# Patient Record
Sex: MALE | Race: WHITE | NOT HISPANIC OR LATINO | Employment: OTHER | URBAN - METROPOLITAN AREA
[De-identification: names, ages, dates, MRNs, and addresses within clinical notes are randomized per-mention and may not be internally consistent; named-entity substitution may affect disease eponyms.]

---

## 2017-01-06 ENCOUNTER — TELEPHONE (OUTPATIENT)
Dept: FAMILY MEDICINE | Facility: CLINIC | Age: 67
End: 2017-01-06

## 2017-01-06 DIAGNOSIS — K59.00 CONSTIPATION, UNSPECIFIED CONSTIPATION TYPE: ICD-10-CM

## 2017-01-06 DIAGNOSIS — R60.0 BILATERAL EDEMA OF LOWER EXTREMITY: ICD-10-CM

## 2017-01-06 DIAGNOSIS — E66.01 MORBID OBESITY, UNSPECIFIED OBESITY TYPE (H): Primary | ICD-10-CM

## 2017-01-06 NOTE — TELEPHONE ENCOUNTER
OKAY TO WAIT for Dr. Jamar Womack  Reason for Call:  Other call back    Detailed comments: 1.) Requesting approval of a referral to a dietician  2.)  Has seen two products advertised on TV and is wondering if they would be beneficial for Jamar.  (A.) Revitive for his leg circulation and (B.) Linzess for his constipation    Phone Number Patient can be reached at: Home number on file [phone] (home)    Best Time: When reviewed by Dr. Jamar Womack      Can we leave a detailed message on this number? YES    Call taken on 1/6/2017 at 4:12 PM by MIKEL GIL

## 2017-01-16 NOTE — TELEPHONE ENCOUNTER
theese were ordered. Will need to see if covered by  [employer] insurance. The dme order was printed out could be faxed to medical supply  Store. Please let patient know.     Jamar Womack MD

## 2017-01-17 NOTE — TELEPHONE ENCOUNTER
Referral done for dietician and pt notified.  Gavin was sent to pharmacy and pt notified.  Ok given to order Revitive machine

## 2017-01-20 ENCOUNTER — TRANSFERRED RECORDS (OUTPATIENT)
Dept: HEALTH INFORMATION MANAGEMENT | Facility: CLINIC | Age: 67
End: 2017-01-20

## 2017-01-24 ENCOUNTER — OFFICE VISIT (OUTPATIENT)
Dept: FAMILY MEDICINE | Facility: CLINIC | Age: 67
End: 2017-01-24
Payer: MEDICARE

## 2017-01-24 VITALS
BODY MASS INDEX: 47.74 KG/M2 | DIASTOLIC BLOOD PRESSURE: 74 MMHG | TEMPERATURE: 98.3 F | RESPIRATION RATE: 18 BRPM | SYSTOLIC BLOOD PRESSURE: 126 MMHG | HEART RATE: 74 BPM | OXYGEN SATURATION: 97 % | HEIGHT: 68 IN | WEIGHT: 315 LBS

## 2017-01-24 DIAGNOSIS — M79.604 PAIN IN BOTH LOWER EXTREMITIES: Primary | ICD-10-CM

## 2017-01-24 DIAGNOSIS — R60.9 EDEMA, UNSPECIFIED TYPE: ICD-10-CM

## 2017-01-24 DIAGNOSIS — M79.605 PAIN IN BOTH LOWER EXTREMITIES: Primary | ICD-10-CM

## 2017-01-24 PROCEDURE — 99213 OFFICE O/P EST LOW 20 MIN: CPT | Performed by: FAMILY MEDICINE

## 2017-01-24 RX ORDER — FUROSEMIDE 40 MG
TABLET ORAL
Qty: 90 TABLET | Refills: 3 | Status: SHIPPED | OUTPATIENT
Start: 2017-01-24 | End: 2017-05-25

## 2017-01-24 RX ORDER — HYDROCODONE BITARTRATE AND ACETAMINOPHEN 5; 325 MG/1; MG/1
TABLET ORAL
Qty: 70 TABLET | Refills: 0 | Status: SHIPPED | OUTPATIENT
Start: 2017-01-24 | End: 2017-08-31
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
## 2017-01-24 NOTE — MR AVS SNAPSHOT
"              After Visit Summary   2017    Jamar Ivory    MRN: [id number]           Patient Information     Date Of Birth          1950        Visit Information        Provider Department      2017 3:30 PM Jamar Womack MD [facility]        Today's Diagnoses     Pain in both lower extremities    -  1     Edema, unspecified type            Follow-ups after your visit        Who to contact     If you have questions or need follow up information about today's clinic visit or your schedule please contact [facility] directly at [phone].  Normal or non-critical lab and imaging results will be communicated to you by [employer], letter or phone within 4 business days after the clinic has received the results. If you do not hear from us within 7 days, please contact the clinic through [employer] or phone. If you have a critical or abnormal lab result, we will notify you by phone as soon as possible.  Submit refill requests through [employer] or call your pharmacy and they will forward the refill request to us. Please allow 3 business days for your refill to be completed.          Additional Information About Your Visit        MyChart Information     [employer] lets you send messages to your doctor, view your test results, renew your prescriptions, schedule appointments and more. To sign up, go to [email or web address] . Click on \"Log in\" on the left side of the screen, which will take you to the Welcome page. Then click on \"Sign up Now\" on the right side of the page.     You will be asked to enter the access code listed below, as well as some personal information. Please follow the directions to create your username and password.     Your access code is: [email or web address]  Expires: 2017  7:37 AM     Your access code will  in 90 days. If you need help or a new code, please call your [facility] or [phone].      " "  Care EveryWhere ID     This is your Care EveryWhere ID. This could be used by other organizations to access your [place] medical records  [id number]        Your Vitals Were     Pulse Temperature Respirations Height BMI (Body Mass Index) Pulse Oximetry    74 98.3  F (36.8  C) 18 5' 8\" (1.727 m) 53.23 kg/m2 97%       Blood Pressure from Last 3 Encounters:   01/24/17 126/74   12/21/16 134/80   12/09/16 114/70    Weight from Last 3 Encounters:   01/24/17 350 lb (158.759 kg)   12/21/16 359 lb (162.841 kg)   12/09/16 352 lb 8 oz (159.893 kg)              Today, you had the following     No orders found for display         Today's Medication Changes          These changes are accurate as of: 1/24/17 11:59 PM.  If you have any questions, ask your nurse or doctor.               These medicines have changed or have updated prescriptions.        Dose/Directions    aspirin 325 MG EC tablet   This may have changed:  how much to take   Used for:  CVA (cerebral infarction)        Dose:  325 mg   Take 1 tablet (325 mg) by mouth daily   Quantity:  90 tablet   Refills:  3       furosemide 40 MG tablet   Commonly known as:  LASIX   This may have changed:    - how much to take  - how to take this  - when to take this  - additional instructions   Used for:  Edema, unspecified type   Changed by:  Jamar Womack MD        New dose--2 in the morning and one in the evening.   Quantity:  90 tablet   Refills:  3       HYDROcodone-acetaminophen 5-325 MG per tablet   Commonly known as:  NORCO   This may have changed:    - how much to take  - how to take this  - when to take this  - reasons to take this  - additional instructions   Used for:  Pain in both lower extremities   Changed by:  Jamar Womack MD        1-2 tabs four times a day.   Quantity:  70 tablet   Refills:  0            Where to get your medicines      These medications were sent to [facility] [address] [place] [facility] [place], [place] - [address] " [place], [facility] [address]     Phone:  [phone]    - furosemide 40 MG tablet      Some of these will need a paper prescription and others can be bought over the counter.  Ask your nurse if you have questions.     Bring a paper prescription for each of these medications    - HYDROcodone-acetaminophen 5-325 MG per tablet             Primary Care Provider Office Phone # Fax #    Jamar Womack [phone]       [facility] [address]  [facility] [address]        Thank you!     Thank you for choosing [facility]  for your care. Our goal is always to provide you with excellent care. Hearing back from our patients is one way we can continue to improve our services. Please take a few minutes to complete the written survey that you may receive in the mail after your visit with us. Thank you!             Your Updated Medication List - Protect others around you: Learn how to safely use, store and throw away your medicines at www.disposemymeds.org.          This list is accurate as of: 1/24/17 11:59 PM.  Always use your most recent med list.                   Brand Name Dispense Instructions for use    acetaminophen 325 MG tablet    TYLENOL    100 tablet    Take 2 tablets (650 mg) by mouth every 6 hours as needed for mild pain       aspirin 325 MG EC tablet     90 tablet    Take 1 tablet (325 mg) by mouth daily       buPROPion 150 MG 24 hr tablet    WELLBUTRIN XL    30 tablet    Take 1 tablet (150 mg) by mouth every morning       * finasteride 5 MG tablet    PROSCAR    30 tablet    Take 1 tablet (5 mg) by mouth daily       * finasteride 5 MG tablet    PROSCAR    30 tablet    Take 1 tablet (5 mg) by mouth daily       furosemide 40 MG tablet    LASIX    90 tablet    New dose--2 in the morning and one in the evening.       gabapentin 100 MG capsule    NEURONTIN     Take 100 mg by mouth       Glucosamine-Chondroitin--200-150 MG Tabs           HYDROcodone-acetaminophen 5-325 MG per tablet    NORCO    70 tablet    1-2 tabs four times a day.       ibuprofen 800 MG tablet    ADVIL/MOTRIN    60 tablet    Take 1 tablet (800 mg) by mouth every 8 hours as needed for pain       indomethacin 50 MG capsule    INDOCIN    15 capsule    Take 1 capsule (50 mg) by mouth 3 times daily (with meals) for 5 days       levothyroxine 175 MCG tablet    SYNTHROID/LEVOTHROID    90 tablet    Take 1 tablet (175 mcg) by mouth daily       LEXAPRO 20 MG tablet   Generic drug:  escitalopram      Take 20 mg by mouth       linaclotide 145 MCG capsule    LINZESS    30 capsule    Take 1 capsule (145 mcg) by mouth every morning (before breakfast)       METOPROLOL SUCCINATE ER PO      Take 25 mg by mouth       order for DME     100 strip    Equipment being ordered: blood glucose monitor strips to use 2 to 4 times  A day. As covered by insurance. #100 with 5 refills  Monitor ordered this day  As well.  Also lancets #100 use as needed for testing 5 refils       potassium chloride SA 10 MEQ CR tablet    K-DUR/KLOR-CON M    30 tablet    Take 1 tablet (10 mEq) by mouth daily       * Notice:  This list has 2 medication(s) that are the same as other medications prescribed for you. Read the directions carefully, and ask your doctor or other care provider to review them with you.

## 2017-01-24 NOTE — NURSING NOTE
"Chief Complaint   Patient presents with     RECHECK       Initial /74 mmHg  Pulse 74  Temp(Src) 98.3  F (36.8  C)  Resp 18  Ht 5' 8\" (1.727 m)  Wt 350 lb (158.759 kg)  BMI 53.23 kg/m2  SpO2 97% Estimated body mass index is 53.23 kg/(m^2) as calculated from the following:    Height as of this encounter: 5' 8\" (1.727 m).    Weight as of this encounter: 350 lb (158.759 kg).  BP completed using cuff size: jassi Matson CMA      "

## 2017-02-07 ENCOUNTER — OFFICE VISIT (OUTPATIENT)
Dept: FAMILY MEDICINE | Facility: CLINIC | Age: 67
End: 2017-02-07
Payer: MEDICARE

## 2017-02-07 VITALS
BODY MASS INDEX: 51.86 KG/M2 | SYSTOLIC BLOOD PRESSURE: 112 MMHG | WEIGHT: 315 LBS | HEART RATE: 67 BPM | RESPIRATION RATE: 18 BRPM | OXYGEN SATURATION: 94 % | TEMPERATURE: 98.8 F | DIASTOLIC BLOOD PRESSURE: 70 MMHG

## 2017-02-07 DIAGNOSIS — I50.42 CHRONIC COMBINED SYSTOLIC AND DIASTOLIC CONGESTIVE HEART FAILURE (H): ICD-10-CM

## 2017-02-07 DIAGNOSIS — R06.02 SHORTNESS OF BREATH: ICD-10-CM

## 2017-02-07 DIAGNOSIS — Z23 NEED FOR PROPHYLACTIC VACCINATION AND INOCULATION AGAINST INFLUENZA: Primary | ICD-10-CM

## 2017-02-07 DIAGNOSIS — Z23 NEED FOR PROPHYLACTIC VACCINATION AGAINST STREPTOCOCCUS PNEUMONIAE (PNEUMOCOCCUS): ICD-10-CM

## 2017-02-07 LAB
ERYTHROCYTE [DISTWIDTH] IN BLOOD BY AUTOMATED COUNT: 14.5 % (ref 10–15)
HCT VFR BLD AUTO: 37 % (ref 40–53)
HGB BLD-MCNC: 12.3 G/DL (ref 13.3–17.7)
MCH RBC QN AUTO: 28 PG (ref 26.5–33)
MCHC RBC AUTO-ENTMCNC: 33.2 G/DL (ref 31.5–36.5)
MCV RBC AUTO: 84 FL (ref 78–100)
NT-PROBNP SERPL-MCNC: 89 PG/ML (ref 0–125)
PLATELET # BLD AUTO: 255 10E9/L (ref 150–450)
RBC # BLD AUTO: 4.4 10E12/L (ref 4.4–5.9)
WBC # BLD AUTO: 4 10E9/L (ref 4–11)

## 2017-02-07 PROCEDURE — 99214 OFFICE O/P EST MOD 30 MIN: CPT | Performed by: FAMILY MEDICINE

## 2017-02-07 PROCEDURE — 83880 ASSAY OF NATRIURETIC PEPTIDE: CPT | Performed by: FAMILY MEDICINE

## 2017-02-07 PROCEDURE — 36415 COLL VENOUS BLD VENIPUNCTURE: CPT | Performed by: FAMILY MEDICINE

## 2017-02-07 PROCEDURE — 85027 COMPLETE CBC AUTOMATED: CPT | Performed by: FAMILY MEDICINE

## 2017-02-07 PROCEDURE — 80048 BASIC METABOLIC PNL TOTAL CA: CPT | Performed by: FAMILY MEDICINE

## 2017-02-07 NOTE — NURSING NOTE
"Chief Complaint   Patient presents with     Insomnia     Depression     Chills       Initial /70 mmHg  Pulse 67  Temp(Src) 98.8  F (37.1  C)  Resp 18  Wt 341 lb (154.677 kg)  SpO2 94% Estimated body mass index is 51.86 kg/(m^2) as calculated from the following:    Height as of 1/24/17: 5' 8\" (1.727 m).    Weight as of this encounter: 341 lb (154.677 kg).  Medication Reconciliation: complete    "
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
## 2017-02-07 NOTE — MR AVS SNAPSHOT
"              After Visit Summary   2/7/2017    Jamar Ivory    MRN: [id number]           Patient Information     Date Of Birth          1950        Visit Information        Provider Department      2/7/2017 10:30 AM Jamar Womack MD [facility]        Today's Diagnoses     Need for prophylactic vaccination and inoculation against influenza    -  1     Need for prophylactic vaccination against Streptococcus pneumoniae (pneumococcus)         Chronic combined systolic and diastolic congestive heart failure (H)         Shortness of breath            Follow-ups after your visit        Who to contact     If you have questions or need follow up information about today's clinic visit or your schedule please contact [facility] directly at [phone].  Normal or non-critical lab and imaging results will be communicated to you by MyChart, letter or phone within 4 business days after the clinic has received the results. If you do not hear from us within 7 days, please contact the clinic through [employer] or phone. If you have a critical or abnormal lab result, we will notify you by phone as soon as possible.  Submit refill requests through [employer] or call your pharmacy and they will forward the refill request to us. Please allow 3 business days for your refill to be completed.          Additional Information About Your Visit        MyChart Information     [employer] lets you send messages to your doctor, view your test results, renew your prescriptions, schedule appointments and more. To sign up, go to [email or web address] . Click on \"Log in\" on the left side of the screen, which will take you to the Welcome page. Then click on \"Sign up Now\" on the right side of the page.     You will be asked to enter the access code listed below, as well as some personal information. Please follow the directions to create your username and " password.     Your access code is: [email or web address]  Expires: 2017  7:37 AM     Your access code will  in 90 days. If you need help or a new code, please call your [place] clinic or [phone].        Care EveryWhere ID     This is your Care EveryWhere ID. This could be used by other organizations to access your [place] medical records  [id number]        Your Vitals Were     Pulse Temperature Respirations Pulse Oximetry          67 98.8  F (37.1  C) 18 94%         Blood Pressure from Last 3 Encounters:   17 112/70   17 126/74   16 134/80    Weight from Last 3 Encounters:   17 341 lb (154.677 kg)   17 350 lb (158.759 kg)   16 359 lb (162.841 kg)              We Performed the Following     Basic metabolic panel     BNP-N terminal pro     CBC with platelets          Today's Medication Changes          These changes are accurate as of: 17  4:58 PM.  If you have any questions, ask your nurse or doctor.               These medicines have changed or have updated prescriptions.        Dose/Directions    aspirin 325 MG EC tablet   This may have changed:  how much to take   Used for:  CVA (cerebral infarction)        Dose:  325 mg   Take 1 tablet (325 mg) by mouth daily   Quantity:  90 tablet   Refills:  3                Primary Care Provider Office Phone # Fax #    Jamar Womack [phone]       [facility] [address] [facility] [address]        Thank you!     Thank you for choosing [facility]  for your care. Our goal is always to provide you with excellent care. Hearing back from our patients is one way we can continue to improve our services. Please take a few minutes to complete the written survey that you may receive in the mail after your visit with us. Thank you!             Your Updated Medication List - Protect others around you: Learn how to safely use, store and throw away your  medicines at www.disposemymeds.org.          This list is accurate as of: 2/7/17  4:58 PM.  Always use your most recent med list.                   Brand Name Dispense Instructions for use    acetaminophen 325 MG tablet    TYLENOL    100 tablet    Take 2 tablets (650 mg) by mouth every 6 hours as needed for mild pain       aspirin 325 MG EC tablet     90 tablet    Take 1 tablet (325 mg) by mouth daily       buPROPion 150 MG 24 hr tablet    WELLBUTRIN XL    30 tablet    Take 1 tablet (150 mg) by mouth every morning       * finasteride 5 MG tablet    PROSCAR    30 tablet    Take 1 tablet (5 mg) by mouth daily       * finasteride 5 MG tablet    PROSCAR    30 tablet    Take 1 tablet (5 mg) by mouth daily       furosemide 40 MG tablet    LASIX    90 tablet    New dose--2 in the morning and one in the evening.       gabapentin 100 MG capsule    NEURONTIN     Take 100 mg by mouth       Glucosamine-Chondroitin--200-150 MG Tabs          HYDROcodone-acetaminophen 5-325 MG per tablet    NORCO    70 tablet    1-2 tabs four times a day.       ibuprofen 800 MG tablet    ADVIL/MOTRIN    60 tablet    Take 1 tablet (800 mg) by mouth every 8 hours as needed for pain       indomethacin 50 MG capsule    INDOCIN    15 capsule    Take 1 capsule (50 mg) by mouth 3 times daily (with meals) for 5 days       levothyroxine 175 MCG tablet    SYNTHROID/LEVOTHROID    90 tablet    Take 1 tablet (175 mcg) by mouth daily       LEXAPRO 20 MG tablet   Generic drug:  escitalopram      Take 20 mg by mouth       linaclotide 145 MCG capsule    LINZESS    30 capsule    Take 1 capsule (145 mcg) by mouth every morning (before breakfast)       METOPROLOL SUCCINATE ER PO      Take 25 mg by mouth       order for DME     100 strip    Equipment being ordered: blood glucose monitor strips to use 2 to 4 times  A day. As covered by insurance. #100 with 5 refills  Monitor ordered this day  As well.  Also lancets #100 use as needed for testing 5 refils        potassium chloride SA 10 MEQ CR tablet    K-DUR/KLOR-CON M    30 tablet    Take 1 tablet (10 mEq) by mouth daily       * Notice:  This list has 2 medication(s) that are the same as other medications prescribed for you. Read the directions carefully, and ask your doctor or other care provider to review them with you.

## 2017-02-08 ENCOUNTER — TELEPHONE (OUTPATIENT)
Dept: FAMILY MEDICINE | Facility: CLINIC | Age: 67
End: 2017-02-08

## 2017-02-08 LAB
ANION GAP SERPL CALCULATED.3IONS-SCNC: 7 MMOL/L (ref 3–14)
BUN SERPL-MCNC: 18 MG/DL (ref 7–30)
CALCIUM SERPL-MCNC: 8.8 MG/DL (ref 8.5–10.1)
CHLORIDE SERPL-SCNC: 103 MMOL/L (ref 94–109)
CO2 SERPL-SCNC: 29 MMOL/L (ref 20–32)
CREAT SERPL-MCNC: 0.9 MG/DL (ref 0.66–1.25)
GFR SERPL CREATININE-BSD FRML MDRD: 84 ML/MIN/1.7M2
GLUCOSE SERPL-MCNC: 99 MG/DL (ref 70–99)
POTASSIUM SERPL-SCNC: 4.5 MMOL/L (ref 3.4–5.3)
SODIUM SERPL-SCNC: 139 MMOL/L (ref 133–144)

## 2017-02-08 NOTE — TELEPHONE ENCOUNTER
Pt's wife was called reports pt he has diarrhea. 6 times today while using Linzess. HC -instructions given. Push fluids, bland food . Hold medication until further directed by PCP.   Wife verbalized understanding and agreement with plan. Will wait to hear from PCP if he is to discontinue or resume Rx when symptoms improved.

## 2017-02-08 NOTE — TELEPHONE ENCOUNTER
Patients wife salvador called stating pt was given LINZESS for constipation and now has been having Diarrhea. Has has about 6 episodes today.  Please call.    Magy Martínez TC

## 2017-02-15 ENCOUNTER — TELEPHONE (OUTPATIENT)
Dept: FAMILY MEDICINE | Facility: CLINIC | Age: 67
End: 2017-02-15

## 2017-02-15 DIAGNOSIS — E87.6 HYPOKALEMIA: Primary | ICD-10-CM

## 2017-02-24 NOTE — PROGRESS NOTES
Result was abnormal The anemia test is low. The other tests are ok. .      Please let patient know by calling  or sending a  letter.

## 2017-03-01 DIAGNOSIS — E03.9 ACQUIRED HYPOTHYROIDISM: Chronic | ICD-10-CM

## 2017-03-01 NOTE — TELEPHONE ENCOUNTER
Levothyroxine 175 mcg     Last Written Prescription Date: 12/3/16  Last Quantity: 90, # refills: 0  Last Office Visit with G, P or [facility] prescribing provider: 2/7/17        TSH   Date Value Ref Range Status   11/28/2016 8.82 (H) 0.40 - 5.00 mU/L Final

## 2017-03-02 DIAGNOSIS — E03.9 ACQUIRED HYPOTHYROIDISM: Chronic | ICD-10-CM

## 2017-03-02 LAB
T4 FREE SERPL-MCNC: 0.95 NG/DL (ref 0.76–1.46)
TSH SERPL DL<=0.005 MIU/L-ACNC: 9.03 MU/L (ref 0.4–4)

## 2017-03-02 PROCEDURE — 36415 COLL VENOUS BLD VENIPUNCTURE: CPT | Performed by: PHYSICIAN ASSISTANT

## 2017-03-02 PROCEDURE — 84439 ASSAY OF FREE THYROXINE: CPT | Performed by: PHYSICIAN ASSISTANT

## 2017-03-02 PROCEDURE — 84443 ASSAY THYROID STIM HORMONE: CPT | Performed by: PHYSICIAN ASSISTANT

## 2017-03-02 RX ORDER — LEVOTHYROXINE SODIUM 175 UG/1
TABLET ORAL
Qty: 30 TABLET | Refills: 0 | Status: SHIPPED | OUTPATIENT
Start: 2017-03-02 | End: 2017-08-31 | Stop reason: DRUGHIGH

## 2017-03-02 NOTE — TELEPHONE ENCOUNTER
Patient overdue for TSH recheck. Lab only appt scheduled.  Medication is being filled for 1 time refill only due to:  Patient needs labs for more refills.  Appt was scheduled.

## 2017-03-02 NOTE — LETTER
[facility]  [address]  [place] [address]  [phone]                                                                                                           Jamar Ivory  [address] [place] [address]    March 6, 2017      Dear Jamar,    The results of your recent tests were reviewed and are enclosed.   Result was abnormal The tsh thyroid test is a little abnormal.  We need to increase the thyroid pill, and I have sent a prescription for the new dose to the pharmacy.  Then we can recheck after your on the new dose for 5-6 weeks  Results for orders placed or performed in visit on 03/02/17   TSH with free T4 reflex   Result Value Ref Range    TSH 9.03 (H) 0.40 - 4.00 mU/L   T4 free   Result Value Ref Range    T4 Free 0.95 0.76 - 1.46 ng/dL       Thank you for choosing [facility].  We appreciate the opportunity to serve you and look forward to supporting your healthcare needs in the future.    If you have any questions or concerns, please call me or my staff at [phone].      Sincerely,    MARTIN Morales

## 2017-03-04 RX ORDER — LEVOTHYROXINE SODIUM 200 UG/1
200 TABLET ORAL DAILY
Qty: 90 TABLET | Refills: 1 | Status: SHIPPED | OUTPATIENT
Start: 2017-03-04 | End: 2017-11-24

## 2017-03-04 NOTE — PROGRESS NOTES
Result was abnormal The tsh thyroid test is a little abnormal.  We need to increase the thyroid pill, and I have sent a prescription for the new dose to the pharmacy.  Then we can recheck after your on the new dose for 5-6 weeks.       Please let patient know by calling  or sending a  letter.

## 2017-05-05 DIAGNOSIS — I10 ESSENTIAL HYPERTENSION: Primary | ICD-10-CM

## 2017-05-08 RX ORDER — METOPROLOL SUCCINATE 25 MG/1
TABLET, EXTENDED RELEASE ORAL
Qty: 90 TABLET | Refills: 2 | Status: SHIPPED | OUTPATIENT
Start: 2017-05-08 | End: 2018-01-05

## 2017-05-08 NOTE — TELEPHONE ENCOUNTER
METOPROLOL SUCCINATE ER PO      Last Written Prescription Date: Historical  Last Fill Quantity: na, # refills: na    Last Office Visit with G, P or [facility] prescribing provider:  2/7/17   Future Office Visit:        BP Readings from Last 3 Encounters:   02/07/17 112/70   01/24/17 126/74   12/21/16 134/80

## 2017-05-25 ENCOUNTER — OFFICE VISIT (OUTPATIENT)
Dept: FAMILY MEDICINE | Facility: CLINIC | Age: 67
End: 2017-05-25
Payer: MEDICARE

## 2017-05-25 VITALS
DIASTOLIC BLOOD PRESSURE: 70 MMHG | RESPIRATION RATE: 18 BRPM | WEIGHT: 308 LBS | OXYGEN SATURATION: 93 % | SYSTOLIC BLOOD PRESSURE: 116 MMHG | BODY MASS INDEX: 46.83 KG/M2 | HEART RATE: 73 BPM | TEMPERATURE: 98.1 F

## 2017-05-25 DIAGNOSIS — R60.9 EDEMA, UNSPECIFIED TYPE: ICD-10-CM

## 2017-05-25 DIAGNOSIS — E11.9 DIABETES MELLITUS WITHOUT COMPLICATION (H): ICD-10-CM

## 2017-05-25 DIAGNOSIS — J20.9 ACUTE BRONCHITIS, UNSPECIFIED ORGANISM: ICD-10-CM

## 2017-05-25 DIAGNOSIS — Z23 NEED FOR PROPHYLACTIC VACCINATION AGAINST STREPTOCOCCUS PNEUMONIAE (PNEUMOCOCCUS): Primary | ICD-10-CM

## 2017-05-25 LAB
ANION GAP SERPL CALCULATED.3IONS-SCNC: 8 MMOL/L (ref 3–14)
BUN SERPL-MCNC: 6 MG/DL (ref 7–30)
CALCIUM SERPL-MCNC: 9.5 MG/DL (ref 8.5–10.1)
CHLORIDE SERPL-SCNC: 97 MMOL/L (ref 94–109)
CO2 SERPL-SCNC: 33 MMOL/L (ref 20–32)
CREAT SERPL-MCNC: 0.77 MG/DL (ref 0.66–1.25)
GFR SERPL CREATININE-BSD FRML MDRD: ABNORMAL ML/MIN/1.7M2
GLUCOSE SERPL-MCNC: 85 MG/DL (ref 70–99)
HBA1C MFR BLD: 5.7 % (ref 4.3–6)
POTASSIUM SERPL-SCNC: 2.8 MMOL/L (ref 3.4–5.3)
SODIUM SERPL-SCNC: 138 MMOL/L (ref 133–144)

## 2017-05-25 PROCEDURE — 99214 OFFICE O/P EST MOD 30 MIN: CPT | Performed by: FAMILY MEDICINE

## 2017-05-25 PROCEDURE — 83036 HEMOGLOBIN GLYCOSYLATED A1C: CPT | Performed by: FAMILY MEDICINE

## 2017-05-25 PROCEDURE — 36415 COLL VENOUS BLD VENIPUNCTURE: CPT | Performed by: FAMILY MEDICINE

## 2017-05-25 PROCEDURE — 80048 BASIC METABOLIC PNL TOTAL CA: CPT | Performed by: FAMILY MEDICINE

## 2017-05-25 RX ORDER — FUROSEMIDE 40 MG
TABLET ORAL
Qty: 90 TABLET | Refills: 3 | Status: SHIPPED | OUTPATIENT
Start: 2017-05-25 | End: 2017-08-31

## 2017-05-25 RX ORDER — MUPIROCIN CALCIUM 20 MG/G
CREAM TOPICAL 3 TIMES DAILY
Qty: 30 G | Refills: 1 | Status: SHIPPED | OUTPATIENT
Start: 2017-05-25 | End: 2017-08-31

## 2017-05-25 RX ORDER — AZITHROMYCIN 250 MG/1
TABLET, FILM COATED ORAL
Qty: 6 TABLET | Refills: 0 | Status: SHIPPED | OUTPATIENT
Start: 2017-05-25 | End: 2017-07-19

## 2017-05-25 NOTE — NURSING NOTE
"Chief Complaint   Patient presents with     Cough       Initial /70  Pulse 73  Temp 98.1  F (36.7  C)  Resp 18  Wt (!) 308 lb (139.7 kg)  SpO2 93%  BMI 46.83 kg/m2 Estimated body mass index is 46.83 kg/(m^2) as calculated from the following:    Height as of 1/24/17: 5' 8\" (1.727 m).    Weight as of this encounter: 308 lb (139.7 kg).  Medication Reconciliation: karthikeyan Matson CMA      "

## 2017-05-25 NOTE — LETTER
[facility]  [address]  [address]  [place] [facility] [place] [address] [phone]                                                                                                           Jamar Ivory  [address]   [place] [address]    July 10, 2017      Dear Jamar,    The results of your recent tests were reviewed and are enclosed.   Result was abnormal. Last potassium was too low please schedule a follow up eval/appointment. .    Results for orders placed or performed in visit on 05/25/17   HEMOGLOBIN A1C   Result Value Ref Range    Hemoglobin A1C 5.7 4.3 - 6.0 %   Basic metabolic panel   Result Value Ref Range    Sodium 138 133 - 144 mmol/L    Potassium 2.8 (L) 3.4 - 5.3 mmol/L    Chloride 97 94 - 109 mmol/L    Carbon Dioxide 33 (H) 20 - 32 mmol/L    Anion Gap 8 3 - 14 mmol/L    Glucose 85 70 - 99 mg/dL    Urea Nitrogen 6 (L) 7 - 30 mg/dL    Creatinine 0.77 0.66 - 1.25 mg/dL    GFR Estimate >90  Non  GFR Calc   >60 mL/min/1.7m2    GFR Estimate If Black >90   GFR Calc   >60 mL/min/1.7m2    Calcium 9.5 8.5 - 10.1 mg/dL       Thank you for choosing [facility].  We appreciate the opportunity to serve you and look forward to supporting your healthcare needs in the future.    If you have any questions or concerns, please call me or my staff at [phone].      Sincerely,    Jamar Womack MD
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
## 2017-05-29 ENCOUNTER — TELEPHONE (OUTPATIENT)
Dept: FAMILY MEDICINE | Facility: CLINIC | Age: 67
End: 2017-05-29

## 2017-05-29 NOTE — TELEPHONE ENCOUNTER
Potassium is low take 3 times a day, not all at once, morning noon and evening. Recheck potassium thurs or Friday as quite low.   Jamar Womack MD

## 2017-07-09 NOTE — PROGRESS NOTES
Result was abnormal last potassium was too low please contact him to have a follow up eval/appointment. .      Please let patient know by calling  or sending a  letter.

## 2017-07-19 ENCOUNTER — TELEPHONE (OUTPATIENT)
Dept: FAMILY MEDICINE | Facility: CLINIC | Age: 67
End: 2017-07-19

## 2017-07-19 ENCOUNTER — OFFICE VISIT (OUTPATIENT)
Dept: FAMILY MEDICINE | Facility: CLINIC | Age: 67
End: 2017-07-19
Payer: MEDICARE

## 2017-07-19 VITALS
WEIGHT: 315 LBS | OXYGEN SATURATION: 96 % | HEART RATE: 85 BPM | DIASTOLIC BLOOD PRESSURE: 76 MMHG | BODY MASS INDEX: 47.74 KG/M2 | RESPIRATION RATE: 20 BRPM | TEMPERATURE: 97.5 F | SYSTOLIC BLOOD PRESSURE: 146 MMHG | HEIGHT: 68 IN

## 2017-07-19 DIAGNOSIS — R60.0 BILATERAL LEG EDEMA: ICD-10-CM

## 2017-07-19 DIAGNOSIS — R60.9 EDEMA, UNSPECIFIED TYPE: Primary | ICD-10-CM

## 2017-07-19 DIAGNOSIS — L03.90 CELLULITIS, UNSPECIFIED CELLULITIS SITE: ICD-10-CM

## 2017-07-19 DIAGNOSIS — E11.9 DIABETES MELLITUS WITHOUT COMPLICATION (H): ICD-10-CM

## 2017-07-19 LAB
ANION GAP SERPL CALCULATED.3IONS-SCNC: 8 MMOL/L (ref 3–14)
BUN SERPL-MCNC: 19 MG/DL (ref 7–30)
CALCIUM SERPL-MCNC: 9 MG/DL (ref 8.5–10.1)
CHLORIDE SERPL-SCNC: 108 MMOL/L (ref 94–109)
CO2 SERPL-SCNC: 25 MMOL/L (ref 20–32)
CREAT SERPL-MCNC: 0.86 MG/DL (ref 0.66–1.25)
GFR SERPL CREATININE-BSD FRML MDRD: 88 ML/MIN/1.7M2
GLUCOSE SERPL-MCNC: 87 MG/DL (ref 70–99)
HBA1C MFR BLD: 5.5 % (ref 4.3–6)
POTASSIUM SERPL-SCNC: 4.3 MMOL/L (ref 3.4–5.3)
SODIUM SERPL-SCNC: 141 MMOL/L (ref 133–144)

## 2017-07-19 PROCEDURE — 36415 COLL VENOUS BLD VENIPUNCTURE: CPT | Performed by: FAMILY MEDICINE

## 2017-07-19 PROCEDURE — 99214 OFFICE O/P EST MOD 30 MIN: CPT | Performed by: FAMILY MEDICINE

## 2017-07-19 PROCEDURE — 80048 BASIC METABOLIC PNL TOTAL CA: CPT | Performed by: FAMILY MEDICINE

## 2017-07-19 PROCEDURE — 83036 HEMOGLOBIN GLYCOSYLATED A1C: CPT | Performed by: FAMILY MEDICINE

## 2017-07-19 RX ORDER — POTASSIUM CHLORIDE 750 MG/1
10 TABLET, EXTENDED RELEASE ORAL 3 TIMES DAILY
Qty: 90 TABLET | Refills: 5 | Status: SHIPPED | OUTPATIENT
Start: 2017-07-19 | End: 2018-01-05

## 2017-07-19 RX ORDER — MUPIROCIN 20 MG/G
OINTMENT TOPICAL 3 TIMES DAILY
Qty: 60 G | Refills: 1 | Status: SHIPPED | OUTPATIENT
Start: 2017-07-19 | End: 2017-07-24

## 2017-07-19 RX ORDER — FUROSEMIDE 80 MG
80 TABLET ORAL 2 TIMES DAILY
Qty: 60 TABLET | Refills: 1 | Status: SHIPPED | OUTPATIENT
Start: 2017-07-19 | End: 2017-10-10

## 2017-07-19 NOTE — TELEPHONE ENCOUNTER
SLIME---Patients wife called to let you know that patient is also using Micro Klenz Antibiotic Spray on his wounds QD to TID and Sween-24 Moisturizer Body Cream on his leg QD.
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
## 2017-07-19 NOTE — LETTER
Jamar Shlomo Cachorro  [address]   [place] [address]        July 24, 2017          Dear ,    We are writing to inform you of your test results.    Results are normal.    Resulted Orders   Basic metabolic panel   Result Value Ref Range    Sodium 141 133 - 144 mmol/L    Potassium 4.3 3.4 - 5.3 mmol/L    Chloride 108 94 - 109 mmol/L    Carbon Dioxide 25 20 - 32 mmol/L    Anion Gap 8 3 - 14 mmol/L    Glucose 87 70 - 99 mg/dL      Comment:      Non Fasting    Urea Nitrogen 19 7 - 30 mg/dL    Creatinine 0.86 0.66 - 1.25 mg/dL    GFR Estimate 88 >60 mL/min/1.7m2      Comment:      Non  GFR Calc    GFR Estimate If Black >90   GFR Calc   >60 mL/min/1.7m2    Calcium 9.0 8.5 - 10.1 mg/dL   Hemoglobin A1c   Result Value Ref Range    Hemoglobin A1C 5.5 4.3 - 6.0 %       If you have any questions or concerns, please call the clinic at the number listed above.       Sincerely,        Jamar Womack MD

## 2017-07-19 NOTE — MR AVS SNAPSHOT
"              After Visit Summary   7/19/2017    Jamar Ivory    MRN: [id number]           Patient Information     Date Of Birth          1950        Visit Information        Provider Department      7/19/2017 9:45 AM Jamar Womack MD [facility]        Today's Diagnoses     Edema, unspecified type    -  1    Bilateral leg edema        Cellulitis, unspecified cellulitis site        Diabetes mellitus without complication (H)           Follow-ups after your visit        Who to contact     If you have questions or need follow up information about today's clinic visit or your schedule please contact [facility] directly at [phone].  Normal or non-critical lab and imaging results will be communicated to you by MyChart, letter or phone within 4 business days after the clinic has received the results. If you do not hear from us within 7 days, please contact the clinic through MyChart or phone. If you have a critical or abnormal lab result, we will notify you by phone as soon as possible.  Submit refill requests through [employer] or call your pharmacy and they will forward the refill request to us. Please allow 3 business days for your refill to be completed.          Additional Information About Your Visit        Care EveryWhere ID     This is your Care EveryWhere ID. This could be used by other organizations to access your [facility] medical records  [id number]        Your Vitals Were     Pulse Temperature Respirations Height Pulse Oximetry BMI (Body Mass Index)    85 97.5  F (36.4  C) 20 5' 8\" (1.727 m) 96% 49.42 kg/m2       Blood Pressure from Last 3 Encounters:   07/19/17 146/76   05/25/17 116/70   02/07/17 112/70    Weight from Last 3 Encounters:   07/19/17 (!) 325 lb (147.4 kg)   05/25/17 (!) 308 lb (139.7 kg)   02/07/17 (!) 341 lb (154.7 kg)              We Performed the Following     Basic metabolic panel     Hemoglobin A1c        "   Today's Medication Changes          These changes are accurate as of: 7/19/17 11:49 AM.  If you have any questions, ask your nurse or doctor.               Start taking these medicines.        Dose/Directions    mupirocin 2 % ointment   Commonly known as:  BACTROBAN   Used for:  Cellulitis, unspecified cellulitis site   Started by:  Jamar Womack MD        Apply topically 3 times daily for 5 days   Quantity:  60 g   Refills:  1         These medicines have changed or have updated prescriptions.        Dose/Directions    aspirin 325 MG EC tablet   This may have changed:  how much to take   Used for:  CVA (cerebral infarction)        Dose:  325 mg   Take 1 tablet (325 mg) by mouth daily   Quantity:  90 tablet   Refills:  3       * furosemide 40 MG tablet   Commonly known as:  LASIX   This may have changed:  Another medication with the same name was added. Make sure you understand how and when to take each.   Used for:  Edema, unspecified type   Changed by:  Jamar Womack MD        New dose--2 in the morning and one in the evening.   Quantity:  90 tablet   Refills:  3       * furosemide 80 MG tablet   Commonly known as:  LASIX   This may have changed:  You were already taking a medication with the same name, and this prescription was added. Make sure you understand how and when to take each.   Used for:  Edema, unspecified type   Changed by:  Jamar Womack MD        Dose:  80 mg   Take 1 tablet (80 mg) by mouth 2 times daily   Quantity:  60 tablet   Refills:  1       potassium chloride SA 10 MEQ CR tablet   Commonly known as:  K-DUR/KLOR-CON M   This may have changed:  when to take this   Used for:  Bilateral leg edema   Changed by:  Jamar Womack MD        Dose:  10 mEq   Take 1 tablet (10 mEq) by mouth 3 times daily   Quantity:  90 tablet   Refills:  5       * Notice:  This list has 2 medication(s) that are the same as other medications prescribed for you. Read the directions  carefully, and ask your doctor or other care provider to review them with you.         Where to get your medicines      These medications were sent to [place] [id number] [facility] - [place], [place] - [address] [facility] [address], [facility] [address]     Phone:  [phone]     furosemide 80 MG tablet    mupirocin 2 % ointment    potassium chloride SA 10 MEQ CR tablet                Primary Care Provider Office Phone # Fax #    Jamar Womack [phone]       [facility] [address] [facility] [address]        Equal Access to Services     JOSE ANTONIO [facility] : Hadii aad ku hadasho Soomaali, waaxda luqadaha, qaybta kaalmada adeegyada, waxay idiin hayaan adeeg kharajoelle reich . So [facility] [phone].    ATENCIÓN: Si habla español, tiene a shook disposición servicios gratuitos de asistencia lingüística. Llame al [phone].    We comply with applicable federal civil rights laws and [place] laws. We do not discriminate on the basis of race, color, national origin, age, disability sex, sexual orientation or gender identity.            Thank you!     Thank you for choosing [facility]  for your care. Our goal is always to provide you with excellent care. Hearing back from our patients is one way we can continue to improve our services. Please take a few minutes to complete the written survey that you may receive in the mail after your visit with us. Thank you!             Your Updated Medication List - Protect others around you: Learn how to safely use, store and throw away your medicines at www.disposemymeds.org.          This list is accurate as of: 7/19/17 11:49 AM.  Always use your most recent med list.                   Brand Name Dispense Instructions for use Diagnosis    acetaminophen 325 MG tablet    TYLENOL    100 tablet    Take 2 tablets (650 mg) by mouth every 6 hours as needed for mild pain    Headache(784.0)       aspirin 325 MG EC  tablet     90 tablet    Take 1 tablet (325 mg) by mouth daily    CVA (cerebral infarction)       buPROPion 150 MG 24 hr tablet    WELLBUTRIN XL    30 tablet    Take 1 tablet (150 mg) by mouth every morning    Depression       finasteride 5 MG tablet    PROSCAR    30 tablet    Take 1 tablet (5 mg) by mouth daily    Benign prostatic hyperplasia with lower urinary tract symptoms, unspecified morphology       * furosemide 40 MG tablet    LASIX    90 tablet    New dose--2 in the morning and one in the evening.    Edema, unspecified type       * furosemide 80 MG tablet    LASIX    60 tablet    Take 1 tablet (80 mg) by mouth 2 times daily    Edema, unspecified type       gabapentin 100 MG capsule    NEURONTIN     Take 100 mg by mouth        Glucosamine-Chondroitin--200-150 MG Tabs           HYDROcodone-acetaminophen 5-325 MG per tablet    NORCO    70 tablet    1-2 tabs four times a day.    Pain in both lower extremities       ibuprofen 800 MG tablet    ADVIL/MOTRIN    60 tablet    Take 1 tablet (800 mg) by mouth every 8 hours as needed for pain    Pain in right extremity, unspecified extremity       indomethacin 50 MG capsule    INDOCIN    15 capsule    Take 1 capsule (50 mg) by mouth 3 times daily (with meals) for 5 days    Acute gouty arthritis       * levothyroxine 175 MCG tablet    SYNTHROID/LEVOTHROID    30 tablet    TAKE 1 TABLET (175 MCG) BY MOUTH DAILY    Acquired hypothyroidism       * levothyroxine 200 MCG tablet    SYNTHROID/LEVOTHROID    90 tablet    Take 1 tablet (200 mcg) by mouth daily    Acquired hypothyroidism       LEXAPRO 20 MG tablet   Generic drug:  escitalopram      Take 20 mg by mouth        linaclotide 145 MCG capsule    LINZESS    30 capsule    Take 1 capsule (145 mcg) by mouth every morning (before breakfast)    Constipation, unspecified constipation type       metoprolol 25 MG 24 hr tablet    TOPROL-XL    90 tablet    TAKE ONE TABLET BY MOUTH ONE TIME DAILY    Essential hypertension        mupirocin 2 % cream    BACTROBAN    30 g    Apply topically 3 times daily    Edema, unspecified type       mupirocin 2 % ointment    BACTROBAN    60 g    Apply topically 3 times daily for 5 days    Cellulitis, unspecified cellulitis site       order for DME     100 strip    Equipment being ordered: blood glucose monitor strips to use 2 to 4 times  A day. As covered by insurance. #100 with 5 refills  Monitor ordered this day  As well.  Also lancets #100 use as needed for testing 5 refils    Diabetes mellitus, type 2 (H)       potassium chloride SA 10 MEQ CR tablet    K-DUR/KLOR-CON M    90 tablet    Take 1 tablet (10 mEq) by mouth 3 times daily    Bilateral leg edema       * Notice:  This list has 4 medication(s) that are the same as other medications prescribed for you. Read the directions carefully, and ask your doctor or other care provider to review them with you.

## 2017-07-19 NOTE — NURSING NOTE
"Chief Complaint   Patient presents with     Edema       Initial /76 (BP Location: Left arm, Patient Position: Chair, Cuff Size: Adult Large)  Pulse 85  Temp 97.5  F (36.4  C)  Resp 20  Ht 5' 8\" (1.727 m)  Wt (!) 325 lb (147.4 kg)  SpO2 96%  BMI 49.42 kg/m2 Estimated body mass index is 49.42 kg/(m^2) as calculated from the following:    Height as of this encounter: 5' 8\" (1.727 m).    Weight as of this encounter: 325 lb (147.4 kg).  Medication Reconciliation: complete   Macarena Barajas LPN  "

## 2017-07-31 ENCOUNTER — TELEPHONE (OUTPATIENT)
Dept: FAMILY MEDICINE | Facility: CLINIC | Age: 67
End: 2017-07-31

## 2017-08-16 ENCOUNTER — OFFICE VISIT (OUTPATIENT)
Dept: FAMILY MEDICINE | Facility: CLINIC | Age: 67
End: 2017-08-16
Payer: MEDICARE

## 2017-08-16 VITALS
HEIGHT: 68 IN | WEIGHT: 315 LBS | BODY MASS INDEX: 47.74 KG/M2 | RESPIRATION RATE: 20 BRPM | TEMPERATURE: 97.2 F | SYSTOLIC BLOOD PRESSURE: 118 MMHG | OXYGEN SATURATION: 95 % | DIASTOLIC BLOOD PRESSURE: 72 MMHG | HEART RATE: 66 BPM

## 2017-08-16 DIAGNOSIS — F32.A DEPRESSION, UNSPECIFIED DEPRESSION TYPE: ICD-10-CM

## 2017-08-16 DIAGNOSIS — E03.9 ACQUIRED HYPOTHYROIDISM: Chronic | ICD-10-CM

## 2017-08-16 DIAGNOSIS — N64.4 BREAST PAIN: Primary | ICD-10-CM

## 2017-08-16 DIAGNOSIS — N52.9 ERECTILE DYSFUNCTION, UNSPECIFIED ERECTILE DYSFUNCTION TYPE: ICD-10-CM

## 2017-08-16 DIAGNOSIS — R60.9 EDEMA, UNSPECIFIED TYPE: ICD-10-CM

## 2017-08-16 DIAGNOSIS — E11.9 TYPE 2 DIABETES MELLITUS WITHOUT COMPLICATION, WITHOUT LONG-TERM CURRENT USE OF INSULIN (H): Chronic | ICD-10-CM

## 2017-08-16 LAB
ANION GAP SERPL CALCULATED.3IONS-SCNC: 11 MMOL/L (ref 3–14)
BUN SERPL-MCNC: 21 MG/DL (ref 7–30)
CALCIUM SERPL-MCNC: 8.9 MG/DL (ref 8.5–10.1)
CHLORIDE SERPL-SCNC: 103 MMOL/L (ref 94–109)
CO2 SERPL-SCNC: 25 MMOL/L (ref 20–32)
CREAT SERPL-MCNC: 0.9 MG/DL (ref 0.66–1.25)
GFR SERPL CREATININE-BSD FRML MDRD: 84 ML/MIN/1.7M2
GLUCOSE SERPL-MCNC: 100 MG/DL (ref 70–99)
POTASSIUM SERPL-SCNC: 3.7 MMOL/L (ref 3.4–5.3)
PROLACTIN SERPL-MCNC: 10 UG/L (ref 2–18)
SODIUM SERPL-SCNC: 139 MMOL/L (ref 133–144)
TSH SERPL DL<=0.005 MIU/L-ACNC: 2.19 MU/L (ref 0.4–4)

## 2017-08-16 PROCEDURE — 84443 ASSAY THYROID STIM HORMONE: CPT | Performed by: FAMILY MEDICINE

## 2017-08-16 PROCEDURE — 84146 ASSAY OF PROLACTIN: CPT | Performed by: FAMILY MEDICINE

## 2017-08-16 PROCEDURE — 84270 ASSAY OF SEX HORMONE GLOBUL: CPT | Performed by: FAMILY MEDICINE

## 2017-08-16 PROCEDURE — 36415 COLL VENOUS BLD VENIPUNCTURE: CPT | Performed by: FAMILY MEDICINE

## 2017-08-16 PROCEDURE — 99214 OFFICE O/P EST MOD 30 MIN: CPT | Performed by: FAMILY MEDICINE

## 2017-08-16 PROCEDURE — 80048 BASIC METABOLIC PNL TOTAL CA: CPT | Performed by: FAMILY MEDICINE

## 2017-08-16 PROCEDURE — 84403 ASSAY OF TOTAL TESTOSTERONE: CPT | Performed by: FAMILY MEDICINE

## 2017-08-16 RX ORDER — BUPROPION HYDROCHLORIDE 300 MG/1
300 TABLET ORAL EVERY MORNING
Qty: 90 TABLET | Refills: 1 | Status: SHIPPED | OUTPATIENT
Start: 2017-08-16 | End: 2017-08-31 | Stop reason: ALTCHOICE

## 2017-08-16 ASSESSMENT — PATIENT HEALTH QUESTIONNAIRE - PHQ9
SUM OF ALL RESPONSES TO PHQ QUESTIONS 1-9: 11
SUM OF ALL RESPONSES TO PHQ QUESTIONS 1-9: 11

## 2017-08-16 NOTE — LETTER
My Depression Action Plan  Name: Jamar Ivory   Date of Birth 1950  Date: 8/16/2017    My doctor: Jamar Womack   My clinic: [address] [address]  [phone]          GREEN    ZONE   Good Control    What it looks like:     Things are going generally well. You have normal up s and down s. You may even feel depressed from time to time, but bad moods usually last less than a day.   What you need to do:  1. Continue to care for yourself (see self care plan)  2. Check your depression survival kit and update it as needed  3. Follow your physician s recommendations including any medication.  4. Do not stop taking medication unless you consult with your physician first.           YELLOW         ZONE Getting Worse    What it looks like:     Depression is starting to interfere with your life.     It may be hard to get out of bed; you may be starting to isolate yourself from others.    Symptoms of depression are starting to last most all day and this has happened for several days.     You may have suicidal thoughts but they are not constant.   What you need to do:     1. Call your care team, your response to treatment will improve if you keep your care team informed of your progress. Yellow periods are signs an adjustment may need to be made.     2. Continue your self-care, even if you have to fake it!    3. Talk to someone in your support network    4. Open up your depression survival kit           RED    ZONE Medical Alert - Get Help    What it looks like:     Depression is seriously interfering with your life.     You may experience these or other symptoms: You can t get out of bed most days, can t work or engage in other necessary activities, you have trouble taking care of basic hygiene, or basic responsibilities, thoughts of suicide or death that will not go away, self-injurious behavior.     What you  need to do:  1. Call your care team and request a same-day appointment. If they are not available (weekends or after hours) call your local crisis line, emergency room or 911.      Electronically signed by: Danelle Kang, August 16, 2017    Depression Self Care Plan / Survival Kit    Self-Care for Depression  Here s the deal. Your body and mind are really not as separate as most people think.  What you do and think affects how you feel and how you feel influences what you do and think. This means if you do things that people who feel good do, it will help you feel better.  Sometimes this is all it takes.  There is also a place for medication and therapy depending on how severe your depression is, so be sure to consult with your medical provider and/ or Behavioral Health Consultant if your symptoms are worsening or not improving.     In order to better manage my stress, I will:    Exercise  Get some form of exercise, every day. This will help reduce pain and release endorphins, the  feel good  chemicals in your brain. This is almost as good as taking antidepressants!  This is not the same as joining a gym and then never going! (they count on that by the way ) It can be as simple as just going for a walk or doing some gardening, anything that will get you moving.      Hygiene   Maintain good hygiene (Get out of bed in the morning, Make your bed, Brush your teeth, Take a shower, and Get dressed like you were going to work, even if you are unemployed).  If your clothes don't fit try to get ones that do.    Diet  I will strive to eat foods that are good for me, drink plenty of water, and avoid excessive sugar, caffeine, alcohol, and other mood-altering substances.  Some foods that are helpful in depression are: complex carbohydrates, B vitamins, flaxseed, fish or fish oil, fresh fruits and vegetables.    Psychotherapy  I agree to participate in Individual Therapy (if recommended).    Medication  If prescribed  medications, I agree to take them.  Missing doses can result in serious side effects.  I understand that drinking alcohol, or other illicit drug use, may cause potential side effects.  I will not stop my medication abruptly without first discussing it with my provider.    Staying Connected With Others  I will stay in touch with my friends, family members, and my primary care provider/team.    Use your imagination  Be creative.  We all have a creative side; it doesn t matter if it s oil painting, sand castles, or mud pies! This will also kick up the endorphins.    Witness Beauty  (AKA stop and smell the roses) Take a look outside, even in mid-winter. Notice colors, textures. Watch the squirrels and birds.     Service to others  Be of service to others.  There is always someone else in need.  By helping others we can  get out of ourselves  and remember the really important things.  This also provides opportunities for practicing all the other parts of the program.    Humor  Laugh and be silly!  Adjust your TV habits for less news and crime-drama and more comedy.    Control your stress  Try breathing deep, massage therapy, biofeedback, and meditation. Find time to relax each day.     My support system    Clinic Contact:  Phone number:    Contact 1:  Phone number:    Contact 2:  Phone number:    [employer]/:  Phone number:    Therapist:  Phone number:    [facility] crisis center:    Phone number:    Other community support:  Phone number:
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
## 2017-08-16 NOTE — MR AVS SNAPSHOT
"              After Visit Summary   8/16/2017    Jamar Ivory    MRN: [id number]           Patient Information     Date Of Birth          1950        Visit Information        Provider Department      8/16/2017 10:15 AM Jamar Womack MD [facility]        Today's Diagnoses     Breast pain    -  1    Erectile dysfunction, unspecified erectile dysfunction type        Edema, unspecified type        Acquired hypothyroidism        Type 2 diabetes mellitus without complication, without long-term current use of insulin (H)        Depression, unspecified depression type           Follow-ups after your visit        Future tests that were ordered for you today     Open Future Orders        Priority Expected Expires Ordered    MA Diagnostic Digital Bilateral Routine  8/16/2018 8/16/2017            Who to contact     If you have questions or need follow up information about today's clinic visit or your schedule please contact [facility] directly at [phone].  Normal or non-critical lab and imaging results will be communicated to you by MyChart, letter or phone within 4 business days after the clinic has received the results. If you do not hear from us within 7 days, please contact the clinic through MyChart or phone. If you have a critical or abnormal lab result, we will notify you by phone as soon as possible.  Submit refill requests through [employer] or call your pharmacy and they will forward the refill request to us. Please allow 3 business days for your refill to be completed.          Additional Information About Your Visit        Care EveryWhere ID     This is your Care EveryWhere ID. This could be used by other organizations to access your [facility] medical records  [id number]        Your Vitals Were     Pulse Temperature Respirations Height Pulse Oximetry BMI (Body Mass Index)    66 97.2  F (36.2  C) 20 5' 8\" (1.727 m) 95% 47.9 kg/m2    "    Blood Pressure from Last 3 Encounters:   08/16/17 118/72   07/19/17 146/76   05/25/17 116/70    Weight from Last 3 Encounters:   08/16/17 (!) 315 lb (142.9 kg)   07/19/17 (!) 325 lb (147.4 kg)   05/25/17 (!) 308 lb (139.7 kg)              We Performed the Following     **Testosterone Free and Total FUTURE anytime     Basic metabolic panel     Prolactin     TSH          Today's Medication Changes          These changes are accurate as of: 8/16/17 12:46 PM.  If you have any questions, ask your nurse or doctor.               These medicines have changed or have updated prescriptions.        Dose/Directions    aspirin 325 MG EC tablet   This may have changed:  how much to take   Used for:  CVA (cerebral infarction)        Dose:  325 mg   Take 1 tablet (325 mg) by mouth daily   Quantity:  90 tablet   Refills:  3       * buPROPion 150 MG 24 hr tablet   Commonly known as:  WELLBUTRIN XL   This may have changed:  Another medication with the same name was added. Make sure you understand how and when to take each.   Used for:  Depression   Changed by:  Jamar Womack MD        Dose:  150 mg   Take 1 tablet (150 mg) by mouth every morning   Quantity:  30 tablet   Refills:  1       * buPROPion 300 MG 24 hr tablet   Commonly known as:  WELLBUTRIN XL   This may have changed:  You were already taking a medication with the same name, and this prescription was added. Make sure you understand how and when to take each.   Used for:  Depression, unspecified depression type   Changed by:  Jamar Womack MD        Dose:  300 mg   Take 1 tablet (300 mg) by mouth every morning   Quantity:  90 tablet   Refills:  1       * Notice:  This list has 2 medication(s) that are the same as other medications prescribed for you. Read the directions carefully, and ask your doctor or other care provider to review them with you.         Where to get your medicines      These medications were sent to [facility] [address] [place], [place]  - [address] [facility] [address]  [address] [facility] [address] [address]     Phone:  [phone]     buPROPion 300 MG 24 hr tablet                Primary Care Provider Office Phone # Fax #    Jamar Womack [phone]       [address]  [facility] [address]        Equal Access to Services     SHAUN AKERS : Hadii aad ku hadasho Soomaali, waaxda luqadaha, qaybta kaalmada adeegyada, waxay idiin hayaan adeeg kharash la'aan ah. So [facility] [phone].    ATENCIÓN: Si habla español, tiene a shook disposición servicios gratuitos de asistencia lingüística. Merry al [phone].    We comply with applicable federal civil rights laws and [place] laws. We do not discriminate on the basis of race, color, national origin, age, disability sex, sexual orientation or gender identity.            Thank you!     Thank you for choosing [facility]  for your care. Our goal is always to provide you with excellent care. Hearing back from our patients is one way we can continue to improve our services. Please take a few minutes to complete the written survey that you may receive in the mail after your visit with us. Thank you!             Your Updated Medication List - Protect others around you: Learn how to safely use, store and throw away your medicines at www.disposemymeds.org.          This list is accurate as of: 8/16/17 12:46 PM.  Always use your most recent med list.                   Brand Name Dispense Instructions for use Diagnosis    acetaminophen 325 MG tablet    TYLENOL    100 tablet    Take 2 tablets (650 mg) by mouth every 6 hours as needed for mild pain    Headache(784.0)       aspirin 325 MG EC tablet     90 tablet    Take 1 tablet (325 mg) by mouth daily    CVA (cerebral infarction)       * buPROPion 150 MG 24 hr tablet    WELLBUTRIN XL    30 tablet    Take 1 tablet (150 mg) by mouth every morning    Depression       * buPROPion 300 MG 24 hr tablet    WELLBUTRIN XL     90 tablet    Take 1 tablet (300 mg) by mouth every morning    Depression, unspecified depression type       finasteride 5 MG tablet    PROSCAR    30 tablet    Take 1 tablet (5 mg) by mouth daily    Benign prostatic hyperplasia with lower urinary tract symptoms, unspecified morphology       * furosemide 40 MG tablet    LASIX    90 tablet    New dose--2 in the morning and one in the evening.    Edema, unspecified type       * furosemide 80 MG tablet    LASIX    60 tablet    Take 1 tablet (80 mg) by mouth 2 times daily    Edema, unspecified type       gabapentin 100 MG capsule    NEURONTIN     Take 100 mg by mouth        Glucosamine-Chondroitin--200-150 MG Tabs           HYDROcodone-acetaminophen 5-325 MG per tablet    NORCO    70 tablet    1-2 tabs four times a day.    Pain in both lower extremities       ibuprofen 800 MG tablet    ADVIL/MOTRIN    60 tablet    Take 1 tablet (800 mg) by mouth every 8 hours as needed for pain    Pain in right extremity, unspecified extremity       indomethacin 50 MG capsule    INDOCIN    15 capsule    Take 1 capsule (50 mg) by mouth 3 times daily (with meals) for 5 days    Acute gouty arthritis       * levothyroxine 175 MCG tablet    SYNTHROID/LEVOTHROID    30 tablet    TAKE 1 TABLET (175 MCG) BY MOUTH DAILY    Acquired hypothyroidism       * levothyroxine 200 MCG tablet    SYNTHROID/LEVOTHROID    90 tablet    Take 1 tablet (200 mcg) by mouth daily    Acquired hypothyroidism       LEXAPRO 20 MG tablet   Generic drug:  escitalopram      Take 20 mg by mouth        linaclotide 145 MCG capsule    LINZESS    30 capsule    Take 1 capsule (145 mcg) by mouth every morning (before breakfast)    Constipation, unspecified constipation type       metoprolol 25 MG 24 hr tablet    TOPROL-XL    90 tablet    TAKE ONE TABLET BY MOUTH ONE TIME DAILY    Essential hypertension       mupirocin 2 % cream    BACTROBAN    30 g    Apply topically 3 times daily    Edema, unspecified type       order  for DME     100 strip    Equipment being ordered: blood glucose monitor strips to use 2 to 4 times  A day. As covered by insurance. #100 with 5 refills  Monitor ordered this day  As well.  Also lancets #100 use as needed for testing 5 refils    Diabetes mellitus, type 2 (H)       potassium chloride SA 10 MEQ CR tablet    K-DUR/KLOR-CON M    90 tablet    Take 1 tablet (10 mEq) by mouth 3 times daily    Bilateral leg edema       * Notice:  This list has 6 medication(s) that are the same as other medications prescribed for you. Read the directions carefully, and ask your doctor or other care provider to review them with you.

## 2017-08-16 NOTE — LETTER
Jamar Shlomo Pylebibi  [address]   [place] [address]        August 18, 2017          Dear ,    We are writing to inform you of your test results.    Please make an appointment with the doctor to review or follow up on your test results.  Appointments can be made by calling [phone].    Resulted Orders   **Testosterone Free and Total FUTURE anytime   Result Value Ref Range    Testosterone Total 173 (L) 240 - 950 ng/dL      Comment:      This test was developed and its performance characteristics determined by the   [facility],  Special [facility] Laboratory. It has   not been cleared or approved by the FDA. The laboratory is regulated under   CLIA as qualified to perform high-complexity testing. This test is used for   clinical purposes. It should not be regarded as investigational or for   research.      Sex Hormone Binding Globulin 37 11 - 80 nmol/L    Free Testosterone Calculated 3.03 (L) 4.7 - 24.4 ng/dL       If you have any questions or concerns, please call the clinic at the number listed above.       Sincerely,        VINNY Johnson. For Dr. Womack.

## 2017-08-16 NOTE — NURSING NOTE
"Chief Complaint   Patient presents with     Erectile Dysfunction     Unable to obtain an erection for the last 2 year     Chest Pain     Left side of chest muscle tender to the touch for the last month     Medication Reconciliation     Review meds with patient, poor historian       Initial /72 (BP Location: Left arm, Patient Position: Sitting, Cuff Size: Adult Large)  Pulse 66  Temp 97.2  F (36.2  C)  Resp 20  Ht 5' 8\" (1.727 m)  Wt (!) 315 lb (142.9 kg)  SpO2 95%  BMI 47.9 kg/m2 Estimated body mass index is 47.9 kg/(m^2) as calculated from the following:    Height as of this encounter: 5' 8\" (1.727 m).    Weight as of this encounter: 315 lb (142.9 kg).  Medication Reconciliation: complete     Danelle Holley LPN  "

## 2017-08-18 LAB
SHBG SERPL-SCNC: 37 NMOL/L (ref 11–80)
TESTOST FREE SERPL-MCNC: 3.03 NG/DL (ref 4.7–24.4)
TESTOST SERPL-MCNC: 173 NG/DL (ref 240–950)

## 2017-08-22 NOTE — PROGRESS NOTES
Result was abnormal The testosterone test is low.  Please come in to discuss medications to start. .      Please let patient know by calling  or sending a  letter.

## 2017-08-31 ENCOUNTER — OFFICE VISIT (OUTPATIENT)
Dept: FAMILY MEDICINE | Facility: CLINIC | Age: 67
End: 2017-08-31
Payer: MEDICARE

## 2017-08-31 VITALS
SYSTOLIC BLOOD PRESSURE: 130 MMHG | HEART RATE: 70 BPM | OXYGEN SATURATION: 93 % | BODY MASS INDEX: 48.81 KG/M2 | DIASTOLIC BLOOD PRESSURE: 68 MMHG | WEIGHT: 315 LBS | RESPIRATION RATE: 24 BRPM | TEMPERATURE: 98 F

## 2017-08-31 DIAGNOSIS — N64.4 BREAST TENDERNESS IN MALE: ICD-10-CM

## 2017-08-31 DIAGNOSIS — F32.1 MODERATE MAJOR DEPRESSION (H): Primary | Chronic | ICD-10-CM

## 2017-08-31 PROCEDURE — 99214 OFFICE O/P EST MOD 30 MIN: CPT | Performed by: FAMILY MEDICINE

## 2017-08-31 RX ORDER — DULOXETIN HYDROCHLORIDE 30 MG/1
30 CAPSULE, DELAYED RELEASE ORAL DAILY
Qty: 30 CAPSULE | Refills: 1 | Status: SHIPPED | OUTPATIENT
Start: 2017-08-31 | End: 2017-09-21

## 2017-08-31 ASSESSMENT — ANXIETY QUESTIONNAIRES
GAD7 TOTAL SCORE: 16
1. FEELING NERVOUS, ANXIOUS, OR ON EDGE: MORE THAN HALF THE DAYS
2. NOT BEING ABLE TO STOP OR CONTROL WORRYING: MORE THAN HALF THE DAYS
6. BECOMING EASILY ANNOYED OR IRRITABLE: NEARLY EVERY DAY
3. WORRYING TOO MUCH ABOUT DIFFERENT THINGS: NEARLY EVERY DAY
5. BEING SO RESTLESS THAT IT IS HARD TO SIT STILL: NEARLY EVERY DAY
7. FEELING AFRAID AS IF SOMETHING AWFUL MIGHT HAPPEN: NOT AT ALL

## 2017-08-31 ASSESSMENT — PATIENT HEALTH QUESTIONNAIRE - PHQ9
5. POOR APPETITE OR OVEREATING: NEARLY EVERY DAY
SUM OF ALL RESPONSES TO PHQ QUESTIONS 1-9: 15

## 2017-08-31 NOTE — NURSING NOTE
"Chief Complaint   Patient presents with     Results     Erectile Dysfunction     Depression       Initial /68 (BP Location: Left arm, Patient Position: Chair, Cuff Size: Adult Large)  Pulse 70  Temp 98  F (36.7  C) (Tympanic)  Resp 24  Wt (!) 321 lb (145.6 kg)  SpO2 93%  BMI 48.81 kg/m2 Estimated body mass index is 48.81 kg/(m^2) as calculated from the following:    Height as of 8/16/17: 5' 8\" (1.727 m).    Weight as of this encounter: 321 lb (145.6 kg).  Medication Reconciliation: complete     Princess PAYAL David CMA      "

## 2017-08-31 NOTE — MR AVS SNAPSHOT
After Visit Summary   8/31/2017    aJmar Ivory    MRN: [id number]           Patient Information     Date Of Birth          1950        Visit Information        Provider Department      8/31/2017 4:15 PM Feng Small MD [facility]        Today's Diagnoses     Moderate major depression (H)    -  1    Breast tenderness in male          Care Instructions    Stop the Wellbutrin begin taking the Cymbalta          Follow-ups after your visit        Follow-up notes from your care team     Return in about 1 month (around 9/30/2017).      Who to contact     If you have questions or need follow up information about today's clinic visit or your schedule please contact [facility] directly at [phone].  Normal or non-critical lab and imaging results will be communicated to you by MyChart, letter or phone within 4 business days after the clinic has received the results. If you do not hear from us within 7 days, please contact the clinic through MyChart or phone. If you have a critical or abnormal lab result, we will notify you by phone as soon as possible.  Submit refill requests through [employer] or call your pharmacy and they will forward the refill request to us. Please allow 3 business days for your refill to be completed.          Additional Information About Your Visit        Care EveryWhere ID     This is your Care EveryWhere ID. This could be used by other organizations to access your [facility] medical records  [id number]        Your Vitals Were     Pulse Temperature Respirations Pulse Oximetry BMI (Body Mass Index)       70 98  F (36.7  C) (Tympanic) 24 93% 48.81 kg/m2        Blood Pressure from Last 3 Encounters:   08/31/17 130/68   08/16/17 118/72   07/19/17 146/76    Weight from Last 3 Encounters:   08/31/17 (!) 321 lb (145.6 kg)   08/16/17 (!) 315 lb (142.9 kg)   07/19/17 (!) 325 lb (147.4 kg)              Today, you had  the following     No orders found for display         Today's Medication Changes          These changes are accurate as of: 8/31/17  4:38 PM.  If you have any questions, ask your nurse or doctor.               Start taking these medicines.        Dose/Directions    DULoxetine 30 MG EC capsule   Commonly known as:  CYMBALTA   Used for:  Moderate major depression (H)   Started by:  Feng Small MD        Dose:  30 mg   Take 1 capsule (30 mg) by mouth daily   Quantity:  30 capsule   Refills:  1         These medicines have changed or have updated prescriptions.        Dose/Directions    aspirin 325 MG EC tablet   This may have changed:  how much to take   Used for:  CVA (cerebral infarction)        Dose:  325 mg   Take 1 tablet (325 mg) by mouth daily   Quantity:  90 tablet   Refills:  3       levothyroxine 200 MCG tablet   Commonly known as:  SYNTHROID/LEVOTHROID   This may have changed:  Another medication with the same name was removed. Continue taking this medication, and follow the directions you see here.   Used for:  Acquired hypothyroidism        Dose:  200 mcg   Take 1 tablet (200 mcg) by mouth daily   Quantity:  90 tablet   Refills:  1         Stop taking these medicines if you haven't already. Please contact your care team if you have questions.     buPROPion 150 MG 24 hr tablet   Commonly known as:  WELLBUTRIN XL   Stopped by:  Feng Small MD           buPROPion 300 MG 24 hr tablet   Commonly known as:  WELLBUTRIN XL   Stopped by:  Feng Small MD           LEXAPRO 20 MG tablet   Generic drug:  escitalopram   Stopped by:  Fneg Small MD                Where to get your medicines      These medications were sent to [place] [address] [place] [address] [address]     Phone:  [phone]     DULoxetine 30 MG EC capsule                Primary Care Provider Office Phone # Fax #    Jamar Womack [phone]        [address] [facility] [address]        Equal Access to Services     SHAUN AKERS : Hadii cole ku hadsariaho Sogretchenali, waaxda luqadaha, qaybta kaalmada prem, maykel newellvalentinejoelle parada. So [facility] [phone].    ATENCIÓN: Si habla español, tiene a shook disposición servicios gratuitos de asistencia lingüística. Llame al [phone].    We comply with applicable federal civil rights laws and [place] laws. We do not discriminate on the basis of race, color, national origin, age, disability sex, sexual orientation or gender identity.            Thank you!     Thank you for choosing [facility]  for your care. Our goal is always to provide you with excellent care. Hearing back from our patients is one way we can continue to improve our services. Please take a few minutes to complete the written survey that you may receive in the mail after your visit with us. Thank you!             Your Updated Medication List - Protect others around you: Learn how to safely use, store and throw away your medicines at www.disposemymeds.org.          This list is accurate as of: 8/31/17  4:38 PM.  Always use your most recent med list.                   Brand Name Dispense Instructions for use Diagnosis    aspirin 325 MG EC tablet     90 tablet    Take 1 tablet (325 mg) by mouth daily    CVA (cerebral infarction)       B-12 PO           DULoxetine 30 MG EC capsule    CYMBALTA    30 capsule    Take 1 capsule (30 mg) by mouth daily    Moderate major depression (H)       finasteride 5 MG tablet    PROSCAR    30 tablet    Take 1 tablet (5 mg) by mouth daily    Benign prostatic hyperplasia with lower urinary tract symptoms, unspecified morphology       furosemide 80 MG tablet    LASIX    60 tablet    Take 1 tablet (80 mg) by mouth 2 times daily    Edema, unspecified type       Glucosamine-Chondroitin--200-150 MG Tabs           IRON SUPPLEMENT PO      Take by mouth daily         levothyroxine 200 MCG tablet    SYNTHROID/LEVOTHROID    90 tablet    Take 1 tablet (200 mcg) by mouth daily    Acquired hypothyroidism       linaclotide 145 MCG capsule    LINZESS    30 capsule    Take 1 capsule (145 mcg) by mouth every morning (before breakfast)    Constipation, unspecified constipation type       metoprolol 25 MG 24 hr tablet    TOPROL-XL    90 tablet    TAKE ONE TABLET BY MOUTH ONE TIME DAILY    Essential hypertension       order for DME     100 strip    Equipment being ordered: blood glucose monitor strips to use 2 to 4 times  A day. As covered by insurance. #100 with 5 refills  Monitor ordered this day  As well.  Also lancets #100 use as needed for testing 5 refils    Diabetes mellitus, type 2 (H)       potassium chloride SA 10 MEQ CR tablet    K-DUR/KLOR-CON M    90 tablet    Take 1 tablet (10 mEq) by mouth 3 times daily    Bilateral leg edema

## 2017-08-31 NOTE — PROGRESS NOTES
SUBJECTIVE:   Jamar Ivory is a 67 year old male who presents to clinic today for the following health issues:      Here today with wife.    Depression Followup    Status since last visit: Has not worsened but hasn't improved.     See PHQ-9 for current symptoms.  Other associated symptoms: Marital problems due to pt's depression.    Complicating factors:   Significant life event:  No   Current substance abuse:  None  Anxiety or Panic symptoms:  No        PHQ-9  English  PHQ-9   Any Language    Has not improved with increased dose of the Bupoprion    Amount of exercise or physical activity: None    Problems taking medications regularly: No    Medication side effects: drowsiness, trouble falling asleep  Diet: regular (no restrictions)    Erectile Dysfunction      Duration: x 2 years    Description (location/character/radiation): trouble with erection and interest with wife. Thinks it's due to depression    Intensity:  severe    Accompanying signs and symptoms:     History (similar episodes/previous evaluation): None    Precipitating or alleviating factors: None    Therapies tried and outcome: None         Lab Review      Duration: Seen 8/16/17    Description (location/character/radiation): following up on lab results. Would also like to know if he still needs chest x-ray for his breast pain    Intensity:  mild    Accompanying signs and symptoms:     History (similar episodes/previous evaluation): Yes 8/16/17    Precipitating or alleviating factors: None    Therapies tried and outcome: None       Lt breast/ chest wall tenderness      Duration: 2 mo    Description (location/character/radiation): Lt breast area tender, not improved    Intensity:  moderate    Accompanying signs and symptoms: chest wall tenderness    History (similar episodes/previous evaluation): None    Precipitating or alleviating factors: None    Therapies tried and outcome: None   Pt thought that Dr Womack was going to have him have xray  done at hospital but never got called to schedule     Problem list and histories reviewed & adjusted, as indicated.  Additional history: as documented    Labs reviewed in EPIC    Reviewed and updated as needed this visit by clinical staff     Reviewed and updated as needed this visit by Provider         ROS:  CONSTITUTIONAL:NEGATIVE for fever, chills, change in weight  RESP:NEGATIVE for significant cough or SOB  BREAST: POSITIVE for tenderness Lt breast  CV: NEGATIVE for chest pain, palpitations or peripheral edema  : positive for and erectile dysfunction  MUSCULOSKELETAL: NEGATIVE for significant arthralgias or myalgia  PSYCHIATRIC: POSITIVE fordepressed mood    OBJECTIVE:                                                    /68 (BP Location: Left arm, Patient Position: Chair, Cuff Size: Adult Large)  Pulse 70  Temp 98  F (36.7  C) (Tympanic)  Resp 24  Wt (!) 321 lb (145.6 kg)  SpO2 93%  BMI 48.81 kg/m2  Body mass index is 48.81 kg/(m^2).  GENERAL APPEARANCE: healthy, alert and no distress  NECK: no adenopathy, no asymmetry, masses, or scars, thyroid normal to palpation and no bruits  RESP: lungs clear to auscultation - no rales, rhonchi or wheezes  BREAST: no palpable axillary masses or adenopathy and tenderness left central under nipple area, no masses or nodules  CV: regular rates and rhythm, normal S1 S2, no S3 or S4 and no murmur, click or rub  ABDOMEN: soft, nontender, without hepatosplenomegaly or masses and bowel sounds normal  PSYCH: mentation appears normal and affect flat    Diagnostic test results:  none        ASSESSMENT/PLAN:                                                        ICD-10-CM    1. Moderate major depression (H) F32.1 DULoxetine (CYMBALTA) 30 MG EC capsule   2. Breast tenderness in male N64.4     Lt breast       Follow up with Provider - 1 mo  Discussed with Pt that this could be side effect of the Buproprion.  Will try changing medications. Buproprion was not being very  effective for depression symptoms anyway   Patient Instructions   Stop the Wellbutrin begin taking the Mikaelmbalta      Feng Small MD  [facility]

## 2017-09-01 DIAGNOSIS — N40.1 BENIGN PROSTATIC HYPERPLASIA WITH LOWER URINARY TRACT SYMPTOMS: ICD-10-CM

## 2017-09-01 ASSESSMENT — ANXIETY QUESTIONNAIRES: GAD7 TOTAL SCORE: 16

## 2017-09-02 NOTE — TELEPHONE ENCOUNTER
finasteride (PROSCAR) 5 MG tablet  Last Written Prescription Date: 12/21/2016  Last Fill Quantity: 30, # refills: 5    Last Office Visit with [facility], [facility] or [facility] prescribing provider:  08/31/2017   Future Office Visit:      BP Readings from Last 3 Encounters:   08/31/17 130/68   08/16/17 118/72   07/19/17 146/76

## 2017-09-05 RX ORDER — FINASTERIDE 5 MG/1
TABLET, FILM COATED ORAL
Qty: 90 TABLET | Refills: 3 | Status: SHIPPED | OUTPATIENT
Start: 2017-09-05 | End: 2018-01-05

## 2017-09-18 ENCOUNTER — NURSE TRIAGE (OUTPATIENT)
Dept: NURSING | Facility: CLINIC | Age: 67
End: 2017-09-18

## 2017-09-18 NOTE — TELEPHONE ENCOUNTER
"\"He has had open sores on his legs for years.  They seem to get better at times but then open up again.  They became worse 2-3 months ago, not worsening today.  The doc usually gives him an abx and cream which seems to help.\"  Advised pt to be seen today in UC per protocol.  Spouse states they don't have the money for UC right now and prefers to wait to see pcp.  Advised spouse to provide elevation, OTC pain relievers, and rest until seen in clinic.  She plans to monitor him for a fever. Transferred to scheduling.      Reason for Disposition    [1] Looks infected (spreading redness, pus) AND [2] large red area (> 2 in. or 5 cm)    Additional Information    Negative: Sounds like a life-threatening emergency to the triager    Negative: Followed an injury (i.e., from an abrasion or scratch)    Negative: Wound infection suspected (in traumatic or surgical wound)    Negative: Soft yellow scabs (crusts)    Negative: Followed a burn    Negative: Looks like insect bite    Negative: Looks like chickenpox    Negative: On one side of the lip    Negative: Looks like poison ivy    Negative: Looks like ringworm    Negative: Patient sounds very sick or weak to the triager    Negative: [1] Red area or streak AND [2] fever    Protocols used: SORES-ADULT-AH    "

## 2017-09-21 ENCOUNTER — OFFICE VISIT (OUTPATIENT)
Dept: FAMILY MEDICINE | Facility: CLINIC | Age: 67
End: 2017-09-21
Payer: MEDICARE

## 2017-09-21 VITALS
OXYGEN SATURATION: 94 % | RESPIRATION RATE: 20 BRPM | DIASTOLIC BLOOD PRESSURE: 76 MMHG | HEART RATE: 73 BPM | WEIGHT: 315 LBS | TEMPERATURE: 97.3 F | BODY MASS INDEX: 47.97 KG/M2 | SYSTOLIC BLOOD PRESSURE: 118 MMHG

## 2017-09-21 DIAGNOSIS — F32.1 MODERATE MAJOR DEPRESSION (H): Chronic | ICD-10-CM

## 2017-09-21 DIAGNOSIS — M79.604 PAIN IN BOTH LOWER EXTREMITIES: ICD-10-CM

## 2017-09-21 DIAGNOSIS — R60.0 BILATERAL LEG EDEMA: ICD-10-CM

## 2017-09-21 DIAGNOSIS — M79.605 PAIN IN BOTH LOWER EXTREMITIES: ICD-10-CM

## 2017-09-21 DIAGNOSIS — L03.119 CELLULITIS OF LOWER EXTREMITY, UNSPECIFIED LATERALITY: Primary | ICD-10-CM

## 2017-09-21 PROCEDURE — 99214 OFFICE O/P EST MOD 30 MIN: CPT | Performed by: FAMILY MEDICINE

## 2017-09-21 RX ORDER — CEFUROXIME AXETIL 500 MG/1
500 TABLET ORAL 2 TIMES DAILY
Qty: 20 TABLET | Refills: 0 | Status: SHIPPED | OUTPATIENT
Start: 2017-09-21 | End: 2017-09-26

## 2017-09-21 RX ORDER — DULOXETIN HYDROCHLORIDE 30 MG/1
30 CAPSULE, DELAYED RELEASE ORAL DAILY
Qty: 30 CAPSULE | Refills: 2 | Status: SHIPPED | OUTPATIENT
Start: 2017-09-21 | End: 2017-09-26

## 2017-09-21 NOTE — MR AVS SNAPSHOT
After Visit Summary   9/21/2017    Jamar Ivory    MRN: [id number]           Patient Information     Date Of Birth          1950        Visit Information        Provider Department      9/21/2017 10:30 AM Feng Small MD [facility]        Today's Diagnoses     Cellulitis of lower extremity, unspecified laterality    -  1    Pain in both lower extremities        Bilateral leg edema        Moderate major depression (H)          Care Instructions    Avoid scratching legs.  Use baking soda compresses to soothe  Hot pack to legs 3-4 times daily           Follow-ups after your visit        Follow-up notes from your care team     Return in about 2 weeks (around 10/5/2017).      Your next 10 appointments already scheduled     Sep 21, 2017 10:30 AM CDT   [facility] with Feng Small MD   [facility]    [address] [place] [id number] [phone]              Who to contact     If you have questions or need follow up information about today's clinic visit or your schedule please contact [facility] directly at [phone].  Normal or non-critical lab and imaging results will be communicated to you by MyChart, letter or phone within 4 business days after the clinic has received the results. If you do not hear from us within 7 days, please contact the clinic through MyChart or phone. If you have a critical or abnormal lab result, we will notify you by phone as soon as possible.  Submit refill requests through [facility] or call your pharmacy and they will forward the refill request to us. Please allow 3 business days for your refill to be completed.          Additional Information About Your Visit        Care EveryWhere ID     This is your Care EveryWhere ID. This could be used by other organizations to access your [facility]  records  [id number]        Your Vitals Were     Pulse Temperature Respirations Pulse Oximetry BMI (Body Mass Index)       73 97.3  F (36.3  C) (Oral) 20 94% 47.97 kg/m2        Blood Pressure from Last 3 Encounters:   09/21/17 118/76   08/31/17 130/68   08/16/17 118/72    Weight from Last 3 Encounters:   09/21/17 (!) 315 lb 8 oz (143.1 kg)   08/31/17 (!) 321 lb (145.6 kg)   08/16/17 (!) 315 lb (142.9 kg)              Today, you had the following     No orders found for display         Today's Medication Changes          These changes are accurate as of: 9/21/17 10:26 AM.  If you have any questions, ask your nurse or doctor.               Start taking these medicines.        Dose/Directions    cefuroxime 500 MG tablet   Commonly known as:  CEFTIN   Used for:  Cellulitis of lower extremity, unspecified laterality   Started by:  Feng Small MD        Dose:  500 mg   Take 1 tablet (500 mg) by mouth 2 times daily   Quantity:  20 tablet   Refills:  0         These medicines have changed or have updated prescriptions.        Dose/Directions    aspirin 325 MG EC tablet   This may have changed:  how much to take   Used for:  CVA (cerebral infarction)        Dose:  325 mg   Take 1 tablet (325 mg) by mouth daily   Quantity:  90 tablet   Refills:  3            Where to get your medicines      These medications were sent to [place] [address] [place] [address] [address]     Phone:  [phone]     cefuroxime 500 MG tablet    DULoxetine 30 MG EC capsule                Primary Care Provider Office Phone # Fax #    Jamar Womack [phone] [address] [facility] [address]        Equal Access to Services     SHAUN AKERS : Isaac Fowler, wamarcellus yap, qaybta kaalmada prem, maykel parada. So [facility] [phone].    ATENCIÓN: Si habla español, tiene a shook disposición servicios  desiree de asistencia lingüística. Merry gavin [phone].    We comply with applicable federal civil rights laws and [place] laws. We do not discriminate on the basis of race, color, national origin, age, disability sex, sexual orientation or gender identity.            Thank you!     Thank you for choosing [facility]  for your care. Our goal is always to provide you with excellent care. Hearing back from our patients is one way we can continue to improve our services. Please take a few minutes to complete the written survey that you may receive in the mail after your visit with us. Thank you!             Your Updated Medication List - Protect others around you: Learn how to safely use, store and throw away your medicines at www.disposemymeds.org.          This list is accurate as of: 9/21/17 10:26 AM.  Always use your most recent med list.                   Brand Name Dispense Instructions for use Diagnosis    aspirin 325 MG EC tablet     90 tablet    Take 1 tablet (325 mg) by mouth daily    CVA (cerebral infarction)       B-12 PO           cefuroxime 500 MG tablet    CEFTIN    20 tablet    Take 1 tablet (500 mg) by mouth 2 times daily    Cellulitis of lower extremity, unspecified laterality       DULoxetine 30 MG EC capsule    CYMBALTA    30 capsule    Take 1 capsule (30 mg) by mouth daily    Moderate major depression (H)       finasteride 5 MG tablet    PROSCAR    90 tablet    TAKE 1 TABLET (5 MG) BY MOUTH DAILY    Benign prostatic hyperplasia with lower urinary tract symptoms       furosemide 80 MG tablet    LASIX    60 tablet    Take 1 tablet (80 mg) by mouth 2 times daily    Edema, unspecified type       Glucosamine-Chondroitin--200-150 MG Tabs           IRON SUPPLEMENT PO      Take by mouth daily        levothyroxine 200 MCG tablet    SYNTHROID/LEVOTHROID    90 tablet    Take 1 tablet (200 mcg) by mouth daily    Acquired hypothyroidism       linaclotide 145 MCG capsule     LINZESS    30 capsule    Take 1 capsule (145 mcg) by mouth every morning (before breakfast)    Constipation, unspecified constipation type       metoprolol 25 MG 24 hr tablet    TOPROL-XL    90 tablet    TAKE ONE TABLET BY MOUTH ONE TIME DAILY    Essential hypertension       order for DME     100 strip    Equipment being ordered: blood glucose monitor strips to use 2 to 4 times  A day. As covered by insurance. #100 with 5 refills  Monitor ordered this day  As well.  Also lancets #100 use as needed for testing 5 refils    Diabetes mellitus, type 2 (H)       potassium chloride SA 10 MEQ CR tablet    K-DUR/KLOR-CON M    90 tablet    Take 1 tablet (10 mEq) by mouth 3 times daily    Bilateral leg edema

## 2017-09-21 NOTE — NURSING NOTE
Advance Care Planning 9/21/2017: ACP Review of Chart / Resources Provided:  Reviewed chart for advance care plan.  Jamar Ivory has been provided information and resources to begin or update their advance care plan.  Added by Princess PAYAL David    Information also provided to wife.   Princess PAYAL David, CMA

## 2017-09-21 NOTE — NURSING NOTE
"Chief Complaint   Patient presents with     Edema     f/u       Initial /76 (BP Location: Left arm, Patient Position: Chair, Cuff Size: Adult Large)  Pulse 73  Temp 97.3  F (36.3  C) (Oral)  Resp 20  Wt (!) 315 lb 8 oz (143.1 kg)  SpO2 94%  BMI 47.97 kg/m2 Estimated body mass index is 47.97 kg/(m^2) as calculated from the following:    Height as of 8/16/17: 5' 8\" (1.727 m).    Weight as of this encounter: 315 lb 8 oz (143.1 kg).  Medication Reconciliation: complete     Princess PAYAL David, MARIPOSA      "

## 2017-09-21 NOTE — PATIENT INSTRUCTIONS
Avoid scratching legs.  Use baking soda compresses to soothe  Hot pack to legs 3-4 times daily     Stressed need to take the potassium at least one daily  Restart the Finasteride.    Could hold the Metoprolol.  BP is good

## 2017-09-21 NOTE — PROGRESS NOTES
SUBJECTIVE:   Jamar Ivory is a 67 year old male who presents to clinic today for the following health issues:    Here today with wife.    Musculoskeletal problem/pain      Duration: x 3-6 months    Description  Location: Bilateral leg pain    Intensity:  severe    Accompanying signs and symptoms: radiation of pain up the legs, warmth, swelling, redness and discoloration of both legs    History  Previous similar problem: no   Previous evaluation:  none    Precipitating or alleviating factors:  Trauma or overuse: no   Aggravating factors include: lying down    Therapies tried and outcome: massage and elevating     Now having redness in both legs.  Pt has been scratching at both legs due to itching and pain        Diabetes Follow-up    Patient is checking blood sugars: once daily.  Results are as follows:       am - 160-180  Not checking every day        Diabetic concerns: None     Symptoms of hypoglycemia (low blood sugar): none     Paresthesias (numbness or burning in feet) or sores: Yes both legs and feet   Date of last diabetic eye exam: uncertain    Heart Failure Follow-up    Symptoms:    Shortness of breath: happens with exertion only - stable    Lower extremity edema: worsened from baseline    Chest pain: No    Using more pillows than normal: Yes-  2 pillows    Cough at night: No    Weight:    Checking weight daily: No    Weight change: none uncertain    Cardiology visits, ER/UC, or hospital admissions since last visit: None    Medication side effects: none    Depression Followup    Status since last visit: Improved slight improvement from last visit    See PHQ-9 for current symptoms.  Other associated symptoms: None    Complicating factors:   Significant life event:  No   Current substance abuse:  None  Anxiety or Panic symptoms:  No    PHQ-9  English  PHQ-9   Any Language      Pt brings bag of medications that he has stopped taking including the Finasteride, Metoprolol, Potassium as well as ASA and  vitamins  Pt didn't think these were doing him any good      Problem list and histories reviewed & adjusted, as indicated.  Additional history: as documented    Labs reviewed in EPIC    Reviewed and updated as needed this visit by clinical staffTobacco  Allergies  Meds  Problems  Med Hx  Surg Hx  Fam Hx  Soc Hx        Reviewed and updated as needed this visit by Provider  Allergies  Meds  Problems         ROS:  CONSTITUTIONAL:NEGATIVE for fever, chills, change in weight  INTEGUMENTARY/SKIN: POSITIVE for rash lower legs bilateral  RESP:POSITIVE for cough-non productive and dyspnea on exertion  CV: POSITIVE for lower extremity edema and NEGATIVE for chest pain/chest pressure  MUSCULOSKELETAL: POSITIVE  for arthralgias knees and ankles and myalgia  ENDOCRINE: NEGATIVE for temperature intolerance, skin/hair changes and POSITIVE  for HX diabetes  PSYCHIATRIC: POSITIVE fordepressed mood, Hx anxiety and Hx depression    OBJECTIVE:                                                    /76 (BP Location: Left arm, Patient Position: Chair, Cuff Size: Adult Large)  Pulse 73  Temp 97.3  F (36.3  C) (Oral)  Resp 20  Wt (!) 315 lb 8 oz (143.1 kg)  SpO2 94%  BMI 47.97 kg/m2  Body mass index is 47.97 kg/(m^2).  GENERAL APPEARANCE: healthy, alert and no distress  RESP: lungs clear to auscultation - no rales, rhonchi or wheezes  CV: regular rates and rhythm, normal S1 S2, no S3 or S4 and no murmur, click or rub  MS: pitting 3+ lower extremity edema bilaterally  SKIN: erythema - lower legs 2/3 way up on lower legs.  Excoriations present  PSYCH: mentation appears normal, affect flat and anxious    Diagnostic test results:  none        ASSESSMENT/PLAN:                                                        ICD-10-CM    1. Cellulitis of lower extremity, unspecified laterality L03.119 cefuroxime (CEFTIN) 500 MG tablet    bilateral   2. Pain in both lower extremities M79.604     M79.605    3. Bilateral leg edema R60.0     4. Moderate major depression (H) F32.1 DULoxetine (CYMBALTA) 30 MG EC capsule       Follow up with Provider - 2 weeks   Patient Instructions   Avoid scratching legs.  Use baking soda compresses to soothe  Hot pack to legs 3-4 times daily     Stressed need to take the potassium at least one daily  Restart the Finasteride.    Could hold the Metoprolol.  BP is good      Feng Small MD  [facility]

## 2017-09-26 ENCOUNTER — RADIANT APPOINTMENT (OUTPATIENT)
Dept: GENERAL RADIOLOGY | Facility: CLINIC | Age: 67
End: 2017-09-26
Attending: FAMILY MEDICINE
Payer: MEDICARE

## 2017-09-26 ENCOUNTER — OFFICE VISIT (OUTPATIENT)
Dept: FAMILY MEDICINE | Facility: CLINIC | Age: 67
End: 2017-09-26
Payer: MEDICARE

## 2017-09-26 VITALS
HEART RATE: 83 BPM | OXYGEN SATURATION: 96 % | TEMPERATURE: 97.4 F | DIASTOLIC BLOOD PRESSURE: 74 MMHG | SYSTOLIC BLOOD PRESSURE: 131 MMHG

## 2017-09-26 DIAGNOSIS — M79.671 RIGHT FOOT PAIN: ICD-10-CM

## 2017-09-26 DIAGNOSIS — R79.9 ABNORMAL FINDING OF BLOOD CHEMISTRY: ICD-10-CM

## 2017-09-26 DIAGNOSIS — I87.2 CHRONIC STASIS DERMATITIS: ICD-10-CM

## 2017-09-26 DIAGNOSIS — M79.671 RIGHT FOOT PAIN: Primary | ICD-10-CM

## 2017-09-26 DIAGNOSIS — R60.0 BILATERAL LEG EDEMA: ICD-10-CM

## 2017-09-26 PROCEDURE — 80061 LIPID PANEL: CPT | Performed by: FAMILY MEDICINE

## 2017-09-26 PROCEDURE — 73630 X-RAY EXAM OF FOOT: CPT | Mod: RT

## 2017-09-26 PROCEDURE — 84550 ASSAY OF BLOOD/URIC ACID: CPT | Performed by: FAMILY MEDICINE

## 2017-09-26 PROCEDURE — 99214 OFFICE O/P EST MOD 30 MIN: CPT | Performed by: FAMILY MEDICINE

## 2017-09-26 PROCEDURE — 36415 COLL VENOUS BLD VENIPUNCTURE: CPT | Performed by: FAMILY MEDICINE

## 2017-09-26 RX ORDER — COLCHICINE 0.6 MG/1
TABLET ORAL
Qty: 30 TABLET | Refills: 0 | Status: SHIPPED | OUTPATIENT
Start: 2017-09-26 | End: 2017-10-13

## 2017-09-26 NOTE — PROGRESS NOTES
"  SUBJECTIVE:   Jamar Ivory is a 67 year old male who presents to clinic today for the following health issues:      foot      Duration: 6 days    Description (location/character/radiation): right foot    Intensity:  moderate    Accompanying signs and symptoms: ache, cant put pressure on right foot    History (similar episodes/previous evaluation): None    Precipitating or alleviating factors: None    Therapies tried and outcome: None     67 year old with history of type 2 diabetes, diet controlled, and chronic severe lower extremity edema and wounds as well as remote history of gout in his foot was seen in clinic last week and given antibiotics for cellulitis.  After that right foot started acting up; very painful to walk on.  Wonders if antibiotics triggered something?    Hasn't taken any medicines for it recently.  Doesn't have gout medications at home.  Doesn't like to wear controlled/tight stockings; they \"don't feel good\".  On 80 mg lasix BID.    Problem list and histories reviewed & adjusted, as indicated.  Additional history: as documented    Recent Labs   Lab Test  08/16/17   1031  07/19/17   1012  05/25/17   1226  03/02/17   1056   07/18/16   0957   01/16/16   1037   04/27/15   0754   04/25/14   0956   01/23/14   0735  10/11/13   2100   A1C   --   5.5  5.7   --    --    --    --   5.5   --   6.0   < >   --    --    --    --    LDL   --    --    --    --    --   96   --    --    --   108   --   100   --    --    --    HDL   --    --    --    --    --   45   --    --    --   43   --   40*   --    --    --    TRIG   --    --    --    --    --   103   --    --    --   92   --   145   --    --    --    ALT   --    --    --    --    --    --    --    --    --    --    --   23   --   19  36   CR  0.90  0.86  0.77   --    < >   --    < >  0.90   --   1.00   --   1.00   < >  1.00  0.74   GFRESTIMATED  84  88  >90  Non  GFR Calc     --    < >   --    < >  84   --   75   --   75   < >  75  " >90   GFRESTBLACK  >90  >90  African American GFR Calc    >90   GFR Calc     --    < >   --    < >  >90   --   >90   --   >90   < >  >90  >90   POTASSIUM  3.7  4.3  2.8*   --    < >   --    < >  4.6   --   4.0   --   3.9   < >  4.1  4.1   TSH  2.19   --    --   9.03*   < >  1.80   < >   --    < >  26.05*   < >   --    --    --   19.60*    < > = values in this interval not displayed.      BP Readings from Last 3 Encounters:   09/26/17 131/74   09/21/17 118/76   08/31/17 130/68    Wt Readings from Last 3 Encounters:   09/21/17 (!) 315 lb 8 oz (143.1 kg)   08/31/17 (!) 321 lb (145.6 kg)   08/16/17 (!) 315 lb (142.9 kg)            Reviewed and updated as needed this visit by clinical staffTobacco  Allergies  Med Hx  Surg Hx  Fam Hx  Soc Hx      Reviewed and updated as needed this visit by Provider         ROS:  Constitutional, HEENT, cardiovascular, pulmonary, gi and gu systems are negative, except as otherwise noted.      OBJECTIVE:   /74  Pulse 83  Temp 97.4  F (36.3  C) (Oral)  SpO2 96%  There is no height or weight on file to calculate BMI.  GENERAL: healthy, alert and no distress  NECK: no adenopathy, no asymmetry, masses, or scars and thyroid normal to palpation  RESP: lungs clear to auscultation - no rales, rhonchi or wheezes  CV: regular rate and rhythm, normal S1 S2, no S3 or S4, no murmur, click or rub, no peripheral edema and peripheral pulses strong  ABDOMEN: soft, nontender, no hepatosplenomegaly, no masses and bowel sounds normal  MS: Bilateral venous stasis changes, severe, with scabbing and erythema on both legs but no acute infection or asymmetry.  Right foot warm, tender mid-foot.    Diagnostic Test Results:  Unresulted Labs Ordered in the Past 30 Days of this Admission     Date and Time Order Name Status Description    9/26/2017 1527 LIPID REFLEX TO DIRECT LDL PANEL In process     9/26/2017 1527 URIC ACID In process             ASSESSMENT/PLAN:     Jamar was seen today  for musculoskeletal problem.    Diagnoses and all orders for this visit:    Right foot pain  -     XR Foot Right G/E 3 Views; Future  -     Uric acid  -     Lipid panel reflex to direct LDL  -     colchicine (COLCRYS) 0.6 MG tablet; 2 tabs at the onset of flare, then 1 tab every 2 hrs until pain is better or diarrhea occurs, up to 10 doses. Wait 3 days until taking again    Abnormal finding of blood chemistry   -     Lipid panel reflex to direct LDL    Chronic stasis dermatitis  -     WOUND CARE REFERRAL    Bilateral leg edema  -     WOUND CARE REFERRAL        Patient Instructions   Your right foot shows signs of arthritis and possible gout.    Please take the COLCHICINE I sent as prescribed.  This should help with your pain.    Stay off your leg!  Elevate.  Use athletic socks knee high from [employer], 1 size too small.    You need to see the WOUND CLINIC.  I referred you.      For some reason, you are not on a cholesterol medicine or blood pressure medicine that helps extend lives for people with diabetes and history of TIAs.  Let Dr. Small know if you would like to start these.      Blanca Peng MD  [facility]

## 2017-09-26 NOTE — NURSING NOTE
"Chief Complaint   Patient presents with     Musculoskeletal Problem       Initial /74  Pulse 83  Temp 97.4  F (36.3  C) (Oral)  SpO2 96% Estimated body mass index is 47.97 kg/(m^2) as calculated from the following:    Height as of 8/16/17: 5' 8\" (1.727 m).    Weight as of 9/21/17: 315 lb 8 oz (143.1 kg).  Medication Reconciliation: complete   .Amrita ALVARADO      "

## 2017-09-26 NOTE — PATIENT INSTRUCTIONS
Your right foot shows signs of arthritis and possible gout.    Please take the COLCHICINE I sent as prescribed.  This should help with your pain.    Stay off your leg!  Elevate.  Use athletic socks knee high from [employer], 1 size too small.    You need to see the WOUND CLINIC.  I referred you.      For some reason, you are not on a cholesterol medicine or blood pressure medicine that helps extend lives for people with diabetes and history of TIAs.  Let Dr. Small know if you would like to start these.

## 2017-09-26 NOTE — LETTER
September 30, 2017      Jamar Pylebibi  [address]   [facility] [address]        Dear ,    It was nice to see you recently!  I wanted to let you know your recent test results - details of the results can be found below. Briefly:     1. Your results did confirm that you had a GOUT flare!  Good think we started that medicine; glad it's working.     Let me know if you have any questions about this, or other concerns.     Resulted Orders   Uric acid   Result Value Ref Range    Uric Acid 9.6 (H) 3.5 - 7.2 mg/dL   Lipid panel reflex to direct LDL   Result Value Ref Range    Cholesterol 163 <200 mg/dL    Triglycerides 193 (H) <150 mg/dL      Comment:      Borderline high:  150-199 mg/dl  High:             200-499 mg/dl  Very high:       >499 mg/dl  Non Fasting      HDL Cholesterol 39 (L) >39 mg/dL    LDL Cholesterol Calculated 85 <100 mg/dL      Comment:      Desirable:       <100 mg/dl    Non HDL Cholesterol 124 <130 mg/dL       If you have any questions or concerns, please call the clinic at the number listed above.       Blanca Kim MD

## 2017-09-26 NOTE — MR AVS SNAPSHOT
After Visit Summary   9/26/2017    Jamar Ivory    MRN: [id number]           Patient Information     Date Of Birth          1950        Visit Information        Provider Department      9/26/2017 2:40 PM Blanca Peng MD [facility]        Today's Diagnoses     Right foot pain    -  1    Abnormal finding of blood chemistry         Chronic stasis dermatitis        Bilateral leg edema          Care Instructions    Your right foot shows signs of arthritis and possible gout.    Please take the COLCHICINE I sent as prescribed.  This should help with your pain.    Stay off your leg!  Elevate.  Use athletic socks knee high from [employer], 1 size too small.    You need to see the WOUND CLINIC.  I referred you.      For some reason, you are not on a cholesterol medicine or blood pressure medicine that helps extend lives for people with diabetes and history of TIAs.  Let Dr. Small know if you would like to start these.          Follow-ups after your visit        Additional Services     WOUND CARE REFERRAL       Your provider has referred you to: [facility]:  [facility] [phone] FAX REFERRAL TO [phone] [email or web address]    Reason for referral: Wound care      1. [facility] Wound Consult appointment is related to what kind of wound: Venous leg/foot ulcer    2. Location of wound: Lower extremity    3. Reason for referral: Assess and treat as indicated    4. Desired treatment if any: Per [facility] nurse     Please be aware that coverage of these services is subject to the terms and limitations of your health insurance plan.  Call member services at your health plan with any benefit or coverage questions.      Please bring the following with you to your appointment:    (1) Any X-Rays, CTs or MRIs which have been performed.  Contact the facility where they were done to arrange for  prior to your  scheduled appointment.    (2) List of current medications   (3) This referral request   (4) Any documents/labs given to you for this referral                  Who to contact     If you have questions or need follow up information about today's clinic visit or your schedule please contact [facility] directly at [phone].  Normal or non-critical lab and imaging results will be communicated to you by MyChart, letter or phone within 4 business days after the clinic has received the results. If you do not hear from us within 7 days, please contact the clinic through MyChart or phone. If you have a critical or abnormal lab result, we will notify you by phone as soon as possible.  Submit refill requests through [employer] or call your pharmacy and they will forward the refill request to us. Please allow 3 business days for your refill to be completed.          Additional Information About Your Visit        Care EveryWhere ID     This is your Care EveryWhere ID. This could be used by other organizations to access your [place] medical records  [id number]        Your Vitals Were     Pulse Temperature Pulse Oximetry             83 97.4  F (36.3  C) (Oral) 96%          Blood Pressure from Last 3 Encounters:   09/26/17 131/74   09/21/17 118/76   08/31/17 130/68    Weight from Last 3 Encounters:   09/21/17 (!) 315 lb 8 oz (143.1 kg)   08/31/17 (!) 321 lb (145.6 kg)   08/16/17 (!) 315 lb (142.9 kg)              We Performed the Following     Lipid panel reflex to direct LDL     Uric acid     WOUND CARE REFERRAL          Today's Medication Changes          These changes are accurate as of: 9/26/17  3:58 PM.  If you have any questions, ask your nurse or doctor.               Start taking these medicines.        Dose/Directions    colchicine 0.6 MG tablet   Commonly known as:  COLCRYS   Used for:  Right foot pain   Started by:  Blanca Peng MD        2 tabs at the onset of flare, then 1  tab every 2 hrs until pain is better or diarrhea occurs, up to 10 doses. Wait 3 days until taking again   Quantity:  30 tablet   Refills:  0         Stop taking these medicines if you haven't already. Please contact your care team if you have questions.     aspirin 325 MG EC tablet   Stopped by:  Blanca Peng MD           linaclotide 145 MCG capsule   Commonly known as:  LINZESS   Stopped by:  Blanca Peng MD                Where to get your medicines      These medications were sent to [place] [address] [place] - [address] [address]     Phone:  [phone]     colchicine 0.6 MG tablet                Primary Care Provider Office Phone # Fax #    Feng Small [phone] [address] [facility] [place] [address]        Equal Access to Services     [facility]: Hadii cole ramirez hadasho Soomaali, waaxda luqadaha, qaybta kaalmada adeegyada, maykel raymond haycachorro reich . So [facility] [phone].    ATENCIÓN: Si habla español, tiene a shook disposición servicios gratuitos de asistencia lingüística. Merry al [phone].    We comply with applicable federal civil rights laws and [place] laws. We do not discriminate on the basis of race, color, national origin, age, disability sex, sexual orientation or gender identity.            Thank you!     Thank you for choosing [facility]  for your care. Our goal is always to provide you with excellent care. Hearing back from our patients is one way we can continue to improve our services. Please take a few minutes to complete the written survey that you may receive in the mail after your visit with us. Thank you!             Your Updated Medication List - Protect others around you: Learn how to safely use, store and throw away your medicines at www.disposemymeds.org.          This list is accurate as of: 9/26/17  3:58 PM.  Always use your most  recent med list.                   Brand Name Dispense Instructions for use Diagnosis    B-12 PO           cefuroxime 500 MG tablet    CEFTIN    20 tablet    Take 1 tablet (500 mg) by mouth 2 times daily    Cellulitis of lower extremity, unspecified laterality       colchicine 0.6 MG tablet    COLCRYS    30 tablet    2 tabs at the onset of flare, then 1 tab every 2 hrs until pain is better or diarrhea occurs, up to 10 doses. Wait 3 days until taking again    Right foot pain       DULoxetine 30 MG EC capsule    CYMBALTA    30 capsule    Take 1 capsule (30 mg) by mouth daily    Moderate major depression (H)       finasteride 5 MG tablet    PROSCAR    90 tablet    TAKE 1 TABLET (5 MG) BY MOUTH DAILY    Benign prostatic hyperplasia with lower urinary tract symptoms       furosemide 80 MG tablet    LASIX    60 tablet    Take 1 tablet (80 mg) by mouth 2 times daily    Edema, unspecified type       Glucosamine-Chondroitin--200-150 MG Tabs           IRON SUPPLEMENT PO      Take by mouth daily        levothyroxine 200 MCG tablet    SYNTHROID/LEVOTHROID    90 tablet    Take 1 tablet (200 mcg) by mouth daily    Acquired hypothyroidism       metoprolol 25 MG 24 hr tablet    TOPROL-XL    90 tablet    TAKE ONE TABLET BY MOUTH ONE TIME DAILY    Essential hypertension       order for DME     100 strip    Equipment being ordered: blood glucose monitor strips to use 2 to 4 times  A day. As covered by insurance. #100 with 5 refills  Monitor ordered this day  As well.  Also lancets #100 use as needed for testing 5 refils    Diabetes mellitus, type 2 (H)       potassium chloride SA 10 MEQ CR tablet    K-DUR/KLOR-CON M    90 tablet    Take 1 tablet (10 mEq) by mouth 3 times daily    Bilateral leg edema

## 2017-09-27 ENCOUNTER — NURSE TRIAGE (OUTPATIENT)
Dept: NURSING | Facility: CLINIC | Age: 67
End: 2017-09-27

## 2017-09-27 NOTE — TELEPHONE ENCOUNTER
Additional Information    Negative: Shock suspected (e.g., cold/pale/clammy skin, too weak to stand, low BP, rapid pulse)    Negative: Difficult to awaken or acting confused  (e.g., disoriented, slurred speech)    Negative: Sounds like a life-threatening emergency to the triager    Negative: Vomiting also present and worse than the diarrhea    Negative: [1] Blood in stool AND [2] without diarrhea    Negative: [1] SEVERE abdominal pain (e.g., excruciating) AND [2] present > 1 hour    Negative: [1] SEVERE abdominal pain AND [2] age > 60    Negative: [1] Blood in the stool AND [2] moderate or large amount of blood    Negative: Black or tarry bowel movements  (Exception: chronic-unchanged  black-grey bowel movements AND is taking iron pills or Pepto-bismol)    Negative: [1] Drinking very little AND [2] dehydration suspected (e.g., no urine > 12 hours, very dry mouth, very lightheaded)    Negative: Patient sounds very sick or weak to the triager    Negative: [1] SEVERE diarrhea (e.g., 7 or more times / day more than normal) AND [2]  age > 60 years    Negative: [1] Constant abdominal pain AND [2] present > 2 hours    Negative: [1] Fever > 103 F (39.4 C) AND [2] not able to get the fever down using Fever Care Advice    Negative: [1] SEVERE diarrhea (e.g., 7 or more times / day more than normal) AND [2] present > 24 hours (1 day)    Negative: [1] MODERATE diarrhea (e.g., 4-6 times / day more than normal) AND [2] present > 48 hours (2 days)    Negative: [1] MODERATE diarrhea (e.g., 4-6 times / day more than normal) AND [2] age > 70 years    Negative: Fever > 101 F (38.3 C)    Negative: Fever present > 3 days (72 hours)    Negative: Abdominal pain  (Exception: Pain clears with each passage of diarrhea stool)    Negative: [1] Blood in the stool AND [2] small amount of blood   (Exception: only on toilet paper. Reason: diarrhea can cause rectal irritation with blood on wiping)    Negative: [1] Mucus or pus in stool AND [2]  present > 2 days AND [3] diarrhea is more than mild    Negative: [1] Recent antibiotic therapy (i.e., within last 2 months) AND [2] > 3 days since antibiotic was stopped    Negative: Weak immune system (e.g., HIV positive, cancer chemo, splenectomy, organ transplant, chronic steroids)    Negative: Tube feedings (e.g., nasogastric, g-tube, j-tube)    Negative: Travel to a foreign country in past month    Negative: [1] MILD (e.g., 1-3 or more stools than normal in past 24 hours) diarrhea without known cause AND [2] present >  7 days    Negative: Diarrhea is a chronic symptom (recurrent or ongoing AND present > 4 weeks)    Negative: SEVERE diarrhea (e.g., 7 or more times / day more than normal) (all triage questions negative)    MILD-MODERATE diarrhea (e.g., 1-6 times / day more than normal) (all triage questions negative)    Protocols used: DIARRHEA-ADULT-ESTEPHANIE Roldan calls and says that he saw his DrRain Yesterday and was prescribed Colchicine, for his right foot pain. Melissa (pt's wife) says that pt. Has had the diarrhea, for the past 3 hours. Melissa says that pt's instructions say to let the Dr. Know if diarrhea occurs, while taking Colchicine. Dr. Hernandez-[facility]-was then paged, to call this nurse, at: [phone]-at: [phone], via smart web.

## 2017-09-27 NOTE — TELEPHONE ENCOUNTER
Regarding: Diarrhea from new medication.  ----- Message from Alfonso Palmer sent at 9/27/2017  6:07 PM CDT -----  Reason for call:  Patient reporting a symptom    Symptom or request: Diarrhea from new prescription.    Duration (how long have symptoms been present): 1 day    Have you been treated for this before? No    Additional comments: Would like to know if patient can stop taking the medication.    Phone Number patient can be reached at:  Other phone number:  [phone]    Best Time:  Anytime, but as soon as possible.    Can we leave a detailed message on this number:  Not Applicable    Call taken on 9/27/2017 at 6:05 PM by Alfonso Palmer

## 2017-09-27 NOTE — PROGRESS NOTES
Called Kenneth to talk to him about x-ray.  He's not having any pain between first and second toes where radiologist noted foreign body - pushed there and no pain at all.  His mid-foot pain is much better since starting colchicine yesterday.  Plan:   Will come into clinic if does have pain between first and second toes.  Otherwise - as feeling much better - no further action needed.  Dr. Blanca Peng MD/[facility]

## 2017-09-28 ENCOUNTER — TELEPHONE (OUTPATIENT)
Dept: FAMILY MEDICINE | Facility: CLINIC | Age: 67
End: 2017-09-28

## 2017-09-28 LAB
CHOLEST SERPL-MCNC: 163 MG/DL
HDLC SERPL-MCNC: 39 MG/DL
LDLC SERPL CALC-MCNC: 85 MG/DL
NONHDLC SERPL-MCNC: 124 MG/DL
TRIGL SERPL-MCNC: 193 MG/DL
URATE SERPL-MCNC: 9.6 MG/DL (ref 3.5–7.2)

## 2017-09-28 NOTE — TELEPHONE ENCOUNTER
Additional Information    Negative: [1] Caller is not with the adult (patient) AND [2] reporting urgent symptoms    Negative: Lab result questions    Negative: Medication questions    Negative: Caller cannot be reached by phone    Negative: Caller has already spoken to PCP or another triager    Negative: RN needs further essential information from caller in order to complete triage    Negative: Requesting regular office appointment    Negative: [1] Caller requesting NON-URGENT health information AND [2] PCP's office is the best resource    Negative: Health Information question, no triage required and triager able to answer question    Negative: General information question, no triage required and triager able to answer question    Negative: Question about upcoming scheduled test, no triage required and triager able to answer question    Negative: [1] Caller is not with the adult (patient) AND [2] probable NON-URGENT symptoms    [1] Follow-up call to recent contact AND [2] information only call, no triage required    Protocols used: INFORMATION ONLY CALL-ADULT-    Dr. Hernandez-[facility]-called this olamide stephenson and was told about pt's diarrhea and about his Colchicine. This  Says that he wants pt. To hold the Colchicine and call his DrRain In the AM. RN then called pt's wife (Melissa) back and told her what the  Said. Melissa voiced understanding and says that pt. Will call his clinic, as instructed.

## 2017-09-28 NOTE — TELEPHONE ENCOUNTER
Diarrhea from colchicine is unfortunately a common side effect.  It should stop quickly with stopping the mediction. Use Imodium AD if needed.

## 2017-09-28 NOTE — TELEPHONE ENCOUNTER
Additional Information    Negative: [1] Caller is not with the adult (patient) AND [2] reporting urgent symptoms    Negative: Lab result questions    Negative: Medication questions    Negative: Caller cannot be reached by phone    Negative: Caller has already spoken to PCP or another triager    Negative: RN needs further essential information from caller in order to complete triage    Negative: Requesting regular office appointment    Negative: [1] Caller requesting NON-URGENT health information AND [2] PCP's office is the best resource    Negative: Health Information question, no triage required and triager able to answer question    Negative: General information question, no triage required and triager able to answer question    Negative: Question about upcoming scheduled test, no triage required and triager able to answer question    Negative: [1] Caller is not with the adult (patient) AND [2] probable NON-URGENT symptoms    [1] Follow-up call to recent contact AND [2] information only call, no triage required    Protocols used: INFORMATION ONLY CALL-ADULT-    Because Dr. Verde-[facility]-has not called this nurse back yet, this  Was paged again, to call this nurse, at: [phone], at: [phone], via smart web. RN is paging the  Because pt. Has had the diarrhea, for the past 3 hours and is taking Colchicine.

## 2017-09-28 NOTE — TELEPHONE ENCOUNTER
Reason for Call:  Other call back    Detailed comments: Patient has a question about medication he was given. Please call    Phone Number Patient can be reached at: Home number on file [phone] (home)    Best Time: any    Can we leave a detailed message on this number? YES    Call taken on 9/28/2017 at 8:21 AM by MITCHELL WOLFE

## 2017-09-28 NOTE — TELEPHONE ENCOUNTER
Patient states that he was up most of the night with diarrhea as a result of the colchicine wants to know what he can do. Advised to stop medication as he reports the pain is gone and he has taken 10 doses. Advised to get some imodium AD and he can take 2 right away and one after each diarrhea stool with up to 6 in a 24 hour period. Will forward to provider for further advice.

## 2017-09-29 NOTE — PROGRESS NOTES
Hi Team 3:  Please sent a lab result with the following note.  Thank you!    Dear Kenneth,    It was nice to see you recently!  I wanted to let you know your recent test results - details of the results can be found below. Briefly:    1. Your results did confirm that you had a GOUT flare!  Good think we started that medicine; glad it's working.    Let me know if you have any questions about this, or other concerns.    Best,  Blanca Peng MD  Family Medicine  [facility]  [phone]         Results for orders placed or performed in visit on 09/26/17  -Uric acid       Result                                            Value                         Ref Range                       Uric Acid                                         9.6 (H)                       3.5 - 7.2 mg/dL            -Lipid panel reflex to direct LDL       Result                                            Value                         Ref Range                       Cholesterol                                       163                           <200 mg/dL                      Triglycerides                                     193 (H)                       <150 mg/dL                      HDL Cholesterol                                   39 (L)                        >39 mg/dL                       LDL Cholesterol Calculated                        85                            <100 mg/dL                      Non HDL Cholesterol                               124                           <130 mg/dL

## 2017-10-11 ENCOUNTER — HOSPITAL ENCOUNTER (OUTPATIENT)
Dept: WOUND CARE | Facility: CLINIC | Age: 67
Discharge: HOME | End: 2017-10-11
Attending: PHYSICIAN ASSISTANT | Admitting: PHYSICIAN ASSISTANT
Payer: MEDICARE

## 2017-10-11 VITALS
RESPIRATION RATE: 18 BRPM | TEMPERATURE: 97.4 F | HEART RATE: 73 BPM | DIASTOLIC BLOOD PRESSURE: 79 MMHG | SYSTOLIC BLOOD PRESSURE: 154 MMHG

## 2017-10-11 PROCEDURE — 97602 WOUND(S) CARE NON-SELECTIVE: CPT

## 2017-10-11 PROCEDURE — A6213 FOAM DRG >16<=48 SQ IN W/BDR: HCPCS

## 2017-10-11 PROCEDURE — 99202 OFFICE O/P NEW SF 15 MIN: CPT | Performed by: PHYSICIAN ASSISTANT

## 2017-10-11 PROCEDURE — A6252 ABSORPT DRG >16 <=48 W/O BDR: HCPCS

## 2017-10-11 PROCEDURE — 27211191 ZZH COFLEX 2 LAYER COMPRESSION WRAP

## 2017-10-11 NOTE — PROGRESS NOTES
[facility]    HISTORY OF PRESENT ILLNESS: Mr. Jamar Ivory is a 67-year-old male who presents today with wounds on bilateral lower legs. He has had intermittent swelling in both lower legs as well as itching that causes him to scratch at his legs and cause wounds. Has had several bouts of cellulitis from this requiring antibiotics. Doesn't utilize any form of compression and doesn't typically wear any kind of sock regularly.     WOUND CARE: None currently.    DATE WOUND ACQUIRED: 7/26/17    PAST MEDICAL HISTORY: vitiligo, hypothyroidism, TIA, shortness of breath, morbid obesitymajor depressive disorder, hypertension, fatty liver, CVA, arthritis, gout    SOCIAL HISTORY:     MEDICATIONS:   Current Outpatient Prescriptions   Medication     colchicine (COLCRYS) 0.6 MG tablet     finasteride (PROSCAR) 5 MG tablet     furosemide (LASIX) 80 MG tablet     potassium chloride SA (K-DUR/KLOR-CON M) 10 MEQ CR tablet     metoprolol (TOPROL-XL) 25 MG 24 hr tablet     levothyroxine (SYNTHROID/LEVOTHROID) 200 MCG tablet     Glucosamine-Chondroitin--200-150 MG TABS     ORDER FOR DME     No current facility-administered medications for this encounter.        REVIEW OF SYSTEMS:  CONSTITUTIONAL: Denies fevers or acute illness  CARDIOVASCULAR: Denies circulatory issues or previous vascular procedures. Denies heart failure. Reports pain in legs at rest, especially when lying in bed.   ENDOCRINE: Denies diabetes    VITALS: /79 (BP Location: Left arm)  Pulse 73  Temp 97.4  F (36.3  C) (Oral)  Resp 18    PHYSICAL EXAM:  GENERAL: Patient is alert and oriented and in no acute distress  CARDIOVASCULAR: pedal pulses are non-palpable bilaterally  INTEGUMENTARY:   WOUND ASSESSMENT:     Location: right proximolateral      Stage/Thickness: Full    Size: 2.0 cm x 1.8 cm with a depth of 0.1 cm    Drainage: copious amount of serosanguinous drainage    Wound description: shallow, clean and granular  WOUND  ASSESSMENT #2:     Location: right lateral    Stage/Thickness: Full    Size: 1.3 cm x 2.9 cm with a depth of 0.1 cm    Drainage: copious amount of serosanguinous drainage    Wound description: shallow, clean and granular  WOUND ASSESSMENT #3:     Location: right distolateral     Stage/Thickness: Full    Size: 1.7 cm x 1.2 cm with a depth of 0.1 cm    Drainage: copious amount of serosanguinous drainage    Wound description: shallow, clean and granular  WOUND ASSESSMENT #4:     Location: right anterior     Stage/Thickness: Full    Size: 1.8 cm x 0.4 cm with a depth of 0.1 cm    Drainage: copious amount of serosanguinous drainage    Wound description: shallow, clean and granular    PROCEDURE: Per standing order, topical 4% lidocaine was applied to the wound by the [facility]. The wound was mechanically debrided.     DOPPLER: Hand held doppler performed showing triphasic wave forms in bilateral DP and PT on an ischemic ulcer with tissue loss of the lower extremity due to absent pulses and rest pain. Please refer to the tracing placed in chart.     ASSESSMENT: lymphedema ulcers of bilateral lower legs with fat-layer exposed, absent pedal pulses    PLAN:   1. Primary dressing: ActiCoat 7; Secondary dressing: Mepilex, 2-layer CoFlex compression wraps  2. Will need to come up with a longer term compression garment for discharge from our services    FOLLOW-UP: weekly    RICH SRIVASTAVA PA-C

## 2017-10-13 ENCOUNTER — OFFICE VISIT (OUTPATIENT)
Dept: FAMILY MEDICINE | Facility: CLINIC | Age: 67
End: 2017-10-13
Payer: MEDICARE

## 2017-10-13 VITALS
WEIGHT: 315 LBS | TEMPERATURE: 98.8 F | SYSTOLIC BLOOD PRESSURE: 130 MMHG | DIASTOLIC BLOOD PRESSURE: 80 MMHG | RESPIRATION RATE: 18 BRPM | OXYGEN SATURATION: 92 % | BODY MASS INDEX: 50.78 KG/M2 | HEART RATE: 76 BPM

## 2017-10-13 DIAGNOSIS — M1A.0790 IDIOPATHIC CHRONIC GOUT OF FOOT WITHOUT TOPHUS, UNSPECIFIED LATERALITY: Primary | ICD-10-CM

## 2017-10-13 DIAGNOSIS — E78.5 HYPERLIPIDEMIA WITH TARGET LDL LESS THAN 100: Chronic | ICD-10-CM

## 2017-10-13 DIAGNOSIS — E66.01 OBESITY, MORBID (MORE THAN 100 LBS OVER IDEAL WEIGHT OR BMI > 40) (H): ICD-10-CM

## 2017-10-13 PROCEDURE — 99214 OFFICE O/P EST MOD 30 MIN: CPT | Performed by: FAMILY MEDICINE

## 2017-10-13 RX ORDER — ALLOPURINOL 100 MG/1
100 TABLET ORAL DAILY
Qty: 90 TABLET | Refills: 1 | Status: SHIPPED | OUTPATIENT
Start: 2017-10-13 | End: 2018-01-05

## 2017-10-13 RX ORDER — SIMVASTATIN 20 MG
20 TABLET ORAL AT BEDTIME
Qty: 90 TABLET | Refills: 1 | Status: SHIPPED | OUTPATIENT
Start: 2017-10-13 | End: 2018-12-28

## 2017-10-13 NOTE — PATIENT INSTRUCTIONS
1) For your uric acid levels:   We will get this down to normal by starting a medicine called ALLOPURINOL that you take once a day, every day.  This will help you avoid GOUT attacks.    2) For your cholesterol:   We'll start a medicine called simvastatin that will get this back to normal.   Try cooking with olive oil instead of butter or margarine.    Gout    Gout is an inflammation of a joint due to a build-up of gout crystals in the joint fluid. This occurs when there is an excess of uric acid (a normal waste product) in the body. Uric acid builds up in the body when the kidneys are unable to filter enough of it from the blood. This may occur with age. It is also associated with kidney disease. Gout occurs more often in people with obesity, diabetes, high blood pressure, or high levels of fats in the blood. It may run in families. Gout tends to come and go. A flare up of gout is called an attack. Drinking alcohol or eating certain foods (such as shellfish or foods with additives such as high-fructose corn syrup) may increase uric acid levels in the blood and cause a gout attack.  During a gout attack, the affected joint may become a hot, red, swollen and painful. If you have had one attack of gout, you are likely to have another. An attack of gout can be treated with medicine. If these attacks become frequent, a daily medicine may be prescribed to help the kidneys remove uric acid from the body.  Home care  During a gout attack:    Rest painful joints. If gout affects the joints of your foot or leg, you may want to use crutches for the first few days to keep from bearing weight on the affected joint.    When sitting or lying down, raise the painful joint to a level higher than your heart.    Apply an ice pack (ice cubes in a plastic bag wrapped in a thin towel) over the injured area for 20 minutes every 1 to 2 hours the first day for pain relief. Continue this 3 to 4 times a day for swelling and pain.    Avoid  alcohol and foods listed below (see Preventing attacks) during a gout attack. Drink extra fluid to help flush the uric acid through your kidneys.    If you were prescribed a medicine to treat gout, take it as your healthcare provider has instructed. Don't skip doses.    Take anti-inflammatory medicine as directed.     If pain medicines have been prescribed, take them exactly as directed.    Preventing attacks    Minimize or avoid alcohol use. Excess alcohol intake can cause a gout attack.    Limit these foods and beverages:    Organ meats, such as kidneys and liver    Certain seafoods (anchovies, sardines, shrimp, scallops, herring, mackerel)    Wild game, meat extracts and meat gravies    Foods and beverages sweetened with high-fructose corn syrup, such as sodas    Eat a healthy diet including low-fat and nonfat dairy, whole grains, and vegetables.    If you are overweight, talk to your healthcare provider about a weight reduction plan. Avoid fasting or extreme low calorie diets (less than 900 calories per day). This will increase uric acid levels in the body.    If you have diabetes or high blood pressure, work with your doctor to manage these conditions.    Protect the joint from injury. Trauma can trigger a gout attack.  Follow-up care  Follow up with your healthcare provider, or as advised.   When to seek medical advice  Call your healthcare provider if you have any of the following:    Fever over 100.4 F (38. C) with worsening joint pain    Increasing redness around the joint    Pain developing in another joint    Repeated vomiting, abdominal pain, or blood in the vomit or stool (black or red color)  Date Last Reviewed: 3/1/2017    9242-7849 [employer]. [address] [place] [address], [place] [address]. All rights reserved. This information is not intended as a substitute for professional medical care. Always follow your healthcare professional's instructions.

## 2017-10-13 NOTE — MR AVS SNAPSHOT
After Visit Summary   10/13/2017    Jamar Ivory    MRN: [id number]           Patient Information     Date Of Birth          1950        Visit Information        Provider Department      10/13/2017 1:40 PM Blanca Peng MD [facility]        Today's Diagnoses     Idiopathic chronic gout of foot without tophus, unspecified laterality    -  1    Hyperlipidemia with target LDL less than 100          Care Instructions        1) For your uric acid levels:   We will get this down to normal by starting a medicine called ALLOPURINOL that you take once a day, every day.  This will help you avoid GOUT attacks.    2) For your cholesterol:   We'll start a medicine called simvastatin that will get this back to normal.   Try cooking with olive oil instead of butter or margarine.    Gout    Gout is an inflammation of a joint due to a build-up of gout crystals in the joint fluid. This occurs when there is an excess of uric acid (a normal waste product) in the body. Uric acid builds up in the body when the kidneys are unable to filter enough of it from the blood. This may occur with age. It is also associated with kidney disease. Gout occurs more often in people with obesity, diabetes, high blood pressure, or high levels of fats in the blood. It may run in families. Gout tends to come and go. A flare up of gout is called an attack. Drinking alcohol or eating certain foods (such as shellfish or foods with additives such as high-fructose corn syrup) may increase uric acid levels in the blood and cause a gout attack.  During a gout attack, the affected joint may become a hot, red, swollen and painful. If you have had one attack of gout, you are likely to have another. An attack of gout can be treated with medicine. If these attacks become frequent, a daily medicine may be prescribed to help the kidneys remove uric acid from the body.  Home care  During a gout  attack:    Rest painful joints. If gout affects the joints of your foot or leg, you may want to use crutches for the first few days to keep from bearing weight on the affected joint.    When sitting or lying down, raise the painful joint to a level higher than your heart.    Apply an ice pack (ice cubes in a plastic bag wrapped in a thin towel) over the injured area for 20 minutes every 1 to 2 hours the first day for pain relief. Continue this 3 to 4 times a day for swelling and pain.    Avoid alcohol and foods listed below (see Preventing attacks) during a gout attack. Drink extra fluid to help flush the uric acid through your kidneys.    If you were prescribed a medicine to treat gout, take it as your healthcare provider has instructed. Don't skip doses.    Take anti-inflammatory medicine as directed.     If pain medicines have been prescribed, take them exactly as directed.    Preventing attacks    Minimize or avoid alcohol use. Excess alcohol intake can cause a gout attack.    Limit these foods and beverages:    Organ meats, such as kidneys and liver    Certain seafoods (anchovies, sardines, shrimp, scallops, herring, mackerel)    Wild game, meat extracts and meat gravies    Foods and beverages sweetened with high-fructose corn syrup, such as sodas    Eat a healthy diet including low-fat and nonfat dairy, whole grains, and vegetables.    If you are overweight, talk to your healthcare provider about a weight reduction plan. Avoid fasting or extreme low calorie diets (less than 900 calories per day). This will increase uric acid levels in the body.    If you have diabetes or high blood pressure, work with your doctor to manage these conditions.    Protect the joint from injury. Trauma can trigger a gout attack.  Follow-up care  Follow up with your healthcare provider, or as advised.   When to seek medical advice  Call your healthcare provider if you have any of the following:    Fever over 100.4 F (38. C) with  worsening joint pain    Increasing redness around the joint    Pain developing in another joint    Repeated vomiting, abdominal pain, or blood in the vomit or stool (black or red color)  Date Last Reviewed: 3/1/2017    0301-6598 [employer]. [address] [place] [address]. All rights reserved. This information is not intended as a substitute for professional medical care. Always follow your healthcare professional's instructions.                Follow-ups after your visit        Your next 10 appointments already scheduled     Oct 19, 2017 11:30 AM CDT   Return Visit with  WOUND HEALING NURSE   [facility] ([facility])    [address]  [address]  [place] [address]   [phone]            Oct 26, 2017 11:30 AM CDT   Return Visit with  WOUND HEALING NURSE   [facility] ([facility])    [address]  [address]  [place] [address]   [phone]            Nov 02, 2017 11:00 AM CDT   Return Visit with Amanda Ayala PA-C   [facility] ([facility])    [address]  [address]  [facility] [address]   [phone]              Who to contact     If you have questions or need follow up information about today's clinic visit or your schedule please contact [facility] directly at [phone].  Normal or non-critical lab and imaging results will be communicated to you by MyChart, letter or phone within 4 business days after the clinic has received the results. If you do not hear from us within 7 days, please contact the clinic through MyChart or phone. If you have a critical or abnormal lab result, we will notify you by phone as soon as possible.  Submit refill requests through [employer] or call your pharmacy and they will forward the refill request to us. Please allow 3 business days for your  refill to be completed.          Additional Information About Your Visit        Care EveryWhere ID     This is your Care EveryWhere ID. This could be used by other organizations to access your [place] medical records  [id number]        Your Vitals Were     Pulse Temperature Respirations Pulse Oximetry BMI (Body Mass Index)       76 98.8  F (37.1  C) (Tympanic) 18 92% 50.78 kg/m2        Blood Pressure from Last 3 Encounters:   10/13/17 130/80   10/11/17 154/79   09/26/17 131/74    Weight from Last 3 Encounters:   10/13/17 (!) 334 lb (151.5 kg)   09/21/17 (!) 315 lb 8 oz (143.1 kg)   08/31/17 (!) 321 lb (145.6 kg)              Today, you had the following     No orders found for display         Today's Medication Changes          These changes are accurate as of: 10/13/17  1:50 PM.  If you have any questions, ask your nurse or doctor.               Start taking these medicines.        Dose/Directions    allopurinol 100 MG tablet   Commonly known as:  ZYLOPRIM   Used for:  Idiopathic chronic gout of foot without tophus, unspecified laterality   Started by:  Blanca Peng MD        Dose:  100 mg   Take 1 tablet (100 mg) by mouth daily   Quantity:  90 tablet   Refills:  1       simvastatin 20 MG tablet   Commonly known as:  ZOCOR   Used for:  Hyperlipidemia with target LDL less than 100   Started by:  Blanca Peng MD        Dose:  20 mg   Take 1 tablet (20 mg) by mouth At Bedtime   Quantity:  90 tablet   Refills:  1         Stop taking these medicines if you haven't already. Please contact your care team if you have questions.     colchicine 0.6 MG tablet   Commonly known as:  COLCRYS   Stopped by:  Blanca Peng MD                Where to get your medicines      These medications were sent to [place] [address] [place] [address] [address]     Phone:  [phone]     allopurinol 100 MG tablet    simvastatin 20 MG tablet                 Primary Care Provider Office Phone # Fax #    Blanca Peng [phone] [address] [facility] [address]        Equal Access to Services     SHAUN AKERS : Hadii aad ku hadsariahdaniel Fowler, wamarceda coletteadaha, qasalta kaalmada tammyrory, maykel barnesjake munozphoebe ramirez rachana parada. So [facility] [phone].    ATENCIÓN: Si habla español, tiene a shook disposición servicios gratuitos de asistencia lingüística. Llame al [phone].    We comply with applicable federal civil rights laws and [place] laws. We do not discriminate on the basis of race, color, national origin, age, disability, sex, sexual orientation, or gender identity.            Thank you!     Thank you for choosing [facility]  for your care. Our goal is always to provide you with excellent care. Hearing back from our patients is one way we can continue to improve our services. Please take a few minutes to complete the written survey that you may receive in the mail after your visit with us. Thank you!             Your Updated Medication List - Protect others around you: Learn how to safely use, store and throw away your medicines at www.disposemymeds.org.          This list is accurate as of: 10/13/17  1:50 PM.  Always use your most recent med list.                   Brand Name Dispense Instructions for use Diagnosis    allopurinol 100 MG tablet    ZYLOPRIM    90 tablet    Take 1 tablet (100 mg) by mouth daily    Idiopathic chronic gout of foot without tophus, unspecified laterality       cefuroxime 500 MG tablet    CEFTIN    20 tablet    TAKE 1 TABLET (500 MG) BY MOUTH 2 TIMES DAILY    Cellulitis of lower extremity, unspecified laterality       finasteride 5 MG tablet    PROSCAR    90 tablet    TAKE 1 TABLET (5 MG) BY MOUTH DAILY    Benign prostatic hyperplasia with lower urinary tract symptoms       furosemide 80 MG tablet    LASIX    180 tablet    TAKE 1 TABLET (80 MG) BY MOUTH 2 TIMES DAILY     Edema, unspecified type       Glucosamine-Chondroitin--200-150 MG Tabs           levothyroxine 200 MCG tablet    SYNTHROID/LEVOTHROID    90 tablet    Take 1 tablet (200 mcg) by mouth daily    Acquired hypothyroidism       metoprolol 25 MG 24 hr tablet    TOPROL-XL    90 tablet    TAKE ONE TABLET BY MOUTH ONE TIME DAILY    Essential hypertension       order for DME     100 strip    Equipment being ordered: blood glucose monitor strips to use 2 to 4 times  A day. As covered by insurance. #100 with 5 refills  Monitor ordered this day  As well.  Also lancets #100 use as needed for testing 5 refils    Diabetes mellitus, type 2 (H)       potassium chloride SA 10 MEQ CR tablet    K-DUR/KLOR-CON M    90 tablet    Take 1 tablet (10 mEq) by mouth 3 times daily    Bilateral leg edema       simvastatin 20 MG tablet    ZOCOR    90 tablet    Take 1 tablet (20 mg) by mouth At Bedtime    Hyperlipidemia with target LDL less than 100

## 2017-10-13 NOTE — PROGRESS NOTES
"  SUBJECTIVE:   Jamar Ivory is a 67 year old male who presents to clinic today for the following health issues:      Pt here to follow up on lab results, how to get numbers back to normal?      Gout  Duration: on going  Description   Location: leg - bilateral  Joint Swelling: YES  Redness: YES  Pain intensity:  moderate, severe  Accompanying signs and symptoms: None  History  Previous history of gout: YES   Trauma to the area: no   Precipitating factors:   Alcohol usage: none  Diuretic use: no   Recent illness: no   Therapies tried and outcome: None        67 year old with history of type 2 diabetes, diet controlled, and chronic severe lower extremity edema and wounds as well as remote history of gout in his foot - here today for follow up of abnormal lab results.  States \"I don't like doctors much, but I like you\".  States he is feeling much better from gout.  Cochicine gave him diarrhea however.  Here today because wonders how to lower cholesterol and uric acid levels?    Problem list and histories reviewed & adjusted, as indicated.  Additional history: as documented    Recent Labs   Lab Test  09/26/17   1540  08/16/17   1031  07/19/17   1012  05/25/17   1226  03/02/17   1056   07/18/16   0957   01/16/16   1037   04/27/15   0754   04/25/14   0956   01/23/14   0735  10/11/13   2100   A1C   --    --   5.5  5.7   --    --    --    --   5.5   --   6.0   < >   --    --    --    --    LDL  85   --    --    --    --    --   96   --    --    --   108   --   100   --    --    --    HDL  39*   --    --    --    --    --   45   --    --    --   43   --   40*   --    --    --    TRIG  193*   --    --    --    --    --   103   --    --    --   92   --   145   --    --    --    ALT   --    --    --    --    --    --    --    --    --    --    --    --   23   --   19  36   CR   --   0.90  0.86  0.77   --    < >   --    < >  0.90   --   1.00   --   1.00   < >  1.00  0.74   GFRESTIMATED   --   84  88  >90  Non  " American GFR Calc     --    < >   --    < >  84   --   75   --   75   < >  75  >90   GFRESTBLACK   --   >90  >90  African American GFR Calc    >90   GFR Calc     --    < >   --    < >  >90   --   >90   --   >90   < >  >90  >90   POTASSIUM   --   3.7  4.3  2.8*   --    < >   --    < >  4.6   --   4.0   --   3.9   < >  4.1  4.1   TSH   --   2.19   --    --   9.03*   < >  1.80   < >   --    < >  26.05*   < >   --    --    --   19.60*    < > = values in this interval not displayed.      BP Readings from Last 3 Encounters:   10/13/17 130/80   10/11/17 154/79   09/26/17 131/74    Wt Readings from Last 3 Encounters:   10/13/17 (!) 334 lb (151.5 kg)   09/21/17 (!) 315 lb 8 oz (143.1 kg)   08/31/17 (!) 321 lb (145.6 kg)                 Reviewed and updated as needed this visit by clinical staffTobacco  Allergies  Med Hx  Surg Hx  Fam Hx  Soc Hx      Reviewed and updated as needed this visit by Provider         ROS:  Constitutional, HEENT, cardiovascular, pulmonary, gi and gu systems are negative, except as otherwise noted.      OBJECTIVE:   /80  Pulse 76  Temp 98.8  F (37.1  C) (Tympanic)  Resp 18  Wt (!) 334 lb (151.5 kg)  SpO2 92%  BMI 50.78 kg/m2  Body mass index is 50.78 kg/(m^2).  GENERAL: healthy, alert and no distress  NECK: no adenopathy, no asymmetry, masses, or scars and thyroid normal to palpation  RESP: lungs clear to auscultation - no rales, rhonchi or wheezes  CV: regular rate and rhythm, normal S1 S2, no S3 or S4, no murmur, click or rub, no peripheral edema and peripheral pulses strong  ABDOMEN: soft, nontender, no hepatosplenomegaly, no masses and bowel sounds normal  MS: Bilateral venous stasis changes, severe, with scabbing and erythema on both legs but no acute infection or asymmetry.  Right foot warm, tender mid-foot.    ASSESSMENT/PLAN:     Jamar was seen today for results and arthritis.    Diagnoses and all orders for this visit:    Idiopathic chronic gout of foot  without tophus, unspecified laterality  -     allopurinol (ZYLOPRIM) 100 MG tablet; Take 1 tablet (100 mg) by mouth daily    Hyperlipidemia with target LDL less than 100  -     simvastatin (ZOCOR) 20 MG tablet; Take 1 tablet (20 mg) by mouth At Bedtime        Patient Instructions       1) For your uric acid levels:   We will get this down to normal by starting a medicine called ALLOPURINOL that you take once a day, every day.  This will help you avoid GOUT attacks.    2) For your cholesterol:   We'll start a medicine called simvastatin that will get this back to normal.   Try cooking with olive oil instead of butter or margarine.    Gout    Gout is an inflammation of a joint due to a build-up of gout crystals in the joint fluid. This occurs when there is an excess of uric acid (a normal waste product) in the body. Uric acid builds up in the body when the kidneys are unable to filter enough of it from the blood. This may occur with age. It is also associated with kidney disease. Gout occurs more often in people with obesity, diabetes, high blood pressure, or high levels of fats in the blood. It may run in families. Gout tends to come and go. A flare up of gout is called an attack. Drinking alcohol or eating certain foods (such as shellfish or foods with additives such as high-fructose corn syrup) may increase uric acid levels in the blood and cause a gout attack.  During a gout attack, the affected joint may become a hot, red, swollen and painful. If you have had one attack of gout, you are likely to have another. An attack of gout can be treated with medicine. If these attacks become frequent, a daily medicine may be prescribed to help the kidneys remove uric acid from the body.  Home care  During a gout attack:    Rest painful joints. If gout affects the joints of your foot or leg, you may want to use crutches for the first few days to keep from bearing weight on the affected joint.    When sitting or lying down,  raise the painful joint to a level higher than your heart.    Apply an ice pack (ice cubes in a plastic bag wrapped in a thin towel) over the injured area for 20 minutes every 1 to 2 hours the first day for pain relief. Continue this 3 to 4 times a day for swelling and pain.    Avoid alcohol and foods listed below (see Preventing attacks) during a gout attack. Drink extra fluid to help flush the uric acid through your kidneys.    If you were prescribed a medicine to treat gout, take it as your healthcare provider has instructed. Don't skip doses.    Take anti-inflammatory medicine as directed.     If pain medicines have been prescribed, take them exactly as directed.    Preventing attacks    Minimize or avoid alcohol use. Excess alcohol intake can cause a gout attack.    Limit these foods and beverages:    Organ meats, such as kidneys and liver    Certain seafoods (anchovies, sardines, shrimp, scallops, herring, mackerel)    Wild game, meat extracts and meat gravies    Foods and beverages sweetened with high-fructose corn syrup, such as sodas    Eat a healthy diet including low-fat and nonfat dairy, whole grains, and vegetables.    If you are overweight, talk to your healthcare provider about a weight reduction plan. Avoid fasting or extreme low calorie diets (less than 900 calories per day). This will increase uric acid levels in the body.    If you have diabetes or high blood pressure, work with your doctor to manage these conditions.    Protect the joint from injury. Trauma can trigger a gout attack.  Follow-up care  Follow up with your healthcare provider, or as advised.   When to seek medical advice  Call your healthcare provider if you have any of the following:    Fever over 100.4 F (38. C) with worsening joint pain    Increasing redness around the joint    Pain developing in another joint    Repeated vomiting, abdominal pain, or blood in the vomit or stool (black or red color)  Date Last Reviewed:  3/1/2017    0372-2804 for[MD]. [address] [place] [address], [place] [address]. All rights reserved. This information is not intended as a substitute for professional medical care. Always follow your healthcare professional's instructions.            Blanca Peng MD  [facility]    BP Readings from Last 3 Encounters:   10/13/17 130/80   10/11/17 154/79   09/26/17 131/74

## 2017-10-13 NOTE — NURSING NOTE
"Chief Complaint   Patient presents with     Results     labs     Arthritis       Initial /80  Pulse 76  Temp 98.8  F (37.1  C) (Tympanic)  Resp 18  Wt (!) 334 lb (151.5 kg)  SpO2 92%  BMI 50.78 kg/m2 Estimated body mass index is 50.78 kg/(m^2) as calculated from the following:    Height as of 8/16/17: 5' 8\" (1.727 m).    Weight as of this encounter: 334 lb (151.5 kg).  Medication Reconciliation: complete   .Amrita ALVARADO      "

## 2017-10-17 ENCOUNTER — OFFICE VISIT (OUTPATIENT)
Dept: FAMILY MEDICINE | Facility: CLINIC | Age: 67
End: 2017-10-17
Payer: MEDICARE

## 2017-10-17 VITALS
WEIGHT: 315 LBS | SYSTOLIC BLOOD PRESSURE: 126 MMHG | HEART RATE: 72 BPM | BODY MASS INDEX: 50.78 KG/M2 | RESPIRATION RATE: 16 BRPM | DIASTOLIC BLOOD PRESSURE: 74 MMHG | TEMPERATURE: 97.5 F | OXYGEN SATURATION: 93 %

## 2017-10-17 DIAGNOSIS — R51.9 NEW ONSET HEADACHE: ICD-10-CM

## 2017-10-17 DIAGNOSIS — R51.9 NONINTRACTABLE EPISODIC HEADACHE, UNSPECIFIED HEADACHE TYPE: ICD-10-CM

## 2017-10-17 DIAGNOSIS — I10 HYPERTENSION GOAL BP (BLOOD PRESSURE) < 140/90: Chronic | ICD-10-CM

## 2017-10-17 DIAGNOSIS — M79.671 RIGHT FOOT PAIN: ICD-10-CM

## 2017-10-17 DIAGNOSIS — M10.9 ACUTE GOUTY ARTHROPATHY: Primary | ICD-10-CM

## 2017-10-17 DIAGNOSIS — G50.1 ATYPICAL FACIAL PAIN: ICD-10-CM

## 2017-10-17 PROCEDURE — 99214 OFFICE O/P EST MOD 30 MIN: CPT | Performed by: FAMILY MEDICINE

## 2017-10-17 RX ORDER — LOSARTAN POTASSIUM 25 MG/1
25 TABLET ORAL DAILY
Qty: 90 TABLET | Refills: 3 | Status: SHIPPED | OUTPATIENT
Start: 2017-10-17 | End: 2018-01-29

## 2017-10-17 RX ORDER — COLCHICINE 0.6 MG/1
TABLET ORAL
Qty: 30 TABLET | Refills: 1 | Status: SHIPPED | OUTPATIENT
Start: 2017-10-17 | End: 2018-01-05

## 2017-10-17 NOTE — MR AVS SNAPSHOT
After Visit Summary   10/17/2017    Jamar Ivory    MRN: [id number]           Patient Information     Date Of Birth          1950        Visit Information        Provider Department      10/17/2017 11:20 AM Blanca Peng MD [facility]        Today's Diagnoses     Acute gouty arthropathy    -  1    Right foot pain        Hypertension goal BP (blood pressure) < 140/90          Care Instructions    For your GOUT:   --Take your allopurinol every day.  This should help AVOID gout flares.   --When you have a flare, take COLCHICINE.  Once the flare is over, stop this medicine.      For your blood pressure:   --We should switch you to a medicine called LOSARTAN.     --I sent this to your pharmacy.            Follow-ups after your visit        Your next 10 appointments already scheduled     Oct 19, 2017 11:30 AM CDT   Return Visit with  WOUND HEALING NURSE   [facility] [place] ([facility])    [address]  [address]  [facility] [address]   [phone]            Oct 26, 2017 11:30 AM CDT   Return Visit with  WOUND HEALING NURSE   [facility] ([facility])    [address]  [address]  [facility] [address]   [phone]            Nov 02, 2017 11:00 AM CDT   Return Visit with Amanda Ayala PA-C   [facility] [place] ([facility])    [address]  [address]  [facility] [address]   [phone]              Who to contact     If you have questions or need follow up information about today's clinic visit or your schedule please contact [facility] directly at [phone].  Normal or non-critical lab and imaging results will be communicated to you by MyChart, letter or phone within 4 business days after the clinic has received the results. If you do not hear from us within 7  "days, please contact the clinic through [employer] English or phone. If you have a critical or abnormal lab result, we will notify you by phone as soon as possible.  Submit refill requests through [employer] English or call your pharmacy and they will forward the refill request to us. Please allow 3 business days for your refill to be completed.          Additional Information About Your Visit        Open English Information     [employer] English lets you send messages to your doctor, view your test results, renew your prescriptions, schedule appointments and more. To sign up, go to [email or web address] . Click on \"Log in\" on the left side of the screen, which will take you to the Welcome page. Then click on \"Sign up Now\" on the right side of the page.     You will be asked to enter the access code listed below, as well as some personal information. Please follow the directions to create your username and password.     Your access code is: [email or web address]  Expires: 1/15/2018 11:41 AM     Your access code will  in 90 days. If you need help or a new code, please call your [place] clinic or [phone].        Care EveryWhere ID     This is your Care EveryWhere ID. This could be used by other organizations to access your [place] medical records  [id number]        Your Vitals Were     Pulse Temperature Respirations Pulse Oximetry BMI (Body Mass Index)       72 97.5  F (36.4  C) (Tympanic) 16 93% 50.78 kg/m2        Blood Pressure from Last 3 Encounters:   10/17/17 126/74   10/13/17 130/80   10/11/17 154/79    Weight from Last 3 Encounters:   10/17/17 (!) 334 lb (151.5 kg)   10/13/17 (!) 334 lb (151.5 kg)   17 (!) 315 lb 8 oz (143.1 kg)              Today, you had the following     No orders found for display         Today's Medication Changes          These changes are accurate as of: 10/17/17 11:41 AM.  If you have any questions, ask your nurse or doctor.               Start taking these medicines.        Dose/Directions    colchicine 0.6 " MG tablet   Commonly known as:  COLCRYS   Used for:  Right foot pain, Acute gouty arthropathy   Started by:  Blanca Peng MD        2 tabs at the onset of flare, then 1 tab every 2 hrs until pain is better or diarrhea occurs, up to 10 doses. Wait 3 days until taking again   Quantity:  30 tablet   Refills:  1       losartan 25 MG tablet   Commonly known as:  COZAAR   Used for:  Hypertension goal BP (blood pressure) < 140/90   Started by:  Blanca Peng MD        Dose:  25 mg   Take 1 tablet (25 mg) by mouth daily   Quantity:  90 tablet   Refills:  3            Where to get your medicines      These medications were sent to [place] [id number] [place] [address] [facility] [address]     Phone:  [phone]     colchicine 0.6 MG tablet    losartan 25 MG tablet                Primary Care Provider Office Phone # Fax #    Blanca Peng [phone] [address] [facility] [address]        Equal Access to Services     JOSE ANTONIO AKERS AH: Hadii cole ku hadasho Soomaali, waaxda luqadaha, qaybta kaalmada adeegyada, waxay idiin haynayelin antonio reich . So [facility] [phone].    ATENCIÓN: Si habla español, tiene a shook disposición servicios gratuitos de asistencia lingüística. [facility] [phone].    We comply with applicable federal civil rights laws and [place] laws. We do not discriminate on the basis of race, color, national origin, age, disability, sex, sexual orientation, or gender identity.            Thank you!     Thank you for choosing [facility]  for your care. Our goal is always to provide you with excellent care. Hearing back from our patients is one way we can continue to improve our services. Please take a few minutes to complete the written survey that you may receive in the mail after your visit with us. Thank you!             Your Updated Medication List - Protect others around you:  Learn how to safely use, store and throw away your medicines at www.disposemymeds.org.          This list is accurate as of: 10/17/17 11:41 AM.  Always use your most recent med list.                   Brand Name Dispense Instructions for use Diagnosis    allopurinol 100 MG tablet    ZYLOPRIM    90 tablet    Take 1 tablet (100 mg) by mouth daily    Idiopathic chronic gout of foot without tophus, unspecified laterality       cefuroxime 500 MG tablet    CEFTIN    20 tablet    TAKE 1 TABLET (500 MG) BY MOUTH 2 TIMES DAILY    Cellulitis of lower extremity, unspecified laterality       colchicine 0.6 MG tablet    COLCRYS    30 tablet    2 tabs at the onset of flare, then 1 tab every 2 hrs until pain is better or diarrhea occurs, up to 10 doses. Wait 3 days until taking again    Right foot pain, Acute gouty arthropathy       finasteride 5 MG tablet    PROSCAR    90 tablet    TAKE 1 TABLET (5 MG) BY MOUTH DAILY    Benign prostatic hyperplasia with lower urinary tract symptoms       furosemide 80 MG tablet    LASIX    180 tablet    TAKE 1 TABLET (80 MG) BY MOUTH 2 TIMES DAILY    Edema, unspecified type       Glucosamine-Chondroitin--200-150 MG Tabs           levothyroxine 200 MCG tablet    SYNTHROID/LEVOTHROID    90 tablet    Take 1 tablet (200 mcg) by mouth daily    Acquired hypothyroidism       losartan 25 MG tablet    COZAAR    90 tablet    Take 1 tablet (25 mg) by mouth daily    Hypertension goal BP (blood pressure) < 140/90       metoprolol 25 MG 24 hr tablet    TOPROL-XL    90 tablet    TAKE ONE TABLET BY MOUTH ONE TIME DAILY    Essential hypertension       order for DME     100 strip    Equipment being ordered: blood glucose monitor strips to use 2 to 4 times  A day. As covered by insurance. #100 with 5 refills  Monitor ordered this day  As well.  Also lancets #100 use as needed for testing 5 refils    Diabetes mellitus, type 2 (H)       potassium chloride SA 10 MEQ CR tablet    K-DUR/KLOR-CON M    90 tablet     Take 1 tablet (10 mEq) by mouth 3 times daily    Bilateral leg edema       simvastatin 20 MG tablet    ZOCOR    90 tablet    Take 1 tablet (20 mg) by mouth At Bedtime    Hyperlipidemia with target LDL less than 100

## 2017-10-17 NOTE — PROGRESS NOTES
SUBJECTIVE:   Jamar Ivory is a 67 year old male who presents to clinic today for the following health issues:      Gout  Duration: on going  Description   Location: leg - bilateral  Joint Swelling: YES  Redness: YES  Pain intensity:  moderate  Accompanying signs and symptoms: sore  History  Previous history of gout: YES   Trauma to the area: no   Precipitating factors:   Alcohol usage: none  Diuretic use: no   Recent illness: no   Therapies tried and outcome: None    BP Readings from Last 3 Encounters:   10/17/17 126/74   10/13/17 130/80   10/11/17 154/79     Also here today because forgot to ask last week; for last month he's been having new very intense headaches that occur at night and wake him out of sleep.  Never had in past.  Wonders if he needs scan of head?  No other neuro symptoms, but worries about aneurysm.    Problem list and histories reviewed & adjusted, as indicated.  Additional history: as documented    BP Readings from Last 3 Encounters:   10/17/17 126/74   10/13/17 130/80   10/11/17 154/79    Wt Readings from Last 3 Encounters:   10/17/17 (!) 334 lb (151.5 kg)   10/13/17 (!) 334 lb (151.5 kg)   09/21/17 (!) 315 lb 8 oz (143.1 kg)           Reviewed and updated as needed this visit by clinical staffTobacco  Allergies  Meds  Problems  Med Hx  Surg Hx  Fam Hx  Soc Hx        Reviewed and updated as needed this visit by Provider  Allergies  Meds  Problems         ROS:  Constitutional, HEENT, cardiovascular, pulmonary, gi and gu systems are negative, except as otherwise noted.      OBJECTIVE:   /74  Pulse 72  Temp 97.5  F (36.4  C) (Tympanic)  Resp 16  Wt (!) 334 lb (151.5 kg)  SpO2 93%  BMI 50.78 kg/m2  Body mass index is 50.78 kg/(m^2).  GENERAL: alert, no distress, obese, elderly, appears older than stated age  NECK: no adenopathy, no asymmetry, masses, or scars and thyroid normal to palpation  RESP: lungs clear to auscultation - no rales, rhonchi or wheezes  CV:  regular rate and rhythm, normal S1 S2, no S3 or S4, no murmur, click or rub, no peripheral edema and peripheral pulses strong  ABDOMEN: soft, nontender, no hepatosplenomegaly, no masses and bowel sounds normal  MS: walks slowly with bilateral legs wrapped for edema  LYMPH: bilateral legs wrapped for edema    ASSESSMENT/PLAN:     Jamar was seen today for arthritis.    Diagnoses and all orders for this visit:    Acute gouty arthropathy  -     colchicine (COLCRYS) 0.6 MG tablet; 2 tabs at the onset of flare, then 1 tab every 2 hrs until pain is better or diarrhea occurs, up to 10 doses. Wait 3 days until taking again    Right foot pain  -     colchicine (COLCRYS) 0.6 MG tablet; 2 tabs at the onset of flare, then 1 tab every 2 hrs until pain is better or diarrhea occurs, up to 10 doses. Wait 3 days until taking again    Hypertension goal BP (blood pressure) < 140/90  -     losartan (COZAAR) 25 MG tablet; Take 1 tablet (25 mg) by mouth daily    Nonintractable episodic headache, unspecified headache type    New onset headache  -     MR Brain w/o & w Contrast; Future  -     MRA Head w/o Contrast Angiogram; Future    Atypical facial pain   -     MRA Head w/o Contrast Angiogram; Future        Patient Instructions   For your HEADACHES:   --As they are new, occur only at night and wake you from sleep we should look into this further.   --I'd like you to call for MRI.   --I'll call you with results.    For your GOUT:   --Take your allopurinol every day.  This should help AVOID gout flares.   --When you have a flare, take COLCHICINE.  Once the flare is over, stop this medicine.      For your blood pressure:   --We should switch you to a medicine called LOSARTAN.     --I sent this to your pharmacy.    Follow up with me on all of the above next month, sooner if any concerns.        Blanca Peng MD  [facility]

## 2017-10-17 NOTE — NURSING NOTE
"Chief Complaint   Patient presents with     Arthritis       Initial /74  Pulse 72  Temp 97.5  F (36.4  C) (Tympanic)  Resp 16  Wt (!) 334 lb (151.5 kg)  SpO2 93%  BMI 50.78 kg/m2 Estimated body mass index is 50.78 kg/(m^2) as calculated from the following:    Height as of 8/16/17: 5' 8\" (1.727 m).    Weight as of this encounter: 334 lb (151.5 kg).  Medication Reconciliation: complete   .Amrita ALVARADO      "

## 2017-10-17 NOTE — PATIENT INSTRUCTIONS
For your HEADACHES:   --As they are new, occur only at night and wake you from sleep we should look into this further.   --I'd like you to call for MRI.   --I'll call you with results.    For your GOUT:   --Take your allopurinol every day.  This should help AVOID gout flares.   --When you have a flare, take COLCHICINE.  Once the flare is over, stop this medicine.      For your blood pressure:   --We should switch you to a medicine called LOSARTAN.     --I sent this to your pharmacy.    Follow up with me on all of the above next month, sooner if any concerns.

## 2017-10-19 ENCOUNTER — HOSPITAL ENCOUNTER (OUTPATIENT)
Dept: WOUND CARE | Facility: CLINIC | Age: 67
Discharge: HOME | End: 2017-10-19
Attending: PHYSICIAN ASSISTANT | Admitting: PHYSICIAN ASSISTANT
Payer: MEDICARE

## 2017-10-19 DIAGNOSIS — S91.009D OPEN WOUND OF KNEE, LEG, AND ANKLE, UNSPECIFIED LATERALITY, SUBSEQUENT ENCOUNTER: ICD-10-CM

## 2017-10-19 DIAGNOSIS — M79.89 SWELLING OF LOWER EXTREMITY: ICD-10-CM

## 2017-10-19 DIAGNOSIS — I83.893 VARICOSE VEINS OF BOTH LEGS WITH EDEMA: ICD-10-CM

## 2017-10-19 DIAGNOSIS — I87.2 VENOUS (PERIPHERAL) INSUFFICIENCY: ICD-10-CM

## 2017-10-19 DIAGNOSIS — S81.009D OPEN WOUND OF KNEE, LEG, AND ANKLE, UNSPECIFIED LATERALITY, SUBSEQUENT ENCOUNTER: ICD-10-CM

## 2017-10-19 DIAGNOSIS — S81.809D OPEN WOUND OF KNEE, LEG, AND ANKLE, UNSPECIFIED LATERALITY, SUBSEQUENT ENCOUNTER: ICD-10-CM

## 2017-10-19 DIAGNOSIS — E11.620 TYPE 2 DIABETES MELLITUS WITH DIABETIC DERMATITIS, UNSPECIFIED LONG TERM INSULIN USE STATUS: Primary | ICD-10-CM

## 2017-10-19 DIAGNOSIS — I89.0 LYMPHEDEMA OF BOTH LOWER EXTREMITIES: ICD-10-CM

## 2017-10-19 PROCEDURE — 99211 OFF/OP EST MAY X REQ PHY/QHP: CPT

## 2017-10-20 ENCOUNTER — HOSPITAL ENCOUNTER (OUTPATIENT)
Dept: MRI IMAGING | Facility: CLINIC | Age: 67
Discharge: HOME | End: 2017-10-20
Attending: FAMILY MEDICINE | Admitting: FAMILY MEDICINE
Payer: MEDICARE

## 2017-10-20 DIAGNOSIS — R51.9 NEW ONSET HEADACHE: ICD-10-CM

## 2017-10-20 DIAGNOSIS — G50.1 ATYPICAL FACIAL PAIN: ICD-10-CM

## 2017-10-20 PROCEDURE — 70553 MRI BRAIN STEM W/O & W/DYE: CPT

## 2017-10-20 PROCEDURE — A9585 GADOBUTROL INJECTION: HCPCS | Performed by: RADIOLOGY

## 2017-10-20 PROCEDURE — 25000128 H RX IP 250 OP 636: Performed by: RADIOLOGY

## 2017-10-20 RX ORDER — GADOBUTROL 604.72 MG/ML
15 INJECTION INTRAVENOUS ONCE
Status: COMPLETED | OUTPATIENT
Start: 2017-10-20 | End: 2017-10-20

## 2017-10-20 RX ADMIN — GADOBUTROL 14 ML: 604.72 INJECTION INTRAVENOUS at 09:19

## 2017-10-20 NOTE — PROGRESS NOTES
"Could you call Kenneth and let him know - good news - MRI looks good and shows none of the \"bad things\" he was worried about - no aneurysms, masses or stroke.  He does have some \"aging brain\" changes, but nothing big and bad.  Let me know if he has any questions about this.  Dr. Blanca Peng MD  [facility]  [phone]"

## 2017-10-20 NOTE — PROGRESS NOTES
"Could you call Kenneth and let him know - good news - MRI looks good and shows none of the \"bad things\" he was worried about - no aneurysms, masses or stroke.  He does have some \"aging brain\" changes, but nothing big and bad.  Let me know if he has any questions about this.  Dr. Blanca Peng MD  [facility]  [phone]   "

## 2017-11-01 ENCOUNTER — TELEPHONE (OUTPATIENT)
Dept: FAMILY MEDICINE | Facility: CLINIC | Age: 67
End: 2017-11-01

## 2017-11-01 NOTE — TELEPHONE ENCOUNTER
Reason for Call:  Request for results:  Name of test or procedure: MRI  Date of test of procedure: 10/20/2017  Location of the test or procedure: [facility] to leave the result message on voice mail or with a family member? YES  Phone number Patient can be reached at:  Home number on file [phone] (home)  Additional comments: please call patient with results  Call taken on 11/1/2017 at 11:36 AM by VALERIA SWAN

## 2017-11-01 NOTE — TELEPHONE ENCOUNTER
I'm not sure why this was routed to me; we called patient with results on 10/20.  Please see my note attached to MRI for details.  Can you call him and remind him we called with results and all normal?  Thanks,  Dr. Blanca Peng MD/[facility]

## 2017-11-24 DIAGNOSIS — E03.9 ACQUIRED HYPOTHYROIDISM: Chronic | ICD-10-CM

## 2017-11-24 RX ORDER — LEVOTHYROXINE SODIUM 200 UG/1
TABLET ORAL
Qty: 90 TABLET | Refills: 2 | Status: SHIPPED | OUTPATIENT
Start: 2017-11-24 | End: 2018-09-26

## 2017-11-24 NOTE — TELEPHONE ENCOUNTER
Last OV 10/17/2017.  Requested Prescriptions   Pending Prescriptions Disp Refills     levothyroxine (SYNTHROID/LEVOTHROID) 200 MCG tablet [Pharmacy Med Name: LEVOTHYROXINE 200 MCG TABLET] 90 tablet 1     Sig: TAKE 1 TABLET (200 MCG) BY MOUTH DAILY    Thyroid Protocol Passed    11/24/2017  9:19 AM       Passed - Patient is 12 years or older       Passed - Recent or future visit with authorizing provider's specialty    Patient had office visit in the last year or has a visit in the next 30 days with authorizing provider.  See chart review.              Passed - Normal TSH on file in past 12 months    Recent Labs   Lab Test  08/16/17   1031   TSH  2.19

## 2018-01-05 ENCOUNTER — OFFICE VISIT (OUTPATIENT)
Dept: FAMILY MEDICINE | Facility: CLINIC | Age: 68
End: 2018-01-05
Payer: MEDICARE

## 2018-01-05 VITALS
SYSTOLIC BLOOD PRESSURE: 132 MMHG | BODY MASS INDEX: 47.74 KG/M2 | HEART RATE: 68 BPM | WEIGHT: 315 LBS | DIASTOLIC BLOOD PRESSURE: 80 MMHG | HEIGHT: 68 IN | RESPIRATION RATE: 18 BRPM | OXYGEN SATURATION: 95 %

## 2018-01-05 DIAGNOSIS — F33.1 MODERATE EPISODE OF RECURRENT MAJOR DEPRESSIVE DISORDER (H): ICD-10-CM

## 2018-01-05 DIAGNOSIS — Z91.81 AT RISK FOR FALLING: ICD-10-CM

## 2018-01-05 DIAGNOSIS — Z13.89 SCREENING FOR DIABETIC PERIPHERAL NEUROPATHY: ICD-10-CM

## 2018-01-05 DIAGNOSIS — L03.119 CELLULITIS OF LOWER EXTREMITY, UNSPECIFIED LATERALITY: ICD-10-CM

## 2018-01-05 DIAGNOSIS — E66.01 OBESITY, MORBID (MORE THAN 100 LBS OVER IDEAL WEIGHT OR BMI > 40) (H): ICD-10-CM

## 2018-01-05 DIAGNOSIS — E11.9 TYPE 2 DIABETES MELLITUS WITHOUT COMPLICATION, WITHOUT LONG-TERM CURRENT USE OF INSULIN (H): Primary | Chronic | ICD-10-CM

## 2018-01-05 LAB
CREAT UR-MCNC: 102 MG/DL
HBA1C MFR BLD: 5.7 % (ref 4.3–6)
MICROALBUMIN UR-MCNC: 12 MG/L
MICROALBUMIN/CREAT UR: 11.76 MG/G CR (ref 0–17)

## 2018-01-05 PROCEDURE — 99214 OFFICE O/P EST MOD 30 MIN: CPT | Performed by: FAMILY MEDICINE

## 2018-01-05 PROCEDURE — 83036 HEMOGLOBIN GLYCOSYLATED A1C: CPT | Performed by: FAMILY MEDICINE

## 2018-01-05 PROCEDURE — 36415 COLL VENOUS BLD VENIPUNCTURE: CPT | Performed by: FAMILY MEDICINE

## 2018-01-05 PROCEDURE — 82043 UR ALBUMIN QUANTITATIVE: CPT | Performed by: FAMILY MEDICINE

## 2018-01-05 PROCEDURE — 99207 C FOOT EXAM  NO CHARGE: CPT | Performed by: FAMILY MEDICINE

## 2018-01-05 RX ORDER — FLUOXETINE 10 MG/1
10 CAPSULE ORAL DAILY
Qty: 60 CAPSULE | Refills: 1 | Status: SHIPPED | OUTPATIENT
Start: 2018-01-05 | End: 2018-02-28

## 2018-01-05 RX ORDER — CEPHALEXIN 500 MG/1
500 CAPSULE ORAL 4 TIMES DAILY
Qty: 40 CAPSULE | Refills: 0 | Status: SHIPPED | OUTPATIENT
Start: 2018-01-05 | End: 2018-01-29

## 2018-01-05 ASSESSMENT — PATIENT HEALTH QUESTIONNAIRE - PHQ9
SUM OF ALL RESPONSES TO PHQ QUESTIONS 1-9: 12
SUM OF ALL RESPONSES TO PHQ QUESTIONS 1-9: 12
10. IF YOU CHECKED OFF ANY PROBLEMS, HOW DIFFICULT HAVE THESE PROBLEMS MADE IT FOR YOU TO DO YOUR WORK, TAKE CARE OF THINGS AT HOME, OR GET ALONG WITH OTHER PEOPLE: SOMEWHAT DIFFICULT

## 2018-01-05 NOTE — PROGRESS NOTES
"  SUBJECTIVE:   Jamar Ivory is a 67 year old male who presents to clinic today for the following health issues:      Edema      Duration: 2 weeks    Description (location/character/radiation): pt having swelling and redness in both legs and feet.  Pt says he has a burning feeling in both legs.  Pt is having a hard time sleeping because of the burning    Intensity:  moderate    Accompanying signs and symptoms: none    History (similar episodes/previous evaluation): None    Precipitating or alleviating factors: None    Therapies tried and outcome: None       67 year old with history of type 2 diabetes, diet controlled, morbid obesity, chronic lower extremity venous insufficiency and chronic severe lower extremity edema with venous insufficiency and some open wounds, as well as history of gout and history of cerebral infarction here today for:    1) Lower legs have been more red, swollen for last few weeks.  Wonders if there is an infection?  No fever.  Slightly tender.  Swollen more than normal.  Hasn't been able to wear compression socks because so swollen and tender.    2) Depression.  Has had for a long time; \"tried to kill myself a long long time ago\".  Hasn't been on medications for some time but wonders if he should again?  Thinks was on prozac in past; worked well.  Denies any SI now - says \"I could never do that to my family, my kids\".      PHQ-9 SCORE 8/16/2017 8/31/2017 1/5/2018   Total Score - - -   Total Score MyChart 11 (Moderate depression) - 12 (Moderate depression)   Total Score 11 15 12       Problem list and histories reviewed & adjusted, as indicated.  Additional history: as documented    BP Readings from Last 3 Encounters:   01/05/18 132/80   10/17/17 126/74   10/13/17 130/80    Wt Readings from Last 3 Encounters:   01/05/18 (!) 345 lb (156.5 kg)   10/17/17 (!) 334 lb (151.5 kg)   10/13/17 (!) 334 lb (151.5 kg)           Reviewed and updated as needed this visit by clinical staff   " "  Reviewed and updated as needed this visit by Provider       ROS:  Constitutional, HEENT, cardiovascular, pulmonary, gi and gu systems are negative, except as otherwise noted.      OBJECTIVE:   /80  Pulse 68  Resp 18  Ht 5' 8\" (1.727 m)  Wt (!) 345 lb (156.5 kg)  SpO2 95%  BMI 52.46 kg/m2  Body mass index is 52.46 kg/(m^2).  GENERAL: alert, no distress, obese, elderly, appears older than stated age  NECK: no adenopathy, no asymmetry, masses, or scars and thyroid normal to palpation  RESP: lungs clear to auscultation - no rales, rhonchi or wheezes  CV: regular rate and rhythm, normal S1 S2, no S3 or S4, no murmur, click or rub, no peripheral edema and peripheral pulses strong  ABDOMEN: soft, nontender, no hepatosplenomegaly, no masses and bowel sounds normal  MS: walks comfortably, with bilateral legs not wrapped today  LYMPH: bilateral legs 2+.  Left leg with erythema to mid calf, warm, several open wounds.  Diabetic foot exam: normal DP and PT pulses, no trophic changes or ulcerative lesions, reduced sensation throughout, trophic changes, venous stasis dermatitis noted and nail exam dystrophic nails  SKIN: Arms with dry skin with several open wounds from itching scattered    Diagnostic Test Results:  No results found for this or any previous visit (from the past 24 hour(s)).  Unresulted Labs Ordered in the Past 30 Days of this Admission     Date and Time Order Name Status Description    1/5/2018 0902 HEMOGLOBIN A1C In process           ASSESSMENT/PLAN:     Diagnoses and all orders for this visit:    Type 2 diabetes mellitus without complication, without long-term current use of insulin (H)  -     HEMOGLOBIN A1C  -     Albumin Random Urine Quantitative with Creat Ratio    Cellulitis of lower extremity, unspecified laterality  -     cephALEXin (KEFLEX) 500 MG capsule; Take 1 capsule (500 mg) by mouth 4 times daily  -     WOUND CARE REFERRAL    Moderate episode of recurrent major depressive disorder " (H)  -     FLUoxetine (PROZAC) 10 MG capsule; Take 1 capsule (10 mg) by mouth daily For  1 week, then increase to 20 mg once a day.    Obesity, morbid (more than 100 lbs over ideal weight or BMI > 40) (H)    At risk for falling    Screening for diabetic peripheral neuropathy  -     FOOT EXAM  NO CHARGE [38806.114]    Other orders  -     DEPRESSION ACTION PLAN (DAP)        Patient Instructions   1) You have an infection in your legs.   --Start KEFLEX 1 pill four times a day for 10 days.   --In 1-2 days, start using the compression sock again every day.   --If you are not getting better in the next 2 days, come back to clinic.    2) Call the wound clinic to get a new pair of compression stockings.    3) For your depression:   --Start 10 mg of prozac in the morning once a day.   --After 1 week, increase to 20 mg of prozac once a day in the morning.   --Follow up with me to see how this is going in 3-4 weeks.    4) For your dry arms, start using lotion every day twice a day.  Vanicream or Eucerin or Cerave are good ones.  Or Amlactin, but that would burn right now with your arms so beat up.              Blanca Peng MD  [facility]

## 2018-01-05 NOTE — PATIENT INSTRUCTIONS
1) You have an infection in your legs.   --Start KEFLEX 1 pill four times a day for 10 days.   --In 1-2 days, start using the compression sock again every day.   --If you are not getting better in the next 2 days, come back to clinic.    2) Call the wound clinic to get a new pair of compression stockings.    3) For your depression:   --Start 10 mg of prozac in the morning once a day.   --After 1 week, increase to 20 mg of prozac once a day in the morning.   --Follow up with me to see how this is going in 3-4 weeks.    4) For your dry arms, start using lotion every day twice a day.  Vanicream or Eucerin or Cerave are good ones.  Or Amlactin, but that would burn right now with your arms so beat up.

## 2018-01-05 NOTE — MR AVS SNAPSHOT
After Visit Summary   1/5/2018    Jamar Ivory    MRN: [id number]           Patient Information     Date Of Birth          1950        Visit Information        Provider Department      1/5/2018 9:00 AM Blanca Peng MD [facility]        Today's Diagnoses     Type 2 diabetes mellitus without complication, without long-term current use of insulin (H)    -  1    At risk for falling        Screening for diabetic peripheral neuropathy        Need for prophylactic vaccination against Streptococcus pneumoniae (pneumococcus)        Cellulitis of lower extremity, unspecified laterality        Moderate episode of recurrent major depressive disorder (H)          Care Instructions    1) You have an infection in your legs.   --Start KEFLEX 1 pill four times a day for 10 days.   --In 1-2 days, start using the compression sock again every day.   --If you are not getting better in the next 2 days, come back to clinic.    2) Call the wound clinic to get a new pair of compression stockings.    3) For your depression:   --Start 10 mg of prozac in the morning once a day.   --After 1 week, increase to 20 mg of prozac once a day in the morning.   --Follow up with me to see how this is going in 3-4 weeks.    4) For your dry arms, start using lotion every day twice a day.  Vanicream or Eucerin or Cerave are good ones.  Or Amlactin, but that would burn right now with your arms so beat up.                  Follow-ups after your visit        Additional Services     WOUND CARE REFERRAL       Your provider has referred you to: [place]:  [facility] [place] [facility] [phone] FAX REFERRAL TO [phone] [email or web address]    Reason for referral: Wound care      1. [facility] Wound Consult appointment is related to what kind of wound: Venous leg/foot ulcer    2. Location of wound: Lower extremity    3. Reason for referral: Assess and  "treat as indicated    4. Desired treatment if any: Per [facility] nurse     Please be aware that coverage of these services is subject to the terms and limitations of your health insurance plan.  Call member services at your health plan with any benefit or coverage questions.      Please bring the following with you to your appointment:    (1) Any X-Rays, CTs or MRIs which have been performed.  Contact the facility where they were done to arrange for  prior to your scheduled appointment.    (2) List of current medications   (3) This referral request   (4) Any documents/labs given to you for this referral                  Who to contact     If you have questions or need follow up information about today's clinic visit or your schedule please contact [facility] directly at [phone].  Normal or non-critical lab and imaging results will be communicated to you by MyChart, letter or phone within 4 business days after the clinic has received the results. If you do not hear from us within 7 days, please contact the clinic through MyChart or phone. If you have a critical or abnormal lab result, we will notify you by phone as soon as possible.  Submit refill requests through [employer] or call your pharmacy and they will forward the refill request to us. Please allow 3 business days for your refill to be completed.          Additional Information About Your Visit        [employer] Information     [employer] lets you send messages to your doctor, view your test results, renew your prescriptions, schedule appointments and more. To sign up, go to [email or web address] . Click on \"Log in\" on the left side of the screen, which will take you to the Welcome page. Then click on \"Sign up Now\" on the right side of the page.     You will be asked to enter the access code listed below, as well as some personal information. Please follow the directions to create your username and password.     Your access " "code is: [email or web address]  Expires: 1/15/2018 10:41 AM     Your access code will  in 90 days. If you need help or a new code, please call your [place] clinic or [phone].        Care EveryWhere ID     This is your Care EveryWhere ID. This could be used by other organizations to access your [place] medical records  [id number]        Your Vitals Were     Pulse Respirations Height Pulse Oximetry BMI (Body Mass Index)       68 18 5' 8\" (1.727 m) 95% 52.46 kg/m2        Blood Pressure from Last 3 Encounters:   18 132/80   10/17/17 126/74   10/13/17 130/80    Weight from Last 3 Encounters:   18 (!) 345 lb (156.5 kg)   10/17/17 (!) 334 lb (151.5 kg)   10/13/17 (!) 334 lb (151.5 kg)              We Performed the Following     Albumin Random Urine Quantitative with Creat Ratio     DEPRESSION ACTION PLAN (DAP)     FOOT EXAM  NO CHARGE [20730.114]     HEMOGLOBIN A1C     WOUND CARE REFERRAL          Today's Medication Changes          These changes are accurate as of: 18  9:19 AM.  If you have any questions, ask your nurse or doctor.               Start taking these medicines.        Dose/Directions    cephALEXin 500 MG capsule   Commonly known as:  KEFLEX   Used for:  Cellulitis of lower extremity, unspecified laterality   Started by:  Blanca Peng MD        Dose:  500 mg   Take 1 capsule (500 mg) by mouth 4 times daily   Quantity:  40 capsule   Refills:  0       FLUoxetine 10 MG capsule   Commonly known as:  PROzac   Used for:  Moderate episode of recurrent major depressive disorder (H)   Started by:  Blanca Peng MD        Dose:  10 mg   Take 1 capsule (10 mg) by mouth daily For  1 week, then increase to 20 mg once a day.   Quantity:  60 capsule   Refills:  1            Where to get your medicines      These medications were sent to [place] [address] [place] [address] [address]     Phone:  [phone]     cephALEXin 500 MG " capsule    FLUoxetine 10 MG capsule                Primary Care Provider Office Phone # Fax #    Blanca Melody Peng [phone] [address] [facility] [address]        Equal Access to Services     SHAUN AKERS : Hadii cole ramirez daneo Solacey, waaxda luqadaha, qaybta kaalmada prem, maykel ramirez laKurtcachorro parada. So [facility] [phone].    ATENCIÓN: Si habla español, tiene a shook disposición servicios gratuitos de asistencia lingüística. Llame al [phone].    We comply with applicable federal civil rights laws and [place] laws. We do not discriminate on the basis of race, color, national origin, age, disability, sex, sexual orientation, or gender identity.            Thank you!     Thank you for choosing [facility]  for your care. Our goal is always to provide you with excellent care. Hearing back from our patients is one way we can continue to improve our services. Please take a few minutes to complete the written survey that you may receive in the mail after your visit with us. Thank you!             Your Updated Medication List - Protect others around you: Learn how to safely use, store and throw away your medicines at www.disposemymeds.org.          This list is accurate as of: 1/5/18  9:19 AM.  Always use your most recent med list.                   Brand Name Dispense Instructions for use Diagnosis    allopurinol 100 MG tablet    ZYLOPRIM    90 tablet    Take 1 tablet (100 mg) by mouth daily    Idiopathic chronic gout of foot without tophus, unspecified laterality       aspirin 81 MG tablet     90 tablet    Take 1 tablet (81 mg) by mouth daily    Hypertension goal BP (blood pressure) < 140/90       cefuroxime 500 MG tablet    CEFTIN    20 tablet    TAKE 1 TABLET (500 MG) BY MOUTH 2 TIMES DAILY    Cellulitis of lower extremity, unspecified laterality       cephALEXin 500 MG capsule    KEFLEX    40 capsule    Take 1 capsule (500 mg)  by mouth 4 times daily    Cellulitis of lower extremity, unspecified laterality       colchicine 0.6 MG tablet    COLCRYS    30 tablet    2 tabs at the onset of flare, then 1 tab every 2 hrs until pain is better or diarrhea occurs, up to 10 doses. Wait 3 days until taking again    Right foot pain, Acute gouty arthropathy       finasteride 5 MG tablet    PROSCAR    90 tablet    TAKE 1 TABLET (5 MG) BY MOUTH DAILY    Benign prostatic hyperplasia with lower urinary tract symptoms       FLUoxetine 10 MG capsule    PROzac    60 capsule    Take 1 capsule (10 mg) by mouth daily For  1 week, then increase to 20 mg once a day.    Moderate episode of recurrent major depressive disorder (H)       furosemide 80 MG tablet    LASIX    180 tablet    TAKE 1 TABLET (80 MG) BY MOUTH 2 TIMES DAILY    Edema, unspecified type       Glucosamine-Chondroitin--200-150 MG Tabs           levothyroxine 200 MCG tablet    SYNTHROID/LEVOTHROID    90 tablet    TAKE 1 TABLET (200 MCG) BY MOUTH DAILY    Acquired hypothyroidism       losartan 25 MG tablet    COZAAR    90 tablet    Take 1 tablet (25 mg) by mouth daily    Hypertension goal BP (blood pressure) < 140/90       metoprolol 25 MG 24 hr tablet    TOPROL-XL    90 tablet    TAKE ONE TABLET BY MOUTH ONE TIME DAILY    Essential hypertension       * order for DME     100 strip    Equipment being ordered: blood glucose monitor strips to use 2 to 4 times  A day. As covered by insurance. #100 with 5 refills  Monitor ordered this day  As well.  Also lancets #100 use as needed for testing 5 refils    Diabetes mellitus, type 2 (H)       * order for DME     3 Device    Compression Stockings Ready To Wear Knee High Compression Stockings 20-30 mmHg Length of Need: Life Time # of Pairs 3    Type 2 diabetes mellitus with diabetic dermatitis, unspecified long term insulin use status (H), Swelling of lower extremity, Lymphedema of both lower extremities, Chronic venous hypertension with ulcer, bilateral  (H), Varicose veins of both legs with edema, Open wound of knee, leg, and ankle, unspecified laterality, subsequent encounter, Venous (peripheral) insufficiency       potassium chloride SA 10 MEQ CR tablet    K-DUR/KLOR-CON M    90 tablet    Take 1 tablet (10 mEq) by mouth 3 times daily    Bilateral leg edema       simvastatin 20 MG tablet    ZOCOR    90 tablet    Take 1 tablet (20 mg) by mouth At Bedtime    Hyperlipidemia with target LDL less than 100       * Notice:  This list has 2 medication(s) that are the same as other medications prescribed for you. Read the directions carefully, and ask your doctor or other care provider to review them with you.

## 2018-01-05 NOTE — LETTER
January 10, 2018      Jamar Keenan Cachorro  [address]   [facility] [facility] [address]      Dear Kenneth,     It was nice to see you recently!  I wanted to let you know your recent test results - details of the results can be found below. Briefly:     1. Your results look great; everything is normal.     Let me know if you have any questions about this, or other concerns.     Best,   Blanca Peng MD   Family Medicine   [facility]   [phone]     Resulted Orders   HEMOGLOBIN A1C   Result Value Ref Range    Hemoglobin A1C 5.7 4.3 - 6.0 %   Albumin Random Urine Quantitative with Creat Ratio   Result Value Ref Range    Creatinine Urine 102 mg/dL    Albumin Urine mg/L 12 mg/L    Albumin Urine mg/g Cr 11.76 0 - 17 mg/g Cr

## 2018-01-05 NOTE — LETTER
My Depression Action Plan  Name: Jamar Ivory   Date of Birth 1950  Date: 1/5/2018    My doctor: Blanca Peng   My clinic: [address]  [address]  [facility] [address] [phone]          GREEN    ZONE   Good Control    What it looks like:     Things are going generally well. You have normal up s and down s. You may even feel depressed from time to time, but bad moods usually last less than a day.   What you need to do:  1. Continue to care for yourself (see self care plan)  2. Check your depression survival kit and update it as needed  3. Follow your physician s recommendations including any medication.  4. Do not stop taking medication unless you consult with your physician first.           YELLOW         ZONE Getting Worse    What it looks like:     Depression is starting to interfere with your life.     It may be hard to get out of bed; you may be starting to isolate yourself from others.    Symptoms of depression are starting to last most all day and this has happened for several days.     You may have suicidal thoughts but they are not constant.   What you need to do:     1. Call your care team, your response to treatment will improve if you keep your care team informed of your progress. Yellow periods are signs an adjustment may need to be made.     2. Continue your self-care, even if you have to fake it!    3. Talk to someone in your support network    4. Open up your depression survival kit           RED    ZONE Medical Alert - Get Help    What it looks like:     Depression is seriously interfering with your life.     You may experience these or other symptoms: You can t get out of bed most days, can t work or engage in other necessary activities, you have trouble taking care of basic hygiene, or basic responsibilities, thoughts of suicide or death that will not go away, self-injurious behavior.     What you need  to do:  1. Call your care team and request a same-day appointment. If they are not available (weekends or after hours) call your local crisis line, emergency room or 911.      Electronically signed by: Betty Matson, January 5, 2018    Depression Self Care Plan / Survival Kit    Self-Care for Depression  Here s the deal. Your body and mind are really not as separate as most people think.  What you do and think affects how you feel and how you feel influences what you do and think. This means if you do things that people who feel good do, it will help you feel better.  Sometimes this is all it takes.  There is also a place for medication and therapy depending on how severe your depression is, so be sure to consult with your medical provider and/ or Behavioral Health Consultant if your symptoms are worsening or not improving.     In order to better manage my stress, I will:    Exercise  Get some form of exercise, every day. This will help reduce pain and release endorphins, the  feel good  chemicals in your brain. This is almost as good as taking antidepressants!  This is not the same as joining a gym and then never going! (they count on that by the way ) It can be as simple as just going for a walk or doing some gardening, anything that will get you moving.      Hygiene   Maintain good hygiene (Get out of bed in the morning, Make your bed, Brush your teeth, Take a shower, and Get dressed like you were going to work, even if you are unemployed).  If your clothes don't fit try to get ones that do.    Diet  I will strive to eat foods that are good for me, drink plenty of water, and avoid excessive sugar, caffeine, alcohol, and other mood-altering substances.  Some foods that are helpful in depression are: complex carbohydrates, B vitamins, flaxseed, fish or fish oil, fresh fruits and vegetables.    Psychotherapy  I agree to participate in Individual Therapy (if recommended).    Medication  If prescribed medications, I  agree to take them.  Missing doses can result in serious side effects.  I understand that drinking alcohol, or other illicit drug use, may cause potential side effects.  I will not stop my medication abruptly without first discussing it with my provider.    Staying Connected With Others  I will stay in touch with my friends, family members, and my primary care provider/team.    Use your imagination  Be creative.  We all have a creative side; it doesn t matter if it s oil painting, sand castles, or mud pies! This will also kick up the endorphins.    Witness Beauty  (AKA stop and smell the roses) Take a look outside, even in mid-winter. Notice colors, textures. Watch the squirrels and birds.     Service to others  Be of service to others.  There is always someone else in need.  By helping others we can  get out of ourselves  and remember the really important things.  This also provides opportunities for practicing all the other parts of the program.    Humor  Laugh and be silly!  Adjust your TV habits for less news and crime-drama and more comedy.    Control your stress  Try breathing deep, massage therapy, biofeedback, and meditation. Find time to relax each day.     My support system    Clinic Contact:  Phone number:    Contact 1:  Phone number:    Contact 2:  Phone number:    Islam/:  Phone number:    Therapist:  Phone number:    Local crisis center:    Phone number:    Other community support:  Phone number:

## 2018-01-05 NOTE — NURSING NOTE
"No chief complaint on file.      Initial /80  Pulse 68  Resp 18  Ht 5' 8\" (1.727 m)  Wt (!) 345 lb (156.5 kg)  SpO2 95%  BMI 52.46 kg/m2 Estimated body mass index is 52.46 kg/(m^2) as calculated from the following:    Height as of this encounter: 5' 8\" (1.727 m).    Weight as of this encounter: 345 lb (156.5 kg).  Medication Reconciliation: karthikeyan Matson CMA      "

## 2018-01-10 NOTE — PROGRESS NOTES
Hi Team 3:  Please sent a lab result with the following note.  Thank you!    Dear Kenneth,    It was nice to see you recently!  I wanted to let you know your recent test results - details of the results can be found below. Briefly:    1. Your results look great; everything is normal.    Let me know if you have any questions about this, or other concerns.    Best,  Blanca Peng MD  Family Medicine  [facility]  [phone]         Results for orders placed or performed in visit on 01/05/18  -HEMOGLOBIN A1C       Result                                            Value                         Ref Range                       Hemoglobin A1C                                    5.7                           4.3 - 6.0 %                -Albumin Random Urine Quantitative with Creat Ratio       Result                                            Value                         Ref Range                       Creatinine Urine                                  102                           mg/dL                           Albumin Urine mg/L                                12                            mg/L                            Albumin Urine mg/g Cr                             11.76                         0 - 17 mg/g Cr

## 2018-01-14 DIAGNOSIS — L03.119 CELLULITIS OF LOWER EXTREMITY, UNSPECIFIED LATERALITY: ICD-10-CM

## 2018-01-15 NOTE — TELEPHONE ENCOUNTER
Keflex      Last Written Prescription Date:  1-5-18  Last Fill Quantity: 40,   # refills: 0  Last Office Visit: 1-5-18  Future Office visit:    Next 5 appointments (look out 90 days)     Jan 19, 2018 10:40 AM CST   SHORT with Blanca Peng MD   [facility]    [address] [place] [id number]   [phone]                   Routing refill request to provider for review/approval because:  Drug not on the [facility], [facility] or [facility] refill protocol or controlled substance

## 2018-01-16 RX ORDER — CEPHALEXIN 500 MG/1
CAPSULE ORAL
Qty: 40 CAPSULE | Refills: 0 | OUTPATIENT
Start: 2018-01-16

## 2018-01-16 NOTE — TELEPHONE ENCOUNTER
Left detailed message with wife Melissa asking patient to schedule OV if he needs a refill for Keflex.

## 2018-01-19 ENCOUNTER — OFFICE VISIT (OUTPATIENT)
Dept: FAMILY MEDICINE | Facility: CLINIC | Age: 68
End: 2018-01-19
Payer: MEDICARE

## 2018-01-19 VITALS
HEART RATE: 75 BPM | DIASTOLIC BLOOD PRESSURE: 78 MMHG | BODY MASS INDEX: 51.85 KG/M2 | TEMPERATURE: 97.9 F | OXYGEN SATURATION: 92 % | WEIGHT: 315 LBS | SYSTOLIC BLOOD PRESSURE: 130 MMHG

## 2018-01-19 DIAGNOSIS — R39.12 BENIGN PROSTATIC HYPERPLASIA WITH WEAK URINARY STREAM: Primary | ICD-10-CM

## 2018-01-19 DIAGNOSIS — L03.119 CELLULITIS OF LOWER EXTREMITY, UNSPECIFIED LATERALITY: ICD-10-CM

## 2018-01-19 DIAGNOSIS — Z12.5 ENCOUNTER FOR SCREENING FOR MALIGNANT NEOPLASM OF PROSTATE: ICD-10-CM

## 2018-01-19 DIAGNOSIS — Z91.81 AT RISK FOR FALLING: ICD-10-CM

## 2018-01-19 DIAGNOSIS — N40.1 BENIGN PROSTATIC HYPERPLASIA WITH WEAK URINARY STREAM: Primary | ICD-10-CM

## 2018-01-19 PROCEDURE — 99214 OFFICE O/P EST MOD 30 MIN: CPT | Performed by: FAMILY MEDICINE

## 2018-01-19 PROCEDURE — G0103 PSA SCREENING: HCPCS | Performed by: FAMILY MEDICINE

## 2018-01-19 RX ORDER — TAMSULOSIN HYDROCHLORIDE 0.4 MG/1
0.4 CAPSULE ORAL DAILY
Qty: 30 CAPSULE | Refills: 1 | Status: SHIPPED | OUTPATIENT
Start: 2018-01-19 | End: 2018-02-28

## 2018-01-19 RX ORDER — CEFUROXIME AXETIL 500 MG/1
500 TABLET ORAL 2 TIMES DAILY
Qty: 20 TABLET | Refills: 1 | Status: SHIPPED | OUTPATIENT
Start: 2018-01-19 | End: 2018-01-29

## 2018-01-19 NOTE — PROGRESS NOTES
"  SUBJECTIVE:   Jamar Ivory is a 68 year old male who presents to clinic today for the following health issues:      legs      Duration: ongoing    Description (location/character/radiation): legs    Intensity:  moderate, severe    Accompanying signs and symptoms: red, swelling, sores, pain    History (similar episodes/previous evaluation): ongoing    Precipitating or alleviating factors: None    Therapies tried and outcome: antiobiotics         Medication Followup of furosemide    Taking Medication as prescribed: yes    Side Effects:  Frequent urination    Medication Helping Symptoms:  unknown         SUBJECTIVE:   Jamar Ivory is a 67 year old male who presents to clinic today for the following health issues:      Edema      Duration: 2 weeks    Description (location/character/radiation): pt having swelling and redness in both legs and feet.  Pt says he has a burning feeling in both legs.  Pt is having a hard time sleeping because of the burning    Intensity:  moderate    Accompanying signs and symptoms: none    History (similar episodes/previous evaluation): None    Precipitating or alleviating factors: None    Therapies tried and outcome: None       67 year old with history of type 2 diabetes, diet controlled, morbid obesity, chronic lower extremity venous insufficiency and chronic severe lower extremity edema with venous insufficiency and some open wounds, as well as history of gout and history of cerebral infarction here today for:    1) Lower legs have been more red, swollen for last few weeks.  Wonders if there is an infection?  No fever.  Slightly tender.  Swollen more than normal.  Hasn't been able to wear compression socks because so swollen and tender.  Was treated with keflex 2 weeks ago and helped \"a bit\" but then once medicine was out he noticed pain just came right back.    2) Also, getting up to urinate a lot at night.  Has been worse over last few months, could it be lasix?  Has " "been on same dose of lasix for \"years\" and never caused this problem before.    PSA   Date Value Ref Range Status   12/16/2015 2.29 0 - 4 ug/L Final       Problem list and histories reviewed & adjusted, as indicated.  Additional history: as documented    BP Readings from Last 3 Encounters:   01/19/18 130/78   01/05/18 132/80   10/17/17 126/74    Wt Readings from Last 3 Encounters:   01/19/18 (!) 341 lb (154.7 kg)   01/05/18 (!) 345 lb (156.5 kg)   10/17/17 (!) 334 lb (151.5 kg)           Reviewed and updated as needed this visit by clinical staffTobacco  Allergies  Meds  Med Hx  Surg Hx  Fam Hx  Soc Hx      Reviewed and updated as needed this visit by Provider       ROS:  Constitutional, HEENT, cardiovascular, pulmonary, gi and gu systems are negative, except as otherwise noted.      OBJECTIVE:   /78  Pulse 75  Temp 97.9  F (36.6  C) (Tympanic)  Wt (!) 341 lb (154.7 kg)  SpO2 92%  BMI 51.85 kg/m2  Body mass index is 51.85 kg/(m^2).  GENERAL: alert, no distress, obese, elderly, appears older than stated age  NECK: no adenopathy, no asymmetry, masses, or scars and thyroid normal to palpation  RESP: lungs clear to auscultation - no rales, rhonchi or wheezes  CV: regular rate and rhythm, normal S1 S2, no S3 or S4, no murmur, click or rub, no peripheral edema and peripheral pulses strong  ABDOMEN: soft, nontender, no hepatosplenomegaly, no masses and bowel sounds normal  MS: walks comfortably, with bilateral legs not wrapped today  LYMPH: bilateral legs 2+.  Left leg with erythema to mid calf, warm, several open wounds.  Right leg mild erythema, less so.  No warmth.  Diabetic foot exam: normal DP and PT pulses, no trophic changes or ulcerative lesions, reduced sensation throughout, trophic changes, venous stasis dermatitis noted and nail exam dystrophic nails  SKIN: Arms with dry skin with several open wounds from itching scattered    Diagnostic Test Results:  No results found for this or any previous " visit (from the past 24 hour(s)).  Unresulted Labs Ordered in the Past 30 Days of this Admission     Date and Time Order Name Status Description    1/19/2018 1109 PROSTATE SPEC ANTIGEN SCREEN In process           ASSESSMENT/PLAN:     Jamar was seen today for leg swelling.    Diagnoses and all orders for this visit:    Benign prostatic hyperplasia with weak urinary stream  -     PSA, screen  -     tamsulosin (FLOMAX) 0.4 MG capsule; Take 1 capsule (0.4 mg) by mouth daily    Encounter for screening for malignant neoplasm of prostate   -     PSA, screen    Cellulitis of lower extremity, unspecified laterality  Comments:  bilateral  Orders:  -     cefuroxime (CEFTIN) 500 MG tablet; Take 1 tablet (500 mg) by mouth 2 times daily    At risk for falling      Plan as below:    Patient Instructions   For your prostate:   --We'll check your PSA (blood test for your prostate) today.    -If this is high, we'll have you see a prostate specialist ASAP.    -If this is low/normal, we'll try the prostate medicine daily, and if your symptoms aren't getting better THEN we'd have you see a prostate specialist.        For your leg infection:   --Wrap your legs daily!   --Keep taking your furosemide (lasix).   --I sent a prescription for a new antibiotics with 1 refill.  Only use if needed for redness, pain, infection symptoms.        Blanca Peng MD  [facility]

## 2018-01-19 NOTE — LETTER
January 22, 2018      Jamar Pylebibi  [address]   [place] [address]            Dear Kenneth,     It was nice to see you recently!  I wanted to let you know your recent test results - details of the results can be found below. Briefly:     1. Your PSA or prostate cancer screening test is normal!  This is very good news; let me know how that new medicine for your prostate is working.     Let me know if you have any questions about this, or other concerns.     Best,   Blanca Peng MD   Family Medicine   [facility]   [phone]     Resulted Orders   PSA, screen   Result Value Ref Range    PSA 0.70 0 - 4 ug/L      Comment:      Assay Method:  Chemiluminescence using Siemens Vista analyzer       If you have any questions or concerns, please call the clinic at the number listed above.       Sincerely,        Blanca Peng MD

## 2018-01-19 NOTE — NURSING NOTE
"Chief Complaint   Patient presents with     Leg Swelling     sores       Initial /78  Pulse 75  Temp 97.9  F (36.6  C) (Tympanic)  Wt (!) 341 lb (154.7 kg)  SpO2 92%  BMI 51.85 kg/m2 Estimated body mass index is 51.85 kg/(m^2) as calculated from the following:    Height as of 1/5/18: 5' 8\" (1.727 m).    Weight as of this encounter: 341 lb (154.7 kg).  Medication Reconciliation: complete   .Amrita ALVARADO      "

## 2018-01-19 NOTE — PATIENT INSTRUCTIONS
For your prostate:   --We'll check your PSA (blood test for your prostate) today.    -If this is high, we'll have you see a prostate specialist ASAP.    -If this is low/normal, we'll try the prostate medicine daily, and if your symptoms aren't getting better THEN we'd have you see a prostate specialist.        For your leg infection:   --Wrap your legs daily!   --Keep taking your furosemide (lasix).   --I sent a prescription for a new antibiotics with 1 refill.  Only use if needed for redness, pain, infection symptoms.

## 2018-01-19 NOTE — MR AVS SNAPSHOT
After Visit Summary   1/19/2018    Jamar Ivory    MRN: [id number]           Patient Information     Date Of Birth          1950        Visit Information        Provider Department      1/19/2018 10:40 AM Blanca Peng MD [facility]        Today's Diagnoses     Benign prostatic hyperplasia with weak urinary stream    -  1    At risk for falling        Need for prophylactic vaccination against Streptococcus pneumoniae (pneumococcus)        Encounter for screening for malignant neoplasm of prostate         Cellulitis of lower extremity, unspecified laterality          Care Instructions    For your prostate:   --We'll check your PSA (blood test for your prostate) today.    -If this is high, we'll have you see a prostate specialist ASAP.    -If this is low/normal, we'll try the prostate medicine daily, and if your symptoms aren't getting better THEN we'd have you see a prostate specialist.        For your leg infection:   --Wrap your legs daily!   --Keep taking your furosemide (lasix).   --I sent a prescription for a new antibiotics with 1 refill.  Only use if needed for redness, pain, infection symptoms.            Follow-ups after your visit        Who to contact     If you have questions or need follow up information about today's clinic visit or your schedule please contact [facility] directly at [phone].  Normal or non-critical lab and imaging results will be communicated to you by MyChart, letter or phone within 4 business days after the clinic has received the results. If you do not hear from us within 7 days, please contact the clinic through MyChart or phone. If you have a critical or abnormal lab result, we will notify you by phone as soon as possible.  Submit refill requests through [employer] or call your pharmacy and they will forward the refill request to us. Please allow 3 business days for your  "refill to be completed.          Additional Information About Your Visit        [employer] Information     [employer] lets you send messages to your doctor, view your test results, renew your prescriptions, schedule appointments and more. To sign up, go to [email or web address] . Click on \"Log in\" on the left side of the screen, which will take you to the Welcome page. Then click on \"Sign up Now\" on the right side of the page.     You will be asked to enter the access code listed below, as well as some personal information. Please follow the directions to create your username and password.     Your access code is: [email or web address]  Expires: 2018 11:15 AM     Your access code will  in 90 days. If you need help or a new code, please call your [facility] clinic or [phone].        Care EveryWhere ID     This is your Care EveryWhere ID. This could be used by other organizations to access your [facility] medical records  [id number]        Your Vitals Were     Pulse Temperature Pulse Oximetry BMI (Body Mass Index)          75 97.9  F (36.6  C) (Tympanic) 92% 51.85 kg/m2         Blood Pressure from Last 3 Encounters:   18 130/78   18 132/80   10/17/17 126/74    Weight from Last 3 Encounters:   18 (!) 341 lb (154.7 kg)   18 (!) 345 lb (156.5 kg)   10/17/17 (!) 334 lb (151.5 kg)              We Performed the Following     PSA, screen          Today's Medication Changes          These changes are accurate as of: 18 11:16 AM.  If you have any questions, ask your nurse or doctor.               Start taking these medicines.        Dose/Directions    cefuroxime 500 MG tablet   Commonly known as:  CEFTIN   Used for:  Cellulitis of lower extremity, unspecified laterality   Started by:  Blanca Peng MD        Dose:  500 mg   Take 1 tablet (500 mg) by mouth 2 times daily   Quantity:  20 tablet   Refills:  1       tamsulosin 0.4 MG capsule   Commonly known as:  FLOMAX   Used for:  Benign " prostatic hyperplasia with weak urinary stream   Started by:  Blanca Peng MD        Dose:  0.4 mg   Take 1 capsule (0.4 mg) by mouth daily   Quantity:  30 capsule   Refills:  1            Where to get your medicines      These medications were sent to [facility] [address] [facility] - [place], [place] - [address] [facility] [address] [facility] [address]     Phone:  [phone]     cefuroxime 500 MG tablet    tamsulosin 0.4 MG capsule                Primary Care Provider Office Phone # Fax #    Blanca Peng [phone] [address] [facility] [address]        Equal Access to Services     [facility]: Hadii aad ashley hadasho Soomaali, waaxda luqadaha, qaybta kaalmada adeegyada, waxpadmini parada. So [facility] [phone].    ATENCIÓN: Si habla español, tiene a shook disposición servicios gratuitos de asistencia lingüística. [facility] [phone].    We comply with applicable federal civil rights laws and [place] laws. We do not discriminate on the basis of race, color, national origin, age, disability, sex, sexual orientation, or gender identity.            Thank you!     Thank you for choosing [facility]  for your care. Our goal is always to provide you with excellent care. Hearing back from our patients is one way we can continue to improve our services. Please take a few minutes to complete the written survey that you may receive in the mail after your visit with us. Thank you!             Your Updated Medication List - Protect others around you: Learn how to safely use, store and throw away your medicines at www.disposemymeds.org.          This list is accurate as of: 1/19/18 11:16 AM.  Always use your most recent med list.                   Brand Name Dispense Instructions for use Diagnosis    aspirin 81 MG tablet     90 tablet    Take 1 tablet (81 mg) by mouth daily    Hypertension goal BP (blood pressure) < 140/90        cefuroxime 500 MG tablet    CEFTIN    20 tablet    Take 1 tablet (500 mg) by mouth 2 times daily    Cellulitis of lower extremity, unspecified laterality       cephALEXin 500 MG capsule    KEFLEX    40 capsule    Take 1 capsule (500 mg) by mouth 4 times daily    Cellulitis of lower extremity, unspecified laterality       FLUoxetine 10 MG capsule    PROzac    60 capsule    Take 1 capsule (10 mg) by mouth daily For  1 week, then increase to 20 mg once a day.    Moderate episode of recurrent major depressive disorder (H)       furosemide 80 MG tablet    LASIX    180 tablet    TAKE 1 TABLET (80 MG) BY MOUTH 2 TIMES DAILY    Edema, unspecified type       levothyroxine 200 MCG tablet    SYNTHROID/LEVOTHROID    90 tablet    TAKE 1 TABLET (200 MCG) BY MOUTH DAILY    Acquired hypothyroidism       losartan 25 MG tablet    COZAAR    90 tablet    Take 1 tablet (25 mg) by mouth daily    Hypertension goal BP (blood pressure) < 140/90       * order for DME     100 strip    Equipment being ordered: blood glucose monitor strips to use 2 to 4 times  A day. As covered by insurance. #100 with 5 refills  Monitor ordered this day  As well.  Also lancets #100 use as needed for testing 5 refils    Diabetes mellitus, type 2 (H)       * order for DME     3 Device    Compression Stockings Ready To Wear Knee High Compression Stockings 20-30 mmHg Length of Need: Life Time # of Pairs 3    Type 2 diabetes mellitus with diabetic dermatitis, unspecified long term insulin use status (H), Swelling of lower extremity, Lymphedema of both lower extremities, Chronic venous hypertension with ulcer, bilateral (H), Varicose veins of both legs with edema, Open wound of knee, leg, and ankle, unspecified laterality, subsequent encounter, Venous (peripheral) insufficiency       simvastatin 20 MG tablet    ZOCOR    90 tablet    Take 1 tablet (20 mg) by mouth At Bedtime    Hyperlipidemia with target LDL less than 100       tamsulosin 0.4 MG capsule     FLOMAX    30 capsule    Take 1 capsule (0.4 mg) by mouth daily    Benign prostatic hyperplasia with weak urinary stream       * Notice:  This list has 2 medication(s) that are the same as other medications prescribed for you. Read the directions carefully, and ask your doctor or other care provider to review them with you.

## 2018-01-20 LAB — PSA SERPL-ACNC: 0.7 UG/L (ref 0–4)

## 2018-01-20 NOTE — PROGRESS NOTES
Hi Team 3:  Please sent a lab result with the following note.  Thank you!    Dear Kenneth,    It was nice to see you recently!  I wanted to let you know your recent test results - details of the results can be found below. Briefly:    1. Your PSA or prostate cancer screening test is normal!  This is very good news; let me know how that new medicine for your prostate is working.    Let me know if you have any questions about this, or other concerns.    Best,  Blanca Peng MD  Family Medicine  [facility]  [phone]         Results for orders placed or performed in visit on 01/19/18  -PSA, screen       Result                                            Value                         Ref Range                       PSA                                               0.70                          0 - 4 ug/L

## 2018-01-29 ENCOUNTER — TELEPHONE (OUTPATIENT)
Dept: FAMILY MEDICINE | Facility: CLINIC | Age: 68
End: 2018-01-29

## 2018-01-29 ENCOUNTER — OFFICE VISIT (OUTPATIENT)
Dept: FAMILY MEDICINE | Facility: CLINIC | Age: 68
End: 2018-01-29
Payer: MEDICARE

## 2018-01-29 VITALS
HEIGHT: 68 IN | TEMPERATURE: 99.3 F | OXYGEN SATURATION: 95 % | HEART RATE: 75 BPM | RESPIRATION RATE: 20 BRPM | BODY MASS INDEX: 47.74 KG/M2 | SYSTOLIC BLOOD PRESSURE: 130 MMHG | WEIGHT: 315 LBS | DIASTOLIC BLOOD PRESSURE: 78 MMHG

## 2018-01-29 DIAGNOSIS — I10 HYPERTENSION GOAL BP (BLOOD PRESSURE) < 140/90: Chronic | ICD-10-CM

## 2018-01-29 DIAGNOSIS — J10.1 INFLUENZA A: Primary | ICD-10-CM

## 2018-01-29 LAB
FLUAV+FLUBV AG SPEC QL: NEGATIVE
FLUAV+FLUBV AG SPEC QL: POSITIVE
SPECIMEN SOURCE: ABNORMAL

## 2018-01-29 PROCEDURE — 99213 OFFICE O/P EST LOW 20 MIN: CPT | Performed by: INTERNAL MEDICINE

## 2018-01-29 PROCEDURE — 87804 INFLUENZA ASSAY W/OPTIC: CPT | Performed by: INTERNAL MEDICINE

## 2018-01-29 RX ORDER — LOSARTAN POTASSIUM 25 MG/1
25 TABLET ORAL DAILY
Qty: 90 TABLET | Refills: 3 | Status: CANCELLED | OUTPATIENT
Start: 2018-01-29

## 2018-01-29 RX ORDER — LOSARTAN POTASSIUM 25 MG/1
25 TABLET ORAL DAILY
Qty: 90 TABLET | Refills: 3 | Status: SHIPPED | OUTPATIENT
Start: 2018-01-29 | End: 2018-12-28

## 2018-01-29 RX ORDER — FLUOXETINE 10 MG/1
10 CAPSULE ORAL DAILY
Qty: 60 CAPSULE | Refills: 1 | Status: CANCELLED | OUTPATIENT
Start: 2018-01-29

## 2018-01-29 RX ORDER — OSELTAMIVIR PHOSPHATE 75 MG/1
75 CAPSULE ORAL 2 TIMES DAILY
Qty: 10 CAPSULE | Refills: 0 | Status: SHIPPED | OUTPATIENT
Start: 2018-01-29 | End: 2018-04-11

## 2018-01-29 NOTE — TELEPHONE ENCOUNTER
"Influenza-Like Illness (MARILU) Protocol    Jamar Shlomo Pylehideo      Age: 68 year old     YOB: 1950    Are you currently sick or have you had close contact with someone who is currently sick?   Yes, this patient is currently sick. For 3 DAYS  Adult Clinical Evaluation    Is this patient experiencing ANY of the following?  Unconsciousness or unresponsiveness No   Difficulty breathing or swallowing No   Blue or dusky lips, skin, or nail beds No   Chest pain No   Severe confusion or delirium No   Seizure activity: ongoing or stopped No   Severe dehydration or signs of shock No   Patient sounds very sick on the phone No     Is this patient experiencing ANY of the following?  Fever > 104 or shaking chills No   Wheezing with minimal response to usual wheezing medications or new wheezing No   Repeated vomiting or diarrhea with signs of dehydration (no urination within last 12 hours) No   Flu-like symptoms that initially improved but returned with fever and a worse cough No   Stiff or painful neck YES,able to move   Severe headache No-has nagging ha     Does the patient have any of the following?  Measured fever > 100 degrees Hasn't taken temp   Chills or feels very warm to the touch Yes at night   Cough Yes- not produtive   Sore throat Yes   Muscle/ body aches No   Headaches Yes, but \"nagging HA'   Fatigue (tiredness) Yes     Nursing Plan  Scheduled appointment    Provided home care instructions    General home care instruction:      Avoid contact with people in your household who are at increased risk for more severe complications of influenza (such as pregnant women or people who have a chronic health condition, for example diabetes, heart disease, asthma, or emphysema).    Stay home from work, school, childcare or other public places until your fever (37.8 degrees Celsius [100 degrees Fahrenheit]) has been gone for at least 24 hours, except to seek medical care. (Fever should be gone without the use of " fever-reducing medications.) Use a surgical mask if available, or cover your mouth and nose with a tissue if possible if you need to seek medical care. Contact your work place, school, or  as they may have longer exclusion times.    You may continue to shed virus after your fever is gone. Limit your contact with high-risk individuals for 10 days after your symptoms started and be especially careful to cover your coughs/sneezes and wash your hands.    Cover your cough and wash your hands often, and especially after coughing, sneezing, blowing your nose.    Drink plenty of fluids (such as water, broth, sports drinks, electrolyte beverages for children) to prevent dehydration.    Avoid tobacco and second hand smoke.    Get plenty of rest.    Use over-the-counter pain relievers as needed per  instructions.    Do not give aspirin (acetylsalicylic acid) or products that contain aspirin (e.g. bismuth subsalicylate - Pepto Bismol) to children or teenagers 18 years or younger.    A small number of people with influenza do not have fever. If you have respiratory symptoms and are at increased risk for complications of influenza, contact your health care provider to discuss these symptoms.    For parents of infants:    If possible, only family members who are not sick should care for infants.    Wash your hands with soap and water, or an alcohol-based hand rub (if your hands are not visibly soiled) before caring for your infant.    Cover your mouth and nose with a tissue when coughing or sneezing, and clean your hands.    Contact a health care provider to discuss your illness within 1-2 days if you are    Pregnant    Immunocompromised    Call 911 if you experience:    Difficulty breathing or shortness of breath    Pain or pressure in the chest    Confusion or less responsive than normal    Seizure activity: ongoing or stopped    Severe dehydration or signs of shock     Blue or dusky lips, skin, or nail  beds    If further questions/concerns or if new symptoms develop, call your PCP or [facility] as soon as possible.    When to seek medical attention    Contact your health care provider right away if you experience:    A painful sore throat accompanied by fever persists for more than 48 hours    Ear pain, sinus pain, persistent vomiting and/or diarrhea    Oral temperature greater than 104  Fahrenheit (40  Celsius)    Dehydration (e.g., mouth feeling dry, dizzy when sitting/standing, decreased urine output)    Severe or persistent vomiting; unable to keep fluids down    Improvement in flu-like symptoms (fever and cough or sore throat) but then return of fever and worse cough or sore throat    Not drinking enough fluid    Any other concerns not stated above      Additional educational resources include:    http://www.[employer].com    http://www.cdc.gov/flu/  Chaya Mathis

## 2018-01-29 NOTE — TELEPHONE ENCOUNTER
Reason for call:  Patient reporting a symptom    Symptom or request: sore throat, headache, upset stomach    Duration (how long have symptoms been present): 2-3 days    Have you been treated for this before? No    Additional comments: Patient states started late Friday night, early Saturday morning. Please call to advise    Phone Number patient can be reached at:  Home number on file [phone] (home)    Best Time:  any    Can we leave a detailed message on this number:  YES    Call taken on 1/29/2018 at 8:45 AM by MITCHELL WOLFE

## 2018-01-29 NOTE — PROGRESS NOTES
"  SUBJECTIVE:   Jamar Ivory is a 68 year old male who presents to clinic today for the following health issues:      Acute Illness   Acute illness concerns: cough  Onset: 2-3 days    Fever: YES- occ.    Chills/Sweats: no     Headache (location?): YES- occ.    Sinus Pressure:no    Conjunctivitis:  no    Ear Pain: no    Rhinorrhea: YES    Congestion: no     Sore Throat: YES     Cough: YES    Wheeze: no     Decreased Appetite: YES    Nausea: YES    Vomiting: no     Diarrhea:  no     Dysuria/Freq.: no     Fatigue/Achiness: no     Sick/Strep Exposure: no      Therapies Tried and outcome: nothing             Did not have influenza vaccine.  No hx of lung disease; never smoked.                                                                     Lots of coughing; has led to \"a sore throat\".                                                   Here with his wife; being Rx for \"sinusitis\".            needs a losartan refill; ran out \"a while ago\".                    Problem list and histories reviewed & adjusted, as indicated.      Current Outpatient Prescriptions   Medication Sig Dispense Refill     tamsulosin (FLOMAX) 0.4 MG capsule Take 1 capsule (0.4 mg) by mouth daily 30 capsule 1     FLUoxetine (PROZAC) 10 MG capsule Take 1 capsule (10 mg) by mouth daily For  1 week, then increase to 20 mg once a day. 60 capsule 1     levothyroxine (SYNTHROID/LEVOTHROID) 200 MCG tablet TAKE 1 TABLET (200 MCG) BY MOUTH DAILY 90 tablet 2     order for DME Compression Stockings  Ready To Wear Knee High Compression Stockings  20-30 mmHg  Length of Need: Life Time  # of Pairs 3 3 Device 0     simvastatin (ZOCOR) 20 MG tablet Take 1 tablet (20 mg) by mouth At Bedtime 90 tablet 1     furosemide (LASIX) 80 MG tablet TAKE 1 TABLET (80 MG) BY MOUTH 2 TIMES DAILY 180 tablet 2     ORDER FOR DME Equipment being ordered: blood glucose monitor strips to use 2 to 4 times  A day. As covered by insurance. #100 with 5 refills    Monitor ordered " "this day  As well.   Also lancets #100 use as needed for testing 5 refils 100 strip 5     aspirin 81 MG tablet Take 1 tablet (81 mg) by mouth daily (Patient not taking: Reported on 1/29/2018) 90 tablet 3     losartan (COZAAR) 25 MG tablet Take 1 tablet (25 mg) by mouth daily (Patient not taking: Reported on 1/29/2018) 90 tablet 3     Allergies   Allergen Reactions     Clopidogrel      Cough/emesis     Penicillins Rash     BP Readings from Last 3 Encounters:   01/29/18 130/78   01/19/18 130/78   01/05/18 132/80    Wt Readings from Last 3 Encounters:   01/29/18 (!) 341 lb (154.7 kg)   01/19/18 (!) 341 lb (154.7 kg)   01/05/18 (!) 345 lb (156.5 kg)                    Reviewed and updated as needed this visit by clinical staff       Reviewed and updated as needed this visit by Provider         ROS:  CONSTITUTIONAL:NEGATIVE  for chills  ENT/MOUTH: NEGATIVE for rhinorrhea-purulent  RESP:POSITIVE for cough-non productive and NEGATIVE for hemoptysis and wheezing    OBJECTIVE:                                                    /78 (BP Location: Left arm, Patient Position: Chair, Cuff Size: Adult Large)  Pulse 75  Temp 99.3  F (37.4  C)  Resp 20  Ht 5' 8\" (1.727 m)  Wt (!) 341 lb (154.7 kg)  SpO2 95%  BMI 51.85 kg/m2  Body mass index is 51.85 kg/(m^2).  GENERAL APPEARANCE: alert, no distress and over weight  RESP: no rales or rhonchi    Diagnostic test results:  Results for orders placed or performed in visit on 01/29/18 (from the past 24 hour(s))   Influenza A/B antigen   Result Value Ref Range    Influenza A/B Agn Specimen Nasopharyngeal     Influenza A Positive (A) NEG^Negative    Influenza B Negative NEG^Negative        ASSESSMENT/PLAN:                                                        ICD-10-CM    1. Influenza A J10.1 Influenza A/B antigen     oseltamivir (TAMIFLU) 75 MG capsule        given age and co-morbidities, will Rx tamiflu.                                 Also,resume losartan.     Huy MOE " MD Roberto  [facility]

## 2018-01-29 NOTE — NURSING NOTE
"Chief Complaint   Patient presents with     Cough       Initial /78 (BP Location: Left arm, Patient Position: Chair, Cuff Size: Adult Large)  Pulse 75  Temp 99.3  F (37.4  C)  Resp 20  Ht 5' 8\" (1.727 m)  Wt (!) 341 lb (154.7 kg)  SpO2 95%  BMI 51.85 kg/m2 Estimated body mass index is 51.85 kg/(m^2) as calculated from the following:    Height as of this encounter: 5' 8\" (1.727 m).    Weight as of this encounter: 341 lb (154.7 kg).  Medication Reconciliation: complete   Macarena Barajas LPN  "

## 2018-01-29 NOTE — MR AVS SNAPSHOT
"              After Visit Summary   2018    Jamar Ivory    MRN: [id number]           Patient Information     Date Of Birth          1950        Visit Information        Provider Department      2018 3:45 PM Huy Mejia MD [facility]        Today's Diagnoses     Influenza A    -  1    Hypertension goal BP (blood pressure) < 140/90           Follow-ups after your visit        Who to contact     If you have questions or need follow up information about today's clinic visit or your schedule please contact [facility] directly at [phone].  Normal or non-critical lab and imaging results will be communicated to you by MyChart, letter or phone within 4 business days after the clinic has received the results. If you do not hear from us within 7 days, please contact the clinic through Information Gateway[facility] or phone. If you have a critical or abnormal lab result, we will notify you by phone as soon as possible.  Submit refill requests through [employer] or call your pharmacy and they will forward the refill request to us. Please allow 3 business days for your refill to be completed.          Additional Information About Your Visit        MyChart Information     Launchr lets you send messages to your doctor, view your test results, renew your prescriptions, schedule appointments and more. To sign up, go to [email or web address] . Click on \"Log in\" on the left side of the screen, which will take you to the Welcome page. Then click on \"Sign up Now\" on the right side of the page.     You will be asked to enter the access code listed below, as well as some personal information. Please follow the directions to create your username and password.     Your access code is: [email or web address]  Expires: 2018 11:15 AM     Your access code will  in 90 days. If you need help or a new code, please call your [facility] or [phone].      " "  Care EveryWhere ID     This is your Care EveryWhere ID. This could be used by other organizations to access your [place] medical records  [id number]        Your Vitals Were     Pulse Temperature Respirations Height Pulse Oximetry BMI (Body Mass Index)    75 99.3  F (37.4  C) 20 5' 8\" (1.727 m) 95% 51.85 kg/m2       Blood Pressure from Last 3 Encounters:   01/29/18 130/78   01/19/18 130/78   01/05/18 132/80    Weight from Last 3 Encounters:   01/29/18 (!) 341 lb (154.7 kg)   01/19/18 (!) 341 lb (154.7 kg)   01/05/18 (!) 345 lb (156.5 kg)              We Performed the Following     Influenza A/B antigen          Today's Medication Changes          These changes are accurate as of 1/29/18  5:14 PM.  If you have any questions, ask your nurse or doctor.               Start taking these medicines.        Dose/Directions    oseltamivir 75 MG capsule   Commonly known as:  TAMIFLU   Used for:  Influenza A   Started by:  Huy Mejia MD        Dose:  75 mg   Take 1 capsule (75 mg) by mouth 2 times daily   Quantity:  10 capsule   Refills:  0            Where to get your medicines      These medications were sent to [place] [address] [place] [address] [address]     Phone:  [phone]     losartan 25 MG tablet    oseltamivir 75 MG capsule                Primary Care Provider Office Phone # Fax #    Blanca Melody Peng [phone]       [facility] [address]        Equal Access to Services     [facility]: Hadgrabiel Fowler, waaxda luqadaha, qaybta kaalmaykel jacques. So [facility] [phone].    ATENCIÓN: Si habla español, tiene a shook disposición servicios gratuitos de asistencia lingüística. Llame al [phone].    We comply with applicable federal civil rights laws and [place] laws. We do not discriminate on the basis of race, color, national origin, age, disability, " sex, sexual orientation, or gender identity.            Thank you!     Thank you for choosing [facility]  for your care. Our goal is always to provide you with excellent care. Hearing back from our patients is one way we can continue to improve our services. Please take a few minutes to complete the written survey that you may receive in the mail after your visit with us. Thank you!             Your Updated Medication List - Protect others around you: Learn how to safely use, store and throw away your medicines at www.disposemymeds.org.          This list is accurate as of 1/29/18  5:14 PM.  Always use your most recent med list.                   Brand Name Dispense Instructions for use Diagnosis    aspirin 81 MG tablet     90 tablet    Take 1 tablet (81 mg) by mouth daily    Hypertension goal BP (blood pressure) < 140/90       FLUoxetine 10 MG capsule    PROzac    60 capsule    Take 1 capsule (10 mg) by mouth daily For  1 week, then increase to 20 mg once a day.    Moderate episode of recurrent major depressive disorder (H)       furosemide 80 MG tablet    LASIX    180 tablet    TAKE 1 TABLET (80 MG) BY MOUTH 2 TIMES DAILY    Edema, unspecified type       levothyroxine 200 MCG tablet    SYNTHROID/LEVOTHROID    90 tablet    TAKE 1 TABLET (200 MCG) BY MOUTH DAILY    Acquired hypothyroidism       losartan 25 MG tablet    COZAAR    90 tablet    Take 1 tablet (25 mg) by mouth daily        * order for DME     100 strip    Equipment being ordered: blood glucose monitor strips to use 2 to 4 times  A day. As covered by insurance. #100 with 5 refills  Monitor ordered this day  As well.  Also lancets #100 use as needed for testing 5 refils    Diabetes mellitus, type 2 (H)       * order for DME     3 Device    Compression Stockings Ready To Wear Knee High Compression Stockings 20-30 mmHg Length of Need: Life Time # of Pairs 3    Type 2 diabetes mellitus with diabetic dermatitis, unspecified  long term insulin use status (H), Swelling of lower extremity, Lymphedema of both lower extremities, Chronic venous hypertension with ulcer, bilateral (H), Varicose veins of both legs with edema, Open wound of knee, leg, and ankle, unspecified laterality, subsequent encounter, Venous (peripheral) insufficiency       oseltamivir 75 MG capsule    TAMIFLU    10 capsule    Take 1 capsule (75 mg) by mouth 2 times daily    Influenza A       simvastatin 20 MG tablet    ZOCOR    90 tablet    Take 1 tablet (20 mg) by mouth At Bedtime    Hyperlipidemia with target LDL less than 100       tamsulosin 0.4 MG capsule    FLOMAX    30 capsule    Take 1 capsule (0.4 mg) by mouth daily    Benign prostatic hyperplasia with weak urinary stream       * Notice:  This list has 2 medication(s) that are the same as other medications prescribed for you. Read the directions carefully, and ask your doctor or other care provider to review them with you.

## 2018-02-28 ENCOUNTER — OFFICE VISIT (OUTPATIENT)
Dept: FAMILY MEDICINE | Facility: CLINIC | Age: 68
End: 2018-02-28
Payer: MEDICARE

## 2018-02-28 ENCOUNTER — TELEPHONE (OUTPATIENT)
Dept: FAMILY MEDICINE | Facility: CLINIC | Age: 68
End: 2018-02-28

## 2018-02-28 VITALS
HEART RATE: 66 BPM | SYSTOLIC BLOOD PRESSURE: 130 MMHG | DIASTOLIC BLOOD PRESSURE: 72 MMHG | TEMPERATURE: 97.6 F | OXYGEN SATURATION: 96 % | WEIGHT: 315 LBS | BODY MASS INDEX: 51.01 KG/M2

## 2018-02-28 DIAGNOSIS — N40.1 BENIGN PROSTATIC HYPERPLASIA WITH WEAK URINARY STREAM: Primary | ICD-10-CM

## 2018-02-28 DIAGNOSIS — R35.0 URINARY FREQUENCY: ICD-10-CM

## 2018-02-28 DIAGNOSIS — F33.1 MODERATE EPISODE OF RECURRENT MAJOR DEPRESSIVE DISORDER (H): ICD-10-CM

## 2018-02-28 DIAGNOSIS — R39.12 BENIGN PROSTATIC HYPERPLASIA WITH WEAK URINARY STREAM: Primary | ICD-10-CM

## 2018-02-28 DIAGNOSIS — E11.42 DIABETIC POLYNEUROPATHY ASSOCIATED WITH TYPE 2 DIABETES MELLITUS (H): ICD-10-CM

## 2018-02-28 LAB
ALBUMIN UR-MCNC: NEGATIVE MG/DL
APPEARANCE UR: CLEAR
BILIRUB UR QL STRIP: NEGATIVE
COLOR UR AUTO: YELLOW
GLUCOSE UR STRIP-MCNC: NEGATIVE MG/DL
HGB UR QL STRIP: NEGATIVE
KETONES UR STRIP-MCNC: NEGATIVE MG/DL
LEUKOCYTE ESTERASE UR QL STRIP: NEGATIVE
NITRATE UR QL: NEGATIVE
PH UR STRIP: 6.5 PH (ref 5–7)
SOURCE: NORMAL
SP GR UR STRIP: 1.01 (ref 1–1.03)
UROBILINOGEN UR STRIP-ACNC: 0.2 EU/DL (ref 0.2–1)

## 2018-02-28 PROCEDURE — 81003 URINALYSIS AUTO W/O SCOPE: CPT | Performed by: FAMILY MEDICINE

## 2018-02-28 PROCEDURE — 99214 OFFICE O/P EST MOD 30 MIN: CPT | Performed by: FAMILY MEDICINE

## 2018-02-28 RX ORDER — FLUOXETINE 40 MG/1
40 CAPSULE ORAL DAILY
Qty: 60 CAPSULE | Refills: 1 | Status: SHIPPED | OUTPATIENT
Start: 2018-02-28 | End: 2018-02-28

## 2018-02-28 RX ORDER — FLUOXETINE 40 MG/1
40 CAPSULE ORAL DAILY
Qty: 90 CAPSULE | Refills: 3 | Status: SHIPPED | OUTPATIENT
Start: 2018-02-28 | End: 2018-08-29

## 2018-02-28 RX ORDER — TAMSULOSIN HYDROCHLORIDE 0.4 MG/1
0.4 CAPSULE ORAL DAILY
Qty: 30 CAPSULE | Refills: 1 | Status: CANCELLED | OUTPATIENT
Start: 2018-02-28

## 2018-02-28 RX ORDER — TAMSULOSIN HYDROCHLORIDE 0.4 MG/1
0.8 CAPSULE ORAL DAILY
Qty: 180 CAPSULE | Refills: 1 | Status: SHIPPED | OUTPATIENT
Start: 2018-02-28 | End: 2018-08-29

## 2018-02-28 NOTE — PATIENT INSTRUCTIONS
For your depression:   --Let's increase your prozac to 40 mg daily.   --I sent a new prescription for this to your pharmacy - 1 pill once a day.  You can use up what you have at home as well but at this increased dose.      For your frequent urination:    --I'd like you to leave a urine sample today to check for infection.    --If you have an infection, we'll treat it with antibiotics.    --If you don't have an infection,  we're going to increase your FLOMAX to 0.8 mg daily.  I'll send a new prescription for this.    --If this doesn't help in 2-4 weeks, I'd like you to see a urologist.      For your neuropathy (pain in legs at night):    --If the pain at night keeps you up even with Aleve, let me know and we can try a different medicine that works well for nerve pain (gabapentin).

## 2018-02-28 NOTE — PROGRESS NOTES
SUBJECTIVE:   Jamar Ivory is a 68 year old male who presents to clinic today for the following health issues:      Depression Followup    Status since last visit: Worsened , lost house and truck    See PHQ-9 for current symptoms.  Other associated symptoms: None    Complicating factors:   Significant life event:  Yes-  Lost house and truck   Current substance abuse:  None  Anxiety or Panic symptoms:  No    PHQ-9 8/16/2017 8/31/2017 1/5/2018   Total Score 11 15 12   Q9: Suicide Ideation Nearly every day More than half the days More than half the days     PHQ-9  English  PHQ-9   Any Language  Suicide Assessment Five-step Evaluation and Treatment (SAFE-T)    Amount of exercise or physical activity: None    Problems taking medications regularly: No    Medication side effects: none    Diet: regular (no restrictions)        Genitourinary symptoms      Duration: years    Description:  frequency and weak flow    Intensity:  moderate, severe    Accompanying signs and symptoms (fever/discharge/nausea/vomiting/back or abdominal pain):  None    History (frequent UTI's/kidney stones/prostate problems): None  Sexually active: YES    Precipitating or alleviating factors: None    Therapies tried and outcome: none   Outcome:       67 year old with history of type 2 diabetes, diet controlled, morbid obesity, chronic lower extremity venous insufficiency and chronic severe lower extremity edema with venous insufficiency and some open wounds, as well as history of gout and history of cerebral infarction here today for:    1. Depression.  Started prozac 10 mg 1/5 - feels not helping.  Has had some life events  In the last year that he still feels down about - losing his house and truck last year.  Just feels medicine isn't working.  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    2. Frequent urination.  Started flomax 0.4 mg 4 weeks ago; no improvement.  PSA normal.  Last Urinalysis over a year ago - needs  to be checked.    PSA   Date Value Ref Range Status   01/19/2018 0.70 0 - 4 ug/L Final     Comment:     Assay Method:  Chemiluminescence using Siemens Vista analyzer     No components found for: URINE    Problem list and histories reviewed & adjusted, as indicated.  Additional history: as documented      Recent Labs   Lab Test  01/05/18   0924  09/26/17   1540  08/16/17   1031  07/19/17   1012  05/25/17   1226  03/02/17   1056   07/18/16   0957   04/27/15   0754   04/25/14   0956   01/23/14   0735  10/11/13   2100   A1C  5.7   --    --   5.5  5.7   --    --    --    < >  6.0   < >   --    --    --    --    LDL   --   85   --    --    --    --    --   96   --   108   --   100   --    --    --    HDL   --   39*   --    --    --    --    --   45   --   43   --   40*   --    --    --    TRIG   --   193*   --    --    --    --    --   103   --   92   --   145   --    --    --    ALT   --    --    --    --    --    --    --    --    --    --    --   23   --   19  36   CR   --    --   0.90  0.86  0.77   --    < >   --    < >  1.00   --   1.00   < >  1.00  0.74   GFRESTIMATED   --    --   84  88  >90  Non  GFR Calc     --    < >   --    < >  75   --   75   < >  75  >90   GFRESTBLACK   --    --   >90  >90  African American GFR Calc    >90   GFR Calc     --    < >   --    < >  >90   --   >90   < >  >90  >90   POTASSIUM   --    --   3.7  4.3  2.8*   --    < >   --    < >  4.0   --   3.9   < >  4.1  4.1   TSH   --    --   2.19   --    --   9.03*   < >  1.80   < >  26.05*   < >   --    --    --   19.60*    < > = values in this interval not displayed.      BP Readings from Last 3 Encounters:   02/28/18 130/72   01/29/18 130/78   01/19/18 130/78    Wt Readings from Last 3 Encounters:   02/28/18 (!) 335 lb 8 oz (152.2 kg)   01/29/18 (!) 341 lb (154.7 kg)   01/19/18 (!) 341 lb (154.7 kg)            Reviewed and updated as needed this visit by clinical staff  Tobacco  Allergies  Meds  Problems  Med  "Hx  Surg Hx  Fam Hx  Soc Hx        Reviewed and updated as needed this visit by Provider  Allergies  Meds  Problems         ROS:  Constitutional, HEENT, cardiovascular, pulmonary, gi and gu systems are negative, except as otherwise noted.    OBJECTIVE:     /72  Pulse 66  Temp 97.6  F (36.4  C) (Tympanic)  Wt (!) 335 lb 8 oz (152.2 kg)  SpO2 96%  BMI 51.01 kg/m2  Body mass index is 51.01 kg/(m^2).  GENERAL: alert, no distress, over weight, obese and fatigued  MENTAL STATUS EXAM:   Jamar is casually dressed and well groomed, sitting comfortably during encounter.  he is alert, awake, and in no acute distress. Eye contact is good. Speech is normal, not pressured. Psychomotor activity is within normal limits and there are no abnormal involuntary movements demonstrated. Mood is \"down, depressed\" and affect is dysthymic with good range and reactivity. Holmes is negative. Thought process is linear, logical, and goal directed. Thought content is free of suicidal or homicidal ideation, hallucinations or other symptoms of psychosis. Insight and judgement are fair to good. he is generally oriented. No difficulty with memory, attention, or cognition. Associations intact, gait and station within normal limits.    ASSESSMENT/PLAN:     Jamar was seen today for depression.    Diagnoses and all orders for this visit:    Benign prostatic hyperplasia with weak urinary stream  -     UROLOGY ADULT REFERRAL  -     tamsulosin (FLOMAX) 0.4 MG capsule; Take 2 capsules (0.8 mg) by mouth daily    Urinary frequency  -     UA reflex to Microscopic  -     UROLOGY ADULT REFERRAL    Moderate episode of recurrent major depressive disorder (H)  -     FLUoxetine (PROZAC) 40 MG capsule; Take 1 capsule (40 mg) by mouth daily For  1 week, then increase to 20 mg once a day.    Diabetic polyneuropathy associated with type 2 diabetes mellitus (H)    Other orders  -     Cancel: tamsulosin (FLOMAX) 0.4 MG capsule; Take 1 capsule (0.4 mg) " by mouth daily        Patient Instructions   For your depression:   --Let's increase your prozac to 40 mg daily.   --I sent a new prescription for this to your pharmacy - 1 pill once a day.  You can use up what you have at home as well but at this increased dose.      For your frequent urination:    --I'd like you to leave a urine sample today to check for infection.    --If you have an infection, we'll treat it with antibiotics.    --If you don't have an infection,  we're going to increase your FLOMAX to 0.8 mg daily.  I'll send a new prescription for this.    --If this doesn't help in 2-4 weeks, I'd like you to see a urologist.      For your neuropathy (pain in legs at night):    --If the pain at night keeps you up even with Aleve, let me know and we can try a different medicine that works well for nerve pain (gabapentin).        Follow up 4 weeks, sooner if worsening or other concerns.    Blanca Peng MD  [facility]

## 2018-02-28 NOTE — NURSING NOTE
"Chief Complaint   Patient presents with     Depression       Initial /72  Pulse 66  Temp 97.6  F (36.4  C) (Tympanic)  Wt (!) 335 lb 8 oz (152.2 kg)  SpO2 96%  BMI 51.01 kg/m2 Estimated body mass index is 51.01 kg/(m^2) as calculated from the following:    Height as of 1/29/18: 5' 8\" (1.727 m).    Weight as of this encounter: 335 lb 8 oz (152.2 kg).  Medication Reconciliation: complete   .Amrita ALVARADO      "

## 2018-02-28 NOTE — TELEPHONE ENCOUNTER
FLUoxetine (PROZAC) 40 MG capsule 60 capsule 1 2/28/2018  No      Sig: Take 1 capsule (40 mg) by mouth daily For  1 week, then increase to 20 mg once a day.     Class: E-Prescribe     Route: Oral     Order: [id number]     E-Prescribing Status: Receipt confirmed by pharmacy (2/28/2018  8:10 AM CST)     Please clarify directions.   Thanks!

## 2018-02-28 NOTE — MR AVS SNAPSHOT
After Visit Summary   2/28/2018    Jamar Ivory    MRN: [id number]           Patient Information     Date Of Birth          1950        Visit Information        Provider Department      2/28/2018 8:00 AM Blanca Peng MD [facility]        Today's Diagnoses     Benign prostatic hyperplasia with weak urinary stream    -  1    Urinary frequency        Moderate major depression (H)        Moderate episode of recurrent major depressive disorder (H)        Need for prophylactic vaccination against Streptococcus pneumoniae (pneumococcus)          Care Instructions    For your depression:   --Let's increase your prozac to 40 mg daily.   --I sent a new prescription for this to your pharmacy - 1 pill once a day.  You can use up what you have at home as well but at this increased dose.      For your frequent urination:    --I'd like you to leave a urine sample today to check for infection.    --If you have an infection, we'll treat it with antibiotics.    --If you don't have an infection,  we're going to increase your FLOMAX to 0.8 mg daily.  I'll send a new prescription for this.    --If this doesn't help in 2-4 weeks, I'd like you to see a urologist.      For your neuropathy (pain in legs at night):    --If the pain at night keeps you up even with Aleve, let me know and we can try a different medicine that works well for nerve pain (gabapentin).            Follow-ups after your visit        Additional Services     UROLOGY ADULT REFERRAL       Your provider has referred you to: [facility] [place]: [facility] [place] [phone]   [email or web address]    Please be aware that coverage of these services is subject to the terms and limitations of your health insurance plan.  Call member services at your health plan with any benefit or coverage questions.      Please bring the following with you to your appointment:    (1) Any  "X-Rays, CTs or MRIs which have been performed.  Contact the facility where they were done to arrange for  prior to your scheduled appointment.    (2) List of current medications  (3) This referral request   (4) Any documents/labs given to you for this referral                  Follow-up notes from your care team     Return in about 4 weeks (around 3/28/2018) for Follow up.      Who to contact     If you have questions or need follow up information about today's clinic visit or your schedule please contact [facility] directly at [phone].  Normal or non-critical lab and imaging results will be communicated to you by [email or web address], letter or phone within 4 business days after the clinic has received the results. If you do not hear from us within 7 days, please contact the clinic through [email or web address] or phone. If you have a critical or abnormal lab result, we will notify you by phone as soon as possible.  Submit refill requests through [employer] or call your pharmacy and they will forward the refill request to us. Please allow 3 business days for your refill to be completed.          Additional Information About Your Visit        MyChart Information     [employer] lets you send messages to your doctor, view your test results, renew your prescriptions, schedule appointments and more. To sign up, go to [email or web address] . Click on \"Log in\" on the left side of the screen, which will take you to the Welcome page. Then click on \"Sign up Now\" on the right side of the page.     You will be asked to enter the access code listed below, as well as some personal information. Please follow the directions to create your username and password.     Your access code is: [email or web address]  Expires: 2018 11:15 AM     Your access code will  in 90 days. If you need help or a new code, please call your [facility] or [phone].        Care EveryWhere ID     This is your Care EveryWhere ID. " This could be used by other organizations to access your [place] medical records  [id number]        Your Vitals Were     Pulse Temperature Pulse Oximetry BMI (Body Mass Index)          66 97.6  F (36.4  C) (Tympanic) 96% 51.01 kg/m2         Blood Pressure from Last 3 Encounters:   02/28/18 130/72   01/29/18 130/78   01/19/18 130/78    Weight from Last 3 Encounters:   02/28/18 (!) 335 lb 8 oz (152.2 kg)   01/29/18 (!) 341 lb (154.7 kg)   01/19/18 (!) 341 lb (154.7 kg)              We Performed the Following     UA reflex to Microscopic     UROLOGY ADULT REFERRAL          Today's Medication Changes          These changes are accurate as of 2/28/18  8:26 AM.  If you have any questions, ask your nurse or doctor.               These medicines have changed or have updated prescriptions.        Dose/Directions    FLUoxetine 40 MG capsule   Commonly known as:  PROzac   This may have changed:    - medication strength  - how much to take   Used for:  Moderate episode of recurrent major depressive disorder (H)   Changed by:  Blanca Peng MD        Dose:  40 mg   Take 1 capsule (40 mg) by mouth daily For  1 week, then increase to 20 mg once a day.   Quantity:  60 capsule   Refills:  1       tamsulosin 0.4 MG capsule   Commonly known as:  FLOMAX   This may have changed:  how much to take   Used for:  Benign prostatic hyperplasia with weak urinary stream   Changed by:  Blanca Peng MD        Dose:  0.8 mg   Take 2 capsules (0.8 mg) by mouth daily   Quantity:  180 capsule   Refills:  1         Stop taking these medicines if you haven't already. Please contact your care team if you have questions.     aspirin 81 MG tablet   Stopped by:  Blanca Peng MD                Where to get your medicines      These medications were sent to [place] [id number] [place] [facility] - [address] [place] [id number]     Phone:  [phone]     FLUoxetine 40 MG capsule    tamsulosin 0.4  MG capsule                Primary Care Provider Office Phone # Fax #    Blanca Peng [phone] [address] [facility] [address]        Equal Access to Services     SHAUN AKERS : Hadii aad ku hadsariahdaniel Fowler, wamarceda coletteibisha, qaybta karositada tammyrory, maykel nicolain hayaajake munozphoebe reecevalentinejoelle parada. So [facility] [phone].    ATENCIÓN: Si habla español, tiene a shook disposición servicios gratuitos de asistencia lingüística. Llame al [phone].    We comply with applicable federal civil rights laws and [place] laws. We do not discriminate on the basis of race, color, national origin, age, disability, sex, sexual orientation, or gender identity.            Thank you!     Thank you for choosing [facility]  for your care. Our goal is always to provide you with excellent care. Hearing back from our patients is one way we can continue to improve our services. Please take a few minutes to complete the written survey that you may receive in the mail after your visit with us. Thank you!             Your Updated Medication List - Protect others around you: Learn how to safely use, store and throw away your medicines at www.disposemymeds.org.          This list is accurate as of 2/28/18  8:26 AM.  Always use your most recent med list.                   Brand Name Dispense Instructions for use Diagnosis    FLUoxetine 40 MG capsule    PROzac    60 capsule    Take 1 capsule (40 mg) by mouth daily For  1 week, then increase to 20 mg once a day.    Moderate episode of recurrent major depressive disorder (H)       furosemide 80 MG tablet    LASIX    180 tablet    TAKE 1 TABLET (80 MG) BY MOUTH 2 TIMES DAILY    Edema, unspecified type       levothyroxine 200 MCG tablet    SYNTHROID/LEVOTHROID    90 tablet    TAKE 1 TABLET (200 MCG) BY MOUTH DAILY    Acquired hypothyroidism       losartan 25 MG tablet    COZAAR    90 tablet    Take 1 tablet (25 mg) by mouth daily         * order for DME     100 strip    Equipment being ordered: blood glucose monitor strips to use 2 to 4 times  A day. As covered by insurance. #100 with 5 refills  Monitor ordered this day  As well.  Also lancets #100 use as needed for testing 5 refils    Diabetes mellitus, type 2 (H)       * order for DME     3 Device    Compression Stockings Ready To Wear Knee High Compression Stockings 20-30 mmHg Length of Need: Life Time # of Pairs 3    Type 2 diabetes mellitus with diabetic dermatitis, unspecified long term insulin use status (H), Swelling of lower extremity, Lymphedema of both lower extremities, Chronic venous hypertension with ulcer, bilateral (H), Varicose veins of both legs with edema, Open wound of knee, leg, and ankle, unspecified laterality, subsequent encounter, Venous (peripheral) insufficiency       oseltamivir 75 MG capsule    TAMIFLU    10 capsule    Take 1 capsule (75 mg) by mouth 2 times daily    Influenza A       simvastatin 20 MG tablet    ZOCOR    90 tablet    Take 1 tablet (20 mg) by mouth At Bedtime    Hyperlipidemia with target LDL less than 100       tamsulosin 0.4 MG capsule    FLOMAX    180 capsule    Take 2 capsules (0.8 mg) by mouth daily    Benign prostatic hyperplasia with weak urinary stream       * Notice:  This list has 2 medication(s) that are the same as other medications prescribed for you. Read the directions carefully, and ask your doctor or other care provider to review them with you.

## 2018-02-28 NOTE — PROGRESS NOTES
Can you call Kenneth and let him know his urine showed NO infection, so I'd like him to double his flomax (as we talked about today) and make an appointment to see the urologist.  Thanks,  Dr. Blanca Peng MD/[facility]

## 2018-03-16 ENCOUNTER — TELEPHONE (OUTPATIENT)
Dept: FAMILY MEDICINE | Facility: CLINIC | Age: 68
End: 2018-03-16

## 2018-03-16 NOTE — TELEPHONE ENCOUNTER
Panel Management Review      Patient has the following on his problem list:     Depression / Dysthymia review    Measure:  Needs PHQ-9 score of 4 or less during index window.  Administer PHQ-9 and if score is 5 or more, send encounter to provider for next steps.    5 - 7 month window range:     PHQ-9 SCORE 8/31/2017 1/5/2018 3/16/2018   Total Score - - -   Total Score MyChart - 12 (Moderate depression) -   Total Score 15 12 14       If PHQ-9 recheck is 5 or more, route to provider for next steps.    Patient is due for:  PHQ9    Diabetes    ASA: Failed    Last A1C  Lab Results   Component Value Date    A1C 5.7 01/05/2018    A1C 5.5 07/19/2017    A1C 5.7 05/25/2017    A1C 5.5 01/16/2016    A1C 6.0 04/27/2015     A1C tested: Passed    Last LDL:    Lab Results   Component Value Date    CHOL 163 09/26/2017     Lab Results   Component Value Date    HDL 39 09/26/2017     Lab Results   Component Value Date    LDL 85 09/26/2017     Lab Results   Component Value Date    TRIG 193 09/26/2017     Lab Results   Component Value Date    CHOLHDLRATIO 3.9 04/27/2015     Lab Results   Component Value Date    NHDL 124 09/26/2017       Is the patient on a Statin? YES             Is the patient on Aspirin? NO    Medications     HMG CoA Reductase Inhibitors    simvastatin (ZOCOR) 20 MG tablet          Last three blood pressure readings:  BP Readings from Last 3 Encounters:   02/28/18 130/72   01/29/18 130/78   01/19/18 130/78       Date of last diabetes office visit: 1-5-18     Tobacco History:     History   Smoking Status     Never Smoker   Smokeless Tobacco     Never Used         Hypertension   Last three blood pressure readings:  BP Readings from Last 3 Encounters:   02/28/18 130/72   01/29/18 130/78   01/19/18 130/78     Blood pressure: Passed    HTN Guidelines:  Age 18-59 BP range:  Less than 140/90  Age 60-85 with Diabetes:  Less than 140/90  Age 60-85 without Diabetes:  less than 150/90      Composite cancer screening  Chart  review shows that this patient is due/due soon for the following None  Summary:    Patient is due/failing the following:   PHQ9    Action needed:   phq9 compleated    Type of outreach:    Phone, spoke to patient.  .    Questions for provider review:    None                                                                                                                                    .Amrita ALVARADO       Chart routed to  .

## 2018-03-17 ASSESSMENT — PATIENT HEALTH QUESTIONNAIRE - PHQ9: SUM OF ALL RESPONSES TO PHQ QUESTIONS 1-9: 14

## 2018-03-27 ENCOUNTER — OFFICE VISIT (OUTPATIENT)
Dept: FAMILY MEDICINE | Facility: CLINIC | Age: 68
End: 2018-03-27
Payer: MEDICARE

## 2018-03-27 VITALS
TEMPERATURE: 98.9 F | SYSTOLIC BLOOD PRESSURE: 132 MMHG | HEART RATE: 78 BPM | BODY MASS INDEX: 50.78 KG/M2 | WEIGHT: 315 LBS | DIASTOLIC BLOOD PRESSURE: 70 MMHG | OXYGEN SATURATION: 95 %

## 2018-03-27 DIAGNOSIS — R60.0 BILATERAL LEG EDEMA: ICD-10-CM

## 2018-03-27 DIAGNOSIS — I87.2 CHRONIC STASIS DERMATITIS: ICD-10-CM

## 2018-03-27 DIAGNOSIS — M79.604 PAIN IN BOTH LOWER EXTREMITIES: Primary | ICD-10-CM

## 2018-03-27 DIAGNOSIS — S81.809S OPEN WOUND OF LOWER EXTREMITY, UNSPECIFIED LATERALITY, SEQUELA: ICD-10-CM

## 2018-03-27 DIAGNOSIS — M79.605 PAIN IN BOTH LOWER EXTREMITIES: Primary | ICD-10-CM

## 2018-03-27 PROCEDURE — 99214 OFFICE O/P EST MOD 30 MIN: CPT | Performed by: FAMILY MEDICINE

## 2018-03-27 NOTE — PATIENT INSTRUCTIONS
1. Call for your ultrasound of your legs to look for blood clots:  For [facility] call [phone].  For [place] or the [place] call [phone].    2. Call the wound clinic for an appointment ASAP.

## 2018-03-27 NOTE — PROGRESS NOTES
"  SUBJECTIVE:   Jamar Ivory is a 68 year old male who presents to clinic today for the following health issues:      Pt here for leg swelling, pain, open wounds since last visit was referred to a specialist but lost info would like again  Pt thinks might have blood clot in leg      67 year old with history of type 2 diabetes, diet controlled, morbid obesity, chronic lower extremity venous insufficiency and chronic severe lower extremity edema with venous insufficiency and some open wounds, as well as history of gout and history of cerebral infarction here today for:    1) Lower legs have been more red, swollen for last few months.  I've sent him to wound clinic but he doesn't wear socks or continue wound care.  Tried antibiotics; no helps.  Wonders if there is a blood clot?  No fever.  Slightly tender.  Swollen more than normal.  Hasn't been able to wear compression socks because so swollen and tender.  Was treated with keflex 2 weeks ago and helped \"a bit\" but then once medicine was out he noticed pain just came right back.    PSA   Date Value Ref Range Status   01/19/2018 0.70 0 - 4 ug/L Final     Comment:     Assay Method:  Chemiluminescence using Siemens Vista analyzer       Problem list and histories reviewed & adjusted, as indicated.  Additional history: as documented    BP Readings from Last 3 Encounters:   04/02/18 145/78   03/27/18 132/70   02/28/18 130/72    Wt Readings from Last 3 Encounters:   03/27/18 (!) 334 lb (151.5 kg)   02/28/18 (!) 335 lb 8 oz (152.2 kg)   01/29/18 (!) 341 lb (154.7 kg)           Reviewed and updated as needed this visit by clinical staffTobacco  Allergies  Meds  Med Hx  Surg Hx  Fam Hx  Soc Hx      Reviewed and updated as needed this visit by Provider       ROS:  Constitutional, HEENT, cardiovascular, pulmonary, gi and gu systems are negative, except as otherwise noted.      OBJECTIVE:   /70 (Cuff Size: Adult Large)  Pulse 78  Temp 98.9  F (37.2  C) " (Tympanic)  Wt (!) 334 lb (151.5 kg)  SpO2 95%  BMI 50.78 kg/m2  Body mass index is 50.78 kg/(m^2).  GENERAL: alert, no distress, obese, elderly, appears older than stated age  NECK: no adenopathy, no asymmetry, masses, or scars and thyroid normal to palpation  RESP: lungs clear to auscultation - no rales, rhonchi or wheezes  CV: regular rate and rhythm, normal S1 S2, no S3 or S4, no murmur, click or rub, no peripheral edema and peripheral pulses strong  ABDOMEN: soft, nontender, no hepatosplenomegaly, no masses and bowel sounds normal  MS: walks comfortably, with bilateral legs not wrapped today  LYMPH: bilateral legs 2+.  Left leg with erythema to mid calf, warm, several open wounds.  Right leg mild erythema, less so.  No warmth.  Diabetic foot exam: normal DP and PT pulses, no trophic changes or ulcerative lesions, reduced sensation throughout, trophic changes, venous stasis dermatitis noted and nail exam dystrophic nails  SKIN: Arms with dry skin with several open wounds from itching scattered    Diagnostic Test Results:  No results found for this or any previous visit (from the past 24 hour(s)).  Unresulted Labs Ordered in the Past 30 Days of this Admission     No orders found from 1/26/2018 to 3/28/2018.          ASSESSMENT/PLAN:     Jamar was seen today for leg swelling.    Diagnoses and all orders for this visit:    Pain in both lower extremities  -     US Lower Extremity Venous Duplex Bilateral; Future  -     WOUND CARE REFERRAL    Chronic stasis dermatitis    Bilateral leg edema    Open wound of lower extremity, unspecified laterality, sequela      Plan as below:    Patient Instructions   1. Call for your ultrasound of your legs to look for blood clots:  For [facility] call [phone].  For [place] or [facility] call [phone].    2. Call the wound clinic for an appointment ASAP.      Blanca Peng MD  [facility]

## 2018-03-27 NOTE — MR AVS SNAPSHOT
After Visit Summary   3/27/2018    Jamar Ivory    MRN: [id number]           Patient Information     Date Of Birth          1950        Visit Information        Provider Department      3/27/2018 3:40 PM Blanca Peng MD [facility]        Today's Diagnoses     Pain in both lower extremities    -  1    Chronic stasis dermatitis        Bilateral leg edema        Open wound of lower extremity, unspecified laterality, sequela          Care Instructions    1. Call for your ultrasound of your legs to look for blood clots:  For [facility] call [phone].  For [place] or [facility] call [phone].    2. Call the wound clinic for an appointment ASAP.            Follow-ups after your visit        Additional Services     WOUND CARE REFERRAL       Your provider has referred you to: [place]: [facility] [place] [facility] [phone] FAX REFERRAL TO [phone] [email or web address]    Reason for referral: Wound care      1. [facility] Wound Consult appointment is related to what kind of wound: Venous leg/foot ulcer    2. Location of wound: Lower extremity    3. Reason for referral: Assess and treat as indicated    4. Desired treatment if any:    Please be aware that coverage of these services is subject to the terms and limitations of your health insurance plan.  Call member services at your health plan with any benefit or coverage questions.      Please bring the following with you to your appointment:    (1) Any X-Rays, CTs or MRIs which have been performed.  Contact the facility where they were done to arrange for  prior to your scheduled appointment.    (2) List of current medications   (3) This referral request   (4) Any documents/labs given to you for this referral                  Future tests that were ordered for you today     Open Future Orders        Priority Expected Expires Ordered     "US Lower Extremity Venous Duplex Bilateral Routine  3/27/2019 3/27/2018            Who to contact     If you have questions or need follow up information about today's clinic visit or your schedule please contact [facility] directly at [phone].  Normal or non-critical lab and imaging results will be communicated to you by MyChart, letter or phone within 4 business days after the clinic has received the results. If you do not hear from us within 7 days, please contact the clinic through MyChart or phone. If you have a critical or abnormal lab result, we will notify you by phone as soon as possible.  Submit refill requests through [employer] or call your pharmacy and they will forward the refill request to us. Please allow 3 business days for your refill to be completed.          Additional Information About Your Visit        MyChart Information     [employer] lets you send messages to your doctor, view your test results, renew your prescriptions, schedule appointments and more. To sign up, go to [email or web address] . Click on \"Log in\" on the left side of the screen, which will take you to the Welcome page. Then click on \"Sign up Now\" on the right side of the page.     You will be asked to enter the access code listed below, as well as some personal information. Please follow the directions to create your username and password.     Your access code is: [email or web address]  Expires: 2018 12:15 PM     Your access code will  in 90 days. If you need help or a new code, please call your [place] clinic or [phone].        Care EveryWhere ID     This is your Care EveryWhere ID. This could be used by other organizations to access your [place] medical records  [id number]        Your Vitals Were     Pulse Temperature Pulse Oximetry BMI (Body Mass Index)          78 98.9  F (37.2  C) (Tympanic) 95% 50.78 kg/m2         Blood Pressure from Last 3 Encounters:   18 132/70 "   02/28/18 130/72   01/29/18 130/78    Weight from Last 3 Encounters:   03/27/18 (!) 334 lb (151.5 kg)   02/28/18 (!) 335 lb 8 oz (152.2 kg)   01/29/18 (!) 341 lb (154.7 kg)              We Performed the Following     WOUND CARE REFERRAL        Primary Care Provider Office Phone # Fax #    Blanca Peng [phone] [address] [facility] [address]        Equal Access to Services     SHAUN AKERS : Hadii aad ku hadasho Soomaali, waaxda luqadaha, qaybta kaalmada adeegyada, waxpadmini raymond haycachorro reich . So [facility] [phone].    ATENCIÓN: Si habla español, tiene a shook disposición servicios gratuitos de asistencia lingüística. Llame al [phone].    We comply with applicable federal civil rights laws and [place] laws. We do not discriminate on the basis of race, color, national origin, age, disability, sex, sexual orientation, or gender identity.            Thank you!     Thank you for choosing [facility]  for your care. Our goal is always to provide you with excellent care. Hearing back from our patients is one way we can continue to improve our services. Please take a few minutes to complete the written survey that you may receive in the mail after your visit with us. Thank you!             Your Updated Medication List - Protect others around you: Learn how to safely use, store and throw away your medicines at www.disposemymeds.org.          This list is accurate as of 3/27/18  4:03 PM.  Always use your most recent med list.                   Brand Name Dispense Instructions for use Diagnosis    FLUoxetine 40 MG capsule    PROzac    90 capsule    Take 1 capsule (40 mg) by mouth daily    Moderate episode of recurrent major depressive disorder (H)       furosemide 80 MG tablet    LASIX    180 tablet    TAKE 1 TABLET (80 MG) BY MOUTH 2 TIMES DAILY    Edema, unspecified type       levothyroxine 200 MCG tablet    SYNTHROID/LEVOTHROID    90  tablet    TAKE 1 TABLET (200 MCG) BY MOUTH DAILY    Acquired hypothyroidism       losartan 25 MG tablet    COZAAR    90 tablet    Take 1 tablet (25 mg) by mouth daily        order for DME     100 strip    Equipment being ordered: blood glucose monitor strips to use 2 to 4 times  A day. As covered by insurance. #100 with 5 refills  Monitor ordered this day  As well.  Also lancets #100 use as needed for testing 5 refils    Diabetes mellitus, type 2 (H)       order for DME     3 Device    Compression Stockings Ready To Wear Knee High Compression Stockings 20-30 mmHg Length of Need: Life Time # of Pairs 3    Type 2 diabetes mellitus with diabetic dermatitis, unspecified long term insulin use status (H), Swelling of lower extremity, Lymphedema of both lower extremities, Chronic venous hypertension with ulcer, bilateral (H), Varicose veins of both legs with edema, Open wound of knee, leg, and ankle, unspecified laterality, subsequent encounter, Venous (peripheral) insufficiency       oseltamivir 75 MG capsule    TAMIFLU    10 capsule    Take 1 capsule (75 mg) by mouth 2 times daily    Influenza A       simvastatin 20 MG tablet    ZOCOR    90 tablet    Take 1 tablet (20 mg) by mouth At Bedtime    Hyperlipidemia with target LDL less than 100       tamsulosin 0.4 MG capsule    FLOMAX    180 capsule    Take 2 capsules (0.8 mg) by mouth daily    Benign prostatic hyperplasia with weak urinary stream

## 2018-03-27 NOTE — NURSING NOTE
"Chief Complaint   Patient presents with     Leg Swelling       Initial /70 (Cuff Size: Adult Large)  Pulse 78  Temp 98.9  F (37.2  C) (Tympanic)  Wt (!) 334 lb (151.5 kg)  SpO2 95%  BMI 50.78 kg/m2 Estimated body mass index is 50.78 kg/(m^2) as calculated from the following:    Height as of 1/29/18: 5' 8\" (1.727 m).    Weight as of this encounter: 334 lb (151.5 kg).  Medication Reconciliation: complete   .Amrita ALVARADO      "

## 2018-03-28 ENCOUNTER — HOSPITAL ENCOUNTER (OUTPATIENT)
Dept: ULTRASOUND IMAGING | Facility: CLINIC | Age: 68
Discharge: HOME | End: 2018-03-28
Attending: FAMILY MEDICINE | Admitting: FAMILY MEDICINE
Payer: MEDICARE

## 2018-03-28 DIAGNOSIS — M79.604 PAIN IN BOTH LOWER EXTREMITIES: ICD-10-CM

## 2018-03-28 DIAGNOSIS — M79.605 PAIN IN BOTH LOWER EXTREMITIES: ICD-10-CM

## 2018-03-28 PROCEDURE — 93970 EXTREMITY STUDY: CPT

## 2018-03-28 NOTE — PROGRESS NOTES
Good news - no blood clots.  Can you let Kenneth know?  He needs to see wound clinic for his legs - I already referred him, can you be sure he made appointment?  Thanks,  Dr. Blanca Peng MD/[facility]

## 2018-04-02 ENCOUNTER — HOSPITAL ENCOUNTER (OUTPATIENT)
Dept: WOUND CARE | Facility: CLINIC | Age: 68
Discharge: HOME | End: 2018-04-02
Attending: PHYSICIAN ASSISTANT | Admitting: PHYSICIAN ASSISTANT
Payer: MEDICARE

## 2018-04-02 VITALS
TEMPERATURE: 97.9 F | HEART RATE: 80 BPM | RESPIRATION RATE: 16 BRPM | SYSTOLIC BLOOD PRESSURE: 145 MMHG | DIASTOLIC BLOOD PRESSURE: 78 MMHG

## 2018-04-02 DIAGNOSIS — I89.0 LYMPHEDEMA OF BOTH LOWER EXTREMITIES: Primary | ICD-10-CM

## 2018-04-02 DIAGNOSIS — I83.029 VENOUS ULCER OF LEFT LEG (H): ICD-10-CM

## 2018-04-02 DIAGNOSIS — L97.929 VENOUS ULCER OF LEFT LEG (H): ICD-10-CM

## 2018-04-02 PROCEDURE — 97602 WOUND(S) CARE NON-SELECTIVE: CPT

## 2018-04-02 PROCEDURE — 99213 OFFICE O/P EST LOW 20 MIN: CPT | Performed by: PHYSICIAN ASSISTANT

## 2018-04-02 NOTE — MR AVS SNAPSHOT
MRN:[id number]                      After Visit Summary   4/2/2018    Jamar Ivory    MRN: [id number]           Visit Information        Provider Department      4/2/2018  2:00 PM Amanda Pham PA-C [facility]          Further instructions from your care team             [facility] [place]  [address] [facility] [address], [facility] [address]  Appointment Phone [phone] [facility]visors [phone]    Jamar Ivory      1950    Wound Dressing Change: Bilateral Lower Leg  Cleanse wound and surrounding skin with: soap and water   Cover wound with xeroform cut to fit the size of the wound   Wrap or cover wound with gauze ABD pad  Change dressing daily   Compression:   You have a compression wrap Spandigrip G is supposed to be removed at night and put back on first thing in the morning.   Please remove compression dressing if toes turn blue and/or tingle and can not be relieved by raising the leg for one hour.   Please raise your legs about your heart for 30 mins 4 times a day to promote wound healing.    Your physician has referred you to Edema Therapy. To make your first appointment call the scheduling line at [phone]. They are located in many different offices through out the [facility] area.      Amanda Pham PA-C. April 2, 2018    Call us at [phone] if you have any questions about your wounds, have redness or swelling around your wound, have a fever of 101 or greater or if you have any other problems or concerns. We answer the phone Monday through Friday 8 am to 4 pm, please leave a message as we check the voicemail frequently throughout the day.     Follow up with Provider - 3 weeks               MyChart Information     [employer] lets you send messages to your doctor, view your test results, renew your prescriptions, schedule appointments and more. To sign up, go to [email or web address] .  "Click on \"Log in\" on the left side of the screen, which will take you to the Welcome page. Then click on \"Sign up Now\" on the right side of the page.     You will be asked to enter the access code listed below, as well as some personal information. Please follow the directions to create your username and password.     Your access code is: [email or web address]  Expires: 2018 12:15 PM     Your access code will  in 90 days. If you need help or a new code, please call your [place] clinic or [phone].        Care EveryWhere ID     This is your Care EveryWhere ID. This could be used by other organizations to access your [place] medical records  [id number]        Equal Access to Services     SHAUN AKERS : Isaac Fowler, cullen yap, lisa amaro, maykel parada. So [facility] [phone].    ATENCIÓN: Si habla español, tiene a shook disposición servicios gratuitos de asistencia lingüística. Llame al [phone].    We comply with applicable federal civil rights laws and [place] laws. We do not discriminate on the basis of race, color, national origin, age, disability, sex, sexual orientation, or gender identity.            "

## 2018-04-02 NOTE — PROGRESS NOTES
[facility]    HISTORY OF PRESENT ILLNESS: Mr. Jamar Ivory is a 68-year-old gentleman with bilateral, intermittent peripheral edema who is known to our clinic for sores relating to this. We last saw Jamar in October 2017. He was put in a weekly compression wrap and then discharged with compression socks. He reports that he didn't wear his compression socks due to pain and difficulty applying them.     DATE WOUND ACQUIRED: 2/2018    PAST MEDICAL HISTORY:  has a past medical history of Arthritis; CVA (cerebral infarction) (2012); Fatty liver; Hypertension goal BP (blood pressure) < 140/90 (1/17/2011); Major depressive disorder, single episode, severe, without mention of psychotic behavior (1977); Obesity, morbid (more than 100 lbs over ideal weight or BMI > 40) (H); Shortness of breath; TIA (transient ischaemic attack) (2012); Unspecified hypothyroidism; and Vitiligo.    SOCIAL HISTORY:     MEDICATIONS:   Current Outpatient Prescriptions   Medication     order for DME     tamsulosin (FLOMAX) 0.4 MG capsule     FLUoxetine (PROZAC) 40 MG capsule     losartan (COZAAR) 25 MG tablet     oseltamivir (TAMIFLU) 75 MG capsule     levothyroxine (SYNTHROID/LEVOTHROID) 200 MCG tablet     order for DME     simvastatin (ZOCOR) 20 MG tablet     furosemide (LASIX) 80 MG tablet     ORDER FOR DME     No current facility-administered medications for this encounter.        VITALS: /78  Pulse 80  Temp 97.9  F (36.6  C) (Oral)  Resp 16    PHYSICAL EXAM:  GENERAL: Patient is alert and oriented and in no acute distress  INTEGUMENTARY:   WOUND ASSESSMENT #2:     Location: right lateral calf     Size: 2.1 cm x 1.1 cm with a depth of 0.1 cm    Drainage: copious amount of serosanguinous drainage    Wound description: shallow, granular  WOUND ASSESSMENT #3:     Location: left medial lower leg     Size: 1.6 cm x 3.8 cm with a depth of 0.1 cm    Drainage: copious amount of serosanguinous drainage    Wound  description: shallow, granular    PROCEDURE: Per standing order, topical 4% lidocaine was applied to the wound by the CMA. The wound was mechanically debrided.     ASSESSMENT:   1. bilateral lower leg ulcers with fat-layer exposed  2. Lymphedema of bilateral lower legs    PLAN:   1. Dress the wounds with Xeroform and SpandaGrip  2. Discussed that the underlying problem is peripheral edema and he needs to get better control of this. We will refer him to the lymphedema clinic for decongestive therapy and advice on a long-term maintenance strategy.     FOLLOW-UP: RENEA DEGROOT PA-C

## 2018-04-02 NOTE — DISCHARGE INSTRUCTIONS
[facility]  [address] [facility] [address], [place] [address]  Appointment Phone [phone] Nurse [facility]visors [phone]    Jamar Ivory      1950    Wound Dressing Change: Bilateral Lower Leg  Cleanse wound and surrounding skin with: soap and water   Cover wound with xeroform cut to fit the size of the wound   Wrap or cover wound with gauze ABD pad  Change dressing daily   Compression:   You have a compression wrap Spandigrip G is supposed to be removed at night and put back on first thing in the morning.   Please remove compression dressing if toes turn blue and/or tingle and can not be relieved by raising the leg for one hour.   Please raise your legs about your heart for 30 mins 4 times a day to promote wound healing.    Your physician has referred you to Edema Therapy. To make your first appointment call the scheduling line at [phone]. They are located in many different offices through out the [facility] area.      Amanda Pham PA-C. April 2, 2018    Call us at [phone] if you have any questions about your wounds, have redness or swelling around your wound, have a fever of 101 or greater or if you have any other problems or concerns. We answer the phone Monday through Friday 8 am to 4 pm, please leave a message as we check the voicemail frequently throughout the day.     Follow up with Provider - 3 weeks

## 2018-04-10 ENCOUNTER — TELEPHONE (OUTPATIENT)
Dept: FAMILY MEDICINE | Facility: CLINIC | Age: 68
End: 2018-04-10

## 2018-04-10 NOTE — TELEPHONE ENCOUNTER
Pt has been having Chest pain since he woke up this morning.  I recommended that pt go to ER.  He refuses. Also has a headache, upset stomach, weak legs, chest pain described as crushing on rt side, lack of energy and sore throat. Wt 330 lbs. Legs sl swollen.   Advised pt to ER- He refused. Again advised if there is any change in his condition that you call 911. Appt made for  Tomorrow .

## 2018-04-10 NOTE — TELEPHONE ENCOUNTER
Reason for Call:  Other call back    Detailed comments: Patient's spouse is calling stating she wants to talk to Dr Peng in regards to  feeling so lousy. Wants a call back today.     Phone Number Patient can be reached at: Other phone number:  [phone]    Best Time: any    Can we leave a detailed message on this number? YES    Call taken on 4/10/2018 at 1:16 PM by MITCHELL WOLFE

## 2018-04-11 ENCOUNTER — OFFICE VISIT (OUTPATIENT)
Dept: FAMILY MEDICINE | Facility: CLINIC | Age: 68
End: 2018-04-11
Payer: MEDICARE

## 2018-04-11 ENCOUNTER — RADIANT APPOINTMENT (OUTPATIENT)
Dept: GENERAL RADIOLOGY | Facility: CLINIC | Age: 68
End: 2018-04-11
Attending: PHYSICIAN ASSISTANT
Payer: MEDICARE

## 2018-04-11 ENCOUNTER — HOSPITAL ENCOUNTER (EMERGENCY)
Facility: CLINIC | Age: 68
Discharge: HOME | End: 2018-04-11
Attending: EMERGENCY MEDICINE | Admitting: EMERGENCY MEDICINE
Payer: MEDICARE

## 2018-04-11 ENCOUNTER — APPOINTMENT (OUTPATIENT)
Dept: CT IMAGING | Facility: CLINIC | Age: 68
End: 2018-04-11
Attending: EMERGENCY MEDICINE
Payer: MEDICARE

## 2018-04-11 VITALS
TEMPERATURE: 97.9 F | DIASTOLIC BLOOD PRESSURE: 78 MMHG | OXYGEN SATURATION: 97 % | RESPIRATION RATE: 19 BRPM | SYSTOLIC BLOOD PRESSURE: 121 MMHG

## 2018-04-11 VITALS
RESPIRATION RATE: 18 BRPM | DIASTOLIC BLOOD PRESSURE: 62 MMHG | WEIGHT: 315 LBS | BODY MASS INDEX: 50.18 KG/M2 | OXYGEN SATURATION: 95 % | HEART RATE: 83 BPM | SYSTOLIC BLOOD PRESSURE: 104 MMHG | TEMPERATURE: 98.8 F

## 2018-04-11 DIAGNOSIS — E66.01 OBESITY, MORBID (MORE THAN 100 LBS OVER IDEAL WEIGHT OR BMI > 40) (H): ICD-10-CM

## 2018-04-11 DIAGNOSIS — K92.1 MELENA: ICD-10-CM

## 2018-04-11 DIAGNOSIS — I51.9 MILD DIASTOLIC DYSFUNCTION: ICD-10-CM

## 2018-04-11 DIAGNOSIS — R07.89 ATYPICAL CHEST PAIN: ICD-10-CM

## 2018-04-11 DIAGNOSIS — R07.89 OTHER CHEST PAIN: ICD-10-CM

## 2018-04-11 DIAGNOSIS — J06.9 UPPER RESPIRATORY TRACT INFECTION, UNSPECIFIED TYPE: ICD-10-CM

## 2018-04-11 DIAGNOSIS — R07.89 OTHER CHEST PAIN: Primary | ICD-10-CM

## 2018-04-11 LAB
ALBUMIN SERPL-MCNC: 3.2 G/DL (ref 3.4–5)
ALBUMIN UR-MCNC: NEGATIVE MG/DL
ALP SERPL-CCNC: 62 U/L (ref 40–150)
ALT SERPL W P-5'-P-CCNC: 20 U/L (ref 0–70)
ANION GAP SERPL CALCULATED.3IONS-SCNC: 12 MMOL/L (ref 3–14)
APPEARANCE UR: CLEAR
AST SERPL W P-5'-P-CCNC: 25 U/L (ref 0–45)
BACTERIA #/AREA URNS HPF: ABNORMAL /HPF
BASOPHILS # BLD AUTO: 0 10E9/L (ref 0–0.2)
BASOPHILS NFR BLD AUTO: 0.6 %
BILIRUB SERPL-MCNC: 0.4 MG/DL (ref 0.2–1.3)
BILIRUB UR QL STRIP: NEGATIVE
BUN SERPL-MCNC: 14 MG/DL (ref 7–30)
CALCIUM SERPL-MCNC: 8.5 MG/DL (ref 8.5–10.1)
CHLORIDE SERPL-SCNC: 98 MMOL/L (ref 94–109)
CO2 SERPL-SCNC: 26 MMOL/L (ref 20–32)
COLOR UR AUTO: ABNORMAL
CREAT SERPL-MCNC: 1.02 MG/DL (ref 0.66–1.25)
D DIMER PPP FEU-MCNC: 1.1 UG/ML FEU (ref 0–0.5)
DIFFERENTIAL METHOD BLD: ABNORMAL
EOSINOPHIL # BLD AUTO: 0.1 10E9/L (ref 0–0.7)
EOSINOPHIL NFR BLD AUTO: 1.9 %
ERYTHROCYTE [DISTWIDTH] IN BLOOD BY AUTOMATED COUNT: 14.1 % (ref 10–15)
ERYTHROCYTE [DISTWIDTH] IN BLOOD BY AUTOMATED COUNT: 14.4 % (ref 10–15)
GFR SERPL CREATININE-BSD FRML MDRD: 72 ML/MIN/1.7M2
GLUCOSE SERPL-MCNC: 100 MG/DL (ref 70–99)
GLUCOSE UR STRIP-MCNC: NEGATIVE MG/DL
HCT VFR BLD AUTO: 35.6 % (ref 40–53)
HCT VFR BLD AUTO: 37.5 % (ref 40–53)
HGB BLD-MCNC: 11.7 G/DL (ref 13.3–17.7)
HGB BLD-MCNC: 12.6 G/DL (ref 13.3–17.7)
HGB UR QL STRIP: NEGATIVE
IMM GRANULOCYTES # BLD: 0 10E9/L (ref 0–0.4)
IMM GRANULOCYTES NFR BLD: 0.3 %
INR PPP: 1.06 (ref 0.86–1.14)
KETONES UR STRIP-MCNC: NEGATIVE MG/DL
LACTATE SERPL-SCNC: 1.3 MMOL/L (ref 0.4–2)
LEUKOCYTE ESTERASE UR QL STRIP: NEGATIVE
LYMPHOCYTES # BLD AUTO: 0.8 10E9/L (ref 0.8–5.3)
LYMPHOCYTES NFR BLD AUTO: 25.2 %
MAGNESIUM SERPL-MCNC: 2.1 MG/DL (ref 1.6–2.3)
MCH RBC QN AUTO: 27.5 PG (ref 26.5–33)
MCH RBC QN AUTO: 27.9 PG (ref 26.5–33)
MCHC RBC AUTO-ENTMCNC: 32.9 G/DL (ref 31.5–36.5)
MCHC RBC AUTO-ENTMCNC: 33.6 G/DL (ref 31.5–36.5)
MCV RBC AUTO: 83 FL (ref 78–100)
MCV RBC AUTO: 84 FL (ref 78–100)
MONOCYTES # BLD AUTO: 0.7 10E9/L (ref 0–1.3)
MONOCYTES NFR BLD AUTO: 22.3 %
NEUTROPHILS # BLD AUTO: 1.6 10E9/L (ref 1.6–8.3)
NEUTROPHILS NFR BLD AUTO: 49.7 %
NITRATE UR QL: NEGATIVE
NRBC # BLD AUTO: 0 10*3/UL
NRBC BLD AUTO-RTO: 0 /100
PH UR STRIP: 6 PH (ref 5–7)
PHOSPHATE SERPL-MCNC: 3.3 MG/DL (ref 2.5–4.5)
PLATELET # BLD AUTO: 173 10E9/L (ref 150–450)
PLATELET # BLD AUTO: 182 10E9/L (ref 150–450)
PLATELET # BLD EST: ABNORMAL 10*3/UL
POTASSIUM SERPL-SCNC: 3.1 MMOL/L (ref 3.4–5.3)
PROT SERPL-MCNC: 8.2 G/DL (ref 6.8–8.8)
RBC # BLD AUTO: 4.26 10E12/L (ref 4.4–5.9)
RBC # BLD AUTO: 4.51 10E12/L (ref 4.4–5.9)
RBC #/AREA URNS AUTO: <1 /HPF (ref 0–2)
RBC MORPH BLD: NORMAL
SODIUM SERPL-SCNC: 136 MMOL/L (ref 133–144)
SOURCE: ABNORMAL
SP GR UR STRIP: 1.02 (ref 1–1.03)
SQUAMOUS #/AREA URNS AUTO: <1 /HPF (ref 0–1)
TROPONIN I BLD-MCNC: 0 UG/L (ref 0–0.1)
TROPONIN I SERPL-MCNC: <0.015 UG/L (ref 0–0.04)
UROBILINOGEN UR STRIP-MCNC: NORMAL MG/DL (ref 0–2)
WBC # BLD AUTO: 3.2 10E9/L (ref 4–11)
WBC # BLD AUTO: 3.4 10E9/L (ref 4–11)
WBC #/AREA URNS AUTO: <1 /HPF (ref 0–5)

## 2018-04-11 PROCEDURE — 85379 FIBRIN DEGRADATION QUANT: CPT | Performed by: EMERGENCY MEDICINE

## 2018-04-11 PROCEDURE — 84484 ASSAY OF TROPONIN QUANT: CPT

## 2018-04-11 PROCEDURE — 99215 OFFICE O/P EST HI 40 MIN: CPT | Performed by: PHYSICIAN ASSISTANT

## 2018-04-11 PROCEDURE — 71046 X-RAY EXAM CHEST 2 VIEWS: CPT | Mod: FY

## 2018-04-11 PROCEDURE — 93005 ELECTROCARDIOGRAM TRACING: CPT | Performed by: EMERGENCY MEDICINE

## 2018-04-11 PROCEDURE — 80053 COMPREHEN METABOLIC PANEL: CPT | Performed by: EMERGENCY MEDICINE

## 2018-04-11 PROCEDURE — 71260 CT THORAX DX C+: CPT

## 2018-04-11 PROCEDURE — 96360 HYDRATION IV INFUSION INIT: CPT | Performed by: EMERGENCY MEDICINE

## 2018-04-11 PROCEDURE — 85610 PROTHROMBIN TIME: CPT | Performed by: EMERGENCY MEDICINE

## 2018-04-11 PROCEDURE — 25000132 ZZH RX MED GY IP 250 OP 250 PS 637: Mod: GY | Performed by: EMERGENCY MEDICINE

## 2018-04-11 PROCEDURE — A9270 NON-COVERED ITEM OR SERVICE: HCPCS | Mod: GY | Performed by: EMERGENCY MEDICINE

## 2018-04-11 PROCEDURE — 83735 ASSAY OF MAGNESIUM: CPT | Performed by: EMERGENCY MEDICINE

## 2018-04-11 PROCEDURE — 99284 EMERGENCY DEPT VISIT MOD MDM: CPT | Mod: 25 | Performed by: EMERGENCY MEDICINE

## 2018-04-11 PROCEDURE — 93010 ELECTROCARDIOGRAM REPORT: CPT | Mod: Z6 | Performed by: EMERGENCY MEDICINE

## 2018-04-11 PROCEDURE — 25000125 ZZHC RX 250: Performed by: EMERGENCY MEDICINE

## 2018-04-11 PROCEDURE — 81001 URINALYSIS AUTO W/SCOPE: CPT | Performed by: EMERGENCY MEDICINE

## 2018-04-11 PROCEDURE — 85025 COMPLETE CBC W/AUTO DIFF WBC: CPT | Performed by: EMERGENCY MEDICINE

## 2018-04-11 PROCEDURE — 25000128 H RX IP 250 OP 636: Performed by: EMERGENCY MEDICINE

## 2018-04-11 PROCEDURE — 83605 ASSAY OF LACTIC ACID: CPT | Performed by: EMERGENCY MEDICINE

## 2018-04-11 PROCEDURE — 84100 ASSAY OF PHOSPHORUS: CPT | Performed by: EMERGENCY MEDICINE

## 2018-04-11 PROCEDURE — 84484 ASSAY OF TROPONIN QUANT: CPT | Performed by: EMERGENCY MEDICINE

## 2018-04-11 RX ORDER — POTASSIUM CHLORIDE 750 MG/1
40 TABLET, EXTENDED RELEASE ORAL ONCE
Status: COMPLETED | OUTPATIENT
Start: 2018-04-11 | End: 2018-04-11

## 2018-04-11 RX ORDER — IOPAMIDOL 755 MG/ML
100 INJECTION, SOLUTION INTRAVASCULAR ONCE
Status: COMPLETED | OUTPATIENT
Start: 2018-04-11 | End: 2018-04-11

## 2018-04-11 RX ADMIN — IOPAMIDOL 75 ML: 755 INJECTION, SOLUTION INTRAVENOUS at 11:57

## 2018-04-11 RX ADMIN — SODIUM CHLORIDE 75 ML: 9 INJECTION, SOLUTION INTRAVENOUS at 12:01

## 2018-04-11 RX ADMIN — SODIUM CHLORIDE 500 ML: 9 INJECTION, SOLUTION INTRAVENOUS at 11:43

## 2018-04-11 RX ADMIN — POTASSIUM CHLORIDE 40 MEQ: 750 TABLET, FILM COATED, EXTENDED RELEASE ORAL at 14:27

## 2018-04-11 ASSESSMENT — ANXIETY QUESTIONNAIRES
7. FEELING AFRAID AS IF SOMETHING AWFUL MIGHT HAPPEN: NOT AT ALL
3. WORRYING TOO MUCH ABOUT DIFFERENT THINGS: MORE THAN HALF THE DAYS
GAD7 TOTAL SCORE: 4
GAD7 TOTAL SCORE: 4
2. NOT BEING ABLE TO STOP OR CONTROL WORRYING: NOT AT ALL
7. FEELING AFRAID AS IF SOMETHING AWFUL MIGHT HAPPEN: NOT AT ALL
4. TROUBLE RELAXING: SEVERAL DAYS
5. BEING SO RESTLESS THAT IT IS HARD TO SIT STILL: NOT AT ALL
GAD7 TOTAL SCORE: 4
1. FEELING NERVOUS, ANXIOUS, OR ON EDGE: SEVERAL DAYS
6. BECOMING EASILY ANNOYED OR IRRITABLE: NOT AT ALL

## 2018-04-11 ASSESSMENT — ENCOUNTER SYMPTOMS
SHORTNESS OF BREATH: 0
WEAKNESS: 1
FEVER: 0
WOUND: 1
DYSURIA: 0
VOMITING: 0
COUGH: 1
NUMBNESS: 0
FREQUENCY: 1
SORE THROAT: 1
BACK PAIN: 0
LIGHT-HEADEDNESS: 1
NAUSEA: 0
ABDOMINAL PAIN: 0

## 2018-04-11 ASSESSMENT — PATIENT HEALTH QUESTIONNAIRE - PHQ9
SUM OF ALL RESPONSES TO PHQ QUESTIONS 1-9: 12
SUM OF ALL RESPONSES TO PHQ QUESTIONS 1-9: 12
10. IF YOU CHECKED OFF ANY PROBLEMS, HOW DIFFICULT HAVE THESE PROBLEMS MADE IT FOR YOU TO DO YOUR WORK, TAKE CARE OF THINGS AT HOME, OR GET ALONG WITH OTHER PEOPLE: NOT DIFFICULT AT ALL

## 2018-04-11 NOTE — ED NOTES
/56. RR 20, shallow. Pulse 130. No observable distress. SpO2 88% RA after CT, placed on 1 LPM NC. Currently 93%. HOB 30 degrees. Tele wave unchanged during transport. RN escorted to imaging.

## 2018-04-11 NOTE — NURSING NOTE
"Chief Complaint   Patient presents with     Nausea     Headache     Fatigue       Initial /62  Pulse 83  Temp 98.8  F (37.1  C)  Resp 18  Wt 330 lb (149.7 kg)  SpO2 95%  BMI 50.18 kg/m2 Estimated body mass index is 50.18 kg/(m^2) as calculated from the following:    Height as of 1/29/18: 5' 8\" (1.727 m).    Weight as of this encounter: 330 lb (149.7 kg).  Medication Reconciliation: karthikeyan Matson CMA      "

## 2018-04-11 NOTE — ED NOTES
Went to the clinic today for multiple complaints. Pt states they did blood and cxr and was told to come here for ct scanfor enlarged arota
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
## 2018-04-11 NOTE — DISCHARGE INSTRUCTIONS
*CHEST PAIN, UNCERTAIN CAUSE    Based on your exam today, the exact cause of your chest pain is not certain. Your condition does not seem serious at this time, and your pain does not appear to be coming from your heart. However, sometimes the signs of a serious problem take more time to appear. Therefore, watch for the warning signs listed below.  HOME CARE:  1. Rest today and avoid strenuous activity.  2. Take any prescribed medicine as directed.  FOLLOW UP with your doctor in 1-3 days.   GET PROMPT MEDICAL ATTENTION if any of the following occur:    A change in the type of pain: if it feels different, becomes more severe, lasts longer, or begins to spread into your shoulder, arm, neck, jaw or back    Shortness of breath or increased pain with breathing    Weakness, dizziness, or fainting    Cough with blood or dark colored sputum (phlegm)    Fever over 101  F (38.3  C)    Swelling, pain or redness in one leg    8142-0694 [employer]. [address] [place] [address]. All rights reserved. This information is not intended as a substitute for professional medical care. Always follow your healthcare professional's instructions.  This information has been modified by your health care provider with permission from the publisher.      Viral Upper Respiratory Illness (Adult)  You have a viral upper respiratory illness (URI), which is another term for the common cold. This illness is contagious during the first few days. It is spread through the air by coughing and sneezing. It may also be spread by direct contact (touching the sick person and then touching your own eyes, nose, or mouth). Frequent handwashing will decrease risk of spread. Most viral illnesses go away within 7 to 10 days with rest and simple home remedies. Sometimes the illness may last for several weeks. Antibiotics will not kill a virus, and they are generally not prescribed for this condition.    Home care    If symptoms are  severe, rest at home for the first 2 to 3 days. When you resume activity, don't let yourself get too tired.    Avoid being exposed to cigarette smoke (yours or others ).    You may use acetaminophen or ibuprofen to control pain and fever, unless another medicine was prescribed. (Note: If you have chronic liver or kidney disease, have ever had a stomach ulcer or gastrointestinal bleeding, or are taking blood-thinning medicines, talk with your healthcare provider before using these medicines.) Aspirin should never be given to anyone under 18 years of age who is ill with a viral infection or fever. It may cause severe liver or brain damage.    Your appetite may be poor, so a light diet is fine. Avoid dehydration by drinking 6 to 8 glasses of fluids per day (water, soft drinks, juices, tea, or soup). Extra fluids will help loosen secretions in the nose and lungs.    Over-the-counter cold medicines will not shorten the length of time you re sick, but they may be helpful for the following symptoms: cough, sore throat, and nasal and sinus congestion. (Note: Do not use decongestants if you have high blood pressure.)  Follow-up care  Follow up with your healthcare provider, or as advised.  When to seek medical advice  Call your healthcare provider right away if any of these occur:    Cough with lots of colored sputum (mucus)    Severe headache; face, neck, or ear pain    Difficulty swallowing due to throat pain    Fever of 100.4 F (38 C)  Call 911, or get immediate medical care  Call emergency services right away if any of these occur:    Chest pain, shortness of breath, wheezing, or difficulty breathing    Coughing up blood    Inability to swallow due to throat pain  Date Last Reviewed: 9/13/2015 2000-2017 [employer]. [address] [place] [address], [place] [address]. All rights reserved. This information is not intended as a substitute for professional medical care. Always follow your healthcare professional's  instructions.      Please make an appointment to follow up with Your Primary Care Provider in 2-3 days if not improving.
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
## 2018-04-11 NOTE — ED AVS SNAPSHOT
[facility], Emergency Department    [address]    [place] [place] [address]    Phone:  [phone]    Fax:  [phone]                                       Jamar Ivory   MRN: [id number]    Department:  [facility], Emergency Department   Date of Visit:  4/11/2018           Patient Information     Date Of Birth          1950        Your diagnoses for this visit were:     Atypical chest pain     Upper respiratory tract infection, unspecified type        You were seen by Gerri Lombardi MD.        Discharge Instructions          *CHEST PAIN, UNCERTAIN CAUSE    Based on your exam today, the exact cause of your chest pain is not certain. Your condition does not seem serious at this time, and your pain does not appear to be coming from your heart. However, sometimes the signs of a serious problem take more time to appear. Therefore, watch for the warning signs listed below.  HOME CARE:  1. Rest today and avoid strenuous activity.  2. Take any prescribed medicine as directed.  FOLLOW UP with your doctor in 1-3 days.   GET PROMPT MEDICAL ATTENTION if any of the following occur:    A change in the type of pain: if it feels different, becomes more severe, lasts longer, or begins to spread into your shoulder, arm, neck, jaw or back    Shortness of breath or increased pain with breathing    Weakness, dizziness, or fainting    Cough with blood or dark colored sputum (phlegm)    Fever over 101  F (38.3  C)    Swelling, pain or redness in one leg    9347-2817 [employer]. [address] [place] [address]. All rights reserved. This information is not intended as a substitute for professional medical care. Always follow your healthcare professional's instructions.  This information has been modified by your health care provider with permission from the publisher.      Viral Upper Respiratory Illness (Adult)  You have a viral upper respiratory illness (URI), which is  another term for the common cold. This illness is contagious during the first few days. It is spread through the air by coughing and sneezing. It may also be spread by direct contact (touching the sick person and then touching your own eyes, nose, or mouth). Frequent handwashing will decrease risk of spread. Most viral illnesses go away within 7 to 10 days with rest and simple home remedies. Sometimes the illness may last for several weeks. Antibiotics will not kill a virus, and they are generally not prescribed for this condition.    Home care    If symptoms are severe, rest at home for the first 2 to 3 days. When you resume activity, don't let yourself get too tired.    Avoid being exposed to cigarette smoke (yours or others ).    You may use acetaminophen or ibuprofen to control pain and fever, unless another medicine was prescribed. (Note: If you have chronic liver or kidney disease, have ever had a stomach ulcer or gastrointestinal bleeding, or are taking blood-thinning medicines, talk with your healthcare provider before using these medicines.) Aspirin should never be given to anyone under 18 years of age who is ill with a viral infection or fever. It may cause severe liver or brain damage.    Your appetite may be poor, so a light diet is fine. Avoid dehydration by drinking 6 to 8 glasses of fluids per day (water, soft drinks, juices, tea, or soup). Extra fluids will help loosen secretions in the nose and lungs.    Over-the-counter cold medicines will not shorten the length of time you re sick, but they may be helpful for the following symptoms: cough, sore throat, and nasal and sinus congestion. (Note: Do not use decongestants if you have high blood pressure.)  Follow-up care  Follow up with your healthcare provider, or as advised.  When to seek medical advice  Call your healthcare provider right away if any of these occur:    Cough with lots of colored sputum (mucus)    Severe headache; face, neck, or ear  pain    Difficulty swallowing due to throat pain    Fever of 100.4 F (38 C)  Call 911, or get immediate medical care  Call emergency services right away if any of these occur:    Chest pain, shortness of breath, wheezing, or difficulty breathing    Coughing up blood    Inability to swallow due to throat pain  Date Last Reviewed: 9/13/2015 2000-2017 [employer]. [address] [place] [address]. All rights reserved. This information is not intended as a substitute for professional medical care. Always follow your healthcare professional's instructions.      Please make an appointment to follow up with Your Primary Care Provider in 2-3 days if not improving.      Your next 10 appointments already scheduled     Apr 13, 2018  8:45 AM CDT   Lymphedema Evaluation with Noble Gonzalez   [facility] ([facility])    [address] [place] [id number] [phone]              24 Hour Appointment Hotline       To make an appointment at any [facility] clinic, call [phone] ([phone]). If you don't have a family doctor or clinic, we will help you find one. [facility] clinics are conveniently located to serve the needs of you and your family.             Review of your medicines      Our records show that you are taking the medicines listed below. If these are incorrect, please call your family doctor or clinic.        Dose / Directions Last dose taken    FLUoxetine 40 MG capsule   Commonly known as:  PROzac   Dose:  40 mg   Quantity:  90 capsule        Take 1 capsule (40 mg) by mouth daily   Refills:  3        furosemide 80 MG tablet   Commonly known as:  LASIX   Quantity:  180 tablet        TAKE 1 TABLET (80 MG) BY MOUTH 2 TIMES DAILY   Refills:  2        levothyroxine 200 MCG tablet   Commonly known as:  SYNTHROID/LEVOTHROID   Quantity:  90 tablet        TAKE 1 TABLET (200 MCG) BY MOUTH DAILY   Refills:  2        losartan 25 MG tablet   Commonly  known as:  COZAAR   Dose:  25 mg   Quantity:  90 tablet        Take 1 tablet (25 mg) by mouth daily   Refills:  3        order for DME   Quantity:  100 strip        Equipment being ordered: blood glucose monitor strips to use 2 to 4 times  A day. As covered by insurance. #100 with 5 refills  Monitor ordered this day  As well.  Also lancets #100 use as needed for testing 5 refils   Refills:  5        order for DME   Quantity:  3 Device        Compression Stockings Ready To Wear Knee High Compression Stockings 20-30 mmHg Length of Need: Life Time # of Pairs 3   Refills:  0        order for DME   Quantity:  30 days        Equipment being ordered: [employer] Phone [phone] Fax [phone]  Primary Dressing Xeroform   Qty 30 sheets Secondary Dressing guanako rolls Qty 60 Secondary Dressing 2'tape Qty 1 roll Length of Need: 1 month Frequency of dressing change: daily   Refills:  0        simvastatin 20 MG tablet   Commonly known as:  ZOCOR   Dose:  20 mg   Quantity:  90 tablet        Take 1 tablet (20 mg) by mouth At Bedtime   Refills:  1        tamsulosin 0.4 MG capsule   Commonly known as:  FLOMAX   Dose:  0.8 mg   Quantity:  180 capsule        Take 2 capsules (0.8 mg) by mouth daily   Refills:  1                Procedures and tests performed during your visit     CBC with platelets differential    Chest CT w IV contrast only, TRAUMA / DISSECTION    Comprehensive metabolic panel    D dimer quantitative    EKG 12 lead    INR    ISTAT troponin nursing POCT    Lactic acid    Magnesium    Phosphorus    Troponin I    Troponin POCT    UA with Microscopic reflex to Culture      Orders Needing Specimen Collection     None      Pending Results     No orders found from 4/9/2018 to 4/12/2018.            Pending Culture Results     No orders found from 4/9/2018 to 4/12/2018.            Pending Results Instructions     If you had any lab results that were not finalized at the time of your Discharge, you can call the ED  "Lab Result RN at [phone]. You will be contacted by this team for any positive Lab results or changes in treatment. The nurses are available 7 days a week from 10A to 6:30P.  You can leave a message 24 hours per day and they will return your call.        Thank you for choosing [facility]       Thank you for choosing [facility] for your care. Our goal is always to provide you with excellent care. Hearing back from our patients is one way we can continue to improve our services. Please take a few minutes to complete the written survey that you may receive in the mail after you visit with us. Thank you!        [employer] Information     [employer] lets you send messages to your doctor, view your test results, renew your prescriptions, schedule appointments and more. To sign up, go to [email or web address] . Click on \"Log in\" on the left side of the screen, which will take you to the Welcome page. Then click on \"Sign up Now\" on the right side of the page.     You will be asked to enter the access code listed below, as well as some personal information. Please follow the directions to create your username and password.     Your access code is: [email or web address]  Expires: 2018 12:15 PM     Your access code will  in 90 days. If you need help or a new code, please call your [facility] clinic or [phone].        Care EveryWhere ID     This is your Care EveryWhere ID. This could be used by other organizations to access your [facility] medical records  [id number]        Equal Access to Services     SHAUN AKERS : Hadii cole ramirez hadasho Sogretchenali, waaxda luqadaha, qaybta kaalmada prem, maykel reich . So [facility] [phone].    ATENCIÓN: Si habla español, tiene a shook disposición servicios gratuitos de asistencia lingüística. Llame al [phone].    We comply with applicable federal civil rights laws and [place] laws. We do not discriminate on the basis of race, color, national origin, age, " disability, sex, sexual orientation, or gender identity.            After Visit Summary       This is your record. Keep this with you and show to your community pharmacist(s) and doctor(s) at your next visit.

## 2018-04-11 NOTE — ED AVS SNAPSHOT
[facility], [place], Emergency Department    [address]    [facility] [address]    Phone:  [phone]    Fax:  [phone]                                       Jamar Ivory   MRN: [id number]    Department:  [facility], Emergency Department   Date of Visit:  4/11/2018           After Visit Summary Signature Page     I have received my discharge instructions, and my questions have been answered. I have discussed any challenges I see with this plan with the nurse or doctor.    ..........................................................................................................................................  Patient/Patient Representative Signature      ..........................................................................................................................................  Patient Representative Print Name and Relationship to Patient    ..................................................               ................................................  Date                                            Time    ..........................................................................................................................................  Reviewed by Signature/Title    ...................................................              ..............................................  Date                                                            Time

## 2018-04-11 NOTE — MR AVS SNAPSHOT
"              After Visit Summary   4/11/2018    Jamar Ivory    MRN: [id number]           Patient Information     Date Of Birth          1950        Visit Information        Provider Department      4/11/2018 9:30 AM Stacy Miller PA-C [facility]        Today's Diagnoses     Other chest pain    -  1    Melena           Follow-ups after your visit        Your next 10 appointments already scheduled     Apr 13, 2018  8:45 AM CDT   Lymphedema Evaluation with Noble Gonzalez   [facility] Lymphedema OT ([facility])    [address] [address] [phone]              Who to contact     If you have questions or need follow up information about today's clinic visit or your schedule please contact [facility] directly at [phone].  Normal or non-critical lab and imaging results will be communicated to you by MyChart, letter or phone within 4 business days after the clinic has received the results. If you do not hear from us within 7 days, please contact the clinic through MyChart or phone. If you have a critical or abnormal lab result, we will notify you by phone as soon as possible.  Submit refill requests through [employer] or call your pharmacy and they will forward the refill request to us. Please allow 3 business days for your refill to be completed.          Additional Information About Your Visit        MyChart Information     [employer] lets you send messages to your doctor, view your test results, renew your prescriptions, schedule appointments and more. To sign up, go to [email or web address] . Click on \"Log in\" on the left side of the screen, which will take you to the Welcome page. Then click on \"Sign up Now\" on the right side of the page.     You will be asked to enter the access code listed below, as well as some personal information. Please follow the directions to create your " username and password.     Your access code is: [email or web address]  Expires: 2018 12:15 PM     Your access code will  in 90 days. If you need help or a new code, please call your [place] clinic or [phone].        Care EveryWhere ID     This is your Care EveryWhere ID. This could be used by other organizations to access your [place] medical records  [id number]        Your Vitals Were     Pulse Temperature Respirations Pulse Oximetry BMI (Body Mass Index)       83 98.8  F (37.1  C) 18 95% 50.18 kg/m2        Blood Pressure from Last 3 Encounters:   18 104/62   18 145/78   18 132/70    Weight from Last 3 Encounters:   18 330 lb (149.7 kg)   18 (!) 334 lb (151.5 kg)   18 (!) 335 lb 8 oz (152.2 kg)              We Performed the Following     CBC with platelets          Today's Medication Changes          These changes are accurate as of 18 10:12 AM.  If you have any questions, ask your nurse or doctor.               Stop taking these medicines if you haven't already. Please contact your care team if you have questions.     oseltamivir 75 MG capsule   Commonly known as:  TAMIFLU   Stopped by:  Stacy Miller PA-C                    Primary Care Provider Office Phone # Fax #    Blanca Melody Peng [phone] [address] [facility] [address]        Equal Access to Services     [facility] : Hadii cole ramirez hadasho Soomaali, waaxda luqadaha, qaybta kaalmada prem, waxay mandi love adephoebe reich . So [facility] [phone].    ATENCIÓN: Si habla español, tiene a shook disposición servicios gratuitos de asistencia lingüística. Llame al [phone].    We comply with applicable federal civil rights laws and [place] laws. We do not discriminate on the basis of race, color, national origin, age, disability, sex, sexual orientation, or gender identity.            Thank you!     Thank you for choosing [facility]  [place]  for your care. Our goal is always to provide you with excellent care. Hearing back from our patients is one way we can continue to improve our services. Please take a few minutes to complete the written survey that you may receive in the mail after your visit with us. Thank you!             Your Updated Medication List - Protect others around you: Learn how to safely use, store and throw away your medicines at www.disposemymeds.org.          This list is accurate as of 4/11/18 10:12 AM.  Always use your most recent med list.                   Brand Name Dispense Instructions for use Diagnosis    FLUoxetine 40 MG capsule    PROzac    90 capsule    Take 1 capsule (40 mg) by mouth daily    Moderate episode of recurrent major depressive disorder (H)       furosemide 80 MG tablet    LASIX    180 tablet    TAKE 1 TABLET (80 MG) BY MOUTH 2 TIMES DAILY    Edema, unspecified type       levothyroxine 200 MCG tablet    SYNTHROID/LEVOTHROID    90 tablet    TAKE 1 TABLET (200 MCG) BY MOUTH DAILY    Acquired hypothyroidism       losartan 25 MG tablet    COZAAR    90 tablet    Take 1 tablet (25 mg) by mouth daily        order for DME     100 strip    Equipment being ordered: blood glucose monitor strips to use 2 to 4 times  A day. As covered by insurance. #100 with 5 refills  Monitor ordered this day  As well.  Also lancets #100 use as needed for testing 5 refils    Diabetes mellitus, type 2 (H)       order for DME     3 Device    Compression Stockings Ready To Wear Knee High Compression Stockings 20-30 mmHg Length of Need: Life Time # of Pairs 3    Type 2 diabetes mellitus with diabetic dermatitis, unspecified long term insulin use status (H), Swelling of lower extremity, Lymphedema of both lower extremities, Chronic venous hypertension with ulcer, bilateral (H), Varicose veins of both legs with edema, Open wound of knee, leg, and ankle, unspecified laterality, subsequent encounter, Venous (peripheral) insufficiency        order for DME     30 days    Equipment being ordered: [employer] Phone [phone] Fax [phone]  Primary Dressing Xeroform   Qty 30 sheets Secondary Dressing guanako rolls Qty 60 Secondary Dressing 2'tape Qty 1 roll Length of Need: 1 month Frequency of dressing change: daily    Lymphedema of both lower extremities, Venous ulcer of left leg (H)       simvastatin 20 MG tablet    ZOCOR    90 tablet    Take 1 tablet (20 mg) by mouth At Bedtime    Hyperlipidemia with target LDL less than 100       tamsulosin 0.4 MG capsule    FLOMAX    180 capsule    Take 2 capsules (0.8 mg) by mouth daily    Benign prostatic hyperplasia with weak urinary stream

## 2018-04-12 LAB — INTERPRETATION ECG - MUSE: NORMAL

## 2018-04-12 ASSESSMENT — ANXIETY QUESTIONNAIRES: GAD7 TOTAL SCORE: 4

## 2018-04-12 ASSESSMENT — PATIENT HEALTH QUESTIONNAIRE - PHQ9: SUM OF ALL RESPONSES TO PHQ QUESTIONS 1-9: 12

## 2018-05-22 ENCOUNTER — OFFICE VISIT (OUTPATIENT)
Dept: OPHTHALMOLOGY | Facility: CLINIC | Age: 68
End: 2018-05-22
Payer: MEDICARE

## 2018-05-22 DIAGNOSIS — H35.62 RETINAL HEMORRHAGE OF LEFT EYE: ICD-10-CM

## 2018-05-22 DIAGNOSIS — H52.4 PRESBYOPIA OF BOTH EYES: ICD-10-CM

## 2018-05-22 DIAGNOSIS — S05.12XA CONTUSION OF GLOBE OF LEFT EYE, INITIAL ENCOUNTER: ICD-10-CM

## 2018-05-22 DIAGNOSIS — H52.223 REGULAR ASTIGMATISM OF BOTH EYES: Primary | ICD-10-CM

## 2018-05-22 ASSESSMENT — EXTERNAL EXAM - RIGHT EYE: OD_EXAM: NORMAL

## 2018-05-22 ASSESSMENT — CONF VISUAL FIELD
OD_NORMAL: 1
OS_NORMAL: 1

## 2018-05-22 ASSESSMENT — SLIT LAMP EXAM - LIDS
COMMENTS: NORMAL
COMMENTS: NORMAL

## 2018-05-22 ASSESSMENT — REFRACTION_WEARINGRX
OD_SPHERE: -1.00
OS_ADD: +2.50
OD_AXIS: 180
OD_ADD: +2.50
OD_CYLINDER: +1.25
OS_AXIS: 170
OS_SPHERE: -1.25
OS_CYLINDER: +0.25
SPECS_TYPE: PAL

## 2018-05-22 ASSESSMENT — EXTERNAL EXAM - LEFT EYE: OS_EXAM: NORMAL

## 2018-05-22 ASSESSMENT — TONOMETRY
OS_IOP_MMHG: 16
IOP_METHOD: ICARE
OD_IOP_MMHG: 15

## 2018-05-22 ASSESSMENT — VISUAL ACUITY
CORRECTION_TYPE: GLASSES
METHOD: SNELLEN - LINEAR
OS_CC: 20/30
OD_CC: 20/30

## 2018-05-22 ASSESSMENT — REFRACTION_MANIFEST
OS_CYLINDER: +1.00
OD_CYLINDER: +1.25
OS_ADD: +2.50
OS_AXIS: 180
OD_SPHERE: +0.25
OS_SPHERE: -1.00
OD_ADD: +2.50
OD_AXIS: 180

## 2018-05-22 ASSESSMENT — CUP TO DISC RATIO
OS_RATIO: 0.4
OD_RATIO: 0.4

## 2018-05-22 NOTE — NURSING NOTE
Chief Complaints and History of Present Illnesses   Patient presents with     Blurred Vision Both Eyes     Pt fell scratched Left lens     HPI    Affected eye(s):  Left   Symptoms:     Blurred vision      Duration:  1 week   Frequency:  Constant       Do you have eye pain now?:  No      Comments:  Pt scratched glasses he fell  Tripping on a curb  Needs new glasses  No eye medications including over the counter  Hui Vo COT 1:37 PM May 22, 2018

## 2018-05-22 NOTE — PROGRESS NOTES
HPI  Jamar Ivory is a 68 year old male here for comprehensive eye exam.  He fell a week and a half ago and hit the left side of his face and scratched his glasses.  He didn't have a swollen or bruised eye and didn't note any vision loss or loss of consciousness.  Denies diplopia, double vision, flashes/floaters, or blurred vision.  Reports normal blood pressure at exam with primary care physician last month.    PMH:  Hypertension, hyperlipidemia, history of diabetes mellitus (currently not on medications), BPH on flomax, hypothyroidism  POH:  Glasses for astigmatism/presbyopia, no surgery, no trauma  Oc Meds:  none  FH:  Denies any glaucoma, age related macular degeneration, or other known eye diseases       Assessment & Plan      (H52.223) Regular astigmatism of both eyes - Both Eyes  (primary encounter diagnosis)  (H52.4) Presbyopia of both eyes - Both Eyes  Comment: mild change and less myopia both eyes   Plan: manifest refraction done and prescription for glasses given     (H35.62) Retinal hemorrhage of left eye - Left Eye  Comment: new- hypertension? No posterior vitreous detachment.  Possibly due to valsalva during fall  Plan: reviewed signs and symptoms of flashes/floaters and to call if they occur  Will recheck in 2 months    (S05.12XA) Contusion of globe of left eye, initial encounter - Left Eye  Comment: no ocular signs of trauma, unclear how much direct impact occured  Plan: discussed with patient symptoms of flashes and floaters, and to call immediately if they occur, will do gonioscopy at next visit         -----------------------------------------------------------------------------------    Patient disposition:   Return in about 2 months (around 7/22/2018) for Follow Up- IRH OS, gonio OU after fall, no dilation . Call for sooner appointment as needed.    Complete documentation of historical and exam elements from today's encounter can be found in the full encounter summary report (not  reduplicated in this progress note). I personally obtained the chief complaint(s) and history of present illness.  I have confirmed and edited as necessary the CC, HPI, PMH/PSH, social history, FMH, ROS, and exam/neuro findings as obtained by the technician or others. I have examined this patient myself and I personally viewed the image(s) and studies listed above and the documentation reflects my findings and interpretation.  I formulated and edited as necessary the assessment and plan and discussed the findings and management plan with the patient and family.     Cary Grace MD

## 2018-05-22 NOTE — MR AVS SNAPSHOT
After Visit Summary   2018    Jamar Ivory    MRN: [id number]           Patient Information     Date Of Birth          1950        Visit Information        Provider Department      2018 1:40 PM Cary Grace MD [facility]         Follow-ups after your visit        Follow-up notes from your care team     Return in about 2 months (around 2018) for Follow Up- IRH OS, gonio OU after fall, no dilation .      Your next 10 appointments already scheduled     Jul 10, 2018 11:40 AM CDT   Return Visit with Cary Grace MD   [facility] ([facility])    [facility]  [address] [address] [phone]              Who to contact     Please call your clinic at [phone] to:    Ask questions about your health    Make or cancel appointments    Discuss your medicines    Learn about your test results    Speak to your doctor            Additional Information About Your Visit        [employer] Information     [employer] is an electronic gateway that provides easy, online access to your medical records. With [employer], you can request a clinic appointment, read your test results, renew a prescription or communicate with your care team.     To sign up for [employer] visit the website at [email or web address]   You will be asked to enter the access code listed below, as well as some personal information. Please follow the directions to create your username and password.     Your access code is: [email or web address]  Expires: 2018  6:31 AM     Your access code will  in 90 days. If you need help or a new code, please contact your [facility] or call [phone] for assistance.        Care EveryWhere ID     This is your Care EveryWhere ID. This could be used by other organizations to access your [facility]  records  [id number]         Blood Pressure from Last 3 Encounters:   04/11/18 121/78   04/11/18 104/62   04/02/18 145/78    Weight from Last 3 Encounters:   04/11/18 149.7 kg (330 lb)   03/27/18 (!) 151.5 kg (334 lb)   02/28/18 (!) 152.2 kg (335 lb 8 oz)              Today, you had the following     No orders found for display       Primary Care Provider Office Phone # Fax #    Blanca Peng [phone] [address] [address]        Equal Access to Services     [facility] ANTON[facility] : Hadii cole ramirez hadtrudy Solacey, waaxda lucaitlynadaha, qaybta kaalmada prem, maykel reich . So [facility] [phone].    ATENCIÓN: Si habla español, tiene a shook disposición servicios gratuitos de asistencia lingüística. Llame al [phone].    We comply with applicable federal civil rights laws and [place] laws. We do not discriminate on the basis of race, color, national origin, age, disability, sex, sexual orientation, or gender identity.            Thank you!     Thank you for choosing [facility]  for your care. Our goal is always to provide you with excellent care. Hearing back from our patients is one way we can continue to improve our services. Please take a few minutes to complete the written survey that you may receive in the mail after your visit with us. Thank you!             Your Updated Medication List - Protect others around you: Learn how to safely use, store and throw away your medicines at www.disposemymeds.org.          This list is accurate as of 5/22/18  2:44 PM.  Always use your most recent med list.                   Brand Name Dispense Instructions for use Diagnosis    FLUoxetine 40 MG capsule    PROzac    90 capsule    Take 1 capsule (40 mg) by mouth daily    Moderate episode of recurrent major depressive disorder (H)       furosemide 80 MG tablet    LASIX    180 tablet    TAKE 1 TABLET (80 MG) BY MOUTH 2 TIMES DAILY     Edema, unspecified type       levothyroxine 200 MCG tablet    SYNTHROID/LEVOTHROID    90 tablet    TAKE 1 TABLET (200 MCG) BY MOUTH DAILY    Acquired hypothyroidism       losartan 25 MG tablet    COZAAR    90 tablet    Take 1 tablet (25 mg) by mouth daily        order for DME     100 strip    Equipment being ordered: blood glucose monitor strips to use 2 to 4 times  A day. As covered by insurance. #100 with 5 refills  Monitor ordered this day  As well.  Also lancets #100 use as needed for testing 5 refils    Diabetes mellitus, type 2 (H)       order for DME     3 Device    Compression Stockings Ready To Wear Knee High Compression Stockings 20-30 mmHg Length of Need: Life Time # of Pairs 3    Type 2 diabetes mellitus with diabetic dermatitis, unspecified long term insulin use status (H), Swelling of lower extremity, Lymphedema of both lower extremities, Chronic venous hypertension with ulcer, bilateral (H), Varicose veins of both legs with edema, Open wound of knee, leg, and ankle, unspecified laterality, subsequent encounter, Venous (peripheral) insufficiency       order for DME     30 days    Equipment being ordered: [employer] Phone [phone] Fax [phone]  Primary Dressing Xeroform   Qty 30 sheets Secondary Dressing guanako rolls Qty 60 Secondary Dressing 2'tape Qty 1 roll Length of Need: 1 month Frequency of dressing change: daily    Lymphedema of both lower extremities, Venous ulcer of left leg (H)       simvastatin 20 MG tablet    ZOCOR    90 tablet    Take 1 tablet (20 mg) by mouth At Bedtime    Hyperlipidemia with target LDL less than 100       tamsulosin 0.4 MG capsule    FLOMAX    180 capsule    Take 2 capsules (0.8 mg) by mouth daily    Benign prostatic hyperplasia with weak urinary stream

## 2018-06-20 ENCOUNTER — APPOINTMENT (OUTPATIENT)
Dept: CT IMAGING | Facility: CLINIC | Age: 68
End: 2018-06-20
Attending: EMERGENCY MEDICINE
Payer: MEDICARE

## 2018-06-20 ENCOUNTER — HOSPITAL ENCOUNTER (EMERGENCY)
Facility: CLINIC | Age: 68
Discharge: HOME | End: 2018-06-20
Attending: EMERGENCY MEDICINE | Admitting: EMERGENCY MEDICINE
Payer: MEDICARE

## 2018-06-20 ENCOUNTER — OFFICE VISIT (OUTPATIENT)
Dept: FAMILY MEDICINE | Facility: CLINIC | Age: 68
End: 2018-06-20
Payer: MEDICARE

## 2018-06-20 ENCOUNTER — HOSPITAL ENCOUNTER (OUTPATIENT)
Dept: LAB | Facility: CLINIC | Age: 68
End: 2018-06-20
Attending: EMERGENCY MEDICINE
Payer: MEDICARE

## 2018-06-20 VITALS
HEIGHT: 68 IN | SYSTOLIC BLOOD PRESSURE: 120 MMHG | RESPIRATION RATE: 8 BRPM | WEIGHT: 315 LBS | BODY MASS INDEX: 47.74 KG/M2 | HEART RATE: 111 BPM | TEMPERATURE: 98.5 F | OXYGEN SATURATION: 93 % | DIASTOLIC BLOOD PRESSURE: 57 MMHG

## 2018-06-20 VITALS
HEART RATE: 101 BPM | DIASTOLIC BLOOD PRESSURE: 62 MMHG | TEMPERATURE: 100.3 F | WEIGHT: 315 LBS | BODY MASS INDEX: 51.7 KG/M2 | RESPIRATION RATE: 24 BRPM | OXYGEN SATURATION: 93 % | SYSTOLIC BLOOD PRESSURE: 138 MMHG

## 2018-06-20 DIAGNOSIS — R10.84 ABDOMINAL PAIN, GENERALIZED: ICD-10-CM

## 2018-06-20 DIAGNOSIS — R10.32 LLQ ABDOMINAL PAIN: ICD-10-CM

## 2018-06-20 DIAGNOSIS — R19.7 NAUSEA, VOMITING AND DIARRHEA: ICD-10-CM

## 2018-06-20 DIAGNOSIS — R11.10 VOMITING AND DIARRHEA: Primary | ICD-10-CM

## 2018-06-20 DIAGNOSIS — R19.7 VOMITING AND DIARRHEA: Primary | ICD-10-CM

## 2018-06-20 DIAGNOSIS — R11.2 NAUSEA, VOMITING AND DIARRHEA: ICD-10-CM

## 2018-06-20 LAB
ALBUMIN UR-MCNC: NEGATIVE MG/DL
ANION GAP SERPL CALCULATED.3IONS-SCNC: 8 MMOL/L (ref 3–14)
APPEARANCE UR: CLEAR
BASOPHILS # BLD AUTO: 0 10E9/L (ref 0–0.2)
BASOPHILS NFR BLD AUTO: 0 %
BILIRUB UR QL STRIP: NEGATIVE
BUN SERPL-MCNC: 18 MG/DL (ref 7–30)
CALCIUM SERPL-MCNC: 8.4 MG/DL (ref 8.5–10.1)
CHLORIDE SERPL-SCNC: 102 MMOL/L (ref 94–109)
CO2 BLDCOV-SCNC: 25 MMOL/L (ref 21–28)
CO2 SERPL-SCNC: 26 MMOL/L (ref 20–32)
COLOR UR AUTO: YELLOW
CREAT SERPL-MCNC: 0.9 MG/DL (ref 0.66–1.25)
DIFFERENTIAL METHOD BLD: ABNORMAL
EOSINOPHIL # BLD AUTO: 0.1 10E9/L (ref 0–0.7)
EOSINOPHIL NFR BLD AUTO: 3 %
ERYTHROCYTE [DISTWIDTH] IN BLOOD BY AUTOMATED COUNT: 14.6 % (ref 10–15)
GFR SERPL CREATININE-BSD FRML MDRD: 83 ML/MIN/1.7M2
GLUCOSE SERPL-MCNC: 117 MG/DL (ref 70–99)
GLUCOSE UR STRIP-MCNC: NEGATIVE MG/DL
HCT VFR BLD AUTO: 36.4 % (ref 40–53)
HGB BLD-MCNC: 11.6 G/DL (ref 13.3–17.7)
HGB UR QL STRIP: NEGATIVE
INTERPRETATION ECG - MUSE: NORMAL
KETONES UR STRIP-MCNC: NEGATIVE MG/DL
LACTATE BLD-SCNC: 1.4 MMOL/L (ref 0.7–2)
LACTATE BLD-SCNC: 1.8 MMOL/L (ref 0.7–2.1)
LACTATE BLD-SCNC: 2.3 MMOL/L (ref 0.7–2)
LEUKOCYTE ESTERASE UR QL STRIP: NEGATIVE
LYMPHOCYTES # BLD AUTO: 0.5 10E9/L (ref 0.8–5.3)
LYMPHOCYTES NFR BLD AUTO: 15 %
MCH RBC QN AUTO: 27.5 PG (ref 26.5–33)
MCHC RBC AUTO-ENTMCNC: 31.9 G/DL (ref 31.5–36.5)
MCV RBC AUTO: 86 FL (ref 78–100)
MONOCYTES # BLD AUTO: 0.8 10E9/L (ref 0–1.3)
MONOCYTES NFR BLD AUTO: 25 %
NEUTROPHILS # BLD AUTO: 1.7 10E9/L (ref 1.6–8.3)
NEUTROPHILS NFR BLD AUTO: 57 %
NITRATE UR QL: NEGATIVE
PCO2 BLDV: 38 MM HG (ref 40–50)
PH BLDV: 7.42 PH (ref 7.32–7.43)
PH UR STRIP: 6 PH (ref 5–7)
PLATELET # BLD AUTO: 195 10E9/L (ref 150–450)
PLATELET # BLD EST: ABNORMAL 10*3/UL
PO2 BLDV: 36 MM HG (ref 25–47)
POTASSIUM SERPL-SCNC: 3.9 MMOL/L (ref 3.4–5.3)
RBC # BLD AUTO: 4.22 10E12/L (ref 4.4–5.9)
RBC #/AREA URNS AUTO: 1 /HPF (ref 0–2)
RBC MORPH BLD: ABNORMAL
SAO2 % BLDV FROM PO2: 71 %
SODIUM SERPL-SCNC: 136 MMOL/L (ref 133–144)
SOURCE: NORMAL
SP GR UR STRIP: 1.02 (ref 1–1.03)
SQUAMOUS #/AREA URNS AUTO: 1 /HPF (ref 0–1)
TROPONIN I SERPL-MCNC: <0.015 UG/L (ref 0–0.04)
TSH SERPL DL<=0.005 MIU/L-ACNC: 1.06 MU/L (ref 0.4–4)
UROBILINOGEN UR STRIP-MCNC: 0 MG/DL (ref 0–2)
WBC # BLD AUTO: 3 10E9/L (ref 4–11)
WBC #/AREA URNS AUTO: <1 /HPF (ref 0–5)

## 2018-06-20 PROCEDURE — 99285 EMERGENCY DEPT VISIT HI MDM: CPT | Mod: 25

## 2018-06-20 PROCEDURE — 83605 ASSAY OF LACTIC ACID: CPT | Performed by: EMERGENCY MEDICINE

## 2018-06-20 PROCEDURE — 80048 BASIC METABOLIC PNL TOTAL CA: CPT | Performed by: EMERGENCY MEDICINE

## 2018-06-20 PROCEDURE — 93005 ELECTROCARDIOGRAM TRACING: CPT

## 2018-06-20 PROCEDURE — 82803 BLOOD GASES ANY COMBINATION: CPT

## 2018-06-20 PROCEDURE — 74177 CT ABD & PELVIS W/CONTRAST: CPT

## 2018-06-20 PROCEDURE — 83605 ASSAY OF LACTIC ACID: CPT

## 2018-06-20 PROCEDURE — 99213 OFFICE O/P EST LOW 20 MIN: CPT | Performed by: FAMILY MEDICINE

## 2018-06-20 PROCEDURE — 25000128 H RX IP 250 OP 636: Performed by: EMERGENCY MEDICINE

## 2018-06-20 PROCEDURE — 96361 HYDRATE IV INFUSION ADD-ON: CPT

## 2018-06-20 PROCEDURE — 84484 ASSAY OF TROPONIN QUANT: CPT | Performed by: EMERGENCY MEDICINE

## 2018-06-20 PROCEDURE — 36415 COLL VENOUS BLD VENIPUNCTURE: CPT | Performed by: EMERGENCY MEDICINE

## 2018-06-20 PROCEDURE — 81001 URINALYSIS AUTO W/SCOPE: CPT | Performed by: EMERGENCY MEDICINE

## 2018-06-20 PROCEDURE — A9270 NON-COVERED ITEM OR SERVICE: HCPCS | Mod: GY | Performed by: EMERGENCY MEDICINE

## 2018-06-20 PROCEDURE — 84443 ASSAY THYROID STIM HORMONE: CPT | Performed by: EMERGENCY MEDICINE

## 2018-06-20 PROCEDURE — 25000132 ZZH RX MED GY IP 250 OP 250 PS 637: Mod: GY | Performed by: EMERGENCY MEDICINE

## 2018-06-20 PROCEDURE — 85025 COMPLETE CBC W/AUTO DIFF WBC: CPT | Performed by: EMERGENCY MEDICINE

## 2018-06-20 PROCEDURE — 96374 THER/PROPH/DIAG INJ IV PUSH: CPT | Mod: 59

## 2018-06-20 RX ORDER — ACETAMINOPHEN 500 MG
1000 TABLET ORAL
Status: COMPLETED | OUTPATIENT
Start: 2018-06-20 | End: 2018-06-20

## 2018-06-20 RX ORDER — SODIUM CHLORIDE, SODIUM LACTATE, POTASSIUM CHLORIDE, CALCIUM CHLORIDE 600; 310; 30; 20 MG/100ML; MG/100ML; MG/100ML; MG/100ML
1000 INJECTION, SOLUTION INTRAVENOUS CONTINUOUS
Status: DISCONTINUED | OUTPATIENT
Start: 2018-06-20 | End: 2018-06-20 | Stop reason: HOSPADM

## 2018-06-20 RX ORDER — ONDANSETRON 4 MG/1
4 TABLET, ORALLY DISINTEGRATING ORAL EVERY 8 HOURS PRN
Qty: 10 TABLET | Refills: 0 | Status: SHIPPED | OUTPATIENT
Start: 2018-06-20 | End: 2018-06-23

## 2018-06-20 RX ORDER — ONDANSETRON 2 MG/ML
4 INJECTION INTRAMUSCULAR; INTRAVENOUS
Status: COMPLETED | OUTPATIENT
Start: 2018-06-20 | End: 2018-06-20

## 2018-06-20 RX ORDER — IOPAMIDOL 755 MG/ML
500 INJECTION, SOLUTION INTRAVASCULAR ONCE
Status: COMPLETED | OUTPATIENT
Start: 2018-06-20 | End: 2018-06-20

## 2018-06-20 RX ADMIN — SODIUM CHLORIDE 65 ML: 9 INJECTION, SOLUTION INTRAVENOUS at 13:31

## 2018-06-20 RX ADMIN — ONDANSETRON 4 MG: 2 INJECTION INTRAMUSCULAR; INTRAVENOUS at 14:28

## 2018-06-20 RX ADMIN — IOPAMIDOL 100 ML: 755 INJECTION, SOLUTION INTRAVENOUS at 13:31

## 2018-06-20 RX ADMIN — ACETAMINOPHEN 1000 MG: 500 TABLET, FILM COATED ORAL at 12:33

## 2018-06-20 RX ADMIN — SODIUM CHLORIDE, POTASSIUM CHLORIDE, SODIUM LACTATE AND CALCIUM CHLORIDE 1000 ML: 600; 310; 30; 20 INJECTION, SOLUTION INTRAVENOUS at 12:33

## 2018-06-20 ASSESSMENT — ENCOUNTER SYMPTOMS
SHORTNESS OF BREATH: 0
APNEA: 1
ABDOMINAL PAIN: 1
NAUSEA: 1
WOUND: 1
DIARRHEA: 1
VOMITING: 1
DIFFICULTY URINATING: 0
FEVER: 1

## 2018-06-20 NOTE — MR AVS SNAPSHOT
"              After Visit Summary   6/20/2018    Jamar Ivory    MRN: [id number]           Patient Information     Date Of Birth          1950        Visit Information        Provider Department      6/20/2018 10:30 AM Feng Small MD [facility]        Today's Diagnoses     Vomiting and diarrhea    -  1    Abdominal pain, generalized           Follow-ups after your visit        Your next 10 appointments already scheduled     Jul 10, 2018 11:40 AM CDT   Return Visit with Cary Grace MD   [facility]    [facility]  [address] [address] [phone]              Who to contact     If you have questions or need follow up information about today's clinic visit or your schedule please contact [facility] directly at [phone].  Normal or non-critical lab and imaging results will be communicated to you by MyChart, letter or phone within 4 business days after the clinic has received the results. If you do not hear from us within 7 days, please contact the clinic through MyChart or phone. If you have a critical or abnormal lab result, we will notify you by phone as soon as possible.  Submit refill requests through [employer] or call your pharmacy and they will forward the refill request to us. Please allow 3 business days for your refill to be completed.          Additional Information About Your Visit        [employer] Information     [employer] lets you send messages to your doctor, view your test results, renew your prescriptions, schedule appointments and more. To sign up, go to [email or web address] . Click on \"Log in\" on the left side of the screen, which will take you to the Welcome page. Then click on \"Sign up Now\" on the right side of the page.     You will be asked to enter the access code listed below, as well " as some personal information. Please follow the directions to create your username and password.     Your access code is: [email or web address]  Expires: 2018  6:31 AM     Your access code will  in 90 days. If you need help or a new code, please call your [place] clinic or [phone].        Care EveryWhere ID     This is your Care EveryWhere ID. This could be used by other organizations to access your [place] medical records  [id number]        Your Vitals Were     Pulse Temperature Respirations Pulse Oximetry BMI (Body Mass Index)       101 100.3  F (37.9  C) (Tympanic) 24 93% 51.7 kg/m2        Blood Pressure from Last 3 Encounters:   18 138/62   18 121/78   18 104/62    Weight from Last 3 Encounters:   18 (!) 340 lb (154.2 kg)   18 330 lb (149.7 kg)   18 (!) 334 lb (151.5 kg)              Today, you had the following     No orders found for display       Primary Care Provider Office Phone # Fax #    Blanca Peng [phone] [address] [facility] [address]        Equal Access to Services     SHAUN AKERS AH: Hadii cole ku hadasho Soomaali, waaxda luqadaha, qaybta kaalmada adeegyada, waxay nicolain haynayelin antonio parada. So [facility] [phone].    ATENCIÓN: Si habla español, tiene a shook disposición servicios gratuitos de asistencia lingüística. Llame al [phone].    We comply with applicable federal civil rights laws and [place] laws. We do not discriminate on the basis of race, color, national origin, age, disability, sex, sexual orientation, or gender identity.            Thank you!     Thank you for choosing [facility]  for your care. Our goal is always to provide you with excellent care. Hearing back from our patients is one way we can continue to improve our services. Please take a few minutes to complete the written survey that you may receive in the mail after your visit with us. Thank you!              Your Updated Medication List - Protect others around you: Learn how to safely use, store and throw away your medicines at www.disposemymeds.org.          This list is accurate as of 6/20/18 10:44 AM.  Always use your most recent med list.                   Brand Name Dispense Instructions for use Diagnosis    FLUoxetine 40 MG capsule    PROzac    90 capsule    Take 1 capsule (40 mg) by mouth daily    Moderate episode of recurrent major depressive disorder (H)       furosemide 80 MG tablet    LASIX    180 tablet    TAKE 1 TABLET (80 MG) BY MOUTH 2 TIMES DAILY    Edema, unspecified type       levothyroxine 200 MCG tablet    SYNTHROID/LEVOTHROID    90 tablet    TAKE 1 TABLET (200 MCG) BY MOUTH DAILY    Acquired hypothyroidism       losartan 25 MG tablet    COZAAR    90 tablet    Take 1 tablet (25 mg) by mouth daily        order for DME     100 strip    Equipment being ordered: blood glucose monitor strips to use 2 to 4 times  A day. As covered by insurance. #100 with 5 refills  Monitor ordered this day  As well.  Also lancets #100 use as needed for testing 5 refils    Diabetes mellitus, type 2 (H)       order for DME     3 Device    Compression Stockings Ready To Wear Knee High Compression Stockings 20-30 mmHg Length of Need: Life Time # of Pairs 3    Type 2 diabetes mellitus with diabetic dermatitis, unspecified long term insulin use status (H), Swelling of lower extremity, Lymphedema of both lower extremities, Chronic venous hypertension with ulcer, bilateral (H), Varicose veins of both legs with edema, Open wound of knee, leg, and ankle, unspecified laterality, subsequent encounter, Venous (peripheral) insufficiency       order for DME     30 days    Equipment being ordered: [employer] Phone [phone] Fax [phone]  Primary Dressing Xeroform   Qty 30 sheets Secondary Dressing guanako rolls Qty 60 Secondary Dressing 2'tape Qty 1 roll Length of Need: 1 month Frequency of dressing change: daily     Lymphedema of both lower extremities, Venous ulcer of left leg (H)       simvastatin 20 MG tablet    ZOCOR    90 tablet    Take 1 tablet (20 mg) by mouth At Bedtime    Hyperlipidemia with target LDL less than 100       tamsulosin 0.4 MG capsule    FLOMAX    180 capsule    Take 2 capsules (0.8 mg) by mouth daily    Benign prostatic hyperplasia with weak urinary stream

## 2018-06-20 NOTE — ED TRIAGE NOTES
ABCs intact. Pt c/o abdominal pain, n/v/d since 0400. Denies black or bloody vomit or stool. C/o headache as well. Pt was seen at [facility]D and had a 100.3 fever.

## 2018-06-20 NOTE — PROGRESS NOTES
SUBJECTIVE:   Jamar Ivory is a 68 year old male who presents to clinic today for the following health issues:    Patient is accompanied by wife.    Acute Illness   Acute illness concerns: vomiting and diarrhea  Onset: today    Severity: severe    Accompanying signs and symptoms: None    History (predisposing factors):  none      Fever: YES    Chills/Sweats: YES    Headache (location?): YES    Sinus Pressure:no    Conjunctivitis:  no    Ear Pain: no    Rhinorrhea: YES    Congestion: no     Sore Throat: no      Cough: no    Wheeze: no     Decreased Appetite: YES    Nausea: YES    Vomiting: YES    Diarrhea:  YES    Dysuria/Freq.: no     Fatigue/Achiness: YES    Sick/Strep Exposure: no        Precipitating or alleviating factors: None  Therapies tried and outcome:  OTC NSAID    Onset this morning has vomited 5 times  He vomited again while in the office this morning    Last diarrhea was 2 hrs ago    Pt complains of diffuse abdominal pain              Problem list and histories reviewed & adjusted, as indicated.  Additional history: as documented    Labs reviewed in EPIC    Reviewed and updated as needed this visit by clinical staff  Tobacco  Allergies  Meds  Problems  Med Hx  Surg Hx  Fam Hx  Soc Hx        Reviewed and updated as needed this visit by Provider  Allergies  Meds  Problems         ROS:  CONSTITUTIONAL:POSITIVE  for fever 100  ENT/MOUTH: NEGATIVE for ear, mouth and throat problems  RESP:POSITIVE for cough-non productive  GI: POSITIVE for diarrhea, gas or bloating, nausea and vomiting    OBJECTIVE:                                                    /62 (BP Location: Left arm, Patient Position: Sitting, Cuff Size: Adult Large)  Pulse 101  Temp 100.3  F (37.9  C) (Tympanic)  Resp 24  Wt (!) 340 lb (154.2 kg)  SpO2 93%  BMI 51.7 kg/m2  Body mass index is 51.7 kg/(m^2).  GENERAL APPEARANCE: healthy, alert and no distress  HENT: ear canals and TM's normal, nose and mouth without  ulcers or lesions, oral mucous membranes moist and oropharynx clear  RESP: lungs clear to auscultation - no rales, rhonchi or wheezes  CV: regular rates and rhythm, normal S1 S2, no S3 or S4 and no murmur, click or rub  ABDOMEN: bowel sounds normal, obese, no hepatosplenomegaly or masses and moderate diffuse tenderness, no localized pain    Diagnostic test results:  none      ASSESSMENT/PLAN:                                                        ICD-10-CM    1. Vomiting and diarrhea R11.10     R19.7    2. Abdominal pain, generalized R10.84        Follow up with Provider - as needed      Patient Instructions   Go to ER today for further assessment      Feng Small MD  [facility]

## 2018-06-20 NOTE — ED AVS SNAPSHOT
[facility] Emergency Department    [address] [facility] [address]    Phone:  [phone]    Fax:  [phone]                                       Jamar Ivory   MRN: [id number]    Department:  [facility] Emergency Department   Date of Visit:  6/20/2018           Patient Information     Date Of Birth          1950        Your diagnoses for this visit were:     Nausea, vomiting and diarrhea     LLQ abdominal pain        You were seen by Clau Morales MD.      Follow-up Information     Follow up with Blanca Peng MD.    Specialty:  Family Practice    Why:  within 2 days    Contact information:    [address] [facility] [address] [phone]          Follow up with [facility] Emergency Department.    Specialty:  EMERGENCY MEDICINE    Why:  As needed, If symptoms worsen    Contact information:    [address] [facility] [address] [phone]        Discharge Instructions       Discharge Instructions  Gastroenteritis (possible cause of your symptoms)    You have been seen today for vomiting (throwing up) and diarrhea (loose stools), called gastroenteritis or the stomach flu. This is usually caused by a virus, but some bacteria, parasites, medicines or other medical conditions can cause similar symptoms. At this time your provider does not find that your vomiting and diarrhea is a sign of anything dangerous or life-threatening.  However, sometimes the signs of serious illness do not show up right away. Remember that serious problems like appendicitis can look like gastroenteritis at first.       Generally, every Emergency Department visit should have a follow-up clinic visit with either a primary or a specialty clinic/provider. Please follow-up as instructed by your emergency provider today.    Return to the Emergency Department if:    You keep vomiting and you are not able to keep liquids down.    You  feel you are getting dehydrated, such as being very thirsty, not urinating (peeing), or feeling faint or lightheaded.     You develop a new fever.    You have abdominal (belly) pain that seems worse than cramps, is in one spot, or is getting worse over time.     You have blood in your vomit or in your diarrhea.    You feel very weak.    What can I do to help myself?    The most important thing to do is to drink clear liquids.  If you have been vomiting a lot, it is best to have only small, frequent sips of liquids.  Drinking too much at once may cause more vomiting. Water is a good first option for rehydration. If you are vomiting often, you must also replace electrolytes (salts and minerals) lost with your illness. Pedialyte  is the best rehydration liquid but many don t like the taste so sports drinks (like Gatorade ) are a good option. Sodas and juice are also options but are high in sugar. Avoid acid liquids (orange), caffeine (coffee) or alcohol. Do not drink milk until you no longer have diarrhea.    After liquids are staying down, you may start eating mild foods. Soda crackers, toast, plain noodles, gelatin, applesauce and bananas are good first choices.  Avoid foods that have acid, are spicy, fatty or fibrous (such as meats, coarse grains, vegetables). You may start eating these foods again in about 3 days when you are better.    Sometimes treatment includes prescription medicine to prevent nausea (sick to your stomach) and vomiting and to prevent diarrhea. If your provider prescribes these for you, take them as directed.    Nonprescription medicine is available for the treatment of diarrhea and can be very effective.  If you use it, make sure you use the dose recommended on the package. Avoid Lomotil . Check with your healthcare provider before you use any medicine for diarrhea.    Do not take ibuprofen, or other nonsteroidal anti-inflammatory medicines without checking with your healthcare provider.  If  you were given a prescription for medicine here today, be sure to read all of the information (including the package insert) that comes with your prescription.  This will include important information about the medicine, its side effects, and any warnings that you need to know about.  The pharmacist who fills the prescription can provide more information and answer questions you may have about the medicine.  If you have questions or concerns that the pharmacist cannot address, please call or return to the Emergency Department.   Remember that you can always come back to the Emergency Department if you are not able to see your regular provider in the amount of time listed above, if you get any new symptoms, or if there is anything that worries you.    Discharge Instructions  Abdominal Pain    Abdominal pain (belly pain) can be caused by many things. Your evaluation today does not show the exact cause for your pain. Your provider today has decided that it is unlikely your pain is due to a life threatening problem, or a problem requiring surgery or hospital admission. Sometimes those problems cannot be found right away, so it is very important that you follow up as directed.  Sometimes only the changes which occur over time allow the cause of your pain to be found.    Generally, every Emergency Department visit should have a follow-up clinic visit with either a primary or a specialty clinic/provider. Please follow-up as instructed by your emergency provider today. With abdominal pain, we often recommend very close follow-up, such as the following day.    ADULTS:  Return to the Emergency Department right away if:      You get an oral temperature above 102oF or as directed by your provider.    You have blood in your stools. This may be bright red or appear as black, tarry stools.      You keep vomiting (throwing up) or cannot drink liquids.    You see blood when you vomit.     You cannot have a bowel movement or you  cannot pass gas.    Your stomach gets bloated or bigger.    Your skin or the whites of your eyes look yellow.    You faint.    You have bloody, frequent or painful urination (peeing).    You have new symptoms or anything that worries you.    CHILDREN:  Return to the Emergency Department right away if your child has any of the above-listed symptoms or the following:      Pushes your hand away or screams/cries when his/her belly is touched.    You notice your child is very fussy or weak.    Your child is very tired and is too tired to eat or drink.    Your child is dehydrated.  Signs of dehydration can be:  o Significant change in the amount of wet diapers/urine.  o Your infant or child starts to have dry mouth and lips, or no saliva (spit) or tears.    PREGNANT WOMEN:  Return to the Emergency Department right away if you have any of the above-listed symptoms or the following:      You have bleeding, leaking fluid or passing tissue from the vagina.    You have worse pain or cramping, or pain in your shoulder or back.    You have vomiting that will not stop.    You have a temperature of 100oF or more.    Your baby is not moving as much as usual.    You faint.    You get a bad headache with or without eye problems and abdominal pain.    You have a seizure.    You have unusual discharge from your vagina and abdominal pain.    Abdominal pain is pretty common during pregnancy.  Your pain may or may not be related to your pregnancy. You should follow-up closely with your OB provider so they can evaluate you and your baby.  Until you follow-up with your regular provider, do the following:       Avoid sex and do not put anything in your vagina.    Drink clear fluids.    Only take medications approved by your provider.    MORE INFORMATION:    Appendicitis:  A possible cause of abdominal pain in any person who still has their appendix is acute appendicitis. Appendicitis is often hard to diagnose.  Testing does not always rule  "out early appendicitis or other causes of abdominal pain. Close follow-up with your provider and re-evaluations may be needed to figure out the reason for your abdominal pain.    Follow-up:  It is very important that you make an appointment with your clinic and go to the appointment.  If you do not follow-up with your primary provider, it may result in missing an important development which could result in permanent injury or disability and/or lasting pain.  If there is any problem keeping your appointment, call your provider or return to the Emergency Department.    Medications:  Take your medications as directed by your provider today.  Before using over-the-counter medications, ask your provider and make sure to take the medications as directed.  If you have any questions about medications, ask your provider.    Diet:  Resume your normal diet as much as possible, but do not eat fried, fatty or spicy foods while you have pain.  Do not drink alcohol or have caffeine.  Do not smoke tobacco.    Probiotics: If you have been given an antibiotic, you may want to also take a probiotic pill or eat yogurt with live cultures. Probiotics have \"good bacteria\" to help your intestines stay healthy. Studies have shown that probiotics help prevent diarrhea (loose stools) and other intestine problems (including C. diff infection) when you take antibiotics. You can buy these without a prescription in the pharmacy section of the store.     If you were given a prescription for medicine here today, be sure to read all of the information (including the package insert) that comes with your prescription.  This will include important information about the medicine, its side effects, and any warnings that you need to know about.  The pharmacist who fills the prescription can provide more information and answer questions you may have about the medicine.  If you have questions or concerns that the pharmacist cannot address, please call or " return to the Emergency Department.       Remember that you can always come back to the Emergency Department if you are not able to see your regular provider in the amount of time listed above, if you get any new symptoms, or if there is anything that worries you.    Your next 10 appointments already scheduled     Jul 10, 2018 11:40 AM CDT   Return Visit with Cary Grace MD   [facility]    [facility]  [address] [address] [phone]              24 Hour Appointment Hotline       To make an appointment at any [facility], call [phone] ([phone]). If you don't have a family doctor or clinic, we will help you find one. [place] clinics are conveniently located to serve the needs of you and your family.          ED Discharge Orders     Clostridium difficile toxin B           Enteric Bacteria and Virus Panel by JOE Stool                    Review of your medicines      START taking        Dose / Directions Last dose taken    ondansetron 4 MG ODT tab   Commonly known as:  ZOFRAN ODT   Dose:  4 mg   Quantity:  10 tablet        Take 1 tablet (4 mg) by mouth every 8 hours as needed for nausea   Refills:  0          Our records show that you are taking the medicines listed below. If these are incorrect, please call your family doctor or clinic.        Dose / Directions Last dose taken    FLUoxetine 40 MG capsule   Commonly known as:  PROzac   Dose:  40 mg   Quantity:  90 capsule        Take 1 capsule (40 mg) by mouth daily   Refills:  3        furosemide 80 MG tablet   Commonly known as:  LASIX   Quantity:  180 tablet        TAKE 1 TABLET (80 MG) BY MOUTH 2 TIMES DAILY   Refills:  2        levothyroxine 200 MCG tablet   Commonly known as:  SYNTHROID/LEVOTHROID   Quantity:  90 tablet        TAKE 1 TABLET (200 MCG) BY MOUTH DAILY   Refills:  2        losartan 25 MG tablet   Commonly  known as:  COZAAR   Dose:  25 mg   Quantity:  90 tablet        Take 1 tablet (25 mg) by mouth daily   Refills:  3        order for DME   Quantity:  100 strip        Equipment being ordered: blood glucose monitor strips to use 2 to 4 times  A day. As covered by insurance. #100 with 5 refills  Monitor ordered this day  As well.  Also lancets #100 use as needed for testing 5 refils   Refills:  5        order for DME   Quantity:  3 Device        Compression Stockings Ready To Wear Knee High Compression Stockings 20-30 mmHg Length of Need: Life Time # of Pairs 3   Refills:  0        order for DME   Quantity:  30 days        Equipment being ordered: [facility] [employer] [facility] Phone [phone] Fax [phone]  Primary Dressing Xeroform   Qty 30 sheets Secondary Dressing guanako rolls Qty 60 Secondary Dressing 2'tape Qty 1 roll Length of Need: 1 month Frequency of dressing change: daily   Refills:  0        simvastatin 20 MG tablet   Commonly known as:  ZOCOR   Dose:  20 mg   Quantity:  90 tablet        Take 1 tablet (20 mg) by mouth At Bedtime   Refills:  1        tamsulosin 0.4 MG capsule   Commonly known as:  FLOMAX   Dose:  0.8 mg   Quantity:  180 capsule        Take 2 capsules (0.8 mg) by mouth daily   Refills:  1                Prescriptions were sent or printed at these locations (1 Prescription)                   Other Prescriptions                Printed at Department/Unit printer (1 of 1)         ondansetron (ZOFRAN ODT) 4 MG ODT tab                Procedures and tests performed during your visit     Procedure/Test Number of Times Performed    Basic metabolic panel 1    CBC with platelets differential 1    CT Abdomen Pelvis w Contrast 1    Cardiac Continuous Monitoring 1    EKG 12-lead, tracing only 1    Give 20 ounces of water 15 minutes before CT of abdomen 1    ISTAT CG4 gases lactate cathy nursing POCT 1    ISTAT gases lactate cathy POCT 1    Lactic acid whole blood 2    Peripheral IV: Standard 1    Pulse oximetry  nursing 1    TSH with free T4 reflex 1    Troponin I 1    UA with Microscopic 1      Orders Needing Specimen Collection     Ordered          06/20/18 1412  Enteric Bacteria and Virus Panel by JOE Stool - STAT, [facility]: STAT, Status: Sent     Scheduled Task Status   06/20/18 1412 [facility] Reminder: Open   Order Class:  PCU Collect                06/20/18 1412  Clostridium difficile toxin B PCR - ROUTINE, [facility]: Routine, Status: Sent     Scheduled Task Status   06/20/18 1412 [facility] Reminder: Open   Order Class:  PCU Collect                  Pending Results     No orders found from 6/18/2018 to 6/21/2018.            Pending Culture Results     No orders found from 6/18/2018 to 6/21/2018.            Pending Results Instructions     If you had any lab results that were not finalized at the time of your Discharge, you can call the ED Lab Result RN at [phone]. You will be contacted by this team for any positive Lab results or changes in treatment. The nurses are available 7 days a week from 10A to 6:30P.  You can leave a message 24 hours per day and they will return your call.        Test Results From Your Hospital Stay        6/20/2018  1:49 PM      Component Results     Component Value Ref Range & Units Status    WBC 3.0 (L) 4.0 - 11.0 10e9/L Final    RBC Count 4.22 (L) 4.4 - 5.9 10e12/L Final    Hemoglobin 11.6 (L) 13.3 - 17.7 g/dL Final    Hematocrit 36.4 (L) 40.0 - 53.0 % Final    MCV 86 78 - 100 fl Final    MCH 27.5 26.5 - 33.0 pg Final    MCHC 31.9 31.5 - 36.5 g/dL Final    RDW 14.6 10.0 - 15.0 % Final    Platelet Count 195 150 - 450 10e9/L Final    Diff Method Manual Differential  Final    % Neutrophils 57.0 % Final    % Lymphocytes 15.0 % Final    % Monocytes 25.0 % Final    % Eosinophils 3.0 % Final    % Basophils 0.0 % Final    Absolute Neutrophil 1.7 1.6 - 8.3 10e9/L Final    Absolute Lymphocytes 0.5 (L) 0.8 - 5.3 10e9/L Final    Absolute Monocytes 0.8 0.0 - 1.3 10e9/L Final    Absolute Eosinophils 0.1  0.0 - 0.7 10e9/L Final    Absolute Basophils 0.0 0.0 - 0.2 10e9/L Final    RBC Morphology   Final    Consistent with reported results    Platelet Estimate   Final    Automated count confirmed.  Platelet morphology is normal.         6/20/2018 12:51 PM      Component Results     Component Value Ref Range & Units Status    Lactic Acid 2.3 (H) 0.7 - 2.0 mmol/L Final         6/20/2018  1:07 PM      Component Results     Component Value Ref Range & Units Status    Sodium 136 133 - 144 mmol/L Final    Potassium 3.9 3.4 - 5.3 mmol/L Final    Chloride 102 94 - 109 mmol/L Final    Carbon Dioxide 26 20 - 32 mmol/L Final    Anion Gap 8 3 - 14 mmol/L Final    Glucose 117 (H) 70 - 99 mg/dL Final    Urea Nitrogen 18 7 - 30 mg/dL Final    Creatinine 0.90 0.66 - 1.25 mg/dL Final    GFR Estimate 83 >60 mL/min/1.7m2 Final    Non  GFR Calc    GFR Estimate If Black >90 >60 mL/min/1.7m2 Final    African American GFR Calc    Calcium 8.4 (L) 8.5 - 10.1 mg/dL Final         6/20/2018  2:19 PM      Narrative     CT ABDOMEN AND PELVIS WITH CONTRAST   6/20/2018 1:43 PM     HISTORY: Left lower quadrant abdomen pain, diarrhea, vomiting.     TECHNIQUE:   CT of the abdomen and pelvis was performed following the  administration of 100 mL Isovue-370. Radiation dose for this scan was  reduced using automated exposure control, adjustment of the mA and/or  kV according to patient size, or iterative reconstruction technique.    COMPARISON: None.    FINDINGS:  There are extensive diverticula within the sigmoid colon.  No inflammatory changes are identified to indicate acute  diverticulitis. There is no free fluid or free air within the abdomen  or pelvis. The bowel otherwise appears grossly unremarkable.    The solid organs in the abdomen appear unremarkable. Nonspecific jan  mesentery.    There are enlarged lymph nodes in the pelvis about the iliac vessels.  The largest of these measures approximately 2.4 x 2.1 cm. There are  mildly  prominent inguinal lymph nodes. There are scattered small  mesenteric lymph nodes.        Impression     IMPRESSION:  1. Enlarged lymph nodes in the pelvis as above. These are nonspecific.  2. Colonic diverticulosis. No evidence for acute diverticulitis.    WANDER SHIRLEY MD         6/20/2018  1:26 PM      Component Results     Component Value Ref Range & Units Status    TSH 1.06 0.40 - 4.00 mU/L Final               6/20/2018 12:44 PM      Component Results     Component Value Ref Range & Units Status    Ph Venous 7.42 7.32 - 7.43 pH Final    PCO2 Venous 38 (L) 40 - 50 mm Hg Final    PO2 Venous 36 25 - 47 mm Hg Final    Bicarbonate Venous 25 21 - 28 mmol/L Final    O2 Sat Venous 71 % Final    Lactic Acid 1.8 0.7 - 2.1 mmol/L Final         6/20/2018  1:36 PM      Component Results     Component Value Ref Range & Units Status    Troponin I ES <0.015 0.000 - 0.045 ug/L Final    The 99th percentile for upper reference range is 0.045 ug/L.  Troponin values   in the range of 0.045 - 0.120 ug/L may be associated with risks of adverse   clinical events.           6/20/2018  3:08 PM      Component Results     Component Value Ref Range & Units Status    Color Urine Yellow  Final    Appearance Urine Clear  Final    Glucose Urine Negative NEG^Negative mg/dL Final    Bilirubin Urine Negative NEG^Negative Final    Ketones Urine Negative NEG^Negative mg/dL Final    Specific Gravity Urine 1.019 1.003 - 1.035 Final    Blood Urine Negative NEG^Negative Final    pH Urine 6.0 5.0 - 7.0 pH Final    Protein Albumin Urine Negative NEG^Negative mg/dL Final    Urobilinogen mg/dL 0.0 0.0 - 2.0 mg/dL Final    Nitrite Urine Negative NEG^Negative Final    Leukocyte Esterase Urine Negative NEG^Negative Final    Source Midstream Urine  Final    WBC Urine <1 0 - 5 /HPF Final    RBC Urine 1 0 - 2 /HPF Final    Squamous Epithelial /HPF Urine 1 0 - 1 /HPF Final         6/20/2018  2:50 PM      Component Results     Component Value Ref Range & Units  Status    Lactic Acid 1.4 0.7 - 2.0 mmol/L Final                Clinical Quality Measure: Blood Pressure Screening     Your blood pressure was checked while you were in the emergency department today. The last reading we obtained was  BP: 120/57 . Please read the guidelines below about what these numbers mean and what you should do about them.  If your systolic blood pressure (the top number) is less than 120 and your diastolic blood pressure (the bottom number) is less than 80, then your blood pressure is normal. There is nothing more that you need to do about it.  If your systolic blood pressure (the top number) is 120-139 or your diastolic blood pressure (the bottom number) is 80-89, your blood pressure may be higher than it should be. You should have your blood pressure rechecked within a year by a primary care provider.  If your systolic blood pressure (the top number) is 140 or greater or your diastolic blood pressure (the bottom number) is 90 or greater, you may have high blood pressure. High blood pressure is treatable, but if left untreated over time it can put you at risk for heart attack, stroke, or kidney failure. You should have your blood pressure rechecked by a primary care provider within the next 4 weeks.  If your provider in the emergency department today gave you specific instructions to follow-up with your doctor or provider even sooner than that, you should follow that instruction and not wait for up to 4 weeks for your follow-up visit.        Thank you for choosing [facility]       Thank you for choosing [facility] for your care. Our goal is always to provide you with excellent care. Hearing back from our patients is one way we can continue to improve our services. Please take a few minutes to complete the written survey that you may receive in the mail after you visit with us. Thank you!        [facility] Information     [facility] [employer] lets you send messages to your doctor, view your test results, renew  "your prescriptions, schedule appointments and more. To sign up, go to [email or web address] . Click on \"Log in\" on the left side of the screen, which will take you to the Welcome page. Then click on \"Sign up Now\" on the right side of the page.     You will be asked to enter the access code listed below, as well as some personal information. Please follow the directions to create your username and password.     Your access code is: [email or web address]  Expires: 2018  6:31 AM     Your access code will  in 90 days. If you need help or a new code, please call your [place] clinic or [phone].        Care EveryWhere ID     This is your Care EveryWhere ID. This could be used by other organizations to access your [place] medical records  [id number]        Equal Access to Services     SHAUN AKERS : Isaac Fowler, cullen yap, lisa amaro, maykel reich . So [facility] [phone].    ATENCIÓN: Si habla español, tiene a shook disposición servicios gratuitos de asistencia lingüística. Llame al [phone].    We comply with applicable federal civil rights laws and [place] laws. We do not discriminate on the basis of race, color, national origin, age, disability, sex, sexual orientation, or gender identity.            After Visit Summary       This is your record. Keep this with you and show to your community pharmacist(s) and doctor(s) at your next visit.                  "

## 2018-06-20 NOTE — ED PROVIDER NOTES
History     Chief Complaint:  Abdominal Pain    HPI   Jamar Ivory is a 68 year old male with a history of type 2 diabetes, cerebral infarction, hypertension, hyperlipidemia, lung cancer as a teenager, and bilateral leg swelling who presents to the emergency department with his wife for evaluation of abdominal pain. The patient reports 4 episodes of runny diarrhea this morning, with no bloody or black stool. This was accompanied by left sided abdominal pain, nausea, and vomiting. He has been feeling warm recently but did not measure his temperature. He denies any trouble urinating, shortness of breath, chest pain, increased leg swelling, ill contacts with similar symptoms, recent travel or hospitalizations. He does admits to being on antibiotics a couple months ago for a chronic left leg wound. The patient has chronic skin ulcerations to his bilateral lower legs. His wife adds that he has been snoring the past couple of nights, but that he typically does not snore. The patient has a surgical history of appendectomy but no other abdominal surgeries. He has no history of COPD or asthma.     Allergies:  Clopidogrel  Penicillins     Medications:    Prozac  Lasix  Levothyroxine  Cozaar  Zocor   Flomax    Past Medical History:    Arthritis   CVA (cerebral infarction)   Fatty liver   Hypertension goal BP (blood pressure) < 140/90   Major depressive disorder, single episode, severe, without mention of psychotic behavior   Obesity, morbid (more than 100 lbs over ideal weight or BMI > 40)   Shortness of breath   TIA (transient ischemic attack)   Unspecified hypothyroidism   Vitiligo     Past Surgical History:    Appendectomy  Left index finger fracture  Nasal bone fracture  Colonoscopy x 2    Family History:    Lung cancer Father  Diabetes Mother  Leukemia Daughter    Social History:  The patient was accompanied to the emergency department by his wife.  Smoking Status: Never Smoker  Smokeless Tobacco: Never  "Used  Alcohol Use: Yes (Rarely)  Marital Status:     Review of Systems   Constitutional: Positive for fever.   Respiratory: Positive for apnea. Negative for shortness of breath.    Cardiovascular: Negative for chest pain and leg swelling.   Gastrointestinal: Positive for abdominal pain, diarrhea, nausea and vomiting.   Genitourinary: Negative for difficulty urinating.   Skin: Positive for wound (chronic).   All other systems reviewed and are negative.    Physical Exam     Patient Vitals for the past 24 hrs:   BP Temp Temp src Pulse Heart Rate Resp SpO2 Height Weight   06/20/18 1515 - 98.5  F (36.9  C) Oral - - - - - -   06/20/18 1430 120/57 - - - 91 8 93 % - -   06/20/18 1400 119/60 - - - 93 17 97 % - -   06/20/18 1348 114/65 - - - 96 24 92 % - -   06/20/18 1345 - - - - 100 25 94 % - -   06/20/18 1315 - - - - 100 16 93 % - -   06/20/18 1300 114/62 - - - 97 15 91 % - -   06/20/18 1230 105/70 102.6  F (39.2  C) Rectal - 99 20 92 % - -   06/20/18 1218 - - - - - - 93 % - -   06/20/18 1215 - - - - 105 20 (!) 89 % - -   06/20/18 1159 147/82 100.6  F (38.1  C) Temporal 111 - 18 90 % 1.727 m (5' 8\") (!) 154.2 kg (340 lb)       Physical Exam  General: Large elderly male sitting upright.   Eyes: PERRL, Conjunctive within normal limits. No scleral icterus.  ENT: Moist mucous membranes, oropharynx clear.   CV: Normal S1S2, no murmur, rub or gallop. Regular rate and rhythm  Resp: Scattered occasional wheeze on expiration, bilateral lungs.  GI: Large abdomen. Tender in left lower quadrant. Abdomen is soft, nontender and nondistended. No palpable masses. No rebound or guarding.  MSK: No edema. Nontender. Normal active range of motion.  Skin: Skin wound of the left anterior medial lower leg. Chronic appearing skin discoloration bilateral lower legs. Bilateral lower extremity edema. Warm and dry.   Neuro: Alert and oriented. Responds appropriately to all questions and commands. No focal findings appreciated. Normal muscle " tone.  Psych: Normal mood and affect. Pleasant.    Emergency Department Course     ECG:  ECG taken at 1238, ECG read at 1239  Sinus tachycardia  Left axis deviation  Inferior infarct, age undetermined   Abnormal ECG   Rate 101 bpm. WI interval 186 ms. QRS duration 96 ms. QT/QTc 336/435 ms. P-R-T axes 31 -30 32.    Imaging:  Radiology findings were communicated with the patient and wife who voiced understanding of the findings.    CT Abdomen Pelvis w Contrast  1. Enlarged lymph nodes in the pelvis as above. These are nonspecific.  2. Colonic diverticulosis. No evidence for acute diverticulitis.  Reading per radiology.     Laboratory:  Laboratory findings were communicated with the patient and wife who voiced understanding of the findings.    UA: All Negative   Lactic Acid (Collected 1424): 1.4   Lactic Acid (Collected 1222): 2.3 (H) Resulted 1251   ISTAT gases lactate cathy POCT: pCO2 38 (L), o/w WNL  TSH with free T4 reflex: 1.06  CBC: WBC 3.0 (L), HGB 11.6 (L),   BMP: Glucose 117 (H), Calcium 8.4 (L) o/w WNL (Creatinine 0.90)  Troponin (Collected 1222): <0.015     Interventions:  1233 Tylenol 1,000 mg PO   1233 NS Bolus IV   1428 Zofran 4 mg IV     Emergency Department Course:     Nursing notes and vitals reviewed.     I performed an exam of the patient as documented above.     IV was inserted and blood was drawn for laboratory testing, results above.     The patient was sent for a CT scan while in the emergency department, results above.    The patient provided a urine sample here in the emergency department. This was sent for laboratory testing, findings above.    1415 I reassessed the patient. He is feeling improved. He would like to go home.     I personally reviewed the laboratory, imaging, and EKG results with the patient and wife and answered all related questions prior to discharge.    Impression & Plan      Medical Decision Making:  Jamar Ivory is a 68 year old male who presents to the  emergency department today with  Concerns for nausea, vomiting, and diarrhea, as well as associated low abdominal pain that started after the diarrhea. He was slightly tachycardiac and had a fever upon presentation. This improved with fluids. His initial lactic by ISTAT gases was normal but there was slight elevation in the lactic sent onto laboratory. This also improved with a liter of IV fluid. He is feeling much better on reassessment. The abdominal pain does not appear to be cause for any acute surgical or emergent life threatening cause based on the CT scan. He noted a desire to go home and seems reasonable in this circumstance, although he is aware that he could be early in acute pathology and should return immediately if symptoms worsen. Zofran given for nausea and vomiting. He was unable to give a diarrhea sample here and was sent home with a stool collection kit, looking for an etiology for the diarrhea given the associated fever and abdominal pain. He should follow up with his PCP within two days for reassessment. All questions answered. Discharged home in stable condition.    Diagnosis:    ICD-10-CM    1. Nausea, vomiting and diarrhea R11.2     R19.7    2. LLQ abdominal pain R10.32      Disposition:   The patient was discharged home.      Discharge Medications:  Discharge Medication List as of 6/20/2018  3:27 PM      START taking these medications    Details   ondansetron (ZOFRAN ODT) 4 MG ODT tab Take 1 tablet (4 mg) by mouth every 8 hours as needed for nausea, Disp-10 tablet, R-0, Local Print             Scribe Disclosure:  I, Babs Lindquist, am serving as a scribe at 12:20 PM on 6/20/2018 to document services personally performed by Clau Morales MD, based on my observations and the provider's statements to me.    [facility] EMERGENCY DEPARTMENT       Clau Morales MD  06/26/18 0431

## 2018-06-20 NOTE — ED AVS SNAPSHOT
[facility] Emergency Department    [address]    [facility] [address]    Phone:  [phone]    Fax:  [phone]                                       Jamar Ivory   MRN: [id number]    Department:  [facility] Emergency Department   Date of Visit:  6/20/2018           After Visit Summary Signature Page     I have received my discharge instructions, and my questions have been answered. I have discussed any challenges I see with this plan with the nurse or doctor.    ..........................................................................................................................................  Patient/Patient Representative Signature      ..........................................................................................................................................  Patient Representative Print Name and Relationship to Patient    ..................................................               ................................................  Date                                            Time    ..........................................................................................................................................  Reviewed by Signature/Title    ...................................................              ..............................................  Date                                                            Time

## 2018-06-20 NOTE — DISCHARGE INSTRUCTIONS
Discharge Instructions  Gastroenteritis (possible cause of your symptoms)    You have been seen today for vomiting (throwing up) and diarrhea (loose stools), called gastroenteritis or the stomach flu. This is usually caused by a virus, but some bacteria, parasites, medicines or other medical conditions can cause similar symptoms. At this time your provider does not find that your vomiting and diarrhea is a sign of anything dangerous or life-threatening.  However, sometimes the signs of serious illness do not show up right away. Remember that serious problems like appendicitis can look like gastroenteritis at first.       Generally, every Emergency Department visit should have a follow-up clinic visit with either a primary or a specialty clinic/provider. Please follow-up as instructed by your emergency provider today.    Return to the Emergency Department if:    You keep vomiting and you are not able to keep liquids down.    You feel you are getting dehydrated, such as being very thirsty, not urinating (peeing), or feeling faint or lightheaded.     You develop a new fever.    You have abdominal (belly) pain that seems worse than cramps, is in one spot, or is getting worse over time.     You have blood in your vomit or in your diarrhea.    You feel very weak.    What can I do to help myself?    The most important thing to do is to drink clear liquids.  If you have been vomiting a lot, it is best to have only small, frequent sips of liquids.  Drinking too much at once may cause more vomiting. Water is a good first option for rehydration. If you are vomiting often, you must also replace electrolytes (salts and minerals) lost with your illness. Pedialyte  is the best rehydration liquid but many don t like the taste so sports drinks (like Gatorade ) are a good option. Sodas and juice are also options but are high in sugar. Avoid acid liquids (orange), caffeine (coffee) or alcohol. Do not drink milk until you no longer  have diarrhea.    After liquids are staying down, you may start eating mild foods. Soda crackers, toast, plain noodles, gelatin, applesauce and bananas are good first choices.  Avoid foods that have acid, are spicy, fatty or fibrous (such as meats, coarse grains, vegetables). You may start eating these foods again in about 3 days when you are better.    Sometimes treatment includes prescription medicine to prevent nausea (sick to your stomach) and vomiting and to prevent diarrhea. If your provider prescribes these for you, take them as directed.    Nonprescription medicine is available for the treatment of diarrhea and can be very effective.  If you use it, make sure you use the dose recommended on the package. Avoid Lomotil . Check with your healthcare provider before you use any medicine for diarrhea.    Do not take ibuprofen, or other nonsteroidal anti-inflammatory medicines without checking with your healthcare provider.  If you were given a prescription for medicine here today, be sure to read all of the information (including the package insert) that comes with your prescription.  This will include important information about the medicine, its side effects, and any warnings that you need to know about.  The pharmacist who fills the prescription can provide more information and answer questions you may have about the medicine.  If you have questions or concerns that the pharmacist cannot address, please call or return to the Emergency Department.   Remember that you can always come back to the Emergency Department if you are not able to see your regular provider in the amount of time listed above, if you get any new symptoms, or if there is anything that worries you.    Discharge Instructions  Abdominal Pain    Abdominal pain (belly pain) can be caused by many things. Your evaluation today does not show the exact cause for your pain. Your provider today has decided that it is unlikely your pain is due to a life  threatening problem, or a problem requiring surgery or hospital admission. Sometimes those problems cannot be found right away, so it is very important that you follow up as directed.  Sometimes only the changes which occur over time allow the cause of your pain to be found.    Generally, every Emergency Department visit should have a follow-up clinic visit with either a primary or a specialty clinic/provider. Please follow-up as instructed by your emergency provider today. With abdominal pain, we often recommend very close follow-up, such as the following day.    ADULTS:  Return to the Emergency Department right away if:      You get an oral temperature above 102oF or as directed by your provider.    You have blood in your stools. This may be bright red or appear as black, tarry stools.      You keep vomiting (throwing up) or cannot drink liquids.    You see blood when you vomit.     You cannot have a bowel movement or you cannot pass gas.    Your stomach gets bloated or bigger.    Your skin or the whites of your eyes look yellow.    You faint.    You have bloody, frequent or painful urination (peeing).    You have new symptoms or anything that worries you.    CHILDREN:  Return to the Emergency Department right away if your child has any of the above-listed symptoms or the following:      Pushes your hand away or screams/cries when his/her belly is touched.    You notice your child is very fussy or weak.    Your child is very tired and is too tired to eat or drink.    Your child is dehydrated.  Signs of dehydration can be:  o Significant change in the amount of wet diapers/urine.  o Your infant or child starts to have dry mouth and lips, or no saliva (spit) or tears.    PREGNANT WOMEN:  Return to the Emergency Department right away if you have any of the above-listed symptoms or the following:      You have bleeding, leaking fluid or passing tissue from the vagina.    You have worse pain or cramping, or pain in  your shoulder or back.    You have vomiting that will not stop.    You have a temperature of 100oF or more.    Your baby is not moving as much as usual.    You faint.    You get a bad headache with or without eye problems and abdominal pain.    You have a seizure.    You have unusual discharge from your vagina and abdominal pain.    Abdominal pain is pretty common during pregnancy.  Your pain may or may not be related to your pregnancy. You should follow-up closely with your OB provider so they can evaluate you and your baby.  Until you follow-up with your regular provider, do the following:       Avoid sex and do not put anything in your vagina.    Drink clear fluids.    Only take medications approved by your provider.    MORE INFORMATION:    Appendicitis:  A possible cause of abdominal pain in any person who still has their appendix is acute appendicitis. Appendicitis is often hard to diagnose.  Testing does not always rule out early appendicitis or other causes of abdominal pain. Close follow-up with your provider and re-evaluations may be needed to figure out the reason for your abdominal pain.    Follow-up:  It is very important that you make an appointment with your clinic and go to the appointment.  If you do not follow-up with your primary provider, it may result in missing an important development which could result in permanent injury or disability and/or lasting pain.  If there is any problem keeping your appointment, call your provider or return to the Emergency Department.    Medications:  Take your medications as directed by your provider today.  Before using over-the-counter medications, ask your provider and make sure to take the medications as directed.  If you have any questions about medications, ask your provider.    Diet:  Resume your normal diet as much as possible, but do not eat fried, fatty or spicy foods while you have pain.  Do not drink alcohol or have caffeine.  Do not smoke  "tobacco.    Probiotics: If you have been given an antibiotic, you may want to also take a probiotic pill or eat yogurt with live cultures. Probiotics have \"good bacteria\" to help your intestines stay healthy. Studies have shown that probiotics help prevent diarrhea (loose stools) and other intestine problems (including C. diff infection) when you take antibiotics. You can buy these without a prescription in the pharmacy section of the store.     If you were given a prescription for medicine here today, be sure to read all of the information (including the package insert) that comes with your prescription.  This will include important information about the medicine, its side effects, and any warnings that you need to know about.  The pharmacist who fills the prescription can provide more information and answer questions you may have about the medicine.  If you have questions or concerns that the pharmacist cannot address, please call or return to the Emergency Department.       Remember that you can always come back to the Emergency Department if you are not able to see your regular provider in the amount of time listed above, if you get any new symptoms, or if there is anything that worries you.  "

## 2018-06-21 ENCOUNTER — HOSPITAL ENCOUNTER (OUTPATIENT)
Dept: LAB | Facility: CLINIC | Age: 68
Discharge: HOME | End: 2018-06-21
Attending: FAMILY MEDICINE | Admitting: EMERGENCY MEDICINE
Payer: MEDICARE

## 2018-06-21 ENCOUNTER — TELEPHONE (OUTPATIENT)
Dept: EMERGENCY MEDICINE | Facility: CLINIC | Age: 68
End: 2018-06-21

## 2018-06-21 DIAGNOSIS — A04.72 C. DIFFICILE COLITIS: ICD-10-CM

## 2018-06-21 LAB
C COLI+JEJUNI+LARI FUSA STL QL NAA+PROBE: NOT DETECTED
C DIFF TOX B STL QL: POSITIVE
EC STX1 GENE STL QL NAA+PROBE: NOT DETECTED
EC STX2 GENE STL QL NAA+PROBE: NOT DETECTED
ENTERIC PATHOGEN COMMENT: NORMAL
NOROV GI+II ORF1-ORF2 JNC STL QL NAA+PR: NOT DETECTED
RVA NSP5 STL QL NAA+PROBE: NOT DETECTED
SALMONELLA SP RPOD STL QL NAA+PROBE: NOT DETECTED
SHIGELLA SP+EIEC IPAH STL QL NAA+PROBE: NOT DETECTED
SPECIMEN SOURCE: ABNORMAL
V CHOL+PARA RFBL+TRKH+TNAA STL QL NAA+PR: NOT DETECTED
Y ENTERO RECN STL QL NAA+PROBE: NOT DETECTED

## 2018-06-21 PROCEDURE — 87493 C DIFF AMPLIFIED PROBE: CPT | Performed by: EMERGENCY MEDICINE

## 2018-06-21 PROCEDURE — 87506 IADNA-DNA/RNA PROBE TQ 6-11: CPT | Performed by: EMERGENCY MEDICINE

## 2018-06-21 RX ORDER — METRONIDAZOLE 500 MG/1
500 TABLET ORAL 3 TIMES DAILY
Qty: 42 TABLET | Refills: 0 | Status: SHIPPED | OUTPATIENT
Start: 2018-06-21 | End: 2018-07-05

## 2018-06-21 NOTE — TELEPHONE ENCOUNTER
[facility] Emergency Department Lab result notification [Adult-Male]    [facility] ED lab result protocol used  Clostridium    Reason for call  Notify of lab results, assess symptoms,  review ED providers recommendations/discharge instructions (if necessary) and advise per ED lab result f/u protocol    Lab Result (including Rx patient on, if applicable)  Final Clostridium difficile toxin B PCR is POSITIVE.  Toxin producing Clostridium difficile target DNA sequences detected, presumed positive for Clostridium difficile toxin B.   Rx (Flagyl or Vancomycin) prescribed upon discharge from the [facility] ED/[facility]:  No  If positive for C diff toxin B and treated appropriately, notify patient of results.  If No Rx, advise and/or treat per [facility] ED Lab Result protocol.    Information table from ED Provider visit on 6/19/18  ED diagnosis: Nausea vomiting diarrhea LLQ abd pain   ED provider Carmen SANTAMARIA   Symptoms reported at ED visit (Chief complaint, HPI) Chief Complaint:  Abdominal Pain     HPI   Jamar Ivory is a 68 year old male with a history of type 2 diabetes, cerebral infarction, hypertension, hyperlipidemia, lung cancer as a teenager, and bilateral leg swelling who presents to the emergency department with his wife for evaluation of abdominal pain. The patient reports 4 episodes of runny diarrhea this morning, with no bloody or black stool. This was accompanied by left sided abdominal pain, nausea, and vomiting. He has been feeling warm recently but did not measure his temperature. He denies any trouble urinating, shortness of breath, chest pain, increased leg swelling, ill contacts with similar symptoms, recent travel or hospitalizations. He does admits to being on antibiotics a couple months ago for a chronic left leg wound. The patient has chronic skin ulcerations to his bilateral lower legs. His wife adds that he has been snoring the past couple of nights, but that he typically does not snore. The  patient has a surgical history of appendectomy but no other abdominal surgeries. He has no history of COPD or asthma.    ED providers Impression and Plan (applicable information) Medical Decision Making:  Jamar Ivory is a 68 year old male who presents to the emergency department today with  Concerns for nausea, vomiting, and diarrhea, as well as associated low abdominal pain that started after the diarrhea. He was slightly tachycardiac and had a fever upon presentation. This improved with fluids. His initial lactic by ISTAT gases was normal but there was slight elevation in the lactic sent onto laboratory. This also improved with a liter of IV fluid. He is feeling much better on reassessment. The abdominal pain does not appear to be cause for any acute surgical or emergent life threatening cause based on the CT scan. He noted a desire to go home and seems reasonable in this circumstance, although he is aware that he could be early in acute pathology and should return immediately if symptoms worsen. Zofran given for nausea and vomiting. He was unable to give a diarrhea sample here and was sent home with a stool collection kit, looking for an etiology for the diarrhea given the associated fever and abdominal pain. He should follow up with his PCP within two days for reassessment. All questions answered. Discharged.      Diagnosis:      ICD-10-CM     1. Nausea, vomiting and diarrhea R11.2 Enteric Bacteria and Virus Panel by JOE Stool     R19.7 Clostridium difficile toxin B   2. LLQ abdominal pain R10.32        Significant Medical hx, if applicable Review   Coumadin/Warfarin [Yes or No] No   Creatinine Level (mg/dl) 0.90   Creatinine clearance (ml/min), if applicable 168.98 mL/min   Allergies Clopidogrel   PenicillinsRash      Weight, if applicable 340 pounds      RN Assessment (Patient s current Symptoms), include time called.  [Insert Left message here if message left]  4:00pm I spoke with wife Melissa and  Jamar. His vomiting has stopped but still having diarrhea.     RN Recommendations/Instructions per [facility] ED lab result protocol  Patient notified of lab result and treatment recommendations.  Rx for Metronidazole (Flagyl) 500 MG tablet, TID for 14 days  sent to [Pharmacy - [facility] [place]].  RN reviewed information about C. Diff and hygiene. Advised to finish full course of antibiotics as prescribed and drink plenty of fluids.  And follow up with your PCP as the ED provider recommended.     Please Contact your PCP clinic or return to the Emergency department if your:    Symptoms return.    Symptoms do not improve after 3 days on antibiotic.    Symptoms do not resolve after completing antibiotic.    Symptoms worsen or other concerning symptom's.    PCP follow-up Questions asked: YES       Fatimah Cordon RN  [facility] RN  Lung Nodule and ED Lab Result F/u RN  Epic pool (ED late result f/u RN): [id number]  # [phone]

## 2018-06-28 ENCOUNTER — OFFICE VISIT (OUTPATIENT)
Dept: FAMILY MEDICINE | Facility: CLINIC | Age: 68
End: 2018-06-28
Payer: MEDICARE

## 2018-06-28 VITALS
TEMPERATURE: 98.2 F | BODY MASS INDEX: 50.63 KG/M2 | WEIGHT: 315 LBS | DIASTOLIC BLOOD PRESSURE: 64 MMHG | HEART RATE: 77 BPM | SYSTOLIC BLOOD PRESSURE: 120 MMHG | RESPIRATION RATE: 18 BRPM | OXYGEN SATURATION: 92 %

## 2018-06-28 DIAGNOSIS — S81.809D OPEN WOUND OF LOWER EXTREMITY, UNSPECIFIED LATERALITY, SUBSEQUENT ENCOUNTER: ICD-10-CM

## 2018-06-28 DIAGNOSIS — A04.72 COLITIS DUE TO CLOSTRIDIUM DIFFICILE: Primary | ICD-10-CM

## 2018-06-28 DIAGNOSIS — Z23 NEED FOR VACCINATION: ICD-10-CM

## 2018-06-28 DIAGNOSIS — I50.42 CHRONIC COMBINED SYSTOLIC AND DIASTOLIC CONGESTIVE HEART FAILURE (H): ICD-10-CM

## 2018-06-28 DIAGNOSIS — F32.1 MODERATE MAJOR DEPRESSION (H): Chronic | ICD-10-CM

## 2018-06-28 DIAGNOSIS — I73.9 PVD (PERIPHERAL VASCULAR DISEASE) (H): ICD-10-CM

## 2018-06-28 DIAGNOSIS — E11.9 TYPE 2 DIABETES MELLITUS WITHOUT COMPLICATION, WITHOUT LONG-TERM CURRENT USE OF INSULIN (H): Chronic | ICD-10-CM

## 2018-06-28 DIAGNOSIS — Z23 NEED FOR PROPHYLACTIC VACCINATION AGAINST STREPTOCOCCUS PNEUMONIAE (PNEUMOCOCCUS): ICD-10-CM

## 2018-06-28 DIAGNOSIS — I47.10 SUPRAVENTRICULAR TACHYCARDIA (H): ICD-10-CM

## 2018-06-28 LAB
ERYTHROCYTE [DISTWIDTH] IN BLOOD BY AUTOMATED COUNT: 14.8 % (ref 10–15)
HBA1C MFR BLD: 5.6 % (ref 0–5.6)
HCT VFR BLD AUTO: 36.1 % (ref 40–53)
HGB BLD-MCNC: 12.1 G/DL (ref 13.3–17.7)
MCH RBC QN AUTO: 28.6 PG (ref 26.5–33)
MCHC RBC AUTO-ENTMCNC: 33.5 G/DL (ref 31.5–36.5)
MCV RBC AUTO: 85 FL (ref 78–100)
PLATELET # BLD AUTO: 253 10E9/L (ref 150–450)
RBC # BLD AUTO: 4.23 10E12/L (ref 4.4–5.9)
WBC # BLD AUTO: 4.1 10E9/L (ref 4–11)

## 2018-06-28 PROCEDURE — 85027 COMPLETE CBC AUTOMATED: CPT | Performed by: PHYSICIAN ASSISTANT

## 2018-06-28 PROCEDURE — 36415 COLL VENOUS BLD VENIPUNCTURE: CPT | Performed by: PHYSICIAN ASSISTANT

## 2018-06-28 PROCEDURE — 90670 PCV13 VACCINE IM: CPT | Performed by: PHYSICIAN ASSISTANT

## 2018-06-28 PROCEDURE — 99214 OFFICE O/P EST MOD 30 MIN: CPT | Mod: 25 | Performed by: PHYSICIAN ASSISTANT

## 2018-06-28 PROCEDURE — 80048 BASIC METABOLIC PNL TOTAL CA: CPT | Performed by: PHYSICIAN ASSISTANT

## 2018-06-28 PROCEDURE — 83036 HEMOGLOBIN GLYCOSYLATED A1C: CPT | Performed by: PHYSICIAN ASSISTANT

## 2018-06-28 PROCEDURE — G0009 ADMIN PNEUMOCOCCAL VACCINE: HCPCS | Performed by: PHYSICIAN ASSISTANT

## 2018-06-28 RX ORDER — BUPROPION HYDROCHLORIDE 150 MG/1
300 TABLET ORAL EVERY MORNING
Qty: 90 TABLET | Refills: 1 | Status: SHIPPED | OUTPATIENT
Start: 2018-06-28 | End: 2018-08-29

## 2018-06-28 ASSESSMENT — ENCOUNTER SYMPTOMS
EYES NEGATIVE: 1
CARDIOVASCULAR NEGATIVE: 1
NEUROLOGICAL NEGATIVE: 1
ROS GI COMMENTS: AS IN HPI
RESPIRATORY NEGATIVE: 1
ROS SKIN COMMENTS: AS IN HPI

## 2018-06-28 ASSESSMENT — ANXIETY QUESTIONNAIRES
GAD7 TOTAL SCORE: 6
6. BECOMING EASILY ANNOYED OR IRRITABLE: SEVERAL DAYS
7. FEELING AFRAID AS IF SOMETHING AWFUL MIGHT HAPPEN: SEVERAL DAYS
1. FEELING NERVOUS, ANXIOUS, OR ON EDGE: MORE THAN HALF THE DAYS
5. BEING SO RESTLESS THAT IT IS HARD TO SIT STILL: NOT AT ALL
GAD7 TOTAL SCORE: 6
2. NOT BEING ABLE TO STOP OR CONTROL WORRYING: MORE THAN HALF THE DAYS
GAD7 TOTAL SCORE: 6
3. WORRYING TOO MUCH ABOUT DIFFERENT THINGS: NOT AT ALL
4. TROUBLE RELAXING: NOT AT ALL
7. FEELING AFRAID AS IF SOMETHING AWFUL MIGHT HAPPEN: SEVERAL DAYS

## 2018-06-28 ASSESSMENT — PATIENT HEALTH QUESTIONNAIRE - PHQ9
SUM OF ALL RESPONSES TO PHQ QUESTIONS 1-9: 12
SUM OF ALL RESPONSES TO PHQ QUESTIONS 1-9: 12

## 2018-06-28 NOTE — MR AVS SNAPSHOT
After Visit Summary   6/28/2018    Jamar Ivory    MRN: [id number]           Patient Information     Date Of Birth          1950        Visit Information        Provider Department      6/28/2018 11:10 AM Myah Florez PA-C [facility]        Today's Diagnoses     Colitis due to Clostridium difficile    -  1    Need for prophylactic vaccination against Streptococcus pneumoniae (pneumococcus)        Chronic combined systolic and diastolic congestive heart failure (H)        Supraventricular tachycardia (H)        PVD (peripheral vascular disease) (H)        Type 2 diabetes mellitus without complication, without long-term current use of insulin (H)        Moderate major depression (H)        Open wound of lower extremity, unspecified laterality, subsequent encounter          Care Instructions    1. Depression medication -     For two weeks (6/28/2018 - 7/12/2018) take 20 mg of fluoxetine daily.  Start taking Wellbutrin  mg every morning.   Stop taking fluoxetine on 7/13/2018, and start taking Wellbutrin  mg in the morning.     Come see me in 3 weeks for a depression recheck.   If you are feeling worse (or have suicidal thought) - either come see me or go to the ER right away.     2. Schedule an appointment with lymphedema specialist for the leg swelling and wound.     3. Continue to take the metronidazole as directed.            Follow-ups after your visit        Additional Services     LYMPHEDEMA THERAPY REFERRAL       *This therapy referral will be filtered to a centralized scheduling office at [facility] and the patient will receive a call to schedule an appointment at a [place] location most convenient for them. *   If you have not heard from the scheduling office within 2 business days, please call [phone] for all locations, with the exception of [place], please call [phone].     Treatment: PT or OT  "Evaluation & Treatment  Special Instructions: chronic lower extremity wounds related to edema  PT/OT Treatment Diagnosis: Edema and Wound(s)    Please be aware that coverage of these services is subject to the terms and limitations of your health insurance plan.  Call member services at your health plan with any benefit or coverage questions.      **Note to Provider:  If you are referring outside of [facility] for the therapy appointment, please list the name of the location in the \"special instructions\" above, print the referral and give to the patient to schedule the appointment.                  Follow-up notes from your care team     Return in about 3 weeks (around 7/19/2018) for depression recheck.      Your next 10 appointments already scheduled     Jul 10, 2018 11:40 AM CDT   Return Visit with Cary Grace MD   [facility])    [facility]  [address] [address] [phone]              Who to contact     If you have questions or need follow up information about today's clinic visit or your schedule please contact [facility] directly at [phone].  Normal or non-critical lab and imaging results will be communicated to you by MyChart, letter or phone within 4 business days after the clinic has received the results. If you do not hear from us within 7 days, please contact the clinic through MyChart or phone. If you have a critical or abnormal lab result, we will notify you by phone as soon as possible.  Submit refill requests through [employer] or call your pharmacy and they will forward the refill request to us. Please allow 3 business days for your refill to be completed.          Additional Information About Your Visit        Care EveryWhere ID     This is your Care EveryWhere ID. This could be used by other organizations to access your [facility] medical " records  [id number]        Your Vitals Were     Pulse Temperature Respirations Pulse Oximetry BMI (Body Mass Index)       77 98.2  F (36.8  C) 18 92% 50.63 kg/m2        Blood Pressure from Last 3 Encounters:   06/28/18 120/64   06/20/18 120/57   06/20/18 138/62    Weight from Last 3 Encounters:   06/28/18 (!) 333 lb (151 kg)   06/20/18 (!) 340 lb (154.2 kg)   06/20/18 (!) 340 lb (154.2 kg)              We Performed the Following     ADMIN: Vaccine, Initial (93398)     Basic metabolic panel  (Ca, Cl, CO2, Creat, Gluc, K, Na, BUN)     CBC with platelets     HEMOGLOBIN A1C     LYMPHEDEMA THERAPY REFERRAL     Pneumococcal vaccine 23 valent PPSV23  (Pneumovax) [14105]          Today's Medication Changes          These changes are accurate as of 6/28/18 11:28 AM.  If you have any questions, ask your nurse or doctor.               Start taking these medicines.        Dose/Directions    buPROPion 150 MG 24 hr tablet   Commonly known as:  WELLBUTRIN XL   Used for:  Type 2 diabetes mellitus without complication, without long-term current use of insulin (H)   Started by:  Myah Florez PA-C        Dose:  300 mg   Take 2 tablets (300 mg) by mouth every morning   Quantity:  90 tablet   Refills:  1            Where to get your medicines      These medications were sent to [place] [address] [place] [address] [address]     Phone:  [phone]     buPROPion 150 MG 24 hr tablet                Primary Care Provider Office Phone # Fax #    Blanca Peng [phone]       [facility] [address]        Equal Access to Services     JOSE ANTONIO AKERS AH: Isaac Fowler, cullen yap, lisa amaro, maykel parada. So [facility] [phone].    ATENCIÓN: Si habla español, tiene a shook disposición servicios gratuitos de asistencia lingüística. Llame al [phone].    We comply with  applicable federal civil rights laws and [place] laws. We do not discriminate on the basis of race, color, national origin, age, disability, sex, sexual orientation, or gender identity.            Thank you!     Thank you for choosing [facility]  for your care. Our goal is always to provide you with excellent care. Hearing back from our patients is one way we can continue to improve our services. Please take a few minutes to complete the written survey that you may receive in the mail after your visit with us. Thank you!             Your Updated Medication List - Protect others around you: Learn how to safely use, store and throw away your medicines at www.disposemymeds.org.          This list is accurate as of 6/28/18 11:28 AM.  Always use your most recent med list.                   Brand Name Dispense Instructions for use Diagnosis    buPROPion 150 MG 24 hr tablet    WELLBUTRIN XL    90 tablet    Take 2 tablets (300 mg) by mouth every morning    Type 2 diabetes mellitus without complication, without long-term current use of insulin (H)       FLUoxetine 40 MG capsule    PROzac    90 capsule    Take 1 capsule (40 mg) by mouth daily    Moderate episode of recurrent major depressive disorder (H)       furosemide 80 MG tablet    LASIX    180 tablet    TAKE 1 TABLET (80 MG) BY MOUTH 2 TIMES DAILY    Edema, unspecified type       levothyroxine 200 MCG tablet    SYNTHROID/LEVOTHROID    90 tablet    TAKE 1 TABLET (200 MCG) BY MOUTH DAILY    Acquired hypothyroidism       losartan 25 MG tablet    COZAAR    90 tablet    Take 1 tablet (25 mg) by mouth daily        metroNIDAZOLE 500 MG tablet    FLAGYL    42 tablet    Take 1 tablet (500 mg) by mouth 3 times daily for 14 days    C. difficile colitis       order for DME     100 strip    Equipment being ordered: blood glucose monitor strips to use 2 to 4 times  A day. As covered by insurance. #100 with 5 refills  Monitor ordered this day  As well.   Also lancets #100 use as needed for testing 5 refils    Diabetes mellitus, type 2 (H)       order for DME     3 Device    Compression Stockings Ready To Wear Knee High Compression Stockings 20-30 mmHg Length of Need: Life Time # of Pairs 3    Type 2 diabetes mellitus with diabetic dermatitis, unspecified long term insulin use status (H), Swelling of lower extremity, Lymphedema of both lower extremities, Chronic venous hypertension with ulcer, bilateral (H), Varicose veins of both legs with edema, Open wound of knee, leg, and ankle, unspecified laterality, subsequent encounter, Venous (peripheral) insufficiency       order for DME     30 days    Equipment being ordered: [employer] Phone [phone] Fax [phone]  Primary Dressing Xeroform   Qty 30 sheets Secondary Dressing guanako rolls Qty 60 Secondary Dressing 2'tape Qty 1 roll Length of Need: 1 month Frequency of dressing change: daily    Lymphedema of both lower extremities, Venous ulcer of left leg (H)       simvastatin 20 MG tablet    ZOCOR    90 tablet    Take 1 tablet (20 mg) by mouth At Bedtime    Hyperlipidemia with target LDL less than 100       tamsulosin 0.4 MG capsule    FLOMAX    180 capsule    Take 2 capsules (0.8 mg) by mouth daily    Benign prostatic hyperplasia with weak urinary stream

## 2018-06-28 NOTE — LETTER
June 29, 2018    Jamar Ivory  [address]   [place] [address]      Dear ,    We are writing to inform you of your test results.    Your test results fall within the expected range(s) or remain unchanged from previous results.  Please continue with current treatment plan.    Resulted Orders   HEMOGLOBIN A1C   Result Value Ref [place]    Hemoglobin A1C 5.6 0 - 5.6 %      Comment:      Normal <5.7% Prediabetes 5.7-6.4%  Diabetes 6.5% or higher - adopted from ADA   consensus guidelines.     Basic metabolic panel  (Ca, Cl, CO2, Creat, Gluc, K, Na, BUN)   Result Value Ref [place]    Sodium 141 133 - 144 mmol/L    Potassium 3.8 3.4 - 5.3 mmol/L    Chloride 105 94 - 109 mmol/L    Carbon Dioxide 26 20 - 32 mmol/L    Anion Gap 10 3 - 14 mmol/L    Glucose 101 (H) 70 - 99 mg/dL      Comment:      Non Fasting    Urea Nitrogen 17 7 - 30 mg/dL    Creatinine 1.04 0.66 - 1.25 mg/dL    GFR Estimate 71 >60 mL/min/1.7m2      Comment:      Non  GFR Calc    GFR Estimate If Black 86 >60 mL/min/1.7m2      Comment:       GFR Calc    Calcium 8.8 8.5 - 10.1 mg/dL   CBC with platelets   Result Value Ref [place]    WBC 4.1 4.0 - 11.0 10e9/L    RBC Count 4.23 (L) 4.4 - 5.9 10e12/L    Hemoglobin 12.1 (L) 13.3 - 17.7 g/dL    Hematocrit 36.1 (L) 40.0 - 53.0 %    MCV 85 78 - 100 fl    MCH 28.6 26.5 - 33.0 pg    MCHC 33.5 31.5 - 36.5 g/dL    RDW 14.8 10.0 - 15.0 %    Platelet Count 253 150 - 450 10e9/L       If you have any questions or concerns, please call the clinic at the number listed above.       Sincerely,        Calvin Florez PA-C
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
## 2018-06-28 NOTE — PATIENT INSTRUCTIONS
1. Depression medication -     For two weeks (6/28/2018 - 7/12/2018) take 20 mg of fluoxetine daily.  Start taking Wellbutrin  mg every morning.   Stop taking fluoxetine on 7/13/2018, and start taking Wellbutrin  mg in the morning.     Come see me in 3 weeks for a depression recheck.   If you are feeling worse (or have suicidal thought) - either come see me or go to the ER right away.     2. Schedule an appointment with lymphedema specialist for the leg swelling and wound.     3. Continue to take the metronidazole as directed.

## 2018-06-29 LAB
ANION GAP SERPL CALCULATED.3IONS-SCNC: 10 MMOL/L (ref 3–14)
BUN SERPL-MCNC: 17 MG/DL (ref 7–30)
CALCIUM SERPL-MCNC: 8.8 MG/DL (ref 8.5–10.1)
CHLORIDE SERPL-SCNC: 105 MMOL/L (ref 94–109)
CO2 SERPL-SCNC: 26 MMOL/L (ref 20–32)
CREAT SERPL-MCNC: 1.04 MG/DL (ref 0.66–1.25)
GFR SERPL CREATININE-BSD FRML MDRD: 71 ML/MIN/1.7M2
GLUCOSE SERPL-MCNC: 101 MG/DL (ref 70–99)
POTASSIUM SERPL-SCNC: 3.8 MMOL/L (ref 3.4–5.3)
SODIUM SERPL-SCNC: 141 MMOL/L (ref 133–144)

## 2018-06-29 ASSESSMENT — PATIENT HEALTH QUESTIONNAIRE - PHQ9: SUM OF ALL RESPONSES TO PHQ QUESTIONS 1-9: 12

## 2018-06-29 ASSESSMENT — ANXIETY QUESTIONNAIRES: GAD7 TOTAL SCORE: 6

## 2018-07-06 ENCOUNTER — HOSPITAL ENCOUNTER (OUTPATIENT)
Dept: OCCUPATIONAL THERAPY | Facility: CLINIC | Age: 68
Setting detail: THERAPIES SERIES
End: 2018-07-06
Attending: PHYSICIAN ASSISTANT
Payer: MEDICARE

## 2018-07-06 DIAGNOSIS — R60.0 LOCALIZED EDEMA: Primary | ICD-10-CM

## 2018-07-06 PROCEDURE — 97165 OT EVAL LOW COMPLEX 30 MIN: CPT | Mod: GO

## 2018-07-06 PROCEDURE — 97535 SELF CARE MNGMENT TRAINING: CPT | Mod: GO

## 2018-07-06 PROCEDURE — G8990 OTHER PT/OT CURRENT STATUS: HCPCS | Mod: GO,CL

## 2018-07-06 PROCEDURE — G8991 OTHER PT/OT GOAL STATUS: HCPCS | Mod: GO,CI

## 2018-07-06 PROCEDURE — 40000445 ZZHC STATISTIC OT VISIT, LYMPHEDEMA

## 2018-07-06 NOTE — PROGRESS NOTES
[facility]        OUTPATIENT OCCUPATIONAL THERAPY EDEMA EVALUATION  PLAN OF TREATMENT FOR OUTPATIENT REHABILITATION  (COMPLETE FOR INITIAL CLAIMS ONLY)  Patient's Last Name, First Name, Jamar Garrido                           Provider s Name:   [facility] Medical Record No.  [id number]     Start of Care Date:  07/06/18   Onset Date:  06/28/18   Type:  OT   Medical Diagnosis:  edema; wounds; PVD   Therapy Diagnosis:  lymphedema Visits from SOC:  1                                     __________________________________________________________________________________   Plan of Treatment/Functional Goals:    Gradient compression bandaging, Fit for compression garment, Exercises, Precautions to prevent infection / exacerbation, Education, Skin care / precautions, Home management program development        GOALS  1. Goal description: In order to improve functional mobility for activities of living, promote wound healing and skin closure for pain reductions and reductions in prevelance cellulitis infections, and promote better fit/ease with LB dressing, by the completion of the intsnive phase of services the pt and/or caregiver will:       Target date: 08/31/18  2. Goal description: tolerate gradient compression bandaging/wearing compression garments 23 hrs/day to prevent re-acccumulation of extracellular fluid for reductions needed to aid eskin healing for reduced incidence cellulitis, reduce pain with rest and activity, and promote improved functional ambulation engagement       Target date: 08/31/18  3. Goal description: demonstrate independence in applying gradient compression bandages to build I with home management of BLE lymphedema/edema needed to aide skin healing for infection prevention, wound healing and pain reductions for imporved daily activity  engageemnt, and promote more ease and I with LB dressing       Target date: 08/31/18  4. Goal description: demonstrate independence in performing prescribed exercises to facilate the muscle pumping systems for max reductions needed to aide skin healing, reduce pain, and promote increased functional ambulation engagement       Target date: 08/31/18  5. Goal description: be independent in donning/doffing, wearing schedule, and care of compression garments for I building with home management of BLE lymphedema/edema needed to reduce recurrent cellulitis infections, promote skin healing and pain reductions for imporved mobility, and promote increased I with LB dressing       Target date: 08/31/18  6. Goal description: Display total BLE volume reduction of .75L+ for reductions needed to aide skin closure for wound healing, to promote reduction cellulitis infections, and promote improvements in functional mobility       Target date: 08/31/18     7.             8.              Treatment frequency: 2 times / week, 3 times / week   Treatment duration: 3x/wk x 2 weeks, then 2x/wk x 1 week, then 0x/wk x 3 weeks, then 1x/wk x 1 week    Noble Gonzalez I CERTIFY THE NEED FOR THESE SERVICES FURNISHED UNDER        THIS PLAN OF TREATMENT AND WHILE UNDER MY CARE     (Physician co-signature of this document indicates review and certification of the therapy plan).                   Certification date from: 07/06/18       Certification date to: 08/31/18           Referring physician: Myah Florez PA-C   Initial Assessment  See Epic Evaluation- Start of care: 07/06/18

## 2018-07-06 NOTE — PROGRESS NOTES
07/06/18 1600   Quick Adds   Quick Adds Certification   Rehab Discipline   Discipline OT   Type of Visit   Type of visit Initial Edema Evaluation   General Information   Start of care 07/06/18   Referring physician Myah Florez PA-C   Orders Evaluate and treat as indicated   Order date 06/28/18   Medical diagnosis edema; wounds; PVD   Onset of illness / date of surgery 06/28/18   Edema onset 06/28/18   Affected body parts LLE;RLE   Edema etiology Infection;Chronic Venous Insufficiency   Edema etiology comments Pt reports wounds visualized on BLE's today have been present in some shape or form over past 2 years. Pt displays hemosidern staining, varicose veins/spider veins, and appears to have struggles with CVI/venous stasis despite no diagnosis found in chart or helath history. History CVA 2 years ago w/o residual deficits. Pt offers no cancer or cardipovbascular history that appears to be related to BLE swelling and wounds. Pt is obese and inactive 2/2 B knee arthritis.   Pertinent history of current problem (PT: include personal factors and/or comorbidities that impact the POC; OT: include additional occupational profile info) PMH significant for arthritis, lung cancer 1968 remedeiated with radiation, DVT, headaches, HTN, cellulitis infections, thyroid problems, visoion/hearing problems, and weakness/loss energy.   Surgical / medical history reviewed Yes   Edema special tests Ultrasound   Prior level of functional mobility I   Prior treatment Compression garments;Elevation   Community support Family / friend caregiver   Patient role / employment history Retired   Living environment [place] [facility]   Fall Risk Screen   Fall screen completed by OT   Have you fallen 2 or more times in the past year? No   Have you fallen and had an injury in the past year? No   Is patient a fall risk? No   System Outcome Measures   Lymphedema Life Impact Scale (score range 0-72). A higher score indicates greater  impairment. 26   Subjective Report   Patient report of symptoms pain; infections   Patient / Family Goals   Patient / family goals statement to get rid of swelling; reduce wounds/help skin heal   Pain   Patient currently in pain Yes   Pain location LLE medial calf wound   Pain description Burning   Cognitive Status   Orientation Orientation to person, place and time   Level of consciousness Alert   Follows commands and answers questions 100% of the time   Personal safety and judgement Intact   Edema Exam / Assessment   Skin condition Pitting;Hemosiderin deposits   Skin condition comments 2-3+ pitting ankles-knee crease; 1-2+ B feet   Pitting 1+;2+;3+   Pitting location BLE   Capillary refill Symmetrical   Dorsal pedal pulse comments unable to palpate-no signs ischemia   Stemmer sign Negative   Girth Measurements   Girth Measurements (will obtain upon return for services)   Range of Motion   ROM comments WFL   Strength   Strength comments NT'd   Activities of Daily Living   Activities of Daily Living I-Min A   Bed Mobility   Bed mobility I   Transfers   Transfers I   Gait / Locomotion   Gait / Locomotion I   Sensory   Sensory perception comments no neuropathy reported or identified   Vascular Assessment   Vascular Assessment Comments BLE appear to have venous involvement (CVI or venous stasis?)   Coordination   Coordination Gross motor coordination appropriate   Muscle Tone   Muscle tone No deficits were identified   Planned Edema Interventions   Planned edema interventions Gradient compression bandaging;Fit for compression garment;Exercises;Precautions to prevent infection / exacerbation;Education;Skin care / precautions;Home management program development   Clinical Impression   Criteria for skilled therapeutic intervention met Yes   Therapy diagnosis lymphedema   Influenced by the following impairments / conditions Stage 2;Phlebolymphedema;Wounds / Ulcers   Assessment of Occupational Performance 3-5 Performance  Deficits   Identified Performance Deficits decreased I with LB dressing; decreased functional mobility; recurrent cellulitis infections; wounds/pain   Clinical Decision Making (Complexity) Low complexity   Treatment frequency 2 times / week;3 times / week   Treatment duration 3x/wk x 2 weeks, then 2x/wk x 1 week, then 0x/wk x 3 weeks, then 1x/wk x 1 week   Patient / family and/or staff in agreement with plan of care Yes   Risks and benefits of therapy have been explained Yes   Clinical impression comments Pt will benefit from skilled lymphedema services to reduce BLE edema/lymphedema to promote skin healing and wound closure for pain reductions with activity, reduce incidence cellulitis infection, promote imporved functional mobility engagement, and promote better fit/I with LB dressing   Goals   Edema Eval Goals 1;2;3;4;5;6   Goal 1   Goal identifier 1   Goal description In order to improve functional mobility for activities of living, promote wound healing and skin closure for pain reductions and reductions in prevelance cellulitis infections, and promote better fit/ease with LB dressing, by the completion of the intsnive phase of services the pt and/or caregiver will:   Target date 08/31/18   Goal 2   Goal identifier 1a   Goal description tolerate gradient compression bandaging/wearing compression garments 23 hrs/day to prevent re-acccumulation of extracellular fluid for reductions needed to aid eskin healing for reduced incidence cellulitis, reduce pain with rest and activity, and promote improved functional ambulation engagement   Target date 08/31/18   Goal 3   Goal identifier 1b   Goal description demonstrate independence in applying gradient compression bandages to build I with home management of BLE lymphedema/edema needed to aide skin healing for infection prevention, wound healing and pain reductions for imporved daily activity engageemnt, and promote more ease and I with LB dressing   Target date  08/31/18   Goal 4   Goal identifier 1c   Goal description demonstrate independence in performing prescribed exercises to facilate the muscle pumping systems for max reductions needed to aide skin healing, reduce pain, and promote increased functional ambulation engagement   Target date 08/31/18   Goal 5   Goal identifier 1d   Goal description be independent in donning/doffing, wearing schedule, and care of compression garments for I building with home management of BLE lymphedema/edema needed to reduce recurrent cellulitis infections, promote skin healing and pain reductions for imporved mobility, and promote increased I with LB dressing   Target date 08/31/18   Goal 6   Goal identifier 2   Goal description Display total BLE volume reduction of .75L+ for reductions needed to aide skin closure for wound healing, to promote reduction cellulitis infections, and promote improvements in functional mobility   Target date 08/31/18   Total Evaluation Time   Total evaluation time 20   Certification   Certification date from 07/06/18   Certification date to 08/31/18   Medical Diagnosis edema; wounds; PVD   Certification I certify the need for these services furnished under this plan of treatment and while under my care.  (Physician co-signature of this document indicates review and certification of the therapy plan).

## 2018-07-09 NOTE — ADDENDUM NOTE
Encounter addended by: Myah Florez PA-C on: 7/9/2018  7:33 AM<BR>     Actions taken: Cosign clinical note with attestation

## 2018-07-10 ENCOUNTER — HOSPITAL ENCOUNTER (OUTPATIENT)
Dept: OCCUPATIONAL THERAPY | Facility: CLINIC | Age: 68
Setting detail: THERAPIES SERIES
End: 2018-07-10
Attending: PHYSICIAN ASSISTANT
Payer: MEDICARE

## 2018-07-10 PROCEDURE — 97535 SELF CARE MNGMENT TRAINING: CPT | Mod: GO

## 2018-07-10 PROCEDURE — 40000445 ZZHC STATISTIC OT VISIT, LYMPHEDEMA

## 2018-07-11 ENCOUNTER — HOSPITAL ENCOUNTER (OUTPATIENT)
Dept: OCCUPATIONAL THERAPY | Facility: CLINIC | Age: 68
Setting detail: THERAPIES SERIES
End: 2018-07-11
Attending: PHYSICIAN ASSISTANT
Payer: MEDICARE

## 2018-07-11 PROCEDURE — 40000445 ZZHC STATISTIC OT VISIT, LYMPHEDEMA

## 2018-07-11 PROCEDURE — 97535 SELF CARE MNGMENT TRAINING: CPT | Mod: GO

## 2018-07-12 ENCOUNTER — TELEPHONE (OUTPATIENT)
Dept: FAMILY MEDICINE | Facility: CLINIC | Age: 68
End: 2018-07-12

## 2018-07-12 ENCOUNTER — HOSPITAL ENCOUNTER (OUTPATIENT)
Dept: OCCUPATIONAL THERAPY | Facility: CLINIC | Age: 68
Setting detail: THERAPIES SERIES
End: 2018-07-12
Attending: PHYSICIAN ASSISTANT
Payer: MEDICARE

## 2018-07-12 PROCEDURE — 97535 SELF CARE MNGMENT TRAINING: CPT | Mod: GO

## 2018-07-12 PROCEDURE — 40000445 ZZHC STATISTIC OT VISIT, LYMPHEDEMA

## 2018-07-12 NOTE — TELEPHONE ENCOUNTER
Reason for Call:  Other Patient information    Detailed comments: Patients wife called in states that patient has been doing therapy and having his legs wrapped. From Tuesday-Thursday patient has lost about 2 liters of fluid in legs. Wanted Dr. Peng to be aware.     Phone Number Patient can be reached at: Home number on file [phone] (home)    Best Time: any    Can we leave a detailed message on this number? Not Applicable    Call taken on 7/12/2018 at 10:56 AM by Sharee Mora

## 2018-07-15 ENCOUNTER — APPOINTMENT (OUTPATIENT)
Dept: ULTRASOUND IMAGING | Facility: CLINIC | Age: 68
End: 2018-07-15
Attending: EMERGENCY MEDICINE
Payer: MEDICARE

## 2018-07-15 ENCOUNTER — APPOINTMENT (OUTPATIENT)
Dept: GENERAL RADIOLOGY | Facility: CLINIC | Age: 68
End: 2018-07-15
Attending: EMERGENCY MEDICINE
Payer: MEDICARE

## 2018-07-15 ENCOUNTER — HOSPITAL ENCOUNTER (EMERGENCY)
Facility: CLINIC | Age: 68
Discharge: HOME | End: 2018-07-15
Attending: EMERGENCY MEDICINE | Admitting: EMERGENCY MEDICINE
Payer: MEDICARE

## 2018-07-15 VITALS
WEIGHT: 315 LBS | BODY MASS INDEX: 47.74 KG/M2 | HEART RATE: 86 BPM | TEMPERATURE: 98.7 F | HEIGHT: 68 IN | DIASTOLIC BLOOD PRESSURE: 73 MMHG | RESPIRATION RATE: 18 BRPM | OXYGEN SATURATION: 90 % | SYSTOLIC BLOOD PRESSURE: 152 MMHG

## 2018-07-15 DIAGNOSIS — M79.671 RIGHT FOOT PAIN: ICD-10-CM

## 2018-07-15 DIAGNOSIS — M72.2 PLANTAR FASCIITIS: ICD-10-CM

## 2018-07-15 PROCEDURE — 25000132 ZZH RX MED GY IP 250 OP 250 PS 637: Mod: GY | Performed by: EMERGENCY MEDICINE

## 2018-07-15 PROCEDURE — A9270 NON-COVERED ITEM OR SERVICE: HCPCS | Mod: GY | Performed by: EMERGENCY MEDICINE

## 2018-07-15 PROCEDURE — 99284 EMERGENCY DEPT VISIT MOD MDM: CPT | Mod: 25

## 2018-07-15 PROCEDURE — 93971 EXTREMITY STUDY: CPT | Mod: RT

## 2018-07-15 PROCEDURE — 73630 X-RAY EXAM OF FOOT: CPT | Mod: RT

## 2018-07-15 RX ORDER — HYDROCODONE BITARTRATE AND ACETAMINOPHEN 5; 325 MG/1; MG/1
1 TABLET ORAL ONCE
Status: COMPLETED | OUTPATIENT
Start: 2018-07-15 | End: 2018-07-15

## 2018-07-15 RX ORDER — HYDROCODONE BITARTRATE AND ACETAMINOPHEN 5; 325 MG/1; MG/1
1 TABLET ORAL EVERY 6 HOURS PRN
Qty: 8 TABLET | Refills: 0 | Status: SHIPPED | OUTPATIENT
Start: 2018-07-15 | End: 2018-07-17

## 2018-07-15 RX ORDER — IBUPROFEN 600 MG/1
600 TABLET, FILM COATED ORAL EVERY 6 HOURS PRN
Qty: 16 TABLET | Refills: 0 | Status: SHIPPED | OUTPATIENT
Start: 2018-07-15 | End: 2018-07-19

## 2018-07-15 RX ORDER — IBUPROFEN 600 MG/1
600 TABLET, FILM COATED ORAL ONCE
Status: COMPLETED | OUTPATIENT
Start: 2018-07-15 | End: 2018-07-15

## 2018-07-15 RX ADMIN — HYDROCODONE BITARTRATE AND ACETAMINOPHEN 1 TABLET: 5; 325 TABLET ORAL at 11:44

## 2018-07-15 RX ADMIN — IBUPROFEN 600 MG: 600 TABLET ORAL at 11:44

## 2018-07-15 ASSESSMENT — ENCOUNTER SYMPTOMS
WEAKNESS: 0
COLOR CHANGE: 0
WOUND: 0
MYALGIAS: 1
VOMITING: 0
NUMBNESS: 1
ARTHRALGIAS: 0
FEVER: 0
NAUSEA: 0
SHORTNESS OF BREATH: 0

## 2018-07-15 NOTE — ED TRIAGE NOTES
ABCs intact. Pt has been having lymphedema treatments on 7/6, 7/7, 7/11, 7/12. Pt had his legs wrapped on Thursday. Pt c/o pain and was told to take off the wraps Friday for 45 min, but has left them off since. Pt hx nerve pain. Pt . Pt BIBA.

## 2018-07-15 NOTE — ED PROVIDER NOTES
"  History     Chief Complaint:  \"My right foot hurts.\"    The history is provided by the patient and the spouse.      Jamar Ivory is a 68 year old male who presents with right foot pain. The patient has a complex past medical history notable for type 2 diabetes and CHF with lymphedema and takes Lasix. He follows with the lymphedema clinic and has his legs wrapped regularly and reports marked improvement in his chronic BLE swelling. Patient reports onset of right foot pain a few days ago. He reports this is a new pain and denies chronic leg or foot pain. He states pain is aching and  \"all over\" the foot and is mid to moderate at rest, but severe with weight bearing making it difficult to ambulate. When pressed, he is able to localize the pain to the sole and lateral aspect of his foot. He denies injury or trauma. He denies pain in the left leg. He denies fever, shortness of breath, chest pain, nausea, or vomiting. Denies skin changes. He is concerned about DVT and wife is concerned about neuropathy. It is unclear why these are their suspected diagnoses. While patient self reports a history of PE, he denies diabetes.    Allergies:  Clopidogrel  Penicillins      Medications:    Wellbutrin  Prozac  Lasix  Levothyroxine  Cozaar  Zocor  Flomax    Past Medical History:    Chronic combined systolic and diastolic CHF  SVT  PVD  Diabetic polyneuropathy  Type 2 diabetes  Obesity  TIA  Fatty liver  Moderate major depression  Cerebral infarction  Hypertension  Hyperlipidemia   Vitiligo  Acquired hypothyroidism   Arthritis  Self reported history of PE    Past Surgical History:    Appendectomy  Left index finger fracture fixation  Nasal bone fracture fixation    Family History:    Lung cancer   Diabetes    Social History:  Smoking Status: Never Smoker  Alcohol Use: rarely  Patient presents with wife via EMS.   Marital Status:        Review of Systems   Constitutional: Negative for fever.   Respiratory: Negative " "for shortness of breath.    Cardiovascular: Positive for leg swelling (chronic). Negative for chest pain.   Gastrointestinal: Negative for nausea and vomiting.   Musculoskeletal: Positive for myalgias (right foot). Negative for arthralgias.   Skin: Negative for color change and wound.   Neurological: Positive for numbness (baseline). Negative for weakness.   All other systems reviewed and are negative.      Physical Exam   Patient Vitals for the past 24 hrs:   BP Temp Temp src Pulse Resp SpO2 Height Weight   07/15/18 1200 - - - - - 90 % - -   07/15/18 1145 - - - - - 93 % - -   07/15/18 1130 - - - - - 91 % - -   07/15/18 1115 - - - - - 93 % - -   07/15/18 1100 - - - - - 92 % - -   07/15/18 1055 - - - - - 93 % - -   07/15/18 1030 152/73 98.7  F (37.1  C) Oral 86 18 96 % 1.727 m (5' 8\") (!) 151.5 kg (334 lb)        Physical Exam  General: Well-developed and obese. Well appearing elderly  man. Cooperative.  Head:  Atraumatic.  Eyes:  Conjunctivae, lids, and sclerae are normal.  ENT:    Normal nose. Moist mucous membranes.  Neck:  Supple. Normal range of motion.  CV:  Regular rate and rhythm. Normal heart sounds with no murmurs, rubs, or gallops detected.  Resp:  No respiratory distress. Clear to auscultation bilaterally without decreased breath sounds, wheezing, rales, or rhonchi.  GI:  Soft. Non-distended. Non-tender. Obese.   MS:  Normal ROM. 1+ bilateral lower extremity edema with chronic appearing skin changes and erythema. Left lower leg bandage clean/dry/intact.  Tenderness to the right foot cannot be well localized, but seems most significant on the plantar surface of MCPs, though he also endorses lateral and dorsal tenderness. There is no worsening erythema as compared to the left foot and no increased warmth. No fluctuance, masses, or induration. Soft compartments throughout. Mild edema similar to right foot. No ankle tenderness with full ROM.   Skin:  Warm. Non-diaphoretic. No pallor.  Neuro:  Awake. " A&Ox3. Normal strength.  Psych: Normal mood and affect. Normal speech.  Vitals reviewed.    Emergency Department Course     Imaging:  Radiographic findings were communicated with the patient who voiced understanding of the findings.    X-ray Right Foot, 3 views:  There is a linear density medial to the proximal second  phalanx which is probably external or artifact although it is  difficult to exclude foreign body. There is no evidence for any  fracture. Benign traction spurs are seen off the plantar fascia and  Achilles tendon attachment site. There is also some ossification in  the Achilles tendon. Degenerative changes are seen in the PIP joints  as well as the DIP joints and the first MTP joint. Degenerative change  is also seen in the midfoot.  Result per radiology.      US Lower extremity venous:  No evidence of deep venous thrombosis.  As read by Radiology.     Interventions:  1144 - Norco 5-325 mg tablet, 1 tablet PO   1144 - Ibuprofen 600 mg PO    Emergency Department Course:  Past medical records, nursing notes, and vitals reviewed.  1119: I performed an exam of the patient and obtained history, as documented above.  The patient was sent for a X-ray and ultrasound while in the emergency department, findings above.       1304: Recheck patient.  Now reports no pain but has not tried to ambulate.     1339: Patient ambulated with post-op shoe and walker. Did very well per ED tech..    1415: I rechecked the patient. Findings and plan explained to the patient. Patient discharged home with instructions regarding supportive care, medications, and reasons to return. The importance of close follow-up was reviewed.      Impression & Plan      Medical Decision Making:  Jamar is a 68-year-old man who presents with right foot pain.  Patient states pain is constant and worse with weightbearing such that he has a difficult time with ambulation.  He notes he has chronic lower extremity edema that is improved with  lymphedema treatments.  He denies fever or systemic signs of illness.  On exam, patient cannot well localize his pain though it seems to be most prominent on the plantar surface of the MCPs and along the plantar fascia. Lesser degree of tenderness over calcaneus.  He also endorses tenderness over the lateral aspect and the dorsum of the foot.  He does have edema that is symmetric with the contralateral side.  He is concerned for DVT, though right lower extremity ultrasound is negative for presence of such.  Similarly, x-ray is negative for acute pathology such as fracture or dislocation.  Radiology remarks linear density is noted which is probably external or artifact.  I reevaluated patient and there is no evidence for a foreign body in this area and I agree with likely that this is artifactual.  Traction spurs off the plantar fascia may support diagnosis of plantar fasciitis.  Other degenerative changes noted.  Patient was given Norco and ibuprofen for pain such that it resolved when he was in bed without weightbearing.  He was placed in a postop shoe and ambulated in the emergency department.  He felt much more comfortable with the use of walker and one was provided though ED tech remarks patient was tolerating weightbearing quite well and not using walker for support of weight.  Etiology of patient's pain is unclear at this time. Although I favor plantar fasciitis as most likely cause his area of maximal tenderness is more proximal than hindfoot.  There is no evidence of gout or other acute arthritis.  No evidence of cellulitis.  No bony injury.  Because patient is improved and is able to tolerate pain with the aforementioned interventions and can now ambulate, I believe he is safe for discharge.  I recommended he follow-up with his primary care provider to discuss his symptoms and to continue the hard sole shoe and walker as needed. May require orthopedics referral if patient has worsening symptoms, but at this  time it is not indicated. Patient to continue Motrin and Norco as needed as well. I recommended he continue his lymphedema treatments and I provided strict return precautions, including those for cellulitis, in the interim.  I answered all the patient and his wife's questions and they verbalized understanding.  Amenable to discharge.    Diagnosis:    ICD-10-CM   1. Right foot pain M79.671   2. Plantar fasciitis M72.2       Discharge Medications:   Details   HYDROcodone-acetaminophen (NORCO) 5-325 MG per tablet Take 1 tablet by mouth every 6 hours as needed for pain, Disp-8 tablet, R-0, Local Print      ibuprofen (ADVIL/MOTRIN) 600 MG tablet Take 1 tablet (600 mg) by mouth every 6 hours as needed for moderate pain, Disp-16 tablet, R-0, Local Print       Francisco J Peng  7/15/2018   [facility] EMERGENCY DEPARTMENT  I, Francisco J Peng, am serving as a scribe at 11:19 AM on 7/15/2018 to document services personally performed by Kalpana Sanchez MD based on my observations and the provider's statements to me.       Kalpana Sanchez MD  07/15/18 [id number]

## 2018-07-15 NOTE — ED AVS SNAPSHOT
[facility] Emergency Department    [address]    [facility] [place] [address]    Phone:  [phone]    Fax:  [phone]                                       Jamar Ivory   MRN: [id number]    Department:  [facility] Emergency Department   Date of Visit:  7/15/2018           Patient Information     Date Of Birth          1950        Your diagnoses for this visit were:     Right foot pain     Plantar fasciitis        You were seen by Kalpana Sanchez MD.      Follow-up Information     Follow up with Blanca Peng MD In 3 days.    Specialty:  Family Practice    Contact information:    [address] [facility] [address]  [phone]          Follow up with [facility] Emergency Department.    Specialty:  EMERGENCY MEDICINE    Why:  If symptoms worsen    Contact information:    [address]  [facility] [address]  [phone]        Discharge Instructions       Continue with your lymphedema treatments.  Use Tylenol or ibuprofen for mild to moderate pain.  Use Norco for severe pain only.  This may make you sleepy and is addictive so be careful with use.  Use hard sole shoe for comfort and walker as needed.  Follow-up with your primary care provider soon as possible.  May require referral to orthopedics if you are not improving.  Return to the emergency department with severe pain, worsening redness, fever greater than 101 F, or any other concerns.    Discharge References/Attachments     PLANTAR FASCIITIS (ENGLISH)    SHOES, WEARING PROPER (ENGLISH)      Your next 10 appointments already scheduled     Jul 17, 2018  8:30 AM CDT   Lymphedema Treatment with Noble Gonzalez   [facility] Lymphedema OT ([facility])    [address] [facility] [address]   [phone]            Jul 18, 2018  8:30 AM CDT   Lymphedema Treatment with Noble Gonzalez   [facility] ([facility])     [address] [facility] [address]   [phone]            Jul 19, 2018  8:30 AM CDT   Lymphedema Treatment with Noble Gonzalez   [facility] ([facility])    [address] [facility] [address]   [phone]            Jul 24, 2018  9:00 AM CDT   Lymphedema Treatment with Noble Gonzalez   [facility] ([facility])    [address] [facility] [address]   [phone]            Jul 26, 2018  8:30 AM CDT   Lymphedema Treatment with Noble Gonzalez   [facility] ([facility])    [address] [facility] [address]   [phone]              24 Hour Appointment Hotline       To make an appointment at any [facility], call [phone] ([phone]). If you don't have a family doctor or clinic, we will help you find one. [facility] are conveniently located to serve the needs of you and your family.             Review of your medicines      START taking        Dose / Directions Last dose taken    HYDROcodone-acetaminophen 5-325 MG per tablet   Commonly known as:  NORCO   Dose:  1 tablet   Quantity:  8 tablet        Take 1 tablet by mouth every 6 hours as needed for pain   Refills:  0        ibuprofen 600 MG tablet   Commonly known as:  ADVIL/MOTRIN   Dose:  600 mg   Quantity:  16 tablet        Take 1 tablet (600 mg) by mouth every 6 hours as needed for moderate pain   Refills:  0          Our records show that you are taking the medicines listed below. If these are incorrect, please call your family doctor or clinic.        Dose / Directions Last dose taken    buPROPion 150 MG 24 hr tablet   Commonly known as:  WELLBUTRIN XL   Dose:  300 mg   Quantity:  90 tablet        Take 2 tablets (300 mg) by mouth every morning   Refills:  1        FLUoxetine 40 MG capsule   Commonly known as:  PROzac   Dose:  40 mg   Quantity:  90 capsule        Take 1 capsule (40 mg) by  mouth daily   Refills:  3        furosemide 80 MG tablet   Commonly known as:  LASIX   Quantity:  180 tablet        TAKE 1 TABLET (80 MG) BY MOUTH 2 TIMES DAILY   Refills:  2        KLOR-CON 10 PO   Dose:  10 mEq        Take 10 mEq by mouth 3 times daily   Refills:  0        levothyroxine 200 MCG tablet   Commonly known as:  SYNTHROID/LEVOTHROID   Quantity:  90 tablet        TAKE 1 TABLET (200 MCG) BY MOUTH DAILY   Refills:  2        losartan 25 MG tablet   Commonly known as:  COZAAR   Dose:  25 mg   Quantity:  90 tablet        Take 1 tablet (25 mg) by mouth daily   Refills:  3        order for DME   Quantity:  100 strip        Equipment being ordered: blood glucose monitor strips to use 2 to 4 times  A day. As covered by insurance. #100 with 5 refills  Monitor ordered this day  As well.  Also lancets #100 use as needed for testing 5 refils   Refills:  5        order for DME   Quantity:  3 Device        Compression Stockings Ready To Wear Knee High Compression Stockings 20-30 mmHg Length of Need: Life Time # of Pairs 3   Refills:  0        order for DME   Quantity:  30 days        Equipment being ordered: [employer] Phone [phone] Fax [phone]  Primary Dressing Xeroform   Qty 30 sheets Secondary Dressing guanako rolls Qty 60 Secondary Dressing 2'tape Qty 1 roll Length of Need: 1 month Frequency of dressing change: daily   Refills:  0        order for DME   Quantity:  1 each        1: Gradient Compression Wraps; 2: Cast Boots; 3: BLE velcro compression garments; knee or thigh high; 4: BLE custom compression stockings; knee or thigh high; 5; BLE 20-30 or 30-40 mm Hg compression stockings; knee or thigh high   Refills:  o        simvastatin 20 MG tablet   Commonly known as:  ZOCOR   Dose:  20 mg   Quantity:  90 tablet        Take 1 tablet (20 mg) by mouth At Bedtime   Refills:  1        tamsulosin 0.4 MG capsule   Commonly known as:  FLOMAX   Dose:  0.8 mg   Quantity:  180 capsule        Take 2 capsules  (0.8 mg) by mouth daily   Refills:  1                Information about OPIOIDS     PRESCRIPTION OPIOIDS: WHAT YOU NEED TO KNOW   We gave you an opioid (narcotic) pain medicine. It is important to manage your pain, but opioids are not always the best choice. You should first try all the other options your care team gave you. Take this medicine for as short a time (and as few doses) as possible.     These medicines have risks:    DO NOT drive when on new or higher doses of pain medicine. These medicines can affect your alertness and reaction times, and you could be arrested for driving under the influence (DUI). If you need to use opioids long-term, talk to your care team about driving.    DO NOT operate heave machinery    DO NOT do any other dangerous activities while taking these medicines.     DO NOT drink any alcohol while taking these medicines.      If the opioid prescribed includes acetaminophen, DO NOT take with any other medicines that contain acetaminophen. Read all labels carefully. Look for the word  acetaminophen  or  Tylenol.  Ask your pharmacist if you have questions or are unsure.    You can get addicted to pain medicines, especially if you have a history of addiction (chemical, alcohol or substance dependence). Talk to your care team about ways to reduce this risk.    Store your pills in a secure place, locked if possible. We will not replace any lost or stolen medicine. If you don t finish your medicine, please throw away (dispose) as directed by your pharmacist. The [place] [employer]ution [employer] has more information about safe disposal: https://www.pca.[facility].[place].us/living-green/managing-unwanted-medications.     All opioids tend to cause constipation. Drink plenty of water and eat foods that have a lot of fiber, such as fruits, vegetables, prune juice, apple juice and high-fiber cereal. Take a laxative (Miralax, milk of magnesia, Colace, Senna) if you don t move your bowels at least every other  day.         Prescriptions were sent or printed at these locations (2 Prescriptions)                   Other Prescriptions                Printed at Department/Unit printer (2 of 2)         HYDROcodone-acetaminophen (NORCO) 5-325 MG per tablet               ibuprofen (ADVIL/MOTRIN) 600 MG tablet                Procedures and tests performed during your visit     Foot  XR, G/E 3 views, right    US Lower Extremity Venous Duplex Right      Orders Needing Specimen Collection     None      Pending Results     No orders found from 7/13/2018 to 7/16/2018.            Pending Culture Results     No orders found from 7/13/2018 to 7/16/2018.            Pending Results Instructions     If you had any lab results that were not finalized at the time of your Discharge, you can call the ED Lab Result RN at [phone]. You will be contacted by this team for any positive Lab results or changes in treatment. The nurses are available 7 days a week from 10A to 6:30P.  You can leave a message 24 hours per day and they will return your call.        Test Results From Your Hospital Stay        7/15/2018 12:46 PM      Narrative     VENOUS ULTRASOUND RIGHT LOWER EXTREMITY  7/15/2018 12:14 PM     HISTORY: Right foot pain, chronic leg swelling, history of VTE.     COMPARISON: 3/28/2018    FINDINGS: Examination of the deep veins with graded compression and  color flow Doppler with spectral wave form analysis shows no evidence  of thrombus in the common femoral vein, femoral vein, popliteal vein  or calf veins. There is no venous insufficiency.        Impression     IMPRESSION: No evidence of deep venous thrombosis.    ENDY BARTH MD         7/15/2018  1:21 PM      Narrative     RIGHT FOOT THREE OR MORE VIEWS   7/15/2018 12:30 PM     HISTORY: Foot pain, difficulty weight bearing, primarily lateral.  Difficult to evaluate.     COMPARISON: None.        Impression     IMPRESSION: There is a linear density medial to the proximal second  phalanx  which is probably external or artifact although it is  difficult to exclude foreign body. There is no evidence for any  fracture. Benign traction spurs are seen off the plantar fascia and  Achilles tendon attachment site. There is also some ossification in  the Achilles tendon. Degenerative changes are seen in the PIP joints  as well as the DIP joints and the first MTP joint. Degenerative change  is also seen in the midfoot.    ENDY BARTH MD                Clinical Quality Measure: Blood Pressure Screening     Your blood pressure was checked while you were in the emergency department today. The last reading we obtained was  BP: 152/73 . Please read the guidelines below about what these numbers mean and what you should do about them.  If your systolic blood pressure (the top number) is less than 120 and your diastolic blood pressure (the bottom number) is less than 80, then your blood pressure is normal. There is nothing more that you need to do about it.  If your systolic blood pressure (the top number) is 120-139 or your diastolic blood pressure (the bottom number) is 80-89, your blood pressure may be higher than it should be. You should have your blood pressure rechecked within a year by a primary care provider.  If your systolic blood pressure (the top number) is 140 or greater or your diastolic blood pressure (the bottom number) is 90 or greater, you may have high blood pressure. High blood pressure is treatable, but if left untreated over time it can put you at risk for heart attack, stroke, or kidney failure. You should have your blood pressure rechecked by a primary care provider within the next 4 weeks.  If your provider in the emergency department today gave you specific instructions to follow-up with your doctor or provider even sooner than that, you should follow that instruction and not wait for up to 4 weeks for your follow-up visit.        Thank you for choosing [facility]       Thank you for choosing  [facility] for your care. Our goal is always to provide you with excellent care. Hearing back from our patients is one way we can continue to improve our services. Please take a few minutes to complete the written survey that you may receive in the mail after you visit with us. Thank you!        Care EveryWhere ID     This is your Care EveryWhere ID. This could be used by other organizations to access your [facility] medical records  [id number]        Equal Access to Services     SHAUN AKERS : Isaac Fowler, cullen yap, lisa amaro, maykel parada. So [facility] [phone].    ATENCIÓN: Si habla español, tiene a shook disposición servicios gratuitos de asistencia lingüística. Maryjaneame al [phone].    We comply with applicable federal civil rights laws and [place] laws. We do not discriminate on the basis of race, color, national origin, age, disability, sex, sexual orientation, or gender identity.            After Visit Summary       This is your record. Keep this with you and show to your community pharmacist(s) and doctor(s) at your next visit.

## 2018-07-15 NOTE — ED AVS SNAPSHOT
[facility] Emergency Department    [address]    [facility] [address]    Phone:  [phone]    Fax:  [phone]                                       Jamar Ivory   MRN: [id number]    Department:  [facility] Emergency Department   Date of Visit:  7/15/2018           After Visit Summary Signature Page     I have received my discharge instructions, and my questions have been answered. I have discussed any challenges I see with this plan with the nurse or doctor.    ..........................................................................................................................................  Patient/Patient Representative Signature      ..........................................................................................................................................  Patient Representative Print Name and Relationship to Patient    ..................................................               ................................................  Date                                            Time    ..........................................................................................................................................  Reviewed by Signature/Title    ...................................................              ..............................................  Date                                                            Time

## 2018-07-15 NOTE — DISCHARGE INSTRUCTIONS
Continue with your lymphedema treatments.  Use Tylenol or ibuprofen for mild to moderate pain.  Use Norco for severe pain only.  This may make you sleepy and is addictive so be careful with use.  Use hard sole shoe for comfort and walker as needed.  Follow-up with your primary care provider soon as possible.  May require referral to orthopedics if you are not improving.  Return to the emergency department with severe pain, worsening redness, fever greater than 101 F, or any other concerns.

## 2018-07-17 ENCOUNTER — HOSPITAL ENCOUNTER (OUTPATIENT)
Dept: OCCUPATIONAL THERAPY | Facility: CLINIC | Age: 68
Setting detail: THERAPIES SERIES
End: 2018-07-17
Attending: PHYSICIAN ASSISTANT
Payer: MEDICARE

## 2018-07-17 PROCEDURE — 40000445 ZZHC STATISTIC OT VISIT, LYMPHEDEMA

## 2018-07-17 PROCEDURE — 97535 SELF CARE MNGMENT TRAINING: CPT | Mod: GO

## 2018-07-18 ENCOUNTER — HOSPITAL ENCOUNTER (OUTPATIENT)
Dept: OCCUPATIONAL THERAPY | Facility: CLINIC | Age: 68
Setting detail: THERAPIES SERIES
End: 2018-07-18
Attending: PHYSICIAN ASSISTANT
Payer: MEDICARE

## 2018-07-18 PROCEDURE — 40000445 ZZHC STATISTIC OT VISIT, LYMPHEDEMA

## 2018-07-18 PROCEDURE — 97535 SELF CARE MNGMENT TRAINING: CPT | Mod: GO

## 2018-07-19 ENCOUNTER — HOSPITAL ENCOUNTER (OUTPATIENT)
Dept: OCCUPATIONAL THERAPY | Facility: CLINIC | Age: 68
Setting detail: THERAPIES SERIES
End: 2018-07-19
Attending: PHYSICIAN ASSISTANT
Payer: MEDICARE

## 2018-07-19 PROCEDURE — 97535 SELF CARE MNGMENT TRAINING: CPT | Mod: GO

## 2018-07-19 PROCEDURE — 40000445 ZZHC STATISTIC OT VISIT, LYMPHEDEMA

## 2018-07-24 ENCOUNTER — HOSPITAL ENCOUNTER (OUTPATIENT)
Dept: OCCUPATIONAL THERAPY | Facility: CLINIC | Age: 68
Setting detail: THERAPIES SERIES
End: 2018-07-24
Attending: PHYSICIAN ASSISTANT
Payer: MEDICARE

## 2018-07-24 PROCEDURE — 97535 SELF CARE MNGMENT TRAINING: CPT | Mod: GO

## 2018-07-24 PROCEDURE — 40000445 ZZHC STATISTIC OT VISIT, LYMPHEDEMA

## 2018-07-26 ENCOUNTER — HOSPITAL ENCOUNTER (OUTPATIENT)
Dept: OCCUPATIONAL THERAPY | Facility: CLINIC | Age: 68
Setting detail: THERAPIES SERIES
End: 2018-07-26
Attending: PHYSICIAN ASSISTANT
Payer: MEDICARE

## 2018-07-26 PROCEDURE — 97535 SELF CARE MNGMENT TRAINING: CPT | Mod: GO

## 2018-07-26 PROCEDURE — 40000445 ZZHC STATISTIC OT VISIT, LYMPHEDEMA

## 2018-08-02 ENCOUNTER — OFFICE VISIT (OUTPATIENT)
Dept: FAMILY MEDICINE | Facility: CLINIC | Age: 68
End: 2018-08-02
Payer: MEDICARE

## 2018-08-02 VITALS
OXYGEN SATURATION: 95 % | WEIGHT: 315 LBS | RESPIRATION RATE: 18 BRPM | HEART RATE: 77 BPM | BODY MASS INDEX: 48.35 KG/M2 | SYSTOLIC BLOOD PRESSURE: 114 MMHG | DIASTOLIC BLOOD PRESSURE: 70 MMHG

## 2018-08-02 DIAGNOSIS — E11.9 TYPE 2 DIABETES MELLITUS WITHOUT COMPLICATION, WITHOUT LONG-TERM CURRENT USE OF INSULIN (H): Chronic | ICD-10-CM

## 2018-08-02 DIAGNOSIS — I73.9 PVD (PERIPHERAL VASCULAR DISEASE) (H): ICD-10-CM

## 2018-08-02 DIAGNOSIS — R60.0 BILATERAL LEG EDEMA: ICD-10-CM

## 2018-08-02 DIAGNOSIS — M79.671 RIGHT FOOT PAIN: Primary | ICD-10-CM

## 2018-08-02 PROCEDURE — 99214 OFFICE O/P EST MOD 30 MIN: CPT | Performed by: PHYSICIAN ASSISTANT

## 2018-08-02 RX ORDER — MELOXICAM 7.5 MG/1
7.5 TABLET ORAL DAILY
Qty: 90 TABLET | Refills: 0 | Status: ON HOLD | OUTPATIENT
Start: 2018-08-02 | End: 2018-12-24

## 2018-08-02 ASSESSMENT — ENCOUNTER SYMPTOMS
CHILLS: 0
NAUSEA: 0
FEVER: 0
FOCAL WEAKNESS: 0
DIARRHEA: 0
ABDOMINAL PAIN: 0
HEADACHES: 0
SHORTNESS OF BREATH: 0
VOMITING: 0

## 2018-08-02 ASSESSMENT — ANXIETY QUESTIONNAIRES
5. BEING SO RESTLESS THAT IT IS HARD TO SIT STILL: NOT AT ALL
GAD7 TOTAL SCORE: 2
GAD7 TOTAL SCORE: 2
6. BECOMING EASILY ANNOYED OR IRRITABLE: MORE THAN HALF THE DAYS
1. FEELING NERVOUS, ANXIOUS, OR ON EDGE: NOT AT ALL
2. NOT BEING ABLE TO STOP OR CONTROL WORRYING: NOT AT ALL
7. FEELING AFRAID AS IF SOMETHING AWFUL MIGHT HAPPEN: NOT AT ALL
7. FEELING AFRAID AS IF SOMETHING AWFUL MIGHT HAPPEN: NOT AT ALL
4. TROUBLE RELAXING: NOT AT ALL
GAD7 TOTAL SCORE: 2
3. WORRYING TOO MUCH ABOUT DIFFERENT THINGS: NOT AT ALL

## 2018-08-02 ASSESSMENT — PATIENT HEALTH QUESTIONNAIRE - PHQ9
SUM OF ALL RESPONSES TO PHQ QUESTIONS 1-9: 15
SUM OF ALL RESPONSES TO PHQ QUESTIONS 1-9: 15
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
## 2018-08-02 NOTE — PROGRESS NOTES
"  SUBJECTIVE:   Jamar Ivory is a 68 year old male who presents to clinic today for the following health issues:      Musculoskeletal problem/pain      Duration: 2 weeks    Description  Location: right foot pain    Intensity:  moderate    Accompanying signs and symptoms: none    History  Previous similar problem: no   Previous evaluation:  Yes    Precipitating or alleviating factors:  Trauma or overuse: no   Aggravating factors include: standing and walking    Therapies tried and outcome: rest/inactivity      {additional problems for provider to add:808436}    Problem list and histories reviewed & adjusted, as indicated.  Additional history: {NONE - AS DOCUMENTED:533475::\"as documented\"}    {HIST REVIEW/ LINKS 2:035838}    Reviewed and updated as needed this visit by clinical staff       Reviewed and updated as needed this visit by Provider         {PROVIDER CHARTING PREFERENCE:983155}  "

## 2018-08-02 NOTE — MR AVS SNAPSHOT
After Visit Summary   8/2/2018    Jamar Ivory    MRN: [id number]           Patient Information     Date Of Birth          1950        Visit Information        Provider Department      8/2/2018 9:20 AM Suzanne Huertas PA-C [facility]        Today's Diagnoses     Right foot pain    -  1      Care Instructions        What is plantar fasciitis?   Plantar fasciitis is a painful inflammation of the bottom of the foot between the ball of the foot and the heel.   How does it occur?   There are several possible causes of plantar fasciitis, including:     wearing high heels     gaining weight     increased walking, standing, or stair-climbing   If you wear high-heeled shoes, including western-style boots, for long periods of time, the tough, tendonlike tissue of the bottom of your foot can become shorter. This layer of tissue is called fascia. Pain occurs when you stretch fascia that has shortened. This painful stretching might happen, for example, when you walk barefoot after getting out of bed in the morning.   If you gain weight, you might be more likely to have plantar fasciitis, especially if you walk a lot or  shoes with poor heel cushioning. Normally there is a pad of fatty tissue under your heel bone. Weight gain might break down this fat pad and cause heel pain.   Runners may get plantar fasciitis when they change their workout and increase their mileage or frequency of workouts. It can also occur with a change in exercise surface or terrain, or if your shoes are worn out and don't provide enough cushion for your heels.   If the arches of your foot are abnormally high or low, you are more likely to develop plantar fasciitis than if your arches are normal.   What are the symptoms?   The main symptom of plantar fasciitis is heel pain when you walk. You may also feel pain when you stand and possibly even when you are resting. This pain  typically occurs first thing in the morning after you get out of bed, when your foot is placed flat on the floor. The pain occurs because you are stretching the plantar fascia. The pain usually lessens with more walking, but you may have it again after periods of rest.   You may feel no pain when you are sleeping because the position of your feet during rest allows the fascia to shorten and relax.   How is it diagnosed?   Your healthcare provider will ask about your symptoms. He or she will ask if the bottom of your heel is tender and if you have pain when you stretch the bottom of your foot. An X-ray of your heel may be done.   How is it treated?     Give your painful heel lots of rest. You may need to stay completely off your foot for several days when the pain is severe.     Your healthcare provider may recommend or prescribe anti-inflammatory medicines, such as aspirin or ibuprofen. These drugs decrease pain and inflammation. Adults aged 65 years and older should not take non-steroidal anti-inflammatory medicine for more than 7 days without their healthcare provider's approval. Resting your heel on an ice pack for a few minutes several times a day can also help.     Try to cushion your foot. You can do this by wearing athletic shoes, even at work, for awhile. Heel cushions can also be used. The cushions should be worn in both shoes. They are most helpful if you are overweight or an older adult.     Your provider may recommend special arch supports or inserts for your shoes called orthotics, either custom-made or off the shelf. These supports can be particularly helpful if you have flat feet or high arches.     Your provider may recommend an injection of a cortisone-like medicine.     Lose weight if needed.     A night splint may be recommended. This will keep the plantar fascia stretched while you are sleeping.     Physical therapy for additional treatments may be recommended     Surgery is rarely needed.   How  long will the effects last?   You may find that the pain is sometimes worse and sometimes better over time. If you get treatment soon after you notice the pain, the symptoms should stop after several weeks. If, however, you have had plantar fasciitis for a long time, it may take many weeks to months for the pain to go away.   When can I return to my normal activities?   Everyone recovers from an injury at a different rate. Return to your activities will be determined by how soon your foot recovers, not by how many days or weeks it has been since your injury has occurred. In general, the longer you have symptoms before you start treatment, the longer it will take to get better. The goal of rehabilitation is to return you to your normal activities as soon as is safely possible. If you return too soon you may worsen your injury.   You may safely return to your activities when, starting from the top of the list and progressing to the end, each of the following is true:     You have full range of motion in the injured foot compared with the uninjured foot.     You have full strength of the injured foot compared with the uninjured foot.     You can walk straight ahead without significant pain or limping.   How can I prevent plantar fasciitis?   The best way to prevent plantar fasciitis is to wear shoes that are well made and fit your feet. This is especially important when you exercise or walk a lot or stand for a long time on hard surfaces. Get new athletic shoes before your old shoes stop supporting and cushioning your feet.   You should also:     Avoid repeated jarring to the heel.     Keep a healthy weight.     Do your leg and foot stretching exercises regularly.   Developed by [employer].  Last reviewed: 2008-07-07   Sports Medicine Advisor 2009.1 Index Sports Medicine Advisor 2009.1 Credits     2009 [facility] and/or its affiliates. All Rights Reserved.               Plantar Fasciitis Rehabilitation Exercises    You may begin exercising the muscles of your foot right away by gently stretching them as follows:     Prone hip extension: Lie on your stomach with your legs straight out behind you. Tighten the buttocks and thigh muscles of your injured leg and lift it off the floor about 8 inches. Keep your knee straight. Hold for 5 seconds. Then lower your leg and relax. Do 3 sets of 10.     Towel stretch: Sit on a hard surface with one leg stretched out in front of you. Loop a towel around your toes and the ball of your foot and pull the towel toward your body keeping your knee straight. Hold this position for 15 to 30 seconds then relax. Repeat 3 times.   When the towel stretch becomes too easy, you may begin doing the standing calf stretch.     Standing calf stretch: Facing a wall, put your hands against the wall at about eye level. Keep one leg back with the heel on the floor, and the other leg forward. Turn your back foot slightly inward (as if you were pigeon-toed) as you slowly lean into the wall until you feel a stretch in the back of your calf. Hold for 15 to 30 seconds. Repeat 3 times and then switch the position of your legs and repeat the exercise 3 times. Do this exercise several times each day.     Sitting plantar fascia stretch: Sit in a chair and cross one foot over your other knee. Grab the base of your toes and pull them back toward your leg until you feel a comfortable stretch. Hold 15 seconds and repeat 3 times.   When you can stand comfortably on your injured foot, you can begin standing to stretch the bottom of your foot using the plantar fascia stretch.     Achilles stretch: Stand with the ball of one foot on a stair. Reach for the bottom step with your heel until you feel a stretch in the arch of your foot. Hold this position for 15 to 30 seconds and then relax. Repeat 3 times.   After you have stretched the bottom muscles of your foot, you can begin strengthening the top muscles of your foot.      Frozen can roll: Roll your bare injured foot back and forth from your heel to your mid-arch over a frozen juice can. Repeat for 3 to 5 minutes. This exercise is particularly helpful if done first thing in the morning.                          Follow-ups after your visit        Additional Services     ORTHO  REFERRAL       [facility] is referring you to the Orthopedic  Services at [facility].       The  Representative will assist you in the coordination of your Orthopedic and Musculoskeletal Care as prescribed by your physician.    The  Representative will call you within 1 business day to help schedule your appointment, or you may contact the  Representative at:    All areas ~ [phone]     Type of Referral : [facility] / Foot & Ankle Surgery       Timeframe requested: Routine    Coverage of these services is subject to the terms and limitations of your health insurance plan.  Please call member services at your health plan with any benefit or coverage questions.      If X-rays, CT or MRI's have been performed, please contact the facility where they were done to arrange for , prior to your scheduled appointment.  Please bring this referral request to your appointment and present it to your specialist.                  Who to contact     If you have questions or need follow up information about today's clinic visit or your schedule please contact [facility] directly at [phone].  Normal or non-critical lab and imaging results will be communicated to you by MyChart, letter or phone within 4 business days after the clinic has received the results. If you do not hear from us within 7 days, please contact the clinic through MyChart or phone. If you have a critical or abnormal lab result, we will notify you by phone as soon as possible.  Submit refill requests through [employer] or  call your pharmacy and they will forward the refill request to us. Please allow 3 business days for your refill to be completed.          Additional Information About Your Visit        Care EveryWhere ID     This is your Care EveryWhere ID. This could be used by other organizations to access your [facility] medical records  [id number]        Your Vitals Were     Pulse Respirations Pulse Oximetry BMI (Body Mass Index)          77 18 95% 48.35 kg/m2         Blood Pressure from Last 3 Encounters:   08/02/18 114/70   07/15/18 152/73   06/28/18 120/64    Weight from Last 3 Encounters:   08/02/18 318 lb (144.2 kg)   07/15/18 (!) 334 lb (151.5 kg)   06/28/18 (!) 333 lb (151 kg)              We Performed the Following     ORTHO  REFERRAL          Today's Medication Changes          These changes are accurate as of 8/2/18  9:36 AM.  If you have any questions, ask your nurse or doctor.               Start taking these medicines.        Dose/Directions    meloxicam 7.5 MG tablet   Commonly known as:  MOBIC   Used for:  Right foot pain   Started by:  Suzanne Huertas PA-C        Dose:  7.5 mg   Take 1 tablet (7.5 mg) by mouth daily   Quantity:  90 tablet   Refills:  0            Where to get your medicines      These medications were sent to [place] [address] [place] [address] [address]     Phone:  [phone]     meloxicam 7.5 MG tablet                Primary Care Provider Office Phone # Fax #    Blanca Melody Peng [phone]       [facility] [address]        Equal Access to Services     [facility] : Hadii cole Fowler, cullen yap, qamaykel waite. So [facility] [phone].    ATENCIÓN: Si habla español, tiene a shook disposición servicios gratuitos de asistencia lingüística. Merry gavin [phone].    We comply with applicable federal civil rights laws  and [place] laws. We do not discriminate on the basis of race, color, national origin, age, disability, sex, sexual orientation, or gender identity.            Thank you!     Thank you for choosing [facility]  for your care. Our goal is always to provide you with excellent care. Hearing back from our patients is one way we can continue to improve our services. Please take a few minutes to complete the written survey that you may receive in the mail after your visit with us. Thank you!             Your Updated Medication List - Protect others around you: Learn how to safely use, store and throw away your medicines at www.disposemymeds.org.          This list is accurate as of 8/2/18  9:36 AM.  Always use your most recent med list.                   Brand Name Dispense Instructions for use Diagnosis    buPROPion 150 MG 24 hr tablet    WELLBUTRIN XL    90 tablet    Take 2 tablets (300 mg) by mouth every morning    Type 2 diabetes mellitus without complication, without long-term current use of insulin (H)       FLUoxetine 40 MG capsule    PROzac    90 capsule    Take 1 capsule (40 mg) by mouth daily    Moderate episode of recurrent major depressive disorder (H)       furosemide 80 MG tablet    LASIX    180 tablet    TAKE 1 TABLET (80 MG) BY MOUTH 2 TIMES DAILY    Edema, unspecified type       KLOR-CON 10 PO      Take 10 mEq by mouth 3 times daily        levothyroxine 200 MCG tablet    SYNTHROID/LEVOTHROID    90 tablet    TAKE 1 TABLET (200 MCG) BY MOUTH DAILY    Acquired hypothyroidism       losartan 25 MG tablet    COZAAR    90 tablet    Take 1 tablet (25 mg) by mouth daily        meloxicam 7.5 MG tablet    MOBIC    90 tablet    Take 1 tablet (7.5 mg) by mouth daily    Right foot pain       order for DME     100 strip    Equipment being ordered: blood glucose monitor strips to use 2 to 4 times  A day. As covered by insurance. #100 with 5 refills  Monitor ordered this day  As well.   Also lancets #100 use as needed for testing 5 refils    Diabetes mellitus, type 2 (H)       order for DME     3 Device    Compression Stockings Ready To Wear Knee High Compression Stockings 20-30 mmHg Length of Need: Life Time # of Pairs 3    Type 2 diabetes mellitus with diabetic dermatitis, unspecified long term insulin use status (H), Swelling of lower extremity, Lymphedema of both lower extremities, Chronic venous hypertension with ulcer, bilateral (H), Varicose veins of both legs with edema, Open wound of knee, leg, and ankle, unspecified laterality, subsequent encounter, Venous (peripheral) insufficiency       order for DME     30 days    Equipment being ordered: [employer] [facility] Phone [phone] Fax [phone]  Primary Dressing Xeroform   Qty 30 sheets Secondary Dressing guanako rolls Qty 60 Secondary Dressing 2'tape Qty 1 roll Length of Need: 1 month Frequency of dressing change: daily    Lymphedema of both lower extremities, Venous ulcer of left leg (H)       order for DME     1 each    1: Gradient Compression Wraps; 2: Cast Boots; 3: BLE velcro compression garments; knee or thigh high; 4: BLE custom compression stockings; knee or thigh high; 5; BLE 20-30 or 30-40 mm Hg compression stockings; knee or thigh high    Localized edema       simvastatin 20 MG tablet    ZOCOR    90 tablet    Take 1 tablet (20 mg) by mouth At Bedtime    Hyperlipidemia with target LDL less than 100       tamsulosin 0.4 MG capsule    FLOMAX    180 capsule    Take 2 capsules (0.8 mg) by mouth daily    Benign prostatic hyperplasia with weak urinary stream

## 2018-08-02 NOTE — PATIENT INSTRUCTIONS
What is plantar fasciitis?   Plantar fasciitis is a painful inflammation of the bottom of the foot between the ball of the foot and the heel.   How does it occur?   There are several possible causes of plantar fasciitis, including:     wearing high heels     gaining weight     increased walking, standing, or stair-climbing   If you wear high-heeled shoes, including western-style boots, for long periods of time, the tough, tendonlike tissue of the bottom of your foot can become shorter. This layer of tissue is called fascia. Pain occurs when you stretch fascia that has shortened. This painful stretching might happen, for example, when you walk barefoot after getting out of bed in the morning.   If you gain weight, you might be more likely to have plantar fasciitis, especially if you walk a lot or  shoes with poor heel cushioning. Normally there is a pad of fatty tissue under your heel bone. Weight gain might break down this fat pad and cause heel pain.   Runners may get plantar fasciitis when they change their workout and increase their mileage or frequency of workouts. It can also occur with a change in exercise surface or terrain, or if your shoes are worn out and don't provide enough cushion for your heels.   If the arches of your foot are abnormally high or low, you are more likely to develop plantar fasciitis than if your arches are normal.   What are the symptoms?   The main symptom of plantar fasciitis is heel pain when you walk. You may also feel pain when you stand and possibly even when you are resting. This pain typically occurs first thing in the morning after you get out of bed, when your foot is placed flat on the floor. The pain occurs because you are stretching the plantar fascia. The pain usually lessens with more walking, but you may have it again after periods of rest.   You may feel no pain when you are sleeping because the position of your feet during rest allows the fascia to shorten  and relax.   How is it diagnosed?   Your healthcare provider will ask about your symptoms. He or she will ask if the bottom of your heel is tender and if you have pain when you stretch the bottom of your foot. An X-ray of your heel may be done.   How is it treated?     Give your painful heel lots of rest. You may need to stay completely off your foot for several days when the pain is severe.     Your healthcare provider may recommend or prescribe anti-inflammatory medicines, such as aspirin or ibuprofen. These drugs decrease pain and inflammation. Adults aged 65 years and older should not take non-steroidal anti-inflammatory medicine for more than 7 days without their healthcare provider's approval. Resting your heel on an ice pack for a few minutes several times a day can also help.     Try to cushion your foot. You can do this by wearing athletic shoes, even at work, for awhile. Heel cushions can also be used. The cushions should be worn in both shoes. They are most helpful if you are overweight or an older adult.     Your provider may recommend special arch supports or inserts for your shoes called orthotics, either custom-made or off the shelf. These supports can be particularly helpful if you have flat feet or high arches.     Your provider may recommend an injection of a cortisone-like medicine.     Lose weight if needed.     A night splint may be recommended. This will keep the plantar fascia stretched while you are sleeping.     Physical therapy for additional treatments may be recommended     Surgery is rarely needed.   How long will the effects last?   You may find that the pain is sometimes worse and sometimes better over time. If you get treatment soon after you notice the pain, the symptoms should stop after several weeks. If, however, you have had plantar fasciitis for a long time, it may take many weeks to months for the pain to go away.   When can I return to my normal activities?   Everyone recovers  from an injury at a different rate. Return to your activities will be determined by how soon your foot recovers, not by how many days or weeks it has been since your injury has occurred. In general, the longer you have symptoms before you start treatment, the longer it will take to get better. The goal of rehabilitation is to return you to your normal activities as soon as is safely possible. If you return too soon you may worsen your injury.   You may safely return to your activities when, starting from the top of the list and progressing to the end, each of the following is true:     You have full range of motion in the injured foot compared with the uninjured foot.     You have full strength of the injured foot compared with the uninjured foot.     You can walk straight ahead without significant pain or limping.   How can I prevent plantar fasciitis?   The best way to prevent plantar fasciitis is to wear shoes that are well made and fit your feet. This is especially important when you exercise or walk a lot or stand for a long time on hard surfaces. Get new athletic shoes before your old shoes stop supporting and cushioning your feet.   You should also:     Avoid repeated jarring to the heel.     Keep a healthy weight.     Do your leg and foot stretching exercises regularly.   Developed by [employer].  Last reviewed: 2008-07-07   Sports Medicine Advisor 2009.1 Index Sports Medicine Advisor 2009.1 Credits     2009 [facility] and/or its affiliates. All Rights Reserved.               Plantar Fasciitis Rehabilitation Exercises   You may begin exercising the muscles of your foot right away by gently stretching them as follows:     Prone hip extension: Lie on your stomach with your legs straight out behind you. Tighten the buttocks and thigh muscles of your injured leg and lift it off the floor about 8 inches. Keep your knee straight. Hold for 5 seconds. Then lower your leg and relax. Do 3 sets of 10.     Towel  stretch: Sit on a hard surface with one leg stretched out in front of you. Loop a towel around your toes and the ball of your foot and pull the towel toward your body keeping your knee straight. Hold this position for 15 to 30 seconds then relax. Repeat 3 times.   When the towel stretch becomes too easy, you may begin doing the standing calf stretch.     Standing calf stretch: Facing a wall, put your hands against the wall at about eye level. Keep one leg back with the heel on the floor, and the other leg forward. Turn your back foot slightly inward (as if you were pigeon-toed) as you slowly lean into the wall until you feel a stretch in the back of your calf. Hold for 15 to 30 seconds. Repeat 3 times and then switch the position of your legs and repeat the exercise 3 times. Do this exercise several times each day.     Sitting plantar fascia stretch: Sit in a chair and cross one foot over your other knee. Grab the base of your toes and pull them back toward your leg until you feel a comfortable stretch. Hold 15 seconds and repeat 3 times.   When you can stand comfortably on your injured foot, you can begin standing to stretch the bottom of your foot using the plantar fascia stretch.     Achilles stretch: Stand with the ball of one foot on a stair. Reach for the bottom step with your heel until you feel a stretch in the arch of your foot. Hold this position for 15 to 30 seconds and then relax. Repeat 3 times.   After you have stretched the bottom muscles of your foot, you can begin strengthening the top muscles of your foot.     Frozen can roll: Roll your bare injured foot back and forth from your heel to your mid-arch over a frozen juice can. Repeat for 3 to 5 minutes. This exercise is particularly helpful if done first thing in the morning.

## 2018-08-03 ASSESSMENT — PATIENT HEALTH QUESTIONNAIRE - PHQ9: SUM OF ALL RESPONSES TO PHQ QUESTIONS 1-9: 15

## 2018-08-03 ASSESSMENT — ANXIETY QUESTIONNAIRES: GAD7 TOTAL SCORE: 2

## 2018-08-14 ENCOUNTER — OFFICE VISIT (OUTPATIENT)
Dept: PODIATRY | Facility: CLINIC | Age: 68
End: 2018-08-14
Payer: MEDICARE

## 2018-08-14 VITALS
BODY MASS INDEX: 47.74 KG/M2 | HEIGHT: 68 IN | DIASTOLIC BLOOD PRESSURE: 72 MMHG | WEIGHT: 315 LBS | SYSTOLIC BLOOD PRESSURE: 118 MMHG

## 2018-08-14 DIAGNOSIS — I87.2 CHRONIC STASIS DERMATITIS: ICD-10-CM

## 2018-08-14 DIAGNOSIS — I87.2 VENOUS INSUFFICIENCY: ICD-10-CM

## 2018-08-14 DIAGNOSIS — M25.571 ACUTE RIGHT ANKLE PAIN: Primary | ICD-10-CM

## 2018-08-14 PROCEDURE — 99202 OFFICE O/P NEW SF 15 MIN: CPT | Performed by: PODIATRIST
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
## 2018-08-14 NOTE — MR AVS SNAPSHOT
After Visit Summary   8/14/2018    Jamar Ivory    MRN: [id number]           Patient Information     Date Of Birth          1950        Visit Information        Provider Department      8/14/2018 11:00 AM Eddi Foster DPM [facility]        Today's Diagnoses     Acute right ankle pain    -  1    Venous insufficiency        Chronic stasis dermatitis           Follow-ups after your visit        Follow-up notes from your care team     Return if symptoms worsen or fail to improve.      Your next 10 appointments already scheduled     Aug 29, 2018  1:40 PM CDT   Office Visit with Blanca Peng MD   [facility]    [address]  [address]  [place] [address] [phone]           Bring a current list of meds and any records pertaining to this visit. For Physicals, please bring immunization records and any forms needing to be filled out. Please arrive 10 minutes early to complete paperwork.              Who to contact     If you have questions or need follow up information about today's clinic visit or your schedule please contact [facility] directly at [phone].  Normal or non-critical lab and imaging results will be communicated to you by MyChart, letter or phone within 4 business days after the clinic has received the results. If you do not hear from us within 7 days, please contact the clinic through MyChart or phone. If you have a critical or abnormal lab result, we will notify you by phone as soon as possible.  Submit refill requests through [email or web address] or call your pharmacy and they will forward the refill request to us. Please allow 3 business days for your refill to be completed.          Additional Information About Your Visit        Care EveryWhere ID     This is your Care EveryWhere ID. This could be used by other organizations to access your  "[place] medical records  [id number]        Your Vitals Were     Height BMI (Body Mass Index)                5' 8\" (1.727 m) 48.35 kg/m2           Blood Pressure from Last 3 Encounters:   08/14/18 118/72   08/02/18 114/70   07/15/18 152/73    Weight from Last 3 Encounters:   08/14/18 318 lb (144.2 kg)   08/02/18 318 lb (144.2 kg)   07/15/18 (!) 334 lb (151.5 kg)              Today, you had the following     No orders found for display       Primary Care Provider Office Phone # Fax #    Blanca Peng [phone] [address] [address]        Equal Access to Services     SHAUN AKERS : Isaac velao Solacey, waaxda luqadaha, qaybta kaalmada adeegyada, maykel reich . So [facility] [phone].    ATENCIÓN: Si habla español, tiene a shook disposición servicios gratuitos de asistencia lingüística. Ll[facility] [phone].    We comply with applicable federal civil rights laws and [place] laws. We do not discriminate on the basis of race, color, national origin, age, disability, sex, sexual orientation, or gender identity.            Thank you!     Thank you for choosing [facility]  for your care. Our goal is always to provide you with excellent care. Hearing back from our patients is one way we can continue to improve our services. Please take a few minutes to complete the written survey that you may receive in the mail after your visit with us. Thank you!             Your Updated Medication List - Protect others around you: Learn how to safely use, store and throw away your medicines at www.disposemymeds.org.          This list is accurate as of 8/14/18  1:59 PM.  Always use your most recent med list.                   Brand Name Dispense Instructions for use Diagnosis    buPROPion 150 MG 24 hr tablet    WELLBUTRIN XL    90 tablet    Take 2 tablets (300 mg) by mouth every morning    Type 2 diabetes mellitus without complication, " without long-term current use of insulin (H)       FLUoxetine 40 MG capsule    PROzac    90 capsule    Take 1 capsule (40 mg) by mouth daily    Moderate episode of recurrent major depressive disorder (H)       furosemide 80 MG tablet    LASIX    180 tablet    TAKE 1 TABLET (80 MG) BY MOUTH 2 TIMES DAILY    Edema, unspecified type       KLOR-CON 10 MEQ CR tablet   Generic drug:  potassium chloride SA     90 tablet    TAKE 1 TABLET (10 MEQ) BY MOUTH 3 TIMES DAILY    Bilateral leg edema       KLOR-CON 10 PO      Take 10 mEq by mouth 3 times daily        levothyroxine 200 MCG tablet    SYNTHROID/LEVOTHROID    90 tablet    TAKE 1 TABLET (200 MCG) BY MOUTH DAILY    Acquired hypothyroidism       losartan 25 MG tablet    COZAAR    90 tablet    Take 1 tablet (25 mg) by mouth daily        meloxicam 7.5 MG tablet    MOBIC    90 tablet    Take 1 tablet (7.5 mg) by mouth daily    Right foot pain       order for DME     100 strip    Equipment being ordered: blood glucose monitor strips to use 2 to 4 times  A day. As covered by insurance. #100 with 5 refills  Monitor ordered this day  As well.  Also lancets #100 use as needed for testing 5 refils    Diabetes mellitus, type 2 (H)       order for DME     3 Device    Compression Stockings Ready To Wear Knee High Compression Stockings 20-30 mmHg Length of Need: Life Time # of Pairs 3    Type 2 diabetes mellitus with diabetic dermatitis, unspecified long term insulin use status (H), Swelling of lower extremity, Lymphedema of both lower extremities, Chronic venous hypertension with ulcer, bilateral (H), Varicose veins of both legs with edema, Open wound of knee, leg, and ankle, unspecified laterality, subsequent encounter, Venous (peripheral) insufficiency       order for DME     30 days    Equipment being ordered: [employer] Phone [phone] Fax [phone]  Primary Dressing Xeroform   Qty 30 sheets Secondary Dressing guanako rolls Qty 60 Secondary Dressing 2'tape Qty 1 roll  Length of Need: 1 month Frequency of dressing change: daily    Lymphedema of both lower extremities, Venous ulcer of left leg (H)       order for DME     1 each    1: Gradient Compression Wraps; 2: Cast Boots; 3: BLE velcro compression garments; knee or thigh high; 4: BLE custom compression stockings; knee or thigh high; 5; BLE 20-30 or 30-40 mm Hg compression stockings; knee or thigh high    Localized edema       simvastatin 20 MG tablet    ZOCOR    90 tablet    Take 1 tablet (20 mg) by mouth At Bedtime    Hyperlipidemia with target LDL less than 100       tamsulosin 0.4 MG capsule    FLOMAX    180 capsule    Take 2 capsules (0.8 mg) by mouth daily    Benign prostatic hyperplasia with weak urinary stream
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
## 2018-08-29 ENCOUNTER — OFFICE VISIT (OUTPATIENT)
Dept: FAMILY MEDICINE | Facility: CLINIC | Age: 68
End: 2018-08-29
Payer: MEDICARE

## 2018-08-29 VITALS
SYSTOLIC BLOOD PRESSURE: 120 MMHG | OXYGEN SATURATION: 94 % | DIASTOLIC BLOOD PRESSURE: 70 MMHG | BODY MASS INDEX: 48.66 KG/M2 | TEMPERATURE: 98.3 F | WEIGHT: 315 LBS | HEART RATE: 80 BPM

## 2018-08-29 DIAGNOSIS — N52.9 VASCULOGENIC ERECTILE DYSFUNCTION, UNSPECIFIED VASCULOGENIC ERECTILE DYSFUNCTION TYPE: ICD-10-CM

## 2018-08-29 DIAGNOSIS — N40.1 BENIGN PROSTATIC HYPERPLASIA WITH INCOMPLETE BLADDER EMPTYING: ICD-10-CM

## 2018-08-29 DIAGNOSIS — R39.14 BENIGN PROSTATIC HYPERPLASIA WITH INCOMPLETE BLADDER EMPTYING: ICD-10-CM

## 2018-08-29 DIAGNOSIS — F32.1 MODERATE MAJOR DEPRESSION (H): Primary | Chronic | ICD-10-CM

## 2018-08-29 DIAGNOSIS — R39.12 BENIGN PROSTATIC HYPERPLASIA WITH WEAK URINARY STREAM: ICD-10-CM

## 2018-08-29 DIAGNOSIS — N40.1 BENIGN PROSTATIC HYPERPLASIA WITH WEAK URINARY STREAM: ICD-10-CM

## 2018-08-29 PROCEDURE — 99214 OFFICE O/P EST MOD 30 MIN: CPT | Performed by: FAMILY MEDICINE

## 2018-08-29 RX ORDER — VENLAFAXINE HYDROCHLORIDE 75 MG/1
75 CAPSULE, EXTENDED RELEASE ORAL DAILY
Qty: 90 CAPSULE | Refills: 1 | Status: SHIPPED | OUTPATIENT
Start: 2018-08-29 | End: 2018-09-18

## 2018-08-29 RX ORDER — SILDENAFIL 50 MG/1
50 TABLET, FILM COATED ORAL DAILY PRN
Qty: 6 TABLET | Refills: 1 | Status: ON HOLD | OUTPATIENT
Start: 2018-08-29 | End: 2018-12-24

## 2018-08-29 RX ORDER — TAMSULOSIN HYDROCHLORIDE 0.4 MG/1
CAPSULE ORAL
Qty: 180 CAPSULE | Refills: 3 | Status: SHIPPED | OUTPATIENT
Start: 2018-08-29 | End: 2018-08-29

## 2018-08-29 ASSESSMENT — ANXIETY QUESTIONNAIRES
7. FEELING AFRAID AS IF SOMETHING AWFUL MIGHT HAPPEN: NOT AT ALL
6. BECOMING EASILY ANNOYED OR IRRITABLE: SEVERAL DAYS
4. TROUBLE RELAXING: NOT AT ALL
GAD7 TOTAL SCORE: 5
GAD7 TOTAL SCORE: 5
7. FEELING AFRAID AS IF SOMETHING AWFUL MIGHT HAPPEN: NOT AT ALL
1. FEELING NERVOUS, ANXIOUS, OR ON EDGE: MORE THAN HALF THE DAYS
5. BEING SO RESTLESS THAT IT IS HARD TO SIT STILL: SEVERAL DAYS
2. NOT BEING ABLE TO STOP OR CONTROL WORRYING: SEVERAL DAYS
3. WORRYING TOO MUCH ABOUT DIFFERENT THINGS: NOT AT ALL
GAD7 TOTAL SCORE: 5

## 2018-08-29 ASSESSMENT — PATIENT HEALTH QUESTIONNAIRE - PHQ9
SUM OF ALL RESPONSES TO PHQ QUESTIONS 1-9: 13
SUM OF ALL RESPONSES TO PHQ QUESTIONS 1-9: 13

## 2018-08-29 NOTE — PROGRESS NOTES
SUBJECTIVE:   Jamar Ivory is a 68 year old male who presents to clinic today for the following health issues:      Depression Followup    Status since last visit: Worsened     See PHQ-9 for current symptoms.  Other associated symptoms: None    Complicating factors:   Significant life event:  Yes-  Wife complaining about alot   Current substance abuse:  None  Anxiety or Panic symptoms:  No    PHQ-9 6/28/2018 8/2/2018 8/29/2018   Total Score 12 15 13   Q9: Suicide Ideation Nearly every day Nearly every day Nearly every day     PHQ-9  English  PHQ-9   Any Language  Suicide Assessment Five-step Evaluation and Treatment (SAFE-T)    Amount of exercise or physical activity: walking    Problems taking medications regularly: No, not sure there helping    Medication side effects: none    Diet: regular (no restrictions)    Pt hasent been able to get an erection for 2 1/2 years and dosent believe his prostate medication is working.  Would be interested in seeing urologist.    Problem list and histories reviewed & adjusted, as indicated.  Additional history: as documented    Reviewed and updated as needed this visit by clinical staff       Reviewed and updated as needed this visit by Provider         ROS:  Constitutional, HEENT, cardiovascular, pulmonary, gi and gu systems are negative, except as otherwise noted.    OBJECTIVE:     /70 (Cuff Size: Adult Large)  Pulse 80  Temp 98.3  F (36.8  C) (Tympanic)  Wt 320 lb (145.2 kg)  SpO2 94%  BMI 48.66 kg/m2  Body mass index is 48.66 kg/(m^2).  GENERAL: alert, no distress, obese, elderly, appears older than stated age  NECK: no adenopathy, no asymmetry, masses, or scars and thyroid normal to palpation  RESP: lungs clear to auscultation - no rales, rhonchi or wheezes  CV: regular rate and rhythm, normal S1 S2, no S3 or S4, no murmur, click or rub, no peripheral edema and peripheral pulses strong  ABDOMEN: soft, nontender, no hepatosplenomegaly, no masses and bowel  sounds normal  MS: walks comfortably, with bilateral legs not wrapped today  LYMPH: bilateral legs 2+.   No warmth.  Diabetic foot exam: normal DP and PT pulses, no trophic changes or ulcerative lesions, reduced sensation throughout, trophic changes, venous stasis dermatitis noted and nail exam dystrophic nails  SKIN: Arms with dry skin with several open wounds from itching scattered    ASSESSMENT/PLAN:     Jamar was seen today for erectile dysfunction.    Diagnoses and all orders for this visit:    Moderate major depression (H)  -     venlafaxine (EFFEXOR-XR) 75 MG 24 hr capsule; Take 1 capsule (75 mg) by mouth daily    Vasculogenic erectile dysfunction, unspecified vasculogenic erectile dysfunction type  -     sildenafil (VIAGRA) 50 MG tablet; Take 1 tablet (50 mg) by mouth daily as needed 30 min to 4 hrs before sex. Do not use with nitroglycerin, terazosin or doxazosin.  -     UROLOGY ADULT REFERRAL    Benign prostatic hyperplasia with incomplete bladder emptying  -     UROLOGY ADULT REFERRAL        Patient Instructions   1. No Tamsulosin (Flomax) with viagra.  Stop this jmedicine.    2. Stop your bupropion and prozac (fluoxetine).  Start instead effexor (venlafaxine) 75 mg daily.    3. I referred you for urology - call for this appointment.    4. Follow up in 1 month, to be sure depression med is working.        Blanca Peng MD  [facility]    Answers for HPI/ROS submitted by the patient on 8/29/2018   PHQ9 TOTAL SCORE: 13  MICHEAL 7 TOTAL SCORE: 5

## 2018-08-29 NOTE — TELEPHONE ENCOUNTER
Prescription approved per [facility] Refill Protocol.  Sade Crump RN- Triage [employer]

## 2018-08-29 NOTE — MR AVS SNAPSHOT
After Visit Summary   8/29/2018    Jamar Ivory    MRN: [id number]           Patient Information     Date Of Birth          1950        Visit Information        Provider Department      8/29/2018 1:40 PM Blanca Peng MD [facility]        Today's Diagnoses     Moderate major depression (H)    -  1    Vasculogenic erectile dysfunction, unspecified vasculogenic erectile dysfunction type        Benign prostatic hyperplasia with incomplete bladder emptying          Care Instructions    1. No Tamsulosin (Flomax) with viagra.  Stop this jmedicine.    2. Stop your bupropion and prozac (fluoxetine).  Start instead effexor (venlafaxine) 75 mg daily.    3. I referred you for urology - call for this appointment.            Follow-ups after your visit        Additional Services     UROLOGY ADULT REFERRAL       Your provider has referred you to: [facility]: [facility] [place] [phone]   [email or web address]    Please be aware that coverage of these services is subject to the terms and limitations of your health insurance plan.  Call member services at your health plan with any benefit or coverage questions.      Please bring the following with you to your appointment:    (1) Any X-Rays, CTs or MRIs which have been performed.  Contact the facility where they were done to arrange for  prior to your scheduled appointment.    (2) List of current medications  (3) This referral request   (4) Any documents/labs given to you for this referral                  Who to contact     If you have questions or need follow up information about today's clinic visit or your schedule please contact [facility] directly at [phone].  Normal or non-critical lab and imaging results will be communicated to you by MyChart, letter or phone within 4 business days after the clinic has received the  results. If you do not hear from us within 7 days, please contact the clinic through [employer] or phone. If you have a critical or abnormal lab result, we will notify you by phone as soon as possible.  Submit refill requests through [employer] or call your pharmacy and they will forward the refill request to us. Please allow 3 business days for your refill to be completed.          Additional Information About Your Visit        Care EveryWhere ID     This is your Care EveryWhere ID. This could be used by other organizations to access your [place] medical records  [id number]        Your Vitals Were     Pulse Temperature Pulse Oximetry BMI (Body Mass Index)          80 98.3  F (36.8  C) (Tympanic) 94% 48.66 kg/m2         Blood Pressure from Last 3 Encounters:   08/29/18 120/70   08/14/18 118/72   08/02/18 114/70    Weight from Last 3 Encounters:   08/29/18 320 lb (145.2 kg)   08/14/18 318 lb (144.2 kg)   08/02/18 318 lb (144.2 kg)              We Performed the Following     UROLOGY ADULT REFERRAL          Today's Medication Changes          These changes are accurate as of 8/29/18  2:25 PM.  If you have any questions, ask your nurse or doctor.               Start taking these medicines.        Dose/Directions    sildenafil 50 MG tablet   Commonly known as:  VIAGRA   Used for:  Vasculogenic erectile dysfunction, unspecified vasculogenic erectile dysfunction type   Started by:  Blanca Peng MD        Dose:  50 mg   Take 1 tablet (50 mg) by mouth daily as needed 30 min to 4 hrs before sex. Do not use with nitroglycerin, terazosin or doxazosin.   Quantity:  6 tablet   Refills:  1       venlafaxine 75 MG 24 hr capsule   Commonly known as:  EFFEXOR-XR   Used for:  Moderate major depression (H)   Started by:  Blanca Peng MD        Dose:  75 mg   Take 1 capsule (75 mg) by mouth daily   Quantity:  90 capsule   Refills:  1         Stop taking these medicines if you haven't already. Please contact your care team if  you have questions.     buPROPion 150 MG 24 hr tablet   Commonly known as:  WELLBUTRIN XL   Stopped by:  Blanca Peng MD           FLUoxetine 40 MG capsule   Commonly known as:  PROzac   Stopped by:  Blanca Peng MD           tamsulosin 0.4 MG capsule   Commonly known as:  FLOMAX   Stopped by:  Blanca Peng MD                Where to get your medicines      These medications were sent to [place] [address] [place] [facility] - [address] [facility] [address] [facility] [address]     Phone:  [phone]     sildenafil 50 MG tablet    venlafaxine 75 MG 24 hr capsule                Primary Care Provider Office Phone # Fax #    Blanca Peng [phone] [address] [facility] [address]        Equal Access to Services     SHAUN AKERS : Hadii aad ku hadasho Soomaali, waaxda luqadaha, qaybta kaalmada adeegyada, maykel reich . So [facility] [phone].    ATENCIÓN: Si habla español, tiene a shook disposición servicios gratuitos de asistencia lingüística. Llame al [phone].    We comply with applicable federal civil rights laws and [place] laws. We do not discriminate on the basis of race, color, national origin, age, disability, sex, sexual orientation, or gender identity.            Thank you!     Thank you for choosing [facility]  for your care. Our goal is always to provide you with excellent care. Hearing back from our patients is one way we can continue to improve our services. Please take a few minutes to complete the written survey that you may receive in the mail after your visit with us. Thank you!             Your Updated Medication List - Protect others around you: Learn how to safely use, store and throw away your medicines at www.disposemymeds.org.          This list is accurate as of 8/29/18  2:25 PM.  Always use your most recent med list.                   Brand Name Dispense  Instructions for use Diagnosis    furosemide 80 MG tablet    LASIX    180 tablet    TAKE 1 TABLET (80 MG) BY MOUTH 2 TIMES DAILY    Edema, unspecified type       KLOR-CON 10 MEQ CR tablet   Generic drug:  potassium chloride SA     90 tablet    TAKE 1 TABLET (10 MEQ) BY MOUTH 3 TIMES DAILY    Bilateral leg edema       KLOR-CON 10 PO      Take 10 mEq by mouth 3 times daily        levothyroxine 200 MCG tablet    SYNTHROID/LEVOTHROID    90 tablet    TAKE 1 TABLET (200 MCG) BY MOUTH DAILY    Acquired hypothyroidism       losartan 25 MG tablet    COZAAR    90 tablet    Take 1 tablet (25 mg) by mouth daily        meloxicam 7.5 MG tablet    MOBIC    90 tablet    Take 1 tablet (7.5 mg) by mouth daily    Right foot pain       order for DME     100 strip    Equipment being ordered: blood glucose monitor strips to use 2 to 4 times  A day. As covered by insurance. #100 with 5 refills  Monitor ordered this day  As well.  Also lancets #100 use as needed for testing 5 refils    Diabetes mellitus, type 2 (H)       order for DME     3 Device    Compression Stockings Ready To Wear Knee High Compression Stockings 20-30 mmHg Length of Need: Life Time # of Pairs 3    Type 2 diabetes mellitus with diabetic dermatitis, unspecified long term insulin use status (H), Swelling of lower extremity, Lymphedema of both lower extremities, Chronic venous hypertension with ulcer, bilateral (H), Varicose veins of both legs with edema, Open wound of knee, leg, and ankle, unspecified laterality, subsequent encounter, Venous (peripheral) insufficiency       order for DME     30 days    Equipment being ordered: [employer] Phone [phone] Fax [phone]  Primary Dressing Xeroform   Qty 30 sheets Secondary Dressing guanako rolls Qty 60 Secondary Dressing 2'tape Qty 1 roll Length of Need: 1 month Frequency of dressing change: daily    Lymphedema of both lower extremities, Venous ulcer of left leg (H)       order for DME     1 each    1: Gradient  Compression Wraps; 2: Cast Boots; 3: BLE velcro compression garments; knee or thigh high; 4: BLE custom compression stockings; knee or thigh high; 5; BLE 20-30 or 30-40 mm Hg compression stockings; knee or thigh high    Localized edema       sildenafil 50 MG tablet    VIAGRA    6 tablet    Take 1 tablet (50 mg) by mouth daily as needed 30 min to 4 hrs before sex. Do not use with nitroglycerin, terazosin or doxazosin.    Vasculogenic erectile dysfunction, unspecified vasculogenic erectile dysfunction type       simvastatin 20 MG tablet    ZOCOR    90 tablet    Take 1 tablet (20 mg) by mouth At Bedtime    Hyperlipidemia with target LDL less than 100       venlafaxine 75 MG 24 hr capsule    EFFEXOR-XR    90 capsule    Take 1 capsule (75 mg) by mouth daily    Moderate major depression (H)

## 2018-08-29 NOTE — TELEPHONE ENCOUNTER
"TAMSULOSIN HCL 0.4 MG CAPSULE  Last Written Prescription Date:  02/28/18  Last Fill Quantity: 180,  # refills: 1   Last office visit: 8/2/2018 with prescribing provider:  08/02/18   Future Office Visit:   Next 5 appointments (look out 90 days)     Aug 29, 2018  1:40 PM CDT   Office Visit with Blanca Peng MD   [facility] ([facility]    [address] [place] [address] [phone]                 Requested Prescriptions   Pending Prescriptions Disp Refills     tamsulosin (FLOMAX) 0.4 MG capsule [Pharmacy Med Name: TAMSULOSIN HCL 0.4 MG CAPSULE] 180 capsule 1     Sig: TAKE 2 CAPSULES (0.8 MG) BY MOUTH DAILY    Alpha Blockers Passed    8/29/2018  1:34 AM       Passed - Blood pressure under 140/90 in past 12 months    BP Readings from Last 3 Encounters:   08/14/18 118/72   08/02/18 114/70   07/15/18 152/73                Passed - Recent (12 mo) or future (30 days) visit within the authorizing provider's specialty    Patient had office visit in the last 12 months or has a visit in the next 30 days with authorizing provider or within the authorizing provider's specialty.  See \"Patient Info\" tab in inbasket, or \"Choose Columns\" in Meds & Orders section of the refill encounter.           Passed - Patient does not have Tadalafil, Vardenafil, or Sildenafil on their medication list       Passed - Patient is 18 years of age or older          "

## 2018-08-29 NOTE — PATIENT INSTRUCTIONS
1. No Tamsulosin (Flomax) with viagra.  Stop this jmedicine.    2. Stop your bupropion and prozac (fluoxetine).  Start instead effexor (venlafaxine) 75 mg daily.    3. I referred you for urology - call for this appointment.    4. Follow up in 1 month, to be sure depression med is working.

## 2018-08-30 ASSESSMENT — ANXIETY QUESTIONNAIRES: GAD7 TOTAL SCORE: 5

## 2018-08-30 ASSESSMENT — PATIENT HEALTH QUESTIONNAIRE - PHQ9: SUM OF ALL RESPONSES TO PHQ QUESTIONS 1-9: 13

## 2018-09-18 ENCOUNTER — OFFICE VISIT (OUTPATIENT)
Dept: FAMILY MEDICINE | Facility: CLINIC | Age: 68
End: 2018-09-18
Payer: MEDICARE

## 2018-09-18 ENCOUNTER — TELEPHONE (OUTPATIENT)
Dept: FAMILY MEDICINE | Facility: CLINIC | Age: 68
End: 2018-09-18

## 2018-09-18 VITALS
DIASTOLIC BLOOD PRESSURE: 72 MMHG | WEIGHT: 315 LBS | OXYGEN SATURATION: 92 % | BODY MASS INDEX: 47.9 KG/M2 | HEART RATE: 85 BPM | TEMPERATURE: 98.5 F | SYSTOLIC BLOOD PRESSURE: 120 MMHG

## 2018-09-18 DIAGNOSIS — N40.1 BENIGN PROSTATIC HYPERPLASIA WITH INCOMPLETE BLADDER EMPTYING: Primary | ICD-10-CM

## 2018-09-18 DIAGNOSIS — R60.0 BILATERAL LEG EDEMA: ICD-10-CM

## 2018-09-18 DIAGNOSIS — R39.14 BENIGN PROSTATIC HYPERPLASIA WITH INCOMPLETE BLADDER EMPTYING: Primary | ICD-10-CM

## 2018-09-18 DIAGNOSIS — F32.1 MODERATE MAJOR DEPRESSION (H): Chronic | ICD-10-CM

## 2018-09-18 PROCEDURE — 99215 OFFICE O/P EST HI 40 MIN: CPT | Performed by: FAMILY MEDICINE

## 2018-09-18 NOTE — PROGRESS NOTES
SUBJECTIVE:   Jamar Ivory is a 68 year old male who presents to clinic today for the following health issues:      Depression Followup    Status since last visit: Stable , wife keeps saying he is    See PHQ-9 for current symptoms.  Other associated symptoms: None    Complicating factors:   Significant life event:  No, prostate concerns   Current substance abuse:  None  Anxiety or Panic symptoms:  No    PHQ-9 8/29/2018 9/18/2018 9/18/2018   Total Score 13 12 12   Q9: Suicide Ideation Nearly every day Nearly every day Nearly every day     PHQ-9  English  PHQ-9   Any Language  Suicide Assessment Five-step Evaluation and Treatment (SAFE-T)    Amount of exercise or physical activity: walking    Problems taking medications regularly: No    Medication side effects: none    Diet: regular (no restrictions)        Genitourinary symptoms      Duration: 3-4 months    Description:  Flow is low    Intensity:  moderate    Accompanying signs and symptoms (fever/discharge/nausea/vomiting/back or abdominal pain):  None    History (frequent UTI's/kidney stones/prostate problems): prostate for about 1 year  Sexually active: no     Precipitating or alleviating factors: None    Therapies tried and outcome: none   Outcome:       Complex 68-year-old male here with his wife, Melissa, who also has complex psychosocial issues and frequently interrupts our conversation, here today for follow-up of several issues:    1.  Depression.  Patient has significant past medical history of depression.  He has been on Prozac and Wellbutrin for the last year and has not improved.  He does not want to go to therapy as he feels he cannot drive and is not sure it worked in the past.  He does have a remote history of suicidal ideation for which he was hospitalized.  He was started on the medication after this time but cannot remember what it was called, he feels it worked well.  We started Effexor last month and it has shown no improvement.  He is  "not actively suicidal and denies a plan but is \"feeling like nothing is going to make it better\".  Much of his depression symptomology is focused around financial stressors and relationship stressors.  He feels he cannot stop thinking about the things he has lost.  He is interested in learning how to change that thought pattern.    2.  Erectile dysfunction and BPH symptoms.  I did refer Kenneth to urology several times most recently last month.  He has not made this follow-up appointment.  He is willing to today.  He has tried multiple medications and behavioral modifications without much success.  He is getting up to urinate every hour.        Problem list and histories reviewed & adjusted, as indicated.  Additional history: as documented    Reviewed and updated as needed this visit by clinical staff  Tobacco  Allergies  Meds  Med Hx  Surg Hx  Fam Hx  Soc Hx      Reviewed and updated as needed this visit by Provider         ROS:  Constitutional, HEENT, cardiovascular, pulmonary, gi and gu systems are negative, except as otherwise noted.    OBJECTIVE:     /72 (Cuff Size: Adult Large)  Pulse 85  Temp 98.5  F (36.9  C) (Tympanic)  Wt 315 lb (142.9 kg)  SpO2 92%  BMI 47.9 kg/m2  Body mass index is 47.9 kg/(m^2).  GENERAL: alert, no distress, over weight, obese and fatigued  MENTAL STATUS EXAM:   Jamar is casually dressed and well groomed, sitting comfortably during encounter.  he is alert, awake, and in no acute distress. Eye contact is good. Speech is normal, not pressured. Psychomotor activity is within normal limits and there are no abnormal involuntary movements demonstrated. Mood is \"down, depressed\" and affect is dysthymic with good range and reactivity. [place] is negative. Thought process is linear, logical, and goal directed. Thought content is free of suicidal or homicidal ideation, hallucinations or other symptoms of psychosis. Insight and judgement are fair to good. he is generally oriented. " No difficulty with memory, attention, or cognition. Associations intact, gait and station within normal limits.    ASSESSMENT/PLAN:       ICD-10-CM    1. Benign prostatic hyperplasia with incomplete bladder emptying N40.1     R39.14    2. Moderate major depression (H) F32.1 sertraline (ZOLOFT) 50 MG tablet   3. Bilateral leg edema R60.0      -For depression needs multifaceted approach.  Will refer to leap program through [facility] [place].  This is a therapy program that will come to patient's house.  Additionally will switch to Zoloft as think this will be really helpful for first name.  First name thinks that this is what he was on in the past which was effective.  Will start with 50 mg, and quick taper to 100 mg if not noticing improvement.  Close follow-up in 1 month.      -Patient contracted with me that he will consider looking for a small part-time job.  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    -For persistent urologic symptoms despite multiple medication trials, will refer to urology.  Patient agrees  to make appointment.      Patient Instructions   1. Stop your effexor.     2. Start zoloft 50 mg daily.  After 2 weeks if depression not better, increase to 100 mg daily (2 tabs).    3. I referred you for urology - call for this appointment.  Your provider has referred you to: [facility]: [facility] [place] [phone]   [email or web address]  \    4. Follow up with min 1 month again.          Greater than 40 minutes total were spent face to face with the patient including history, exam, counseling and coordination of care.        Blanca Peng MD  [facility]

## 2018-09-18 NOTE — MR AVS SNAPSHOT
After Visit Summary   9/18/2018    Jamar Ivory    MRN: [id number]           Patient Information     Date Of Birth          1950        Visit Information        Provider Department      9/18/2018 1:40 PM Blanca Peng MD [facility]        Today's Diagnoses     Moderate major depression (H)          Care Instructions    1. Stop your effexor.     2. Start zoloft 50 mg daily.  After 2 weeks if depression not better, increase to 100 mg daily (2 tabs).    3. I referred you for urology - call for this appointment.  Your provider has referred you to: [facility]: [facility] - [place] [phone]   [email or web address]  \    4. Follow up with min 1 month again.                Follow-ups after your visit        Who to contact     If you have questions or need follow up information about today's clinic visit or your schedule please contact [facility] directly at [phone].  Normal or non-critical lab and imaging results will be communicated to you by MyChart, letter or phone within 4 business days after the clinic has received the results. If you do not hear from us within 7 days, please contact the clinic through MyChart or phone. If you have a critical or abnormal lab result, we will notify you by phone as soon as possible.  Submit refill requests through [employer] or call your pharmacy and they will forward the refill request to us. Please allow 3 business days for your refill to be completed.          Additional Information About Your Visit        Care EveryWhere ID     This is your Care EveryWhere ID. This could be used by other organizations to access your [place] medical records  [id number]        Your Vitals Were     Pulse Temperature Pulse Oximetry BMI (Body Mass Index)          85 98.5  F (36.9  C) (Tympanic) 92% 47.9 kg/m2         Blood Pressure from Last 3 Encounters:    09/18/18 120/72   08/29/18 120/70   08/14/18 118/72    Weight from Last 3 Encounters:   09/18/18 315 lb (142.9 kg)   08/29/18 320 lb (145.2 kg)   08/14/18 318 lb (144.2 kg)              Today, you had the following     No orders found for display         Today's Medication Changes          These changes are accurate as of 9/18/18  2:29 PM.  If you have any questions, ask your nurse or doctor.               Start taking these medicines.        Dose/Directions    sertraline 50 MG tablet   Commonly known as:  ZOLOFT   Used for:  Moderate major depression (H)   Started by:  Blanca Peng MD        Dose:  50 mg   Take 1 tablet (50 mg) by mouth daily . After 2 weeks, if depression not better increase to 2 tablets (100 mg) a day.   Quantity:  60 tablet   Refills:  1         Stop taking these medicines if you haven't already. Please contact your care team if you have questions.     venlafaxine 75 MG 24 hr capsule   Commonly known as:  EFFEXOR-XR   Stopped by:  Blanca Peng MD                Where to get your medicines      These medications were sent to [place] [address] [place] [address] [address]     Phone:  [phone]     sertraline 50 MG tablet                Primary Care Provider Office Phone # Fax #    Blanca Peng [phone]       [address] [place] [address]        Equal Access to Services     SHAUN AKERS AH: Isaac don Solacey, waaxda luqbandar, qaybta kaalmada prem, waxay mandi parada. So [facility] [phone].    ATENCIÓN: Si habla elenañol, tiene a shook disposición servicios gratuitos de asistencia lingüística. Llame al [phone].    We comply with applicable federal civil rights laws and [place] laws. We do not discriminate on the basis of race, color, national origin, age, disability, sex, sexual orientation, or gender identity.            Thank you!     Thank you  for choosing [facility]  for your care. Our goal is always to provide you with excellent care. Hearing back from our patients is one way we can continue to improve our services. Please take a few minutes to complete the written survey that you may receive in the mail after your visit with us. Thank you!             Your Updated Medication List - Protect others around you: Learn how to safely use, store and throw away your medicines at www.disposemymeds.org.          This list is accurate as of 9/18/18  2:29 PM.  Always use your most recent med list.                   Brand Name Dispense Instructions for use Diagnosis    furosemide 80 MG tablet    LASIX    180 tablet    TAKE 1 TABLET (80 MG) BY MOUTH 2 TIMES DAILY    Edema, unspecified type       KLOR-CON 10 MEQ CR tablet   Generic drug:  potassium chloride SA     90 tablet    TAKE 1 TABLET (10 MEQ) BY MOUTH 3 TIMES DAILY    Bilateral leg edema       KLOR-CON 10 PO      Take 10 mEq by mouth 3 times daily        levothyroxine 200 MCG tablet    SYNTHROID/LEVOTHROID    90 tablet    TAKE 1 TABLET (200 MCG) BY MOUTH DAILY    Acquired hypothyroidism       losartan 25 MG tablet    COZAAR    90 tablet    Take 1 tablet (25 mg) by mouth daily        meloxicam 7.5 MG tablet    MOBIC    90 tablet    Take 1 tablet (7.5 mg) by mouth daily    Right foot pain       order for DME     100 strip    Equipment being ordered: blood glucose monitor strips to use 2 to 4 times  A day. As covered by insurance. #100 with 5 refills  Monitor ordered this day  As well.  Also lancets #100 use as needed for testing 5 refils    Diabetes mellitus, type 2 (H)       order for DME     3 Device    Compression Stockings Ready To Wear Knee High Compression Stockings 20-30 mmHg Length of Need: Life Time # of Pairs 3    Type 2 diabetes mellitus with diabetic dermatitis, unspecified long term insulin use status (H), Swelling of lower extremity, Lymphedema of both lower  extremities, Chronic venous hypertension with ulcer, bilateral (H), Varicose veins of both legs with edema, Open wound of knee, leg, and ankle, unspecified laterality, subsequent encounter, Venous (peripheral) insufficiency       order for DME     30 days    Equipment being ordered: [employer] Phone [phone] Fax [phone]  Primary Dressing Xeroform   Qty 30 sheets Secondary Dressing guanako rolls Qty 60 Secondary Dressing 2'tape Qty 1 roll Length of Need: 1 month Frequency of dressing change: daily    Lymphedema of both lower extremities, Venous ulcer of left leg (H)       order for DME     1 each    1: Gradient Compression Wraps; 2: Cast Boots; 3: BLE velcro compression garments; knee or thigh high; 4: BLE custom compression stockings; knee or thigh high; 5; BLE 20-30 or 30-40 mm Hg compression stockings; knee or thigh high    Localized edema       sertraline 50 MG tablet    ZOLOFT    60 tablet    Take 1 tablet (50 mg) by mouth daily . After 2 weeks, if depression not better increase to 2 tablets (100 mg) a day.    Moderate major depression (H)       sildenafil 50 MG tablet    VIAGRA    6 tablet    Take 1 tablet (50 mg) by mouth daily as needed 30 min to 4 hrs before sex. Do not use with nitroglycerin, terazosin or doxazosin.    Vasculogenic erectile dysfunction, unspecified vasculogenic erectile dysfunction type       simvastatin 20 MG tablet    ZOCOR    90 tablet    Take 1 tablet (20 mg) by mouth At Bedtime    Hyperlipidemia with target LDL less than 100

## 2018-09-18 NOTE — PATIENT INSTRUCTIONS
1. Stop your effexor.     2. Start zoloft 50 mg daily.  After 2 weeks if depression not better, increase to 100 mg daily (2 tabs).    3. I referred you for urology - call for this appointment.  Your provider has referred you to: [facility]: [facility] - [place] [phone]   [email or web address]  \    4. Follow up with min 1 month again.

## 2018-09-18 NOTE — TELEPHONE ENCOUNTER
Reason for Call:  Other     Detailed comments: saw Dr. Peng today, has question about medications    Phone Number Patient can be reached at: Home number on file [phone] (home)    Best Time: today    Can we leave a detailed message on this number? YES    Call taken on 9/18/2018 at 4:03 PM by JONATHON ARIAS

## 2018-09-20 ASSESSMENT — PATIENT HEALTH QUESTIONNAIRE - PHQ9: SUM OF ALL RESPONSES TO PHQ QUESTIONS 1-9: 12

## 2018-09-21 DIAGNOSIS — F32.1 MODERATE MAJOR DEPRESSION (H): Chronic | ICD-10-CM

## 2018-09-21 NOTE — TELEPHONE ENCOUNTER
Pharmacy calling   PT starting sertraline--should the be D/C fluoxetine and venlafaxine? Please advise.     Venlafaxine- d/c'd 9/18/18  Fluoxetine d/c'd 2/28/18    I called him and he is only on Sertraline now.  I updated pharmacy    Sade Crump RN- Triage [employer]

## 2018-09-26 DIAGNOSIS — E03.9 ACQUIRED HYPOTHYROIDISM: Chronic | ICD-10-CM

## 2018-09-27 RX ORDER — LEVOTHYROXINE SODIUM 200 UG/1
TABLET ORAL
Qty: 90 TABLET | Refills: 2 | Status: SHIPPED | OUTPATIENT
Start: 2018-09-27 | End: 2018-12-28

## 2018-09-27 NOTE — TELEPHONE ENCOUNTER
"LEVOTHYROXINE 200 MCG TABLET  Last Written Prescription Date:  11/24/17  Last Fill Quantity: 90,  # refills: 2   Last office visit: 9/18/2018 with prescribing provider:  09/18/18   Future Office Visit:    Requested Prescriptions   Pending Prescriptions Disp Refills     levothyroxine (SYNTHROID/LEVOTHROID) 200 MCG tablet [Pharmacy Med Name: LEVOTHYROXINE 200 MCG TABLET] 90 tablet 2     Sig: TAKE 1 TABLET (200 MCG) BY MOUTH DAILY    Thyroid Protocol Passed    9/26/2018  4:42 PM       Passed - Patient is 12 years or older       Passed - Recent (12 mo) or future (30 days) visit within the authorizing provider's specialty    Patient had office visit in the last 12 months or has a visit in the next 30 days with authorizing provider or within the authorizing provider's specialty.  See \"Patient Info\" tab in inbasket, or \"Choose Columns\" in Meds & Orders section of the refill encounter.           Passed - Normal TSH on file in past 12 months    Recent Labs   Lab Test  06/20/18   1225   TSH  1.06                "

## 2018-10-15 ENCOUNTER — OFFICE VISIT (OUTPATIENT)
Dept: UROLOGY | Facility: CLINIC | Age: 68
End: 2018-10-15
Payer: MEDICARE

## 2018-10-15 VITALS — BODY MASS INDEX: 47.74 KG/M2 | OXYGEN SATURATION: 97 % | WEIGHT: 315 LBS | HEART RATE: 76 BPM | HEIGHT: 68 IN

## 2018-10-15 DIAGNOSIS — N40.1 BENIGN PROSTATIC HYPERPLASIA WITH INCOMPLETE BLADDER EMPTYING: Primary | ICD-10-CM

## 2018-10-15 DIAGNOSIS — R39.14 BENIGN PROSTATIC HYPERPLASIA WITH INCOMPLETE BLADDER EMPTYING: Primary | ICD-10-CM

## 2018-10-15 LAB — RESIDUAL VOLUME (RV) (EXTERNAL): 99

## 2018-10-15 PROCEDURE — 51798 US URINE CAPACITY MEASURE: CPT | Performed by: UROLOGY

## 2018-10-15 PROCEDURE — 99203 OFFICE O/P NEW LOW 30 MIN: CPT | Mod: 25 | Performed by: UROLOGY

## 2018-10-15 RX ORDER — TAMSULOSIN HYDROCHLORIDE 0.4 MG/1
0.4 CAPSULE ORAL DAILY
COMMUNITY
End: 2019-05-15

## 2018-10-15 ASSESSMENT — PAIN SCALES - GENERAL: PAINLEVEL: EXTREME PAIN (8)
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
## 2018-10-15 NOTE — MR AVS SNAPSHOT
"              After Visit Summary   10/15/2018    Jamar Ivory    MRN: [id number]           Patient Information     Date Of Birth          1950        Visit Information        Provider Department      10/15/2018 9:00 AM Adiel Alvarez MD [facility]        Today's Diagnoses     Benign prostatic hyperplasia with incomplete bladder emptying    -  1       Follow-ups after your visit        Follow-up notes from your care team     Return if symptoms worsen or fail to improve.      Who to contact     If you have questions or need follow up information about today's clinic visit or your schedule please contact [facility] directly at [phone].  Normal or non-critical lab and imaging results will be communicated to you by MyChart, letter or phone within 4 business days after the clinic has received the results. If you do not hear from us within 7 days, please contact the clinic through [employer] or phone. If you have a critical or abnormal lab result, we will notify you by phone as soon as possible.  Submit refill requests through [employer] or call your pharmacy and they will forward the refill request to us. Please allow 3 business days for your refill to be completed.          Additional Information About Your Visit        MyChart Information     [employer] lets you send messages to your doctor, view your test results, renew your prescriptions, schedule appointments and more. To sign up, go to [email or web address] . Click on \"Log in\" on the left side of the screen, which will take you to the Welcome page. Then click on \"Sign up Now\" on the right side of the page.     You will be asked to enter the access code listed below, as well as some personal information. Please follow the directions to create your username and password.     Your access code is: [email or web address]  Expires: 1/13/2019  9:14 AM     Your access " "code will  in 90 days. If you need help or a new code, please call your [facility] clinic or [phone].        Care EveryWhere ID     This is your Care EveryWhere ID. This could be used by other organizations to access your [facility] medical records  [id number]        Your Vitals Were     Pulse Height Pulse Oximetry BMI (Body Mass Index)          76 1.727 m (5' 8\") 97% 47.9 kg/m2         Blood Pressure from Last 3 Encounters:   18 120/72   18 120/70   18 118/72    Weight from Last 3 Encounters:   10/15/18 142.9 kg (315 lb)   18 142.9 kg (315 lb)   18 145.2 kg (320 lb)              We Performed the Following     Bladder scan        Primary Care Provider Office Phone # Fax #    Blanca Peng [phone] [address] [address]        Equal Access to Services     SHAUN AKERS : Hadii aad ku hadasho Soomaali, waaxda luqadaha, qaybta kaalmada adeegyada, waxay idiin haynayelin antonio reich . So [facility] [phone].    ATENCIÓN: Si habla español, tiene a shook disposición servicios gratuitos de asistencia lingüística. Llame al [phone].    We comply with applicable federal civil rights laws and [place] laws. We do not discriminate on the basis of race, color, national origin, age, disability, sex, sexual orientation, or gender identity.            Thank you!     Thank you for choosing [facility] [place]  for your care. Our goal is always to provide you with excellent care. Hearing back from our patients is one way we can continue to improve our services. Please take a few minutes to complete the written survey that you may receive in the mail after your visit with us. Thank you!             Your Updated Medication List - Protect others around you: Learn how to safely use, store and throw away your medicines at www.disposemymeds.org.          This list is accurate as of 10/15/18  9:14 AM.  Always use " your most recent med list.                   Brand Name Dispense Instructions for use Diagnosis    FLOMAX 0.4 MG capsule   Generic drug:  tamsulosin      Take 0.4 mg by mouth daily        furosemide 80 MG tablet    LASIX    180 tablet    TAKE 1 TABLET (80 MG) BY MOUTH 2 TIMES DAILY    Edema, unspecified type       KLOR-CON 10 MEQ CR tablet   Generic drug:  potassium chloride SA     90 tablet    TAKE 1 TABLET (10 MEQ) BY MOUTH 3 TIMES DAILY    Bilateral leg edema       KLOR-CON 10 PO      Take 10 mEq by mouth 3 times daily        levothyroxine 200 MCG tablet    SYNTHROID/LEVOTHROID    90 tablet    TAKE 1 TABLET (200 MCG) BY MOUTH DAILY    Acquired hypothyroidism       losartan 25 MG tablet    COZAAR    90 tablet    Take 1 tablet (25 mg) by mouth daily        meloxicam 7.5 MG tablet    MOBIC    90 tablet    Take 1 tablet (7.5 mg) by mouth daily    Right foot pain       order for DME     100 strip    Equipment being ordered: blood glucose monitor strips to use 2 to 4 times  A day. As covered by insurance. #100 with 5 refills  Monitor ordered this day  As well.  Also lancets #100 use as needed for testing 5 refils    Diabetes mellitus, type 2 (H)       order for DME     3 Device    Compression Stockings Ready To Wear Knee High Compression Stockings 20-30 mmHg Length of Need: Life Time # of Pairs 3    Type 2 diabetes mellitus with diabetic dermatitis, unspecified long term insulin use status, Swelling of lower extremity, Lymphedema of both lower extremities, Chronic venous hypertension with ulcer, bilateral (H), Varicose veins of both legs with edema, Open wound of knee, leg, and ankle, unspecified laterality, subsequent encounter, Venous (peripheral) insufficiency       order for DME     30 days    Equipment being ordered: [employer] Phone [phone] Fax [phone]  Primary Dressing Xeroform   Qty 30 sheets Secondary Dressing guanako rolls Qty 60 Secondary Dressing 2'tape Qty 1 roll Length of Need: 1 month  Frequency of dressing change: daily    Lymphedema of both lower extremities, Venous ulcer of left leg (H)       order for DME     1 each    1: Gradient Compression Wraps; 2: Cast Boots; 3: BLE velcro compression garments; knee or thigh high; 4: BLE custom compression stockings; knee or thigh high; 5; BLE 20-30 or 30-40 mm Hg compression stockings; knee or thigh high    Localized edema       sertraline 50 MG tablet    ZOLOFT    60 tablet    Take 1 tablet (50 mg) by mouth daily . After 2 weeks, if depression not better increase to 2 tablets (100 mg) a day.    Moderate major depression (H)       sildenafil 50 MG tablet    VIAGRA    6 tablet    Take 1 tablet (50 mg) by mouth daily as needed 30 min to 4 hrs before sex. Do not use with nitroglycerin, terazosin or doxazosin.    Vasculogenic erectile dysfunction, unspecified vasculogenic erectile dysfunction type       simvastatin 20 MG tablet    ZOCOR    90 tablet    Take 1 tablet (20 mg) by mouth At Bedtime    Hyperlipidemia with target LDL less than 100

## 2018-10-15 NOTE — NURSING NOTE
Pvr=99  Pt has not seen a urologist in the past.  Pt has nocturia and freq.  Pt has ED.  Pt up a lot at nt to void and goes every 15 min during the day.  Pt states Viagra did not work on him.  No UA at this time............. He just went before he got here.  Pt denies dysuria.  KIA Vail, CMA
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
## 2018-10-17 NOTE — TELEPHONE ENCOUNTER
Reason for Call:  Medication or medication refill:    Do you use a [facility]?  Name of the pharmacy and phone number for the current request:  [id number] [place] [address]    Name of the medication requested: HYDROcodone-acetaminophen (NORCO) 5-325 MG per tablet     Other request: Patients wife Suzan state that it helps with Kenneth's knee pain. Please mail to pharmacy     Can we leave a detailed message on this number? YES    Phone number patient can be reached at: Home number on file [phone] (home)    Best Time: anytime     Call taken on 10/17/2018 at 2:54 PM by Idalia Atkinson

## 2018-10-18 NOTE — TELEPHONE ENCOUNTER
Controlled Substance Refill Request for Hydrocodone-acetaminophen (NORCO) 5-325 MGProblem List Complete:  Yes   checked in past 3 months?  Yes 10/18/18 NO CONCERNS

## 2018-10-19 RX ORDER — HYDROCODONE BITARTRATE AND ACETAMINOPHEN 5; 325 MG/1; MG/1
1 TABLET ORAL EVERY 6 HOURS PRN
Qty: 8 TABLET | Refills: 0 | OUTPATIENT
Start: 2018-10-19

## 2018-10-22 NOTE — TELEPHONE ENCOUNTER
Reached him and he isn't in any pain.  I explained that the medication that was requested is for pain and his wife had asked for it. He will call back PRN    Sade Crump RN- Triage [employer]

## 2018-12-24 ENCOUNTER — APPOINTMENT (OUTPATIENT)
Dept: GENERAL RADIOLOGY | Facility: CLINIC | Age: 68
DRG: 292 | End: 2018-12-24
Attending: EMERGENCY MEDICINE
Payer: MEDICARE

## 2018-12-24 ENCOUNTER — APPOINTMENT (OUTPATIENT)
Dept: CARDIOLOGY | Facility: CLINIC | Age: 68
DRG: 292 | End: 2018-12-24
Attending: INTERNAL MEDICINE
Payer: MEDICARE

## 2018-12-24 ENCOUNTER — HOSPITAL ENCOUNTER (INPATIENT)
Facility: CLINIC | Age: 68
LOS: 2 days | Discharge: HOME | DRG: 292 | End: 2018-12-26
Attending: EMERGENCY MEDICINE | Admitting: INTERNAL MEDICINE
Payer: MEDICARE

## 2018-12-24 DIAGNOSIS — I50.43 ACUTE ON CHRONIC COMBINED SYSTOLIC AND DIASTOLIC CONGESTIVE HEART FAILURE (H): Primary | ICD-10-CM

## 2018-12-24 DIAGNOSIS — R06.02 SHORTNESS OF BREATH: ICD-10-CM

## 2018-12-24 DIAGNOSIS — I63.9 CEREBRAL INFARCTION, UNSPECIFIED MECHANISM (H): ICD-10-CM

## 2018-12-24 DIAGNOSIS — J98.01 ACUTE BRONCHOSPASM: ICD-10-CM

## 2018-12-24 DIAGNOSIS — I50.9 ACUTE ON CHRONIC CONGESTIVE HEART FAILURE, UNSPECIFIED HEART FAILURE TYPE (H): ICD-10-CM

## 2018-12-24 LAB
ANION GAP SERPL CALCULATED.3IONS-SCNC: 6 MMOL/L (ref 3–14)
BASOPHILS # BLD AUTO: 0.1 10E9/L (ref 0–0.2)
BASOPHILS NFR BLD AUTO: 1.6 %
BUN SERPL-MCNC: 12 MG/DL (ref 7–30)
CALCIUM SERPL-MCNC: 8.2 MG/DL (ref 8.5–10.1)
CHLORIDE SERPL-SCNC: 107 MMOL/L (ref 94–109)
CO2 SERPL-SCNC: 27 MMOL/L (ref 20–32)
CREAT SERPL-MCNC: 0.86 MG/DL (ref 0.66–1.25)
CREAT SERPL-MCNC: 0.89 MG/DL (ref 0.66–1.25)
DIFFERENTIAL METHOD BLD: ABNORMAL
EOSINOPHIL # BLD AUTO: 0.4 10E9/L (ref 0–0.7)
EOSINOPHIL NFR BLD AUTO: 10.4 %
ERYTHROCYTE [DISTWIDTH] IN BLOOD BY AUTOMATED COUNT: 15.5 % (ref 10–15)
GFR SERPL CREATININE-BSD FRML MDRD: 87 ML/MIN/{1.73_M2}
GFR SERPL CREATININE-BSD FRML MDRD: 88 ML/MIN/{1.73_M2}
GLUCOSE SERPL-MCNC: 106 MG/DL (ref 70–99)
HCT VFR BLD AUTO: 38 % (ref 40–53)
HGB BLD-MCNC: 12.1 G/DL (ref 13.3–17.7)
IMM GRANULOCYTES # BLD: 0 10E9/L (ref 0–0.4)
IMM GRANULOCYTES NFR BLD: 0.3 %
INTERPRETATION ECG - MUSE: NORMAL
LYMPHOCYTES # BLD AUTO: 1.4 10E9/L (ref 0.8–5.3)
LYMPHOCYTES NFR BLD AUTO: 35.2 %
MAGNESIUM SERPL-MCNC: 2.2 MG/DL (ref 1.6–2.3)
MCH RBC QN AUTO: 28.6 PG (ref 26.5–33)
MCHC RBC AUTO-ENTMCNC: 31.8 G/DL (ref 31.5–36.5)
MCV RBC AUTO: 90 FL (ref 78–100)
MONOCYTES # BLD AUTO: 0.5 10E9/L (ref 0–1.3)
MONOCYTES NFR BLD AUTO: 14 %
NEUTROPHILS # BLD AUTO: 1.5 10E9/L (ref 1.6–8.3)
NEUTROPHILS NFR BLD AUTO: 38.5 %
NRBC # BLD AUTO: 0 10*3/UL
NRBC BLD AUTO-RTO: 0 /100
NT-PROBNP SERPL-MCNC: 911 PG/ML (ref 0–900)
PLATELET # BLD AUTO: 213 10E9/L (ref 150–450)
PLATELET # BLD AUTO: 239 10E9/L (ref 150–450)
POTASSIUM SERPL-SCNC: 4.2 MMOL/L (ref 3.4–5.3)
PROCALCITONIN SERPL-MCNC: <0.05 NG/ML
RBC # BLD AUTO: 4.23 10E12/L (ref 4.4–5.9)
SODIUM SERPL-SCNC: 140 MMOL/L (ref 133–144)
TROPONIN I SERPL-MCNC: 0.02 UG/L (ref 0–0.04)
TROPONIN I SERPL-MCNC: 0.02 UG/L (ref 0–0.04)
TROPONIN I SERPL-MCNC: <0.015 UG/L (ref 0–0.04)
TROPONIN I SERPL-MCNC: <0.015 UG/L (ref 0–0.04)
WBC # BLD AUTO: 3.9 10E9/L (ref 4–11)

## 2018-12-24 PROCEDURE — 93005 ELECTROCARDIOGRAM TRACING: CPT

## 2018-12-24 PROCEDURE — 25500064 ZZH RX 255 OP 636: Performed by: INTERNAL MEDICINE

## 2018-12-24 PROCEDURE — 84484 ASSAY OF TROPONIN QUANT: CPT | Performed by: EMERGENCY MEDICINE

## 2018-12-24 PROCEDURE — 94640 AIRWAY INHALATION TREATMENT: CPT

## 2018-12-24 PROCEDURE — 12000007 ZZH R&B INTERMEDIATE

## 2018-12-24 PROCEDURE — 40000275 ZZH STATISTIC RCP TIME EA 10 MIN

## 2018-12-24 PROCEDURE — 82565 ASSAY OF CREATININE: CPT | Performed by: INTERNAL MEDICINE

## 2018-12-24 PROCEDURE — 25000125 ZZHC RX 250: Performed by: EMERGENCY MEDICINE

## 2018-12-24 PROCEDURE — 83880 ASSAY OF NATRIURETIC PEPTIDE: CPT | Performed by: EMERGENCY MEDICINE

## 2018-12-24 PROCEDURE — 84484 ASSAY OF TROPONIN QUANT: CPT | Performed by: INTERNAL MEDICINE

## 2018-12-24 PROCEDURE — 25000128 H RX IP 250 OP 636: Performed by: INTERNAL MEDICINE

## 2018-12-24 PROCEDURE — 40000274 ZZH STATISTIC RCP CONSULT EA 30 MIN

## 2018-12-24 PROCEDURE — 25000132 ZZH RX MED GY IP 250 OP 250 PS 637: Mod: GY | Performed by: EMERGENCY MEDICINE

## 2018-12-24 PROCEDURE — 99285 EMERGENCY DEPT VISIT HI MDM: CPT | Mod: 25

## 2018-12-24 PROCEDURE — 71046 X-RAY EXAM CHEST 2 VIEWS: CPT

## 2018-12-24 PROCEDURE — 94640 AIRWAY INHALATION TREATMENT: CPT | Mod: 76

## 2018-12-24 PROCEDURE — 25000128 H RX IP 250 OP 636: Performed by: EMERGENCY MEDICINE

## 2018-12-24 PROCEDURE — 36415 COLL VENOUS BLD VENIPUNCTURE: CPT | Performed by: INTERNAL MEDICINE

## 2018-12-24 PROCEDURE — 25000132 ZZH RX MED GY IP 250 OP 250 PS 637: Mod: GY | Performed by: INTERNAL MEDICINE

## 2018-12-24 PROCEDURE — 93306 TTE W/DOPPLER COMPLETE: CPT | Mod: 26 | Performed by: INTERNAL MEDICINE

## 2018-12-24 PROCEDURE — 87633 RESP VIRUS 12-25 TARGETS: CPT | Performed by: INTERNAL MEDICINE

## 2018-12-24 PROCEDURE — 85049 AUTOMATED PLATELET COUNT: CPT | Performed by: INTERNAL MEDICINE

## 2018-12-24 PROCEDURE — A9270 NON-COVERED ITEM OR SERVICE: HCPCS | Mod: GY | Performed by: EMERGENCY MEDICINE

## 2018-12-24 PROCEDURE — 80048 BASIC METABOLIC PNL TOTAL CA: CPT | Performed by: EMERGENCY MEDICINE

## 2018-12-24 PROCEDURE — 99223 1ST HOSP IP/OBS HIGH 75: CPT | Mod: AI | Performed by: INTERNAL MEDICINE

## 2018-12-24 PROCEDURE — 83735 ASSAY OF MAGNESIUM: CPT | Performed by: EMERGENCY MEDICINE

## 2018-12-24 PROCEDURE — 96374 THER/PROPH/DIAG INJ IV PUSH: CPT

## 2018-12-24 PROCEDURE — 85025 COMPLETE CBC W/AUTO DIFF WBC: CPT | Performed by: EMERGENCY MEDICINE

## 2018-12-24 PROCEDURE — 84145 PROCALCITONIN (PCT): CPT | Performed by: INTERNAL MEDICINE

## 2018-12-24 PROCEDURE — A9270 NON-COVERED ITEM OR SERVICE: HCPCS | Mod: GY | Performed by: INTERNAL MEDICINE

## 2018-12-24 PROCEDURE — 25000125 ZZHC RX 250: Performed by: INTERNAL MEDICINE

## 2018-12-24 PROCEDURE — 40000264 ECHOCARDIOGRAM COMPLETE

## 2018-12-24 RX ORDER — BISACODYL 10 MG
10 SUPPOSITORY, RECTAL RECTAL DAILY PRN
Status: DISCONTINUED | OUTPATIENT
Start: 2018-12-24 | End: 2018-12-26 | Stop reason: HOSPADM

## 2018-12-24 RX ORDER — SIMVASTATIN 20 MG
20 TABLET ORAL AT BEDTIME
Status: DISCONTINUED | OUTPATIENT
Start: 2018-12-24 | End: 2018-12-26 | Stop reason: HOSPADM

## 2018-12-24 RX ORDER — POTASSIUM CHLORIDE 7.45 MG/ML
10 INJECTION INTRAVENOUS
Status: DISCONTINUED | OUTPATIENT
Start: 2018-12-24 | End: 2018-12-26 | Stop reason: HOSPADM

## 2018-12-24 RX ORDER — ONDANSETRON 2 MG/ML
4 INJECTION INTRAMUSCULAR; INTRAVENOUS EVERY 6 HOURS PRN
Status: DISCONTINUED | OUTPATIENT
Start: 2018-12-24 | End: 2018-12-26 | Stop reason: HOSPADM

## 2018-12-24 RX ORDER — IPRATROPIUM BROMIDE AND ALBUTEROL SULFATE 2.5; .5 MG/3ML; MG/3ML
3 SOLUTION RESPIRATORY (INHALATION) EVERY 4 HOURS PRN
Status: DISCONTINUED | OUTPATIENT
Start: 2018-12-24 | End: 2018-12-26 | Stop reason: HOSPADM

## 2018-12-24 RX ORDER — NALOXONE HYDROCHLORIDE 0.4 MG/ML
.1-.4 INJECTION, SOLUTION INTRAMUSCULAR; INTRAVENOUS; SUBCUTANEOUS
Status: DISCONTINUED | OUTPATIENT
Start: 2018-12-24 | End: 2018-12-26 | Stop reason: HOSPADM

## 2018-12-24 RX ORDER — LEVOTHYROXINE SODIUM 100 UG/1
200 TABLET ORAL DAILY
Status: DISCONTINUED | OUTPATIENT
Start: 2018-12-25 | End: 2018-12-26 | Stop reason: HOSPADM

## 2018-12-24 RX ORDER — AZITHROMYCIN 250 MG/1
250 TABLET, FILM COATED ORAL DAILY
Status: DISCONTINUED | OUTPATIENT
Start: 2018-12-25 | End: 2018-12-26 | Stop reason: HOSPADM

## 2018-12-24 RX ORDER — AMOXICILLIN 250 MG
1 CAPSULE ORAL 2 TIMES DAILY PRN
Status: DISCONTINUED | OUTPATIENT
Start: 2018-12-24 | End: 2018-12-26 | Stop reason: HOSPADM

## 2018-12-24 RX ORDER — FUROSEMIDE 10 MG/ML
40 INJECTION INTRAMUSCULAR; INTRAVENOUS
Status: DISCONTINUED | OUTPATIENT
Start: 2018-12-24 | End: 2018-12-26 | Stop reason: HOSPADM

## 2018-12-24 RX ORDER — ACETAMINOPHEN 325 MG/1
650 TABLET ORAL EVERY 4 HOURS PRN
Status: DISCONTINUED | OUTPATIENT
Start: 2018-12-24 | End: 2018-12-26 | Stop reason: HOSPADM

## 2018-12-24 RX ORDER — POTASSIUM CHLORIDE 1500 MG/1
20-40 TABLET, EXTENDED RELEASE ORAL
Status: DISCONTINUED | OUTPATIENT
Start: 2018-12-24 | End: 2018-12-26 | Stop reason: HOSPADM

## 2018-12-24 RX ORDER — TAMSULOSIN HYDROCHLORIDE 0.4 MG/1
0.4 CAPSULE ORAL DAILY
Status: DISCONTINUED | OUTPATIENT
Start: 2018-12-25 | End: 2018-12-26 | Stop reason: HOSPADM

## 2018-12-24 RX ORDER — ACETAMINOPHEN 500 MG
1000 TABLET ORAL EVERY 6 HOURS PRN
COMMUNITY
Start: 2016-09-18 | End: 2021-12-30

## 2018-12-24 RX ORDER — MAGNESIUM SULFATE HEPTAHYDRATE 40 MG/ML
4 INJECTION, SOLUTION INTRAVENOUS EVERY 4 HOURS PRN
Status: DISCONTINUED | OUTPATIENT
Start: 2018-12-24 | End: 2018-12-26 | Stop reason: HOSPADM

## 2018-12-24 RX ORDER — POTASSIUM CL/LIDO/0.9 % NACL 10MEQ/0.1L
10 INTRAVENOUS SOLUTION, PIGGYBACK (ML) INTRAVENOUS
Status: DISCONTINUED | OUTPATIENT
Start: 2018-12-24 | End: 2018-12-26 | Stop reason: HOSPADM

## 2018-12-24 RX ORDER — LOSARTAN POTASSIUM 25 MG/1
25 TABLET ORAL DAILY
Status: DISCONTINUED | OUTPATIENT
Start: 2018-12-25 | End: 2018-12-24

## 2018-12-24 RX ORDER — ASPIRIN 81 MG/1
324 TABLET, CHEWABLE ORAL ONCE
Status: COMPLETED | OUTPATIENT
Start: 2018-12-24 | End: 2018-12-24

## 2018-12-24 RX ORDER — ASPIRIN 81 MG/1
81 TABLET, CHEWABLE ORAL DAILY
Status: DISCONTINUED | OUTPATIENT
Start: 2018-12-25 | End: 2018-12-26 | Stop reason: HOSPADM

## 2018-12-24 RX ORDER — HYDRALAZINE HYDROCHLORIDE 20 MG/ML
10 INJECTION INTRAMUSCULAR; INTRAVENOUS EVERY 4 HOURS PRN
Status: DISCONTINUED | OUTPATIENT
Start: 2018-12-24 | End: 2018-12-26 | Stop reason: HOSPADM

## 2018-12-24 RX ORDER — FUROSEMIDE 10 MG/ML
40 INJECTION INTRAMUSCULAR; INTRAVENOUS ONCE
Status: COMPLETED | OUTPATIENT
Start: 2018-12-24 | End: 2018-12-24

## 2018-12-24 RX ORDER — IPRATROPIUM BROMIDE AND ALBUTEROL SULFATE 2.5; .5 MG/3ML; MG/3ML
3 SOLUTION RESPIRATORY (INHALATION)
Status: DISCONTINUED | OUTPATIENT
Start: 2018-12-24 | End: 2018-12-24

## 2018-12-24 RX ORDER — AMOXICILLIN 250 MG
2 CAPSULE ORAL 2 TIMES DAILY PRN
Status: DISCONTINUED | OUTPATIENT
Start: 2018-12-24 | End: 2018-12-26 | Stop reason: HOSPADM

## 2018-12-24 RX ORDER — POTASSIUM CHLORIDE 29.8 MG/ML
20 INJECTION INTRAVENOUS
Status: DISCONTINUED | OUTPATIENT
Start: 2018-12-24 | End: 2018-12-26 | Stop reason: HOSPADM

## 2018-12-24 RX ORDER — POTASSIUM CHLORIDE 1.5 G/1.58G
20-40 POWDER, FOR SOLUTION ORAL
Status: DISCONTINUED | OUTPATIENT
Start: 2018-12-24 | End: 2018-12-26 | Stop reason: HOSPADM

## 2018-12-24 RX ORDER — IPRATROPIUM BROMIDE AND ALBUTEROL SULFATE 2.5; .5 MG/3ML; MG/3ML
3 SOLUTION RESPIRATORY (INHALATION) ONCE
Status: COMPLETED | OUTPATIENT
Start: 2018-12-24 | End: 2018-12-24

## 2018-12-24 RX ORDER — ONDANSETRON 4 MG/1
4 TABLET, ORALLY DISINTEGRATING ORAL EVERY 6 HOURS PRN
Status: DISCONTINUED | OUTPATIENT
Start: 2018-12-24 | End: 2018-12-26 | Stop reason: HOSPADM

## 2018-12-24 RX ORDER — LOSARTAN POTASSIUM 25 MG/1
25 TABLET ORAL ONCE
Status: COMPLETED | OUTPATIENT
Start: 2018-12-24 | End: 2018-12-24

## 2018-12-24 RX ORDER — LOSARTAN POTASSIUM 50 MG/1
50 TABLET ORAL DAILY
Status: DISCONTINUED | OUTPATIENT
Start: 2018-12-25 | End: 2018-12-26 | Stop reason: HOSPADM

## 2018-12-24 RX ADMIN — IPRATROPIUM BROMIDE AND ALBUTEROL SULFATE 3 ML: .5; 3 SOLUTION RESPIRATORY (INHALATION) at 16:19

## 2018-12-24 RX ADMIN — HUMAN ALBUMIN MICROSPHERES AND PERFLUTREN 3 ML: 10; .22 INJECTION, SOLUTION INTRAVENOUS at 12:30

## 2018-12-24 RX ADMIN — FUROSEMIDE 40 MG: 10 INJECTION, SOLUTION INTRAMUSCULAR; INTRAVENOUS at 15:52

## 2018-12-24 RX ADMIN — ENOXAPARIN SODIUM 40 MG: 40 INJECTION SUBCUTANEOUS at 13:45

## 2018-12-24 RX ADMIN — FUROSEMIDE 40 MG: 10 INJECTION, SOLUTION INTRAMUSCULAR; INTRAVENOUS at 10:37

## 2018-12-24 RX ADMIN — IPRATROPIUM BROMIDE AND ALBUTEROL SULFATE 3 ML: .5; 3 SOLUTION RESPIRATORY (INHALATION) at 08:58

## 2018-12-24 RX ADMIN — AZITHROMYCIN MONOHYDRATE 500 MG: 500 INJECTION, POWDER, LYOPHILIZED, FOR SOLUTION INTRAVENOUS at 13:39

## 2018-12-24 RX ADMIN — SIMVASTATIN 20 MG: 20 TABLET, FILM COATED ORAL at 21:41

## 2018-12-24 RX ADMIN — ASPIRIN 81 MG 324 MG: 81 TABLET ORAL at 10:37

## 2018-12-24 RX ADMIN — LOSARTAN POTASSIUM 25 MG: 25 TABLET, FILM COATED ORAL at 18:02

## 2018-12-24 ASSESSMENT — PULMONARY FUNCTION TESTS
PRE_BRONCH_PEFR_L/MIN: 200
PRE_BRONCH_PEFR_L/MIN: 250

## 2018-12-24 ASSESSMENT — ENCOUNTER SYMPTOMS
FEVER: 0
WHEEZING: 1
SHORTNESS OF BREATH: 1
COUGH: 1
UNEXPECTED WEIGHT CHANGE: 1

## 2018-12-24 ASSESSMENT — ACTIVITIES OF DAILY LIVING (ADL)
ADLS_ACUITY_SCORE: 13

## 2018-12-24 ASSESSMENT — MIFFLIN-ST. JEOR: SCORE: 2244.99

## 2018-12-24 NOTE — LETTER
Transition Communication Hand-off for Care Transitions to Next Level of Care Provider    Name: Jamar Ivory  : 1950  MRN #: [id number]  Primary Care Provider: Blanca Peng  Primary Care MD Name: Blanca Peng  Primary Clinic: [address] [facility] [address]  Primary Care Clinic Name: [facility]   Reason for Hospitalization:  Shortness of breath [R06.02]  Acute bronchospasm [J98.01]  Acute on chronic congestive heart failure, unspecified heart failure type (H) [I50.9]  Admit Date/Time: 2018  8:28 AM  Discharge Date:   Payor Source: Payor: MEDICARE / Plan: MEDICARE / Product Type: Medicare /     Readmission Assessment Measure (LAM) Risk Score/category: Avg    Reason for Communication Hand-off Referral: Admission diagnoses: CHF    Discharge Plan: home     Discharge Needs Assessment:  Needs      Most Recent Value   # of Referrals Placed by CTS  Scheduled Follow-up appointments, Communication hand-offs to next level of Care Providers        Follow-up plan:    Future Appointments   Date Time Provider Department Center   2018  9:00 AM Blanca Peng MD [facility]             Key Recommendations:  CTS following for CHF dx. BNP on admission was 911, and pt was admitted with SOB.  Pt states he lives at home with his wife in [place]. He does not have any in home services or home oxygen. He does not follow a specific diet and does not have a scale or do daily weights. He states he has had the CHF teaching and understands he will have to follow a low Na diet. We discussed importance of low Na diet and daily weights. New scale for home weights given to pt. Weight monitoring and recording daily weights explained. Pt is receptive of CHF education. He will need reinforcement through clinic for CHF education. Follow up appt arranged for pt with PCP. [facility] is recommended.           Sylvia Perry    AVS/Discharge Summary is the source of truth; this is a helpful guide  for improved communication of patient story

## 2018-12-24 NOTE — PROGRESS NOTES
"Critical [facility] hospital RCAT     Date: 12/24/18  Admission Dx: SOB, Possible PNA  Pulmonary History: States no history of asthma, copd  Home Nebulizer/MDI Use: None  Home Oxygen: None  Acuity Level (RCAT flow sheet): 4  Aerosol Therapy initiated: Duoneb Q4 prn      Pulmonary Hygiene initiated: Cough and deep breathing techniques       Volume Expansion initiated: Incentive Spirometry      Current Oxygen Requirements: RA  Current SpO2: 94%    Re-evaluation date: 12/27/18    Patient Education: Patient does not feel as though nebulizer's help his breathing. Patient scored on RCAT assessment and scores for prn nebulizers, will give as needed.       See \"RT Assessments\" flow sheet for patient assessment scoring and Acuity Level Details.             " I personally performed the service described in the documentation recorded by the scribe in my presence, and it accurately and completely records my words and actions.
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
## 2018-12-24 NOTE — ED TRIAGE NOTES
shosrtness of breath started yesterday at about 10 pm, continued through the night and this morning. Dry non-productive cough. Some mid-chest discomfort as well, EKG done by EMS showed NSR. His oxigen saturations on EMS arrival were 80% with audible wheezes, they started a duoneb which was continuing on arrival. Blood glucose 141. /p.

## 2018-12-24 NOTE — ED NOTES
Bed: ED12  Expected date: 12/24/18  Expected time:   Means of arrival: Ambulance  Comments:  MARY 69yo M

## 2018-12-24 NOTE — PHARMACY-ADMISSION MEDICATION HISTORY
Admission medication history interview status for this patient is complete. See [facility] admission navigator for allergy information, prior to admission medications and immunization status.     Medication history interview source(s):Patient  Medication history resources (including written lists, pill bottles, clinic record): [facility] Everywhere, SureScripts fill history  Primary pharmacy: [facility] [id number] [place], [place] - [address] [place] [address] [address]    Changes made to PTA medication list:  Added: Osteo-Bi-Flex  Deleted: Meloxicam [per pt - no longer needing for pain]; Potassium chloride [duplicate]; Sertraline [per pt - no longer needing for mood]; Sildenafil;   Changed: None    Actions taken by pharmacist (provider contacted, etc): Notified provider;     Additional medication history information: Patient reports that he stopped taking all of his medications about a month or two ago as he feels they are not working - they remain for historical purposes.  He explains that he only takes levothyroxine,  Osteo-Bi-Flex, and Tylenol.    Medication reconciliation/reorder completed by provider prior to medication history? Yes    Do you take OTC medications (eg tylenol, ibuprofen, fish oil, eye/ear drops, etc)?  Yes - listed      Prior to Admission medications    Medication Sig Last Dose Taking? Auth Provider   acetaminophen (TYLENOL) 500 MG tablet Take 1,000 mg by mouth every 6 hours as needed (Headache)  12/23/2018 at Unknown time Yes Unknown, Entered By History   Glucosamine-Chondroitin (OSTEO BI-FLEX REGULAR STRENGTH) 250-200 MG TABS Take 1 tablet by mouth 2 times daily 12/23/2018 at Unknown time Yes Unknown, Entered By History   levothyroxine (SYNTHROID/LEVOTHROID) 200 MCG tablet TAKE 1 TABLET (200 MCG) BY MOUTH DAILY 12/24/2018 at AM Yes Blanca Peng MD   furosemide (LASIX) 80 MG tablet TAKE 1 TABLET (80 MG) BY MOUTH 2 TIMES DAILY More than a month at Unknown time  Blanca Peng MD   KLOR-CON 10 MEQ CR tablet  TAKE 1 TABLET (10 MEQ) BY MOUTH 3 TIMES DAILY More than a month at Unknown time  Huy Mejia MD   losartan (COZAAR) 25 MG tablet Take 1 tablet (25 mg) by mouth daily More than a month at Unknown time  Huy Mejia MD   simvastatin (ZOCOR) 20 MG tablet Take 1 tablet (20 mg) by mouth At Bedtime More than a month at Unknown time  Blanca Peng MD   tamsulosin (FLOMAX) 0.4 MG capsule Take 0.4 mg by mouth daily More than a month at Unknown time  Reported, Patient         Sundar Beyer, PharmD./PGY-1 Resident

## 2018-12-24 NOTE — ED NOTES
[facility]  ED Nurse Handoff Report    Jamar Ivory is a 68 year old male   ED Chief complaint: Shortness of Breath and Chest Pain  . ED Diagnosis:   Final diagnoses:   Shortness of breath   Acute on chronic congestive heart failure, unspecified heart failure type (H)   Acute bronchospasm     Allergies:   Allergies   Allergen Reactions     Clopidogrel      Cough/emesis     Penicillins Rash       Code Status: DNR / DNI  Activity level - Baseline/Home:  Independent. Activity Level - Current:   Stand with Assist. Lift room needed: No. Bariatric: No   Needed: No   Isolation: No. Infection: Not Applicable.     Vital Signs:   Vitals:    12/24/18 0830 12/24/18 0835 12/24/18 0845 12/24/18 0900   BP: (!) 164/101 (!) 164/101 (!) 150/92 (!) 156/91   Pulse:  83 87 78   Resp:  28 (!) 40 (!) 40   Temp:  98.3  F (36.8  C)     TempSrc:  Oral     SpO2:  91% 92% 93%       Cardiac Rhythm:  ,   Cardiac  Cardiac Rhythm: Normal sinus rhythm  Pain level: 0-10 Pain Scale: 5  Patient confused: No. Patient Falls Risk: Yes.   Elimination Status: Has voided   Patient Report - Initial Complaint: shosrtness of breath started yesterday at about 10 pm, continued through the night and this morning. Dry non-productive cough. Some mid-chest discomfort as well, EKG done by EMS showed NSR. His oxigen saturations on EMS arrival were 80% with audible wheezes, they started a duoneb which was continuing on arrival. Blood glucose 141. /p. . Focused Assessment:    Chest Pain Assessment - Chest Pain Location: midsternal Chest Pain Reproducible?: No  Cardiac Monitoring - EKG Monitoring: Yes Cardiac Regularity: Regular Cardiac Rhythm: NSR  Respiratory - Respiratory WDL: cough; all; breath sounds; effort/expansion; rhythm/pattern Rhythm/Pattern, Respiratory: tachypneic; shortness of breath Breath Sounds: All Fields Nailbeds: pale Mucous Membranes: pale Cough Frequency: frequent Cough Type: dry; no productive sputum  Head  To Toe Assessment - All Lung Fields Breath Sounds: wheezes, expiratory  Tests Performed: EKG, labs, cxr. Abnormal Results:   Abnormal Labs Reviewed   CBC WITH PLATELETS DIFFERENTIAL - Abnormal; Notable for the following components:       Result Value    WBC 3.9 (*)     RBC Count 4.23 (*)     Hemoglobin 12.1 (*)     Hematocrit 38.0 (*)     RDW 15.5 (*)     Absolute Neutrophil 1.5 (*)     All other components within normal limits   BASIC METABOLIC PANEL - Abnormal; Notable for the following components:    Glucose 106 (*)     Calcium 8.2 (*)     All other components within normal limits   NT PROBNP INPATIENT - Abnormal; Notable for the following components:    N-Terminal Pro BNP Inpatient 911 (*)     All other components within normal limits     XR Chest 2 Views   Preliminary Result   IMPRESSION: Bilateral interstitial edema.          Treatments provided: lasix, asa, telemetry, ekg  Family Comments: wife here, would like healthy lifestyle information and instructions for him including nutrition advice.  OBS brochure/video discussed/provided to patient:  No  ED Medications:   Medications   ipratropium - albuterol 0.5 mg/2.5 mg/3 mL (DUONEB) neb solution 3 mL (3 mLs Nebulization Given 12/24/18 0858)   furosemide (LASIX) injection 40 mg (40 mg Intravenous Given 12/24/18 1037)   aspirin (ASA) chewable tablet 324 mg (324 mg Oral Given 12/24/18 1037)     Drips infusing:  No  For the majority of the shift, the patient's behavior Green. Interventions performed were none.     Severe Sepsis OR Septic Shock Diagnosis Present: No      ED Nurse Name/Phone Number: Kerrie Crenshaw,   10:44 AM  RECEIVING UNIT ED HANDOFF REVIEW    Above ED Nurse Handoff Report was reviewed: Yes  Reviewed by: Joselyn Rea on December 24, 2018 at 11:23 AM (for MS3)

## 2018-12-24 NOTE — PLAN OF CARE
"VS /87 (BP Location: Left arm)   Pulse 85   Temp 97.5  F (36.4  C) (Oral)   Resp 18   Ht 1.727 m (5' 8\")   Wt (!) 150.5 kg (331 lb 14.4 oz)   SpO2 94%   BMI 50.47 kg/m    Lung sounds diminished  O2 room air  Tele SR 1* AVB with PVCs  Bowel sounds active, no BM this shift  Tolerating 2 gm salt diet  IVF saline locked  Dressings lymphedema wraps to bilateral lower extremities  Tests echo completed this shift  CMS intact  Activity up with stand by assist  Pain denies pain    Heart Failure Care Pathway  GOALS TO BE MET BEFORE DISCHARGE:    1. Decrease congestion and/or edema with diuretic therapy to achieve near      optimal volume status.            Dyspnea improved:  No            Edema improved:     No, please explain: lymphedema wraps BLE          Net I/O and Weights since admission:          11/24 1500 - 12/24 1459  In: -   Out: 875 [Urine:875]  Net: -875            Vitals:    12/24/18 1142   Weight: (!) 150.5 kg (331 lb 14.4 oz)       2.  O2 sats > 92% on RA or at prior home O2 therapy level.          Current oxygenation status:       SpO2: 94 %         O2 Device: None (Room air),            Able to wean O2 this shift to keep sats > 92%:  Yes       Does patient use Home O2? No    3.  Tolerates ambulation and mobility near baseline: No, please explain: patient admitted this shift         How many times did the patient ambulate with nursing staff this shift? 1--within room     Please review the Heart Failure Care Pathway for additional HF goal outcomes.    Joselyn Rea RN  12/24/2018           "

## 2018-12-24 NOTE — PROGRESS NOTES
Update: Blood pressure remains poorly controlled.  -- will give an additional 25 mg losartan now and increase his a.m. dose to 50 mg.    --Continue IV diuresis   --We will have IV hydralazine available for blood pressures greater than 160 in the setting of suspected congestive heart failure.    Noble Cerda MD

## 2018-12-25 LAB
ANION GAP SERPL CALCULATED.3IONS-SCNC: 5 MMOL/L (ref 3–14)
BASOPHILS # BLD AUTO: 0 10E9/L (ref 0–0.2)
BASOPHILS NFR BLD AUTO: 1 %
BUN SERPL-MCNC: 18 MG/DL (ref 7–30)
CALCIUM SERPL-MCNC: 8.2 MG/DL (ref 8.5–10.1)
CHLORIDE SERPL-SCNC: 104 MMOL/L (ref 94–109)
CO2 SERPL-SCNC: 28 MMOL/L (ref 20–32)
CREAT SERPL-MCNC: 0.89 MG/DL (ref 0.66–1.25)
DIFFERENTIAL METHOD BLD: ABNORMAL
EOSINOPHIL # BLD AUTO: 0.4 10E9/L (ref 0–0.7)
EOSINOPHIL NFR BLD AUTO: 12 %
ERYTHROCYTE [DISTWIDTH] IN BLOOD BY AUTOMATED COUNT: 15.7 % (ref 10–15)
FLUAV H1 2009 PAND RNA SPEC QL NAA+PROBE: NEGATIVE
FLUAV H1 RNA SPEC QL NAA+PROBE: NEGATIVE
FLUAV H3 RNA SPEC QL NAA+PROBE: NEGATIVE
FLUAV RNA SPEC QL NAA+PROBE: NEGATIVE
FLUBV RNA SPEC QL NAA+PROBE: NEGATIVE
GFR SERPL CREATININE-BSD FRML MDRD: 87 ML/MIN/{1.73_M2}
GLUCOSE SERPL-MCNC: 110 MG/DL (ref 70–99)
HADV DNA SPEC QL NAA+PROBE: NEGATIVE
HADV DNA SPEC QL NAA+PROBE: NEGATIVE
HCT VFR BLD AUTO: 38.8 % (ref 40–53)
HGB BLD-MCNC: 12.5 G/DL (ref 13.3–17.7)
HMPV RNA SPEC QL NAA+PROBE: NEGATIVE
HPIV1 RNA SPEC QL NAA+PROBE: NEGATIVE
HPIV2 RNA SPEC QL NAA+PROBE: NEGATIVE
HPIV3 RNA SPEC QL NAA+PROBE: NEGATIVE
IMM GRANULOCYTES # BLD: 0 10E9/L (ref 0–0.4)
IMM GRANULOCYTES NFR BLD: 0.3 %
LYMPHOCYTES # BLD AUTO: 0.8 10E9/L (ref 0.8–5.3)
LYMPHOCYTES NFR BLD AUTO: 27.6 %
MCH RBC QN AUTO: 28.6 PG (ref 26.5–33)
MCHC RBC AUTO-ENTMCNC: 32.2 G/DL (ref 31.5–36.5)
MCV RBC AUTO: 89 FL (ref 78–100)
MICROBIOLOGIST REVIEW: NORMAL
MONOCYTES # BLD AUTO: 0.5 10E9/L (ref 0–1.3)
MONOCYTES NFR BLD AUTO: 15.3 %
NEUTROPHILS # BLD AUTO: 1.3 10E9/L (ref 1.6–8.3)
NEUTROPHILS NFR BLD AUTO: 43.8 %
NRBC # BLD AUTO: 0 10*3/UL
NRBC BLD AUTO-RTO: 0 /100
PLATELET # BLD AUTO: 217 10E9/L (ref 150–450)
POTASSIUM SERPL-SCNC: 3.9 MMOL/L (ref 3.4–5.3)
RBC # BLD AUTO: 4.37 10E12/L (ref 4.4–5.9)
RHINOVIRUS RNA SPEC QL NAA+PROBE: NEGATIVE
RSV RNA SPEC QL NAA+PROBE: NEGATIVE
RSV RNA SPEC QL NAA+PROBE: NEGATIVE
SODIUM SERPL-SCNC: 137 MMOL/L (ref 133–144)
SPECIMEN SOURCE: NORMAL
WBC # BLD AUTO: 3 10E9/L (ref 4–11)

## 2018-12-25 PROCEDURE — 99232 SBSQ HOSP IP/OBS MODERATE 35: CPT | Performed by: INTERNAL MEDICINE

## 2018-12-25 PROCEDURE — 25000128 H RX IP 250 OP 636: Performed by: INTERNAL MEDICINE

## 2018-12-25 PROCEDURE — 36415 COLL VENOUS BLD VENIPUNCTURE: CPT | Performed by: INTERNAL MEDICINE

## 2018-12-25 PROCEDURE — 85025 COMPLETE CBC W/AUTO DIFF WBC: CPT | Performed by: INTERNAL MEDICINE

## 2018-12-25 PROCEDURE — A9270 NON-COVERED ITEM OR SERVICE: HCPCS | Mod: GY | Performed by: INTERNAL MEDICINE

## 2018-12-25 PROCEDURE — 25000132 ZZH RX MED GY IP 250 OP 250 PS 637: Mod: GY | Performed by: INTERNAL MEDICINE

## 2018-12-25 PROCEDURE — 80048 BASIC METABOLIC PNL TOTAL CA: CPT | Performed by: INTERNAL MEDICINE

## 2018-12-25 PROCEDURE — 99207 ZZC CDG-MDM COMPONENT: MEETS LOW - DOWN CODED: CPT | Performed by: INTERNAL MEDICINE

## 2018-12-25 PROCEDURE — 12000007 ZZH R&B INTERMEDIATE

## 2018-12-25 RX ADMIN — FUROSEMIDE 40 MG: 10 INJECTION, SOLUTION INTRAMUSCULAR; INTRAVENOUS at 15:56

## 2018-12-25 RX ADMIN — ENOXAPARIN SODIUM 40 MG: 40 INJECTION SUBCUTANEOUS at 09:17

## 2018-12-25 RX ADMIN — POTASSIUM CHLORIDE 20 MEQ: 1500 TABLET, EXTENDED RELEASE ORAL at 15:56

## 2018-12-25 RX ADMIN — ENOXAPARIN SODIUM 40 MG: 40 INJECTION SUBCUTANEOUS at 20:42

## 2018-12-25 RX ADMIN — TAMSULOSIN HYDROCHLORIDE 0.4 MG: 0.4 CAPSULE ORAL at 09:12

## 2018-12-25 RX ADMIN — AZITHROMYCIN 250 MG: 250 TABLET, FILM COATED ORAL at 09:12

## 2018-12-25 RX ADMIN — LEVOTHYROXINE SODIUM 200 MCG: 100 TABLET ORAL at 09:12

## 2018-12-25 RX ADMIN — SIMVASTATIN 20 MG: 20 TABLET, FILM COATED ORAL at 20:42

## 2018-12-25 RX ADMIN — FUROSEMIDE 40 MG: 10 INJECTION, SOLUTION INTRAMUSCULAR; INTRAVENOUS at 09:12

## 2018-12-25 RX ADMIN — LOSARTAN POTASSIUM 50 MG: 50 TABLET ORAL at 09:12

## 2018-12-25 RX ADMIN — ASPIRIN 81 MG 81 MG: 81 TABLET ORAL at 09:12

## 2018-12-25 ASSESSMENT — MIFFLIN-ST. JEOR: SCORE: 2242.72

## 2018-12-25 ASSESSMENT — ACTIVITIES OF DAILY LIVING (ADL)
ADLS_ACUITY_SCORE: 13

## 2018-12-25 NOTE — PROVIDER NOTIFICATION
Pt had 12 beat run of Vtach, VSS, No c/o of CLARK GREEN/Kalyan4 - MD notified    2043 - no new orders given

## 2018-12-25 NOTE — PLAN OF CARE
Up independently in room. Lasix iv given this morning. Voiding adequately. No c/o pain. Appetite good. VSS.

## 2018-12-25 NOTE — PLAN OF CARE
"A&O. BP elevated-PRN hydralazine not given d/t unmet parameters, other VSS on RA. Tele SR with 1st degree AVB. LS diminished, GOMEZ, cough with small amounts of light pink-tinged sputum. 2+/3+ BLE edema. Lymphedema wraps in place-pt refused to remove. Up with SBA/independent in room. Continue with POC.    /71 (BP Location: Right arm)   Pulse 75   Temp 97.1  F (36.2  C) (Oral)   Resp 18   Ht 1.727 m (5' 8\")   Wt (!) 150.3 kg (331 lb 6.4 oz)   SpO2 93%   BMI 50.39 kg/m       Heart Failure Care Pathway  GOALS TO BE MET BEFORE DISCHARGE:    1. Decrease congestion and/or edema with diuretic therapy to achieve near      optimal volume status.            Dyspnea improved:  No, please explain: GOMEZ              Edema improved:     No, please explain: 2+/3+ BLE edema, lymphedema wraps        Net I/O and Weights since admission:          11/25 1500 - 12/25 1459  In: -   Out: 2150 [Urine:2150]  Net: -2150            Vitals:    12/24/18 1142 12/25/18 0655   Weight: (!) 150.5 kg (331 lb 14.4 oz) (!) 150.3 kg (331 lb 6.4 oz)       2.  O2 sats > 92% on RA or at prior home O2 therapy level.          Current oxygenation status:       SpO2: 93 %         O2 Device: None (Room air),            Able to wean O2 this shift to keep sats > 92%:  Yes       Does patient use Home O2? No    3.  Tolerates ambulation and mobility near baseline: Yes        How many times did the patient ambulate with nursing staff this shift? 2    Please review the Heart Failure Care Pathway for additional HF goal outcomes.    Charu Best RN  12/25/2018       "

## 2018-12-25 NOTE — PLAN OF CARE
Heart Failure Care Pathway  GOALS TO BE MET BEFORE DISCHARGE:    1. Decrease congestion and/or edema with diuretic therapy to achieve near      optimal volume status.            Dyspnea improved:  No, please explain: Pt not ambulating at baseline, c/o dyspnea             Edema improved:     No, please explain: lymphedema wraps          Net I/O and Weights since admission:          11/24 2300 - 12/24 2259  In: -   Out: 2150 [Urine:2150]  Net: -2150            Vitals:    12/24/18 1142   Weight: (!) 150.5 kg (331 lb 14.4 oz)       2.  O2 sats > 92% on RA or at prior home O2 therapy level.          Current oxygenation status:       SpO2: 94 %         O2 Device: None (Room air),            Able to wean O2 this shift to keep sats > 92%:  NA       Does patient use Home O2? No    3.  Tolerates ambulation and mobility near baseline: No, please explain: dyspneic           How many times did the patient ambulate with nursing staff this shift? 0    Please review the Heart Failure Care Pathway for additional HF goal outcomes.    Jessica Dasilva RN  12/24/2018     Orientation: Alert and oriented x4. Awake, alert, cooperative, no apparent distress.     VSS. 94% on RA. Afebrile.  IVF: SL  Tele: SR. W/1st degree AV block HR 70s. 12 beats of Vtach, see note  LS: diminished throughout, No wheezing  GI: Passing gas. No BM. Denies N/V.  Normal bowel sounds, soft, non-distended, non-tender  : Adequate urine output. Clear yellow.   Skin: dry flaky skin, scabbing on arms from scratching Skin otherwise intact. No rashes, no cyanosis, dry to touch  Activity: SBA to assist of 1.    Pain: No c/o pain. No PRN interventions utilized.   DC Plan: Continue with current cares.       ;

## 2018-12-25 NOTE — PROGRESS NOTES
[facility]  Hospitalist Progress Note  Tatiana Vivas MD 12/25/2018    Reason for Stay (Diagnosis): SOB         Assessment and Plan:      Summary of Stay: Jamar Ivory is a 68 year old male with a history of systolic heart dysfunction with an ejection fraction measured at 46% by Lexiscan in 2016,  Stroke, hypertension, hypothyroidism, gout admitted on 12/24/2018 with complaints of shortness of breath.  He has had a non-productive cough and has been afebrile.  He does not carry any pulmonary diagnoses such as emphysema COPD or asthma.  His breathing became so difficult he actually called the paramedics and on arrival he was tachypneic at a rate of 40 and was hypertensive in the 160/100 range.    Examination revealed diffuse inspiratory and expiratory wheezing with faint crackles and chest x-ray showed bilateral interstitial edema.  It was difficult to tell the difference between an atypical pneumonia versus pulmonary edema.  Since his hospitalization he has completed an echocardiogram which shows an ejection fraction of 40-45% which I suspect is his baseline.  He remains hypertensive but responded well to IV diuretics and respiratory status is much improved.    Retrospectively I think this is all acute on chronic systolic heart failure exacerbation.  He now admits to stopping all medications 2-3 weeks ago as he felt like they weren't working       Problem List:   1. Shortness of breath and tachypnea: Secondary to pulmonary edema from combination of acute systolic and diastolic dysfunction on chronic systolic and diastolic dysfunction.  He appears to responded well to IV diuretics, blood pressure remains uncontrolled  2. Chronic systolic heart failure: Baseline function is 40-45% by echocardiogram during this hospitalization that was seen previously in 2016.  Should be noted that when they diagnosed his systolic dysfunction they did a Lexiscan that did not show any evidence of ischemia.  Prior to  "admission medications included low-dose losartan at 25 mg a day and high-dose furosemide at 80 mg twice daily which he had stopped some weeks ago.  He really should be on a beta-blocker but I am hesitant to start it now when he is acutely decompensated.  For now agree with titration up of his losartan from 25-50 mg a day, and IV furosemide at 40 mg twice daily.  He's been counseled not to stop medications, especially abruptly, in the future.   3. Questionable pneumonia: Procal, is undetectable, he is not febrile nor does he have an elevated white count and therefore will discontinue azithromycin  4. Hypertension: Poorly controlled, somewhat better after IV diuresis.  Titrate up his losartan from 25-50 mg a day, consider addition of a beta-blocker when acute systolic heart failure exacerbation resolved  5. History of CVA: Continue on daily aspirin and statin  6. Obesity: Encouraged to lose weight  7. Hypothyroidism: Continue replacement as at home  DVT Prophylaxis: Enoxaparin (Lovenox) subcutaneous bid  Code Status: Full Code    Disposition Plan   Expected discharge in 2 days to prior living arrangement once diuresis complete and BP under fair control .     Entered: Tatiana Vivas 12/25/2018, 8:41 AM               Interval History (Subjective):      Is feeling much better compared with admission.  No cp or sob, although still with GOMEZ.  No n/v ad tolerating po                   Physical Exam:      Last Vital Signs:  /84 (BP Location: Right arm)   Pulse 75   Temp 98.3  F (36.8  C) (Oral)   Resp 16   Ht 1.727 m (5' 8\")   Wt (!) 150.3 kg (331 lb 6.4 oz)   SpO2 95%   BMI 50.39 kg/m      I/O: net negative 3.3 L  Tele NSR    Pleasant nad looks stated age head nc/at sclera clear, lungs no wheezes, bilateral LL crackles, good breath sounds, rrr no mrg 1 -2 + le edema skin w/d no cyanosis or clubbing abd s/nt/nd alert and oriented affect appropriate adam            Medications:      All current medications were " reviewed with changes reflected in problem list.         Data:      All new lab and imaging data was reviewed.   Labs:  Recent Labs   Lab 12/25/18  0602      POTASSIUM 3.9   CHLORIDE 104   CO2 28   ANIONGAP 5   *   BUN 18   CR 0.89   GFRESTIMATED 87   GFRESTBLACK >90   ANGEL 8.2*     Recent Labs   Lab 12/25/18  0602   WBC 3.0*   HGB 12.5*   HCT 38.8*   MCV 89        Recent Labs   Lab 12/25/18  0602 12/24/18  0850   * 106*      Imaging:    Echo:  12/24/18  Interpretation Summary     The left ventricle is mildly dilated. Proximal septal thickening is noted.  Left ventricular systolic function is mild to moderately reduced. The visual  ejection fraction is estimated at 40-45%. Grade I or early diastolic  dysfunction. Diastolic Doppler findings (E/E' ratio and/or other parameters)  suggest left ventricular filling pressures are increased. There is mild-  moderate global hypokinesia of the left ventricle. There is no thrombus seen  in the left ventricle.  The right ventricle is normal size. The right ventricular systolic function is  normal.  Trace to mild mitral and tricuspid regurgitation.  Mildly dilated ascending thoracic aorta.  No pericardial effusion.  In comparison to the [facility] echo report dated 02/12/2016, the findings are  similar.

## 2018-12-26 VITALS
DIASTOLIC BLOOD PRESSURE: 73 MMHG | TEMPERATURE: 97.8 F | RESPIRATION RATE: 18 BRPM | HEIGHT: 68 IN | BODY MASS INDEX: 47.74 KG/M2 | OXYGEN SATURATION: 90 % | HEART RATE: 75 BPM | SYSTOLIC BLOOD PRESSURE: 133 MMHG | WEIGHT: 315 LBS

## 2018-12-26 PROCEDURE — 97602 WOUND(S) CARE NON-SELECTIVE: CPT

## 2018-12-26 PROCEDURE — 25000128 H RX IP 250 OP 636: Performed by: INTERNAL MEDICINE

## 2018-12-26 PROCEDURE — A9270 NON-COVERED ITEM OR SERVICE: HCPCS | Mod: GY | Performed by: INTERNAL MEDICINE

## 2018-12-26 PROCEDURE — G0463 HOSPITAL OUTPT CLINIC VISIT: HCPCS | Mod: 25

## 2018-12-26 PROCEDURE — A9270 NON-COVERED ITEM OR SERVICE: HCPCS | Mod: GY | Performed by: HOSPITALIST

## 2018-12-26 PROCEDURE — 25000132 ZZH RX MED GY IP 250 OP 250 PS 637: Mod: GY | Performed by: INTERNAL MEDICINE

## 2018-12-26 PROCEDURE — 25000132 ZZH RX MED GY IP 250 OP 250 PS 637: Mod: GY | Performed by: HOSPITALIST

## 2018-12-26 PROCEDURE — 99239 HOSP IP/OBS DSCHRG MGMT >30: CPT | Performed by: HOSPITALIST

## 2018-12-26 RX ORDER — ASPIRIN 81 MG/1
81 TABLET, CHEWABLE ORAL DAILY
Qty: 30 TABLET | Refills: 0 | Status: SHIPPED | OUTPATIENT
Start: 2018-12-26 | End: 2018-12-26

## 2018-12-26 RX ORDER — SILVER SULFADIAZINE 10 MG/G
CREAM TOPICAL DAILY
Status: DISCONTINUED | OUTPATIENT
Start: 2018-12-26 | End: 2018-12-26 | Stop reason: HOSPADM

## 2018-12-26 RX ORDER — METOPROLOL SUCCINATE 25 MG/1
25 TABLET, EXTENDED RELEASE ORAL DAILY
Status: DISCONTINUED | OUTPATIENT
Start: 2018-12-26 | End: 2018-12-26 | Stop reason: HOSPADM

## 2018-12-26 RX ORDER — METOPROLOL SUCCINATE 25 MG/1
25 TABLET, EXTENDED RELEASE ORAL DAILY
Qty: 30 TABLET | Refills: 0 | Status: SHIPPED | OUTPATIENT
Start: 2018-12-26 | End: 2018-12-28

## 2018-12-26 RX ADMIN — AZITHROMYCIN 250 MG: 250 TABLET, FILM COATED ORAL at 07:49

## 2018-12-26 RX ADMIN — ASPIRIN 81 MG 81 MG: 81 TABLET ORAL at 07:49

## 2018-12-26 RX ADMIN — LEVOTHYROXINE SODIUM 200 MCG: 100 TABLET ORAL at 07:49

## 2018-12-26 RX ADMIN — FUROSEMIDE 40 MG: 10 INJECTION, SOLUTION INTRAMUSCULAR; INTRAVENOUS at 08:13

## 2018-12-26 RX ADMIN — METOPROLOL SUCCINATE 25 MG: 25 TABLET, EXTENDED RELEASE ORAL at 10:04

## 2018-12-26 RX ADMIN — LOSARTAN POTASSIUM 50 MG: 50 TABLET ORAL at 07:50

## 2018-12-26 RX ADMIN — ENOXAPARIN SODIUM 40 MG: 40 INJECTION SUBCUTANEOUS at 07:50

## 2018-12-26 RX ADMIN — TAMSULOSIN HYDROCHLORIDE 0.4 MG: 0.4 CAPSULE ORAL at 07:48

## 2018-12-26 ASSESSMENT — ACTIVITIES OF DAILY LIVING (ADL)
ADLS_ACUITY_SCORE: 13

## 2018-12-26 ASSESSMENT — MIFFLIN-ST. JEOR: SCORE: 2215.05

## 2018-12-26 NOTE — PROGRESS NOTES
Transition Communication Hand-off for Care Transitions to Next Level of Care Provider    Name: Jamar Ivory  : 1950  MRN #: [id number]  Primary Care Provider: Blanca Peng  Primary Care MD Name: Blanca Peng  Primary Clinic: [address] [facility] [address]  Primary Care Clinic Name: [facility]   Reason for Hospitalization:  Shortness of breath [R06.02]  Acute bronchospasm [J98.01]  Acute on chronic congestive heart failure, unspecified heart failure type (H) [I50.9]  Admit Date/Time: 2018  8:28 AM  Discharge Date:   Payor Source: Payor: MEDICARE / Plan: MEDICARE / Product Type: Medicare /     Readmission Assessment Measure (LAM) Risk Score/category: Avg    Reason for Communication Hand-off Referral: Admission diagnoses: CHF    Discharge Plan: home     Discharge Needs Assessment:  Needs      Most Recent Value   # of Referrals Placed by CTS  Scheduled Follow-up appointments, Communication hand-offs to next level of Care Providers        Follow-up plan:    Future Appointments   Date Time Provider Department Center   2018  9:00 AM Blanca Peng MD [facility]             Key Recommendations:  CTS following for CHF dx. BNP on admission was 911, and pt was admitted with SOB.  Pt states he lives at home with his wife in [place]. He does not have any in home services or home oxygen. He does not follow a specific diet and does not have a scale or do daily weights. He states he has had the CHF teaching and understands he will have to follow a low Na diet. We discussed importance of low Na diet and daily weights. New scale for home weights given to pt. Weight monitoring and recording daily weights explained. Pt is receptive of CHF education. He will need reinforcement through clinic for CHF education. Follow up appt arranged for pt with PCP. [facility] clinic is recommended.           Sylvia Perry    AVS/Discharge Summary is the source of truth; this is a helpful guide  for improved communication of patient story

## 2018-12-26 NOTE — CONSULTS
Care Transition Initial Assessment - RN        Met with: Patient.  DATA   Active Problems:    CHF (congestive heart failure) (H)       Cognitive Status: alert and oriented.  Primary Care Clinic Name: [facility]   Primary Care MD Name: Blanca Peng  Contact information and PCP information verified: Yes  Lives With: spouse         Description of Support System: Supportive, Involved       Support Assessment: Adequate family and caregiver support   Insurance concerns: No Insurance issues identified  ASSESSMENT  Patient currently receives the following services:  none        Identified issues/concerns regarding health management: new low Na diet and daily weights    CTS following for CHF dx. BNP on admission was 911, and pt was admitted with SOB. Met with pt at bedside to discuss plan of care. Pt states he lives at home with his wife in [place]. He does not have any in home services or home oxygen. He is independent with all activities and care. He does not follow a specific diet and does not have a scale or do daily weights. He states he has had the CHF teaching and understands he will have to follow a low Na diet. We discussed importance of low Na diet and daily weights. New scale for home weights given to pt. Weight monitoring and recording daily weights explained. Pt is receptive of CHF education. His wife will be picking him up for discharge. Follow up appt arranged for pt with PCP.    PLAN  Patient/family is agreeable to the plan?  Yes  Patient anticipates discharging to home .        Patient anticipates needs for home equipment: Yes, scale given to pt  Plan/Disposition: Home   Appointments: Follow up was arranged for [facility] with Dr Peng on Friday 12/28 at 9:00am.      Care  (CTS) will continue to follow as needed. Handoff will be sent to cts for pcp on discharge.    Sylvia Perry RN CTS  Care Transitions  [phone]

## 2018-12-26 NOTE — PLAN OF CARE
Heart Failure Care Pathway  GOALS TO BE MET BEFORE DISCHARGE:    1. Decrease congestion and/or edema with diuretic therapy to achieve near      optimal volume status.            Dyspnea improved:  Yes            Edema improved:     Yes - Legs remains somewhat swollen        Net I/O and Weights since admission:          11/26 1500 - 12/26 1459  In: 1120 [P.O.:1120]  Out: 4775 [Urine:4775]  Net: -3655            Vitals:    12/24/18 1142 12/25/18 0655 12/26/18 0652   Weight: (!) 150.5 kg (331 lb 14.4 oz) (!) 150.3 kg (331 lb 6.4 oz) 147.6 kg (325 lb 4.8 oz)       2.  O2 sats > 92% on RA or at prior home O2 therapy level.          Current oxygenation status:       SpO2: 90 %         O2 Device: None (Room air),            Able to wean O2 this shift to keep sats > 92%:  Yes       Does patient use Home O2? No    3.  Tolerates ambulation and mobility near baseline: Yes        How many times did the patient ambulate with nursing staff this shift? 2    Please review the Heart Failure Care Pathway for additional HF goal outcomes.    RN - VSS. Pt denies pain. Tele SR. Received 1 last dose of IV lasix. Receiving discharge orders. Pt expected to restart home medications that he stopped. Arranging follow up with primary doctor and cardiologist. Of note, pt awaiting ordered topical silvadene per WOC RN. Pt receiving discharge orders prior to ordering - Silvadene was not available thus not applied. Pt sent home with wound care instructions/supplies and states would follow up with PCP.    Patient's After Visit Summary was reviewed with patient.   Patient verbalized understanding of After Visit Summary, recommended follow up and was given an opportunity to ask questions.   Discharge medications sent home with patient/family: YES   Discharged with significant other

## 2018-12-26 NOTE — DISCHARGE SUMMARY
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  Hypertension  Cerebrovascular disease  Obesity  Hypothyroidism             Discharge Disposition:   Discharged to home           Allergies:   Allergies   Allergen Reactions     Clopidogrel      Cough/emesis     Penicillins Rash              Discharge Medications:   Current Discharge Medication List      START taking these medications    Details   aspirin (ASA) 81 MG tablet Take 1 tablet (81 mg) by mouth daily  Qty: 30 tablet, Refills: 0    Associated Diagnoses: Cerebral infarction, unspecified mechanism (H)      metoprolol succinate ER (TOPROL-XL) 25 MG 24 hr tablet Take 1 tablet (25 mg) by mouth daily  Qty: 30 tablet, Refills: 0    Associated Diagnoses: Acute on chronic combined systolic and diastolic congestive heart failure (H)         CONTINUE these medications which have NOT CHANGED    Details   acetaminophen (TYLENOL) 500 MG tablet Take 1,000 mg by mouth every 6 hours as needed (Headache)       Glucosamine-Chondroitin (OSTEO BI-FLEX REGULAR STRENGTH) 250-200 MG TABS Take 1 tablet by mouth 2 times daily      levothyroxine (SYNTHROID/LEVOTHROID) 200 MCG tablet TAKE 1 TABLET (200 MCG) BY MOUTH DAILY  Qty: 90 tablet, Refills: 2    Associated Diagnoses: Acquired hypothyroidism      furosemide (LASIX) 80 MG tablet TAKE 1 TABLET (80 MG) BY MOUTH 2 TIMES DAILY  Qty: 180 tablet, Refills: 2    Associated Diagnoses: Edema, unspecified type      KLOR-CON 10 MEQ CR tablet TAKE 1 TABLET (10 MEQ) BY MOUTH 3 TIMES DAILY  Qty:  "90 tablet, Refills: 1    Associated Diagnoses: Bilateral leg edema      losartan (COZAAR) 25 MG tablet Take 1 tablet (25 mg) by mouth daily  Qty: 90 tablet, Refills: 3      simvastatin (ZOCOR) 20 MG tablet Take 1 tablet (20 mg) by mouth At Bedtime  Qty: 90 tablet, Refills: 1    Associated Diagnoses: Hyperlipidemia with target LDL less than 100      tamsulosin (FLOMAX) 0.4 MG capsule Take 0.4 mg by mouth daily                    Condition on Discharge:   Discharge condition: Stable   Discharge vitals: Blood pressure 133/73, pulse 75, temperature 97.8  F (36.6  C), temperature source Oral, resp. rate 18, height 1.727 m (5' 8\"), weight 147.6 kg (325 lb 4.8 oz), SpO2 90 %.   Code status on discharge: Full Code      BASIC PHYSICAL EXAMINATION:  GENERAL: No apparent distress.  CARDIOVASCULAR: Regular rate and rhythm without murmurs.  PULMONARY: Clear to auscultation bilaterally.   GASTROINTESTINAL: Abdomen soft, non-tender.  EXTREMITIES: 2+ BL LE edema, pulses intact.  NEUROLOGIC: No focal deficits.            History of Illness:   See detailed admission note for full details.               Procedures excluding imaging which is summarized below:   Please see details in the electronic medical record.           Consultations:   WOUND OSTOMY CONTINENCE NURSE  IP CONSULT  [facility]INIC EVALUATION IP CONSULT          Significant Results:   Results for orders placed or performed during the hospital encounter of 12/24/18   XR Chest 2 Views    Narrative    CHEST TWO VIEWS December 24, 2018 9:14 AM     HISTORY: Cough, dyspnea, evaluate for infiltrate.     COMPARISON: 4/11/2018.    FINDINGS: Aortic calcification and mild tortuosity again seen. Mild  left-sided atelectasis.      Impression    IMPRESSION: Bilateral interstitial edema.    DELMAR MYERS MD       Transthoracic Echocardiogram Results:  No results found for this or any previous visit (from the past 4320 hour(s)).             Pending Results:   Unresulted Labs Ordered in the " Past 30 Days of this Admission     No orders found from 10/25/2018 to 12/25/2018.                      Discharge Instructions and Follow-Up:   Discharge instructions and follow-up:   Discharge Procedure Orders   Follow-up and recommended labs and tests    Order Comments: Follow up with primary care provider, Blanca Peng, within 7 days to evaluate medication change and for hospital follow- up.  The following labs/tests are recommended: BMP.  Continue compression stockings.  Medication compliance and outpatient follow up is crucial.  Recommend establishing in the [facility] clinic to manage medications, diuretics, and monitor weight.  Recommend outpatient sleep study to assess for sleep apnea.     Activity   Order Comments: Your activity upon discharge: activity as tolerated     Order Specific Question Answer Comments   Is discharge order? Yes      Full Code     Order Specific Question Answer Comments   Code status determined by: Discussion with patient/legal decision maker      Diet   Order Comments: Follow this diet upon discharge: Orders Placed This Encounter      Combination Diet Regular Diet Adult; 2 gm NA Diet     Order Specific Question Answer Comments   Is discharge order? Yes              Hospital Course:   Summary of Stay: Jamar Ivory is a 68 year old male with a history of systolic heart dysfunction with an ejection fraction measured at 46% by Lexiscan in 2016,  Stroke, hypertension, hypothyroidism, gout admitted on 12/24/2018 with complaints of shortness of breath.  He has had a non-productive cough and has been afebrile.  He does not carry any pulmonary diagnoses such as emphysema COPD or asthma.  His breathing became so difficult he actually called the paramedics and on arrival he was tachypneic at a rate of 40 and was hypertensive in the 160/100 range.     Examination revealed diffuse inspiratory and expiratory wheezing with faint crackles and chest x-ray showed bilateral interstitial edema.   It was difficult to tell the difference between an atypical pneumonia versus pulmonary edema.  Since his hospitalization he has completed an echocardiogram which shows an ejection fraction of 40-45% which I suspect is his baseline.  He remains hypertensive but responded well to IV diuretics and respiratory status is much improved.     Retrospectively I think this is all acute on chronic systolic heart failure exacerbation.  He now admits to stopping all medications 2-3 weeks ago as he felt like they weren't working.  He has been started on Toprol XL today.  He is no longer hypoxic and feels much improved and has requested discharge.  I recommended continuing with hospitalization to further optimize volume status be he declined and appears stable for discharge with close outpatient follow up.     Problem List:   1. Shortness of breath and tachypnea: Secondary to pulmonary edema from combination of acute systolic and diastolic dysfunction on chronic systolic and diastolic dysfunction.  He appears to responded well to IV diuretics, blood pressure remains uncontrolled  2. Chronic systolic heart failure: Baseline function is 40-45% by echocardiogram during this hospitalization that was seen previously in 2016.  Should be noted that when they diagnosed his systolic dysfunction they did a Lexiscan that did not show any evidence of ischemia.  Prior to admission medications included low-dose losartan at 25 mg a day and high-dose furosemide at 80 mg twice daily which he had stopped some weeks ago.  He is more compensated now so we have started Toprol XL.  He's been counseled not to stop medications, especially abruptly, in the future.  He was given contact information for the [facility] clinic.  3. Questionable pneumonia: Procal, is undetectable, he is not febrile nor does he have an elevated white count and therefore discontinued azithromycin  4. Hypertension: Better controlled after IV diuresis.  Continue losartan 25 mg a day,  adding low dose Toprol.  5. History of CVA: Continue on daily aspirin and statin.  6. Obesity: Encouraged to lose weight, recommended outpatient sleep clinic follow up.  7. Hypothyroidism: Continue replacement as at home.    The patient was seen, examined, and counseled on this day. The hospitalization and plan of care was reviewed with the patient extensively. All questions were addressed and the patient agreed to follow-up as noted above.      Total time spent in face to face contact with the patient and coordinating discharge was:  35 Minutes    Jonnie Hardy DO, MPH  [facility] Hospitalist  [address] [place] [address]  Pager: [phone]  12/26/2018

## 2018-12-26 NOTE — PROGRESS NOTES
Focus: wounds  D: per MD note: 68 year old male with a history of systolic heart dysfunction with an ejection fraction measured at 46% by Lexiscan in 2016,  Stroke, hypertension, hypothyroidism, gout admitted on 12/24/2018 with complaints of shortness of breath.  He has had a non-productive cough and has been afebrile.  He does not carry any pulmonary diagnoses such as emphysema COPD or asthma.  His breathing became so difficult he actually called the paramedics and on arrival he was tachypneic at a rate of 40 and was hypertensive in the 160/100 range.      BLE scattered dry red ulcerations; left greater than right, chronic wounds pt states for at least 6 months and treated with neosporin and gauze by his wife with no improvement. Wound bases are dry red scabs and in need of gentle debridement and cleansing. Hemosiderin staining to gator region and 2+ edema to bilateral feet; skin is dry and scaly.    I: assessment and orders written; education to pt     A/P: chronic VSU to BLE; stable dry wounds in need of debridement with Silvadene.    Wound care to BLE: Daily  1. Wash with soap and water, rinse wounds with wound spray  2. Sween moisturizing cream to intact skin and dry scabs  3. Apply a small amount of silvadene to non adherent dressing (Adaptic- clear package) and cover large ulcerations on legs  4. Cover with gauze or ABD/ secure with flex net/ Stretch roll gauze  5. Elevate BLE as able several times thru the day

## 2018-12-26 NOTE — CONSULTS
[facility] Referral    A [facility] Clinic referral was received from Dr. Hardy. Mr. Ivory is hospitalized for heart failure after stopping his medications for several weeks and plans to discharge this morning. Called and spoke with patient's inpatient nurse, Freddy. Reviewed that patient previously followed with [facility] cardiology, last appointment 2016. Asked him to have a discussion with patient about his plans for medication compliance and hospital follow up. Provided nurse with our scheduling phone number if patient would like to establish with one of our cardiologists which is required prior to an appointment with a [facility].     Thank you for the [facility] referral. If you have any questions, please contact one of the [facility] nurses in [place] at [phone] or [place] at [phone].    Yeimi SOUTH, RN, CHFN  [facility] Care Coordinator  [phone]    [facility] Clinic: Cardiomyopathy, Optimization, Rehabilitation, Education   The [facility] is a heart failure specialty clinic within the [facility] where you will work with specialized nurse practioners, physician assistants, doctors and registered nurses. They are dedicated to helping patients with heart failure to carefully adjust medications, receive education, and learn who and when to call if symptoms develop. They specialize in helping you better understand your condition, slow the progression of your disease, improve the length and quality of your life, help you detect future heart problems before they become life threatening, and avoid hospitalizations.

## 2018-12-26 NOTE — PLAN OF CARE
Heart Failure Care Pathway  GOALS TO BE MET BEFORE DISCHARGE:    1. Decrease congestion and/or edema with diuretic therapy to achieve near      optimal volume status.            Dyspnea improved:  No, please explain: GOMEZ            Edema improved:     No, please explain: 3+BLE        Net I/O and Weights since admission:          11/26 0700 - 12/26 0659  In: 240 [P.O.:240]  Out: 3800 [Urine:3800]  Net: -3560            Vitals:    12/24/18 1142 12/25/18 0655   Weight: (!) 150.5 kg (331 lb 14.4 oz) (!) 150.3 kg (331 lb 6.4 oz)       2.  O2 sats > 92% on RA or at prior home O2 therapy level.          Current oxygenation status:       SpO2: 90 %         O2 Device: None (Room air),            Able to wean O2 this shift to keep sats > 92%:  Yes       Does patient use Home O2? No    3.  Tolerates ambulation and mobility near baseline: Yes        How many times did the patient ambulate with nursing staff this shift? 1    Please review the Heart Failure Care Pathway for additional HF goal outcomes.    Kerrie Moss RN  12/26/2018   A&O. Denies pain. VSS. IV SL. LS clear. Wounds on BLE, NISA. Independent with ambulation. Tele:

## 2018-12-27 ENCOUNTER — TELEPHONE (OUTPATIENT)
Dept: FAMILY MEDICINE | Facility: CLINIC | Age: 68
End: 2018-12-27

## 2018-12-27 ENCOUNTER — PATIENT OUTREACH (OUTPATIENT)
Dept: CARE COORDINATION | Facility: CLINIC | Age: 68
End: 2018-12-27

## 2018-12-27 NOTE — TELEPHONE ENCOUNTER
"Hospital/TCU/ED for chronic condition Discharge Protocol    \"Hi, my name is Loar Zamora, a registered nurse, and I am calling from [facility].  I am calling to follow up and see how things are going for you after your recent emergency visit/hospital/TCU stay.\"    Tell me how you are doing now that you are home?\" I feel really good.       Discharge Instructions    \"Let's review your discharge instructions.  What is/are the follow-up recommendations?  Pt. Response: to follow up with PCP    \"Has an appointment with your primary care provider been scheduled?\"   Yes. (confirm)    \"When you see the provider, I would recommend that you bring your medications with you.\"    Medications    \"Tell me what changed about your medicines when you discharged?\"    Changes to chronic meds?    0-1    \"What questions do you have about your medications?\"    None     New diagnoses of heart failure, COPD, diabetes, or MI?    No              Medication reconciliation completed? Yes  Was MTM referral placed (*Make sure to put transitions as reason for referral)?   No    Call Summary    \"What questions or concerns do you have about your recent visit and your follow-up care?\"     none    \"If you have questions or things don't continue to improve, we encourage you contact us through the main clinic number (give number).  Even if the clinic is not open, triage nurses are available 24/7 to help you.     We would like you to know that our clinic has extended hours (provide information).  We also have urgent care (provide details on closest location and hours/contact info)\"      \"Thank you for your time and take care!\"             "

## 2018-12-27 NOTE — PROGRESS NOTES
Clinic Care Coordination Contact  [facility]/Voicemail    Referral Source: IP Handoff  Clinical Data: Care Coordinator Outreach.  Chart reviewed.  Patient admitted to [facility] 12/24/18-12/26/18 and treated for CHF exacerbation.  Per CTS handoff, patient is non compliant with sodium restricted diet and does not monitor weight.  Patient given CHF education and C[facility] clinic referral.  Patient was also seen by WOC RN for treatment of BLE scattered dry red ulcerations; left greater than right, chronic wounds pt states for at least 6 months and treated with neosporin and gauze by his wife with no improvement. Patient and wife instructed on daily wound care:   Wound care to BLE: Daily  1. Wash with soap and water, rinse wounds with wound spray  2. Sween moisturizing cream to intact skin and dry scabs  3. Apply a small amount of silvadene to non adherent dressing (Adaptic- clear package) and cover large ulcerations on legs  4. Cover with gauze or ABD/ secure with flex net/ Stretch roll gauze  5. Elevate BLE as able several times thru the day    Outreach attempted x 1.  Left message on voicemail with call back information and requested return call.  Plan: Care Coordinator will try to reach patient again in 3-5 business days.      Ro Villaseñor RN-BSN   Care Coordination  Phone:  [phone]  Email: [email or web address]

## 2018-12-27 NOTE — TELEPHONE ENCOUNTER
ED / Discharge Outreach Protocol    Patient Contact    Attempt # 1    Was call answered?  No.  Left message on voicemail with information to call triage back. FYI patient has a hospital follow up appointment 12/28/2018.

## 2018-12-28 ENCOUNTER — OFFICE VISIT (OUTPATIENT)
Dept: FAMILY MEDICINE | Facility: CLINIC | Age: 68
End: 2018-12-28
Payer: MEDICARE

## 2018-12-28 ENCOUNTER — ANCILLARY PROCEDURE (OUTPATIENT)
Dept: GENERAL RADIOLOGY | Facility: CLINIC | Age: 68
End: 2018-12-28
Attending: FAMILY MEDICINE
Payer: MEDICARE

## 2018-12-28 VITALS
SYSTOLIC BLOOD PRESSURE: 110 MMHG | TEMPERATURE: 97.3 F | BODY MASS INDEX: 51.09 KG/M2 | HEART RATE: 75 BPM | WEIGHT: 315 LBS | DIASTOLIC BLOOD PRESSURE: 72 MMHG | OXYGEN SATURATION: 91 %

## 2018-12-28 DIAGNOSIS — E03.9 ACQUIRED HYPOTHYROIDISM: Chronic | ICD-10-CM

## 2018-12-28 DIAGNOSIS — R60.9 EDEMA, UNSPECIFIED TYPE: ICD-10-CM

## 2018-12-28 DIAGNOSIS — I50.43 ACUTE ON CHRONIC COMBINED SYSTOLIC AND DIASTOLIC CONGESTIVE HEART FAILURE (H): ICD-10-CM

## 2018-12-28 DIAGNOSIS — M25.531 RIGHT WRIST PAIN: ICD-10-CM

## 2018-12-28 DIAGNOSIS — E78.5 HYPERLIPIDEMIA WITH TARGET LDL LESS THAN 100: Chronic | ICD-10-CM

## 2018-12-28 DIAGNOSIS — M25.531 RIGHT WRIST PAIN: Primary | ICD-10-CM

## 2018-12-28 DIAGNOSIS — R60.0 BILATERAL LEG EDEMA: ICD-10-CM

## 2018-12-28 DIAGNOSIS — I63.9 CEREBRAL INFARCTION, UNSPECIFIED MECHANISM (H): ICD-10-CM

## 2018-12-28 PROCEDURE — 73110 X-RAY EXAM OF WRIST: CPT | Mod: RT

## 2018-12-28 PROCEDURE — 99495 TRANSJ CARE MGMT MOD F2F 14D: CPT | Performed by: FAMILY MEDICINE

## 2018-12-28 RX ORDER — METOPROLOL SUCCINATE 25 MG/1
25 TABLET, EXTENDED RELEASE ORAL DAILY
Qty: 90 TABLET | Refills: 3 | Status: SHIPPED | OUTPATIENT
Start: 2018-12-28 | End: 2019-05-08

## 2018-12-28 RX ORDER — LOSARTAN POTASSIUM 25 MG/1
25 TABLET ORAL DAILY
Qty: 90 TABLET | Refills: 3 | Status: SHIPPED | OUTPATIENT
Start: 2018-12-28 | End: 2019-05-08

## 2018-12-28 RX ORDER — SIMVASTATIN 20 MG
20 TABLET ORAL AT BEDTIME
Qty: 90 TABLET | Refills: 3 | Status: SHIPPED | OUTPATIENT
Start: 2018-12-28 | End: 2019-05-08

## 2018-12-28 RX ORDER — POTASSIUM CHLORIDE 750 MG/1
TABLET, EXTENDED RELEASE ORAL
Qty: 90 TABLET | Refills: 11 | Status: SHIPPED | OUTPATIENT
Start: 2018-12-28 | End: 2019-05-15

## 2018-12-28 RX ORDER — LEVOTHYROXINE SODIUM 200 UG/1
200 TABLET ORAL DAILY
Qty: 90 TABLET | Refills: 3 | Status: SHIPPED | OUTPATIENT
Start: 2018-12-28 | End: 2020-03-22

## 2018-12-28 RX ORDER — FUROSEMIDE 80 MG
TABLET ORAL
Qty: 180 TABLET | Refills: 3 | Status: SHIPPED | OUTPATIENT
Start: 2018-12-28 | End: 2019-05-08

## 2018-12-28 NOTE — RESULT ENCOUNTER NOTE
Could you call Kenneth and let him know no fracture?  Continue to use brace for next few days until wrist feeling better.  Dr. Blanca Peng MD/[facility]

## 2019-01-31 ASSESSMENT — ACTIVITIES OF DAILY LIVING (ADL): DEPENDENT_IADLS:: INDEPENDENT

## 2019-01-31 NOTE — PROGRESS NOTES
Clinic Care Coordination Contact    Clinic Care Coordination Contact  OUTREACH    Referral Information:  Referral Source: IP Handoff  Chart reviewed.  Patient admitted to [facility] 12/24/18-12/26/18 and treated for CHF exacerbation.  Per CTS handoff, patient is non compliant with sodium restricted diet and does not monitor weight.  Patient given CHF education and CORE clinic referral.  Patient was also seen by WOC RN for treatment of BLE scattered dry red ulcerations; left greater than right, chronic wounds pt states for at least 6 months and treated with neosporin and gauze by his wife with no improvement. Patient and wife instructed on daily wound care:   Wound care to BLE: Daily  1. Wash with soap and water, rinse wounds with wound spray  2. Sween moisturizing cream to intact skin and dry scabs  3. Apply a small amount of silvadene to non adherent dressing (Adaptic- clear package) and cover large ulcerations on legs  4. Cover with gauze or ABD/ secure with flex net/ Stretch roll gauze  5. Elevate BLE as able several times thru the day    Patient discharged home.    Primary Diagnosis: CHF    Chief Complaint   Patient presents with     Clinic Care Coordination - Post Hospital     [facility] 12/24/18-12/26/18  CHF exacerbation.  CTS handoff.        Universal Utilization:   Clinic Utilization  Difficulty keeping appointments:: No  Compliance Concerns: Yes  No-Show Concerns: No  No PCP office visit in Past Year: No  Utilization    Last refreshed: 1/25/2019 10:42 PM:  Hospital Admissions 1           Last refreshed: 1/25/2019 10:42 PM:  ED Visits 3           Last refreshed: 1/25/2019 10:42 PM:  No Show Count (past year) 4              Current as of: 1/25/2019 10:42 PM              Clinical Concerns:  Current Medical Concerns: CHF and chronic wounds.  RN CC spoke with patient today over phone.  Patient stated he is doing well.  No new concerns.  Patient reports he is monitoring weight daily and writing it down on a  "notebook he has. Discharge weight was 325 lbs.  Today patient reports weight as being 329 lbs. He is taking all medications as prescribed, including lasix. Patient stated he does see increased urine output after he takes lasix, he is urinating frequently over night as well.  Almost hourly, which is frustrating to him.  CORE clinic referral was placed at hospital discharge.  Patient states he did follow up with [facility], but was told \"there isn't much we can do for you\", so patient left.  Writer does not see any documentation in Epic regarding this visit.  Patient would defiantly benefit from close CORE follow up.    BLE wound is still present.  He feels it has improved, but not healed.  There is no pain, no drainage, area is not reddened or hot to touch.  Patient reports following wound care instructions as mentioned above.  He is frustrated at how long it is taking for wound to heal.  Patient stated he has been to wound clinic previously and didn't find it helpful.    Patient has been enrolled in care coordination.    Current Behavioral Concerns: none      Education Provided to patient: r     Health Maintenance Reviewed: Due/Overdue    1950  HF ACTION PLAN Q3 YR     01/19/1999  ZOSTER IMMUNIZATION (1 of 2)     06/26/2017  ADVANCE DIRECTIVE PLANNING Q5 YRS     09/01/2018  INFLUENZA VACCINE (1)     09/26/2018  LIPID MONITORING Q1 YEAR     12/28/2018  A1C Q6 MO     01/05/2019  FOOT EXAM Q1 YEAR     01/05/2019  MICROALBUMIN Q1 YEAR     01/19/2019  PSA Q1 YR        Clinical Pathway: Clinic Care Coordination CHF Assessment    CHF:    Home scale available:  Yes  Symptom Review:   Heart Failure Symptoms  Shortness of breath:: No  Wheezing or noisy breathing?: No  Increased sputum: No  Fever: No  Chest pain: : No  Heartbeat: Regular  Dizzy or Lightheaded: No  Checking weight daily? : Yes  Today's Weight?: 149.5 kg (329 lb 9.6 oz)  Does the patient have understanding of Diuretic self-management?: Yes  Diet:: 2000 mg of " sodium  Appetite:: Normal  Bloating:: None  Urination:: Waking up at night  Fatigue: No  Weakness (Heaviness in limbs):: No  Overall your CHF symptoms are (GOAL):: Stable  How confident are you with the plan we have identified?: 9    Medication Management:  No questions or concerns.    Functional Status: Patient does not use DME but has cane and walker at home.    Dependent ADLs:: Independent  Dependent IADLs:: Independent  Bed or wheelchair confined:: No  Mobility Status: Independent  Fallen 2 or more times in the past year?: No  Any fall with injury in the past year?: No    Living Situation:  Current living arrangement:: I live in a private home with spouse and a roommate  Type of residence:: Apartment    Diet/Exercise/Sleep:  Diet:: No added salt 2gm Na  Inadequate nutrition (GOAL):: No  Food Insecurity: No  Tube Feeding: No  Exercise:: Currently not exercising  Inadequate activity/exercise (GOAL):: No  Significant changes in sleep pattern (GOAL): No       Goals:   Goals        General    Improve chronic conditions (pt-stated)     Notes - Note created  1/31/2019 11:41 AM by Ro Villaseñor RN    Goal Statement: I will remain free from infection  Measure of Success: healing of open chronic wounds  Supportive Steps to Achieve: follow prescribed wound care.  Follow up with providers as recommended.  RN CC support.    Barriers: wound has not improved much on current wound care regime.  Strengths: patient is motivated. Patient has good support system.  Date to Achieve By: 1 June 2019  Patient expressed understanding of goal: yes.              Outreach Frequency: Monthly    Pt reports understanding and denies any additional questions or concerns at this times. RN CC engaged in AIDET communication during encounter.    Plan: Patient will continue to follow prescribed daily wound care.  Patient will continue to monitor weight and follow salt restriction in diet.  Patient will follow up with providers as recommended.    RN  CC will send in basket message to PCP for further recommendations.  RN CC will follow patient closely and outreach monthly or as needed.    Ro Villaseñor RN-BSN   Care Coordination  Phone:  [phone]  Email: [email or web address]

## 2019-02-08 ENCOUNTER — PATIENT OUTREACH (OUTPATIENT)
Dept: CARE COORDINATION | Facility: CLINIC | Age: 69
End: 2019-02-08

## 2019-02-08 NOTE — PROGRESS NOTES
Clinic Care Coordination Contact  Care Team Conversations    RN CC discussed patient with PCP.  She would like patient to try [facility] clinic again.  He would benefit from the kind of support they offer.    RN CC spoke to patient over phone review with him the recommendations of PCP.  Patient stated frankly that he is not interested in trying CORE clinic again.  We discussed benefits of CORE and patient was not interested.  Writer acknowledged this and encouraged patient to come to clinic for OV with with PCP so his current health status and wounds can be assessed.  Patient verbalized understanding of this, but declined to make appt today.  He stated he will call and schedule OV soon.    Patient encouraged to call RN CC with any questions or concerns.  RN CC will continue to follow.  Next outreach in 1 month.    Ro Villaseñor RN-BSN   Care Coordination  Phone:  [phone]  Email: [email or web address]

## 2019-02-14 DIAGNOSIS — F32.1 MODERATE MAJOR DEPRESSION (H): Chronic | ICD-10-CM

## 2019-02-14 NOTE — TELEPHONE ENCOUNTER
"Requested Prescriptions   Pending Prescriptions Disp Refills     sertraline (ZOLOFT) 50 MG tablet [Pharmacy Med Name: SERTRALINE HCL 50 MG TABLET]  DISCONTINUED  Last Written Prescription Date:  9/18/2018 END: 12/24/2018  Last Fill Quantity: 60 tablet,  # refills: 1   Last office visit: 12/28/2018 with prescribing provider:  DELIA Peng   Future Office Visit:     60 tablet 1     Sig: TAKE 1 TAB BY MOUTH DAILY , AFTER 2 WEEKS, IF DEPRESSION NOT BETTER INCREASE TO 2 TABS A DAY    SSRIs Protocol Failed - 2/14/2019  9:33 AM       Failed - PHQ-9 score less than 5 in past 6 months    Please review last PHQ-9 score.   PHQ-9 SCORE 8/29/2018 9/18/2018 9/18/2018   PHQ-9 Total Score - - -   PHQ-9 Total Score MyChart 13 (Moderate depression) - -   PHQ-9 Total Score 13 12 12     MICHEAL-7 SCORE 6/28/2018 8/2/2018 8/29/2018   Total Score - - -   Total Score 6 (mild anxiety) 2 (minimal anxiety) 5 (mild anxiety)   Total Score 6 2 5          Failed - Medication is active on med list       Passed - Patient is age 18 or older       Passed - Recent (6 mo) or future (30 days) visit within the authorizing provider's specialty    Patient had office visit in the last 6 months or has a visit in the next 30 days with authorizing provider or within the authorizing provider's specialty.  See \"Patient Info\" tab in inbasket, or \"Choose Columns\" in Meds & Orders section of the refill encounter.               "

## 2019-02-19 ENCOUNTER — PATIENT OUTREACH (OUTPATIENT)
Dept: CARE COORDINATION | Facility: CLINIC | Age: 69
End: 2019-02-19

## 2019-02-19 NOTE — PROGRESS NOTES
Clinic Care Coordination Contact      CHANDNI GUARDADO received call from pt's wife, who has been working with this writer, requesting Good Rx coupons for both herself and this pt.     CHANDNI GUARDADO provided coupons via mail for losartan and potassium cl er as well as a Good Rx card for future use.     This writer will not have any additional outreached for this pt at this time. CHANDNI GUARDADO notified RN CC of pt supports provided.     MERY Coffey   Social Work Care Coordinator  [phone]

## 2019-02-19 NOTE — TELEPHONE ENCOUNTER
Is patient requesting this, or pharmacy?  Can you call patient and see?  If he is requesting, okay to refill.  Dr. Blanca Peng MD/[facility]

## 2019-02-19 NOTE — TELEPHONE ENCOUNTER
Patient Contact    Attempt # 1    Was call answered?  No.  Left message on voicemail with information to call triage back to clarify if he requested this medication.

## 2019-02-20 NOTE — TELEPHONE ENCOUNTER
Patient Contact    Attempt # 2    Was call answered?  No.  Left message on voicemail with information to call me back.

## 2019-03-02 DIAGNOSIS — F32.1 MODERATE MAJOR DEPRESSION (H): Chronic | ICD-10-CM

## 2019-03-04 NOTE — TELEPHONE ENCOUNTER
"Requested Prescriptions   Pending Prescriptions Disp Refills     sertraline (ZOLOFT) 50 MG tablet [Pharmacy Med Name: SERTRALINE HCL 50 MG TABLET]  DISCONTINUED  Last Written Prescription Date:  9/18/2018 END: 12/24/2018  Last Fill Quantity: 60 tablet,  # refills: 1   Last office visit: 12/28/2018 with prescribing provider:  DELIA Peng   Future Office Visit:     60 tablet 1     Sig: TAKE 1 TAB BY MOUTH DAILY , AFTER 2 WEEKS, IF DEPRESSION NOT BETTER INCREASE TO 2 TABS A DAY    SSRIs Protocol Failed - 3/2/2019 11:28 AM       Failed - PHQ-9 score less than 5 in past 6 months    Please review last PHQ-9 score.   PHQ-9 SCORE 8/29/2018 9/18/2018 9/18/2018   PHQ-9 Total Score - - -   PHQ-9 Total Score MyChart 13 (Moderate depression) - -   PHQ-9 Total Score 13 12 12     MICHEAL-7 SCORE 6/28/2018 8/2/2018 8/29/2018   Total Score - - -   Total Score 6 (mild anxiety) 2 (minimal anxiety) 5 (mild anxiety)   Total Score 6 2 5            Failed - Medication is active on med list       Passed - Patient is age 18 or older       Passed - Recent (6 mo) or future (30 days) visit within the authorizing provider's specialty    Patient had office visit in the last 6 months or has a visit in the next 30 days with authorizing provider or within the authorizing provider's specialty.  See \"Patient Info\" tab in inbasket, or \"Choose Columns\" in Meds & Orders section of the refill encounter.               "

## 2019-03-05 NOTE — PROGRESS NOTES
Outpatient Occupational Therapy Discharge Note     Patient: Jamar Ivory  : 1950    Beginning/End Dates of Reporting Period:  18 to 3/5/2019    Referring Provider: Myah Florez PA-C    Therapy Diagnosis: edema; wounds; PVD    Client Self Report:       Objective Measurements: see flowsheet                                                        Outcome Measures (most recent score): no LLIS scored; pt did not return for follow up       Goals:   Goal Identifier 1   Goal Description In order to improve functional mobility for activities of living, promote wound healing and skin closure for pain reductions and reductions in prevelance cellulitis infections, and promote better fit/ease with LB dressing, by the completion of the intsnive phase of services the pt and/or caregiver will:   Target Date 18   Date Met      Progress:     Goal Identifier 1a   Goal Description tolerate gradient compression bandaging/wearing compression garments 23 hrs/day to prevent re-acccumulation of extracellular fluid for reductions needed to aid eskin healing for reduced incidence cellulitis, reduce pain with rest and activity, and promote improved functional ambulation engagement   Target Date 18   Date Met  18   Progress:     Goal Identifier 1b   Goal Description demonstrate independence in applying gradient compression bandages to build I with home management of BLE lymphedema/edema needed to aide skin healing for infection prevention, wound healing and pain reductions for imporved daily activity engageemnt, and promote more ease and I with LB dressing   Target Date 18   Date Met  18   Progress:     Goal Identifier 1c   Goal Description demonstrate independence in performing prescribed exercises to facilate the muscle pumping systems for max reductions needed to aide skin healing, reduce pain, and promote increased functional ambulation engagement   Target Date 18   Date  Met  07/18/18   Progress:     Goal Identifier 1d   Goal Description be independent in donning/doffing, wearing schedule, and care of compression garments for I building with home management of BLE lymphedema/edema needed to reduce recurrent cellulitis infections, promote skin healing and pain reductions for imporved mobility, and promote increased I with LB dressing   Target Date 08/31/18   Date Met      Progress:     Goal Identifier 2   Goal Description Display total BLE volume reduction of .75L+ for reductions needed to aide skin closure for wound healing, to promote reduction cellulitis infections, and promote improvements in functional mobility   Target Date 08/31/18   Date Met  07/12/18(and on going)   Progress:     Goal Identifier     Goal Description     Target Date     Date Met      Progress:     Goal Identifier     Goal Description     Target Date     Date Met      Progress:     Progress Toward Goals:   Progress this reporting period: Pt and pts spouse built I with use GCB's and reduced BLE edema/swelling quite a bit. Pt had open skin that fluctuated throughout POC, looking better some days, and then opening up and weeping worse on other days. Pt had some toleranec issues with GCB wearing and on days he would discharge use GCB's, swelling would increase and skin would open back up. Pts spouse is was educated on options for daily compression in context open weeping skin at times. Pt and spouse planned to get zipper type compression stocking to use daily in combo with night time GCb for home management. [place] and pt kept in touch with this writer via phone concerning some continued skin issues and questions reagrding progress with pts cares; spouse advised cannot tend to pt needs over phone regarding topics they would call in about and impression built to this writer via spouse pt non compliant with home management regarding stocking and GCB use. Pt and spouse have not returned for follow up; pt to be  discharged.      Plan:  Discharge from therapy.    Discharge:    Reason for Discharge: Patient chooses to discontinue therapy.  Patient has failed to schedule further appointments.  Medicare G-code: Patient did not attend a final scheduled session prior to discharge. Unable to determine discharge functional status.    Equipment Issued:     Discharge Plan: Patient to continue home program.

## 2019-03-05 NOTE — TELEPHONE ENCOUNTER
Pls see previous request for this on 2/14.  For some reason it is continuing to be requested although patient states he is not taking.  Could you call the pharmacy and ask them to stop sending request?  And maybe send med list to them so they know what he is taking and what he's not?  Thanks,  Dr. Blanca Peng MD/[facility]

## 2019-03-05 NOTE — ADDENDUM NOTE
Encounter addended by: Noble Gonzalez OT on: 3/5/2019 9:04 AM   Actions taken: Sign clinical note, Episode resolved

## 2019-03-26 ENCOUNTER — PATIENT OUTREACH (OUTPATIENT)
Dept: CARE COORDINATION | Facility: CLINIC | Age: 69
End: 2019-03-26

## 2019-03-26 NOTE — PROGRESS NOTES
Clinic Care Coordination Contact  [facility]/Voicemail       Clinical Data: Care Coordinator Outreach. Chart reviewed. Patient is enrolled in care coordination.  It is noted patient has not yet had f/u with PCP since our last encounter on 2/8/19  Outreach attempted x 1.  Left message on voicemail with call back information and requested return call.  Plan:  Care Coordinator will try to reach patient again in 3-5 business days.    Ro Villaseñor RN-BSN   Care Coordination  Phone:  [phone]  Email: [email or web address]

## 2019-03-26 NOTE — LETTER
[facility] CARE COORDINATION  [address] [address]    April 11, 2019    Jamar Ivory  [address]   [place] [address]      Dear Jamar,    I am a clinic care coordinator who works with Blanca Peng MD at [facility]. I recently tried to call and was unable to reach you. I wanted to check in on your current health status and see if there is anything I can help you with. Below is a description of clinic care coordination and how I can further assist you.     The clinic care coordinator is a registered nurse and/or  who understand the health care system. The goal of clinic care coordination is to help you manage your health and improve access to the [facility] system in the most efficient manner. The registered nurse can assist you in meeting your health care goals by providing education, coordinating services, and strengthening the communication among your providers. The  can assist you with financial, behavioral, psychosocial, chemical dependency, counseling, and/or psychiatric resources.    Please feel free to contact me at [phone], with any questions or concerns. We at [facility] are focused on providing you with the highest-quality healthcare experience possible and that all starts with you.     Sincerely,     Ro Villaseñor RN-BSN   Care Coordination  Phone:  [phone]  Email: [email or web address]

## 2019-04-11 NOTE — PROGRESS NOTES
Clinic Care Coordination Contact  [facility]/Voicemail       Clinical Data: Care Coordinator Outreach  Outreach attempted x 2.  Left message on voicemail with call back information and requested return call.  Plan: Care Coordinator will mail out care coordination introduction letter with care coordinator contact information and explanation of care coordination services. Care Coordinator will do no further outreaches at this time.    Ro Villaseñor RN-BSN   Care Coordination  Phone:  [phone]  Email: [email or web address]

## 2019-05-08 ENCOUNTER — OFFICE VISIT (OUTPATIENT)
Dept: FAMILY MEDICINE | Facility: CLINIC | Age: 69
End: 2019-05-08
Payer: MEDICARE

## 2019-05-08 VITALS
BODY MASS INDEX: 51.85 KG/M2 | HEART RATE: 87 BPM | OXYGEN SATURATION: 96 % | WEIGHT: 315 LBS | SYSTOLIC BLOOD PRESSURE: 124 MMHG | DIASTOLIC BLOOD PRESSURE: 80 MMHG | RESPIRATION RATE: 18 BRPM | TEMPERATURE: 98.9 F

## 2019-05-08 DIAGNOSIS — E78.5 HYPERLIPIDEMIA WITH TARGET LDL LESS THAN 100: Chronic | ICD-10-CM

## 2019-05-08 DIAGNOSIS — F32.1 MODERATE MAJOR DEPRESSION (H): ICD-10-CM

## 2019-05-08 DIAGNOSIS — E11.9 TYPE 2 DIABETES MELLITUS WITHOUT COMPLICATION, WITHOUT LONG-TERM CURRENT USE OF INSULIN (H): ICD-10-CM

## 2019-05-08 DIAGNOSIS — E66.01 OBESITY, MORBID (MORE THAN 100 LBS OVER IDEAL WEIGHT OR BMI > 40) (H): ICD-10-CM

## 2019-05-08 DIAGNOSIS — I50.43 ACUTE ON CHRONIC COMBINED SYSTOLIC AND DIASTOLIC CONGESTIVE HEART FAILURE (H): Primary | ICD-10-CM

## 2019-05-08 DIAGNOSIS — Z12.5 ENCOUNTER FOR SCREENING FOR MALIGNANT NEOPLASM OF PROSTATE: ICD-10-CM

## 2019-05-08 DIAGNOSIS — R60.9 EDEMA, UNSPECIFIED TYPE: ICD-10-CM

## 2019-05-08 DIAGNOSIS — Z13.89 SCREENING FOR DIABETIC PERIPHERAL NEUROPATHY: ICD-10-CM

## 2019-05-08 LAB — HBA1C MFR BLD: 5.5 % (ref 0–5.6)

## 2019-05-08 PROCEDURE — 99207 C FOOT EXAM  NO CHARGE: CPT | Performed by: FAMILY MEDICINE

## 2019-05-08 PROCEDURE — 80061 LIPID PANEL: CPT | Performed by: FAMILY MEDICINE

## 2019-05-08 PROCEDURE — 83036 HEMOGLOBIN GLYCOSYLATED A1C: CPT | Performed by: FAMILY MEDICINE

## 2019-05-08 PROCEDURE — 36415 COLL VENOUS BLD VENIPUNCTURE: CPT | Performed by: FAMILY MEDICINE

## 2019-05-08 PROCEDURE — G0103 PSA SCREENING: HCPCS | Performed by: FAMILY MEDICINE

## 2019-05-08 PROCEDURE — 83880 ASSAY OF NATRIURETIC PEPTIDE: CPT | Performed by: FAMILY MEDICINE

## 2019-05-08 PROCEDURE — 99215 OFFICE O/P EST HI 40 MIN: CPT | Performed by: FAMILY MEDICINE

## 2019-05-08 PROCEDURE — 84460 ALANINE AMINO (ALT) (SGPT): CPT | Performed by: FAMILY MEDICINE

## 2019-05-08 PROCEDURE — 82043 UR ALBUMIN QUANTITATIVE: CPT | Performed by: FAMILY MEDICINE

## 2019-05-08 RX ORDER — LOSARTAN POTASSIUM 25 MG/1
25 TABLET ORAL DAILY
Qty: 90 TABLET | Refills: 3 | Status: SHIPPED | OUTPATIENT
Start: 2019-05-08 | End: 2020-07-10

## 2019-05-08 RX ORDER — METOPROLOL SUCCINATE 25 MG/1
25 TABLET, EXTENDED RELEASE ORAL DAILY
Qty: 90 TABLET | Refills: 3 | Status: SHIPPED | OUTPATIENT
Start: 2019-05-08 | End: 2019-08-21

## 2019-05-08 RX ORDER — SIMVASTATIN 20 MG
20 TABLET ORAL AT BEDTIME
Qty: 90 TABLET | Refills: 3 | Status: SHIPPED | OUTPATIENT
Start: 2019-05-08 | End: 2019-07-10

## 2019-05-08 RX ORDER — FUROSEMIDE 80 MG
TABLET ORAL
Qty: 180 TABLET | Refills: 3 | Status: SHIPPED | OUTPATIENT
Start: 2019-05-08 | End: 2019-08-21

## 2019-05-08 NOTE — PATIENT INSTRUCTIONS
1. Please start taking your LASIX (FUROSEMIDE) 80 mg TWICE A DAY.  2. You need to continue your medications - metoprolol and cozaar and zocor every day.  3. Please consider seeing the [facility] clinic.  They can help manage your heart failure.  Call for appointment, please!  4. Follow up next week.

## 2019-05-08 NOTE — PROGRESS NOTES
SUBJECTIVE:   Jamar Ivory is a 69 year old male who presents to clinic today for the following   health issues:      Musculoskeletal problem/pain      Duration: ongoing    Description  Location: pt has pain and swelling in both legs. Also has wounds on both legs that are not healing    Intensity:  severe    Accompanying signs and symptoms: warmth, swelling and redness    History  Previous similar problem: YES  Previous evaluation:  YES    Precipitating or alleviating factors:  Trauma or overuse: no   Aggravating factors include: sitting, standing and walking    Therapies tried and outcome: nothing    Very pleasant 69-year-old man with chronic heart failure, depression, morbid obesity, type 2 diabetes here today for swelling in his legs.  He has had chronic nonhealing wounds in bilateral lower legs.  He was hospitalized for heart failure in December.  At that point he was 325 pounds.  Today he is 341.  He brings me his daily weights and it they have slowly gone up over time.  He did not follow-up with the core clinic as recommended.  He did not continue taking his Lasix twice a day as recommended.  He is also stopped taking his metoprolol and losartan.    Additional history: as documented    Reviewed  and updated as needed this visit by clinical staff  Tobacco  Allergies  Meds  Problems  Med Hx  Surg Hx  Fam Hx  Soc Hx          Reviewed and updated as needed this visit by Provider  Tobacco  Allergies  Meds  Problems  Med Hx  Surg Hx  Fam Hx         Recent Labs   Lab Test 05/08/19  1616 12/25/18  0602 12/24/18  1246 12/24/18  0850 06/28/18  1138 06/20/18  1225  04/11/18  1115 01/05/18  0924 09/26/17  1540 08/16/17  1031  07/18/16  0957  04/27/15  0754  04/25/14  0956  01/23/14  0735   A1C 5.5  --   --   --  5.6  --   --   --  5.7  --   --    < >  --    < > 6.0   < >  --   --   --    LDL  --   --   --   --   --   --   --   --   --  85  --   --  96  --  108  --  100  --   --    HDL  --   --    --   --   --   --   --   --   --  39*  --   --  45  --  43  --  40*  --   --    TRIG  --   --   --   --   --   --   --   --   --  193*  --   --  103  --  92  --  145  --   --    ALT  --   --   --   --   --   --   --  20  --   --   --   --   --   --   --   --  23  --  19   CR  --  0.89 0.89 0.86 1.04  --    < > 1.02  --   --  0.90   < >  --    < > 1.00  --  1.00   < > 1.00   GFRESTIMATED  --  87 87 88 71  --    < > 72  --   --  84   < >  --    < > 75  --  75   < > 75   GFRESTBLACK  --  >90 >90 >90 86  --    < > 88  --   --  >90   < >  --    < > >90  --  >90   < > >90   POTASSIUM  --  3.9  --  4.2 3.8  --    < > 3.1*  --   --  3.7   < >  --    < > 4.0  --  3.9   < > 4.1   TSH  --   --   --   --   --  1.06  --   --   --   --  2.19   < > 1.80   < > 26.05*   < >  --   --   --     < > = values in this interval not displayed.        ROS:  Constitutional, HEENT, cardiovascular, pulmonary, gi and gu systems are negative, except as otherwise noted.    OBJECTIVE:     /80   Pulse 87   Temp 98.9  F (37.2  C)   Resp 18   Wt (!) 154.7 kg (341 lb)   SpO2 96%   BMI 51.85 kg/m    Body mass index is 51.85 kg/m .  GENERAL: alert, no distress, obese and elderly  RESP: bilateral mild crackles lower lungs  CV: regular rate and rhythm, normal S1 S2, no S3 or S4, no murmur, click or rub, no peripheral edema and peripheral pulses strong  MS: no gross musculoskeletal defects noted, no edema  PSYCH: mentation appears normal, concentration poor and affect normal/bright  Diabetic foot exam: normal DP and PT pulses, no trophic changes or ulcerative lesions and poor sensory exam.   Lower extremities with 2+ edema bilaterally to midcalf    Diagnostic Test Results:  Results for orders placed or performed in visit on 05/08/19 (from the past 24 hour(s))   HEMOGLOBIN A1C   Result Value Ref Range    Hemoglobin A1C 5.5 0 - 5.6 %       ASSESSMENT/PLAN:       ICD-10-CM    1. Acute on chronic combined systolic and diastolic congestive heart  failure (H) I50.43 losartan (COZAAR) 25 MG tablet     metoprolol succinate ER (TOPROL-XL) 25 MG 24 hr tablet     CARDIOLOGY EVAL ADULT REFERRAL     BNP-N terminal pro   2. Moderate major depression (H) F32.1    3. Obesity, morbid (more than 100 lbs over ideal weight or BMI > 40) (H) E66.01    4. Type 2 diabetes mellitus without complication, without long-term current use of insulin (H) E11.9 PROSTATE SPEC ANTIGEN SCREEN     ALT     HEMOGLOBIN A1C     Lipid panel reflex to direct LDL Fasting     Albumin Random Urine Quantitative with Creat Ratio   5. Edema, unspecified type R60.9 furosemide (LASIX) 80 MG tablet   6. Encounter for screening for malignant neoplasm of prostate  Z12.5 PROSTATE SPEC ANTIGEN SCREEN   7. Screening for diabetic peripheral neuropathy Z13.89 FOOT EXAM  NO CHARGE [21813.073]   8. Hyperlipidemia with target LDL less than 100 E78.5 simvastatin (ZOCOR) 20 MG tablet     Patient with chronic CHF with slow worsening of symptoms as he has stopped taking medications as prescribed and not followed up with CORE heart clinic as recommended.  Plan:   1. Restart lasix BID.   2. Restart metoprolol and losartan.   3. FOLLOW UP WITH CORE CLINIC.  We discussed how his weight has jumped up, and that they can follow him over the phone to keep him fluid balanced and out of hospital.   4. In meantime, close follow up with me next week.  If worsening to hospital/ER.    Discussed with patient, all questions answered, in agreement with this plan, will return or seek further care if not improving or worsening.    Patient Instructions   1. Please start taking your LASIX (FUROSEMIDE) 80 mg TWICE A DAY.  2. You need to continue your medications - metoprolol and cozaar and zocor every day.  3. Please consider seeing the CORE clinic.  They can help manage your heart failure.  Call for appointment, please!  4. Follow up next week.    Greater than 40 minutes total were spent face to face with the patient including history,  exam with >50% spent on counseling and coordination of care.  Blanca Peng MD  [facility]

## 2019-05-09 ENCOUNTER — TELEPHONE (OUTPATIENT)
Dept: FAMILY MEDICINE | Facility: CLINIC | Age: 69
End: 2019-05-09

## 2019-05-09 LAB
ALT SERPL W P-5'-P-CCNC: 16 U/L (ref 0–70)
CHOLEST SERPL-MCNC: 175 MG/DL
CREAT UR-MCNC: 79 MG/DL
HDLC SERPL-MCNC: 38 MG/DL
LDLC SERPL CALC-MCNC: 94 MG/DL
MICROALBUMIN UR-MCNC: 8 MG/L
MICROALBUMIN/CREAT UR: 9.84 MG/G CR (ref 0–17)
NONHDLC SERPL-MCNC: 137 MG/DL
NT-PROBNP SERPL-MCNC: 982 PG/ML (ref 0–125)
PSA SERPL-ACNC: 1.93 UG/L (ref 0–4)
TRIGL SERPL-MCNC: 214 MG/DL

## 2019-05-09 NOTE — TELEPHONE ENCOUNTER
Reason for Call:  Other prescription    Detailed comments:   Patient wants to discuss medications in order to clarify what he should be taking   Re Glucosamine-Chondroitin (OSTEO BI-FLEX REGULAR STRENGTH) 250-200 MG TABS  And potassium chloride ER (KLOR-CON) 10 MEQ CR tablet    Phone Number Patient can be reached at: Home number on file [phone] (home)    Best Time:   anytime    Can we leave a detailed message on this number? YES    Call taken on 5/9/2019 at 3:38 PM by DESTIN LOWE

## 2019-05-09 NOTE — TELEPHONE ENCOUNTER
Medication list reviewed. Pt reports he has not taken potassium for 1 month since he needed this renewed. Please advice on refill or if K+ lab order needed first.

## 2019-05-09 NOTE — TELEPHONE ENCOUNTER
Reason for Call:  Other Update Rxs    Detailed comments: Kenneth was told to call with what rx he is currently on     Levothyroxine   Low dose Asprin  Losartan   Tasmuolosin     Phone Number Patient can be reached at: Cell number on file:    Telephone Information:   Mobile [phone]       Best Time: anytime     Can we leave a detailed message on this number? YES    Call taken on 5/9/2019 at 12:49 PM by Idalia Atkinson

## 2019-05-10 ENCOUNTER — TELEPHONE (OUTPATIENT)
Dept: FAMILY MEDICINE | Facility: CLINIC | Age: 69
End: 2019-05-10

## 2019-05-10 NOTE — TELEPHONE ENCOUNTER
Wow - he's not taking his Furosemide?  That's what keeps his swelling down!  Can you call him and emphasize the importance of this given his heart failure?  I discussed with him, as have other providers, but seems like he needs a reminder.  And okay to refill K+.  Needs follow up 1 week.  May need home RN nursing help, we'll discuss in clinic then.  Thanks.  Dr. Blanca Peng MD/[facility]

## 2019-05-10 NOTE — TELEPHONE ENCOUNTER
Reason for Call: Request for an order or referral:    Order or referral being requested: referral for cardiology    Date needed: as soon as possible    Has the patient been seen by the PCP for this problem? YES    Additional comments: Pt has referral but cant get in until June 14.  Should he go somewhere else?  Should he keep appt with Dr Peng for May 15?    Phone number Patient can be reached at:  Home number on file [phone] (home)    Best Time:  any    Can we leave a detailed message on this number?  YES    Call taken on 5/10/2019 at 11:32 AM by PRIYANKA GOMEZ

## 2019-05-10 NOTE — TELEPHONE ENCOUNTER
Routing to provider to advise if a different referral needed or if okay to wait 1 month for appointment

## 2019-05-10 NOTE — TELEPHONE ENCOUNTER
Called patient wife, Suzan back. She was unable to find the notes she wanted to discuss with triage.     Will find these and call back.     Please discuss medication questions on call back and review provider OV note with patient and wife.

## 2019-05-13 NOTE — TELEPHONE ENCOUNTER
Can wait, as long as he sees me weekly.  Is he feeling better?  What's his weight?  Let's do daily weight checks while we get him in - I'll send this to Care Coordinator RN to see if they can help follow while waiting for CORE clinic.  Dr. Blanca Peng MD/[facility]

## 2019-05-13 NOTE — TELEPHONE ENCOUNTER
Patient Contact    Attempt # 1    Was call answered?  No.  Left message on voicemail with information about the need for furosemide and to call back and schedule an appointment.

## 2019-05-14 ENCOUNTER — PATIENT OUTREACH (OUTPATIENT)
Dept: CARE COORDINATION | Facility: CLINIC | Age: 69
End: 2019-05-14

## 2019-05-14 DIAGNOSIS — E66.01 OBESITY, MORBID (MORE THAN 100 LBS OVER IDEAL WEIGHT OR BMI > 40) (H): ICD-10-CM

## 2019-05-14 DIAGNOSIS — E11.9 TYPE 2 DIABETES MELLITUS WITHOUT COMPLICATION, WITHOUT LONG-TERM CURRENT USE OF INSULIN (H): Chronic | ICD-10-CM

## 2019-05-14 DIAGNOSIS — I50.43 ACUTE ON CHRONIC COMBINED SYSTOLIC AND DIASTOLIC CONGESTIVE HEART FAILURE (H): ICD-10-CM

## 2019-05-14 DIAGNOSIS — I50.42 CHRONIC COMBINED SYSTOLIC AND DIASTOLIC CONGESTIVE HEART FAILURE (H): Primary | ICD-10-CM

## 2019-05-14 DIAGNOSIS — F32.1 MODERATE MAJOR DEPRESSION (H): Chronic | ICD-10-CM

## 2019-05-14 NOTE — PROGRESS NOTES
Clinic Care Coordination Contact    Clinic Care Coordination Contact  OUTREACH    Referral Information:  Referral Source: PCP  Primary Diagnosis: CHF  Chief Complaint   Patient presents with     Clinic Care Coordination - Initial     PCP referral; CHF management      Universal Utilization: Compliance concerns    Clinical Concerns:  CCC RN received patient referral from PCP requesting assistance for patient in managing his worsening CHF while waiting to be seen by Cardiology/CORE clinic.    CCC RN left voicemail for CORE clinic explaining patient situation and requesting call back to discuss whether or not they would be able to accommodate seeing patient sooner than next month.    CCC RN spoke with the patient today and explained Care Coordination and reason for call being that PCP is concerned about his worsening CHF in light of not being able to be seen by Cardiology/CORE until next month; the patient stated understanding.    The patient reported to be feeling quite well today, but did report that he thinks he might have a recurrent episode of gout in his left foot.  He reported his left big toe to be red and sore to the touch.    The patient otherwise stated his swelling to be improved/nearly resolved and denied shortness of breath or other symptoms; see CHF Pathway Assessment and CHF Symptom Review below.  The patient confirmed he has been weighing himself daily, but hasn't been following any diet or fluid restrictions.  CCC RN to provide patient with low-salt diet education.    The patient was agreeable to working with Care Coordination in efforts to keep his CHF under control and prevent him from being admitted to the hospital; see Goals below.  The patient confirmed his PCP appointment scheduled for tomorrow.    CHF Pathway Assessment:  Home scale available:  Yes  Home scale weight this mornin lbs  Weight increase more than 2 lbs in 24 hours?: No  Weight Increase more than 5 lbs in 1 week? : No  Last  weight recorded in clinic: 341 lbs on 5/8/19   Heart Failure Zones sheet on refrigerator or available: No, patient stated he doesn't have the Heart Failure Action Plan; CCC RN will alert PCP and request to provide to patient.  Any increased SOB:  No  Any increased edema:  No     CHF Symptom Review:   Heart Failure Symptoms  Shortness of breath:: No  Wheezing or noisy breathing?: No  Cough: No  Increased sputum: No  Fever: No  Chest pain: : No  Heartbeat: Regular  Dizzy or Lightheaded: No  Checking weight daily? : Yes  Does the patient have understanding of Diuretic self-management?: No  Appetite: Normal  Bloating: None  Urination: Normal  Fatigue: No  Weakness (Heaviness in limbs): No  Overall your CHF symptoms are (GOAL): Improving    Medication Management:  CCC RN reviewed medications with the patient.  He stated he has not been taking any potassium and does not currently have any on hand.  CCC RN will notify PCP of this and request potassium refill if patient should be taking.  The patient also reported that his prescription for tamsulosin says to take 0.4 mg twice daily while the prescription on file says daily.  CCC RN will request clarification from PCP regarding tamsulosin dosing.    Living Situation:  Lives in private home with wife    Diet/Exercise/Sleep:  Diet: Regular-- patient should be following low-salt diet; educational materials to be provided to patient  Exercise: Currently not exercising  Significant changes in sleep pattern (GOAL): No    Transportation:  Transportation concerns (GOAL): No  Transportation means: Regular car     Psychosocial:  The patient's wife is main source of support.  The patient's wife stated that the patient has depression and they would like to talk with PCP about medication options to help patient manage his depression.     Goals Addressed                 This Visit's Progress       Patient Stated      1. Medical (pt-stated)   New     Goal Statement: I will not have any  heart failure-related hospitalizations within the next 6 months.  Measure of Success: The patient will have no hospitalizations related to CHF.  Supportive Steps to Achieve: The patient will take his medications as prescribed, monitor is weight daily and notify clinic with weight gain of 2 or more lbs in a day or 5 or more lbs in a week, promptly report new/worsening symptoms to clinic, follow a low-salt diet, and participate in routine follow-up with my care teams.  Barriers: Depression, limited knowledge about managing CHF.  Strengths: The patient is motivated to avoid hospitalizations.  Date to Achieve By: 8/31/19  Patient expressed understanding of goal: Yes          Patient/Caregiver understanding: Yes    Outreach Frequency: weekly  Future Appointments              Tomorrow Blanca Peng MD [facility]    In 1 month Janette Morton DO [facility]        Plan:    The patient will weigh himself daily and call clinic with any weight gain of 2 or more lbs in a day or 5 or more lbs in a week.    The patient will continue taking his medications daily as prescribed.    The patient will promptly call clinic with any new or worsening symptoms.    The patient will contact CCC RN with additional questions or concerns.    The patient will attend his PCP appointment tomorrow as scheduled to discuss CHF, possible gout, and depression.    CCC RN will outreach to patient in approximately 2 business days to get updates on patient status, assess goal progress, and offer additional support and resources as indicated.    UPDATE at 1218:  CCC RN received call back from [facility]; they can see the patient this coming Monday 5/20/19 at 1:45 pm with Dr. Ezequiel Chand.  CCC RN called patient/patient's wife to update them with the appointment information.    Sool Dia RN  Clinic Care  Coordinator  [facility]  Ph: [phone]

## 2019-05-14 NOTE — Clinical Note
Mark Peng!  I followed up with Kenneth as you requested based on worsening labs and concern for non-compliance.  He reported to be feeling well overall and confirmed his appointment with you tomorrow.  There are a few things that need to be addressed at his visit: 1) Kenneth thinks he might have recurrence of gout to his left foot. 2) He isn't taking potassium and doesn't have any. 3) He said his bottle of tamsulosin says 0.4 mg twice daily when our prescription on file says only daily; needs clarification. 4) His wife brought up his depression so they may want to discuss possible medication for depression (and I wouldn't be surprised if this is a factor contributing to his non-compliance).  5) Can you give him and review Heart Failure Action Plan?  He said he doesn't have one/remember talking about it.  I plan to call him again this Thursday 5/16/19 to check in, get his weight, ask about symptoms.  Additionally, I have left a voicemail for the [facility] requesting call back to discuss if patient could perhaps be seen sooner given status of CHF, non-compliance, etc.  Let me know if you have follow-up questions/concerns!  Thank you!  --JOY Banda Care Coordinator

## 2019-05-14 NOTE — RESULT ENCOUNTER NOTE
Results back - this is heart failure worsening again due to patient not taking medications including lasix.  He restarted last week, and we referred to CORE clinic.  In the meantime, let's try to do daily check ins and weight checks.  Patient needs additional support to be sure he's taking his medications regularly and correctly.  Dr. Blanca Peng MD/[facility]

## 2019-05-14 NOTE — Clinical Note
Sorry for multiple messages, but wanted to update you that I was able to get Kenneth's appointment with Cardiology moved up to this coming Monday 5/20/19 with Dr. Chand; Kenneth is aware of the appointment change.  After this appointment, they will then decide if they plan to enroll Kenneth with the [facility] Clinic based on needs.  Thanks!  --JOY Banda Care Coordinator

## 2019-05-14 NOTE — LETTER
[facility] CARE COORDINATION  [facility]  [address]  [place] [address]    May 14, 2019    Jamar Ivory  [address]   [place] [facility] [address]      Dear Jamar,    I am a clinic care coordinator who works with Blanca Peng MD at [facility]. I wanted to thank you for spending the time to talk with me and provide you with my contact information so that you can call me with questions or concerns about your health care. Below is a description of clinic care coordination and how I can further assist you.     The clinic care coordinator is a registered nurse and/or  who understand the health care system. The goal of clinic care coordination is to help you manage your health and improve access to the [facility] system in the most efficient manner. The registered nurse can assist you in meeting your health care goals by providing education, coordinating services, and strengthening the communication among your providers. The  can assist you with financial, behavioral, psychosocial, chemical dependency, counseling, and/or psychiatric resources.    Please feel free to contact me at [phone] with any questions or concerns. We at [facility] are focused on providing you with the highest-quality healthcare experience possible and that all starts with you.     Sincerely,     Solo Dia    Enclosed: I have enclosed a copy of the Complex Care Plan. This has helpful information and goals that we have talked about. Please keep this in an easy to access place to use as needed.  I have also enclosed education materials about heart failure, low-salt diet, and some sheets you can use to track your daily weights.

## 2019-05-14 NOTE — LETTER
[facility]  Complex Care Plan  About Me:    Patient Name:  Jamar Marroquin    YOB: 1950  Age:         69 year old   Twila MRN:    [id number] Telephone Information:  Home Phone [phone]   Mobile [phone]       Address:  [address]  [facility] [place] [facility] [address] Email address:  No e-mail address on record      Emergency Contact(s)    Name Relationship Lgl Grd Work Phone Home Phone Mobile Phone   1. SELIN MARROQUIN Spouse  none [phone]   2. JOSE MARIA MARROQUIN Son  none [phone] none           Primary language:  English     needed? No   [employer] [employer]:  [phone] op. 1  Other communication barriers: None  Preferred Method of Communication:  Mail  Current living arrangement: I live in a private home with spouse  Mobility Status/ Medical Equipment:      Health Maintenance  Health Maintenance Reviewed: Due/Overdue   Health Maintenance Due   Topic Date Due     HF ACTION PLAN Q3 YR  1950     ZOSTER IMMUNIZATION (1 of 2) 01/19/1999     MEDICARE ANNUAL WELLNESS VISIT  04/27/2016     ADVANCE DIRECTIVE PLANNING Q5 YRS  06/26/2017     PHQ-9 Q6 MONTHS  03/18/2019     EYE EXAM Q1 YEAR  05/22/2019     My Access Plan  Medical Emergency 911   Primary Clinic Line [facility] - [phone]   24 Hour Appointment Line [phone] or  [phone]) (toll-free)   24 Hour Nurse Line [phone] (toll-free)   Preferred Urgent Care [facility] [phone]   Preferred Hospital [facility]  [phone]   Preferred Pharmacy [id number] - [place], [place] - [address]     Behavioral Health Crisis Line The National Suicide Prevention Lifeline at 1-947.254.7120 or 911     My Care Team Members  Patient Care Team       Relationship Specialty Notifications Start End    Blanca Peng MD PCP - General Family Practice  10/10/17     Phone:  [phone] Fax: [phone]         [address] [address]    Kong Fraga MD MD Gastroenterology  8/11/16     Phone: [phone] Fax: [phone]         [address] [facility] [address] [place] [address]    Blanca Pegn MD Assigned PCP   10/15/17     Phone: [phone] Fax: [phone]         [address] [address]    Cary Grace MD MD Ophthalmology  5/21/18     Phone: [phone] Fax: [phone]         [address] [address]    Solo Dia, RN Lead Care Coordinator Primary Care - CC  5/14/19     Phone: [phone]                 My Care Plans  Self Management and Treatment Plan  Goals and (Comments)  Goals        General    1. Medical (pt-stated)     Notes - Note edited  5/14/2019 11:10 AM by Solo Dia, RN    Goal Statement: I will not have any heart failure-related hospitalizations within the next 6 months.  Measure of Success: The patient will have no hospitalizations related to CHF.  Supportive Steps to Achieve: The patient will take his medications as prescribed, monitor is weight daily and notify clinic with weight gain of 2 or more lbs in a day or 5 or more lbs in a week, promptly report new/worsening symptoms to clinic, follow a low-salt diet, and participate in routine follow-up with my care teams.  Barriers: Depression, limited knowledge about managing CHF.  Strengths: The patient is motivated to avoid hospitalizations.  Date to Achieve By: 8/31/19  Patient expressed understanding of goal: Yes             Action Plans on File:    Depression        My Medical and Care Information  Problem List   Patient Active Problem List   Diagnosis     Acquired hypothyroidism     Vitiligo     Hyperlipidemia with target LDL less than 100     Hypertension goal BP (blood pressure) < 140/90     Cerebral infarction (H)     Moderate major depression (H)     Health Care Home     Fatty liver     Advanced directives, counseling/discussion      Impaired glucose tolerance     Transient cerebral ischemia     Obesity, morbid (more than 100 lbs over ideal weight or BMI > 40) (H)     Chronic stasis dermatitis     Bilateral leg edema     Type 2 diabetes mellitus without complication, without long-term current use of insulin (H)     Benign neoplasm of colon     Pain in both lower extremities     Low hemoglobin     Breast tenderness in male     Diabetic polyneuropathy associated with type 2 diabetes mellitus (H)     Chronic combined systolic and diastolic congestive heart failure (H)     Supraventricular tachycardia (H)     PVD (peripheral vascular disease) (H)     Benign prostatic hyperplasia with incomplete bladder emptying     CHF (congestive heart failure) (H)        Care Coordination Start Date: 5/14/2019   Frequency of Care Coordination: weekly   Form Last Updated: 05/14/2019

## 2019-05-14 NOTE — PATIENT INSTRUCTIONS
Patient Education     Heart Failure: Making Changes to Your Diet    You have a condition called heart failure. When you have heart failure, excess fluid is more likely to build up in your body because your heart isn't working well. This makes the heart work harder to pump blood. Fluid buildup causes symptoms such as shortness of breath and swelling (edema). This is often referred to as congestive heart failure or CHF. Controlling the amount of salt (sodium) you eat may help stop fluid from building up. Your doctor may also tell you to reduce the amount of fluid you drink.  Reading food labels  Your healthcare provider will tell you how much sodium you can eat each day. Read food labels to keep track. Keep in mind that certain foods are high in salt. These include canned, frozen, and processed foods. Check the amount of sodium in each serving. Watch out for high-sodium ingredients. These include MSG (monosodium glutamate), baking soda, and sodium phosphate.   Eating less salt  Give yourself time to get used to eating less salt. It may take a little while. Here are some tips to help:    Take the saltshaker off the table. Replace it with salt-free herb mixes and spices.    Eat fresh or plain frozen vegetables. These have much less salt than canned vegetables.    Choose low-sodium snacks like sodium-free pretzels, crackers, or air-popped popcorn.    Don t add salt to your food when you re cooking. Instead, season your foods with pepper, lemon, garlic, or onion.    When you eat out, ask that your food be cooked without added salt.    Avoid eating fried foods as these often have a great deal of salt.  If you re told to limit fluids  You may need to limit how much fluid you have to help prevent swelling. This includes anything that is liquid at room temperature, such as ice cream and soup. If your doctor tells you to limit fluid, try these tips:    Measure drinks in a measuring cup before you drink them. This will help  you meet daily goals.    Chill drinks to make them more refreshing.    Suck on frozen lemon wedges to quench thirst.    Only drink when you re thirsty.    Chew sugarless gum or suck on hard candy to keep your mouth moist.    Weigh yourself daily to know if your body's fluid content is rising.  My sodium goal  Your healthcare provider may give you a sodium goal to meet each day. This includes sodium found in food as well as salt that you add. My goal is to eat no more than ___________ mg of sodium per day.     When to call your doctor  Call your doctor right away if you have any symptoms of worsening heart failure. These can include:    Sudden weight gain    Increased swelling of your legs or ankles    Trouble breathing when you re resting or at night    Increase in the number of pillows you have to sleep on    Chest pain, pressure, discomfort, or pain in the jaw, neck, or back   Date Last Reviewed: 3/21/2016    0619-0287 [employer]. [address] [place] [address]. All rights reserved. This information is not intended as a substitute for professional medical care. Always follow your healthcare professional's instructions.           Patient Education     Heart Failure: Warning Signs of a Flare-Up    You have a condition called heart failure. Once you have heart failure, flare-ups can happen. Below are signs that can mean your heart failure is getting worse. If you notice any of these warning signs, call your healthcare provider.  Swelling    Your feet, ankles, or lower legs get puffier.    You notice skin changes on your lower legs.    Your shoes feel too tight.    Your clothes are tighter in the waist.    You have trouble getting rings on or off your fingers.  Shortness of breath    You have to breathe harder even when you re doing your normal activities or when you re resting.    You are short of breath walking up stairs or even short distances.    You wake up at night short of breath or  coughing.    You need to use more pillows or sit up to sleep.    You wake up tired or restless.  Other warning signs    You feel weaker, dizzy, or more tired.    You have chest pain or changes in your heartbeat.    You have a cough that won t go away.    You can t remember things or don t feel like eating.  Tracking your weight  Gaining weight is often the first warning sign that heart failure is getting worse. Gaining even a few pounds can be a sign that your body is retaining excess water and salt. Weighing yourself each day in the morning after you urinate and before you eat, is the best way to know if you're retaining water. Get a scale that is easy to read and make sure you wear the same clothes and use the same scale every time you weigh. Your healthcare provider will show you how to track your weight. Call your doctor if you gain more than 2 pounds in 1 day, 5 pounds in 1 week, or whatever weight gain you were told to report by your doctor. This is often a sign of worsening heart failure and needs to be evaluated and treated before it compromises your breathing. Your doctor will tell you what to do next.    Date Last Reviewed: 3/15/2016    1901-7384 [employer]. [address] [place] [address], [place] [address]. All rights reserved. This information is not intended as a substitute for professional medical care. Always follow your healthcare professional's instructions.           Patient Education     Low-Salt Diet  This diet removes foods that are high in salt. It also limits the amount of salt you use when cooking. It is most often used for people with high blood pressure, edema (fluid retention), and kidney, liver, or heart disease.  Table salt contains the mineral sodium. Your body needs sodium to work normally. But too much sodium can make your health problems worse. Your healthcare provider is recommending a low-salt (also called low-sodium) diet for you. Your total daily allowance of salt is 1,500  to 2,300 milligrams (mg). It is less than 1 teaspoon of table salt. This means you can have only about 500 to 700 mg of sodium at each meal. People with certain health problems should limit salt intake to the lower end of the recommended range.    When you cook, don t add much salt. If you can cook without using salt, even better. Don t add salt to your food at the table.  When shopping, read food labels. Salt is often called sodium on the label. Choose foods that are salt-free, low salt, or very low salt. Note that foods with reduced salt may not lower your salt intake enough.    Beans, potatoes, and pasta  Ok: Dry beans, split peas, lentils, potatoes, rice, macaroni, pasta, spaghetti without added salt  Avoid: Potato chips, tortilla chips, and similar products  Breads and cereals  Ok: Low-sodium breads, rolls, cereals, and cakes; low-salt crackers, matzo crackers  Avoid: Salted crackers, pretzels, popcorn, Citizen of [place] toast, pancakes, muffins  Dairy  Ok: Milk, chocolate milk, hot chocolate mix, low-salt cheeses, and yogurt  Avoid: Processed cheese and cheese spreads; Roquefort, Camembert, and cottage cheese; buttermilk, instant breakfast drink  Desserts  Ok: Ice cream, frozen yogurt, juice bars, gelatin, cookies and pies, sugar, honey, jelly, hard candy  Avoid: Most pies, cakes and cookies prepared or processed with salt; instant pudding  Drinks  Ok: Tea, coffee, fizzy (carbonated) drinks, juices  Avoid: Flavored coffees, electrolyte replacement drinks, sports drinks  Meats  Ok: All fresh meat, fish, poultry, low-salt tuna, eggs, egg substitute  Avoid: Smoked, pickled, brine-cured, or salted meats and fish. This includes way, chipped beef, corned beef, hot dogs, deli meats, ham, kosher meats, salt pork, sausage, canned tuna, salted codfish, smoked salmon, herring, sardines, or anchovies.  Seasonings and spices  Ok: Most seasonings are okay. Good substitutes for salt include: fresh herb blends, hot sauce, lemon,  garlic, alves, vinegar, dry mustard, parsley, cilantro, horseradish, tomato paste, regular margarine, mayonnaise, unsalted butter, cream cheese, vegetable oil, cream, low-salt salad dressing and gravy.  Avoid: Regular ketchup, relishes, pickles, soy sauce, teriyaki sauce, Worcestershire sauce, BBQ sauce, tartar sauce, meat tenderizer, chili sauce, regular gravy, regular salad dressing, salted butter  Soups  Ok: Low-salt soups and broths made with allowed foods  Avoid: Bouillon cubes, soups with smoked or salted meats, regular soup and broth  Vegetables  Ok: Most vegetables are okay; also low-salt tomato and vegetable juices  Avoid: Sauerkraut and other brine-soaked vegetables; pickles and other pickled vegetables; tomato juice, olives  Date Last Reviewed: 8/1/2016 2000-2018 [employer]. [address] [place] [address]. All rights reserved. This information is not intended as a substitute for professional medical care. Always follow your healthcare professional's instructions.

## 2019-05-14 NOTE — TELEPHONE ENCOUNTER
Talked with patient this AM.    States he has appointment tomorrow with provider. Also states he IS taking Lasix.     States that he is taking losartan-potassium pills and is not taking them separately like on his medication list. Because of this he is not taking the potassium 3 times daily.    He will bring all his medications tomorrow to appointment. Review needed for patient.       FYI to provider.

## 2019-05-15 ENCOUNTER — OFFICE VISIT (OUTPATIENT)
Dept: FAMILY MEDICINE | Facility: CLINIC | Age: 69
End: 2019-05-15
Payer: MEDICARE

## 2019-05-15 VITALS
OXYGEN SATURATION: 93 % | DIASTOLIC BLOOD PRESSURE: 80 MMHG | HEART RATE: 77 BPM | SYSTOLIC BLOOD PRESSURE: 130 MMHG | BODY MASS INDEX: 50.78 KG/M2 | WEIGHT: 315 LBS | TEMPERATURE: 98.6 F

## 2019-05-15 DIAGNOSIS — R60.0 BILATERAL LEG EDEMA: ICD-10-CM

## 2019-05-15 DIAGNOSIS — M10.9 GOUT, UNSPECIFIED CAUSE, UNSPECIFIED CHRONICITY, UNSPECIFIED SITE: Primary | ICD-10-CM

## 2019-05-15 DIAGNOSIS — F32.1 MODERATE MAJOR DEPRESSION (H): ICD-10-CM

## 2019-05-15 DIAGNOSIS — E66.01 OBESITY, MORBID (MORE THAN 100 LBS OVER IDEAL WEIGHT OR BMI > 40) (H): ICD-10-CM

## 2019-05-15 DIAGNOSIS — R60.9 EDEMA, UNSPECIFIED TYPE: ICD-10-CM

## 2019-05-15 DIAGNOSIS — E11.42 DIABETIC POLYNEUROPATHY ASSOCIATED WITH TYPE 2 DIABETES MELLITUS (H): ICD-10-CM

## 2019-05-15 DIAGNOSIS — I73.9 PVD (PERIPHERAL VASCULAR DISEASE) (H): ICD-10-CM

## 2019-05-15 DIAGNOSIS — I50.42 CHRONIC COMBINED SYSTOLIC AND DIASTOLIC CONGESTIVE HEART FAILURE (H): ICD-10-CM

## 2019-05-15 PROBLEM — I47.10 SUPRAVENTRICULAR TACHYCARDIA (H): Status: RESOLVED | Noted: 2018-06-28 | Resolved: 2019-05-15

## 2019-05-15 PROBLEM — I50.9 CHF (CONGESTIVE HEART FAILURE) (H): Status: RESOLVED | Noted: 2018-12-24 | Resolved: 2019-05-15

## 2019-05-15 PROCEDURE — 99215 OFFICE O/P EST HI 40 MIN: CPT | Performed by: FAMILY MEDICINE

## 2019-05-15 RX ORDER — TAMSULOSIN HYDROCHLORIDE 0.4 MG/1
0.8 CAPSULE ORAL DAILY
Start: 2019-05-15 | End: 2019-10-23

## 2019-05-15 RX ORDER — DESVENLAFAXINE 25 MG/1
TABLET, EXTENDED RELEASE ORAL
Qty: 67 TABLET | Refills: 0 | Status: SHIPPED | OUTPATIENT
Start: 2019-05-15 | End: 2019-08-05

## 2019-05-15 RX ORDER — PREDNISONE 10 MG/1
TABLET ORAL
Qty: 23 TABLET | Refills: 0 | Status: SHIPPED | OUTPATIENT
Start: 2019-05-15 | End: 2019-08-21

## 2019-05-15 RX ORDER — POTASSIUM CHLORIDE 750 MG/1
TABLET, EXTENDED RELEASE ORAL
Qty: 90 TABLET | Refills: 11 | Status: SHIPPED | OUTPATIENT
Start: 2019-05-15 | End: 2019-08-21

## 2019-05-15 NOTE — PATIENT INSTRUCTIONS
1. Check at [facility] [place] pharmacy for less expensive medications.  2. For your gout: take PREDNISONE taper.  I sent this to your pharmacy.  3. For your low potassium: start taking this again.  I sent it to your pharmacy.  4. For your depression: let's try a new medication called PRISTIQ.  Take one daily for first week, then two daily.  Follow up with me in 1 month.  5. See cardiologist on Monday.  6. Follow up with me in 1 jossy, sooner if you're not feeling better.

## 2019-05-15 NOTE — LETTER
My Heart Failure Action Plan   Name: Jamar Ivory    YOB: 1950   Date: 5/15/2019    My doctor: Blanca Peng     [address]  [address]  [facility] [address] [phone]  My Diagnosis: Combined Heart Failure   My Ejection Fraction: Over 50%    My Exercise Goal: 30 minutes daily  .     My Weight Goal: 325-330 lbs  Wt Readings from Last 2 Encounters:   05/15/19 (!) 151.5 kg (334 lb)   05/08/19 (!) 154.7 kg (341 lb)     Weigh yourself daily using the same scale. If you gain more than 2 pounds in 24 hours or 5 pounds in a week call the clinic    My Diet Goal: No added salt    Emergency Room Visits:    Our goal is to improve your quality of life and help you avoid a visit to the emergency room or hospital.  If we work together, we can achieve this goal. But, if you feel you need to call 911 or go to the emergency room, please do so.  If you go to the emergency room, please bring your list of medicines and your daily weight chart with you.       GREEN ZONE     Doing well today    Weight gained is no more than 2 pounds a day or 5 pounds a week.    No swelling in feet, ankles, legs or stomach.    No more swelling than usual.    No more trouble breathing than usual.    No change in my sleep.    No other problems. Actions:    I am doing fine.  I will take my medicine, follow my diet, see my doctor, exercise, and watch for symptoms.           YELLOW ZONE         Having a bad day or flare up    Weight gain of more than 2 pounds in one day or 5 pounds in one week.    New swelling in ankle, leg, knee or thigh.    Bloating in belly, pants feel tighter.    Swelling in hands or face.    Coughing or trouble breathing while walking or talking.    Harder to breathe last night.    Have trouble sleeping, wake up short of breath.    Much more tired than usual.    Not eating.    Pain in my chest or bad leg cramps.    Feel weak or dizzy.  Actions:    I need to take action and call my doctor or nurse today.                 RED ZONE         Need medical care now    Weight gain of 5 pounds overnight.    Chest pain or pressure that does not go away.    Feel less alert.    Wheezing or have trouble breathing when at rest.    Cannot sleep lying down.    Cannot take my water pill.    Pass out or faint. Actions:    I need to call my doctor or nurse now!    Call 911 if I have chest pain or cannot breathe.

## 2019-05-15 NOTE — PROGRESS NOTES
SUBJECTIVE:   Jamar Ivory is a 69 year old male who presents to clinic today for the following   health issues:      Musculoskeletal problem/pain      Duration: ongoing    Description  Location: left foot 6/10    Intensity:  moderate, severe    Accompanying signs and symptoms: swelling, redness and pain, leg wounds    History  Previous similar problem: YES    Previous evaluation:  goutPrecipitating or alleviating factors:  Trauma or overuse: no   Aggravating factors include: overuse      Pleasant 69 year old male here for follow up from last week for     1. CHF exacerbation.  Patient stopped taking most of medications and CHF worsened.  Now here for follow up after restarting.  Brings medications today which I reconciled and explained to patient.  He will take as prescribed and understands importance.  Has cardiology follow up on Monday.    2. Gout.  Left big toe.  Feels the same.  Been 4 days.  Didn't start colchicine yet.  Gave diarrhea last time.    3. Depression.  Wonders if different med would work?  Prozac ineffective in past.  Zoloft ineffective.  Effexor ineffective.  Have referred to home therapy: ineffective.      Wt Readings from Last 4 Encounters:   05/15/19 (!) 151.5 kg (334 lb)   05/08/19 (!) 154.7 kg (341 lb)   12/28/18 (!) 152.4 kg (336 lb)   12/26/18 147.6 kg (325 lb 4.8 oz)       Additional history: as documented    Reviewed  and updated as needed this visit by clinical staff  Tobacco  Allergies  Meds  Problems  Med Hx  Surg Hx  Fam Hx  Soc Hx          Reviewed and updated as needed this visit by Provider  Tobacco  Allergies  Meds  Problems  Med Hx  Surg Hx  Fam Hx         ROS:  Constitutional, HEENT, cardiovascular, pulmonary, gi and gu systems are negative, except as otherwise noted.    OBJECTIVE:     /80 (Cuff Size: Adult Large)   Pulse 77   Temp 98.6  F (37  C) (Tympanic)   Wt (!) 151.5 kg (334 lb)   SpO2 93%   BMI 50.78 kg/m    Body mass index is 50.78  kg/m .  GENERAL: healthy, alert, obese, elderly and appears older than stated age  RESP: lungs clear to auscultation - no rales, rhonchi or wheezes  CV: regular rate and rhythm, normal S1 S2, no S3 or S4, no murmur, click or rub, no peripheral edema and peripheral pulses strong  ABDOMEN: soft, nontender, no hepatosplenomegaly, no masses and bowel sounds normal  MS: bilateral lower extremities with 1+ pitting edema, improved.  Continued vascular insufficiency wounds left leg.  Left great toe with redness, erythema, pain in joint consistent with gout flare.  SKIN: no suspicious lesions or rashes  PSYCH: mentation appears normal, affect flat, judgement and insight impaired and appearance disheveled    ASSESSMENT/PLAN:       ICD-10-CM    1. Gout, unspecified cause, unspecified chronicity, unspecified site M10.9 Uric acid     predniSONE (DELTASONE) 10 MG tablet   2. Diabetic polyneuropathy associated with type 2 diabetes mellitus (H) E11.42    3. Moderate major depression (H) F32.1 desvenlafaxine (PRISTIQ) 25 MG 24 hr tablet   4. Edema, unspecified type R60.9 tamsulosin (FLOMAX) 0.4 MG capsule   5. Bilateral leg edema R60.0 potassium chloride ER (KLOR-CON) 10 MEQ CR tablet   6. Chronic combined systolic and diastolic congestive heart failure (H) I50.42    7. PVD (peripheral vascular disease) (H) I73.9    8. Obesity, morbid (more than 100 lbs over ideal weight or BMI > 40) (H) E66.01        Patient Instructions   1. Check at [facility] [place] pharmacy for less expensive medications.  2. For your gout: take PREDNISONE taper.  I sent this to your pharmacy.  3. For your low potassium: start taking this again.  I sent it to your pharmacy.  4. For your depression: let's try a new medication called PRISTIQ.  Take one daily for first week, then two daily.  Follow up with me in 1 month.  5. See cardiologist on Monday.  6. Follow up with me in 1 jossy, sooner if you're not feeling better.    Greater than 45 minutes total were  spent face to face with the patient including history, exam with >50% spent on counseling and coordination of care.    Blanca Peng MD  [facility]

## 2019-05-16 ENCOUNTER — PATIENT OUTREACH (OUTPATIENT)
Dept: CARE COORDINATION | Facility: CLINIC | Age: 69
End: 2019-05-16

## 2019-05-16 ASSESSMENT — ACTIVITIES OF DAILY LIVING (ADL): DEPENDENT_IADLS:: INDEPENDENT

## 2019-05-16 NOTE — PROGRESS NOTES
Clinic Care Coordination Contact    Clinic Care Coordination Contact  OUTREACH    Referral Information:  Referral Source: PCP  Primary Diagnosis: CHF  Chief Complaint   Patient presents with     Clinic Care Coordination - Follow-up     goal and symptom check-in     Clinical Concerns:  CCC RN follow-up call to get updates on status and assess symptoms while patient waiting to be seen by Cardiology for heart failure management.    The patient reports to be feeling well today.  He had just woken up at time of call.  He stated his PCP appointment yesterday went well, and confirmed his upcoming Cardiology appointment on 19.    Clinical Pathway: Clinic Care Coordination CHF Assessment  Home scale available:  Yes  Home scale weight this mornin lbs  Weight increase more than 2 lbs in 24 hours?: No  Weight Increase more than 5 lbs in 1 week? : No  Heart Failure Zones sheet on refrigerator or available: Yes  Any increased SOB:  No  Any increased edema:  No   Wheezing or noisy breathing?: No  Cough: No  Increased sputum: No  Fever: No  Chest pain: No  Heartbeat: Regular  Dizzy or Lightheaded: No  Appetite: Normal  Bloating: None  Urination: Normal  Fatigue: No  Weakness (Heaviness in limbs): No  What Heart Failure zone are you currently in: Green  Overall your CHF symptoms are (GOAL): Improving  How confident are you with the plan we have identified?: 10- Completely confident    Medication Management:  The patient confirmed he was able to  all of his medications from the pharmacy and has been taking them as prescribed by PCP at his appointment yesterday.     Psychosocial:  PCP started patient on desvenlafaxine to see if this helps with his depression.  The patient confirmed the dosing instructions and denied questions at this time.    Goals Addressed                 This Visit's Progress       Patient Stated      1. Medical (pt-stated)   On track     Goal Statement: I will not have any heart  failure-related hospitalizations within the next 6 months.  Measure of Success: The patient will have no hospitalizations related to CHF.  Supportive Steps to Achieve: The patient will take his medications as prescribed, monitor is weight daily and notify clinic with weight gain of 2 or more lbs in a day or 5 or more lbs in a week, promptly report new/worsening symptoms to clinic, follow a low-salt diet, and participate in routine follow-up with my care teams.  Barriers: Depression, limited knowledge about managing CHF.  Strengths: The patient is motivated to avoid hospitalizations.  Date to Achieve By: 8/31/19  Patient expressed understanding of goal: Yes          Patient/Caregiver understanding: Yes  Outreach Frequency: weekly  Future Appointments              In 4 days Ezequiel Chand MD [facility]    In 1 month Blanca Peng MD [facility]        Plan: The patient will continue monitoring his weight daily and will report weight changes as discussed as well as any new/worsening symptoms.  The patient will take his medications daily as prescribed and will attend his upcoming appointments as scheduled.  The patient will contact CCC RN with additional questions or concerns.  CCC RN will outreach to patient in approximately 1 week to get updates on patient status, assess goal progress, and offer additional support and resources as indicated.    Solo Dia RN  Clinic Care Coordinator  [facility]  Ph: [phone]

## 2019-05-20 ENCOUNTER — OFFICE VISIT (OUTPATIENT)
Dept: CARDIOLOGY | Facility: CLINIC | Age: 69
End: 2019-05-20
Payer: MEDICARE

## 2019-05-20 VITALS
HEART RATE: 69 BPM | DIASTOLIC BLOOD PRESSURE: 72 MMHG | WEIGHT: 315 LBS | BODY MASS INDEX: 47.74 KG/M2 | OXYGEN SATURATION: 94 % | HEIGHT: 68 IN | SYSTOLIC BLOOD PRESSURE: 98 MMHG

## 2019-05-20 DIAGNOSIS — I50.22 CHRONIC SYSTOLIC HEART FAILURE (H): Primary | ICD-10-CM

## 2019-05-20 PROCEDURE — 99204 OFFICE O/P NEW MOD 45 MIN: CPT | Performed by: INTERNAL MEDICINE

## 2019-05-20 ASSESSMENT — MIFFLIN-ST. JEOR: SCORE: 2276.73

## 2019-05-20 NOTE — LETTER
"5/20/2019    Blanca Peng MD  [address] [facility] [address]    RE: Jamar Ivory       Dear Colleague,    I had the pleasure of seeing Jamar Shlomo Ivory in the [facility].    HISTORY OF PRESENT ILLNESS:  Feels \"fine\" despite current blood pressure.    Orders this Visit:  No orders of the defined types were placed in this encounter.    No orders of the defined types were placed in this encounter.    There are no discontinued medications.    No diagnosis found.    CURRENT MEDICATIONS:  Current Outpatient Medications   Medication Sig Dispense Refill     acetaminophen (TYLENOL) 500 MG tablet Take 1,000 mg by mouth every 6 hours as needed (Headache)        aspirin (ASA) 81 MG tablet Take 1 tablet (81 mg) by mouth daily 90 tablet 3     desvenlafaxine (PRISTIQ) 25 MG 24 hr tablet Take 1 tablet (25 mg) by mouth daily for 7 days, THEN 2 tablets (50 mg) daily. 67 tablet 0     furosemide (LASIX) 80 MG tablet TAKE 1 TABLET (80 MG) BY MOUTH 2 TIMES DAILY 180 tablet 3     Glucosamine-Chondroitin (OSTEO BI-FLEX REGULAR STRENGTH) 250-200 MG TABS Take 1 tablet by mouth 2 times daily       levothyroxine (SYNTHROID/LEVOTHROID) 200 MCG tablet Take 1 tablet (200 mcg) by mouth daily 90 tablet 3     losartan (COZAAR) 25 MG tablet Take 1 tablet (25 mg) by mouth daily 90 tablet 3     metoprolol succinate ER (TOPROL-XL) 25 MG 24 hr tablet Take 1 tablet (25 mg) by mouth daily 90 tablet 3     potassium chloride ER (KLOR-CON) 10 MEQ CR tablet TAKE 1 TABLET (10 MEQ) BY MOUTH 3 TIMES DAILY 90 tablet 11     predniSONE (DELTASONE) 10 MG tablet Take 20 mg by mouth 2 times daily for 3 days, THEN 10 mg 2 times daily for 3 days, THEN 5 mg 2 times daily for 3 days, THEN 5 mg daily for 3 days. (Patient taking differently: 10 mg QD) 23 tablet 0     simvastatin (ZOCOR) 20 MG tablet Take 1 tablet (20 mg) by mouth At Bedtime 90 tablet 3     tamsulosin (FLOMAX) 0.4 MG capsule Take 2 capsules (0.8 mg) by " "mouth daily         ALLERGIES     Allergies   Allergen Reactions     Clopidogrel      Cough/emesis     Penicillins Rash       PAST MEDICAL, SURGICAL, FAMILY, SOCIAL HISTORY:  History was reviewed and updated as needed, see medical record.    Review of Systems:  A 12-point review of systems was completed, see medical record for detailed review of systems information.    Physical Exam:  Vitals: BP 98/72 (BP Location: Right arm, Patient Position: Sitting, Cuff Size: Adult Large)   Pulse 69   Ht 1.727 m (5' 8\")   Wt (!) 154.2 kg (340 lb)   SpO2 94%   BMI 51.70 kg/m       Constitutional:           Skin:           Head:           Eyes:           ENT:           Neck:           Chest:           Cardiac:                    Abdomen:           Vascular:                                        Extremities and Back:           Neurological:           ASSESSMENT:  Stable CHF       RECOMMENDATIONS:   [facility]  Dietary counseling      Recent Lab Results:  LIPID RESULTS:  Lab Results   Component Value Date    CHOL 175 05/08/2019    HDL 38 (L) 05/08/2019    LDL 94 05/08/2019    TRIG 214 (H) 05/08/2019    CHOLHDLRATIO 3.9 04/27/2015       LIVER ENZYME RESULTS:  Lab Results   Component Value Date    AST 25 04/11/2018    ALT 16 05/08/2019       CBC RESULTS:  Lab Results   Component Value Date    WBC 3.0 (L) 12/25/2018    RBC 4.37 (L) 12/25/2018    HGB 12.5 (L) 12/25/2018    HCT 38.8 (L) 12/25/2018    MCV 89 12/25/2018    MCH 28.6 12/25/2018    MCHC 32.2 12/25/2018    RDW 15.7 (H) 12/25/2018     12/25/2018       BMP RESULTS:  Lab Results   Component Value Date     12/25/2018    POTASSIUM 3.9 12/25/2018    CHLORIDE 104 12/25/2018    CO2 28 12/25/2018    ANIONGAP 5 12/25/2018     (H) 12/25/2018    BUN 18 12/25/2018    CR 0.89 12/25/2018    GFRESTIMATED 87 12/25/2018    GFRESTBLACK >90 12/25/2018    ANGEL 8.2 (L) 12/25/2018        A1C RESULTS:  Lab Results   Component Value Date    A1C 5.5 05/08/2019       INR " RESULTS:  Lab Results   Component Value Date    INR 1.06 04/11/2018    INR 1.02 08/23/2013       We greatly appreciate the opportunity to be involved in the care of your patient, Jamar Ivory.    Sincerely,  Ezequiel Chand MD      CC  Blanca Peng MD  [address] [place], [place] [id number]                                                                       Thank you for allowing me to participate in the care of your patient.      Sincerely,     Ezequiel Chand MD     [facility]    cc:   Blanca Peng MD  [address] [place] [id number]

## 2019-05-20 NOTE — PROGRESS NOTES
"HISTORY OF PRESENT ILLNESS:  Feels \"fine\" despite current blood pressure.    Orders this Visit:  No orders of the defined types were placed in this encounter.    No orders of the defined types were placed in this encounter.    There are no discontinued medications.    No diagnosis found.    CURRENT MEDICATIONS:  Current Outpatient Medications   Medication Sig Dispense Refill     acetaminophen (TYLENOL) 500 MG tablet Take 1,000 mg by mouth every 6 hours as needed (Headache)        aspirin (ASA) 81 MG tablet Take 1 tablet (81 mg) by mouth daily 90 tablet 3     desvenlafaxine (PRISTIQ) 25 MG 24 hr tablet Take 1 tablet (25 mg) by mouth daily for 7 days, THEN 2 tablets (50 mg) daily. 67 tablet 0     furosemide (LASIX) 80 MG tablet TAKE 1 TABLET (80 MG) BY MOUTH 2 TIMES DAILY 180 tablet 3     Glucosamine-Chondroitin (OSTEO BI-FLEX REGULAR STRENGTH) 250-200 MG TABS Take 1 tablet by mouth 2 times daily       levothyroxine (SYNTHROID/LEVOTHROID) 200 MCG tablet Take 1 tablet (200 mcg) by mouth daily 90 tablet 3     losartan (COZAAR) 25 MG tablet Take 1 tablet (25 mg) by mouth daily 90 tablet 3     metoprolol succinate ER (TOPROL-XL) 25 MG 24 hr tablet Take 1 tablet (25 mg) by mouth daily 90 tablet 3     potassium chloride ER (KLOR-CON) 10 MEQ CR tablet TAKE 1 TABLET (10 MEQ) BY MOUTH 3 TIMES DAILY 90 tablet 11     predniSONE (DELTASONE) 10 MG tablet Take 20 mg by mouth 2 times daily for 3 days, THEN 10 mg 2 times daily for 3 days, THEN 5 mg 2 times daily for 3 days, THEN 5 mg daily for 3 days. (Patient taking differently: 10 mg QD) 23 tablet 0     simvastatin (ZOCOR) 20 MG tablet Take 1 tablet (20 mg) by mouth At Bedtime 90 tablet 3     tamsulosin (FLOMAX) 0.4 MG capsule Take 2 capsules (0.8 mg) by mouth daily         ALLERGIES     Allergies   Allergen Reactions     Clopidogrel      Cough/emesis     Penicillins Rash       PAST MEDICAL, SURGICAL, FAMILY, SOCIAL HISTORY:  History was reviewed and updated as needed, see medical " "record.    Review of Systems:  A 12-point review of systems was completed, see medical record for detailed review of systems information.    Physical Exam:  Vitals: BP 98/72 (BP Location: Right arm, Patient Position: Sitting, Cuff Size: Adult Large)   Pulse 69   Ht 1.727 m (5' 8\")   Wt (!) 154.2 kg (340 lb)   SpO2 94%   BMI 51.70 kg/m      Constitutional:           Skin:           Head:           Eyes:           ENT:           Neck:           Chest:           Cardiac:                    Abdomen:           Vascular:                                        Extremities and Back:           Neurological:           ASSESSMENT:  Stable CHF       RECOMMENDATIONS:   [facility]  Dietary counseling      Recent Lab Results:  LIPID RESULTS:  Lab Results   Component Value Date    CHOL 175 05/08/2019    HDL 38 (L) 05/08/2019    LDL 94 05/08/2019    TRIG 214 (H) 05/08/2019    CHOLHDLRATIO 3.9 04/27/2015       LIVER ENZYME RESULTS:  Lab Results   Component Value Date    AST 25 04/11/2018    ALT 16 05/08/2019       CBC RESULTS:  Lab Results   Component Value Date    WBC 3.0 (L) 12/25/2018    RBC 4.37 (L) 12/25/2018    HGB 12.5 (L) 12/25/2018    HCT 38.8 (L) 12/25/2018    MCV 89 12/25/2018    MCH 28.6 12/25/2018    MCHC 32.2 12/25/2018    RDW 15.7 (H) 12/25/2018     12/25/2018       BMP RESULTS:  Lab Results   Component Value Date     12/25/2018    POTASSIUM 3.9 12/25/2018    CHLORIDE 104 12/25/2018    CO2 28 12/25/2018    ANIONGAP 5 12/25/2018     (H) 12/25/2018    BUN 18 12/25/2018    CR 0.89 12/25/2018    GFRESTIMATED 87 12/25/2018    GFRESTBLACK >90 12/25/2018    ANGEL 8.2 (L) 12/25/2018        A1C RESULTS:  Lab Results   Component Value Date    A1C 5.5 05/08/2019       INR RESULTS:  Lab Results   Component Value Date    INR 1.06 04/11/2018    INR 1.02 08/23/2013       We greatly appreciate the opportunity to be involved in the care of your patient, Jamar Ivory.    Sincerely,  Ezequiel Chand, " MD      CC  Blanca Peng MD  [address] [place] [address]

## 2019-05-20 NOTE — PROGRESS NOTES
Service Date: 05/20/2019      PRIMARY CARE PHYSICIAN:  Dr. Blanca Peng.      HISTORY OF PRESENT ILLNESS:  Jamar Ivory, a 69-year-old man with nonischemic cardiomyopathy, chronic systolic heart failure, morbid obesity, diabetes and dyslipidemia, was seen today at your request for recommendations regarding long-term management of heart failure.      Mr. Ivory has a known history of nonischemic dilated cardiomyopathy.  Previously, he underwent a normal coronary angiogram in 2012.  He was evaluated for nonsustained VT at an outside institution in 2016.  A nuclear stress test showed no ischemia with an ejection fraction in the 45%-50% range.      The patient was hospitalized in 12/2018 for heart failure symptoms in the setting of medical noncompliance.  The patient's medical therapy was resumed, and his symptoms abated.      In spring 2019, the patient  developed swelling in his lower legs associated with weight gain in the setting of medical noncompliance. His primary care providers stressed to him the importance of compliance with medical therapy and arranged follow up in cardiology clinic today.     Since the last visit with his primary care MD , Mr. Ivory  Has remained compliant with medical treatment.  He is on a combination of losartan, metoprolol, and b.i.d. furosemide.  He denies dizziness, lightheadedness, chest pain, syncope or palpitation.      PAST MEDICAL HISTORY:   1.  Morbid obesity.   2.  Dyslipidemia - well controlled on simvastatin.   3.  Chronic systolic heart failure - ejection fraction in the 45% range.  Most recent echo 12/2018 showing ejection fraction of 40%-45% with mild to moderate global hypokinesis and no significant valvular stenosis or insufficiency.  Unchanged since 2016.   4.  Noninsulin dependent diabetes mellitus.   5.  Depression.   6.  Old history of nonsustained VT - currently asymptomatic.      ALLERGIES:  None known.      HABITS:  The patient does not abuse tobacco or  alcohol.      SOCIAL HISTORY:  The patient is .  This is his second marriage.  He has 3 children.  He worked as a .  He is now retired.      SURGICAL HISTORY:  Status post appendectomy.      CARDIAC RISK FACTORS:  Denies hypertension, previous MI, dyslipidemia or cigarette smoking.      REVIEW OF SYSTEMS:  A 12-point review of systems was performed.  There are no other complaints.      PHYSICAL EXAMINATION:   GENERAL:  Exam today demonstrates a very pleasant and cooperative 69-year-old man who is markedly overweight.   VITAL SIGNS:  His height was 1.73 meters, his weight was 154.2 kg for a BMI of 51.7.   LUNGS:  Clear to percussion and auscultation.   CARDIOVASCULAR:  Shows a normal S1 with a normal S2.  There is no S3.  There is no murmur, rub or click.      MEDICATIONS:   1.  Aspirin 81 daily.   2.  Furosemide 80 b.i.d.   3.  Levothyroxine 200 mcg daily.   4.  Losartan 25 daily.   5.  Metoprolol ER 25 daily.   6.  Simvastatin 20 mg daily.      ASSESSMENT:  Mr. Ivory is currently asymptomatic on maximally tolerated doses of ACE inhibitors and beta blockers.  He will require chronic diuretic therapy.      It does not appear he will be able to tolerate higher doses of  neurohormonal inhibitors.  In view of his difficulty with compliance in the past, I believe he should be followed in [facility] to ensure compliance and avoidance of recurrent hospitalization.      The patient would benefit from counseling regarding weight loss and exercise.      RECOMMENDATIONS:   1.  We will enroll in [facility] for long-term followup.  I can see the patient on an as needed basis every year.   2.  Dietary counseling for weight loss.  We recommended a regular exercise program at least 40 minutes 4-7 times a week along with a Mediterranean style weight loss diet.   3.  Consider screening for sleep apnea.   4. I would plan to see him on an annual basis unless he develops new problems that require earlier  assessment.      We greatly appreciate the opportunity to be involved in the care of your patient, Mr. Jamar Marroquin.       cc:   [place] [address]         SABA BEJARANO MD             D: 2019   T: 2019   MT: ALEJANDRA      Name:     JAMAR MARROQUIN   MRN:      [id number]        Account:      [id number]   :      1950           Service Date: 2019      Document: [id number]

## 2019-05-20 NOTE — LETTER
5/20/2019      Blanca Peng MD  [address] [facility] [address]      RE: Jamar Carrolldeo       Dear Colleague,    I had the pleasure of seeing Jamar Ivory in the [facility].    Service Date: 05/20/2019      PRIMARY CARE PHYSICIAN:  Dr. Blanca Peng.      HISTORY OF PRESENT ILLNESS:  Jamar Ivory, a 69-year-old man with nonischemic cardiomyopathy, chronic systolic heart failure, morbid obesity, diabetes and dyslipidemia, was seen today at your request for recommendations regarding long-term management of heart failure.      Mr. Ivory has a known history of nonischemic dilated cardiomyopathy.  Previously, he underwent a normal coronary angiogram in 2012.  He was evaluated for nonsustained VT at an outside institution in 2016.  A nuclear stress test showed no ischemia with an ejection fraction in the 45%-50% range.      The patient was hospitalized in 12/2018 for heart failure symptoms in the setting of medical noncompliance.  The patient's medical therapy was resumed, and his symptoms abated.      In spring 2019, the patient  developed swelling in his lower legs associated with weight gain in the setting of medical noncompliance. His primary care providers stressed to him the importance of compliance with medical therapy and arranged follow up in cardiology clinic today.     Since the last visit with his primary care MD , Mr. Ivory  Has remained compliant with medical treatment.  He is on a combination of losartan, metoprolol, and b.i.d. furosemide.  He denies dizziness, lightheadedness, chest pain, syncope or palpitation.      PAST MEDICAL HISTORY:   1.  Morbid obesity.   2.  Dyslipidemia - well controlled on simvastatin.   3.  Chronic systolic heart failure - ejection fraction in the 45% range.  Most recent echo 12/2018 showing ejection fraction of 40%-45% with mild to moderate global hypokinesis and no significant valvular stenosis or insufficiency.   Unchanged since 2016.   4.  Noninsulin dependent diabetes mellitus.   5.  Depression.   6.  Old history of nonsustained VT - currently asymptomatic.      ALLERGIES:  None known.      HABITS:  The patient does not abuse tobacco or alcohol.      SOCIAL HISTORY:  The patient is .  This is his second marriage.  He has 3 children.  He worked as a .  He is now retired.      SURGICAL HISTORY:  Status post appendectomy.      CARDIAC RISK FACTORS:  Denies hypertension, previous MI, dyslipidemia or cigarette smoking.      REVIEW OF SYSTEMS:  A 12-point review of systems was performed.  There are no other complaints.      PHYSICAL EXAMINATION:   GENERAL:  Exam today demonstrates a very pleasant and cooperative 69-year-old man who is markedly overweight.   VITAL SIGNS:  His height was 1.73 meters, his weight was 154.2 kg for a BMI of 51.7.   LUNGS:  Clear to percussion and auscultation.   CARDIOVASCULAR:  Shows a normal S1 with a normal S2.  There is no S3.  There is no murmur, rub or click.      MEDICATIONS:   1.  Aspirin 81 daily.   2.  Furosemide 80 b.i.d.   3.  Levothyroxine 200 mcg daily.   4.  Losartan 25 daily.   5.  Metoprolol ER 25 daily.   6.  Simvastatin 20 mg daily.      ASSESSMENT:  Mr. Ivory is currently asymptomatic on maximally tolerated doses of ACE inhibitors and beta blockers.  He will require chronic diuretic therapy.      It does not appear he will be able to tolerate higher doses of  neurohormonal inhibitors.  In view of his difficulty with compliance in the past, I believe he should be followed in [facility] to ensure compliance and avoidance of recurrent hospitalization.      The patient would benefit from counseling regarding weight loss and exercise.      RECOMMENDATIONS:   1.  We will enroll in [facility] for long-term followup.  I can see the patient on an as needed basis every year.   2.  Dietary counseling for weight loss.  We recommended a regular exercise program  at least 40 minutes 4-7 times a week along with a Mediterranean style weight loss diet.   3.  Consider screening for sleep apnea.   4. I would plan to see him on an annual basis unless he develops new problems that require earlier assessment.      We greatly appreciate the opportunity to be involved in the care of your patient, Mr. Jamar Marroquin.       cc:   Blanca Peng MD   [address]  [address]         SABA BEJARANO MD             D: 2019   T: 2019   MT: ALEJANDRA      Name:     JAMAR MARROQUIN   MRN:      [id number]        Account:      [id number]   :      1950           Service Date: 2019      Document: [id number]           Outpatient Encounter Medications as of 2019   Medication Sig Dispense Refill     acetaminophen (TYLENOL) 500 MG tablet Take 1,000 mg by mouth every 6 hours as needed (Headache)        aspirin (ASA) 81 MG tablet Take 1 tablet (81 mg) by mouth daily 90 tablet 3     desvenlafaxine (PRISTIQ) 25 MG 24 hr tablet Take 1 tablet (25 mg) by mouth daily for 7 days, THEN 2 tablets (50 mg) daily. 67 tablet 0     furosemide (LASIX) 80 MG tablet TAKE 1 TABLET (80 MG) BY MOUTH 2 TIMES DAILY 180 tablet 3     Glucosamine-Chondroitin (OSTEO BI-FLEX REGULAR STRENGTH) 250-200 MG TABS Take 1 tablet by mouth 2 times daily       levothyroxine (SYNTHROID/LEVOTHROID) 200 MCG tablet Take 1 tablet (200 mcg) by mouth daily 90 tablet 3     losartan (COZAAR) 25 MG tablet Take 1 tablet (25 mg) by mouth daily 90 tablet 3     metoprolol succinate ER (TOPROL-XL) 25 MG 24 hr tablet Take 1 tablet (25 mg) by mouth daily 90 tablet 3     potassium chloride ER (KLOR-CON) 10 MEQ CR tablet TAKE 1 TABLET (10 MEQ) BY MOUTH 3 TIMES DAILY 90 tablet 11     predniSONE (DELTASONE) 10 MG tablet Take 20 mg by mouth 2 times daily for 3 days, THEN 10 mg 2 times daily for 3 days, THEN 5 mg 2 times daily for 3 days, THEN 5 mg daily for 3 days.  (Patient taking differently: 10 mg QD) 23 tablet 0     simvastatin (ZOCOR) 20 MG tablet Take 1 tablet (20 mg) by mouth At Bedtime 90 tablet 3     tamsulosin (FLOMAX) 0.4 MG capsule Take 2 capsules (0.8 mg) by mouth daily       No facility-administered encounter medications on file as of 5/20/2019.      Again, thank you for allowing me to participate in the care of your patient.      Sincerely,    Ezequiel Chand MD     [facility]

## 2019-05-23 ENCOUNTER — PATIENT OUTREACH (OUTPATIENT)
Dept: CARE COORDINATION | Facility: CLINIC | Age: 69
End: 2019-05-23

## 2019-05-23 ASSESSMENT — ACTIVITIES OF DAILY LIVING (ADL): DEPENDENT_IADLS:: INDEPENDENT

## 2019-05-23 NOTE — PATIENT INSTRUCTIONS
Patient Education     Mediterranean Diet  A heart healthy eating plan  The Mediterranean Diet is based on the eating habits of people in countries near the [place]. People living in this part of the world have long lives and low rates of chronic diseases. They have lower rates of death from heart disease, cancer and other illnesses.  When you follow this eating plan you'll have better control of your blood sugar and weight. The plan requires simple changes in your habits of eating, and the whole family can take part.  Mediterranean lifestyle   Enjoy eating with others. Sit at the table with family and friends and enjoy your meal. When you eat slowly, you are able to tune in to your body's hunger and fullness signals. You're less likely to overeat.  Be physically active: Being active every day is important for overall good health. Run, walk, dance and do lighter activities such as house and yard work. Move more and sit less!  Drink plenty of water during the day.  Drink red wine with meals in modest amounts (optional).  The Mediterranean Pyramid   The pyramid shows the food groups and the amounts eaten in relation to the whole diet. It is more than a diet; it is also a life-style plan.  The largest food group at bottom contains plant foods: vegetables, fruits, grains, nuts, legumes, seeds, olives and olive oil. These foods make up the largest part of your meals.   The next groups above: fish and seafood, then poultry, cheese, eggs and yogurt are eaten less often and in smaller servings.   The top group, meats and sweets, are eaten the least often and in the smallest amounts.    Tips for adding plant foods to your meals   Vegetables and fruits:     Aim for 3 to 8 servings each day. A serving is   to 2 cups, depending on the food.    Choose a variety of colors. Big green salads are a great way to include several vegetable servings.    Try fresh fruit as a dessert: oranges, grapes, apples pomegranates or  "fresh figs.    At home keep fresh fruit in a bowl to tempt family.    Bring fruit and vegetables to work for a snack.  Oil     Replace butter and margarine with healthy fats such as olive oil. Other plant-based oils are canola, walnut and peanut oil. These are high in good fats. Use them in cooking, salad dressings and baking.    Drizzle your bread with olive oil instead of butter or margarine. Use herbs and spices to add flavor and aroma and to reduce fat and salt when cooking.  Whole grains    Look for the term \"whole\" or \"whole grain\" on the package. Processing grains removes vitamins, minerals and fiber. Whole grains may include: corn, wheat, oats, rye, rice and barley.    Examples of Mediterranean grains include: barley, farro, buckwheat, bulgur, couscous, and wheatberries.    Slowly switch to a whole grain by using whole-grain blends of pastas and rice. Or mix whole grains with refined; for example, mix whole-wheat pasta with white pasta.  Beans and legumes     Beans are a good source of protein and fiber, adding flavor and texture to dishes. Examples include cannellini beans, chickpea, clau beans, green beans, kidney beans, lentils and split peas.    Cook a vegetarian meal one night a week: use beans or legumes with vegetables and grains.    Nuts are high in healthy fats. Try walnuts, almonds, pine nuts, hazelnuts and cashews. Avoid candied, honey-roasted and heavily salted nuts.    Limit your intake of nuts to a small handful each day. Explore ways to add to nuts to salads and other dishes.  Tips for using fish and seafood in your meals    Aim for meals with fish or shellfish at least twice a week.    Tuna, herring, salmon and sardines are rich in heart-healthy omega-3. Shellfish, such as mussels, oysters and clams, have similar benefits for brain and heart health.  Tips for using poultry, eggs and cheese and yogurt in your meals    Eat poultry or eggs at least twice a week.    Roast, broil or grill your " poultry. Season with fresh or dried herbs.    Enjoy low-fat cheese or yogurt every day.  Tips for using red meat and sweets in your meals     Limit lean red meat to one time a week or 4 times a month.    Red meat has more saturated fats. Choose a lean cut, like top loin, sirloin, flank steak, strip steak or 90% lean ground beef.    Limit portions to 3 to 4 ounces.    Make whole grains and vegetables the main focus of a meal. Add meat in small amounts for flavor.    Limit sweets such as ice cream or cookies for a special times or holidays.  Snacks    Snack on a handful of almonds, walnuts or sunflower seeds in place of chips, cookies or other processed snack foods.    Calcium-rich, low-fat cheese or low- and nonfat plain yogurt with fresh fruit are healthy snacks that are easy to take with you.  Like to know more?  For tips on shoppping, cooking and eating well the Mediterranean way, go to the [employer] website at www.[employer].  For informational purposes only. Not to replace the advice of your health care provider.   Copyright   2014 [employer]. All rights reserved.   Mediterranean pyramrid used with permission of the [employer] Lola Pirindola 427737 - 09/15.  For informational purposes only. Not to replace the advice of your health care provider.  Copyright   2018 [employer]. All rights reserved.

## 2019-05-23 NOTE — LETTER
[address]  [place] [address]    May 23, 2019        Jmaar Ivory  [address]  [facility] [address]

## 2019-05-23 NOTE — PROGRESS NOTES
Clinic Care Coordination Contact    Clinic Care Coordination Contact  OUTREACH    Referral Information:  Referral Source: PCP  Primary Diagnosis: CHF  Chief Complaint   Patient presents with     Clinic Care Coordination - Follow-up     goal check-in     Clinical Concerns:  CCC RN patient outreach to get updates on status, assess goal progress, and provide support and resources as needed.    Today the patient reports to be feeling well.  He stated his Cardiology appointment went well.  He was referred to a Lifestyle & Weight Management program as well as to [facility] clinic; appointments days/times reviewed with patient.    The patient reported his weight today of 331 lbs and denied any new/worsening symptoms.  He did mention that his legs continue to have scabs/small wounds on them from awhile back when he itched them raw.  CCC RN encouraged the patient to be keeping his legs clean and dry each day, and to have them promptly evaluated if he notices any worsening, which he agreed to do.  He stated they are not currently swollen.    At this time, the patient reported to be feeling well and denied any outstanding questions or concerns.  He agreed to contact CCC RN with any issues.    Diet/Exercise/Sleep:  Diet: No added salt (Mediterranean)  CCC RN reviewed salt-restricted diet and Mediterranean diet with patient as recommended by his Cardiology team.  CCC RN will send patient educational information about Mediterranean diet.        Goals Addressed                 This Visit's Progress       Patient Stated      1. Medical (pt-stated)   On track     Goal Statement: I will not have any heart failure-related hospitalizations within the next 6 months.  Measure of Success: The patient will have no hospitalizations related to CHF.  Supportive Steps to Achieve: The patient will take his medications as prescribed, monitor is weight daily and notify clinic with weight gain of 2 or more lbs in a day or 5 or more lbs in a week,  promptly report new/worsening symptoms to clinic, follow a low-salt diet, and participate in routine follow-up with my care teams.  Barriers: Depression, limited knowledge about managing CHF.  Strengths: The patient is motivated to avoid hospitalizations.  Date to Achieve By: 8/31/19  Patient expressed understanding of goal: Yes          Patient/Caregiver understanding: Yes  Outreach Frequency: 2 weeks  Future Appointments              In 1 week Jeanine Howell RD; Ramirez Worley MD [facility]    In 1 week RU LAB [facility]    In 1 week Cecilio Garvin PA-C [facility] PSA CLIN    In 3 weeks Blanca Peng MD [facility]        Plan: The patient will continue closely monitoring for heart failure symptoms and will promptly report any new/worsening symptoms to Cardiology clinic.  The patient will continue monitoring the scabs/wounds on his legs and will notify his care team should they worsen so he can be seen for evaluation.  The patient will continue taking his medications daily as prescribed, will attend his upcoming appointments as scheduled, and will contact CCC RN with any questions or concerns.  CCC RN will outreach to patient in approximately 2 weeks to get updates on patient status, assess goal progress, and offer additional support and resources as indicated.    Solo Dia RN  Clinic Care Coordinator  [facility]  Ph: [phone]

## 2019-05-29 DIAGNOSIS — R06.02 SOB (SHORTNESS OF BREATH): ICD-10-CM

## 2019-05-29 DIAGNOSIS — I50.42 CHRONIC COMBINED SYSTOLIC AND DIASTOLIC CONGESTIVE HEART FAILURE (H): Primary | ICD-10-CM

## 2019-06-03 ENCOUNTER — OFFICE VISIT (OUTPATIENT)
Dept: FAMILY MEDICINE | Facility: CLINIC | Age: 69
End: 2019-06-03
Attending: INTERNAL MEDICINE
Payer: MEDICARE

## 2019-06-03 VITALS
HEART RATE: 77 BPM | SYSTOLIC BLOOD PRESSURE: 102 MMHG | OXYGEN SATURATION: 93 % | WEIGHT: 315 LBS | BODY MASS INDEX: 49.44 KG/M2 | HEIGHT: 67 IN | TEMPERATURE: 97.7 F | DIASTOLIC BLOOD PRESSURE: 66 MMHG

## 2019-06-03 DIAGNOSIS — E66.9 OBESITY, UNSPECIFIED OBESITY SEVERITY, UNSPECIFIED OBESITY TYPE: Primary | ICD-10-CM

## 2019-06-03 DIAGNOSIS — I50.22 CHRONIC SYSTOLIC HEART FAILURE (H): ICD-10-CM

## 2019-06-03 ASSESSMENT — MIFFLIN-ST. JEOR: SCORE: 2171.52

## 2019-06-03 NOTE — PATIENT INSTRUCTIONS
IMPRESSION: 69 year old y.o. with obesity who is motivated to lose weight in order to feel better.     Obesity, complicated by:    BMI of 50    Very low motivation     DIET ASSESSMENT/PLAN:    Diet is currently with reported large portions of processed foods    Stress is a large precipitant of intake. Also, eats large amounts in the evening. Lots of overeating    Motivation to change is very low. He feels that it's futile.     PLAN:     Discussed many options, Kenneth comfortable with current eating plan     Agrees to spend 10 minutes outside in the evening before an after dinner snack. At that snack, consider half of usual portion.     PHYSICAL ACTIVITY ASSESSMENT/PLAN:    Currently does very little activity. Spends the entire day sitting    PLAN    Discussed options and elected to start with the following:    Aim for short, 10 minute outdoor walks 3 times/day    Also, ask the [employer] society near your home in [place] about spending time with their animals.     Miscellaneous Issues:     Strongly encouraged Kenneth to look into counseling to help his feelings of hopelessness.     Follow-up:    In 8 weeks with MD/Nutrition    Has visit with Dr. Peng on June 19, in 2.5 weeks      --Ramirez Worley MD

## 2019-06-03 NOTE — NURSING NOTE
"69 year old  Chief Complaint   Patient presents with     Weight Problem     pt here for Lifestyle management program       Blood pressure 102/66, pulse 77, temperature 97.7  F (36.5  C), temperature source Oral, height 1.704 m (5' 7.09\"), weight 145.2 kg (320 lb), SpO2 93 %. Body mass index is 49.99 kg/m .  Patient Active Problem List   Diagnosis     Acquired hypothyroidism     Vitiligo     Hyperlipidemia with target LDL less than 100     Hypertension goal BP (blood pressure) < 140/90     Cerebral infarction (H)     Moderate major depression (H)     Health Care Home     Fatty liver     Advanced directives, counseling/discussion     Impaired glucose tolerance     Transient cerebral ischemia     Obesity, morbid (more than 100 lbs over ideal weight or BMI > 40) (H)     Chronic stasis dermatitis     Bilateral leg edema     Type 2 diabetes mellitus without complication, without long-term current use of insulin (H)     Benign neoplasm of colon     Pain in both lower extremities     Low hemoglobin     Breast tenderness in male     Diabetic polyneuropathy associated with type 2 diabetes mellitus (H)     Chronic combined systolic and diastolic congestive heart failure (H)     PVD (peripheral vascular disease) (H)     Benign prostatic hyperplasia with incomplete bladder emptying       Wt Readings from Last 2 Encounters:   06/03/19 145.2 kg (320 lb)   05/20/19 (!) 154.2 kg (340 lb)     BP Readings from Last 3 Encounters:   06/03/19 102/66   05/20/19 98/72   05/15/19 130/80         Current Outpatient Medications   Medication     acetaminophen (TYLENOL) 500 MG tablet     aspirin (ASA) 81 MG tablet     desvenlafaxine (PRISTIQ) 25 MG 24 hr tablet     furosemide (LASIX) 80 MG tablet     Glucosamine-Chondroitin (OSTEO BI-FLEX REGULAR STRENGTH) 250-200 MG TABS     levothyroxine (SYNTHROID/LEVOTHROID) 200 MCG tablet     losartan (COZAAR) 25 MG tablet     metoprolol succinate ER (TOPROL-XL) 25 MG 24 hr tablet     potassium chloride ER " (KLOR-CON) 10 MEQ CR tablet     simvastatin (ZOCOR) 20 MG tablet     tamsulosin (FLOMAX) 0.4 MG capsule     No current facility-administered medications for this visit.        Social History     Tobacco Use     Smoking status: Never Smoker     Smokeless tobacco: Never Used   Substance Use Topics     Alcohol use: Yes     Alcohol/week: 0.0 oz     Comment: rarely     Drug use: No       Health Maintenance Due   Topic Date Due     ZOSTER IMMUNIZATION (1 of 2) 01/19/1999     MEDICARE ANNUAL WELLNESS VISIT  04/27/2016     ADVANCED DIRECTIVE PLANNING  06/26/2017     PHQ-9  03/18/2019     DIABETIC EYE EXAM  05/22/2019     BMP  06/25/2019       No results found for: PAP      Dianne 3, 2019 9:15 AM

## 2019-06-03 NOTE — PROGRESS NOTES
INTRODUCTION: Mr. Jamar Ivory is a 69 year old y.o. male with Obesity, pre-diabetes and s/p CVA who presents for his initial visit to the Lifestyle Medicine Program for Weight Management referred by Blanca Peng MD.      Accompanied by his wife, Melissa.    Mr. Ivory reports a history of obesity since at least 2 years. 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 Was also in [employer] for 6 years. Stopped working in 2015 and further decreased physical activity about 2 years ago (2017). Reports usual weight as adult was 180-200 with weight gain starting 2-3 years ago, however brief chart review shows a weight of 343 lbs in 2014, 5 years ago, and a weight of 315 lbs in 2010, 9 years ago. For weight loss he has tried eating less food, 2 meals per day currently. He started trying to lose weight about 2 months ago after his most recent PCP visit, has decreased from 336 lbs to 320 lbs.  He believes that his biggest obstacles in trying to maintain a healthy weight are low energy, depression. Reports no motivation to lose weight, plays [employer] all day.       Patient Supplied Answers To Sports Med Lifestyle Weight Management Intake Questionnaire:    [facility] Lifestyle Management Questionnaire Responses  Reasons for coming: Referred by PCP, agreed to come because it was easier than arguing.     History of obesity and weight loss attempts:  Fasted/skipped meals, Ate very little food    Dietary history: At times difficult to obtain history as spouse (Melissa) provided many opinions about current eating and activity pattern, at times Kenneth had a hard time speaking for self. Also Kenneth reports feeling very depressed. Unable to fully articulate why he decided to come to this appointment and hesitant to discuss any follow up with nutrition in the future. Describes motivation at a zero, saying he just wants to die instead. Spouse very worried about depression  "and both agree this is a major challenge/barrier to any lifestyle changes.     Recent food recall:  Breakfast: bowl of cereal, water  No snack  Lunch: nothing right now  Snack: potato chips (anything crunchy, salty)  Dinner: [employer] ernie steak, potatoes, glass of milk; alternatively encore turkey, BBQ amita, chicken breast with 1-2 cans cream of chicken soup with instant potatoes, occasional veggies (corn, peas, green beans, spinach)  HS: not often, bread 4 pieces with butter, sometimes PBJ  Beverages: water all day, 1-2 cans pepsi in evening, no etoh    Sleep Habits:  - No problems sleeping. Some snoring but \"not a lot\" per his wife, Melissa.     Physical Activity History:  \"Not much\". He plays [employer] all day per his report. He walks around house ever now and then. Only rarely goes outside except to car. He states that he enjoys walking outside in the non-winter months. No watch. No activity monitor. No smartphone. No animals. He wants to have a dog, but his apartment charges money for animals.     Review of systems: \"I feel fine\".      Past Medical History:  has a past medical history of Arthritis, CHF (congestive heart failure) (H) (12/24/2018), CVA (cerebral infarction) (2012), CVD (cardiovascular disease), Fatty liver, Gout, Hypertension goal BP (blood pressure) < 140/90 (1/17/2011), Major depressive disorder, single episode, severe, without mention of psychotic behavior (1977), Obesity, morbid (more than 100 lbs over ideal weight or BMI > 40) (H), Shortness of breath, Spider veins, TIA (transient ischaemic attack) (2012), Unspecified hypothyroidism, and Vitiligo. He also has no past medical history of Asymptomatic human immunodeficiency virus (HIV) infection status (H), Blood in semen, Cerebral palsy (H), Complication of anesthesia, Congenital renal agenesis and dysgenesis, Epididymitis, bilateral, Epididymitis, left, Epididymitis, right, Goiter, Hernia, abdominal, History of spinal cord injury, History of " "thrombophlebitis, Horseshoe kidney, Hydrocephalus, Malignant hyperthermia, Mumps, Orchitis, epididymitis, and epididymo-orchitis, with abscess, Palpitations, Parkinsons disease (H), Penile discharge, Prostate infection, Spina bifida (H), STD (sexually transmitted disease), Swelling of testicle, Tethered cord (H), or Tuberculosis.    Speciality comments:  [facility]  [address]   [place] [address]  [phone]    Lab Results  Recent Labs   Lab Test 05/08/19  1616 09/26/17  1540  04/27/15  0754 04/25/14  0956   CHOL 175 163   < > 169 169   HDL 38* 39*   < > 43 40*   LDL 94 85   < > 108 100   TRIG 214* 193*   < > 92 145   CHOLHDLRATIO  --   --   --  3.9 4.2    < > = values in this interval not displayed.     Liver Function Studies -   Recent Labs   Lab Test 05/08/19  1616 04/11/18  1115   PROTTOTAL  --  8.2   ALBUMIN  --  3.2*   BILITOTAL  --  0.4   ALKPHOS  --  62   AST  --  25   ALT 16 20         PHYSICAL EXAM:  /66   Pulse 77   Temp 97.7  F (36.5  C) (Oral)   Ht 1.704 m (5' 7.09\")   Wt 145.2 kg (320 lb)   SpO2 93%   BMI 49.99 kg/m         Mr. Ivory is an obese somewhat pale 70 yo. Able to rise slowly without assistance. Gait is normal given his body size    HEENT: Normal  CV: Regular rate and rhythm without murmer  Lungs: Clear to Ausculation  Abdomen: Large, soft and without tenderness  Musculoskeletal: No limitations noted in hips, knees or lower legs and feet    IMPRESSION: 69 year old y.o. with obesity who is motivated to lose weight in order to feel better.     Obesity, complicated by:    BMI of 50    Very low motivation     Given the lack of motivation, and his age of 69, will focus efforts on lifestyle changes of healthier diet and more activity  If weight loss comes with these changes, then about 5% (16 lbs) in the next 3 months would be an approx goal.     DIET ASSESSMENT/PLAN:    Diet is currently with reported large portions of processed " foods    Stress is a large precipitant of intake. Also, eats large amounts in the evening. Lots of overeating    Motivation to change is very low. He feels that it's futile.     PLAN:     Discussed multiple possible dietary adjustments including daily Pepsi, portion sizes, and larger unplanned snacks in the evening however Kenneth was resistant to any changes at this time. Eventually agreed to an after dinner snack that was half the size of his usual snack (2 pc bread vs 4 pc bread)    Agrees to spend 10 minutes outside in the evening before an after dinner snack.     PHYSICAL ACTIVITY ASSESSMENT/PLAN:    Currently does very little activity. Spends the entire day sitting    PLAN    Discussed options and elected to start with the following:    Aim for short, 10 minute outdoor walks 3 times/day    Also, ask the [employer] society near your home in [place] about spending time with their animals.     Miscellaneous Issues:     Strongly encouraged Kenneth to look into counseling to help his feelings of hopelessness.     Follow-up:    In 8 weeks with MD/Nutrition    Has visit with Dr. Peng on June 19, in 2.5 weeks    Over 50% of the 45 minute face-to-face clinic visit time was spent in counseling regarding strategies to achieve lifestyle changes in diet and physical activity as described above.   --Ramirez Worley MD

## 2019-06-06 ENCOUNTER — TELEPHONE (OUTPATIENT)
Dept: CARDIOLOGY | Facility: CLINIC | Age: 69
End: 2019-06-06

## 2019-06-06 ASSESSMENT — ANXIETY QUESTIONNAIRES
1. FEELING NERVOUS, ANXIOUS, OR ON EDGE: SEVERAL DAYS
GAD7 TOTAL SCORE: 8
5. BEING SO RESTLESS THAT IT IS HARD TO SIT STILL: SEVERAL DAYS
6. BECOMING EASILY ANNOYED OR IRRITABLE: SEVERAL DAYS
IF YOU CHECKED OFF ANY PROBLEMS ON THIS QUESTIONNAIRE, HOW DIFFICULT HAVE THESE PROBLEMS MADE IT FOR YOU TO DO YOUR WORK, TAKE CARE OF THINGS AT HOME, OR GET ALONG WITH OTHER PEOPLE: NOT DIFFICULT AT ALL
3. WORRYING TOO MUCH ABOUT DIFFERENT THINGS: NEARLY EVERY DAY
2. NOT BEING ABLE TO STOP OR CONTROL WORRYING: MORE THAN HALF THE DAYS
7. FEELING AFRAID AS IF SOMETHING AWFUL MIGHT HAPPEN: NOT AT ALL

## 2019-06-06 ASSESSMENT — PATIENT HEALTH QUESTIONNAIRE - PHQ9: 5. POOR APPETITE OR OVEREATING: NOT AT ALL

## 2019-06-06 NOTE — TELEPHONE ENCOUNTER
Left message requesting patient bring medication bottles for all current prescriptions to office visit with TYLER Brunner on 6/7/19.      Solo Foreman LPN  [facility]  [phone]

## 2019-06-07 ENCOUNTER — OFFICE VISIT (OUTPATIENT)
Dept: CARDIOLOGY | Facility: CLINIC | Age: 69
End: 2019-06-07
Attending: INTERNAL MEDICINE
Payer: MEDICARE

## 2019-06-07 ENCOUNTER — PATIENT OUTREACH (OUTPATIENT)
Dept: CARDIOLOGY | Facility: CLINIC | Age: 69
End: 2019-06-07

## 2019-06-07 VITALS
BODY MASS INDEX: 47.74 KG/M2 | OXYGEN SATURATION: 93 % | WEIGHT: 315 LBS | SYSTOLIC BLOOD PRESSURE: 124 MMHG | DIASTOLIC BLOOD PRESSURE: 70 MMHG | HEIGHT: 68 IN | HEART RATE: 64 BPM

## 2019-06-07 DIAGNOSIS — I50.22 CHRONIC SYSTOLIC HEART FAILURE (H): ICD-10-CM

## 2019-06-07 DIAGNOSIS — I50.42 CHRONIC COMBINED SYSTOLIC AND DIASTOLIC CONGESTIVE HEART FAILURE (H): ICD-10-CM

## 2019-06-07 DIAGNOSIS — I50.42 CHRONIC COMBINED SYSTOLIC AND DIASTOLIC CONGESTIVE HEART FAILURE (H): Primary | ICD-10-CM

## 2019-06-07 DIAGNOSIS — R06.02 SOB (SHORTNESS OF BREATH): ICD-10-CM

## 2019-06-07 LAB
ANION GAP SERPL CALCULATED.3IONS-SCNC: 5 MMOL/L (ref 3–14)
BUN SERPL-MCNC: 15 MG/DL (ref 7–30)
CALCIUM SERPL-MCNC: 8.5 MG/DL (ref 8.5–10.1)
CHLORIDE SERPL-SCNC: 103 MMOL/L (ref 94–109)
CO2 SERPL-SCNC: 29 MMOL/L (ref 20–32)
CREAT SERPL-MCNC: 0.83 MG/DL (ref 0.66–1.25)
GFR SERPL CREATININE-BSD FRML MDRD: 89 ML/MIN/{1.73_M2}
GLUCOSE SERPL-MCNC: 107 MG/DL (ref 70–99)
NT-PROBNP SERPL-MCNC: 261 PG/ML (ref 0–125)
POTASSIUM SERPL-SCNC: 3.9 MMOL/L (ref 3.4–5.3)
SODIUM SERPL-SCNC: 137 MMOL/L (ref 133–144)
TSH SERPL DL<=0.005 MIU/L-ACNC: 1.83 MU/L (ref 0.4–4)

## 2019-06-07 PROCEDURE — 83880 ASSAY OF NATRIURETIC PEPTIDE: CPT | Performed by: PHYSICIAN ASSISTANT

## 2019-06-07 PROCEDURE — 84443 ASSAY THYROID STIM HORMONE: CPT | Performed by: PHYSICIAN ASSISTANT

## 2019-06-07 PROCEDURE — 80048 BASIC METABOLIC PNL TOTAL CA: CPT | Performed by: PHYSICIAN ASSISTANT

## 2019-06-07 PROCEDURE — 36415 COLL VENOUS BLD VENIPUNCTURE: CPT | Performed by: PHYSICIAN ASSISTANT

## 2019-06-07 PROCEDURE — 99214 OFFICE O/P EST MOD 30 MIN: CPT | Performed by: PHYSICIAN ASSISTANT

## 2019-06-07 ASSESSMENT — ANXIETY QUESTIONNAIRES: GAD7 TOTAL SCORE: 8

## 2019-06-07 ASSESSMENT — MIFFLIN-ST. JEOR: SCORE: 2222.75
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
## 2019-06-07 NOTE — LETTER
[address]  [facility] [address]  Phone: [phone]  Fax: [phone]       Jamar Ivory  [address]   [facility] [address]      June 7, 2019       Dear Jamar Ivory,  Welcome to the [employer] Home Monitoring Program!  Per our discussion, below is a summary of our conversation today.  Enclosed, you will find the attached instructions on how to access, complete, and submit your daily surveys, as well as detailed instructions for accurate daily weight recording (please refer to these instructions to assure you are measuring accurate and true weights).  Please keep a copy of this information for your records; you may also want to keep a copy of the attached instructions near your phone or computer, for reference, should you need them.        General Program Information:  - Please complete your survey daily, between 6:00 am and 12:00 pm (noon).   - You may complete your survey via touch-tone phone or by logging into your [employer] account (please see attached instructions for either option).   - After submitting your survey, a Registered Nurse (RN) from your care team will review your responses and contact you for any necessary follow up needed, based on survey entries.  Please note, any surveys entered on weekends or holidays will be reviewed the next business day.   - Should you miss 3 consecutive daily surveys, you will receive a reminder to complete your survey message, sent to your email.  - For general questions about your survey, please call [phone].  - Vacations: If you will be out of town and will miss your surveys, please notify the RN from your care team by calling [phone], and a hold will be placed until your return.  - If you are experiencing symptoms, please call your Cardiologist ([facility] at [phone]).      Again, we want to welcome you to the program and look forward to assisting you with  your care.         Haily Graves RN              Instructions for Accurate Daily Weight Recording:  - You will need a scale at home  - Weigh yourself every day, first thing in the morning.  - Weigh after going to the bathroom and before getting dressed.   - Write down your weight in your daily weight log, and enter this weight in your My[employer] Tracker survey.  - Call your doctor if you gain 2 pounds a day for 2 days in a row, or 5 pounds in 1 week.     Daily Survey Instructions (Touch-Tone Phone):  - Call [phone] to access the survey.   - Follow the prompts in answering the six questions.  Please note, if you call from a number that is not on file, you may be asked for your phone number and/or date of birth followed by the # sign, to verify your identity.   - When prompted, enter your weight - as measured this morning - using the numbers on your phone, followed by the # sign.   - Answer the remaining 5 survey questions, using 1 for Yes or 2 for No, for each question.     Daily Survey Instructions ([employer]):  - Login to your [employer] Account at [email or web address]  - From the  Health  dropdown menu, select  Track My Health    - On the  Track My Health  screen, click on 'My CHF Diary'   - Click 'Add New Data' button (located directly below the 'My CHF Diary' header)  - Click on the 'Now' button, to automatically pull in today's date and current time in your survey (if necessary, please change the time to reflect the exact time you checked your weight)    - Record your daily weight (please refer to the  Instructions for Accurate Daily Weight Recording , above) and answer the survey questions.  - Click the 'Continue' button and review your answers for accuracy (please note, once you submit your survey, your answers cannot be changed)   - Click the 'Submit' button to save and submit your survey
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
## 2019-06-07 NOTE — PROGRESS NOTES
Enrolled pt into [employer] [facility] today. Pt did not have MyChart; writer assisted pt into enrolling. Pt does not want to communicate through [employer] as they do not have Internet.   Wt window to be set per Cecilio.  Haily Graves RN 10:47 AM 06/07/19

## 2019-06-07 NOTE — PATIENT INSTRUCTIONS
Call [facility] nurse for any questions or concerns Mon-Fri 8am-4pm: [phone] For concerns after hours: [phone]      Plan from today:   Continue your current medications.     Continue to weigh daily and write it down. If your weight is up more than 3 pounds in one day or 5 pounds in one week, please call your provider.    Look at the labels on the food you buy.  We are aiming for 2,000 mg or less of sodium in a day.     Work on going for walks every day.  Start with three 10 minute walks daily.  Our goal will be 40 minutes of exercise most days of the week.      We will get you back to the Lymphedema Clinic for wraps.     I referred you to the sleep clinic as well.     See me in 1 month.

## 2019-06-10 ENCOUNTER — TELEPHONE (OUTPATIENT)
Dept: FAMILY MEDICINE | Facility: CLINIC | Age: 69
End: 2019-06-10

## 2019-06-10 ENCOUNTER — CARE COORDINATION (OUTPATIENT)
Dept: CARDIOLOGY | Facility: CLINIC | Age: 69
End: 2019-06-10

## 2019-06-10 NOTE — TELEPHONE ENCOUNTER
Reason for Call:  Other     Detailed comments: patient on simvastatin (ZOCOR) 20 MG tablet for about 1 mo.  Getting him sick    Phone Number Patient can be reached at: Cell number on file:    Telephone Information:   Mobile [phone]       Best Time: today    Can we leave a detailed message on this number? YES    Call taken on 6/10/2019 at 11:39 AM by JONATHON ARIAS

## 2019-06-10 NOTE — PROGRESS NOTES
"Pt left  stating his wt is 323 lbs today, down 2 lbs from yesterday and up 1 lbs from OV.     Pt was seen on 6/7 for [facility] Enrollment with c/o GOMEZ with more significant exertion & swollen legs. Home wt was 322 lbs   Pt has appt with lymphedema clinic referral on 6/17.    Called and spoke to pt. Sat into Sun his wt went from 321 lbs to 325 lbs. Today he is down to 323 lbs. Pt states GOMEZ is \"maybe about the same\" and legs remain swollen. He is not elevating; Encouraged him to elevate especially after taking Furosemide.   Diet:  Pt unsure but did say he has been \" a lot of bread and butter, unsalted.\"  Diet education given. Wife grabbed the phone and stated that pt has been having diarrhea and vomitting since starting on simvastatin (PCP prescribes). Wife states this happened to him in the past when he was on it.   Verified pt is taking his furosemide & KCL and is voiding in good amounts. Pt reports urine is clear.   Next appt is 7/10 with Cecilio.   Writer did send PCP staff message regarding simvastatin.   Will update Cecilio.  Reminded pt and wife to call into [employer]. Expressed understanding.   Haily Graves RN 1:45 PM 06/10/19    "

## 2019-06-10 NOTE — TELEPHONE ENCOUNTER
Patient states that ever since starting simvastatin, he has had diarrhea on almost a daily basis and has thrown up a few times as well. Says he has some days where he is sick to his stomach. Patient denies abdominal pain. Routing to provider to advise on medication adjustment/change.

## 2019-06-10 NOTE — TELEPHONE ENCOUNTER
Patient Contact    Attempt # 1    Was call answered?  No.  Left message on voicemail with information to call triage back.    Patient returned call to clinic and provider message relayed. Pt will contact [facility] to discuss these symptoms. No further questions or concerns at this time.    Patient notified. Verbalized understanding. No further questions.

## 2019-06-10 NOTE — TELEPHONE ENCOUNTER
Chart reviewed. Patient had stopped medications and all had been restarted 5/8/2019 when he presented with acute-on-CHF. Is he sure simvastatin is the cause?    Simvastatin unlikely cause of diarrhea, more likely to cause constipation. Vomiting also less likely with simvastatin. Could consider switching to rosuvastatin, which may be better tolerated but would defer that decision upon PCP's return next week.    He could check with [facility] about his other medications (Lasix, losartan) as they could cause those symptoms.    TSH just checked 6/7/2019, wnl, so levothyroxine dose correct and unlikely cause of symptoms.

## 2019-06-10 NOTE — PROGRESS NOTES
Pt was seen in [facility] on 6/7 by Cecilio. Pt was enrolled into [employer] at that time but has not called in. Received call from [employer] stating they received a VM from pt on 6/9 reporting 4 lbs wt gain from sat to sun. Pt does have a high salt diet at home and needs a lot of education. Wt was 322 lbs at home.     Pt takes furosemide 80 mg BID, KCL 10 mEq TID    Left VM for pt for wt update and not calling into [employer].   Haily Graves RN 10:19 AM 06/10/19

## 2019-06-11 NOTE — PROGRESS NOTES
Let's start with weight window 321-326 lbs. This was his range when he saw me, but was most often 322-324 lbs.     Re the nausea and vomiting - it only happened once or twice in the past month - that is what he reported to me.  I suspect it is more related to what he is eating or the volume he is eating or some other GI process (and not the statin), but defer to PCP.     Cecilio Garvin PA-C 6/11/2019 10:45 AM

## 2019-06-11 NOTE — PROGRESS NOTES
Nausea/vomiting better since stopping Simvastatin. PCP aware. Pt has appt 6/19 with PCP. Reminded pt to call into [employer]. Pt expressed understanding. Wt today is 324 lbs.   Wt window set at 321-326 lbs.   Next appt 6/17 for lymphedema therapy and 7/10 with Cecilio.   Haily Graves RN 1:06 PM 06/11/19

## 2019-06-12 ENCOUNTER — CARE COORDINATION (OUTPATIENT)
Dept: CARDIOLOGY | Facility: CLINIC | Age: 69
End: 2019-06-12

## 2019-06-12 NOTE — PROGRESS NOTES
[facility]     [employer] Daily Monitoring      First call in sussessful    Daily Weight Goal: 321-326 lbs     Today's Weight:  324 lbs      Diuretic:  furosemide 80 mg BID, KCL 10 mEq TID    Plan: Needs plan        Haily Graves RN 10:13 AM 06/12/19

## 2019-06-12 NOTE — PROGRESS NOTES
Weight goal 321-326 lbs  If > 326 lbs and no sxs, monitor one more day.  If weight comes back down, no changes.   If weight remains above 326 lbs x 2 days in a row, additional Lasix 40 mg.     If weight > 326 lbs + sxs, he can take an additional 40 mg Lasix.     If he takes additional Lasix 40 mg, he should take double up on his KCl that day.     Thanks,   Cecilio Garvin PA-C 6/12/2019 3:16 PM

## 2019-06-17 ENCOUNTER — CARE COORDINATION (OUTPATIENT)
Dept: CARDIOLOGY | Facility: CLINIC | Age: 69
End: 2019-06-17

## 2019-06-17 ENCOUNTER — HOSPITAL ENCOUNTER (OUTPATIENT)
Dept: OCCUPATIONAL THERAPY | Facility: CLINIC | Age: 69
Setting detail: THERAPIES SERIES
End: 2019-06-17
Attending: PHYSICIAN ASSISTANT
Payer: MEDICARE

## 2019-06-17 DIAGNOSIS — I89.0 LYMPHEDEMA: Primary | ICD-10-CM

## 2019-06-17 DIAGNOSIS — I50.42 CHRONIC COMBINED SYSTOLIC AND DIASTOLIC CONGESTIVE HEART FAILURE (H): ICD-10-CM

## 2019-06-17 DIAGNOSIS — R60.0 BILATERAL LEG EDEMA: ICD-10-CM

## 2019-06-17 PROCEDURE — 97140 MANUAL THERAPY 1/> REGIONS: CPT | Mod: GO

## 2019-06-17 PROCEDURE — 97165 OT EVAL LOW COMPLEX 30 MIN: CPT | Mod: GO

## 2019-06-17 NOTE — PROGRESS NOTES
06/17/19 1000   Quick Adds   Quick Adds Certification   Rehab Discipline   Discipline OT   Type of Visit   Type of visit Initial Edema Evaluation   General Information   Start of care 06/17/19   Referring physician Cecilio Garvin PA-C   Orders Evaluate and treat as indicated   Order date 06/07/19   Medical diagnosis lymphedema   Onset of illness / date of surgery 06/07/19   Edema onset 06/07/19   Affected body parts LLE;RLE   Edema etiology Infection;Chronic Venous Insufficiency;DVT  (CHF; inactivity; diabetes; obesity)   Edema etiology comments Pt familiar to this writer 2/2 previous POC. he has history CVA w/o residual deficits, DVT, LE cellulitis, what appears to be CVI seen with hmosidern staining and distended varicose and spider veins. Pt returns to clinic for new POC in setting of further decompensation of heart failure and ejection fraction worse than when seen last year    Pertinent history of current problem (PT: include personal factors and/or comorbidities that impact the POC; OT: include additional occupational profile info) PMH significant for depression, CHF, DVT, LE cellulitis infection, arthritis, cancer, frequent headaches, HTN, L hand pins/metal placed, thyroid issues, obesity, vision problems, and fatigue/weakness/loss energy.   Surgical / medical history reviewed Yes   Prior level of functional mobility I   Prior treatment Compression garments;Exercise;Elevation;Diuretics;Gradient compression bandaging   Community support Family / friend caregiver   Patient role / employment history Retired   Psychosocial concerns Depression   Living environment Apartment / condo   Fall Risk Screen   Fall screen completed by OT   Have you fallen 2 or more times in the past year? No   Have you fallen and had an injury in the past year? No   Is patient a fall risk? No   Abuse Screen (yes response referral indicated)   Feels Unsafe at Home or Work/School no   Feels Threatened by Someone no   Does Anyone Try to  Keep You From Having Contact with Others or Doing Things Outside Your Home? no   Physical Signs of Abuse Present no   System Outcome Measures   Lymphedema Life Impact Scale (score range 0-72). A higher score indicates greater impairment. 11   Subjective Report   Patient report of symptoms itchy; open skin thats struggles to heal; fatigue with mobility   Patient / Family Goals   Patient / family goals statement to reduce swelling in legs   Pain   Pain comments Pt rpeorts pain in LLE that started more so past few days   Cognitive Status   Orientation Orientation to person, place and time   Level of consciousness Alert   Follows commands and answers questions 100% of the time   Personal safety and judgement Intact   Edema Exam / Assessment   Skin condition Pitting;Venous distention;Dryness;Hemosiderin deposits   Skin condition comments 2+ pitting ankle-knee crease BLE's; widespread open skin from scratching/skin tears throughout lower legs BLE's   Pitting 2+   Pitting location BLE's   Stemmer sign Negative   Girth Measurements   Girth Measurements   (will obtain upon return for services)   Range of Motion   ROM comments WFL   Strength   Strength comments NT'd   Activities of Daily Living   Activities of Daily Living I-Min A   Bed Mobility   Bed mobility I   Transfers   Transfers I   Gait / Locomotion   Gait / Locomotion I   Sensory   Sensory perception comments no neuropathy reported or identified   Vascular Assessment   Vascular Assessment Comments History DVT; CVI symptoms noted in external skin; varicose and spider veins widespread thoughout LE's   Coordination   Coordination Gross motor coordination appropriate   Muscle Tone   Muscle tone No deficits were identified   Planned Edema Interventions   Planned edema interventions Gradient compression bandaging;Fit for compression garment;Exercises;Precautions to prevent infection / exacerbation;Education;Manual therapy;Skin care / precautions;Home management program  development   Clinical Impression   Criteria for skilled therapeutic intervention met Yes   Therapy diagnosis lymphedema   Influenced by the following impairments / conditions Stage 1;Phlebolymphedema;Wounds / Ulcers   Assessment of Occupational Performance 1-3 Performance Deficits   Identified Performance Deficits infection risk; decreased functional mobility; limite dstanding tolerance   Clinical Decision Making (Complexity) Low complexity   Treatment Frequency 1x/week;4x/week   Treatment duration 4x/wk x 1 week, then 1x/wk x 1 week, then 0x/wk x 3 weeks, then 1x/wk x 1 week   Patient / family and/or staff in agreement with plan of care Yes   Risks and benefits of therapy have been explained Yes   Clinical impression comments Pt will benefit from skilled lymphedema services to reduce BLE lymphedema to promote skin healing for infection reoccurrence avoidance, promote more tolerance when standing, and promote improved engagement in functional mobility.   Goals   Edema Eval Goals 1;2;3;4;5   Goal 1   Goal identifier 1   Goal description In order to improve functional mobility for activities of living, reduce swelling for infection prveention, and promote more tolerance when standing, by the completion of the intensive phase of services the pt and/or caregiver will:   Target date 08/23/19   Goal 2   Goal identifier 1a   Goal description tolerate gradient compression bandaging/wearing compression garments 23 hrs/day to prevent re-acccumulation of extracellular fluid for reductions needed to aide improvements in functional mobility, standing tolerance, and skin healing for infection prevention/avoidance   Target date 08/23/19   Goal 3   Goal identifier 1b   Goal description demonstrate independence in applying gradient compression bandages to build Ind with home management of BLE lymphedema needed to aide improvements in functional mobility, standing tolerance, and reduce incidence skin infection   Target date 08/23/19    Goal 4   Goal identifier 1c   Goal description demonstrate independence in performing prescribed exercises to facilate the muscle puming system for max reductions needed to aid eimprovements in functional mobility, standing tolerance, and reduce chances skin infections   Target date 08/23/19   Goal 5   Goal identifier 2   Goal description Display total BLE volume reduction of .75L+ for reducitons needed to aid eskin healing for infection prveention and promote more tolerance when standing and ambulating   Target date 08/23/19   Total Evaluation Time   OT Eval, Low Complexity Minutes (80665) 10   Certification   Certification date from 06/17/19   Certification date to 08/23/19   Medical Diagnosis lymphedema   Certification I certify the need for these services furnished under this plan of treatment and while under my care.  (Physician co-signature of this document indicates review and certification of the therapy plan).

## 2019-06-17 NOTE — PROGRESS NOTES
"[facility]     My [employer] Daily Monitoring        Daily Weight Goal: 321 - 326 lbs     Today's Weight: wrong wt entered. Pt meant to enter 329 lbs, not 129 lbs.       Diuretic:  furosemide 80 mg BID, KCL 10 mEq TID     Plan: Per Anah: Weight goal 321-326 lbs  If > 326 lbs and no sxs, monitor one more day.  If weight comes back down, no changes.   If weight remains above 326 lbs x 2 days in a row, additional Lasix 40 mg.   If weight > 326 lbs + sxs, he can take an additional 40 mg Lasix.   If he takes additional Lasix 40 mg, he should take double up on his KCl that day.     No answer when called pt.  left.  6/17/19 1251: Spoke to pt and wife. Pt does have some \"mild\" GOMEZ and abd bloat. Instructed him and wife to take an additional 40 mg tablet of furosemide today with extra KCL. Wife and pt took out meds while writer was on the phone and both repeated back correctly.   Will continue to monitor.         Haily Graves RN 12:54 AM 06/17/19    "

## 2019-06-17 NOTE — PROGRESS NOTES
[facility]        OUTPATIENT OCCUPATIONAL THERAPY EDEMA EVALUATION  PLAN OF TREATMENT FOR OUTPATIENT REHABILITATION  (COMPLETE FOR INITIAL CLAIMS ONLY)  Patient's Last Name, First Name, Jamar Garrido                           Provider s Name:   [facility] Medical Record No.  [id number]     Start of Care Date:  06/17/19   Onset Date:  06/07/19   Type:  OT   Medical Diagnosis:  lymphedema   Therapy Diagnosis:  lymphedema Visits from SOC:  1                                     __________________________________________________________________________________   Plan of Treatment/Functional Goals:    Gradient compression bandaging, Fit for compression garment, Exercises, Precautions to prevent infection / exacerbation, Education, Manual therapy, Skin care / precautions, Home management program development        GOALS  1. Goal description: In order to improve functional mobility for activities of living, reduce swelling for infection prveention, and promote more tolerance when standing, by the completion of the intensive phase of services the pt and/or caregiver will:       Target date: 08/23/19  2. Goal description: tolerate gradient compression bandaging/wearing compression garments 23 hrs/day to prevent re-acccumulation of extracellular fluid for reductions needed to aide improvements in functional mobility, standing tolerance, and skin healing for infection prevention/avoidance       Target date: 08/23/19  3. Goal description: demonstrate independence in applying gradient compression bandages to build Ind with home management of BLE lymphedema needed to aide improvements in functional mobility, standing tolerance, and reduce incidence skin infection       Target date: 08/23/19  4. Goal description: demonstrate independence in performing prescribed  exercises to facilate the muscle puming system for max reductions needed to aid eimprovements in functional mobility, standing tolerance, and reduce chances skin infections       Target date: 08/23/19  5. Goal description: Display total BLE volume reduction of .75L+ for reducitons needed to aid eskin healing for infection prveention and promote more tolerance when standing and ambulating       Target date: 08/23/19  6.               7.             8.              Treatment Frequency: 1x/week, 4x/week   Treatment duration: 4x/wk x 1 week, then 1x/wk x 1 week, then 0x/wk x 3 weeks, then 1x/wk x 1 week    Noble Gonzalez                                    I CERTIFY THE NEED FOR THESE SERVICES FURNISHED UNDER        THIS PLAN OF TREATMENT AND WHILE UNDER MY CARE     (Physician co-signature of this document indicates review and certification of the therapy plan).                   Certification date from: 06/17/19       Certification date to: 08/23/19           Referring physician: Cecilio Garvin PA-C   Initial Assessment  See Epic Evaluation- Start of care: 06/17/19

## 2019-06-18 NOTE — PROGRESS NOTES
"Pt is down 6 lbs over night after taking extra 40 mg furosemide yesterday with extra KCL per instructions.   Pt feels \"great, went to the bathroom like crazy.\"   Wt is back down in his current wt window with no sx. Pt reminded to go back to his return to his normal furosemide dosing of 80 mg BID. Pt repeated back correctly.         Haily Graves RN 9:43 AM 06/18/19    "

## 2019-06-26 ENCOUNTER — CARE COORDINATION (OUTPATIENT)
Dept: CARDIOLOGY | Facility: CLINIC | Age: 69
End: 2019-06-26

## 2019-06-26 NOTE — PROGRESS NOTES
Let's lower his weight parameter 2 lbs on both ends, so 319-324.   We are getting BMP 7/10/19.   We may end up lowering diuretic.   But for now, without sxs ogf LH, near syncope, let's continue current diuretic dose.     Cecilio Garvin PA-C 6/26/2019 2:57 PM

## 2019-06-26 NOTE — PROGRESS NOTES
[facility]     My [employer] Daily Monitoring        Daily Weight Goal: 321 - 326 lbs     Today's Weight: 319 lbs       Diuretic:  furosemide 80 mg BID, KCL 10 mEq TID     Plan: Per 6/12/19 OV w/Cecilio  If > 326 lbs and no sxs, monitor one more day.  If weight comes back down, no changes.   If weight remains above 326 lbs x 2 days in a row, additional Lasix 40 mg.   If weight > 326 lbs + sxs, he can take an additional 40 mg Lasix.   If he takes additional Lasix 40 mg, he should take double up on his KCl that day.        Spoke to wife and pt (on speaker phone) Pt states he would like his wt down to 280 lbs. Pt is denying dizziness, lightheaded, cramps or other symptoms. Verified he is taking Furosemide 80 mg BID and KCL 10 mEq TID  Next appt 7/10  Will review with Cecilio.        Haily Graves RN 1:46 PM 06/26/19

## 2019-06-26 NOTE — PROGRESS NOTES
Wt window adjusted to 319-324. Called wife and pt to let them know no med changes and to call if dizzy, LH, near syncopal or other sx. Expressed understanding.   Haily Graves RN 4:53 PM 06/26/19

## 2019-06-28 DIAGNOSIS — I50.42 CHRONIC COMBINED SYSTOLIC AND DIASTOLIC CONGESTIVE HEART FAILURE (H): Primary | ICD-10-CM

## 2019-07-02 ENCOUNTER — PATIENT OUTREACH (OUTPATIENT)
Dept: CARE COORDINATION | Facility: CLINIC | Age: 69
End: 2019-07-02

## 2019-07-02 ASSESSMENT — ACTIVITIES OF DAILY LIVING (ADL): DEPENDENT_IADLS:: INDEPENDENT
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
## 2019-07-05 ENCOUNTER — CARE COORDINATION (OUTPATIENT)
Dept: CARDIOLOGY | Facility: CLINIC | Age: 69
End: 2019-07-05

## 2019-07-05 NOTE — PROGRESS NOTES
"Pt called stating \"I goofed. I forgot to weigh myself this morning and had breakfast then weighed myself. My wt was 322 lbs.\"  Reassured pt he is ok and he is still within his parameters. Pt is feeling well.           Haily Gerdowsky, RN 1:37 PM 07/05/19    "

## 2019-07-10 ENCOUNTER — OFFICE VISIT (OUTPATIENT)
Dept: CARDIOLOGY | Facility: CLINIC | Age: 69
End: 2019-07-10
Attending: PHYSICIAN ASSISTANT
Payer: MEDICARE

## 2019-07-10 VITALS
WEIGHT: 315 LBS | HEART RATE: 70 BPM | BODY MASS INDEX: 47.74 KG/M2 | DIASTOLIC BLOOD PRESSURE: 60 MMHG | SYSTOLIC BLOOD PRESSURE: 122 MMHG | OXYGEN SATURATION: 95 % | HEIGHT: 68 IN

## 2019-07-10 DIAGNOSIS — I50.42 CHRONIC COMBINED SYSTOLIC AND DIASTOLIC CONGESTIVE HEART FAILURE (H): ICD-10-CM

## 2019-07-10 LAB
ANION GAP SERPL CALCULATED.3IONS-SCNC: 7 MMOL/L (ref 3–14)
BUN SERPL-MCNC: 18 MG/DL (ref 7–30)
CALCIUM SERPL-MCNC: 8.7 MG/DL (ref 8.5–10.1)
CHLORIDE SERPL-SCNC: 105 MMOL/L (ref 94–109)
CO2 SERPL-SCNC: 25 MMOL/L (ref 20–32)
CREAT SERPL-MCNC: 0.83 MG/DL (ref 0.66–1.25)
GFR SERPL CREATININE-BSD FRML MDRD: 89 ML/MIN/{1.73_M2}
GLUCOSE SERPL-MCNC: 107 MG/DL (ref 70–99)
POTASSIUM SERPL-SCNC: 3.9 MMOL/L (ref 3.4–5.3)
SODIUM SERPL-SCNC: 137 MMOL/L (ref 133–144)

## 2019-07-10 PROCEDURE — 36415 COLL VENOUS BLD VENIPUNCTURE: CPT | Performed by: PHYSICIAN ASSISTANT

## 2019-07-10 PROCEDURE — 99214 OFFICE O/P EST MOD 30 MIN: CPT | Performed by: PHYSICIAN ASSISTANT

## 2019-07-10 PROCEDURE — 80048 BASIC METABOLIC PNL TOTAL CA: CPT | Performed by: PHYSICIAN ASSISTANT

## 2019-07-10 ASSESSMENT — MIFFLIN-ST. JEOR: SCORE: 2229.1
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
## 2019-07-10 NOTE — PATIENT INSTRUCTIONS
Weights are stable. Continue to weigh daily and submit on MyHealth Tracker.      Continue to limit sodium.  Look at the labels on the food you buy.  We are aiming for 2,000 mg or less of sodium in a day.      Continue to work on daily walks.  Start with three 10 minute walks daily.  Our goal will be 40 minutes of exercise most days of the week.      See the Lymphedema Clinic 7/16/19.      See [facility] in 6 weeks.

## 2019-07-11 ENCOUNTER — CARE COORDINATION (OUTPATIENT)
Dept: CARDIOLOGY | Facility: CLINIC | Age: 69
End: 2019-07-11

## 2019-07-11 NOTE — PROGRESS NOTES
"[facility]     My [place]-Tracker Daily Monitoring        Daily Weight Goal: 319-324 lbs     Today's Weight: 325 lbs      Alert: 4 pound wt gain over night, 1 lb above goal     Diuretic:  furosemide 80 mg BID, KCL 10 mEq TID     Plan: Previous plan before wt window adjusted: Per 6/12/19 OV w/Beeh  If > 326 lbs and no sxs, monitor one more day.  If weight comes back down, no changes.   If weight remains above 326 lbs x 2 days in a row, additional Lasix 40 mg.   If weight > 326 lbs + sxs, he can take an additional 40 mg Lasix.   If he takes additional Lasix 40 mg, he should take double up on his KCl that day      Spoke to pt. He is up 4 lbs over night. Pt denies eating high salt foods or drinking excessive fluids. He denies dyspnea. L lower leg swollen but wife put his wraps on and he is elevating his legs \"It seems to be better\".   Will review with Cecilio and verify if previous plan is still active.             Haily Graves RN 11:59 AM 07/11/19    "

## 2019-07-11 NOTE — PROGRESS NOTES
Changed parameters to new goal.       If > 324 lbs and no sxs, monitor one more day.  If weight comes back down, no changes.   If weight remains above 324 lbs x 2 days in a row, additional Lasix 40 mg.   If weight > 324 lbs + sxs, he can take an additional 40 mg Lasix.   If he takes additional Lasix 40 mg, he should take double up on his KCl that day      Based on this (weight >324 but no sxs - leg edema is not new), check in on weight Friday 7/12/19.   Thanks,   Cecilio Garvin PA-C 7/11/2019 5:35 PM

## 2019-07-12 ENCOUNTER — TELEPHONE (OUTPATIENT)
Dept: OPHTHALMOLOGY | Facility: CLINIC | Age: 69
End: 2019-07-12

## 2019-07-12 NOTE — PROGRESS NOTES
Pt has not called in to [facility] yet this morning but did weigh. Wt is 325 lbs today and pt is feeling fatigued. He is wearing his leg wraps and elevating legs.   Instructed p to take an extra 40 mg of lasix today. Pt normally takes 80 mg BID. Instructed pt to take 1.5 tablets this morning and his regular 1 tablet this afternoon.   Also instructed pt to double his KCL today as well. Pt takes 10 mEq TID, Instructed him to take 2 tablets TID.   Pt repeated back correctly and verified doses.   Haily Graves RN 10:52 AM 07/12/19

## 2019-07-12 NOTE — TELEPHONE ENCOUNTER
Mailed last glasses RX to pt    Note to Dr. Grace/facilitator to review recommendations for cheap or basically free glasses per message below  Huy Rome RN 3:42 PM 07/12/19          [facility]    Phone Message    May a detailed message be left on voicemail: yes    Reason for Call: pt wife calling to get the prescription for the eye glasses as they lost the prescription. They also forgot where they can get cheap glasses or basically free. Dr Grace told the pt where they get this as well. Please call the pt. Pt wife said not to rush through the message.    Action Taken: Message routed to:  [facility] Eye clinic

## 2019-07-16 NOTE — PROGRESS NOTES
[facility]     My [place]-Tracker Daily Monitoring        Daily Weight Goal: 319-324 lbs     Today's Weight: 319 lbs      Alert: 4 pound wt gain over night, 1 lb above goal     Diuretic:  furosemide 80 mg BID, KCL 10 mEq TID     Plan: Previous plan before wt window adjusted: Per 6/12/19 OV w/Anah  If > 326 lbs and no sxs, monitor one more day.  If weight comes back down, no changes.   If weight remains above 326 lbs x 2 days in a row, additional Lasix 40 mg.   If weight > 326 lbs + sxs, he can take an additional 40 mg Lasix.   If he takes additional Lasix 40 mg, he should take double up on his KCl that day            Haily Graves RN 10:51 AM 07/16/19

## 2019-07-17 ENCOUNTER — HOSPITAL ENCOUNTER (OUTPATIENT)
Dept: OCCUPATIONAL THERAPY | Facility: CLINIC | Age: 69
Setting detail: THERAPIES SERIES
End: 2019-07-17
Attending: PHYSICIAN ASSISTANT
Payer: MEDICARE

## 2019-07-17 PROCEDURE — 97140 MANUAL THERAPY 1/> REGIONS: CPT | Mod: GO | Performed by: OCCUPATIONAL THERAPIST

## 2019-07-18 ENCOUNTER — HOSPITAL ENCOUNTER (OUTPATIENT)
Dept: OCCUPATIONAL THERAPY | Facility: CLINIC | Age: 69
Setting detail: THERAPIES SERIES
End: 2019-07-18
Attending: PHYSICIAN ASSISTANT
Payer: MEDICARE

## 2019-07-18 PROCEDURE — 97140 MANUAL THERAPY 1/> REGIONS: CPT | Mod: GO

## 2019-07-19 ENCOUNTER — HOSPITAL ENCOUNTER (OUTPATIENT)
Dept: OCCUPATIONAL THERAPY | Facility: CLINIC | Age: 69
Setting detail: THERAPIES SERIES
End: 2019-07-19
Attending: PHYSICIAN ASSISTANT
Payer: MEDICARE

## 2019-07-19 PROCEDURE — 97140 MANUAL THERAPY 1/> REGIONS: CPT | Mod: GO

## 2019-07-24 ENCOUNTER — CARE COORDINATION (OUTPATIENT)
Dept: CARDIOLOGY | Facility: CLINIC | Age: 69
End: 2019-07-24

## 2019-07-24 NOTE — PROGRESS NOTES
"[facility]     My [place]-[facility] Daily Monitoring        Daily Weight Goal: 319-324 lbs      Today's Weight: 315 lbs     Alert:  Wt below parameters     Diuretic:  Furosemide 80 mg BID, KCL 10 mEq TID     Spoke to wife. Pt feeling well. No fatigue, LH, dizziness, diarrhea or vomiting. Pt has been watching what he has been eating. He is also \"very slowly\" increasing activity. No GOMEZ. He has been going to grocery store with wife. He is also seeing lymphedema therapist and that is helpful. Pt took shower today and has been scratching his legs and did open up some sores and wife bandage them and put on his wraps.   Next appt 8/28  Will review with Madina Graves RN 11:20 AM 07/24/19    "

## 2019-07-24 NOTE — PROGRESS NOTES
Spoke to wife with pt in background. Notified them that wt parameters are changed to 310-320 lbs.   Wt parameters updated in My Health Tracker.   Haily Graves RN 3:19 PM 07/24/19

## 2019-07-25 ENCOUNTER — HOSPITAL ENCOUNTER (OUTPATIENT)
Dept: OCCUPATIONAL THERAPY | Facility: CLINIC | Age: 69
Setting detail: THERAPIES SERIES
End: 2019-07-25
Attending: PHYSICIAN ASSISTANT
Payer: MEDICARE

## 2019-07-25 PROCEDURE — 97140 MANUAL THERAPY 1/> REGIONS: CPT | Mod: GO

## 2019-07-26 NOTE — PROGRESS NOTES
Outpatient Occupational Therapy Discharge Note     Patient: Jamar Ivory  : 1950    Beginning/End Dates of Reporting Period:  19 to 2019    Referring Provider: Cecilio Garvin PA-C    Therapy Diagnosis: lymphedema    Client Self Report:       Objective Measurements: see flowsheet                                                        Outcome Measures (most recent score):  Lymphedema Life Impact Scale (score range 0-72). A higher score indicates greater impairment.: 14 post from 11 pre indicates no change    Goals:   Goal Identifier 1   Goal Description In order to improve functional mobility for activities of living, reduce swelling for infection prveention, and promote more tolerance when standing, by the completion of the intensive phase of services the pt and/or caregiver will:   Target Date 19   Date Met      Progress:     Goal Identifier 1a   Goal Description tolerate gradient compression bandaging/wearing compression garments 23 hrs/day to prevent re-acccumulation of extracellular fluid for reductions needed to aide improvements in functional mobility, standing tolerance, and skin healing for infection prevention/avoidance   Target Date 19   Date Met  (partially met with modified approach)   Progress:     Goal Identifier 1b   Goal Description demonstrate independence in applying gradient compression bandages to build Ind with home management of BLE lymphedema needed to aide improvements in functional mobility, standing tolerance, and reduce incidence skin infection   Target Date 19   Date Met  19   Progress:     Goal Identifier 1c   Goal Description demonstrate independence in performing prescribed exercises to facilate the muscle puming system for max reductions needed to aid eimprovements in functional mobility, standing tolerance, and reduce chances skin infections   Target Date 19   Date Met   19   Progress:     Goal Identifier 2   Goal  Description Display total BLE volume reduction of .75L+ for reducitons needed to aid eskin healing for infection prveention and promote more tolerance when standing and ambulating   Target Date 08/23/19   Date Met      Progress: Goal not met     Goal Identifier     Goal Description     Target Date     Date Met      Progress:     Goal Identifier     Goal Description     Target Date     Date Met      Progress:     Goal Identifier     Goal Description     Target Date     Date Met      Progress:     Progress Toward Goals:   Progress limited due to pt compliance and tolerance. Pt and spouse familiar to this writer from previous POC. Pt has BLE lymphedema with open skin and long standing wound healing issues. Pts home management was not going well and they returned for this POC to find what they could do to improve home management as they felt GCB's were not reducing pts legs like they had in past. New materials showed it was a matter of material replacement versus ineffective GCB use, or a signs of decline with CHF and medical issues influencing LE lymphedema. Pt showed good reductions during this POC when able to tolerate GCB's. Unfortunetly, like last POC, tolerance became too much of an issue and he was not able to meet wearing schedule requests, minimizing results. Spouse and pt able to use modified version GCB system and aide ankle-knee reductions but sacrificing feet (feet remain edemetous). HEP issued and available for use but pt use randomly despite education consistent routine use needed for swelling management. Skin began to heal and look less open when pt tolerating wraps. Pt and spouse appropriate to manage from home. Pt to be discharged.      Plan:  Discharge from therapy.    Discharge:    Reason for Discharge: No further expectation of progress.    Equipment Issued:     Discharge Plan: Patient to continue home program.

## 2019-08-05 ENCOUNTER — OFFICE VISIT (OUTPATIENT)
Dept: FAMILY MEDICINE | Facility: CLINIC | Age: 69
End: 2019-08-05
Payer: MEDICARE

## 2019-08-05 ENCOUNTER — CARE COORDINATION (OUTPATIENT)
Dept: CARDIOLOGY | Facility: CLINIC | Age: 69
End: 2019-08-05

## 2019-08-05 VITALS
OXYGEN SATURATION: 95 % | BODY MASS INDEX: 48.96 KG/M2 | TEMPERATURE: 97.6 F | RESPIRATION RATE: 18 BRPM | SYSTOLIC BLOOD PRESSURE: 116 MMHG | WEIGHT: 315 LBS | DIASTOLIC BLOOD PRESSURE: 64 MMHG | HEART RATE: 68 BPM

## 2019-08-05 DIAGNOSIS — R19.7 DIARRHEA OF PRESUMED INFECTIOUS ORIGIN: Primary | ICD-10-CM

## 2019-08-05 LAB
BASOPHILS # BLD AUTO: 0 10E9/L (ref 0–0.2)
BASOPHILS NFR BLD AUTO: 0.8 %
DIFFERENTIAL METHOD BLD: ABNORMAL
EOSINOPHIL # BLD AUTO: 0.7 10E9/L (ref 0–0.7)
EOSINOPHIL NFR BLD AUTO: 13.5 %
ERYTHROCYTE [DISTWIDTH] IN BLOOD BY AUTOMATED COUNT: 13.8 % (ref 10–15)
ERYTHROCYTE [SEDIMENTATION RATE] IN BLOOD BY WESTERGREN METHOD: 51 MM/H (ref 0–20)
HCT VFR BLD AUTO: 38.3 % (ref 40–53)
HGB BLD-MCNC: 12.2 G/DL (ref 13.3–17.7)
LYMPHOCYTES # BLD AUTO: 1.3 10E9/L (ref 0.8–5.3)
LYMPHOCYTES NFR BLD AUTO: 26 %
MCH RBC QN AUTO: 28.8 PG (ref 26.5–33)
MCHC RBC AUTO-ENTMCNC: 31.9 G/DL (ref 31.5–36.5)
MCV RBC AUTO: 90 FL (ref 78–100)
MONOCYTES # BLD AUTO: 0.9 10E9/L (ref 0–1.3)
MONOCYTES NFR BLD AUTO: 19.5 %
NEUTROPHILS # BLD AUTO: 1.9 10E9/L (ref 1.6–8.3)
NEUTROPHILS NFR BLD AUTO: 40.2 %
PLATELET # BLD AUTO: 187 10E9/L (ref 150–450)
RBC # BLD AUTO: 4.24 10E12/L (ref 4.4–5.9)
WBC # BLD AUTO: 4.8 10E9/L (ref 4–11)

## 2019-08-05 PROCEDURE — 99214 OFFICE O/P EST MOD 30 MIN: CPT | Performed by: FAMILY MEDICINE

## 2019-08-05 PROCEDURE — 36415 COLL VENOUS BLD VENIPUNCTURE: CPT | Performed by: FAMILY MEDICINE

## 2019-08-05 PROCEDURE — 85652 RBC SED RATE AUTOMATED: CPT | Performed by: FAMILY MEDICINE

## 2019-08-05 PROCEDURE — 80048 BASIC METABOLIC PNL TOTAL CA: CPT | Performed by: FAMILY MEDICINE

## 2019-08-05 PROCEDURE — 85025 COMPLETE CBC W/AUTO DIFF WBC: CPT | Performed by: FAMILY MEDICINE

## 2019-08-05 ASSESSMENT — PATIENT HEALTH QUESTIONNAIRE - PHQ9
SUM OF ALL RESPONSES TO PHQ QUESTIONS 1-9: 7
10. IF YOU CHECKED OFF ANY PROBLEMS, HOW DIFFICULT HAVE THESE PROBLEMS MADE IT FOR YOU TO DO YOUR WORK, TAKE CARE OF THINGS AT HOME, OR GET ALONG WITH OTHER PEOPLE: NOT DIFFICULT AT ALL
SUM OF ALL RESPONSES TO PHQ QUESTIONS 1-9: 7

## 2019-08-05 NOTE — PATIENT INSTRUCTIONS
"Diabetes: Exchange List  What are the exchange lists?   The exchange lists show you portions of food that equal 1 exchange. Foods are divided into food lists. The foods on each list are called exchanges because they have a similar number of calories, protein, carbohydrate and fat content. Foods from each list can be traded or \"exchanged\" for any other food on the same list because they all have a similar exchange value. A dietitian will help you plan how much food your child should eat at each meal and from what lists the foods should come from. At first you should measure your food until you are able to make good estimates about serving sizes. The following list is a sample of foods found on the exchange lists. For more information, you can buy the Exchange Lists for Meal Planning from: The American [employer] Association [address] [place] [address] [phone] http://www.diabetes.org  Carbohydrate group   Starch List: One starch exchange contains about 15 grams of carbohydrate, 3 grams of protein, 0 to 1 grams of fat, and 80 calories. A starch exchange is sometimes called a carb exchange. Examples of one starch (carb) exchange are:   one slice of bread   1/2 hamburger or hot dog bun   3/4 cup of unsweetened cereal   1/3 cup pasta   3 cups popcorn   crackers (6 small saltines, 3 squares of allyn crackers, 3 of most other crackers)   1 pancake or waffle (5 inch)   15 to 20 fat-free or baked potato or corn chips.   The vegetables included in the starch exchanges include:   corn (1/2 cup or 1/2 cob)   white potato (1/4 large baked with skin or 1/2 cup mashed)   yam or sweet potato (1/2 cup)   green peas (1/2 cup)   squash, winter (1 cup)   lima beans (2/3 cup).   Fruit List: 1 fruit exchange contains about 15 grams of carbohydrate and 60 calories. Examples of one fruit exchange are:   grape juice (1/3 cup)   apple or pineapple juice (1/2 cup)   orange or grapefruit juice (1/2 cup)   1 small apple   orange or " "peach   1/2 banana   1 cup raspberries   1/3 of a small cantaloupe   1 slice of watermelon.   Milk List: 1 milk exchange contains about 8 grams of protein and 12 grams of carbohydrate. Items on the milk list are divided into fat-free, reduced fat, and whole milk depending on the number of fat grams in the exchange. Examples of one milk exchange are: Fat-Free (0 to 3 grams of fat)   1 cup of skim or non-fat milk   1 cup of 1% milk (also includes 1/2 fat exchange)   6 ounces flavored fat-free yogurt   Reduced-Fat (5 grams of fat)   6 ounces of plain, low-fat yogurt   1 cup 2% milk (also includes one fat exchange).   Whole Milk (8 grams of fat)   8 ounces of plain yogurt (made from whole milk)   1 cup whole milk.   Vegetable List: One vegetable exchange has 5 grams of carbohydrate, 2 grams of protein, no fat, and 25 calories. One-half cup of cooked or a cup of raw vegetables is a good measure for 1 exchange of most vegetables. Raw lettuce may be taken in larger quantities, but salad dressing usually equals 1 fat exchange. Other Carbohydrates List: One \"other carbohydrate\" exchange has 15 grams of carbohydrate. Many of these foods count as a starch exchange and one or more fat exchanges.   brownie (2 inch square) = 1 carb, 1 fat exchange   fruit snack roll = 1 carb exchange   granola bar = 1 and 1/2 carb exchanges   ice cream (1/2 cup) = 1 carb, 2 fat exchanges   frozen yogurt (1/2 cup) = 1 carb, 0 to 1 fat exchanges   tortilla chips (6-12) = 1 carb, 2 fat exchanges.   Meat and Meat Substitute Group   Meats are divided into very lean meats, lean meats, medium-fat meats, and high-fat meats. People with diabetes should try to eat more lean and medium fat meats and stay away from the high fat choices. The Very Lean meat group includes foods that contain 7 grams of protein, 0 to 1 gram of fat, and 35 calories for 1 exchange. Examples include:   1 ounce chicken or turkey (white meat, no skin)   1 ounce fresh fish   1 ounce " fat-free cheese   2 egg whites   The Lean meat group includes foods that contain 7 grams of protein, 3 grams of fat, and 55 calories for 1 meat exchange. Examples include:   1 ounce chicken or turkey (dark meat, no skin)   1 ounce fish   1 ounce lean pork   1 ounce USDA Select or Choice grades of lean beef   1 ounce cheese (with 3 grams of fat or less per ounce).   The Medium-Fat group includes foods that have 7 grams of protein, 5 grams of fat, and 75 calories for 1 meat exchange. Examples include:   1 ounce of ground beef, most cuts of beef, pork, lamb or veal   1 ounce of cheese (5 grams of fat per ounce or less)   1 egg   1 ounce fried fish.   The High-Fat foods have 7 grams of protein, 8 grams of fat, and 100 calories for 1 meat exchange. This group includes:   1 ounce of pork sausage   1 ounce of spare ribs   1 oz of regular cheese (American, Swiss etc.)   1 oz of lunch meat   1 hot dog (turkey or chicken).   Fat Group   Fat List: Fat is necessary for the body and is particularly important during periods of fasting (overnight), when it is very slowly absorbed. 1 fat exchange contains 5 grams of fat and 45 calories. The monounsaturated fats and polyunsaturated fats are better for us than saturated fats. The fat list includes: 1 exchange of monounsaturated fats equals:   1/2 Tbsp peanut butter   6 almonds   1 tsp of oil (olive, peanut, canola).   1 exchange of polyunsaturated fats equals:   1 tsp margarine   1 tsp of any vegetable oil (except coconut).   1 exchange of saturated fat includes:   1 tsp butter   1 strip of way   2 Tbsp of cream (half and half).   Free Foods   A free food contains less than 20 calories or less than 5 grams of carbohydrate per serving. If the food has a serving size listed on its package, it should be limited to 3 servings spread throughout the day. Examples of free foods include:   4 Tbsp fat-free margarine   1 Tbsp fat-free Miracle Whip   sugar-free gelatin   diet soft drinks    catsup   soy sauce   spices.   Combination Foods   Many foods, such as casseroles, are mixed together. Your dietitian can help you figure out how many exchanges to count for combination foods. For example:   lasagna (1 cup) = 2 carb exchanges and 2 medium-fat meat exchanges   spaghetti with meatballs (1 cup) = 2 carb exchanges and 2 medium-fat meat exchanges   pizza, cheese (1/4 of 12 in.) = 2 carbs, 2 medium-fat meats   chicken noodle soup (1 cup) = 1 carb exchange   frozen entrée (less than 300 calories) = 2 carbs, 3 lean meat exchanges   macaroni and cheese (1 cup) = 2 carb exchanges and 2 medium-fat meat exchanges.   20 from list   WALK IN PLACE 15 MINUTES WITH EACH MEAL    AVOID SITTING   1. MODIFIED FAST 250 CALORIES TWICE DAILY FEMALES  300 CALORIES TWICE DAILY FOR MALES   OATMEAL AND COTTAGE CHEESE WORK NICELY   COPIOUS WATER BETWEEN   5/2 PLAN DR [facility]  NORMAL DIET 5 DAYS PER WEEK  SEMIFAST 2 DAYS PER WEEK  LOOK UP 5-2 PLAN ON THE INTERNET    Weight Loss Tips  1. Do not eat after 6 hrs before your expected bedtime  2. Have your heaviest meal for breakfast, a slightly lighter meal at lunch and a snack 6 hrs before bed  3. No sugar/calorie drinks except milk ie no fruit juice, pop, alcohol.  4. Drink milk OR WATER  30min before meals to decrease your hunger. Also it is excellent as part of your last meal of the day snack  5. Drink lots of water  6. Increase fiber in diet: all bran cereal, salads, popcorn etc  7. Have only one small serving of fruit a day about 1/2 cup (as this is high in sugar)  8. EXERCISE is the bottom line. Without it, you will gain weight even on a low calorie diet. Best if done 2-3X a day as can    2. The only way known to prevent diabetes or keep it from getting worse is exercise, 20-40 minutes 3 times a day around the time of meals      SLEEP 8 HOURS PER DAY    BLACK AND BLUEBERRIES EVERY DAY ANTINFLAMMATORY BENEFIT     PLAIN YOGURT SEVERAL TIMES DAILY AS TOLERATED IF  NOT ALLERGIC     AVOID HIGH FRUCTOSE SYRUP AND OLENE IN YOUR DIET     GREEN TEA EXTRACT    PROBIOTIC CAPSULE DAILY  HIGH QUALITY     DON'T EAT LATE AT NIGHT    CLARISSAUMA FIMBRALEXYA HELPS WITH APPETITE    KEEP A BLESSINGS JOURNAL    888 BREATHER WHEN STRESSED OR COUNT BACK FROM 100 WITH SLOW BREATHING    POSITIVE AFFIRMATIONS         FIT BIT OR PEDOMETER 10,000 STEPS PER DAY     FIT BIT TWICH RECOMMENDED
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
## 2019-08-05 NOTE — PROGRESS NOTES
Pt left VM stating he is down 7 lbs.   Pt has not called into [facility] since 8/2.    Left  for pt.         Haily Gerdowsky, RN 3:29 PM 08/05/19

## 2019-08-05 NOTE — LETTER
August 8, 2019      Jamar Ivory  [address]   [facility] [address]        Dear ,    We are writing to inform you of your test results.    NORMAL GLUCOSE, RENAL AND BLOOD SALTS     HIGH ERTHROCYTE SEDIMENTATION RATE IE INFLAMMATORY MARKER AT 51   MILD ANEMIA   NORMOCYTIC NORMOCHROMIC   NORMAL WHITE BLOOD CELL COUNT       Resulted Orders   Basic metabolic panel   Result Value Ref Range    Sodium 139 133 - 144 mmol/L    Potassium 4.1 3.4 - 5.3 mmol/L    Chloride 108 94 - 109 mmol/L    Carbon Dioxide 22 20 - 32 mmol/L    Anion Gap 10 3 - 14 mmol/L    Glucose 97 70 - 99 mg/dL    Urea Nitrogen 15 7 - 30 mg/dL    Creatinine 0.81 0.66 - 1.25 mg/dL    GFR Estimate 90 >60 mL/min/[1.73_m2]      Comment:      Non  GFR Calc  Starting 12/18/2018, serum creatinine based estimated GFR (eGFR) will be   calculated using the Chronic Kidney Disease Epidemiology Collaboration   (CKD-EPI) equation.      GFR Estimate If Black >90 >60 mL/min/[1.73_m2]      Comment:       GFR Calc  Starting 12/18/2018, serum creatinine based estimated GFR (eGFR) will be   calculated using the Chronic Kidney Disease Epidemiology Collaboration   (CKD-EPI) equation.      Calcium 8.8 8.5 - 10.1 mg/dL   CBC with platelets differential   Result Value Ref Range    WBC 4.8 4.0 - 11.0 10e9/L    RBC Count 4.24 (L) 4.4 - 5.9 10e12/L    Hemoglobin 12.2 (L) 13.3 - 17.7 g/dL    Hematocrit 38.3 (L) 40.0 - 53.0 %    MCV 90 78 - 100 fl    MCH 28.8 26.5 - 33.0 pg    MCHC 31.9 31.5 - 36.5 g/dL    RDW 13.8 10.0 - 15.0 %    Platelet Count 187 150 - 450 10e9/L    % Neutrophils 40.2 %    % Lymphocytes 26.0 %    % Monocytes 19.5 %    % Eosinophils 13.5 %    % Basophils 0.8 %    Absolute Neutrophil 1.9 1.6 - 8.3 10e9/L    Absolute Lymphocytes 1.3 0.8 - 5.3 10e9/L    Absolute Monocytes 0.9 0.0 - 1.3 10e9/L    Absolute Eosinophils 0.7 0.0 - 0.7 10e9/L    Absolute Basophils 0.0 0.0 - 0.2 10e9/L    Diff Method Automated  Method    Erythrocyte sedimentation rate auto   Result Value Ref Range    Sed Rate 51 (H) 0 - 20 mm/h       If you have any questions or concerns, please call the clinic at the number listed above.       Sincerely,        TRACI ALMANZA MD

## 2019-08-06 LAB
ANION GAP SERPL CALCULATED.3IONS-SCNC: 10 MMOL/L (ref 3–14)
BUN SERPL-MCNC: 15 MG/DL (ref 7–30)
CALCIUM SERPL-MCNC: 8.8 MG/DL (ref 8.5–10.1)
CHLORIDE SERPL-SCNC: 108 MMOL/L (ref 94–109)
CO2 SERPL-SCNC: 22 MMOL/L (ref 20–32)
CREAT SERPL-MCNC: 0.81 MG/DL (ref 0.66–1.25)
GFR SERPL CREATININE-BSD FRML MDRD: 90 ML/MIN/{1.73_M2}
GLUCOSE SERPL-MCNC: 97 MG/DL (ref 70–99)
POTASSIUM SERPL-SCNC: 4.1 MMOL/L (ref 3.4–5.3)
SODIUM SERPL-SCNC: 139 MMOL/L (ref 133–144)

## 2019-08-06 NOTE — PROGRESS NOTES
"Spoke to pt. He has been having diarrhea since Friday. The did go to his [facility] and saw another provider. Pt is to collect a stool sample for CDiff.   Pt states he is drinking water 5 20 oz bottles of water. Has abd cramps. No dizziness. Wt today is 320 lbs. Wt window 321-326 lbs. Pt reports he is stooling \"every half hr\". Pt is taking all of meds including Lasix 80 mg BID & KCl 10 mEq TID.     BMP done at [facility] 8/5:  Component      Latest Ref Rng & Units 8/5/2019   Sodium      133 - 144 mmol/L 139   Potassium      3.4 - 5.3 mmol/L 4.1   Chloride      94 - 109 mmol/L 108   Carbon Dioxide      20 - 32 mmol/L 22   Anion Gap      3 - 14 mmol/L 10   Glucose      70 - 99 mg/dL 97   Urea Nitrogen      7 - 30 mg/dL 15   Creatinine      0.66 - 1.25 mg/dL 0.81   GFR Estimate      >60 mL/min/1.73:m2 90   GFR Estimate If Black      >60 mL/min/1.73:m2 >90   Calcium      8.5 - 10.1 mg/dL 8.8     Will review w/Anah  "

## 2019-08-06 NOTE — PROGRESS NOTES
Spoke to pt and wife. Pt having large amounts of stool. Did instruct pt and wife to decrease lasix in half from 80 mg BID to 40 mg BID.   Wife cutting 5 tablets in half.   Will follow up on pt tomorrow to see how he is feeling.   Haily Graves RN 4:34 PM 08/06/19

## 2019-08-06 NOTE — PROGRESS NOTES
Is it significant volume stool?  If just small, but frequent, his BMP from yesterday looks stable, so if he has no LH/dizziness, would continue current  Doses.    If significant volume and sxs, he could half his Lasix while he is having significant volume diarrhea.   We would need to keep a close eye on weights.

## 2019-08-07 NOTE — PROGRESS NOTES
I think weight is probably 319 lbs :)    Resume Lasix 80 mg BID tomorrow as long as no more diarrhea.   We will keep the same weight parameters for now    Cecilio Garvin PA-C 8/7/2019 12:57 PM

## 2019-08-07 NOTE — PROGRESS NOTES
Spoke to Kenneth after he left .  Pt weight down to 219# today.  Last diarrhea episode was 8/6 at 1500.   Denies cramps to hands/legs, denies dizziness or LH.   Will update Anah and call pt back with plan.     Blanca Gould RN BSN  C[facility] Clinic Care Coordinator, [place], [place]  08/07/19, 12:08 PM

## 2019-08-08 NOTE — PROGRESS NOTES
[facility]     My [place]-[facility] Daily Monitoring        Daily Weight Goal: 321-326 lb     Today's Weight: 319 lb x2 days     Alert: under goal raissa by 2 lbs.      Diuretic:  PO Lasix 80mg BID.      Plan:  Resume Lasix 80 mg BID if diarrhea resolved. Keep same weight parameters. -per Cecilio Garvin PA-C.      Spoke with pt.  Denies diarrhea stools now since Mon 1500.  Will resume Lasix 80 mg BID starting today.  No other questions or concerns at this time.  Will monitor wt tomorrow and follow-up if needed.      Blanca Gould RN BSN  C[facility]O.R[facility] Clinic Care Coordinator, [place], [place]  08/08/19, 10:07 AM

## 2019-08-09 DIAGNOSIS — R19.7 DIARRHEA OF PRESUMED INFECTIOUS ORIGIN: ICD-10-CM

## 2019-08-09 PROCEDURE — 87506 IADNA-DNA/RNA PROBE TQ 6-11: CPT | Performed by: FAMILY MEDICINE

## 2019-08-12 ENCOUNTER — CARE COORDINATION (OUTPATIENT)
Dept: CARDIOLOGY | Facility: CLINIC | Age: 69
End: 2019-08-12

## 2019-08-12 ENCOUNTER — TELEPHONE (OUTPATIENT)
Dept: FAMILY MEDICINE | Facility: CLINIC | Age: 69
End: 2019-08-12

## 2019-08-12 NOTE — PROGRESS NOTES
"Wife reports diarrhea \"started up\" again, 2-3 days ago.    Pt reports going every 1 hr -1.5 hr, small amounts.    Frozen hot pockets x2 for breakfast.  Doesn't read ingredients.    Recommended they call PCP and update them about diarrhea starting up again and f/u with stool sample.    No other questions or concerns.  Wife agrees to call PCP regarding stool sample they sent to r/o CDiff.        Blanca Gould RN BSN  C.O.R.E[facility] Clinic Care Coordinator, [place], [place]  08/12/19, 12:03 PM      "

## 2019-08-12 NOTE — TELEPHONE ENCOUNTER
Reason for Call:  Other call back    Detailed comments: The patient called and stated that he dropped off a stool sample on 8/5 or 8/6 and is wondering if results are back. He would like a call back to discuss this.     Phone Number Patient can be reached at: Cell number on file:    Telephone Information:   Mobile [phone]       Best Time: Any    Can we leave a detailed message on this number? YES    Call taken on 8/12/2019 at 2:30 PM by Whitney Duarte

## 2019-08-12 NOTE — TELEPHONE ENCOUNTER
Called patient to notify of results. C. Diff sample was not collected. Diarrhea not improving, pt requesting to do lab. Scheduled lab only appointment 8/13. Will come in to provide sample tomorrow.

## 2019-08-13 DIAGNOSIS — R19.7 DIARRHEA OF PRESUMED INFECTIOUS ORIGIN: ICD-10-CM

## 2019-08-13 LAB
C DIFF TOX B STL QL: NEGATIVE
SPECIMEN SOURCE: NORMAL

## 2019-08-13 PROCEDURE — 87493 C DIFF AMPLIFIED PROBE: CPT | Performed by: FAMILY MEDICINE

## 2019-08-21 ENCOUNTER — OFFICE VISIT (OUTPATIENT)
Dept: FAMILY MEDICINE | Facility: CLINIC | Age: 69
End: 2019-08-21
Payer: MEDICARE

## 2019-08-21 VITALS
TEMPERATURE: 98.2 F | HEART RATE: 72 BPM | SYSTOLIC BLOOD PRESSURE: 136 MMHG | BODY MASS INDEX: 49.26 KG/M2 | WEIGHT: 315 LBS | OXYGEN SATURATION: 94 % | DIASTOLIC BLOOD PRESSURE: 70 MMHG

## 2019-08-21 DIAGNOSIS — Z59.9 FINANCIAL DIFFICULTIES: ICD-10-CM

## 2019-08-21 DIAGNOSIS — F32.1 MODERATE MAJOR DEPRESSION (H): Primary | ICD-10-CM

## 2019-08-21 DIAGNOSIS — I50.42 CHRONIC COMBINED SYSTOLIC AND DIASTOLIC CONGESTIVE HEART FAILURE (H): ICD-10-CM

## 2019-08-21 DIAGNOSIS — Z91.89: ICD-10-CM

## 2019-08-21 DIAGNOSIS — R60.0 BILATERAL LOWER EXTREMITY EDEMA: ICD-10-CM

## 2019-08-21 PROCEDURE — 99215 OFFICE O/P EST HI 40 MIN: CPT | Performed by: FAMILY MEDICINE

## 2019-08-21 RX ORDER — FUROSEMIDE 80 MG
TABLET ORAL
Qty: 180 TABLET | Refills: 3 | Status: SHIPPED | OUTPATIENT
Start: 2019-08-21 | End: 2019-09-11

## 2019-08-21 SDOH — ECONOMIC STABILITY - INCOME SECURITY: PROBLEM RELATED TO HOUSING AND ECONOMIC CIRCUMSTANCES, UNSPECIFIED: Z59.9

## 2019-08-21 NOTE — PROGRESS NOTES
"Subjective     Jamarjolly Ivory is a 69 year old male who presents to clinic today for the following health issues:    HPI   Pt here for depression, palliative care, medications.  Patient is a 69-year-old male well-known to me.  Has heart failure, morbid obesity, long-standing depression.  He is here today with his wife because he \"does not want to take his medications anymore\".  He states he is\" tired of all the sickness and ready for it just to be over\".  He does agree that he has depression that is not been well controlled however he states he has had this for years and none of the medications or therapy we have tried have worked.  He has quite a bit of stress with his medications and expenses.  He would like to be on fewer medications as he feels depressed about how much money they cost him.  Additionally he has a fair amount of pain which interferes with him doing activities he enjoys.  He is wondering about options today.    Reviewed and updated as needed this visit by Provider  Tobacco  Allergies  Meds  Problems  Med Hx  Surg Hx  Fam Hx         Review of Systems   ROS COMP: Constitutional, HEENT, cardiovascular, pulmonary, GI, , musculoskeletal, neuro, skin, endocrine and psych systems are negative, except as otherwise noted.      Objective    /70 (Cuff Size: Adult Large)   Pulse 72   Temp 98.2  F (36.8  C) (Tympanic)   Wt 147 kg (324 lb)   SpO2 94%   BMI 49.26 kg/m    Body mass index is 49.26 kg/m .  Physical Exam   GENERAL: alert, no distress, obese, elderly and fatigued  MENTAL STATUS EXAM:   Jamar is casually dressed and well groomed, sitting comfortably during encounter. Alert, awake, and in no acute distress. Not tearful today, has been in past. Eye contact is  good. Speech is normal and not pressured. Psychomotor activity is within normal limits and there no abnormal involuntary movements demonstrated. Mood is \"down\" and affect is dysthymic but with good range and reactivity. " "Adak is down, negative and focused on recent stressors. Thought process is linear, logical, and goal directed. Thought content is notable for suicidal ideation, but states \"I would never do that\" and denies a plan.  Denies hallucinations or other symptoms of psychosis. Insight and judgement are poor to fair. Seems to have limited memory/cognition at baseline although I don't have formal testing for this.  Generally oriented. No difficulty with memory, attention, or cognition. Associations intact, gait and station within normal limits          Assessment & Plan       ICD-10-CM    1. Moderate major depression (H) F32.1 PALLIATIVE CARE REFERRAL   2. Chronic combined systolic and diastolic congestive heart failure (H) I50.42 PALLIATIVE CARE REFERRAL   3. Bilateral lower extremity edema R60.0 furosemide (LASIX) 80 MG tablet   4. Financial difficulties Z59.8    5. At risk for decreased quality of life Z91.89      Complex psychologically and socially and medically 69-year-old male here today as he is frustrated by his medications, personal, psychological, medical, and social situation.  I do think there is a significant element of depression with this however this is been resistant to treatment and he is not interested in restarting additional medications today.  Additionally I did get home counseling for him with [facility] however after 2 visits he declined further treatment.  What he is mostly interested in today decreasing the number of medications he is on.  We did go through his medications and carefully and with shared decision making and shared risk discussion the prescribed some of his medications.  This included his potassium and his metoprolol which he feels makes him feel quite sluggish.  We did discuss the importance of staying on his furosemide for his heart failure and edema, and he agreed to stay on his losartan as well.    Additionally we discussed his goals for the remainder of his life.  He " "would prefer to have more independence and be in less pain.  Additionally he like to be able to pay for his own medications.  We discussed that may be palliative care could be helpful for him.  I went ahead and placed a referral for the palliative care physicians in [place] as this is the closest location for him.    I would like close follow-up with Kenneth.  I like him to follow-up within 1 month.  Sooner if he has issues or other concerns.  Additionally we will do a follow-up of his potassium level in 2 weeks as a lab only appointment given that we just discontinued this medication.    At the end of the visit Kenneth is quite happy that he has been on fewer medications.  He did express understanding of the risks of discontinuing these medications.    BMI:   Estimated body mass index is 49.26 kg/m  as calculated from the following:    Height as of 7/10/19: 1.727 m (5' 8\").    Weight as of this encounter: 147 kg (324 lb).   Weight management plan: Discussed healthy diet and exercise guidelines      Return in about 2 weeks (around 9/4/2019) for Non-fasting Lab Only Visit to check your potassium.    56 minutes total were spent face to face with the patient including history, exam with >50% spent on counseling and coordination of care.       Blanca Peng MD  [facility]        "

## 2019-08-22 ENCOUNTER — TELEPHONE (OUTPATIENT)
Dept: ONCOLOGY | Facility: CLINIC | Age: 69
End: 2019-08-22

## 2019-08-22 NOTE — TELEPHONE ENCOUNTER
PT called to schedule for palliative care.  His info has been passed on to Chuck in that department to call him back and schedule.

## 2019-08-26 ENCOUNTER — CARE COORDINATION (OUTPATIENT)
Dept: CARDIOLOGY | Facility: CLINIC | Age: 69
End: 2019-08-26

## 2019-08-26 NOTE — PROGRESS NOTES
Spoke to pt as he has not called into [facility] since/23. Pt states he is very depressed with his health and doesn't know what to do. He has an appt with palliative on 9/6.   Offered emotional support to pt.   Pt states he does not want to talk but thankful for he call.   Next appt 8/28 w/Cecilio.  Haily Graves, RN 2:31 PM 08/26/19

## 2019-08-27 ENCOUNTER — CARE COORDINATION (OUTPATIENT)
Dept: CARDIOLOGY | Facility: CLINIC | Age: 69
End: 2019-08-27

## 2019-08-27 NOTE — PROGRESS NOTES
[facility]    MyHealth Tracker Daily Monitoring      7/12/19: Furosemide 40 mg extra    7/11/19:      7/24/19: Weight Parameters Changed to 310-320 lbs    8/5/19: Weight 319 x 2 days (2 lbs under goal) Lasix held due to diarrhea.    8/6/19: Lasix decreased to 40 mg BID    8/8/19: Resumed Lasix 80 mg BID      My Health-Tracker Trends:       Blanca Bright RN    Heart Failure Coordinator, [place]  08/27/19 3:21 PM

## 2019-08-28 ENCOUNTER — TELEPHONE (OUTPATIENT)
Dept: CARDIOLOGY | Facility: CLINIC | Age: 69
End: 2019-08-28

## 2019-08-28 NOTE — TELEPHONE ENCOUNTER
Spoke with Kenneth this morning at 9:30AM, as he had a 9:00Am appointment at [facility], with Dr. Mora.  Kenneth had been scheduled June 7, 2019 for this appointment due to referral from [facility] .  Kenneth said he forgot the appointment and was sleeping well.  I did offer a later time today and he declined.  I explained his doctor at [facility] wanted him to have the appointment.  Kenneth again deferred appointment and will contact [facility].  Routing message to Dr. Chand.

## 2019-09-06 ENCOUNTER — ONCOLOGY VISIT (OUTPATIENT)
Dept: ONCOLOGY | Facility: CLINIC | Age: 69
End: 2019-09-06
Attending: INTERNAL MEDICINE
Payer: MEDICARE

## 2019-09-06 VITALS
HEART RATE: 73 BPM | OXYGEN SATURATION: 95 % | DIASTOLIC BLOOD PRESSURE: 71 MMHG | WEIGHT: 315 LBS | RESPIRATION RATE: 16 BRPM | TEMPERATURE: 97.6 F | SYSTOLIC BLOOD PRESSURE: 144 MMHG | HEIGHT: 68 IN | BODY MASS INDEX: 47.74 KG/M2

## 2019-09-06 DIAGNOSIS — I50.42 CHRONIC COMBINED SYSTOLIC AND DIASTOLIC CONGESTIVE HEART FAILURE (H): ICD-10-CM

## 2019-09-06 DIAGNOSIS — Z51.5 ENCOUNTER FOR PALLIATIVE CARE: ICD-10-CM

## 2019-09-06 DIAGNOSIS — F33.2 SEVERE EPISODE OF RECURRENT MAJOR DEPRESSIVE DISORDER, WITHOUT PSYCHOTIC FEATURES (H): ICD-10-CM

## 2019-09-06 DIAGNOSIS — I50.42 CHRONIC COMBINED SYSTOLIC AND DIASTOLIC CONGESTIVE HEART FAILURE (H): Primary | ICD-10-CM

## 2019-09-06 PROCEDURE — 80048 BASIC METABOLIC PNL TOTAL CA: CPT | Performed by: PHYSICIAN ASSISTANT

## 2019-09-06 PROCEDURE — 99204 OFFICE O/P NEW MOD 45 MIN: CPT | Performed by: INTERNAL MEDICINE

## 2019-09-06 PROCEDURE — 36415 COLL VENOUS BLD VENIPUNCTURE: CPT | Performed by: PHYSICIAN ASSISTANT

## 2019-09-06 PROCEDURE — G0463 HOSPITAL OUTPT CLINIC VISIT: HCPCS

## 2019-09-06 ASSESSMENT — PAIN SCALES - GENERAL: PAINLEVEL: MODERATE PAIN (5)

## 2019-09-06 ASSESSMENT — MIFFLIN-ST. JEOR: SCORE: 2213.23

## 2019-09-06 NOTE — PATIENT INSTRUCTIONS
Thank you for coming into the Palliative Care Clinic today.     1. Please put a copy of your POLST form on the fridge or in another easily accessible location.   2. Try moving the second water pill to the afternoon and not drinking water after dinner. Can use saliva spray or gel instead for dry mouth.     Return to clinic as needed for a follow up.     You can reach the Palliative Care Team during business hours at the following number: [phone] (Palliative Clinic Nurse Line).     To reach the Palliative Care Provider on-call after-hours or on holidays and weekends, call: [phone].  Please note that we are not able to provide pain medication refills on evenings or weekends.     ===================================================================

## 2019-09-06 NOTE — NURSING NOTE
"Oncology Rooming Note    September 6, 2019 12:27 PM   Jamar Ivory is a 69 year old male who presents for:    Chief Complaint   Patient presents with     Oncology Clinic Visit     New Patient - consult     Initial Vitals: BP (!) 144/71   Pulse 73   Temp 97.6  F (36.4  C) (Tympanic)   Resp 16   Ht 1.727 m (5' 8\")   Wt 147.9 kg (326 lb)   SpO2 95%   BMI 49.57 kg/m   Estimated body mass index is 49.57 kg/m  as calculated from the following:    Height as of this encounter: 1.727 m (5' 8\").    Weight as of this encounter: 147.9 kg (326 lb). Body surface area is 2.66 meters squared.  Moderate Pain (5) Comment: Data Unavailable   No LMP for male patient.  Allergies reviewed: Yes  Medications reviewed: Yes    Medications: Medication refills not needed today.  Pharmacy name entered into [facility]: [facility] [id number] [place] [address]    Clinical concerns: New Patient consult      Ana Maria Morales CMA              "
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
## 2019-09-07 LAB
ANION GAP SERPL CALCULATED.3IONS-SCNC: 3 MMOL/L (ref 3–14)
BUN SERPL-MCNC: 14 MG/DL (ref 7–30)
CALCIUM SERPL-MCNC: 8.8 MG/DL (ref 8.5–10.1)
CHLORIDE SERPL-SCNC: 105 MMOL/L (ref 94–109)
CO2 SERPL-SCNC: 30 MMOL/L (ref 20–32)
CREAT SERPL-MCNC: 0.81 MG/DL (ref 0.66–1.25)
GFR SERPL CREATININE-BSD FRML MDRD: 90 ML/MIN/{1.73_M2}
GLUCOSE SERPL-MCNC: 87 MG/DL (ref 70–99)
POTASSIUM SERPL-SCNC: 3.7 MMOL/L (ref 3.4–5.3)
SODIUM SERPL-SCNC: 138 MMOL/L (ref 133–144)

## 2019-09-09 ENCOUNTER — CARE COORDINATION (OUTPATIENT)
Dept: CARDIOLOGY | Facility: CLINIC | Age: 69
End: 2019-09-09

## 2019-09-09 NOTE — PROGRESS NOTES
Reviewed MyHealth Tracker/phone encounter information. Weight up 11# in past 3 days with a weight of 336# which is 10# above goal weight.     Patient was last seen for CORE follow up on 7/10/2019 at which time 1 month follow up was recommended, no medication changes were made, patient no showed to CORE follow up on 8/28/2019.     Most recent labs from 9/6/2019 reviewed, showed stable kidney function and electrolytes.       1. Patient to take additional 40mg furosemide x 3 days  2. Patient needs urgent CORE follow up this week for assessment of volume and diuretics with BMP and BNP prior, may need to consider changing diuretics to torsemide or adding PRN metolazone if continued difficulty with weight.       Rozina Bond, YENI CNP  Text Page  (M-F, 7:30 - 4:00 pm)

## 2019-09-09 NOTE — PROGRESS NOTES
[facility]    MyHealth Tracker Daily Monitoring      Daily Weight Goal: 321-326 lbs    Today's Weight: 336 lbs             Weight 9/6/19: 325 lbs    Alert: 10 lbs over weight parameters and answered yes to questions 1, 3, 5    Diuretic: Furosemide 80 mg twice a day    SOB/GOMEZ: SOB at rest and GOMEZ when walking more than a few steps    Lower Extremity/Abdominal Edema: BLE edema > He reports it is less than it was last week    Diet/Fluid Intake: Weekend (He stated he has only had the foods listed below since Friday morning (9/6/19)      Frosted Flakes      Skim Milk      Canned Fruit    Notes: He reports he is taking his Furosemide as directed and he has frequent urination after taking it. He stated his SOB woke him up in the middle of the night.    Plan: Forward this to Rozina Bond CNP for further recommendation.      My Health-Tracker Trends:       Blanca Bright RN    Heart Failure Coordinator, [place]  09/09/19 9:26 AM

## 2019-09-09 NOTE — PROGRESS NOTES
[facility]        Per Rozina Bond, CNP orders I have instructed Kenneth to take an additional 40 mg of Lasix for today, tomorrow and Wednesday ( September 9, 10, 11). I repeated it back to him 4 times and he expressed understanding.    I also scheduled a follow-up appointment with Rozina on 9/20/19 @ 0850 and Labs at 0800.    Blanca Bright RN    Heart Failure Coordinator, [place]  09/09/19 1:36 PM

## 2019-09-10 ENCOUNTER — DOCUMENTATION ONLY (OUTPATIENT)
Dept: OTHER | Facility: CLINIC | Age: 69
End: 2019-09-10

## 2019-09-11 ENCOUNTER — CARE COORDINATION (OUTPATIENT)
Dept: CARDIOLOGY | Facility: CLINIC | Age: 69
End: 2019-09-11

## 2019-09-11 DIAGNOSIS — I50.42 CHRONIC COMBINED SYSTOLIC AND DIASTOLIC CONGESTIVE HEART FAILURE (H): Primary | ICD-10-CM

## 2019-09-11 RX ORDER — TORSEMIDE 20 MG/1
20 TABLET ORAL DAILY
Qty: 60 TABLET | Refills: 1 | Status: SHIPPED | OUTPATIENT
Start: 2019-09-11 | End: 2019-09-13

## 2019-09-11 NOTE — PROGRESS NOTES
"[facility]     [facility] [place]-Tracker Daily Monitoring        Daily Weight Goal: 321-326 lbs     Today's Weight: 329 lbs                 Alert: wt up 3 lbs above wt goal & Yes to SOB that woke him       Diuretic: Furosemide 80 mg twice a day     Plan: Pt was to take extra 40 mg lasix 9/9, 9/10 & 9/11 per Rozina Bond CNP on 9/9 (1. Patient to take additional 40mg furosemide x 3 days 2. Patient needs urgent CORE follow up this week for assessment of volume and diuretics with BMP and BNP prior, may need to consider changing diuretics to torsemide or adding PRN metolazone if continued difficulty with weight.)    Spent 22 min on phone with wife listening to her talk about her health problems.  Spoke to pt. He is feeling tired, weak and dyspneic, more so with rest. He also has some JACOB.   Pt reports he is voiding \"about every 30 min to 1 hr\".     Pt had BMP last done 9/6: K 3.7, BUN 14, Cr 0.81  Pt did see Palliative on 9/6. Pt depressed. He wanted to stop his cardiac meds as \"they don't seem to be helping\" . Dr Lawrence did explain reasoning behind cardiac meds. Pt was encouraged to take his afternoon lasix earlier so he is not up all night going to the bathroom.   Pt has been taking it earlier and it is helpful. Pt has been taking his medications as directed.     Next appt 9/20 kelsey/Rozina  Will review with Cecilio.           Haily Graves RN 11:00 AM 09/11/19            "

## 2019-09-11 NOTE — PROGRESS NOTES
Called pt to let him know Anah would like to change furosemide to torsemide 40 mg daily.  Pt willing to try the med change. Informed pt he can start torsemide tomorrow and we will get wt update on Friday and if needed we can incr to 40 mg BID.  Spoke to wife to let her know the same.   Med list updated and prescription sent to [facility]  Haily Graves RN 2:52 PM 09/11/19

## 2019-09-11 NOTE — PROGRESS NOTES
Have him trial switching from Lasix to torsemide 40 mg once daily starting Thursday 9/12/19.   He may need more, so if weight is going up Friday, please let me know and we would increase to 40 mg BID.   He has lab and appt already scheduled 9/20/19.   Cecilio Garvin PA-C 9/11/2019 11:27 AM

## 2019-09-13 ENCOUNTER — CARE COORDINATION (OUTPATIENT)
Dept: CARDIOLOGY | Facility: CLINIC | Age: 69
End: 2019-09-13

## 2019-09-13 DIAGNOSIS — R06.02 SOB (SHORTNESS OF BREATH): Primary | ICD-10-CM

## 2019-09-13 DIAGNOSIS — I50.42 CHRONIC COMBINED SYSTOLIC AND DIASTOLIC CONGESTIVE HEART FAILURE (H): ICD-10-CM

## 2019-09-13 RX ORDER — TORSEMIDE 20 MG/1
40 TABLET ORAL DAILY
Qty: 60 TABLET | Refills: 1 | Status: SHIPPED | OUTPATIENT
Start: 2019-09-11 | End: 2019-10-09

## 2019-09-13 NOTE — PROGRESS NOTES
Destiny Cruz, Haily Steele, JOY   Cc: Rozina Bond, APRN CNP   Caller: Unspecified (2 days ago,  9:00 AM)             Then he should go to the 40mg of torsemide as initially planned please.   Rozina sees him next week, I will CC her so she's aware.   Please be sure to call him Monday and check if it made any difference.   timi Dixon      Called pt back and instructed him to take another 20 mg tablet now and starting to tomorrow to take 40 mg daily. Pt expressed understanding.   Haily Graves, RN 1:58 PM 09/13/19

## 2019-09-13 NOTE — PROGRESS NOTES
"[facility]     My Vernon-Tracker Daily Monitoring        Daily Weight Goal: 321 - 326 lbs      Today's Weight: 336 lbs      Alert: 10 lbs above max goal & 'yes\" to SOB that woke him     Diuretic:  Torsemide 40 mg daily     Plan: Per Cecilio on 9/11/19: \"He may need more, so if weight is going up Friday, please let me know and we would increase to 40 mg BID. \"    Torsemide prescription was sent in wrong. Instructions defaulted to 20 mg daily not 40 mg daily. Spoke to pt. He was taking 20 mg daily per instructions on bottle.   He is feeling weak, tired & SOB woke up him up. Does have some JACOB \"but its not worse thatn normal\"  Next appt 9/20 kelsey/Rozina    Will review with Destiny as Cecilio is off.           Haily Graves RN 8:40 AM 09/13/19    "

## 2019-09-16 NOTE — PROGRESS NOTES
Called to pt. He has not called into [facility] since 9/13   Pt was to increase torsemide from 20 mg daily to 40 mg (as ordered, instructions did not go through correctly when prescription submitted)  VM left for pt.   Next appt 9/20 w/Rozina.  Haily Graves RN 10:30 AM 09/16/19

## 2019-09-18 ENCOUNTER — CARE COORDINATION (OUTPATIENT)
Dept: CARDIOLOGY | Facility: CLINIC | Age: 69
End: 2019-09-18

## 2019-09-18 NOTE — PROGRESS NOTES
[facility]     My Vernon-Tracker Daily Monitoring        Daily Weight Goal: 321-326 lbs      Today's Weight: No call in for 5 days      Alert: No call in for 5 days      Diuretic:  Torsemide 40 mg daily     Pt has not called in since 9/13.   VM left for pt and wife.  Haily Graves, RN 10:47 AM 09/18/19

## 2019-09-18 NOTE — PROGRESS NOTES
[facility]     My Vernon-Tracker Daily Monitoring        Daily Weight Goal: 321 - 326 lbs      Today's Weight: 330 lbs     Diuretic:  Torsemide 40 mg daily       Writer called into [facility] based on what pt stated.       Haily Graves RN 1:38 PM 09/18/19

## 2019-09-18 NOTE — PROGRESS NOTES
"Wife called back. Pt and wife could not remember (torsemide) \"what that new pill was for\". Pt was only taking 20 mg daily, not 40 mg as instructed.   Re educated both on torsemide and that is to replace furosemide. Both expressed understanding. Instructed wife and pt to take another 20 mg tablet now. Pt and wife expressed understanding and will take 40 mg to starting tomorrow.   Pt's wt is 330 lbs. Wt window set at 321-326 lbs. Pt reports he is tired.     Next appt 9/20 w/Rozina Graves RN 11:52 AM 09/18/19    "

## 2019-09-19 ENCOUNTER — CARE COORDINATION (OUTPATIENT)
Dept: CARDIOLOGY | Facility: CLINIC | Age: 69
End: 2019-09-19

## 2019-09-19 NOTE — PROGRESS NOTES
[facility]    MyHealth Tracker Daily Monitoring      Daily Weight Goal: 321-326 lbs    Adjustments since 7/10/19 office visit with Cecilio Garvin PA-C:    7/11/19: Weight parameters changed to 319-324 lbs    7/11/19: If > 324 lbs and no sxs, monitor one more day. If weight comes back down, no changes. If weight remains above 324 lbs x 2 days in a row, additional Lasix 40 mg. If weight > 324 lbs + sxs, he can take an additional 40 mg Lasix. If he takes additional Lasix 40 mg, he should take double up on his KCL that day.    7/24/19: Weight parameters changed to 310-320 lbs    8/5/19: Weight parameters changed to 321-236 lbs    8/5/19: Lasix decreased to 40 mg BID due to diarrhea    9/9/19: Lasix 40 mg extra    9/10/19: Lasix 40 mg extra    9/11/19: Lasix 40 mg extra    9/11/19: Furosemide changed to Torsemide 40 mg daily    9/18/19: Patient was only taking Torsemide 20 mg daily vs 40 mg daily. He started the correct dosage today.      My Health-Tracker Trends:         Weight Graph:        Blanca Bright RN    Heart Failure Coordinator, [place]  09/19/19 2:26 PM

## 2019-09-19 NOTE — PROGRESS NOTES
[facility]     My Vernon-Tracker Daily Monitoring        Daily Weight Goal: 321 - 326 lbs      Today's Weight: 328 lbs, down 2 lbs from yesterday    Alert: Yes to SOB that woke him and dizzy or lightheaded     Diuretic:  Torsemide 40 mg daily      VM left for pt    Next appt 9/20 w/Rozina Graves RN 10:26 AM 09/19/19

## 2019-09-20 ENCOUNTER — APPOINTMENT (OUTPATIENT)
Dept: GENERAL RADIOLOGY | Facility: CLINIC | Age: 69
End: 2019-09-20
Attending: EMERGENCY MEDICINE
Payer: MEDICARE

## 2019-09-20 ENCOUNTER — HOSPITAL ENCOUNTER (EMERGENCY)
Facility: CLINIC | Age: 69
Discharge: HOME | End: 2019-09-20
Attending: EMERGENCY MEDICINE | Admitting: EMERGENCY MEDICINE
Payer: MEDICARE

## 2019-09-20 ENCOUNTER — OFFICE VISIT (OUTPATIENT)
Dept: CARDIOLOGY | Facility: CLINIC | Age: 69
End: 2019-09-20
Payer: MEDICARE

## 2019-09-20 VITALS
SYSTOLIC BLOOD PRESSURE: 134 MMHG | HEART RATE: 64 BPM | OXYGEN SATURATION: 95 % | WEIGHT: 315 LBS | HEIGHT: 68 IN | BODY MASS INDEX: 47.74 KG/M2 | DIASTOLIC BLOOD PRESSURE: 80 MMHG

## 2019-09-20 VITALS
WEIGHT: 315 LBS | TEMPERATURE: 97.7 F | HEIGHT: 68 IN | SYSTOLIC BLOOD PRESSURE: 167 MMHG | HEART RATE: 73 BPM | BODY MASS INDEX: 47.74 KG/M2 | OXYGEN SATURATION: 95 % | RESPIRATION RATE: 18 BRPM | DIASTOLIC BLOOD PRESSURE: 97 MMHG

## 2019-09-20 DIAGNOSIS — I50.42 CHRONIC COMBINED SYSTOLIC AND DIASTOLIC CONGESTIVE HEART FAILURE (H): Primary | ICD-10-CM

## 2019-09-20 DIAGNOSIS — R06.02 SOB (SHORTNESS OF BREATH): ICD-10-CM

## 2019-09-20 DIAGNOSIS — I50.42 CHRONIC COMBINED SYSTOLIC AND DIASTOLIC CONGESTIVE HEART FAILURE (H): ICD-10-CM

## 2019-09-20 DIAGNOSIS — R07.9 CHEST PAIN, UNSPECIFIED TYPE: ICD-10-CM

## 2019-09-20 DIAGNOSIS — R07.9 CHEST PAIN: Primary | ICD-10-CM

## 2019-09-20 LAB
ANION GAP SERPL CALCULATED.3IONS-SCNC: 2 MMOL/L (ref 3–14)
ANION GAP SERPL CALCULATED.3IONS-SCNC: 5 MMOL/L (ref 3–14)
BUN SERPL-MCNC: 18 MG/DL (ref 7–30)
BUN SERPL-MCNC: 18 MG/DL (ref 7–30)
CALCIUM SERPL-MCNC: 8.9 MG/DL (ref 8.5–10.1)
CALCIUM SERPL-MCNC: 8.9 MG/DL (ref 8.5–10.1)
CHLORIDE SERPL-SCNC: 105 MMOL/L (ref 94–109)
CHLORIDE SERPL-SCNC: 107 MMOL/L (ref 94–109)
CO2 SERPL-SCNC: 26 MMOL/L (ref 20–32)
CO2 SERPL-SCNC: 31 MMOL/L (ref 20–32)
CREAT SERPL-MCNC: 0.9 MG/DL (ref 0.66–1.25)
CREAT SERPL-MCNC: 0.96 MG/DL (ref 0.66–1.25)
ERYTHROCYTE [DISTWIDTH] IN BLOOD BY AUTOMATED COUNT: 14.1 % (ref 10–15)
GFR SERPL CREATININE-BSD FRML MDRD: 79 ML/MIN/{1.73_M2}
GFR SERPL CREATININE-BSD FRML MDRD: 86 ML/MIN/{1.73_M2}
GLUCOSE SERPL-MCNC: 102 MG/DL (ref 70–99)
GLUCOSE SERPL-MCNC: 102 MG/DL (ref 70–99)
HCT VFR BLD AUTO: 39.3 % (ref 40–53)
HGB BLD-MCNC: 12.5 G/DL (ref 13.3–17.7)
INTERPRETATION ECG - MUSE: NORMAL
MCH RBC QN AUTO: 27.8 PG (ref 26.5–33)
MCHC RBC AUTO-ENTMCNC: 31.8 G/DL (ref 31.5–36.5)
MCV RBC AUTO: 87 FL (ref 78–100)
NT-PROBNP SERPL-MCNC: 311 PG/ML (ref 0–125)
PLATELET # BLD AUTO: 266 10E9/L (ref 150–450)
POTASSIUM SERPL-SCNC: 3.6 MMOL/L (ref 3.4–5.3)
POTASSIUM SERPL-SCNC: 3.7 MMOL/L (ref 3.4–5.3)
RBC # BLD AUTO: 4.5 10E12/L (ref 4.4–5.9)
SODIUM SERPL-SCNC: 138 MMOL/L (ref 133–144)
SODIUM SERPL-SCNC: 138 MMOL/L (ref 133–144)
TROPONIN I SERPL-MCNC: <0.015 UG/L (ref 0–0.04)
WBC # BLD AUTO: 4.3 10E9/L (ref 4–11)

## 2019-09-20 PROCEDURE — 93005 ELECTROCARDIOGRAM TRACING: CPT

## 2019-09-20 PROCEDURE — 85027 COMPLETE CBC AUTOMATED: CPT | Performed by: EMERGENCY MEDICINE

## 2019-09-20 PROCEDURE — 83880 ASSAY OF NATRIURETIC PEPTIDE: CPT | Performed by: NURSE PRACTITIONER

## 2019-09-20 PROCEDURE — 80048 BASIC METABOLIC PNL TOTAL CA: CPT | Performed by: EMERGENCY MEDICINE

## 2019-09-20 PROCEDURE — 80048 BASIC METABOLIC PNL TOTAL CA: CPT | Performed by: NURSE PRACTITIONER

## 2019-09-20 PROCEDURE — 93000 ELECTROCARDIOGRAM COMPLETE: CPT | Performed by: NURSE PRACTITIONER

## 2019-09-20 PROCEDURE — 36415 COLL VENOUS BLD VENIPUNCTURE: CPT | Performed by: NURSE PRACTITIONER

## 2019-09-20 PROCEDURE — 84484 ASSAY OF TROPONIN QUANT: CPT | Performed by: EMERGENCY MEDICINE

## 2019-09-20 PROCEDURE — 99285 EMERGENCY DEPT VISIT HI MDM: CPT | Mod: 25

## 2019-09-20 PROCEDURE — 71046 X-RAY EXAM CHEST 2 VIEWS: CPT

## 2019-09-20 ASSESSMENT — ENCOUNTER SYMPTOMS
COUGH: 0
SHORTNESS OF BREATH: 1
NAUSEA: 0
VOMITING: 0

## 2019-09-20 ASSESSMENT — MIFFLIN-ST. JEOR
SCORE: 2205.52
SCORE: 2204.15

## 2019-09-20 NOTE — PROGRESS NOTES
Patient presented to CORE clinic today with complaints of crushing chest pain that has been going on for 2 days, notes slight improvement today. This is accompanied with dizziness/lightheadedness and increased fatigued. EKG in clinic shows no acute ischemic changes.     Recommended presenting to the ED for urgent evaluation.     Will coordinate close CORE follow up moving forward to continuing optimizing his heart failure medications.     YENI Meadows CNP   [facility]   Text Page

## 2019-09-20 NOTE — PATIENT INSTRUCTIONS
Call C[facility] nurse for any questions or concerns Mon-Fri 8am-4pm: [phone] For concerns after hours: [phone]    Medication changes:     Plan from today:     Lab results: Processing.

## 2019-09-20 NOTE — ED TRIAGE NOTES
Pt has sternal chest pain for past 3 days.  Pain is continuous and was worse yesterday and 2 days ago.

## 2019-09-20 NOTE — ED PROVIDER NOTES
History     Chief Complaint:  Chest Pain    HPI   Jamar Ivory is a 69 year old male who presents to the emergency department today with chest pain. The patient presented to heart failure clinic today and was sent to the ER for symptoms of chest pain. The patient reports central chest pain for the last 3 days that he describes as constant and crushing. The pain is better today than the last few days, but has not resolved. The pain does not radiate and he has not found anything to make this pain worse or better. He reports associated shortness of breath. He denies nausea, vomiting, hemoptysis The patient has an EF of about 40-45%.    Cardiac/PE/DVT Risk Factors:  History of hypertension - positive   History of hyperlipidemia - positive   History of diabetes - positive   History of smoking - negative   Personal history of PE/DVT - negative    Family history of heart complications - positive   Recent travel - negative   Recent surgery - negative   Other immobilizations - negative   Cancer - positive     Allergies:  Clopidogrel  Penicillins    Medications:    Tylenol  Aspirin  Synthroid  Glucosamine-chondroitin   Flomax  Demadex      Past Medical History:    Acquired hypothyroidism  Arthritis  Benign neoplasm of colon  Benign prostatic hyperplasia with incomplete bladder emptying  Bilateral leg edema  Breast tenderness in male  Cerebral infarction   CHF (congestive heart failure)   Chronic combined systolic and diastolic congestive heart failure   Chronic stasis dermatitis  CVA (cerebral infarction)  CVD (cardiovascular disease)  Diet Controlled Type 2 diabetes mellitus without complication, without long-term current use of insulin   Fatty liver  Gout  Hx of Diabetic polyneuropathy associated with type 2 diabetes mellitus - now diet controlled   Hyperlipidemia   Hypertension  Impaired glucose tolerance  Low hemoglobin  Moderate major depression   Obesity, morbid (more than 100 lbs over ideal weight or BMI >  "40)   Pain in both lower extremities  PVD (peripheral vascular disease)   Spider veins  TIA (transient ischaemic attack)  Transient cerebral ischemia  Unspecified hypothyroidism  Vitiligo    Past Surgical History:    Appendectomy  Left index finger procedure  Nasal bone fracture repair   Testicle surgery  Vasectomy      Family History:    Lung cancer  Diabetes  Leukemia      Social History:  The patient was accompanied to the ED by wife.  Smoking Status: Never  Smokeless Tobacco: Never  Alcohol Use: Yes   Marital Status:      Review of Systems   Respiratory: Positive for shortness of breath. Negative for cough (bloody).    Cardiovascular: Positive for chest pain.   Gastrointestinal: Negative for nausea and vomiting.   All other systems reviewed and are negative.      Physical Exam     Patient Vitals for the past 24 hrs:   BP Temp Temp src Pulse Heart Rate Resp SpO2 Height Weight   09/20/19 0945 (!) 154/112 -- -- 73 66 18 92 % -- --   09/20/19 0934 -- -- -- -- -- -- -- 1.727 m (5' 8\") 147 kg (324 lb)   09/20/19 0930 (!) 185/110 -- -- -- 66 -- 95 % -- --   09/20/19 0926 (!) 171/106 97.7  F (36.5  C) Oral -- 72 20 94 % -- --      Physical Exam    General:   Pleasant, age appropriate male resting comfortably in the bed.  HEENT:    Oropharynx is moist.  Eyes:    Conjunctiva normal  Neck:    Supple, no meningismus.     CV:     Regular rate and rhythm.      No murmurs, rubs or gallops.       No unilateral leg swelling.       2+ radial pulses bilateral.       2+ bilateral lower extremity edema.  PULM:    Clear to auscultation bilateral.       No respiratory distress.      Good air exchange.     No rales or wheezing.  ABD:    Soft, non-tender, non-distended.       No pulsatile masses.       No rebound, guarding or rigidity.  MSK:     No gross deformity to all four extremities.   LYMPH:   No cervical lymphadenopathy.  NEURO:   Alert. Good muscle tone, no atrophy.  Skin:    Warm, dry  Psych:    Mood is good and affect " is appropriate.      Emergency Department Course     ECG:  Indication: chest pain  Completed at 0929.  Read at 0930.   Normal sinus rhythm   Left axis deviation  Abnormal ECG    No significant change compared to 18  Rate 65 bpm. IA interval 202. QRS duration 106. QT/QTc 434/451. P-R-T axes 29 -35 15.     Imaging:  Radiology findings were communicated with the patient who voiced understanding of the findings.  Chest XR,  PA & LAT   Preliminary Result   IMPRESSION: Stable heart size. The previously demonstrated   interstitial edema has resolved. No new focal lung consolidation,   pleural effusion or pneumothorax appreciated.      Report per radiology      Laboratory:  Laboratory findings were communicated with the patient and family who voiced understanding of the findings.  Troponin (Collected 932): <0.015  BMP: 2 anion gap, 102 Glucose o/w WNL (Creatinine 0.96)   CBC: AWNL (WBC 4.3, HGB 12.5, )     Emergency Department Course:  Nursing notes and vitals reviewed.  0925: I performed an exam of the patient as documented above.   IV was inserted and blood was drawn for laboratory testing, results above.  The patient was sent for a Chest XR while in the emergency department, results above.   1037: Findings and plan explained to the Patient. Patient discharged home with instructions regarding supportive care, medications, and reasons to return. The importance of close follow-up was reviewed.   I personally reviewed the laboratory and imaging results with the Patient and answered all related questions prior to discharge.     Impression & Plan    Medical Decision Makin-year-old male presented to the ED with chest pain for last 2 to 3 days.  EKG reveals no ischemic changes.  Troponin is within normal limits despite greater than 24 hours of constant symptoms thus ruling out MI.  There are no features concerning for aortic dissection, aortic aneurysm or pulmonary embolus.  Chest x-ray unremarkable.  The  remainder of his evaluation was unremarkable.  The cause of his symptoms are uncertain.  Differential diagnosis would include atypical reflux, esophageal spasm, intercostal muscle strain, gastritis, pleurisy.  Patient safe for discharge home and will closely follow up with primary care physician for further investigation and treatment.    Diagnosis:    ICD-10-CM    1. Chest pain, unspecified type R07.9        Disposition:  discharged to home    Scribe Disclosure:  IYana MD, am serving as a scribe at 9:30 AM on 9/20/2019 to document services personally performed by Tonio Long MD based on my observations and the provider's statements to me.    9/20/2019   [facility] EMERGENCY DEPARTMENT       Tonio Long MD  09/20/19 1210

## 2019-09-20 NOTE — ED AVS SNAPSHOT
[facility] Emergency Department  [address]  [facility] [address]  Phone:  [phone]  Fax:  [phone]                                    Jamar Ivory   MRN: [id number]    Department:  [facility] Emergency Department   Date of Visit:  9/20/2019           After Visit Summary Signature Page    I have received my discharge instructions, and my questions have been answered. I have discussed any challenges I see with this plan with the nurse or doctor.    ..........................................................................................................................................  Patient/Patient Representative Signature      ..........................................................................................................................................  Patient Representative Print Name and Relationship to Patient    ..................................................               ................................................  Date                                   Time    ..........................................................................................................................................  Reviewed by Signature/Title    ...................................................              ..............................................  Date                                               Time          22EPIC Rev 08/18

## 2019-09-20 NOTE — LETTER
9/20/2019    Blanca Peng MD  [address] [facility] [address]    RE: Jamar Keenan Cachorro       Dear Colleague,    I had the pleasure of seeing Jamar Keenan Cachorro in the [facility].    Patient not seen in clinic    Pt was not seen in clinic.  Due to symptoms he was sent to the ER for further evaluation.     Patient not seen in clinic.    Patient presented to CORE clinic today with complaints of crushing chest pain that has been going on for 2 days, notes slight improvement today. This is accompanied with dizziness/lightheadedness and increased fatigued. EKG in clinic shows no acute ischemic changes.      Recommended presenting to the ED for urgent evaluation.      Will coordinate close CORE follow up moving forward to continuing optimizing his heart failure medications.          Thank you for allowing me to participate in the care of your patient.      Sincerely,     YENI Meadows CNP     [facility]    cc:   No referring provider defined for this encounter.

## 2019-09-23 ENCOUNTER — CARE COORDINATION (OUTPATIENT)
Dept: CARDIOLOGY | Facility: CLINIC | Age: 69
End: 2019-09-23

## 2019-09-23 NOTE — PROGRESS NOTES
[facility]    MyHealth Tracker Daily Monitoring      Daily Weight Goal: 321-326 lbs    Today's Weight: 330 lbs    Alert: 4 lbs above parameter and yes to SOB    Diuretic: Torsemide 40 mg daily      Notes: I left a message for them to return my call and what my call was in reference to.      My Health-Tracker Trends:         Blanca Bright RN    Heart Failure Coordinator, [place]  09/23/19 1:35 PM

## 2019-09-23 NOTE — PROGRESS NOTES
[facility]        I spoke to Melissa and Kenneth and confirmed his medications. His medication list in Epic is correct.    His SOB is the same as it always is. He denies lower extremity edema.    His weight has been around 330 lbs since 9/18/19.     Patient and wife stated they would prefer to not take any additional diuretic tonight.     Since his SOB has remained unchanged I told them we can watch him one more night to see how his weight does and if his SOB increases.    Blanca Bright RN    Heart Failure Coordinator, [place]  09/23/19 3:15 PM

## 2019-09-24 NOTE — PROGRESS NOTES
Phone encounter reviewed.     Patient can take extra 20mg torsemide this afternoon for complaints of worsening dyspnea. Weight is down 5# since yesterday.       YENI Meadows CNP   [facility]  Text Page

## 2019-09-24 NOTE — PROGRESS NOTES
[facility]          Per Rozina Bond, RICCARDO I instructed Kenneth to take additional Torsemide 20 mg this afternoon. He repeated my instructions back to me.     Blanca Bright RN    Heart Failure Coordinator, [place]  09/24/19 4:05 PM

## 2019-09-24 NOTE — PROGRESS NOTES
[facility]    MyHealth Tracker Daily Monitoring      Daily Weight Goal: 321-326 lbs    Today's Weight: 325 lbs    Alert: Yes to SOB is more today than yesterday    Diuretic: Torsemide 40 mg daily    SOB/GOMEZ: He was SOB when he woke up this morning and still feels like it is a little harder to breathe.    Lower Extremity/Abdominal Edema: Denies    Diet/Fluid Intake: He stated he still does not have an appetite and he eats what his wife gives him.    Next Follow-Up Appointment: 10/9/19 with Rozina Bond CNP     Notes: Creatinine 0.96 BUN 18 on 9/20/19    Plan: Forward to Rozina Bond CNP for further recommendations    My Health-Tracker Trends:   '    Blanca Bright RN    Heart Failure Coordinator, [place]  09/24/19 12:32 PM

## 2019-09-25 ENCOUNTER — OFFICE VISIT (OUTPATIENT)
Dept: FAMILY MEDICINE | Facility: CLINIC | Age: 69
End: 2019-09-25
Payer: MEDICARE

## 2019-09-25 VITALS
HEART RATE: 71 BPM | BODY MASS INDEX: 48.66 KG/M2 | WEIGHT: 315 LBS | SYSTOLIC BLOOD PRESSURE: 124 MMHG | DIASTOLIC BLOOD PRESSURE: 76 MMHG | TEMPERATURE: 97.6 F | OXYGEN SATURATION: 95 %

## 2019-09-25 DIAGNOSIS — R07.9 CHEST PAIN, UNSPECIFIED TYPE: ICD-10-CM

## 2019-09-25 DIAGNOSIS — F32.1 MODERATE MAJOR DEPRESSION (H): ICD-10-CM

## 2019-09-25 DIAGNOSIS — I50.22 CHRONIC SYSTOLIC HEART FAILURE (H): ICD-10-CM

## 2019-09-25 DIAGNOSIS — L97.902: Primary | ICD-10-CM

## 2019-09-25 PROCEDURE — 99215 OFFICE O/P EST HI 40 MIN: CPT | Performed by: FAMILY MEDICINE

## 2019-09-25 NOTE — PROGRESS NOTES
[facility]     My Vernon-Tracker Daily Monitoring        Daily Weight Goal: 321-326 lbs      Today's Weight: 325 lbs       Diuretic:  Torsemide 40 mg daily     Plan: Pt was to take extra 20 mg torsemide 9/24 per Rozina Bond CNP    Next Follow-Up Appointment: 10/9/19 with Rozina Bond CNP      No answer, wt unchanged but no symptoms reported.         Haily Graves RN 12:24 PM 09/25/19

## 2019-09-25 NOTE — PROGRESS NOTES
Patient not seen in clinic.    Patient presented to CORE clinic today with complaints of crushing chest pain that has been going on for 2 days, notes slight improvement today. This is accompanied with dizziness/lightheadedness and increased fatigued. EKG in clinic shows no acute ischemic changes.      Recommended presenting to the ED for urgent evaluation.      Will coordinate close CORE follow up moving forward to continuing optimizing his heart failure medications.

## 2019-09-25 NOTE — PROGRESS NOTES
"Subjective     Jamar Shlomo Ivory is a 69 year old male who presents to clinic today for the following health issues:    HPI   ED/UC Followup:    Facility:  [facility]  Date of visit: 9-20-19  Reason for visit: chest pain  Current Status: pressure on chest 5-6/10 now 3-4/10,      Complex 69 year old with CHF, chronic venous stasis and with ulcers that have been non healing, and chronic difficult to treat depressionn non-responsive to SSRIs or SNRI, here today for:    1. Follow up from ER.  Chest pain better.  2. Follow up from palliative care visit.  Feeling still quite down, \"no one can help me\".  \"ready to just have my heart quit\".  Is feeling better on fewer medications, but only slightly.   Has appointment with psychiatrist November 7th.  3. Legs bothering him.  Melissa has been wrapping daily and it is making them smaller, but wounds won't heal.  She is using neosporin and nightly and they burn after using neosporin.  Went to wound clinic once but \"they didn't do anything\".    Reviewed and updated as needed this visit by Provider  Meds  Problems         Review of Systems   ROS COMP: Constitutional, HEENT, cardiovascular, pulmonary, gi and gu systems are negative, except as otherwise noted.      Objective    /76 (Cuff Size: Adult Large)   Pulse 71   Temp 97.6  F (36.4  C) (Tympanic)   Wt 145.2 kg (320 lb)   SpO2 95%   BMI 48.66 kg/m    Body mass index is 48.66 kg/m .  Physical Exam   GENERAL: alert, no distress, obese, elderly, fatigued and appears older than stated age  Right Leg:      Left Leg:            Diagnostic Test Results:  Labs reviewed in Epic        Assessment & Plan     Jamar was seen today for hospital f/u.    Diagnoses and all orders for this visit:    Non-healing ulcer of lower leg with fat layer exposed, unspecified laterality (H)    At this point with 1 year of non healing wounds, we need home care wound help.     Referred.  I do think patient would qualify as he \"doesn't " "leave the home at all except when Melissa drives me to doctor's appointments\".    I also think a regular home care visit could help with med compliance and CHF management, and potentially depression as well.  -     HOME CARE NURSING REFERRAL    Chronic systolic heart failure (H)    Patient declines statin and beta blockers.    Managed through CORE clinic.     Otherwise stable.  Doing better on torsemide then furosemide.  Weight down.  Wt Readings from Last 4 Encounters:   09/25/19 145.2 kg (320 lb)   09/20/19 147 kg (324 lb)   09/20/19 147.1 kg (324 lb 4.8 oz)   09/06/19 147.9 kg (326 lb)     -     STATIN NOT PRESCRIBED (INTENTIONAL); Please choose reason not prescribed, below  -     HOME CARE NURSING REFERRAL  -     BETA BLOCKER NOT PRESCRIBED (INTENTIONAL); Beta Blocker not prescribed intentionally due to Evidence of fluid overload or volume depletion and Refusal by patient    Moderate major depression (H)    Severe, and non responsive to medications in past.    REferred to psych - has appointment on 11/7.    Patient states \"I hope they'll be able to think of something to help\".    Follow up monthly,continue to offer support.      Return in about 1 month (around 10/25/2019) for Follow up.    55 minutes total were spent face to face with the patient including history, exam with >50% spent on counseling and coordination of care.      Blanca Peng MD  [facility]      GET HOME CARE< WOUND NURSE< WOUND SUPPLIES FOR PT  "

## 2019-09-26 ENCOUNTER — CARE COORDINATION (OUTPATIENT)
Dept: CARDIOLOGY | Facility: CLINIC | Age: 69
End: 2019-09-26

## 2019-09-26 NOTE — TELEPHONE ENCOUNTER
Error. Please disregard.    MERY Amezquita  [facility] Care Coordination  [facility]   Email: [email or web address]  Tele: [phone]

## 2019-09-26 NOTE — PROGRESS NOTES
"[facility]     My Vernon-Tracker Daily Monitoring        Daily Weight Goal: 321 - 326 lbs      Today's Weight: 330 lbs      Alert: 5 lbs wt gain over night     Diuretic:  Torsemide 40 mg daily      Spoke to wife, Melissa. Pt is having a hard time with his depression. He did have \"a lot of soda and water yesterday. \" Melissa states pt reports CP is resolved. CP was 1-2/10 yesterday. Melissa reports pt was up at 0400 this morning and could not fall back to sleep. He did go back to sleep at [id number] and \"is snoring up a storm now\".   Pt's legs are bothering him still. Melissa was putting neosporin on his leg wounds and then wrapping them. Pt would c/o burning sensation after about an hr. They saw his PCP yesterday dn she recommended using Vaseline instead. They used it last night and pt did not complain about burning. They did go to the wound clinic once but \"they didn't do anything\".  They will be having home care out soon for wound care. Pt has been dealing with non healing leg ulcers, medication compliance and potentially depression. PCP sent referral.     1115: Pt woke up and called back. He is feeling tried. Chest feels \"a little\" heavy. Pt's wt is up 5lbs from yesterday. Pt denies eating high salt foods, \"I just drank a lot of water and soda.\" Reminded him to be mindful of his fluid intake.   Pt did go to ER for CP on 9/20 and had negative work up. He did take his torsemide this morning and is voiding.     Will follow up with pt tomorrow.   Next appt 11/7 with psych  Next appt 10/9 & 10/25 w/Rozina.        Haily Graves RN 12:35 PM 09/26/19    "

## 2019-09-27 ENCOUNTER — TELEPHONE (OUTPATIENT)
Dept: FAMILY MEDICINE | Facility: CLINIC | Age: 69
End: 2019-09-27

## 2019-09-27 NOTE — TELEPHONE ENCOUNTER
Reason for Call: Request for an order or referral:    Order or referral being requested: home care orders    Date needed: as soon as possible    Has the patient been seen by the PCP for this problem? YES    Additional comments: April a nurse with [place] [facility] called to get verbal orders from Dr. Peng for skilled nursing visits twice a week for three weeks and three as needed visits. Occupational therapy for lymphedema to evaluate and treat. Wound care for legs-vaseline with non stick guaze and compression wraps.    Phone number Patient can be reached at:  Other phone number: [phone]    Best Time: Any    Can we leave a detailed message on this number?  YES    Call taken on 9/27/2019 at 1:45 PM by Whitney Duarte

## 2019-09-30 ENCOUNTER — CARE COORDINATION (OUTPATIENT)
Dept: CARDIOLOGY | Facility: CLINIC | Age: 69
End: 2019-09-30

## 2019-09-30 NOTE — PROGRESS NOTES
[facility]     My Vernon-Tracker Daily Monitoring        Daily Weight Goal: 321-326 lbs      Today's Weight: 321 lbs      Diuretic:  Torsemide 40 mg daily     No answer when call placed to pt.     Next appt 11/7 with psych  Next appt 10/9 & 10/25 w/Rozina.        Haily Graves RN 12:00 PM 09/30/19

## 2019-10-01 ENCOUNTER — HEALTH MAINTENANCE LETTER (OUTPATIENT)
Age: 69
End: 2019-10-01

## 2019-10-03 ENCOUNTER — TELEPHONE (OUTPATIENT)
Dept: FAMILY MEDICINE | Facility: CLINIC | Age: 69
End: 2019-10-03

## 2019-10-03 NOTE — TELEPHONE ENCOUNTER
Our goal is to have forms completed within 72 hours, however some forms may require a visit or additional information.    What clinic location was the form placed at [facility] or [place].?     Who is the form from?   Where did the form come from? Faxed to clinic   The form was placed in the inbox of Blanca Peng MD      Please fax to [phone]  Phone number: [phone]    Additional comments: [facility]- HHC/POC (certification period 09/27/2019-11/25/2019)    Call take on 10/3/2019 at 1:38 PM by Johanna Valerio

## 2019-10-04 ENCOUNTER — TELEPHONE (OUTPATIENT)
Dept: FAMILY MEDICINE | Facility: CLINIC | Age: 69
End: 2019-10-04

## 2019-10-04 DIAGNOSIS — N40.1 BENIGN PROSTATIC HYPERPLASIA WITH WEAK URINARY STREAM: ICD-10-CM

## 2019-10-04 DIAGNOSIS — R39.12 BENIGN PROSTATIC HYPERPLASIA WITH WEAK URINARY STREAM: ICD-10-CM

## 2019-10-04 DIAGNOSIS — L97.902: Primary | ICD-10-CM

## 2019-10-04 RX ORDER — TRIAMCINOLONE ACETONIDE 1 MG/G
CREAM TOPICAL DAILY
Status: SHIPPED | OUTPATIENT
Start: 2019-10-04 | End: 2019-10-05

## 2019-10-04 NOTE — TELEPHONE ENCOUNTER
"Requested Prescriptions   Pending Prescriptions Disp Refills     tamsulosin (FLOMAX) 0.4 MG capsule [Pharmacy Med Name: TAMSULOSIN HCL 0.4 MG CAPSULE]  Last Written Prescription Date:  5/15/2019  Last Fill Quantity: na,  # refills: na   Last office visit: 9/25/2019 with prescribing provider:  Danish   Future Office Visit:     180 capsule 3     Sig: TAKE 2 CAPSULES (0.8 MG) BY MOUTH DAILY       Alpha Blockers Passed - 10/4/2019  1:29 AM        Passed - Blood pressure under 140/90 in past 12 months     BP Readings from Last 3 Encounters:   09/25/19 124/76   09/20/19 (!) 167/97   09/20/19 134/80                 Passed - Recent (12 mo) or future (30 days) visit within the authorizing provider's specialty     Patient has had an office visit with the authorizing provider or a provider within the authorizing providers department within the previous 12 mos or has a future within next 30 days. See \"Patient Info\" tab in inbasket, or \"Choose Columns\" in Meds & Orders section of the refill encounter.              Passed - Patient does not have Tadalafil, Vardenafil, or Sildenafil on their medication list        Passed - Medication is active on med list        Passed - Patient is 18 years of age or older           "

## 2019-10-04 NOTE — TELEPHONE ENCOUNTER
Home Care Contact (Carisa)     Attempt # 1    Was call answered?  No.  Left message on voicemail with information to call triage back.    Upon callback, notify that triamcinolone was sent to pharmacy. Does she need order for hydrogel and adaptic or just verbal order?

## 2019-10-04 NOTE — TELEPHONE ENCOUNTER
Carisa, wound nurse with  Home Care calling to request orders for:     1. Hydrogel Silver and Adaptic - daily to wound on Lower Legs.   2. Triamcinolone cream for leg daily ([facility] in [place])    Routing to provider to approve.    Traige: Okay to leave detailed voicemail with orders.

## 2019-10-04 NOTE — TELEPHONE ENCOUNTER
I signed triamcinolone order - do we need to add hydrogel silver and adaptic?  Thanks!  Dr. Blanca Peng MD/[facility]

## 2019-10-07 RX ORDER — TAMSULOSIN HYDROCHLORIDE 0.4 MG/1
CAPSULE ORAL
Qty: 180 CAPSULE | Refills: 3 | Status: SHIPPED | OUTPATIENT
Start: 2019-10-07 | End: 2020-10-14

## 2019-10-07 NOTE — TELEPHONE ENCOUNTER
Carisa was informed Rx for triamcinolone was sent to pt's pharmacy. Verbal approval was given for hydrogel silver and adaptic. No further action needed.

## 2019-10-08 ENCOUNTER — TELEPHONE (OUTPATIENT)
Dept: FAMILY MEDICINE | Facility: CLINIC | Age: 69
End: 2019-10-08

## 2019-10-08 NOTE — TELEPHONE ENCOUNTER
Our goal is to have forms completed within 72 hours, however some forms may require a visit or additional information.    What clinic location was the form placed at [facility] or [place].?     Who is the form from?   Where did the form come from? Faxed to clinic   The form was placed in the inbox of Blanca Peng MD      Please fax to [phone]  Phone number: [phone]    Additional comments: [facility] - new order for triamcinolone; SN 10/4/2019 (2)    Call take on 10/8/2019 at 9:44 AM by Leslie Allen

## 2019-10-09 ENCOUNTER — CARE COORDINATION (OUTPATIENT)
Dept: CARDIOLOGY | Facility: CLINIC | Age: 69
End: 2019-10-09

## 2019-10-09 ENCOUNTER — OFFICE VISIT (OUTPATIENT)
Dept: CARDIOLOGY | Facility: CLINIC | Age: 69
End: 2019-10-09
Payer: MEDICARE

## 2019-10-09 VITALS
HEART RATE: 78 BPM | HEIGHT: 68 IN | SYSTOLIC BLOOD PRESSURE: 124 MMHG | BODY MASS INDEX: 47.74 KG/M2 | WEIGHT: 315 LBS | OXYGEN SATURATION: 94 % | DIASTOLIC BLOOD PRESSURE: 78 MMHG

## 2019-10-09 DIAGNOSIS — E78.5 HYPERLIPIDEMIA WITH TARGET LDL LESS THAN 100: Chronic | ICD-10-CM

## 2019-10-09 DIAGNOSIS — I10 HYPERTENSION GOAL BP (BLOOD PRESSURE) < 140/90: Chronic | ICD-10-CM

## 2019-10-09 DIAGNOSIS — I50.42 CHRONIC COMBINED SYSTOLIC AND DIASTOLIC CONGESTIVE HEART FAILURE (H): Primary | ICD-10-CM

## 2019-10-09 DIAGNOSIS — R06.02 SOB (SHORTNESS OF BREATH): ICD-10-CM

## 2019-10-09 DIAGNOSIS — I50.42 CHRONIC COMBINED SYSTOLIC AND DIASTOLIC CONGESTIVE HEART FAILURE (H): ICD-10-CM

## 2019-10-09 LAB
ANION GAP SERPL CALCULATED.3IONS-SCNC: 5 MMOL/L (ref 3–14)
BUN SERPL-MCNC: 16 MG/DL (ref 7–30)
CALCIUM SERPL-MCNC: 8.6 MG/DL (ref 8.5–10.1)
CHLORIDE SERPL-SCNC: 104 MMOL/L (ref 94–109)
CO2 SERPL-SCNC: 27 MMOL/L (ref 20–32)
CREAT SERPL-MCNC: 0.87 MG/DL (ref 0.66–1.25)
GFR SERPL CREATININE-BSD FRML MDRD: 87 ML/MIN/{1.73_M2}
GLUCOSE SERPL-MCNC: 104 MG/DL (ref 70–99)
NT-PROBNP SERPL-MCNC: 209 PG/ML (ref 0–125)
POTASSIUM SERPL-SCNC: 3.7 MMOL/L (ref 3.4–5.3)
SODIUM SERPL-SCNC: 136 MMOL/L (ref 133–144)

## 2019-10-09 PROCEDURE — 99214 OFFICE O/P EST MOD 30 MIN: CPT | Performed by: NURSE PRACTITIONER

## 2019-10-09 PROCEDURE — 80048 BASIC METABOLIC PNL TOTAL CA: CPT | Performed by: NURSE PRACTITIONER

## 2019-10-09 PROCEDURE — 83880 ASSAY OF NATRIURETIC PEPTIDE: CPT | Performed by: NURSE PRACTITIONER

## 2019-10-09 PROCEDURE — 36415 COLL VENOUS BLD VENIPUNCTURE: CPT | Performed by: NURSE PRACTITIONER

## 2019-10-09 RX ORDER — POTASSIUM CHLORIDE 1500 MG/1
20 TABLET, EXTENDED RELEASE ORAL DAILY
Qty: 90 TABLET | Refills: 1 | Status: SHIPPED | OUTPATIENT
Start: 2019-10-09 | End: 2019-12-04

## 2019-10-09 RX ORDER — TORSEMIDE 20 MG/1
60 TABLET ORAL DAILY
Qty: 270 TABLET | Refills: 1 | Status: SHIPPED | OUTPATIENT
Start: 2019-10-09 | End: 2019-10-17

## 2019-10-09 ASSESSMENT — MIFFLIN-ST. JEOR: SCORE: 2199.62

## 2019-10-09 NOTE — PATIENT INSTRUCTIONS
Call C.O.R.VIDYA. nurse for any questions or concerns Mon-Fri 8am-4pm: [phone] For concerns after hours: [phone]    Medication changes: INCREASE torsemide to 60mg daily (3 tablets daily) START potassium 20meq (1 tablet) daily.     Plan from today: Follow up with [facility] in 2 weeks with labs.     Lab results: stable kidney function and electrolytes  Component      Latest Ref Rng & Units 10/9/2019   Sodium      133 - 144 mmol/L 136   Potassium      3.4 - 5.3 mmol/L 3.7   Chloride      94 - 109 mmol/L 104   Carbon Dioxide      20 - 32 mmol/L 27   Anion Gap      3 - 14 mmol/L 5   Glucose      70 - 99 mg/dL 104 (H)   Urea Nitrogen      7 - 30 mg/dL 16   Creatinine      0.66 - 1.25 mg/dL 0.87   GFR Estimate      >60 mL/min/1.73:m2 87   GFR Estimate If Black      >60 mL/min/1.73:m2 >90   Calcium      8.5 - 10.1 mg/dL 8.6   N-Terminal Pro Bnp      0 - 125 pg/mL 209 (H)
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
## 2019-10-10 NOTE — PROGRESS NOTES
[facility]  [facility]    [employer] Tracker Heart Failure Alert      Daily Weight Goal: 321-326 lbs    Today's Weight: 324 lbs    Weight Alert: 0 lbs over weight parameters    Symptom Alert 10/10/19: Yes       3) Did shortness of breath wake you up last night?   4) Did you prop up on more pillows or sleep in a chair to breathe      easier last night?    Current Diuretic Regimen: Torsemide 60 mg daily    Potassium Replacement: KCL 20 mEq daily    Follow-Up Appointments: 10/25/19 with Rozina Bond CNP    Plan: Monitor for one more day. His Torsemide was increased yesterday by Rozina Bond CNP.       MyHealth Tracker Weight Trends:         MyHealth Tracker Weight Graph:         Blanca Bright RN    Heart Failure Coordinator, [place]  10/10/19 9:56 AM

## 2019-10-14 NOTE — PROGRESS NOTES
[facility]     [employer] Daily Monitoring        Daily Weight Goal: 321-326 lbs      Today's Weight: 327 lbs      Alert: 'Yes' to SOB and 1 lb above goal     Diuretic:  Torsemide 60 mg daily w/KCL 20 mEq daily       No answer, left VM.   Next appt 1025 w/Rozina Graves RN 11:57 AM 10/14/19

## 2019-10-15 NOTE — PROGRESS NOTES
[facility]     [employer] Daily Monitoring           Daily Weight Goal: 321-326 lbs      Today's Weight: 322 lbs      Alert: 'Yes' to SOB      Diuretic:  Torsemide 60 mg daily w/KCL 20 mEq daily       No answer when call to pt. Back within threshold goal without interventions.           Haily Graves, RN 10:36 AM 10/15/19

## 2019-10-16 ENCOUNTER — TELEPHONE (OUTPATIENT)
Dept: FAMILY MEDICINE | Facility: CLINIC | Age: 69
End: 2019-10-16

## 2019-10-16 ENCOUNTER — PATIENT OUTREACH (OUTPATIENT)
Dept: CARE COORDINATION | Facility: CLINIC | Age: 69
End: 2019-10-16

## 2019-10-16 ASSESSMENT — ACTIVITIES OF DAILY LIVING (ADL): DEPENDENT_IADLS:: INDEPENDENT

## 2019-10-16 NOTE — TELEPHONE ENCOUNTER
Our goal is to have forms completed within 72 hours, however some forms may require a visit or additional information.    What clinic location was the form placed at [facility] or [place]     Who is the form from?   Where did the form come from? Faxed to clinic   The form was placed in the inbox of Blanca Peng MD      Please fax to [phone]  Phone number: [phone]    Additional comments: [facility] - OT 2 week 2, 1 week 1    Call take on 10/16/2019 at 4:35 PM by Leslie Allen

## 2019-10-16 NOTE — PROGRESS NOTES
Clinic Care Coordination Contact    Follow Up Progress Note     Assessment: The patient reports to be doing well and denied outstanding concerns at this time.  He is currently working with [facility] and continues close follow-up with Cardiology/CORE.  The patient's wife updated that the patient has been getting his leg wrapped by home care, which she has also been assisting with when needed.  Both patient and wife denied need for additional assistance from Care Coordination at this time.    Goals addressed this encounter:   Goals Addressed                 This Visit's Progress       Patient Stated      COMPLETED: 1. Medical (pt-stated)        Goal Statement: I will not have any heart failure-related hospitalizations within the next 6 months.  Measure of Success: The patient will have no hospitalizations related to CHF.  Supportive Steps to Achieve: The patient will take his medications as prescribed, monitor is weight daily and notify clinic with weight gain of 2 or more lbs in a day or 5 or more lbs in a week, promptly report new/worsening symptoms to clinic, follow a low-salt diet, and participate in routine follow-up with my care teams.  Barriers: Depression, limited knowledge about managing CHF.  Strengths: The patient is motivated to avoid hospitalizations.  Date to Achieve By: 8/31/19  Patient expressed understanding of goal: Yes          Home Care Contact:              Home Care Agency: [facility]              Contact name () and phone number: Jonnie Lanza, ph: [phone]              Anticipated start of care date: 9/27/19    Plan: The patient will continue working with home care and Cardiology/CORE as scheduled.  He will contact CCC RN with any additional questions or concerns.  CCC RN will make no further scheduled patient outreaches at this time but will remain available should any patient needs arise.    Solo Dia RN  Clinic Care Coordinator  [facility]  [employer]  Ph: [phone]

## 2019-10-17 ENCOUNTER — CARE COORDINATION (OUTPATIENT)
Dept: CARDIOLOGY | Facility: CLINIC | Age: 69
End: 2019-10-17

## 2019-10-17 DIAGNOSIS — I50.42 CHRONIC COMBINED SYSTOLIC AND DIASTOLIC CONGESTIVE HEART FAILURE (H): ICD-10-CM

## 2019-10-17 RX ORDER — TORSEMIDE 20 MG/1
40 TABLET ORAL DAILY
Qty: 180 TABLET | Refills: 1 | Status: SHIPPED | OUTPATIENT
Start: 2019-10-17 | End: 2019-10-25

## 2019-10-17 NOTE — PROGRESS NOTES
Okay to decrease torsemide to 40mg daily. Keep CORE f/u visit scheduled for 10/25/19, will have CORE RN meet with him after that visit to provide further sodium education.

## 2019-10-17 NOTE — PROGRESS NOTES
Call to pt. Spoke to wife. Did let her know that Rozina is ok with pt decreasing torsemide from 60 mg daily to 40 mg daily. Pt asked for writer to send in updated prescription to [facility].   Haily Graves RN 1:08 PM 10/17/19

## 2019-10-17 NOTE — PROGRESS NOTES
"Received call from Jonnie [facility] RN, reporting that pt is \"miserable and would like to go back to 40 mg of torsemide\". He is also declining HC. He will participate in out pt lymphedema therapy and wife will do his wound care.   Pt was last seen in CORE on 10/9 by Rozina Bond CNP. She increased his torsemide to 60 mg daily and started him on KCL 20 mEq daily. Pt does not exercise and overindulges in food.   Pt is enrolled into [facility] and calls in frequently. Wts varying 1-4 lbs difference each day with SOB daily that's not worse than the previous day.         Call to pt and wife. Pt is not feeling well today with frequent bathroom trips. He is voiding every \"5-10 mins after he takes that water pill\".  His legs no longer itching. Wife is \"very worried\" about pt's depression. \"It is getting really bad. I can't get him to do anything.\" Pt is not on any medication for depression and declines them as he does not think they work. Wife reports pt is also having a lot of pain his back and legs. Wife reports his legs are looking \"better. The wounds are finally healing and the swelling. Pt and wife likes Luis Armando at the Lymphedema clinic and will continue to see him.   Wife did confirm that pt is declining home care and wants to decrease his diuretic.   Pt is not eating a low sodium diet. They had hot dogs last evening and he had canned soup for lunch. Wife states she is planning on making bologna for dinner. Did let wife know that diet does strongly impact pt's HF symptoms and water retention. Both pt and wife need further diet education.     Next appt 10/25 w/Rozina  Will review with Rozina for recommendations.   Haily Graves RN 11:54 AM 10/17/19    "

## 2019-10-21 ENCOUNTER — MEDICAL CORRESPONDENCE (OUTPATIENT)
Dept: HEALTH INFORMATION MANAGEMENT | Facility: CLINIC | Age: 69
End: 2019-10-21

## 2019-10-23 ENCOUNTER — CARE COORDINATION (OUTPATIENT)
Dept: CARDIOLOGY | Facility: CLINIC | Age: 69
End: 2019-10-23

## 2019-10-23 NOTE — PROGRESS NOTES
[facility] - [facility]    [employer] Tracker Heart Failure Alert      Daily Weight Goal: 321-326 lbs    Today's Weight: 329 lbs    Weight Alert: 3 lbs over weight parameters    Symptom Alert: NONE    Current Diuretic Regimen: Torsemide 40 mg daily    Potassium Replacement: Potassium Chloride 20 mEq daily    Follow-Up Appointments: 10/25/2019 with Rozina Bond CNP    Notes: Left a message for Kenneth or his wife to call me back regarding his increased weight today.    MyHealth Tracker Weight Trends:         MyHealth Tracker Weight Graph:       Blanca Bright RN    [facility]  10/23/19 9:57 AM

## 2019-10-24 ENCOUNTER — DOCUMENTATION ONLY (OUTPATIENT)
Dept: CARDIOLOGY | Facility: CLINIC | Age: 69
End: 2019-10-24

## 2019-10-24 NOTE — PROGRESS NOTES
[facility]- CORE Clinic MyHealth Tracker    For CORE OV Review: 10/25/19    MyHealth Tracker Activitated: 6/7/19    Diuretic: Torsemide 40 mg daily    KCL:  20 mEq daily    Goal Weight: 321-326 lbs    Diuretic Plan: None              Haily Graves RN 4:57 PM 10/24/19

## 2019-10-24 NOTE — PROGRESS NOTES
Spoke to wife and pt. No changes. We will see them tomorrow at appt.   Haily Graves RN 11:57 AM 10/24/19

## 2019-10-24 NOTE — PROGRESS NOTES
[facility]     [employer] Daily Monitoring        Daily Weight Goal: 321 - 326 lbs      Today's Weight: 329 lbs x 2 days       Alert:  3 lbs over parameter     Diuretic:  Torsemide 40 mg daily     Potassium:  KCL 20 mEq daily     Spoke to pt and wife. Pt feels weak and tired. Wife states pt does not do any activity and needs. Writer agreed and encouraged to have him start with being active for 5 min and working up slowly. Pt states his legs are sore from his wounds. A new blister on his R leg popped last night. They are wrapping his legs ad wife has non stick dressings that she uses. Pt sees lymphedema tomorrow.  Pt and wife not able to recall diet. Reminded them to be mindful of sodium intake as well as fluid intake. Encouraged him to try to limit soda.   Pt is voidng every 30 min. No dizziness, cramping or SOB.   Will review w/Rozina as she sees pt tomorrow  Haily Graves RN 10:06 AM 10/24/19

## 2019-10-25 ENCOUNTER — OFFICE VISIT (OUTPATIENT)
Dept: CARDIOLOGY | Facility: CLINIC | Age: 69
End: 2019-10-25
Payer: MEDICARE

## 2019-10-25 VITALS
BODY MASS INDEX: 47.74 KG/M2 | HEIGHT: 68 IN | SYSTOLIC BLOOD PRESSURE: 142 MMHG | WEIGHT: 315 LBS | HEART RATE: 76 BPM | DIASTOLIC BLOOD PRESSURE: 88 MMHG | OXYGEN SATURATION: 91 %

## 2019-10-25 DIAGNOSIS — I10 HYPERTENSION GOAL BP (BLOOD PRESSURE) < 140/90: Chronic | ICD-10-CM

## 2019-10-25 DIAGNOSIS — E66.01 OBESITY, MORBID (MORE THAN 100 LBS OVER IDEAL WEIGHT OR BMI > 40) (H): ICD-10-CM

## 2019-10-25 DIAGNOSIS — E78.5 HYPERLIPIDEMIA WITH TARGET LDL LESS THAN 100: Chronic | ICD-10-CM

## 2019-10-25 DIAGNOSIS — I50.42 CHRONIC COMBINED SYSTOLIC AND DIASTOLIC CONGESTIVE HEART FAILURE (H): Primary | ICD-10-CM

## 2019-10-25 DIAGNOSIS — I50.42 CHRONIC COMBINED SYSTOLIC AND DIASTOLIC CONGESTIVE HEART FAILURE (H): ICD-10-CM

## 2019-10-25 LAB
ANION GAP SERPL CALCULATED.3IONS-SCNC: 5 MMOL/L (ref 3–14)
BUN SERPL-MCNC: 11 MG/DL (ref 7–30)
CALCIUM SERPL-MCNC: 8.8 MG/DL (ref 8.5–10.1)
CHLORIDE SERPL-SCNC: 108 MMOL/L (ref 94–109)
CO2 SERPL-SCNC: 25 MMOL/L (ref 20–32)
CREAT SERPL-MCNC: 0.91 MG/DL (ref 0.66–1.25)
GFR SERPL CREATININE-BSD FRML MDRD: 85 ML/MIN/{1.73_M2}
GLUCOSE SERPL-MCNC: 98 MG/DL (ref 70–99)
POTASSIUM SERPL-SCNC: 4 MMOL/L (ref 3.4–5.3)
SODIUM SERPL-SCNC: 138 MMOL/L (ref 133–144)

## 2019-10-25 PROCEDURE — 99214 OFFICE O/P EST MOD 30 MIN: CPT | Performed by: NURSE PRACTITIONER

## 2019-10-25 PROCEDURE — 36415 COLL VENOUS BLD VENIPUNCTURE: CPT | Performed by: NURSE PRACTITIONER

## 2019-10-25 PROCEDURE — 80048 BASIC METABOLIC PNL TOTAL CA: CPT | Performed by: NURSE PRACTITIONER

## 2019-10-25 RX ORDER — TORSEMIDE 20 MG/1
TABLET ORAL
Qty: 180 TABLET | Refills: 1 | Status: SHIPPED | OUTPATIENT
Start: 2019-10-25 | End: 2020-03-27

## 2019-10-25 ASSESSMENT — MIFFLIN-ST. JEOR: SCORE: 2227.74

## 2019-10-25 NOTE — PATIENT INSTRUCTIONS
Call C.O.R.VIDYA. nurse for any questions or concerns Mon-Fri 8am-4pm: [phone] For concerns after hours: [phone]    Medication changes: INCREASE torsemide to 40mg(2 tablets) in the morning and 20mg(1 tablet) in afternoon.    Plan from today: Labs in 10 day to monitor after adjustments in diuretics.     Follow up with Rozina in 6 weeks with labs    Lab results:   Component      Latest Ref Rng & Units 10/25/2019   Sodium      133 - 144 mmol/L 138   Potassium      3.4 - 5.3 mmol/L 4.0   Chloride      94 - 109 mmol/L 108   Carbon Dioxide      20 - 32 mmol/L 25   Anion Gap      3 - 14 mmol/L 5   Glucose      70 - 99 mg/dL 98   Urea Nitrogen      7 - 30 mg/dL 11   Creatinine      0.66 - 1.25 mg/dL 0.91   GFR Estimate      >60 mL/min/1.73:m2 85   GFR Estimate If Black      >60 mL/min/1.73:m2 >90   Calcium      8.5 - 10.1 mg/dL 8.8
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
## 2019-10-30 DIAGNOSIS — R60.0 BILATERAL LEG EDEMA: Primary | ICD-10-CM

## 2019-10-30 DIAGNOSIS — I50.42 CHRONIC COMBINED SYSTOLIC AND DIASTOLIC CONGESTIVE HEART FAILURE (H): ICD-10-CM

## 2019-10-30 NOTE — PROGRESS NOTES
Placed order for Lymphedema Therapy per Rozina Bond CNP. Ordered under Dr Chand name as pt has medicare for insurance and it is required to be under MD name. Dr Chand is pt's cardiologist.   Haily Graves RN 4:48 PM 10/30/19

## 2019-11-04 ENCOUNTER — CARE COORDINATION (OUTPATIENT)
Dept: CARDIOLOGY | Facility: CLINIC | Age: 69
End: 2019-11-04

## 2019-11-04 DIAGNOSIS — I50.42 CHRONIC COMBINED SYSTOLIC AND DIASTOLIC CONGESTIVE HEART FAILURE (H): ICD-10-CM

## 2019-11-04 LAB
ANION GAP SERPL CALCULATED.3IONS-SCNC: 5 MMOL/L (ref 3–14)
BUN SERPL-MCNC: 14 MG/DL (ref 7–30)
CALCIUM SERPL-MCNC: 9 MG/DL (ref 8.5–10.1)
CHLORIDE SERPL-SCNC: 109 MMOL/L (ref 94–109)
CO2 SERPL-SCNC: 26 MMOL/L (ref 20–32)
CREAT SERPL-MCNC: 0.96 MG/DL (ref 0.66–1.25)
GFR SERPL CREATININE-BSD FRML MDRD: 79 ML/MIN/{1.73_M2}
GLUCOSE SERPL-MCNC: 107 MG/DL (ref 70–99)
POTASSIUM SERPL-SCNC: 4.2 MMOL/L (ref 3.4–5.3)
SODIUM SERPL-SCNC: 140 MMOL/L (ref 133–144)

## 2019-11-04 PROCEDURE — 80048 BASIC METABOLIC PNL TOTAL CA: CPT | Performed by: NURSE PRACTITIONER

## 2019-11-04 PROCEDURE — 36415 COLL VENOUS BLD VENIPUNCTURE: CPT | Performed by: NURSE PRACTITIONER

## 2019-11-04 NOTE — PROGRESS NOTES
[facility]  DORA    [email or web address] Tracker Heart Failure Alert      Daily Weight Goal: 321-326 lbs    Today's Weight: 330 lbs    Weight Alert: 4 lbs over weight parameters    Symptom Alert: NONE    Current Diuretic Regimen: Torsemide 40 mg in the morning and Torsemide 20 mg in the afternoon    Taking Diuretics as Instructed: Yes    Potassium Replacement: K-DUR/KLOR-CON 20 mEq daily    Taking Potassium Replacement as Instructed: Yes    SOB/GOMEZ: Denies     Lower Extremity Edema: Denies    Abdominal Edema: Denies    Diet: Kenneth stated his appetite is poor and he hasn't been eating very much. His wife Melissa stated she made a big meal on Saturday and he ate a lot (Tur[place], Mashed Potato's, Bread, Veggies).     Fluids: He is drinking > 2 liters of water daily    Follow-Up Appointments: Labs Only today @1500 and 12/4/19 with Rozina Bond CNP with labs prior.    Notes: Patient and wife both stated they do not want him to take extra diuretics because he goes to the bathroom to much.    Plan: Forward to Rozina Bond CNP for further orders.      MyHealth Tracker Weight Trends:         MyHealth Tracker Weight Graph:         Blanca Bright RN    [facility], [place]  11/04/19 12:02 PM

## 2019-11-04 NOTE — PROGRESS NOTES
Phone encounter reviewed. Patients weight is up today (330#) but already notes notes unwilling to further increase diuretics. Reviewed lab results from today which show stable kidney function and electrolytes.     No changes, continue current regimen. Please reinforce lifestyle modifications. Continue telehealth tracker.     YENI Meadows CNP

## 2019-11-07 ENCOUNTER — OFFICE VISIT (OUTPATIENT)
Dept: PSYCHIATRY | Facility: CLINIC | Age: 69
End: 2019-11-07
Payer: MEDICARE

## 2019-11-07 VITALS
WEIGHT: 315 LBS | RESPIRATION RATE: 18 BRPM | DIASTOLIC BLOOD PRESSURE: 60 MMHG | SYSTOLIC BLOOD PRESSURE: 122 MMHG | OXYGEN SATURATION: 95 % | HEART RATE: 72 BPM | TEMPERATURE: 97.7 F | HEIGHT: 68 IN | BODY MASS INDEX: 47.74 KG/M2

## 2019-11-07 DIAGNOSIS — F32.1 MODERATE MAJOR DEPRESSION (H): Primary | Chronic | ICD-10-CM

## 2019-11-07 PROCEDURE — 90792 PSYCH DIAG EVAL W/MED SRVCS: CPT | Performed by: NURSE PRACTITIONER

## 2019-11-07 RX ORDER — BUPROPION HYDROCHLORIDE 150 MG/1
150 TABLET, EXTENDED RELEASE ORAL 2 TIMES DAILY
Qty: 60 TABLET | Refills: 1 | Status: SHIPPED | OUTPATIENT
Start: 2019-11-07 | End: 2020-01-27

## 2019-11-07 ASSESSMENT — ANXIETY QUESTIONNAIRES
1. FEELING NERVOUS, ANXIOUS, OR ON EDGE: SEVERAL DAYS
IF YOU CHECKED OFF ANY PROBLEMS ON THIS QUESTIONNAIRE, HOW DIFFICULT HAVE THESE PROBLEMS MADE IT FOR YOU TO DO YOUR WORK, TAKE CARE OF THINGS AT HOME, OR GET ALONG WITH OTHER PEOPLE: SOMEWHAT DIFFICULT
6. BECOMING EASILY ANNOYED OR IRRITABLE: SEVERAL DAYS
2. NOT BEING ABLE TO STOP OR CONTROL WORRYING: NOT AT ALL
5. BEING SO RESTLESS THAT IT IS HARD TO SIT STILL: NOT AT ALL
GAD7 TOTAL SCORE: 2
3. WORRYING TOO MUCH ABOUT DIFFERENT THINGS: NOT AT ALL
7. FEELING AFRAID AS IF SOMETHING AWFUL MIGHT HAPPEN: NOT AT ALL

## 2019-11-07 ASSESSMENT — MIFFLIN-ST. JEOR: SCORE: 2235.91

## 2019-11-07 ASSESSMENT — PAIN SCALES - GENERAL: PAINLEVEL: NO PAIN (0)

## 2019-11-07 ASSESSMENT — PATIENT HEALTH QUESTIONNAIRE - PHQ9
5. POOR APPETITE OR OVEREATING: NOT AT ALL
SUM OF ALL RESPONSES TO PHQ QUESTIONS 1-9: 15

## 2019-11-07 NOTE — Clinical Note
Hi Dr. Peng,Thank you for the Psychiatry referral to the [facility] ([facility]). Our psychiatry providers act as a specialty service for Primary Care Providers in the [facility] that seek to optimize medications for unstable patients.  Once medications have been optimized, our providers discharge the patient back to the referring Primary Care Provider for ongoing medication management.  This type of system allows our providers to serve a high volume of patients. Please see my Impression and Plan. If you have any questions or concerns, please let me know. Rika

## 2019-11-07 NOTE — PROGRESS NOTES
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 Denies  Suicide History: Denies  Medications: Denies     Social History:   Birth place: [place], [place]  Childhood: Yes intact home  Siblings:  zero Brother(s),  zero Sister(s)  Highest education level was associate degree / vocational certificate.   Current Living situation:  [place], [place] with Spouse/Partner and friend Adithya. Feels safe at home.  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     Legal History:  No: Patient denies any legal history    Significant Losses / Trauma / Abuse / Neglect Issues:  There are indications or report of significant loss, trauma, abuse or neglect issues related to: major medical problems   and divorce / relational changes  .   Issues of possible neglect are not " present.   Recommended that patient call 911 or go to the local ED should there be a change in any of these risk factors.    Mental Status Examination:     Appearance:  appeared stated age, awake, alert, mild distress, morbidly obese, slightly unkempt and with wife  Attitude:  cooperative - arguing with wife at times during interview  Eye Contact:  fair and wears glasses  Gait and Station: Normal, No assistive Devices used and No dizziness or falls  Psychomotor Behavior:  no evidence of tardive dyskinesia, dystonia, or tics and fidgeting  Oriented to:  time, person, and place  Attention Span and Concentration:  Normal  Speech:  clear, coherent, regular rate, regular rhythm and fluent  Language: intact  Mood:  depressed  Affect:  mood congruent and intensity is flat  Associations:  no loose associations  Thought Process:  logical, linear, goal oriented and rigid at times  Thought Content:  no evidence of psychotic thought, passive suicidal ideation present and rigid at times  Recent and Remote Memory:  Intact to interview. Not formally assessed. No amnesia.  Fund of Knowledge: appropriate  Insight:  fair  Judgment:  fair, adequate for safety  Impulse Control:  intact    Suicide Risk Assessment:  Today Jamar Ivory reports depression that has been worse the last 1-1/5 years. Reports he just does not wan tot be here anymore. In addition, there are notable risk factors for self-harm, including age, anxiety, previous history of suicide attempts, comorbid medical condition of heart disease, suicidal ideation, anger/rage, withdrawing and mood change. However, risk is mitigated by ability to volunteer a safety plan, history of seeking help when needed, future oriented, denies suicidal intent or plan, no family history of suicide and denies homicidal ideation, intent, or plan. Therefore, based on all available evidence including the factors cited above, Jamar Ivory does not appear to be at imminent risk  for self-harm, does not meet criteria for a 72-hr hold, and therefore remains appropriate for ongoing outpatient level of care.  A thorough assessment of risk factors related to suicide and self-harm have been reviewed and are noted above. The patient convincingly denies acute suicidality on several occasions. Local community safety resources reviewed and printed for patient to use if needed. There was no deceit detected, and the patient presented in a manner that was believable.     DSM5  Diagnosis:  296.32 (F33.1) Major Depressive Disorder, Recurrent Episode, Moderate to Severe  Obesity per BMI of 50.33    Medical Comorbidities Include:   Patient Active Problem List    Diagnosis Date Noted     Benign prostatic hyperplasia with incomplete bladder emptying 08/29/2018     Priority: Medium     Chronic combined systolic and diastolic congestive heart failure (H) 06/28/2018     Priority: Medium     PVD (peripheral vascular disease) (H) 06/28/2018     Priority: Medium     Hx of Diabetic polyneuropathy associated with type 2 diabetes mellitus - now diet controlled (H) 02/28/2018     Priority: Medium     Breast tenderness in male 08/31/2017     Priority: Medium     Lt breast       Pain in both lower extremities 12/03/2016     Priority: Medium     Low hemoglobin 12/03/2016     Priority: Medium     Benign neoplasm of colon 09/08/2016     Priority: Medium     Colon polyp 9/2016; repeat 1-3 years       Diet Controlled Type 2 diabetes mellitus without complication, without long-term current use of insulin (H) 10/25/2015     Priority: Medium     Chronic stasis dermatitis 10/13/2013     Priority: Medium     Bilateral leg edema 10/13/2013     Priority: Medium     Obesity, morbid (more than 100 lbs over ideal weight or BMI > 40) (H)      Priority: Medium     Transient cerebral ischemia 07/04/2012     Priority: Medium     Advanced directives, counseling/discussion 06/26/2012     Priority: Medium     Impaired glucose tolerance  06/26/2012     Priority: Medium     Fatty liver      Priority: Medium     Moderate major depression (H) 06/24/2011     Priority: Medium     Cerebral infarction (H)      Priority: Medium     Hypertension goal BP (blood pressure) < 140/90 01/17/2011     Priority: Medium     Hyperlipidemia with target LDL less than 100 05/09/2010     Priority: Medium     History of TIA       Vitiligo 10/09/2007     Priority: Medium     Acquired hypothyroidism 09/03/2004     Priority: Medium     Health Care Home 12/19/2011     Priority: Low     State Tier Level:    Status:  Closed  Care Coordinator:  Kerrie Perez    See Letters for H Care Plan             Psychosocial & Contextual Factors:  Financial Difficulties, Relationship Difficulties, Phase of Life Difficulties and Medical Comorbidites, Wife had cancer    Strengths and Opportunities:   Jamar Ivory identified the following strengths or resources that will help he succeed in counseling: friends / good social support, family support and sense of humor. Things that may interfere with the patient's success include:  financial hardship.    There are no language or communication issues or need for modification in treatment.   There are no ethnic, cultural or Oriental orthodox factors that may be relevant for therapy.  Client identified their preferred language to be English.  Client does not need the assistance of an  or other support involved in therapy.    A 12-item WHODAS 2.0 assessment was completed by the patient today and recorded in [facility].  No flowsheet data found.    Impression:  Jamar Ivory notes long history of depression that has been worse the last one year.  Patient is not very interested in therapy or adding medications, but would certainly benefit from it. Per Epic Electronic Medical Record, he has been on different antidepressant medications, but is not sure how he reacted to them. Does not seem like he had a combination of medication. We can  re-tiral Zoloft (sertraline) and Wellbutrin (buproprion) and consider augmentation with Abilify (aripriprazole)  if needed. Patient notes significant financial stressors that limit his ability to do things and affects his mood.     Medication side effects and alternatives reviewed. Health promotion activities recommended and reviewed today. All questions addressed. Education and counseling completed regarding risks and benefits of medications and psychotherapy options. Recommend therapy for additional support. 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 Patient is aware of this and we discussed other options for care if needed. We also reviewed the Collaborative Care Psychiatry Service model today.     Treatment Plan:    Start Wellbutrin (buproprion)  mg daily in AM and Noon for depression.     Contact me in about 2-4 weeks and we may consider dose increase if needed.     Continue all other medications as reviewed per electronic medical record today.     Safety plan reviewed. To the Emergency Department as needed or call after hours crisis line at [phone] or [phone]. [place] Crisis Text Line: Text [place] to 465370  or  Suicide LifeLine Chat: suicidepreventionlifeline.org/chat    To schedule individual or family therapy, call [facility] at [phone].     Schedule an appointment with me in 6-8 weeks or sooner as needed.     Follow up with primary care provider as planned or sooner if needed for acute medical concerns.    Call the psychiatric nurse line with medication questions or concerns at [phone].    MyChart may be used to communicate with your provider, but this is not intended to be used for emergencies.    Community Resources:    National Suicide Prevention Lifeline: 742.176.1854 (TTY: 450.674.5287). Call anytime for help.  (www.suicidepreventionlifeline.org)  National Welches on Mental Illness (www.gold.org):  823-188-9745 or 850-798-3314.   Mental Health Association (www.mentalhealth.org): 754.788.5299 or 511-496-8814.  [place] Crisis Text Line: Text [place] to 820909  Suicide LifeLine Chat: suicideprePeriGenline.org/chat    Administrative Billing:   Time spent with patient and wife was 60 minutes and greater than 50% of time or 60 minutes was spent in counseling and coordination of care regarding above diagnoses and treatment plan.    Patient was seen with JUANITO Leung, RN, PMHNP Student     Patient Status:  Patient will continue to be seen for ongoing consultation and stabilization.    Signed:   Rika Lindsay, PhD, APRN, CNP  Psychiatry

## 2019-11-07 NOTE — NURSING NOTE
"Chief Complaint   Patient presents with     Consult     referred by Dr Peng-depression ongoing for 1 year medications pt has tried are not helping.      initial /60 (BP Location: Left arm, Patient Position: Sitting, Cuff Size: Adult Large)   Pulse 72   Temp 97.7  F (36.5  C) (Oral)   Resp 18   Ht 1.727 m (5' 8\")   Wt (!) 150.1 kg (331 lb)   SpO2 95%   BMI 50.33 kg/m   Estimated body mass index is 50.33 kg/m  as calculated from the following:    Height as of this encounter: 1.727 m (5' 8\").    Weight as of this encounter: 150.1 kg (331 lb)..  bp completed using cuff size large    "

## 2019-11-08 ENCOUNTER — CARE COORDINATION (OUTPATIENT)
Dept: CARDIOLOGY | Facility: CLINIC | Age: 69
End: 2019-11-08

## 2019-11-08 ENCOUNTER — PATIENT OUTREACH (OUTPATIENT)
Dept: CARE COORDINATION | Facility: CLINIC | Age: 69
End: 2019-11-08

## 2019-11-08 ASSESSMENT — ACTIVITIES OF DAILY LIVING (ADL): DEPENDENT_IADLS:: INDEPENDENT

## 2019-11-08 ASSESSMENT — ANXIETY QUESTIONNAIRES: GAD7 TOTAL SCORE: 2

## 2019-11-08 NOTE — LETTER
[facility]  Complex Care Plan  About Me:    Patient Name:  Jamar Marroquin    YOB: 1950  Age:         69 year old   [place] MRN:    [id number] Telephone Information:  Home Phone [phone]   Mobile [phone]       Address:  [address]  [facility] [place] [address] Email address:  No e-mail address on record      Emergency Contact(s)    Name Relationship Lgl Grd Work Phone Home Phone Mobile Phone   1. SELIN MARROQUIN Spouse No none [phone]   2. JOSE MARIA MARROQUIN Son No  [phone]            Primary language:  English     needed? No   [facility] [employer]:  [phone] op. 1  Other communication barriers: None  Preferred Method of Communication:  Mail  Current living arrangement: I live in a private home with spouse  Mobility Status/ Medical Equipment: Independent    Health Maintenance  Health Maintenance Reviewed: Due/Overdue   Health Maintenance Due   Topic Date Due     ZOSTER IMMUNIZATION (1 of 2) 01/19/1999     MEDICARE ANNUAL WELLNESS VISIT  04/27/2016     EYE EXAM  05/22/2019     INFLUENZA VACCINE (1) 09/01/2019     A1C  11/08/2019     My Access Plan  Medical Emergency 911   Primary Clinic Line [facility] - [phone]   24 Hour Appointment Line [phone] or  [phone]) (toll-free)   24 Hour Nurse Line [phone] (toll-free)   Preferred Urgent Care [facility] [phone]   Preferred Hospital [facility]  [phone]   Preferred Pharmacy [id number] - [place], [place] - [address]     Behavioral Health Crisis Line The National Suicide Prevention Lifeline at 1-494.764.5600 or 911       My Care Team Members  Patient Care Team       Relationship Specialty Notifications Start End    Blanca Peng MD PCP - General Family Practice  10/10/17     Phone: [phone] Fax: [phone] [address]  [facility] [address]    Kong Fraga MD MD Gastroenterology  8/11/16     Phone: [phone] Fax: [phone]         [address] [place] [address]    Blanca Peng MD Assigned PCP   10/15/17     Phone: [phone] Fax: [phone]         [address] [facility] [address]    Cary Grace MD MD Ophthalmology  5/21/18     Phone: [phone] Fax: [phone]         [address] [place] [address]    Ezequiel Chand MD MD Cardiology  9/16/19     Phone: [phone] Fax: [phone] [address] [place] [address]    Haily Graves, RN Registered Nurse Cardiology Admissions 9/26/19     [facility]    Rika Lindsay NP Nurse Practitioner Nurse Practitioner Psych/Mental Health  11/7/19     Phone: [phone] Fax: [phone]         [facility] [address] [facility] [address]    Rozina Bond APRN CNP Nurse Practitioner Cardiology  11/8/19     [facility]    Phone: [phone] Pager: [phone] Fax: [phone] [address] [place] [address]    Rozina Bond APRN CNP [facility] Cardiology  11/8/19     Phone: [phone] Pager: [phone] Fax: [phone] [address] [place] [address]    Solo Dia, RN Lead Care Coordinator Primary Care - CC  11/8/19     Phone: [phone]                 My Care Plans  Self Management and Treatment Plan  Goals and (Comments)  Goals        General    1. Mental Health Management (pt-stated)     Notes - Note edited  11/8/2019  2:00 PM by Solo Dia, RN    Goal Statement: I will improve my depression by working with my care teams and taking my medications daily as prescribed during the next 6 months.  Measure of Success: The patient will report decrease in depressive symptoms as evidenced by decreased PHQ-9 score.  Supportive Steps to Achieve: The patient will continue routine and as-needed follow-up with Psychiatry and will explore mental  health resources provided.  CCC RN will continue routine patient outreach for ongoing support and additional resources as needed.  Barriers: The patient has a long history of depression and reports having tried several interventions without improvement.  Strengths: The patient has good support from his wife.  The patient is motivated to improve his depression.  Date to Achieve By: 5/31/20   Patient expressed understanding of goal: Yes             Action Plans on File:    Depression  Heart Failure      Advance Care Plans/Directives Type:        My Medical and Care Information  Problem List   Patient Active Problem List   Diagnosis     Acquired hypothyroidism     Vitiligo     Hyperlipidemia with target LDL less than 100     Hypertension goal BP (blood pressure) < 140/90     Cerebral infarction (H)     Moderate major depression (H)     Health Care Home     Fatty liver     Advanced directives, counseling/discussion     Impaired glucose tolerance     Transient cerebral ischemia     Obesity, morbid (more than 100 lbs over ideal weight or BMI > 40) (H)     Chronic stasis dermatitis     Bilateral leg edema     Diet Controlled Type 2 diabetes mellitus without complication, without long-term current use of insulin (H)     Benign neoplasm of colon     Pain in both lower extremities     Low hemoglobin     Breast tenderness in male     Hx of Diabetic polyneuropathy associated with type 2 diabetes mellitus - now diet controlled (H)     Chronic combined systolic and diastolic congestive heart failure (H)     PVD (peripheral vascular disease) (H)     Benign prostatic hyperplasia with incomplete bladder emptying      Current Medications:  Current Outpatient Medications   Medication     acetaminophen (TYLENOL) 500 MG tablet     aspirin (ASA) 81 MG tablet     BETA BLOCKER NOT PRESCRIBED (INTENTIONAL)     buPROPion (WELLBUTRIN SR) 150 MG 12 hr tablet     Glucosamine-Chondroitin (OSTEO BI-FLEX REGULAR STRENGTH) 250-200 MG TABS      levothyroxine (SYNTHROID/LEVOTHROID) 200 MCG tablet     losartan (COZAAR) 25 MG tablet     order for DME     potassium chloride ER (K-DUR/KLOR-CON M) 20 MEQ CR tablet     STATIN NOT PRESCRIBED (INTENTIONAL)     tamsulosin (FLOMAX) 0.4 MG capsule     torsemide (DEMADEX) 20 MG tablet     No current facility-administered medications for this visit.      Care Coordination Start Date: 11/8/2019   Frequency of Care Coordination: monthly   Form Last Updated: 11/08/2019

## 2019-11-08 NOTE — LETTER
[facility] CARE COORDINATION  [facility]  [address]  [place] [address]    November 8, 2019    Jamar Ivory  [address]   [place] [address]      Dear Jamar,    I am a clinic care coordinator who works with Blanca Peng MD at [facility]. I wanted to thank you for spending the time to talk with me and provide you with my contact information so that you can call me with questions or concerns about your health care. Below is a description of clinic care coordination and how I can further assist you.     The clinic care coordinator is a registered nurse and/or  who understand the health care system. The goal of clinic care coordination is to help you manage your health and improve access to the [facility] system in the most efficient manner. The registered nurse can assist you in meeting your health care goals by providing education, coordinating services, and strengthening the communication among your providers. The  can assist you with financial, behavioral, psychosocial, chemical dependency, counseling, and/or psychiatric resources.    Please feel free to contact me at [phone] with any questions or concerns. We at [facility] are focused on providing you with the highest-quality healthcare experience possible and that all starts with you.     Sincerely,     Solo Dia RN  Clinic Care Coordinator  [facility]  [facility]  Ph: [phone]    Enclosed: I have enclosed a copy of the Complex Care Plan. This has helpful information and goals that we have talked about. Please keep this in an easy to access place to use as needed.

## 2019-11-08 NOTE — PROGRESS NOTES
Clinic Care Coordination Contact    Clinic Care Coordination Contact  OUTREACH    Referral Information:  Referral Source: Care Team  Primary Diagnosis: Psychosocial  Chief Complaint   Patient presents with     Clinic Care Coordination - Initial     provider referral     Clinical Concerns:  Care Coordination referral received from Psychiatry provider with whom patient established yesterday 11/7/19.    CCC RN called and spoke with both patient and patient's wife to get updates on patient's status and assess for care coordination needs.    Primarily, the patient is focusing on his mental health at this time; see Goal.  CCC RN did discuss finances, medications, diet recommendations (per Cardiology), and transportation as below.    Medication Management:  The patient was able to discontinue some of his medications yesterday at his Psychiatry appointment, which he was glad about.  He did start buPROPion for his depression.  CCC RN reiterated that it will likely take several weeks for him to notice any improvement and encouraged him to continue taking it daily as prescribed; he stated understanding and agreed to do so.  At this time, the patient reports he is able to afford his medications.  He has looked into the [employer] Prescription Assistance program but did not qualify due to too much income.  He agreed to notify CCC RN if he begins having difficulty affording his medications.    Functional Status:  Independent    Living Situation:  Lives in private home with spouse    Diet/Exercise/Sleep:  Diet--low salt, which patient struggles with.  The patient states that he tries to follow a low-salt diet with encouragement from his wife, but he enjoys adding salt to his food.  CCC RN discussed alternative seasonings/spices other than salt, which patient/wife state they have and will aim to use more in place of salt.  The patient and his wife report they have good access to food ([employer], food shelves) and deny food insecurity at  this time.    Transportation:  The patient serves as the main source of transportation for himself and his wife.  He is not interested in any additional transportation resources at this time but agreed to contact CCC RN should he want information in the future.     Psychosocial:  Mental health DX: Yes  Mental health DX how managed: Medication  Mental health management concern (GOAL): Yes  Informal Support system: Spouse  The patient has a long-standing history of depression and reports having tried several medications/intervetions without success.  He saw Psychiatry yesterday 11/7/19; see note for plan.  The patient feels that his appointment went well and he feels comfortable with the plan in place.     Financial/Insurance:   The patient has Medicare and is on a fixed-income.  He receives social security income but he and his wife state that they do use her income (social security, early halfway, early pension) for food and some of his medications as his income sometimes isn't enough.  He does have a payment plan open with [employer] to allow him to pay his bills a little at a time ($50/month).  The patient/wife have tried to obtain county assistance in the past but did not qualify due to being over income/asset limit.     Resources and Interventions:  Community Resources: [facility], [facility]     Goals Addressed                 This Visit's Progress       Patient Stated      1. Mental Health Management (pt-stated)        Goal Statement: I will improve my depression by working with my care teams and taking my medications daily as prescribed during the next 6 months.  Measure of Success: The patient will report decrease in depressive symptoms as evidenced by decreased PHQ-9 score.  Supportive Steps to Achieve: The patient will continue routine and as-needed follow-up with Psychiatry and will explore mental health resources provided.  CCC RN will continue routine patient outreach for ongoing support and  additional resources as needed.  Barriers: The patient has a long history of depression and reports having tried several interventions without improvement.  Strengths: The patient has good support from his wife.  The patient is motivated to improve his depression.  Date to Achieve By: 5/31/20   Patient expressed understanding of goal: Yes          Patient/Caregiver understanding: Yes    Outreach Frequency: monthly  Future Appointments              In 3 weeks RU LAB [facility]    In 3 weeks Rozina Bond APRN CNP [facility]    In 1 month Rika Lindsay NP [place], [place]        Plan: The patient will continue his depression plan of care per Psychiatry and will call their clinic in 2-4 weeks with an update on how he is feeling after starting buPROPion.  He will continue working closely with Cardiology/[facility].  The patient will attend his upcoming appointments as scheduled and will notify CCC RN of additional questions or concerns.  CCC RN will outreach to patient in approximately 1 month to get updates on patient status, assess goal progress, and offer additional support and resources as indicated.    Solo Dia, RN  Clinic Care Coordinator  [facility]  Ph: [phone]

## 2019-11-12 ENCOUNTER — CARE COORDINATION (OUTPATIENT)
Dept: CARDIOLOGY | Facility: CLINIC | Age: 69
End: 2019-11-12

## 2019-11-12 NOTE — PROGRESS NOTES
"[facility]     My [employer] Daily Monitoring        Daily Weight Goal:  321 - 326 lbs      Today's Weight: 324 lbs      Alert: 8 lbs wt loss 4 days      Diuretic:  Torsemide 40 mg in am and 20 mg Torsemide 20 mg in afternoon    KCL: KDur 20 mEq daily    Pt back within wt parameters. Per wife his legs looking better, sores are healing. Legs are wrapped.   Pt feeling \"ok, except for the depression.\" He is continuing to take Wellbutrin.    Spoke to pt. He has not changed anything he is drinking or eating but he is eating less. Wife contributes that to Wellbutrin. Wife trying to get pt to increase activity. Agreed with wife to have pt go with her to grocery store and try to get involved in more activaties.   Will send update to Rozina.   Next appt is 12/4 w/Rozina.   Wife will call to make appt with Luis Armando in Lymphedema         Haily Graves RN 9:44 AM 11/12/19    "

## 2019-11-14 ENCOUNTER — CARE COORDINATION (OUTPATIENT)
Dept: CARDIOLOGY | Facility: CLINIC | Age: 69
End: 2019-11-14

## 2019-11-14 NOTE — PROGRESS NOTES
Reviewed my health tracker encounter. Patients weight is stable. Patient does continue to not complaints of shortness of breath or lightheadedness, but notes not worse. Will continue to monitor. Patient scheduled for CORE visit on 12/4/19.

## 2019-11-14 NOTE — PROGRESS NOTES
"[facility]  C.ORainRNESSA    MyHealth Tracker Heart Failure Alert      Daily Weight Goal: 321-326 lbs    Today's Weight: 323 lbs    Weight Alert: 0 lbs over weight parameters    Symptom Alert: YES       3) Did shortness of breath wake you up last night?   5) Have you felt more dizzy or lightheaded, since yesterday?    Current Diuretic Regimen: Torsemide 40 mg in the morning and Torsemide 20 mg in the afternoon,    Taking Diuretics as Instructed: Yes    Last Extra Diuretic: NA    Potassium Replacement: K-DUR/KLOR-CON 20 mEq daily    Taking Potassium Replacement as Instructed: Yes    SOB/GOMEZ: Yes. He stated it is not more today versus yesterday    Lower Extre mity Edema: Per Kenneth, no more than usual    Diet: 11/13/19   Breakfast: Cereal/Milk   Lunch: NA   Dinner: 2 pieces of leftover chicken    Fluids: He stated he \"Thinks\" he is drinking < 2 Liters daily    Follow-Up Appointments: 12/4/19 with Rozina Bond CNP with labs prior    Notes: He stated he is still dizzy and lightheaded. It is not worse today than yesterday. He also stated he does not want additional diuretics.    Plan: Forward to Rozina Bond CNP for review      MyHealth Tracker Weight Trends:         MyHealth Tracker Weight Graph:         Blanca Bright RN    [facility]  11/14/19 10:13 AM               "

## 2019-11-18 ENCOUNTER — CARE COORDINATION (OUTPATIENT)
Dept: CARDIOLOGY | Facility: CLINIC | Age: 69
End: 2019-11-18

## 2019-11-18 NOTE — PROGRESS NOTES
My health tracker phone encounter, patients weight up 4# over 3 days. Patient and girlfriend decline adjustments in diuretic. Patient scheduled for CORE follow up 12/4/19. Continue to reinforced diet and lifestyle changes. No medication changes given patients resistance to diuretic adjustments.

## 2019-11-18 NOTE — PROGRESS NOTES
[facility]  DORA    "[employer]"ealth Tracker Heart Failure Alert      Daily Weight Goal: 321-326 lbs    Today's Weight: 325 lbs    Weight Alert: 4 lbs gained since 11/15/19    Symptom Alert: YES      3) Did shortness of breath wake you up last night?   4) Did you prop up on more pillows or sleep in a chair to breathe easier last  night?    Current Diuretic Regimen: Torsemide 40 mg in the morning, Torsemide 20 mg in the afternoon    Taking Diuretics as Instructed: Yes    Last Extra Diuretic: NA    Potassium Replacement: K-DUR/KLOR-CON 20 mEq daily    Taking Potassium Replacement as Instructed: Yes    SOB/GOMEZ: Kenneth does not feel more SOB today versus yesterday    Lower Extremity Edema: Denies    Abdominal Edema: Denies    Diet: Per Melissa there has been no changes    Fluids: Per Melissa there has been no changes    Follow-Up Appointments: 12/4/19 with Rozina Bond CNP    Notes: Kenneth and Melissa do not want him to take any additional diuretics.     Plan: Forward to Rozina Bond CNP for review      MyHealth Tracker Weight Trends:         MyHealth Tracker Weight Graph:

## 2019-11-22 ENCOUNTER — CARE COORDINATION (OUTPATIENT)
Dept: CARDIOLOGY | Facility: CLINIC | Age: 69
End: 2019-11-22

## 2019-11-22 NOTE — PROGRESS NOTES
"[facility]  C.O.RNESSA    MyHealth Tracker Heart Failure Alert      Daily Weight Goal: 321-326 lbs    Today's Weight: 330 lbs    Weight Alert: 4 lbs over weight parameters    Symptom Alert: Yes       4) Did you prop up on more pillows or sleep in a chair to breathe  easier last night?      Current Diuretics: Torsemide 40 mg in the morning and 40 mg in the afternoon,     Taking Diuretics as Instructed: Yes    Last Extra Diuretic: NA    Potassium Replacement: K-DUR/KLOR-CON 20 mEq daily    Taking Potassium as Instructed: Yes    SOB/GOMEZ: A little while sitting. He has more SOB when walking around. He had a difficult time resting last night due to SOB.    Lower Extremity Edema: \"A little more than usual\"    Abdominal Edema: None    Diet: No Changes (per patient's wife)    Fluids: No Changes (per patient's wife)    Follow-Up Appointments: 12/4/19 with Rozina Bond CNP    Notes: After explaining to Kenneth and his wife what his weight parameters are set at, how much weight he has gained overnight and all week in general, Kenneth is willing to take extra diuretics today.     Plan: Forward to Rozina Bond CNP for review      MyHealth Tracker Weight Trends:         MyHealth Tracker Weight Graph:       Blanca Bright RN    [facility] [place]  11/22/19 9:49 AM                 "

## 2019-11-22 NOTE — PROGRESS NOTES
[facility]        Per Rozina Bond CNP, Kenneth was instructed to take Torsemide 40 mg twice daily and K-DUR/KLOR-CON 20 mEq twice daily for the next 3 days, 11/22, 11/23, 11/24. Then resume Torsemide 40 mg in the morning and 20 mg in the afternoon and K-DUR/KLOR-CON 20 mEq daily.    Also instructed to continue to call in weights and symptoms over the weekend to MyHealth Tracker.    Will follow-up with them on Monday morning via MyHealth Tracker.    Blanca Bright RN    [facility]  11/22/19 1:15 PM

## 2019-11-22 NOTE — PROGRESS NOTES
Reviewed MHT encounter, patients weight is now 4# above goal weight at 330# today, along with worsening dyspnea. Patients current diuretic regimen is torsemide 40mg in the morning and 20mg in the afternoon.     Recommendations   1. Increase torsemide to 40mg BID x 3 days.   2. When on increased torsemide increase potassium to 20meq BID x 3 days, then resume potassium 20meq daily.   3. Continue to reinforce HF education about low sodium diet and 2L fluid restriction

## 2019-11-25 ENCOUNTER — CARE COORDINATION (OUTPATIENT)
Dept: CARDIOLOGY | Facility: CLINIC | Age: 69
End: 2019-11-25

## 2019-11-25 NOTE — PROGRESS NOTES
"[facility]  C.O.RRainERain    "[employer]"ealth Tracker Heart Failure Alert      Daily Weight Goal: 321-326 lbs    Today's Weight: 326 lbs       Sunday's Weight: 325 lbs         Saturday's Weight: 321lbs    Weight Alert: NA    Symptom Alert:      3) Did shortness of breath wake you up last night?   4) Did you prop up on more pillows or sleep in a chair to breathe easier last  night?       Current Diuretics: Torsemide 40 mg in the morning and 20 mg in the afternoon    Taking Diuretics as Instructed: Yes    Last Extra Diuretic: Torsemide 40 mg Bid on 11/22/19, 11/23, 11/24    Current Potassium: K-DUR/KLOR-CON 20 mEq Daily    Taking Potassium as Instructed: Yes    SOB/GOMEZ: Patient states it was no more than what is usual for him    Lower Extremity Edema: Patient states he does have a \"Little\"    Abdominal Edema: Denies    Diet: Melissa states he maintains a < 2 gram sodium diet. He has been eating a little later in the evening.    Fluids: Melissa states it is < 64 oz    Follow-Up Appointments: 12/4/19 with Rozina Bond CNP    Notes: Kenneth stated he is not really in distress with his SOB. It is just noticeable at times, but not anymore than what he normally is.    Plan: Monitor his weight and Signs and Symptoms one more day.      MyHealth Tracker Weight Trends:         MyHealth Tracker Weight Graph:       Blanca Bright RN    [facility], [place]  11/25/19 11:42 AM                 "

## 2019-11-26 NOTE — PROGRESS NOTES
"[facility]        Kenneth stated he feels about the same as yesterday, \"No worse, no better.\" His weight is 327 lbs, which is 1 lb over his parameters of 321-326 lbs.         Plan: Continue to monitor one more day via MyHealth Tracker    Blanca Bright RN    [facility]  11/26/19 11:48 AM       "

## 2019-11-27 NOTE — PROGRESS NOTES
[facility]        Kenneth is within parameter today and is symptom free.      Weight Parameter: 321-326 lbs    Today's Weight: 321 lbs          Plan: Will continue to monitor via MyHealth Tracker    Blanca Bright RN    [facility]  11/27/19 12:12 PM

## 2019-11-29 NOTE — PROGRESS NOTES
Subjective     Jamar Ivory is a 69 year old male who presents to clinic today for the following health issues:    HPI   Toe Pain    Onset: 1 week    Description:   Location: big toe, left foot  Character: wound on toe    Intensity: moderate    Progression of Symptoms: worse, same    Accompanying Signs & Symptoms:  Other symptoms: swelling and redness, some drainage    History:   Previous similar pain: no       Precipitating factors:   Trauma or overuse: no     Alleviating factors:  Improved by: nothing    Therapies Tried and outcome: trying to soak foot daily       Diabetes Follow-up      How often are you checking your blood sugar? Not at all    What concerns do you have today about your diabetes? None     Do you have any of these symptoms? (Select all that apply)  Numbness in feet     Have you had a diabetic eye exam in the last 12 months? No    BP Readings from Last 2 Encounters:   11/30/19 132/74   11/07/19 122/60     Hemoglobin A1C (%)   Date Value   11/30/2019 5.5   05/08/2019 5.5     LDL Cholesterol Calculated (mg/dL)   Date Value   05/08/2019 94   09/26/2017 85       Diabetes Management Resources      Recent Labs   Lab Test 11/30/19  1123 11/04/19  1444 10/25/19  1411  06/07/19  0800 05/08/19  1616  06/28/18  1138 06/20/18  1225  04/11/18  1115  09/26/17  1540  07/18/16  0957  04/25/14  0956   A1C 5.5  --   --   --   --  5.5  --  5.6  --   --   --    < >  --    < >  --    < >  --    LDL  --   --   --   --   --  94  --   --   --   --   --   --  85  --  96   < > 100   HDL  --   --   --   --   --  38*  --   --   --   --   --   --  39*  --  45   < > 40*   TRIG  --   --   --   --   --  214*  --   --   --   --   --   --  193*  --  103   < > 145   ALT  --   --   --   --   --  16  --   --   --   --  20  --   --   --   --   --  23   CR  --  0.96 0.91   < > 0.83  --    < > 1.04  --    < > 1.02  --   --    < >  --    < > 1.00   GFRESTIMATED  --  79 85   < > 89  --    < > 71  --    < > 72  --   --    < >  --     < > 75   GFRESTBLACK  --  >90 >90   < > >90  --    < > 86  --    < > 88  --   --    < >  --    < > >90   POTASSIUM  --  4.2 4.0   < > 3.9  --    < > 3.8  --    < > 3.1*  --   --    < >  --    < > 3.9   TSH  --   --   --   --  1.83  --   --   --  1.06  --   --   --   --    < > 1.80   < >  --     < > = values in this interval not displayed.      BP Readings from Last 3 Encounters:   11/30/19 132/74   11/07/19 122/60   10/25/19 (!) 142/88    Wt Readings from Last 3 Encounters:   11/30/19 147.9 kg (326 lb)   11/07/19 (!) 150.1 kg (331 lb)   10/25/19 149.3 kg (329 lb 3.2 oz)                      Reviewed and updated as needed this visit by Provider         Review of Systems   ROS COMP: CONSTITUTIONAL: NEGATIVE for fever, chills, change in weight  INTEGUMENTARY/SKIN: NEGATIVE for worrisome rashes, moles or lesions and POSITIVE for swelling and sore on Lt 1st toe  ENT/MOUTH: NEGATIVE for ear, mouth and throat problems  RESP: NEGATIVE for significant cough or SOB  CV: NEGATIVE for chest pain, palpitations or peripheral edema  ENDOCRINE: NEGATIVE for temperature intolerance, skin/hair changes and POSITIVE  for HX diabetes      Objective    /74   Pulse 82   Temp 97.6  F (36.4  C) (Tympanic)   Resp 16   Wt 147.9 kg (326 lb)   SpO2 95%   BMI 49.57 kg/m    Body mass index is 49.57 kg/m .  Physical Exam   GENERAL: healthy, alert and no distress  NECK: no adenopathy, no asymmetry, masses, or scars and thyroid normal to palpation  RESP: lungs clear to auscultation - no rales, rhonchi or wheezes  CV: regular rate and rhythm, normal S1 S2, no S3 or S4, no murmur, click or rub, no peripheral edema and peripheral pulses strong  SKIN: Lt 1st toe shows paronychial inflammation, with granulation tissue, dried blood present.  Tender to palpation     Diagnostic Test Results:  Labs reviewed in Epic  Lab: see below, results pending        Assessment & Plan     Jamar was seen today for wound check.    Diagnoses and all orders for  "this visit:    Paronychia of toe, left  -     clindamycin (CLEOCIN) 150 MG capsule; Take 1 capsule (150 mg) by mouth 3 times daily  -     CBC with platelets    Diet Controlled Type 2 diabetes mellitus without complication, without long-term current use of insulin (H)  -     HEMOGLOBIN A1C  -     Basic metabolic panel    Obesity, morbid (more than 100 lbs over ideal weight or BMI > 40) (H)         BMI:   Estimated body mass index is 49.57 kg/m  as calculated from the following:    Height as of 11/7/19: 1.727 m (5' 8\").    Weight as of this encounter: 147.9 kg (326 lb).   Weight management plan: Discussed healthy diet and exercise guidelines        FUTURE APPOINTMENTS:       - Follow-up visit in 2 weeks if not resolving  Patient Instructions   Frequent hot soaks for Lt foot       Return in about 2 weeks (around 12/14/2019).    Feng Small MD  [facility]      "

## 2019-11-30 ENCOUNTER — OFFICE VISIT (OUTPATIENT)
Dept: FAMILY MEDICINE | Facility: CLINIC | Age: 69
End: 2019-11-30
Payer: MEDICARE

## 2019-11-30 VITALS
BODY MASS INDEX: 49.57 KG/M2 | OXYGEN SATURATION: 95 % | WEIGHT: 315 LBS | RESPIRATION RATE: 16 BRPM | TEMPERATURE: 97.6 F | DIASTOLIC BLOOD PRESSURE: 74 MMHG | HEART RATE: 82 BPM | SYSTOLIC BLOOD PRESSURE: 132 MMHG

## 2019-11-30 DIAGNOSIS — E11.9 TYPE 2 DIABETES MELLITUS WITHOUT COMPLICATION, WITHOUT LONG-TERM CURRENT USE OF INSULIN (H): Chronic | ICD-10-CM

## 2019-11-30 DIAGNOSIS — L03.032 PARONYCHIA OF TOE, LEFT: Primary | ICD-10-CM

## 2019-11-30 DIAGNOSIS — E66.01 OBESITY, MORBID (MORE THAN 100 LBS OVER IDEAL WEIGHT OR BMI > 40) (H): ICD-10-CM

## 2019-11-30 LAB
ERYTHROCYTE [DISTWIDTH] IN BLOOD BY AUTOMATED COUNT: 14.5 % (ref 10–15)
HBA1C MFR BLD: 5.5 % (ref 0–5.6)
HCT VFR BLD AUTO: 38.3 % (ref 40–53)
HGB BLD-MCNC: 12.5 G/DL (ref 13.3–17.7)
MCH RBC QN AUTO: 28.2 PG (ref 26.5–33)
MCHC RBC AUTO-ENTMCNC: 32.6 G/DL (ref 31.5–36.5)
MCV RBC AUTO: 86 FL (ref 78–100)
PLATELET # BLD AUTO: 198 10E9/L (ref 150–450)
RBC # BLD AUTO: 4.44 10E12/L (ref 4.4–5.9)
WBC # BLD AUTO: 3.1 10E9/L (ref 4–11)

## 2019-11-30 PROCEDURE — 83036 HEMOGLOBIN GLYCOSYLATED A1C: CPT | Performed by: FAMILY MEDICINE

## 2019-11-30 PROCEDURE — 99214 OFFICE O/P EST MOD 30 MIN: CPT | Performed by: FAMILY MEDICINE

## 2019-11-30 PROCEDURE — 85027 COMPLETE CBC AUTOMATED: CPT | Performed by: FAMILY MEDICINE

## 2019-11-30 PROCEDURE — 80048 BASIC METABOLIC PNL TOTAL CA: CPT | Performed by: FAMILY MEDICINE

## 2019-11-30 PROCEDURE — 36415 COLL VENOUS BLD VENIPUNCTURE: CPT | Performed by: FAMILY MEDICINE

## 2019-11-30 RX ORDER — CLINDAMYCIN HCL 150 MG
150 CAPSULE ORAL 3 TIMES DAILY
Qty: 30 CAPSULE | Refills: 1 | Status: SHIPPED | OUTPATIENT
Start: 2019-11-30 | End: 2020-04-08

## 2019-12-02 ENCOUNTER — CARE COORDINATION (OUTPATIENT)
Dept: CARDIOLOGY | Facility: CLINIC | Age: 69
End: 2019-12-02

## 2019-12-02 LAB
ANION GAP SERPL CALCULATED.3IONS-SCNC: 5 MMOL/L (ref 3–14)
BUN SERPL-MCNC: 22 MG/DL (ref 7–30)
CALCIUM SERPL-MCNC: 8.9 MG/DL (ref 8.5–10.1)
CHLORIDE SERPL-SCNC: 106 MMOL/L (ref 94–109)
CO2 SERPL-SCNC: 26 MMOL/L (ref 20–32)
CREAT SERPL-MCNC: 1.07 MG/DL (ref 0.66–1.25)
GFR SERPL CREATININE-BSD FRML MDRD: 70 ML/MIN/{1.73_M2}
GLUCOSE SERPL-MCNC: 92 MG/DL (ref 70–99)
POTASSIUM SERPL-SCNC: 4.2 MMOL/L (ref 3.4–5.3)
SODIUM SERPL-SCNC: 137 MMOL/L (ref 133–144)

## 2019-12-02 NOTE — PROGRESS NOTES
[facility]     My [employer] Daily Monitoring        Daily Weight Goal: 321 - 326 lbs      Today's Weight: 331 lbs      Alert: 5 lbs wt gain over night & yes to pillows & noc dyspnea     Diuretic:  Torsemide 40 mg in am and 20 mg in pm     KCl: KCL 20 mEq daily    Left VM for pt and wife  Next appt 12/4 with Rozina Graves RN 1:10 PM 12/02/19

## 2019-12-03 ENCOUNTER — CARE COORDINATION (OUTPATIENT)
Dept: CARDIOLOGY | Facility: CLINIC | Age: 69
End: 2019-12-03

## 2019-12-03 NOTE — PROGRESS NOTES
[facility] CORE Clinic MyHealth Tracker    For CORE OV Review: 12/4/19    MyHealth Tracker Activitated: 6/7/2019    Diuretic: Torsemide 40 mg in am and 20 mg in pm     KCL: KCL 20 mEq daily    Goal Weight: 321-326 lbs    Diuretic Plan: Pt does not like to take extra diuretics. Pt has diet Noncompliance issues and increase fluid intake. Diet education.                 Haily Graves RN 3:42 PM 12/03/19

## 2019-12-04 ENCOUNTER — OFFICE VISIT (OUTPATIENT)
Dept: CARDIOLOGY | Facility: CLINIC | Age: 69
End: 2019-12-04
Attending: NURSE PRACTITIONER
Payer: MEDICARE

## 2019-12-04 VITALS
DIASTOLIC BLOOD PRESSURE: 64 MMHG | BODY MASS INDEX: 47.74 KG/M2 | SYSTOLIC BLOOD PRESSURE: 120 MMHG | WEIGHT: 315 LBS | OXYGEN SATURATION: 93 % | HEART RATE: 82 BPM | HEIGHT: 68 IN

## 2019-12-04 DIAGNOSIS — E78.5 HYPERLIPIDEMIA WITH TARGET LDL LESS THAN 100: Chronic | ICD-10-CM

## 2019-12-04 DIAGNOSIS — I50.42 CHRONIC COMBINED SYSTOLIC AND DIASTOLIC CONGESTIVE HEART FAILURE (H): Primary | ICD-10-CM

## 2019-12-04 DIAGNOSIS — I10 HYPERTENSION GOAL BP (BLOOD PRESSURE) < 140/90: Chronic | ICD-10-CM

## 2019-12-04 DIAGNOSIS — I50.42 CHRONIC COMBINED SYSTOLIC AND DIASTOLIC CONGESTIVE HEART FAILURE (H): ICD-10-CM

## 2019-12-04 DIAGNOSIS — E66.01 OBESITY, MORBID (MORE THAN 100 LBS OVER IDEAL WEIGHT OR BMI > 40) (H): ICD-10-CM

## 2019-12-04 LAB
ANION GAP SERPL CALCULATED.3IONS-SCNC: 6 MMOL/L (ref 3–14)
BUN SERPL-MCNC: 19 MG/DL (ref 7–30)
CALCIUM SERPL-MCNC: 8.8 MG/DL (ref 8.5–10.1)
CHLORIDE SERPL-SCNC: 107 MMOL/L (ref 94–109)
CO2 SERPL-SCNC: 26 MMOL/L (ref 20–32)
CREAT SERPL-MCNC: 1.24 MG/DL (ref 0.66–1.25)
GFR SERPL CREATININE-BSD FRML MDRD: 58 ML/MIN/{1.73_M2}
GLUCOSE SERPL-MCNC: 97 MG/DL (ref 70–99)
POTASSIUM SERPL-SCNC: 4.2 MMOL/L (ref 3.4–5.3)
SODIUM SERPL-SCNC: 139 MMOL/L (ref 133–144)

## 2019-12-04 PROCEDURE — 80048 BASIC METABOLIC PNL TOTAL CA: CPT | Performed by: NURSE PRACTITIONER

## 2019-12-04 PROCEDURE — 36415 COLL VENOUS BLD VENIPUNCTURE: CPT | Performed by: NURSE PRACTITIONER

## 2019-12-04 PROCEDURE — 99214 OFFICE O/P EST MOD 30 MIN: CPT | Performed by: NURSE PRACTITIONER

## 2019-12-04 RX ORDER — SPIRONOLACTONE 25 MG/1
12.5 TABLET ORAL DAILY
Qty: 45 TABLET | Refills: 1 | Status: SHIPPED | OUTPATIENT
Start: 2019-12-04 | End: 2019-12-18

## 2019-12-04 ASSESSMENT — MIFFLIN-ST. JEOR: SCORE: 2229.56
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
## 2019-12-04 NOTE — PATIENT INSTRUCTIONS
Call [facility] nurse for any questions or concerns Mon-Fri 8am-4pm: [phone] For concerns after hours: [phone]    Medication changes:    - STOP potassium   - START spironolactone 12.5 mg (1/2 tablet) daily    Plan from today:    - Follow up with Rozina in CORE clinic 12/16/19   - Continue to call in weight, notify clinic of worsening edema/breathing.     Lab results:   Component      Latest Ref Rng & Units 12/4/2019   Sodium      133 - 144 mmol/L 139   Potassium      3.4 - 5.3 mmol/L 4.2   Chloride      94 - 109 mmol/L 107   Carbon Dioxide      20 - 32 mmol/L 26   Anion Gap      3 - 14 mmol/L 6   Glucose      70 - 99 mg/dL 97   Urea Nitrogen      7 - 30 mg/dL 19   Creatinine      0.66 - 1.25 mg/dL 1.24   GFR Estimate      >60 mL/min/1.73:m2 58 (L)   GFR Estimate If Black      >60 mL/min/1.73:m2 67   Calcium      8.5 - 10.1 mg/dL 8.8
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
## 2019-12-06 ENCOUNTER — CARE COORDINATION (OUTPATIENT)
Dept: CARDIOLOGY | Facility: CLINIC | Age: 69
End: 2019-12-06

## 2019-12-06 NOTE — PROGRESS NOTES
[facility]     My [place]-Tracker Daily Monitoring        Daily Weight Goal: 321 - 326 lbs      Today's Weight: 325 lbs      Alert: None     Diuretic:  Torsemide 40 mg in am and 20 mg in pm     KCl: KCL 20 mEq daily     Pt was started on spironolactone on 12/4 by Rozina Bond CNP. Wt was 324 lbs at home on 12/4.   Wt back with goal with no symptoms.           Will update Rozina.  Haily Graves RN 7:28 AM 12/06/19

## 2019-12-10 ENCOUNTER — CARE COORDINATION (OUTPATIENT)
Dept: CARDIOLOGY | Facility: CLINIC | Age: 69
End: 2019-12-10

## 2019-12-10 NOTE — PROGRESS NOTES
"[facility]     My [place]-Tracker Daily Monitoring        Daily Weight Goal: 321-326 lbs     Today's Weight: 327 lbs      Alert: None     Diuretic:  Torsemide 40 mg in am and 20 mg in PM, spironolactone 12.5 mg daily     KCl: None    Pt has has not called in since 12/6. He had forgotten.   Wts:   12/6    325 lbs  12/7    327 lbs  12/8    325 lbs  12/9    324 lbs  12/10  327 lbs    Pt feeling \"good\". Wants to monitor another day. Pt has not had his leg wraps for the last \"coupledays, legs are not swollen.\"  Next appt 12/16  Will send update to RICCARDO Cantor RN 10:57 AM 12/10/19    "

## 2019-12-10 NOTE — PROGRESS NOTES
Rozina Bond, APRN CNP  [facility] Core Nurse 2 hours ago (11:37 AM)      Thank you for the update.

## 2019-12-10 NOTE — PROGRESS NOTES
Reviewed phone encounter. Weight up, denies worsening symptoms. If weight continues to increase would recommend increase in diuretic for a couple days. Please continue to reinforce HF education ( low sodium and fluid restriction).

## 2019-12-12 ENCOUNTER — CARE COORDINATION (OUTPATIENT)
Dept: CARDIOLOGY | Facility: CLINIC | Age: 69
End: 2019-12-12

## 2019-12-13 ENCOUNTER — DOCUMENTATION ONLY (OUTPATIENT)
Dept: CARDIOLOGY | Facility: CLINIC | Age: 69
End: 2019-12-13

## 2019-12-13 NOTE — PROGRESS NOTES
[facility]- CORE Clinic MyHealth Tracker    For CORE OV Review:  12/16/19    MyHealth Tracker Activitated: 6/7/19    Diuretic: Spironolactone 12.5 mg daily & torsemide 40 mg in am & 20 mg in afternoon    KCL: None, stopped 12/4/19    Goal Weight: 321 lbs - 326 lbs    Diuretic Plan: Pt does not like to take extra diuretics. Complete diet review              Haily Graves RN 3:18 PM 12/13/19

## 2019-12-15 ENCOUNTER — HEALTH MAINTENANCE LETTER (OUTPATIENT)
Age: 69
End: 2019-12-15

## 2019-12-16 ENCOUNTER — OFFICE VISIT (OUTPATIENT)
Dept: CARDIOLOGY | Facility: CLINIC | Age: 69
End: 2019-12-16
Attending: NURSE PRACTITIONER
Payer: MEDICARE

## 2019-12-16 VITALS
SYSTOLIC BLOOD PRESSURE: 122 MMHG | DIASTOLIC BLOOD PRESSURE: 74 MMHG | HEIGHT: 68 IN | OXYGEN SATURATION: 94 % | WEIGHT: 315 LBS | BODY MASS INDEX: 47.74 KG/M2 | HEART RATE: 74 BPM

## 2019-12-16 DIAGNOSIS — E78.5 HYPERLIPIDEMIA WITH TARGET LDL LESS THAN 100: Chronic | ICD-10-CM

## 2019-12-16 DIAGNOSIS — I50.42 CHRONIC COMBINED SYSTOLIC AND DIASTOLIC CONGESTIVE HEART FAILURE (H): ICD-10-CM

## 2019-12-16 DIAGNOSIS — E66.01 OBESITY, MORBID (MORE THAN 100 LBS OVER IDEAL WEIGHT OR BMI > 40) (H): Primary | ICD-10-CM

## 2019-12-16 DIAGNOSIS — I10 HYPERTENSION GOAL BP (BLOOD PRESSURE) < 140/90: Chronic | ICD-10-CM

## 2019-12-16 LAB
ANION GAP SERPL CALCULATED.3IONS-SCNC: 6 MMOL/L (ref 3–14)
BUN SERPL-MCNC: 16 MG/DL (ref 7–30)
CALCIUM SERPL-MCNC: 8.8 MG/DL (ref 8.5–10.1)
CHLORIDE SERPL-SCNC: 106 MMOL/L (ref 94–109)
CO2 SERPL-SCNC: 26 MMOL/L (ref 20–32)
CREAT SERPL-MCNC: 1.15 MG/DL (ref 0.66–1.25)
GFR SERPL CREATININE-BSD FRML MDRD: 64 ML/MIN/{1.73_M2}
GLUCOSE SERPL-MCNC: 88 MG/DL (ref 70–99)
POTASSIUM SERPL-SCNC: 3.9 MMOL/L (ref 3.4–5.3)
SODIUM SERPL-SCNC: 138 MMOL/L (ref 133–144)

## 2019-12-16 PROCEDURE — 99214 OFFICE O/P EST MOD 30 MIN: CPT | Performed by: NURSE PRACTITIONER

## 2019-12-16 PROCEDURE — 36415 COLL VENOUS BLD VENIPUNCTURE: CPT | Performed by: INTERNAL MEDICINE

## 2019-12-16 PROCEDURE — 80048 BASIC METABOLIC PNL TOTAL CA: CPT | Performed by: NURSE PRACTITIONER

## 2019-12-16 ASSESSMENT — MIFFLIN-ST. JEOR: SCORE: 2210.96

## 2019-12-16 NOTE — PATIENT INSTRUCTIONS
Call C.O.R.E. nurse for any questions or concerns Mon-Fri 8am-4pm: [phone] For concerns after hours: [phone]    Medication changes: none    Plan from today:    - CORE follow up with Rozina the end of January    Lab results:   Component      Latest Ref Rng & Units 12/16/2019   Sodium      133 - 144 mmol/L 138   Potassium      3.4 - 5.3 mmol/L 3.9   Chloride      94 - 109 mmol/L 106   Carbon Dioxide      20 - 32 mmol/L 26   Anion Gap      3 - 14 mmol/L 6   Glucose      70 - 99 mg/dL 88   Urea Nitrogen      7 - 30 mg/dL 16   Creatinine      0.66 - 1.25 mg/dL 1.15   GFR Estimate      >60 mL/min/1.73:m2 64   GFR Estimate If Black      >60 mL/min/1.73:m2 74   Calcium      8.5 - 10.1 mg/dL 8.8
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
## 2019-12-18 ENCOUNTER — CARE COORDINATION (OUTPATIENT)
Dept: CARDIOLOGY | Facility: CLINIC | Age: 69
End: 2019-12-18

## 2019-12-18 DIAGNOSIS — I50.42 CHRONIC COMBINED SYSTOLIC AND DIASTOLIC CONGESTIVE HEART FAILURE (H): Primary | ICD-10-CM

## 2019-12-18 DIAGNOSIS — I50.42 CHRONIC COMBINED SYSTOLIC AND DIASTOLIC CONGESTIVE HEART FAILURE (H): ICD-10-CM

## 2019-12-18 RX ORDER — SPIRONOLACTONE 25 MG/1
25 TABLET ORAL DAILY
Qty: 90 TABLET | Refills: 1 | Status: SHIPPED | OUTPATIENT
Start: 2019-12-18 | End: 2020-05-05

## 2019-12-18 NOTE — PROGRESS NOTES
[facility]     My [place]-Tracker Daily Monitoring        Daily Weight Goal: 321-326 lbs      Today's Weight: 326 lbs      Alert: Pillows and dizziness reported     Diuretic:  Spironolactone 25 mg daily & torsemide 40 mg in am and 20 mg in afternoon     KCl: None    Left VM for pt and wife for call back.        Haily Graves RN 8:55 AM 12/18/19

## 2019-12-18 NOTE — PROGRESS NOTES
Updated spironolactone dose to 25 mg daily. Dose adjusted at 12/16/19 OV as pt was taking 25 mg daily not 12.5 mg daily.   Haily Graves RN 12:23 PM 12/18/19

## 2019-12-19 NOTE — PROGRESS NOTES
Called pt to review recommendation per Rozina regarding Entresto.     Pt is willing to have echo prior to appt with Rozina.   Will message scheduling to schedule. Order placed.   Haily Graves RN 12:54 PM 12/19/19

## 2019-12-19 NOTE — PROGRESS NOTES
"Spoke to Melissa to check on pt.   Spoke to pt. He is feeling better than yesterday. He slept better with no extra pillows. He is still \"a little dizzy but it is better then yesterday\" Wt is down 2 lbs from yesterday. He slept better.           Pt and wife asked if Entresto would be \"a good medication for me to on. I keep seeing commercials for it.\"    Will send Rozina an update.   Haily Graves RN 12:02 PM 12/19/19    "

## 2019-12-19 NOTE — PROGRESS NOTES
"Late entry from 12/18/19:   VM from wife. Pt was doing well and \"helped me with grocery shopping. He pushed the cart and helped me bag them up. He even helped me carry everything in.\"  Haily Graves RN 11:43 AM 12/19/19    "

## 2019-12-19 NOTE — PROGRESS NOTES
Reviewed phone encounter. Weight remaining stable.     Regarding Entresto, I would recommend a repeat echocardiogram to assess his EF. Patient is a combined HFpEF/HFrEF, his EF per echo in 12/2018 was 40-45%. If patient willing we can get an echocardiogram prior to his clinic visit in January and then can further discuss consideration for Entresto.     YENI Meadows CNP

## 2019-12-20 ENCOUNTER — CARE COORDINATION (OUTPATIENT)
Dept: CARDIOLOGY | Facility: CLINIC | Age: 69
End: 2019-12-20

## 2019-12-20 NOTE — PROGRESS NOTES
[facility]  DORA    erentoealth Tracker Heart Failure Alert      Daily Weight Goal: 321-326 lbs    Today's Weight: 323 lbs    Weight Alert: 0 lbs over weight parameters    Symptom Alert: YES      1) If you have shortness of breath, is it worse today than yesterday?   4) Did you prop up on more pillows or sleep in a chair to breathe easier last                    night?      Current Diuretics: Torsemide 40 mg in the morning and 20 mg in the afternoon, Spironolactone 25 mg daily    Taking Diuretics as Instructed: Yes    Last Extra Diuretic: Torsemide 20 mg on 11/22/19 x 3 days    Current Potassium: None    SOB/GOMEZ: No more than what is normal for him    Lower Extremity Edema: Denies    Diet: Per Melissa he only eats what she makes for him and it is all low sodium    Fluids: Maintaining fluid restriction    Follow-Up Appointments:         Notes: Patient does not want to take extra diuretics. Reinforced CHF diet and fluid education.     Plan: Continue to monitor via MyHealth Tracker      MyHealth Tracker Weight Trends:         MyHealth Tracker Weight Graph:         Blanca Bright RN    [facility]  12/20/19 1:02 PM

## 2019-12-23 NOTE — PROGRESS NOTES
[facility]  [email or web address]    [employer] Tr[employer] Heart Failure Alert      Daily Weight Goal: 321-326 lbs    Today's Weight: 322 lbs    Weight Alert: 0 lbs over weight parameters    Symptom Alert: YES      1) If you have shortness of breath, is it worse today than yesterday?   3) Did shortness of breath wake you up last night?   4) Did you prop up on more pillows or sleep in a chair to breathe easier last night?      Current Diuretics: Torsemide 40 mg in the morning and 20 mg in the afternoon, Spironolactone 25 mg daily     Taking Diuretics as Instructed: Yes    Last Extra Diuretic: Torsemide 20 mg on 11/22/19    Current Potassium: NONE    SOB/GOMEZ: GOMEZ Exertion    Lower Extremity Edema: Slight bilateral ankle edema    Diet: Patient's wife Melissa, stated he has not been following lower sodium diet for the last week.    Fluids: Patient's wife Melissa, stated he has had more fluids daily over the last week    Follow-Up Appointments: 1/27/2020 with Rozina Bond CNP    Notes: Melissa stated Kenneth woke up early this morning SOB, but it has resolved. He is currently in bed sleeping and he does not appear to be SOB with his head lying on 1 pillow.     Plan: Patient is within weight parameters, but answered yes to 3 MyHealth Tracker questions about his breathing this morning. Per Melissa his SOB seems to have resolved since his call into MyHealth Tracker. I have asked Melissa to have Kenneth call me when he wakes up this morning and if his symptoms return. She stated understanding.      MyHealth Tracker Weight Trends:         MyHealth Tracker Weight Graph:       Blanca Bright RN    [facility]  12/23/19 8:45 AM

## 2019-12-26 NOTE — PROGRESS NOTES
[facility]          Today's Weight: 323 lbs    Patient remains within weight parameters and is asymptomatic today      MyHealth Tracker Weight Trends:      MyHealth Tracker Weight Trend Graph:      Blanca Bright RN    [facility]  12/26/19 10:14 AM

## 2019-12-30 ENCOUNTER — CARE COORDINATION (OUTPATIENT)
Dept: CARDIOLOGY | Facility: CLINIC | Age: 69
End: 2019-12-30

## 2019-12-30 NOTE — PROGRESS NOTES
[facility]     My [place]-Tracker Daily Monitoring        Daily Weight Goal: 321-326 lbs      Today's Weight: 319 lbs       Alert: Yes to sob that woke him and used more pillows     Diuretic:  Spironolactone 25 mg daily & Torsemide 40 mg in am and 20 mg in pm     KCl: None     Plan:  Patient does not want to take extra diuretics. Reinforced CHF diet and fluid education.     Call to pt. He is still sleeping. Reviewed reported symptoms. Wife states pt has not said anything to her about it and will call back once pt wakes up.     Next appt 1/27/20 w/Rozina.         Haily Graves, RN 9:34 AM 12/30/19

## 2019-12-30 NOTE — PROGRESS NOTES
"Spoke to pt and wife.   Pt feeling better right now.   He did have a can Progresso Traditional 99% Fat-Free Chicken Noodle Soup and 4 pieces of bread.     Reviewed sodium content in can of soup: 620 mg in half a can, 1340 mg for the whole.   Bread 1 piece has 172 m pieces was 688 mg of sodium.   Pt had a total of 2028 mg in just his dinner.   Pt and wife were \"shocked\"  Wife admits she has not been looking at sodium content on labels but will start again.   Wife asked if we have seasoning options for cooking. Mailing her out a list of options as well as our low sodium recipe book.   Haily Graves RN 3:11 PM 19           "

## 2020-01-06 ENCOUNTER — HOSPITAL ENCOUNTER (OUTPATIENT)
Dept: CARDIOLOGY | Facility: CLINIC | Age: 70
Discharge: HOME | End: 2020-01-06
Attending: NURSE PRACTITIONER | Admitting: NURSE PRACTITIONER
Payer: MEDICARE

## 2020-01-06 DIAGNOSIS — I50.42 CHRONIC COMBINED SYSTOLIC AND DIASTOLIC CONGESTIVE HEART FAILURE (H): ICD-10-CM

## 2020-01-06 PROCEDURE — 25500064 ZZH RX 255 OP 636: Performed by: NURSE PRACTITIONER

## 2020-01-06 PROCEDURE — 93306 TTE W/DOPPLER COMPLETE: CPT | Mod: 26 | Performed by: INTERNAL MEDICINE

## 2020-01-06 PROCEDURE — 40000264 ECHOCARDIOGRAM COMPLETE

## 2020-01-06 RX ADMIN — HUMAN ALBUMIN MICROSPHERES AND PERFLUTREN 3 ML: 10; .22 INJECTION, SOLUTION INTRAVENOUS at 11:30

## 2020-01-08 NOTE — RESULT ENCOUNTER NOTE
Discussed results with Melissa, patients wife. She stated understanding and will give the results to Kenneth when he comes out of the bathroom. Instructed her to call back if they have any further questions or concerns.    Blanca Bright RN    [facility]  01/08/20 10:11 AM

## 2020-01-09 ENCOUNTER — PATIENT OUTREACH (OUTPATIENT)
Dept: CARE COORDINATION | Facility: CLINIC | Age: 70
End: 2020-01-09

## 2020-01-09 DIAGNOSIS — F32.1 MODERATE MAJOR DEPRESSION (H): Primary | ICD-10-CM

## 2020-01-09 NOTE — PROGRESS NOTES
"Clinic Care Coordination Contact    Follow Up Progress Note      Assessment: DEBBY TARIQ to follow up on patient's goal of decreasing his depression symptoms. DEBBY TARIQ inquired to what depression symptoms patient is experiencing, he states that his symptoms,\" are wanting to isolate himself, he spends a lot of time by himself. He doesn't have interest in doing anything outside his home.\" DEBBY TARIQ asked if he would be interested in counseling services, he is not interested in talk therapy.      Diet:  Patient is eating 3 meals a day, sometimes eat snacks.  Patient is maintaining his low sodium diet as best he can. He typically eats two sandwiches a day as stated by wife.       Goals addressed this encounter:   Goals Addressed                 This Visit's Progress       Patient Stated      1. Mental Health Management (pt-stated)   40%     Goal Statement: I will improve my depression by working with my care teams and taking my medications daily as prescribed during the next 6 months.  Measure of Success: The patient will report decrease in depressive symptoms as evidenced by decreased PHQ-9 score.  Supportive Steps to Achieve: The patient will continue routine and as-needed follow-up with Psychiatry and will explore mental health resources provided.  CCC RN will continue routine patient outreach for ongoing support and additional resources as needed.  Barriers: The patient has a long history of depression and reports having tried several interventions without improvement.  Strengths: The patient has good support from his wife.  The patient is motivated to improve his depression.  Date to Achieve By: 5/31/20   Patient expressed understanding of goal: Yes             Intervention/Education provided during outreach: DEBBY TARIQ dicussed maintaining his low sodium diet. Patient's wife stated that overall patient's edema has decreased.   DEBBY TARIQ discussed his depression symptoms.   DEBBY TARIQ reviewed patient's medication, no changes to medications. " "Patient taking medications appropriately.     Outreach Frequency: monthly    Plan:   CC RN to reach out in a month to follow up on patient's stated depression symptoms.   Patient to see Psychiatry in on 1/27/2019 to discuss medication management. Patient is taking medication daily, but feels that his current Wellbutrin 300 MG daily dose is not doing anything to decrease his depression symptoms. He states,\" he feels like he needs a different antidepressant.\" He will discuss this with his Psychiatrist.       Care Coordinator will follow up in 1 month.       IGNACIA Lujan  Clinic Care Coordinator  [phone]  [email or web address]          "

## 2020-01-10 ENCOUNTER — CARE COORDINATION (OUTPATIENT)
Dept: CARDIOLOGY | Facility: CLINIC | Age: 70
End: 2020-01-10

## 2020-01-10 NOTE — PROGRESS NOTES
[facility]     My [place]-Tracker Daily Monitoring        Daily Weight Goal:  321 -326 lbs      Today's Weight: 319 lbs      Alert: Yes to SOB & pillows      Diuretic:  Spironolactone 25 mg daily & Torsemide 40 mg in am and 20 mg in pm     KCl: None      Plan:  Patient does not want to take extra diuretics. Reinforced CHF diet and fluid education.     Left VM for pt and wife.     MyHealth Tracker CHF Flowsheet My weight today is:   1/7/2020 319   1/8/2020 321   1/9/2020 320   1/10/2020 319       Haily Graves RN 11:56 AM 01/10/20

## 2020-01-16 ENCOUNTER — CARE COORDINATION (OUTPATIENT)
Dept: CARDIOLOGY | Facility: CLINIC | Age: 70
End: 2020-01-16

## 2020-01-16 NOTE — PROGRESS NOTES
Pt called into [facility] today. Last call in was 1/13. Pt's wt is up 7 lbs in 3 day. No symptoms reported. He is 1# over wt window.     Left VM for pt.     Next appt 1/27 w/Rozina Graves RN 3:14 PM 01/16/20

## 2020-01-27 ENCOUNTER — DOCUMENTATION ONLY (OUTPATIENT)
Dept: CARDIOLOGY | Facility: CLINIC | Age: 70
End: 2020-01-27

## 2020-01-27 ENCOUNTER — OFFICE VISIT (OUTPATIENT)
Dept: CARDIOLOGY | Facility: CLINIC | Age: 70
End: 2020-01-27
Attending: NURSE PRACTITIONER
Payer: MEDICARE

## 2020-01-27 VITALS
HEART RATE: 66 BPM | BODY MASS INDEX: 47.74 KG/M2 | DIASTOLIC BLOOD PRESSURE: 68 MMHG | WEIGHT: 315 LBS | SYSTOLIC BLOOD PRESSURE: 122 MMHG | HEIGHT: 68 IN | OXYGEN SATURATION: 96 %

## 2020-01-27 DIAGNOSIS — E78.5 HYPERLIPIDEMIA WITH TARGET LDL LESS THAN 100: Chronic | ICD-10-CM

## 2020-01-27 DIAGNOSIS — I10 HYPERTENSION GOAL BP (BLOOD PRESSURE) < 140/90: Chronic | ICD-10-CM

## 2020-01-27 DIAGNOSIS — I50.42 CHRONIC COMBINED SYSTOLIC AND DIASTOLIC CONGESTIVE HEART FAILURE (H): Primary | ICD-10-CM

## 2020-01-27 DIAGNOSIS — E66.01 OBESITY, MORBID (MORE THAN 100 LBS OVER IDEAL WEIGHT OR BMI > 40) (H): ICD-10-CM

## 2020-01-27 DIAGNOSIS — I50.42 CHRONIC COMBINED SYSTOLIC AND DIASTOLIC CONGESTIVE HEART FAILURE (H): ICD-10-CM

## 2020-01-27 LAB
ANION GAP SERPL CALCULATED.3IONS-SCNC: 4 MMOL/L (ref 3–14)
BUN SERPL-MCNC: 15 MG/DL (ref 7–30)
CALCIUM SERPL-MCNC: 9.2 MG/DL (ref 8.5–10.1)
CHLORIDE SERPL-SCNC: 107 MMOL/L (ref 94–109)
CO2 SERPL-SCNC: 28 MMOL/L (ref 20–32)
CREAT SERPL-MCNC: 0.98 MG/DL (ref 0.66–1.25)
GFR SERPL CREATININE-BSD FRML MDRD: 77 ML/MIN/{1.73_M2}
GLUCOSE SERPL-MCNC: 102 MG/DL (ref 70–99)
POTASSIUM SERPL-SCNC: 4 MMOL/L (ref 3.4–5.3)
SODIUM SERPL-SCNC: 139 MMOL/L (ref 133–144)

## 2020-01-27 PROCEDURE — 36415 COLL VENOUS BLD VENIPUNCTURE: CPT | Performed by: NURSE PRACTITIONER

## 2020-01-27 PROCEDURE — 80048 BASIC METABOLIC PNL TOTAL CA: CPT | Performed by: NURSE PRACTITIONER

## 2020-01-27 PROCEDURE — 99214 OFFICE O/P EST MOD 30 MIN: CPT | Performed by: NURSE PRACTITIONER

## 2020-01-27 ASSESSMENT — MIFFLIN-ST. JEOR: SCORE: 2216.84

## 2020-01-27 NOTE — PATIENT INSTRUCTIONS
Call C.O.RDREW. nurse for any questions or concerns Mon-Fri 8am-4pm: [phone] For concerns after hours: [phone]    Medication changes: none    Plan from today:    - CORE follow up with Rozina in 2 weeks   - Continue to monitor weight   - Please call if willing to increase torsemide     Lab results:   Component      Latest Ref Rng & Units 1/27/2020   Sodium      133 - 144 mmol/L 139   Potassium      3.4 - 5.3 mmol/L 4.0   Chloride      94 - 109 mmol/L 107   Carbon Dioxide      20 - 32 mmol/L 28   Anion Gap      3 - 14 mmol/L 4   Glucose      70 - 99 mg/dL 102 (H)   Urea Nitrogen      7 - 30 mg/dL 15   Creatinine      0.66 - 1.25 mg/dL 0.98   GFR Estimate      >60 mL/min/1.73:m2 77   GFR Estimate If Black      >60 mL/min/1.73:m2 89   Calcium      8.5 - 10.1 mg/dL 9.2
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
## 2020-01-27 NOTE — PROGRESS NOTES
[facility]- CORE Clinic MyHealth Tracker    For CORE OV Review:  1/27/2020    MyHealth Tracker Activitated: 06/07/2020    Diuretic: Spironolactone 25 mg daily & Torsemide 40 mg in am and 20 mg in afternoon    KCL:  None    Goal Weight: 321-326 lbs    Diuretic Plan: Diet and fluid education. Pt does not like tot take extra diuretics                  Haily Graves RN 8:48 AM 01/27/20

## 2020-02-06 ENCOUNTER — CARE COORDINATION (OUTPATIENT)
Dept: CARDIOLOGY | Facility: CLINIC | Age: 70
End: 2020-02-06

## 2020-02-06 NOTE — PROGRESS NOTES
[facility]     My [place]-Tracker Daily Monitoring        Daily Weight Goal:  321 -326 lbs      Today's Weight: 331 lbs      Alert: 5 lbs above wt window max     Diuretic:  Spironolactone 25 mg daily & Torsemide 40 mg in am and 20 mg in pm     KCl: None      Plan:  Patient does not want to take extra diuretics. Reinforced CHF diet and fluid education.     VM left for pt and wife.   Next appt 3/27 w/Rozina    MHT Weights:           MHT Weight graph:

## 2020-02-07 NOTE — PROGRESS NOTES
[facility]     My [place]-Tracker Daily Monitoring      Daily Weight Goal:  321 -326 lbs      Today's Weight: 327 lbs      Alert: 1 lbs above goal & said yes to dizziness     Diuretic:  Spironolactone 25 mg daily & Torsemide 40 mg in am and 20 mg in pm     KCl: None      Plan:  Patient does not want to take extra diuretics. Reinforced CHF diet and fluid education.      VM left for pt and wife.   Next appt 3/27 w/Rozina CROWELLT weights:           MHT graph:        Haily Graves RN 10:58 AM 02/07/20

## 2020-02-10 ENCOUNTER — ANCILLARY PROCEDURE (OUTPATIENT)
Dept: GENERAL RADIOLOGY | Facility: CLINIC | Age: 70
End: 2020-02-10
Attending: PHYSICIAN ASSISTANT
Payer: MEDICARE

## 2020-02-10 ENCOUNTER — OFFICE VISIT (OUTPATIENT)
Dept: FAMILY MEDICINE | Facility: CLINIC | Age: 70
End: 2020-02-10
Payer: MEDICARE

## 2020-02-10 VITALS
WEIGHT: 315 LBS | TEMPERATURE: 97.4 F | HEIGHT: 68 IN | RESPIRATION RATE: 14 BRPM | BODY MASS INDEX: 47.74 KG/M2 | DIASTOLIC BLOOD PRESSURE: 78 MMHG | HEART RATE: 69 BPM | SYSTOLIC BLOOD PRESSURE: 126 MMHG | OXYGEN SATURATION: 98 %

## 2020-02-10 DIAGNOSIS — M79.672 LEFT FOOT PAIN: ICD-10-CM

## 2020-02-10 DIAGNOSIS — E11.42 DIABETIC POLYNEUROPATHY ASSOCIATED WITH TYPE 2 DIABETES MELLITUS (H): ICD-10-CM

## 2020-02-10 DIAGNOSIS — L97.902: ICD-10-CM

## 2020-02-10 DIAGNOSIS — M10.9 GOUT, UNSPECIFIED CAUSE, UNSPECIFIED CHRONICITY, UNSPECIFIED SITE: Primary | ICD-10-CM

## 2020-02-10 DIAGNOSIS — E11.9 TYPE 2 DIABETES MELLITUS WITHOUT COMPLICATION, WITHOUT LONG-TERM CURRENT USE OF INSULIN (H): ICD-10-CM

## 2020-02-10 PROCEDURE — 99214 OFFICE O/P EST MOD 30 MIN: CPT | Performed by: PHYSICIAN ASSISTANT

## 2020-02-10 PROCEDURE — 73630 X-RAY EXAM OF FOOT: CPT | Mod: LT

## 2020-02-10 RX ORDER — INDOMETHACIN 50 MG/1
50 CAPSULE ORAL 2 TIMES DAILY WITH MEALS
Qty: 14 CAPSULE | Refills: 0 | Status: SHIPPED | OUTPATIENT
Start: 2020-02-10 | End: 2021-01-04

## 2020-02-10 ASSESSMENT — ENCOUNTER SYMPTOMS
NEUROLOGICAL NEGATIVE: 1
GASTROINTESTINAL NEGATIVE: 1
CONSTITUTIONAL NEGATIVE: 1
RESPIRATORY NEGATIVE: 1
CARDIOVASCULAR NEGATIVE: 1
EYES NEGATIVE: 1
PSYCHIATRIC NEGATIVE: 1

## 2020-02-10 ASSESSMENT — MIFFLIN-ST. JEOR: SCORE: 2226.37

## 2020-02-10 ASSESSMENT — PATIENT HEALTH QUESTIONNAIRE - PHQ9: SUM OF ALL RESPONSES TO PHQ QUESTIONS 1-9: 10
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
## 2020-02-11 ENCOUNTER — CARE COORDINATION (OUTPATIENT)
Dept: CARDIOLOGY | Facility: CLINIC | Age: 70
End: 2020-02-11

## 2020-02-11 NOTE — PROGRESS NOTES
[facility] [place].          Weight and symptoms update in MyHealth Tracker as requested      Blanca Bright RN    [facility] [place]  02/11/20 1:24 PM

## 2020-02-18 ENCOUNTER — CARE COORDINATION (OUTPATIENT)
Dept: CARDIOLOGY | Facility: CLINIC | Age: 70
End: 2020-02-18

## 2020-02-18 NOTE — PROGRESS NOTES
Left VM reminding pt to call into MHT  Next appt. 3/27 with RICCARDO Cantor, RN 11:32 AM 02/18/20

## 2020-02-19 NOTE — PROGRESS NOTES
"[facility]     My [place]-Tracker Daily Monitoring        Daily Weight Goal:  321 -326 lbs      Today's Weight: 328 lbs      Alert: 2 lbs above wt window max     Diuretic:  Spironolactone 25 mg daily & Torsemide 40 mg in am and 20 mg in pm     KCl: None      Plan:  Patient does not want to take extra diuretics. Reinforced CHF diet and fluid education.     Spoke to pt and wife. Pt is continuing to be non complaint with diet. He is eating \"a can of green beans right now with 4 slices of bread\". Pt did rinse beans. STRONGLY encouraged pt to stop eating canned veggies and that many slices of bread.   Discussed briefly [facility] with pt and wife. Both are very interested. I will get more information to them.     Pt was seen by PCC on 2/10 for L foot ulcer. Diagnosed with gout and started on Indomethacin. His foot is feeling better.     Will send Rozina an update.   Next appt 3/27 w/Rozina.     T wts:           T Wt graph:         Haily Gerdowsky, RN 3:06 PM 02/19/20    "

## 2020-02-19 NOTE — PROGRESS NOTES
Reviewed [facility]ORE phone encounter. Continue to reinforce low sodium diet and 2L fluid restriction. Dietary indiscretions continue to make volume management difficult.     YENI [facility]  Pager: [phone]

## 2020-02-20 ENCOUNTER — PATIENT OUTREACH (OUTPATIENT)
Dept: CARE COORDINATION | Facility: CLINIC | Age: 70
End: 2020-02-20

## 2020-02-20 ASSESSMENT — ACTIVITIES OF DAILY LIVING (ADL): DEPENDENT_IADLS:: INDEPENDENT

## 2020-02-20 NOTE — PROGRESS NOTES
Clinic Care Coordination Contact  [facility]/Voicemail    Referral Source: Care Team  CC RN outreach to get updates on patient status, assess goal progress, and provide support and additional resources as needed.    Clinical Data: Care Coordinator Outreach  Outreach attempted x 1.  Left message on patient's voicemail with call back information and requested return call.  Plan: Care Coordinator will try to reach patient again in 3-5 business days.    Solo Dia RN  Clinic Care Coordinator  [facility]  Ph: [phone]

## 2020-02-20 NOTE — Clinical Note
"Hi Dr. Peng-- Kenneth stopped taking his Wellbutrin as he didn't feel it was helping and was \"dismissed\" from Psychiatry; he does not have any interest in continuing to see Psychiatry as he doesn't feel it has been helpful either.  He was agreeable to me reaching out to you to see if you had any recommendations (Psychiatry has essentially sent him back to you for depression management at this point).  Kenneth said he would likely be willing to try a different medication if it was something you thought might help.  I spoke with him candidly about his depression and told him I'm concerned that nobody is monitoring it/managing it at this point.  He was very apathetic, which seems to be the trend.  His wife was on the phone with us, as well, and she reiterated that this has been how he's been for a long time.  I'm not sure how to help him at this point.  Thoughts?  --JOY Banda Care Coordinator"

## 2020-02-20 NOTE — PROGRESS NOTES
[facility]     My Vernon-Tracker Daily Monitoring        Daily Weight Goal:  321 -326 lbs      Today's Weight: 328 lbs      Alert: 2 lbs above wt window max     Diuretic:  Spironolactone 25 mg daily & Torsemide 40 mg in am and 20 mg in pm     KCl: None      Plan:  Patient does not want to take extra diuretics. Reinforced CHF diet and fluid education.       Pt was eating high salt foods last night. Left  for wife to see if she would be willing to drive to [facility] to  meals from [facility] as they are a few blocks west of delivery area.    T Wts:               T Wt graph:         Haily Graves RN 10:13 AM 02/20/20

## 2020-02-24 ENCOUNTER — CARE COORDINATION (OUTPATIENT)
Dept: CARDIOLOGY | Facility: CLINIC | Age: 70
End: 2020-02-24

## 2020-02-24 NOTE — PROGRESS NOTES
[facility]     My Vernon-Tracker Daily Monitoring        Daily Weight Goal:  321 -326 lbs      Today's Weight: 328 lbs      Alert: 2 lbs above wt window max     Diuretic:  Spironolactone 25 mg daily & Torsemide 40 mg in am and 20 mg in pm     KCl: None      Plan:  Patient does not want to take extra diuretics. Reinforced CHF diet and fluid education.     Next appt 3/27 w/Rozina    MHT weights:         MHT weight graph:         Haily Gerdowsky, RN 9:39 AM 02/24/20

## 2020-02-25 ENCOUNTER — CARE COORDINATION (OUTPATIENT)
Dept: CARDIOLOGY | Facility: CLINIC | Age: 70
End: 2020-02-25

## 2020-02-25 NOTE — PROGRESS NOTES
[facility]     My Vernon-Tracker Daily Monitoring        Daily Weight Goal:  321 -326 lbs      Today's Weight: 327 lbs      Alert: 1 lbs above wt window max     Diuretic:  Spironolactone 25 mg daily & Torsemide 40 mg in am and 20 mg in pm     KCl: None      Plan:  Patient does not want to take extra diuretics. Reinforced CHF diet and fluid education.    No answer when called to pt.  Next appt 3/27 w/Rozina    MHT wts:        MHT wt graph:

## 2020-02-27 ENCOUNTER — CARE COORDINATION (OUTPATIENT)
Dept: CARDIOLOGY | Facility: CLINIC | Age: 70
End: 2020-02-27

## 2020-02-27 ASSESSMENT — ACTIVITIES OF DAILY LIVING (ADL): DEPENDENT_IADLS:: INDEPENDENT

## 2020-02-27 NOTE — PROGRESS NOTES
"[facility]     My Vern[place]-Tracker Daily Monitoring        Daily Weight Goal:  321 -326 lbs      Today's Weight:  No wt today     Alert:      Diuretic:  Spironolactone 25 mg daily & Torsemide 40 mg in am and 20 mg in pm     KCl: None      Plan:  Patient does not want to take extra diuretics. Reinforced CHF diet and fluid education.        Message received from a CC at pt's PCC: \"I'm going to follow up with his PCP about his depression as it seems like that's really impacting how he's managing his health (particularly his CHF).  From a CHF standpoint, I understand he wasn't wanting to increase his diuretics...it sounds like it was really impacting his quality of life day-to-day, so I'm not sure if this is something that could be discussed further. His legs are swollen (L >R) and burning, but he said this has been the case intermittently for months\"      Spoke to pt and wife. Pt believes leg is swollen from gout as it is painful. Did recommend they keep elevating legs and wrapping. Both deny rash to leg. Encouraged him to elevate his legs, especially right after talking torsemide.  Pt has been continuing to eat high salt foods. Reminded him again to be mindful of food choices and read labels. Pt seems very depressed. Wife seems very stressed.   Discussed [facility] with them. unfortunately, pt and wife live just outside of the meal delivery zone. Did let wife know if she is willing to drive to [facility] there is a  a station there. Wife will think about it as they are \"really struggling this month. We need new tires, tabs and I have 3 medical bills I have to pay\".   Will wait for them to call back.     MHT wts:           MHT wt graph:           "

## 2020-02-27 NOTE — PROGRESS NOTES
"Clinic Care Coordination Contact    Follow Up Progress Note      Assessment: The patient updated that he stopped taking his Wellbutrin and is not agreeable to continuing to see Psychiatry as he didn't feel like the medication or the appointments were helping.  The patient stated \"I just don't trust any doctor\".  The patient's wife was on speaker phone with CC RN and patient; she stated she feels the patient has never felt like his depression has been well-controlled, but he also usually hasn't been agreeable to seeking help for it and is oftentimes apathetic about it.  Per chart review, the patient's depression will now need to be managed by PCP as Psychiatry has \"dismissed\" the patient; he would need another PCP referral to Psychiatry to schedule with a different Psychiatry provider.    While on the phone, the patient mentioned that his legs continue to be somewhat swollen (left > right) and are burning; which he states has been intermittently going on for months.  The patient's wife helps him to wrap his legs daily as previously instructed by the lymphedema therapist that was seeing him.  Per chart review, the patient is refusing to increase his diuretics.  When CC RN inquired about this, the patient stated the additional diuretics were causing him to urinate so frequently that he didn't feel he had time to do anything he enjoys (watching TV, doing puzzles, etc.)  CC RN spoke with patient about his diet and fluid management per [facility] clinic recommendations in light of him not wanting to increase his diuretics; he stated he doesn't really eat much, and reported eating mostly sandwiches, bread, cereal.  CC RN reiterated CHF diet which includes low sodium; patient and wife stated it can be challenging to purchase healthier foods, at times, related to their financial limitations.  Per chart review, [facility] clinic recently discussed option of getting patient connected with meals through [facility]; patient/wife stated they " had not yet received this information.    Goals addressed this encounter:   Goals Addressed                 This Visit's Progress       Patient Stated      1. Mental Health Management (pt-stated)   10%     Goal Statement: I will improve my depression by working with my care teams and taking my medications daily as prescribed during the next 6 months.  Measure of Success: The patient will report decrease in depressive symptoms as evidenced by decreased PHQ-9 score.  Supportive Steps to Achieve: The patient will continue routine and as-needed follow-up with Psychiatry and will explore mental health resources provided.  CCC RN will continue routine patient outreach for ongoing support and additional resources as needed.  Barriers: The patient has a long history of depression and reports having tried several interventions without improvement.  Strengths: The patient has good support from his wife.  The patient is motivated to improve his depression.  Date to Achieve By: 5/31/20   Patient expressed understanding of goal: Yes    As of today's date 2/27/2020 goal is met at 0 - 25%.   Goal Status:  Active - Patient stopped his Wellbutrin as he didn't feel it was helping and is not currently agreeable to continue seeing a Psychiatrist.        Intervention/Education provided during outreach: Regarding patient's depression, the patient was agreeable to CC RN speaking with PCP about today's conversation and inquiring about any recommendations.  He stated he would be open to trying a different medication if she thought something might help.  Regarding the patient's leg swelling and need for healthier foods, CC RN spoke with [facility] clinic RN Haily who confirmed she will contact patient/wife today to review current CHF status as well as to provide information about the [facility] program.     Outreach Frequency: monthly    Plan: CC RN will discuss patient's depression with PCP with goal of formulating a plan for depression  management in light of patient's refusal to continue seeing Psychiatry at this time.  [facility] clinic will continue CHF symptom management and will discuss [facility] with patient as option of him obtaining healthier foods.  CC RN will call back to update patient with updates after speaking with PCP.    Sool Dia RN  Clinic Care Coordinator  [facility]  Ph: [phone]

## 2020-03-02 NOTE — PROGRESS NOTES
Clinic Care Coordination Contact  Care Team Conversations    PCP suggested patient could see Psychiatry at [facility] as they may be able to consider alternative/non-medication treatments (such as ECT, TMS, etc).    CC RN left voicemail for patient briefly reviewing this recommendation and requested call back from patient to discuss further.  If patient agreeable, PCP can place referral to [facility] Psychiatry.    Solo Dia RN  Clinic Care Coordinator  [facility]  Ph: [phone]

## 2020-03-03 ENCOUNTER — CARE COORDINATION (OUTPATIENT)
Dept: CARDIOLOGY | Facility: CLINIC | Age: 70
End: 2020-03-03

## 2020-03-11 ENCOUNTER — CARE COORDINATION (OUTPATIENT)
Dept: CARDIOLOGY | Facility: CLINIC | Age: 70
End: 2020-03-11

## 2020-03-11 NOTE — PROGRESS NOTES
"Called and reviewed the following instructions by Rozina Bond CNP:    1. INCREASE torsemide to 40mg BID until you hear otherwise  2. Continue telehealth management   3. Be mindful of the salt in foods and watch how much soda pop you are consuming daily.  Try to cut back as this should also help lower your weight.  4. CORE follow up scheduled for 3/27/20.      Kenneth reports he was only taking 2 pills/day in the morning (40 mg total) of torsemide.  Was not taking the 20 mg in afternoon.       Melissa reports \"your guess is as good as mine\" as to whether or not Kenneth will follow the directions to increase his torsemide.  Melissa thinks that his depression is worsening.  Recommended they see's his PCP regarding this.        Will follow up with Kenneth on Friday to assess how he's feeling.          Blanca Gould RN [facility] [place] [place]  [place]. Clinic Care Coordinator  03/11/20, 3:22 PM              "

## 2020-03-11 NOTE — PROGRESS NOTES
Will call Kenneth on Friday to follow up.      JOY Guzman  [place], [place]  D[facility] Clinic Care Coordinator  03/11/20, 4:13 PM

## 2020-03-11 NOTE — PROGRESS NOTES
Agree, please check in with patient on Friday after increased dosage of torsemide will determine at that time if he should continue or cut back to his baseline dosage.     YEIN [facility]  Pager: [phone]

## 2020-03-11 NOTE — PROGRESS NOTES
[facility]: [facility]E. Clinic Telemanagment  My Health Tracker HF Alert     Current Weight Parameters:  321-326 lbs    3/9: 323 lbs  3/10: 331 lbs  3/11: 335 lbs    Current Daily Diuretic Plan:  Spironolactone 25 mg daily & torsemide 40 mg in AM and 20 mg in PM.     Heart Failure s/s: denies.  Has intermittent leg edema.     Notes:    - Wife Melissa reports she bought a 24 pack of pepsi on Fri 3/6 and today there is only 10 can left. He also is drinking a lot of water and milk as well. He's hiding his food that he is eating and she says he's going out to eat too much.   - Continued to reinforce low sodium diet and 2L fluid restriction with wife, but Melissa states he just isn't interested.      Future Appointments   Date Time Provider Department Center   3/27/2020  9:15 AM RU [facility] PSA CLIN   3/27/2020 10:10 AM Rozina Bond APRN CNP [facility] PSA CLIN   4/8/2020 11:20 AM Blanca Peng MD [facility] HP       Plan:  Will route to Rozina Bond CNP for further review and recommendations      Blanca Gould RN BSN  [facility] Care Coordinator  [facility] [place], [place]  [phone]  03/11/20, 12:37 PM

## 2020-03-12 NOTE — PROGRESS NOTES
"[facility]: C.O.R.E. Clinic Telemanagment  My Health Tracker HF Alert     Today's Weight: 321 lbs (14# wt loss?)  Current Weight Parameters:  321-326 lbs       Answered yes to MHT questions:   - \"Did shortness of breath wake you up last night?\"  - \"Did you prop up with more pillows or sleep in a chair to breathe easier last night?\"    Current Daily Diuretic Plan:  Torsemide 40 mg AM and 20 mg afternoon. Spironolactone 25 mg daily.    Notes:  Spoke with Kenneth on phone and he confirmed he did take Torsemide 40 mg BID yesterday 3/11 as directed by CORE.  Voiding every hour.  Questioning accuracy of wt today as appear's he's lost 14 lbs.  He stood on scale while on the phone with me to recheck and scale read: 323.6 lbs. Possibly 3/11 was inaccurate?     Instructed Kenneth to go back to 40 mg of Torsemide AM and 20 mg Torsemide (1 pill) this afternoon.  Will check wt tomorrow again and call with follow up.     Future Appointments   Date Time Provider Department Center   3/27/2020  9:15 AM RU LAB [facility] PSA CLIN   3/27/2020 10:10 AM Rozina Bond APRN CNP [facility] PSA CLIN   4/8/2020 11:20 AM Blanca Peng MD [facility] HP     Plan:  Will route to Rozina Bond CNP for further review and recommendations      JOY Guzman  C.O.R.E. Clinic Care Coordinator  [facility] [place], [place]  [phone]  03/12/20, 3:12 PM             "

## 2020-03-13 NOTE — PROGRESS NOTES
Melissa returned called and stated the received the VM and Kenneth will continue taking Torsemide 40 mg (2 pills) in AM and 20 mg (1 pill) in afternoon per Rozina Bond CNP.  He will continue to call in over the weekend as well.     Today's wt: 324 lbs          Blanca Gould, RN BSN  [place], [place]  [place]. Clinic Care Coordinator  03/13/20, 1:11 PM

## 2020-03-13 NOTE — PROGRESS NOTES
Reviewed my health tracker phone encounter. Agree questioning accuracy of weight.     RECOMMENDATIONS  1. Continue torsemide 40mg qam and 20mg qpm  2. Continue my health tracker

## 2020-03-16 NOTE — PROGRESS NOTES
Tele-health tracker reviewed. Please encourage compliance with torsemide 40mg q am and 20mg q pm. Reinforce HF education including low sodium diet and 1.5-2L fluid restriction.     YENI [facility]  Pager: [phone]

## 2020-03-16 NOTE — PROGRESS NOTES
"[facility]  C.ORAMY    MyHealth Tracker Heart Failure Alert      Daily Weight Goal: 321-326 lbs    Today's Weight: 325 lbs    Weight Alert: Within wt window    Symptom Alert: Answered yes to:       3) Did shortness of breath wake you up last night?     Current Diuretic & Potassium Plan: Torsemide 40 mg in am and 20 mg in afternoon       Last Extra Diuretic: Torsemide 40 mg BID on 3/11 - 3/13/2020.     Notes: Pt was not taking his pm dose of torsemide, 20 mg.  Was instructed to go back to 40 mg in am and 20 mg in afternoon on 3/13    Spoke to wife. Pt is sleeping. She has not noticed any signs of PND last night. \"He slept all night. He has been eating and drinking too much. And he skipped his 20 mg pm dose.\" Wife is \"very worried that his depression will kill him before his heart condition\"   Wife states they have a hard time finding a therapist that he likes but will keep trying.       Will send [place] an update.     Future Appointments   Date Time Provider Department Center   3/27/2020  9:15 AM RU LAB RULAB [facility] PSA CLIN   3/27/2020 10:10 AM Rozina Bond APRN [facility] PSA CLIN   4/8/2020 11:20 AM Blanca Peng MD [facility]           MyHealth Tracker Weight Trends:         MyHealth Tracker Weight Graph:           Haily Graves RN 10:07 AM 03/16/20      "

## 2020-03-19 ENCOUNTER — CARE COORDINATION (OUTPATIENT)
Dept: CARDIOLOGY | Facility: CLINIC | Age: 70
End: 2020-03-19

## 2020-03-19 NOTE — PROGRESS NOTES
"Wife called and left message stating pt accidentally deleted phone for MHT.    Called and spoke to wife as pt is sleeping. Wt today is 324 lbs. Up 3 lbs from yesterday. He did have soup. Wife states he was not complaining of SOB, swelling/bloating, dizziness or had to prop himself up more.   Gave wife phone number to MHT.     I also did let her know that Rozina would like to change pt's appt to a phone visit due to COVID -19. Wife agreeable. Did let her know that Rozina will decide if pt needs labs after phone visit. Wife expressed understanding. She is very concerned about 's depression, :I really wish we could remember what he was taking when we first met because he felt good on that.\"   They do not have a BP cuff at home. I did tell her briefly how phone visit will go and that I will call Thursday to review and also to complete Wellness Screening as it needs to be done 24 hrs prior to appt.     Haily Graves RN 11:21 AM 03/19/20        "

## 2020-03-24 ENCOUNTER — CARE COORDINATION (OUTPATIENT)
Dept: CARDIOLOGY | Facility: CLINIC | Age: 70
End: 2020-03-24

## 2020-03-24 NOTE — PROGRESS NOTES
[facility]  CSCAR    58.comealth Tracker Heart Failure Alert      Daily Weight Goal: 321-326 lbs    Today's Weight: 325 lbs    Weight Alert: 3 lbs over weight parameters    Symptom Alert: Yes to        4) Did you prop up on more pillows or sleep in a chair to breathe easier last night?     Current Diuretic & Potassium Plan: Torsemide 40 mg in am and 20 mg in afternoon        Last Extra Diuretic: Torsemide 40 mg BID on 3/11 - 3/13/2020.        Future Appointments   Date Time Provider Department Center   3/27/2020 10:10 AM Rozina Bond APRN CNP [facility] PSA CLIN   4/8/2020 11:20 AM Blanca Peng MD [facility] HP       Notes: Pt missed his afternoon dose of torsemide yesterday. Reminded him to take it. Expressed understanding.         MyHealth Tracker Weight Trends:           MyHealth Tracker Weight Graph:         Haily Graves RN 12:30 PM 03/24/20

## 2020-03-25 ENCOUNTER — PATIENT OUTREACH (OUTPATIENT)
Dept: CARE COORDINATION | Facility: CLINIC | Age: 70
End: 2020-03-25

## 2020-03-25 DIAGNOSIS — F32.1 MODERATE MAJOR DEPRESSION (H): Primary | ICD-10-CM

## 2020-03-25 ASSESSMENT — ACTIVITIES OF DAILY LIVING (ADL): DEPENDENT_IADLS:: INDEPENDENT

## 2020-03-25 NOTE — PROGRESS NOTES
Clinic Care Coordination Contact  [facility]/Voicemail    Referral Source: Care Team  CC RN outreach to get updates on patient status, assess goal progress, and provide support and additional resources as needed.    Clinical Data: Care Coordinator Outreach  Outreach attempted x 1.  Left message on patient's voicemail with call back information and requested return call.  Plan: Care Coordinator will try to reach patient again in 3-5 business days.    Solo Dia RN  Clinic Care Coordinator  [facility]  Ph: [phone]

## 2020-03-25 NOTE — Clinical Note
Dr. Peng-- Kenneth is [facility] to [facility] to [facility] to see if they have alternative/non-medicinal options for his depression.  Would you mind placing?  Thank you!  --JOY Banda Care Coordinator

## 2020-03-25 NOTE — LETTER
[facility]  Complex Care Plan  About Me:    Patient Name:  Jamar Marroquin    YOB: 1950  Age:         70 year old   Twila MRN:    [id number] Telephone Information:  Home Phone [phone]   Mobile [phone]       Address:  [address]  [place] [address] Email address:  No e-mail address on record      Emergency Contact(s)    Name Relationship Lgl Grd Work Phone Home Phone Mobile Phone   1. SELIN MARROQUIN Spouse No none [phone]   2. JOSE MARIA MARROQUIN Son No  [phone]            Primary language:  English     needed? No   [employer]:  [phone] op. 1  Other communication barriers: None  Preferred Method of Communication:  Mail  Current living arrangement: I live in a private home with spouse  Mobility Status/ Medical Equipment: Independent    Health Maintenance  Health Maintenance Reviewed: Due/Overdue   Health Maintenance Due   Topic Date Due     ZOSTER IMMUNIZATION (1 of 2) 01/19/1999     MEDICARE ANNUAL WELLNESS VISIT  04/27/2016     EYE EXAM  05/22/2019     INFLUENZA VACCINE (1) 09/01/2019     My Access Plan  Medical Emergency 911   Primary Clinic Line [facility] - [phone]   24 Hour Appointment Line [phone] or  [phone]) (toll-free)   24 Hour Nurse Line [phone] (toll-free)   Preferred Urgent Care [facility] [phone]   Preferred Hospital [facility]  [phone]   Preferred Pharmacy [id number] - [place], [place] - [address]     Behavioral Health Crisis Line The National Suicide Prevention Lifeline at 1-596.383.2617 or 911     My Care Team Members  Patient Care Team       Relationship Specialty Notifications Start End    Blanca Peng MD PCP - General Family Practice  10/10/17     Phone: [phone] Fax: [phone]         [facility] [facility]  [place] [address]    Kong Fraga MD MD Gastroenterology  8/11/16     Phone: [phone] Fax: [phone]         [address] [place] [address]    Blanca Peng MD Assigned PCP   10/15/17     Phone: [phone] Fax: [phone]         [address] [facility] [address]    Cary Grace MD MD Ophthalmology  5/21/18     Phone: [phone] Fax: [phone]         [address] [place] [address]    Ezequiel Chand MD MD Cardiology  9/16/19     Phone: [phone] Fax: [phone] [address] [facility] [address]    Haily Graves, RN Registered Nurse Cardiology Admissions 9/26/19     [facility]    AngélicaRika villalba NP Nurse Practitioner Nurse Practitioner Psych/Mental Health  11/7/19     Phone: [phone] Fax: [phone]         [facility] [address] [place] [address]    Rozina Bond APRN CNP Nurse Practitioner Cardiology  11/8/19     [facility]    Phone: [phone] Pager: [phone] Fax: [phone] [address] [facility] [address]    Solo Dia, RN Lead Care Coordinator Primary Care - CC  11/8/19     Phone: [phone]         Karen Stapleton MA Community Health Worker Primary Care - CC  1/22/20     Rozina Bond APRN CNP MyHealth Tracker Nurse Practitioner - Adult Health  1/27/20     Phone: [phone] Pager: [phone] Fax: [phone] [address] [place] [address]            My Care Plans  Self Management and Treatment Plan  Goals and (Comments)  Goals        General    1. Mental Health Management (pt-stated)     Notes - Note edited  3/30/2020 12:28 PM by Solo Dia, RN    Goal Statement: I will improve my depression by working with my care teams and taking my medications daily as prescribed.  Measure of Success: The patient will report decrease in depressive symptoms as evidenced by decreased PHQ-9 score.  Supportive Steps to Achieve: The patient will continue routine and as-needed  follow-up with Psychiatry and will explore mental health resources provided.  CCC RN will continue routine patient outreach for ongoing support and additional resources as needed.  Barriers: The patient has a long history of depression and reports having tried several interventions without improvement.  Strengths: The patient has good support from his wife.  The patient is motivated to improve his depression.  Date to Achieve By: 5/31/20   Patient expressed understanding of goal: Yes             Action Plans on File:   Depression  Heart Failure       My Medical and Care Information  Problem List   Patient Active Problem List   Diagnosis     Acquired hypothyroidism     Vitiligo     Hyperlipidemia with target LDL less than 100     Hypertension goal BP (blood pressure) < 140/90     Cerebral infarction (H)     Moderate major depression (H)     Health Care Home     Fatty liver     Advanced directives, counseling/discussion     Impaired glucose tolerance     Transient cerebral ischemia     Obesity, morbid (more than 100 lbs over ideal weight or BMI > 40) (H)     Chronic stasis dermatitis     Bilateral leg edema     Diet Controlled Type 2 diabetes mellitus without complication, without long-term current use of insulin (H)     Benign neoplasm of colon     Pain in both lower extremities     Low hemoglobin     Breast tenderness in male     Hx of Diabetic polyneuropathy associated with type 2 diabetes mellitus - now diet controlled (H)     Chronic combined systolic and diastolic congestive heart failure (H)     PVD (peripheral vascular disease) (H)     Benign prostatic hyperplasia with incomplete bladder emptying     Non-healing ulcer of lower leg with fat layer exposed, unspecified laterality (H)      Current Medications:  Current Outpatient Medications   Medication     acetaminophen (TYLENOL) 500 MG tablet     aspirin (ASA) 81 MG tablet     BETA BLOCKER NOT PRESCRIBED (INTENTIONAL)     clindamycin (CLEOCIN) 150 MG capsule      Glucosamine-Chondroitin (OSTEO BI-FLEX REGULAR STRENGTH) 250-200 MG TABS     indomethacin (INDOCIN) 50 MG capsule     levothyroxine (SYNTHROID/LEVOTHROID) 200 MCG tablet     losartan (COZAAR) 25 MG tablet     order for DME     spironolactone (ALDACTONE) 25 MG tablet     STATIN NOT PRESCRIBED (INTENTIONAL)     tamsulosin (FLOMAX) 0.4 MG capsule     torsemide (DEMADEX) 20 MG tablet     No current facility-administered medications for this visit.      Care Coordination Start Date: 11/8/2019   Frequency of Care Coordination: monthly   Form Last Updated: 03/30/2020

## 2020-03-26 ENCOUNTER — CARE COORDINATION (OUTPATIENT)
Dept: CARDIOLOGY | Facility: CLINIC | Age: 70
End: 2020-03-26

## 2020-03-26 NOTE — PROGRESS NOTES
[facility]  CSCAR    Audiamealth Tracker Heart Failure Alert      Daily Weight Goal: 321-326 lbs    Today's Weight: 320 lbs    Weight Alert: None    Symptom Alert: None         Current Diuretic & Potassium Plan: Torsemide 40 mg in am and 20 mg in afternoon        Last Extra Diuretic: Torsemide 40 mg BID on 3/11 - 3/13/2020.       Future Appointments   Date Time Provider Department Center   3/27/2020 10:10 AM Rozina Bond APRN [facility] PSA CLIN   4/8/2020 11:20 AM Blanca Peng MD [facility] HP       Notes: Left VM for pt and wife as pt did not call in yesterday. Want to make sure not rapid wt loss.         MyHealth Tracker Weight Trends:             MyHealth Tracker Weight Graph:       Haily Graves, RN 2:32 PM 03/26/20

## 2020-03-27 ENCOUNTER — CARE COORDINATION (OUTPATIENT)
Dept: CARDIOLOGY | Facility: CLINIC | Age: 70
End: 2020-03-27

## 2020-03-27 ENCOUNTER — VIRTUAL VISIT (OUTPATIENT)
Dept: CARDIOLOGY | Facility: CLINIC | Age: 70
End: 2020-03-27
Attending: NURSE PRACTITIONER
Payer: MEDICARE

## 2020-03-27 VITALS — WEIGHT: 315 LBS | BODY MASS INDEX: 49.11 KG/M2

## 2020-03-27 DIAGNOSIS — E66.01 OBESITY, MORBID (MORE THAN 100 LBS OVER IDEAL WEIGHT OR BMI > 40) (H): ICD-10-CM

## 2020-03-27 DIAGNOSIS — I10 HYPERTENSION GOAL BP (BLOOD PRESSURE) < 140/90: Chronic | ICD-10-CM

## 2020-03-27 DIAGNOSIS — I50.42 CHRONIC COMBINED SYSTOLIC AND DIASTOLIC CONGESTIVE HEART FAILURE (H): Primary | ICD-10-CM

## 2020-03-27 DIAGNOSIS — E78.5 HYPERLIPIDEMIA WITH TARGET LDL LESS THAN 100: Chronic | ICD-10-CM

## 2020-03-27 PROCEDURE — 99443 ZZC PHYSICIAN TELEPHONE EVALUATION 21-30 MIN: CPT | Performed by: NURSE PRACTITIONER

## 2020-03-27 RX ORDER — TORSEMIDE 20 MG/1
TABLET ORAL
Qty: 180 TABLET | Refills: 1 | Status: SHIPPED | OUTPATIENT
Start: 2020-03-27 | End: 2020-07-30

## 2020-03-27 NOTE — PROGRESS NOTES
"Jamar Ivory is a 70 year old male who is being evaluated via a billable telephone visit.      The patient has been notified of following:     \"This telephone visit will be conducted via a call between you and your physician/provider. We have found that certain health care needs can be provided without the need for a physical exam.  This service lets us provide the care you need with a short phone conversation.  If a prescription is necessary we can send it directly to your pharmacy.  If lab work is needed we can place an order for that and you can then stop by our lab to have the test done at a later time.    If during the course of the call the physician/provider feels a telephone visit is not appropriate, you will not be charged for this service.\"     Physician has received verbal consent for a Telephone Visit from the patient? Yes    Jamar Ivory complains of    Chief Complaint   Patient presents with     Heart Failure       I have reviewed and updated the patient's Past Medical History, Social History, Family History and Medication List.    ALLERGIES  Clopidogrel and Penicillins     HPI  Jamar Ivory is a very pleasant 69 year old male with a history of combined chronic systolic and diastolic heart failure, nonischemic cardiomyopathy, hypertension, obesity, hypothyroidism, stroke, dyslipidemia and diabetes.     Please see my note from 1/27/20 for complete history. Patient established care with cardiology in May 2019 with Dr. Chand after developing swelling in his lower extremities associated with weight gain in the setting of medication noncompliance. Patient noted to have a known nonischemic cardiomyopathy. Normal coronary angiogram in 12/2011. In 2016 patient was evaluated for nonsustained VT at outside facility, nuclear stress test showed no ischemic with an EF 45-50%.     Patient has followed closely with [facility] since June 2019. I first met patient in September 2019. " "Patient has been followed in telehealth tracker with multiple phone calls and diuretic adjustments over the past many months. Patient is often resistant to adjustments in his diuretic regimen.     Patient reports feel \"lousy\", notes his depression has been worse. Sleeping okay. Denies orthopnea of PND. Denies suicidal or homicidal ideations. Monitoring weight daily at home, weight has been 320-325#, today weight 323#. Patient has been taking torsemide 40mg qam and 20mg qpm. Notes weeping has resolved on lower extremities, but still has some edema. Still wearing lymphedema wraps most days. Denies shortness of breath at rest. Will note some slight exertional dyspnea with carrying in groceries. Energy has been low.     Denies chest pain or chest tightness. Denies dizziness, lightheadedness or other presyncopal symptoms. Denies syncope. Denies palpitations or tachycardia.     No blood pressure or heart rate available at home today.     Appetite good. Not monitoring sodium intake. Doing some walking for exercise. Denies alcohol use. Denies tobacco use.     Review Of Systems  Skin: Lower extremity wounds no longer weeping  Eyes: negative, glasses  Ears/Nose/Throat: negative  Respiratory: Shortness of breath when carrying groceries  Cardiovascular: positive for edema, fatigue  Gastrointestinal: negative  Genitourinary: negative  Musculoskeletal: negative  Neurologic: negative  Psychiatric: excessive stress, depression, poor sleep  Hematologic/Lymphatic/Immunologic: negative  Endocrine: thyroid disorder    Assessment/Plan:  1.  Chronic combined systolic and diastolic heart failure, nonischemic cardiomyopathy - LVEF of 40-45%, grade I or early diastolic dysfunction. Weights had been stable around 320-325#, weight today 323#. Patient has been taking torsemide 40mg qam and 20mg qafternoon. Overall HF symptoms controlled. No labs today. Patient dietary indiscretions, sedentary lifestyle and depression continue to be major " contributors, he follows with psychiatry see below. Given stable weight and overall stable heart failure will continue current medication regimen. Continue my health tracker and [facility] follow up recommended in 2 months. Reinforced when to call [facility] clinic.               - NYHA class III, stage C              - Etiology : nonischemic               - Diuretic regimen : continue torsemide to 40mg qam and 20mg qafternoon, additional 20mg torsemide when advised by [facility] clinic.               - Last RHC : none              - Ischemic evaluation : 2016 nuclear stress test completed which showed no ischemia, 12/2011 normal coronary angiogram               - Guideline directed medical therapy                          - Betablocker: stopped due to patients frustrations with medications, PCP and patient went through his medications and with shared decision making reviewed the risk/benefits. Ultimately they decided to stop metoprolol XL. Heart rates have remained controlled.                           - ACEI/ARB/ARNI: losartan 25mg daily                           - Aldactone antagonist: spironolactone 25mg daily               - Reinforced HF education, reviewed low sodium diet, fluid restriction and when to notify [facility] clinic              - Continue telehealth tracker.               - Encouraged patient to continue using lymphedema wraps     2. Hypertension - controlled     3. Obesity - counseled patient on weight loss strategies.      4. Dyslipidemia - 5/8/2019 lipid panel total cholesterol 175, HDL 38, LDL 94, and triglycerides 214     5. Depression - patient notes his depression has been worsening, denies suicidal or homicidal ideations. He notes that his PCP and psychiatry appts were cancelled due to COVID-19 pandemic. Advised patient and wife to call psychiatry clinic to see if they are able to complete a telephone visit.    - [facility] staff to assist with getting patient/family phone number for psychiatry.      Follow up  plan:  1. [facility] telephone follow up in 2 months, this will be with another [facility] provider while I am on leave.   2. Advised to call psychiatry - [facility] nurse will call with psych phone number okay to leave voicemail.     Phone call duration: 22 minutes      YENI [facility]  Pager: [phone]

## 2020-03-27 NOTE — PROGRESS NOTES
Left VM for wife with phone to psych.   Also called psych and asked if hey could reach out to wife.They will call.   Haily Graves RN 2:13 PM 03/27/20

## 2020-03-27 NOTE — PROGRESS NOTES
Rozina Bond, YENI CNP  [facility] Core Nurse               Hi,     I had a phone visit with Kenneth and Melissa today, Kenneth's depression is worse right now, he was scheduled for psych appt but Kenneth states was cancelled. I am hoping they can do a phone visit with patient.     Could you call and leave a message for Melissa with the number for psychiatry? Sorry these are the struggles I have when working remotely....  I advised them to call and set up a telephone visit with psychiatry.     Please let me know if you need anything from me.     Thank you for all your help.   Rozina

## 2020-03-30 ASSESSMENT — ACTIVITIES OF DAILY LIVING (ADL): DEPENDENT_IADLS:: INDEPENDENT

## 2020-03-30 ASSESSMENT — PATIENT HEALTH QUESTIONNAIRE - PHQ9: SUM OF ALL RESPONSES TO PHQ QUESTIONS 1-9: 17

## 2020-03-30 NOTE — PROGRESS NOTES
"Clinic Care Coordination Contact    Follow Up Progress Note     Assessment:    The patient states he has been \"doing fine\".  No major changes noted to his depression, per his report.    He is open to [facility] referral as recommended by PCP as they do have some alternative/non-medicinal options for treatment of depression.    The patient has been in close contact with Cardiology/[facility] clinic and will continue following up with them, likely via telephone/virtual visits for now.    Goals addressed this encounter:   Goals Addressed                 This Visit's Progress       Patient Stated      1. Mental Health Management (pt-stated)   30%     Goal Statement: I will improve my depression by working with my care teams and taking my medications daily as prescribed.  Measure of Success: The patient will report decrease in depressive symptoms as evidenced by decreased PHQ-9 score.  Supportive Steps to Achieve: The patient will continue routine and as-needed follow-up with Psychiatry and will explore mental health resources provided.  CCC RN will continue routine patient outreach for ongoing support and additional resources as needed.  Barriers: The patient has a long history of depression and reports having tried several interventions without improvement.  Strengths: The patient has good support from his wife.  The patient is motivated to improve his depression.  Date to Achieve By: 5/31/20   Patient expressed understanding of goal: Yes    As of today's date 2/27/2020 goal is met at 0 - 25%.   Goal Status:  Active - Patient stopped his Wellbutrin as he didn't feel it was helping and is not currently agreeable to continue seeing a Psychiatrist.  As of today's date 3/30/2020 goal is met at 26 - 50%.   Goal Status:  Active - Patient PHQ-9 score 4 (as compared to previous score of 6). Patient agreeable to referral to [facility].        Intervention/Education provided during outreach: PHQ-9 done; score of 4 today, " previously 6.  CC RN informed patient of plan to request [facility] referral from PCP; he was agreeable.  The patient denied further outstanding concerns at this time.    Outreach Frequency: monthly    Plan:    CC RN will request [facility] referral from PCP as previously discussed.    The patient will participate with [facility] per referral.    The patient will continue working closely with Cardiology/[facility] clinic as scheduled.    The patient agreed to contact CC RN with additional questions or concerns.    CC RN will outreach to patient in approximately 1 month to get updates on patient status, assess goal progress, and offer additional support and resources as indicated.    Solo Dia, RN  Clinic Care Coordinator  [facility]  Ph: [phone]

## 2020-03-31 ENCOUNTER — CARE COORDINATION (OUTPATIENT)
Dept: CARDIOLOGY | Facility: CLINIC | Age: 70
End: 2020-03-31

## 2020-03-31 NOTE — PROGRESS NOTES
[facility]  C.ORainRNESSA    MyHealth Tracker Heart Failure Alert      Daily Weight Goal: 321-326 lbs    Today's Weight: 325 lbs    Weight Alert: 5 lbs weight gain over night    Symptom Alert: Said yes to        5) Have you felt more dizzy or lightheaded, since yesterday?    Current Diuretic & Potassium Plan: Torsemide 40 mg in am and 20 mg in afternoon        Last Extra Diuretic: Torsemide 40 mg BID on 3/11 - 3/13/2020.     Future Appointments   Date Time Provider Department Center   4[facility]2020 11[facility]    4/28/2020 10:30 AM Nasrin Nation LICSW CCL[facility]   5/22/2020 10:10 AM [facility], [facility]       Notes: Left VM for pt to call back. Will do diet/fluid intake education/reminders  Next appt 5/22 with Destiny    MyHealth Tracker Weight Trends:             MyHealth Tracker Weight Graph:         Haily Graves RN 10:32 AM 03/31/20

## 2020-04-01 NOTE — PROGRESS NOTES
[facility]  DORA    MyHealth Tracker Heart Failure Alert      Daily Weight Goal: 321-326 lbs    Today's Weight: 320 lbs    Weight Alert: None    Symptom Alert: None     Current Diuretic & Potassium Plan: Torsemide 40 mg in am and 20 mg in afternoon        Last Extra Diuretic: Torsemide 40 mg BID on 3/11 - 3/13/2020.     Future Appointments   Date Time Provider Department Center   4/8/2020 11:20 [facility]    [facility]28/2020 10:30 [facility]   5/22/2020 10:10 AM [facility]       Notes: No answer when call to pt.       MyHealth Tracker Weight Trends:             MyHealth Tracker Weight Graph:         Haily Graves RN 12:56 PM 04/01/20

## 2020-04-08 ENCOUNTER — CARE COORDINATION (OUTPATIENT)
Dept: CARDIOLOGY | Facility: CLINIC | Age: 70
End: 2020-04-08

## 2020-04-08 ENCOUNTER — VIRTUAL VISIT (OUTPATIENT)
Dept: FAMILY MEDICINE | Facility: CLINIC | Age: 70
End: 2020-04-08
Payer: MEDICARE

## 2020-04-08 DIAGNOSIS — R60.0 BILATERAL LEG EDEMA: ICD-10-CM

## 2020-04-08 DIAGNOSIS — I87.2 CHRONIC STASIS DERMATITIS: ICD-10-CM

## 2020-04-08 DIAGNOSIS — I50.42 CHRONIC COMBINED SYSTOLIC AND DIASTOLIC CONGESTIVE HEART FAILURE (H): ICD-10-CM

## 2020-04-08 DIAGNOSIS — I73.9 PVD (PERIPHERAL VASCULAR DISEASE) (H): ICD-10-CM

## 2020-04-08 DIAGNOSIS — L97.902: Primary | ICD-10-CM

## 2020-04-08 PROCEDURE — 99442 ZZC PHYSICIAN TELEPHONE EVALUATION 11-20 MIN: CPT | Performed by: FAMILY MEDICINE

## 2020-04-08 NOTE — PATIENT INSTRUCTIONS
For your legs:  1. Keep taking your torsemide as instructed by [facility].  2. For worsening pain and redness in legs without what sounds like acute infection, I think we need to get the wound care nurse out again.  I've ordered this for you, and I'd like her to come visit you and assess your legs within the next few days.  3. Follow up with us post-covid for exam, or sooner if any issues at all.

## 2020-04-08 NOTE — PROGRESS NOTES
"Subjective     Jamar Ivory is a 70 year old male who is being evaluated via a billable telephone visit.      The patient has been notified of following:     \"This telephone visit will be conducted via a call between you and your physician/provider. We have found that certain health care needs can be provided without the need for a physical exam.  This service lets us provide the care you need with a short phone conversation.  If a prescription is necessary we can send it directly to your pharmacy.  If lab work is needed we can place an order for that and you can then stop by our lab to have the test done at a later time.    Telephone visits are billed at different rates depending on your insurance coverage. During this emergency period, for some insurers they may be billed the same as an in-person visit.  Please reach out to your insurance provider with any questions.    If during the course of the call the physician/provider feels a telephone visit is not appropriate, you will not be charged for this service.\"    Patient has given verbal consent for Telephone visit?  Yes    Jamar Ivory complains of   Chief Complaint   Patient presents with     Musculoskeletal Problem     rash and swelling      Start: 11:30 AM    ALLERGIES  Clopidogrel and Penicillins    Musculoskeletal problem/pain      Duration: 4 weeks      Description  Location: both left leg and ankles    Intensity:  moderate    Accompanying signs and symptoms: swelling and redness    History  Previous similar problem: YES    Precipitating or alleviating factors:  Trauma or overuse: no   Aggravating factors include: ankle raps     Holding up - barely.  Taking medications.  Weight: 225.   Afternoon clinic - torsemide.    No one wants to help you.  His social security, hers.  Getting bills paid, there is food in the house.    Using wraps for legs.  Worried about possible infection.  Ankles really red and sore.  Started about a month ago.  Look " a little red but not too much.    Melissa doing wrapping.  Therapist said was doing excellent job and to keep it up.  Didn't want homebound status.      Reviewed and updated as needed this visit by Provider  Tobacco  Allergies  Meds  Problems  Med Hx  Surg Hx  Fam Hx         Review of Systems   ROS COMP: Constitutional, HEENT, cardiovascular, pulmonary, gi and gu systems are negative, except as otherwise noted.       Objective   Reported vitals:  There were no vitals taken for this visit.   healthy, alert and no distress  Psych: Alert and oriented times 3; coherent speech, normal   rate and volume, able to articulate logical thoughts, able   to abstract reason, no tangential thoughts, no hallucinations   or delusions  His affect is down, depressed as at baseline.  Per Melissa and Kenneth: legs bilateral not hot, a little redder than normal but no red streaks, small wound on back of leg that isn't healing     Diagnostic Test Results:  Labs reviewed in Epic        Assessment/Plan:  Problem List Items Addressed This Visit        Medium    Bilateral leg edema    Chronic combined systolic and diastolic congestive heart failure (H)    Chronic stasis dermatitis    Non-healing ulcer of lower leg with fat layer exposed, unspecified laterality (H) - Primary    Relevant Orders    HOME CARE NURSING REFERRAL    PVD (peripheral vascular disease) (H)        Patient Instructions   For your legs:  1. Keep taking your torsemide as instructed by [facility].  2. For worsening pain and redness in legs without what sounds like acute infection, I think we need to get the wound care nurse out again.  I've ordered this for you, and I'd like her to come visit you and assess your legs within the next few days.  3. Follow up with us post-covid for exam, or sooner if any issues at all.          Return in about 1 week (around 4/15/2020) for home nursing care - wound eval.    Phone end: 11:46 AM    Phone call duration:  13 minutes    Blanca Oliver  MD [place]

## 2020-04-08 NOTE — PROGRESS NOTES
[facility]  C.O.RDREW.    MyHealth Tracker Heart Failure Alert      Daily Weight Goal: 321-326 lbs    Today's Weight: 325 lbs    Weight Alert: 5 lb weight gain overnight    Symptom Alert: NONE    Current Diuretic & Potassium Plan: Torsemide 40 mg in the morning and 20 mg in the afternoon, with additional 20 mg in the afternoon when advised by the [facility], Spironolactone 25 mg daily       Last Extra Diuretic: Torsemide 40 mg on 3/11, 3/12, 3/13    Notes: Instructed Kenneth to take an additional 20 mg of Torsemide this afternoon. He stated understanding    Future Appointments   Date Time Provider Department Center   4/28/2020 10:30 AM Nasrin Nation LICSW [facility]   5/22/2020 10:10 [facility]     MyHealth Tracker Weight Trends:       MyHealth Tracker Weight Graph:       Blanca Bright RN    [facility]  04/08/20 12:34 PM

## 2020-04-09 ENCOUNTER — TELEPHONE (OUTPATIENT)
Dept: FAMILY MEDICINE | Facility: CLINIC | Age: 70
End: 2020-04-09

## 2020-04-09 ENCOUNTER — CARE COORDINATION (OUTPATIENT)
Dept: CARDIOLOGY | Facility: CLINIC | Age: 70
End: 2020-04-09

## 2020-04-09 ENCOUNTER — MEDICAL CORRESPONDENCE (OUTPATIENT)
Dept: HEALTH INFORMATION MANAGEMENT | Facility: CLINIC | Age: 70
End: 2020-04-09

## 2020-04-09 NOTE — PROGRESS NOTES
[facility]  C.O.R.E.    MyHealth Tracker Heart Failure Alert      Daily Weight Goal: 321-326 lbs    Today's Weight: 318 lbs    Weight Alert: 7 lbs weight loss over night.     Symptom Alert: None     Current Diuretic & Potassium Plan: Torsemide 40 mg in the morning and 20 mg in the afternoon, with additional 20 mg in the afternoon when advised by the [facility], Spironolactone 25 mg daily        Last Extra Diuretic: Extra 20 mg in afternoon on (for total of 40 mg) 3/11, 3/12, 3/13 & 4/8      Notes: Kenneth was instructed be [facility] to take an extra 20 mg torsemide yesterday afternoon.     Future Appointments   Date Time Provider Department Center   4/28/2020 10:30 AM Nasrin Nation LICSW [facility]   5/22/2020 10:10 [facility]       Notes: No answer when call placed to pt.   Next appt 5/22 with Destiny       MyHealth Tracker Weight Trends:             MyHealth Tracker Weight Graph:         Haily Graves RN 9:46 AM 04/09/20

## 2020-04-09 NOTE — TELEPHONE ENCOUNTER
"Dr. Peng  Pt was admitted to [facility] 4/9/2020. He is reporting severe right lateral ankle pain, 10/10, taking tylneol. Area not red, not warm to the touch, no s/s of infection-appears to be more tendonitis in nature. Pt has three scabbed stasis ulcers to right lateral leg. Wondering if Ibuprofen/Ice would be appropriate? He is not getting relief with tylenol.     Pts left leg has multiple small open stasis ulcers on posterior and anterior leg and one larger 5cmx4.5cm weeping wound to left medial lower leg.   Pts leg is slightly red/warm to the touch, but spouse reports \"not more than usual\"  They were under the impression that you were going to be calling in a prescription?     I am requesting the following home care orders  RN 2w2, 1w1, 3prn, WOCN to eval and treat  PT/SW to eval and treat     Wound care: vaseline to wound beds, cover with gauze, and lymph wrapping (spouse doing this).     Thank you. Please feel free to call if you have any questions . Please route message back to sender for verbal orders.  Eliane Peng RN, BSN  [email or web address]  [phone]    "

## 2020-04-09 NOTE — TELEPHONE ENCOUNTER
Ice is perfect. Rare ibuprofen okay, but I'd be cautious with this given cardiac history.    I was not going to send prescription - I wanted wound care nurse to eval and see if he needed anything for wound care, increased wrapping.    Let me know if you have any questions about this.  Thanks,  Dr. Blanca Peng MD/[facility]

## 2020-04-14 ENCOUNTER — TELEPHONE (OUTPATIENT)
Dept: FAMILY MEDICINE | Facility: CLINIC | Age: 70
End: 2020-04-14

## 2020-04-14 NOTE — TELEPHONE ENCOUNTER
[facility] now requests orders and shares plan of care/discharge summaries for some patients through [employer].  Please REPLY TO THIS MESSAGE OR ROUTE BACK TO THE AUTHOR in order to give authorization for orders when needed.  This is considered a verbal order, you will still receive a faxed copy of orders for signature.  Thank you for your assistance in improving collaboration for our patients.    ORDER: OT lymphedema therapy 5x/month for 1 month for Complete decongestive therapy including gradient compression bandaging, skin/edema assessment, lymph HEP/elevation program, garment education/fitting/ordering, and long term edema management strategies.     Please respond to this message as soon as possible. Thank you.    Migdalia Dutta OTR/[facility]

## 2020-04-14 NOTE — PROGRESS NOTES
[facility] LETYORainRDREW.        This was addressed in a separate encounter by Haily Graves RN. For this reason I am closing this encounter.    Blanca Bright RN    [facility]  04/14/20 11:31 AM

## 2020-04-16 ENCOUNTER — TELEPHONE (OUTPATIENT)
Dept: FAMILY MEDICINE | Facility: CLINIC | Age: 70
End: 2020-04-16

## 2020-04-16 DIAGNOSIS — Z53.9 DIAGNOSIS NOT YET DEFINED: Primary | ICD-10-CM

## 2020-04-16 PROCEDURE — G0180 MD CERTIFICATION HHA PATIENT: HCPCS | Performed by: FAMILY MEDICINE

## 2020-04-17 NOTE — TELEPHONE ENCOUNTER
..[facility] now requests orders and shares plan of care/discharge summaries for some patients through [employer].  Please REPLY TO THIS MESSAGE OR ROUTE BACK TO THE AUTHOR in order to give authorization for orders when needed.  This is considered a verbal order, you will still receive a faxed copy of orders for signature.  Thank you for your assistance in improving collaboration for our patients.    ORDER    Requesting orders for  Bilateral lower leg wounds 3x per week and prn  Neosporin and adaptic, telfa and wrap with gauze

## 2020-04-22 ENCOUNTER — TELEPHONE (OUTPATIENT)
Dept: FAMILY MEDICINE | Facility: CLINIC | Age: 70
End: 2020-04-22

## 2020-04-22 NOTE — TELEPHONE ENCOUNTER
[facility] now requests orders and shares plan of care/discharge summaries for some patients through [employer].  Please REPLY TO THIS MESSAGE OR ROUTE BACK TO THE AUTHOR in order to give authorization for orders when needed.  This is considered a verbal order, you will still receive a faxed copy of orders for signature.  Thank you for your assistance in improving collaboration for our patients.    DISCHARGE SUMMARY  Pt is a 70 yr old male with referred to [facility] from PMD after telephone visit secondary to bilateral lower extremity edema (chronic) with chronic stasis dermatis and non healing ulcer of lower leg with fat layer exposed. During this episode, pt was seen for SN for wound care and disease mgt and OT lymphedema treatment for BLE edema mgt. Pt was also seen for 1 PT eval to assess home/mobility safety and HEP and SW phone visit to provide caregiver support and resources to spouse. According to spouse, pt is suffering from depression, manifests in a lot of anger. Spouse states pt is refusing all home care visits at this time.     Summary of outcomes in meeting goals  Goals not met    Discharge disposition/remain living in apt with spouse to assist with wound cares and lymph wraps for edema mgt.

## 2020-04-23 ENCOUNTER — PATIENT OUTREACH (OUTPATIENT)
Dept: CARE COORDINATION | Facility: CLINIC | Age: 70
End: 2020-04-23

## 2020-04-23 ENCOUNTER — TELEPHONE (OUTPATIENT)
Dept: PSYCHOLOGY | Facility: CLINIC | Age: 70
End: 2020-04-23

## 2020-04-23 ASSESSMENT — ACTIVITIES OF DAILY LIVING (ADL): DEPENDENT_IADLS:: INDEPENDENT

## 2020-04-23 NOTE — PROGRESS NOTES
Clinic Care Coordination Contact  [facility]/Voicemail    Referral Source: Care Team  Discharge from [facility] on 4/21/20    Clinical Data: Care Coordinator Outreach  Outreach attempted x 1.  Patient's wife answered phone; patient on the other line and requested call back.  Plan: Care Coordinator will try to reach patient again in 1-2 business days.    Solo Dia RN  Clinic Care Coordinator  [facility]  Ph: [phone]

## 2020-04-24 ASSESSMENT — ACTIVITIES OF DAILY LIVING (ADL): DEPENDENT_IADLS:: INDEPENDENT

## 2020-04-27 ENCOUNTER — TELEPHONE (OUTPATIENT)
Dept: FAMILY MEDICINE | Facility: CLINIC | Age: 70
End: 2020-04-27

## 2020-04-27 NOTE — TELEPHONE ENCOUNTER
Faxed home care orders to [phone] and abstracted.    Lizzeth Marie MA on 4/27/2020 at 3:48 PM

## 2020-04-28 ENCOUNTER — CARE COORDINATION (OUTPATIENT)
Dept: CARDIOLOGY | Facility: CLINIC | Age: 70
End: 2020-04-28

## 2020-04-28 ENCOUNTER — TELEPHONE (OUTPATIENT)
Dept: PSYCHOLOGY | Facility: CLINIC | Age: 70
End: 2020-04-28

## 2020-04-28 ENCOUNTER — VIRTUAL VISIT (OUTPATIENT)
Dept: PSYCHOLOGY | Facility: CLINIC | Age: 70
End: 2020-04-28
Payer: MEDICARE

## 2020-04-28 DIAGNOSIS — Z53.9 ERRONEOUS ENCOUNTER--DISREGARD: Primary | ICD-10-CM

## 2020-04-28 PROCEDURE — 99207 ZZC NO CHARGE LOS: CPT | Mod: GT | Performed by: SOCIAL WORKER

## 2020-04-28 NOTE — TELEPHONE ENCOUNTER
Therapist left a vm on client's home and cell phone and sent a link to join via video for today's scheduled intake.  At time of this note, therapist was unable to get in communication with client for scheduled session.

## 2020-04-28 NOTE — PROGRESS NOTES
[facility]  C.O.RNESSA    MyHealth Tracker Heart Failure Alert      Daily Weight Goal: 321-326 lbs    Today's Weight: 320 lbs    Weight Alert: No alert, within goal    Symptom Alert: Said yes to       4) Did you prop up on more pillows or sleep in a chair to breathe easier last night?      Current Diuretic & Potassium Plan: Torsemide 40 mg in the morning and 20 mg in the afternoon, with additional 20 mg in the afternoon when advised by the [facility], Spironolactone 25 mg daily        Last Extra Diuretic: Extra 20 mg in afternoon on (for total of 40 mg) 3/11, 3/12, 3/13 & 4/8     Future Appointments   Date Time Provider Department Center   5/22/2020 10:10 AM Destiny Cruz PA [facility]       Notes: No answer when call to pt.          MyHealth Tracker Weight Trends:             MyHealth Tracker Weight Graph:         Haily Graves RN 11:47 AM 04/28/20

## 2020-05-02 ENCOUNTER — OFFICE VISIT (OUTPATIENT)
Dept: URGENT CARE | Facility: URGENT CARE | Age: 70
End: 2020-05-02
Payer: MEDICARE

## 2020-05-02 VITALS
TEMPERATURE: 97.9 F | BODY MASS INDEX: 49.26 KG/M2 | DIASTOLIC BLOOD PRESSURE: 74 MMHG | RESPIRATION RATE: 20 BRPM | WEIGHT: 315 LBS | HEART RATE: 72 BPM | SYSTOLIC BLOOD PRESSURE: 124 MMHG

## 2020-05-02 DIAGNOSIS — Z87.39 HISTORY OF GOUT: ICD-10-CM

## 2020-05-02 DIAGNOSIS — L03.119 CELLULITIS OF LOWER EXTREMITY, UNSPECIFIED LATERALITY: ICD-10-CM

## 2020-05-02 DIAGNOSIS — M79.671 RIGHT FOOT PAIN: Primary | ICD-10-CM

## 2020-05-02 PROCEDURE — 99214 OFFICE O/P EST MOD 30 MIN: CPT | Performed by: FAMILY MEDICINE

## 2020-05-02 RX ORDER — CLINDAMYCIN HCL 300 MG
300 CAPSULE ORAL 4 TIMES DAILY
Qty: 40 CAPSULE | Refills: 0 | Status: SHIPPED | OUTPATIENT
Start: 2020-05-02 | End: 2020-07-22

## 2020-05-02 RX ORDER — METHYLPREDNISOLONE 4 MG
TABLET, DOSE PACK ORAL
Qty: 21 TABLET | Refills: 0 | Status: SHIPPED | OUTPATIENT
Start: 2020-05-02 | End: 2020-05-22

## 2020-05-02 NOTE — PROGRESS NOTES
SUBJECTIVE: Jamar Ivory is a 70 year old male presenting with a chief complaint of rt foot pain and lower leg redness bilaterally.  Onset of symptoms was day(s) ago.  Course of illness is same.    Severity moderate  Current and Associated symptoms: none  Treatment measures tried include None tried.  Predisposing factors include HX of gout and sma e symptoms as b4.    Past Medical History:   Diagnosis Date     Arthritis      CHF (congestive heart failure) (H) 12/24/2018     CVA (cerebral infarction) 2012     CVD (cardiovascular disease)      Fatty liver      Gout      Hypertension goal BP (blood pressure) < 140/90 1/17/2011     Major depressive disorder, single episode, severe, without mention of psychotic behavior 1977    hospitalized     Obesity, morbid (more than 100 lbs over ideal weight or BMI > 40) (H)      Shortness of breath      Spider veins      TIA (transient ischaemic attack) 2012     Unspecified hypothyroidism      Vitiligo      Allergies   Allergen Reactions     Clopidogrel      Cough/emesis     Penicillins Rash     Social History     Tobacco Use     Smoking status: Never Smoker     Smokeless tobacco: Never Used   Substance Use Topics     Alcohol use: Not Currently     Alcohol/week: 0.0 standard drinks     Comment: rarely       ROS:  SKIN: no rash  GI: no vomiting    OBJECTIVE:  /74 (Cuff Size: Adult Large)   Pulse 72   Temp 97.9  F (36.6  C) (Oral)   Resp 20   Wt 147 kg (324 lb)   BMI 49.26 kg/m  GENERAL APPEARANCE: healthy, alert and no distress  SKIN: redness bilateral lower ext  Rt dorsum of foot pain      ICD-10-CM    1. Right foot pain  M79.671 methylPREDNISolone (MEDROL DOSEPAK) 4 MG tablet therapy pack   2. Cellulitis of lower extremity, unspecified laterality  L03.119 clindamycin (CLEOCIN) 300 MG capsule   3. History of gout  Z87.39        Fluids/Rest, f/u if worse/not any better

## 2020-05-05 ENCOUNTER — CARE COORDINATION (OUTPATIENT)
Dept: CARDIOLOGY | Facility: CLINIC | Age: 70
End: 2020-05-05

## 2020-05-05 NOTE — PROGRESS NOTES
[facility]  DORA    MyHealth Tracker Heart Failure Alert      Daily Weight Goal: 321-326 lbs    Today's Weight: 326 lbs    Weight Alert:  Within weight parameters    Symptom Alert: None    Current Diuretic & Potassium Plan: Torsemide 40 mg in am and 20 mg in afternoon       Last Extra Diuretic: None      Future Appointments   Date Time Provider Department Center   5[facility]22[facility]2020 10:10 [facility]       Notes:  Pt entered in wrong wt.      MyHealth Tracker Weight Trends:               MyHealth Tracker Weight Graph:        Graph will not copy to clipboard.     Haily Graves, RN 2:47 PM 05/05/20

## 2020-05-06 ENCOUNTER — CARE COORDINATION (OUTPATIENT)
Dept: CARDIOLOGY | Facility: CLINIC | Age: 70
End: 2020-05-06

## 2020-05-06 NOTE — PROGRESS NOTES
[facility]  DORA    [employer] Tracker Heart Failure Alert      Daily Weight Goal: 321-326 lbs    Today's Weight: 316 lbs     Weight Alert:  Within weight parameters     Symptom Alert: None     Current Diuretic & Potassium Plan: Torsemide 40 mg in am and 20 mg in afternoon    Future Appointments   Date Time Provider Department Center   5/22/2020 10:10 AM [facility]       Notes:  Spoke to wife as pt is sleeping. She did say that pt was not eating and was having diarrhea for 2-3 days. He is feeling better now. He is starting to eat again. She sates his wt is really 316 lbs. She said pt was not right when he told me he was 326 lbs.   Pt is also recover ing from R foot cellulitis and pain. He is on Clindamycin 300 for 10 days and Medrol Dosepak 4 mg for 21 days. Started both 5/5.   Will update Destiny as  .     MyHealth Tracker Weight Trends:             MyHealth Tracker Weight Graph:           Haily Graves RN 9:22 AM 05/06/20

## 2020-05-13 ENCOUNTER — PATIENT OUTREACH (OUTPATIENT)
Dept: CARE COORDINATION | Facility: CLINIC | Age: 70
End: 2020-05-13

## 2020-05-13 ASSESSMENT — ACTIVITIES OF DAILY LIVING (ADL): DEPENDENT_IADLS:: INDEPENDENT

## 2020-05-13 NOTE — PROGRESS NOTES
Clinic Care Coordination Contact  [facility]/Voicemail    Referral Source: Care Team  CC RN outreach to get updates on patient status, assess goal progress, and provide support and additional resources as needed.    Clinical Data: Care Coordinator Outreach  Outreach attempted x 1.  Left message on patient's voicemail with call back information and requested return call.  Plan: Care Coordinator will try to reach patient again in 1-2 business days.    Solo Dia RN  Clinic Care Coordinator  [facility]  Ph: [phone]

## 2020-05-14 ENCOUNTER — CARE COORDINATION (OUTPATIENT)
Dept: CARDIOLOGY | Facility: CLINIC | Age: 70
End: 2020-05-14

## 2020-05-14 DIAGNOSIS — R06.02 SOB (SHORTNESS OF BREATH): Primary | ICD-10-CM

## 2020-05-14 DIAGNOSIS — I50.42 CHRONIC COMBINED SYSTOLIC AND DIASTOLIC CONGESTIVE HEART FAILURE (H): ICD-10-CM

## 2020-05-14 RX ORDER — SPIRONOLACTONE 25 MG/1
25 TABLET ORAL DAILY
Refills: 0 | COMMUNITY
Start: 2019-12-18 | End: 2020-07-15

## 2020-05-14 ASSESSMENT — ACTIVITIES OF DAILY LIVING (ADL): DEPENDENT_IADLS:: INDEPENDENT

## 2020-05-14 NOTE — PROGRESS NOTES
[facility]  CSCAR    MyHealth Tracker Heart Failure Alert      Daily Weight Goal: 321-326 lbs    Today's Weight: 320 lbs    Weight Alert: 1 lbs below weight parameters    Symptom Alert: Said yes to        3) Did shortness of breath wake you up last night?   4) Did you prop up on more pillows or sleep in a chair to breathe easier last night?     Current Diuretic & Potassium Plan: Torsemide 40 mg in morning and 20 mg in afternoon.      Spironolactone 25 mg daily (pt stopped on own 5/5/20)        Future Appointments   Date Time Provider Department Center   5/22/2020 10:10 AM Destiny Cruz PA [facility]       Notes:   Phone busy x2        MyHealth Tracker Weight Trends:           MyHealth Tracker Weight Graph:         Haily Gerdowsky, RN 7:45 AM 05/14/20

## 2020-05-14 NOTE — PROGRESS NOTES
Clinic Care Coordination Contact  [facility]/Voicemail    Referral Source: Care Team  CC RN outreach to get updates on patient status, assess goal progress, and provide support and additional resources as needed.    Clinical Data: Care Coordinator Outreach  Outreach attempted x 2.  Left message on patient's voicemail with call back information and requested return call.  Also sent patient MyChart message.  Plan: Care Coordinator will try to reach patient again in 3-5 business days.    Solo Dia RN  Clinic Care Coordinator  [facility]  Ph: [phone]

## 2020-05-15 NOTE — PROGRESS NOTES
[facility]  DORA    MyHealth Tracker Heart Failure Alert      Daily Weight Goal: 321-326 lbs    Today's Weight: 324 lbs    Weight Alert: weight within parameters    Symptom Alert: said yes        3) Did shortness of breath wake you up last night?   4) Did you prop up on more pillows or sleep in a chair to breathe easier last night?      Current Diuretic & Potassium Plan: Torsemide 40 mg in morning and 20 mg in afternoon.      Spironolactone 25 mg daily (pt stopped on own 5/5/20)        Future Appointments   Date Time Provider Department Center   5/22/2020 10:10 AM Destiny Cruz PA [facility]       Notes: No answer or VM when call placed to pt        MyHealth Tracker Weight Trends:             MyHealth Tracker Weight Graph:           Haily Graves RN 10:58 AM 05/15/20

## 2020-05-18 ASSESSMENT — ACTIVITIES OF DAILY LIVING (ADL): DEPENDENT_IADLS:: INDEPENDENT

## 2020-05-18 NOTE — PROGRESS NOTES
"Clinic Care Coordination Contact    Follow Up Progress Note      Assessment: CC RN called and spoke with patient's wife.  She updated that the patient seems to be doing \"half and half\" where some days are worse and some days are quite good.  The patient's wife stated he does seem to be doing better overall, however, and has started helping her more and participating in things like errands/getting groceries.  The patient's wife stated she wasn't sure whether or not the patient had been set up for Psych follow-up.  The patient's wife confirmed the patient continues working closely with the [facility] for management of his CHF.    Goals addressed this encounter:   Goals Addressed                 This Visit's Progress       Patient Stated      1. Mental Health Management (pt-stated)   40%     Goal Statement: I will improve my depression by working with my care teams and taking my medications daily as prescribed.  Measure of Success: The patient will report decrease in depressive symptoms as evidenced by decreased PHQ-9 score.  Supportive Steps to Achieve: The patient will continue routine and as-needed follow-up with Psychiatry and will explore mental health resources provided.  CCC RN will continue routine patient outreach for ongoing support and additional resources as needed.  Barriers: The patient has a long history of depression and reports having tried several interventions without improvement.  Strengths: The patient has good support from his wife.  The patient is motivated to improve his depression.  Date to Achieve By: extended to 8/31/20  Patient expressed understanding of goal: Yes    As of today's date 2/27/2020 goal is met at 0 - 25%.   Goal Status:  Active - Patient stopped his Wellbutrin as he didn't feel it was helping and is not currently agreeable to continue seeing a Psychiatrist.  As of today's date 3/30/2020 goal is met at 26 - 50%.   Goal Status:  Active - Patient PHQ-9 score 4 (as compared to " previous score of 6). Patient agreeable to referral to [facility].  As of today's date 5/18/2020 goal is met at 26 - 50%.   Goal Status:  Active - Patient had virtual visit scheduled with psychology; no-show. Patient's wife will discuss with patient and call to reschedule if patient agreeable. Patient mood improved; participating in more things with wife.        Intervention/Education provided during outreach: Per chart review, CC RN informed patient's wife that it looks like patient was no-show for and intake appointment with  Psychology.  The patient's wife wasn't aware of this; she noted the department phone number and plans to review this with the patient; they will call to reschedule if patient agreeable.  The patient's wife denied further outstanding needs at this time.     Outreach Frequency: monthly    Plan:     The patient's wife will review with patient recent no-show for intake with  Psychology; they will rescheduled the appointment if patient agreeable.    The patient's wife agreed they will call back with additional questions or concerns.    CC RN will outreach to patient in approximately 3 weeks to get updates on patient status, assess goal progress, and offer additional support and resources as indicated.    Solo Dia, RN  Clinic Care Coordinator  [facility], & [facility]  Ph: [phone]

## 2020-05-21 ENCOUNTER — DOCUMENTATION ONLY (OUTPATIENT)
Dept: CARDIOLOGY | Facility: CLINIC | Age: 70
End: 2020-05-21

## 2020-05-21 NOTE — PROGRESS NOTES
CARDIOLOGY TELEPHONE VISIT    Jamar Ivory is a 70 year old male who is being evaluated via a billable telephone visit.      The patient has been notified of following:     This telephone visit will be conducted via a call between you and your physician/provider. Given concern for spread of COVID 19 we are minimizing in person clinic visits when possible. We have found that certain health care needs can be provided without the need for a physical exam.  This service lets us provide the care you need with a short phone conversation.  If a prescription is necessary we can send it directly to your pharmacy.  If lab work is needed we can place an order for that and you can then stop by our lab to have the test done at a later time. If during the course of the call the physician/provider feels a telephone visit is not appropriate, you will not be charged for this service.    Telephone visits are billed at different rates depending on your insurance coverage. During this emergency period, for some insurers they may be billed the same as an in-person visit.  Please reach out to your insurance provider with any questions.    Patient has given verbal consent for Telephone visit?  Yes      I have reviewed and updated the patient's Past Medical History, Social History, Family History and Medication List.      MEDICATIONS:  Current Outpatient Medications   Medication Sig Dispense Refill     acetaminophen (TYLENOL) 500 MG tablet Take 1,000 mg by mouth every 6 hours as needed (Headache)        aspirin (ASA) 81 MG tablet Take 1 tablet (81 mg) by mouth daily 90 tablet 3     BETA BLOCKER NOT PRESCRIBED (INTENTIONAL) Beta Blocker not prescribed intentionally due to Evidence of fluid overload or volume depletion and Refusal by patient       Glucosamine-Chondroitin (OSTEO BI-FLEX REGULAR STRENGTH) 250-200 MG TABS Take 1 tablet by mouth 2 times daily       indomethacin (INDOCIN) 50 MG capsule Take 1 capsule (50 mg) by mouth 2  times daily (with meals) (Patient not taking: Reported on 5/2/2020) 14 capsule 0     levothyroxine (SYNTHROID/LEVOTHROID) 200 MCG tablet TAKE 1 TABLET (200 MCG) BY MOUTH DAILY 90 tablet 0     losartan (COZAAR) 25 MG tablet Take 1 tablet (25 mg) by mouth daily 90 tablet 3     methylPREDNISolone (MEDROL DOSEPAK) 4 MG tablet therapy pack Follow Package Directions 21 tablet 0     order for DME 1: Gradient Compression Wraps; 2: Cast Boots; 3: BLE knee high 20-30 mm Hg compression stockings; 4: BLE velcro compression garments; knee high 1 each 0     spironolactone (ALDACTONE) 25 MG tablet Take 1 tablet (25 mg) by mouth daily  0     STATIN NOT PRESCRIBED (INTENTIONAL) Please choose reason not prescribed, below       tamsulosin (FLOMAX) 0.4 MG capsule TAKE 2 CAPSULES (0.8 MG) BY MOUTH DAILY 180 capsule 3     torsemide (DEMADEX) 20 MG tablet Take 2 tablets (40mg) in morning and 1 tablet (20mg) in afternoon, additional 20mg in the afternoon when advised by [facility] clinic. 180 tablet 1       ALLERGIES  Clopidogrel and Penicillins      Review Of Systems:    Skin: negative  Eyes: negative  Ears/Nose/Throat: negative  Respiratory: No shortness of breath, dyspnea on exertion, cough, or hemoptysis  Cardiovascular: negative  Gastrointestinal: negative  Genitourinary: negative  Musculoskeletal: negative  Neurologic: negative  Psychiatric: negative  Hematologic/Lymphatic/Immunologic: negative  Endocrine: positive for Hypothyroidism  (ROS TAKEN BY Blanca Bright RN PRIOR TO VISIT)    He is not currently taking Spironolactone, Losartan, Indomethacin. I did not discontinue them off of his medication record because I wasn't sure if he was supposed to be taking them and he isn't. No recent labs in [facility] or Care Everywhere.    Self reported vitals:    Weight: 317 lbs  BP NA  HR NA    Mr. Ivory was scheduled for a telephone visit today for [facility] clinic.  He did answer and complete the first part of the visit with the RN, but when I attempted to  call him x 2 for the visit portion, there was no answer and went to .  I left  that our office would reach back out to him later today to reschedule another appointment.        Destiny Cruz PA-C  [facility]  Pager [phone]

## 2020-05-21 NOTE — PROGRESS NOTES
[facility]  [place]    [employer] Tr[employer] Heart Failure   For office visit review      Daily Weight Goal: 321-326 lbs    [facility] Enrollment date: 6/7/2020    Current Diuretic: Torsemide 40 mg in am and 20 mg in afternoon  *Pt not taking spironolactone 25 mg daily since 5/5 per notes in epic from      Potassium Plan: None       Diuretic Plan: Extra 20 mg torsemide when directed by C[facility]     Last Extra Diuretic: Torsemide 20 mg on 3/13/2020        MyHealth Tracker Weight Trends:             MyHealth Tracker Weight Graph:         Haily Gerdowsky, RN 4:10 PM 05/21/20

## 2020-05-22 ENCOUNTER — VIRTUAL VISIT (OUTPATIENT)
Dept: CARDIOLOGY | Facility: CLINIC | Age: 70
End: 2020-05-22
Attending: NURSE PRACTITIONER
Payer: MEDICARE

## 2020-05-22 DIAGNOSIS — I50.42 CHRONIC COMBINED SYSTOLIC AND DIASTOLIC CONGESTIVE HEART FAILURE (H): ICD-10-CM

## 2020-05-22 PROCEDURE — 99207 ZZC NO CHARGE LOS: CPT | Performed by: PHYSICIAN ASSISTANT

## 2020-05-22 NOTE — PATIENT INSTRUCTIONS
Call CORE nurse for any questions or concerns Mon-Fri 8am-4pm:                                                  [phone]                                       For concerns after hours:                                                 [phone]     1. Medication changes:   2.  Plan from today:    3.  Lab results:  (pending)

## 2020-05-22 NOTE — LETTER
5/22/2020    Blanca Peng MD  [address] [address]    RE: Jamar Ivory       Dear Colleague,    I had the pleasure of seeing Jamar Ivory in the [facility].    Jamar Ivory is a 70 year old male who is being evaluated via a billable telephone visit.      The patient has been notified of following:     This telephone visit will be conducted via a call between you and your physician/provider. Given concern for spread of COVID 19 we are minimizing in person clinic visits when possible. We have found that certain health care needs can be provided without the need for a physical exam.  This service lets us provide the care you need with a short phone conversation.  If a prescription is necessary we can send it directly to your pharmacy.  If lab work is needed we can place an order for that and you can then stop by our lab to have the test done at a later time. If during the course of the call the physician/provider feels a telephone visit is not appropriate, you will not be charged for this service.    Telephone visits are billed at different rates depending on your insurance coverage. During this emergency period, for some insurers they may be billed the same as an in-person visit.  Please reach out to your insurance provider with any questions.    Patient has given verbal consent for Telephone visit?  Yes      I have reviewed and updated the patient's Past Medical History, Social History, Family History and Medication List.      MEDICATIONS:  Current Outpatient Medications   Medication Sig Dispense Refill     acetaminophen (TYLENOL) 500 MG tablet Take 1,000 mg by mouth every 6 hours as needed (Headache)        aspirin (ASA) 81 MG tablet Take 1 tablet (81 mg) by mouth daily 90 tablet 3     BETA BLOCKER NOT PRESCRIBED (INTENTIONAL) Beta Blocker not prescribed intentionally due to Evidence of fluid overload or volume depletion and Refusal by  patient       Glucosamine-Chondroitin (OSTEO BI-FLEX REGULAR STRENGTH) 250-200 MG TABS Take 1 tablet by mouth 2 times daily       indomethacin (INDOCIN) 50 MG capsule Take 1 capsule (50 mg) by mouth 2 times daily (with meals) (Patient not taking: Reported on 5/2/2020) 14 capsule 0     levothyroxine (SYNTHROID/LEVOTHROID) 200 MCG tablet TAKE 1 TABLET (200 MCG) BY MOUTH DAILY 90 tablet 0     losartan (COZAAR) 25 MG tablet Take 1 tablet (25 mg) by mouth daily 90 tablet 3     methylPREDNISolone (MEDROL DOSEPAK) 4 MG tablet therapy pack Follow Package Directions 21 tablet 0     order for DME 1: Gradient Compression Wraps; 2: Cast Boots; 3: BLE knee high 20-30 mm Hg compression stockings; 4: BLE velcro compression garments; knee high 1 each 0     spironolactone (ALDACTONE) 25 MG tablet Take 1 tablet (25 mg) by mouth daily  0     STATIN NOT PRESCRIBED (INTENTIONAL) Please choose reason not prescribed, below       tamsulosin (FLOMAX) 0.4 MG capsule TAKE 2 CAPSULES (0.8 MG) BY MOUTH DAILY 180 capsule 3     torsemide (DEMADEX) 20 MG tablet Take 2 tablets (40mg) in morning and 1 tablet (20mg) in afternoon, additional 20mg in the afternoon when advised by [facility] clinic. 180 tablet 1       ALLERGIES  Clopidogrel and Penicillins      Review Of Systems:    Skin: negative  Eyes: negative  Ears/Nose/Throat: negative  Respiratory: No shortness of breath, dyspnea on exertion, cough, or hemoptysis  Cardiovascular: negative  Gastrointestinal: negative  Genitourinary: negative  Musculoskeletal: negative  Neurologic: negative  Psychiatric: negative  Hematologic/Lymphatic/Immunologic: negative  Endocrine: positive for Hypothyroidism  (ROS TAKEN BY Blanca Bright RN PRIOR TO VISIT)    He is not currently taking Spironolactone, Losartan, Indomethacin. I did not discontinue them off of his medication record because I wasn't sure if he was supposed to be taking them and he isn't. No recent labs in [facility] or Care Everywhere.    Self reported  vitals:    Weight: 317 lbs  BP NA  HR NA    Mr. Ivory was scheduled for a telephone visit today for [facility] clinic.  He did answer and complete the first part of the visit with the RN, but when I attempted to call him x 2 for the visit portion, there was no answer and went to .  I left  that our office would reach back out to him later today to reschedule another appointment.        Destiny Cruz PA-C  [facility]  Pager [phone]  Thank you for allowing me to participate in the care of your patient.    Sincerely,     MARTIN Crockett     [facility]

## 2020-06-03 ENCOUNTER — CARE COORDINATION (OUTPATIENT)
Dept: CARDIOLOGY | Facility: CLINIC | Age: 70
End: 2020-06-03

## 2020-06-03 NOTE — LETTER
[address]  [place] [address]  Phone: [phone]  Fax: [phone]       Jamar Ivory  [address]   [place] [address]      Dianne 3, 2020       Dear Shadi Kellogg!!  Just a friendly reminder to please call into your daily My Health Tracker survey to report your daily weight and if you are experiencing any symptoms between 6:00 am and 12:00 pm (Noon) every day.      Calling your survey in prior to 12:00 pm will give the [facility] adequate time to contact a provider to review your symptoms and/or weight gain and have a diuretic plan.     - For general questions about your survey, please call the [facility] at [phone].  - Vacations: If you will be out of town and will miss your surveys, please notify the RN from your care team by calling [phone], and a hold will be placed until your return.      Thank you so much!        [facility]

## 2020-06-04 ENCOUNTER — CARE COORDINATION (OUTPATIENT)
Dept: CARDIOLOGY | Facility: CLINIC | Age: 70
End: 2020-06-04

## 2020-06-04 NOTE — PROGRESS NOTES
[facility]  [place]    My[employer] Tracker Heart Failure Alert      Daily Weight Goal: 321-326 lbs    Today's Weight: 322 lbs    Weight Alert: Weight within weight parameters    Symptom Alert: Said yes to        3) Did shortness of breath wake you up last night?   4) Did you prop up on more pillows or sleep in a chair to breathe easier last night?       Current Diuretic & Potassium Plan: Torsemide 40 mg in am and 20 mg in afternoon & spironolactone 25 mg daily      Potassium Plan: None        Diuretic Plan: Extra 20 mg torsemide when directed by CORE     No future appointments.    Notes: Let  for pt and wife    MyHealth Tracker Weight Trends:       MyHealth Tracker Weight Graph:           Haily Graves RN 12:23 PM 06/04/20

## 2020-06-11 ENCOUNTER — CARE COORDINATION (OUTPATIENT)
Dept: CARDIOLOGY | Facility: CLINIC | Age: 70
End: 2020-06-11

## 2020-06-11 NOTE — PROGRESS NOTES
Destiny Cruz PA Gerdowsky, Christina, RN    Caller: Unspecified (Today, 11:09 AM)               I have never actually spoken with him, as he did not answer the phone for our last scheduled virtual visit. Looks like he has not rescheduled a visit with us either?   I would just continue to encourage medication compliance. He also hasn't had any labs in quite some time, so that would be helpful.   Otherwise in the meantime, you can lower his bottom threshold number to 318 lbs until Rozina is back to assess further.     Thanks,   Destiny

## 2020-06-11 NOTE — PROGRESS NOTES
"[facility]  [place]    [employer] Tr[employer] Heart Failure Alert      Daily Weight Goal: 321-326 lbs    Today's Weight: 320 lbs  6/4 320 lbs  6/5 324 lbs  6/6 322 lbs  6/7  324 lbs  6/8 321 lbs  6/9 325 lbs  6/10 327 lbs     Weight Alert: 1 lbs under weight parameters    Symptom Alert: Denies      Current Diuretic & Potassium Plan: Torsemide 40 mg in am and 20 mg in afternoon.   Spironolactone 25 mg daily - Not taking since 5/5 and will occ miss afternoon torsemide        No future appointments. Declines making another one. Wife will talk to him.     Notes: No call in since 6/3. Pt lost phone number for [facility]. Gave wife number. Pt refused to speak to me. Wife states depression is \"really bad\". Wife reports pt has diarrhea again. Today is day 2. She is not sure how often. Asked wife to call me if pt has frequent episodes.   Pt still not taking spironolactone, encouraged her to have him take it. Wife reports pt not taking low dose aspirin ever day either. Wife sounded very frustrated with pt, his noncompliance w/meds, psych follow-up, diet and depression.    MyHealth Tracker Weight Trends:             MyHealth Tracker Weight Graph:           Haily Graves RN 11:27 AM 06/11/20      "

## 2020-06-12 DIAGNOSIS — R06.02 SOB (SHORTNESS OF BREATH): ICD-10-CM

## 2020-06-12 DIAGNOSIS — I50.42 CHRONIC COMBINED SYSTOLIC AND DIASTOLIC CONGESTIVE HEART FAILURE (H): ICD-10-CM

## 2020-06-12 LAB — NT-PROBNP SERPL-MCNC: 207 PG/ML (ref 0–125)

## 2020-06-12 PROCEDURE — 36415 COLL VENOUS BLD VENIPUNCTURE: CPT | Performed by: PHYSICIAN ASSISTANT

## 2020-06-12 PROCEDURE — 80048 BASIC METABOLIC PNL TOTAL CA: CPT | Performed by: PHYSICIAN ASSISTANT

## 2020-06-12 PROCEDURE — 83880 ASSAY OF NATRIURETIC PEPTIDE: CPT | Performed by: PHYSICIAN ASSISTANT

## 2020-06-12 NOTE — PROGRESS NOTES
"[facility]  CSC[place]    MyHealth Tracker Heart Failure Alert      Daily Weight Goal: 318-326 lbs    Today's Weight: 326 lbs    Weight Alert: Weight within weight parameters. Up 4 lbs over night    Symptom Alert: Said yes to       3) Did shortness of breath wake you up last night?   4) Did you prop up on more pillows or sleep in a chair to breathe easier last night?     Current Diuretic & Potassium Plan: Torsemide 40 mg in am and 20 mg in afternoon.   **Spironolactone 25 mg daily - Not taking since 5/5 and will occ miss afternoon torsemide        Diuretic plan: Extra 20 in afternoon when advised by [facility]     Last Extra Diuretic: Torsemide 20 mg on 6/11/2020    No future appointments. Pt willing to go to [facility] for lab draw today.     Notes:  Pt declined to speak to me. Spoke to wife. He was up most of evening due to SOB. Wife told me they have black mold in their bedroom where he sleeps. Did tell her how dangerous that it is. She said they are waiting for the apt manager to have it removed. Recommend pt & herself to NOT be in that room until mold is taken care of.   Pt has been voiding every 15-30 min this monring. He has not been taking his afternoon torsemide consistently. Pt and wife misunderstood the instructions on torsemide bottle as it states: \"Take 2 tablets (40mg) in morning and 1 tablet (20mg) in afternoon, additional 20mg in the afternoon when advised by [facility] clinic. \" He did take torsemide 40 mg BID yesterday. Instructed wife pt should be taking 2 tablets in the morning and only 1 in afternoon UNLESS [facility] instructs him to take the extra tablet the afternoon. Wife wrote down correct instructions. Pt is still not taking spironolactone.   Diarrhea is slowing down. Stool last evening and this morning is formed and \"pretty soft\".   Pt not  forth coming with diet. Wife not sure what he ate.  He feels \"little short of breath\" recommend again he get out of the bedroom with the mold.   [facility] will [facility] to have [facility]. Did ask wife to call his [facility] and make a lab only appt and orders are in. She will call now to make lab appt for today.  Will await lab results.        MyHealth Tracker Weight Trends:         Pt lost number for [facility]. Did get it yesterday, 6/11:     6/11   320 lbs    6/10   327 lbs  6/9    325 lbs  6/8   321 lbs  6/7   324 lbs  6/6   322 lbs   6/5   324 lbs   6/4   320 lbs    MyHealth Tracker Weight Graph:           Haily Graves, RN 9:50 AM 06/12/20

## 2020-06-13 LAB
ANION GAP SERPL CALCULATED.3IONS-SCNC: 7 MMOL/L (ref 3–14)
BUN SERPL-MCNC: 16 MG/DL (ref 7–30)
CALCIUM SERPL-MCNC: 8.6 MG/DL (ref 8.5–10.1)
CHLORIDE SERPL-SCNC: 104 MMOL/L (ref 94–109)
CO2 SERPL-SCNC: 26 MMOL/L (ref 20–32)
CREAT SERPL-MCNC: 0.91 MG/DL (ref 0.66–1.25)
GFR SERPL CREATININE-BSD FRML MDRD: 84 ML/MIN/{1.73_M2}
GLUCOSE SERPL-MCNC: 88 MG/DL (ref 70–99)
POTASSIUM SERPL-SCNC: 3.7 MMOL/L (ref 3.4–5.3)
SODIUM SERPL-SCNC: 137 MMOL/L (ref 133–144)

## 2020-06-15 ENCOUNTER — NURSE TRIAGE (OUTPATIENT)
Dept: FAMILY MEDICINE | Facility: CLINIC | Age: 70
End: 2020-06-15

## 2020-06-15 ENCOUNTER — CARE COORDINATION (OUTPATIENT)
Dept: CARDIOLOGY | Facility: CLINIC | Age: 70
End: 2020-06-15

## 2020-06-15 ENCOUNTER — OFFICE VISIT (OUTPATIENT)
Dept: URGENT CARE | Facility: URGENT CARE | Age: 70
End: 2020-06-15
Payer: MEDICARE

## 2020-06-15 VITALS
TEMPERATURE: 99.4 F | WEIGHT: 315 LBS | BODY MASS INDEX: 47.74 KG/M2 | DIASTOLIC BLOOD PRESSURE: 76 MMHG | HEART RATE: 82 BPM | OXYGEN SATURATION: 95 % | SYSTOLIC BLOOD PRESSURE: 146 MMHG | RESPIRATION RATE: 20 BRPM | HEIGHT: 68 IN

## 2020-06-15 DIAGNOSIS — M25.562 CHRONIC PAIN OF LEFT KNEE: ICD-10-CM

## 2020-06-15 DIAGNOSIS — R30.0 DYSURIA: ICD-10-CM

## 2020-06-15 DIAGNOSIS — K62.5 BRBPR (BRIGHT RED BLOOD PER RECTUM): Primary | ICD-10-CM

## 2020-06-15 DIAGNOSIS — K59.00 CONSTIPATION, UNSPECIFIED CONSTIPATION TYPE: ICD-10-CM

## 2020-06-15 DIAGNOSIS — G89.29 CHRONIC PAIN OF LEFT KNEE: ICD-10-CM

## 2020-06-15 LAB
ALBUMIN UR-MCNC: NEGATIVE MG/DL
APPEARANCE UR: CLEAR
BASOPHILS # BLD AUTO: 0 10E9/L (ref 0–0.2)
BASOPHILS NFR BLD AUTO: 0.9 %
BILIRUB UR QL STRIP: NEGATIVE
COLOR UR AUTO: YELLOW
DIFFERENTIAL METHOD BLD: ABNORMAL
EOSINOPHIL # BLD AUTO: 0.4 10E9/L (ref 0–0.7)
EOSINOPHIL NFR BLD AUTO: 11.1 %
ERYTHROCYTE [DISTWIDTH] IN BLOOD BY AUTOMATED COUNT: 14.9 % (ref 10–15)
GLUCOSE UR STRIP-MCNC: NEGATIVE MG/DL
HCT VFR BLD AUTO: 39.7 % (ref 40–53)
HGB BLD-MCNC: 12.8 G/DL (ref 13.3–17.7)
HGB UR QL STRIP: NEGATIVE
KETONES UR STRIP-MCNC: NEGATIVE MG/DL
LEUKOCYTE ESTERASE UR QL STRIP: NEGATIVE
LYMPHOCYTES # BLD AUTO: 1.3 10E9/L (ref 0.8–5.3)
LYMPHOCYTES NFR BLD AUTO: 37.1 %
MCH RBC QN AUTO: 28.3 PG (ref 26.5–33)
MCHC RBC AUTO-ENTMCNC: 32.2 G/DL (ref 31.5–36.5)
MCV RBC AUTO: 88 FL (ref 78–100)
MONOCYTES # BLD AUTO: 0.7 10E9/L (ref 0–1.3)
MONOCYTES NFR BLD AUTO: 19.9 %
NEUTROPHILS # BLD AUTO: 1.1 10E9/L (ref 1.6–8.3)
NEUTROPHILS NFR BLD AUTO: 31 %
NITRATE UR QL: NEGATIVE
PH UR STRIP: 6.5 PH (ref 5–7)
PLATELET # BLD AUTO: 255 10E9/L (ref 150–450)
RBC # BLD AUTO: 4.52 10E12/L (ref 4.4–5.9)
RBC #/AREA URNS AUTO: NORMAL /HPF
SOURCE: NORMAL
SP GR UR STRIP: 1.01 (ref 1–1.03)
UROBILINOGEN UR STRIP-ACNC: 0.2 EU/DL (ref 0.2–1)
WBC # BLD AUTO: 3.4 10E9/L (ref 4–11)
WBC #/AREA URNS AUTO: NORMAL /HPF

## 2020-06-15 PROCEDURE — 36415 COLL VENOUS BLD VENIPUNCTURE: CPT | Performed by: FAMILY MEDICINE

## 2020-06-15 PROCEDURE — 99214 OFFICE O/P EST MOD 30 MIN: CPT | Performed by: FAMILY MEDICINE

## 2020-06-15 PROCEDURE — 85025 COMPLETE CBC W/AUTO DIFF WBC: CPT | Performed by: FAMILY MEDICINE

## 2020-06-15 PROCEDURE — 81001 URINALYSIS AUTO W/SCOPE: CPT | Performed by: FAMILY MEDICINE

## 2020-06-15 RX ORDER — HYDROCODONE BITARTRATE AND ACETAMINOPHEN 5; 325 MG/1; MG/1
1 TABLET ORAL EVERY 6 HOURS PRN
Qty: 8 TABLET | Refills: 0 | Status: SHIPPED | OUTPATIENT
Start: 2020-06-15 | End: 2020-06-17

## 2020-06-15 ASSESSMENT — MIFFLIN-ST. JEOR: SCORE: 2203.69

## 2020-06-15 NOTE — TELEPHONE ENCOUNTER
"Writer was put on speaker phone with patient and patient's spouse.    Additional Information    Negative: Passed out (i.e., fainted, collapsed and was not responding)    Negative: Shock suspected (e.g., cold/pale/clammy skin, too weak to stand, low BP, rapid pulse)    Negative: Vomiting red blood or black (coffee ground) material    Negative: Sounds like a life-threatening emergency to the triager    Negative: Diarrhea is the main symptom    Negative: Rectal symptoms    Negative: SEVERE rectal bleeding (large blood clots; on and off, or constant bleeding)    Negative: SEVERE dizziness (e.g., unable to stand, requires support to walk, feels like passing out now)    Negative: MODERATE rectal bleeding (small blood clots, passing blood without stool, or toilet water turns red) more than once a day    Negative: Bloody, black, or tarry bowel movements    Negative: High-risk adult (e.g., prior surgery on aorta, abdominal aortic aneurysm)    Negative: Rectal foreign body (inserted or swallowed)    Negative: SEVERE abdominal pain (e.g., excruciating)    Negative: Constant abdominal pain lasting > 2 hours    Negative: Pale skin (pallor) of new onset or worsening    Negative: Patient sounds very sick or weak to the triager    MODERATE rectal bleeding (small blood clots, passing blood without stool, or toilet water turns red)    Answer Assessment - Initial Assessment Questions  1. APPEARANCE of BLOOD: \"What color is it?\" \"Is it passed separately, on the surface of the stool, or mixed in with the stool?\"       Red blood on toilet paper when wiping.    2. AMOUNT: \"How much blood was passed?\"       Unable to tell writer  3. FREQUENCY: \"How many times has blood been passed with the stools?\"       Every time wiping since 6/11/20  4. ONSET: \"When was the blood first seen in the stools?\" (Days or weeks)       8/11/20  5. DIARRHEA: \"Is there also some diarrhea?\" If so, ask: \"How many diarrhea stools were passed in past 24 hours?\"       " "No  6. CONSTIPATION: \"Do you have constipation?\" If so, \"How bad is it?\"      No  7. RECURRENT SYMPTOMS: \"Have you had blood in your stools before?\" If so, ask: \"When was the last time?\" and \"What happened that time?\"       Bleeding not associated with having bowel movement.  Denied history of hemorrhoids.  8. BLOOD THINNERS: \"Do you take any blood thinners?\" (e.g., Coumadin/warfarin, Pradaxa/dabigatran, aspirin)      Aspirin  9. OTHER SYMPTOMS: \"Do you have any other symptoms?\"  (e.g., abdominal pain, vomiting, dizziness, fever)      Generalized weakness for 1 day.  Afebrile.  No rectal sores that patient is aware of and denies injury to this area.  Rectal pain is worse with sitting down.  Also experiencing dysuria and believes has UTI for the past couple days.  Denied hematuria, malodorous urine and/or cloudy appearance to urine.  10. PREGNANCY: \"Is there any chance you are pregnant?\" \"When was your last menstrual period?\"        N/A    Protocols used: RECTAL BLEEDING-A-WILLIAM GuerraN, RN    "

## 2020-06-15 NOTE — PROGRESS NOTES
[facility]  [place]    [employer] Tr[employer] Heart Failure Alert      Daily Weight Goal: 318-326 lbs    Today's Weight: 325 lbs, down 2 lb from yesterday    Weight Alert: Within parameters    Symptom Alert: said yes to        4) Did you prop up on more pillows or sleep in a chair to breathe easier last night?       Current Diuretic & Potassium Plan: Torsemide 40 mg in am and 20 mg in afternoon.   **Spironolactone 25 mg daily - Not taking since 5/5 and will occ miss afternoon torsemide         Diuretic plan: Extra 20 in afternoon when advised by [facility]      Last Extra Diuretic: Torsemide 20 mg on 6/11/2020       No future appointments.    Notes: Left VM asking him to please call back to make an appt. Pt may need to restart spironolactone but will need appt first.         MyHealth Tracker Weight Trends:               MyHealth Tracker Weight Graph:           Haily Graves RN 9:18 AM 06/15/20

## 2020-06-15 NOTE — TELEPHONE ENCOUNTER
Patient called stating he thinks he needs to see a doctor as he has a bladder infection & blood in his colon since last Wed-Thurs     Please advise and ok to leave a message

## 2020-06-16 ENCOUNTER — CARE COORDINATION (OUTPATIENT)
Dept: CARDIOLOGY | Facility: CLINIC | Age: 70
End: 2020-06-16

## 2020-06-16 NOTE — PROGRESS NOTES
[facility]  [place]    MyHealth Tracker Heart Failure Alert      Daily Weight Goal: 318-326 lbs    Today's Weight: 320 lbs    Weight Alert: weight within parameters    Symptom Alert: Said yes to       3) Did shortness of breath wake you up last night?   4) Did you prop up on more pillows or sleep in a chair to breathe easier last night?       Current Diuretic & Potassium Plan: Torsemide 40 mg in am and 20 mg in afternoon.   **Spironolactone 25 mg daily - Not taking since 5/5 and will occ miss afternoon torsemide         Diuretic plan: Extra 20 in afternoon when advised by [facility]      Last Extra Diuretic: Torsemide 20 mg on 6/11/2020 last reported      Future Appointments   Date Time Provider Department Center   6/18/2020 11:20 AM Brody Hawk MD [facility]       Notes: No answer when called and then busy 2nd & 3rd time         MyHealth Tracker Weight Trends:             MyHealth Tracker Weight Graph:         Haily Graves RN 12:16 PM 06/16/20

## 2020-06-16 NOTE — PROGRESS NOTES
"SUBJECTIVE: Jamar Ivory is a 70 year old male presenting with a chief complaint of BRBPR, left knee pain.  Onset of symptoms was day(s) ago with brbpr and chronic for knee pain.  Course of illness is same.    Severity moderate  Current and Associated symptoms: occasional constipation  Treatment measures tried include None tried.  Predisposing factors include HX of internal hemorroids.    Past Medical History:   Diagnosis Date     Arthritis      CHF (congestive heart failure) (H) 12/24/2018     CVA (cerebral infarction) 2012     CVD (cardiovascular disease)      Fatty liver      Gout      Hypertension goal BP (blood pressure) < 140/90 1/17/2011     Major depressive disorder, single episode, severe, without mention of psychotic behavior 1977    hospitalized     Obesity, morbid (more than 100 lbs over ideal weight or BMI > 40) (H)      Shortness of breath      Spider veins      TIA (transient ischaemic attack) 2012     Unspecified hypothyroidism      Vitiligo      Allergies   Allergen Reactions     Clopidogrel      Cough/emesis     Penicillins Rash     Social History     Tobacco Use     Smoking status: Never Smoker     Smokeless tobacco: Never Used   Substance Use Topics     Alcohol use: Not Currently     Alcohol/week: 0.0 standard drinks     Comment: rarely       ROS:  SKIN: no rash  GI: no vomiting    OBJECTIVE:  BP (!) 146/76 (BP Location: Right arm, Patient Position: Sitting, Cuff Size: Adult Regular)   Pulse 82   Temp 99.4  F (37.4  C) (Tympanic)   Resp 20   Ht 1.727 m (5' 8\")   Wt 147.4 kg (325 lb)   SpO2 95%   BMI 49.42 kg/m  GENERAL APPEARANCE: healthy, alert and no distress  EYES: EOMI,  PERRL, conjunctiva clear  HENT: ear canals and TM's normal.  Nose and mouth without ulcers, erythema or lesions  NECK: supple, nontender, no lymphadenopathy  RESP: lungs clear to auscultation - no rales, rhonchi or wheezes  CV: regular rates and rhythm, normal S1 S2, no murmur noted  ABDOMEN:  soft, nontender, " no HSM or masses and bowel sounds normal  NEURO: Normal strength and tone, sensory exam grossly normal,  normal speech and mentation  SKIN: no suspicious lesions or rashes      ICD-10-CM    1. BRBPR (bright red blood per rectum)  K62.5 CBC with platelets differential     COLORECTAL SURGERY REFERRAL   2. Constipation, unspecified constipation type  K59.00    3. Dysuria  R30.0 UA with Microscopic reflex to Culture   4. Chronic pain of left knee  M25.562 Orthopedic & Spine  Referral    G89.29 HYDROcodone-acetaminophen (NORCO) 5-325 MG tablet       Fluids/Rest, f/u if worse/not any better

## 2020-06-17 ENCOUNTER — CARE COORDINATION (OUTPATIENT)
Dept: CARDIOLOGY | Facility: CLINIC | Age: 70
End: 2020-06-17

## 2020-06-17 NOTE — PROGRESS NOTES
"[facility]  [facility]    MyHealth Tracker Heart Failure Alert      Daily Weight Goal: 318-326 lbs    Today's Weight: 323 lbs, pt accidentally entered 333 lbs    Weight Alert: None    Symptom Alert: None      Current Diuretic & Potassium Plan: Torsemide 40 mg in am and 20 mg in afternoon.   **Spironolactone 25 mg daily - Not taking since 5/5 and will occ miss afternoon torsemide         Diuretic plan: Extra 20 in afternoon when advised by [facility]      Last Extra Diuretic: Torsemide 20 mg on 6/11/2020 last reported    Future Appointments   Date Time Provider Department Center   6/18/2020 11:20 AM Brody Hawk MD [facility]       Notes: Pt entered wrong weight. Feels \"fine\" does not want to make Follow-up appt \"right now. I'll have the wife call\".         MyHealth Tracker Weight Trends:     MyHealth Tracker CHF Flowsheet My weight today is:   6/12/2020 326   6/13/2020 326   6/15/2020 325   6/16/2020 320   6/17/2020 333     6/17 wt should be 323 lbs      MyHealth Tracker Weight Graph:           Haily Gerdowsky, RN 1:16 PM 06/17/20        "

## 2020-06-18 ENCOUNTER — OFFICE VISIT (OUTPATIENT)
Dept: ORTHOPEDICS | Facility: CLINIC | Age: 70
End: 2020-06-18
Payer: MEDICARE

## 2020-06-18 ENCOUNTER — ANCILLARY PROCEDURE (OUTPATIENT)
Dept: GENERAL RADIOLOGY | Facility: CLINIC | Age: 70
End: 2020-06-18
Attending: ORTHOPAEDIC SURGERY
Payer: MEDICARE

## 2020-06-18 ENCOUNTER — CARE COORDINATION (OUTPATIENT)
Dept: CARDIOLOGY | Facility: CLINIC | Age: 70
End: 2020-06-18

## 2020-06-18 VITALS
WEIGHT: 315 LBS | SYSTOLIC BLOOD PRESSURE: 138 MMHG | DIASTOLIC BLOOD PRESSURE: 74 MMHG | HEIGHT: 68 IN | BODY MASS INDEX: 47.74 KG/M2

## 2020-06-18 DIAGNOSIS — G89.29 CHRONIC PAIN OF LEFT KNEE: ICD-10-CM

## 2020-06-18 DIAGNOSIS — G89.29 CHRONIC PAIN OF LEFT KNEE: Primary | ICD-10-CM

## 2020-06-18 DIAGNOSIS — M25.562 CHRONIC PAIN OF LEFT KNEE: ICD-10-CM

## 2020-06-18 DIAGNOSIS — M25.562 CHRONIC PAIN OF LEFT KNEE: Primary | ICD-10-CM

## 2020-06-18 DIAGNOSIS — R60.0 BILATERAL LOWER EXTREMITY EDEMA: ICD-10-CM

## 2020-06-18 PROCEDURE — 99203 OFFICE O/P NEW LOW 30 MIN: CPT | Performed by: ORTHOPAEDIC SURGERY

## 2020-06-18 PROCEDURE — 73562 X-RAY EXAM OF KNEE 3: CPT | Mod: LT | Performed by: ORTHOPAEDIC SURGERY

## 2020-06-18 ASSESSMENT — MIFFLIN-ST. JEOR: SCORE: 2203.69
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
## 2020-06-18 NOTE — PROGRESS NOTES
[facility]  [place]    [facility] Your [facility] Tracker Heart Failure Alert      Daily Weight Goal: 318-326 lbs    Today's Weight: 333 lbs    Weight Alert: 7 lbs over weight parameters    Symptom Alert: Said yes to        3) Did shortness of breath wake you up last night?   4) Did you prop up on more pillows or sleep in a chair to breathe easier last night?       Current Diuretic & Potassium Plan: Torsemide 40 mg in am and 20 mg in afternoon.   **Spironolactone 25 mg daily - Not taking since 5/5 and will occ miss afternoon torsemide         Diuretic plan: Extra 20 in afternoon when advised by [facility]      Last Extra Diuretic: Torsemide 20 mg on 6/11/2020 last reported    No future appointments.    Notes: Phone busy        MyHealth Tracker Weight Trends:           MyHealth Tracker Weight Graph:         Haily Graves RN 11:55 AM 06/18/20

## 2020-06-19 NOTE — PROGRESS NOTES
[facility]  [place]    [facility] Tr[facility] Heart Failure Alert      Daily Weight Goal: 318-326 lbs    Today's Weight: 326 lbs    Weight Alert: Within weight parameters    Symptom Alert: Said yes to        4) Did you prop up on more pillows or sleep in a chair to breathe easier last night?     Current Diuretic & Potassium Plan: Torsemide 40 mg in am and 20 mg in afternoon.   **Spironolactone 25 mg daily - Not taking since 5/5 and will occ miss afternoon torsemide         Diuretic plan: Extra 20 in afternoon when advised by [facility]      Last Extra Diuretic: Torsemide 20 mg on 6/11/2020 last reported    No future appointments.    Notes: No answer        MyHealth Tracker Weight Trends:             MyHealth Tracker Weight Graph:     \    Haily Graves RN 9:22 AM 06/19/20

## 2020-06-22 ENCOUNTER — PATIENT OUTREACH (OUTPATIENT)
Dept: CARE COORDINATION | Facility: CLINIC | Age: 70
End: 2020-06-22

## 2020-06-22 ASSESSMENT — ACTIVITIES OF DAILY LIVING (ADL): DEPENDENT_IADLS:: INDEPENDENT

## 2020-06-22 NOTE — PROGRESS NOTES
Clinic Care Coordination Contact  [facility]/Voicemail    Referral Source: Care Team  CC RN outreach to get updates on patient status, assess goal progress, and provide support and additional resources as needed.    Clinical Data: Care Coordinator Outreach  Outreach attempted x 1.  Patient's wife answered phone and stated patient not available at current time; she agreed to give patient message of CC RN call.  Plan: Care Coordinator will try to reach patient again in approximately 3-5 business days.    Solo Dia, RN  Clinic Care Coordinator  [facility]  Ph: [phone]

## 2020-06-25 ENCOUNTER — CARE COORDINATION (OUTPATIENT)
Dept: CARDIOLOGY | Facility: CLINIC | Age: 70
End: 2020-06-25

## 2020-06-25 NOTE — PROGRESS NOTES
[facility]  [place]    MyHealth Tracker Heart Failure Alert    Daily Weight Goal: 318-326 lbs    Today's Weight: 330 lbs    Weight Alert:  4 lbs over weight parameters    Symptom Alert: No alerts     Current Diuretic & Potassium Plan: Torsemide 40 mg in morning and 20 mg in afternoon daily     Spironolactone 25 mg daily not taking since 5/5/2020        Future Appointments   Date Time Provider Department Center   7/3/2020 11:45 AM Noble Gonzalez [facility]       Notes: No answer        MyHealth Tracker Weight Trends:           MyHealth Tracker Weight Graph:         Haily Graves RN 10:43 AM 06/25/20

## 2020-06-26 DIAGNOSIS — E03.9 ACQUIRED HYPOTHYROIDISM: Chronic | ICD-10-CM

## 2020-06-26 RX ORDER — LEVOTHYROXINE SODIUM 200 UG/1
TABLET ORAL
Qty: 90 TABLET | Refills: 0 | Status: SHIPPED | OUTPATIENT
Start: 2020-06-26 | End: 2020-09-29

## 2020-06-26 NOTE — PROGRESS NOTES
[facility]  [place]    MyHealth Tracker Heart Failure Alert      Daily Weight Goal: 318-326 lbs    Today's Weight: 325 lbs    Weight Alert: Weight within parameters    Symptom Alert: none       Current Diuretic & Potassium Plan: Torsemide 40 mg in morning and 20 mg in afternoon daily      Spironolactone 25 mg daily not taking since 5/5/2020       Future Appointments   Date Time Provider Department Center   7/3/2020 11:45 AM Noble Gonzalez [facility]       Notes: No answer when call to pt    MyHealth Tracker Weight Trends:     MyHealth Tracker CHF Flowsheet My weight today is:   6/19/2020 326   6/20/2020 327   6/21/2020 326   6/22/2020 325   6/24/2020 330   6/26/2020 325           MyHealth Tracker Weight Graph:       Haily Graves RN 10:20 AM 06/26/20

## 2020-06-26 NOTE — TELEPHONE ENCOUNTER
Medication is being filled for 1 time refill only due to:  Patient needs labs TSH.   Stacey Rico RN    Please schedule

## 2020-06-27 NOTE — TELEPHONE ENCOUNTER
Hello,    I need an order from Dr. Peng so the pt can get labs done for his TSH.    Yu ZAFAR  [facility]

## 2020-06-30 ENCOUNTER — CARE COORDINATION (OUTPATIENT)
Dept: CARDIOLOGY | Facility: CLINIC | Age: 70
End: 2020-06-30

## 2020-06-30 ENCOUNTER — TELEPHONE (OUTPATIENT)
Dept: FAMILY MEDICINE | Facility: CLINIC | Age: 70
End: 2020-06-30

## 2020-06-30 ENCOUNTER — DOCUMENTATION ONLY (OUTPATIENT)
Dept: CARDIOLOGY | Facility: CLINIC | Age: 70
End: 2020-06-30

## 2020-06-30 DIAGNOSIS — R06.02 SOB (SHORTNESS OF BREATH): ICD-10-CM

## 2020-06-30 DIAGNOSIS — I50.42 CHRONIC COMBINED SYSTOLIC AND DIASTOLIC CONGESTIVE HEART FAILURE (H): Primary | ICD-10-CM

## 2020-06-30 DIAGNOSIS — I50.42 CHRONIC COMBINED SYSTOLIC AND DIASTOLIC CONGESTIVE HEART FAILURE (H): ICD-10-CM

## 2020-06-30 DIAGNOSIS — R06.02 SOB (SHORTNESS OF BREATH): Primary | ICD-10-CM

## 2020-06-30 DIAGNOSIS — E03.9 ACQUIRED HYPOTHYROIDISM: Chronic | ICD-10-CM

## 2020-06-30 LAB — NT-PROBNP SERPL-MCNC: 97 PG/ML (ref 0–125)

## 2020-06-30 PROCEDURE — 84443 ASSAY THYROID STIM HORMONE: CPT | Performed by: FAMILY MEDICINE

## 2020-06-30 PROCEDURE — 80048 BASIC METABOLIC PNL TOTAL CA: CPT | Performed by: PHYSICIAN ASSISTANT

## 2020-06-30 PROCEDURE — 36415 COLL VENOUS BLD VENIPUNCTURE: CPT | Performed by: FAMILY MEDICINE

## 2020-06-30 PROCEDURE — 83880 ASSAY OF NATRIURETIC PEPTIDE: CPT | Performed by: PHYSICIAN ASSISTANT

## 2020-06-30 NOTE — PROGRESS NOTES
[facility]  DORA    [employer] Tracker Heart Failure Alert      Daily Weight Goal: 318-326 lbs    Today's Weight: 328 lbs    Weight Alert: 2 lbs over weight parameters    Symptom Alert: Said yes to       3) Did shortness of breath wake you up last night?   4) Did you prop up on more pillows or sleep in a chair to breathe easier last night?      Current Diuretic & Potassium Plan: Torsemide 40 mg in am and 20 mg in afternoon & Spironolactone 25 mg daily       Future Appointments   Date Time Provider Department Center   6/30/2020  2:00 PM HP LAB [facility]PL[facility] HP   7/3/2020 11:45 AM Noble Gonzalez [facility] RID       Notes:   Last BMP 6/12/2020. Pt did not answer his phone for last CORE appt on 5/22 and has not yet rescheduled.     Spoke to pt. Pt states he is fatigued and dyspnea. He admitted he is non compliant with his diet. Strongly encouraged him to really work on his diet.     I would like to enroll pt into [facility] for low sodium meals but he is right outside of the delivery area. Wife is willing to drive to [facility] to  meals as [facility] is a drop off/ location. Will fill out paper work when able to start enrolling pts.   Also I would liek to change pt's MD to a CORE MD     Will review with Destiny,. Pt is on way to get TSH checked.       MyHealth Tracker Weight Trends:               MyHealth Tracker Weight Graph:           Haily Gerdowsky, RN 1:05 PM 06/30/20

## 2020-06-30 NOTE — PROGRESS NOTES
Destiny Cruz PA Gerdowsky, Christina, RN    Caller: Unspecified (Today,  1:03 PM)               Why don't you have him go ahead and take his torsemide 40mg BID x 3 days and get an update later this week.   Thanks   Destiny

## 2020-06-30 NOTE — PROGRESS NOTES
"Patient had lab only appt TODAY for  \"**Please draw BMP & BNP per Destiny Cruz PAC\", no orders in chart. Blood has been drawn. Please place FUTURE orders in Epic if appropriate.    Thanks,   [facility]    "

## 2020-06-30 NOTE — TELEPHONE ENCOUNTER
Blanca Peng MD,     SLIME Johansen from  heart clinic would like to add on BMP and Pro BNP labs due to Heart failure symptoms. Patient on way to lab now. Haily from Heart clinic will place under Cardiologists name to order. Ok given from writer.      Thanks! Lou Rodriguez RN

## 2020-06-30 NOTE — PROGRESS NOTES
Left VM for pt to increase his afternoon dose of torsemide from 20 mg to 40 mg so he will be taking 40 mg BID for 3 days.   Did let pt know labs drawn today are in process but Destiny reviewed BMP that was done on 6/12. Asked pt to call back so we can discuss upcoming appts and changing MDs.   Haily Graves RN 4:15 PM 06/30/20

## 2020-07-01 ENCOUNTER — PATIENT OUTREACH (OUTPATIENT)
Dept: NURSING | Facility: CLINIC | Age: 70
End: 2020-07-01
Payer: MEDICARE

## 2020-07-01 LAB
ANION GAP SERPL CALCULATED.3IONS-SCNC: 4 MMOL/L (ref 3–14)
BUN SERPL-MCNC: 17 MG/DL (ref 7–30)
CALCIUM SERPL-MCNC: 8.3 MG/DL (ref 8.5–10.1)
CHLORIDE SERPL-SCNC: 104 MMOL/L (ref 94–109)
CO2 SERPL-SCNC: 28 MMOL/L (ref 20–32)
CREAT SERPL-MCNC: 0.95 MG/DL (ref 0.66–1.25)
GFR SERPL CREATININE-BSD FRML MDRD: 80 ML/MIN/{1.73_M2}
GLUCOSE SERPL-MCNC: 128 MG/DL (ref 70–99)
POTASSIUM SERPL-SCNC: 3.7 MMOL/L (ref 3.4–5.3)
SODIUM SERPL-SCNC: 136 MMOL/L (ref 133–144)
TSH SERPL DL<=0.005 MIU/L-ACNC: 3.87 MU/L (ref 0.4–4)

## 2020-07-01 ASSESSMENT — ACTIVITIES OF DAILY LIVING (ADL): DEPENDENT_IADLS:: INDEPENDENT

## 2020-07-01 NOTE — PROGRESS NOTES
"Clinic Care Coordination Contact    Follow Up Progress Note      Assessment:    Patient reports to be feeling fairly well.    Patient and wife noted that patient has been participating more in outings and errands than he had been previously.    Patient stated he does not recall scheduling or canceling an appointment with [facility]; he was agreeable to calling to reschedule an intake appointment.    Patient reported his depression to be \"about the same, no better, no worse\".    Patient recently saw Orthopedics for left knee pain in setting of osteoarthritis.  He was told he would need to lose a little over 100 lbs in order to be eligible for knee surgery.  He was offered cortisone injection in the meantime which he declined.  Patient did start Osteo Bi-Flex which he reports has helped with his knee pain some and he has been able to walk around more than previously.    Patient's wife also participated in patient updates and noted that the patient has talked about a medication he was on a long time ago when he was seeing a psychiatrist at \"[facility]\" and he remembered that the medication had been effective for his depression.  He couldn't remember the name of the medication or the name of the clinic.  He does remember the doctor's name but stated the doctor has since retired and moved to [place].    Goals addressed this encounter:   Goals Addressed                 This Visit's Progress       Patient Stated      1. Mental Health Management (pt-stated)   40%     Goal Statement: I will improve my depression by working with my care teams and taking my medications daily as prescribed.  Measure of Success: The patient will report decrease in depressive symptoms as evidenced by decreased PHQ-9 score.  Supportive Steps to Achieve: The patient will continue routine and as-needed follow-up with Psychiatry and will explore mental health resources provided.  CCC RN will continue routine patient outreach for " ongoing support and additional resources as needed.  Barriers: The patient has a long history of depression and reports having tried several interventions without improvement.  Strengths: The patient has good support from his wife.  The patient is motivated to improve his depression.  Date to Achieve By: extended to 10/31/20  Patient expressed understanding of goal: Yes        Intervention/Education provided during outreach: CC RN helped patient's wife to identify a clinic that could be where patient used to be seen (potentially [facility]); she agreed to call there to inquire if patient's old psychiatrist used to work there and whether or not patient has any records on file there in hopes of obtaining the name of the medication patient was on for his depression.  CC RN provided patient with phone number to call and reschedule an intake appointment for [email or web address] Psych[facility]ry.  CC RN encouraged patient to also try to focus on walking more throughout the day now that his knee pain is a little improved and to try to do more of something he likes throughout the week to help with his depression; he agreed to try these things.      Outreach Frequency: monthly    Plan:     The patient will call [facility] [[email or web address] Psychiatry to reschedule an intake appointment as discussed in an effort to better manage his depression.    The patient will also try to help his depression by walking more throughout the day and spending more time doing things he likes throughout the week.    The patient/wife will call to [facility] to inquire if that is the location where patient used to be seen and ask if he has any available records they could access to gain information about his previous depression treatments.    The patient/wife agreed to contact CC RN with additional questions or concerns.    CC RN will outreach to patient in approximately 1 month to get updates on  patient status, assess goal progress, and offer additional support and resources as indicated.    Solo Dia, RN  Clinic Care Coordinator  [facility]  Ph: [phone]

## 2020-07-01 NOTE — LETTER
[facility]  Complex Care Plan  About Me:    Patient Name:  Jamar Marroquin    YOB: 1950  Age:         70 year old   Twila MRN:    [id number] Telephone Information:  Home Phone [phone]   Mobile [phone]       Address:  [address]  [place] [address] Email address:  No e-mail address on record      Emergency Contact(s)    Name Relationship Lgl Grd Work Phone Home Phone Mobile Phone   1. SELIN MARROQUIN Spouse No none [phone]   2. JOSE MARIA MARROQUIN Son No  [phone]            Primary language:  English     needed? No   [facility] [employer] [facility]:  [phone] op. 1  Other communication barriers: None  Preferred Method of Communication:  Mail  Current living arrangement: I live in a private home with spouse  Mobility Status/ Medical Equipment: Independent    Health Maintenance  Health Maintenance Reviewed: Due/Overdue   Health Maintenance Due   Topic Date Due     URINE DRUG SCREEN  1950     ZOSTER IMMUNIZATION (1 of 2) 01/19/1999     MEDICARE ANNUAL WELLNESS VISIT  04/27/2016     EYE EXAM  05/22/2019     ALT  05/08/2020     LIPID  05/08/2020     MICROALBUMIN  05/08/2020     PSA  05/08/2020     DIABETIC FOOT EXAM  05/08/2020     A1C  05/30/2020     My Access Plan  Medical Emergency 911   Primary Clinic Line [facility] [phone]   24 Hour Appointment Line [phone] or  [phone]) (toll-free)   24 Hour Nurse Line [phone] (toll-free)   Preferred Urgent Care [facility] [phone]   Preferred Hospital [facility]  [phone]   Preferred Pharmacy [id number] - [place], [place] - [address]     Behavioral Health Crisis Line The National Suicide Prevention Lifeline at 1-592.170.3864 or 911       My Care Team Members  Patient Care Team       Relationship Specialty Notifications Start End    Danish  Blanca Oliver MD PCP - General Family Practice  10/10/17     [phone]    Phone: [phone] Fax: [phone]         [address] [facility] [address]    Kong Fraga MD MD Gastroenterology  8/11/16     Phone: [phone] Fax: [phone]         [address] [facility] [address] [place] [address]    Blanca Peng MD Assigned PCP   10/15/17     Phone: [phone] Fax: [phone]         [address] [facility] [address]    Cary Grace MD MD Ophthalmology  5/21/18     Phone: [phone] Fax: [phone]         [address] [place] [address]    Ezequiel Chand MD MD Cardiology  9/16/19     Phone: [phone] Fax: [phone] [address] [facility] [address]    Haily Grvaes, RN Registered Nurse Cardiology Admissions 9/26/19     [facility]    Rika Lindsay NP Nurse Practitioner Nurse Practitioner Psych/Mental Health  11/7/19     Phone: [phone] Fax: [phone]         [facility] [address] [place] [address]    Rozina Gallegos APRN CNP Nurse Practitioner Cardiology  11/8/19     [facility]    Phone: [phone] Pager: [phone] Fax: [phone] [address] [facility] [address]    Solo Dia, RN Lead Care Coordinator Primary Care - CC  11/8/19     Phone: [phone]         Rozina Gallegos APRN CNP MyHealth Tracker Nurse Practitioner - Adult Health  1/27/20     Phone: [phone] Pager: [phone] Fax: [phone] [address] [place] [address]            My Care Plans  Self Management and Treatment Plan  Goals and (Comments)  Goals        General    1. Mental Health Management (pt-stated)     Notes - Note edited  7/1/2020 11:36 AM by Solo Dia RN    Goal Statement: I will improve my depression by working with my care teams and taking my medications daily as prescribed.  Measure of Success: The patient will report decrease in depressive symptoms as evidenced by decreased PHQ-9  score.  Supportive Steps to Achieve: The patient will continue routine and as-needed follow-up with Psychiatry and will explore mental health resources provided.  CCC RN will continue routine patient outreach for ongoing support and additional resources as needed.  Barriers: The patient has a long history of depression and reports having tried several interventions without improvement.  Strengths: The patient has good support from his wife.  The patient is motivated to improve his depression.  Date to Achieve By: extended to 10/31/20  Patient expressed understanding of goal: Yes             Action Plans on File:   Depression  Heart Failure       My Medical and Care Information  Problem List   Patient Active Problem List   Diagnosis     Acquired hypothyroidism     Vitiligo     Hyperlipidemia with target LDL less than 100     Hypertension goal BP (blood pressure) < 140/90     Cerebral infarction (H)     Moderate major depression (H)     Health Care Home     Fatty liver     Advanced directives, counseling/discussion     Impaired glucose tolerance     Transient cerebral ischemia     Obesity, morbid (more than 100 lbs over ideal weight or BMI > 40) (H)     Chronic stasis dermatitis     Bilateral leg edema     Diet Controlled Type 2 diabetes mellitus without complication, without long-term current use of insulin (H)     Benign neoplasm of colon     Pain in both lower extremities     Low hemoglobin     Breast tenderness in male     Hx of Diabetic polyneuropathy associated with type 2 diabetes mellitus - now diet controlled (H)     Chronic combined systolic and diastolic congestive heart failure (H)     PVD (peripheral vascular disease) (H)     Benign prostatic hyperplasia with incomplete bladder emptying     Non-healing ulcer of lower leg with fat layer exposed, unspecified laterality (H)      Current Medications:  Current Outpatient Medications   Medication     acetaminophen (TYLENOL) 500 MG tablet     aspirin (ASA) 81 MG  tablet     BETA BLOCKER NOT PRESCRIBED (INTENTIONAL)     Glucosamine-Chondroitin (OSTEO BI-FLEX REGULAR STRENGTH) 250-200 MG TABS     indomethacin (INDOCIN) 50 MG capsule     levothyroxine (SYNTHROID/LEVOTHROID) 200 MCG tablet     losartan (COZAAR) 25 MG tablet     order for DME     spironolactone (ALDACTONE) 25 MG tablet     STATIN NOT PRESCRIBED (INTENTIONAL)     tamsulosin (FLOMAX) 0.4 MG capsule     torsemide (DEMADEX) 20 MG tablet     Care Coordination Start Date: 11/8/2019   Frequency of Care Coordination: monthly   Form Last Updated: 07/01/2020

## 2020-07-02 NOTE — PROGRESS NOTES
[facility]  CSCAR    MyHealth Tracker Heart Failure Alert      Daily Weight Goal: 318-326 lbs    Today's Weight: 330 lbs    Symptom Alert: Said yes to        3) Did shortness of breath wake you up last night?   4) Did you prop up on more pillows or sleep in a chair to breathe easier last night?       Current Diuretic & Potassium Plan: Torsemide 40 mg in am and 20 mg in afternoon & spironlactone       Last Extra Diuretic: Pt was instructed on 6/30 to increase afternoon torsemide from 20 mg to 40 mg for 3 days.       Future Appointments   Date Time Provider Department Center   7/3/2020 11:45 AM Noble Gonzalez [facility] RID       Notes: Left VM asking for pt to call back so we can review his diet and make sure he is taking the extra torsemide. Also would like to talk with him about rescheduling C[facility]ORA appt and new MD appt.         [facility] [employer] Tr[facility] Weight Trends:               MyHealth Tracker Weight Graph:         Haily Graves RN 8:43 AM 07/02/20

## 2020-07-03 ENCOUNTER — HOSPITAL ENCOUNTER (OUTPATIENT)
Dept: OCCUPATIONAL THERAPY | Facility: CLINIC | Age: 70
Setting detail: THERAPIES SERIES
End: 2020-07-03
Attending: ORTHOPAEDIC SURGERY
Payer: MEDICARE

## 2020-07-03 DIAGNOSIS — I89.0 LYMPHEDEMA: Primary | ICD-10-CM

## 2020-07-03 DIAGNOSIS — G89.29 CHRONIC PAIN OF LEFT KNEE: ICD-10-CM

## 2020-07-03 DIAGNOSIS — M25.562 CHRONIC PAIN OF LEFT KNEE: ICD-10-CM

## 2020-07-03 DIAGNOSIS — R60.0 BILATERAL LOWER EXTREMITY EDEMA: ICD-10-CM

## 2020-07-03 PROCEDURE — 97140 MANUAL THERAPY 1/> REGIONS: CPT | Mod: GO

## 2020-07-03 PROCEDURE — 97166 OT EVAL MOD COMPLEX 45 MIN: CPT | Mod: GO

## 2020-07-05 NOTE — PROGRESS NOTES
07/03/20 1600   Quick Adds   Quick Adds Certification   Rehab Discipline   Discipline OT   Type of Visit   Type of visit Initial Edema Evaluation   General Information   Start of care 07/03/20   Referring physician Brody Hawk   Orders Evaluate and treat as indicated   Order date 06/18/20   Medical diagnosis lymphedema   Onset of illness / date of surgery 06/18/20   Edema onset 06/18/20  (chronic for years)   Affected body parts LLE;RLE   Edema etiology Infection;Chronic Venous Insufficiency;DVT  (CHF; DM; sedentary/inactive)   Edema etiology comments Pts BLE lymphedema is multifactoral. Pt living with CHF, CVI, obesity, history DVT's and LE cellulitis infections.   Pertinent history of current problem (PT: include personal factors and/or comorbidities that impact the POC; OT: include additional occupational profile info) PMH significant for OA, thyroid issues, CHF, CVI, cellulitis infections LE's, CVI w/o residual deficits, DVT, DM, and pt now with need for LTKA due to patellofemoral joint space diminished/bone on bone.   Surgical / medical history reviewed Yes   Prior level of functional mobility Mod I  (SEC)   Prior treatment Complete decongestive therapy;Compression garments;Exercise;Elevation;Diuretics;MLD;Gradient compression bandaging   Community support Family / friend caregiver   Patient role / employment history Disabled   Psychosocial concerns Depression   Living environment Apartment / condo   Living environment comments Pt lives with supportive spouse who can aide with compression donning/doffing and MLD   Current assistive devices   (SEC)   Fall Risk Screen   Fall screen completed by OT   Have you fallen 2 or more times in the past year? No   Have you fallen and had an injury in the past year? No   Is patient a fall risk? No   Abuse Screen (yes response referral indicated)   Feels Unsafe at Home or Work/School no   Feels Threatened by Someone no   Does Anyone Try to Keep You From Having Contact  with Others or Doing Things Outside Your Home? no   Physical Signs of Abuse Present no   System Outcome Measures   Lymphedema Life Impact Scale (score range 0-72). A higher score indicates greater impairment. 19   Subjective Report   Patient report of symptoms increased LE swelling; need to reduce weight and fluid levels for potential knee surgery   Patient / Family Goals   Patient / family goals statement to reduce swelling in legs for surgery   Pain   Pain comments pt has chronic pain in LE's 2/2 neuropathy; pt has substantial L knee pain with any weight/standing activity   Cognitive Status   Orientation Orientation to person, place and time   Level of consciousness Alert   Follows commands and answers questions 100% of the time   Personal safety and judgement Intact   Edema Exam / Assessment   Skin condition Pitting;Venous distention;Dryness;Hemosiderin deposits   Skin condition comments 2+ fibrotic pitting ankle-mid calf   Pitting 2+   Capillary refill Symmetrical   Dorsal pedal pulse comments unable to palpate-no signs ischemia   Stemmer sign Positive;Negative   Stemmer sign comments positive lower legs; negative B feet   Girth Measurements   Girth Measurements   (will obtain upon return for services)   Range of Motion   ROM comments WFL   Strength   Strength comments NT'd   Activities of Daily Living   Activities of Daily Living I-Min A   Bed Mobility   Bed mobility I   Transfers   Transfers Mod I   Gait / Locomotion   Gait / Locomotion Mod I   Sensory   Sensory perception comments BLRE neuropathy   Vascular Assessment   Vascular Assessment Comments known CVI   Coordination   Coordination Gross motor coordination appropriate   Muscle Tone   Muscle tone No deficits were identified   Planned Edema Interventions   Planned edema interventions Manual lymph drainage;Gradient compression bandaging;Fit for compression garment;Exercises;Precautions to prevent infection / exacerbation;Education;Skin care /  precautions;Home management program development   Clinical Impression   Criteria for skilled therapeutic intervention met Yes   Therapy diagnosis lymphedema   Influenced by the following impairments / conditions Stage 2;Phlebolymphedema;Wounds / Ulcers   Assessment of Occupational Performance 3-5 Performance Deficits   Identified Performance Deficits skin infection reoccurence; decreased functional mobility; decreased fit LB clothing; wounds/open skin   Clinical Decision Making (Complexity) Moderate complexity   Treatment Frequency 3x/week   Treatment duration 3x/wk x 4 weeks, then 0x/wk x 3 weeks, then 1x/wk x 1 week   Patient / family and/or staff in agreement with plan of care Yes;Other  (pt and spouse differing in commitment to therapy)   Risks and benefits of therapy have been explained Yes   Clinical impression comments Pt can benefit from skilled lymphedema services to reduce BLE lymphedema to imporve wound healing and repair open skin to reduce incidence skin infection, promote better fit LB clothing, and promote improveemnts in mobility and standing tasks   Goals   Edema Eval Goals 1;2;3;4;5;6   Goal 1   Goal identifier 1   Goal description In order to improve functional mobility for activities of living, aide reducitons needed for L TKA, and promote reduciton in infection risk, by the completion of the intensive phase of services the pt and or caregiver will:   Target date 09/25/20   Goal 2   Goal identifier 1a   Goal description Tolerate gradient compression bandaging/wearing compression wraps for entire daytime hour span to promote reductions in BLE lymphedema needed to reduce infection risk, promote reductions needed for L TKA, and promote imporved functional mobility   Target date 09/25/20   Goal 3   Goal identifier 1b   Goal description Demonstrate independence in applying gradient compression bandages to build I with home management of BLE lymphedema needed for redcuign incidence skin infection,  promote betetr mobility engagement, and reduce limb in manner needed to aide best outcomes L TKA needed   Target date 09/25/20   Goal 4   Goal identifier 1c   Goal description Demonstrate independence in performing prescribed exercises to facilate the lymph system and muscle pumping systems for max reductions needed to aid eimporveemnts in mobility engagement, reduce risk skin infection, and promote reductions needed for L TKA   Target date 09/25/20   Goal 5   Goal identifier 1d   Goal description Be independent in donning/doffing, wearing schedule, and care of compression garments to build I with home management of BLE lymphedema needed for redcuign incidence skin infection, promote betetr mobility engagement, and reduce limb in manner needed to aide best outcomes L TKA needed    Target date 09/25/20   Goal 6   Goal identifier 2   Goal description Reduce total BLE volume 1.25L for reducitons needed to aide mobility engagement and promote reduciton in skin infection incidene   Target date 09/25/20   Total Evaluation Time   OT Eval, Moderate Complexity Minutes (96440) 20   Certification   Certification date from 07/03/20   Certification date to 09/25/20   Medical Diagnosis lymphedema   Certification I certify the need for these services furnished under this plan of treatment and while under my care.  (Physician co-signature of this document indicates review and certification of the therapy plan).

## 2020-07-05 NOTE — PROGRESS NOTES
[facility]        OUTPATIENT OCCUPATIONAL THERAPY EDEMA EVALUATION  PLAN OF TREATMENT FOR OUTPATIENT REHABILITATION  (COMPLETE FOR INITIAL CLAIMS ONLY)  Patient's Last Name, First Name, Jamar Garrido                           Provider s Name:   [facility] Medical Record No.  [id number]     Start of Care Date:  07/03/20   Onset Date:  06/18/20(chronic for years)   Type:  OT   Medical Diagnosis:  lymphedema   Therapy Diagnosis:  lymphedema Visits from SOC:  1                                     __________________________________________________________________________________   Plan of Treatment/Functional Goals:    Manual lymph drainage, Gradient compression bandaging, Fit for compression garment, Exercises, Precautions to prevent infection / exacerbation, Education, Skin care / precautions, Home management program development        GOALS  1. Goal description: In order to improve functional mobility for activities of living, aide reducitons needed for L TKA, and promote reduciton in infection risk, by the completion of the intensive phase of services the pt and or caregiver will:       Target date: 09/25/20  2. Goal description: Tolerate gradient compression bandaging/wearing compression wraps for entire daytime hour span to promote reductions in BLE lymphedema needed to reduce infection risk, promote reductions needed for L TKA, and promote imporved functional mobility       Target date: 09/25/20  3. Goal description: Demonstrate independence in applying gradient compression bandages to build I with home management of BLE lymphedema needed for redcuign incidence skin infection, promote betetr mobility engagement, and reduce limb in manner needed to aide best outcomes L TKA needed       Target date: 09/25/20  4. Goal description: Demonstrate  independence in performing prescribed exercises to facilate the lymph system and muscle pumping systems for max reductions needed to aid eimporveemnts in mobility engagement, reduce risk skin infection, and promote reductions needed for L TKA       Target date: 09/25/20  5. Goal description: Be independent in donning/doffing, wearing schedule, and care of compression garments to build I with home management of BLE lymphedema needed for redcuign incidence skin infection, promote betetr mobility engagement, and reduce limb in manner needed to aide best outcomes L TKA needed        Target date: 09/25/20  6. Goal description: Reduce total BLE volume 1.25L for reducitons needed to aide mobility engagement and promote reduciton in skin infection incidene       Target date: 09/25/20     7.             8.              Treatment Frequency: 3x/week   Treatment duration: 3x/wk x 4 weeks, then 0x/wk x 3 weeks, then 1x/wk x 1 week    Noble Gonzalez                                    I CERTIFY THE NEED FOR THESE SERVICES FURNISHED UNDER        THIS PLAN OF TREATMENT AND WHILE UNDER MY CARE     (Physician co-signature of this document indicates review and certification of the therapy plan).                   Certification date from: 07/03/20       Certification date to: 09/25/20           Referring physician: Brody Hawk   Initial Assessment  See Epic Evaluation- Start of care: 07/03/20

## 2020-07-08 ENCOUNTER — CARE COORDINATION (OUTPATIENT)
Dept: CARDIOLOGY | Facility: CLINIC | Age: 70
End: 2020-07-08

## 2020-07-08 DIAGNOSIS — I50.42 CHRONIC COMBINED SYSTOLIC AND DIASTOLIC CONGESTIVE HEART FAILURE (H): Primary | ICD-10-CM

## 2020-07-08 NOTE — PROGRESS NOTES
Called [address] [place] [address] and made lab appt for 7/9 at 1345.  Called pt to let him know. Wife got on phone and was telling me about pt's diet. Ddi let her know he was upfront with me and did admit to eating high salt and chips. Reviewed instructions for pt to increase afternoon torsemide until he has phone appt with Destiny.   Haily Graves RN 1:08 PM 07/08/20

## 2020-07-08 NOTE — PROGRESS NOTES
Spoke to pt. He will increase afternoon torsemide dose from 20 mg to 40 mg. Pt will also get BMP drawn at [facility] or [address] [place] [address] 7/9 for 7/10 phone appt. Pt asked me to call to make appt  Haily Graves RN 12:45 PM 07/08/20

## 2020-07-08 NOTE — PROGRESS NOTES
Destiny Cruz PA Gerdowsky, Christina, RN    Caller: Unspecified (Today, 10:11 AM)               I see him in a few days to sort it out.   I suppose we can have him continue to take the extra torsemide in the afternoon until I see him with labs.   Giovanna vogt

## 2020-07-08 NOTE — PROGRESS NOTES
"[facility]  C.O.R.VIDYA.    MyHealth Tracker Heart Failure Alert      Daily Weight Goal: 318-326 lbs    Today's Weight: 330 lbs    Symptom Alert: Said yes to       3) Did shortness of breath wake you up last night?   4) Did you prop up on more pillows or sleep in a chair to breathe easier last night?      Current Diuretic & Potassium Plan: Spironolactone 25 mg daily & torsemide 40 mg in morning and 20 mg in afternoon        Last Extra Diuretic: Extra 20 mg torsemide in afternoon x 3 days  on 7/1-7/3      Future Appointments   Date Time Provider Department Center   7/10/2020  8:50 AM Destiny Cruz PA [facility]   8/10/2020  3:45 PM Vega Anders MD [facility]   8/11/2020 11:00 AM Noble Gonzalez [facility]   8/13/2020 10:30 AM ThatNoble siddiqi [facility]   8/14/2020 11:00 AM Macarena Leone, OT [facility]   8/18/2020 11:00 AM Macarena Leone, BRI [facility]   8/20/2020 11:00 AM ThatNoble siddiqi [facility]   8/21/2020 11:00 AM ThatNoble siddiqi [facility]   8/25/2020 11:00 AM Noble Gonzalez [facility]   8/27/2020 11:00 AM Noble Gonzalez [facility]   8/28/2020 11:00 AM Noble Gonzalez [facility]   9/1/2020 11:00 AM EvaNoble siddiqi [facility]   9/3/2020 11:00 AM EvaNoble siddiqi [facility]   9/4/2020 11:00 AM Noble Gonzalez [facility]       Notes: Spoke pt. Reports weight is \"creeping up again\". He is more SOB/GOMEZ today. Asked pt if he felt relief with the extra 20 mg of torsemide in tthe afternoon for 3 days. Pt reports he didn't;t notice much of a difference. \"I maybe went to the bathroom more.\"   Pt has been eating \"lots of potato chips\". Asked him to please omit the chips, no canned or processed foods today and watch his free water intake as he \"drinks a lot\". I asked him to p[please keep it under 64 oz a day. Recommended he freeze grapes, suck on lemon wedges, ice " "cubes, eat cucumbers with no salt on them or watermelon to help quench his thrist. Pt states he will try.  Pt continues to void \"every half hour to hr maybe.\"     Will update Destiny     MyHealth Tracker Weight Trends:               MyHealth Tracker Weight Graph:           Haily Graves RN 11:46 AM 07/08/20    "

## 2020-07-09 DIAGNOSIS — I50.42 CHRONIC COMBINED SYSTOLIC AND DIASTOLIC CONGESTIVE HEART FAILURE (H): ICD-10-CM

## 2020-07-09 PROCEDURE — 36415 COLL VENOUS BLD VENIPUNCTURE: CPT | Performed by: NURSE PRACTITIONER

## 2020-07-09 PROCEDURE — 80048 BASIC METABOLIC PNL TOTAL CA: CPT | Performed by: NURSE PRACTITIONER

## 2020-07-09 NOTE — PROGRESS NOTES
Pt did call into [facility] this morning.   He is down 6 lbs from yesterday. He did watch his salt and waster intake. He also took and 20 mg torsemide yesterday afternoon. Slept better. He is having BMP drawn today at 1345. Will watch for results then update Destiny Graves RN 10:21 AM 07/09/20

## 2020-07-09 NOTE — PROGRESS NOTES
CARDIOLOGY CORE CLINIC TELEPHONE VISIT    Jamar Ivory is a 70 year old male who is being evaluated via a billable telephone visit.      The patient has been notified of following:     This telephone visit will be conducted via a call between you and your physician/provider. Given concern for spread of COVID 19 we are minimizing in person clinic visits when possible. We have found that certain health care needs can be provided without the need for a physical exam.  This service lets us provide the care you need with a short phone conversation.  If a prescription is necessary we can send it directly to your pharmacy.  If lab work is needed we can place an order for that and you can then stop by our lab to have the test done at a later time. If during the course of the call the physician/provider feels a telephone visit is not appropriate, you will not be charged for this service.    Telephone visits are billed at different rates depending on your insurance coverage. During this emergency period, for some insurers they may be billed the same as an in-person visit.  Please reach out to your insurance provider with any questions.    Patient has given verbal consent for Telephone visit?  Yes      I have reviewed and updated the patient's Past Medical History, Social History, Family History and Medication List.      MEDICATIONS:  Current Outpatient Medications   Medication Sig Dispense Refill     acetaminophen (TYLENOL) 500 MG tablet Take 1,000 mg by mouth every 6 hours as needed (Headache)        aspirin (ASA) 81 MG tablet Take 1 tablet (81 mg) by mouth daily 90 tablet 3     Glucosamine-Chondroitin (OSTEO BI-FLEX REGULAR STRENGTH) 250-200 MG TABS Take 1 tablet by mouth 2 times daily       levothyroxine (SYNTHROID/LEVOTHROID) 200 MCG tablet TAKE 1 TABLET BY MOUTH EVERY DAY 90 tablet 0     order for DME 1: Gradient Compression Wraps; 2: Cast boots; 3: BLE knee high 20-30 or 30-40 mm Hg compression stockings; 4:  BLE velcro compression garments; 30-40 mm Hg; full leg; 5: Compression olamide 1 each 0     tamsulosin (FLOMAX) 0.4 MG capsule TAKE 2 CAPSULES (0.8 MG) BY MOUTH DAILY 180 capsule 3     torsemide (DEMADEX) 20 MG tablet Take 2 tablets (40mg) in morning and 1 tablet (20mg) in afternoon, additional 20mg in the afternoon when advised by CORE clinic. 180 tablet 1     BETA BLOCKER NOT PRESCRIBED (INTENTIONAL) Beta Blocker not prescribed intentionally due to Evidence of fluid overload or volume depletion and Refusal by patient (Patient not taking: Reported on 7/10/2020)       indomethacin (INDOCIN) 50 MG capsule Take 1 capsule (50 mg) by mouth 2 times daily (with meals) (Patient not taking: Reported on 7/10/2020) 14 capsule 0     losartan (COZAAR) 25 MG tablet Take 1 tablet (25 mg) by mouth daily (Patient not taking: Reported on 7/10/2020) 90 tablet 3     order for DME 1: Gradient Compression Wraps; 2: Cast Boots; 3: BLE knee high 20-30 mm Hg compression stockings; 4: BLE velcro compression garments; knee high (Patient not taking: Reported on 7/10/2020) 1 each 0     spironolactone (ALDACTONE) 25 MG tablet Take 1 tablet (25 mg) by mouth daily  0     STATIN NOT PRESCRIBED (INTENTIONAL) Please choose reason not prescribed, below (Patient not taking: Reported on 7/10/2020)         ALLERGIES  Clopidogrel and Penicillins      Review Of Systems:  Skin: Bilateral ulcers LE  Eyes: negative  Ears/Nose/Throat: negative  Respiratory: SOB/GOMEZ w/minimal exertion/ADLs, PND  Cardiovascular: Fatigue, bilateral edema LE (wears wraps at night), positional LH, chest pain at night  Gastrointestinal: negative  Genitourinary: negative  Musculoskeletal: Knee pain - arthritis   Neurologic: negative  Psychiatric: Anxiety, depression, poor sleep  Hematologic/Lymphatic/Immunologic: negative  Endocrine: negative  (ROS TAKEN BY Solo Foreman LPN PRIOR TO VISIT)    Self reported vitals:  Weight: 321#  BP N/A  HR N/A       Most recent labs:   Results  for SERGIO MARROQUIN (MRN [id number]) as of 7/10/2020 09:02   Ref. Range 7/9/2020 13:43   Sodium Latest Ref Range: 133 - 144 mmol/L 136   Potassium Latest Ref Range: 3.4 - 5.3 mmol/L 3.6   Chloride Latest Ref Range: 94 - 109 mmol/L 103   Carbon Dioxide Latest Ref Range: 20 - 32 mmol/L 27   Urea Nitrogen Latest Ref Range: 7 - 30 mg/dL 20   Creatinine Latest Ref Range: 0.66 - 1.25 mg/dL 0.97   GFR Estimate Latest Ref Range: >60 mL/min/1.73_m2 78   GFR Estimate If Black Latest Ref Range: >60 mL/min/1.73_m2 >90   Calcium Latest Ref Range: 8.5 - 10.1 mg/dL 8.6   Anion Gap Latest Ref Range: 3 - 14 mmol/L 6       Primary cardiologist: Dr. 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 He established care in May 2019 with Dr. Chand after developing swelling in his lower extremities associated with weight gain in the setting of medication noncompliance. Patient noted to have a known nonischemic cardiomyopathy, and had a normal coronary angiogram in 12/2011. In 2016 patient was evaluated for nonsustained VT at outside facility, nuclear stress test showed no ischemic with an EF 45-50%.      Patient follows closely with YENI Hutchinson CNP in the [facility] clinic. He has been followed by telehealth tracker with multiple diuretic adjustments. His course has been complicated by medical noncompliance and depression. He is also often resistant to adjustments in his diuretics. He had a virtual visit last with Rozina in March at which time he reported home weights 320-325#. He was taking torsemide 40mg qam and 20mg qpm and wearing lymphedema wraps most days. She recommended a follow up in 2 months, but unfortunately he missed his next virtual visit in May.    Today, we are doing another telephone CORE visit. Last week his weight had  increased to 330 lbs; he had noticed that he has had more swelling as well. Via phone we increased his torsemide to 40mg BID. Today, he reports that his weight has come back down to 321 lbs. He says the swelling is improved as well. He denies any new chest discomfort, or palpitations. He tells me he still has dizziness often when he first stands. He does not routinely check his BP at home, but is weighing regularly. Upon medication reconciliation, he is not taking the spironolactone or the losartan but says he cannot recall when he ran out.        Assessment/Plan:    1. Chronic combined systolic and diastolic heart failure, nonischemic cardiomyopathy    --Most recent echo Jan 2020 with LVEF reported at 46% with moderate diastolic dysfunction. RV was of normal size and function. Weight has been 320-325# at baseline. Recent increase up to 330#, responded well with transient increase of torsemide to 40mg BID. Renal function preserved.   --He will go back down to baseline dose of torsemide 40mg AM/20mg PM over the weekend, but I told him to to back up to 40mg BID if his weight climbs back up. Continue my health tracker daily weights. I encouraged lower sodium food choices which appears to be an issue. Continue leg wraps as is.   --He has been off his spironolactone and losartan for an unknown amount of time. Will try to reintroduce spironolactone at 12.5mg daily today. I did not do a full dose due to some positional dizziness. When he returns will check a BP and repeat BMP, and titrate as able. Will likely need to reintroduce ARB as well. He is not on beta blockers due to frustrations with multiple medications in the past as well. Will need to reinforce medication compliance going forward.    --Ischemic eval via 2016 nuclear stress test which showed no ischemia, 12/2011 normal coronary angiogram.       Follow up plan: Return to see myself in CORE in person in ~2 weeks with labs.       I have reviewed the note as  documented above.  This accurately captures the substance of my conversation with the patient.      Phone call contact time  Call Started at 0902  Call Ended at 0928    Destiny Cruz PA-C  [facility]  Pager [phone]

## 2020-07-10 ENCOUNTER — DOCUMENTATION ONLY (OUTPATIENT)
Dept: CARDIOLOGY | Facility: CLINIC | Age: 70
End: 2020-07-10

## 2020-07-10 ENCOUNTER — VIRTUAL VISIT (OUTPATIENT)
Dept: CARDIOLOGY | Facility: CLINIC | Age: 70
End: 2020-07-10
Attending: PHYSICIAN ASSISTANT
Payer: MEDICARE

## 2020-07-10 VITALS — WEIGHT: 315 LBS | BODY MASS INDEX: 48.81 KG/M2

## 2020-07-10 DIAGNOSIS — I50.42 CHRONIC COMBINED SYSTOLIC AND DIASTOLIC CONGESTIVE HEART FAILURE (H): ICD-10-CM

## 2020-07-10 LAB
ANION GAP SERPL CALCULATED.3IONS-SCNC: 6 MMOL/L (ref 3–14)
BUN SERPL-MCNC: 20 MG/DL (ref 7–30)
CALCIUM SERPL-MCNC: 8.6 MG/DL (ref 8.5–10.1)
CHLORIDE SERPL-SCNC: 103 MMOL/L (ref 94–109)
CO2 SERPL-SCNC: 27 MMOL/L (ref 20–32)
CREAT SERPL-MCNC: 0.97 MG/DL (ref 0.66–1.25)
GFR SERPL CREATININE-BSD FRML MDRD: 78 ML/MIN/{1.73_M2}
GLUCOSE SERPL-MCNC: 92 MG/DL (ref 70–99)
POTASSIUM SERPL-SCNC: 3.6 MMOL/L (ref 3.4–5.3)
SODIUM SERPL-SCNC: 136 MMOL/L (ref 133–144)

## 2020-07-10 PROCEDURE — 99443 ZZC PHYSICIAN TELEPHONE EVALUATION 21-30 MIN: CPT | Performed by: PHYSICIAN ASSISTANT

## 2020-07-10 RX ORDER — SPIRONOLACTONE 25 MG/1
12.5 TABLET ORAL DAILY
Qty: 90 TABLET | Refills: 3 | Status: SHIPPED | OUTPATIENT
Start: 2020-07-10 | End: 2020-07-22

## 2020-07-10 NOTE — LETTER
7/10/2020    Blanca Peng MD  [address] [address]    RE: Jamar Carrolldeo       Dear Colleague,    I had the pleasure of seeing Jamar Ivory in the [facility].    CARDIOLOGY CORE CLINIC TELEPHONE VISIT    Jamar Ivory is a 70 year old male who is being evaluated via a billable telephone visit.      The patient has been notified of following:     This telephone visit will be conducted via a call between you and your physician/provider. Given concern for spread of COVID 19 we are minimizing in person clinic visits when possible. We have found that certain health care needs can be provided without the need for a physical exam.  This service lets us provide the care you need with a short phone conversation.  If a prescription is necessary we can send it directly to your pharmacy.  If lab work is needed we can place an order for that and you can then stop by our lab to have the test done at a later time. If during the course of the call the physician/provider feels a telephone visit is not appropriate, you will not be charged for this service.    Telephone visits are billed at different rates depending on your insurance coverage. During this emergency period, for some insurers they may be billed the same as an in-person visit.  Please reach out to your insurance provider with any questions.    Patient has given verbal consent for Telephone visit?  Yes      I have reviewed and updated the patient's Past Medical History, Social History, Family History and Medication List.      MEDICATIONS:  Current Outpatient Medications   Medication Sig Dispense Refill     acetaminophen (TYLENOL) 500 MG tablet Take 1,000 mg by mouth every 6 hours as needed (Headache)        aspirin (ASA) 81 MG tablet Take 1 tablet (81 mg) by mouth daily 90 tablet 3     Glucosamine-Chondroitin (OSTEO BI-FLEX REGULAR STRENGTH) 250-200 MG TABS Take 1 tablet by mouth 2 times daily        levothyroxine (SYNTHROID/LEVOTHROID) 200 MCG tablet TAKE 1 TABLET BY MOUTH EVERY DAY 90 tablet 0     order for DME 1: Gradient Compression Wraps; 2: Cast boots; 3: BLE knee high 20-30 or 30-40 mm Hg compression stockings; 4: BLE velcro compression garments; 30-40 mm Hg; full leg; 5: Compression olamide 1 each 0     tamsulosin (FLOMAX) 0.4 MG capsule TAKE 2 CAPSULES (0.8 MG) BY MOUTH DAILY 180 capsule 3     torsemide (DEMADEX) 20 MG tablet Take 2 tablets (40mg) in morning and 1 tablet (20mg) in afternoon, additional 20mg in the afternoon when advised by C[facility] clinic. 180 tablet 1     BETA BLOCKER NOT PRESCRIBED (INTENTIONAL) Beta Blocker not prescribed intentionally due to Evidence of fluid overload or volume depletion and Refusal by patient (Patient not taking: Reported on 7/10/2020)       indomethacin (INDOCIN) 50 MG capsule Take 1 capsule (50 mg) by mouth 2 times daily (with meals) (Patient not taking: Reported on 7/10/2020) 14 capsule 0     losartan (COZAAR) 25 MG tablet Take 1 tablet (25 mg) by mouth daily (Patient not taking: Reported on 7/10/2020) 90 tablet 3     order for DME 1: Gradient Compression Wraps; 2: Cast Boots; 3: BLE knee high 20-30 mm Hg compression stockings; 4: BLE velcro compression garments; knee high (Patient not taking: Reported on 7/10/2020) 1 each 0     spironolactone (ALDACTONE) 25 MG tablet Take 1 tablet (25 mg) by mouth daily  0     STATIN NOT PRESCRIBED (INTENTIONAL) Please choose reason not prescribed, below (Patient not taking: Reported on 7/10/2020)         ALLERGIES  Clopidogrel and Penicillins      Review Of Systems:  Skin: Bilateral ulcers LE  Eyes: negative  Ears/Nose/Throat: negative  Respiratory: SOB/GOMEZ w/minimal exertion/ADLs, PND  Cardiovascular: Fatigue, bilateral edema LE (wears wraps at night), positional LH, chest pain at night  Gastrointestinal: negative  Genitourinary: negative  Musculoskeletal: Knee pain - arthritis   Neurologic: negative  Psychiatric: Anxiety,  depression, poor sleep  Hematologic/Lymphatic/Immunologic: negative  Endocrine: negative  (ROS TAKEN BY Solo Foreman LPN PRIOR TO VISIT)    Self reported vitals:  Weight: 321#  BP N/A  HR N/A       Most recent labs:   Results for SERGIO MARROQUIN (MRN [id number]) as of 7/10/2020 09:02   Ref. Range 7/9/2020 13:43   Sodium Latest Ref Range: 133 - 144 mmol/L 136   Potassium Latest Ref Range: 3.4 - 5.3 mmol/L 3.6   Chloride Latest Ref Range: 94 - 109 mmol/L 103   Carbon Dioxide Latest Ref Range: 20 - 32 mmol/L 27   Urea Nitrogen Latest Ref Range: 7 - 30 mg/dL 20   Creatinine Latest Ref Range: 0.66 - 1.25 mg/dL 0.97   GFR Estimate Latest Ref Range: >60 mL/min/1.73_m2 78   GFR Estimate If Black Latest Ref Range: >60 mL/min/1.73_m2 >90   Calcium Latest Ref Range: 8.5 - 10.1 mg/dL 8.6   Anion Gap Latest Ref Range: 3 - 14 mmol/L 6       Primary cardiologist: Dr. 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 He established care in May 2019 with Dr. Chand after developing swelling in his lower extremities associated with weight gain in the setting of medication noncompliance. Patient noted to have a known nonischemic cardiomyopathy, and had a normal coronary angiogram in 12/2011. In 2016 patient was evaluated for nonsustained VT at outside facility, nuclear stress test showed no ischemic with an EF 45-50%.      Patient follows closely with YENI Hutchinson CNP in the [facility] clinic. He has been followed by telehealth tracker with multiple diuretic adjustments. His course has been complicated by medical noncompliance and depression. He is also often resistant to adjustments in his diuretics. He had a virtual visit last with Rozina in March at which time he reported home weights 320-325#. He was taking torsemide 40mg qam and  20mg qpm and wearing lymphedema wraps most days. She recommended a follow up in 2 months, but unfortunately he missed his next virtual visit in May.    Today, we are doing another telephone CORE visit. Last week his weight had increased to 330 lbs; he had noticed that he has had more swelling as well. Via phone we increased his torsemide to 40mg BID. Today, he reports that his weight has come back down to 321 lbs. He says the swelling is improved as well. He denies any new chest discomfort, or palpitations. He tells me he still has dizziness often when he first stands. He does not routinely check his BP at home, but is weighing regularly. Upon medication reconciliation, he is not taking the spironolactone or the losartan but says he cannot recall when he ran out.        Assessment/Plan:    1. Chronic combined systolic and diastolic heart failure, nonischemic cardiomyopathy    --Most recent echo Jan 2020 with LVEF reported at 46% with moderate diastolic dysfunction. RV was of normal size and function. Weight has been 320-325# at baseline. Recent increase up to 330#, responded well with transient increase of torsemide to 40mg BID. Renal function preserved.   --He will go back down to baseline dose of torsemide 40mg AM/20mg PM over the weekend, but I told him to to back up to 40mg BID if his weight climbs back up. Continue my health tracker daily weights. I encouraged lower sodium food choices which appears to be an issue. Continue leg wraps as is.   --He has been off his spironolactone and losartan for an unknown amount of time. Will try to reintroduce spironolactone at 12.5mg daily today. I did not do a full dose due to some positional dizziness. When he returns will check a BP and repeat BMP, and titrate as able. Will likely need to reintroduce ARB as well. He is not on beta blockers due to frustrations with multiple medications in the past as well. Will need to reinforce medication compliance going forward.     --Ischemic eval via 2016 nuclear stress test which showed no ischemia, 12/2011 normal coronary angiogram.       Follow up plan: Return to see myself in CORE in person in ~2 weeks with labs.       I have reviewed the note as documented above.  This accurately captures the substance of my conversation with the patient.      Phone call contact time  Call Started at 0902  Call Ended at 0928    Destiny Cruz PA-C  [facility]  Pager [phone]      Thank you for allowing me to participate in the care of your patient.    Sincerely,     MARTIN Crockett     [facility]

## 2020-07-10 NOTE — PROGRESS NOTES
[facility]  DORA    [email or web address] Heart Failure   For office visit review      Daily Weight Goal: 318-326 lbs    [facility] Enrollment date: 6/7/2020    Current Diuretic & Potassium Plan: Spironolactone 25 mg daily & torsemide 40 mg in morning and 20 mg in afternoon         Last Extra Diuretic: Extra 20 mg torsemide in afternoon x 3 days  on 7/1-7/3          MyHealth Tracker Weight Trends:               MyHealth Tracker Weight Graph:           Haily Gerdowsky, RN 2:10 PM 07/10/20

## 2020-07-10 NOTE — PATIENT INSTRUCTIONS
Visit Summary:    Today we discussed:   1. You can go back to torsemide 40mg AM, 20mg PM, but if weight climbs back up, please return to 40mg twice daily dose and let our clinic know. Continue to call in daily weights.  2. Please work on low sodium food choices.  3. We will restart spironolactone, at 12.5mg (HALF TABLET) once daily. Prescription was sent to your pharmacy. At our next visit will decide whether to go up to a full dose.     Follow up:   With Destiny in ~2 weeks in [facility] with labs.     Please call my nurse Haily at [phone] with any questions or concerns.

## 2020-07-14 ENCOUNTER — CARE COORDINATION (OUTPATIENT)
Dept: CARDIOLOGY | Facility: CLINIC | Age: 70
End: 2020-07-14

## 2020-07-14 ENCOUNTER — TELEPHONE (OUTPATIENT)
Dept: CARDIOLOGY | Facility: CLINIC | Age: 70
End: 2020-07-14

## 2020-07-14 DIAGNOSIS — I50.42 CHRONIC COMBINED SYSTOLIC AND DIASTOLIC CONGESTIVE HEART FAILURE (H): Primary | ICD-10-CM

## 2020-07-14 NOTE — TELEPHONE ENCOUNTER
Spoke to wife and did let her know it is ok for pt to take Excedrin short term. Warned wife to be careful with taking to much as it does contain aspirin and pt is already taking an 81 mg dose.   Wife did say she was able to find 2 boxes of extra strength Tyelonol this morning.   Wife did say she did give him 1 Aleve last night. Asked her to try to avoid NSAID's   Haily Graves RN 9:32 AM 07/14/20

## 2020-07-14 NOTE — TELEPHONE ENCOUNTER
"Wife left  last night asking if pt can use Excedrin as they are unable to find Tylenol \"due to everyone buying up all the Tylenol because of this COVID.\"   Pt has been having increased joint and back pian.   Will review with Destiny Graves RN 7:20 AM 07/14/20    "

## 2020-07-14 NOTE — PROGRESS NOTES
[facility]  C.O.R.MILLIE    MyHealth Tracker Heart Failure Alert      Daily Weight Goal: 318-326 lbs    Today's Weight: 318 lbs    Symptom Alert: none    Current Diuretic & Potassium Plan: Torsemide 40 mg in morning & 20 mg in afternoon daily & Spironolactone 12.5 mg daily      Future Appointments   Date Time Provider Department Center   7/22/2020  8:50 AM Destiny Cruz PA [facility]   8/10/2020  3:45 PM Vega Anders MD [facility]   8/11/2020 11:00 AM Noble Gonzalez [facility]   8/13/2020 10:30 AM ThatNoble siddiqi A [facility]   8/14/2020 11:00 AM Macarena Leone, OT [facility]   8/18/2020 11:00 AM Macarena Leone, OT [facility]   8/20/2020 11:00 AM Rosemarie Ivy OT [facility]   8/21/2020 11:00 AM Rosemarie Ivy, OT [facility]   8/25/2020 11:00 AM Noble Gonzalez [facility]   8/27/2020 11:00 AM Noble Gonzalez [facility]   8/28/2020 11:00 AM Noble Gonzalez [facility]   9/1/2020 11:00 AM Noble Gonzalez [facility]   9/3/2020 11:00 AM Noble Gonzalez [facility]   9/4/2020 11:00 AM Noble Gonzalez [facility]       MyHealth Tracker Weight Trends:               MyHealth Tracker Weight Graph:         Haily Graves RN 12:55 PM 07/14/20

## 2020-07-14 NOTE — TELEPHONE ENCOUNTER
Destiny Cruz, Haily Steele RN    Caller: Unspecified (Yesterday,  5:54 PM)               It's ok short term, but make sure he is aware there is caffeine and aspirin in it as well.   So I would prefer he continue to see if he can find Tylenol as a longer term solution.   Thanks   Destiny

## 2020-07-15 NOTE — PROGRESS NOTES
"[facility]  [facility]    [facility]      Daily Weight Goal: 318-326 lbs    Today's Weight: 323 lbs    Symptom Alert: none      Diuretic: Spironolactone 12.5 mg daily & Torsemide 40 mg in morning and 20 mg in afternoon       Plan: Per 7/10 note: \"You can go back to torsemide 40mg AM, 20mg PM, but if weight climbs back up, please return to 40mg twice daily dose and let our clinic know. Continue to call in daily weights.\"      Future Appointments   Date Time Provider Department Center   7/22/2020  8:50 AM Destiny Cruz PA [facility] PSA CLIN   8/10/2020  3:45 PM Vega Anders MD [facility] PSA CLIN   8/11/2020 11:00 AM Noble Gonzalez [facility]   8/13/2020 10:30 AM Noble Gonzalez [facility]   8/14/2020 11:00 AM Macarena Leone, OT [facility]   8/18/2020 11:00 AM Macarena Leone OT [facility]   8/20/2020 11:00 AM Rosemarie Ivy OT [facility]   8/21/2020 11:00 AM Rosemarie Ivy OT [facility]   8/25/2020 11:00 AM Noble Gonzalez [facility]   8/27/2020 11:00 AM Noble Gonzalez [facility]   8/28/2020 11:00 AM Noble Gonzalez [facility]   9/1/2020 11:00 AM Noble Gonzalez [facility]   9/3/2020 11:00 AM Noble Gonzalez [facility]   9/4/2020 11:00 AM Noble Gonzalez [facility]       Notes: Spoke to wife. Pt is sleeping. Wife reports pt \"pigged out last night. I am so upset with him\". Asked her to have him take an take extra 20 mg tablet of torsemide today in the afternoon.       MyHealth Tracker Weight Trends:     MyHealth Tracker CHF Flowsheet My weight today is:   7/8/2020 330   7/9/2020 324   7/10/2020 321   7/13/2020 316   7/14/2020 318   7/15/2020 323           MyHealth Tracker Weight Graph:         Haily Graves RN 7:55 AM 07/15/20    "

## 2020-07-16 DIAGNOSIS — I50.42 CHRONIC COMBINED SYSTOLIC AND DIASTOLIC CONGESTIVE HEART FAILURE (H): ICD-10-CM

## 2020-07-16 PROCEDURE — 36415 COLL VENOUS BLD VENIPUNCTURE: CPT | Performed by: PHYSICIAN ASSISTANT

## 2020-07-16 PROCEDURE — 80048 BASIC METABOLIC PNL TOTAL CA: CPT | Performed by: PHYSICIAN ASSISTANT

## 2020-07-17 ENCOUNTER — OFFICE VISIT (OUTPATIENT)
Dept: FAMILY MEDICINE | Facility: CLINIC | Age: 70
End: 2020-07-17
Payer: MEDICARE

## 2020-07-17 ENCOUNTER — TELEPHONE (OUTPATIENT)
Dept: FAMILY MEDICINE | Facility: CLINIC | Age: 70
End: 2020-07-17

## 2020-07-17 VITALS
HEART RATE: 81 BPM | RESPIRATION RATE: 16 BRPM | BODY MASS INDEX: 49.42 KG/M2 | WEIGHT: 315 LBS | OXYGEN SATURATION: 96 % | SYSTOLIC BLOOD PRESSURE: 134 MMHG | TEMPERATURE: 98 F | DIASTOLIC BLOOD PRESSURE: 76 MMHG

## 2020-07-17 DIAGNOSIS — M85.80 AGE-RELATED BONE LOSS: ICD-10-CM

## 2020-07-17 DIAGNOSIS — E11.9 TYPE 2 DIABETES MELLITUS WITHOUT COMPLICATION, WITHOUT LONG-TERM CURRENT USE OF INSULIN (H): Chronic | ICD-10-CM

## 2020-07-17 DIAGNOSIS — M10.9 GOUT, UNSPECIFIED CAUSE, UNSPECIFIED CHRONICITY, UNSPECIFIED SITE: ICD-10-CM

## 2020-07-17 DIAGNOSIS — L03.116 CELLULITIS OF LEFT LOWER EXTREMITY: ICD-10-CM

## 2020-07-17 DIAGNOSIS — Z12.5 ENCOUNTER FOR SCREENING FOR MALIGNANT NEOPLASM OF PROSTATE: ICD-10-CM

## 2020-07-17 LAB
ANION GAP SERPL CALCULATED.3IONS-SCNC: 5 MMOL/L (ref 3–14)
BASOPHILS # BLD AUTO: 0.1 10E9/L (ref 0–0.2)
BASOPHILS NFR BLD AUTO: 2 %
BUN SERPL-MCNC: 23 MG/DL (ref 7–30)
CALCIUM SERPL-MCNC: 8.3 MG/DL (ref 8.5–10.1)
CHLORIDE SERPL-SCNC: 106 MMOL/L (ref 94–109)
CO2 SERPL-SCNC: 26 MMOL/L (ref 20–32)
CREAT SERPL-MCNC: 0.86 MG/DL (ref 0.66–1.25)
DIFFERENTIAL METHOD BLD: ABNORMAL
EOSINOPHIL # BLD AUTO: 0.4 10E9/L (ref 0–0.7)
EOSINOPHIL NFR BLD AUTO: 13.3 %
ERYTHROCYTE [DISTWIDTH] IN BLOOD BY AUTOMATED COUNT: 14.3 % (ref 10–15)
GFR SERPL CREATININE-BSD FRML MDRD: 87 ML/MIN/{1.73_M2}
GLUCOSE SERPL-MCNC: 121 MG/DL (ref 70–99)
HBA1C MFR BLD: 5.6 % (ref 0–5.6)
HCT VFR BLD AUTO: 38 % (ref 40–53)
HGB BLD-MCNC: 12.3 G/DL (ref 13.3–17.7)
LYMPHOCYTES # BLD AUTO: 1.2 10E9/L (ref 0.8–5.3)
LYMPHOCYTES NFR BLD AUTO: 40.5 %
MCH RBC QN AUTO: 28.3 PG (ref 26.5–33)
MCHC RBC AUTO-ENTMCNC: 32.4 G/DL (ref 31.5–36.5)
MCV RBC AUTO: 87 FL (ref 78–100)
MONOCYTES # BLD AUTO: 0.6 10E9/L (ref 0–1.3)
MONOCYTES NFR BLD AUTO: 18.6 %
NEUTROPHILS # BLD AUTO: 0.8 10E9/L (ref 1.6–8.3)
NEUTROPHILS NFR BLD AUTO: 25.6 %
PLATELET # BLD AUTO: 246 10E9/L (ref 150–450)
POTASSIUM SERPL-SCNC: 3.8 MMOL/L (ref 3.4–5.3)
RBC # BLD AUTO: 4.35 10E12/L (ref 4.4–5.9)
SODIUM SERPL-SCNC: 137 MMOL/L (ref 133–144)
WBC # BLD AUTO: 3 10E9/L (ref 4–11)

## 2020-07-17 PROCEDURE — 99214 OFFICE O/P EST MOD 30 MIN: CPT | Performed by: INTERNAL MEDICINE

## 2020-07-17 PROCEDURE — G0103 PSA SCREENING: HCPCS | Performed by: INTERNAL MEDICINE

## 2020-07-17 PROCEDURE — 84460 ALANINE AMINO (ALT) (SGPT): CPT | Performed by: INTERNAL MEDICINE

## 2020-07-17 PROCEDURE — 83036 HEMOGLOBIN GLYCOSYLATED A1C: CPT | Performed by: INTERNAL MEDICINE

## 2020-07-17 PROCEDURE — 85025 COMPLETE CBC W/AUTO DIFF WBC: CPT | Performed by: INTERNAL MEDICINE

## 2020-07-17 PROCEDURE — 82306 VITAMIN D 25 HYDROXY: CPT | Performed by: INTERNAL MEDICINE

## 2020-07-17 PROCEDURE — 84550 ASSAY OF BLOOD/URIC ACID: CPT | Performed by: INTERNAL MEDICINE

## 2020-07-17 PROCEDURE — 36415 COLL VENOUS BLD VENIPUNCTURE: CPT | Performed by: INTERNAL MEDICINE

## 2020-07-17 RX ORDER — POTASSIUM CHLORIDE 1500 MG/1
TABLET, EXTENDED RELEASE ORAL
COMMUNITY
Start: 2019-10-09 | End: 2020-07-22

## 2020-07-17 RX ORDER — COLCHICINE 0.6 MG/1
TABLET ORAL
Qty: 10 TABLET | Refills: 0 | Status: SHIPPED | OUTPATIENT
Start: 2020-07-17 | End: 2021-01-04

## 2020-07-17 NOTE — TELEPHONE ENCOUNTER
Called the patient to update on his labs and will send the medication for gout at this time.    Patient and wife understood the plan, answered all the questions.  Kinza Sanches MD on 7/17/2020 at 5:25 PM

## 2020-07-17 NOTE — PATIENT INSTRUCTIONS
Your condition is overlap of gout flare up and also possible cellulitis flaring up.    Please do the lab work before we send the medications to your pharmacy.     ======================    Patient Education     Eating to Prevent Gout  Gout is a painful form of arthritis caused by an excess of uric acid. This is a waste product made by the body. It builds up in the body and forms crystals that collect in the joints, causing a gout attack. Alcohol and certain foods can trigger a gout attack. Below are some guidelines for changing your diet to help you manage gout. Your healthcare provider can work with you to determine the best eating plan for you. Know that diet is only one part of managing gout. Take your medicines as prescribed and follow the other guidelines your healthcare provider has given you.  Foods to limit  Eating too many foods containing purines may increase the levels of uric acid in your body and increase your risk for a gout attack. It may be best to limit these high-purine foods:    Alcohol (beer and red wine). You may be told to avoid alcohol completely.    Certain fish (anchovies, sardines, fish roes, herring, tuna, mussels, codfish, scallops, trout, and sugey)    Certain meats (red meat, processed meat, way, turkey, wild game, and goose)    Sauces and gravies made with meat    Organ meats (such as liver, kidneys, sweetbreads, and tripe)    Legumes (such as dried beans and peas)    Mushrooms, spinach, asparagus, and cauliflower    Yeast and yeast extract supplements  Foods to try  Some foods may be helpful for people with gout. You may want to try adding some of the following foods to your diet:    Dark berries. These include blueberries, blackberries, and cherries. These berries contain chemicals that may lower uric acid.    Tofu. Tofu, which is made from soy, is a good source of protein. Studies have shown that it may be a better choice than meat for people with gout.    Omega fatty acids.  These acids are found in fatty fish (such as salmon), certain oils (such as flax, olive, or nut oils), or nuts. They may help prevent inflammation due to gout.  The following guidelines are recommended by the American Medical Association for people with gout. Your diet should be:    High in fiber, whole grains, fruits, and vegetables.    Low in protein (15% of calories should come from protein. Choose lean sources, such as soy, lean meats, and poultry).    Low in fat (no more than 30% of calories should come from fat, with only 10% coming from animal, or saturated, fat).  Date Last Reviewed: 11/1/2017 2000-2019 [employer]. [address] [place] [address], [place] [address]. All rights reserved. This information is not intended as a substitute for professional medical care. Always follow your healthcare professional's instructions.
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
## 2020-07-17 NOTE — LETTER
July 20, 2020      Jamar Ivory  [id number]   [facility] [id number]        Dear ,    We are writing to inform you of your test results.          Your Uric acid levels are high and we took the right decision by starting the right medication for you on your Gout diagnosis and not cellulitis at this time.     Complete the Colchicine medication as explained on instructions.   Let me know if you have any questions.          Resulted Orders   CBC with platelets differential   Result Value Ref Range    WBC 3.0 (L) 4.0 - 11.0 10e9/L    RBC Count 4.35 (L) 4.4 - 5.9 10e12/L    Hemoglobin 12.3 (L) 13.3 - 17.7 g/dL    Hematocrit 38.0 (L) 40.0 - 53.0 %    MCV 87 78 - 100 fl    MCH 28.3 26.5 - 33.0 pg    MCHC 32.4 31.5 - 36.5 g/dL    RDW 14.3 10.0 - 15.0 %    Platelet Count 246 150 - 450 10e9/L    Diff Method Automated Method     % Neutrophils 25.6 %    % Lymphocytes 40.5 %    % Monocytes 18.6 %    % Eosinophils 13.3 %    % Basophils 2.0 %    Absolute Neutrophil 0.8 (L) 1.6 - 8.3 10e9/L    Absolute Lymphocytes 1.2 0.8 - 5.3 10e9/L    Absolute Monocytes 0.6 0.0 - 1.3 10e9/L    Absolute Eosinophils 0.4 0.0 - 0.7 10e9/L    Absolute Basophils 0.1 0.0 - 0.2 10e9/L   Uric acid   Result Value Ref Range    Uric Acid 11.1 (H) 3.5 - 7.2 mg/dL   PSA, screen   Result Value Ref Range    PSA 1.35 0 - 4 ug/L      Comment:      Assay Method:  Chemiluminescence using Siemens Vista analyzer   ALT   Result Value Ref Range    ALT 29 0 - 70 U/L   Hemoglobin A1c   Result Value Ref Range    Hemoglobin A1C 5.6 0 - 5.6 %      Comment:      Normal <5.7% Prediabetes 5.7-6.4%  Diabetes 6.5% or higher - adopted from ADA   consensus guidelines.         If you have any questions or concerns, please call the clinic at the number listed above.       Sincerely,        Kinza Sanches MD

## 2020-07-18 LAB
ALT SERPL W P-5'-P-CCNC: 29 U/L (ref 0–70)
PSA SERPL-ACNC: 1.35 UG/L (ref 0–4)
URATE SERPL-MCNC: 11.1 MG/DL (ref 3.5–7.2)

## 2020-07-20 ENCOUNTER — CARE COORDINATION (OUTPATIENT)
Dept: CARDIOLOGY | Facility: CLINIC | Age: 70
End: 2020-07-20

## 2020-07-20 LAB — DEPRECATED CALCIDIOL+CALCIFEROL SERPL-MC: 25 UG/L (ref 20–75)

## 2020-07-20 NOTE — PROGRESS NOTES
[facility]  C.O.R[facility]    MyHealth Tracker Heart Failure Alert      Daily Weight Goal: 318-326 lbs    Today's Weight: 321 lbs    Symptom Alert: Said yes to      3) Did shortness of breath wake you up last night?   4) Did you prop up on more pillows or sleep in a chair to breathe easier last night?     Current Diuretic & Potassium Plan: Spironolactone 12.5 mg daily & Torsemide 40 mg in morning and 20 mg in afternoon    Last Extra Diuretic: Torsemide 20 mg in afternoon on 7/1-7/3      Future Appointments   Date Time Provider Department Center   7/22/2020  8:50 AM Destiny Cruz PA [facility]   8/10/2020  3:45 PM Vega Anders MD [facility]   8/11/2020 11:00 AM Noble Gonzalez [facility]   8/13/2020 10:30 AM ThatNoble siddiqi A [facility]   8/14/2020 11:00 AM Macarena Leone, OT [facility]   8/18/2020 11:00 AM Macarena Leone, OT [facility]   8/20/2020 11:00 AM Rosemarie Ivy, OT [facility]   8/21/2020 11:00 AM Rosemarie Ivy, OT [facility]   8/25/2020 11:00 AM Noble Gonzalez [facility]   8/27/2020 11:00 AM Noble Gonzalez [facility]   8/28/2020 11:00 AM Noble Gonzalez [facility]   9/1/2020 11:00 AM Noble Gonzalez [facility]   9/3/2020 11:00 AM Noble Gonzalez [facility]   9/4/2020 11:00 AM Noble Gonzalez [facility]       Notes: No answer when call to pt        MyHealth Tracker Weight Trends:             MyHealth Tracker Weight Graph:           Haily Graves, JOY 8:20 AM 07/20/20

## 2020-07-21 ENCOUNTER — DOCUMENTATION ONLY (OUTPATIENT)
Dept: CARDIOLOGY | Facility: CLINIC | Age: 70
End: 2020-07-21

## 2020-07-21 ENCOUNTER — TELEPHONE (OUTPATIENT)
Dept: CARDIOLOGY | Facility: CLINIC | Age: 70
End: 2020-07-21

## 2020-07-21 NOTE — TELEPHONE ENCOUNTER
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
## 2020-07-21 NOTE — PROGRESS NOTES
Wt uncharged at 321 lbs, no symptoms reported.   No answer when call to pt.   Haily Graves RN 12:05 PM 07/21/20

## 2020-07-21 NOTE — PROGRESS NOTES
Filled out application for [employer] for pt and faxed to [employer] at [phone]. Pt and wife aware.   Haily Graves RN 2:04 PM 07/21/20

## 2020-07-22 ENCOUNTER — OFFICE VISIT (OUTPATIENT)
Dept: CARDIOLOGY | Facility: CLINIC | Age: 70
End: 2020-07-22
Payer: MEDICARE

## 2020-07-22 ENCOUNTER — DOCUMENTATION ONLY (OUTPATIENT)
Dept: CARDIOLOGY | Facility: CLINIC | Age: 70
End: 2020-07-22

## 2020-07-22 VITALS
OXYGEN SATURATION: 93 % | HEART RATE: 80 BPM | DIASTOLIC BLOOD PRESSURE: 66 MMHG | WEIGHT: 315 LBS | BODY MASS INDEX: 49.02 KG/M2 | SYSTOLIC BLOOD PRESSURE: 122 MMHG

## 2020-07-22 DIAGNOSIS — I50.42 CHRONIC COMBINED SYSTOLIC AND DIASTOLIC CONGESTIVE HEART FAILURE (H): ICD-10-CM

## 2020-07-22 PROCEDURE — 99214 OFFICE O/P EST MOD 30 MIN: CPT | Performed by: PHYSICIAN ASSISTANT

## 2020-07-22 RX ORDER — SPIRONOLACTONE 25 MG/1
25 TABLET ORAL DAILY
Qty: 90 TABLET | Refills: 3
Start: 2020-07-22 | End: 2020-08-24

## 2020-07-22 NOTE — PATIENT INSTRUCTIONS
Visit Summary:    Today we discussed:   INCREASE your spironolactone to a full pill (25mg once daily).    Keep weighing daily, and follow a low salt diet.  Keep your appointment with the lymphedema clinic.     Follow up:   With Dr. Anders in August as scheduled, with labs prior.     Please call my nurse Haily with any concerns at [phone].

## 2020-07-22 NOTE — PROGRESS NOTES
[facility]  DORA    [email or web address] Tracker Heart Failure   For office visit review      Daily Weight Goal: 318-326 lbs    [facility] Enrollment date: 6/7/2020    Current Diuretic: Spironolactone 12.5 mg daily & Torsemide 40 mg in morning and 20 mg in afternoon.     Potassium Plan: 20 mEq daily       Diuretic Plan: Extra 20 mg torsemide in afternoon for wt gain     Last Extra Diuretic: Torsemide 20 mg in afternoon on 7/1-7/3 & 7/17      [employer] Tracker Weight Trends:             [employer] Tracker Weight Graph:           Haily Graves RN 7:54 AM 07/22/20
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
## 2020-07-30 ENCOUNTER — CARE COORDINATION (OUTPATIENT)
Dept: CARDIOLOGY | Facility: CLINIC | Age: 70
End: 2020-07-30

## 2020-07-30 DIAGNOSIS — I50.42 CHRONIC COMBINED SYSTOLIC AND DIASTOLIC CONGESTIVE HEART FAILURE (H): ICD-10-CM

## 2020-07-30 RX ORDER — TORSEMIDE 20 MG/1
TABLET ORAL
Qty: 180 TABLET | Refills: 1 | Status: SHIPPED | OUTPATIENT
Start: 2020-07-30 | End: 2020-10-02

## 2020-07-30 NOTE — PROGRESS NOTES
" called asking for call back. Called back and left      Wife then called and left .   Called back and spoke to pt and wife. Pt's weight is up 4 lbs from yesterday. They both had canned soup yesterday. Wife reporting \"pt eating everything is sight. He's eating crackers, mac & cheese, cheese, 4-5 slices of bread, lunch meat. You name it he's eating it.\"   Wife is struggling with finding low sodium foods. Did recommend she make an appt with dietician at [employer] as they can help her choose low sodium foods. Wife states she does not shop at [employer]. Recommended she take advantage of their free service and use that knowledge to shop at her preferred store.   Declines having low soidum options mailed to her. \"you mailed me stuff already and he won't eat it\".   They have not heard from [facility] yet and both could benefit from low sodium meals.     Did instruct pt to take an extra 20 mg of torsemide this afternoon per plan on 7/22. Pt expressed understanding.   Haily Graves RN 3:38 PM 07/30/20    "

## 2020-07-30 NOTE — PROGRESS NOTES
[facility]  [facility]    MyHealth Tracker Heart Failure Alert      Daily Weight Goal: 318-326 lbs    Today's Weight: 327 lbs      Symptom Alert: said yes       4) Did you prop up on more pillows or sleep in a chair to breathe easier last night?    Current Diuretic & Potassium Plan: Torsemide 40 mg/20 mg & Spironolactone 25 mg daily (increased from 12.5 daily to 25 mg daily on 7/22)     Last Extra Diuretic:       Future Appointments   Date Time Provider Department Center   8/10/2020  2:00 PM RU LAB RULAB [facility]   8/10/2020  3:45 PM Vega Anders MD [facility]   8/11/2020 11:00 AM Noble Gonzalez [facility]   8/13/2020 10:30 AM ThatNoble siddiqi [facility]   8/14/2020 11:00 AM Macarena Leone, OT [facility]   8/18/2020 11:00 AM Macarena Leone, OT [facility]   8/20/2020 11:00 AM Rosemarie Ivy, OT [facility]   8/21/2020 11:00 AM Rosemarie Ivy, OT [facility]   8/25/2020 11:00 AM Noble Gonzalez [facility]   8/27/2020 11:00 AM ThateSerenityew [facility]   8/28/2020 11:00 AM Noble Gonzalez [facility]   9/1/2020 11:00 AM ThateSerenityew [facility]   9/3/2020 11:00 AM ThateSerenityew [facility]   9/4/2020 11:00 AM ThatNoble siddiqi [facility]       Notes: Left VM for call back.        MyHealth Tracker Weight Trends:           MyHealth Tracker Weight Graph:           Haily Graves RN 12:18 PM 07/30/20

## 2020-07-31 NOTE — PROGRESS NOTES
[facility]        Left a detailed message asking for a call back the [facility] Nurse LIne with a weight and symptom update. Did not calling into MyHealth Tracker by noon today.    Blanca Bright RN    [facility]  07/31/20 1:59 PM

## 2020-08-03 ENCOUNTER — OFFICE VISIT (OUTPATIENT)
Dept: FAMILY MEDICINE | Facility: CLINIC | Age: 70
End: 2020-08-03
Payer: MEDICARE

## 2020-08-03 VITALS
DIASTOLIC BLOOD PRESSURE: 82 MMHG | RESPIRATION RATE: 18 BRPM | OXYGEN SATURATION: 94 % | BODY MASS INDEX: 49.42 KG/M2 | WEIGHT: 315 LBS | SYSTOLIC BLOOD PRESSURE: 124 MMHG | TEMPERATURE: 99 F | HEART RATE: 83 BPM

## 2020-08-03 DIAGNOSIS — I50.22 CHRONIC SYSTOLIC HEART FAILURE (H): ICD-10-CM

## 2020-08-03 DIAGNOSIS — E11.42 DIABETIC POLYNEUROPATHY ASSOCIATED WITH TYPE 2 DIABETES MELLITUS (H): ICD-10-CM

## 2020-08-03 DIAGNOSIS — R60.0 BILATERAL LEG EDEMA: ICD-10-CM

## 2020-08-03 DIAGNOSIS — R53.81 PHYSICAL DECONDITIONING: ICD-10-CM

## 2020-08-03 DIAGNOSIS — Z29.9 PREVENTIVE MEASURE: ICD-10-CM

## 2020-08-03 DIAGNOSIS — L03.032 PARONYCHIA OF TOE, LEFT: ICD-10-CM

## 2020-08-03 DIAGNOSIS — I50.43 ACUTE ON CHRONIC COMBINED SYSTOLIC AND DIASTOLIC CONGESTIVE HEART FAILURE (H): ICD-10-CM

## 2020-08-03 DIAGNOSIS — I87.2 CHRONIC STASIS DERMATITIS: ICD-10-CM

## 2020-08-03 DIAGNOSIS — L03.116 CELLULITIS OF LEFT LOWER EXTREMITY: Primary | ICD-10-CM

## 2020-08-03 PROCEDURE — 99214 OFFICE O/P EST MOD 30 MIN: CPT | Performed by: PHYSICIAN ASSISTANT

## 2020-08-03 RX ORDER — DOXYCYCLINE 100 MG/1
100 CAPSULE ORAL 2 TIMES DAILY
Qty: 20 CAPSULE | Refills: 0 | Status: SHIPPED | OUTPATIENT
Start: 2020-08-03 | End: 2020-08-13

## 2020-08-03 NOTE — PROGRESS NOTES
"[facility].R[facility]        Follow-Up on taking extra Torsemide 20 mg on 7/30:    Reports he had a \"Terrible weekend.\" He did not get very much sleep due to left great toe pain and left knee pain. Does not have anymore SOB or BLE and Abdominal edema than what he normally does. Wife states \"He eats whatever he wants and he doesn't listen to me.\"    Left Great Toe Pain:  He is scheduled to see Myah Gunn PA-C this morning at 1150 am for his toe pain.     Left Knee Pain: Recommended he call Dr. Brody Hawk's office (Orthopedics) regarding his left knee pain. Encouraged Kenneth to reconsider having a Cortisone Injection to his left knee. Explained to them it will help with the pain to make it manageable, which will lead to him sleeping better and being able to move around more to aid him with weight loss so he can have a knee replacement. Gave them two different numbers to try to contact Dr. Hawk's office regarding pain management.     Low Sodium Diet: Spoke to Kenneth and reminded him of the importance of maintaining a low sodium diet. Also, reminded him Dr. Brody Hawk needs his health to be optimized before he can do a total knee replacement.     Excerpt from Dr. Brody Hawk's note on 6/18/20            Blanca Bright RN    [facility]  08/03/20 9:39 AM       "

## 2020-08-03 NOTE — PATIENT INSTRUCTIONS
Patient Education     Ingrown Toenail, Infected (Antibiotics, No Excision)  An ingrown toenail occurs when the nail grows sideways into the skin alongside the nail. This can cause pain. It can also lead to an infection with redness, swelling, and sometimes drainage.  The most common cause of an ingrown toenail is trimming your nails wrong. Most people trim the nails too close to the skin and try to round the nail too tightly around the shape of the toe. When you do this, the nail can grow into the skin of your toe. It is safer to trim the nail ending in a straight line rather than a curve.  Other causes include injury or wearing shoes that are too short or tight. This can cause the same problem that happens when trimming your nails. Your genetics can also make this more likely to happen.  The following are the most common symptoms of an ingrown toenail:     Pain    Redness    Swelling    Drainage  If the infection is mild, you may be able to take care of it at home with the following measures:    Frequent warm water soaks    Keeping it clean    Wearing loose, comfortable shoes or sandals  Another method involves using a small piece of cotton or waxed dental floss to gently lift up the corner of the problem nail. Change the cotton or floss frequently, especially if it gets dirty.  If your infection is mild, and the above methods aren t working, or if the infection gets worse, see your healthcare provider. Signs of worsening infection include:    Swelling    Redness    Pus drainage  In some cases, you may need antibiotics along with warm soaks. If after 2 to 3 days of antibiotics the toenail doesn't get better or gets worse, part of the nail may need to be removed to drain the infection. With treatment, it can take 1 to 2 weeks to clear up completely.  Home care  Wound care  For the next 3 days, soak and clean your toe in warm water a few times a day.    Twice a day for the first 3 days, clean and soak the toe as  follows:  1. Soak your foot in a tub of warm water for 5 minutes. Or, hold your toe under a faucet of warm running water for 5 minute  2. Clean any remaining crust away with soap and water using a cotton swab.  3. Put a small amount of antibiotic ointment on the infected area.    Change the dressing or bandage every time you soak or clean it, or whenever it becomes wet or dirty.    If you were prescribed antibiotics, take them as directed until they are all gone.    Wear comfortable shoes with a lot of toe room, or open-toe sandals, while your toe is healing.  Medicines    You can take over-the-counter medicine for pain, unless you were given a different pain medicine to use. Note: Talk with your provider before using these medicines if you have chronic liver or kidney disease, ever had a stomach ulcer or GI (gastrointestinal) bleeding, or are taking blood thinner medicines.    If you were given antibiotics, take them until they are used up or your provider tells you to stop, even if the wound looks better. This ensures that the infection clears up.  Prevention  To prevent ingrown toenails:    Wear shoes that fit well. Avoid shoes that pinch the toes together.    When you trim your toenails, do not cut them too short. Cut straight across at the top and don t round the edges.    Don t use a sharp object to clean under your nail since this might cause an infection.    If the toenail starts to grow into the skin again, put a small piece of waxed dental floss or cotton under that side of the nail to help it grow out straight.  Follow-up care  Follow up with your healthcare provider, or as advised. If the antibiotic doesn't work, or if the condition recurs, you may need a minor procedure to have part of the nail removed.  When to seek medical advice  Call your healthcare provider right away if any of the following occur:    Increasing redness, pain, or swelling of the toe    Red streaks in the skin leading away from the  wound    Pus or fluid drainage    Fever of 100.4 F (38 C) or higher, or as directed by your provider  Date Last Reviewed: 12/1/2016 2000-2019 [employer]. [address] [place] [address], [place] [address]. All rights reserved. This information is not intended as a substitute for professional medical care. Always follow your healthcare professional's instructions.

## 2020-08-03 NOTE — Clinical Note
1. call patient to schedule wound recheck on Friday 8/7  2. call patient with information on where to get a face shield (ask [place])

## 2020-08-03 NOTE — LETTER
[facility]  [address]  [place] [address]  Phone: [phone]  Fax: [phone]    August 3, 2020        Jamar Ivory  [address]   [place] [address]          To whom it may concern:    RE: Jamar Ivory    Kenneth is not able to wear a face mask due to underlying health conditions which cause difficulty breathing.  He has been instructed to obtain a clear face shield to wear instead.    Please contact me for questions or concerns.      Sincerely,        Myah Florez PA-C
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
## 2020-08-06 ENCOUNTER — CARE COORDINATION (OUTPATIENT)
Dept: CARDIOLOGY | Facility: CLINIC | Age: 70
End: 2020-08-06

## 2020-08-06 ASSESSMENT — ENCOUNTER SYMPTOMS
CARDIOVASCULAR NEGATIVE: 1
EYES NEGATIVE: 1
CONSTITUTIONAL NEGATIVE: 1
PSYCHIATRIC NEGATIVE: 1
ROS SKIN COMMENTS: AS IN HPI
GASTROINTESTINAL NEGATIVE: 1
NEUROLOGICAL NEGATIVE: 1
RESPIRATORY NEGATIVE: 1

## 2020-08-06 NOTE — PROGRESS NOTES
[facility]  C.O.RNESSA    [facility]    Notes: Kenneth reports his SOB was respiratory related. He was able to lay flat after setting up for a few minutes. Reports no other symptoms or issues. As of today he has not called his Orthopedic physician to schedule a steroid injection in his knee. Reminded him it will help him with pain to have it done.     Daily Weight Goal: 318-326 lbs    Today's Weight: 322 lbs weight is trending downwards    Symptom Alert: YES      4) Did you prop up on more pillows or sleep in a chair to breathe easier last night?     Current Diuretic & Potassium Plan: Torsemide 40 mg in the morning, 20 mg in the afternoon, additional 20 mg in the afternoon when advised by the [facility]. Spironolactone 25 mg daily.    Last Extra Diuretic/Changes: Torsemide 20 mg on 7/30/20    Future Appointments   Date Time Provider Department Center   8/7/2020 10:10 AM Myah Florez PA-C [facility]   8/10/2020  2:00 PM RU LAB LAB [facility]   8/10/2020  3:45 PM Vega Anders MD [facility]   8/11/2020 11:00 AM Noble Gonzalez [facility]   8/13/2020 10:30 AM Noble Gonzalez [facility]   8/14/2020 11:00 AM Macarena Leone, OT [facility]   8/18/2020 11:00 AM Macarena Leone OT [facility]   8/20/2020 11:00 AM Rosemarie Ivy OT [facility]   8/21/2020 11:00 AM Rosemarie Ivy OT [facility]   8/25/2020 11:00 AM Noble Gonzalez [facility]   8/27/2020 11:00 AM Noble Gonzalez [facility]   8/28/2020 11:00 AM Noble Gonzalez [facility]   9/1/2020 11:00 AM Noble Gonzalez [facility]   9/3/2020 11:00 AM Noble Gonzalez [facility]   9/4/2020 11:00 AM [facility]         MyHealth Tracker Weight Trends:       MyHealth Tracker Weight Graph:       Blanca Bright RN    [facility]  08/06/20 10:39 AM

## 2020-08-07 ENCOUNTER — OFFICE VISIT (OUTPATIENT)
Dept: FAMILY MEDICINE | Facility: CLINIC | Age: 70
End: 2020-08-07
Payer: MEDICARE

## 2020-08-07 ENCOUNTER — CARE COORDINATION (OUTPATIENT)
Dept: CARDIOLOGY | Facility: CLINIC | Age: 70
End: 2020-08-07

## 2020-08-07 VITALS
OXYGEN SATURATION: 95 % | HEART RATE: 83 BPM | WEIGHT: 315 LBS | BODY MASS INDEX: 49.26 KG/M2 | TEMPERATURE: 97.8 F | RESPIRATION RATE: 16 BRPM | DIASTOLIC BLOOD PRESSURE: 72 MMHG | SYSTOLIC BLOOD PRESSURE: 124 MMHG

## 2020-08-07 DIAGNOSIS — R60.0 BILATERAL LEG EDEMA: ICD-10-CM

## 2020-08-07 DIAGNOSIS — I87.2 CHRONIC STASIS DERMATITIS: ICD-10-CM

## 2020-08-07 DIAGNOSIS — L03.116 CELLULITIS OF LEFT LOWER EXTREMITY: ICD-10-CM

## 2020-08-07 DIAGNOSIS — L03.032 PARONYCHIA OF TOE, LEFT: Primary | ICD-10-CM

## 2020-08-07 PROCEDURE — 99214 OFFICE O/P EST MOD 30 MIN: CPT | Performed by: PHYSICIAN ASSISTANT

## 2020-08-07 RX ORDER — METRONIDAZOLE 500 MG/1
500 TABLET ORAL 3 TIMES DAILY
Qty: 21 TABLET | Refills: 0 | Status: SHIPPED | OUTPATIENT
Start: 2020-08-07 | End: 2020-08-14

## 2020-08-07 ASSESSMENT — ENCOUNTER SYMPTOMS
EYES NEGATIVE: 1
CARDIOVASCULAR NEGATIVE: 1
RESPIRATORY NEGATIVE: 1
GASTROINTESTINAL NEGATIVE: 1
ROS SKIN COMMENTS: AS IN HPI
NEUROLOGICAL NEGATIVE: 1
CONSTITUTIONAL NEGATIVE: 1
PSYCHIATRIC NEGATIVE: 1

## 2020-08-07 NOTE — PROGRESS NOTES
[facility]  C.O.R.E.    [facility]    Notes: Denies SOB. Reports he has he same BLE and Abdominal Bloating he always does. When asked about his diet he doesn't recall what he ate. His wife stated she thinks he is eating things he shouldn't when she isn't around. Wife states it is difficult to get him up to walk around the apartment. Last BMP was 7/16 - Na, K+, Crt stable.Ca 8.3. Will forward to Destiny Cruz PA-C.    Daily Weight Goal: 318-326 lbs    Today's Weight: 324 lbs    Weight Alert: 2 lb weight gain overnight     Symptom Alert: NONE    Current Diuretic: Torsemide 40 mg in the morning, 20 mg in the afternoon, extra 20 mg in the afternoon as advised by the [facility]. Spironolactone 25 mg daily.    Last Extra Diuretic/Changes: Torsemide 20 mg on 7/1/20 and 7/3/20    Future Appointments   Date Time Provider Department Center   8/7/2020 10:10 AM Myah Florez PA-C [facility]   8/10/2020  2:00 PM RU LAB [facility]   8/10/2020  3:45 PM Vega Anders MD [facility]   8/11/2020 11:00 AM Noble Gonzalez [facility]   8/13/2020 10:30 AM Noble Gonzalez [facility]   8/14/2020 11:00 AM Macarena Leone OT [facility]   8/18/2020 11:00 AM Macarena Leone OT [facility]   8/20/2020 11:00 AM Rosemarie Ivy OT [facility]   8/21/2020 11:00 AM Rosemarie Ivy OT [facility]   8/25/2020 11:00 AM Noble Gonzalez [facility]   8/27/2020 11:00 AM Noble Gonzalez [facility]   8/28/2020 11:00 AM Noble Gonzalez [facility]   9/1/2020 11:00 AM Noble Gonzalez [facility]   9/3/2020 11:00 AM Noble Gonzalez [employer] [facility]   9/4/2020 11:00 AM Noble Gonzalez [employer] [facility]         MyHealth Tracker Weight Trends:       MyHealth Tracker Weight Graph:         Blanca Bright RN    [facility]  08/07/20 8:01 AM

## 2020-08-07 NOTE — PROGRESS NOTES
Subjective     Jamar Ivory is a 70 year old male who presents to clinic today for the following health issues:    HPI       Cellulitis f/u      Duration: 2-3 weeks    Description (location/character/radiation): pt is here today for f/u on cellulitis infection on his great left toe. 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  Surgery is not an option due to weight  Told he can consider injections           Current Outpatient Medications   Medication Sig Dispense Refill     acetaminophen (TYLENOL) 500 MG tablet Take 1,000 mg by mouth every 6 hours as needed (Headache)        aspirin (ASA) 81 MG tablet Take 1 tablet (81 mg) by mouth daily 90 tablet 3     BETA BLOCKER NOT PRESCRIBED (INTENTIONAL) Beta Blocker not prescribed intentionally due to Evidence of fluid overload or volume depletion and Refusal by patient       colchicine (COLCYRS) 0.6 MG tablet Take 2 tabs x 1, then 1 tab one hour later x 1 ( do not exceed more than 3 tabs / day ) wait for 3 days and repeat the regimen. 10 tablet 0     doxycycline hyclate (VIBRAMYCIN) 100 MG capsule Take 1 capsule (100 mg) by mouth 2 times daily for 10 days 20 capsule 0     Glucosamine-Chondroitin (OSTEO BI-FLEX REGULAR STRENGTH) 250-200 MG TABS Take 1 tablet by mouth 2 times daily       indomethacin (INDOCIN) 50 MG capsule Take 1 capsule (50 mg) by mouth 2 times daily (with meals) 14 capsule 0     levothyroxine (SYNTHROID/LEVOTHROID) 200 MCG tablet TAKE 1 TABLET BY MOUTH EVERY DAY 90 tablet 0     metroNIDAZOLE (FLAGYL) 500 MG tablet Take 1 tablet (500 mg) by mouth 3 times daily for 7 days 21 tablet 0     order for DME 1: Gradient Compression Wraps; 2:  Cast boots; 3: BLE knee high 20-30 or 30-40 mm Hg compression stockings; 4: BLE velcro compression garments; 30-40 mm Hg; full leg; 5: Compression olamide 1 each 0     order for DME 1: Gradient Compression Wraps; 2: Cast Boots; 3: BLE knee high 20-30 mm Hg compression stockings; 4: BLE velcro compression garments; knee high 1 each 0     spironolactone (ALDACTONE) 25 MG tablet Take 1 tablet (25 mg) by mouth daily 90 tablet 3     STATIN NOT PRESCRIBED (INTENTIONAL) Please choose reason not prescribed, below       tamsulosin (FLOMAX) 0.4 MG capsule TAKE 2 CAPSULES (0.8 MG) BY MOUTH DAILY 180 capsule 3     torsemide (DEMADEX) 20 MG tablet Take 2 tablets (40mg) in morning and 1 tablet (20mg) in afternoon, additional 20mg in the afternoon when advised by [facility]. 180 tablet 1     Allergies   Allergen Reactions     Clopidogrel      Cough/emesis     Penicillins Rash     Recent Labs   Lab Test 07/17/20  1058 07/16/20  0903 07/09/20  1343  06/30/20  1353  11/30/19  1123  06/07/19  0800 05/08/19  1616  04/11/18  1115  09/26/17  1540  07/18/16  0957   A1C 5.6  --   --   --   --   --  5.5  --   --  5.5   < >  --    < >  --    < >  --    LDL  --   --   --   --   --   --   --   --   --  94  --   --   --  85  --  96   HDL  --   --   --   --   --   --   --   --   --  38*  --   --   --  39*  --  45   TRIG  --   --   --   --   --   --   --   --   --  214*  --   --   --  193*  --  103   ALT 29  --   --   --   --   --   --   --   --  16  --  20  --   --   --   --    CR  --  0.86 0.97   < >  --    < > 1.07   < > 0.83  --    < > 1.02  --   --    < >  --    GFRESTIMATED  --  87 78   < >  --    < > 70   < > 89  --    < > 72  --   --    < >  --    GFRESTBLACK  --  >90 >90   < >  --    < > 81   < > >90  --    < > 88  --   --    < >  --    POTASSIUM  --  3.8 3.6   < >  --    < > 4.2   < > 3.9  --    < > 3.1*  --   --    < >  --    TSH  --   --   --   --  3.87  --   --   --  1.83  --    < >  --   --   --    < > 1.80    < > = values in this  interval not displayed.        Reviewed and updated as needed this visit by Provider         Review of Systems   Constitutional: Negative.    HENT: Negative.    Eyes: Negative.    Respiratory: Negative.    Cardiovascular: Negative.    Gastrointestinal: Negative.    Genitourinary: Negative.    Musculoskeletal:        As in HPI   Skin:        As in HPI   Neurological: Negative.    Psychiatric/Behavioral: Negative.          Objective    /72   Pulse 83   Temp 97.8  F (36.6  C) (Tympanic)   Resp 16   Wt 147 kg (324 lb)   SpO2 95%   BMI 49.26 kg/m    Physical Exam  Constitutional:       General: He is not in acute distress.     Appearance: He is well-developed. He is not diaphoretic.   HENT:      Head: Normocephalic.      Right Ear: External ear normal.      Left Ear: External ear normal.      Nose: Nose normal.   Eyes:      Conjunctiva/sclera: Conjunctivae normal.   Neck:      Musculoskeletal: Normal range of motion.   Pulmonary:      Effort: Pulmonary effort is normal.   Musculoskeletal:      Left knee: He exhibits decreased range of motion and swelling. He exhibits no effusion. Tenderness found. Medial joint line and lateral joint line tenderness noted.   Neurological:      Mental Status: He is alert and oriented to person, place, and time.   Psychiatric:         Judgment: Judgment normal.       Minimal improvement of cellulitis   Blister has now completed unroofed with red and yellow granulation tissue and ongoing drainage  Pain with IP flexion and extension - no pain when palpating MCP joint. No pain when palpating plantar aspect of IP joint.                     Diagnostic Test Results:  No results found for this or any previous visit (from the past 24 hour(s)).        Assessment & Plan   Problem List Items Addressed This Visit        Musculoskeletal and Integumentary    Bilateral leg edema    Chronic stasis dermatitis      Other Visit Diagnoses     Paronychia of toe, left    -  Primary    Relevant  Medications    metroNIDAZOLE (FLAGYL) 500 MG tablet    Cellulitis of left lower extremity             We will add metronidazole to antibiotic regimen to cover for anaerobes   Continue with doxycycline  At this time, I do not suspect IP joint involvement, but we will watch closely.   Keep wound clean - cover when wearing shoes  Continue with twice daily soaks and antibiotic ointment  Recheck in 3-4 days, or sooner if symptoms worsen.    For the knee pain - he is unlikely to get much benefit from a cortisone injection.   Definitely NO cortisone injection until toe/cellulitis is completely resolved.       There are no Patient Instructions on file for this visit.    Return in about 3 days (around 8/10/2020) for Wound Check.    Myah Florez PA-C  [facility]

## 2020-08-07 NOTE — PROGRESS NOTES
[facility] JOVITA.OSEVERIANO.      Left detailed voicemail instructing patient to take an extra dose of Torsemide 20 mg this afternoon for today only. Repeated instructions twice. Asked for a call back just to verify my instructions were received and understood.    Blanca Bright RN    [facility]  08/07/20 9:55 AM

## 2020-08-10 ENCOUNTER — OFFICE VISIT (OUTPATIENT)
Dept: CARDIOLOGY | Facility: CLINIC | Age: 70
End: 2020-08-10
Attending: PHYSICIAN ASSISTANT
Payer: MEDICARE

## 2020-08-10 ENCOUNTER — OFFICE VISIT (OUTPATIENT)
Dept: FAMILY MEDICINE | Facility: CLINIC | Age: 70
End: 2020-08-10
Payer: MEDICARE

## 2020-08-10 ENCOUNTER — PATIENT OUTREACH (OUTPATIENT)
Dept: CARE COORDINATION | Facility: CLINIC | Age: 70
End: 2020-08-10

## 2020-08-10 VITALS
HEIGHT: 68 IN | WEIGHT: 315 LBS | BODY MASS INDEX: 47.74 KG/M2 | SYSTOLIC BLOOD PRESSURE: 115 MMHG | DIASTOLIC BLOOD PRESSURE: 80 MMHG | HEART RATE: 77 BPM

## 2020-08-10 VITALS
OXYGEN SATURATION: 92 % | DIASTOLIC BLOOD PRESSURE: 80 MMHG | HEART RATE: 84 BPM | BODY MASS INDEX: 49.72 KG/M2 | TEMPERATURE: 98.4 F | SYSTOLIC BLOOD PRESSURE: 126 MMHG | WEIGHT: 315 LBS

## 2020-08-10 DIAGNOSIS — L03.116 CELLULITIS OF LEFT LOWER EXTREMITY: ICD-10-CM

## 2020-08-10 DIAGNOSIS — R60.0 BILATERAL LEG EDEMA: ICD-10-CM

## 2020-08-10 DIAGNOSIS — I87.2 CHRONIC STASIS DERMATITIS: ICD-10-CM

## 2020-08-10 DIAGNOSIS — I50.42 CHRONIC COMBINED SYSTOLIC AND DIASTOLIC CONGESTIVE HEART FAILURE (H): ICD-10-CM

## 2020-08-10 DIAGNOSIS — L03.032 PARONYCHIA OF TOE, LEFT: Primary | ICD-10-CM

## 2020-08-10 LAB
ANION GAP SERPL CALCULATED.3IONS-SCNC: 6 MMOL/L (ref 3–14)
BUN SERPL-MCNC: 27 MG/DL (ref 7–30)
CALCIUM SERPL-MCNC: 8.7 MG/DL (ref 8.5–10.1)
CHLORIDE SERPL-SCNC: 101 MMOL/L (ref 94–109)
CO2 SERPL-SCNC: 26 MMOL/L (ref 20–32)
CREAT SERPL-MCNC: 1.02 MG/DL (ref 0.66–1.25)
GFR SERPL CREATININE-BSD FRML MDRD: 73 ML/MIN/{1.73_M2}
GLUCOSE SERPL-MCNC: 113 MG/DL (ref 70–99)
NT-PROBNP SERPL-MCNC: 137 PG/ML (ref 0–125)
POTASSIUM SERPL-SCNC: 3.9 MMOL/L (ref 3.4–5.3)
SODIUM SERPL-SCNC: 133 MMOL/L (ref 133–144)

## 2020-08-10 PROCEDURE — 99213 OFFICE O/P EST LOW 20 MIN: CPT | Performed by: INTERNAL MEDICINE

## 2020-08-10 PROCEDURE — 80048 BASIC METABOLIC PNL TOTAL CA: CPT | Performed by: PHYSICIAN ASSISTANT

## 2020-08-10 PROCEDURE — 99214 OFFICE O/P EST MOD 30 MIN: CPT | Performed by: PHYSICIAN ASSISTANT

## 2020-08-10 PROCEDURE — 83880 ASSAY OF NATRIURETIC PEPTIDE: CPT | Performed by: PHYSICIAN ASSISTANT

## 2020-08-10 PROCEDURE — 36415 COLL VENOUS BLD VENIPUNCTURE: CPT | Performed by: INTERNAL MEDICINE

## 2020-08-10 ASSESSMENT — ENCOUNTER SYMPTOMS
MUSCULOSKELETAL NEGATIVE: 1
ROS SKIN COMMENTS: AS IN HPI
GASTROINTESTINAL NEGATIVE: 1
PSYCHIATRIC NEGATIVE: 1
RESPIRATORY NEGATIVE: 1
CARDIOVASCULAR NEGATIVE: 1
NEUROLOGICAL NEGATIVE: 1
CONSTITUTIONAL NEGATIVE: 1
EYES NEGATIVE: 1

## 2020-08-10 ASSESSMENT — MIFFLIN-ST. JEOR: SCORE: 2203.24

## 2020-08-10 ASSESSMENT — ACTIVITIES OF DAILY LIVING (ADL): DEPENDENT_IADLS:: INDEPENDENT
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
## 2020-08-10 NOTE — PROGRESS NOTES
"[facility] C.OSEVERIANO.        Melissa returned my call and left a voicemail. I heard Kenneth say \"I'm fine!\" Melissa stated they will be at the [facility] today at 2pm for a lab only appointment.    Left voicemail asking for Kenneth to call in his weight and symptoms into [facility]acker for today.    Blanca Bright RN    [facility]  08/10/20 1:19 PM           "

## 2020-08-10 NOTE — LETTER
8/10/2020      Blanca Peng MD  [address]  [place] [address]      RE: Jamar Ivory       Dear Colleague,    I had the pleasure of seeing Jamar Ivory in the [facility].    Service Date: 08/10/2020      REASON FOR VISIT:  Heart failure consultation.      HISTORY OF PRESENT ILLNESS:  This is a 70-year-old gentleman who is here with his significant other.  The patient is a patient of Dr. Chand, last seen about a year ago in0 5/2019.  He has been followed up in [facility] by Rozina Bond and Destiny Cruz, most recently seen on 07/20/2020.  The patient has a history of nonischemic cardiomyopathy dating back to 2011, where in 12/2011 he had an angiogram done at the [facility].  I do not have the official report of that, but per reports, it appears that he had nonobstructive to mild coronary disease.  He has morbid obesity with a BMI of close to 50.  He has mild ischemic cardiomyopathy with an EF ranging about 45%-50% range.  In 2016, he had nonsustained VT for which he was seen in the [facility] system.  Reports are in Care Everywhere.  He had a nuclear stress test at that time which was negative for ischemia, and he was maintained on beta blockers since he was asymptomatic.  Also has a history of diabetes, hypertension, hyperlipidemia, hypothyroidism and morbid obesity.  He tells me that he was offered a CPAP, but he has not used it because he does not like it.  His last heart failure admission was in 12/2018.  His weight at home runs in the 320-pound range.  That corresponds to a BMI of 49.      Today Mr. Ivory is here for heart failure consultation given his persistent exhaustion, shortness of breath, fatigue and lower extremity edema/lymphedema that has been reported.  He tells me he denies any anginal symptoms.  He says he is chronically short of breath and nothing has changed for him in the last 3 years.  He does not like to walk around or  do anything in the house.  He does not cook, he does not do groceries, all he does is sits and watches TV, he says.  He does not have any syncope or presyncope.        From a medication standpoint, he is on aspirin, spironolactone and torsemide.  In the past, he used to be on beta blockers and losartan, and he has stopped that from an unknown duration of time.  He is not interested in getting back on that.      PHYSICAL EXAMINATION:   VITAL SIGNS:  Blood pressure is 115/80 with a pulse of 77 and regular.   GENERAL:  Alert and oriented x3, in no acute distress.   NECK:  Thick.  JVP is impossible to see, given his neck size for me and thick beard.   LUNGS:  Clear to auscultation.   CARDIAC:  Sounds are regular, very soft systolic murmur, no rubs or gallops.   EXTREMITIES:  Have chronic wounds on them, and there is 2 to 3+ bilateral pitting edema.   ABDOMEN:  He is morbidly obese, but it is nontender.   HEENT:  No icterus or pallor.   PSYCHIATRIC:  Appropriate affect, somewhat flat, though.      PERTINENT DATA:  Angiogram in 2011 negative for obstructive disease.  Nuclear stress test 2016 negative for ischemia.  Echocardiogram 01/2020 showing mild to moderate LV dilatation measuring 200 cc end-diastolic and an EF of 46% on biplane.  Normal RV size and function, no major valve disease.  Last NT-proBNP normal.  Last creatinine and potassium are normal.      ASSESSMENT AND PLAN:  Mr. Ivory is 70, on clinical exam seems to have a significant amount of volume on board.  His weight is 324 pounds.  He says his shortness of breath, fatigue, exhaustion and weight and edema have not changed in many years.  He says he is not interested in changing his regimen and wants to hold ground at this time.  Given his increasing weight and significant edema, I would recommend at least getting an echocardiogram to make sure his EF is not going down.  He does not want to be on ACE inhibitors and beta blockers at this time.  Although his EF  is greater than 40%, given the magnitude of LV dilatation he has, I would prefer if he is on some degree of neurohormonal blockade; however, he has been resistant for it.  At this time, he remains on torsemide 40 mg in the morning and 20 in the evening with 25 of spironolactone.      I did offer Mr. Marroquin options for aggressive outpatient diuresis that include up-titration of diuretic therapy and close followup with more frequent labs, at least for the next month or 2 while we achieve this diuresis, and potentially also consideration of right heart catheterization to better define his hemodynamics given his persistent 2 to 3+ edema.  He is not interested in doing that, but should things get worse for him he said he will change his mind and alert us.  At this time, I have not made any changes per his request to his regimen.       cc:   Destiny Cruz PA-C   [facility]   [address] [place]  [address]      Rozina Bond NP   [facility]   [address], [address]    [place]  [address]         KASEY HODGES MD             D: 08/10/2020   T: 08/10/2020   MT: ENZO      Name:     ENDY MARROQUIN   MRN:      [id number]        Account:      [id number]   :      1950           Service Date: 08/10/2020      Document: [id number]           Outpatient Encounter Medications as of 8/10/2020   Medication Sig Dispense Refill     acetaminophen (TYLENOL) 500 MG tablet Take 1,000 mg by mouth every 6 hours as needed (Headache)        aspirin (ASA) 81 MG tablet Take 1 tablet (81 mg) by mouth daily 90 tablet 3     colchicine (COLCYRS) 0.6 MG tablet Take 2 tabs x 1, then 1 tab one hour later x 1 ( do not exceed more than 3 tabs / day ) wait for 3 days and repeat the regimen. 10 tablet 0     doxycycline hyclate (VIBRAMYCIN) 100 MG capsule Take 1 capsule (100 mg) by mouth 2 times daily for 10 days 20 capsule 0     Glucosamine-Chondroitin (OSTEO BI-FLEX REGULAR STRENGTH) 250-200 MG TABS  Take 1 tablet by mouth 2 times daily       indomethacin (INDOCIN) 50 MG capsule Take 1 capsule (50 mg) by mouth 2 times daily (with meals) 14 capsule 0     levothyroxine (SYNTHROID/LEVOTHROID) 200 MCG tablet TAKE 1 TABLET BY MOUTH EVERY DAY 90 tablet 0     order for DME 1: Gradient Compression Wraps; 2: Cast boots; 3: BLE knee high 20-30 or 30-40 mm Hg compression stockings; 4: BLE velcro compression garments; 30-40 mm Hg; full leg; 5: Compression olamide 1 each 0     order for DME 1: Gradient Compression Wraps; 2: Cast Boots; 3: BLE knee high 20-30 mm Hg compression stockings; 4: BLE velcro compression garments; knee high 1 each 0     spironolactone (ALDACTONE) 25 MG tablet Take 1 tablet (25 mg) by mouth daily 90 tablet 3     tamsulosin (FLOMAX) 0.4 MG capsule TAKE 2 CAPSULES (0.8 MG) BY MOUTH DAILY 180 capsule 3     torsemide (DEMADEX) 20 MG tablet Take 2 tablets (40mg) in morning and 1 tablet (20mg) in afternoon, additional 20mg in the afternoon when advised by [facility]. 180 tablet 1     BETA BLOCKER NOT PRESCRIBED (INTENTIONAL) Beta Blocker not prescribed intentionally due to Evidence of fluid overload or volume depletion and Refusal by patient       [] HYDROcodone-acetaminophen (NORCO) 5-325 MG tablet Take 1 tablet by mouth every 6 hours as needed for pain 8 tablet 0     metroNIDAZOLE (FLAGYL) 500 MG tablet Take 1 tablet (500 mg) by mouth 3 times daily for 7 days (Patient not taking: Reported on 8/10/2020) 21 tablet 0     STATIN NOT PRESCRIBED (INTENTIONAL) Please choose reason not prescribed, below       No facility-administered encounter medications on file as of 8/10/2020.        Again, thank you for allowing me to participate in the care of your patient.      Sincerely,    Vega Anders MD     [facility]

## 2020-08-10 NOTE — PATIENT INSTRUCTIONS
Proper skin care from [facility]:     -Eliminate harsh soaps as they strip the natural oils from the skin, often resulting in dry itchy skin ( i.e. Dial, Zest, [place] Spring)  -Use mild soaps such as Cetaphil or Dove Sensitive Skin in the shower. You do not need to use soap on arms, legs, and trunk every time you shower unless visibly soiled.   -Avoid hot or cold showers.  -After showering, lightly dry off and apply moisturizing within 2-3 minutes. This will help trap moisture in the skin.   -Aggressive use of a moisturizer at least 1-2 times a day to the entire body (including -Vanicream, Cetaphil, Aquaphor or Cerave) and moisturize hands after every washing.  -We recommend using moisturizers that come in a tub that needs to be scooped out, not a pump. This has more of an oil base. It will hold moisture in your skin much better than a water base moisturizer. The above recommended are non-pore clogging.

## 2020-08-10 NOTE — PROGRESS NOTES
Clinic Care Coordination Contact  [facility]/Voicemail    Referral Source: Care Team  CC RN outreach to get updates on patient status, assess goal progress, and provide support and additional resources as needed.    Clinical Data: Care Coordinator Outreach  Outreach attempted x 1.  Left message on patient's voicemail with call back information and requested return call.    Plan: Care Coordinator will try to reach patient again in approximately 3-5 business days.    Solo Dia RN  Clinic Care Coordinator  [facility]  Ph: [phone]
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
## 2020-08-10 NOTE — PROGRESS NOTES
[facility]      Follow-Up Post Extra Torsemide on 8/7/20:     Left detailed message requesting a call back        Blanca Bright RN    [facility]  08/10/20 9:46 AM

## 2020-08-11 ENCOUNTER — CARE COORDINATION (OUTPATIENT)
Dept: CARDIOLOGY | Facility: CLINIC | Age: 70
End: 2020-08-11

## 2020-08-11 NOTE — PROGRESS NOTES
[facility]        Weight is 324 lbs today and he feels good - per message left for Haily Graves, JOY today.    Blanca Bright RN    [facility]  08/11/20 3:50 PM

## 2020-08-11 NOTE — PROGRESS NOTES
Service Date: 08/10/2020      REASON FOR VISIT:  Heart failure consultation.      HISTORY OF PRESENT ILLNESS:  This is a 70-year-old gentleman who is here with his significant other.  The patient is a patient of Dr. Chand, last seen about a year ago in0 5/2019.  He has been followed up in [facility] by Rozina Bond and Destiny Cruz, most recently seen on 07/20/2020.  The patient has a history of nonischemic cardiomyopathy dating back to 2011, where in 12/2011 he had an angiogram done at the [facility].  I do not have the official report of that, but per reports, it appears that he had nonobstructive to mild coronary disease.  He has morbid obesity with a BMI of close to 50.  He has mild ischemic cardiomyopathy with an EF ranging about 45%-50% range.  In 2016, he had nonsustained VT for which he was seen in the [facility] system.  Reports are in Care Everywhere.  He had a nuclear stress test at that time which was negative for ischemia, and he was maintained on beta blockers since he was asymptomatic.  Also has a history of diabetes, hypertension, hyperlipidemia, hypothyroidism and morbid obesity.  He tells me that he was offered a CPAP, but he has not used it because he does not like it.  His last heart failure admission was in 12/2018.  His weight at home runs in the 320-pound range.  That corresponds to a BMI of 49.      Today Mr. Ivory is here for heart failure consultation given his persistent exhaustion, shortness of breath, fatigue and lower extremity edema/lymphedema that has been reported.  He tells me he denies any anginal symptoms.  He says he is chronically short of breath and nothing has changed for him in the last 3 years.  He does not like to walk around or do anything in the house.  He does not cook, he does not do groceries, all he does is sits and watches TV, he says.  He does not have any syncope or presyncope.        From a medication standpoint, he is on aspirin, spironolactone  and torsemide.  In the past, he used to be on beta blockers and losartan, and he has stopped that from an unknown duration of time.  He is not interested in getting back on that.      PHYSICAL EXAMINATION:   VITAL SIGNS:  Blood pressure is 115/80 with a pulse of 77 and regular.   GENERAL:  Alert and oriented x3, in no acute distress.   NECK:  Thick.  JVP is impossible to see, given his neck size for me and thick beard.   LUNGS:  Clear to auscultation.   CARDIAC:  Sounds are regular, very soft systolic murmur, no rubs or gallops.   EXTREMITIES:  Have chronic wounds on them, and there is 2 to 3+ bilateral pitting edema.   ABDOMEN:  He is morbidly obese, but it is nontender.   HEENT:  No icterus or pallor.   PSYCHIATRIC:  Appropriate affect, somewhat flat, though.      PERTINENT DATA:  Angiogram in 2011 negative for obstructive disease.  Nuclear stress test 2016 negative for ischemia.  Echocardiogram 01/2020 showing mild to moderate LV dilatation measuring 200 cc end-diastolic and an EF of 46% on biplane.  Normal RV size and function, no major valve disease.  Last NT-proBNP normal.  Last creatinine and potassium are normal.      ASSESSMENT AND PLAN:  Mr. Ivory is 70, on clinical exam seems to have a significant amount of volume on board.  His weight is 324 pounds.  He says his shortness of breath, fatigue, exhaustion and weight and edema have not changed in many years.  He says he is not interested in changing his regimen and wants to hold ground at this time.  Given his increasing weight and significant edema, I would recommend at least getting an echocardiogram to make sure his EF is not going down.  He does not want to be on ACE inhibitors and beta blockers at this time.  Although his EF is greater than 40%, given the magnitude of LV dilatation he has, I would prefer if he is on some degree of neurohormonal blockade; however, he has been resistant for it.  At this time, he remains on torsemide 40 mg in the morning  and 20 in the evening with 25 of spironolactone.      I did offer Mr. Marroquin options for aggressive outpatient diuresis that include up-titration of diuretic therapy and close followup with more frequent labs, at least for the next month or 2 while we achieve this diuresis, and potentially also consideration of right heart catheterization to better define his hemodynamics given his persistent 2 to 3+ edema.  He is not interested in doing that, but should things get worse for him he said he will change his mind and alert us.  At this time, I have not made any changes per his request to his regimen.       cc:   Destiny Cruz PA-C   [facility]   [address] [place]  [address]      Rozina Bond NP   [facility]   [address], [address]    [place]  [address]         KASEY HODGES MD             D: 08/10/2020   T: 08/10/2020   MT: ENZO      Name:     ENDY MARROQUIN   MRN:      [id number]        Account:      [id number]   :      1950           Service Date: 08/10/2020      Document: [id number]

## 2020-08-11 NOTE — PROGRESS NOTES
[facility]  C.[facility]AMY    MyHealth Tracker Heart Failure Alert      Daily Weight Goal: 318-326 lbs    Today's Weight: 324 lbs      Symptom Alert: none       Current Diuretic & Potassium Plan: Current Diuretic & Potassium Plan: Torsemide 40 mg in the morning, 20 mg in the afternoon, additional 20 mg in the afternoon when advised by the [facility]. Spironolactone 25 mg daily.        Future Appointments   Date Time Provider Department Center   8/13/2020 10:30 AM Noble Gonzalez [facility]   8/14/2020 11:00 AM Macarena Leone, OT [facility]   8/18/2020 11:00 AM Macarena Leone, OT [facility]   8/20/2020 11:00 AM Rosemarie Ivy, OT [facility]   8/21/2020 11:00 AM Rosemarie Ivy, OT [facility]   8/24/2020  9:30 AM Myah Florez PA-C [facility]   8/25/2020 11:00 AM Noble Gonzalez A [facility]   8/27/2020 11:00 AM Noble Gonzalez [facility]   8/28/2020 11:00 AM Noble Gonzalez [facility]   9/1/2020 11:00 AM Noble Gonzalez [facility]   9/3/2020 11:00 AM Thatdevang Noble A [facility]   9/4/2020 11:00 AM Noble Gonzalez [facility]       Notes:   Wife left Vm stating pt's wt today is 324 lbs with no symptoms. No answer when called back.         MyHealth Tracker Weight Trends:           Haily Graves, RN 1:44 PM 08/11/20

## 2020-08-11 NOTE — PROGRESS NOTES
[facility]        Second Request    Left voicemail asking patient to call in his weight and symptom update to MyHealth Tracker. We have not received an update since 8/9/20.    Blanca Bright RN    [facility]  08/11/20 12:18 PM

## 2020-08-13 ENCOUNTER — HOSPITAL ENCOUNTER (OUTPATIENT)
Dept: OCCUPATIONAL THERAPY | Facility: CLINIC | Age: 70
Setting detail: THERAPIES SERIES
End: 2020-08-13
Attending: ORTHOPAEDIC SURGERY
Payer: MEDICARE

## 2020-08-13 PROCEDURE — 97140 MANUAL THERAPY 1/> REGIONS: CPT | Mod: GO

## 2020-08-13 NOTE — PROGRESS NOTES
Spoke to wife. Pt has an infection to his L great toe. He is almost completed a 7 day course of metronidazole for paronychia of L great toe and cellulitis of L leg.   Pt did delete MHT number from cell. Number given to wife and asked her to have pt call in after they get back from lymphedema appt.     Wife wanting to know if pt can have any alcohol. He likes beer and [employer].   Will check with Destiny.       Future Appointments   Date Time Provider Department Center   8/13/2020 10:30 AM Noble Gonzalez [facility]   8/14/2020 11:00 AM Macarena Leone, OT [facility]   8/18/2020 11:00 AM Macarena Leone OT [facility]   8/20/2020 11:00 AM Rosemarie Ivy, OT [facility]   8/21/2020 11:00 AM Rosemarie Ivy, OT [facility]   8/24/2020  9:30 AM Myah Florez PA-C [facility]   8/25/2020 11:00 AM Noble Gonzalez [facility]   8/27/2020 11:00 AM Noble Gonzalez [facility]   8/28/2020 11:00 AM Noble Gonzalez [facility]   9/1/2020 11:00 AM Noble Gonzalez [facility]   9/3/2020 11:00 AM Noble Gonzalez [facility]   9/4/2020 11:00 AM Noble Gonzalez [facility]     Haily Graves RN 10:04 AM 08/13/20

## 2020-08-14 ENCOUNTER — HOSPITAL ENCOUNTER (OUTPATIENT)
Dept: OCCUPATIONAL THERAPY | Facility: CLINIC | Age: 70
Setting detail: THERAPIES SERIES
End: 2020-08-14
Attending: ORTHOPAEDIC SURGERY
Payer: MEDICARE

## 2020-08-14 PROCEDURE — 97140 MANUAL THERAPY 1/> REGIONS: CPT | Mod: GO | Performed by: OCCUPATIONAL THERAPIST

## 2020-08-18 ENCOUNTER — HOSPITAL ENCOUNTER (OUTPATIENT)
Dept: OCCUPATIONAL THERAPY | Facility: CLINIC | Age: 70
Setting detail: THERAPIES SERIES
End: 2020-08-18
Attending: ORTHOPAEDIC SURGERY
Payer: MEDICARE

## 2020-08-18 PROCEDURE — 97140 MANUAL THERAPY 1/> REGIONS: CPT | Mod: GO | Performed by: OCCUPATIONAL THERAPIST

## 2020-08-19 NOTE — PROGRESS NOTES
Agnieszka from [employer] left VM asking for a phone number to call pt. Called and left Agnieszka VM with pt's home and cell phone numbers.   Haily Graves RN 4:24 PM 08/19/20

## 2020-08-19 NOTE — PROGRESS NOTES
Received call from Gloria from [employer]. They have been attempting to reach pt and wife but number is disconnected. Verified phone number with Gloria. She had the wrong area code listed. Gave her corrected phone number.  I reviewed the application to see if phone number incorrect and it was listed with the correct phone number.   Gloria will reach out to pt and wife.   Haily Graves RN 9:06 AM 08/19/20

## 2020-08-20 ENCOUNTER — HOSPITAL ENCOUNTER (OUTPATIENT)
Dept: OCCUPATIONAL THERAPY | Facility: CLINIC | Age: 70
Setting detail: THERAPIES SERIES
End: 2020-08-20
Attending: ORTHOPAEDIC SURGERY
Payer: MEDICARE

## 2020-08-20 PROCEDURE — 97140 MANUAL THERAPY 1/> REGIONS: CPT | Mod: GO

## 2020-08-21 ENCOUNTER — TRANSFERRED RECORDS (OUTPATIENT)
Dept: HEALTH INFORMATION MANAGEMENT | Facility: CLINIC | Age: 70
End: 2020-08-21

## 2020-08-21 ENCOUNTER — OFFICE VISIT (OUTPATIENT)
Dept: FAMILY MEDICINE | Facility: CLINIC | Age: 70
End: 2020-08-21
Payer: MEDICARE

## 2020-08-21 VITALS
HEART RATE: 78 BPM | TEMPERATURE: 98.4 F | WEIGHT: 315 LBS | SYSTOLIC BLOOD PRESSURE: 124 MMHG | DIASTOLIC BLOOD PRESSURE: 70 MMHG | OXYGEN SATURATION: 94 % | BODY MASS INDEX: 48.81 KG/M2 | RESPIRATION RATE: 14 BRPM

## 2020-08-21 DIAGNOSIS — M79.89 PAIN AND SWELLING OF LEFT LOWER LEG: Primary | ICD-10-CM

## 2020-08-21 DIAGNOSIS — R60.0 EDEMA OF LEFT LOWER LEG: ICD-10-CM

## 2020-08-21 DIAGNOSIS — Z79.899 LONG TERM CURRENT USE OF DIURETIC: ICD-10-CM

## 2020-08-21 DIAGNOSIS — M79.662 PAIN AND SWELLING OF LEFT LOWER LEG: Primary | ICD-10-CM

## 2020-08-21 LAB
BASOPHILS # BLD AUTO: 0.1 10E9/L (ref 0–0.2)
BASOPHILS NFR BLD AUTO: 2 %
DIFFERENTIAL METHOD BLD: ABNORMAL
EOSINOPHIL # BLD AUTO: 0.4 10E9/L (ref 0–0.7)
EOSINOPHIL NFR BLD AUTO: 8.6 %
ERYTHROCYTE [DISTWIDTH] IN BLOOD BY AUTOMATED COUNT: 14.5 % (ref 10–15)
HCT VFR BLD AUTO: 37.8 % (ref 40–53)
HGB BLD-MCNC: 12.2 G/DL (ref 13.3–17.7)
LYMPHOCYTES # BLD AUTO: 1.3 10E9/L (ref 0.8–5.3)
LYMPHOCYTES NFR BLD AUTO: 30.8 %
MCH RBC QN AUTO: 28.3 PG (ref 26.5–33)
MCHC RBC AUTO-ENTMCNC: 32.3 G/DL (ref 31.5–36.5)
MCV RBC AUTO: 88 FL (ref 78–100)
MONOCYTES # BLD AUTO: 0.9 10E9/L (ref 0–1.3)
MONOCYTES NFR BLD AUTO: 21.3 %
NEUTROPHILS # BLD AUTO: 1.5 10E9/L (ref 1.6–8.3)
NEUTROPHILS NFR BLD AUTO: 37.3 %
PLATELET # BLD AUTO: 238 10E9/L (ref 150–450)
RBC # BLD AUTO: 4.31 10E12/L (ref 4.4–5.9)
WBC # BLD AUTO: 4.1 10E9/L (ref 4–11)

## 2020-08-21 PROCEDURE — 80048 BASIC METABOLIC PNL TOTAL CA: CPT | Performed by: PHYSICIAN ASSISTANT

## 2020-08-21 PROCEDURE — 85025 COMPLETE CBC W/AUTO DIFF WBC: CPT | Performed by: PHYSICIAN ASSISTANT

## 2020-08-21 PROCEDURE — 99215 OFFICE O/P EST HI 40 MIN: CPT | Performed by: PHYSICIAN ASSISTANT

## 2020-08-21 PROCEDURE — 36415 COLL VENOUS BLD VENIPUNCTURE: CPT | Performed by: PHYSICIAN ASSISTANT

## 2020-08-21 NOTE — PROGRESS NOTES
Subjective     Jamar Ivory is a 70 year old male who presents to clinic today for the following health issues:    HPI       Concern - Leg Swelling  Onset: this morning  Description: lt leg swelling, pain with walking  Intensity: 8/10  Progression of Symptoms:  same  Accompanying Signs & Symptoms: redness  Previous history of similar problem: pt has lymphedema   Precipitating factors:        Worsened by: walking and putting pressure on leg  Alleviating factors:        Improved by: nothing  Therapies tried and outcome:  none       Review of Systems   Constitutional: Negative.    HENT: Negative.    Eyes: Negative.    Respiratory: Negative.    Cardiovascular: Negative.    Gastrointestinal: Negative.    Genitourinary: Negative.    Musculoskeletal:        As in HPI   Skin:        As in HPI   Neurological: Negative.    Psychiatric/Behavioral: Negative.          Objective    /70 (Cuff Size: Adult Large)   Pulse 78   Temp 98.4  F (36.9  C) (Tympanic)   Resp 14   Wt 145.6 kg (321 lb)   SpO2 94%   BMI 48.81 kg/m    Physical Exam  Constitutional:       General: He is not in acute distress.     Appearance: He is well-developed.   HENT:      Head: Normocephalic and atraumatic.      Nose: Nose normal.   Eyes:      Conjunctiva/sclera: Conjunctivae normal.   Neck:      Musculoskeletal: Normal range of motion.   Pulmonary:      Effort: Pulmonary effort is normal.   Musculoskeletal:      Right lower leg: Edema present.      Left lower leg: Edema (greater than right) present.   Skin:     General: Skin is warm and dry.      Findings: No erythema.      Comments: Chronic venous stasis changed bilateral lower legs - no new injury or erythema.  No warmth.    Neurological:      Mental Status: He is alert and oriented to person, place, and time.   Psychiatric:         Judgment: Judgment normal.         Diagnostic Test Results:  No results found for this or any previous visit (from the past 24 hour(s)).        Assessment  & Plan   Problem List Items Addressed This Visit     None      Visit Diagnoses     Pain and swelling of left lower leg    -  Primary    Relevant Orders    CBC with platelets and differential (Completed)    US Lower Extremity Venous Duplex Left    Long term current use of diuretic        Relevant Orders    Basic metabolic panel  (Ca, Cl, CO2, Creat, Gluc, K, Na, BUN) (Completed)    Edema of left lower leg        Relevant Orders    US Lower Extremity Venous Duplex Left         - leg discoloration is not consistent with cellulitis.  Bilateral lower legs with matching discoloration  - left leg with greater swelling than right - new per patient    Next step = rule out DVT.  STAT doppler US ordered - left lower leg.      Labs completed as above      There are no Patient Instructions on file for this visit.    Return today (on 8/21/2020).    Myah Florez PA-C  [facility]

## 2020-08-22 LAB
ANION GAP SERPL CALCULATED.3IONS-SCNC: 8 MMOL/L (ref 3–14)
BUN SERPL-MCNC: 22 MG/DL (ref 7–30)
CALCIUM SERPL-MCNC: 8.7 MG/DL (ref 8.5–10.1)
CHLORIDE SERPL-SCNC: 104 MMOL/L (ref 94–109)
CO2 SERPL-SCNC: 24 MMOL/L (ref 20–32)
CREAT SERPL-MCNC: 0.9 MG/DL (ref 0.66–1.25)
GFR SERPL CREATININE-BSD FRML MDRD: 85 ML/MIN/{1.73_M2}
GLUCOSE SERPL-MCNC: 119 MG/DL (ref 70–99)
POTASSIUM SERPL-SCNC: 3.8 MMOL/L (ref 3.4–5.3)
SODIUM SERPL-SCNC: 136 MMOL/L (ref 133–144)

## 2020-08-22 NOTE — RESULT ENCOUNTER NOTE
Kenneth    Your lab tests are complete and I have reviewed the results.     - Your lab results look great; everything is normal.    If you have any questions or concerns, please feel free to call or send a [employer] message.    Sincerely,  Calvin Florez PA-C

## 2020-08-24 ENCOUNTER — CARE COORDINATION (OUTPATIENT)
Dept: CARDIOLOGY | Facility: CLINIC | Age: 70
End: 2020-08-24

## 2020-08-24 DIAGNOSIS — I50.42 CHRONIC COMBINED SYSTOLIC AND DIASTOLIC CONGESTIVE HEART FAILURE (H): ICD-10-CM

## 2020-08-24 RX ORDER — SPIRONOLACTONE 25 MG/1
25 TABLET ORAL DAILY
Qty: 90 TABLET | Refills: 0 | Status: SHIPPED | OUTPATIENT
Start: 2020-08-24 | End: 2021-03-11

## 2020-08-24 ASSESSMENT — ACTIVITIES OF DAILY LIVING (ADL): DEPENDENT_IADLS:: INDEPENDENT

## 2020-08-24 NOTE — PROGRESS NOTES
Wife reports [employer] contacted them Friday and again today. They reviewed food choices and allergies. They should receive their first delivery in 2 wks.   Haily Graves RN 11:52 AM 08/24/20

## 2020-08-24 NOTE — PROGRESS NOTES
"[facility]  [facility]    [employer] Tracker Heart Failure Alert      Daily Weight Goal: 318-326 lbs    Today's Weight: 327 lbs    Symptom Alert: None     Current Diuretic & Potassium Plan: Spironolactone 25 mg daily & Torsemide 40 mg am and 20 mg in afternoon.    Diuretic plan: Extra 20 mg torsemide in afternoon per [facility]    Last Extra Diuretic: Torsemide 20 mg on 8/17/2020 - on own      Future Appointments   Date Time Provider Department Center   8/25/2020 11:00 AM Carlos Noble [facility]   8/27/2020 11:00 AM ThatdevangNoble [facility]   8/28/2020 11:00 AM EvaNoble siddiqi [facility]   9/1/2020 11:00 AM EvaNoble siddiqi [facility]   9/3/2020 11:00 AM Carlos Noble [facility]   9/4/2020 11:00 AM Evadevang Noble CLARK [facility]       Notes:   Wife left  on Saturday asking what dose of spirolactone pt should be on.     Call to wife and pt. Pt did not want to speak. Wife reports he feels \"good, except for his depression\". Did let wife know what Martita's answer was regarding alcohol use: \"Mild occassional use is fine\"  Did let wife know pt should be on 25 mg daily of spironolactone. Wife reports that is what pt is taking and the [facility] needs updated prescription. New one sent.   Wife also gave update on [employer]. They will get their first delivery in 2 wks. Wife will also be getting meals.       MyHealth Tracker Weight Trends:             MyHealth Tracker Weight Graph:         Haily Graves RN 11:51 AM 08/24/20    "

## 2020-08-24 NOTE — PROGRESS NOTES
Clinic Care Coordination Contact  [facility]/Voicemail    Referral Source: Care Team  CC RN outreach to get updates on patient status, assess goal progress, and provide support and additional resources as needed.    Clinical Data: Care Coordinator Outreach  Outreach attempted x 2.  Left message on patient's voicemail with call back information and requested return call.    Plan: Care Coordinator will try to reach patient again in approximately 3 weeks.    Solo Dia RN  Clinic Care Coordinator  [facility]  Ph: [phone]

## 2020-08-25 ENCOUNTER — HOSPITAL ENCOUNTER (OUTPATIENT)
Dept: OCCUPATIONAL THERAPY | Facility: CLINIC | Age: 70
Setting detail: THERAPIES SERIES
End: 2020-08-25
Attending: ORTHOPAEDIC SURGERY
Payer: MEDICARE

## 2020-08-25 PROCEDURE — 97140 MANUAL THERAPY 1/> REGIONS: CPT | Mod: GO

## 2020-08-27 ENCOUNTER — HOSPITAL ENCOUNTER (OUTPATIENT)
Dept: OCCUPATIONAL THERAPY | Facility: CLINIC | Age: 70
Setting detail: THERAPIES SERIES
End: 2020-08-27
Attending: ORTHOPAEDIC SURGERY
Payer: MEDICARE

## 2020-08-27 PROCEDURE — 97140 MANUAL THERAPY 1/> REGIONS: CPT | Mod: GO

## 2020-08-28 ENCOUNTER — HOSPITAL ENCOUNTER (OUTPATIENT)
Dept: OCCUPATIONAL THERAPY | Facility: CLINIC | Age: 70
Setting detail: THERAPIES SERIES
End: 2020-08-28
Attending: ORTHOPAEDIC SURGERY
Payer: MEDICARE

## 2020-08-28 PROCEDURE — 97140 MANUAL THERAPY 1/> REGIONS: CPT | Mod: GO

## 2020-08-28 ASSESSMENT — ENCOUNTER SYMPTOMS
CARDIOVASCULAR NEGATIVE: 1
EYES NEGATIVE: 1
RESPIRATORY NEGATIVE: 1
NEUROLOGICAL NEGATIVE: 1
CONSTITUTIONAL NEGATIVE: 1
ROS SKIN COMMENTS: AS IN HPI
GASTROINTESTINAL NEGATIVE: 1
PSYCHIATRIC NEGATIVE: 1

## 2020-09-01 ENCOUNTER — HOSPITAL ENCOUNTER (OUTPATIENT)
Dept: OCCUPATIONAL THERAPY | Facility: CLINIC | Age: 70
Setting detail: THERAPIES SERIES
End: 2020-09-01
Attending: ORTHOPAEDIC SURGERY
Payer: MEDICARE

## 2020-09-01 PROCEDURE — 97140 MANUAL THERAPY 1/> REGIONS: CPT | Mod: GO

## 2020-09-03 ENCOUNTER — HOSPITAL ENCOUNTER (OUTPATIENT)
Dept: OCCUPATIONAL THERAPY | Facility: CLINIC | Age: 70
Setting detail: THERAPIES SERIES
End: 2020-09-03
Attending: ORTHOPAEDIC SURGERY
Payer: MEDICARE

## 2020-09-03 PROCEDURE — 97140 MANUAL THERAPY 1/> REGIONS: CPT | Mod: GO

## 2020-09-03 NOTE — PROGRESS NOTES
Outpatient Occupational Therapy Progress Note     Patient: Jamar Ivory  : 1950    Beginning/End Dates of Reporting Period:  7/3/20 to 9/3/2020    Referring Provider: Brody Hawk Diagnosis: lymphedema    Client Self Report:       Objective Measurements: see flowsheet                                                        Outcome Measures (most recent score): LLIS scored post services       Goals:   Goal Identifier 1   Goal Description In order to improve functional mobility for activities of living, aide reducitons needed for L TKA, and promote reduciton in infection risk, by the completion of the intensive phase of services the pt and or caregiver will:   Target Date 20   Date Met      Progress:     Goal Identifier 1a   Goal Description Tolerate gradient compression bandaging/wearing compression wraps for entire daytime hour span to promote reductions in BLE lymphedema needed to reduce infection risk, promote reductions needed for L TKA, and promote imporved functional mobility   Target Date 20   Date Met  20   Progress:     Goal Identifier 1b   Goal Description Demonstrate independence in applying gradient compression bandages to build I with home management of BLE lymphedema needed for redcuign incidence skin infection, promote betetr mobility engagement, and reduce limb in manner needed to aide best outcomes L TKA needed   Target Date 20   Date Met  20   Progress:     Goal Identifier 1c   Goal Description Demonstrate independence in performing prescribed exercises to facilate the lymph system and muscle pumping systems for max reductions needed to aid eimporveemnts in mobility engagement, reduce risk skin infection, and promote reductions needed for L TKA   Target Date 20   Date Met  20   Progress:     Goal Identifier 1d   Goal Description Be independent in donning/doffing, wearing schedule, and care of compression garments to build I  with home management of BLE lymphedema needed for redcuign incidence skin infection, promote betetr mobility engagement, and reduce limb in manner needed to aide best outcomes L TKA needed    Target Date 09/25/20   Date Met  (goal discontinued)   Progress:     Goal Identifier 2   Goal Description Reduce total BLE volume 1.25L for reducitons needed to aide mobility engagement and promote reduciton in skin infection incidene   Target Date 09/25/20   Date Met  (not met; .9L reduciton total)   Progress:     Goal Identifier     Goal Description     Target Date     Date Met      Progress:     Goal Identifier     Goal Description     Target Date     Date Met      Progress:     Progress Toward Goals:   Progress this reporting period: Pt has made good reducitons in BLE lymphedema and has improved his compliance with compression use since seen in past POC. He still has issues at tiems tolerating GCB wraps at time frame requested but overall doing well and measureable reductions achieved. Pts spouse is now I with GCB application and pt understands he can benefit from walking and regular ex eventhough he chooses not to engage. Pt uses duct clearing and diaphragm breathing learned within MLD use in clinic. Skin improving but not ready for use of stocking and pt reports cannot afford velcro. Pt to cont GCB use and self MLD as able to cont to reduce LE's and get skin to heel prior to attempting stocking use.      Plan:  Continue therapy per current plan of care.    Discharge:    Reason for Discharge: discharge end POC/9-4-20    Equipment Issued:     Discharge Plan: Patient to continue home program post discharge

## 2020-09-04 ENCOUNTER — HOSPITAL ENCOUNTER (OUTPATIENT)
Dept: OCCUPATIONAL THERAPY | Facility: CLINIC | Age: 70
Setting detail: THERAPIES SERIES
End: 2020-09-04
Attending: ORTHOPAEDIC SURGERY
Payer: MEDICARE

## 2020-09-04 PROCEDURE — 97140 MANUAL THERAPY 1/> REGIONS: CPT | Mod: GO

## 2020-09-04 NOTE — PROGRESS NOTES
Outpatient Occupational Therapy Discharge Note     Patient: Jamar Ivory  : 1950    Beginning/End Dates of Reporting Period:  7/3/20 to 2020    Referring Provider: Brody Hawk Diagnosis: lymphedema    Client Self Report:       Objective Measurements: see flowsheet                                                        Outcome Measures (most recent score):  Lymphedema Life Impact Scale (score range 0-72). A higher score indicates greater impairment.: 7 post score from 19 pre score indicates satisfaction and benefit with services    Goals:   Goal Identifier 1   Goal Description In order to improve functional mobility for activities of living, aide reducitons needed for L TKA, and promote reduciton in infection risk, by the completion of the intensive phase of services the pt and or caregiver will:   Target Date 20   Date Met      Progress:     Goal Identifier 1a   Goal Description Tolerate gradient compression bandaging/wearing compression wraps for entire daytime hour span to promote reductions in BLE lymphedema needed to reduce infection risk, promote reductions needed for L TKA, and promote imporved functional mobility   Target Date 20   Date Met  20   Progress:     Goal Identifier 1b   Goal Description Demonstrate independence in applying gradient compression bandages to build I with home management of BLE lymphedema needed for redcuign incidence skin infection, promote betetr mobility engagement, and reduce limb in manner needed to aide best outcomes L TKA needed   Target Date 20   Date Met  20   Progress:     Goal Identifier 1c   Goal Description Demonstrate independence in performing prescribed exercises to facilate the lymph system and muscle pumping systems for max reductions needed to aid eimporveemnts in mobility engagement, reduce risk skin infection, and promote reductions needed for L TKA   Target Date 20   Date Met  20    Progress:     Goal Identifier 1d   Goal Description Be independent in donning/doffing, wearing schedule, and care of compression garments to build I with home management of BLE lymphedema needed for redcuign incidence skin infection, promote betetr mobility engagement, and reduce limb in manner needed to aide best outcomes L TKA needed    Target Date 09/25/20   Date Met  (goal discontinued)   Progress:     Goal Identifier 2   Goal Description Reduce total BLE volume 1.25L for reducitons needed to aide mobility engagement and promote reduciton in skin infection incidene   Target Date 09/25/20   Date Met  (not met; .9L reduciton total)   Progress:     Goal Identifier     Goal Description     Target Date     Date Met      Progress:     Goal Identifier     Goal Description     Target Date     Date Met      Progress:     Progress Toward Goals:   Progress this reporting period: Pt has made reducitons in BLE lymphedema and wounds and open skin appear to be healing and closing. Pt has shown greater tolerance for GCB use within context therapy but not engaged in therpy beyond GCB use. Pt can progress to daily stocking use with closing an dhealing of skin and spouse has skills to cont using GCB for pt support. Pt to be discharged.      Plan:  Discharge from therapy.    Discharge:    Reason for Discharge: No further expectation of progress.    Equipment Issued:     Discharge Plan: Patient to continue home program.

## 2020-09-08 ENCOUNTER — CARE COORDINATION (OUTPATIENT)
Dept: CARDIOLOGY | Facility: CLINIC | Age: 70
End: 2020-09-08

## 2020-09-08 NOTE — PROGRESS NOTES
[facility]  DORA    [employer] [facility] Heart Failure Alert      Daily Weight Goal: 318-326 lbs    Today's Weight: 319 lbs      Symptom Alert: Said yes to        4) Did you prop up on more pillows or sleep in a chair to breathe easier last night?       Current Diuretic & Potassium Plan: Spironolactone 25 mg daily & Torsemide 40 mg am and 20 mg in afternoon.          Last Extra Diuretic: Torsemide 20 mg 8/17/2020      No future appointments.    Notes: Left Vm for wife and pt        [employer] [facility] Weight Trends:               [employer] Tr[facility] Weight Graph:         Haily Graves, RN 2:19 PM 09/08/20

## 2020-09-10 ENCOUNTER — CARE COORDINATION (OUTPATIENT)
Dept: CARDIOLOGY | Facility: CLINIC | Age: 70
End: 2020-09-10

## 2020-09-10 DIAGNOSIS — R06.02 SOB (SHORTNESS OF BREATH): ICD-10-CM

## 2020-09-10 DIAGNOSIS — I50.42 CHRONIC COMBINED SYSTOLIC AND DIASTOLIC CONGESTIVE HEART FAILURE (H): Primary | ICD-10-CM

## 2020-09-11 ENCOUNTER — TELEPHONE (OUTPATIENT)
Dept: FAMILY MEDICINE | Facility: CLINIC | Age: 70
End: 2020-09-11

## 2020-09-11 ENCOUNTER — PATIENT OUTREACH (OUTPATIENT)
Dept: CARE COORDINATION | Facility: CLINIC | Age: 70
End: 2020-09-11

## 2020-09-11 ASSESSMENT — ACTIVITIES OF DAILY LIVING (ADL): DEPENDENT_IADLS:: INDEPENDENT

## 2020-09-11 NOTE — TELEPHONE ENCOUNTER
"----- Message from Myah Florez PA-C sent at 9/11/2020  8:27 AM CDT -----  Regarding: FW: Worsening depression  Can we please reach out to the patient and ask if he can schedule a visit without his wife present?  A phone visit is appropriate.      I am concerned the messages coming from him and his wife are different - she likes to put him down and interrupt when we are talking.  She complains about him often, and I would prefer to chat with him alone.    Calvin Florez PA-C      ----- Message -----  From: Haily Graves, RN  Sent: 9/10/2020   9:22 AM CDT  To: Myah Florez PA-C  Subject: Worsening depression                             Good morning, Myah    I am from the [facility], we help Kenneth with his CHF. We have him on a daily call in program for his HF called [facility] [employer] [facility]. He is gradually losing weight despite a good appetite per Melissa, his wife.   I just got off with Melissa. She is very upset as Kenneth's depression \"is really bad. He is so angry and just yells at me. He says he wants to give up and is tried of living. I do not know what to do anymore.\" Pt did not want to talk to me. He did report he had to prop himself up with more pillows and he was dizzy. Melissa said he has not said anything about being orthopneic or dizzy and has been moving around the house with no complaints.     I just wanted to give you a heads up on his mental health status.     Thank you!Haily Graves, JOY 9:42 AM 09/10/20         "

## 2020-09-11 NOTE — PROGRESS NOTES
Clinic Care Coordination Contact  Care Team Conversations    DEBBY RN received voicemail on 9/10/20 from DEBBY Johansen with [facility]; she requested call back to discuss some patient concerns.    Per chart review of Haily's note to PCP, it appears there are concerns about patient's depression.  Clinic staff helped to schedule patient for an in-person visit with PCP for Monday 9/14/20 to further discuss mental health concerns.    DEBBY RN will connect with PCP for ongoing patient support.    DEBBY RN left return voicemail message for DEBBY Johansen with [facility], updating her with plan as above.    Solo Dia RN  Clinic Care Coordinator  [facility]  Ph: [phone]

## 2020-09-11 NOTE — TELEPHONE ENCOUNTER
"RN called back to pt.    Pt scheduled in-person appt with DF for Monday, 09/14.    Pt does not note plans of hurting self on phone, but does state frustration about \"having to rely on my wife\". Plans to discuss this at appt with Calvin.  "

## 2020-09-11 NOTE — TELEPHONE ENCOUNTER
"Patient Contact    Attempt # 1    Was call answered?  Yes.  \"May I please speak with <patient name>\"  Is patient available?   No.  Spoke with Suzan; best time to call back is around lunch time    On call back:    Can we please reach out to the patient and ask if he can schedule a visit without his wife present?  A phone visit is appropriate.       I am concerned the messages coming from him and his wife are different - she likes to put him down and interrupt when we are talking.  She complains about him often, and I would prefer to chat with him alone.  "

## 2020-09-14 ENCOUNTER — CARE COORDINATION (OUTPATIENT)
Dept: CARDIOLOGY | Facility: CLINIC | Age: 70
End: 2020-09-14

## 2020-09-14 ENCOUNTER — OFFICE VISIT (OUTPATIENT)
Dept: FAMILY MEDICINE | Facility: CLINIC | Age: 70
End: 2020-09-14
Payer: MEDICARE

## 2020-09-14 VITALS
TEMPERATURE: 97.6 F | SYSTOLIC BLOOD PRESSURE: 124 MMHG | BODY MASS INDEX: 48.66 KG/M2 | DIASTOLIC BLOOD PRESSURE: 80 MMHG | OXYGEN SATURATION: 96 % | WEIGHT: 315 LBS | HEART RATE: 83 BPM | RESPIRATION RATE: 18 BRPM

## 2020-09-14 DIAGNOSIS — F32.1 MODERATE MAJOR DEPRESSION (H): Primary | ICD-10-CM

## 2020-09-14 DIAGNOSIS — Z91.51 HISTORY OF SUICIDE ATTEMPT: ICD-10-CM

## 2020-09-14 PROCEDURE — 99213 OFFICE O/P EST LOW 20 MIN: CPT | Performed by: PHYSICIAN ASSISTANT

## 2020-09-14 ASSESSMENT — ANXIETY QUESTIONNAIRES
1. FEELING NERVOUS, ANXIOUS, OR ON EDGE: MORE THAN HALF THE DAYS
GAD7 TOTAL SCORE: 7
6. BECOMING EASILY ANNOYED OR IRRITABLE: NOT AT ALL
7. FEELING AFRAID AS IF SOMETHING AWFUL MIGHT HAPPEN: NOT AT ALL
3. WORRYING TOO MUCH ABOUT DIFFERENT THINGS: NEARLY EVERY DAY
5. BEING SO RESTLESS THAT IT IS HARD TO SIT STILL: NOT AT ALL
IF YOU CHECKED OFF ANY PROBLEMS ON THIS QUESTIONNAIRE, HOW DIFFICULT HAVE THESE PROBLEMS MADE IT FOR YOU TO DO YOUR WORK, TAKE CARE OF THINGS AT HOME, OR GET ALONG WITH OTHER PEOPLE: VERY DIFFICULT
2. NOT BEING ABLE TO STOP OR CONTROL WORRYING: SEVERAL DAYS

## 2020-09-14 ASSESSMENT — PATIENT HEALTH QUESTIONNAIRE - PHQ9
SUM OF ALL RESPONSES TO PHQ QUESTIONS 1-9: 12
5. POOR APPETITE OR OVEREATING: SEVERAL DAYS

## 2020-09-14 NOTE — PROGRESS NOTES
[facility]  [email or web address]    [facility] Tr[facility] Heart Failure Alert      Daily Weight Goal: 318-326 lbs    Today's Weight: 315 lbs    Symptom Alert: Said yes to       4) Did you prop up on more pillows or sleep in a chair to breathe easier last night?    Current Diuretic & Potassium Plan: Spironolactone 25 mg daily & Torsemide 40 mg am and 20 mg in afternoon.     Diuretic plan: Extra 20 mg torsemide in afternoon per [facility]       Last Extra Diuretic: Torsemide 20 mg on 8/17/2020      Future Appointments   Date Time Provider Department Center   9/25/2020 12:30 PM RU LAB [facility]   9/25/2020  1:10 [facility]   10/14/2020 10:00 AM Myah Florez, PAJessicaC [facility]       Notes: Left VM for pt and wife       [facility] Tr[facility] Weight Trends:             [facility] Tr[facility] Weight Graph:           Haily Graves RN 11:44 AM 09/14/20
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
## 2020-09-15 ASSESSMENT — ANXIETY QUESTIONNAIRES: GAD7 TOTAL SCORE: 7

## 2020-09-16 ASSESSMENT — ENCOUNTER SYMPTOMS
NEUROLOGICAL NEGATIVE: 1
CARDIOVASCULAR NEGATIVE: 1
RESPIRATORY NEGATIVE: 1
MUSCULOSKELETAL NEGATIVE: 1
GASTROINTESTINAL NEGATIVE: 1
CONSTITUTIONAL NEGATIVE: 1
EYES NEGATIVE: 1

## 2020-09-21 ENCOUNTER — PATIENT OUTREACH (OUTPATIENT)
Dept: NURSING | Facility: CLINIC | Age: 70
End: 2020-09-21
Payer: MEDICARE

## 2020-09-21 SDOH — ECONOMIC STABILITY: TRANSPORTATION INSECURITY
IN THE PAST 12 MONTHS, HAS LACK OF TRANSPORTATION KEPT YOU FROM MEETINGS, WORK, OR FROM GETTING THINGS NEEDED FOR DAILY LIVING?: NO

## 2020-09-21 SDOH — ECONOMIC STABILITY: TRANSPORTATION INSECURITY
IN THE PAST 12 MONTHS, HAS THE LACK OF TRANSPORTATION KEPT YOU FROM MEDICAL APPOINTMENTS OR FROM GETTING MEDICATIONS?: NO

## 2020-09-21 ASSESSMENT — ACTIVITIES OF DAILY LIVING (ADL): DEPENDENT_IADLS:: INDEPENDENT

## 2020-09-21 NOTE — PROGRESS NOTES
"Clinic Care Coordination Contact    Follow Up Progress Note     Assessment:    Patient reported to be doing \"okay\" today.    He stated his recent PCP appointment to discuss his depression went well.  He reviewed his new prescription for fluoxetine and stated this has been going fine, no medication questions or concerns at this time.    Patient noted his upcoming appointments with Therapy and Psychiatry, as well as a return visit with PCP.    Patient reported feeling confident about current plan in place for his depression; he hopes the fluoxetine helps.  He stated he has tried a lot of medications in the past and doesn't feel like they helped, but he is hopeful about fluoxetine as it's one he hasn't tried.    Patient denied outstanding questions or concerns at this time.    Goals addressed this encounter:   Goals Addressed                 This Visit's Progress       Patient Stated      1. Mental Health Management (pt-stated)   40%     Goal Statement: I will work to improve my depression.  Date Goal set: 11/8/19 // revised on 9/21/20  Barriers: long-standing history of depression, reports minimal depression improvement in the past despite multiple interventions  Strengths: motivated to improve depression, willing to continue trying different things for depression treatment  Date to Achieve By: 3/31/21  Patient expressed understanding of goal: Yes    Action steps to achieve this goal:  1. I will continue taking my medications as prescribed and will notify my care team of any medication questions or concerns.  2. I will establish care with Therapy and Psychiatry for ongoing mental health management.  3. I will continue routine follow-up with my PCP as indicated.  4. I will focus on doing more things I enjoy on a weekly basis.  5. I will explore additional mental health supports and resources that I can utilize when needed.  6. I will continue working with Care Coordination to identify and address any barriers to " achieving my goal.        Intervention/Education provided during outreach:     Medication reconciliation status: Medications reviewed and reconciled.  Continue medications without change.     CC RN provided patient with encouragement for taking initial steps toward better depression management; reviewed plan of care.    CC RN encouraged patient to focus on hobbies or things he enjoys.  He reported he used to really enjoy making models of cars and would like to try do a model ship.  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  He was excited at the prospect of getting back into making models.    CC RN reviewed plan of a follow-up outreach after his next PCP visit and first Therapy visit on 10/14/20 and 10/15/20; patient agreeable to plan.     Outreach Frequency: monthly    Future Appointments              In 4 days RU LAB [facility]    In 4 days Rozina Gallegos APRN CNP [facility]    In 3 weeks Myah Florez PA-C [facility], [facility]    In 3 weeks Ricky Yu LICSW [facility]    In 1 month Carin Gomez MD [facility]      Plan:     Patient will attend his upcoming appointments as scheduled.    Patient will continue taking his medications as prescribed.  He agreed to notify care team of any questions or concerns regarding his medications, particularly his newly prescribed fluoxetine for depression.    Patient agreed to begin focusing more on hobbies/things he enjoys at home, such as building models.    Patient agreed to contact CC RN with additional questions or concerns.    Care Coordination will outreach to patient in approximately 1 month to get updates on patient status,  assess goal progress, and offer additional support and resources as indicated.    Solo Dia RN  Clinic Care Coordinator  [facility]  Ph: [phone]

## 2020-09-21 NOTE — LETTER
[place] CARE COORDINATION  [facility] [place], [facility]  [address], [place] [address]    September 21, 2020        Jamar Marroquin  [address]  [facility] [address]          Dear Damian Roldan is an updated Complex Care Plan for your continued enrollment in Care Coordination. Please let us know if you have additional questions, concerns, or goals that we can assist with.    Sincerely,    Solo Dai RN        [facility]  Complex Care Plan  About Me:    Patient Name:  Jamar Marroquin    YOB: 1950  Age:         70 year old   [place] MRN:    [id number] Telephone Information:  Home Phone [phone]   Mobile [phone]       Address:  [address]  [facility] [address] Email address:  No e-mail address on record      Emergency Contact(s)    Name Relationship Lgl Grd Work Phone Home Phone Mobile Phone   1. SELIN MARROQUIN Spouse No none [phone]   2. JOSE MARIA MARROQUIN Son No  [phone]            Primary language:  English     needed? No   [place] [employer] [facility]:  [phone] op. 1  Other communication barriers: None  Preferred Method of Communication:  Mail  Current living arrangement: I live in a private home with spouse  Mobility Status/ Medical Equipment: Independent    Health Maintenance  Health Maintenance Reviewed: Due/Overdue   Health Maintenance Due   Topic Date Due     URINE DRUG SCREEN  1950     HEPATITIS B IMMUNIZATION (1 of 3 - Risk 3-dose series) 01/19/1968     ZOSTER IMMUNIZATION (1 of 2) 01/19/1999     MEDICARE ANNUAL WELLNESS VISIT  04/27/2016     EYE EXAM  05/22/2019     LIPID  05/08/2020     MICROALBUMIN  05/08/2020     DIABETIC FOOT EXAM  05/08/2020     INFLUENZA VACCINE (1) 09/01/2020     My Access Plan  Medical Emergency 911   Primary Clinic Line [facility] - [phone]   24 Hour Appointment Line [phone]  or  [phone]) (toll-free)   24 Hour Nurse Line [phone] (toll-free)   Preferred Urgent Care [facility], [phone]   Preferred Hospital [facility]  [phone]   Preferred Pharmacy [facility] - [place], [place] - [address]     Behavioral Health Crisis Line The National Suicide Prevention Lifeline at 1-572.902.7038 or 911       My Care Team Members  Patient Care Team       Relationship Specialty Notifications Start Myah Parker PA-C PCP - General Physician Assistant  9/11/20     Phone: [phone] Fax: [phone] [address] [facility] [address]    Kong Fraga MD MD Gastroenterology  8/11/16     Phone: [phone] Fax: [phone]         [address] [facility] [address] [place] [address]    Cary Grace MD MD Ophthalmology  5/21/18     Phone: [phone] Fax: [phone]         [address] [facility] [address]    Haily Graves, RN Registered Nurse Cardiology Admissions 9/26/19     [facility]    Rika Lindsay NP Nurse Practitioner Nurse Practitioner Psych/Mental Health  11/7/19     Phone: [phone] Fax: [phone]          [facility] [address] [facility] [address]    Rozina Gallegos APRN CNP [facility] Tracker Cardiology  11/8/19     [facility]    Phone: [phone] Pager: [phone] Fax: [phone] [address] [facility] [address]    Solo Dia, RN Lead Care Coordinator Primary Care - CC  11/8/19     Phone: [phone]         Vega Anders MD MD Cardiology  7/9/20     [facility]    Phone: [phone] Pager: [phone] Fax: [phone] [address] [facility] [address] [place] [address]    Myah Florez PA-C Assigned PCP   8/16/20     Phone: [phone] Fax: [phone] [address] [place] [address]    Karen Stapleton MA Community Health Worker Primary Care - CC  8/27/20     ph: [phone]     Phone: [phone]                 My Care Plans  Self Management and Treatment Plan  Goals and (Comments)  Goals        General    1. Mental Health Management (pt-stated)     Notes - Note edited  9/21/2020  9:36 AM by Solo Dia RN    Goal Statement: I will work to improve my depression.  Date Goal set: 11/8/19 // revised on 9/21/20  Barriers: long-standing history of depression, reports minimal depression improvement in the past despite multiple interventions  Strengths: motivated to improve depression, willing to continue trying different things for depression treatment  Date to Achieve By: 3/31/21  Patient expressed understanding of goal: Yes    Action steps to achieve this goal:  1. I will continue taking my medications as prescribed and will notify my care team of any medication questions or concerns.  2. I will establish care with Therapy and Psychiatry for ongoing mental health management.  3. I will continue routine follow-up with my PCP as indicated.  4. I will focus on doing more things I enjoy on a weekly basis.  5. I will explore additional mental health supports and resources that I can utilize when needed.  6. I will continue working with Care Coordination to identify and address any barriers to achieving my goal.             Action Plans on File:   Depression  Heart Failure    Advance Care Plans/Directives Type:   None on file       My Medical and Care Information  Problem List   Patient Active Problem List   Diagnosis     Acquired hypothyroidism     Vitiligo     Hyperlipidemia with target LDL less than 100     Hypertension goal BP (blood pressure) < 140/90     Cerebral infarction (H)     Moderate major depression (H)     Health Care Home     Fatty liver     Advanced directives, counseling/discussion     Impaired glucose tolerance     Transient cerebral ischemia     Obesity, morbid (more than 100 lbs over ideal weight or BMI > 40) (H)     Chronic stasis dermatitis     Bilateral leg edema      Diet Controlled Type 2 diabetes mellitus without complication, without long-term current use of insulin (H)     Benign neoplasm of colon     Pain in both lower extremities     Low hemoglobin     Breast tenderness in male     Hx of Diabetic polyneuropathy associated with type 2 diabetes mellitus - now diet controlled (H)     Chronic combined systolic and diastolic congestive heart failure (H)     PVD (peripheral vascular disease) (H)     Benign prostatic hyperplasia with incomplete bladder emptying     Non-healing ulcer of lower leg with fat layer exposed, unspecified laterality (H)      Current Medications:  Current Outpatient Medications   Medication Instructions     acetaminophen (TYLENOL) 1,000 mg, Oral, EVERY 6 HOURS PRN     aspirin (ASA) 81 mg, Oral, DAILY     BETA BLOCKER NOT PRESCRIBED (INTENTIONAL) Beta Blocker not prescribed intentionally due to Evidence of fluid overload or volume depletion and Refusal by patient     colchicine (COLCYRS) 0.6 MG tablet Take 2 tabs x 1, then 1 tab one hour later x 1 ( do not exceed more than 3 tabs / day ) wait for 3 days and repeat the regimen.     FLUoxetine (PROZAC) 20 mg, Oral, DAILY     Glucosamine-Chondroitin (OSTEO BI-FLEX REGULAR STRENGTH) 250-200 MG TABS 1 tablet, Oral, 2 TIMES DAILY     indomethacin (INDOCIN) 50 mg, Oral, 2 TIMES DAILY WITH MEALS     levothyroxine (SYNTHROID/LEVOTHROID) 200 MCG tablet TAKE 1 TABLET BY MOUTH EVERY DAY     order for DME 1: Gradient Compression Wraps; 2: Cast Boots; 3: BLE knee high 20-30 mm Hg compression stockings; 4: BLE velcro compression garments; knee high     order for DME 1: Gradient Compression Wraps; 2: Cast boots; 3: BLE knee high 20-30 or 30-40 mm Hg compression stockings; 4: BLE velcro compression garments; 30-40 mm Hg; full leg; 5: Compression olamide     spironolactone (ALDACTONE) 25 mg, Oral, DAILY     STATIN NOT PRESCRIBED (INTENTIONAL) Please choose reason not prescribed, below     tamsulosin (FLOMAX) 0.4 MG  capsule TAKE 2 CAPSULES (0.8 MG) BY MOUTH DAILY     torsemide (DEMADEX) 20 MG tablet Take 2 tablets (40mg) in morning and 1 tablet (20mg) in afternoon, additional 20mg in the afternoon when advised by CORE clinic.     Care Coordination Start Date: 11/8/2019   Frequency of Care Coordination: monthly   Form Last Updated: 09/21/2020

## 2020-09-25 DIAGNOSIS — R06.02 SOB (SHORTNESS OF BREATH): ICD-10-CM

## 2020-09-25 DIAGNOSIS — I50.42 CHRONIC COMBINED SYSTOLIC AND DIASTOLIC CONGESTIVE HEART FAILURE (H): ICD-10-CM

## 2020-09-25 LAB
ANION GAP SERPL CALCULATED.3IONS-SCNC: 3 MMOL/L (ref 3–14)
BUN SERPL-MCNC: 22 MG/DL (ref 7–30)
CALCIUM SERPL-MCNC: 8.9 MG/DL (ref 8.5–10.1)
CHLORIDE SERPL-SCNC: 104 MMOL/L (ref 94–109)
CO2 SERPL-SCNC: 29 MMOL/L (ref 20–32)
CREAT SERPL-MCNC: 0.94 MG/DL (ref 0.66–1.25)
GFR SERPL CREATININE-BSD FRML MDRD: 80 ML/MIN/{1.73_M2}
GLUCOSE SERPL-MCNC: 104 MG/DL (ref 70–99)
NT-PROBNP SERPL-MCNC: 148 PG/ML (ref 0–125)
POTASSIUM SERPL-SCNC: 4.1 MMOL/L (ref 3.4–5.3)
SODIUM SERPL-SCNC: 136 MMOL/L (ref 133–144)

## 2020-09-25 PROCEDURE — 36415 COLL VENOUS BLD VENIPUNCTURE: CPT | Performed by: NURSE PRACTITIONER

## 2020-09-25 PROCEDURE — 83880 ASSAY OF NATRIURETIC PEPTIDE: CPT | Performed by: NURSE PRACTITIONER

## 2020-09-25 PROCEDURE — 80048 BASIC METABOLIC PNL TOTAL CA: CPT | Performed by: NURSE PRACTITIONER

## 2020-09-29 ENCOUNTER — CARE COORDINATION (OUTPATIENT)
Dept: CARDIOLOGY | Facility: CLINIC | Age: 70
End: 2020-09-29

## 2020-09-29 DIAGNOSIS — E03.9 ACQUIRED HYPOTHYROIDISM: Chronic | ICD-10-CM

## 2020-09-29 RX ORDER — LEVOTHYROXINE SODIUM 200 UG/1
200 TABLET ORAL DAILY
Qty: 90 TABLET | Refills: 2 | Status: SHIPPED | OUTPATIENT
Start: 2020-09-29 | End: 2021-07-23

## 2020-09-29 NOTE — PROGRESS NOTES
"Wife left  stating pt is feeling well and has \"nohting to tell me\". They are both enjoying the [facility] meals.     Left  for pt and wife asking if pt is currently taking his spironolactone and if he has had to take any extra Torsemide.     Haily Graves RN 12:43 PM 09/29/20    "

## 2020-10-02 ENCOUNTER — OFFICE VISIT (OUTPATIENT)
Dept: CARDIOLOGY | Facility: CLINIC | Age: 70
End: 2020-10-02
Attending: INTERNAL MEDICINE
Payer: MEDICARE

## 2020-10-02 ENCOUNTER — DOCUMENTATION ONLY (OUTPATIENT)
Dept: CARDIOLOGY | Facility: CLINIC | Age: 70
End: 2020-10-02

## 2020-10-02 VITALS
WEIGHT: 315 LBS | OXYGEN SATURATION: 93 % | SYSTOLIC BLOOD PRESSURE: 122 MMHG | HEART RATE: 78 BPM | BODY MASS INDEX: 48.88 KG/M2 | DIASTOLIC BLOOD PRESSURE: 80 MMHG

## 2020-10-02 DIAGNOSIS — I50.42 CHRONIC COMBINED SYSTOLIC AND DIASTOLIC CONGESTIVE HEART FAILURE (H): Primary | ICD-10-CM

## 2020-10-02 DIAGNOSIS — E78.5 HYPERLIPIDEMIA WITH TARGET LDL LESS THAN 100: Chronic | ICD-10-CM

## 2020-10-02 DIAGNOSIS — I10 HYPERTENSION GOAL BP (BLOOD PRESSURE) < 140/90: Chronic | ICD-10-CM

## 2020-10-02 DIAGNOSIS — I42.8 NONISCHEMIC CARDIOMYOPATHY (H): ICD-10-CM

## 2020-10-02 DIAGNOSIS — R60.0 BILATERAL LEG EDEMA: ICD-10-CM

## 2020-10-02 PROCEDURE — 99214 OFFICE O/P EST MOD 30 MIN: CPT | Performed by: NURSE PRACTITIONER

## 2020-10-02 RX ORDER — LOSARTAN POTASSIUM 25 MG/1
25 TABLET ORAL DAILY
Qty: 90 TABLET | Refills: 1 | Status: SHIPPED | OUTPATIENT
Start: 2020-10-02 | End: 2021-04-07

## 2020-10-02 RX ORDER — TORSEMIDE 20 MG/1
40 TABLET ORAL 2 TIMES DAILY
Qty: 180 TABLET | Refills: 1 | Status: SHIPPED | OUTPATIENT
Start: 2020-10-02 | End: 2021-01-08

## 2020-10-02 NOTE — PATIENT INSTRUCTIONS
1. INCREASE torsemide to 40mg (2 tablets) 2 x a day  2. START losartan to 25mg daily  3. Continue monitoring weight daily at home  4. Notify clinic of worsening breathing or swelling, [phone]   5. Follow up with Rozina in 3 weeks with labs prior
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
## 2020-10-02 NOTE — PROGRESS NOTES
[facility]  DORA    [employer] Tracker Heart Failure   For office visit review      Daily Weight Goal: 318-326 lbs    [facility] Enrollment date:  6/7/2020    Current Diuretic: Torsemide 40 mg in am and 20 mg in pm. Spirionlactone    Potassium Plan: none    Diuretic Plan: Extra 20 mg torsemide in afternoon per CORE    Last Extra Diuretic: torsemide 20 mg on 08/07/2020        MyHealth Tracker Weight Trends:     MyHealth Tracker CHF Flowsheet My weight today is:   8/20/2020 321   8/21/2020 321   8/22/2020 320   8/24/2020 327   8/28/2020 322   8/29/2020 322   8/30/2020 323   9/1/2020 325   9/3/2020 322   9/5/2020 320   9/6/2020 321   9/7/2020 324   9/8/2020 319   9/10/2020 316   9/11/2020 319   9/12/2020 315   9/14/2020 315   9/15/2020 316   9/16/2020 316   9/26/2020 326   9/29/2020 330           My[employer] Tracker Weight Graph:          Haily Gerdowsky, RN 9:08 AM 10/02/20

## 2020-10-06 NOTE — PROGRESS NOTES
[facility]  C.O.RNESSA    MyHealth Tracker Heart Failure Alert      Daily Weight Goal: 318-326 lbs    Today's Weight: 330 lbs    Symptom Alert: None     Current Diuretic & Potassium Plan: Spironolactone 25 mg daily and Torsemide 40 mg in morning and 20 mg in afternoon       Last Extra Diuretic:       Future Appointments   Date Time Provider Department Center   10/2/2020 10:10 AM Rozina Gallegos APRN CNP [facility]   10/14/2020 10:00 AM Myah Florez PA-C [facility] BL   10/15/2020  1:30 PM Ricky Yu, LICSW CCCP [facility]   11/10/2020 10:15 AM Carin Gomez MD [facility] PAR       Notes:  left for pt.         MyHealth Tracker Weight Trends:            MyHealth Tracker Weight Graph:         Haily Graves RN 10:15 AM 09/29/20

## 2020-10-09 ENCOUNTER — CARE COORDINATION (OUTPATIENT)
Dept: CARDIOLOGY | Facility: CLINIC | Age: 70
End: 2020-10-09

## 2020-10-09 NOTE — PROGRESS NOTES
"[facility]  C.O.RNESSA    MyHealth Tracker Heart Failure Alert      Daily Weight Goal: 318-326 lbs    Today's Weight: 327 lbs      Symptom Alert: none       Current Diuretic & Potassium Plan: Spironolactone 25 mg daily & torsemide 40 mg BID    Last Extra Diuretic: torsemide 20 mg on 08/07/2020      Future Appointments   Date Time Provider Department Center   10/14/2020 10:00 AM Myah Florez PA-C [facility]   10/15/2020  1:30 PM Ricky Yu LICSW [facility]   10/23/2020  1:00 PM RU [facility] P [facility] CLIN   10/23/2020  2:10 PM Rozina Gallegos, APRN CNP [facility]   11/10/2020 10:15 AM Carin Gomez MD [facility] PAR       Notes:     Pt feels fine. Ate \"to much yesterday\". Will watch his food this wkend.         MyHealth Tracker Weight Trends:                 MyHealth Tracker Weight Graph:         Haily Graves RN 10:24 AM 10/09/20    "

## 2020-10-14 ENCOUNTER — OFFICE VISIT (OUTPATIENT)
Dept: FAMILY MEDICINE | Facility: CLINIC | Age: 70
End: 2020-10-14
Payer: MEDICARE

## 2020-10-14 VITALS
TEMPERATURE: 98.5 F | BODY MASS INDEX: 47.74 KG/M2 | WEIGHT: 315 LBS | HEIGHT: 68 IN | RESPIRATION RATE: 16 BRPM | SYSTOLIC BLOOD PRESSURE: 112 MMHG | HEART RATE: 85 BPM | OXYGEN SATURATION: 95 % | DIASTOLIC BLOOD PRESSURE: 68 MMHG

## 2020-10-14 DIAGNOSIS — I73.9 PVD (PERIPHERAL VASCULAR DISEASE) (H): ICD-10-CM

## 2020-10-14 DIAGNOSIS — R53.81 PHYSICAL DECONDITIONING: ICD-10-CM

## 2020-10-14 DIAGNOSIS — F32.1 MODERATE MAJOR DEPRESSION (H): ICD-10-CM

## 2020-10-14 DIAGNOSIS — M10.9 ACUTE GOUTY ARTHRITIS: ICD-10-CM

## 2020-10-14 DIAGNOSIS — I50.42 CHRONIC COMBINED SYSTOLIC AND DIASTOLIC CONGESTIVE HEART FAILURE (H): Primary | ICD-10-CM

## 2020-10-14 LAB
BASOPHILS # BLD AUTO: 0 10E9/L (ref 0–0.2)
BASOPHILS NFR BLD AUTO: 0.8 %
DIFFERENTIAL METHOD BLD: ABNORMAL
EOSINOPHIL # BLD AUTO: 0.2 10E9/L (ref 0–0.7)
EOSINOPHIL NFR BLD AUTO: 4.4 %
ERYTHROCYTE [DISTWIDTH] IN BLOOD BY AUTOMATED COUNT: 14.5 % (ref 10–15)
HCT VFR BLD AUTO: 36.4 % (ref 40–53)
HGB BLD-MCNC: 12 G/DL (ref 13.3–17.7)
LYMPHOCYTES # BLD AUTO: 0.9 10E9/L (ref 0.8–5.3)
LYMPHOCYTES NFR BLD AUTO: 17.9 %
MCH RBC QN AUTO: 28.4 PG (ref 26.5–33)
MCHC RBC AUTO-ENTMCNC: 33 G/DL (ref 31.5–36.5)
MCV RBC AUTO: 86 FL (ref 78–100)
MONOCYTES # BLD AUTO: 1.1 10E9/L (ref 0–1.3)
MONOCYTES NFR BLD AUTO: 23.6 %
NEUTROPHILS # BLD AUTO: 2.5 10E9/L (ref 1.6–8.3)
NEUTROPHILS NFR BLD AUTO: 53.3 %
PLATELET # BLD AUTO: 266 10E9/L (ref 150–450)
RBC # BLD AUTO: 4.22 10E12/L (ref 4.4–5.9)
WBC # BLD AUTO: 4.8 10E9/L (ref 4–11)

## 2020-10-14 PROCEDURE — 99214 OFFICE O/P EST MOD 30 MIN: CPT | Performed by: PHYSICIAN ASSISTANT

## 2020-10-14 PROCEDURE — 85025 COMPLETE CBC W/AUTO DIFF WBC: CPT | Performed by: PHYSICIAN ASSISTANT

## 2020-10-14 PROCEDURE — 36415 COLL VENOUS BLD VENIPUNCTURE: CPT | Performed by: PHYSICIAN ASSISTANT

## 2020-10-14 RX ORDER — INDOMETHACIN 50 MG/1
50 CAPSULE ORAL 2 TIMES DAILY WITH MEALS
Qty: 14 CAPSULE | Refills: 0 | Status: SHIPPED | OUTPATIENT
Start: 2020-10-14 | End: 2020-10-23

## 2020-10-14 ASSESSMENT — ANXIETY QUESTIONNAIRES
2. NOT BEING ABLE TO STOP OR CONTROL WORRYING: NOT AT ALL
3. WORRYING TOO MUCH ABOUT DIFFERENT THINGS: NOT AT ALL
7. FEELING AFRAID AS IF SOMETHING AWFUL MIGHT HAPPEN: NOT AT ALL
IF YOU CHECKED OFF ANY PROBLEMS ON THIS QUESTIONNAIRE, HOW DIFFICULT HAVE THESE PROBLEMS MADE IT FOR YOU TO DO YOUR WORK, TAKE CARE OF THINGS AT HOME, OR GET ALONG WITH OTHER PEOPLE: NOT DIFFICULT AT ALL
GAD7 TOTAL SCORE: 2
5. BEING SO RESTLESS THAT IT IS HARD TO SIT STILL: NOT AT ALL
6. BECOMING EASILY ANNOYED OR IRRITABLE: MORE THAN HALF THE DAYS
1. FEELING NERVOUS, ANXIOUS, OR ON EDGE: NOT AT ALL

## 2020-10-14 ASSESSMENT — PATIENT HEALTH QUESTIONNAIRE - PHQ9
SUM OF ALL RESPONSES TO PHQ QUESTIONS 1-9: 8
5. POOR APPETITE OR OVEREATING: NOT AT ALL

## 2020-10-14 ASSESSMENT — MIFFLIN-ST. JEOR: SCORE: 2190.08

## 2020-10-14 ASSESSMENT — ENCOUNTER SYMPTOMS
CARDIOVASCULAR NEGATIVE: 1
CONSTITUTIONAL NEGATIVE: 1
RESPIRATORY NEGATIVE: 1
GASTROINTESTINAL NEGATIVE: 1
EYES NEGATIVE: 1
NEUROLOGICAL NEGATIVE: 1
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
## 2020-10-15 ENCOUNTER — VIRTUAL VISIT (OUTPATIENT)
Dept: PSYCHOLOGY | Facility: CLINIC | Age: 70
End: 2020-10-15
Payer: MEDICARE

## 2020-10-15 DIAGNOSIS — F33.1 MAJOR DEPRESSIVE DISORDER, RECURRENT EPISODE, MODERATE WITH ANXIOUS DISTRESS (H): Primary | ICD-10-CM

## 2020-10-15 PROCEDURE — 99207 PR NO BILLABLE SERVICE THIS VISIT: CPT | Mod: TEL | Performed by: SOCIAL WORKER

## 2020-10-15 ASSESSMENT — COLUMBIA-SUICIDE SEVERITY RATING SCALE - C-SSRS
6. HAVE YOU EVER DONE ANYTHING, STARTED TO DO ANYTHING, OR PREPARED TO DO ANYTHING TO END YOUR LIFE?: YES
REASONS FOR IDEATION LIFETIME: MOSTLY TO END OR STOP THE PAIN (YOU COULDN'T GO ON LIVING WITH THE PAIN OR HOW YOU WERE FEELING)
TOTAL  NUMBER OF ACTUAL ATTEMPTS LIFETIME: 1
2. HAVE YOU ACTUALLY HAD ANY THOUGHTS OF KILLING YOURSELF LIFETIME?: YES
LETHALITY/MEDICAL DAMAGE CODE FIRST ACTUAL ATTEMPT: MODERATE PHYSICAL DAMAGE, MEDICAL ATTENTION NEEDED
3. HAVE YOU BEEN THINKING ABOUT HOW YOU MIGHT KILL YOURSELF?: YES
6. HAVE YOU EVER DONE ANYTHING, STARTED TO DO ANYTHING, OR PREPARED TO DO ANYTHING TO END YOUR LIFE?: NO
TOTAL  NUMBER OF INTERRUPTED ATTEMPTS PAST 3 MONTHS: NO
5. HAVE YOU STARTED TO WORK OUT OR WORKED OUT THE DETAILS OF HOW TO KILL YOURSELF? DO YOU INTEND TO CARRY OUT THIS PLAN?: YES
4. HAVE YOU HAD THESE THOUGHTS AND HAD SOME INTENTION OF ACTING ON THEM?: YES
1. IN THE PAST MONTH, HAVE YOU WISHED YOU WERE DEAD OR WISHED YOU COULD GO TO SLEEP AND NOT WAKE UP?: YES
ATTEMPT PAST THREE MONTHS: NO
1. IN THE PAST MONTH, HAVE YOU WISHED YOU WERE DEAD OR WISHED YOU COULD GO TO SLEEP AND NOT WAKE UP?: YES
ATTEMPT LIFETIME: YES
REASONS FOR IDEATION PAST MONTH: DOES NOT APPLY
TOTAL  NUMBER OF INTERRUPTED ATTEMPTS LIFETIME: NO
2. HAVE YOU ACTUALLY HAD ANY THOUGHTS OF KILLING YOURSELF?: NO
4. HAVE YOU HAD THESE THOUGHTS AND HAD SOME INTENTION OF ACTING ON THEM?: NO
5. HAVE YOU STARTED TO WORK OUT OR WORKED OUT THE DETAILS OF HOW TO KILL YOURSELF? DO YOU INTEND TO CARRY OUT THIS PLAN?: NO
TOTAL  NUMBER OF ABORTED OR SELF INTERRUPTED ATTEMPTS PAST LIFETIME: NO
TOTAL  NUMBER OF ABORTED OR SELF INTERRUPTED ATTEMPTS PAST 3 MONTHS: NO
5. HAVE YOU STARTED TO WORK OUT OR WORKED OUT THE DETAILS OF HOW TO KILL YOURSELF? DO YOU INTEND TO CARRY OUT THIS PLAN?: SEE ABOVE

## 2020-10-15 ASSESSMENT — ANXIETY QUESTIONNAIRES: GAD7 TOTAL SCORE: 2

## 2020-10-15 NOTE — PROGRESS NOTES
"               Progress Note - Initial Session    Client Name:  Jamar Ivory Date: 10-15-20         Service Type: Individual     Session Start Time: 1:31  Session End Time: 2:16     Session Length: 45    Session #: 1    Attendees: Client    Service Modality:  Phone Visit:    The patient has been notified of the following:      \"We have found that certain health care needs can be provided without the need for a face to face visit.  This service lets us provide the care you need with a phone conversation.       I will have full access to your [facility] medical record during this entire phone call.   I will be taking notes for your medical record.      Since this is like an office visit, we will bill your insurance company for this service.       There are potential benefits and risks of telephone visits (e.g. limits to patient confidentiality) that differ from in-person visits.?  Confidentiality still applies for telephone services, and nobody will record the visit.  It is important to be in a quiet, private space that is free of distractions (including cell phone or other devices) during the visit.??      If during the course of the call I believe a telephone visit is not appropriate, you will not be charged for this service\"     Consent has been obtained for this service by care team member: Yes        DATA:  Diagnostic Assessment in progress.  Unable to complete documentation at the conclusion of the first session due to completion of screenings and adult intake form.    Interactive Complexity: No  Crisis: No    Intervention:  Motivational Interviewing: MI spirit & PACE    ASSESSMENT:  Mental Status Assessment:  Appearance:   Could not assess due to telephone visit   Eye Contact:   Could not assess due to telephone visit   Psychomotor Behavior: Normal   Attitude:   Cooperative  Pleasant  Orientation:   All  Speech   Rate / Production: Impoverished    Volume:  Soft   Mood:    Depressed  anxious distress at " times  Affect:    Blunted  Flat   Thought Content:  Clear   Thought Form:  Coherent   Insight:    Fair       Safety Issues and Plan for Safety and Risk Management:     [place] Suicide Severity Rating Scale (Lifetime/Recent)  [place] Suicide Severity Rating (Lifetime/Recent) 10/15/2020   1. Wish to be Dead (Lifetime) Yes   Wish to be Dead Description (Lifetime) Had suicide attempt 50 years ago and currently he has thoughts of not wanting to be alive and going to sleep and not waking up   1. Wish to be Dead (Recent) Yes   Wish to be Dead Description (Recent) Fleeting intrusive thoughts of not wanting to be alive and going to sleep and not waking up.   2. Non-Specific Active Suicidal Thoughts (Lifetime) Yes   Non-Specific Active Suicidal Thought Description (Lifetime) After first divorce had fleeting thoughts and was impulsive and took all of his medication and drank a bottle of whiskey-thought no one cared about him.   2. Non-Specific Active Suicidal Thoughts (Recent) No   3. Active Suicidal Ideation with any Methods (Not Plan) Without Intent to Act (Lifetime) Yes   Active Suicidal Ideation with any Methods (Not Plan) Description (Lifetime) thoughts about not wanting to live after divorce of first marriage because hethought no one cared about him.   3. Active Sucidal Ideation with any Methods (Not Plan) Without Intent to Act (Recent) No   4. Active Suicidal Ideation with Some Intent to Act, Without Specific Plan (Lifetime) Yes   Active Suicidal Ideation with Some Intent to Act, Without Specific Plan Description (Lifetime) Yes back in 1970 after divorce of first marriage took pills and drank bottle of whiskey and was hospitalized   4. Active Suicidal Ideation with Some Intent to Act, Without Specific Plan (Recent) No   5. Active Suicidal Ideation with Specific Plan and Intent (Lifetime) Yes   Active Suicidal Ideation with Specific Plan and Intent Description (Lifetime) See above   5. Active Suicidal Ideation with  Specific Plan and Intent (Recent) No   Most Severe Ideation Rating (Lifetime) 5   Most Severe Ideation Description (Lifetime) After divorce in 1970   Frequency (Lifetime) 2   Duration (Lifetime) 2   Controllability (Lifetime) 5   Protective Factors  (Lifetime) 5   Reasons for Ideation (Lifetime) 4   Most Severe Ideation Rating (Past Month) 1   Most Severe Ideation Description (Past Month) Fleeting thoughts of not wanting to live without a plan, would not do due to difficulty after first attempt and protective factors due to children, grandchildren and wife   Frequency (Past Month) 1   Duration (Past Month) 1   Controllability (Past Month) 2   Protective Factors (Past Month) 1   Reasons for Ideation (Past Month) 0   Actual Attempt (Lifetime) Yes   Comments In 1970 after divorce from first wife   Actual Attempt Description (Lifetime) See above   Total Number of Actual Attempts (Lifetime) 1   Actual Attempt (Past 3 Months) No   Has subject engaged in non-suicidal self-injurious behavior? (Lifetime) No   Has subject engaged in non-suicidal self-injurious behavior? (Past 3 Months) No   Interrupted Attempts (Lifetime) No   Interrupted Attempts (Past 3 Months) No   Aborted or Self-Interrupted Attempt (Lifetime) No   Aborted or Self-Interrupted Attempt (Past 3 Months) No   Preparatory Acts or Behavior (Lifetime) Yes   Preparatory Acts or Behavior Description (Lifetime) Took all medications and drank a bottle of whiskey   Preparatory Acts or Behavior (Past 3 Months) No   Most Recent Attempt Date (No Data)   Comments N/A   Most Recent Attempt Actual Lethality Code NA   Initial/First Attempt Date (No Data)   Comments 1970   Initial/First Attempt Actual Lethality Code 2     Patient denies current fears or concerns for personal safety.  Patient reports the following current or recent suicidal ideation or behaviors: Patient has fleeting intrusive thoughts of not wanting to be alive; going to sleep and not waking up; no plan or  serious intent and was able to list several protective factors why he would not end life.  Patient denies current or recent homicidal ideation or behaviors.  Patient denies current or recent self injurious behavior or ideation.  Patient denies other safety concerns.  Recommended that patient call 911 or go to the local ED should there be a change in any of these risk factors.  Patient reports there are firearms in the house. The firearms are secured in a locked space.     Diagnostic Criteria:  A) Recurrent episode(s) - symptoms have been present during the same 2-week period and represent a change from previous functioning 5 or more symptoms (required for diagnosis)   - Depressed mood. Note: In children and adolescents, can be irritable mood.     - Diminished interest or pleasure in all, or almost all, activities.    - Decreased sleep.    - Psychomotor activity retardation.    - Fatigue or loss of energy.    - Feelings of worthlessness or inappropriate and excessive guilt.    - Diminished ability to think or concentrate, or indecisiveness.    - Recurrent thoughts of death (not just fear of dying), recurrent suicidal ideation without a specific plan, or a suicide attempt or a specific plan for committing suicide.   B) The symptoms cause clinically significant distress or impairment in social, occupational, or other important areas of functioning  C) The episode is not attributable to the physiological effects of a substance or to another medical condition  D) The occurence of major depressive episode is not better explained by other thought / psychotic disorders  E) There has never been a manic episode or hypomanic episode      DSM5 Diagnoses: (Sustained by DSM5 Criteria Listed Above)  Diagnoses: 296.32 (F33.1) Major Depressive Disorder, Recurrent Episode, Moderate _ and With anxious distress  Psychosocial & Contextual Factors: currently retired, few hobbies, introverted, health issues, difficulty completing daily  household chores  WHODAS 2.0 (12 item):   WHODAS 2.0 Total Score 10/15/2020   Total Score 22       Collateral Reports Completed:  Not Applicable      PLAN: (Homework, other):  Patient scheduled ongoing services with [facility].        TACO Jean  October 15, 2020

## 2020-10-16 NOTE — RESULT ENCOUNTER NOTE
Kenneth    Your lab tests are complete and I have reviewed the results.     - Your lab results look great; everything is normal.    If you have any questions or concerns, please feel free to call or send a [employer] message.    Sincerely,  Calvin Florez PA-C

## 2020-10-22 ENCOUNTER — CARE COORDINATION (OUTPATIENT)
Dept: CARDIOLOGY | Facility: CLINIC | Age: 70
End: 2020-10-22

## 2020-10-22 NOTE — PROGRESS NOTES
[facility]     MyHealth Tracker Monitoring For Upcoming Office Visit        Data Copied to Patient's Chart for Review at Office Visit On: 10/23/20 with Rozina Gallegos CNP    Current Diuretics and Potassium Plan: Torsemide 40 mg twice daily, Spironolactone 25 mg daily    Diuretic Changes/Extra Since Last Office Visit: None since 8/7/20 (Torsemide 20 mg)    Weight Trends:       Weight Graph:         Blanca Bright RN  Care Coordinator  [facility]   [facility] Nurse Line: [phone]  / Personal Line: [phone]  10/22/20 12:29 PM

## 2020-10-23 ENCOUNTER — OFFICE VISIT (OUTPATIENT)
Dept: CARDIOLOGY | Facility: CLINIC | Age: 70
End: 2020-10-23
Attending: NURSE PRACTITIONER
Payer: MEDICARE

## 2020-10-23 VITALS
BODY MASS INDEX: 48.76 KG/M2 | OXYGEN SATURATION: 95 % | DIASTOLIC BLOOD PRESSURE: 76 MMHG | SYSTOLIC BLOOD PRESSURE: 124 MMHG | WEIGHT: 315 LBS | HEART RATE: 80 BPM

## 2020-10-23 DIAGNOSIS — I50.42 CHRONIC COMBINED SYSTOLIC AND DIASTOLIC CONGESTIVE HEART FAILURE (H): ICD-10-CM

## 2020-10-23 DIAGNOSIS — E78.5 HYPERLIPIDEMIA WITH TARGET LDL LESS THAN 100: Chronic | ICD-10-CM

## 2020-10-23 DIAGNOSIS — I10 HYPERTENSION GOAL BP (BLOOD PRESSURE) < 140/90: Chronic | ICD-10-CM

## 2020-10-23 DIAGNOSIS — E66.01 OBESITY, MORBID (MORE THAN 100 LBS OVER IDEAL WEIGHT OR BMI > 40) (H): ICD-10-CM

## 2020-10-23 DIAGNOSIS — I50.42 CHRONIC COMBINED SYSTOLIC AND DIASTOLIC CONGESTIVE HEART FAILURE (H): Primary | ICD-10-CM

## 2020-10-23 LAB
ANION GAP SERPL CALCULATED.3IONS-SCNC: 7 MMOL/L (ref 3–14)
BUN SERPL-MCNC: 21 MG/DL (ref 7–30)
CALCIUM SERPL-MCNC: 8.8 MG/DL (ref 8.5–10.1)
CHLORIDE SERPL-SCNC: 101 MMOL/L (ref 94–109)
CO2 SERPL-SCNC: 28 MMOL/L (ref 20–32)
CREAT SERPL-MCNC: 1.02 MG/DL (ref 0.66–1.25)
GFR SERPL CREATININE-BSD FRML MDRD: 73 ML/MIN/{1.73_M2}
GLUCOSE SERPL-MCNC: 89 MG/DL (ref 70–99)
POTASSIUM SERPL-SCNC: 3.8 MMOL/L (ref 3.4–5.3)
SODIUM SERPL-SCNC: 136 MMOL/L (ref 133–144)

## 2020-10-23 PROCEDURE — 36415 COLL VENOUS BLD VENIPUNCTURE: CPT | Performed by: INTERNAL MEDICINE

## 2020-10-23 PROCEDURE — 80048 BASIC METABOLIC PNL TOTAL CA: CPT | Performed by: INTERNAL MEDICINE

## 2020-10-23 PROCEDURE — 99214 OFFICE O/P EST MOD 30 MIN: CPT | Performed by: NURSE PRACTITIONER
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
## 2020-10-26 ENCOUNTER — CARE COORDINATION (OUTPATIENT)
Dept: CARDIOLOGY | Facility: CLINIC | Age: 70
End: 2020-10-26

## 2020-10-26 ENCOUNTER — TELEPHONE (OUTPATIENT)
Dept: CARDIOLOGY | Facility: CLINIC | Age: 70
End: 2020-10-26

## 2020-10-26 NOTE — PROGRESS NOTES
[facility] JOVITA.O.RNESSA      Spoke to Melissa with Kenneth listening in the background regarding the importance of calling into MyHealth Tracker daily with weight and symptom updates.       For [employer] Meals Purposes:     Reviewed daily weights specifically between the dates of 8/9/20 - 9/9/20 to verify how many days Kenneth's weight fell outside of his parameters (318 - 326 lbs). Two days during this time period Kenneth's weight was 327 lbs. Will update [employer] Meals Tracking Sheet.      Blanca Bright RN  Care Coordinator  [facility] JOVITA.ORAMY   C.O.R.E. Nurse Line: [phone]  / Personal Line: [phone]  10/26/20 1:18 PM

## 2020-10-27 ENCOUNTER — CARE COORDINATION (OUTPATIENT)
Dept: CARDIOLOGY | Facility: CLINIC | Age: 70
End: 2020-10-27

## 2020-10-27 NOTE — PROGRESS NOTES
[facility]  C.ORainRNESSA    MyHealth Tracker Heart Failure Alert      Daily Weight Goal: 318-326 lbs    Today's Weight: 323 lbs      Symptom Alert: None       Current Diuretic & Potassium Plan: Torsemide 40 mg BID & Spironolactone 25 mg daily          Future Appointments   Date Time Provider Department Center   10/30/2020  9:00 AM Ricky Yu LICSW [facility]   11/10/2020 10:15 AM Carin Gomez MD [facility]   12/11/2020 12:30 PM RU LAB RULAB [facility]   12/11/2020  1:30 PM [facility]legos[facility]       Notes: Pt indulged yesterday. He will watch his diet today        MyHealth Tracker Weight Trends:             MyHealth Tracker Weight Graph:           Haily Graves, RN 11:02 AM 10/27/20

## 2020-11-02 ENCOUNTER — PATIENT OUTREACH (OUTPATIENT)
Dept: CARE COORDINATION | Facility: CLINIC | Age: 70
End: 2020-11-02

## 2020-11-02 ASSESSMENT — ACTIVITIES OF DAILY LIVING (ADL): DEPENDENT_IADLS:: INDEPENDENT

## 2020-11-02 NOTE — PROGRESS NOTES
Clinic Care Coordination Contact  [facility]/Voicemail    Referral Source: Care Team  Clinical Data: Care Coordinator Outreach  CC RN outreach to get updates on patient status, assess goal progress, and provide support and additional resources as needed.    Outreach attempted x 1.  Left message on patient's voicemail with call back information and requested return call.    Plan: Care Coordinator will try to reach patient again in approximately 7-10 business days.    Solo Dia RN  Clinic Care Coordinator  [facility]  Ph: [phone]

## 2020-11-10 ENCOUNTER — VIRTUAL VISIT (OUTPATIENT)
Dept: PSYCHOLOGY | Facility: CLINIC | Age: 70
End: 2020-11-10
Attending: PHYSICIAN ASSISTANT
Payer: MEDICARE

## 2020-11-10 DIAGNOSIS — F33.42 DEPRESSION, MAJOR, RECURRENT, IN COMPLETE REMISSION (H): Primary | ICD-10-CM

## 2020-11-10 ASSESSMENT — ANXIETY QUESTIONNAIRES
3. WORRYING TOO MUCH ABOUT DIFFERENT THINGS: NOT AT ALL
6. BECOMING EASILY ANNOYED OR IRRITABLE: NOT AT ALL
5. BEING SO RESTLESS THAT IT IS HARD TO SIT STILL: NOT AT ALL
IF YOU CHECKED OFF ANY PROBLEMS ON THIS QUESTIONNAIRE, HOW DIFFICULT HAVE THESE PROBLEMS MADE IT FOR YOU TO DO YOUR WORK, TAKE CARE OF THINGS AT HOME, OR GET ALONG WITH OTHER PEOPLE: NOT DIFFICULT AT ALL
1. FEELING NERVOUS, ANXIOUS, OR ON EDGE: NOT AT ALL
7. FEELING AFRAID AS IF SOMETHING AWFUL MIGHT HAPPEN: NOT AT ALL
2. NOT BEING ABLE TO STOP OR CONTROL WORRYING: MORE THAN HALF THE DAYS
GAD7 TOTAL SCORE: 2

## 2020-11-10 ASSESSMENT — PATIENT HEALTH QUESTIONNAIRE - PHQ9
SUM OF ALL RESPONSES TO PHQ QUESTIONS 1-9: 8
5. POOR APPETITE OR OVEREATING: NOT AT ALL
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
## 2020-11-10 NOTE — Clinical Note
Myah Flores,    I met with Kenneth today.  He was feeling better and not interested in completing a psychiatric evaluation today.  I was not really sure if you had referred him for therapy or for psychiatric care (he may have been scheduled with me in error.)  I will discharge him back to you, but feel free to refer him again in the future if needed.  In the meantime, I would recommend continuing the fluoxetine indefinitely if he is willing.    Thanks for the referral.  Please feel free to contact me with questions or concerns.    Regards,    Hui Gomez MD  Collaborative Care Psychiatry  [facility]

## 2020-11-11 ASSESSMENT — ANXIETY QUESTIONNAIRES: GAD7 TOTAL SCORE: 2

## 2020-11-13 ENCOUNTER — TELEPHONE (OUTPATIENT)
Dept: FAMILY MEDICINE | Facility: CLINIC | Age: 70
End: 2020-11-13

## 2020-11-13 NOTE — TELEPHONE ENCOUNTER
Called patient. He states that he has arthritis in his knees. He states he is wondering if embrel could help. He states he has seen commercials on television. Routing to provider to review/advise.

## 2020-11-13 NOTE — TELEPHONE ENCOUNTER
Enbrel is used to treat autoimmune diseases and will not help with his arthritis pain in the he has in his knees.     He should focus on weight loss as the first treatment - this can help a lot!

## 2020-11-13 NOTE — TELEPHONE ENCOUNTER
Patient Contact    Attempt # 1    Was call answered?  No.  Left message on voicemail with information to call triage back.    On call back:     -Relay provider message.

## 2020-11-19 ENCOUNTER — PATIENT OUTREACH (OUTPATIENT)
Dept: NURSING | Facility: CLINIC | Age: 70
End: 2020-11-19
Payer: MEDICARE

## 2020-11-19 ENCOUNTER — CARE COORDINATION (OUTPATIENT)
Dept: CARDIOLOGY | Facility: CLINIC | Age: 70
End: 2020-11-19

## 2020-11-19 ASSESSMENT — ACTIVITIES OF DAILY LIVING (ADL): DEPENDENT_IADLS:: INDEPENDENT

## 2020-11-19 NOTE — PROGRESS NOTES
[facility]  CRainORAMY    MyHealth Tracker Heart Failure Alert      Daily Weight Goal: 318-326 lbs    Today's Weight: 323 lbs      Symptom Alert: Said yes to        5) Have you felt more dizzy or lightheaded, since yesterday?    Current Diuretic & Potassium Plan: Torsemide 40 mg BID & Spironolactone 25 mg daily. No KCl            Future Appointments   Date Time Provider Department Center   12/11/2020 12:30 PM RU LAB RULAB P [facility]   12/11/2020  1:30 PM Rozina Gallegos, APRN [facility]       Notes: Accidentally said yes, no dizziness        MyHealth Tracker Weight Trends:             MyHealth Tracker Weight Graph:       Haily Graves, RN 9:22 AM 11/19/20

## 2020-11-19 NOTE — PROGRESS NOTES
Clinic Care Coordination Contact    Follow Up Progress Note     Assessment:    Patient reported he has been feeling quite well.  He reported his mood continues to be 9 out of 10 with 10 being the best.    Patient continues taking fluoxetine as prescribed and feels this has been effective in helping with his depression.    Patient did have a Virtual Visit with Dr. Gomez (Psychiatry) on 11/10/20 at which time he also reported to be doing well from a mental health standpoint.  Patient declined psychiatric evaluation at that time but will keep this in mind as an option in the future.    Patient has continued focusing on his hobbies.  He has been spending a lot of time doing crossword puzzles lately.  He also has asked for a couple of ship models for [place] and hopes to receive one that he can work on putting together.    Patient's wife updated that they have been receiving food from [employer] which has been going well.  They received their Thanksgiving food this week.    Patient's wife also feels patient has been doing better lately.  She noted he was able to help her carry their food into the apartment yesterday which can be quite taxing yet he was able to help.    Patient has continued to be compliant with Cardiology/CORE follow-up; he denied concerns regarding this at this time.    Patient denied need for additional resources/supports at this time.    Goals addressed this encounter:   Goals Addressed                 This Visit's Progress       Patient Stated      1. Mental Health Management (pt-stated)   50%     Goal Statement: I will work to improve my depression.  Date Goal set: 11/8/19 // revised on 9/21/20  Barriers: long-standing history of depression, reports minimal depression improvement in the past despite multiple interventions  Strengths: motivated to improve depression, willing to continue trying different things for depression treatment  Date to Achieve By: 3/31/21  Patient expressed understanding  of goal: Yes    Action steps to achieve this goal:  1. I will continue taking my medications as prescribed and will notify my care team of any medication questions or concerns.  2. I will establish care with Therapy and Psychiatry for ongoing mental health management.  3. I will continue routine follow-up with my PCP as indicated.  4. I will focus on doing more things I enjoy on a weekly basis.  5. I will explore additional mental health supports and resources that I can utilize when needed.  6. I will continue working with Care Coordination to identify and address any barriers to achieving my goal.        Intervention/Education provided during outreach:    CC RN provided kudos to patient for attending his appointment with Psychiatry, continued medication compliance, and continued efforts toward doing things he enjoys.    CC RN encouraged patient to call should he desire additional resources, intervention ideas, etc; he agreed to do so.    Outreach Frequency: monthly    Plan:    Patient will continue taking his medications as prescribed.    Patient will continue routine follow-up with his care teams as recommended.    Patient will continue focusing on hobbies and interests to support improved depression and overall mental health.    Patient agreed to contact CC RN with additional questions or concerns.    CC RN will outreach to patient in approximately 1 month to get updates on patient status, assess goal progress, and offer additional support and resources as indicated.    Solo Dia, RN  Clinic Care Coordinator  [facility]  Ph: [phone]

## 2020-11-24 ENCOUNTER — CARE COORDINATION (OUTPATIENT)
Dept: CARDIOLOGY | Facility: CLINIC | Age: 70
End: 2020-11-24

## 2020-11-24 NOTE — PROGRESS NOTES
[facility]  C.ORAMY    MyHealth Tracker Heart Failure Alert      Daily Weight Goal: 318-326 lbs    Today's Weight: 325 lbs      Symptom Alert: Said yes to          3) Did shortness of breath wake you up last night?      5) Have you felt more dizzy or lightheaded, since yesterday?    Current Diuretic & Potassium Plan: Spironolactone 25 mg daily & Torsemide 40 mg BID           Future Appointments   Date Time Provider Department Center   12/11/2020 12:30 PM RU LAB RULAB [facility]   12/11/2020  1:30 PM Rozina Gallegos APRN CNP [facility]       Notes: Left voice mail for Kenneth and Melissa        MyHealth Tracker Weight Trends:               MyHealth Tracker Weight Graph:             Haily Gerdowsky, RN 1:10 PM 11/24/20

## 2020-12-02 ENCOUNTER — CARE COORDINATION (OUTPATIENT)
Dept: CARDIOLOGY | Facility: CLINIC | Age: 70
End: 2020-12-02

## 2020-12-02 NOTE — PROGRESS NOTES
[facility]  CRainORAMY    [employer] Tracker Heart Failure Alert      Daily Weight Goal: 318-324 lbs    Today's Weight: 330 lbs    Symptom Alert: said yes to       4) Did you prop up on more pillows or sleep in a chair to breathe easier last night?    Current Diuretic & Potassium Plan: Torsemide 40 mg BID & Spironolactone 25 mg daily. No KCl      Future Appointments   Date Time Provider Department Center   12/11/2020 12:30 PM RU LAB RULAB P [facility]   12/11/2020  1:30 PM Rozina Gallegos, APRN CNP [facility]       Notes:   Kenneth and Melissa have been overindulging in foods from family and friends. Kenneth has also missed some doses of spironolactone and torsemide. He did take both this morning and will take him afternoon dose today. I will watch his weight and symptoms for tomorrow.         MyHealth Tracker Weight Trends:               MyHealth Tracker Weight Graph:               Haily Graves RN 11:10 AM 12/02/20

## 2020-12-07 ENCOUNTER — CARE COORDINATION (OUTPATIENT)
Dept: CARDIOLOGY | Facility: CLINIC | Age: 70
End: 2020-12-07

## 2020-12-07 NOTE — PROGRESS NOTES
"[facility]  C.O.RNESSA    MyHealth Tracker Heart Failure Alert      Daily Weight Goal: 318-326 lbs    Today's Weight: 331 lbs      Symptom Alert: Said yes       5) Have you felt more dizzy or lightheaded, since yesterday?    Current Diuretic & Potassium Plan: Spironolactone 25 mg daily & Torsemide 40 mg BID           Future Appointments   Date Time Provider Department Center   12/11/2020 12:30 PM RU LAB RULAB [facility]   12/11/2020  1:30 PM Rozina Gallegos APRN CNP [facility] CL[facility]       Notes:  He missed a few doses of meds and \"overindulgled\"   Education given.         MyHealth Tracker Weight Trends:         MyHealth Tracker Weight Graph:           Haily Graves RN 8:24 AM 12/07/20    "

## 2020-12-08 ENCOUNTER — CARE COORDINATION (OUTPATIENT)
Dept: CARDIOLOGY | Facility: CLINIC | Age: 70
End: 2020-12-08

## 2020-12-08 DIAGNOSIS — I50.42 CHRONIC COMBINED SYSTOLIC AND DIASTOLIC CONGESTIVE HEART FAILURE (H): Primary | ICD-10-CM

## 2020-12-08 RX ORDER — POTASSIUM CHLORIDE 1500 MG/1
20 TABLET, EXTENDED RELEASE ORAL PRN
Qty: 30 TABLET | Refills: 0 | Status: SHIPPED | OUTPATIENT
Start: 2020-12-08 | End: 2020-12-11

## 2020-12-08 RX ORDER — METOLAZONE 5 MG/1
5 TABLET ORAL PRN
Qty: 5 TABLET | Refills: 0 | Status: SHIPPED | OUTPATIENT
Start: 2020-12-08 | End: 2020-12-17

## 2020-12-08 NOTE — PROGRESS NOTES
eVga Anders MD  You 18 minutes ago (11:41 AM)        Give Metolazone 5 mg once with 40 of K today and labs on day with Rozina.         Documentation

## 2020-12-08 NOTE — TELEPHONE ENCOUNTER
His weight in August when I had seen him was 324 and I had thought he was volume overloaded. He was not interested in meds or procedures back then. He has not seen Rozina since 2 months. He should come in to clinic to be evaluated in CORE. He needs labs and aggressive outpatient diuresis. What is his home (previous) and current diuretic regimen?

## 2020-12-08 NOTE — PROGRESS NOTES
"Spoke to Kenneth and Melissa. Gave them instructions to take Metolazone 5 mg 30 min prior to torsemide with 40 mEq KCl.. I did educate them about this medication.   Kenneth and Melissa have \" many errands to run today\". I advised him to take tomorrow morning prior to his morning torsemide dose. Both expressed understanding. Reminded them of Kenneth's appt with Rozina on Friday, 12/11.     Haily Graves RN 12:33 PM 12/08/20      "

## 2020-12-08 NOTE — PROGRESS NOTES
Vega Anders MD  You; Rozina Gallegos APRN CNP 1 hour ago (9:15 AM)        His weight in August when I had seen him was 324 and I had thought he was volume overloaded. He was not interested in meds or procedures back then. He has not seen Rozina since 2 months. He should come in to clinic to be evaluated in CORE. He needs labs and aggressive outpatient diuresis. What is his home (previous) and current diuretic regimen?         Documentation

## 2020-12-08 NOTE — PROGRESS NOTES
Kenneth has CORE appt with Wainwright this Friday, 12/11, with BMP prior.     Current regimen:   Torsemide 40 mg BID   Spironolactone 25 mg daily    Recent changes:   - 7/22 - CORE w/Destiny: Torsemide 40 mg am & 20 mg in afternoon. Spironolactone increased from 12.5 to 25 mg daily. Wts 320-325 lbs. Plan: extra 20 mg for wt gain.    - 7/30 - Extra 20 mg torsemide for wt 327 lbs. No wts until 8/3 - 326 lbs     -8/7 - Wt 324 lbs, took extra 20 mg torsemide on own, no wt for 8/8. 8/9 was 324 lbs    - 8/10 - OV w/Martita: Offered IV diuresis & consider RHC - pt declined    - 8/24 - Took extra 20 mg torsemide on own on 8/17 for wt 326, lost 2 lbs.     - 9/8 - Wt 319, Wainwright updated    - 9/10 - wt 316 lbs, Wainwright update. No changes    -  9/14 wt 315, Wainwright updated & no changes    - 10/2 - OV w/Wainwright: Wts 320-325 lbs, 321 lbs today. Appears fluid up & increased SOB. Plan: Increased torsemide to 40 mg BID. Started losartan, Follow-up 3 wks    - 10/9 - wt 327  lbs, diet education. Extra 20 mg on 10/9, down 2 lbs, 324 lbs.     - 10/23 - OV w/Wainwright - Wt 327 lbs. Home wts 324-330 lbs.  No changes.     -10/29 - wt 327 lbs, took extra 20 on own, lost 2 lbs, 325 lbs    - 11/20 - 11/25  - Wts 324-326  lbs. missed couple dose and diet indiscretions.     - 12/2 - wt 330 (was 324 lbs on 11/30) Diet indiscretions.     - 12/2 - 12/8 - Wts 330-335 lbs.     Haily Graves, RN 10:45 AM 12/08/20

## 2020-12-08 NOTE — PROGRESS NOTES
"[facility]  C.O.R.E.    [employer] Tracker Heart Failure Alert      Daily Weight Goal: 318-324 lbs    Today's Weight: 335 lbs    Symptom Alert: Said yes to      2) If you have swelling in your legs or abdomen, is it worse today than yesterday?      5) Have you felt more dizzy or lightheaded, since yesterday?    Current Diuretic & Potassium Plan: Torsemide 40 mg BID & Spironolactone 25 mg daily. No KCl      Future Appointments   Date Time Provider Department Center   12/11/2020 12:30 PM RU LAB RULAB [facility]   12/11/2020  1:30 PM [facility] Gallegos[facility]       Notes:   **Kenneth did miss a few doses of torsemide & spironolactone had high salt diet last week (12/1 - 12/3).     Spoke to Melissa as Kenneth did not want to talk. Melissa did report that Kenneth has not made any comments to her but she thinks his abdomen is more bloated as well as legs look \"puffy\". She was not aware of his weight. He did go for a walk this morning.   Spoke to Kenneth. He did tell me he walked about 3 blocks with frequent stops to catch his breath. He is fatigued right now. He was also feeling a little dizzy while walking.   He reports he has been taking his medications as instructed. His continues to have diet indiscretions. We spoke about how he feels when he is \"indulging\" compared to when he is watching what he eats and he did say \"I do feel better when I eat like I should\". Asked Kenneth to try to think of that as he is preparing meals or snacks. He said he would try.     Will review with Dr Anders as Rozina is off.       MyHealth Tracker Weight Trends:             MyHealth Tracker Weight Graph:         Haily Gerdowsky, RN 8:19 AM 12/08/20      "

## 2020-12-09 NOTE — TELEPHONE ENCOUNTER
Thanks! Glad he is feeling better. Continue current regimen. See in CORE clinic with FLORA and likely will have to go to weekly PRN metolazone dosings.

## 2020-12-09 NOTE — PROGRESS NOTES
Vega Anders MD  You 47 minutes ago (1:24 PM)        If weight is down then wait till he is seen before adding metolazone

## 2020-12-09 NOTE — PROGRESS NOTES
[facility]  CSCAR    MyHealth Tracker Heart Failure Alert      Daily Weight Goal: 318-324 lbs    Today's Weight: 325 lbs    Symptom Alert: None     Current Diuretic & Potassium Plan: Torsemide 40 mg BID & spironolactone 25 mg daily    Last Extra Diuretic: Metolazone 5 mg with 40 KCl mEq on 12/8/2020      Future Appointments   Date Time Provider Department Center   12/11/2020 12:30 PM RU LAB RULAB P [facility]   12/11/2020  1:30 PM Rozina Gallegos, APRN CNP [facility] CLIN       Notes:   Kenneth was to take metolazone 5 mg with 40 KCl mEq yesterday, He is done 10 lbs per [facility].   Left voice mail for them.     Will update Dr Anders     Appt 12/11 w/BMP prior     MyHealth Tracker Weight Trends:               MyHealth Tracker Weight Graph:          Haily Graves RN 11:23 AM 12/09/20

## 2020-12-09 NOTE — PROGRESS NOTES
Spoke to Melissa and told her we will watch and see how Kenneth is tomorrow.     Haily Graves RN 2:13 PM 12/09/20

## 2020-12-09 NOTE — PROGRESS NOTES
Vega Anders MD  You 35 minutes ago (11:35 AM)        Thanks! Glad he is feeling better. Continue current regimen. See in CORE clinic with FLORA and likely will have to go to weekly PRN metolazone dosings.

## 2020-12-09 NOTE — PROGRESS NOTES
Spoke to Melissa. She said that Kenneth mis-read the scale yesterday. They did not go  metolazone.   I did ask her to wait to get it until I review with Dr Anders. She expressed understanding.     Haily Graves RN 1:16 PM 12/09/20

## 2020-12-11 ENCOUNTER — OFFICE VISIT (OUTPATIENT)
Dept: CARDIOLOGY | Facility: CLINIC | Age: 70
End: 2020-12-11
Attending: NURSE PRACTITIONER
Payer: MEDICARE

## 2020-12-11 ENCOUNTER — DOCUMENTATION ONLY (OUTPATIENT)
Dept: CARDIOLOGY | Facility: CLINIC | Age: 70
End: 2020-12-11

## 2020-12-11 VITALS
SYSTOLIC BLOOD PRESSURE: 120 MMHG | OXYGEN SATURATION: 92 % | DIASTOLIC BLOOD PRESSURE: 68 MMHG | HEART RATE: 78 BPM | WEIGHT: 315 LBS | BODY MASS INDEX: 50.47 KG/M2

## 2020-12-11 DIAGNOSIS — I10 HYPERTENSION GOAL BP (BLOOD PRESSURE) < 140/90: Chronic | ICD-10-CM

## 2020-12-11 DIAGNOSIS — E66.01 OBESITY, MORBID (MORE THAN 100 LBS OVER IDEAL WEIGHT OR BMI > 40) (H): ICD-10-CM

## 2020-12-11 DIAGNOSIS — I50.42 CHRONIC COMBINED SYSTOLIC AND DIASTOLIC CONGESTIVE HEART FAILURE (H): ICD-10-CM

## 2020-12-11 DIAGNOSIS — E78.5 HYPERLIPIDEMIA WITH TARGET LDL LESS THAN 100: Chronic | ICD-10-CM

## 2020-12-11 DIAGNOSIS — I50.42 CHRONIC COMBINED SYSTOLIC AND DIASTOLIC CONGESTIVE HEART FAILURE (H): Primary | ICD-10-CM

## 2020-12-11 LAB
ANION GAP SERPL CALCULATED.3IONS-SCNC: 5 MMOL/L (ref 3–14)
BUN SERPL-MCNC: 19 MG/DL (ref 7–30)
CALCIUM SERPL-MCNC: 8.9 MG/DL (ref 8.5–10.1)
CHLORIDE SERPL-SCNC: 103 MMOL/L (ref 94–109)
CO2 SERPL-SCNC: 28 MMOL/L (ref 20–32)
CREAT SERPL-MCNC: 0.91 MG/DL (ref 0.66–1.25)
GFR SERPL CREATININE-BSD FRML MDRD: 84 ML/MIN/{1.73_M2}
GLUCOSE SERPL-MCNC: 103 MG/DL (ref 70–99)
POTASSIUM SERPL-SCNC: 4.2 MMOL/L (ref 3.4–5.3)
SODIUM SERPL-SCNC: 136 MMOL/L (ref 133–144)

## 2020-12-11 PROCEDURE — 99214 OFFICE O/P EST MOD 30 MIN: CPT | Performed by: NURSE PRACTITIONER

## 2020-12-11 PROCEDURE — 36415 COLL VENOUS BLD VENIPUNCTURE: CPT | Performed by: NURSE PRACTITIONER

## 2020-12-11 PROCEDURE — 80048 BASIC METABOLIC PNL TOTAL CA: CPT | Mod: QW | Performed by: NURSE PRACTITIONER
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
## 2020-12-11 NOTE — PATIENT INSTRUCTIONS
1. INCREASE Torsemide to 3 tablets (60mg) in the morning and 2 tablets (40mg) in the afternoon for 3 days (Saturday, Sunday and Monday).   2. Continue to monitor weight  3. Follow up with Rozina in 2 months  4. Please call with any additional questions/concerns [phone]

## 2020-12-11 NOTE — PROGRESS NOTES
[facility]  DORA    [employer] Heart Failure   For office visit review      Daily Weight Goal: 318-326 lbs    [facility] Enrollment date: 6/7/2020    Current Diuretic: Torsemide 40 mg in am and 20 mg in pm. Spironolactone 25 mg dialy     Potassium Plan: none     Diuretic Plan: Extra 20 mg torsemide in afternoon per CORE    MyHealth Tracker Weight Trends:               MyHealth Tracker Weight Graph:              Haily Graves RN 11:45 AM 12/11/20

## 2020-12-14 ENCOUNTER — CARE COORDINATION (OUTPATIENT)
Dept: CARDIOLOGY | Facility: CLINIC | Age: 70
End: 2020-12-14

## 2020-12-14 NOTE — PROGRESS NOTES
[facility]  DORA    [employer] Heart Failure Alert      Daily Weight Goal: 318-326 lbs    Today's Weight: 328 lbs    Symptom Alert: Said yes to       5) Have you felt more dizzy or lightheaded, since yesterday?    Current Diuretic: Torsemide 40 mg in am and 20 mg in pm. Spironolactone 25 mg dialy     Potassium Plan: none     Diuretic Plan: Extra 20 mg torsemide in afternoon per CORE    Future Appointments   Date Time Provider Department Center   2/12/2021 12:30 PM RU LAB RULAB P [facility] CL[facility]   2/12/2021  1:30 PM Rozina Gallegos, APRN CNP [facility] PSA CLIN       Notes: Left voice mail for Kenneth to call back.       MyHealth Tracker Weight Trends:            MyHealth Tracker Weight Graph:           Haily Graves, RN 10:39 AM 12/14/20

## 2020-12-16 ENCOUNTER — CARE COORDINATION (OUTPATIENT)
Dept: CARDIOLOGY | Facility: CLINIC | Age: 70
End: 2020-12-16

## 2020-12-16 NOTE — PROGRESS NOTES
[facility]  [place]ain[place]AMY    [employer] Heart Failure Alert      Daily Weight Goal: 318-326 lbs    Today's Weight: 324 lbs      Symptom Alert: said yes to         5) Have you felt more dizzy or lightheaded, since yesterday?    Current Diuretic & Potassium Plan: spironolactone 25 mg daily and Torsemide 40 mg BID    Last Extra Diuretic: torsemide dose to 60mg qam and 40mg qpm x 3 days. 12/12-12/15/20      Future Appointments   Date Time Provider Department Center   2/12/2021 12:30 PM RU LAB RULAB [facility]   2/12/2021  1:30 PM [facility]       Notes: Spoke to Melissa and Kenneth. Kenneth is reporting some dizziness with position changes (sit to stand). It is better today. He is eating and drinking. No issues with bowel or bladder.   They receives a call from [facility] asking if they need to  metolazone that was called in last wk with KCl that was not needed as Kenneth entered in the wrong. They are asking if he needs those medications.     Last OV was 12/11 with Rozina with BMP. Clinic weight was 331 lbs, with home wt of 325 lbs. Plan: Reviewed options for diuretic adjustments today, he declined PRN metolazone. He agreed to increasing morning torsemide dose to 60mg qam and 40mg qpm x 3 days. Recommended CORE follow up in 2 months.     Will review with Dr Anders and Rozina     MyHealth Tracker Weight Trends:                MyHealth Tracker Weight Graph:                 Haily Graves, RN 3:33 PM 12/16/20

## 2020-12-17 NOTE — PROGRESS NOTES
Reviewed CORE my health tracker phone encounter. Weights appears to be declining. Recommend continuing current plan and use additional PRN torsemide or if patient willing metolazone in the future for weight gain/worsening symptoms when advised by CORE.     YENI Yang [facility]  Pager: [phone]

## 2020-12-17 NOTE — PROGRESS NOTES
Called and spoke to Melissa. Kenneth was still in bed. He was up voiding frequently last night. Will wait for him to call in to [facility].     Did let her know that since his weight is coming down on it's own NO metolazone.     Called [facility] and asked them to restock both KCl and metolazone as they are not needed.  Both meds removed from list     Haily Graves RN 11:34 AM 12/17/20

## 2020-12-21 ENCOUNTER — CARE COORDINATION (OUTPATIENT)
Dept: CARDIOLOGY | Facility: CLINIC | Age: 70
End: 2020-12-21

## 2020-12-21 NOTE — PROGRESS NOTES
[facility]  CSCAR    MyHealth Tracker Heart Failure Alert      Daily Weight Goal: 318-326 lbs    Today's Weight: 321 lbs      Symptom Alert: said yes to         5) Have you felt more dizzy or lightheaded, since yesterday?    Current Diuretic & Potassium Plan: spironolactone 25 mg daily and Torsemide 40 mg BID     Last Extra Diuretic: torsemide dose to 60mg qam and 40mg qpm x 3 days. 12/12-12/15/20      Future Appointments   Date Time Provider Department Center   2/12/2021 12:30 PM RU LAB RULAB P [facility] CL[facility]   2/12/2021  1:30 PM Rozina Gallegos APRN CNP [facility] PSA CLIN       Notes:   Left vm for Kenneth          MyHealth Tracker Weight Trends:                MyHealth Tracker Weight Graph:             Haily Graves RN 12:25 PM 12/21/20

## 2021-01-03 DIAGNOSIS — R39.12 BENIGN PROSTATIC HYPERPLASIA WITH WEAK URINARY STREAM: ICD-10-CM

## 2021-01-03 DIAGNOSIS — N40.1 BENIGN PROSTATIC HYPERPLASIA WITH WEAK URINARY STREAM: ICD-10-CM

## 2021-01-04 ENCOUNTER — OFFICE VISIT (OUTPATIENT)
Dept: FAMILY MEDICINE | Facility: CLINIC | Age: 71
End: 2021-01-04
Payer: MEDICARE

## 2021-01-04 VITALS
BODY MASS INDEX: 47.74 KG/M2 | WEIGHT: 315 LBS | RESPIRATION RATE: 18 BRPM | OXYGEN SATURATION: 97 % | SYSTOLIC BLOOD PRESSURE: 120 MMHG | DIASTOLIC BLOOD PRESSURE: 70 MMHG | HEIGHT: 68 IN | HEART RATE: 67 BPM | TEMPERATURE: 97.5 F

## 2021-01-04 DIAGNOSIS — E78.2 MIXED HYPERLIPIDEMIA: ICD-10-CM

## 2021-01-04 DIAGNOSIS — E78.5 HYPERLIPIDEMIA WITH TARGET LDL LESS THAN 100: Chronic | ICD-10-CM

## 2021-01-04 DIAGNOSIS — I73.9 PVD (PERIPHERAL VASCULAR DISEASE) (H): ICD-10-CM

## 2021-01-04 DIAGNOSIS — E11.9 TYPE 2 DIABETES MELLITUS WITHOUT COMPLICATION, WITHOUT LONG-TERM CURRENT USE OF INSULIN (H): Chronic | ICD-10-CM

## 2021-01-04 DIAGNOSIS — E66.01 OBESITY, MORBID (MORE THAN 100 LBS OVER IDEAL WEIGHT OR BMI > 40) (H): Primary | ICD-10-CM

## 2021-01-04 LAB — HBA1C MFR BLD: 5.7 % (ref 0–5.6)

## 2021-01-04 PROCEDURE — 36415 COLL VENOUS BLD VENIPUNCTURE: CPT | Performed by: PHYSICIAN ASSISTANT

## 2021-01-04 PROCEDURE — 80061 LIPID PANEL: CPT | Performed by: PHYSICIAN ASSISTANT

## 2021-01-04 PROCEDURE — 99214 OFFICE O/P EST MOD 30 MIN: CPT | Performed by: PHYSICIAN ASSISTANT

## 2021-01-04 PROCEDURE — 99207 PR FOOT EXAM NO CHARGE: CPT | Performed by: PHYSICIAN ASSISTANT

## 2021-01-04 PROCEDURE — 83036 HEMOGLOBIN GLYCOSYLATED A1C: CPT | Performed by: PHYSICIAN ASSISTANT

## 2021-01-04 PROCEDURE — 82043 UR ALBUMIN QUANTITATIVE: CPT | Performed by: PHYSICIAN ASSISTANT

## 2021-01-04 ASSESSMENT — MIFFLIN-ST. JEOR: SCORE: 2203.69

## 2021-01-04 NOTE — PATIENT INSTRUCTIONS
Prior to wrapping the legs, apply a thick layer of petroleum jelly.  Do not itch the legs.     Schedule a diabetic eye exam and please send me the records.  I have placed a referral.     Diarrhea - continue hydration and eating bland foods until you can tolerate a normal diet.           Proper skin care from [facility]:     -Eliminate harsh soaps as they strip the natural oils from the skin, often resulting in dry itchy skin ( i.e. Dial, Zest, [place] Spring)  -Use mild soaps such as Cetaphil or Dove Sensitive Skin in the shower. You do not need to use soap on arms, legs, and trunk every time you shower unless visibly soiled.   -Avoid hot or cold showers.  -After showering, lightly dry off and apply moisturizing within 2-3 minutes. This will help trap moisture in the skin.   -Aggressive use of a moisturizer at least 1-2 times a day to the entire body (including -Vanicream, Cetaphil, Aquaphor or Cerave) and moisturize hands after every washing.  -We recommend using moisturizers that come in a tub that needs to be scooped out, not a pump. This has more of an oil base. It will hold moisture in your skin much better than a water base moisturizer. The above recommended are non-pore clogging.

## 2021-01-04 NOTE — PROGRESS NOTES
Assessment & Plan   Problem List Items Addressed This Visit        Digestive    Obesity, morbid (more than 100 lbs over ideal weight or BMI > 40) (H) - Primary    Relevant Orders    Lipid panel reflex to direct LDL Fasting (Completed)       Endocrine    Diet Controlled Type 2 diabetes mellitus without complication, without long-term current use of insulin (H) (Chronic)    Relevant Orders    Albumin Random Urine Quantitative with Creat Ratio    HEMOGLOBIN A1C (Completed)    FOOT EXAM (Completed)    EYE ADULT REFERRAL    Hyperlipidemia with target LDL less than 100 (Chronic)       Circulatory    PVD (peripheral vascular disease) (H)                           32 minutes spent on the date of the encounter doing chart review, history and exam, documentation and further activities as noted above           FUTURE APPOINTMENTS:       - Follow-up for annual visit or as needed  Patient Instructions   Prior to wrapping the legs, apply a thick layer of petroleum jelly.  Do not itch the legs.     Schedule a diabetic eye exam and please send me the records.  I have placed a referral.     Diarrhea - continue hydration and eating bland foods until you can tolerate a normal diet.           Proper skin care from [facility]:     -Eliminate harsh soaps as they strip the natural oils from the skin, often resulting in dry itchy skin ( i.e. Dial, Zest, Chyna Spring)  -Use mild soaps such as Cetaphil or Dove Sensitive Skin in the shower. You do not need to use soap on arms, legs, and trunk every time you shower unless visibly soiled.   -Avoid hot or cold showers.  -After showering, lightly dry off and apply moisturizing within 2-3 minutes. This will help trap moisture in the skin.   -Aggressive use of a moisturizer at least 1-2 times a day to the entire body (including -Vanicream, Cetaphil, Aquaphor or Cerave) and moisturize hands after every washing.  -We recommend using moisturizers that come in a tub that needs to be scooped  "out, not a pump. This has more of an oil base. It will hold moisture in your skin much better than a water base moisturizer. The above recommended are non-pore clogging.        Return in about 4 weeks (around 2/1/2021) for Preventive Care Visit.    [facility]    Danni Ivory is a 70 year old male who presents to clinic today for the following health issues  accompanied by his spouse:    HPI       Diarrhea  Onset/Duration: about 3 days  Description:       Consistency of stool: watery       Blood in stool: no       Number of loose stools past 24 hours: 0  Progression of Symptoms: improving  Accompanying signs and symptoms:       Fever: no       Nausea/Vomiting: no       Abdominal pain: no       Weight loss: no       Episodes of constipation: no  History   Ill contacts: no  Recent use of antibiotics: no  Recent travels: no  Recent medication-new or changes(Rx or OTC): no  Precipitating or alleviating factors: None  Therapies tried and outcome: none    Musculoskeletal problem/pain  Onset/Duration: about a month  Description  Location: foot - left  Joint Swelling: YES  Redness: no  Pain: YES  Warmth: no  Intensity:  moderate  Progression of Symptoms:  improving  Accompanying signs and symptoms:   Fevers: no  Numbness/tingling/weakness: no  History  Trauma to the area: no  Recent illness:  no  Previous similar problem: no  Previous evaluation:  no  Precipitating or alleviating factors:  Aggravating factors include: standing and walking  Therapies tried and outcome: nothing    Gout flare?  Pain is now improving  Hard to bear weight for about one month - better now          Review of Systems         Objective    /70   Pulse 67   Temp 97.5  F (36.4  C)   Resp 18   Ht 1.727 m (5' 8\")   Wt 147.4 kg (325 lb)   SpO2 97%   BMI 49.42 kg/m    Body mass index is 49.42 kg/m .  Physical Exam  Cardiovascular:      Pulses:           Dorsalis pedis " pulses are 1+ on the right side and 1+ on the left side.   Musculoskeletal:      Right lower leg: Edema present.      Left lower leg: Edema present.   Feet:      Right foot:      Protective Sensation: 5 sites tested. 5 sites sensed.      Skin integrity: Callus and dry skin present. No skin breakdown.      Toenail Condition: Fungal disease present.     Left foot:      Protective Sensation: 5 sites tested. 5 sites sensed.      Skin integrity: Callus and dry skin present. No skin breakdown.      Toenail Condition: Fungal disease present.     Comments: Bilateral lower leg wounds - no drainage, no erythema.  Dry serous drainage on socks.  Excoriation bilateral lower legs.            No results found for any visits on 01/04/21.

## 2021-01-05 LAB
CHOLEST SERPL-MCNC: 183 MG/DL
CREAT UR-MCNC: 81 MG/DL
HDLC SERPL-MCNC: 38 MG/DL
LDLC SERPL CALC-MCNC: 117 MG/DL
MICROALBUMIN UR-MCNC: <5 MG/L
MICROALBUMIN/CREAT UR: NORMAL MG/G CR (ref 0–17)
NONHDLC SERPL-MCNC: 145 MG/DL
TRIGL SERPL-MCNC: 138 MG/DL

## 2021-01-05 RX ORDER — SIMVASTATIN 40 MG
40 TABLET ORAL AT BEDTIME
Qty: 90 TABLET | Refills: 3 | Status: SHIPPED | OUTPATIENT
Start: 2021-01-05 | End: 2021-01-08

## 2021-01-05 NOTE — RESULT ENCOUNTER NOTE
Kenneth    Your lab tests are complete and I have reviewed the results.     Diabetes control is good.   I would like to restart simvastatin, due to high cholesterol.  I will send a prescription, please start at your convenience.     If you have any questions or concerns, please feel free to call or send a [employer] message.    Sincerely,  Calvin Florez PA-C

## 2021-01-08 ENCOUNTER — PATIENT OUTREACH (OUTPATIENT)
Dept: NURSING | Facility: CLINIC | Age: 71
End: 2021-01-08
Payer: MEDICARE

## 2021-01-08 ENCOUNTER — OFFICE VISIT (OUTPATIENT)
Dept: FAMILY MEDICINE | Facility: CLINIC | Age: 71
End: 2021-01-08
Payer: MEDICARE

## 2021-01-08 VITALS
HEIGHT: 68 IN | OXYGEN SATURATION: 95 % | DIASTOLIC BLOOD PRESSURE: 72 MMHG | SYSTOLIC BLOOD PRESSURE: 126 MMHG | BODY MASS INDEX: 46.68 KG/M2 | HEART RATE: 78 BPM | WEIGHT: 308 LBS | TEMPERATURE: 97.4 F

## 2021-01-08 DIAGNOSIS — Z00.00 ENCOUNTER FOR ANNUAL WELLNESS EXAM IN MEDICARE PATIENT: Primary | ICD-10-CM

## 2021-01-08 DIAGNOSIS — E78.5 HYPERLIPIDEMIA WITH TARGET LDL LESS THAN 100: ICD-10-CM

## 2021-01-08 DIAGNOSIS — I50.42 CHRONIC COMBINED SYSTOLIC AND DIASTOLIC CONGESTIVE HEART FAILURE (H): ICD-10-CM

## 2021-01-08 PROCEDURE — 99213 OFFICE O/P EST LOW 20 MIN: CPT | Mod: 25 | Performed by: PHYSICIAN ASSISTANT

## 2021-01-08 PROCEDURE — G0439 PPPS, SUBSEQ VISIT: HCPCS | Performed by: PHYSICIAN ASSISTANT

## 2021-01-08 RX ORDER — TORSEMIDE 20 MG/1
80 TABLET ORAL DAILY
Qty: 180 TABLET | Refills: 1 | Status: SHIPPED | OUTPATIENT
Start: 2021-01-08 | End: 2021-02-24

## 2021-01-08 RX ORDER — ATORVASTATIN CALCIUM 20 MG/1
TABLET, FILM COATED ORAL
Qty: 70 TABLET | Refills: 0 | Status: ON HOLD | OUTPATIENT
Start: 2021-01-08 | End: 2021-02-09

## 2021-01-08 ASSESSMENT — ENCOUNTER SYMPTOMS
PSYCHIATRIC NEGATIVE: 1
CONSTITUTIONAL NEGATIVE: 1
MUSCULOSKELETAL NEGATIVE: 1
EYES NEGATIVE: 1
GASTROINTESTINAL NEGATIVE: 1
CARDIOVASCULAR NEGATIVE: 1
RESPIRATORY NEGATIVE: 1
NEUROLOGICAL NEGATIVE: 1

## 2021-01-08 ASSESSMENT — ACTIVITIES OF DAILY LIVING (ADL): CURRENT_FUNCTION: NO ASSISTANCE NEEDED

## 2021-01-08 ASSESSMENT — MIFFLIN-ST. JEOR: SCORE: 2126.58

## 2021-01-08 NOTE — PROGRESS NOTES
Clinic Care Coordination Contact    Follow Up Progress Note      Assessment: Pt had Medicare Annual Wellness visit with PCP today.     Goals addressed this encounter:   Goals Addressed                 This Visit's Progress      1. Mental Health Management (pt-stated)   50%     Goal Statement: I will work to improve my depression.  Date Goal set: 11/8/19 // revised on 9/21/20  Barriers: long-standing history of depression, reports minimal depression improvement in the past despite multiple interventions  Strengths: motivated to improve depression, willing to continue trying different things for depression treatment  Date to Achieve By: 3/31/21  Patient expressed understanding of goal: Yes    Action steps to achieve this goal:  1. I will continue taking my medications as prescribed and will notify my care team of any medication questions or concerns.  2. I will establish care with Therapy and Psychiatry for ongoing mental health management.  3. I will continue routine follow-up with my PCP as indicated.  4. I will focus on doing more things I enjoy on a weekly basis.  5. I will explore additional mental health supports and resources that I can utilize when needed.  6. I will continue working with Care Coordination to identify and address any barriers to achieving my goal.             Intervention/Education provided during outreach: CHANDNI GUARDADO outreach to pt for goal check in.     CHANDNI GUARDADO reviewed visit with PCP notes from earlier today. Discussed new medication Lipitor and changed Torsemide dose. Discussed that previous medication gave him bad diarrhea - Simvastatin. Pt reported he weighs too much, advised to discuss this with PCP and ways to manage this.     CHANDNI GUARDADO introduced self and role, taking over for RN CC. CHANDNI CC reviewed goal and chart. Pt had psychiatry appt 11/10/20 but declined full psych assessment; last scheduled [facility] counseling appt was 10/30/20 and pt was no show. Pt had [facility] counseling appt 10/15/20.     Pt  reported he was not interested in revisiting these providers, did not feel counseling was helpful. SW CC offered other resources but pt said he is managing fine by himself at this time.     SW CC reviewed upcoming appts in chart.     Pt's wife got on the line and discussed her own health concerns. Provided active listening and feedback as needed.     Outreach Frequency: Monthly    Plan: Due to nature of pt's goals, lead CC will be transferred from RN CC to SW CC. SW CC will follow up in one month. Pt will continue to follow up as needed and follow treatment plan.     MERY Altamirano   Social Work Clinic Care Coordinator   [facility]  PH: [phone]  [email or web address]
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
## 2021-01-11 ENCOUNTER — CARE COORDINATION (OUTPATIENT)
Dept: CARDIOLOGY | Facility: CLINIC | Age: 71
End: 2021-01-11

## 2021-01-11 RX ORDER — TAMSULOSIN HYDROCHLORIDE 0.4 MG/1
CAPSULE ORAL
Qty: 180 CAPSULE | Refills: 0 | Status: SHIPPED | OUTPATIENT
Start: 2021-01-11 | End: 2021-03-11

## 2021-01-11 NOTE — PROGRESS NOTES
"[facility]: C.O.R.E. Clinic Telemanagment  My Health Tracker HF Alert     Today's Weight: 330 lbs (wt above parameter; 5 lb wt gain in 1 week)  Current Weight Parameters:  318-326 lbs       Weight Graph Trends:          Current Daily Diuretic Plan:  Torsemide 80 mg daily, spironolactone 25 mg daily    Recent Adjustments Made:   - 12/11: Office Visit w/ [facility] wt 331 lbs w/ increased LE edema and fatigue.  Torsemide increased x 3 days (12/12, 12/13, 12/14).  60 mg in AM and 40 mg in PM.  Pt declined wanting Metolazone.     Notes:  Called and spoke with wife Melissa.  She reports she thinks he had some dietary indiscretion w/ high sodium foods and increased fluid intake recently.  She reports Kenneth doesn't share much of his symptoms.  He answered no to all MHT s/s.  He just reports, \"I don't feel good.\"        Future Appointments   Date Time Provider Department Center   2/12/2021 12:30 PM [facility] LAB RULAB [facility] CL[facility]   2/12/2021  1:30 PM Rozina Gallegos APRN CNP [facility] PSA CL[facility]       Plan:  Will route to Rozina Gallegos CNP for further review and recommendations      Blanca Gould RN BSN  C.O.R.E. Clinic Care Coordinator  [facility] [place], [place]  [phone]  01/11/21, 1:38 PM             "

## 2021-01-12 NOTE — PROGRESS NOTES
"Spoke to Melissa and Kenneth. Myah, his PCP, increased his torsemide to 80 mg daily and to take it in the morning as he is up all night voiding.   She also started him on Atorvastatin 20 mg daily.   I did review instructions per Rozina: Increase torsemide to 60mg qam and 40mg qpm x 3 days. Kenneth is very hesitant on taking any diuretics in the afternoon as he \"doesn't want to be up all night peeing.\" Reminded him he needs to be mindful and really watch his diet. He gave the phone back to his wife and she said he is not happy about the diet.       Future Appointments   Date Time Provider Department Center   2/12/2021 12:30 PM [facility] LAB RULAB [facility] PSA [facility]   2/12/2021  1:30 PM Rozina Gallegos APRN CNP [facility] PSA CLIN     Update to Rozina Graves, RN 2:16 PM 01/12/21      "

## 2021-01-12 NOTE — PROGRESS NOTES
Rozina Gallegos, YENI CNP  You 1 hour ago (2:37 PM)     Okay. Thanks for the update.     Message text

## 2021-01-12 NOTE — PROGRESS NOTES
Reviewed CORE MHT encounter. Weight up 5# in 1 week to 330#. Patient was vague with symptoms when CORE nurse called. Appears weight gain likely secondary to dietary indiscretions.     RECOMMENDATIONS  1. Increase torsemide to 60mg qam and 40mg qpm x 3 days  2. Reinforce low sodium diet      YENI Yang [facility]  Pager: [phone]

## 2021-01-14 ENCOUNTER — TELEPHONE (OUTPATIENT)
Dept: FAMILY MEDICINE | Facility: CLINIC | Age: 71
End: 2021-01-14

## 2021-01-14 ENCOUNTER — OFFICE VISIT (OUTPATIENT)
Dept: FAMILY MEDICINE | Facility: CLINIC | Age: 71
End: 2021-01-14
Payer: MEDICARE

## 2021-01-14 ENCOUNTER — CARE COORDINATION (OUTPATIENT)
Dept: CARDIOLOGY | Facility: CLINIC | Age: 71
End: 2021-01-14

## 2021-01-14 VITALS
SYSTOLIC BLOOD PRESSURE: 128 MMHG | DIASTOLIC BLOOD PRESSURE: 84 MMHG | HEIGHT: 68 IN | BODY MASS INDEX: 47.74 KG/M2 | OXYGEN SATURATION: 98 % | TEMPERATURE: 97.8 F | RESPIRATION RATE: 18 BRPM | WEIGHT: 315 LBS | HEART RATE: 90 BPM

## 2021-01-14 DIAGNOSIS — I50.42 CHRONIC COMBINED SYSTOLIC AND DIASTOLIC CONGESTIVE HEART FAILURE (H): Primary | ICD-10-CM

## 2021-01-14 DIAGNOSIS — M10.9 ACUTE GOUT OF RIGHT ANKLE, UNSPECIFIED CAUSE: Primary | ICD-10-CM

## 2021-01-14 PROCEDURE — 99213 OFFICE O/P EST LOW 20 MIN: CPT | Performed by: INTERNAL MEDICINE

## 2021-01-14 RX ORDER — PREDNISONE 20 MG/1
TABLET ORAL
Qty: 20 TABLET | Refills: 0 | Status: SHIPPED | OUTPATIENT
Start: 2021-01-14 | End: 2021-02-09

## 2021-01-14 ASSESSMENT — MIFFLIN-ST. JEOR: SCORE: 2221.83

## 2021-01-14 NOTE — TELEPHONE ENCOUNTER
Patient reports right ankle pain and swelling X 2 days. Rates pain as 12/10. States that he has a history of gout.  Scheduled patient for 4:20 today with Dr. Munoz.  Lucía Hale RN

## 2021-01-14 NOTE — PATIENT INSTRUCTIONS
Stick with acetaminophen for the pain, can use the biofreeze and ice packs as well.   Take prednisone for the gout flare

## 2021-01-14 NOTE — PROGRESS NOTES
"  Assessment & Plan     Acute gout of right ankle, unspecified cause  Likely acute gout.  Will treat with prednisone rather than colchicine due to the duration of his symptoms. Would stick to acetaminophen for pain. Can use ice as well.   - predniSONE (DELTASONE) 20 MG tablet; Take 3 tabs by mouth daily x 3 days, then 2 tabs daily x 3 days, then 1 tab daily x 3 days, then 1/2 tab daily x 3 days.      Return in about 2 days (around 1/16/2021) for If no improvement.    Nasrin Munoz MD  [facility] is a 70 year [facility] who presents to clinic today for the following health issues  accompanied by his wife:    HPI       Concern - possible gout follow flare up, right leg  Onset: 3 days ago  Description: constant ache  Intensity: 8/10  Progression of Symptoms:  constant  Accompanying Signs & Symptoms: None  Previous history of similar problem: Yes  Precipitating factors:        Worsened by: standing up, walking, putting pressure on the leg  Alleviating factors:        Improved by: keeping legs  elevated  Therapies tried and outcome: cold packs on his leg, some  relief    Kenneth is here with concern for a gout flare. The pain started in his right ankle a few days ago. Feels like gout he has had in the past.  (He mentions being on a medicine for gout that MARTIN Diaz prescribed at a recent visit but we reviewed that gout was not addressed at either of his visits last week.)  He is taking acetaminophen for pain and using an ice pack.  No fevers or chills.  Hurts to move the ankle and bear weight, there is some swelling.          Review of Systems   Const, skin, msk reviewed,  otherwise negative unless noted above.        Objective    /84   Pulse 90   Temp 97.8  F (36.6  C) (Tympanic)   Resp 18   Ht 1.727 m (5' 8\")   Wt 149.2 kg (329 lb)   SpO2 98%   BMI 50.02 kg/m    Body mass index is 50.02 kg/m .  Physical Exam   Gen: pleasant, obese gentleman in a wheelchair, no " distress  MSK: R ankle without notable erythema or significant warmth. Swelling present, but he does have baseline edema. ROM limited due to pain. Tender to palpation.   Skin: erythema and some ulceration at shin above the ankle - per patient and his wife this is chronic and not where his pain is

## 2021-01-14 NOTE — PROGRESS NOTES
01/14/21 today's wt is 333 lbs which is still not back within wt parameters of 318-326 lbs.  Called and spoke with wife Melissa to get update.  She reports he hasn't been feeling so well.  R ankle is really swollen today.  Knee is painful, hasn't been walking very well.  He confirms he did the 3 days of increased torsemide to 60 mg in AM and 40 mg in PM.  He denies worsening s/s since Monday.  He's decreased his fluid intake since 1/11, prior to that wife reports he had been drinking about 6L of fluid daily previously.  The only thing he had for dinner last night was chicken broth.      Future Appointments   Date Time Provider Department Center   2/12/2021 12:30 PM RU LAB RULAB [facility] PSA CLIN   2/12/2021  1:30 PM Rozina Gallegos APRN CNP [facility] PSA CLIN       Blanca Gould RN BSN   [place], [place]  C.O.R.E. Clinic Care Coordinator  01/14/21, 1:29 PM

## 2021-01-15 RX ORDER — METOLAZONE 2.5 MG/1
2.5 TABLET ORAL PRN
Qty: 5 TABLET | Refills: 0 | Status: SHIPPED | OUTPATIENT
Start: 2021-01-15 | End: 2021-03-05

## 2021-01-15 RX ORDER — POTASSIUM CHLORIDE 1500 MG/1
20 TABLET, EXTENDED RELEASE ORAL PRN
Qty: 30 TABLET | Refills: 0 | Status: SHIPPED | OUTPATIENT
Start: 2021-01-15 | End: 2021-02-12

## 2021-01-15 NOTE — PROGRESS NOTES
[facility]  DORA    Pwin[facility] Tracker Heart Failure Alert      Daily Weight Goal: 318-326 lbs    Today's Weight: 330 lbs    Symptom Alert: None     Current Diuretic & Potassium Plan: spironolactone 25 mg daily and Torsemide 80 mg daily      Last Extra Diuretic: torsemide dose to 60mg qam and 40mg qpm x 3 days. 1/11-1/14. Lost 3 lbs than gained it back      Future Appointments   Date Time Provider Department Center   2/12/2021 12:30 PM RU LAB RULAB [facility] PSA CLIN   2/12/2021  1:30 PM Rozina Gallegos, APRN CNP [facility] PSA CLIN       Notes:   1/14 - R ankle was very swollen, wt 333 lbs.   Spoke to Melissa as Kenneth did not want to talk as his depression is bad. He is now taking prednisone tapering dose for gout flare up. Started yesterday for a 12 day course.   Instructed them per Rozina to take Metolazone 2.5 mg with 40 mEq KCl tomorrow 30 min prior to torsemide. Repeated back correctly.          MyHealth Tracker Weight Trends:            MyHealth Tracker Weight Graph:              Haily Graves RN 11:56 AM 01/15/21

## 2021-01-15 NOTE — PROGRESS NOTES
Reviewed CORE phone encounter. Recommend PRN metolazone 2.5mg x 1 (I think he has this at home) with additional 40meq potassium. Keep CORE follow up as scheduled.     YENI Yang [facility]  Pager: [phone]

## 2021-01-18 ENCOUNTER — CARE COORDINATION (OUTPATIENT)
Dept: CARDIOLOGY | Facility: CLINIC | Age: 71
End: 2021-01-18

## 2021-01-18 NOTE — PROGRESS NOTES
[facility]  DORA    MyHealth Tracker Heart Failure Alert      Daily Weight Goal: 318-326 lbs    Today's Weight: 325 lbs    Symptom Alert: None      Current Diuretic & Potassium Plan: spironolactone 25 mg daily and Torsemide 80 mg daily       Last Extra Diuretic: Metolazone 5 mg with 20 mEq KCL on 1/16. (was instructed to take metolazone 2.5 mg with 40 mEq KCl, he was confused about how many to take so he took 2 metolazones (5 mg) and 1 KCL tablet. Wife gave him 2nd KCl tablet.   Response: Did not weight the next day, voided a lot.     Future Appointments   Date Time Provider Department Center   2/12/2021 12:30 PM RU [facility] PSA CLIN   2/12/2021  1:30 PM Rozina Gallegos APRN [facility] PSA CLIN       Notes: Kenneth was instructed to take metolazone 2.5 mg (one tablet) with 40 mEq KCL (2 tablets) on 1/16. He misunderstood how many tablets of each he was to take and took 2 metolazones and 1 KCl tablet. His wife did give him a 2nd KCL tablet for a total of 40 mEq. Kenneth did void a lot and continues to. He denies bloating, shortness of breath, swelling, cramps or dizziness.     Will update Rozina          MyHealth Tracker Weight Trends: MHT not working since 1/15         MyHealth Tracker Weight Graph:       Haily Graves RN 9:51 AM 01/18/21

## 2021-01-19 NOTE — PROGRESS NOTES
Message  Received: Yesterday  Message Contents   Rozina Gallegos APRN CNP Gerdowsky, Christina, RN   Caller: Unspecified (Yesterday,  9:23 AM)             Thank you for the update. Will continue to monitor. No further changes at this time.

## 2021-01-20 ENCOUNTER — CARE COORDINATION (OUTPATIENT)
Dept: CARDIOLOGY | Facility: CLINIC | Age: 71
End: 2021-01-20

## 2021-01-20 NOTE — PROGRESS NOTES
Received voice mail early this morning from Kenneth asking for call back.     Called Kenneth back and his wife answered as he was sleeping. I did let her know he left voice mail at 0650 today and sounded slightly panicking. She woke him and he wanted to know where to get his COVID vaccine. I did referral them to their PCP.   Expressed understanding.   Haily Graves RN 8:57 AM 01/20/21

## 2021-01-28 ENCOUNTER — CARE COORDINATION (OUTPATIENT)
Dept: CARDIOLOGY | Facility: CLINIC | Age: 71
End: 2021-01-28

## 2021-01-28 NOTE — PROGRESS NOTES
"Spoke to Kenneth and Melissa. He is feeling \"good\".  No dizziness now. Reports leg swelling and abd bloat at baseline.     Haily Graves RN 4:07 PM 01/28/21      "

## 2021-01-28 NOTE — PROGRESS NOTES
[facility]  DORA    MyHealth Tracker Heart Failure Alert      Daily Weight Goal: 318-326 lbs    Today's Weight: 322 lbs      Symptom Alert: said yes to          2) If you have swelling in your legs or abdomen, is it worse today than yesterday?      5) Have you felt more dizzy or lightheaded, since yesterday?    Current Diuretic & Potassium Plan: spironolactone 25 mg daily and Torsemide 80 mg daily       Last Extra Diuretic: Metolazone 5 mg with 20 mEq KCL on 1/16. (was instructed to take metolazone 2.5 mg with 40 mEq KCl, he was confused about how many to take so he took 2 metolazones (5 mg) and 1 KCL tablet. Wife gave him 2nd KCl tablet.     Future Appointments   Date Time Provider Department Center   2/12/2021 12:30 PM RU LAB RULAB P PSA CLIN   2/12/2021  1:30 PM Rozina Gallegos APRN CNP [facility] PSA CLIN       Notes: Left VM        MyHealth Tracker Weight Trends:               MyHealth Tracker Weight Graph:           Haily Graves RN 10:33 AM 01/28/21

## 2021-01-29 NOTE — PROGRESS NOTES
[facility]  DORA    MyHealth Tracker Heart Failure Alert      Daily Weight Goal: 318-326 lbs    Today's Weight: 325 lbs    Symptom Alert: None      Current Diuretic & Potassium Plan: Spironolactone 25 mg daily and Torsemide 80 mg daily       Last Extra Diuretic: Metolazone 5 mg with 20 mEq KCL on 1/16. (was instructed to take metolazone 2.5 mg with 40 mEq KCl, he was confused about how many to take so he took 2 metolazones (5 mg) and 1 KCL tablet. Wife gave him 2nd KCl tablet.        Future Appointments   Date Time Provider Department Center   2/12/2021 12:30 PM RU LAB RULAB [facility] PSA CLIN   2/12/2021  1:30 PM Rozina Gallegos APRN [facility] PSA CLIN       Notes:   Call to Kenneth and Melissa. He is doing well. She is concerned about his eating. He will eat 3-5 slices of bread. At the store he puts hot dogs and olives in the cart. They had sausage for breakfast this morning. I did diet education with Melissa and Kenneth. I did ask Kenneth to please not eat any olives, hot dogs, way or other slaty foods for he weekend. He states he will try.       MyHealth Tracker Weight Trends:             MyHealth Tracker Weight Graph:           Haily Graves RN 10:57 AM 01/29/21

## 2021-02-05 ENCOUNTER — CARE COORDINATION (OUTPATIENT)
Dept: CARDIOLOGY | Facility: CLINIC | Age: 71
End: 2021-02-05

## 2021-02-05 ENCOUNTER — TELEPHONE (OUTPATIENT)
Dept: CARDIOLOGY | Facility: CLINIC | Age: 71
End: 2021-02-05

## 2021-02-05 DIAGNOSIS — I50.42 CHRONIC COMBINED SYSTOLIC AND DIASTOLIC CONGESTIVE HEART FAILURE (H): ICD-10-CM

## 2021-02-05 RX ORDER — POTASSIUM CHLORIDE 1500 MG/1
20 TABLET, EXTENDED RELEASE ORAL PRN
Qty: 30 TABLET | Refills: 0 | Status: CANCELLED | OUTPATIENT
Start: 2021-02-05

## 2021-02-05 NOTE — PROGRESS NOTES
Call to Melissa and Kenneth with recoemmednations    1. If weight remains > 330# tomorrow recommend PRN metolazone 2.5mg ONCE with 40meq potassium.  2. Continue monitoring weight/symptoms via my health tracker  3. CORE follow up as scheduled next week.     Melissa will put 1 metolazone and 2 KCl tablets out and put the bottles away.     Haily Graves RN 2:39 PM 02/05/21

## 2021-02-05 NOTE — PROGRESS NOTES
"[facility]  C.O.RNESSA    MyHealth Tracker Heart Failure Alert         Daily Weight Goal: 318-326 lbs     Today's Weight: 331 lbs     Symptom Alert: said yes to                1) If you have shortness of breath, is it worse today than yesterday?   5) Have you felt more dizzy or lightheaded, since yesterday?                Current Diuretic & Potassium Plan: Spironolactone 25 mg daily and Torsemide 80 mg daily       Last Extra Diuretic: Metolazone 5 mg with 20 mEq KCL on 1/16. (was instructed to take metolazone 2.5 mg with 40 mEq KCl, he was confused about how many to take so he took 2 metolazones (5 mg) and 1 KCL tablet. Wife gave him 2nd KCl tablet.        Future Appointments   Date Time Provider Department Center   2/12/2021 12:30 PM RU LAB RULAB [facility] PSA CLIN   2/12/2021  1:30 PM Rozina Gallegos, APRN [facility] PSA CLIN       Notes:   Spoke to both Kenneth and Melissa. Kenneth was very vague with his responses. He did say he does feel like he \"will fall over\" when he stands up. His breathing is worse today then yesterday. Not forth coming with diet or how much fluids he has been drinking.     Update to Rozina Nuñez Weight Trends:               MyHealth Tracker Weight Graph:           Haily Graves RN 10:06 AM 02/05/21      "

## 2021-02-05 NOTE — PROGRESS NOTES
"Reviewed CORE phone encounter. Weight is up with complaints of increased dyspnea and reporting that he feels like \"will fall over\" when he stands up. No further details given to CORE nurse on the phone.     Weight 331#. No blood pressures for review.       RECOMMENDATIONS  1. If weight remains > 330# tomorrow recommend PRN metolazone 2.5mg ONCE with 40meq potassium.  2. Continue monitoring weight/symptoms via my health tracker  3. CORE follow up as scheduled next week.     YENI Yang [facility]  Pager: [phone]    "

## 2021-02-05 NOTE — TELEPHONE ENCOUNTER
Fax sent to [facility] to deny refill of Potassium. Patient has an upcoming appointment on 2/12. He can call CORE nurse if he needs a refill.

## 2021-02-07 ENCOUNTER — NURSE TRIAGE (OUTPATIENT)
Dept: NURSING | Facility: CLINIC | Age: 71
End: 2021-02-07

## 2021-02-07 NOTE — TELEPHONE ENCOUNTER
HX of CHF .  FNA triage call :   Presenting problem : Pt called. Persisting constant mild  Dizziness and constant   SOB and  Intermittent , up to severe headache  in the last 2 weeks - without any known cause . Currently : 1&0 , eating , and homebound  Activity ( FNA  advised to use walker ) getting up to kitchen , bathroom and bed and no change in leg swelling and headache is 7/10 on pain scale and   Guideline used : Covid suspected A AH.   Disposition and recommendations : COVID 19 Nurse Triage Plan/Patient Instructions    Please be aware that novel coronavirus (COVID-19) may be circulating in the community. If you develop symptoms such as fever, cough, or SOB or if you have concerns about the presence of another infection including coronavirus (COVID-19), please contact your health care provider or visit [email or web address].     Disposition/Instructions/ Pt is unsure what he will do , will discuss with his wife.     ED Visit recommended. Follow protocol based instructions.     Bring Your Own Device:  Please also bring your smart device(s) (smart phones, tablets, laptops) and their charging cables for your personal use and to communicate with your care team during your visit.    Thank you for taking steps to prevent the spread of this virus.  o Limit your contact with others.  o Wear a simple mask to cover your cough.  o Wash your hands well and often.    Resources    [facility]: About COVID-19: [email or web address]    CDC: What to Do If You're Sick: www.cdc.gov/coronavirus/2019-ncov/about/steps-when-sick.html    CDC: Ending Home Isolation: www.cdc.gov/coronavirus/2019-ncov/hcp/disposition-in-home-patients.html     CDC: Caring for Someone: www.cdc.gov/coronavirus/2019-ncov/if-you-are-sick/care-for-someone.html     [facility]: Interim Guidance for Hospital Discharge to Home: www.health.[facility].[place]/diseases/coronavirus/hcp/hospdischarge.pdf    [facility] clinical trials (COVID-19 research  studies): clinicalaffairs.[email or web address]-clinical-trials     Below are the COVID-19 hotlines at the [place] Department of Health ([facility]). Interpreters are available.   o For health questions: Call 291-516-8603 or 1-406.128.1127 (7 a.m. to 7 p.m.)  o For questions about schools and childcare: Call 594-478-5673 or 1-888.815.2538 (7 a.m. to 7 p.m.)     Caller verbalizes understanding and denies further questions and will call back if further symptoms to triage or questions  . Nani Eng RN  - [place] Nurse Advisor       Reason for Disposition    SEVERE or constant chest pain or pressure (Exception: mild central chest pain, present only when coughing)    Additional Information    Negative: SEVERE difficulty breathing (e.g., struggling for each breath, speaks in single words)    Negative: Difficult to awaken or acting confused (e.g., disoriented, slurred speech)    Negative: Bluish (or gray) lips or face now    Negative: Shock suspected (e.g., cold/pale/clammy skin, too weak to stand, low BP, rapid pulse)    Negative: Sounds like a life-threatening emergency to the triager    Negative: [1] COVID-19 exposure AND [2] no symptoms    Negative: [1] Lives with someone known to have influenza (flu test positive) AND [2] flu-like symptoms (e.g., cough, runny nose, sore throat, SOB; with or without fever)    Negative: [1] Adult with possible COVID-19 symptoms AND [2] triager concerned about severity of symptoms or other causes    Negative: COVID-19 vaccine reaction suspected (e.g., fever, headache, muscle aches) occurring during days 1-3 after getting vaccine    Negative: COVID-19 vaccine, questions about    Negative: COVID-19 and breastfeeding, questions about    Protocols used: CORONAVIRUS (COVID-19) DIAGNOSED OR [id number] 1.3

## 2021-02-09 ENCOUNTER — APPOINTMENT (OUTPATIENT)
Dept: GENERAL RADIOLOGY | Facility: CLINIC | Age: 71
DRG: 313 | End: 2021-02-09
Attending: EMERGENCY MEDICINE
Payer: MEDICARE

## 2021-02-09 ENCOUNTER — CARE COORDINATION (OUTPATIENT)
Dept: CARDIOLOGY | Facility: CLINIC | Age: 71
End: 2021-02-09

## 2021-02-09 ENCOUNTER — HOSPITAL ENCOUNTER (INPATIENT)
Facility: CLINIC | Age: 71
LOS: 1 days | Discharge: HOME | DRG: 313 | End: 2021-02-11
Attending: EMERGENCY MEDICINE | Admitting: HOSPITALIST
Payer: MEDICARE

## 2021-02-09 ENCOUNTER — OFFICE VISIT (OUTPATIENT)
Dept: FAMILY MEDICINE | Facility: CLINIC | Age: 71
End: 2021-02-09
Payer: MEDICARE

## 2021-02-09 VITALS
SYSTOLIC BLOOD PRESSURE: 138 MMHG | HEART RATE: 80 BPM | BODY MASS INDEX: 49.11 KG/M2 | OXYGEN SATURATION: 97 % | WEIGHT: 315 LBS | DIASTOLIC BLOOD PRESSURE: 80 MMHG

## 2021-02-09 DIAGNOSIS — M79.604 PAIN IN BOTH LOWER EXTREMITIES: ICD-10-CM

## 2021-02-09 DIAGNOSIS — I87.2 VENOUS STASIS ULCER OF ANKLE LIMITED TO BREAKDOWN OF SKIN WITHOUT VARICOSE VEINS, UNSPECIFIED LATERALITY (H): ICD-10-CM

## 2021-02-09 DIAGNOSIS — R07.89 CHEST TIGHTNESS: ICD-10-CM

## 2021-02-09 DIAGNOSIS — R06.02 SHORTNESS OF BREATH: ICD-10-CM

## 2021-02-09 DIAGNOSIS — R07.9 CHEST PAIN, UNSPECIFIED TYPE: Primary | ICD-10-CM

## 2021-02-09 DIAGNOSIS — R60.0 BILATERAL LEG EDEMA: Primary | ICD-10-CM

## 2021-02-09 DIAGNOSIS — M79.605 PAIN IN BOTH LOWER EXTREMITIES: ICD-10-CM

## 2021-02-09 DIAGNOSIS — L97.301 VENOUS STASIS ULCER OF ANKLE LIMITED TO BREAKDOWN OF SKIN WITHOUT VARICOSE VEINS, UNSPECIFIED LATERALITY (H): ICD-10-CM

## 2021-02-09 LAB
ANION GAP SERPL CALCULATED.3IONS-SCNC: 5 MMOL/L (ref 3–14)
BASOPHILS # BLD AUTO: 0.1 10E9/L (ref 0–0.2)
BASOPHILS NFR BLD AUTO: 1.3 %
BUN SERPL-MCNC: 35 MG/DL (ref 7–30)
CALCIUM SERPL-MCNC: 9.7 MG/DL (ref 8.5–10.1)
CHLORIDE SERPL-SCNC: 94 MMOL/L (ref 94–109)
CO2 SERPL-SCNC: 32 MMOL/L (ref 20–32)
CREAT SERPL-MCNC: 1.01 MG/DL (ref 0.66–1.25)
D DIMER PPP FEU-MCNC: 0.5 UG/ML FEU (ref 0–0.5)
DIFFERENTIAL METHOD BLD: NORMAL
EOSINOPHIL # BLD AUTO: 0.6 10E9/L (ref 0–0.7)
EOSINOPHIL NFR BLD AUTO: 10.6 %
ERYTHROCYTE [DISTWIDTH] IN BLOOD BY AUTOMATED COUNT: 14.4 % (ref 10–15)
GFR SERPL CREATININE-BSD FRML MDRD: 74 ML/MIN/{1.73_M2}
GLUCOSE SERPL-MCNC: 101 MG/DL (ref 70–99)
HCT VFR BLD AUTO: 41.7 % (ref 40–53)
HGB BLD-MCNC: 13.5 G/DL (ref 13.3–17.7)
IMM GRANULOCYTES # BLD: 0 10E9/L (ref 0–0.4)
IMM GRANULOCYTES NFR BLD: 0.2 %
INTERPRETATION ECG - MUSE: NORMAL
LABORATORY COMMENT REPORT: NORMAL
LYMPHOCYTES # BLD AUTO: 1.8 10E9/L (ref 0.8–5.3)
LYMPHOCYTES NFR BLD AUTO: 34.6 %
MCH RBC QN AUTO: 28.1 PG (ref 26.5–33)
MCHC RBC AUTO-ENTMCNC: 32.4 G/DL (ref 31.5–36.5)
MCV RBC AUTO: 87 FL (ref 78–100)
MONOCYTES # BLD AUTO: 1.1 10E9/L (ref 0–1.3)
MONOCYTES NFR BLD AUTO: 20.6 %
NEUTROPHILS # BLD AUTO: 1.7 10E9/L (ref 1.6–8.3)
NEUTROPHILS NFR BLD AUTO: 32.7 %
NRBC # BLD AUTO: 0 10*3/UL
NRBC BLD AUTO-RTO: 0 /100
NT-PROBNP SERPL-MCNC: 43 PG/ML (ref 0–900)
PLATELET # BLD AUTO: 256 10E9/L (ref 150–450)
PLATELET # BLD EST: NORMAL 10*3/UL
POTASSIUM SERPL-SCNC: 3.1 MMOL/L (ref 3.4–5.3)
POTASSIUM SERPL-SCNC: 3.2 MMOL/L (ref 3.4–5.3)
RBC # BLD AUTO: 4.81 10E12/L (ref 4.4–5.9)
RBC MORPH BLD: NORMAL
SARS-COV-2 RNA RESP QL NAA+PROBE: NEGATIVE
SODIUM SERPL-SCNC: 131 MMOL/L (ref 133–144)
SPECIMEN SOURCE: NORMAL
TROPONIN I SERPL-MCNC: <0.015 UG/L (ref 0–0.04)
TROPONIN I SERPL-MCNC: <0.015 UG/L (ref 0–0.04)
VARIANT LYMPHS BLD QL SMEAR: PRESENT
WBC # BLD AUTO: 5.2 10E9/L (ref 4–11)

## 2021-02-09 PROCEDURE — 93005 ELECTROCARDIOGRAM TRACING: CPT

## 2021-02-09 PROCEDURE — 96376 TX/PRO/DX INJ SAME DRUG ADON: CPT

## 2021-02-09 PROCEDURE — 99220 PR INITIAL OBSERVATION CARE,LEVEL III: CPT | Performed by: PHYSICIAN ASSISTANT

## 2021-02-09 PROCEDURE — 84132 ASSAY OF SERUM POTASSIUM: CPT | Performed by: PHYSICIAN ASSISTANT

## 2021-02-09 PROCEDURE — 96374 THER/PROPH/DIAG INJ IV PUSH: CPT

## 2021-02-09 PROCEDURE — 83880 ASSAY OF NATRIURETIC PEPTIDE: CPT | Performed by: EMERGENCY MEDICINE

## 2021-02-09 PROCEDURE — 85379 FIBRIN DEGRADATION QUANT: CPT | Performed by: EMERGENCY MEDICINE

## 2021-02-09 PROCEDURE — 99214 OFFICE O/P EST MOD 30 MIN: CPT | Performed by: PHYSICIAN ASSISTANT

## 2021-02-09 PROCEDURE — 250N000011 HC RX IP 250 OP 636: Performed by: PHYSICIAN ASSISTANT

## 2021-02-09 PROCEDURE — G0378 HOSPITAL OBSERVATION PER HR: HCPCS

## 2021-02-09 PROCEDURE — 84484 ASSAY OF TROPONIN QUANT: CPT | Performed by: EMERGENCY MEDICINE

## 2021-02-09 PROCEDURE — 71045 X-RAY EXAM CHEST 1 VIEW: CPT

## 2021-02-09 PROCEDURE — 250N000013 HC RX MED GY IP 250 OP 250 PS 637: Performed by: EMERGENCY MEDICINE

## 2021-02-09 PROCEDURE — 80048 BASIC METABOLIC PNL TOTAL CA: CPT | Performed by: EMERGENCY MEDICINE

## 2021-02-09 PROCEDURE — 85025 COMPLETE CBC W/AUTO DIFF WBC: CPT | Performed by: EMERGENCY MEDICINE

## 2021-02-09 PROCEDURE — 250N000013 HC RX MED GY IP 250 OP 250 PS 637: Performed by: PHYSICIAN ASSISTANT

## 2021-02-09 PROCEDURE — C9803 HOPD COVID-19 SPEC COLLECT: HCPCS

## 2021-02-09 PROCEDURE — 84484 ASSAY OF TROPONIN QUANT: CPT | Performed by: PHYSICIAN ASSISTANT

## 2021-02-09 PROCEDURE — 36415 COLL VENOUS BLD VENIPUNCTURE: CPT | Performed by: PHYSICIAN ASSISTANT

## 2021-02-09 PROCEDURE — 87635 SARS-COV-2 COVID-19 AMP PRB: CPT | Performed by: EMERGENCY MEDICINE

## 2021-02-09 PROCEDURE — 99285 EMERGENCY DEPT VISIT HI MDM: CPT | Mod: 25

## 2021-02-09 RX ORDER — TORSEMIDE 20 MG/1
80 TABLET ORAL DAILY
Status: DISCONTINUED | OUTPATIENT
Start: 2021-02-10 | End: 2021-02-11 | Stop reason: HOSPADM

## 2021-02-09 RX ORDER — TAMSULOSIN HYDROCHLORIDE 0.4 MG/1
0.8 CAPSULE ORAL DAILY
Status: DISCONTINUED | OUTPATIENT
Start: 2021-02-10 | End: 2021-02-11 | Stop reason: HOSPADM

## 2021-02-09 RX ORDER — NALOXONE HYDROCHLORIDE 0.4 MG/ML
0.4 INJECTION, SOLUTION INTRAMUSCULAR; INTRAVENOUS; SUBCUTANEOUS
Status: DISCONTINUED | OUTPATIENT
Start: 2021-02-09 | End: 2021-02-11 | Stop reason: HOSPADM

## 2021-02-09 RX ORDER — ASPIRIN 325 MG
325 TABLET ORAL ONCE
Status: COMPLETED | OUTPATIENT
Start: 2021-02-09 | End: 2021-02-09

## 2021-02-09 RX ORDER — CEFAZOLIN SODIUM 2 G/100ML
2 INJECTION, SOLUTION INTRAVENOUS EVERY 8 HOURS
Status: DISCONTINUED | OUTPATIENT
Start: 2021-02-09 | End: 2021-02-10

## 2021-02-09 RX ORDER — ATORVASTATIN CALCIUM 20 MG/1
20 TABLET, FILM COATED ORAL DAILY
COMMUNITY
End: 2021-02-24

## 2021-02-09 RX ORDER — SPIRONOLACTONE 25 MG/1
25 TABLET ORAL DAILY
Status: DISCONTINUED | OUTPATIENT
Start: 2021-02-10 | End: 2021-02-11 | Stop reason: HOSPADM

## 2021-02-09 RX ORDER — ATORVASTATIN CALCIUM 20 MG/1
20 TABLET, FILM COATED ORAL DAILY
Status: DISCONTINUED | OUTPATIENT
Start: 2021-02-09 | End: 2021-02-11 | Stop reason: HOSPADM

## 2021-02-09 RX ORDER — POTASSIUM CHLORIDE 1500 MG/1
40 TABLET, EXTENDED RELEASE ORAL ONCE
Status: COMPLETED | OUTPATIENT
Start: 2021-02-09 | End: 2021-02-09

## 2021-02-09 RX ORDER — MAGNESIUM HYDROXIDE/ALUMINUM HYDROXICE/SIMETHICONE 120; 1200; 1200 MG/30ML; MG/30ML; MG/30ML
30 SUSPENSION ORAL EVERY 4 HOURS PRN
Status: DISCONTINUED | OUTPATIENT
Start: 2021-02-09 | End: 2021-02-11 | Stop reason: HOSPADM

## 2021-02-09 RX ORDER — NITROGLYCERIN 0.4 MG/1
0.4 TABLET SUBLINGUAL EVERY 5 MIN PRN
Status: DISCONTINUED | OUTPATIENT
Start: 2021-02-09 | End: 2021-02-09

## 2021-02-09 RX ORDER — NITROGLYCERIN 0.4 MG/1
0.4 TABLET SUBLINGUAL EVERY 5 MIN PRN
Status: DISCONTINUED | OUTPATIENT
Start: 2021-02-09 | End: 2021-02-11 | Stop reason: HOSPADM

## 2021-02-09 RX ORDER — NALOXONE HYDROCHLORIDE 0.4 MG/ML
0.2 INJECTION, SOLUTION INTRAMUSCULAR; INTRAVENOUS; SUBCUTANEOUS
Status: DISCONTINUED | OUTPATIENT
Start: 2021-02-09 | End: 2021-02-11 | Stop reason: HOSPADM

## 2021-02-09 RX ORDER — MORPHINE SULFATE 2 MG/ML
1 INJECTION, SOLUTION INTRAMUSCULAR; INTRAVENOUS
Status: DISCONTINUED | OUTPATIENT
Start: 2021-02-09 | End: 2021-02-11 | Stop reason: HOSPADM

## 2021-02-09 RX ORDER — LOSARTAN POTASSIUM 25 MG/1
25 TABLET ORAL DAILY
Status: DISCONTINUED | OUTPATIENT
Start: 2021-02-10 | End: 2021-02-11 | Stop reason: HOSPADM

## 2021-02-09 RX ORDER — LIDOCAINE 40 MG/G
CREAM TOPICAL
Status: DISCONTINUED | OUTPATIENT
Start: 2021-02-09 | End: 2021-02-11 | Stop reason: HOSPADM

## 2021-02-09 RX ORDER — FAMOTIDINE 10 MG
10 TABLET ORAL 2 TIMES DAILY
Status: DISCONTINUED | OUTPATIENT
Start: 2021-02-09 | End: 2021-02-11 | Stop reason: HOSPADM

## 2021-02-09 RX ORDER — ACETAMINOPHEN 325 MG/1
650 TABLET ORAL EVERY 4 HOURS PRN
Status: DISCONTINUED | OUTPATIENT
Start: 2021-02-09 | End: 2021-02-11 | Stop reason: HOSPADM

## 2021-02-09 RX ORDER — LEVOTHYROXINE SODIUM 100 UG/1
200 TABLET ORAL DAILY
Status: DISCONTINUED | OUTPATIENT
Start: 2021-02-10 | End: 2021-02-11 | Stop reason: HOSPADM

## 2021-02-09 RX ORDER — ASPIRIN 81 MG/1
81 TABLET ORAL DAILY
Status: DISCONTINUED | OUTPATIENT
Start: 2021-02-10 | End: 2021-02-11 | Stop reason: HOSPADM

## 2021-02-09 RX ADMIN — ASPIRIN 325 MG ORAL TABLET 325 MG: 325 PILL ORAL at 13:13

## 2021-02-09 RX ADMIN — CEFAZOLIN SODIUM 2 G: 2 INJECTION, SOLUTION INTRAVENOUS at 23:50

## 2021-02-09 RX ADMIN — POTASSIUM CHLORIDE 40 MEQ: 1500 TABLET, EXTENDED RELEASE ORAL at 20:28

## 2021-02-09 RX ADMIN — CEFAZOLIN SODIUM 2 G: 2 INJECTION, SOLUTION INTRAVENOUS at 16:52

## 2021-02-09 RX ADMIN — NITROGLYCERIN 0.4 MG: 0.4 TABLET SUBLINGUAL at 13:13

## 2021-02-09 RX ADMIN — ATORVASTATIN CALCIUM 20 MG: 20 TABLET, FILM COATED ORAL at 20:28

## 2021-02-09 RX ADMIN — NITROGLYCERIN 0.4 MG: 0.4 TABLET SUBLINGUAL at 13:26

## 2021-02-09 RX ADMIN — FAMOTIDINE 10 MG: 10 TABLET, FILM COATED ORAL at 20:28

## 2021-02-09 ASSESSMENT — MIFFLIN-ST. JEOR
SCORE: 2183.72
SCORE: 2194.15

## 2021-02-09 ASSESSMENT — ENCOUNTER SYMPTOMS
CHILLS: 0
COUGH: 0
FEVER: 0
SHORTNESS OF BREATH: 1
NAUSEA: 0
VOMITING: 0
CHEST TIGHTNESS: 1
DIARRHEA: 0
WOUND: 1
NUMBNESS: 0

## 2021-02-09 NOTE — PROGRESS NOTES
Received hospital admit for sob and cp x 2 wks.     Melissa left voice mail stating Kenneth was at the ER with chest pain and she is upset that he did not tell her how he was feeling.    He was calling on the other line and will call me back.   Haily Graves RN 4:44 PM 02/09/21

## 2021-02-09 NOTE — ED PROVIDER NOTES
History   Chief Complaint:  Chest tightness     The history is provided by the patient.      Jamar Ivory is a 71 year old male with a history of hypertension and type 2 diabetes mellitus who presents for evaluation of chest tightness. The patient reports constant chest tightness since the evening of 2/7. He rates the tightness as a 7/10 and denies any exacerbating or alleviating factors. He is no orthopnea and he denies overt pain. This feels similar to a previous episode of chest tightness that resolved after a Nitroglycerin; he denies knowledge of a past MI. He reports some associated shortness of breath. He otherwise denies numbness, tingling, cough, fever, chills, nausea, vomiting, or diarrhea. He has been taking all of his medication as prescribed and is not on any erectile medications. Of note, the patient does have a chronic skin tear right lower extremity that he reports has worsening surrounding erythema.      Review of Systems   Constitutional: Negative for chills and fever.   Respiratory: Positive for chest tightness and shortness of breath. Negative for cough.    Cardiovascular: Negative for chest pain.   Gastrointestinal: Negative for diarrhea, nausea and vomiting.   Skin: Positive for rash and wound (chronic ).   Neurological: Negative for numbness.   All other systems reviewed and are negative.      Allergies:  Clopidogrel  Penicillins  Simvastatin    Medications:  Aspirin 81 mg   Lipitor  Prozac  Levothyroxine  Cozaar  Metolazone  Klor-Con  Aldactone  Flomax  Demadex    Past Medical History:    Arthritis   CHF   CVA  Fatty liver  Gout   Hypertension  Depression  Obesity   Spider veins  TIA  Hypothyroidism  Vitiligo  Ulcer of lower leg   BPH   Peripheral vascular disease  Diabetic polyneuropathy   Benign neoplasm of colon  Hyperlipidemia  Type 2 diabetes mellitus     Past Surgical History:    Appendectomy   Colonoscopy x2  Vasectomy   Left index finger fracture repair  Nasal bone fracture   "    Family History:    Lung Cancer - father   Diabetes - mother   Leukemia - daughter    Social History:  The patient arrived to the ED with wife.   Presents to the ER room alone.     Physical Exam     Patient Vitals for the past 24 hrs:   BP Temp Temp src Pulse Resp SpO2 Height Weight   02/09/21 1400 122/83 -- -- -- 15 95 % -- --   02/09/21 1345 121/69 -- -- 71 -- -- -- --   02/09/21 1330 113/62 -- -- 74 -- -- -- --   02/09/21 1315 (!) 121/90 -- -- 73 -- 95 % -- --   02/09/21 1300 132/77 -- -- 73 29 92 % -- --   02/09/21 1245 134/81 -- -- 76 15 95 % -- --   02/09/21 1230 -- -- -- 78 20 97 % -- --   02/09/21 1215 125/82 -- -- -- -- -- -- --   02/09/21 1206 (!) 155/86 97.6  F (36.4  C) Temporal 82 16 98 % 1.727 m (5' 8\") 147 kg (324 lb)       Physical Exam  General: Resting on the bed.  Head: No obvious trauma to head.  Ears, Nose, Throat:  External ears normal.  Nose normal.  No pharyngeal erythema, swelling or exudate.  Midline uvula.    Eyes:  Conjunctivae clear.  Pupils are equal, round, and reactive.   Neck: Normal range of motion.  Neck supple.   CV: Regular rate and rhythm.  No murmurs.  2+ radial pulses    Respiratory: Effort normal and breath sounds normal.  No wheezing or crackles.   Gastrointestinal: Soft.  Obese.  No distension. There is no tenderness.   Musculoskeletal: bilateral edema  Neuro: Alert. Moving all extremities appropriately.  Normal speech.    Skin: Skin is warm and dry.  Skin tear with mild surrounding warmth and erythema on the left lower leg    Emergency Department Course   ECG  ECG taken at 1215  Sinus rhythm with 1st degree A block  Left axis deviation  Inferior infarct, age undetermined  Anterior infarct, age undetermined   No significant change as compared to prior, dated 9/20/17.  Rate 77 bpm. PA interval 210 ms. QRS duration 88 ms. QT/QTc 372/420 ms. P-R-T axes 49 -61 76.     Imaging:  XR Chest Port 1 View  IMPRESSION: No airspace consolidation, pneumothorax, or " pleural  effusion.  Reading per radiology     Laboratory:  CBC: WBC 5.2, HGB 13.5,   BMP: (L), potassium 3.2(L), glucose 101(H), BUN 35(H) o/w WNL (Creatinine 1.01)  Troponin (Collected 1218): <0.015  BNP: 43  Ddimer: 0.5    Asymptomatic COVID19 Virus PCR by nasopharyngeal swab pending     Emergency Department Course:    Reviewed:  I reviewed the patient's nursing notes, vitals and past medical history.     Assessments:  1300 I performed an exam of the patient.  1400 I rechecked the patient; he is feeling improved after the nitroglycerin.     Consults:    1422 I spoke with Cece Winston PA-C for Dr. Viramontes of the Hospitalist service from [facility] regarding patient's presentation, findings, and plan of care.    Interventions:  1313 Aspirin 325 mg Oral  1313 Nitroglycerin 0.4 mg Sublingual  1326 Nitroglycerin 0.4 mg Sublingual     Disposition:  The patient was admitted to the hospital under the care of Dr. Viramontes.       Impression & Plan   Medical Decision Making:  Jamar Ivory is a 71 year old male who presents with chest pain.  Their history and risk factor analysis are significant for obesity, HLD, CHF, HTN.  The workup in the Emergency Department (see above for cardiac enzymes and EKG) is overall reassuring.  CBC shows no leukocytosis or anemia.  BMP shows no acute electrolyte, metabolic or renal dysfunction.  Troponin initially is negative and EKG looks grossly unchanged.  Sinus rhythm.  No signs of acute ST-T wave change.  No signs of ischemia.  BNP normal not suggestive acute CHF.  Dimer negative not suggestive of acute PE.  Chest x-ray without evidence acute pneumonia, pneumothorax or effusion.  No tearing pain, no neurologic changes, do not suspect dissection. Patient is heart score 6.  My clinical suspicion of acute coronary syndrome is high enough to warrant further therapy and investigation.  I will admit the patient  to medicine service for further workup.  The patient is pain free  after nitroglycerin and aspirin.  The patient agrees to be admitted and all questions were answered.      Covid-19  Jamar Ivory was evaluated during a global COVID-19 pandemic, which necessitated consideration that the patient might be at risk for infection with the SARS-CoV-2 virus that causes COVID-19.   Applicable protocols for evaluation were followed during the patient's care.   COVID-19 was considered as part of the patient's evaluation. The plan for testing is:  a test was obtained during this visit.    Diagnosis:    ICD-10-CM    1. Chest tightness  R07.89 CBC with platelets differential   2. Shortness of breath  R06.02        Migdalia DOYLE, am serving as a scribe at 1:39 PM on 2/9/2021 to document services personally performed by Kerrie Alvarez MD based on my observations and the provider's statements to me.       Kerrie Alvarez MD  02/09/21 [id number]

## 2021-02-09 NOTE — ED NOTES
[facility]  ED Nurse Handoff Report    Jamar Ivory is a 71 year old male   ED Chief complaint: Chest Pain  . ED Diagnosis:   Final diagnoses:   Chest tightness   Shortness of breath     Allergies:   Allergies   Allergen Reactions     Clopidogrel      Cough/emesis     Penicillins Rash     Simvastatin Diarrhea       Code Status: DNR / DNI  Activity level - Baseline/Home:  Independent. Activity Level - Current:   Assist X 1. Lift room needed: No. Bariatric: Yes   Needed: No   Isolation: No. Infection: Not Applicable.     Vital Signs:   Vitals:    02/09/21 1315 02/09/21 1330 02/09/21 1345 02/09/21 1400   BP: (!) 121/90 113/62 121/69 122/83   Pulse: 73 74 71    Resp:    15   Temp:       TempSrc:       SpO2: 95%   95%   Weight:       Height:           Cardiac Rhythm:  ,   Cardiac  Cardiac Rhythm: Normal sinus rhythm  Pain level:    Patient confused: No. Patient Falls Risk: Yes.   Elimination Status: Has voided   Patient Report - Initial Complaint: Chest pain. Focused Assessment:    12:30 Cardiac Cardiac - Cardiac WDL: .WDL except   Review of Systems (Cardiac) - Cardiovascular Symptoms/Conditions: chest pain   Chest Pain Assessment - Chest Pain Location: midsternal (Pt c/o midsternal CP for past 2 weeks, pain waxes and wanes. No radiation of pain. Pt also having SOB intermittently) Duration: intermittent  Precipitating Factors: unknown  Chest Pain Reproducible?: No   Cardiac Monitoring - EKG Monitoring: Yes  Cardiac Regularity: Regular  Cardiac Rhythm: NSR   Chest Pain Assessment - Character: aching; throbbing   Cardiac Care - Chest Pain Intervention: cardiac biomarkers drawn; cardiac monitor placed; 12-lead ECG obtained; activity minimized  EO     12:30 Respiratory Respiratory - Respiratory WDL: .WDL except  Rhythm/Pattern, Respiratory: shortness of breath (Pt c/o 2 weeks of intermittent SOB with midsternal CP) Breath Sounds: All Fields   Head To Toe Assessment - All Lung Fields Breath  Sounds: Anterior:; Posterior:; equal bilaterally; clear         Tests Performed: IV labs, imaging. Abnormal Results:   Labs Ordered and Resulted from Time of ED Arrival Up to the Time of Departure from the ED   BASIC METABOLIC PANEL - Abnormal; Notable for the following components:       Result Value    Sodium 131 (*)     Potassium 3.2 (*)     Glucose 101 (*)     Urea Nitrogen 35 (*)     All other components within normal limits   CBC WITH PLATELETS DIFFERENTIAL   TROPONIN I   NT PROBNP INPATIENT   D DIMER QUANTITATIVE   SARS-COV-2 (COVID-19) VIRUS RT-PCR     XR Chest Port 1 View   Final Result   IMPRESSION: No airspace consolidation, pneumothorax, or pleural   effusion.      LESLIE SANDOVAL MD         Treatments provided: PO ASA, nitroglycerin   Family Comments: N/A  OBS brochure/video discussed/provided to patient:  Yes  ED Medications:   Medications   nitroGLYcerin (NITROSTAT) sublingual tablet 0.4 mg (0.4 mg Sublingual Given 2/9/21 1326)   aspirin (ASA) tablet 325 mg (325 mg Oral Given 2/9/21 1313)     Drips infusing:  No  For the majority of the shift, the patient's behavior Green. Interventions performed were N/A.    Sepsis treatment initiated: No     Patient tested for COVID 19 prior to admission: YES    ED Nurse Name/Phone Number: Cece Desai RN,   2:24 PM  RECEIVING UNIT ED HANDOFF REVIEW    Above ED Nurse Handoff Report was reviewed: Yes  Reviewed by: Gabriella Reaves RN on February 9, 2021 at 3:35 PM

## 2021-02-09 NOTE — ED TRIAGE NOTES
Patient comes in for evaluation of chest pain and shortness of breath which has been intermittent for 2 weeks. Currently 6/10.

## 2021-02-09 NOTE — PHARMACY-ADMISSION MEDICATION HISTORY
Admission medication history interview status for this patient is complete. See [facility] admission navigator for allergy information, prior to admission medications and immunization status.     Medication history interview done via telephone during Covid-19 pandemic, indicate source(s): Patient  Medication history resources (including written lists, pill bottles, clinic record): [facility] dispense records  Pharmacy: [facility] [id number] [place] [address] [phone]    Changes made to PTA medication list:  Added: None  Deleted: None  Changed:   1. Atorvastatin 10mg x14d then 20mg x14d --> 20mg daily    Actions taken by pharmacist (provider contacted, etc):None     Additional medication history information:None    Medication reconciliation/reorder completed by provider prior to medication history?  No       Prior to Admission medications    Medication Sig Last Dose Taking? Auth Provider   acetaminophen (TYLENOL) 500 MG tablet Take 1,000 mg by mouth every 6 hours as needed (Headache)   Yes Unknown, Entered By History   aspirin (ASA) 81 MG tablet Take 1 tablet (81 mg) by mouth daily 2/9/2021 at AM Yes Blanca Peng MD   atorvastatin (LIPITOR) 20 MG tablet Take 20 mg by mouth daily Past Week at Unknown time Yes Unknown, Entered By History   FLUoxetine (PROZAC) 20 MG capsule Take 1 capsule (20 mg) by mouth daily Past Week at Unknown time Yes Myah Florez PA-C   levothyroxine (SYNTHROID/LEVOTHROID) 200 MCG tablet Take 1 tablet (200 mcg) by mouth daily 2/9/2021 at AM Yes Myah Florez PA-C   losartan (COZAAR) 25 MG tablet Take 1 tablet (25 mg) by mouth daily 2/9/2021 at AM Yes Rozina Gallegos APRN CNP   order for DME 1: Gradient Compression Wraps; 2: Cast Boots; 3: BLE knee high 20-30 mm Hg compression stockings; 4: BLE velcro compression garments; knee high Past Month at Unknown time Yes Cecilio Garvin PA-C   potassium chloride ER (KLOR-CON M) 20 MEQ CR tablet Take 1  tablet (20 mEq) by mouth as needed for potassium supplementation (Take 40 mEq ONLY when directed BY [facility] when takes Metolazone) Past Month at Unknown time Yes Rozina Gallegos APRN CNP   spironolactone (ALDACTONE) 25 MG tablet Take 1 tablet (25 mg) by mouth daily 2/9/2021 at AM Yes Vega Anders MD   tamsulosin (FLOMAX) 0.4 MG capsule TAKE 2 CAPSULES BY MOUTH EVERY DAY 2/9/2021 at AM Yes Blanca Peng MD   torsemide (DEMADEX) 20 MG tablet Take 4 tablets (80 mg) by mouth daily 2/9/2021 at AM Yes Myah Florez PA-C   BETA BLOCKER NOT PRESCRIBED (INTENTIONAL) Beta Blocker not prescribed intentionally due to Evidence of fluid overload or volume depletion and Refusal by patient   Blanca Peng MD   Glucosamine-Chondroitin (OSTEO BI-FLEX REGULAR STRENGTH) 250-200 MG TABS Take 1 tablet by mouth 2 times daily   Unknown, Entered By History   metolazone (ZAROXOLYN) 2.5 MG tablet Take 1 tablet (2.5 mg) by mouth as needed (when instructed by [facility] clinic)   Rozina Gallegos APRN CNP   order for DME 1: Gradient Compression Wraps; 2: Cast boots; 3: BLE knee high 20-30 or 30-40 mm Hg compression stockings; 4: BLE velcro compression garments; 30-40 mm Hg; full leg; 5: Compression Brody Wilcox MD

## 2021-02-09 NOTE — PLAN OF CARE
ROOM # 213-2     Living Situation (if not independent, order SW consult): Home with wife and friend  Facility name:  : [facility]     Activity level at baseline: Ind sometimes uses walker   Activity level on admit: SBA with walker       Patient registered to observation; given Patient Bill of Rights; given the opportunity to ask questions about observation status and their plan of care.  Patient has been oriented to the observation room, bathroom and call light is in place.    Discussed discharge goals and expectations with patient/family.       Pt is A&Ox4. VSS. Pt denies any pain. LLE outlined for redness, hard to tell as pts entire lower LLE is reddened.  Area is scabbed over. Potassium is low, will replace per protocol. Ancef started for LLE redness. Pt is SBA with walker. Echo and Lexiscan for tomorrow. No chest pain/pressure.

## 2021-02-09 NOTE — PROGRESS NOTES
[facility]  C.[email or web address]    MyHealth Tracker Heart Failure Alert      Daily Weight Goal: 318-325 lbs    Today's Weight:   No call in     Symptom Alert: no call in since 2/5      Current Diuretic & Potassium Plan:Spironolactone 25 mg daily and Torsemide 80 mg daily      Last Extra Diuretic: Metolazone 2.5 mg with 40 mEq KCl on 2/6/21 per Rozina. Weight was 331 lbs  **Response:                Future Appointments   Date Time Provider Department Center   2/9/2021 11:20 AM Myah Florez PA-C [facility]   2/12/2021 12:30 PM RU LAB RULAB P [facility] CL[facility]   2/12/2021  1:30 PM [facility]       Notes: Left voice mail for Melissa and Kenneth. Kenneth has not called into [facility] since 2/85. He was to take metolazone w/KCl on 2/6.         MyHealth Tracker Weight Trends:               MyHealth Tracker Weight Graph:             Haily Graves RN 11:17 AM 02/09/21

## 2021-02-09 NOTE — PROGRESS NOTES
"  Assessment & Plan   Problem List Items Addressed This Visit     None      Visit Diagnoses     Chest pain, unspecified type    -  Primary         Patient needs to be seen in ED right away for chest pain rule out  Hx DM, morbidly obese, hyperlipidemia, CHF, HTN, PVD, TIA  Patient took ASA 81 mg this morning - will transport in private car to [facility] ED immediately.   Called and discussed patient with triage RN at ED for handoff    20 minutes spent on the date of the encounter doing chart review, patient visit and documentation      BMI:   Estimated body mass index is 49.11 kg/m  as calculated from the following:    Height as of 1/14/21: 1.727 m (5' 8\").    Weight as of this encounter: 146.5 kg (323 lb).         Return today (on 2/9/2021) for ED for chest pain workup .    Myah Florez PA-C  [facility] VANNESSA Cooper is a 71 year old who presents to clinic today for the following health issues     HPI       Headache - ongoing but worse the past 2 weeks    Chest is tight and sore - patient explains tightness by squeezing his fist over the chest  Constant   Worse with exertion  Dyspnea on exertion  Weak when standing or moving around  Legs feel weak          Review of Systems         Objective    /80   Pulse 80   Wt 146.5 kg (323 lb)   SpO2 97%   BMI 49.11 kg/m    Body mass index is 49.11 kg/m .  Physical Exam  Constitutional:       General: He is not in acute distress.     Appearance: He is well-developed. He is not diaphoretic.   HENT:      Head: Normocephalic.      Right Ear: External ear normal.      Left Ear: External ear normal.      Nose: Nose normal.   Eyes:      Conjunctiva/sclera: Conjunctivae normal.   Neck:      Musculoskeletal: Normal range of motion.   Pulmonary:      Effort: Pulmonary effort is normal.   Neurological:      Mental Status: He is alert and oriented to person, place, and time.   Psychiatric:         Judgment: Judgment normal.      "
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
## 2021-02-10 ENCOUNTER — APPOINTMENT (OUTPATIENT)
Dept: CARDIOLOGY | Facility: CLINIC | Age: 71
DRG: 313 | End: 2021-02-10
Attending: PHYSICIAN ASSISTANT
Payer: MEDICARE

## 2021-02-10 ENCOUNTER — APPOINTMENT (OUTPATIENT)
Dept: NUCLEAR MEDICINE | Facility: CLINIC | Age: 71
DRG: 313 | End: 2021-02-10
Attending: PHYSICIAN ASSISTANT
Payer: MEDICARE

## 2021-02-10 ENCOUNTER — APPOINTMENT (OUTPATIENT)
Dept: PHYSICAL THERAPY | Facility: CLINIC | Age: 71
DRG: 313 | End: 2021-02-10
Attending: PHYSICIAN ASSISTANT
Payer: MEDICARE

## 2021-02-10 LAB
ALBUMIN SERPL-MCNC: 3.2 G/DL (ref 3.4–5)
ALP SERPL-CCNC: 57 U/L (ref 40–150)
ALT SERPL W P-5'-P-CCNC: 38 U/L (ref 0–70)
ANION GAP SERPL CALCULATED.3IONS-SCNC: 5 MMOL/L (ref 3–14)
AST SERPL W P-5'-P-CCNC: 28 U/L (ref 0–45)
BILIRUB DIRECT SERPL-MCNC: 0.1 MG/DL (ref 0–0.2)
BILIRUB SERPL-MCNC: 0.5 MG/DL (ref 0.2–1.3)
BUN SERPL-MCNC: 24 MG/DL (ref 7–30)
CALCIUM SERPL-MCNC: 9.5 MG/DL (ref 8.5–10.1)
CHLORIDE SERPL-SCNC: 101 MMOL/L (ref 94–109)
CO2 SERPL-SCNC: 29 MMOL/L (ref 20–32)
CREAT SERPL-MCNC: 0.87 MG/DL (ref 0.66–1.25)
GFR SERPL CREATININE-BSD FRML MDRD: 86 ML/MIN/{1.73_M2}
GLUCOSE SERPL-MCNC: 110 MG/DL (ref 70–99)
POTASSIUM SERPL-SCNC: 3.3 MMOL/L (ref 3.4–5.3)
POTASSIUM SERPL-SCNC: 3.6 MMOL/L (ref 3.4–5.3)
PROT SERPL-MCNC: 7.6 G/DL (ref 6.8–8.8)
SODIUM SERPL-SCNC: 135 MMOL/L (ref 133–144)
TROPONIN I SERPL-MCNC: <0.015 UG/L (ref 0–0.04)

## 2021-02-10 PROCEDURE — 93306 TTE W/DOPPLER COMPLETE: CPT | Mod: 26 | Performed by: INTERNAL MEDICINE

## 2021-02-10 PROCEDURE — G0463 HOSPITAL OUTPT CLINIC VISIT: HCPCS | Mod: 25

## 2021-02-10 PROCEDURE — 250N000011 HC RX IP 250 OP 636: Performed by: PHYSICIAN ASSISTANT

## 2021-02-10 PROCEDURE — 80076 HEPATIC FUNCTION PANEL: CPT | Performed by: PHYSICIAN ASSISTANT

## 2021-02-10 PROCEDURE — G1004 CDSM NDSC: HCPCS | Performed by: INTERNAL MEDICINE

## 2021-02-10 PROCEDURE — 999N000208 ECHOCARDIOGRAM COMPLETE

## 2021-02-10 PROCEDURE — 93018 CV STRESS TEST I&R ONLY: CPT | Mod: ME | Performed by: INTERNAL MEDICINE

## 2021-02-10 PROCEDURE — 97161 PT EVAL LOW COMPLEX 20 MIN: CPT | Mod: GP | Performed by: PHYSICAL THERAPIST

## 2021-02-10 PROCEDURE — G0378 HOSPITAL OBSERVATION PER HR: HCPCS

## 2021-02-10 PROCEDURE — 120N000004 HC R&B MS OVERFLOW

## 2021-02-10 PROCEDURE — G1004 CDSM NDSC: HCPCS

## 2021-02-10 PROCEDURE — 36415 COLL VENOUS BLD VENIPUNCTURE: CPT | Performed by: PHYSICIAN ASSISTANT

## 2021-02-10 PROCEDURE — 80048 BASIC METABOLIC PNL TOTAL CA: CPT | Performed by: PHYSICIAN ASSISTANT

## 2021-02-10 PROCEDURE — 255N000002 HC RX 255 OP 636: Performed by: HOSPITALIST

## 2021-02-10 PROCEDURE — 78452 HT MUSCLE IMAGE SPECT MULT: CPT | Mod: 26 | Performed by: INTERNAL MEDICINE

## 2021-02-10 PROCEDURE — 250N000013 HC RX MED GY IP 250 OP 250 PS 637: Performed by: HOSPITALIST

## 2021-02-10 PROCEDURE — 343N000001 HC RX 343: Performed by: HOSPITALIST

## 2021-02-10 PROCEDURE — 96376 TX/PRO/DX INJ SAME DRUG ADON: CPT

## 2021-02-10 PROCEDURE — 93016 CV STRESS TEST SUPVJ ONLY: CPT | Performed by: INTERNAL MEDICINE

## 2021-02-10 PROCEDURE — 250N000013 HC RX MED GY IP 250 OP 250 PS 637: Performed by: PHYSICIAN ASSISTANT

## 2021-02-10 PROCEDURE — 99233 SBSQ HOSP IP/OBS HIGH 50: CPT | Performed by: PHYSICIAN ASSISTANT

## 2021-02-10 PROCEDURE — A9502 TC99M TETROFOSMIN: HCPCS | Performed by: HOSPITALIST

## 2021-02-10 PROCEDURE — 97602 WOUND(S) CARE NON-SELECTIVE: CPT

## 2021-02-10 PROCEDURE — 97530 THERAPEUTIC ACTIVITIES: CPT | Mod: GP | Performed by: PHYSICAL THERAPIST

## 2021-02-10 RX ORDER — POTASSIUM CHLORIDE 1500 MG/1
40 TABLET, EXTENDED RELEASE ORAL ONCE
Status: COMPLETED | OUTPATIENT
Start: 2021-02-10 | End: 2021-02-10

## 2021-02-10 RX ADMIN — LEVOTHYROXINE SODIUM 200 MCG: 0.1 TABLET ORAL at 09:07

## 2021-02-10 RX ADMIN — FLUOXETINE 20 MG: 20 CAPSULE ORAL at 09:07

## 2021-02-10 RX ADMIN — FAMOTIDINE 10 MG: 10 TABLET, FILM COATED ORAL at 19:57

## 2021-02-10 RX ADMIN — TORSEMIDE 80 MG: 20 TABLET ORAL at 09:07

## 2021-02-10 RX ADMIN — SPIRONOLACTONE 25 MG: 25 TABLET ORAL at 09:07

## 2021-02-10 RX ADMIN — POTASSIUM CHLORIDE 40 MEQ: 1500 TABLET, EXTENDED RELEASE ORAL at 01:03

## 2021-02-10 RX ADMIN — ASPIRIN 81 MG: 81 TABLET ORAL at 09:07

## 2021-02-10 RX ADMIN — Medication 33 MCI.: at 08:54

## 2021-02-10 RX ADMIN — HUMAN ALBUMIN MICROSPHERES AND PERFLUTREN 3 ML: 10; .22 INJECTION, SOLUTION INTRAVENOUS at 10:00

## 2021-02-10 RX ADMIN — ENOXAPARIN SODIUM 40 MG: 40 INJECTION SUBCUTANEOUS at 14:20

## 2021-02-10 RX ADMIN — ATORVASTATIN CALCIUM 20 MG: 20 TABLET, FILM COATED ORAL at 19:57

## 2021-02-10 RX ADMIN — TAMSULOSIN HYDROCHLORIDE 0.8 MG: 0.4 CAPSULE ORAL at 09:07

## 2021-02-10 RX ADMIN — LOSARTAN POTASSIUM 25 MG: 25 TABLET, FILM COATED ORAL at 09:07

## 2021-02-10 RX ADMIN — FAMOTIDINE 10 MG: 10 TABLET, FILM COATED ORAL at 09:07

## 2021-02-10 RX ADMIN — CEFAZOLIN SODIUM 2 G: 2 INJECTION, SOLUTION INTRAVENOUS at 09:08

## 2021-02-10 ASSESSMENT — MIFFLIN-ST. JEOR: SCORE: 2141.99

## 2021-02-10 NOTE — UTILIZATION REVIEW
Admission Status; Secondary Review Determination    Under the authority of the Utilization Management Committee, the utilization review process indicated a secondary review on the above patient. The review outcome is based on review of the medical records, discussions with staff, and applying clinical experience noted on the date of the review.    (x) Inpatient Status Appropriate - This patient's medical care is consistent with medical management for inpatient care and reasonable inpatient medical practice.    RATIONALE FOR DETERMINATION  Jamar Ivory is a 71 year old male with a history of NSVT, Nonischemic Cardiomyopathy, HTN, Hypothyroidism, CVA, HLD, Chronic Combined Systolic and Diastolic CHF, Depression, Gout, and Obesity who was admitted on 2/9/2021 with episodes of intermittent central chest pain since 2/7 with associated shortness of breath occurring at rest and with exertion.  He has a history of NICM with most recent NM Lexiscan 2/2016 negative.  12/2011 normal coronary angiogram.   Most recent echo 1/2020 with no significant valvular abnormalities with EF 46%.  Plan is for serial troponin, echocardiogram, telemetry monitoring, and NM lexiscan stress test (2 days test).  Additionally, he has evidence of LE cellulitis requiring IV antibiotics.    At the time of admission with the information available to the attending physician more than 2 nights Hospital complex care was anticipated, based on patient risk of adverse outcome if treated as outpatient and complex care required. Inpatient admission is appropriate based on the Medicare guidelines.    The information on this document is developed by the utilization review team in order for the business office to ensure compliance. This only denotes the appropriateness of proper admission status and does not reflect the quality of care rendered.    The definitions of Inpatient Status and Observation Status used in making the determination above are those  provided in the CMS Coverage Manual, Chapter 1 and Chapter 6, section 70.4.    Sincerely,    Norma Jones MD   Utilization Review  Physician Advisor  [facility].

## 2021-02-10 NOTE — PROGRESS NOTES
02/10/21 1524   Quick Adds   Quick Adds Edema Eval   Type of Visit Initial PT Evaluation   Living Environment   People in home significant other   Self-Care   Activity/Exercise/Self-Care Comment Pt mod I with FWW at home. No services continues to drive.    General Information   Onset of Illness/Injury or Date of Surgery 02/09/21   Referring Physician Ila Jacobo PA-C   Patient/Family Therapy Goals Statement (PT) agreeable to recs   Pertinent History of Current Problem (include personal factors and/or comorbidities that impact the POC) Jamar Ivory is a 71 year old male with a history of NSVT, Nonischemic Cardiomyopathy, HTN, Hypothyroidism, CVA, HLD, Chronic Combined Systolic and Diastolic CHF, Depression, Gout, and Obesity who was admitted on 2/9/2021 with episodes of intermittent central chest pain since 2/7 with associated shortness of breath occurring at rest and with exertion.  He has a history of NICM with most recent NM Lexiscan 2/2016 negative.  12/2011 normal coronary angiogram.   Most recent echo 1/2020 with no significant valvular abnormalities with EF 46%.  Plan is for serial troponin, echocardiogram, telemetry monitoring, and NM lexiscan stress test (2 days test).  Additionally, he has evidence of LE cellulitis requiring IV antibiotics. PT was ordered for lymphedema management   Edema General Information   Affected Body Part(s) Left LE;Right LE   Edema Etiology Chronic Venous Insfficiency;Infection   Etiology Comments Pt reports wound for over 1 year. Pt reports he has seen outpatient lymph therapy about 4 months ago. He was advised to continue to wrap until his edema was minimal. Pt reports that he does have wraps at home and a compression sock.   Edema Precautions Acute infection   Edema Examination/Assessment   Skin Condition Non-pitting   Ulcerations Yes  (see WOC notes)   Skin Integrity dryness, wounds, limited elasticity   Pain Assessment   Patient Currently in Pain No    Integumentary/Edema   Integumentary/Edema Comments see above   Range of Motion (ROM)   ROM Quick Adds ROM WFL   Strength   Manual Muscle Testing Quick Adds Strength WFL   Bed Mobility   Comment (Bed Mobility) inde   Transfers   Transfer Safety Comments inde with FWW, reports does not have wheels on his walker at home   Gait/Stairs (Locomotion)   Comment (Gait/Stairs) inde with FWW into BR   Clinical Impression   Criteria for Skilled Therapeutic Intervention yes, treatment indicated   PT Diagnosis (PT) edema with wounds   Edema: Patient Presentation Edema;Wounds/Ulcers   Influenced by the following impairments decreased skin intgrity   Functional limitations due to impairments risk of further wounds impacting mobility   Clinical Presentation Stable/Uncomplicated   Clinical Presentation Rationale improving   Clinical Decision Making (Complexity) low complexity   Therapy Frequency (PT) One time eval and treatment only   Predicted Duration of Therapy Intervention (days/wks) 1 day   Planned Therapy Interventions (PT) patient/family education   Edema: Planned Interventions Education;Precautions to prevent infection/exacerbation   Anticipated Equipment Needs at Discharge (PT)   (walker wheels)   Risk & Benefits of therapy have been explained evaluation/treatment results reviewed;care plan/treatment goals reviewed;risks/benefits reviewed;current/potential barriers reviewed;participants voiced agreement with care plan;patient   PT Discharge Planning    PT Discharge Recommendation (DC Rec) home with outpatient physical therapy;home with assist   PT Rationale for DC Rec PT recommends OP lymph therapy to improve skin integrity. Pt educated to have family continue to wrap at home. Recommend not using compression stocking at this time due to risk of skin tear.    Total Evaluation Time   Total Evaluation Time (Minutes) 10

## 2021-02-10 NOTE — PLAN OF CARE
"PRIMARY DIAGNOSIS: CHEST PAIN  OUTPATIENT/OBSERVATION GOALS TO BE MET BEFORE DISCHARGE:  1. Ruled out acute coronary syndrome (negative or stable Troponin):  Yes  2. Pain Status: Pain free.  3. Appropriate provocative testing performed: Yes  - Stress Test Procedure: Lesiscan  - Interpretation of cardiac rhythm per telemetry tech: SR    4. Cleared by Consultants (if applicable):No  5. Return to near baseline physical activity: Yes  Discharge Planner Nurse   Safe discharge environment identified: Yes  Barriers to discharge: Yes       Entered by: Gregoria Vaughn 02/10/2021 10:05 AM   /66 (BP Location: Left arm)   Pulse 68   Temp 96.9  F (36.1  C) (Oral)   Resp 18   Ht 1.727 m (5' 8\")   Wt 141.7 kg (312 lb 8 oz)   SpO2 94%   BMI 47.52 kg/m    Please review provider order for any additional goals.   Nurse to notify provider when observation goals have been met and patient is ready for discharge.  Patient Aox4. Denies pain. Denies SOB. PIV patent. WOC consulted and following patient BLE leg wounds. Echo and Lexiscan performed today. Plan to have repeat lexiscan tomorrow and possible discharge. Will continue to monitor and follow plan of care.    "

## 2021-02-10 NOTE — PROGRESS NOTES
[facility]    Hospitalist Progress Note      Assessment & Plan   Jamar Ivory is a 71 year old male with a history of Depression, Gout, Obesity, NSVT, Nonischemic Cardiomyopathy, HTN, Hypothyroidism, CVA, HLD, Chronic Combined Systolic and Diastolic CHF, who was admitted to [facility] on 2/9/2021 for observation and ischemic testing after complaints of intermittent central chest pain.      Active Hospital Problems      #Chest Pain - pt presents with episodes of intermittent central chest pain since 2/7 with associated shortness of breath occurring at rest and with exertion.  He has a history of NICM with most recent NM Lexiscan 2/2016 negative.  12/2011 normal coronary angiogram.   Most recent echo 1/2020 with no significant valvular abnormalities with EF 46%.   Weight today is 324lb with his goal weight 318-326lb followed by the [facility].  ED work up revealed patient hemodynamically stable and afebrile, 98% on room air.  Laboratory work up revealed sodium 131, potassium 3.2, BUN 35 with otherwise normal BMP, BNP, Troponin, CBC, ddimer.  CXR negative.  EKG with no acute ischemic changes.  Patient received ASA, sl Nitroglycerin with resolution of symptoms and admitted for ACS rule out.    - serial troponins without dynamic rise and fall  - check hepatic panel   - telemetry monitoring, SR 1AVB  - no heparin indicated at this time  - sl nitroglycerin prn  - echocardiogram pending, if new acute concerns or RWMA will request Cardiology consultation   - NM Lexiscan stress test (will need to be a 2 day test), admit to IP  - cardiac dt    #Nonhealing Chronic Vascular Ulcerations   #Lymphedema  Pt with an ulceration present ~2 years on the medial LLE with surrounding erythema with smaller ulcerations on the lateral side. No e/o CLI.   -Needs Vascular follow up/referral and MISTY's   -check doppler pulse  -Monitor outline area  -if pain, fever, leukocytosis or concerning skin changes may consider  restarting abx. Hold futher abx for now   -elevate extremities at rest  -podiatry referral at discharge  -PT consult for lymphedema    #Hyponatremia  #Hypokalemia - 2/2 baseline diuretic use  - replace per protocol     Chronic Medical Conditions     #Chronic CHF  #HTN  #HLD - Does not appear exacerbated. BNP negative.  CXR with no signs of fluid overload.  Weight reported 324lb today with his goal weight 318-326lb followed by the [facility].  Hx of combined systolic and diastolic CHF.    - continue pta ASA, Atorvastatin, Losartan, Spironolactone, Torsemide.  Pt on prn Metolazone as instructed by [facility] clinic  - monitor daily weights and I/O         #Depression - continue pta Fluoxetine     #Hypothyroidism - continue pta Levothyroxine          DVT Prophylaxis: Enoxaparin (Lovenox) SQ  Code Status: No CPR- Do NOT Intubate  Expected discharge: Tomorrow, recommended to prior living arrangement once stress evaluation complete.    Family updated with POC: Suzan via phone.    COVID PCR TESTING Status: Neg. No isolation precautions.    Ila Jacobo PA-C  Text Page (7am - 5pm, M-F)    Interval History   No CP overnight. No dyspnea, lightheadedness. Afebrile. No leg pain or warmth. Only complaint on evaluation is hunger.     -Data reviewed today: I reviewed all new labs and imaging results over the last 24 hours. I personally reviewed lab work and imaging as noted above.    Physical Exam   Temp: 96.9  F (36.1  C) Temp src: Oral BP: (!) 143/71 Pulse: 77   Resp: 18 SpO2: 93 % O2 Device: None (Room air)    Vitals:    02/09/21 1206 02/09/21 1559 02/10/21 0506   Weight: 147 kg (324 lb) 145.9 kg (321 lb 11.2 oz) 141.7 kg (312 lb 8 oz)     Vital Signs with Ranges  Temp:  [96.9  F (36.1  C)-98  F (36.7  C)] 96.9  F (36.1  C)  Pulse:  [68-82] 77  Resp:  [15-29] 18  BP: (112-155)/(62-90) 143/71  SpO2:  [91 %-98 %] 93 %  No intake/output data recorded.      Constitutional:Awake, alert, no apparent distress  Respiratory:  Normal work  of breathing. Lungs clear to auscultation bilaterally, no crackles or wheezing.  Cardiovascular: Regular rate and rhythm, normal S1 and S2, and no murmur appreciated.   GI: Normal bowel sounds, soft, non-distended, non-tender.   Skin/Integument: No rashes, no cyanosis, +2 peripheral LE edema. Ulceration present with granulation medial LLE with surrounding erythema no pain or fluctuance, with smaller ulcerations in various stages. Dystrophic toenails.   Neuro: Moving all extremities with normal strength. Coordination and sensation grossly intact. Speech clear. No focal deficits.   Psych: Appropriate affect.            Medications       aspirin  81 mg Oral Daily     atorvastatin  20 mg Oral Daily     ceFAZolin  2 g Intravenous Q8H     famotidine  10 mg Oral BID     FLUoxetine  20 mg Oral Daily     levothyroxine  200 mcg Oral Daily     losartan  25 mg Oral Daily     spironolactone  25 mg Oral Daily     tamsulosin  0.8 mg Oral Daily     technetium Tc 99m tetrofosmin 1UD study  30 mCi Intravenous Once     torsemide  80 mg Oral Daily       Data   Recent Labs   Lab 02/10/21  0645 02/09/21  2349 02/09/21  1751 02/09/21  1218   WBC  --   --   --  5.2   HGB  --   --   --  13.5   MCV  --   --   --  87   PLT  --   --   --  256     --   --  131*   POTASSIUM 3.6 3.3* 3.1* 3.2*   CHLORIDE 101  --   --  94   CO2 29  --   --  32   BUN 24  --   --  35*   CR 0.87  --   --  1.01   ANIONGAP 5  --   --  5   ANGEL 9.5  --   --  9.7   *  --   --  101*   TROPI  --  <0.015 <0.015 <0.015       Recent Results (from the past 24 hour(s))   XR Chest Port 1 View    Narrative    CHEST ONE VIEW PORTABLE   2/9/2021 1:26 PM     HISTORY:  Chest tightness.    COMPARISON: 9/20/2018      Impression    IMPRESSION: No airspace consolidation, pneumothorax, or pleural  effusion.    LESLIE SANDOVAL MD

## 2021-02-10 NOTE — DISCHARGE INSTRUCTIONS
BLE wounds: Daily  1. Cleanse with wound cleanser and dry  2. Apply Vaseline generously to wounds and legs/feet (not between toes)  3. Apply adaptic to open areas  4. Cover with gauze and wrap with kerlix

## 2021-02-10 NOTE — PLAN OF CARE
PRIMARY DIAGNOSIS: CHEST PAIN & SOB  OUTPATIENT/OBSERVATION GOALS TO BE MET BEFORE DISCHARGE:  1. Ruled out acute coronary syndrome (negative or stable Troponin):  Yes, Trops negative x2, one remaining.  2. Pain Status: Pain free.  3. Appropriate provocative testing performed: Yes, echo in AM and 2 day Ria scheduled.   - Stress Test Procedure: Echo & ria.   - Interpretation of cardiac rhythm per telemetry tech: SR, HR 70's.    4. Cleared by Consultants (if applicable):No  5. Return to near baseline physical activity: Yes  Discharge Planner Nurse   Safe discharge environment identified: Yes  Barriers to discharge: Yes       Entered by: Vanessa Reaves 02/10/2021 3:57 AM  Pt. A&O x4, denies pain. Up with SBA and walker. Denies chest pain or tightness, no reported SOB, audible expiration on respirations. On K+ replacement protocol 40 meq to be administered this evening, recheck in 4 hours. LLE outlined, possible cellulitis, wound over 2 years, warm to touch. Peripheral IV SL between Cefazolin Q 8 for cellulitis. NPO midnight. WOC consult, Ria 2 day and echo in AM. Nursing to continue to monitor and assess.     Please review provider order for any additional goals.   Nurse to notify provider when observation goals have been met and patient is ready for discharge.

## 2021-02-10 NOTE — PLAN OF CARE
"PRIMARY DIAGNOSIS: CHEST PAIN  OUTPATIENT/OBSERVATION GOALS TO BE MET BEFORE DISCHARGE:  1. Ruled out acute coronary syndrome (negative or stable Troponin):  Yes  2. Pain Status: Pain free.  3. Appropriate provocative testing performed: Yes  - Stress Test Procedure: Lexiscan  - Interpretation of cardiac rhythm per telemetry tech: SR    4. Cleared by Consultants (if applicable):No  5. Return to near baseline physical activity: Yes  Discharge Planner Nurse   Safe discharge environment identified: Yes  Barriers to discharge: Yes       Entered by: Gregoria Vaughn 02/10/2021 2:24 PM   BP (!) 151/78 (BP Location: Left arm)   Pulse 80   Temp 96.6  F (35.9  C) (Axillary)   Resp 16   Ht 1.727 m (5' 8\")   Wt 141.7 kg (312 lb 8 oz)   SpO2 92%   BMI 47.52 kg/m    Please review provider order for any additional goals.   Nurse to notify provider when observation goals have been met and patient is ready for discharge.  Patient Aox4. Denies pain. Denies SOB. PIV patent. WOC consulted and following patient BLE leg wounds. Echo and Lexiscan performed today. Plan to have repeat lexiscan tomorrow and possible discharge. Will continue to monitor and follow plan of care.    "

## 2021-02-10 NOTE — PROGRESS NOTES
Spoke to Kenneth this morning. He is feeling tried but well otherwise. He did say that he was having chest pain for 2 weeks. I did ask him if he is having any symptoms to please tell me so we can help him. He expressed understanding. He is waiting for echo tech then will have part 1 of 2 of his stress test.   Will continue to follow his chart and make appropiate Follow-up appt.     Called and spoke to Melissa. She is planning on going to the hospital.   Haily Graves RN 9:18 AM 02/10/21

## 2021-02-10 NOTE — PLAN OF CARE
PRIMARY DIAGNOSIS: CHEST PAIN & SOB  OUTPATIENT/OBSERVATION GOALS TO BE MET BEFORE DISCHARGE:  1. Ruled out acute coronary syndrome (negative or stable Troponin):  Yes, Trops negative x3.  2. Pain Status: Pain free.  3. Appropriate provocative testing performed: Yes, echo in AM and 2 day Ria scheduled.   - Stress Test Procedure: Echo & ria.   - Interpretation of cardiac rhythm per telemetry tech: SR, HR 70's.    4. Cleared by Consultants (if applicable):No  5. Return to near baseline physical activity: Yes  Discharge Planner Nurse   Safe discharge environment identified: Yes  Barriers to discharge: Yes       Entered by: Vanessa Reaves 02/10/2021 4:10 AM  Pt. A&O x4, denies pain. Up with SBA and walker. Denies chest pain or tightness, no reported SOB, audible expiration on respirations. On K+ replacement protocol  3.3 at 2349 added another 40 meq to be administered, recheck in 4 hours. LLE remains within outline, possible cellulitis, wound over 2 years, warm to touch. Peripheral IV SL between Cefazolin Q 8 for cellulitis. NPO midnight. WOC consult, Ria 2 day and echo in AM. Daily weights. Nursing to continue to monitor and assess.     Please review provider order for any additional goals.   Nurse to notify provider when observation goals have been met and patient is ready for discharge.

## 2021-02-10 NOTE — PLAN OF CARE
Physical Therapy Discharge Summary    Reason for therapy discharge:  Pt has good DISCHARGE plan for edema control, just needs to follow up and continue.     Progress towards therapy goal(s). See goals on Care Plan in [facility] electronic health record for goal details.  Goals met    Therapy recommendation(s):    Continued therapy is recommended.  Rationale/Recommendations:   Pt was educated on lymphedema system, properties of the system and the way this system is impacted by CV system as well as skin infections. Pt was educated on good skin integrity- use of lotion daily to improve elasticity of skin and decrease risk of further skin injury and how skin injury can be harder for him to heal. Pt was educated on where lymph nodes are, where the primary lymphatics live and how to start performing lymph node clearing at inguinal region in supine position with heart below LEs. Pt educated on elevation during the day and avoiding dependent position. Pt educated on compression therapy as part of complete decongestive therapy. Pt educated he also is moving inde, so his swelling will not prevent him from DISCHARGE home at this time. And is likely to DC in next day so would benefit from education, exercise and instruction on principles of the edema clinic here, but defer compression to Outpatient clinic at this time. Pt agreement with plan and understands education. Pt given handout for address of edema clinic, handout for LE edema- and precautions associated with lymphedema.  Pt educated to continue to perform compression bandaging at home at this time with instructions from wound care for wound care at home. Pt agreeable. Pt was educated on walker wheels for home. Pt agreeable to this rec. Pt inde with supine to sit, ambulation with walker. Tolerating well.

## 2021-02-10 NOTE — PLAN OF CARE
PRIMARY DIAGNOSIS: CHEST PAIN & SOB  OUTPATIENT/OBSERVATION GOALS TO BE MET BEFORE DISCHARGE:  1. Ruled out acute coronary syndrome (negative or stable Troponin):  Yes, Trops negative x3.  2. Pain Status: Pain free.  3. Appropriate provocative testing performed: Yes, echo in AM and 2 day Ria scheduled.   - Stress Test Procedure: Echo & ria.   - Interpretation of cardiac rhythm per telemetry tech: SR, HR 60's.    4. Cleared by Consultants (if applicable):No  5. Return to near baseline physical activity: Yes  Discharge Planner Nurse   Safe discharge environment identified: Yes  Barriers to discharge: Yes       Entered by: Vanessa Reaves 02/10/2021 5:23 AM  Pt. A&O x4, denies pain. Up with SBA and walker, unsteady on feet at times. Denies chest pain or tightness, no reported SOB, audible expiration on respirations. On K+ replacement protocol  3.3 at 2349 added another 40 meq to be administered, recheck this AM with morning labs. Continuing to deny chest tightness, pain or SOB. LLE remains within outline, possible cellulitis, wound over 2 years, warm to touch. Peripheral IV SL between Cefazolin Q 8 for cellulitis. NPO since midnight. WOC consult, Ria 2 day and echo in AM. Daily weights. Nursing to continue to monitor and assess.     Please review provider order for any additional goals.   Nurse to notify provider when observation goals have been met and patient is ready for discharge.

## 2021-02-11 ENCOUNTER — APPOINTMENT (OUTPATIENT)
Dept: CARDIOLOGY | Facility: CLINIC | Age: 71
DRG: 313 | End: 2021-02-11
Attending: PHYSICIAN ASSISTANT
Payer: MEDICARE

## 2021-02-11 ENCOUNTER — APPOINTMENT (OUTPATIENT)
Dept: NUCLEAR MEDICINE | Facility: CLINIC | Age: 71
DRG: 313 | End: 2021-02-11
Attending: PHYSICIAN ASSISTANT
Payer: MEDICARE

## 2021-02-11 ENCOUNTER — TELEPHONE (OUTPATIENT)
Dept: WOUND CARE | Facility: CLINIC | Age: 71
End: 2021-02-11

## 2021-02-11 VITALS
WEIGHT: 312.5 LBS | SYSTOLIC BLOOD PRESSURE: 129 MMHG | HEIGHT: 68 IN | DIASTOLIC BLOOD PRESSURE: 65 MMHG | TEMPERATURE: 97.4 F | HEART RATE: 81 BPM | RESPIRATION RATE: 16 BRPM | BODY MASS INDEX: 47.36 KG/M2 | OXYGEN SATURATION: 94 %

## 2021-02-11 PROBLEM — I50.42 CHRONIC COMBINED SYSTOLIC AND DIASTOLIC CONGESTIVE HEART FAILURE (H): Status: ACTIVE | Noted: 2018-06-28

## 2021-02-11 PROBLEM — I25.119 CORONARY ARTERY DISEASE INVOLVING NATIVE CORONARY ARTERY OF NATIVE HEART WITH ANGINA PECTORIS (H): Status: ACTIVE | Noted: 2021-02-11

## 2021-02-11 PROBLEM — R07.2 PRECORDIAL PAIN: Status: ACTIVE | Noted: 2021-02-11

## 2021-02-11 PROBLEM — I73.9 PVD (PERIPHERAL VASCULAR DISEASE) (H): Status: ACTIVE | Noted: 2018-06-28

## 2021-02-11 LAB
CV BLOOD PRESSURE: 41 %
CV STRESS MAX HR HE: 80
NUC STRESS EJECTION FRACTION: 52 %
POTASSIUM SERPL-SCNC: 3.2 MMOL/L (ref 3.4–5.3)
POTASSIUM SERPL-SCNC: 3.4 MMOL/L (ref 3.4–5.3)
RATE PRESSURE PRODUCT: NORMAL
STRESS ECHO BASELINE DIASTOLIC HE: 66
STRESS ECHO BASELINE HR: 67
STRESS ECHO BASELINE SYSTOLIC BP: 129
STRESS ECHO CALCULATED PERCENT HR: 54 %
STRESS ECHO LAST STRESS DIASTOLIC BP: 70
STRESS ECHO LAST STRESS SYSTOLIC BP: 143
STRESS ECHO TARGET HR: 148

## 2021-02-11 PROCEDURE — 99222 1ST HOSP IP/OBS MODERATE 55: CPT | Mod: 25 | Performed by: INTERNAL MEDICINE

## 2021-02-11 PROCEDURE — 93017 CV STRESS TEST TRACING ONLY: CPT

## 2021-02-11 PROCEDURE — 84132 ASSAY OF SERUM POTASSIUM: CPT | Performed by: PHYSICIAN ASSISTANT

## 2021-02-11 PROCEDURE — A9502 TC99M TETROFOSMIN: HCPCS | Performed by: HOSPITALIST

## 2021-02-11 PROCEDURE — 250N000011 HC RX IP 250 OP 636

## 2021-02-11 PROCEDURE — 93018 CV STRESS TEST I&R ONLY: CPT | Performed by: INTERNAL MEDICINE

## 2021-02-11 PROCEDURE — 78452 HT MUSCLE IMAGE SPECT MULT: CPT | Mod: 26 | Performed by: INTERNAL MEDICINE

## 2021-02-11 PROCEDURE — 250N000013 HC RX MED GY IP 250 OP 250 PS 637: Performed by: PHYSICIAN ASSISTANT

## 2021-02-11 PROCEDURE — 36415 COLL VENOUS BLD VENIPUNCTURE: CPT | Performed by: PHYSICIAN ASSISTANT

## 2021-02-11 PROCEDURE — 343N000001 HC RX 343: Performed by: HOSPITALIST

## 2021-02-11 PROCEDURE — 93016 CV STRESS TEST SUPVJ ONLY: CPT | Performed by: INTERNAL MEDICINE

## 2021-02-11 RX ORDER — POTASSIUM CHLORIDE 1500 MG/1
40 TABLET, EXTENDED RELEASE ORAL ONCE
Status: COMPLETED | OUTPATIENT
Start: 2021-02-11 | End: 2021-02-11

## 2021-02-11 RX ORDER — REGADENOSON 0.08 MG/ML
INJECTION, SOLUTION INTRAVENOUS
Status: COMPLETED
Start: 2021-02-11 | End: 2021-02-11

## 2021-02-11 RX ORDER — AMINOPHYLLINE 25 MG/ML
INJECTION, SOLUTION INTRAVENOUS
Status: DISCONTINUED
Start: 2021-02-11 | End: 2021-02-11 | Stop reason: WASHOUT

## 2021-02-11 RX ADMIN — POTASSIUM CHLORIDE 40 MEQ: 1500 TABLET, EXTENDED RELEASE ORAL at 06:09

## 2021-02-11 RX ADMIN — FLUOXETINE 20 MG: 20 CAPSULE ORAL at 09:40

## 2021-02-11 RX ADMIN — SPIRONOLACTONE 25 MG: 25 TABLET ORAL at 13:17

## 2021-02-11 RX ADMIN — TAMSULOSIN HYDROCHLORIDE 0.8 MG: 0.4 CAPSULE ORAL at 09:41

## 2021-02-11 RX ADMIN — ACETAMINOPHEN 650 MG: 325 TABLET, FILM COATED ORAL at 03:47

## 2021-02-11 RX ADMIN — REGADENOSON 0.4 MG: 0.08 INJECTION, SOLUTION INTRAVENOUS at 07:47

## 2021-02-11 RX ADMIN — FAMOTIDINE 10 MG: 10 TABLET, FILM COATED ORAL at 09:41

## 2021-02-11 RX ADMIN — TORSEMIDE 80 MG: 20 TABLET ORAL at 13:17

## 2021-02-11 RX ADMIN — LEVOTHYROXINE SODIUM 200 MCG: 0.1 TABLET ORAL at 09:41

## 2021-02-11 RX ADMIN — Medication 34 MCI.: at 07:56

## 2021-02-11 RX ADMIN — ASPIRIN 81 MG: 81 TABLET ORAL at 09:40

## 2021-02-11 NOTE — PLAN OF CARE
" Temp: 97.6  F (36.4  C)  Temp src: Oral  BP: 129/64  Pulse: 78  Resp: 16  SpO2: 97 %  O2 Device: None (Room air)        Admit Date: 02/09/2021  Admitting Diagnosis: SOB  Pertinent History: HTN, DM2 (diet controlled), Bilateral leg edema, MDD, PVD, CHF    Isolation Precautions:   Standard.    Neuro: Alert and Oriented x4  Activity: are SBA with Walker  Telemetry Monitoring: Yes - normal sinus rhythm with HR in the 70's  Pain: complaining of pain in their knee. States it is his \"usual arthritis pain\". Tylenol given..  Labs / Tests: Troponin negative x 3; part 1 of lexiscan 2/10 and second part today 2/11  GI: WNL  : Frequency (baseline)  Medications: Aspirin, Lovenox, Flomax  Misc: Bilateral lower extremities wrapped by WOC  LDA's: Peripheral  Fluids: is Saline locked.  Diet: No caffeine, Low fat and 2 gram sodium; NPO at midnight for [employer]isc[employer]  Living Situation: Home with wife and friend  Consults: PT and WOC  Discharge Disposition: TBD    Plan of Care: Part 2 of Lexiscan today. Monitor telemetry.      Mariana Brown RN on 2/11/2021 at 5:32 AM      "

## 2021-02-11 NOTE — PLAN OF CARE
"/65 (BP Location: Left arm)   Pulse 81   Temp 97.4  F (36.3  C) (Oral)   Resp 16   Ht 1.727 m (5' 8\")   Wt 141.7 kg (312 lb 8 oz)   SpO2 94%   BMI 47.52 kg/m        Vitals stable. Pt alert and oriented x4. SBA with walker and gait belt. Pt reports intermittent chest pain. Currently denies any pain. Declined need for pain medication. Denies lightheadedness/dizziness/or SOB. BLE dressing CDI. No drainage noted. Pedal pulses WDL. Lexiscan complete, see results. Cardiology consulted, see note. Tolerating regular diet, denies nausea. Voiding without difficulty. Plan for discharge this afternoon. Will continue to monitor and provide supportive cares.   "

## 2021-02-11 NOTE — PLAN OF CARE
VSS, on RA, afebrile. Up SBA with walker. A&Ox4. Pt states his sob with activity is back to his baseline. Pt also up frequently to the bathroom- pt also states this is his baseline. Denies chest pain. Good appetite. Bilateral lower extremities wound care completed today by [facility] nurse. Trops negative x3.  Plan- monitor on tele overnight, npo at midnight, lexiscan tomorrow, daily weight.
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
## 2021-02-11 NOTE — PLAN OF CARE
Patient's After Visit Summary was reviewed with patient: YES   Patient verbalized understanding of After Visit Summary, recommended follow up and was given an opportunity to ask questions.   Discharge medications sent home with patient/family:CAMRYN  Discharged with self    OBSERVATION patient END time: 1427

## 2021-02-11 NOTE — DISCHARGE SUMMARY
[facility] Discharge Summary    Jamar Ivory MRN# [id number]   Age: 71 year old YOB: 1950     Date of Admission:  2/9/2021  Date of Discharge::  2/11/2021  Admitting Physician:  Simeon Viramontes MD  Discharge Physician:  Mac Meade MD             Admission Diagnoses:   Shortness of breath [R06.02]  Chest tightness [R07.89]          Discharge Diagnosis:   Principal Problem:    Precordial pain  Active Problems:    Obesity with serious comorbidity    Venous stasis ulcer of ankle limited to breakdown of skin without varicose veins (H)    Chronic combined systolic and diastolic congestive heart failure (H)    PVD (peripheral vascular disease) (H)    Shortness of breath    Coronary artery disease, noted to have a small segment infarct with teo-infarct ischemia            Procedures:   Echocardiogram       Interpretation Summary  The visual ejection fraction is estimated at 45-50%.  Left ventricular systolic function is borderline reduced.  There is borderline global hypokinesia of the left ventricle.  Borderline aortic root dilatation.  The ascending aorta is Mildly dilated.  The study was technically difficult. The study was technically limited.  Compared to prior study, there is no significant change.    Stress Lexiscan       Conclusion       The nuclear stress test is abnormal.     There is a small area of nontransmural infarction in the inferoapical segment(s) of the left ventricle associated with a mild degree of teo-infarct ischemia.     Left ventricular function is mildly reduced.     The left ventricular ejection fraction at rest is 41%.  The left ventricular ejection fraction at stress is 52%.     There is no prior study for comparison.               Medications Prior to Admission:     Medications Prior to Admission   Medication Sig Dispense Refill Last Dose     acetaminophen (TYLENOL) 500 MG tablet Take 1,000 mg by mouth every 6 hours as needed (Headache)         aspirin  (ASA) 81 MG tablet Take 1 tablet (81 mg) by mouth daily 90 tablet 3 2/9/2021 at AM     atorvastatin (LIPITOR) 20 MG tablet Take 20 mg by mouth daily   Past Week at Unknown time     FLUoxetine (PROZAC) 20 MG capsule Take 1 capsule (20 mg) by mouth daily 90 capsule 1 Past Week at Unknown time     levothyroxine (SYNTHROID/LEVOTHROID) 200 MCG tablet Take 1 tablet (200 mcg) by mouth daily 90 tablet 2 2/9/2021 at AM     losartan (COZAAR) 25 MG tablet Take 1 tablet (25 mg) by mouth daily 90 tablet 1 2/9/2021 at AM     order for DME 1: Gradient Compression Wraps; 2: Cast Boots; 3: BLE knee high 20-30 mm Hg compression stockings; 4: BLE velcro compression garments; knee high 1 each 0 Past Month at Unknown time     potassium chloride ER (KLOR-CON M) 20 MEQ CR tablet Take 1 tablet (20 mEq) by mouth as needed for potassium supplementation (Take 40 mEq ONLY when directed BY [facility] when takes Metolazone) 30 tablet 0 Past Month at Unknown time     spironolactone (ALDACTONE) 25 MG tablet Take 1 tablet (25 mg) by mouth daily 90 tablet 0 2/9/2021 at AM     tamsulosin (FLOMAX) 0.4 MG capsule TAKE 2 CAPSULES BY MOUTH EVERY  capsule 0 2/9/2021 at AM     torsemide (DEMADEX) 20 MG tablet Take 4 tablets (80 mg) by mouth daily 180 tablet 1 2/9/2021 at AM     BETA BLOCKER NOT PRESCRIBED (INTENTIONAL) Beta Blocker not prescribed intentionally due to Evidence of fluid overload or volume depletion and Refusal by patient        Glucosamine-Chondroitin (OSTEO BI-FLEX REGULAR STRENGTH) 250-200 MG TABS Take 1 tablet by mouth 2 times daily        metolazone (ZAROXOLYN) 2.5 MG tablet Take 1 tablet (2.5 mg) by mouth as needed (when instructed by [facility] clinic) 5 tablet 0      order for DME 1: Gradient Compression Wraps; 2: Cast boots; 3: BLE knee high 20-30 or 30-40 mm Hg compression stockings; 4: BLE velcro compression garments; 30-40 mm Hg; full leg; 5: Compression olamide 1 each 0              Discharge Medications:     Current Discharge  Medication List      CONTINUE these medications which have NOT CHANGED    Details   acetaminophen (TYLENOL) 500 MG tablet Take 1,000 mg by mouth every 6 hours as needed (Headache)       aspirin (ASA) 81 MG tablet Take 1 tablet (81 mg) by mouth daily  Qty: 90 tablet, Refills: 3    Associated Diagnoses: Cerebral infarction, unspecified mechanism (H)      atorvastatin (LIPITOR) 20 MG tablet Take 20 mg by mouth daily      FLUoxetine (PROZAC) 20 MG capsule Take 1 capsule (20 mg) by mouth daily  Qty: 90 capsule, Refills: 1    Associated Diagnoses: Moderate major depression (H)      levothyroxine (SYNTHROID/LEVOTHROID) 200 MCG tablet Take 1 tablet (200 mcg) by mouth daily  Qty: 90 tablet, Refills: 2    Associated Diagnoses: Acquired hypothyroidism      losartan (COZAAR) 25 MG tablet Take 1 tablet (25 mg) by mouth daily  Qty: 90 tablet, Refills: 1    Associated Diagnoses: Chronic combined systolic and diastolic congestive heart failure (H)      !! order for DME 1: Gradient Compression Wraps; 2: Cast Boots; 3: BLE knee high 20-30 mm Hg compression stockings; 4: BLE velcro compression garments; knee high  Qty: 1 each, Refills: 0    Associated Diagnoses: Lymphedema      potassium chloride ER (KLOR-CON M) 20 MEQ CR tablet Take 1 tablet (20 mEq) by mouth as needed for potassium supplementation (Take 40 mEq ONLY when directed BY [facility] when takes Metolazone)  Qty: 30 tablet, Refills: 0    Associated Diagnoses: Chronic combined systolic and diastolic congestive heart failure (H)      spironolactone (ALDACTONE) 25 MG tablet Take 1 tablet (25 mg) by mouth daily  Qty: 90 tablet, Refills: 0    Associated Diagnoses: Chronic combined systolic and diastolic congestive heart failure (H)      tamsulosin (FLOMAX) 0.4 MG capsule TAKE 2 CAPSULES BY MOUTH EVERY DAY  Qty: 180 capsule, Refills: 0    Associated Diagnoses: Benign prostatic hyperplasia with weak urinary stream      torsemide (DEMADEX) 20 MG tablet Take 4 tablets (80 mg) by mouth  "daily  Qty: 180 tablet, Refills: 1    Comments: Refills are not needed right away  Associated Diagnoses: Chronic combined systolic and diastolic congestive heart failure (H)      BETA BLOCKER NOT PRESCRIBED (INTENTIONAL) Beta Blocker not prescribed intentionally due to Evidence of fluid overload or volume depletion and Refusal by patient    Associated Diagnoses: Chronic systolic heart failure (H)      Glucosamine-Chondroitin (OSTEO BI-FLEX REGULAR STRENGTH) 250-200 MG TABS Take 1 tablet by mouth 2 times daily      metolazone (ZAROXOLYN) 2.5 MG tablet Take 1 tablet (2.5 mg) by mouth as needed (when instructed by [facility] clinic)  Qty: 5 tablet, Refills: 0    Associated Diagnoses: Chronic combined systolic and diastolic congestive heart failure (H)      !! order for DME 1: Gradient Compression Wraps; 2: Cast boots; 3: BLE knee high 20-30 or 30-40 mm Hg compression stockings; 4: BLE velcro compression garments; 30-40 mm Hg; full leg; 5: Compression olamide  Qty: 1 each, Refills: 0    Associated Diagnoses: Lymphedema       !! - Potential duplicate medications found. Please discuss with provider.                Consultations:   Consultation during this admission received from cardiology         Hospital Course:   Jamar Ivory is a 71 year old man who had presented on 2/9/2021 due to chest pain and assoc SOB that had been present intermittently over the coursse of the past couple of weeks.     In the ED,  was noted to be hemodynamically stable and in NAD. Labs: sodium 131, potassium 3.2, BUN 35 with otherwise normal BMP, BNP, Troponin, CBC, ddimer. CXR negative.  EKG with no acute ischemic changes.  Patient received ASA, sl Nitroglycerin with resolution of symptoms and admitted for ACS rule out.      He was admitted without initiating heparin and remained stable. His troponins remained negative. His echo appeared unchanged from previous, but his stress test was positive with a \"small area of nontransmural " "infarction in the inferoapical segment(s) of the left ventricle associated with a mild degree of teo-infarct ischemia.\"      For that reason, a formal Cardiology consult was completed.  Dr. Crisostomo, who saw Mr. Ivory recommended conservative management, without coronary angiography at this time.     /66 (BP Location: Left arm)   Pulse 75   Temp 95.7  F (35.4  C) (Oral)   Resp 16   Ht 1.727 m (5' 8\")   Wt 141.7 kg (312 lb 8 oz)   SpO2 95%   BMI 47.52 kg/m     On the date of discharge, I met the patient and also communicated with Dr. Crisostomo in regard to his assessment. Mr. Ivory was fully appropriate and eager for discharge home.   HEENT: no focal muscular asymmetry  Chest: CTA  COR: RRR without murmur or rub  Abd: soft, NTND  LEs are wrapped with areas of superficial erosion covered.  Pitting edema noted.           Discharge Instructions and Follow-Up:   Discharge diet: Low salt   Discharge activity: Activity as tolerated    Discharge follow-up: Low salt, low fat.            Discharge Disposition:   Discharged to home      Attestation:  I have reviewed today's vital signs, notes, medications, labs and imaging.  Total time: 35 minutes    Mac Meade MD     "

## 2021-02-11 NOTE — TELEPHONE ENCOUNTER
Patient in workque, recently discharged from hospital with bilateral lower extremity ulcers. Previously seen by Mariana Pham PA-C in 2018 for same issue. Please schedule with Mariana or Delores flanagan available for in clinic appointment.

## 2021-02-12 ENCOUNTER — PATIENT OUTREACH (OUTPATIENT)
Dept: NURSING | Facility: CLINIC | Age: 71
End: 2021-02-12
Payer: MEDICARE

## 2021-02-12 ENCOUNTER — OFFICE VISIT (OUTPATIENT)
Dept: CARDIOLOGY | Facility: CLINIC | Age: 71
End: 2021-02-12
Attending: NURSE PRACTITIONER
Payer: MEDICARE

## 2021-02-12 VITALS
BODY MASS INDEX: 48.99 KG/M2 | DIASTOLIC BLOOD PRESSURE: 70 MMHG | WEIGHT: 315 LBS | SYSTOLIC BLOOD PRESSURE: 120 MMHG | HEART RATE: 86 BPM | OXYGEN SATURATION: 94 %

## 2021-02-12 DIAGNOSIS — I50.42 CHRONIC COMBINED SYSTOLIC AND DIASTOLIC CONGESTIVE HEART FAILURE (H): ICD-10-CM

## 2021-02-12 DIAGNOSIS — E66.813 CLASS 3 SEVERE OBESITY DUE TO EXCESS CALORIES WITH SERIOUS COMORBIDITY IN ADULT, UNSPECIFIED BMI (H): ICD-10-CM

## 2021-02-12 DIAGNOSIS — E66.01 CLASS 3 SEVERE OBESITY DUE TO EXCESS CALORIES WITH SERIOUS COMORBIDITY IN ADULT, UNSPECIFIED BMI (H): ICD-10-CM

## 2021-02-12 DIAGNOSIS — I10 HYPERTENSION GOAL BP (BLOOD PRESSURE) < 140/90: Chronic | ICD-10-CM

## 2021-02-12 DIAGNOSIS — E78.5 HYPERLIPIDEMIA WITH TARGET LDL LESS THAN 100: Chronic | ICD-10-CM

## 2021-02-12 DIAGNOSIS — I50.42 CHRONIC COMBINED SYSTOLIC AND DIASTOLIC CONGESTIVE HEART FAILURE (H): Primary | ICD-10-CM

## 2021-02-12 LAB
ANION GAP SERPL CALCULATED.3IONS-SCNC: 6 MMOL/L (ref 3–14)
BUN SERPL-MCNC: 20 MG/DL (ref 7–30)
CALCIUM SERPL-MCNC: 8.8 MG/DL (ref 8.5–10.1)
CHLORIDE SERPL-SCNC: 98 MMOL/L (ref 94–109)
CO2 SERPL-SCNC: 30 MMOL/L (ref 20–32)
CREAT SERPL-MCNC: 0.92 MG/DL (ref 0.66–1.25)
GFR SERPL CREATININE-BSD FRML MDRD: 83 ML/MIN/{1.73_M2}
GLUCOSE SERPL-MCNC: 85 MG/DL (ref 70–99)
POTASSIUM SERPL-SCNC: 3.2 MMOL/L (ref 3.4–5.3)
SODIUM SERPL-SCNC: 134 MMOL/L (ref 133–144)

## 2021-02-12 PROCEDURE — 80048 BASIC METABOLIC PNL TOTAL CA: CPT | Performed by: NURSE PRACTITIONER

## 2021-02-12 PROCEDURE — 36415 COLL VENOUS BLD VENIPUNCTURE: CPT | Performed by: NURSE PRACTITIONER

## 2021-02-12 PROCEDURE — 99215 OFFICE O/P EST HI 40 MIN: CPT | Performed by: NURSE PRACTITIONER

## 2021-02-12 RX ORDER — POTASSIUM CHLORIDE 1500 MG/1
20 TABLET, EXTENDED RELEASE ORAL PRN
Qty: 90 TABLET | Refills: 1 | Status: SHIPPED | OUTPATIENT
Start: 2021-02-12 | End: 2021-02-24

## 2021-02-12 ASSESSMENT — ACTIVITIES OF DAILY LIVING (ADL): DEPENDENT_IADLS:: INDEPENDENT

## 2021-02-12 NOTE — LETTER
[facility] CARE COORDINATION  [address] [place] [address]  February 12, 2021      Jamar PATEL Glendeo  [address]  [facility] [address]      Dear Damian Roldan is an updated complex care plan for your continued enrollment in clinic care coordination.     Please let us know if you have additional questions, goals, or concerns that we can assist with.      MERY Altamirano   Social Work Clinic Care Coordinator   [facility]  PH: [phone]  [email or web address]  Complex Care Plan  About Me:    Patient Name:  Jamar Marroquin    YOB: 1950  Age:         71 year old   [place] MRN:    [id number] Telephone Information:  Home Phone [phone]   Mobile [phone]       Address:  [address]  [facility] [address] Email address:  No e-mail address on record      Emergency Contact(s)    Name Relationship Lgl Grd Work Phone Home Phone Mobile Phone   1. SELIN MARROQUIN Spouse No none [phone]   2. JOSE MARIA MARROQUIN Son No  [phone]            Primary language:  English     needed? No   [place] [facility]:  [phone] op. 1  Other communication barriers: Glasses  Preferred Method of Communication:  Mail  Current living arrangement: I live in a private home with spouse  Mobility Status/ Medical Equipment: Independent    Health Maintenance  Health Maintenance Reviewed: Due/Overdue     Health Maintenance Due   Topic Date Due     URINE DRUG SCREEN  1950     ZOSTER IMMUNIZATION (1 of 2) 01/19/1999     EYE EXAM  05/22/2019     INFLUENZA VACCINE (1) 09/01/2020       My Access Plan  Medical Emergency 911   Primary Clinic Line [facility] - [phone]   24 Hour Appointment Line [phone] or  [phone]) (toll-free)   24 Hour Nurse Line [phone] (toll-free)   Preferred Urgent  Care     Preferred Hospital [facility]  [phone]   Preferred Pharmacy [id number] - [place], [place] - [address]     Behavioral Health Crisis Line The National Suicide Prevention Lifeline at 1-434.549.9326 or 911     My Care Team Members  Patient Care Team       Relationship Specialty Notifications Start End    Myah Florez PA-C PCP - General Physician Assistant  9/11/20     Phone: [phone] Fax: [phone]         [address] [place] [address]    Kong Fraga MD MD Gastroenterology  8/11/16     Phone: [phone] Fax: [phone]         [address] [place] [address]    Cary Grace MD MD Ophthalmology  5/21/18     Phone: [phone] Fax: [phone]         [address] [facility] [address]    Haily Graves, RN Registered Nurse Cardiology Admissions 9/26/19     [facility]    Rika Lindsay NP Nurse Practitioner Nurse Practitioner Psych/Mental Health  11/7/19     Phone: [phone] Fax: [phone]          [facility] [address] [facility] [address]    Rozina Gallegos APRN CNP [facility] Cardiology  11/8/19     [facility]    Phone: [phone] Pager: [phone] Fax: [phone] [address] [facility] [place] [address]    Vega Anders MD MD Cardiology  7/9/20     [facility]    Phone: [phone] Pager: [phone] Fax: [phone] [address] [facility] [place] [address]    Myah Florez PA-C Assigned PCP   8/16/20     Phone: [phone] Fax: [phone]         [address] [place] [address]    Brody Hawk MD Assigned Musculoskeletal Provider   10/23/20     Phone: [phone] Fax: [phone]         [address] [address] [address]    Cece Lawrence MD Assigned Palliative Care Provider   10/23/20     Phone: [phone] Fax: [phone] [address] [address]     Vega Anders MD Assigned Heart and Vascular Provider   11/15/20     Phone: [phone] Pager: [phone] Fax: [phone] [address] [place] [address]    Carin Gomez MD Assigned Behavioral Health Provider   11/15/20     Phone: [phone] Fax: [phone]         [address] [place] [address]    Cassandra Doe LSW Lead Care Coordinator Primary Care - CC  1/8/21     Phone: [phone] Fax: [phone]         [address] [place] [address]    Haily Graves, RN MyHealth Tracker   2/10/21             My Care Plans  Self Management and Treatment Plan  Goals and (Comments)  Goals        General    1. Mental Health Management (pt-stated)     Notes - Note edited  9/21/2020  9:36 AM by Solo Dia, RN    Goal Statement: I will work to improve my depression.  Date Goal set: 11/8/19 // revised on 9/21/20  Barriers: long-standing history of depression, reports minimal depression improvement in the past despite multiple interventions  Strengths: motivated to improve depression, willing to continue trying different things for depression treatment  Date to Achieve By: 3/31/21  Patient expressed understanding of goal: Yes    Action steps to achieve this goal:  1. I will continue taking my medications as prescribed and will notify my care team of any medication questions or concerns.  2. I will establish care with Therapy and Psychiatry for ongoing mental health management.  3. I will continue routine follow-up with my PCP as indicated.  4. I will focus on doing more things I enjoy on a weekly basis.  5. I will explore additional mental health supports and resources that I can utilize when needed.  6. I will continue working with Care Coordination to identify and address any barriers to achieving my goal.             Action Plans on File:            Depression  Heart Failure       Advance Care Plans/Directives Type:   Type Advanced Care Plans/Directives:  POLST    My Medical and Care Information  Problem List   Patient Active Problem List   Diagnosis     Acquired hypothyroidism     Vitiligo     Hyperlipidemia with target LDL less than 100     Hypertension goal BP (blood pressure) < 140/90     Cerebral infarction (H)     Moderate major depression (H)     Health Care Home     Fatty liver     Advanced directives, counseling/discussion     Impaired glucose tolerance     Transient cerebral ischemia     Obesity with serious comorbidity     Venous stasis ulcer of ankle limited to breakdown of skin without varicose veins (H)     Bilateral leg edema     Diet Controlled Type 2 diabetes mellitus without complication, without long-term current use of insulin (H)     Benign neoplasm of colon     Pain in both lower extremities     Low hemoglobin     Breast tenderness in male     Hx of Diabetic polyneuropathy associated with type 2 diabetes mellitus - now diet controlled (H)     Chronic combined systolic and diastolic congestive heart failure (H)     PVD (peripheral vascular disease) (H)     Benign prostatic hyperplasia with incomplete bladder emptying     Non-healing ulcer of lower leg with fat layer exposed, unspecified laterality (H)     Depression, major, recurrent, in complete remission (H)     Shortness of breath     Chest tightness     Coronary artery disease, noted to have a small segment infarct with teo-infarct ischemia     Precordial pain      Current Medications and Allergies:    Current Outpatient Medications   Medication     acetaminophen (TYLENOL) 500 MG tablet     aspirin (ASA) 81 MG tablet     atorvastatin (LIPITOR) 20 MG tablet     BETA BLOCKER NOT PRESCRIBED (INTENTIONAL)     FLUoxetine (PROZAC) 20 MG capsule     Glucosamine-Chondroitin (OSTEO BI-FLEX REGULAR STRENGTH) 250-200 MG TABS     levothyroxine (SYNTHROID/LEVOTHROID) 200 MCG tablet     losartan (COZAAR) 25 MG tablet     metolazone (ZAROXOLYN) 2.5 MG tablet     order for DME     order for DME      potassium chloride ER (KLOR-CON M) 20 MEQ CR tablet     spironolactone (ALDACTONE) 25 MG tablet     tamsulosin (FLOMAX) 0.4 MG capsule     torsemide (DEMADEX) 20 MG tablet     No current facility-administered medications for this visit.      Care Coordination Start Date: 11/8/2019   Frequency of Care Coordination: monthly   Form Last Updated: 02/12/2021
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
## 2021-02-12 NOTE — PROGRESS NOTES
Clinic Care Coordination Contact    Clinic Care Coordination Contact  OUTREACH    Referral Information:  Referral Source: IP Report    Primary Diagnosis: Behavioral Health    Chief Complaint   Patient presents with     Clinic Care Coordination - [facility]        [place] Utilization:   Clinic Utilization  Difficulty keeping appointments: No  Compliance Concerns: Yes  No-Show Concerns: No  No PCP office visit in Past Year: No    Utilization    Last refreshed: 2/11/2021  4:37 PM: Hospital Admissions 1           Last refreshed: 2/11/2021  4:37 PM: ED Visits 1           Last refreshed: 2/11/2021  4:37 PM: No Show Count (past year) 2              Current as of: 2/11/2021  4:37 PM              Clinical Concerns:  Current Medical Concerns: Pt was hospitalized at [facility] from 2/9/21 to 2/11/21. Pt came in with chest pain and was found to have a small segment of heart injury. It is not clear that this happened just now, and is seems most likely (because it does not change with activity) that is is related to the chest pain that you are currently having.    Patient Active Problem List   Diagnosis     Acquired hypothyroidism     Vitiligo     Hyperlipidemia with target LDL less than 100     Hypertension goal BP (blood pressure) < 140/90     Cerebral infarction (H)     Moderate major depression (H)     Health Care Home     Fatty liver     Advanced directives, counseling/discussion     Impaired glucose tolerance     Transient cerebral ischemia     Obesity with serious comorbidity     Venous stasis ulcer of ankle limited to breakdown of skin without varicose veins (H)     Bilateral leg edema     Diet Controlled Type 2 diabetes mellitus without complication, without long-term current use of insulin (H)     Benign neoplasm of colon     Pain in both lower extremities     Low hemoglobin     Breast tenderness in male     Hx of Diabetic polyneuropathy associated with type 2 diabetes mellitus - now diet controlled (H)      Chronic combined systolic and diastolic congestive heart failure (H)     PVD (peripheral vascular disease) (H)     Benign prostatic hyperplasia with incomplete bladder emptying     Non-healing ulcer of lower leg with fat layer exposed, unspecified laterality (H)     Depression, major, recurrent, in complete remission (H)     Shortness of breath     Chest tightness     Coronary artery disease, noted to have a small segment infarct with teo-infarct ischemia     Precordial pain       Current Behavioral Concerns: Depression ongoing     Education Provided to patient: AVS review     Pt's wife reported pt seems to be doing better. May call back later or tomorrow if any questions come up. Reported leg therapy called this morning to schedule. Pt has lab and [facility] appt today, they will bring AVS to clinic today as well. Reported pt needs to see vascular Dr to see about wounds per inpatient team.     AVS notes:    - Follow this diet upon discharge: Low Saturated Fat Na <2400 mg    Follow-up and recommended labs and tests:  - With PMD and C[facility] clinic as planned  - You have a [facility] appt tomorrow  - lymphedema therapy referral   - walker wheel referral     Pain  Pain: Not discussed     Health Maintenance Reviewed: Due/Overdue     Health Maintenance Due   Topic Date Due     URINE DRUG SCREEN  1950     ZOSTER IMMUNIZATION (1 of 2) 01/19/1999     EYE EXAM  05/22/2019     INFLUENZA VACCINE (1) 09/01/2020       Clinical Pathway: None    Medication Management:  No medication changes in hospital.     Post-discharge medication reconciliation status: Discharge medications reviewed and reconciled.  Continue medications without change.      Functional Status:  Dependent ADLs: Independent  Dependent IADLs: Independent  Bed or wheelchair confined: No  Mobility Status: Independent    Living Situation:  Current living arrangement: I live in a private home with spouse  Type of residence: Apartment    Lifestyle & Psychosocial  Needs:     Social Needs     Financial resource strain: Not on file     Food insecurity     Worry: Not on file     Inability: Not on file     Transportation needs     Medical: No     Non-medical: No       Diet: No added salt (Mediterranean)  Inadequate nutrition: No  Tube Feeding: No  Inadequate activity/exercise: Yes  Significant changes in sleep pattern: No  Transportation means: Regular car  Financial/Insurance concerns: No  [employer]ist or spiritual beliefs that impact treatment: No  Mental health DX: Yes  Mental health DX how managed: Medication  Mental health management concern: No  Chemical Dependency Status: No Current Concerns  Informal Support system: Spouse     Socioeconomic History     Marital status:      Spouse name: Melissa     Number of children: 3     Years of education: Not on file     Highest education level: Not on file   Occupational History     Occupation:      Employer: [employer]     Tobacco Use     Smoking status: Never Smoker     Smokeless tobacco: Never Used   Substance and Sexual Activity     Alcohol use: Not Currently     Alcohol/week: 0.0 standard drinks     Comment: rarely     Drug use: No     Sexual activity: Yes     Partners: Female       Care Coordinator has reviewed patient's Social Determinants of Health (SDoH) on this date. Upon review, changes were not made.      Resources and Interventions:  Current Resources:   Community Resources: [facility]  Supplies used at home: None  Equipment Currently Used at Home: none  Employment Status: retired    Advance Care Plan/Directive  Advanced Care Plans/Directives on file: Yes  Type Advanced Care Plans/Directives: POLST  Advanced Care Plan/Directive Status: Considering Options    Referrals Placed: None     Goals:   Goals        General    1. Mental Health Management (pt-stated)     Notes - Note edited  9/21/2020  9:36 AM by Solo Dia RN    Goal Statement: I will work to improve my depression.  Date Goal set:  11/8/19 // revised on 9/21/20  Barriers: long-standing history of depression, reports minimal depression improvement in the past despite multiple interventions  Strengths: motivated to improve depression, willing to continue trying different things for depression treatment  Date to Achieve By: 3/31/21  Patient expressed understanding of goal: Yes    Action steps to achieve this goal:  1. I will continue taking my medications as prescribed and will notify my care team of any medication questions or concerns.  2. I will establish care with Therapy and Psychiatry for ongoing mental health management.  3. I will continue routine follow-up with my PCP as indicated.  4. I will focus on doing more things I enjoy on a weekly basis.  5. I will explore additional mental health supports and resources that I can utilize when needed.  6. I will continue working with Care Coordination to identify and address any barriers to achieving my goal.            Patient/Caregiver understanding: Pt reports understanding and denies any additional questions or concerns at this times. CHANDNI CC engaged in AIDET communication during encounter.    Outreach Frequency: monthly  Future Appointments              Today RU LAB [facility]    Today Rozina Gallegos APRN CNP [facility] PSA CLIN    In 1 week Amanda Pham PA-C [facility]          Plan: Patient was provided with this writer's contact information and encouraged to call with any questions or concerns. Pt will attend upcoming appts and follow plan of care.     CHANDNI CC will mail updated complex plan to patient. CHANDNI CC will follow up again in approx one month.     MERY Altamirano   Social Work Clinic Care Coordinator   [facility]  PH: [phone]  [email or web address]

## 2021-02-12 NOTE — PATIENT INSTRUCTIONS
1. No medication changes  2. Start taking potassium 20meq (1 tablet) daily  3. Continue to monitor weight daily  4. Follow up with Rozina in 6 weeks with labs prior  5. Please call with any additional questions/concerns [phone]
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
## 2021-02-16 ENCOUNTER — OFFICE VISIT (OUTPATIENT)
Dept: FAMILY MEDICINE | Facility: CLINIC | Age: 71
End: 2021-02-16
Payer: MEDICARE

## 2021-02-16 ENCOUNTER — ANCILLARY PROCEDURE (OUTPATIENT)
Dept: GENERAL RADIOLOGY | Facility: CLINIC | Age: 71
End: 2021-02-16
Attending: PHYSICIAN ASSISTANT
Payer: MEDICARE

## 2021-02-16 ENCOUNTER — CARE COORDINATION (OUTPATIENT)
Dept: CARDIOLOGY | Facility: CLINIC | Age: 71
End: 2021-02-16

## 2021-02-16 VITALS
WEIGHT: 315 LBS | BODY MASS INDEX: 49.72 KG/M2 | TEMPERATURE: 97.6 F | SYSTOLIC BLOOD PRESSURE: 124 MMHG | OXYGEN SATURATION: 95 % | DIASTOLIC BLOOD PRESSURE: 70 MMHG | HEART RATE: 88 BPM

## 2021-02-16 DIAGNOSIS — R07.1 CHEST PAIN ON BREATHING: ICD-10-CM

## 2021-02-16 DIAGNOSIS — I50.42 CHRONIC COMBINED SYSTOLIC AND DIASTOLIC CONGESTIVE HEART FAILURE (H): Primary | ICD-10-CM

## 2021-02-16 DIAGNOSIS — E87.6 HYPOKALEMIA: ICD-10-CM

## 2021-02-16 DIAGNOSIS — E11.42 DIABETIC POLYNEUROPATHY ASSOCIATED WITH TYPE 2 DIABETES MELLITUS (H): ICD-10-CM

## 2021-02-16 DIAGNOSIS — M10.9 ACUTE GOUTY ARTHRITIS: ICD-10-CM

## 2021-02-16 DIAGNOSIS — E66.01 MORBID OBESITY (H): ICD-10-CM

## 2021-02-16 DIAGNOSIS — F33.41 RECURRENT MAJOR DEPRESSIVE DISORDER, IN PARTIAL REMISSION (H): ICD-10-CM

## 2021-02-16 LAB
BASOPHILS # BLD AUTO: 0.1 10E9/L (ref 0–0.2)
BASOPHILS NFR BLD AUTO: 2 %
DIFFERENTIAL METHOD BLD: ABNORMAL
EOSINOPHIL # BLD AUTO: 0.5 10E9/L (ref 0–0.7)
EOSINOPHIL NFR BLD AUTO: 10.5 %
ERYTHROCYTE [DISTWIDTH] IN BLOOD BY AUTOMATED COUNT: 14.4 % (ref 10–15)
HCT VFR BLD AUTO: 36.8 % (ref 40–53)
HGB BLD-MCNC: 12.4 G/DL (ref 13.3–17.7)
LYMPHOCYTES # BLD AUTO: 1.6 10E9/L (ref 0.8–5.3)
LYMPHOCYTES NFR BLD AUTO: 35.7 %
MCH RBC QN AUTO: 29.2 PG (ref 26.5–33)
MCHC RBC AUTO-ENTMCNC: 33.7 G/DL (ref 31.5–36.5)
MCV RBC AUTO: 87 FL (ref 78–100)
MONOCYTES # BLD AUTO: 1.3 10E9/L (ref 0–1.3)
MONOCYTES NFR BLD AUTO: 29.2 %
NEUTROPHILS # BLD AUTO: 1 10E9/L (ref 1.6–8.3)
NEUTROPHILS NFR BLD AUTO: 22.6 %
PLATELET # BLD AUTO: 275 10E9/L (ref 150–450)
RBC # BLD AUTO: 4.24 10E12/L (ref 4.4–5.9)
WBC # BLD AUTO: 4.6 10E9/L (ref 4–11)

## 2021-02-16 PROCEDURE — 84132 ASSAY OF SERUM POTASSIUM: CPT | Performed by: PHYSICIAN ASSISTANT

## 2021-02-16 PROCEDURE — 71046 X-RAY EXAM CHEST 2 VIEWS: CPT | Performed by: RADIOLOGY

## 2021-02-16 PROCEDURE — 85025 COMPLETE CBC W/AUTO DIFF WBC: CPT | Performed by: PHYSICIAN ASSISTANT

## 2021-02-16 PROCEDURE — 99495 TRANSJ CARE MGMT MOD F2F 14D: CPT | Performed by: PHYSICIAN ASSISTANT

## 2021-02-16 PROCEDURE — 36415 COLL VENOUS BLD VENIPUNCTURE: CPT | Performed by: PHYSICIAN ASSISTANT

## 2021-02-16 RX ORDER — INDOMETHACIN 50 MG/1
50 CAPSULE ORAL
Qty: 30 CAPSULE | Refills: 0 | Status: SHIPPED | OUTPATIENT
Start: 2021-02-16 | End: 2021-05-06

## 2021-02-16 ASSESSMENT — ENCOUNTER SYMPTOMS
SHORTNESS OF BREATH: 1
GASTROINTESTINAL NEGATIVE: 1
PSYCHIATRIC NEGATIVE: 1
NEUROLOGICAL NEGATIVE: 1
EYES NEGATIVE: 1
CONSTITUTIONAL NEGATIVE: 1

## 2021-02-16 ASSESSMENT — ANXIETY QUESTIONNAIRES
2. NOT BEING ABLE TO STOP OR CONTROL WORRYING: NOT AT ALL
1. FEELING NERVOUS, ANXIOUS, OR ON EDGE: NOT AT ALL
GAD7 TOTAL SCORE: 0
3. WORRYING TOO MUCH ABOUT DIFFERENT THINGS: NOT AT ALL
5. BEING SO RESTLESS THAT IT IS HARD TO SIT STILL: NOT AT ALL
6. BECOMING EASILY ANNOYED OR IRRITABLE: NOT AT ALL
7. FEELING AFRAID AS IF SOMETHING AWFUL MIGHT HAPPEN: NOT AT ALL

## 2021-02-16 ASSESSMENT — PATIENT HEALTH QUESTIONNAIRE - PHQ9
SUM OF ALL RESPONSES TO PHQ QUESTIONS 1-9: 12
5. POOR APPETITE OR OVEREATING: NOT AT ALL

## 2021-02-16 NOTE — PROGRESS NOTES
"Melissa called back with an update on Kenneth;s appt with his PCP. CXR was done and \"nothing new showed up on it she said.\" Melissa sates she did tell his PCP about Kenneth having frequent chills but no temp. PCP did not find anything. Labs were drawn.   Request for note and labs faxed.     Haily Graves RN 5:01 PM 02/16/21      "

## 2021-02-16 NOTE — LETTER
February 18, 2021      Jamar Ivory  [id number]    [place] [id number]        Dear ,    We are writing to inform you of your test results.    Your test results fall within the expected range(s) or remain unchanged from previous results.  Please continue with current treatment plan.    Resulted Orders   Potassium   Result Value Ref Range    Potassium 3.9 3.4 - 5.3 mmol/L   CBC with platelets and differential   Result Value Ref Range    WBC 4.6 4.0 - 11.0 10e9/L    RBC Count 4.24 (L) 4.4 - 5.9 10e12/L    Hemoglobin 12.4 (L) 13.3 - 17.7 g/dL      Comment:      Results confirmed by repeat test    Hematocrit 36.8 (L) 40.0 - 53.0 %    MCV 87 78 - 100 fl    MCH 29.2 26.5 - 33.0 pg    MCHC 33.7 31.5 - 36.5 g/dL    RDW 14.4 10.0 - 15.0 %    Platelet Count 275 150 - 450 10e9/L    % Neutrophils 22.6 %    % Lymphocytes 35.7 %    % Monocytes 29.2 %      Comment:      Results confirmed by repeat test  Reviewed: OK with previous      % Eosinophils 10.5 %    % Basophils 2.0 %    Absolute Neutrophil 1.0 (L) 1.6 - 8.3 10e9/L    Absolute Lymphocytes 1.6 0.8 - 5.3 10e9/L    Absolute Monocytes 1.3 0.0 - 1.3 10e9/L    Absolute Eosinophils 0.5 0.0 - 0.7 10e9/L    Absolute Basophils 0.1 0.0 - 0.2 10e9/L    Diff Method Automated Method        If you have any questions or concerns, please call the clinic at the number listed above.       Sincerely,      Myha Florez PA-C

## 2021-02-16 NOTE — PROGRESS NOTES
Spoke to Melissa yesterday. Kenneth has been having chills. No sweating after. Both Melissa and her outbreaks. I did ask her to take his temp; need afebrile.   Kennteh has appt with his PMD this morning.     Future Appointments   Date Time Provider Department Center   2/23/2021  9:30 AM Amanda Pham PA-C [facility]   4/2/2021 12:30 PM [facility]   4/2/2021  1:30 PM Rozina Gallegos APRN CNP [facility]       Haily Graves, RN 10:57 AM 02/16/21

## 2021-02-16 NOTE — PATIENT INSTRUCTIONS
Chest XR looked normal to me.  I will also have radiology read it.  I will call you if the plan changes.     Indomethacin sent for gout.     Labs ordered today.     Recheck with me in 4 weeks, or sooner if symptoms worsen.

## 2021-02-16 NOTE — PROGRESS NOTES
"    Assessment & Plan   Problem List Items Addressed This Visit        Nervous and Auditory    Hx of Diabetic polyneuropathy associated with type 2 diabetes mellitus - now diet controlled (H)       Digestive    Morbid obesity (H)       Circulatory    Chronic combined systolic and diastolic congestive heart failure (H) - Primary       Behavioral    Depression, major, recurrent, in complete remission (H)      Other Visit Diagnoses     Acute gouty arthritis        Relevant Medications    indomethacin (INDOCIN) 50 MG capsule    Hypokalemia        Relevant Orders    Potassium (Completed)    Chest pain on breathing        Relevant Orders    XR Chest 2 Views    CBC with platelets and differential (Completed)         - Ongoing chest pain: cardiology follow up found patient to be stable.  CXR today stable.    - Gout - exam is consistent with gout, patient treated with indomethacin (GFR normal)  - Potassium - monitor fluctuating hypokalemia  - depression - stable.    PHQ-9 score:    PHQ 2/16/2021   PHQ-9 Total Score 12   Q9: Thoughts of better off dead/self-harm past 2 weeks Not at all   F/U: Thoughts of suicide or self-harm -   F/U: Safety concerns -   - DM well controlled.  A1C 5.7 on 1/4/21      30 minutes spent on the date of the encounter doing chart review, interpretation of tests, patient visit and documentation      BMI:   Estimated body mass index is 49.72 kg/m  as calculated from the following:    Height as of 2/9/21: 1.727 m (5' 8\").    Weight as of this encounter: 148.3 kg (327 lb).   Weight management plan: Discussed healthy diet and exercise guidelines        Return in about 4 weeks (around 3/16/2021) for Medication Recheck, or sooner if symptoms persist or worsen.    Myah Florez PA-C  [facility] VANNESSA Cooper is a 71 year old who presents for the following health issues     HPI         Hospital Follow-up Visit:    Hospital/Nursing Home/IP Rehab Facility: [place] [facility]  Date of Admission: 2/9/2021  Date of Discharge: 2/11/2021  Reason(s) for Admission: SOB and chest pain      Was your hospitalization related to COVID-19? No   Problems taking medications regularly:  None  Medication changes since discharge: None  Problems adhering to non-medication therapy:  None    Summary of hospitalization:  [facility] discharge summary reviewed  Diagnostic Tests/Treatments reviewed.  Follow up needed: potassium (hypokalemia while inpatient)  Other Healthcare Providers Involved in Patient s Care:         Specialist appointment - Cardiology  Update since discharge: stable.  Additional issues: gout sx worsening the past 4 days       Post Discharge Medication Reconciliation: discharge medications reconciled, continue medications without change.  Plan of care communicated with patient and family                Musculoskeletal problem/pain - GOUT  Onset/Duration: 4 days  Description  Location: foot and ankle - right  Joint Swelling: YES  Redness: YES  Pain: YES  Warmth: YES  Intensity:  moderate  Progression of Symptoms:  constant  Accompanying signs and symptoms:   Fevers: no  Numbness/tingling/weakness: no  History  Trauma to the area: no  Recent illness:  no  Previous similar problem: YES  Previous evaluation:  no  Precipitating or alleviating factors:  Aggravating factors include: standing and walking  Therapies tried and outcome: nothing      Review of Systems   Constitutional: Negative.    HENT: Negative.    Eyes: Negative.    Respiratory: Positive for shortness of breath.    Cardiovascular: Positive for chest pain.   Gastrointestinal: Negative.    Genitourinary: Negative.    Musculoskeletal:        As in HPI   Neurological: Negative.    Psychiatric/Behavioral: Negative.             Objective    /70   Pulse 88   Temp 97.6  F (36.4  C) (Tympanic)   Wt 148.3 kg (327 lb)   SpO2 95%   BMI 49.72 kg/m    Body mass index is 49.72 kg/m .  Physical Exam  Constitutional:        General: He is not in acute distress.     Appearance: He is well-developed. He is not diaphoretic.   HENT:      Head: Normocephalic.      Right Ear: External ear normal.      Left Ear: External ear normal.      Nose: Nose normal.   Eyes:      Conjunctiva/sclera: Conjunctivae normal.   Neck:      Musculoskeletal: Normal range of motion.   Cardiovascular:      Rate and Rhythm: Normal rate and regular rhythm.      Heart sounds: Normal heart sounds. No murmur. No friction rub. No gallop.    Pulmonary:      Effort: Pulmonary effort is normal.      Breath sounds: Normal breath sounds. No wheezing, rhonchi or rales.   Musculoskeletal:         General: Swelling (right ankle and foot) and tenderness present. No signs of injury.      Right lower leg: Edema present.      Left lower leg: Edema present.   Skin:     General: Skin is warm and dry.   Neurological:      Mental Status: He is alert and oriented to person, place, and time.   Psychiatric:         Judgment: Judgment normal.            Results for orders placed or performed in visit on 02/16/21 (from the past 24 hour(s))   CBC with platelets and differential   Result Value Ref Range    WBC 4.6 4.0 - 11.0 10e9/L    RBC Count 4.24 (L) 4.4 - 5.9 10e12/L    Hemoglobin 12.4 (L) 13.3 - 17.7 g/dL    Hematocrit 36.8 (L) 40.0 - 53.0 %    MCV 87 78 - 100 fl    MCH 29.2 26.5 - 33.0 pg    MCHC 33.7 31.5 - 36.5 g/dL    RDW 14.4 10.0 - 15.0 %    Platelet Count 275 150 - 450 10e9/L    % Neutrophils 22.6 %    % Lymphocytes 35.7 %    % Monocytes 29.2 %    % Eosinophils 10.5 %    % Basophils 2.0 %    Absolute Neutrophil 1.0 (L) 1.6 - 8.3 10e9/L    Absolute Lymphocytes 1.6 0.8 - 5.3 10e9/L    Absolute Monocytes 1.3 0.0 - 1.3 10e9/L    Absolute Eosinophils 0.5 0.0 - 0.7 10e9/L    Absolute Basophils 0.1 0.0 - 0.2 10e9/L    Diff Method Automated Method        CXR performed today - my findings: no change from CXR on 2/9/21, no infiltrate seen.

## 2021-02-17 LAB — POTASSIUM SERPL-SCNC: 3.9 MMOL/L (ref 3.4–5.3)

## 2021-02-17 ASSESSMENT — ANXIETY QUESTIONNAIRES: GAD7 TOTAL SCORE: 0

## 2021-02-19 ENCOUNTER — CARE COORDINATION (OUTPATIENT)
Dept: CARDIOLOGY | Facility: CLINIC | Age: 71
End: 2021-02-19

## 2021-02-19 NOTE — PROGRESS NOTES
"Spoke to Melissa. She did ask if it ok if Kenneth goes back to 2% milk as the 1% \"doesn't agree with him and he did throw it up.\"   Lymphedema contact her this morning and they will get Kenneth set up to see Luis Armando at the lymphedema clinic.     Kenneth did see his PCP on 2/16. He was found to have a gout flare up. He was started on a 10 day course of Indomethacin 50 mg TID.       K was low on 2/12. Rozina started 20 mEq KCL daily. Recheck done 2/16, K improved to 3.9.     I did ask Melissa to remind Kenneth to call into MHT as it has been since 2/5. She did say he has been trying to call and his My Chart was not active anymore. His My Chart icon in his chart was changed to inactivated. I was able to reactivate and did let Melissa know.     Will wait for his MHT call and then send update to Rozina.     Future Appointments   Date Time Provider Department Center   2/23/2021  9:30 AM [facility]   3/16/2021  1:20 PM Myah Florez PA-C [facility] EC   4/2/2021 12:30 PM RU LAB [facility] PSA CLIN   4/2/2021  1:30 PM Rozina Gallegos APRN [facility] PSA CLIN           Haily Graves, RN 9:03 AM 02/19/21      "

## 2021-02-22 ENCOUNTER — CARE COORDINATION (OUTPATIENT)
Dept: CARDIOLOGY | Facility: CLINIC | Age: 71
End: 2021-02-22

## 2021-02-22 NOTE — PROGRESS NOTES
Left voice mail that MHT is not working and to call with weight update .    Haily Graves RN 11:03 AM 02/22/21

## 2021-02-23 ENCOUNTER — CARE COORDINATION (OUTPATIENT)
Dept: CARDIOLOGY | Facility: CLINIC | Age: 71
End: 2021-02-23

## 2021-02-23 ENCOUNTER — HOSPITAL ENCOUNTER (OUTPATIENT)
Dept: OCCUPATIONAL THERAPY | Facility: CLINIC | Age: 71
Setting detail: THERAPIES SERIES
End: 2021-02-23
Attending: INTERNAL MEDICINE
Payer: MEDICARE

## 2021-02-23 ENCOUNTER — HOSPITAL ENCOUNTER (OUTPATIENT)
Dept: WOUND CARE | Facility: CLINIC | Age: 71
Discharge: HOME | End: 2021-02-23
Attending: PHYSICIAN ASSISTANT | Admitting: PHYSICIAN ASSISTANT
Payer: MEDICARE

## 2021-02-23 VITALS
DIASTOLIC BLOOD PRESSURE: 72 MMHG | WEIGHT: 315 LBS | SYSTOLIC BLOOD PRESSURE: 125 MMHG | HEART RATE: 76 BPM | BODY MASS INDEX: 47.74 KG/M2 | HEIGHT: 68 IN | TEMPERATURE: 97.2 F | RESPIRATION RATE: 20 BRPM

## 2021-02-23 DIAGNOSIS — I87.2 VENOUS STASIS ULCER OF ANKLE LIMITED TO BREAKDOWN OF SKIN WITHOUT VARICOSE VEINS, UNSPECIFIED LATERALITY (H): ICD-10-CM

## 2021-02-23 DIAGNOSIS — L97.902: ICD-10-CM

## 2021-02-23 DIAGNOSIS — R60.0 BILATERAL LEG EDEMA: ICD-10-CM

## 2021-02-23 DIAGNOSIS — L97.301 VENOUS STASIS ULCER OF ANKLE LIMITED TO BREAKDOWN OF SKIN WITHOUT VARICOSE VEINS, UNSPECIFIED LATERALITY (H): ICD-10-CM

## 2021-02-23 DIAGNOSIS — I73.9 PVD (PERIPHERAL VASCULAR DISEASE) (H): Primary | ICD-10-CM

## 2021-02-23 PROCEDURE — 97165 OT EVAL LOW COMPLEX 30 MIN: CPT | Mod: GO | Performed by: OCCUPATIONAL THERAPIST

## 2021-02-23 PROCEDURE — 97602 WOUND(S) CARE NON-SELECTIVE: CPT

## 2021-02-23 PROCEDURE — 99214 OFFICE O/P EST MOD 30 MIN: CPT | Performed by: PHYSICIAN ASSISTANT

## 2021-02-23 ASSESSMENT — MIFFLIN-ST. JEOR: SCORE: 2225.91

## 2021-02-23 NOTE — PROGRESS NOTES
Call to Berta as MHT is still not working.   Left voice mail for them to call back with weights and symptoms.       Haily Graves RN 10:33 AM 02/23/21

## 2021-02-23 NOTE — DISCHARGE INSTRUCTIONS
[facility]  [address] [facility] [address] [facility] [address]    Call us at [phone] if you have any questions about your wounds, have redness or swelling around your wound, have a fever of 101 or greater or if you have any other problems or concerns. We answer the phone Monday through Friday 8 am to 4 pm, please leave a message as we check the voicemail frequently throughout the day.     Jamar Ivory      1950    Referral to [facility]; Please call to set up appointment    A diet high in protein is important for wound healing, we recommend getting 90 grams of protein per day. Taking protein shakes or bars are a good way to get extra protein in your diet.  Other good sources of protein:  Pork 26g per 3 oz  Whey protein powder - 24g per scoop (on average)  Greek yogurt - 23g per 8oz   Chicken or Turkey - 23g per 3oz  Fish - 20-25g per 3oz  Beef - 18-23g per 3oz  Navy beans - 20g per cup  Cottage cheese - 14g per 1/2 cup   Lentils - 13g per 1/4 cup  Beef jerky 13g per 1oz  2% milk - 8g per cup  Peanut butter - 8g per 2 tablespoons  Eggs - 6g per egg  Mixed nuts - 6g per 2oz     Wound care recommendations to Bilateral legs  Wash with soap and water; apply Sween cream to surrounding skin;   Cut Xeroform to size of wound and pull up Edema wear Yellow stripe  If wound drains: cover on OUTSIDE with gauze pad and secure     Compression:  Remove compression dressing if toes turn blue and/or start to feel numb and is not relieved by elevating the leg for one hour.   Walk as much as you can. When you sit raise your ankle above your hips to promote wound healing.      Amanda Pham PA-C. February 23, 2021    Wound Clinic follow up as scheduled    COVID vaccine information at [email or web address]    If you had a positive experience please indicate that on your patient satisfaction survey form that [facility] will be sending you.

## 2021-02-23 NOTE — PROGRESS NOTES
"[facility]  C.O.R[facility]E.    MyHealth Tracker Heart Failure Alert      Daily Weight Goal: 318-326 lbs    Today's Weight: 335 lbs    Symptom Alert: Said yes to      1) If you have shortness of breath, is it worse today than yesterday?   2) If you have swelling in your legs or abdomen, is it worse today than yesterday?      4) Did you prop up on more pillows or sleep in a chair to breathe easier last night?    Current Diuretic & Potassium Plan: Spironolactone 25 mg daily and torsemide 80 mg daily with Potassium 20 mEq BID    Last Extra Diuretic: Metolazone 2.5 mg with 40 mEq KCl on 2/6/21 per Rozina. Weight was 331 lbs. No weight update after      Future Appointments   Date Time Provider Department Center   2/23/2021 12:45 PM Macarena Leone OT [facility]   3/16/2021 10:10 AM [facility]   3/16/2021  1:20 PM Myah Florez PA-C [facility] EC   4/2/2021 12:30 PM RU LAB RULAB [facility] PSA CLIN   4/2/2021  1:30 PM [facility] Gallegos, [facility]       Notes:     Melissa left voice mail with Kenneth's weight and symptoms. Wife said weights have been treading up \"he is eating nonstop\".  They are going to his lymphedema appointment.     Update to Rozina.       MyHealth Tracker Weight Trends:             MyHealth Tracker Weight Graph:         "

## 2021-02-23 NOTE — PROGRESS NOTES
"[facility]    ASSESSMENT:   1. (L97.902) bilateral lower leg ulcerations with fat-layer exposed  2. Lymphedema of bilateral lower legs  3. CHF   4. Diet controlled type 2 DM  5. Limited resources, unable to afford EdemaWear   6. Venous stasis    PLAN/DISCUSSION:   1. Wound care plan: dress wounds with Xeroform, cover with Spandage (we are using this for its lymphatic stimulation effects, would prefer EdemaWear but pt unable to purchase), inelastic garment per OT  2. Will discuss case with his OT team and try to coordinate care  3. Referred to [facility] for competency US and consult    HISTORY OF PRESENT ILLNESS:   Jamar Ivory is a 71 year old male with complex medical history including hypothyroidism, morbid obesity, bilateral LE lymphedema, diet controlled type 2 DM, CHD and CAD who presents today for a bilateral lower leg ulcerations. Mr. Ivory is known to me for other similar lower extremity wounds. He has been followed by the OT outpatient lymphedema team and is attempting to manage lymphedema with short stretch wraps. However, it appears that he is failing this. He was unable to don compression stockings.      TREATMENT COURSE:  2/23/20: Presents for initial visit at our clinic.     OFFLOADING DEVICES:  EDEMA MANAGEMENT: short stretch wraps     VITALS: /72 (BP Location: Left arm)   Pulse 76   Temp 97.2  F (36.2  C) (Temporal)   Resp 20   Ht 1.727 m (5' 8\")   Wt (!) 150.1 kg (331 lb)   BMI 50.33 kg/m       PHYSICAL EXAM:  GENERAL: Patient is alert and oriented and in no acute distress  CV: pedal pulses palpable bilaterally  INTEGUMENTARY:   Wound 08/24/13 Bilateral;Lower Other (Comment) Other (comment) (Active)   Wound Bed Moist;Pale;Granulation tissue 02/23/21 1116   Angie-wound Assessment Edema;Excoriated;Erythema 02/23/21 1116   Drainage Amount Moderate 02/23/21 1116   Drainage Color/Characteristics Serosanguineous 02/23/21 1116   Wound Care/Cleansing Wound " cleanser 02/23/21 1116   Dressing Xeroform 02/23/21 1116   Dressing Status New dressing 02/23/21 1116   Wound Description (WOC) Full thickness 02/23/21 1116       Wound (used by OP [facility] only) 02/23/21 1118 Right ulceration, venous (Active)   Thickness/Stage full thickness 02/23/21 1100   Dressing Appearance moist drainage 02/23/21 1100   Base pink;moist;granulating 02/23/21 1100   Periwound edematous;dry;intact 02/23/21 1100   Periwound Temperature warm 02/23/21 1100   Periwound Skin Turgor firm 02/23/21 1100   Edges open 02/23/21 1100   Length (cm) 3.5 02/23/21 1100   Width (cm) 2.2 02/23/21 1100   Depth (cm) 0.1 02/23/21 1100   Wound (cm^2) 7.7 cm^2 02/23/21 1100   Wound Volume (cm^3) 0.77 cm^3 02/23/21 1100   Drainage Characteristics/Odor serosanguineous 02/23/21 1100   Drainage Amount copious 02/23/21 1100   Care, Wound non-select wound debridement performed 02/23/21 1100   Dressing Care dressing applied 02/23/21 1100               MDM: 44 was spent on Feb 23, 2021 reviewing previous chart notes, labs, imaging, assessing the patient, developing the plan of care and education.    RICH DEGROOT PA-C

## 2021-02-23 NOTE — PROGRESS NOTES
02/23/21 1300   Quick Adds   Quick Adds Certification   Rehab Discipline   Discipline OT   Type of Visit   Type of visit Initial Edema Evaluation       present No   General Information   Start of care 02/23/21   Referring physician Ila Jacobo PA-C    Orders Evaluate and treat as indicated   Order date 02/10/21   Medical diagnosis edema - will clarify to lymphedema   Onset of illness / date of surgery 02/19/21   Edema onset 02/23/13   Affected body parts LLE;RLE   Edema etiology Chronic Venous Insufficiency;Ulcers   Edema etiology comments Pt has long hx of CHF, CVI.  He has had swelling since 2013.  His swelling fluctuates, but when large will become wounds.   Pertinent history of current problem (PT: include personal factors and/or comorbidities that impact the POC; OT: include additional occupational profile info) PVD, ulcer, hypothyroid, Cerebral infarct, venous stasis, DM CHF - systolic and diastolic   Surgical / medical history reviewed Yes   Prior level of functional mobility Pt lives at home with his wife, Melissa. She is currently wrapping him from ankle to knee which he leaves on for 2-3 days at a time.  He cannot samara the foot wrap.   Prior treatment Complete decongestive therapy   Community support Family / friend caregiver   Patient role / employment history Retired   Psychosocial concerns Financial concerns;Impaired coping   Living environment Apartment / condo   Fall Risk Screen   Fall screen completed by OT   Have you fallen 2 or more times in the past year? No   Have you fallen and had an injury in the past year? No   Is patient a fall risk? No   Fall screen comments His chair fell backwards once.   Abuse Screen (yes response referral indicated)   Feels Unsafe at Home or Work/School no   Feels Threatened by Someone no   Does Anyone Try to Keep You From Having Contact with Others or Doing Things Outside Your Home? no   Physical Signs of Abuse Present no   System Outcome  Measures   Outcome Measures Lymphedema   Lymphedema Life Impact Scale (score range 0-72). A higher score indicates greater impairment. 14   Subjective Report   Patient report of symptoms Pt reports some new areas are open.  His swelling wiill just suddenly flare and they are not able to control it back down to where it was during his tx.   Precautions   Precautions Cardiac Edema/CHF   Patient / Family Goals   Patient / family goals statement Pt wishes to have his swelling controlled so he doesn't have the wounds.   Pain   Patient currently in pain Yes   Pain location L calf   Pain rating 6/10   Pain description Ache   Pain description comment constant   Cognitive Status   Orientation Orientation to person, place and time   Level of consciousness Alert   Follows commands and answers questions 100% of the time   Personal safety and judgement Intact   Memory Intact   Edema Exam / Assessment   Skin condition Pitting;Hemosiderin deposits   Skin condition comments Fibrosis in calf.  Dark coloring in the gaiter area of both legs   Pitting 3+   Pitting location pretibial   Scar No   Capillary refill Symmetrical   Dorsal pedal pulse comments no able to palpate   Stemmer sign Negative   Stemmer sign comments in BLEs.   Ulceration comments Pt has 4 light tears of his skin.   Girth Measurements   Girth Measurements Refer to separate girth measurement flowsheet   Volume LE   Right LE (mL) 68731   Left LE (mL) 12281   LE volume comparison LLE volume greater than RLE volume   Range of Motion   ROM Other   ROM comments Pt has difficulty reaching to his feet.   Strength   Strength No deficits were identified   Posture   Posture Forward head position   Palpation   Palpation No pain on palpation   Activities of Daily Living   Activities of Daily Living Pt has assist with dressing his lower body.   Bed Mobility   Bed mobility Pt is independent.   Transfers   Transfers Pt is independent.   Gait / Locomotion   Gait / Locomotion Pt can  walk for 15-20 then becomes winded.  He can stand to cook for 15-20 min then becomes winded.   Sensory   Sensory perception Light touch   Light touch Intact   Vascular Assessment   Vascular Assessment Vascular concerns   Coordination   Coordination Gross motor coordination appropriate   Muscle Tone   Muscle tone No deficits were identified   Planned Edema Interventions   Planned edema interventions Gradient compression bandaging;Exercises;Precautions to prevent infection / exacerbation;Education;ADL training;Home management program development   Clinical Impression   Criteria for skilled therapeutic intervention met Yes   Therapy diagnosis Edema, will clarify to lymphedema   Influenced by the following impairments / conditions Edema;Stage 2;Phlebolymphedema   Assessment of Occupational Performance 1-3 Performance Deficits   Identified Performance Deficits pain, wounds, walking and standing distance   Clinical Decision Making (Complexity) Low complexity   Treatment Frequency 1x/week   Treatment duration 0x/week for one week, then 1x/week for one week.   Patient / family and/or staff in agreement with plan of care Yes   Risks and benefits of therapy have been explained Yes   Education Assessment   Preferred learning style Listening;Reading;Demonstration;Pictures / video   Goals   Edema Eval Goals 1   Goal 1   Goal identifier 1   Goal description Pt and caregiver will be independent with modifications to home program for lymphedema and this home program will decrease his leg by1cm in B calves.   Target date 03/05/21   Total Evaluation Time   OT Eval, Low Complexity Minutes (39115) 45   Certification   Certification date from 02/23/21   Certification date to 03/05/21   Medical Diagnosis Lymphedema   Certification I certify the need for these services furnished under this plan of treatment and while under my care.  (Physician co-signature of this document indicates review and certification of the therapy plan).

## 2021-02-23 NOTE — PROGRESS NOTES
Patient arrived for wound care visit. Certified Wound Care Nurse time spent evaluating patient record, completed a full evaluation and documented wound(s) & teo-wound skin; provided recommendation based on treatment plan. Applied dressing, reviewed discharge instructions, patient education, and discussed plan of care with appropriate medical team staff members and patient and/or family members.     Pt and spouse state they cannot afford Edema wear and several pieces of Spandage were provided.

## 2021-02-23 NOTE — PROGRESS NOTES
[facility]        OUTPATIENT OCCUPATIONAL THERAPY EDEMA EVALUATION  PLAN OF TREATMENT FOR OUTPATIENT REHABILITATION  (COMPLETE FOR INITIAL CLAIMS ONLY)  Patient's Last Name, First Name, Jamar Garrido                           Provider s Name:   [facility] Medical Record No.  [id number]     Start of Care Date:  02/23/21   Onset Date:  02/23/13   Type:  OT   Medical Diagnosis:  Lymphedema   Therapy Diagnosis:  lymphedema Visits from SOC:  1                                     __________________________________________________________________________________   Plan of Treatment/Functional Goals:    Gradient compression bandaging, Exercises, Precautions to prevent infection / exacerbation, Education, ADL training, Home management program development        GOALS  1. Goal description: Pt and caregiver will be independent with modifications to home program for lymphedema and this home program will decrease his leg by1cm in B calves.       Target date: 03/05/21                Treatment Frequency: 1x/week   Treatment duration: 0x/week for one week, then 1x/week for one week.    Macarena Leone OT                                    I CERTIFY THE NEED FOR THESE SERVICES FURNISHED UNDER        THIS PLAN OF TREATMENT AND WHILE UNDER MY CARE     (Physician co-signature of this document indicates review and certification of the therapy plan).                   Certification date from: 02/23/21       Certification date to: 03/05/21           Referring physician: Ila Jacobo PA-C    Initial Assessment  See Epic Evaluation- Start of care: 02/23/21

## 2021-02-24 DIAGNOSIS — I25.119 CORONARY ARTERY DISEASE INVOLVING NATIVE CORONARY ARTERY OF NATIVE HEART WITH ANGINA PECTORIS (H): Primary | ICD-10-CM

## 2021-02-24 DIAGNOSIS — I50.42 CHRONIC COMBINED SYSTOLIC AND DIASTOLIC CONGESTIVE HEART FAILURE (H): ICD-10-CM

## 2021-02-24 RX ORDER — TORSEMIDE 20 MG/1
80 TABLET ORAL DAILY
Qty: 360 TABLET | Refills: 0 | Status: SHIPPED | OUTPATIENT
Start: 2021-02-24 | End: 2021-03-11

## 2021-02-24 RX ORDER — POTASSIUM CHLORIDE 1500 MG/1
20 TABLET, EXTENDED RELEASE ORAL DAILY
Qty: 100 TABLET | Refills: 0 | Status: SHIPPED | OUTPATIENT
Start: 2021-02-24 | End: 2021-03-10

## 2021-02-24 RX ORDER — ATORVASTATIN CALCIUM 20 MG/1
20 TABLET, FILM COATED ORAL DAILY
Qty: 90 TABLET | Refills: 0 | Status: SHIPPED | OUTPATIENT
Start: 2021-02-24 | End: 2021-06-24

## 2021-02-24 NOTE — PROGRESS NOTES
Reviewed my health tracker encounter. Weight up to 335#. We have no prior weights since 2/5.     Given weight being above his baseline with increased symptoms. Recommend metolazone 2.5mg once with 40meq potassium.     YENI Yang [facility]  Pager: [phone]

## 2021-02-24 NOTE — TELEPHONE ENCOUNTER
Received refill request for:   Torsemide, KCl and Lipitor                              Last OV was:   2/12 with Rozina Sutton CNP  Labs/EKG:  BMP 2/12/21  F/U scheduled:   4/2 with Rozina Garcia script sent to:    GENARO Graves RN 9:30 AM 02/24/21

## 2021-02-24 NOTE — PROGRESS NOTES
Spoke to Claudia as Kenneth was still asleep. Did let her know that MHT is working.   She will let Kenneth know to call in his weight and symptoms.     I did give her instructions that if his weight is still up to have him take a metolazone with 2 extra KCl.   She repeated back correctly.     Haily Graves RN 10:56 AM 02/24/21

## 2021-02-25 ENCOUNTER — CARE COORDINATION (OUTPATIENT)
Dept: CARDIOLOGY | Facility: CLINIC | Age: 71
End: 2021-02-25

## 2021-02-25 NOTE — PROGRESS NOTES
[facility]    [facility] Three Month Evaluation      Time Frame Being Evaluated: 9/10/20 - 12/10/20      MyHealth Tracker Weight Variances: 9 days      MyHealth Tracker Positive Heart Failure Symptoms Variances: 13 days      Heart Failure Hospital Admissions: NONE      Blanca Bright RN  Care Coordinator  [facility] DAVIDSONO.RNESSA Nurse Line: [phone]  / Personal Line: [phone]  02/25/21 12:05 PM

## 2021-02-25 NOTE — PROGRESS NOTES
"[facility]  C.O.RNESSA    MyHealth Tracker Heart Failure Alert      Daily Weight Goal: 318-326 lbs    Today's Weight: 332 lbs      Symptom Alert: None reported        Current Diuretic & Potassium Plan: Spironolactone 25 mg daily and torsemide 80 mg daily with Potassium 20 mEq daily    Last Extra Diuretic: Metolazone 2.5 mg with 40 mEq KCl on 02/24/2021  **Response: Wt was 334 lbs 2/24. He lost 2 lbs, 332 lbs today.       Future Appointments   Date Time Provider Department Center   3/2/2021 11:00 AM Macarena Leone OT RHLYBRI [place] RID   3/16/2021 10:10 AM Amanda Pham PA-C SHWOU [place] REX   3/16/2021  1:20 PM Myah Florez PA-C ECFP EC   4/2/2021 12:30 PM RU LAB RULAB P PSA CLIN   4/2/2021  1:30 PM Rozina Gallegos APRN CNP RUUMHT [facility] PSA CLIN       Notes: Spoke to Melissa and Kenneth. Kenneth was voiding \"about every hr\". Him and Melissa deny JACOB, abd bloating or breathing issues. Denies chest pain/discomfort. He reports he feels tried.   He made him self a breakfast of 2 scrambled eggs, hash browns, 2 pieces of toast and a glass of 2% milk.   Melissa did say he drank a lot of fluids yesterday.  Britney asked if he should take another Metolazone today. I did ask them not as it does last in his system for up to 72 hrs.     Update to Rozina    MyHealth Tracker Weight Trends:                   MyHealth Tracker Weight Graph:         Haily Graves, RN 10:02 AM 02/25/21        "

## 2021-02-26 ENCOUNTER — CARE COORDINATION (OUTPATIENT)
Dept: CARDIOLOGY | Facility: CLINIC | Age: 71
End: 2021-02-26

## 2021-02-26 NOTE — PROGRESS NOTES
[facility]  CRainORAMY    MyHealth Tracker Heart Failure Alert      Daily Weight Goal: 318-326 lbs    Today's Weight: 332 lbs      Symptom Alert: None reported       Current Diuretic & Potassium Plan: Spironolactone 25 mg daily and torsemide 80 mg daily with Potassium 20 mEq daily     Last Extra Diuretic: Metolazone 2.5 mg with 40 mEq KCl on 2/6/21 per Rozina. Weight was 331 lbs. No weight update after      Future Appointments   Date Time Provider Department Center   3/2/2021 11:00 AM Macarena Leone OT RHJEMIMA [place] RID   3/16/2021 10:10 AM Amanda Pham PA-C SHW[facility] [place] REX   3/16/2021  1:20 PM Myah Florez PA-C ECFP EC   4/2/2021 12:30 PM RU LAB RULAB UMP PSA CLIN   4/2/2021  1:30 PM [facility]       Notes: No answer when call placed. Left voice mail        MyHealth Tracker Weight Trends:                   MyHealth Tracker Weight Graph:           Haily Graves, RN 12:24 PM 02/26/21

## 2021-03-02 ENCOUNTER — TELEPHONE (OUTPATIENT)
Dept: FAMILY MEDICINE | Facility: CLINIC | Age: 71
End: 2021-03-02

## 2021-03-02 ENCOUNTER — CARE COORDINATION (OUTPATIENT)
Dept: CARDIOLOGY | Facility: CLINIC | Age: 71
End: 2021-03-02

## 2021-03-02 ENCOUNTER — HOSPITAL ENCOUNTER (OUTPATIENT)
Dept: OCCUPATIONAL THERAPY | Facility: CLINIC | Age: 71
Setting detail: THERAPIES SERIES
End: 2021-03-02
Attending: INTERNAL MEDICINE
Payer: MEDICARE

## 2021-03-02 DIAGNOSIS — I50.42 CHRONIC COMBINED SYSTOLIC AND DIASTOLIC CONGESTIVE HEART FAILURE (H): ICD-10-CM

## 2021-03-02 PROCEDURE — 97140 MANUAL THERAPY 1/> REGIONS: CPT | Mod: GO | Performed by: OCCUPATIONAL THERAPIST

## 2021-03-02 NOTE — TELEPHONE ENCOUNTER
TO PCP:     Haily - [facility] Cardiology called     Running out of ideas for pt and his wife     Wife called their office yesterday     Cardiology concerned about his depression and how much he's eating (eats a lot and it's related to his mental health)     Running out of ideas - thinks he sees a therapist/mental health provider. Has referred to nutritionist, wife was more willing than he was to follow up with them    Asking PCP to advise - asks that clinic call pt's wife back     Eating is affecting his heart health - wife makes a healthy grocery list then he adds unhealthy items and they fight about it    Please advise  Ana Maria IZQUIERDO RN      Next 5 appointments (look out 90 days)    Mar 16, 2021 10:10 AM  Return Visit with Amanda Pham PA-C  [facility] [place] ([facility] ) [address] [place] [address]  [address]  [facility] [address]  [phone]   Mar 16, 2021  1:20 PM  SHORT with Myah Florez PA-C  [facility] [place] ([facility] ) [address] [address]  [phone]

## 2021-03-02 NOTE — PROGRESS NOTES
[facility]  C.ORainRNESSA    MyHealth Tracker Heart Failure Alert      Daily Weight Goal: 318-326 lbs    Today's Weight:  No call in for today  lbs    Symptom Alert: None     Current Diuretic & Potassium Plan: Spironolactone 25 mg daily and torsemide 80 mg daily with Potassium 20 mEq daily     Last Extra Diuretic: Metolazone 2.5 mg with 40 mEq KCl on 2/6/21 per Rozina. Weight was 331 lbs. No weight update after      Future Appointments   Date Time Provider Department Center   3/16/2021 10:10 AM [facility]   3/16/2021  1:20 PM Myah Florez PA-C ECFP EC   4/2/2021 12:30 PM RU LAB RULAB P PSA CLIN   4/2/2021  1:30 PM Rozina [facility]legos [facility]       Notes: Melissa called yesterday stating she is concerned about Kenneth's eating and his depression. She stated that he is over eating, will write high salt items on their grocery list (saurkraut, brats, hot dogs). Melissa reports that Kenneth gets upset when she tells him he can't be eating those foods.   I called over to Myah Florez's office for assistance and he has appt with her on 3/16.   Left message for Melissa.      MyHealth Tracker Weight Trends: No call in for today         MyHealth Tracker Weight Graph: No call in for today       Haily Graves RN 12:26 PM 03/02/21

## 2021-03-03 NOTE — PROGRESS NOTES
[facility]        OUTPATIENT OCCUPATIONAL THERAPY EDEMA EVALUATION  PLAN OF TREATMENT FOR OUTPATIENT REHABILITATION  (COMPLETE FOR INITIAL CLAIMS ONLY)  Patient's Last Name, First Name, ANDRADERainYANIRARain  EdenilsonhiedoJamar                           Provider s Name:   [facility] Medical Record No.  [id number]     Start of Care Date:  02/23/21   Onset Date:  02/23/13   Type:  OT   Medical Diagnosis:  Lymphedema   Therapy Diagnosis:  Edema, will clarify to lymphedema Visits from SOC:  1                                     __________________________________________________________________________________   Plan of Treatment/Functional Goals:    Gradient compression bandaging, Exercises, Precautions to prevent infection / exacerbation, Education, ADL training, Home management program development        GOALS  1. Goal description: Pt and caregiver will be independent with modifications to home program for lymphedema and this home program will decrease his leg by1cm in B calves.       Target date: 03/05/21  2.            3.            4.            5.            6.               7.             8.              Treatment Frequency: 1x/week   Treatment duration: 0x/week for one week, then 1x/week for one week.    Macarena Leone OT                                    I CERTIFY THE NEED FOR THESE SERVICES FURNISHED UNDER        THIS PLAN OF TREATMENT AND WHILE UNDER MY CARE     (Physician co-signature of this document indicates review and certification of the therapy plan).                   Certification date from: 02/23/21       Certification date to: 03/05/21           Referring physician: Ila Jacobo PA-C    Initial Assessment  See Epic Evaluation- Start of care: 02/23/21

## 2021-03-03 NOTE — PROGRESS NOTES
[facility]  C.ORAMY    [employer] Tracker Heart Failure Alert      Daily Weight Goal: 318-326 lbs    Today's Weight: 327 lbs      Symptom Alert: Denies        Current Diuretic & Potassium Plan: Spironolactone 25 mg daily and torsemide 80 mg daily with Potassium 20 mEq daily     Last Extra Diuretic: Metolazone 2.5 mg with 40 mEq KCl on 2/6/21 per Rozina. Weight was 331 lbs. No weight update after    Future Appointments   Date Time Provider Department Center   3/16/2021 10:10 AM [facility]   3/16/2021  1:20 PM Myah Florez PA-C ECFP EC   4/2/2021 12:30 PM RU LAB RULAB P PSA CLIN   4/2/2021  1:30 PM Roz[facility]legos [facility]       Notes: Spoke to Kenneth and Melissa. They just got back from the store and picking up their [employer] meals.   Kenneth reports his weight is 327 lbs today and he feels well.       MyHealth Tracker Weight Trends:               MyHealth Tracker Weight Graph:           Haily Graves, RN 12:22 PM 03/03/21

## 2021-03-03 NOTE — PROGRESS NOTES
Outpatient Occupational Therapy Discharge Note     Patient: Jamar Ivory  : 1950    Beginning/End Dates of Reporting Period:  21 to 3/3/2021    Referring Provider: Myah Florez PA-C (initial order from hospitalist)    Therapy Diagnosis: Lymphedema    Client Self Report:   Pt and wife report swelling is improved.    Objective Measurements:    -1.2L RLE from SOC and -1.5L in the LLE    Outcome Measures (most recent score):  Lymphedema Life Impact Scale (score range 0-72). A higher score indicates greater impairment.: 3    Goals:   Goal Identifier 1   Goal Description Pt and caregiver will be independent with modifications to home program for lymphedema and this home program will decrease his leg by1cm in B calves.   Target Date 21   Date Met  21   Progress:goal met     Progress Toward Goals:   Progress this reporting period: Pt was seen for 2 sessions to make changes t home program and to ensure the changes helped his swelling.  He reduced 1.2L LLE and 1.5L RLE fro increasing wraps to daily, ,not every 2-3 days as they had been doing.  It was helpful that they were able to figure out only calf wrapping while not wrapping the foot as he is slightly bigger, but couldn't samara the wraps if the feet are covered.      Plan:  Discharge from therapy.    Discharge:    Reason for Discharge: Patient has met all goals.    Equipment Issued: GCB    Discharge Plan: Patient to continue home program.

## 2021-03-04 NOTE — PROGRESS NOTES
Reviewed CORE telephone encounter, weight up 6#, only answered yes to feeling lightheaded this morning. Unclear if other HF symptoms were reviewed with patient. He has been over eating and eating high sodium foods.     RECOMMENDATIONS  1. PRN metolazone 2.5mg once with 40meq potassium.  2. Reinforce HF education      YENI Yang [facility]  Pager: [phone]

## 2021-03-04 NOTE — PROGRESS NOTES
[facility]  DORA    [employer] Tracker Heart Failure Alert      Daily Weight Goal: 318-326 lbs    Today's Weight: 333 lbs, up 6 lbs from yesterday     Symptom Alert: said yes to       5) Have you felt more dizzy or lightheaded, since yesterday?    Current Diuretic & Potassium Plan: Spironolactone 25 mg daily and torsemide 80 mg daily with Potassium 20 mEq daily     Last Extra Diuretic: Metolazone 2.5 mg with 40 mEq KCl on 2/6/21 per Rozina. Weight was 331 lbs. No weight update after    Future Appointments   Date Time Provider Department Center   3/16/2021 10:10 AM Amanda Pham PA-C [facility]   3/16/2021  1:20 PM Myah Florez PA-C EC[facility] EC   4/2/2021 12:30 PM RU LAB RULAB P PSA CLIN   4/2/2021  1:30 PM Rozina Gallegos[facility] APRN [facility]       Notes:   Spoke to Kenneth. He has sore legs today and felt LH headed this morning. LH resolved but feels fatigued and sluggish today. He did over eat yesterday.         MyHealth Tracker Weight Trends:             MyHealth Tracker Weight Graph:             Haily Graves RN 12:10 PM 03/04/21

## 2021-03-05 RX ORDER — METOLAZONE 2.5 MG/1
2.5 TABLET ORAL PRN
Qty: 10 TABLET | Refills: 0 | Status: SHIPPED | OUTPATIENT
Start: 2021-03-05 | End: 2021-12-21

## 2021-03-05 NOTE — PROGRESS NOTES
"[facility]  C.O.RRainE.    [employer] Tracker Heart Failure Alert      Daily Weight Goal: 318-326 lbs    Today's Weight: 329 lbs    Symptom Alert: None reported    Current Diuretic & Potassium Plan: Spironolactone 25 mg daily and torsemide 80 mg daily with Potassium 20 mEq daily     Last Extra Diuretic: Metolazone 2.5 mg with 40 mEq KCl on 2/6/21 per Rozina. Weight was 331 lbs - no call in next day or call back      Future Appointments   Date Time Provider Department Center   3/16/2021 10:10 AM [facility]   3/16/2021  1:20 PM Myah Florez PA-C ECFP EC   4/2/2021 12:30 PM RU LAB RULAB P PSA CLIN   4/2/2021  1:30 PM Rozina Gallegos, APRN [facility]       Notes:   Spoke to Melissa as Kenneth did not want to talk. Per Melissa \"He drinks so much!. She is unsure of how much he is drinking. \"he always has his mug with him. He drank 2 big glasses of milk and then just filled up his mug and went back to bed.\"     MyHealth Tracker Weight Trends:             MyHealth Tracker Weight Graph:         Haily Graves RN 10:04 AM 03/05/21        "

## 2021-03-08 ENCOUNTER — CARE COORDINATION (OUTPATIENT)
Dept: CARDIOLOGY | Facility: CLINIC | Age: 71
End: 2021-03-08

## 2021-03-08 ENCOUNTER — APPOINTMENT (OUTPATIENT)
Dept: ULTRASOUND IMAGING | Facility: CLINIC | Age: 71
End: 2021-03-08
Attending: EMERGENCY MEDICINE
Payer: MEDICARE

## 2021-03-08 ENCOUNTER — OFFICE VISIT (OUTPATIENT)
Dept: FAMILY MEDICINE | Facility: CLINIC | Age: 71
End: 2021-03-08
Payer: MEDICARE

## 2021-03-08 ENCOUNTER — HOSPITAL ENCOUNTER (EMERGENCY)
Facility: CLINIC | Age: 71
Discharge: HOME | End: 2021-03-08
Attending: EMERGENCY MEDICINE | Admitting: EMERGENCY MEDICINE
Payer: MEDICARE

## 2021-03-08 VITALS
HEART RATE: 83 BPM | RESPIRATION RATE: 20 BRPM | DIASTOLIC BLOOD PRESSURE: 63 MMHG | OXYGEN SATURATION: 94 % | SYSTOLIC BLOOD PRESSURE: 107 MMHG | TEMPERATURE: 98.3 F

## 2021-03-08 VITALS
SYSTOLIC BLOOD PRESSURE: 158 MMHG | BODY MASS INDEX: 47.74 KG/M2 | HEART RATE: 115 BPM | HEIGHT: 68 IN | OXYGEN SATURATION: 93 % | DIASTOLIC BLOOD PRESSURE: 94 MMHG | WEIGHT: 315 LBS

## 2021-03-08 DIAGNOSIS — I50.42 CHRONIC COMBINED SYSTOLIC AND DIASTOLIC CONGESTIVE HEART FAILURE (H): Primary | ICD-10-CM

## 2021-03-08 DIAGNOSIS — L03.116 CELLULITIS OF LEFT LOWER EXTREMITY: ICD-10-CM

## 2021-03-08 DIAGNOSIS — M25.572 PAIN IN JOINT INVOLVING ANKLE AND FOOT, LEFT: Primary | ICD-10-CM

## 2021-03-08 DIAGNOSIS — M25.50 MULTIPLE JOINT PAIN: ICD-10-CM

## 2021-03-08 DIAGNOSIS — M25.521 RIGHT ELBOW PAIN: ICD-10-CM

## 2021-03-08 LAB
ANION GAP SERPL CALCULATED.3IONS-SCNC: 8 MMOL/L (ref 3–14)
BASOPHILS # BLD AUTO: 0.1 10E9/L (ref 0–0.2)
BASOPHILS NFR BLD AUTO: 1.1 %
BUN SERPL-MCNC: 19 MG/DL (ref 7–30)
CALCIUM SERPL-MCNC: 9 MG/DL (ref 8.5–10.1)
CHLORIDE SERPL-SCNC: 98 MMOL/L (ref 94–109)
CO2 SERPL-SCNC: 27 MMOL/L (ref 20–32)
CREAT SERPL-MCNC: 0.96 MG/DL (ref 0.66–1.25)
CRP SERPL-MCNC: 118 MG/L (ref 0–8)
DIFFERENTIAL METHOD BLD: ABNORMAL
EOSINOPHIL # BLD AUTO: 0.5 10E9/L (ref 0–0.7)
EOSINOPHIL NFR BLD AUTO: 6 %
ERYTHROCYTE [DISTWIDTH] IN BLOOD BY AUTOMATED COUNT: 14.5 % (ref 10–15)
GFR SERPL CREATININE-BSD FRML MDRD: 78 ML/MIN/{1.73_M2}
GLUCOSE SERPL-MCNC: 111 MG/DL (ref 70–99)
HCT VFR BLD AUTO: 37.3 % (ref 40–53)
HGB BLD-MCNC: 12 G/DL (ref 13.3–17.7)
IMM GRANULOCYTES # BLD: 0.1 10E9/L (ref 0–0.4)
IMM GRANULOCYTES NFR BLD: 0.7 %
LYMPHOCYTES # BLD AUTO: 1.5 10E9/L (ref 0.8–5.3)
LYMPHOCYTES NFR BLD AUTO: 19.6 %
MCH RBC QN AUTO: 28.4 PG (ref 26.5–33)
MCHC RBC AUTO-ENTMCNC: 32.2 G/DL (ref 31.5–36.5)
MCV RBC AUTO: 88 FL (ref 78–100)
MONOCYTES # BLD AUTO: 1.1 10E9/L (ref 0–1.3)
MONOCYTES NFR BLD AUTO: 14.2 %
NEUTROPHILS # BLD AUTO: 4.4 10E9/L (ref 1.6–8.3)
NEUTROPHILS NFR BLD AUTO: 58.4 %
NRBC # BLD AUTO: 0 10*3/UL
NRBC BLD AUTO-RTO: 0 /100
PLATELET # BLD AUTO: 234 10E9/L (ref 150–450)
POTASSIUM SERPL-SCNC: 3.1 MMOL/L (ref 3.4–5.3)
RBC # BLD AUTO: 4.22 10E12/L (ref 4.4–5.9)
SODIUM SERPL-SCNC: 133 MMOL/L (ref 133–144)
URATE SERPL-MCNC: 9.2 MG/DL (ref 3.5–7.2)
WBC # BLD AUTO: 7.5 10E9/L (ref 4–11)

## 2021-03-08 PROCEDURE — 80048 BASIC METABOLIC PNL TOTAL CA: CPT | Performed by: EMERGENCY MEDICINE

## 2021-03-08 PROCEDURE — 250N000011 HC RX IP 250 OP 636: Performed by: EMERGENCY MEDICINE

## 2021-03-08 PROCEDURE — 86140 C-REACTIVE PROTEIN: CPT | Performed by: EMERGENCY MEDICINE

## 2021-03-08 PROCEDURE — 93971 EXTREMITY STUDY: CPT | Mod: LT

## 2021-03-08 PROCEDURE — 250N000013 HC RX MED GY IP 250 OP 250 PS 637: Performed by: EMERGENCY MEDICINE

## 2021-03-08 PROCEDURE — 84550 ASSAY OF BLOOD/URIC ACID: CPT | Performed by: EMERGENCY MEDICINE

## 2021-03-08 PROCEDURE — 99214 OFFICE O/P EST MOD 30 MIN: CPT | Performed by: PHYSICIAN ASSISTANT

## 2021-03-08 PROCEDURE — 85025 COMPLETE CBC W/AUTO DIFF WBC: CPT | Performed by: EMERGENCY MEDICINE

## 2021-03-08 PROCEDURE — 99285 EMERGENCY DEPT VISIT HI MDM: CPT | Mod: 25

## 2021-03-08 PROCEDURE — 96365 THER/PROPH/DIAG IV INF INIT: CPT

## 2021-03-08 RX ORDER — OXYCODONE AND ACETAMINOPHEN 5; 325 MG/1; MG/1
2 TABLET ORAL ONCE
Status: COMPLETED | OUTPATIENT
Start: 2021-03-08 | End: 2021-03-08

## 2021-03-08 RX ORDER — CEPHALEXIN 500 MG/1
500 CAPSULE ORAL 4 TIMES DAILY
Qty: 28 CAPSULE | Refills: 0 | Status: SHIPPED | OUTPATIENT
Start: 2021-03-08 | End: 2021-03-15

## 2021-03-08 RX ORDER — CEFTRIAXONE 2 G/1
2 INJECTION, POWDER, FOR SOLUTION INTRAMUSCULAR; INTRAVENOUS ONCE
Status: COMPLETED | OUTPATIENT
Start: 2021-03-08 | End: 2021-03-08

## 2021-03-08 RX ORDER — INDOMETHACIN 50 MG/1
50 CAPSULE ORAL
Qty: 30 CAPSULE | Refills: 0 | Status: SHIPPED | OUTPATIENT
Start: 2021-03-08 | End: 2021-03-16

## 2021-03-08 RX ADMIN — CEFTRIAXONE 2 G: 2 INJECTION, POWDER, FOR SOLUTION INTRAMUSCULAR; INTRAVENOUS at 16:28

## 2021-03-08 RX ADMIN — OXYCODONE HYDROCHLORIDE AND ACETAMINOPHEN 2 TABLET: 5; 325 TABLET ORAL at 16:27

## 2021-03-08 ASSESSMENT — ENCOUNTER SYMPTOMS
NEUROLOGICAL NEGATIVE: 1
CHILLS: 1
GASTROINTESTINAL NEGATIVE: 1
PSYCHIATRIC NEGATIVE: 1
EYES NEGATIVE: 1
DIARRHEA: 0
JOINT SWELLING: 1
ARTHRALGIAS: 1
JOINT SWELLING: 1
FEVER: 0
VOMITING: 0
RESPIRATORY NEGATIVE: 1
COLOR CHANGE: 1
COUGH: 0
CARDIOVASCULAR NEGATIVE: 1

## 2021-03-08 ASSESSMENT — MIFFLIN-ST. JEOR: SCORE: 2189.62

## 2021-03-08 NOTE — PROGRESS NOTES
[facility]  C.ORAMY    MyHealth Tracker Heart Failure Alert      Daily Weight Goal: 318-326 lbs    Today's Weight: 330 lbs       Symptom Alert: said yes to        5) Have you felt more dizzy or lightheaded, since yesterday?    Current Diuretic & Potassium Plan: Spironolactone 25 mg daily and torsemide 80 mg daily with Potassium 20 mEq daily     Last Extra Diuretic: Metolazone 2.5 mg with 40 mEq KCl on 2/6/21 per Rozina. Weight was 331 lbs. No weight update after, no call in or call back      Future Appointments   Date Time Provider Department Center   3/8/2021  1:20 PM Myah Florez PA-C ECFP    3/16/2021 10:10 AM Amanda Pham PA-C [facility]   3/16/2021  1:20 PM Myah Florez PA-C ECFP EC   4/2/2021 12:30 [facility]   4/2/2021  1:30 PM [facility]       Notes:  Left voice mail for Melissa and Kenneth to call back.       MyHealth Tracker Weight Trends:                 MyHealth Tracker Weight Graph:             Haily Graves, RN 12:42 PM 03/10/21

## 2021-03-08 NOTE — ED TRIAGE NOTES
"Pt arrives for left ankle swelling/pain/redness since last Wednesday. Pt's primary physician sent him in \"for a gout test.\" Pt endorses chronic SOB d/t CHF and denies any new or worsening SOB.   "

## 2021-03-08 NOTE — ED PROVIDER NOTES
History     Chief Complaint:  Wound Check       HPI   Jamar Ivory is a 71 year old male, accompanied by his wife, with history of CHF, T2DM, CAD, CVA, BPH, and gout who presents for evaluation of left ankle swelling and redness present for a couple of weeks, and pain intermittent for five days. His ankle is sore and tender. Dr. Florez his PCP believes it may be a gout flare. He also reports pain and redness overlying the right elbow. No trauma to either ankle or elbow. His most recent flare was in his right foot last month and was treated with indomethacin. He believes he may have had cellulitis in his legs before. He denies any fever, cough, vomiting, or diarrhea. He denies any injury. He denies any recent travel.    Review of Systems   Constitutional: Negative for fever.   Respiratory: Negative for cough.    Cardiovascular: Positive for leg swelling.   Gastrointestinal: Negative for diarrhea and vomiting.   Musculoskeletal: Positive for joint swelling.   Skin: Positive for color change.   All other systems reviewed and are negative.    Allergies:  Clopidogrel  Penicillins  Simvastatin    Medications:  aspirin   atorvastatin  Prozac  Glucosamine-Chondroitin  indomethacin   levothyroxine   losartan   metolazone   potassium chloride ER   spironolactone   tamsulosin  torsemide     Past Medical History:    Arthritis   CHF    CVA   Coronary Artery Disease   Fatty liver   Gout   Hypertension   Hyperlipidemia   depression  obesity   Shortness of breath   Spider veins   TIA   Hypothyroidism   Vitiligo   Precordial pain  Non-healing ulcer of lower leg  Benign prostatic hyperplasia  peripheral vascular disease  Diabetic polyneuropathy  Benign neoplasm of colon  Type 2 diabetes mellitus  Bilateral leg edema  Fatty liver      Past Surgical History:    Appendectomy  colonoscopy  Vasectomy   Nasal bone surgery  Left index surgery  Testicle surgery    Family History:    Father: lung cancer  Mother: diabetes  Daughter:  leukemia     Social History:  The patient was accompanied to the ED by his wife.  PCP: Myah Florez     Physical Exam     Patient Vitals for the past 24 hrs:   BP Temp Temp src Pulse Resp SpO2   03/08/21 1500 107/63 -- -- 83 -- 94 %   03/08/21 1411 110/69 -- -- -- -- --   03/08/21 1410 -- 98.3  F (36.8  C) Temporal 89 20 93 %     Physical Exam  VS: Reviewed per above  HENT: normal speech  EYES: sclera anicteric  CV: Rate as noted, regular rhythm.   RESP: Effort normal. Breath sounds are normal bilaterally.  NEURO: Alert, moving all extremities  MSK: No deformity of the extremities. Full range of motion of right elbow.  SKIN: Redness and tenderness around chronic medial left lower leg wound as well as the left medial foot.  No crepitance or fluctuance.  Mild redness and tenderness over the right olecranon.        Emergency Department Course     Imaging:  US Lower Extremity Venous Duplex Left  IMPRESSION: No evidence of deep venous thrombosis.  As read by Radiology.    Laboratory:    CBC: WBC 7.5, HGB 12.0(L),   BMP: Potassium 3.1(L), Glucose 111(H), o/w WNL (Creatinine 0.96)  CRP: 118(H)  Uric Acid: 9.2(H)    Emergency Department Course:    Reviewed:    I reviewed the patient's nursing notes, vitals, past medical records, Care Everywhere.     Assessments:    1555 I performed an exam of the patient as documented above.   1705 I rechecked the patient and explained findings. I answered all questions prior to discharge.    Interventions:  1627 Percocet 2 tab PO  1628 Rocephin 2 g IV    Disposition:  The patient was discharged to home.     Impression & Plan    Medical Decision Making:  Jamar Ivory is a 71 year old male who presents to the emergency department today for evaluation of pain and redness and tenderness to the left lower medial leg and right elbow.  On arrival vital signs are reassuring without fever or tachycardia or hypotension.  On exam he has chronic appearing wound to the left  medial leg.  There is surrounding redness and tenderness and warmth which also involves the left medial foot.  No clinical joint effusion and bedside ultrasound reveals cobblestoning but no obvious joint effusion amenable to aspiration.  Left elbow does not have clinical effusion or significant redness or warmth.  At this time low suspicion for septic arthropathy.  No leukocytosis or fever.  He does have CRP elevation.  At this time I do not suspect he has sepsis.  Duplex ultrasound of the left lower extremity does not show DVT.  Patient does have significant history for recurrent gout flares.  Most recently he was treated with indomethacin last month.  Today his uric acid level is elevated and he has multiple risk factors to suggest that his symptoms could also be related to gout flare.  He states his symptoms improved with this regimen in the past.  Patient was given a single dose of Rocephin here and discharged with Keflex and indomethacin to treat both cellulitis and possible gout flare.  Area of redness was outlined with skin marker.  Although patient was not interested in admission to observation for treatment of this likely cellulitis +/- gout flare, he is agreeable to returning for any worsening of redness beyond the skin marker borders or development of fevers or new concerns. Plan for PCP recheck in the coming days.    Diagnosis:    ICD-10-CM    1. Multiple joint pain  M25.50 Uric acid    concern for gout flare   2. Cellulitis of left lower extremity  L03.116        Discharge Medications:  New Prescriptions    CEPHALEXIN (KEFLEX) 500 MG CAPSULE    Take 1 capsule (500 mg) by mouth 4 times daily for 7 days    INDOMETHACIN (INDOCIN) 50 MG CAPSULE    Take 1 capsule (50 mg) by mouth 3 times daily (with meals) for 10 days       Scribe Disclosure:  I, Orla Severson, am serving as a scribe at 3:30 PM on 3/8/2021 to document services personally performed by Jonnie Glez MD based on my observations and the  provider's statements to me.     Scribe Disclosure:  I, Brian Jimenezrenetta, am serving as a scribe at 4:11 PM on 3/8/2021 to document services personally performed by Albino Russo MD based on my observations and the provider's statements to me.       [facility] EMERGENCY DEPT         Albino Russo MD  03/08/21 2122

## 2021-03-08 NOTE — PROGRESS NOTES
Assessment & Plan   Problem List Items Addressed This Visit     None      Visit Diagnoses     Pain in joint involving ankle and foot, left    -  Primary    Right elbow pain             Erythematous, warm, painful left ankle - chronic wounds adjacent to ankle.  Likely gout, cannot rule out infection (patient has also had chills, tachycardia, high BP).  Sent to ED to rule out infection.     Right elbow pain with left ankle pain - more likely gout     [facility] ED contacted - patient will drive there for further eval.    All questions were answered.                  Return today (on 3/8/2021) for go the the ED right away.    TYLER Pastrana [facility] VANNESSA Cooper is a 71 year old who presents for the following health issues     HPI       Musculoskeletal problem/pain  Onset/Duration: started last thursday  Description  Location:  Left ankle pain  Joint Swelling: YES  Redness: YES  Pain: YES  Warmth: YES  Intensity:  10/10  Progression of Symptoms:  same  Accompanying signs and symptoms:   Fevers: no  Numbness/tingling/weakness: no  History  Trauma to the area: no  Recent illness:  no  Previous similar problem: does have a history of gout  Previous evaluation:  no  Precipitating or alleviating factors:  Aggravating factors include: walking, standing, putting any pressure on it at all  Therapies tried and outcome: rest      Left ankle pain and right elbow pain   Patient reports unable to bear weight on left foot  Can't hold things with right arm   Started last Friday   Swelling in the left ankle   hasnt been able to wrap legs at night because of the pain   Achy/dull pain   Constant   Decreased appetite  Ice helps   Chills   Elbow is tender to the touch          Review of Systems   Constitutional: Positive for chills.   HENT: Negative.    Eyes: Negative.    Respiratory: Negative.    Cardiovascular: Negative.    Gastrointestinal: Negative.    Genitourinary: Negative.   "  Musculoskeletal: Positive for arthralgias and joint swelling.        As in HPI   Skin: Negative.    Neurological: Negative.    Psychiatric/Behavioral: Negative.             Objective    BP (!) 158/94 (BP Location: Right arm, Cuff Size: Adult Large)   Pulse 115   Ht 1.727 m (5' 8\")   Wt 146.5 kg (323 lb)   SpO2 93%   BMI 49.11 kg/m    Body mass index is 49.11 kg/m .  Physical Exam  Constitutional:       General: He is not in acute distress.     Appearance: He is well-developed.   HENT:      Head: Normocephalic and atraumatic.      Nose: Nose normal.   Eyes:      Conjunctiva/sclera: Conjunctivae normal.   Neck:      Musculoskeletal: Normal range of motion.   Pulmonary:      Effort: Pulmonary effort is normal.   Musculoskeletal:      Right elbow: He exhibits decreased range of motion, swelling and effusion. Tenderness found.      Left ankle: He exhibits decreased range of motion and swelling. Tenderness.        Feet:       Comments: Wound over left medial ankle   Skin:     General: Skin is warm and dry.   Neurological:      Mental Status: He is alert and oriented to person, place, and time.   Psychiatric:         Judgment: Judgment normal.                        "

## 2021-03-09 ENCOUNTER — PATIENT OUTREACH (OUTPATIENT)
Dept: NURSING | Facility: CLINIC | Age: 71
End: 2021-03-09
Payer: MEDICARE

## 2021-03-09 ASSESSMENT — ACTIVITIES OF DAILY LIVING (ADL): DEPENDENT_IADLS:: INDEPENDENT

## 2021-03-09 NOTE — LETTER
Critical access hospital  Complex Care Plan  About Me:    Patient Name:  Jamar Marroquin    YOB: 1950  Age:         71 year old   [place] MRN:    [id number] Telephone Information:  Home Phone [phone]   Mobile [phone]       Address:  [address]  [address]  [facility] [address] Email address:  No e-mail address on record      Emergency Contact(s)    Name Relationship Lgl Grd Work Phone Home Phone Mobile Phone   1. SELIN MARROQUIN Spouse No none [phone]   2. JOSE MARIA MARROQUIN Son No  [phone]            Primary language:  English     needed? No   [place] [facility]:  [phone] op. 1  Other communication barriers: Glasses  Preferred Method of Communication:  Mail  Current living arrangement: I live in a private home with spouse  Mobility Status/ Medical Equipment: Independent    Health Maintenance  Health Maintenance Reviewed: Due/Overdue   Health Maintenance Due   Topic Date Due     URINE DRUG SCREEN  Never done     COVID-19 Vaccine (1 of 2) Never done     ZOSTER IMMUNIZATION (1 of 2) Never done     EYE EXAM  05/22/2019     INFLUENZA VACCINE (1) Never done     My Access Plan  Medical Emergency 911   Primary Clinic Line [facility] [phone]   24 Hour Appointment Line [phone] or  [phone]) (toll-free)   24 Hour Nurse Line [phone] (toll-free)   Preferred Urgent Care [facility] [phone]   [facility]  [phone]   Preferred Pharmacy [id number] - [place], [place] - [address]     Behavioral Health Crisis Line The National Suicide Prevention Lifeline at 1-631.393.3037 or 911     My Care Team Members  Patient Care Team       Relationship Specialty Notifications Start End    Myah Florez PA-C PCP - General Physician Assistant  9/11/20     Phone: [phone] Fax: [phone]          [address] [place] [address]    Kong Fraga MD MD Gastroenterology  8/11/16     Phone: [phone] Fax: [phone]         [address] [place] [address]    Cary Grace MD MD Ophthalmology  5/21/18     Phone: [phone] Fax: [phone]         [address] [facility] [address]    Haily Graves, RN Registered Nurse Cardiology Admissions 9/26/19     [facility]    AngélicaRika villalba NP Nurse Practitioner Nurse Practitioner Psych/Mental Health  11/7/19     Phone: [phone] Fax: [phone]         [facility] [address] [facility] [address]    Rozina Gallegos APRN CNP MyHealth Tracker Cardiology  11/8/19     [facility]    Phone: [phone] Pager: [phone] Fax: [phone] [address] [facility] [address]    Vega Anders MD MD Cardiology  7/9/20     [facility]    Phone: [phone] Pager: [phone] Fax: [phone] [address] [facility] [address]    Myah Florez PA-C Assigned PCP   8/16/20     Phone: [phone] Fax: [phone]         [address] [place] [address]    Brody Hawk MD Assigned Musculoskeletal Provider   10/23/20     Phone: [phone] Fax: [phone]         [address] [facility] [address]    Cece Lawrence MD Assigned Palliative Care Provider   10/23/20     Phone: [phone] Fax: [phone]         [address] [address]    Vega Anders MD Assigned Heart and Vascular Provider   10/23/20     Phone: [phone] Pager: [phone] Fax: [phone] [address] [place] [address]    Carin Gomez MD Assigned Behavioral Health Provider   11/15/20     Phone: [phone] Fax: [phone]          [address] [place] [address]    Cassandra Doe LSW Lead Care Coordinator Primary Care - CC  1/8/21     Phone: [phone] Fax: [phone]         [address]   [place] [address]    Haily Graves, RN MyHealth Tracker   2/10/21             My Care Plans  Self Management and Treatment Plan  Goals and (Comments)  Goals        General    1. Mental Health Management (pt-stated)     Notes - Note edited  9/21/2020  9:36 AM by Solo Dia, RN    Goal Statement: I will work to improve my depression.  Date Goal set: 11/8/19 // revised on 9/21/20  Barriers: long-standing history of depression, reports minimal depression improvement in the past despite multiple interventions  Strengths: motivated to improve depression, willing to continue trying different things for depression treatment  Date to Achieve By: 3/31/21  Patient expressed understanding of goal: Yes    Action steps to achieve this goal:  1. I will continue taking my medications as prescribed and will notify my care team of any medication questions or concerns.  2. I will establish care with Therapy and Psychiatry for ongoing mental health management.  3. I will continue routine follow-up with my PCP as indicated.  4. I will focus on doing more things I enjoy on a weekly basis.  5. I will explore additional mental health supports and resources that I can utilize when needed.  6. I will continue working with Care Coordination to identify and address any barriers to achieving my goal.             Action Plans on File:            Depression  Heart Failure       Advance Care Plans/Directives Type:   Type Advanced Care Plans/Directives: POLST    My Medical and Care Information  Problem List   Patient Active Problem List   Diagnosis     Acquired hypothyroidism     Vitiligo     Hyperlipidemia with target LDL less than 100     Hypertension goal BP (blood pressure) < 140/90     Cerebral infarction (H)     Moderate major depression (H)     Health Care Home     Fatty liver     Advanced directives, counseling/discussion     Impaired glucose tolerance     Transient cerebral ischemia     Obesity with serious comorbidity      Bilateral leg edema     Diet Controlled Type 2 diabetes mellitus without complication, without long-term current use of insulin (H)     Benign neoplasm of colon     Pain in both lower extremities     Low hemoglobin     Breast tenderness in male     Hx of Diabetic polyneuropathy associated with type 2 diabetes mellitus - now diet controlled (H)     Chronic combined systolic and diastolic congestive heart failure (H)     PVD (peripheral vascular disease) (H)     Benign prostatic hyperplasia with incomplete bladder emptying     Non-healing ulcer of lower leg with fat layer exposed, unspecified laterality (H)     Depression, major, recurrent, in complete remission (H)     Shortness of breath     Chest tightness     Coronary artery disease, noted to have a small segment infarct with teo-infarct ischemia     Precordial pain     Morbid obesity (H)      Current Medications and Allergies:   Current Outpatient Medications   Medication     acetaminophen (TYLENOL) 500 MG tablet     aspirin (ASA) 81 MG tablet     atorvastatin (LIPITOR) 20 MG tablet     BETA BLOCKER NOT PRESCRIBED (INTENTIONAL)     cephALEXin (KEFLEX) 500 MG capsule     FLUoxetine (PROZAC) 20 MG capsule     Glucosamine-Chondroitin (OSTEO BI-FLEX REGULAR STRENGTH) 250-200 MG TABS     indomethacin (INDOCIN) 50 MG capsule     indomethacin (INDOCIN) 50 MG capsule     levothyroxine (SYNTHROID/LEVOTHROID) 200 MCG tablet     losartan (COZAAR) 25 MG tablet     metolazone (ZAROXOLYN) 2.5 MG tablet     order for DME     order for DME     potassium chloride ER (KLOR-CON M) 20 MEQ CR tablet     spironolactone (ALDACTONE) 25 MG tablet     tamsulosin (FLOMAX) 0.4 MG capsule     torsemide (DEMADEX) 20 MG tablet     No current facility-administered medications for this visit.      Care Coordination Start Date: 11/8/2019   Frequency of Care Coordination: monthly   Form Last Updated: 03/09/2021

## 2021-03-09 NOTE — PROGRESS NOTES
Potassium noted to be 3.1 while in the ED recently. Please confirm patient is taking potassium as recommended 20mg daily.     YENI Yang [facility]  Pager: [phone]

## 2021-03-09 NOTE — PROGRESS NOTES
Clinic Care Coordination Contact    Clinic Care Coordination Contact  OUTREACH    Referral Information:  Referral Source: ED Follow-Up    Primary Diagnosis: Behavioral Health    Chief Complaint   Patient presents with     Clinic Care Coordination - Post Hospital     ED follow up - joint pain, cellulitis left lower extremity        Duck Utilization:   Clinic Utilization  Difficulty keeping appointments: No  Compliance Concerns: Yes  No-Show Concerns: No  No PCP office visit in Past Year: No    Utilization    Last refreshed: 3/8/2021  9:33 PM: Hospital Admissions 1           Last refreshed: 3/8/2021  9:33 PM: ED Visits 2           Last refreshed: 3/8/2021  9:33 PM: No Show Count (past year) 2              Current as of: 3/8/2021  9:33 PM              Clinical Concerns:  Current Medical Concerns: Pt presented to [facility] ED 3/8/21 after PCP OV for joint pain, cellulitis left lower extremity.     Patient Active Problem List   Diagnosis     Acquired hypothyroidism     Vitiligo     Hyperlipidemia with target LDL less than 100     Hypertension goal BP (blood pressure) < 140/90     Cerebral infarction (H)     Moderate major depression (H)     Health Care Home     Fatty liver     Advanced directives, counseling/discussion     Impaired glucose tolerance     Transient cerebral ischemia     Obesity with serious comorbidity     Bilateral leg edema     Diet Controlled Type 2 diabetes mellitus without complication, without long-term current use of insulin (H)     Benign neoplasm of colon     Pain in both lower extremities     Low hemoglobin     Breast tenderness in male     Hx of Diabetic polyneuropathy associated with type 2 diabetes mellitus - now diet controlled (H)     Chronic combined systolic and diastolic congestive heart failure (H)     PVD (peripheral vascular disease) (H)     Benign prostatic hyperplasia with incomplete bladder emptying     Non-healing ulcer of lower leg with fat layer exposed,  "unspecified laterality (H)     Depression, major, recurrent, in complete remission (H)     Shortness of breath     Chest tightness     Coronary artery disease, noted to have a small segment infarct with teo-infarct ischemia     Precordial pain     Morbid obesity (H)       Current Behavioral Concerns: Mental health, declines formal supports      Education Provided to patient: AVS and ED review, upcoming appts      SW CC outreach to pt for ED follow up, spoke to pt and wife. Pt was brief and reported \"I'm ok and I'm tired.\" Confirmed PCP follow up appt scheduled for 3/16/21. No other questions or concerns today.     Of note: 3/2/1 telephone encounter from cardiology to PCP regarding pt's mental skip and lack of outpatient supports. Will continue to discuss with pt. Previously declined.      Pain  Pain: Not discussed - indicated in ED visit     Health Maintenance Reviewed: Due/Overdue     Health Maintenance Due   Topic Date Due     URINE DRUG SCREEN  Never done     COVID-19 Vaccine (1 of 2) Never done     ZOSTER IMMUNIZATION (1 of 2) Never done     EYE EXAM  05/22/2019     INFLUENZA VACCINE (1) Never done       Clinical Pathway: None    Medication Management:  No questions.      START taking:  cephALEXin (KEFLEX)  CHANGE how you take:  indomethacin (INDOCIN)    Post-discharge medication reconciliation status: Discharge medications reviewed and reconciled.  Changed medications per note/orders (see AVS). ED staff updated medication list accordingly.     Functional Status:  Dependent ADLs: Independent  Dependent IADLs: Independent  Bed or wheelchair confined: No  Mobility Status: Independent  Fallen 2 or more times in the past year?: No  Any fall with injury in the past year?: No    Living Situation:  Current living arrangement: I live in a private home with spouse  Type of residence: Apartment    Lifestyle & Psychosocial Needs:    Social Needs     Financial resource strain: Not on file     Food insecurity     Worry: " Not on file     Inability: Not on file     Transportation needs     Medical: No     Non-medical: No       Diet: No added salt (Mediterranean)  Inadequate nutrition: No  Tube Feeding: No  Inadequate activity/exercise: Yes  Significant changes in sleep pattern: No  Transportation means: Regular car  Financial/Insurance concerns: No  Episcopalian or spiritual beliefs that impact treatment: No  Mental health DX: Yes  Mental health DX how managed: Medication  Mental health management concern: No  Chemical Dependency Status: No Current Concerns  Informal Support system: Spouse     Socioeconomic History     Marital status:      Spouse name: Melissa     Number of children: 3     Years of education: Not on file     Highest education level: Not on file   Occupational History     Occupation:      Employer: [employer]     Tobacco Use     Smoking status: Never Smoker     Smokeless tobacco: Never Used   Substance and Sexual Activity     Alcohol use: Not Currently     Alcohol/week: 0.0 standard drinks     Comment: rarely     Drug use: No     Sexual activity: Yes     Partners: Female       Care Coordinator has reviewed patient's Social Determinants of Health (SDoH) on this date. Upon review, changes were not made.      Resources and Interventions:  Current Resources:   Community Resources: [facility]  Supplies used at home: None  Equipment Currently Used at Home: none  Employment Status: retired    Advance Care Plan/Directive  Advanced Care Plans/Directives on file: Yes  Type Advanced Care Plans/Directives: POLST  Advanced Care Plan/Directive Status: Considering Options    Referrals Placed: None     Goals:   Goals        General    1. Mental Health Management (pt-stated)     Notes - Note edited  9/21/2020  9:36 AM by Solo Dia RN    Goal Statement: I will work to improve my depression.  Date Goal set: 11/8/19 // revised on 9/21/20  Barriers: long-standing history of depression, reports minimal  depression improvement in the past despite multiple interventions  Strengths: motivated to improve depression, willing to continue trying different things for depression treatment  Date to Achieve By: 3/31/21  Patient expressed understanding of goal: Yes    Action steps to achieve this goal:  1. I will continue taking my medications as prescribed and will notify my care team of any medication questions or concerns.  2. I will establish care with Therapy and Psychiatry for ongoing mental health management.  3. I will continue routine follow-up with my PCP as indicated.  4. I will focus on doing more things I enjoy on a weekly basis.  5. I will explore additional mental health supports and resources that I can utilize when needed.  6. I will continue working with Care Coordination to identify and address any barriers to achieving my goal.            Patient/Caregiver understanding: Pt reports understanding and denies any additional questions or concerns at this times. CHANDNI CC engaged in AIDET communication during encounter.    Outreach Frequency: Monthly    Future Appointments              Today Chandni, Kalyan [facility]     In 1 week Amanda Pham PA-C [facility]    In 1 week Myah Florez PA-C [facility] [place], [facility]    In 3 weeks RU LAB [facility]    In 3 weeks Rozina Gallegos APRN CNP [facility]          Plan: Patient was provided with this writer's contact information and encouraged to call with any questions or concerns. Pt will attend upcoming appts. Pt will follow plan of care.     CHANDNI CC will send updated complex care plan to patient. CHANDNI CC will outreach to pt in approx one month.     MERY Altamirano   Social Work Clinic Care Coordinator   [facility], [place], [place], and [facility] [place]  PH: [phone]  [email or web address]

## 2021-03-09 NOTE — PROGRESS NOTES
"Called and spoke with wife Melissa and Kenneth and he confirms he is taking 20 mEq KCl daily.  His phone wasn't working well today.  He reports his wt today is 329 lbs.  He says he would answer yes to \"Have you been dizzy or lightheaded more than usual since yesterday?\"  He feels slightly lightheaded after standing up from sitting after a long time.  She gives permission to leave instructions via VM if they do not answer.       Blanca Gould RN AKOSUA   [facility] [place], [place]  JEFFERSON. Clinic Care Coordinator  03/09/21, 4:53 PM                  "

## 2021-03-09 NOTE — PROGRESS NOTES
[facility]  C.ORAMY    MyHealth Tracker Heart Failure Alert      Daily Weight Goal: 318-326 lbs    Today's Weight:   Reported 330 lbs on 3/8, no wt for 3/9      Symptom Alert: ?? No call in yet     1) If you have shortness of breath, is it worse today than yesterday?   2) If you have swelling in your legs or abdomen, is it worse today than yesterday?   3) Did shortness of breath wake you up last night?   4) Did you prop up on more pillows or sleep in a chair to breathe easier last night?   5) Have you felt more dizzy or lightheaded, since yesterday?    Current Diuretic & Potassium Plan: Spironolactone 25 mg daily and torsemide 80 mg daily with Potassium 20 mEq daily     Last Extra Diuretic: Metolazone 2.5 mg with 40 mEq KCl on 2/6/21 per Rozina. Weight was 331 lbs. No weight update after, no call in or call back      Future Appointments   Date Time Provider Department Center   3/9/2021 11:15 AM , [facility]   3/16/2021 10:10 AM Amanda Pham PA-C [facility]   3/16/2021  1:20 PM Myah Florez PA-C [facility]   4/2/2021 12:30 PM RU LAB [facility]   4/2/2021  1:30 PM [facility] Gallegos[facility]       Notes:   No call in for today. Left voice mail for weight update.   Reviewed Epic: Kenneth had appt with his PCP 3/8 for joint pain to his left foot & right elbow. Ankle was suspicious of cellulitis & right elbow suspicious of gout. He was referred to ER for further evaluation. An US of LLE was done: Negative for DVT. Labs were checked: Uric acid was 9.2.  CRP elevated at 118. BMP K 3.1, Cr 0.96. He received percocet and Rocephin 2 G IVPB and sent home with a 7 day course of PO antibiotic and indomethacin for 10 days.   K was not addressed.     Call to [facility]. No answer, left voice mail for call back.     Will update Rozina regarding K of 3.1        MyHealth Tracker Weight Trends:           MyHealth Tracker Weight Graph:        Haily  JOY Graves 11:23 AM 03/09/21

## 2021-03-10 RX ORDER — POTASSIUM CHLORIDE 1500 MG/1
20 TABLET, EXTENDED RELEASE ORAL DAILY
COMMUNITY
Start: 2021-03-10 | End: 2021-05-11

## 2021-03-10 NOTE — PROGRESS NOTES
"[facility] ([facility])    You  Jamar Ivory \"Kenneth\" 1 minute ago (12:18 PM)      Vega Anders MD  You 22 minutes ago (11:57 AM)        Yes take extra 40 today and then increase daily dosing on the K to 40 meq. Can recheck as you suggested at next visit.         Documentation        "

## 2021-03-10 NOTE — PROGRESS NOTES
[facility]  DORA    StoreAgeealth Tracker Heart Failure Alert      Daily Weight Goal: 318-326 lbs    Today's Weight: 327 lbs      Symptom Alert: None reported     Current Diuretic & Potassium Plan: Spironolactone 25 mg daily and torsemide 80 mg daily with Potassium 20 mEq daily     Last Extra Diuretic: Metolazone 2.5 mg with 40 mEq KCl on 2/6/21 per Rozina. Weight was 331 lbs. No weight update after, no call in or call back    Future Appointments   Date Time Provider Department Center   3/16/2021 10:10 AM Amanda Pham PA-C [facility]   3/16/2021  1:20 PM Myah Florez PA-C [facility]   4/2/2021 12:30 PM RU LAB [facility]   4/2/2021  1:30 PM [facility]       Notes:   Kenneth reports he is feeling better than yesterday. Verified he is taking his KCl 20 mEq daily.     MyHealth Tracker Weight Trends:               MyHealth Tracker Weight Graph:         Haily Graves, RN 8:25 AM 03/10/21

## 2021-03-10 NOTE — PROGRESS NOTES
Called and spoke to Melissa and gave her instructions for Kenneth to take and extra 40 mEq today for a total 60 mEq today. Tomorrow he will increase his KCl from 20 mEq to 40 mEq daily. Melissa will call Dr Florez's office to make lab appt for 3/16.   Order placed for BMP placed and updated KCl on list.     Haily Graves RN 12:33 PM 03/10/21

## 2021-03-11 DIAGNOSIS — R39.12 BENIGN PROSTATIC HYPERPLASIA WITH WEAK URINARY STREAM: ICD-10-CM

## 2021-03-11 DIAGNOSIS — I50.42 CHRONIC COMBINED SYSTOLIC AND DIASTOLIC CONGESTIVE HEART FAILURE (H): ICD-10-CM

## 2021-03-11 DIAGNOSIS — N40.1 BENIGN PROSTATIC HYPERPLASIA WITH WEAK URINARY STREAM: ICD-10-CM

## 2021-03-11 RX ORDER — TORSEMIDE 20 MG/1
80 TABLET ORAL DAILY
Qty: 360 TABLET | Refills: 0 | Status: SHIPPED | OUTPATIENT
Start: 2021-03-11 | End: 2021-07-05

## 2021-03-11 RX ORDER — TAMSULOSIN HYDROCHLORIDE 0.4 MG/1
CAPSULE ORAL
Qty: 180 CAPSULE | Refills: 0 | Status: SHIPPED | OUTPATIENT
Start: 2021-03-11 | End: 2021-06-08

## 2021-03-11 RX ORDER — SPIRONOLACTONE 25 MG/1
25 TABLET ORAL DAILY
Qty: 90 TABLET | Refills: 0 | Status: SHIPPED | OUTPATIENT
Start: 2021-03-11 | End: 2021-05-11

## 2021-03-11 NOTE — TELEPHONE ENCOUNTER
Received refill request for:   Torsemide & spironolactone                  Last OV was:   2/12/21 w/Rozina Gallegos CNP  Labs/EKG:  BMP 3/8/21  F/U scheduled:   4/2/21 with Rozina Sutton CNP  New script sent to:  [facility]       Future Appointments   Date Time Provider Department Center   3/16/2021 10:10 AM [facility]   3/16/2021  1:20 PM Myah Florez PA-C [facility]   4/2/2021 12:30 PM RU LAB [facility]   4/2/2021  1:30 PM [facility]     Haily Graves, RN 9:44 AM 03/11/21

## 2021-03-15 ENCOUNTER — CARE COORDINATION (OUTPATIENT)
Dept: CARDIOLOGY | Facility: CLINIC | Age: 71
End: 2021-03-15

## 2021-03-15 NOTE — PROGRESS NOTES
[facility]  CSCAR    MyHealth Tracker Heart Failure Alert      Daily Weight Goal: 318-326 lbs    Today's Weight: 312 lbs  ?? Right??      Symptom Alert: None reported      Current Diuretic & Potassium Plan: Spironolactone 25 mg daily and torsemide 80 mg daily with Potassium 20 mEq daily     Last Extra Diuretic: Metolazone 2.5 mg with 40 mEq KCl on 2/6/21 per Rozina. Weight was 331 lbs. No weight update after, no call in or call back      Future Appointments   Date Time Provider Department Center   3/16/2021 10:10 AM Amanda Pham PA-C [facility]   3/16/2021  1:20 PM Myah Florez PA-C [facility]   4/2/2021 12:30 PM RU LAB [facility]   4/2/2021  1:30 PM [facility]legos [facility]       Notes:   Left voice mail to verify Kenneth's weight as       MyHealth Tracker Weight Trends:               MyHealth Tracker Weight Graph:          Haily Graves, RN 1:43 PM 03/15/21

## 2021-03-16 ENCOUNTER — HOSPITAL ENCOUNTER (OUTPATIENT)
Dept: WOUND CARE | Facility: CLINIC | Age: 71
Discharge: HOME | End: 2021-03-16
Attending: PHYSICIAN ASSISTANT | Admitting: PHYSICIAN ASSISTANT
Payer: MEDICARE

## 2021-03-16 ENCOUNTER — OFFICE VISIT (OUTPATIENT)
Dept: FAMILY MEDICINE | Facility: CLINIC | Age: 71
End: 2021-03-16
Payer: MEDICARE

## 2021-03-16 VITALS
BODY MASS INDEX: 50.33 KG/M2 | DIASTOLIC BLOOD PRESSURE: 60 MMHG | OXYGEN SATURATION: 93 % | TEMPERATURE: 97.2 F | SYSTOLIC BLOOD PRESSURE: 124 MMHG | HEART RATE: 80 BPM | WEIGHT: 315 LBS

## 2021-03-16 VITALS
RESPIRATION RATE: 24 BRPM | HEART RATE: 81 BPM | SYSTOLIC BLOOD PRESSURE: 130 MMHG | DIASTOLIC BLOOD PRESSURE: 71 MMHG | TEMPERATURE: 96.5 F

## 2021-03-16 DIAGNOSIS — L97.929 ULCER OF LOWER LIMB, LEFT, WITH UNSPECIFIED SEVERITY (H): Primary | ICD-10-CM

## 2021-03-16 DIAGNOSIS — L97.912 SKIN ULCER OF RIGHT LOWER LEG WITH FAT LAYER EXPOSED (H): ICD-10-CM

## 2021-03-16 DIAGNOSIS — E87.6 HYPOKALEMIA: ICD-10-CM

## 2021-03-16 DIAGNOSIS — F33.41 RECURRENT MAJOR DEPRESSIVE DISORDER, IN PARTIAL REMISSION (H): ICD-10-CM

## 2021-03-16 DIAGNOSIS — M10.9 ACUTE GOUTY ARTHRITIS: Primary | ICD-10-CM

## 2021-03-16 DIAGNOSIS — I50.42 CHRONIC COMBINED SYSTOLIC AND DIASTOLIC CONGESTIVE HEART FAILURE (H): ICD-10-CM

## 2021-03-16 PROCEDURE — 99214 OFFICE O/P EST MOD 30 MIN: CPT | Performed by: PHYSICIAN ASSISTANT

## 2021-03-16 PROCEDURE — 97602 WOUND(S) CARE NON-SELECTIVE: CPT

## 2021-03-16 PROCEDURE — 36415 COLL VENOUS BLD VENIPUNCTURE: CPT | Performed by: INTERNAL MEDICINE

## 2021-03-16 PROCEDURE — 80048 BASIC METABOLIC PNL TOTAL CA: CPT | Performed by: INTERNAL MEDICINE

## 2021-03-16 RX ORDER — ALLOPURINOL 100 MG/1
200 TABLET ORAL DAILY
Qty: 120 TABLET | Refills: 0 | Status: SHIPPED | OUTPATIENT
Start: 2021-03-16 | End: 2021-03-31

## 2021-03-16 ASSESSMENT — ANXIETY QUESTIONNAIRES
2. NOT BEING ABLE TO STOP OR CONTROL WORRYING: NOT AT ALL
7. FEELING AFRAID AS IF SOMETHING AWFUL MIGHT HAPPEN: NOT AT ALL
1. FEELING NERVOUS, ANXIOUS, OR ON EDGE: NOT AT ALL
5. BEING SO RESTLESS THAT IT IS HARD TO SIT STILL: NOT AT ALL
GAD7 TOTAL SCORE: 0
6. BECOMING EASILY ANNOYED OR IRRITABLE: NOT AT ALL
3. WORRYING TOO MUCH ABOUT DIFFERENT THINGS: NOT AT ALL

## 2021-03-16 ASSESSMENT — ENCOUNTER SYMPTOMS
CARDIOVASCULAR NEGATIVE: 1
PSYCHIATRIC NEGATIVE: 1
EYES NEGATIVE: 1
RESPIRATORY NEGATIVE: 1
ROS SKIN COMMENTS: AS IN HPI
CONSTITUTIONAL NEGATIVE: 1
GASTROINTESTINAL NEGATIVE: 1
NEUROLOGICAL NEGATIVE: 1

## 2021-03-16 ASSESSMENT — PATIENT HEALTH QUESTIONNAIRE - PHQ9
SUM OF ALL RESPONSES TO PHQ QUESTIONS 1-9: 8
5. POOR APPETITE OR OVEREATING: NOT AT ALL

## 2021-03-16 NOTE — PROGRESS NOTES
[facility]    ASSESSMENT:   1. (L97.902) bilateral lower leg ulcerations with fat-layer exposed  2. Lymphedema of bilateral lower legs  3. CHF   4. Diet controlled type 2 DM   5. Venous stasis    PLAN/DISCUSSION:   1. His care plan is suboptimal, limited by patients willingness to do interventions (declines supplements, lymphedema pump) and limited by financial situation (initially unable to afford EdemaWear)  2. Wound care plan: dress wounds with Xeroform, EdemaWear, short stretch wraps  3. Recommended hesperiden/diosmin, patient declines  4. Recommended lymphedema pump, patient states he is unable to do this due to urinary frequency - unable to sit for 60mins at a time  5. Referred to [facility] for competency US and consult    HISTORY OF PRESENT ILLNESS:   Jamar Ivory is a 71 year old male with complex medical history including hypothyroidism, morbid obesity, bilateral LE lymphedema, diet controlled type 2 DM, CHD and CAD who presents today for a bilateral lower leg ulcerations. Mr. Ivory is known to me for other similar lower extremity wounds. He has been followed by the OT outpatient lymphedema team and is attempting to manage lymphedema with short stretch wraps. However, it appears that he is failing this. He was unable to don compression stockings.      TREATMENT COURSE:  2/23/20: Presents for initial visit at our clinic.   03/16/21: Returns for follow-up. Lymphedema therapy increased his short stretch wraps to be applied daily which has been helpful in reducing volume. His wounds are slightly improved. Has not set up a consultation at the vein clinic. Unable to afford EdemaWear initially but planning on picking that up today.     OFFLOADING DEVICES:  EDEMA MANAGEMENT: short stretch wraps     VITALS: /71   Pulse 81   Temp 96.5  F (35.8  C) (Temporal)   Resp 24      PHYSICAL EXAM:  GENERAL: Patient is alert and oriented and in no acute distress  CV: pedal pulses  palpable bilaterally  INTEGUMENTARY:   Wound (used by OP I only) 02/23/21 1118 Right ulceration, venous (Active)   Thickness/Stage full thickness 03/16/21 1029   Dressing Appearance moist drainage 03/16/21 1029   Base granulating 03/16/21 1029   Periwound edematous;dry 03/16/21 1029   Periwound Temperature warm 03/16/21 1029   Length (cm) 2 03/16/21 1029   Width (cm) 1 03/16/21 1029   Depth (cm) 0.1 03/16/21 1029   Wound (cm^2) 2 cm^2 03/16/21 1029   Wound Volume (cm^3) 0.2 cm^3 03/16/21 1029   Wound healing % 74.03 03/16/21 1029   Drainage Characteristics/Odor serosanguineous 03/16/21 1029   Drainage Amount copious 03/16/21 1029   Care, Wound non-select wound debridement performed 03/16/21 1029       Wound (used by [facility] only) 03/16/21 1030 Left ulceration, venous (Active)   Thickness/Stage full thickness 03/16/21 1030   Dressing Appearance moist drainage 03/16/21 1030   Base granulating 03/16/21 1030   Periwound intact;edematous 03/16/21 1030   Periwound Temperature warm 03/16/21 1030   Length (cm) 1.5 03/16/21 1030   Width (cm) 1.1 03/16/21 1030   Depth (cm) 0.1 03/16/21 1030   Wound (cm^2) 1.65 cm^2 03/16/21 1030   Wound Volume (cm^3) 0.17 cm^3 03/16/21 1030   Drainage Characteristics/Odor serosanguineous 03/16/21 1030   Drainage Amount copious 03/16/21 1030   Care, Wound non-select wound debridement performed 03/16/21 1030       Wound (used by [facility] only) 03/16/21 1030 Left ulceration, venous (Active)   Thickness/Stage full thickness 03/16/21 1031   Dressing Appearance moist drainage 03/16/21 1031   Base granulating 03/16/21 1031   Periwound intact;edematous 03/16/21 1031   Periwound Temperature warm 03/16/21 1031   Length (cm) 3 03/16/21 1031   Width (cm) 1.7 03/16/21 1031   Depth (cm) 0.1 03/16/21 1031   Wound (cm^2) 5.1 cm^2 03/16/21 1031   Wound Volume (cm^3) 0.51 cm^3 03/16/21 1031   Drainage Characteristics/Odor serosanguineous 03/16/21 1031   Drainage Amount copious 03/16/21 1031   Care, Wound  non-select wound debridement performed 03/16/21 1031             MDM: 30-39 minutes were spent on the date of the visit reviewing previous chart notes, evaluating patient and developing the treatment plan.     RICH DEGROOT PA-C

## 2021-03-16 NOTE — PROGRESS NOTES
Assessment & Plan   Problem List Items Addressed This Visit        Circulatory    Chronic combined systolic and diastolic congestive heart failure (H)      Other Visit Diagnoses     Acute gouty arthritis    -  Primary    Relevant Medications    allopurinol (ZYLOPRIM) 100 MG tablet    Other Relevant Orders    Uric acid    Basic metabolic panel  (Ca, Cl, CO2, Creat, Gluc, K, Na, BUN)    Hypokalemia        Recurrent major depressive disorder, in partial remission (H)             - Gout and leg cellulitis have improved. Uric acid is elevated - likely worsened from torsemide and spironolactone.  Patient has good kidney function - GFR 78 last week - we will start 200 mg allopurinol daily and recheck uric acid in 4 weeks.    - Hypokalemia - recheck K today.  Patient is taking 20Meq potassium daily.   - Depression - good today.                  Return in about 4 weeks (around 4/13/2021) for Lab Only Appointment.    Myah Florez PA-C  [facility] VANNESSA Cooper is a 71 year old who presents for the following health issues     HPI     Hyperlipidemia Follow-Up      Are you regularly taking any medication or supplement to lower your cholesterol?   Yes- Atorvastatin    Are you having muscle aches or other side effects that you think could be caused by your cholesterol lowering medication?  No    Depression and Anxiety Follow-Up    How are you doing with your depression since your last visit? No change    How are you doing with your anxiety since your last visit?  No change    Are you having other symptoms that might be associated with depression or anxiety? No    Have you had a significant life event? No     Do you have any concerns with your use of alcohol or other drugs? No    Social History     Tobacco Use     Smoking status: Never Smoker     Smokeless tobacco: Never Used   Substance Use Topics     Alcohol use: Not Currently     Alcohol/week: 0.0 standard drinks     Comment:  rarely     Drug use: No     PHQ 11/10/2020 2/16/2021 3/16/2021   PHQ-9 Total Score 8 12 8   Q9: Thoughts of better off dead/self-harm past 2 weeks Not at all Not at all Not at all   F/U: Thoughts of suicide or self-harm - - -   F/U: Safety concerns - - -     MICHEAL-7 SCORE 11/10/2020 2/16/2021 3/16/2021   Total Score - - -   Total Score - - -   Total Score 2 0 0     Last PHQ-9 3/16/2021   1.  Little interest or pleasure in doing things 1   2.  Feeling down, depressed, or hopeless 3   3.  Trouble falling or staying asleep, or sleeping too much 1   4.  Feeling tired or having little energy 0   5.  Poor appetite or overeating 2   6.  Feeling bad about yourself 1   7.  Trouble concentrating 0   8.  Moving slowly or restless 0   Q9: Thoughts of better off dead/self-harm past 2 weeks 0   PHQ-9 Total Score 8   Difficulty at work, home, or with people -   In the past two weeks have you had thoughts of suicide or self harm? -   Do you have concerns about your personal safety or the safety of others? -     MICHEAL-7  3/16/2021   1. Feeling nervous, anxious, or on edge 0   2. Not being able to stop or control worrying 0   3. Worrying too much about different things 0   4. Trouble relaxing 0   5. Being so restless that it is hard to sit still 0   6. Becoming easily annoyed or irritable 0   7. Feeling afraid, as if something awful might happen 0   MICHEAL-7 Total Score 0   If you checked any problems, how difficult have they made it for you to do your work, take care of things at home, or get along with other people? -       Suicide Assessment Five-step Evaluation and Treatment (SAFE-T)    Hypothyroidism Follow-up      Since last visit, patient describes the following symptoms: Weight stable, no hair loss, no skin changes, no constipation, no loose stools        Review of Systems   Constitutional: Negative.    HENT: Negative.    Eyes: Negative.    Respiratory: Negative.    Cardiovascular: Negative.    Gastrointestinal: Negative.     Genitourinary: Negative.    Musculoskeletal:        As in HPI   Skin:        As in HPI   Neurological: Negative.    Psychiatric/Behavioral: Negative.             Objective    /60   Pulse 80   Temp 97.2  F (36.2  C) (Tympanic)   Wt (!) 150.1 kg (331 lb)   SpO2 93%   BMI 50.33 kg/m    Body mass index is 50.33 kg/m .  Physical Exam  Constitutional:       General: He is not in acute distress.     Appearance: He is well-developed. He is not diaphoretic.   HENT:      Head: Normocephalic.      Right Ear: External ear normal.      Left Ear: External ear normal.      Nose: Nose normal.   Eyes:      Conjunctiva/sclera: Conjunctivae normal.   Neck:      Musculoskeletal: Normal range of motion.   Pulmonary:      Effort: Pulmonary effort is normal.   Musculoskeletal:      Right lower leg: Edema (1+) present.      Left lower leg: Edema (1+) present.   Skin:     General: Skin is warm and dry.      Findings: No erythema.   Neurological:      Mental Status: He is alert and oriented to person, place, and time.   Psychiatric:         Judgment: Judgment normal.

## 2021-03-16 NOTE — PATIENT INSTRUCTIONS
Foods high in potassium:  - baked potatoes with skin  - black beans  - banana  - raw carrots  - broccoli  - cantaloupe  - plain, low fat yogurt  - milk

## 2021-03-16 NOTE — DISCHARGE INSTRUCTIONS
[facility]  [address] [address]    Call us at [phone] if you have any questions about your wounds, have redness or swelling around your wound, have a fever of 101 or greater or if you have any other problems or concerns. We answer the phone Monday through Friday 8 am to 4 pm, please leave a message as we check the voicemail frequently throughout the day.     Jamar Ivory      1950    Wound care recommendations to open areas on both lower legs  Cleanse with soap and water. Apply Xeroform to wound beds followed by EdemaWear followed by roll gauze and secure with tape.   Remove compression dressing if toes turn blue and/or start to feel numb and is not relieved by elevating the leg for one hour.   Walk as much as you can. When you sit raise your ankle above your hips to promote wound healing.     Recommend calling [facility] at [phone] to evaluate if you have a vein disease.     We will submit an order from [employer] for more Xeroform. Their phone number is [phone]. Please call them if you do not get your supplies in 2-3 days     Amanda Pham PA-C. March 16, 2021    Wound Clinic follow up as scheduled    If you had a positive experience please indicate that on your patient satisfaction survey form that [facility] will be sending you.

## 2021-03-17 LAB
ANION GAP SERPL CALCULATED.3IONS-SCNC: 6 MMOL/L (ref 3–14)
BUN SERPL-MCNC: 37 MG/DL (ref 7–30)
CALCIUM SERPL-MCNC: 8.7 MG/DL (ref 8.5–10.1)
CHLORIDE SERPL-SCNC: 104 MMOL/L (ref 94–109)
CO2 SERPL-SCNC: 24 MMOL/L (ref 20–32)
CREAT SERPL-MCNC: 1.09 MG/DL (ref 0.66–1.25)
GFR SERPL CREATININE-BSD FRML MDRD: 67 ML/MIN/{1.73_M2}
GLUCOSE SERPL-MCNC: 125 MG/DL (ref 70–99)
POTASSIUM SERPL-SCNC: 4.5 MMOL/L (ref 3.4–5.3)
SODIUM SERPL-SCNC: 134 MMOL/L (ref 133–144)

## 2021-03-17 ASSESSMENT — ANXIETY QUESTIONNAIRES: GAD7 TOTAL SCORE: 0

## 2021-03-17 NOTE — PROGRESS NOTES
No call in x 2 days... Wt was reported as 312 lbs on 3/15. He was 327 lbs the day prior.   Voice mail left.     BMP completed 3/16.     Future Appointments   Date Time Provider Department Center   4/2/2021 12:30 PM RU LAB LAB [facility]   4/2/2021  1:30 PM Rozina Gallegos APRN CNP [facility]   4/6/2021  2:10 PM [facility]         Haily Graves, RN 12:45 PM 03/17/21

## 2021-03-18 ENCOUNTER — CARE COORDINATION (OUTPATIENT)
Dept: CARDIOLOGY | Facility: CLINIC | Age: 71
End: 2021-03-18

## 2021-03-18 ENCOUNTER — TELEPHONE (OUTPATIENT)
Dept: FAMILY MEDICINE | Facility: CLINIC | Age: 71
End: 2021-03-18

## 2021-03-18 DIAGNOSIS — I89.0 LYMPHEDEMA OF BOTH LOWER EXTREMITIES: Primary | ICD-10-CM

## 2021-03-18 NOTE — TELEPHONE ENCOUNTER
Asking for orders to be put in epic for PHYSICAL THERAPY & OT to eval and treat    Diagnosis with lymphedema needs to be stated- lymphedema-      Call Lucía when this has been done, or to ask her to fax the form to you    [phone] ok to leave message

## 2021-03-18 NOTE — PROGRESS NOTES
Received call from wife Melissa and she reports she left a message for JOY Johansen yesterday requesting help with his tamsulosin refill since last was filled by old PMD.  New PMD is Dr. Florez who he has seen.  Informed her appears Dr. Florez has sent a new prescription for tamsulosin on 3/11/21 and was sent to [facility] pharmacy in [employer] [address]  She will call them to see if available to  as he just finished his last pill at home.  She voices understanding and denies further questions or concerns at this time.       Blanca Gould RN AKOSUA   [facility] [place], [place]  CRASHEEDA. Clinic Care Coordinator  03/18/21, 11:02 AM

## 2021-03-19 ENCOUNTER — TELEPHONE (OUTPATIENT)
Dept: FAMILY MEDICINE | Facility: CLINIC | Age: 71
End: 2021-03-19

## 2021-03-19 NOTE — PROGRESS NOTES
Melissa left voice mail yesterday afternoon very upset that Kenneth is refusing to take his tamsulosin.   Call to Melissa & voice mail left.     Called over to Myah Florez, PAC's office to see of they can reach out to Kenneth regarding med compliance. Nurse will call and speak to Kenneth and Melissa.     Haily Graves RN 10:37 AM 03/19/21

## 2021-03-19 NOTE — TELEPHONE ENCOUNTER
Received call from Haily from the [facility] who states she received a message from wife Melissa who was very upset because pt is not taking tamsulosin.  Wondering if clinic can give wife or pt a call?    S/w wife Melissa who states pt ran out of tamsulosin and did not know there was a prescription at the pharmacy.  Melissa states she s/w pt yesterday and advised there is a prescription at the pharmacy and pt states they will  rx today 3/19 and he will start taking it again.    Kassi MACKEY RN  EP Triage

## 2021-03-20 ENCOUNTER — HEALTH MAINTENANCE LETTER (OUTPATIENT)
Age: 71
End: 2021-03-20

## 2021-03-22 ENCOUNTER — CARE COORDINATION (OUTPATIENT)
Dept: CARDIOLOGY | Facility: CLINIC | Age: 71
End: 2021-03-22

## 2021-03-22 ENCOUNTER — PATIENT OUTREACH (OUTPATIENT)
Dept: NURSING | Facility: CLINIC | Age: 71
End: 2021-03-22
Payer: MEDICARE

## 2021-03-22 NOTE — PROGRESS NOTES
Clinic Care Coordination Contact    Follow Up Progress Note      Assessment: Looking to schedule COVID-19 vaccine    Goals addressed this encounter:   Goals Addressed    None          Intervention/Education provided during outreach: Another SW CC received voicemail from pt's wife Friday requesting a call to discuss scheduling COVID-19 vaccine. They are on a wait list at a local pharmacy but would like to get scheduled sooner than later.     SW CC outreach to pt's wife. Gave # for COVID-19 vaccine scheduling - [phone]. They have MyChart per chart but cannot access it.     Talked about using GoodRx for one of wife's medications.      Outreach Frequency: Monthly    Plan: SW CC will follow up in as previously scheduled. Pt's wife will call and schedule COVID-19 vaccine.     MERY Altamirano   Social Work Clinic Care Coordinator   [facility]  [facility]  PH: [phone]  [email or web address]

## 2021-03-22 NOTE — PROGRESS NOTES
"Melissa called and left voice mail stating that Kenneth's legs \"look so good. His wounds are healing.\"     Called to get weight update as Kenneth has not called in since 3/15.     Phone rang twice then busy signal x 2.     Haily Graves RN 2:04 PM 03/22/21      "

## 2021-03-23 ENCOUNTER — CARE COORDINATION (OUTPATIENT)
Dept: CARDIOLOGY | Facility: CLINIC | Age: 71
End: 2021-03-23

## 2021-03-23 NOTE — PROGRESS NOTES
[facility]  Received call back from Macarena with Lymphedema. Original order came from hospitalist so they did need order signed, but they did get it signed so can disregard.     Jazmin Roland RN, BSN, CHFN  03/23/21 at 3:51 PM

## 2021-03-23 NOTE — PROGRESS NOTES
"Spoke to Melissa. Kenneth did not want to talk. His depression \"is really bad today. He drinks constantly. I know he is going over his 2 liters a day and he does not seem to care.\"     He has not called into [facility] since 3/15. Melissa reports he has not due to his depression. She did read me his weights as he has been weighing daily and writing them down. Melissa is not noticing any shortness of breath. He is not reporting cramps, lightheadedness or dizziness.     Wts:   3/23      323 lbs  3/22      335 lbs  3/21      321 lbs  3/20       319 lbs  3/19       322 lbs  3/18      324 lbs  3/17       No weight  3/16      315 lbs  3/15       312 lbs    Melissa also reports his legs \"look so much better! His wounds are healing and the skin color is looking better. \" Legs are currently unwrapped and she will wrap them later.     Future Appointments   Date Time Provider Department Center   4/2/2021 12:30 PM RU LAB [facility]   4/2/2021  1:30 PM Rozina Gallegos APRN CNP [facility]   4/6/2021  2:10 PM Amanda Pham PA-C [facility]   4/16/2021 11:30 AM [facility]       Haily Graves, JOY 10:47 AM 03/23/21      "

## 2021-03-23 NOTE — PROGRESS NOTES
[facility]  Lymphedema form received dated 3/2/21 for CAROL Hallman to sign, but their is a provider name on bottom of form. CAROL Hallman willing to sign form if needed, but not with another provider listed, will need new form. Reviewed chart. Lymphedema ordered by MARTIN Cantrell, who is listed on the form.     Called [facility], no answer. LVM for Macarena, Lymphedema therapist noting form and date. Noted provider name on form not our provider. Asked if she had gotten this taken care of already or if she still needed a signature. If still needs signature, asked she resend without provider name on bottom, since that is not our provider. Asked for call back to review.     Jazmin Roland RN, BSN, CHFN  03/23/21 at 11:40 AM

## 2021-03-25 NOTE — PROGRESS NOTES
[facility]  C.ORAMY    MyHealth Tracker Heart Failure Alert      Daily Weight Goal: 318-326 lbs    Today's Weight: 326 lbs      Symptom Alert: said yes to      4) Did you prop up on more pillows or sleep in a chair to breathe easier last night?        Current Diuretic & Potassium Plan: Spironolactone 25 mg once daily & Torsemide 80 mg daily. KCL 40 mEq once daily     Last Extra Diuretic:   3/2 - metolazone 2.5mg once with 40meq potassium.      Future Appointments   Date Time Provider Department Center   4/2/2021 12:30 PM RU LAB [facility]   4/2/2021  1:30 PM [facility]   4/6/2021  2:10 PM Amanda Pham PA-C [facility]   4/16/2021 11:30 AM Noble Gonzalez [place] [facility]       Notes: left voice mail for call back        MyHealth Tracker Weight Trends:               MyHealth Tracker Weight Graph:               Haily Graves, RN 10:24 AM 03/25/21

## 2021-03-26 NOTE — PROGRESS NOTES
"[facility]  C.O.RRainE.    MyHealth Tracker Heart Failure Alert      Daily Weight Goal: 318-326 lbs    Today's Weight: 313 lbs, Wt was 326 lbs yesterday    Symptom Alert: Said yes to       4) Did you prop up on more pillows or sleep in a chair to breathe easier last night?   5) Have you felt more dizzy or lightheaded, since yesterday?    Current Diuretic & Potassium Plan: Spironolactone 25 mg once daily & Torsemide 80 mg daily. KCL 40 mEq once daily      Last Extra Diuretic:     3/2 - metolazone 2.5mg once with 40meq potassium.    Future Appointments   Date Time Provider Department Center   4/2/2021 12:30 PM RU LAB [facility]   4/2/2021  1:30 PM [facility]   4/5/2021 10:55 AM [facility] COVID VACCINE INITIAL [facility]   4/6/2021  2:10 PM [facility]   4/16/2021 11:30 AM Noble Gonzalez [place] [facility]       Notes:   Spoke to Melissa. She reports he has not been eating as much, \"he's been mostly drinking his calories. He has his big mug of milk,  two of those and water. He is trying to eat better.\" He was sleeping when I called. I did ask her if he said anything about being being dizzy or needing to prop himself as he said yes to that. He has not said anything about that. She will ask him when he wakes up.   I also asked if he took a metolazone. Melissa states she \"hide them really good and if he found them I would be very surprised.\"     MyHealth Tracker Weight Trends:               MyHealth Tracker Weight Graph:           Haily Graves RN 8:54 AM 03/26/21        "

## 2021-03-29 ENCOUNTER — OFFICE VISIT (OUTPATIENT)
Dept: FAMILY MEDICINE | Facility: CLINIC | Age: 71
End: 2021-03-29
Payer: MEDICARE

## 2021-03-29 VITALS
OXYGEN SATURATION: 95 % | HEART RATE: 105 BPM | SYSTOLIC BLOOD PRESSURE: 122 MMHG | WEIGHT: 315 LBS | DIASTOLIC BLOOD PRESSURE: 78 MMHG | TEMPERATURE: 98 F | HEIGHT: 68 IN | BODY MASS INDEX: 47.74 KG/M2

## 2021-03-29 DIAGNOSIS — R06.02 SHORTNESS OF BREATH: ICD-10-CM

## 2021-03-29 DIAGNOSIS — I25.110 CORONARY ARTERY DISEASE INVOLVING NATIVE CORONARY ARTERY OF NATIVE HEART WITH UNSTABLE ANGINA PECTORIS (H): ICD-10-CM

## 2021-03-29 DIAGNOSIS — R07.89 CHEST TIGHTNESS: ICD-10-CM

## 2021-03-29 DIAGNOSIS — I50.42 CHRONIC COMBINED SYSTOLIC AND DIASTOLIC CONGESTIVE HEART FAILURE (H): ICD-10-CM

## 2021-03-29 DIAGNOSIS — I25.119 CORONARY ARTERY DISEASE INVOLVING NATIVE CORONARY ARTERY OF NATIVE HEART WITH ANGINA PECTORIS (H): Primary | ICD-10-CM

## 2021-03-29 DIAGNOSIS — M10.9 ACUTE GOUTY ARTHRITIS: ICD-10-CM

## 2021-03-29 LAB
ALBUMIN SERPL-MCNC: 3.6 G/DL (ref 3.4–5)
ALP SERPL-CCNC: 71 U/L (ref 40–150)
ALT SERPL W P-5'-P-CCNC: 24 U/L (ref 0–70)
ANION GAP SERPL CALCULATED.3IONS-SCNC: 6 MMOL/L (ref 3–14)
AST SERPL W P-5'-P-CCNC: 17 U/L (ref 0–45)
BILIRUB SERPL-MCNC: 1.1 MG/DL (ref 0.2–1.3)
BUN SERPL-MCNC: 19 MG/DL (ref 7–30)
CALCIUM SERPL-MCNC: 9 MG/DL (ref 8.5–10.1)
CHLORIDE SERPL-SCNC: 104 MMOL/L (ref 94–109)
CO2 SERPL-SCNC: 25 MMOL/L (ref 20–32)
CREAT SERPL-MCNC: 1.01 MG/DL (ref 0.66–1.25)
ERYTHROCYTE [DISTWIDTH] IN BLOOD BY AUTOMATED COUNT: 14.9 % (ref 10–15)
GFR SERPL CREATININE-BSD FRML MDRD: 74 ML/MIN/{1.73_M2}
GLUCOSE SERPL-MCNC: 110 MG/DL (ref 70–99)
HCT VFR BLD AUTO: 38.9 % (ref 40–53)
HGB BLD-MCNC: 13.1 G/DL (ref 13.3–17.7)
MCH RBC QN AUTO: 29.2 PG (ref 26.5–33)
MCHC RBC AUTO-ENTMCNC: 33.7 G/DL (ref 31.5–36.5)
MCV RBC AUTO: 87 FL (ref 78–100)
PLATELET # BLD AUTO: 242 10E9/L (ref 150–450)
POTASSIUM SERPL-SCNC: 4 MMOL/L (ref 3.4–5.3)
PROT SERPL-MCNC: 8.4 G/DL (ref 6.8–8.8)
RBC # BLD AUTO: 4.48 10E12/L (ref 4.4–5.9)
SODIUM SERPL-SCNC: 135 MMOL/L (ref 133–144)
URATE SERPL-MCNC: 7.6 MG/DL (ref 3.5–7.2)
WBC # BLD AUTO: 6.7 10E9/L (ref 4–11)

## 2021-03-29 PROCEDURE — 99214 OFFICE O/P EST MOD 30 MIN: CPT | Performed by: PHYSICIAN ASSISTANT

## 2021-03-29 PROCEDURE — 85027 COMPLETE CBC AUTOMATED: CPT | Performed by: PHYSICIAN ASSISTANT

## 2021-03-29 PROCEDURE — 80053 COMPREHEN METABOLIC PANEL: CPT | Performed by: PHYSICIAN ASSISTANT

## 2021-03-29 PROCEDURE — 93000 ELECTROCARDIOGRAM COMPLETE: CPT | Performed by: PHYSICIAN ASSISTANT

## 2021-03-29 PROCEDURE — 84550 ASSAY OF BLOOD/URIC ACID: CPT | Performed by: PHYSICIAN ASSISTANT

## 2021-03-29 PROCEDURE — 36415 COLL VENOUS BLD VENIPUNCTURE: CPT | Performed by: PHYSICIAN ASSISTANT

## 2021-03-29 RX ORDER — INDOMETHACIN 25 MG/1
25 CAPSULE ORAL
Qty: 30 CAPSULE | Refills: 1 | Status: SHIPPED | OUTPATIENT
Start: 2021-03-29 | End: 2021-05-06

## 2021-03-29 RX ORDER — PREDNISONE 20 MG/1
20 TABLET ORAL DAILY
Qty: 5 TABLET | Refills: 0 | Status: CANCELLED | OUTPATIENT
Start: 2021-03-29 | End: 2021-04-03

## 2021-03-29 RX ORDER — NITROGLYCERIN 0.4 MG/1
TABLET SUBLINGUAL
Qty: 30 TABLET | Refills: 1 | Status: SHIPPED | OUTPATIENT
Start: 2021-03-29 | End: 2022-01-04

## 2021-03-29 ASSESSMENT — ENCOUNTER SYMPTOMS
NEUROLOGICAL NEGATIVE: 1
PSYCHIATRIC NEGATIVE: 1
GASTROINTESTINAL NEGATIVE: 1
CONSTITUTIONAL NEGATIVE: 1
EYES NEGATIVE: 1

## 2021-03-29 ASSESSMENT — MIFFLIN-ST. JEOR: SCORE: 2180.55

## 2021-03-29 NOTE — Clinical Note
FYI -   Patient continues to have intermittent angina on exertion.  EKG stable today.  I prescribed nitroglycerin PRN - wanted to make sure you are aware - please let me know if you have anything to add or change to the plan.     Calvin Florez PA-C

## 2021-03-29 NOTE — PROGRESS NOTES
Assessment & Plan   Problem List Items Addressed This Visit        Nervous and Auditory    Chest tightness    Relevant Orders    EKG 12-lead complete w/read - Clinics (Completed)    Coronary artery disease, noted to have a small segment infarct with teo-infarct ischemia - Primary    Relevant Orders    EKG 12-lead complete w/read - Clinics (Completed)       Respiratory    Shortness of breath    Relevant Orders    EKG 12-lead complete w/read - Clinics (Completed)    Comprehensive metabolic panel (BMP + Alb, Alk Phos, ALT, AST, Total. 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  EKG stable today.  Medications are optimized - patient still having angina.  Rx for nitroglycerin SL sent as well as note to cardiologist regarding plan.  Discussed calling 911 if chest pain does not improve after 2 doses of nitroglycerin.   Discussed patient with Dr. Booth, and routed today's chart to Dr. Anders and Rozina MEDINA in Cardiology for further recommendations - consider starting Imdur or nifedipine to prevent chest pain episodes?    Recheck K today - if normal ok to take Klon Con only once a day.     Continue allopurinol - we will check uric acid today.  Right elbow pain most consistent with gout.  Ok to take indomethacin PRN until pain resolves.                       Return in about 2 weeks (around 4/12/2021) for Medication  "Recheck, or sooner if symptoms persist or worsen.    TY[facility] VANNESSA Cooper is a 71 year old who presents for the following health issues     HPI     Chest Pain  Onset/Duration: the last 4-5 days - history of pleurisy  Description:   Location: right in the middle of his chest  Character: crushing  Radiation: none  Duration: intermittent   Intensity: 5/10  Progression of Symptoms: same  Accompanying Signs & Symptoms:  Shortness of breath: YES  Sweating: no  Nausea/vomiting: no  Lightheadedness: YES  Palpitations: no  Fever/Chills: no  Cough: no           Heartburn: no  History:   Family history of heart disease: no  Tobacco use: no  Previous similar symptoms: YES, feels like pleurisy  Precipitating factors:   Worse with exertion: YES  Worse with deep breaths: YES           Related to eating: no           Better with burping: no  Alleviating factors:   Therapies tried and outcome:     Musculoskeletal problem/pain  Onset/Duration: for about a week  Description  Location: right elbow pain  Joint Swelling: no  Redness: no  Pain: YES  Warmth: no  Intensity:  moderate  Progression of Symptoms:  improving  Accompanying signs and symptoms:   Fevers: no  Numbness/tingling/weakness: no  History  Trauma to the area: no  Recent illness:  no  Previous similar problem: no  Previous evaluation:  no  Precipitating or alleviating factors:  Aggravating factors include: extending his hand or lifting his arm  Therapies tried and outcome:         Review of Systems   Constitutional: Negative.    HENT: Negative.    Eyes: Negative.    Gastrointestinal: Negative.    Genitourinary: Negative.    Musculoskeletal:        As in HPI   Skin: Negative.    Neurological: Negative.    Psychiatric/Behavioral: Negative.             Objective    /78 (BP Location: Right arm, Cuff Size: Adult Large)   Pulse 105   Temp 98  F (36.7  C) (Tympanic)   Ht 1.727 m (5' 8\")   Wt 145.6 kg " (321 lb)   SpO2 95%   BMI 48.81 kg/m    Body mass index is 48.81 kg/m .  Physical Exam  Constitutional:       General: He is not in acute distress.     Appearance: He is well-developed. He is not diaphoretic.   HENT:      Head: Normocephalic.      Right Ear: External ear normal.      Left Ear: External ear normal.      Nose: Nose normal.   Eyes:      Conjunctiva/sclera: Conjunctivae normal.   Neck:      Musculoskeletal: Normal range of motion.   Cardiovascular:      Rate and Rhythm: Normal rate and regular rhythm.      Heart sounds: Normal heart sounds.   Pulmonary:      Effort: Pulmonary effort is normal.      Breath sounds: Normal breath sounds. No wheezing, rhonchi or rales.   Musculoskeletal:      Right elbow: He exhibits decreased range of motion and swelling. Tenderness (lateral joint line) found.   Skin:     General: Skin is warm and dry.   Neurological:      Mental Status: He is alert and oriented to person, place, and time.   Psychiatric:         Judgment: Judgment normal.            Results for orders placed or performed in visit on 03/29/21 (from the past 24 hour(s))   CBC with platelets   Result Value Ref Range    WBC 6.7 4.0 - 11.0 10e9/L    RBC Count 4.48 4.4 - 5.9 10e12/L    Hemoglobin 13.1 (L) 13.3 - 17.7 g/dL    Hematocrit 38.9 (L) 40.0 - 53.0 %    MCV 87 78 - 100 fl    MCH 29.2 26.5 - 33.0 pg    MCHC 33.7 31.5 - 36.5 g/dL    RDW 14.9 10.0 - 15.0 %    Platelet Count 242 150 - 450 10e9/L

## 2021-03-29 NOTE — Clinical Note
Follow up on adding nitroglycerin for angina.     I discussed with Dr. Booth, my supervising physician here as well.     Could his ongoing symptoms be coronary artery spasm?  And would you recommend starting Imdur or nifedipine?  I am happy to add these if you feel it would be appropriate.  I can also send him back to your team for a virtual visit or office visit as well - thanks!    Calvin Florez PA-C

## 2021-03-31 ENCOUNTER — TELEPHONE (OUTPATIENT)
Dept: FAMILY MEDICINE | Facility: CLINIC | Age: 71
End: 2021-03-31

## 2021-03-31 ENCOUNTER — CARE COORDINATION (OUTPATIENT)
Dept: CARDIOLOGY | Facility: CLINIC | Age: 71
End: 2021-03-31

## 2021-03-31 DIAGNOSIS — M10.9 ACUTE GOUTY ARTHRITIS: ICD-10-CM

## 2021-03-31 RX ORDER — ALLOPURINOL 300 MG/1
300 TABLET ORAL DAILY
Qty: 30 TABLET | Refills: 1 | Status: SHIPPED | OUTPATIENT
Start: 2021-03-31 | End: 2021-04-30

## 2021-03-31 NOTE — TELEPHONE ENCOUNTER
Left VM for patient to check MyChart and also  new prescription.   Increasing allopurinol to 300 mg daily and we will then recheck uric acid in 4 weeks.     Calvin Florez PA-C

## 2021-03-31 NOTE — PROGRESS NOTES
"Melissa left voice mail asking if [facility] can look at Kenneth's My Chart messages as his PCP sent a message and they do not have a computer or access to Internet.     Called Melissa and spoke to her and Kenneth. Did read to them AILYN Dai's My Chart message:     \" Kenneth   Your lab tests are complete and I have reviewed the results.      I would like you to increase the dose of allopurinol and then we can recheck the uric acid in another month.    I will send the new prescription over today.      If you have any questions or concerns, please feel free to call or send a MyChart message.     Sincerely,  Calvin Florez PA-C    Left VM for patient to check MyChart and also  new prescription.   Increasing allopurinol to 300 mg daily and we will then recheck uric acid in 4 weeks.      Calvin Florez PA-C\"     I did review uric acid results with them: 3/8 he was 9.2. On 3/29 it was 7.6.      Future Appointments   Date Time Provider Department Center   4/2/2021 12:30 PM [facility] LAB [facility]   4/2/2021  1:30 PM [facility]   4/5[facility]2021 10:55 [facility]   4/6/2021  2:10 PM Amanda Pham PA-C [facility]   4/16/2021 11:30 AM Noble Gonzalez [facility]         Haily Graves, RN 3:40 PM 03/31/21      "

## 2021-03-31 NOTE — RESULT ENCOUNTER NOTE
Kenneth    Your lab tests are complete and I have reviewed the results.     I would like you to increase the dose of allopurinol and then we can recheck the uric acid in another month.    I will send the new prescription over today.     If you have any questions or concerns, please feel free to call or send a MD-IT message.    Sincerely,  Calvin Florez PA-C

## 2021-04-01 NOTE — TELEPHONE ENCOUNTER
S/w wife and pt and gave Calvin's message below.  Pt states he received the message.    Kassi MACKEY RN  EP Triage

## 2021-04-02 ENCOUNTER — DOCUMENTATION ONLY (OUTPATIENT)
Dept: CARDIOLOGY | Facility: CLINIC | Age: 71
End: 2021-04-02

## 2021-04-02 ENCOUNTER — OFFICE VISIT (OUTPATIENT)
Dept: CARDIOLOGY | Facility: CLINIC | Age: 71
End: 2021-04-02
Attending: NURSE PRACTITIONER
Payer: MEDICARE

## 2021-04-02 VITALS
OXYGEN SATURATION: 95 % | SYSTOLIC BLOOD PRESSURE: 98 MMHG | BODY MASS INDEX: 47.74 KG/M2 | HEIGHT: 68 IN | HEART RATE: 74 BPM | WEIGHT: 315 LBS | DIASTOLIC BLOOD PRESSURE: 62 MMHG

## 2021-04-02 DIAGNOSIS — E66.01 MORBID OBESITY (H): ICD-10-CM

## 2021-04-02 DIAGNOSIS — I50.42 CHRONIC COMBINED SYSTOLIC AND DIASTOLIC CONGESTIVE HEART FAILURE (H): ICD-10-CM

## 2021-04-02 DIAGNOSIS — I10 HYPERTENSION GOAL BP (BLOOD PRESSURE) < 140/90: Chronic | ICD-10-CM

## 2021-04-02 DIAGNOSIS — R07.89 CHEST TIGHTNESS: ICD-10-CM

## 2021-04-02 DIAGNOSIS — E78.5 HYPERLIPIDEMIA WITH TARGET LDL LESS THAN 100: Chronic | ICD-10-CM

## 2021-04-02 DIAGNOSIS — I50.42 CHRONIC COMBINED SYSTOLIC AND DIASTOLIC CONGESTIVE HEART FAILURE (H): Primary | ICD-10-CM

## 2021-04-02 LAB
ANION GAP SERPL CALCULATED.3IONS-SCNC: 7 MMOL/L (ref 3–14)
BUN SERPL-MCNC: 38 MG/DL (ref 7–30)
CALCIUM SERPL-MCNC: 8.8 MG/DL (ref 8.5–10.1)
CHLORIDE SERPL-SCNC: 102 MMOL/L (ref 94–109)
CO2 SERPL-SCNC: 23 MMOL/L (ref 20–32)
CREAT SERPL-MCNC: 1.09 MG/DL (ref 0.66–1.25)
GFR SERPL CREATININE-BSD FRML MDRD: 67 ML/MIN/{1.73_M2}
GLUCOSE SERPL-MCNC: 110 MG/DL (ref 70–99)
POTASSIUM SERPL-SCNC: 4.2 MMOL/L (ref 3.4–5.3)
SODIUM SERPL-SCNC: 132 MMOL/L (ref 133–144)

## 2021-04-02 PROCEDURE — 36415 COLL VENOUS BLD VENIPUNCTURE: CPT | Performed by: NURSE PRACTITIONER

## 2021-04-02 PROCEDURE — 80048 BASIC METABOLIC PNL TOTAL CA: CPT | Performed by: NURSE PRACTITIONER

## 2021-04-02 PROCEDURE — 99215 OFFICE O/P EST HI 40 MIN: CPT | Performed by: NURSE PRACTITIONER

## 2021-04-02 ASSESSMENT — MIFFLIN-ST. JEOR: SCORE: 2207.63

## 2021-04-02 NOTE — PROGRESS NOTES
"Spoke to Melissa and Kenneth regarding EdemaWear. Melissa said they cannot afford it and the pair they were given is \"too tight, its a medium and I think we need a large.\" I did let her know that the should be tight to help move the fluid. She states she can't get them on without \"hurting him\".     I called to vascular to see if compression stockings could be used instead of EdemaWear as I could [facility] a prescription to  [facility]. Spoke to nurse who said that they prefer Kenneth to have the edemaWear as he would benefit from them more. I did let them know that Melissa thought the pair he has are too tight and they are too expensive for them. Nurse states she will let Amanda know and will have help them figure something out for the cost. Kenneth has an appt with them on 4/6.      home [facility] do not carry EdemaWear and insurance does not cover the cost.     Melissa and Kenneth updated    Haily Graves, RN 2:40 PM 04/02/21      "
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
## 2021-04-02 NOTE — PROGRESS NOTES
[facility]  DORA    [facility] Tr[facility] Heart Failure   For office visit review      Daily Weight Goal: 318-326 lbs    [facility] Enrollment date: 6/19/2019    Current Diuretic: Spironolactone 25 mg daily & torsemide 80 mg daily    Potassium Plan: 40 mEq daily     Diuretic Plan: Metolazone 2.5 mEq with extra 20 mEq KCl     Last Extra Diuretic: Metolazone 2.5 mg with 20 mEq KCL on 2/6/21  **wt 331 lbs; ? Response no call in or call back for update     Recent changes:     - 2/24 - Metolazone 2.5 mg with 40 mEq KCl on 02/24/2021  **Response: Wt was 334 lbs 2/24. He lost 2 lbs, 332 lbs today.   3/31 - PCP increased Allopurinol to 300 mg daily, uric acid 9.2   - 3/2 - metolazone 2.5mg once with 40meq potassium.  - 3/10 extra 40 mEq KCL (K 3.1) and increase daily from 20 to 40 mEq daily           My[facility] Tracker Weight Trends:               My[facility] Tracker Weight Graph:           Haily Graves, RN 9:20 AM 04/02/21

## 2021-04-02 NOTE — PATIENT INSTRUCTIONS
1. No medication changes  2. Follow up with Dr. Anders in 3 months  3. Please call with any questions/concerns [phone]

## 2021-04-05 ENCOUNTER — IMMUNIZATION (OUTPATIENT)
Dept: NURSING | Facility: CLINIC | Age: 71
End: 2021-04-05
Payer: MEDICARE

## 2021-04-05 PROCEDURE — 0001A PR COVID VAC PFIZER DIL RECON 30 MCG/0.3 ML IM: CPT

## 2021-04-05 PROCEDURE — 91300 PR COVID VAC PFIZER DIL RECON 30 MCG/0.3 ML IM: CPT

## 2021-04-06 ENCOUNTER — HOSPITAL ENCOUNTER (OUTPATIENT)
Dept: WOUND CARE | Facility: CLINIC | Age: 71
Discharge: HOME | End: 2021-04-06
Attending: PHYSICIAN ASSISTANT | Admitting: PHYSICIAN ASSISTANT
Payer: MEDICARE

## 2021-04-06 VITALS — HEART RATE: 79 BPM | DIASTOLIC BLOOD PRESSURE: 72 MMHG | SYSTOLIC BLOOD PRESSURE: 142 MMHG | TEMPERATURE: 97 F

## 2021-04-06 DIAGNOSIS — L97.929 ULCER OF LOWER LIMB, LEFT, WITH UNSPECIFIED SEVERITY (H): ICD-10-CM

## 2021-04-06 DIAGNOSIS — L97.912 SKIN ULCER OF RIGHT LOWER LEG WITH FAT LAYER EXPOSED (H): ICD-10-CM

## 2021-04-06 PROCEDURE — 97602 WOUND(S) CARE NON-SELECTIVE: CPT

## 2021-04-06 PROCEDURE — 99213 OFFICE O/P EST LOW 20 MIN: CPT | Performed by: PHYSICIAN ASSISTANT

## 2021-04-06 RX ORDER — ALLOPURINOL 300 MG/1
300 TABLET ORAL DAILY
COMMUNITY
Start: 2021-03-16 | End: 2021-06-24

## 2021-04-06 NOTE — PROGRESS NOTES
[facility]    ASSESSMENT:   1. Full-thickness right lower leg wound with fat-layer exposed due to lymphedema  2. Lymphedema of bilateral lower legs  3. CHF   4. Diet controlled type 2 DM   5. Venous stasis    PLAN/DISCUSSION:   1. His care plan is suboptimal, limited by patients willingness to do interventions (declines supplements, lymphedema pump, dressing changes)    2. Wound care plan: dress wounds with Xeroform, EdemaWear size large, short stretch wraps  3. Recommended hesperiden/diosmin, patient declines  4. Recommended lymphedema pump, patient states he is unable to do this due to urinary frequency - unable to sit for 60mins at a time  5. Referred to [facility] for competency US and consult    HISTORY OF PRESENT ILLNESS:   Jamar Ivory is a 71 year old male with complex medical history including hypothyroidism, morbid obesity, bilateral LE lymphedema, diet controlled type 2 DM, CHD and CAD who presents today for a bilateral lower leg ulcerations. Mr. Ivory is known to me for other similar lower extremity wounds. He has been followed by the OT outpatient lymphedema team and is attempting to manage lymphedema with short stretch wraps. However, it appears that he is failing this. He was unable to don compression stockings.      TREATMENT COURSE:  2/23/20: Presents for initial visit at our clinic.   03/16/21: Returns for follow-up. Lymphedema therapy increased his short stretch wraps to be applied daily which has been helpful in reducing volume. His wounds are slightly improved. Has not set up a consultation at the vein clinic. Unable to afford EdemaWear initially but planning on picking that up today.   04/06/21: Returns for follow-up. He was not able to get the medium sized EdemaWear on. Has been using short stretch wraps but does not always change them daily as instructed. Wounds are all nearly healed. Has appt with lymphedema therapy coming up.     EDEMA MANAGEMENT: short  stretch wraps     VITALS: BP (!) 142/72 (BP Location: Left arm)   Pulse 79   Temp 97  F (36.1  C) (Temporal)      PHYSICAL EXAM:  GENERAL: Patient is alert and oriented and in no acute distress  CV: pedal pulses palpable bilaterally  INTEGUMENTARY:   Wound (used by OP [facility] only) 02/23/21 1118 Right ulceration, venous (Active)   Thickness/Stage full thickness 04/06/21 1357   Dressing Appearance moist drainage 04/06/21 1357   Base granulating 04/06/21 1357   Periwound intact;edematous 04/06/21 1357   Periwound Temperature warm 04/06/21 1357   Length (cm) 2 04/06/21 1357   Width (cm) 1 04/06/21 1357   Depth (cm) 0.1 04/06/21 1357   Wound (cm^2) 2 cm^2 04/06/21 1357   Wound Volume (cm^3) 0.2 cm^3 04/06/21 1357   Wound healing % 74.03 04/06/21 1357   Drainage Characteristics/Odor serosanguineous 04/06/21 1357   Drainage Amount copious 04/06/21 1357   Care, Wound non-select wound debridement performed 04/06/21 1357             MDM: 20-29 minutes was spent on the day of visit reviewing previous chart notes, assessing the patient and developing the plan of care.     RICH DEGROOT PA-C

## 2021-04-06 NOTE — DISCHARGE INSTRUCTIONS
[facility]  [address] [address]    Call us at [phone] if you have any questions about your wounds, have redness or  swelling around your wound, have a fever of 101 or greater or if you have any other  problems or concerns. We answer the phone Monday through Friday 8 am to 4 pm,  please leave a message as we check the voicemail frequently throughout the day.    Jamar Ivory 1950    Wound care recommendations to open areas on both lower legs  Cleanse with soap and water. Apply Xeroform to wound beds followed by EdemaWear followed by roll gauze and secure with tape.    Remove compression dressing if toes turn blue and/or start to feel numb and is not relieved by elevating the leg for one hour.  Walk as much as you can. When you sit raise your ankle above your hips to promote wound healing.    Amanda Pham PA-C. April 6, 2021    Wound Clinic follow up as needed

## 2021-04-07 ENCOUNTER — CARE COORDINATION (OUTPATIENT)
Dept: CARDIOLOGY | Facility: CLINIC | Age: 71
End: 2021-04-07

## 2021-04-07 DIAGNOSIS — I50.42 CHRONIC COMBINED SYSTOLIC AND DIASTOLIC CONGESTIVE HEART FAILURE (H): ICD-10-CM

## 2021-04-07 RX ORDER — LOSARTAN POTASSIUM 25 MG/1
25 TABLET ORAL DAILY
Qty: 90 TABLET | Refills: 1 | Status: SHIPPED | OUTPATIENT
Start: 2021-04-07 | End: 2021-06-24

## 2021-04-07 NOTE — PROGRESS NOTES
[facility]  C.O.RNESSA    MyHealth Tracker Heart Failure Alert      Daily Weight Goal: 318-324 lbs    Today's Weight: 314 lbs yesterday      Symptom Alert: Yesterday said yes to (called in after 12 pm)        4) Did you prop up on more pillows or sleep in a chair to breathe easier last night?       Current Diuretic: Spironolactone 25 mg daily & torsemide 80 mg daily     Potassium Plan: 40 mEq daily     Diuretic Plan: Metolazone 2.5 mEq with extra 20 mEq KCl when directed      Last Extra Diuretic: Metolazone 2.5 mg with 20 mEq KCL on 3/2/21  **wt 331 lbs;  Response 327lbs    Future Appointments   Date Time Provider Department Center   4/16/2021 11:30 AM Noble Gonzalez [facility] [place] [facility]   4/26/2021 10:45 AM [facility]   7/2/2021 12:00 PM RU LAB RULAB [facility] CL[facility]   7/2/2021 12:45 PM Vega Anders MD [facility]       Notes: Zhane Hampton a voice mail for a call back        MyHealth Tracker Weight Trends:             MyHealth Tracker Weight Graph:             Haily Graves RN 11:10 AM 04/07/21

## 2021-04-07 NOTE — PROGRESS NOTES
I think this is a patient of Dr. Chand that I had seen in CORE clinic once for heart failure consultation and he was resistant to med changes from a HF standpoint.     [place], does he need to follow up with Dr. Chand again? Please let me know if I need to see him again.

## 2021-04-09 ENCOUNTER — PATIENT OUTREACH (OUTPATIENT)
Dept: NURSING | Facility: CLINIC | Age: 71
End: 2021-04-09
Payer: MEDICARE

## 2021-04-09 NOTE — PROGRESS NOTES
Clinic Care Coordination Contact    Follow Up Progress Note      Assessment: No questions, concerns, or resource needs today. Pt had a question, but forgot. Encouraged pt/wife to call back if remember.     Goals addressed this encounter:      Goals Addressed                 This Visit's Progress      COMPLETED: 1. Mental Health Management (pt-stated)        Goal Statement: I will work to improve my depression.  Date Goal set: 19 // revised on 20  Barriers: long-standing history of depression, reports minimal depression improvement in the past despite multiple interventions  Strengths: motivated to improve depression, willing to continue trying different things for depression treatment  Date to Achieve By: 3/31/21  Patient expressed understanding of goal: Yes    Action steps to achieve this goal:  1. I will continue taking my medications as prescribed and will notify my care team of any medication questions or concerns.  2. I will establish care with Therapy and Psychiatry for ongoing mental health management.  3. I will continue routine follow-up with my PCP as indicated.  4. I will focus on doing more things I enjoy on a weekly basis.  5. I will explore additional mental health supports and resources that I can utilize when needed.  6. I will continue working with Care Coordination to identify and address any barriers to achieving my goal.            Intervention/Education provided during outreach: Goal  3/31/21 and was set 19. Pt is not interested in formal supports for depression at this time.     Pt is connected to wound care, CORE, cardiology, and lymphedema treatments.     Outreach Frequency: 2 months    Plan: CHANDNI GUARDADO will follow up in two months for maintenance check in.     MERY Altamirano   Social Work Clinic Care Coordinator   [facility]  [facility], [facility], and [facility]  PH: [phone]  [email or web address]

## 2021-04-12 NOTE — PROGRESS NOTES
ERIC for wife from 2018, . Called pt and left voicemail with [facility] Intake number. Advised they set up evaluation and ask about insurance. That way, they will know if pt qualifies for outpt or inpt treatment and can get the care he needs.    Pt escorted pt and wife to ER by wheelchair. Transport uneventful.   Haily Graves RN 9:51 AM 09/20/19

## 2021-04-13 ENCOUNTER — CARE COORDINATION (OUTPATIENT)
Dept: CARDIOLOGY | Facility: CLINIC | Age: 71
End: 2021-04-13

## 2021-04-13 NOTE — PROGRESS NOTES
[facility]        Spoke with Kenneth and his preference for a Cardiologist to follow-up with is Dr. Chand. Him and his wife requested we cancel his appointment with Dr. Anders and schedule him with Dr. Chand.    Patient would like these appointments cancelled:          Blanca Bright, RN  Care Coordinator  [facility] PAULINO. Nurse Line: [phone]  / Personal Line: [phone]  04/13/21 2:10 PM

## 2021-04-14 NOTE — PROGRESS NOTES
Messaged scheduling requesting they contact Kenneth and wife to reschedule to Dr. Chand from Dr. Anders per pt preference.     Blanca Gould RN BSN   [place], [place]  [email or web address]. Clinic Care Coordinator  04/14/21, 10:40 AM

## 2021-04-15 NOTE — PROGRESS NOTES
[facility] DORA Bright, RN  Care Coordinator  [facility] [place]. Nurse Line: [phone]  / Personal Line: [phone]  04/15/21 8:28 AM

## 2021-04-16 ENCOUNTER — HOSPITAL ENCOUNTER (OUTPATIENT)
Dept: OCCUPATIONAL THERAPY | Facility: CLINIC | Age: 71
Setting detail: THERAPIES SERIES
End: 2021-04-16
Attending: PHYSICIAN ASSISTANT
Payer: MEDICARE

## 2021-04-16 DIAGNOSIS — I89.0 LYMPHEDEMA OF BOTH LOWER EXTREMITIES: ICD-10-CM

## 2021-04-16 PROCEDURE — 97140 MANUAL THERAPY 1/> REGIONS: CPT | Mod: GO

## 2021-04-16 PROCEDURE — 97165 OT EVAL LOW COMPLEX 30 MIN: CPT | Mod: GO

## 2021-04-16 NOTE — PROGRESS NOTES
Outpatient Occupational Therapy Discharge Note     Patient: Jamar Ivory  : 1950    Beginning/End Dates of Reporting Period:  21 to 2021    Referring Provider: Myah Gunn Diagnosis: lymphedema BLE's    Client Self Report:       Objective Measurements: none taken                                                        Outcome Measures (most recent score):  Lymphedema Life Impact Scale (score range 0-72). A higher score indicates greater impairment.: 2-pre score; not scored as pt did not return for services    Goals:   Goal Identifier 1   Goal Description Pt will report understandign s/s lymphedema, s/s skin infections, and tretament options for safety and I with home managment of BLE lymphedema   Target Date 21   Date Met  21   Progress:     Goal Identifier     Goal Description     Target Date     Date Met      Progress:     Goal Identifier     Goal Description     Target Date     Date Met      Progress:     Goal Identifier     Goal Description     Target Date     Date Met      Progress:     Goal Identifier     Goal Description     Target Date     Date Met      Progress:     Goal Identifier     Goal Description     Target Date     Date Met      Progress:     Goal Identifier     Goal Description     Target Date     Date Met      Progress:     Goal Identifier     Goal Description     Target Date     Date Met      Progress:     Progress Toward Goals:   Pt seen for eval and 1x treat. Pts BLE have started to increase since seen in clinic within last POC because he is not compliant with home compression use. He removed last night and never donned this morning so refilling noted in today's measurements. Pt received ed on velcro compression for ease with use at home and advised to discontinue edema wear use as he reports it never reduced the swelling and it really irritated his skin. Pt has compared effects GCB to edema wear and notes far greater support and reductions of  lymphedema from tissue BLE's. Pt does not use MLD and will not perform ex's. Pt has reported increase in tolerance and compliance with home GCB use via his spouse. No need to return for services as pt and spouse are skilled and I with GCB use. Pt to be discharged.      Plan:  Discharge from therapy.    Discharge:    Reason for Discharge: No further expectation of progress.  No skilled needs identified beyond ed session today.    Equipment Issued:     Discharge Plan: Patient to continue home program.

## 2021-04-16 NOTE — PROGRESS NOTES
04/16/21 1200   Quick Adds   Quick Adds Certification   Rehab Discipline   Discipline OT   Type of Visit   Type of visit Initial Edema Evaluation   General Information   Start of care 04/16/21   Referring physician Myah Florez   Orders Evaluate and treat as indicated   Order date 03/18/21   Medical diagnosis BLE lymphedema   Onset of illness / date of surgery 03/18/21  (chronic)   Edema onset 03/18/21  (chronic)   Affected body parts LLE;RLE   Edema etiology Infection;Chronic Venous Insufficiency;DVT;Ulcers  (CHF; inactivity; obesity)   Edema etiology comments Pt familiar to this writer from past POC. Pts BLE related to CHF, inactivity, CVI, cellulitis infections, CVI, and history DVT. Pt has had positive support from lymphedema in past when perfoming therapy and issues with compliance and increased lymphedema through each past POC. Pt is in need of B TKA and has reported in past MD will not operate with better BLE lymphedema control and weight loss.   Pertinent history of current problem (PT: include personal factors and/or comorbidities that impact the POC; OT: include additional occupational profile info) PMH significant for CHF, arthritis, HTN, DVT, cellulitis infections, depression, and surgery requiring hardware placement.   Surgical / medical history reviewed Yes   Prior level of functional mobility I   Prior treatment Complete decongestive therapy;Compression garments;Elevation;Diuretics;MLD;Gradient compression bandaging   Community support Family / friend caregiver   Patient role / employment history Unemployed;Retired;Disabled   Psychosocial concerns Depression   Living environment Apartment / condo   Fall Risk Screen   Fall screen completed by OT   Have you fallen 2 or more times in the past year? No   Have you fallen and had an injury in the past year? No   Is patient a fall risk? No   Abuse Screen (yes response referral indicated)   Feels Unsafe at Home or Work/School no   Feels Threatened by  Someone no   Does Anyone Try to Keep You From Having Contact with Others or Doing Things Outside Your Home? no   Physical Signs of Abuse Present no   System Outcome Measures   Lymphedema Life Impact Scale (score range 0-72). A higher score indicates greater impairment. 2   Subjective Report   Patient report of symptoms Pt here for eval/check in per request wound care team   Patient / Family Goals   Patient / family goals statement to get legs checked   Pain   Pain comments Pt has fluctuating baseline pain 2/2 arthritis   Cognitive Status   Orientation Orientation to person, place and time   Level of consciousness Alert   Follows commands and answers questions 100% of the time   Personal safety and judgement Intact   Edema Exam / Assessment   Skin condition Pitting;Non-pitting;Venous distention;Dryness   Skin condition comments 1+ pitting foot-knee crease BLE; wounds presents medial ankle LLE smaller than seen in past POC and much better off. Small wound lateral/posterior RLE and no concerns   Pitting 1+   Pitting location BLE   Capillary refill Symmetrical   Dorsal pedal pulse comments unbale to palpate-no signs ischemia   Stemmer sign Negative   Girth Measurements   Girth Measurements Refer to separate girth measurement flowsheet   Range of Motion   ROM comments BLE WFL   Strength   Strength comments NT'd   Activities of Daily Living   Activities of Daily Living I-Min A   Bed Mobility   Bed mobility I   Transfers   Transfers I   Gait / Locomotion   Gait / Locomotion I   Sensory   Sensory perception comments BLE neuropathy reported in past   Vascular Assessment   Vascular Assessment Comments known CVI   Coordination   Coordination Gross motor coordination appropriate   Muscle Tone   Muscle tone No deficits were identified   Planned Edema Interventions   Planned edema interventions Education   Clinical Impression   Criteria for skilled therapeutic intervention met Yes  (1x treat)   Therapy diagnosis lymphedema    Influenced by the following impairments / conditions Stage 1;Stage 2;Phlebolymphedema;Wounds / Ulcers   Assessment of Occupational Performance 1-3 Performance Deficits   Identified Performance Deficits infection risk; wounds; decreased functional mobility   Clinical Decision Making (Complexity) Low complexity   Treatment Frequency   (1x treat)   Treatment duration 1x treat   Patient / family and/or staff in agreement with plan of care Yes   Risks and benefits of therapy have been explained Yes   Clinical impression comments Pt can benefit from 1x treat today for eval situation with legs and wound healing for ed needed for sound and efficient I home management with chronic BLE lymphedema. Effieict home management will translate to decreased lymphedema, wound healing, and reduciton in incidence infections.   Goals   Edema Eval Goals 1   Goal 1   Goal identifier 1   Goal description Pt will report understandign s/s lymphedema, s/s skin infections, and tretament options for safety and I with home managment of BLE lymphedema   Target date 04/16/21   Date met 04/16/21   Total Evaluation Time   OT Eval, Low Complexity Minutes (51176) 10   Certification   Certification date from 04/16/21   Certification date to 04/19/21   Medical Diagnosis lymphedema   Certification I certify the need for these services furnished under this plan of treatment and while under my care.  (Physician co-signature of this document indicates review and certification of the therapy plan).

## 2021-04-16 NOTE — PROGRESS NOTES
[facility]        OUTPATIENT OCCUPATIONAL THERAPY EDEMA EVALUATION  PLAN OF TREATMENT FOR OUTPATIENT REHABILITATION  (COMPLETE FOR INITIAL CLAIMS ONLY)  Patient's Last Name, First Name, Jamar Garrido                           Provider s Name:   [facility] Medical Record No.  [id number]     Start of Care Date:  04/16/21   Onset Date:  03/18/21(chronic)   Type:  OT   Medical Diagnosis:  lymphedema   Therapy Diagnosis:  lymphedema Visits from SOC:  1                                     __________________________________________________________________________________   Plan of Treatment/Functional Goals:    Education        GOALS  1. Goal description: Pt will report understandign s/s lymphedema, s/s skin infections, and tretament options for safety and I with home managment of BLE lymphedema       Target date: 04/16/21  2.            3.            4.            5.            6.               7.             8.              Treatment Frequency: (1x treat)   Treatment duration: 1x treat    Noble Gonzalez                                    I CERTIFY THE NEED FOR THESE SERVICES FURNISHED UNDER        THIS PLAN OF TREATMENT AND WHILE UNDER MY CARE     (Physician co-signature of this document indicates review and certification of the therapy plan).                   Certification date from: 04/16/21       Certification date to: 04/19/21           Referring physician: Myah Florez   Initial Assessment  See Epic Evaluation- Start of care: 04/16/21

## 2021-04-20 ENCOUNTER — CARE COORDINATION (OUTPATIENT)
Dept: CARDIOLOGY | Facility: CLINIC | Age: 71
End: 2021-04-20

## 2021-04-20 NOTE — PROGRESS NOTES
[facility]  DORA    MyHealth Tracker Heart Failure Alert      Daily Weight Goal: 318-324 lbs    Today's Weight: 311 lbs      Symptom Alert: none reported     Current Diuretic: Spironolactone 25 mg daily & torsemide 80 mg daily     Potassium Plan: 40 mEq daily     Diuretic Plan: Metolazone 2.5 mEq with extra 20 mEq KCl when directed      Last Extra Diuretic: Metolazone 2.5 mg with 20 mEq KCL on 3/2/21  **wt 331 lbs;  Response 327lbs      Future Appointments   Date Time Provider Department Center   4/26/2021 10:45 AM RI COVID VACCINE 21 DAY PFIZER [facility] RID   7/12/2021  1:30 PM RU LAB RULAB [facility] PSA CLIN   7/12/2021  2:15 PM Ezequiel Chand MD [facility] PSA CLIN       Notes: Left voice mail to verify weight as he reported he is down 10 lbs over night      MyHealth Tracker Weight Trends:               MyHealth Tracker Weight Graph:           Haily Graves RN 10:24 AM 04/20/21

## 2021-04-26 ENCOUNTER — IMMUNIZATION (OUTPATIENT)
Dept: NURSING | Facility: CLINIC | Age: 71
End: 2021-04-26
Attending: INTERNAL MEDICINE
Payer: MEDICARE

## 2021-04-26 PROCEDURE — 0002A PR COVID VAC PFIZER DIL RECON 30 MCG/0.3 ML IM: CPT

## 2021-04-26 PROCEDURE — 91300 PR COVID VAC PFIZER DIL RECON 30 MCG/0.3 ML IM: CPT

## 2021-04-27 ENCOUNTER — CARE COORDINATION (OUTPATIENT)
Dept: CARDIOLOGY | Facility: CLINIC | Age: 71
End: 2021-04-27

## 2021-04-27 DIAGNOSIS — I50.42 CHRONIC COMBINED SYSTOLIC AND DIASTOLIC CONGESTIVE HEART FAILURE (H): Primary | ICD-10-CM

## 2021-04-28 NOTE — PROGRESS NOTES
Agree with MTM referral.    YENI Yang CNP  [facility]  Pager: [phone]

## 2021-04-28 NOTE — PROGRESS NOTES
"Melissa called and stated her and Kenneth did received their 2nd COVID shots. She had no issues except sore arm. Kenneth was feeling ill but is feeling better.   She asked if there was anything we can do to \"maybe get rid of some of his meds. I think all these pills make his depression worse.\"     Will review with Rozina to see if he can be referred to MTM or other recommendations.     Haily Graves RN 9:58 AM 04/28/21      "

## 2021-05-06 ENCOUNTER — CARE COORDINATION (OUTPATIENT)
Dept: CARDIOLOGY | Facility: CLINIC | Age: 71
End: 2021-05-06

## 2021-05-06 ENCOUNTER — OFFICE VISIT (OUTPATIENT)
Dept: PHARMACY | Facility: CLINIC | Age: 71
End: 2021-05-06
Attending: NURSE PRACTITIONER

## 2021-05-06 VITALS — WEIGHT: 315 LBS | DIASTOLIC BLOOD PRESSURE: 58 MMHG | SYSTOLIC BLOOD PRESSURE: 120 MMHG | BODY MASS INDEX: 50.64 KG/M2

## 2021-05-06 DIAGNOSIS — M10.9 ACUTE GOUTY ARTHRITIS: ICD-10-CM

## 2021-05-06 DIAGNOSIS — E78.5 HYPERLIPIDEMIA WITH TARGET LDL LESS THAN 100: ICD-10-CM

## 2021-05-06 DIAGNOSIS — I25.119 CORONARY ARTERY DISEASE INVOLVING NATIVE CORONARY ARTERY OF NATIVE HEART WITH ANGINA PECTORIS (H): ICD-10-CM

## 2021-05-06 DIAGNOSIS — N40.1 BENIGN PROSTATIC HYPERPLASIA WITH INCOMPLETE BLADDER EMPTYING: ICD-10-CM

## 2021-05-06 DIAGNOSIS — F32.1 MODERATE MAJOR DEPRESSION (H): Chronic | ICD-10-CM

## 2021-05-06 DIAGNOSIS — R39.14 BENIGN PROSTATIC HYPERPLASIA WITH INCOMPLETE BLADDER EMPTYING: ICD-10-CM

## 2021-05-06 DIAGNOSIS — I50.42 CHRONIC COMBINED SYSTOLIC AND DIASTOLIC CONGESTIVE HEART FAILURE (H): ICD-10-CM

## 2021-05-06 DIAGNOSIS — F33.42 DEPRESSION, MAJOR, RECURRENT, IN COMPLETE REMISSION (H): ICD-10-CM

## 2021-05-06 DIAGNOSIS — E03.9 ACQUIRED HYPOTHYROIDISM: ICD-10-CM

## 2021-05-06 DIAGNOSIS — F32.1 MODERATE MAJOR DEPRESSION (H): Primary | Chronic | ICD-10-CM

## 2021-05-06 DIAGNOSIS — M79.604 PAIN IN BOTH LOWER EXTREMITIES: ICD-10-CM

## 2021-05-06 DIAGNOSIS — M79.605 PAIN IN BOTH LOWER EXTREMITIES: ICD-10-CM

## 2021-05-06 LAB
ANION GAP SERPL CALCULATED.3IONS-SCNC: 3 MMOL/L (ref 3–14)
BUN SERPL-MCNC: 19 MG/DL (ref 7–30)
CALCIUM SERPL-MCNC: 9.2 MG/DL (ref 8.5–10.1)
CHLORIDE SERPL-SCNC: 106 MMOL/L (ref 94–109)
CO2 SERPL-SCNC: 30 MMOL/L (ref 20–32)
CREAT SERPL-MCNC: 0.97 MG/DL (ref 0.66–1.25)
GFR SERPL CREATININE-BSD FRML MDRD: 77 ML/MIN/{1.73_M2}
GLUCOSE SERPL-MCNC: 100 MG/DL (ref 70–99)
POTASSIUM SERPL-SCNC: 4 MMOL/L (ref 3.4–5.3)
SODIUM SERPL-SCNC: 139 MMOL/L (ref 133–144)

## 2021-05-06 PROCEDURE — 80048 BASIC METABOLIC PNL TOTAL CA: CPT | Performed by: NURSE PRACTITIONER

## 2021-05-06 PROCEDURE — 99607 MTMS BY PHARM ADDL 15 MIN: CPT | Performed by: PHARMACIST

## 2021-05-06 PROCEDURE — 36415 COLL VENOUS BLD VENIPUNCTURE: CPT | Performed by: NURSE PRACTITIONER

## 2021-05-06 PROCEDURE — 99605 MTMS BY PHARM NP 15 MIN: CPT | Performed by: PHARMACIST

## 2021-05-06 ASSESSMENT — PATIENT HEALTH QUESTIONNAIRE - PHQ9: SUM OF ALL RESPONSES TO PHQ QUESTIONS 1-9: 8

## 2021-05-06 NOTE — PATIENT INSTRUCTIONS
Recommendations from today's MTM visit:                                                       1. We will recheck sodium today and increase Fluoxetine if your sodium is back to normal.    2. If you decide you want further help with overnight bathroom runs, reach out, there is something else we could start (possibly stopping the Flomax).    3. I will reach out to Dr. Gallegos about increasing spironolactone to see if we can get you off potassium.    Follow-up: Return in 2 weeks (on 5/20/2021).    It was great to speak with you today.  I value your experience and would be very thankful for your time with providing feedback on our clinic survey. You may receive a survey via email or text message in the next few days.     To schedule another MTM appointment, please call the clinic directly or you may call the MTM scheduling line at [phone] or toll-free at [phone].     My Clinical Pharmacist's contact information:                                                      Please feel free to contact me with any questions or concerns you have.      Elizabeth Gutiérrez PharmD, BCACP  Medication Therapy Management Provider  Phone: [phone]  [email or web address]

## 2021-05-06 NOTE — PROGRESS NOTES
[facility]  CSCAR    [employer] Tracker Heart Failure Alert      Daily Weight Goal: 318-326 lbs    Today's Weight: 299 lbs  ??accurate??    Symptom Alert: Said yes to       4) Did you prop up on more pillows or sleep in a chair to breathe easier last night?     Current Diuretic: Spironolactone 25 mg daily & torsemide 80 mg daily     Potassium Plan: 40 mEq daily     Diuretic Plan: Metolazone 2.5 mEq with extra 20 mEq KCl when directed      Last Extra Diuretic: Metolazone 2.5 mg with 20 mEq KCL on 3/2/21  **wt 331 lbs;  Response 327lbs      Future Appointments   Date Time Provider Department Center   5/6/2021 11:30 AM Elizabeth Gutiérrez [facility] ED ECM EC   7/12[facility]2021  1[facility]30 [facility]   7/12/2021  2:15 PM Ezequiel Chand MD [facility] PSA CLIN       Notes: Left voice mail for Kenneth to verify accurate weights.         MyHealth Tracker Weight Trends:               MyHealth Tracker Weight Graph:             Haily Graves RN 11:08 AM 05/06/21

## 2021-05-06 NOTE — PROGRESS NOTES
Medication Therapy Management (MTM) Encounter    ASSESSMENT:                            Medication Adherence/Access: discussed use of pill bottles but as patient is taking all his meds in the morning, he would prefer to keep current habits.    HFpEF (Borderline): Discussed with patient today that unfortunately most of his medications are necessary either to avoid progression of disease or to treat symptoms/improve QOL.  We did discuss that there is a chance that [facility] may be open to increasing the spironolactone to allow him to come off of daily potassium supplementation (adjusting other BP and diuretics as needed).  He would be very open to this, as he says that this is one of the more difficult medications for him to take and omitting it from his pill regimen would make a huge difference for him.  I did not make reference to the fact that he is prescribed 2 tablets or 40 mEq daily, not 20 mEq daily as he is taking it, as potassium is within normal range. I updated the med list to reflect 20 mEq daily and we will recheck BMP today as discussed below.    Hyperlipidemia/CAD: Stable.    Gout: Current maintenance therapy appropriate but NSAIDs should be avoided if at all possible in patients with heart failure- would advise colchicine if tolerated, glucocorticoids if not.    Depression: Patient would benefit from recheck of sodium- if normal, patient would benefit from fluoxetine increase.    Hypothyroidism: Stable- see depression above.    OA of the Knee: Not discussed in full today- we will discuss weight management to assist with this at future visit as I avoid that discussion in first visits with patients.    BPH: We discussed other options and patient would like to avoid starting new therapies at this time- could consider finasteride in the future if interested, potentially coming off Flomax after 2-3 months.    PLAN:                            1. We will recheck sodium today and increase Fluoxetine if your  sodium is back to normal. Order placed.    2. If you decide you want further help with overnight bathroom runs, reach out, there is something else we could start.    3. I will reach out to Dr. Gallegos about increasing spironolactone to see if we can get you off potassium.    Future considerations:  -Colchicine or glucocorticoids for any future gout flares.  -Diet discussion (regular soda intake)    Follow-up: Return in about 2 weeks (around 5/20/2021).      SUBJECTIVE/OBJECTIVE:                          Jamar Ivory is a 71 year old male coming in for an initial visit. He was referred to me from Calvin Florez PA-C. Patient was accompanied by wife, Melissa.     Reason for visit: Patient is frustrated by the amount of pills he is taking and feels it is a major contributor to his depression.    Allergies/ADRs: Reviewed in chart  Tobacco: He reports that he has never smoked. He has never used smokeless tobacco.  Alcohol: not currently using  Caffeine: 1 sodas/day (16 oz, Mtn Dew or Pepsi regular)  Activity: Sedentary  Past Medical History: Reviewed in chart    Medication Adherence/Access: Patient takes medications directly from bottles.  Patient takes medications 1 time(s) per day.   Per patient, misses medication 0 times per week.     HFpEF (Borderline): Current medications include losartan 25mg daily, torsemide 80mg daily, spironolactone 25mg daily and metolazone 2.5mg as needed/instructed for fluid overload.  Patient reports no current medication side effects.    He takes potassium 20mEq daily and an extra dose on the days he takes metolazone. If he could get off of this it would be nice because it is hard to swallow.  ECHO:  Date 2/9/21, EF 45-50%  Patient is complaining of HF symptomsof: shortness of breath and dyspnea on exertion.    Patient is measuring daily weightsand reports increased- went from 297 to 299 in 2 days (from Tuesday to Thursday).  Patient does not self-monitor blood pressure.   Patient is  "following a low sodium diet ([employer] low-sodium meals), is avoiding EtOH.  Was using compression wraps but they shrunk so is now primarily using Ace bandages with good result- patient has \"graduated\" from lymphedema clinic and though legs still look swollen today do not look concerning.    Hyperlipidemia/CAD: Current therapy includes atorvastatin 20mg daily.  Patient reports no significant myalgias or other side effects.    Recent Labs   Lab Test 01/04/21  1419 05/08/19  1616 04/27/15  0754 04/27/15  0754 04/25/14  0956   CHOL 183 175   < > 169 169   HDL 38* 38*   < > 43 40*   * 94   < > 108 100   TRIG 138 214*   < > 92 145   CHOLHDLRATIO  --   --   --  3.9 4.2    < > = values in this interval not displayed.   Patient is taking aspirin 81mg daily without complaint of issue. Has not taken nitroglycerin since March- no chest pain since his last cardiology visit on 4/2.    Gout: Taking allopurinol 300mg daily. Had some elbow pain two weeks ago and took a course of indomethacin which he finished and the pain resolved.    Depression: Taking fluoxetine 20mg daily. As stated above he has been fairly depressed about his health and med burden lately. He is okay with going up on fluoxetine dose but is not interested in behavioral health.  PHQ-9 score:    PHQ 5/6/2021   PHQ-9 Total Score 8   Q9: Thoughts of better off dead/self-harm past 2 weeks Several days   F/U: Thoughts of suicide or self-harm -   F/U: Safety concerns -   When asked about his thoughts of hurting himself, he reports he does not have a plan in place and is \"taking a day at a time\".    Hypothyroidism: Patient is taking levothyroxine 200 mcg daily. Patient is having the following symptoms: hypothyroidism -  depression.   TSH   Date Value Ref Range Status   06/30/2020 3.87 0.40 - 4.00 mU/L Final     OA of the Knee: Taking Osteo Biflex and is not sure it is still working for him. Takes acetaminophen as needed for pain with some improvement.    BPH: " Taking tamsulosin 0.8mg daily. Generally gets up every hour overnight. Takes diuretics first thing in the morning. He has not been on other therapies for this.    Today's Vitals: /58   Wt (!) 333 lb (151 kg)   BMI 50.64 kg/m    ----------------  Last Comprehensive Metabolic Panel:  Sodium   Date Value Ref Range Status   04/02/2021 132 (L) 133 - 144 mmol/L Final     Potassium   Date Value Ref Range Status   04/02/2021 4.2 3.4 - 5.3 mmol/L Final     Chloride   Date Value Ref Range Status   04/02/2021 102 94 - 109 mmol/L Final     Carbon Dioxide   Date Value Ref Range Status   04/02/2021 23 20 - 32 mmol/L Final     Anion Gap   Date Value Ref Range Status   04/02/2021 7 3 - 14 mmol/L Final     Glucose   Date Value Ref Range Status   04/02/2021 110 (H) 70 - 99 mg/dL Final     Urea Nitrogen   Date Value Ref Range Status   04/02/2021 38 (H) 7 - 30 mg/dL Final     Creatinine   Date Value Ref Range Status   04/02/2021 1.09 0.66 - 1.25 mg/dL Final     GFR Estimate   Date Value Ref Range Status   04/02/2021 67 >60 mL/min/[1.73_m2] Final     Comment:     Non  GFR Calc  Starting 12/18/2018, serum creatinine based estimated GFR (eGFR) will be   calculated using the Chronic Kidney Disease Epidemiology Collaboration   (CKD-EPI) equation.       Calcium   Date Value Ref Range Status   04/02/2021 8.8 8.5 - 10.1 mg/dL Final     I spent 70 minutes with this patient today. All changes were made via collaborative practice agreement with Calvin Florez PA-C. A copy of the visit note was provided to the patient's primary care provider.    The patient was given a summary of these recommendations.     Elizabeth Gutiérrez, PharmD, BCACP  Medication Therapy Management Provider  Phone: [phone]  [email or web address]

## 2021-05-10 ENCOUNTER — CARE COORDINATION (OUTPATIENT)
Dept: CARDIOLOGY | Facility: CLINIC | Age: 71
End: 2021-05-10

## 2021-05-10 DIAGNOSIS — I50.42 CHRONIC COMBINED SYSTOLIC AND DIASTOLIC CONGESTIVE HEART FAILURE (H): ICD-10-CM

## 2021-05-10 NOTE — PROGRESS NOTES
[facility] C.O.R.E.    MyHealth Tracker Heart Failure Alert    Notes: Left a detailed message asking for a call back regarding Kenneth answering yes to question number one on the survey yesterday. Also requested a weight and symptom update be called in to MyHealth Tracker today.     Daily Weight Goal: 318-326 lbs    5/9/21 Weight: 325 lbs    Weight Alert: 0 lbs over weight parameters    Symptom Alert: YES     1) If you have shortness of breath, is it worse today than yesterday?       Current Diuretic & Potassium Plan: Torsemide 80 mg daily, Spironolactone 25 mg daily, Metolazone 2.5 mg as directed by the [facility], KCL 20 mEq daily and an additional 20 mEq on days he takes Metolazone.        Last Extra Diuretic/Changes: Metolazone 2.5 mg with an additional KCL 20 mEq on 3/2/21    Future Appointments   Date Time Provider Department Center   5/20/2021 11:30 AM Elizabeth Gutiérrez, [facility] EC   7/12/2021  1:30 PM RU LAB RULAB UMP PSA CLIN   7/12/2021  2:15 PM Ezequiel Chand MD [facility] PSA CLIN         MyHealth Tracker Weight Trends:       MyHealth Tracker Weight Graph:           Blanca Bright RN  Care Coordinator  [facility] C.O.R.E.   C.O.R.E. Nurse Line: [phone]  / Personal Line: [phone]  05/10/21 12:32 PM

## 2021-05-11 ENCOUNTER — TELEPHONE (OUTPATIENT)
Dept: PHARMACY | Facility: CLINIC | Age: 71
End: 2021-05-11

## 2021-05-11 RX ORDER — SPIRONOLACTONE 25 MG/1
50 TABLET ORAL DAILY
Qty: 180 TABLET | Refills: 0 | Status: SHIPPED | OUTPATIENT
Start: 2021-05-11 | End: 2021-06-24

## 2021-05-11 NOTE — TELEPHONE ENCOUNTER
Left voicemail asking patient to call me back at [phone] or get on my schedule at [phone].    Per verbal from Dr. Gallegos, once I make contact with the patient, we will move forward with increasing spironolactone to 50mg daily and discontinuing scheduled daily potassium, following up with a BMP and BP recheck. We are scheduled to meet the 20th if we do not connect sooner.    Elizabeth Gutiérrez, SelenaD, [facility]  Medication Therapy Management Provider  Phone: [phone]  [email or web address]

## 2021-05-11 NOTE — PROGRESS NOTES
"Kenneth has not called into MHT nor returned phone call.     Reviewed note form MTM: \"Per verbal from Dr. Gallegos, once I make contact with the patient, we will move forward with increasing spironolactone to 50mg daily and discontinuing scheduled daily potassium, following up with a BMP and BP recheck. We are scheduled to meet the 20th if we do not connect sooner.\"    Updated medication list.     Call to Berta. Message left.     Haily Graves RN 2:13 PM 05/11/21        "

## 2021-05-13 NOTE — TELEPHONE ENCOUNTER
Returned VM from patient, discussed the following plan with Supa:    -Increase spironolactone to 50mg daily  -Stop daily potassium chloride but keep on-hand in case it is needed for metolazone doses  -BMP and BP recheck in 3 weeks in clinic with myself    Orders/med list have already been updated by Dr. Gallegos's office to reflect above changes. I encouraged patient to call either the clinic or myself with any issues.    Elizabeth Gutiérrez, SelenaD, [facility]  Medication Therapy Management Provider  Phone: [phone]  [email or web address]

## 2021-05-14 NOTE — PROGRESS NOTES
[facility] C.O.R.E.        Patient and wife have had two messages left for them to return our calls to update us on Heart Failure Symptoms and Weight Increase on MyHealth Tracker. As well as needing to update MyHealth Tracker with his current weight and symptoms. Closing encounter due to non-response for patient and his wife.    Blanca Bright RN  Care Coordinator  [facility] C.OSEVERIANO.   C.O.R.E. Nurse Line: [phone]  / Personal Line: [phone]  05/14/21 9:44 AM

## 2021-05-21 ENCOUNTER — CARE COORDINATION (OUTPATIENT)
Dept: CARDIOLOGY | Facility: CLINIC | Age: 71
End: 2021-05-21

## 2021-05-21 NOTE — PROGRESS NOTES
[facility] C.O.R.E.    MyHealth Tracker Heart Failure Alert    Notes: Left a detailed message for patient and his wife to call back so I can gather more information from them regarding his weight being 1 lb over his weight parameters.    Daily Weight Goal: 318-326 lbs    Today's Weight: 327 lbs    Weight Alert: 1 lbs over weight parameters    Symptom Alert: NONE     Current Diuretic & Potassium Plan: Torsemide 80 mg daily, Spironolactone 50 mg daily, Metolazone 2.5 mg as directed by the [facility]       Last Extra Diuretic/Changes: Metolazone 2.5 mg with an additional KCL 20 mEq on 3/2/21    Future Appointments   Date Time Provider Department Center   6/3/2021 11:00 AM Elizabeth Gutiérrez [facility] EC   7/12/2021  1:30 PM Counts include [facility] beds at the [facility] RULAB UMP PSA CLIN   7/12/2021  2:15 PM Ezequiel Chand MD [facility] PSA CLIN         MyHealth Tracker Weight Trends:       MyHealth Tracker Weight Graph:         Blanca Bright, JOY  Care Coordinator  [facility] C.O.R.ERani   C.O.R.E. Nurse Line: [phone]  / Personal Line: [phone]  05/21/21 12:22 PM

## 2021-05-24 NOTE — PROGRESS NOTES
[facility] JOVITA.OSEVERIANO.      Kenneth denies SOB, BLE Edema, Lower Abdominal Bloating. Patient has not had much of an appetite lately due to the humidity outside. His wife Melissa will try to find a way to remind him to call in his weights to MyHealth Tracker daily. I questioned the 11 lb weight loss overnight. Melissa stated he doesn't see the scale very well at times. Will watch his weight one more day to see what happens.      Weights    5/22: 326 lbs  5/23: 327 lbs  5/24: 316 lbs    Blanca Bright RN  Care Coordinator  [facility] DORA HUSSEIN.O.RDREW. Nurse Line: [phone]  / Personal Line: [phone]  05/24/21 11:32 AM

## 2021-05-25 NOTE — PROGRESS NOTES
[facility]  CRainORAMY    freeeealth Tracker Heart Failure Alert    Daily Weight Goal: 318-326 lbs    Today's Weight: 299 lbs    Symptom Alert: None      Current Diuretic & Potassium Plan: Torsemide 80 mg daily, Spironolactone 50 mg daily, Metolazone 2.5 mg as directed by [facility]        Last Extra Diuretic/Changes: Metolazone 2.5 mg with an additional KCL 20 mEq on 3/2/21    Future Appointments   Date Time Provider Department Center   6/3/2021 11:00 AM Elizabeth Gutiérrez, [facility] EC   7/12/2021  1:30 PM RU LAB RULAB P PSA CLIN   7/12/2021  2:15 PM Ezequiel Chand MD [facility] PSA CLIN       Notes:   Called with no answer nor voice mail; questioning accuracy if weight as Kenneth sometimes can not read the scale.      MyHealth Tracker Weight Trends:               MyHealth Tracker Weight Graph:           Haily Graves RN 10:34 AM 05/25/21

## 2021-05-27 NOTE — PROGRESS NOTES
[facility] DORA        Kenneth called in his weight to MyHealth Tracker today. It is within parameters. He has not returned any of the messages we have left for him and his wife.For these reasons I am closing this encounter.          Blanca Bright, RN  Care Coordinator  [facility] DORA PAULINO. Nurse Line: [phone]  / Personal Line: [phone]  05/27/21 12:19 PM

## 2021-05-28 NOTE — PROGRESS NOTES
[facility]  C.ORainRNESSA    [facility] Tracker Heart Failure Alert      Daily Weight Goal: 318-326 lbs    Today's Weight: 314 lbs    Symptom Alert: none      Current Diuretic & Potassium Plan: Torsemide 80 mg daily, Spironolactone 50 mg daily, Metolazone 2.5 mg as directed by [facility]        Last Extra Diuretic/Changes: Metolazone 2.5 mg with an additional KCL 20 mEq on 3/2/21    Future Appointments   Date Time Provider Department Center   6/3/2021 11:00 AM Elizabeth Gutiérrez, [facility] EC   7/12/2021  1:30 [facility]   7/12/2021  2:15 PM Ezequiel Chand MD [facility] PSA CLIN       Notes:   Left message for Kenneth asking about accuracy of wts.       MyHealth Tracker Weight Trends:               MyHealth Tracker Weight Graph:         Haily Graves RN 11:00 AM 05/28/21

## 2021-06-02 ENCOUNTER — CARE COORDINATION (OUTPATIENT)
Dept: CARDIOLOGY | Facility: CLINIC | Age: 71
End: 2021-06-02

## 2021-06-03 ENCOUNTER — OFFICE VISIT (OUTPATIENT)
Dept: PHARMACY | Facility: CLINIC | Age: 71
End: 2021-06-03

## 2021-06-03 VITALS — SYSTOLIC BLOOD PRESSURE: 108 MMHG | DIASTOLIC BLOOD PRESSURE: 58 MMHG

## 2021-06-03 DIAGNOSIS — F32.1 MODERATE MAJOR DEPRESSION (H): ICD-10-CM

## 2021-06-03 DIAGNOSIS — I50.42 CHRONIC COMBINED SYSTOLIC AND DIASTOLIC CONGESTIVE HEART FAILURE (H): ICD-10-CM

## 2021-06-03 DIAGNOSIS — I50.42 CHRONIC COMBINED SYSTOLIC AND DIASTOLIC CONGESTIVE HEART FAILURE (H): Primary | ICD-10-CM

## 2021-06-03 DIAGNOSIS — M79.605 PAIN IN BOTH LOWER EXTREMITIES: ICD-10-CM

## 2021-06-03 DIAGNOSIS — M79.604 PAIN IN BOTH LOWER EXTREMITIES: ICD-10-CM

## 2021-06-03 LAB
ANION GAP SERPL CALCULATED.3IONS-SCNC: 8 MMOL/L (ref 3–14)
BUN SERPL-MCNC: 24 MG/DL (ref 7–30)
CALCIUM SERPL-MCNC: 9.5 MG/DL (ref 8.5–10.1)
CHLORIDE SERPL-SCNC: 103 MMOL/L (ref 94–109)
CO2 SERPL-SCNC: 24 MMOL/L (ref 20–32)
CREAT SERPL-MCNC: 0.98 MG/DL (ref 0.66–1.25)
GFR SERPL CREATININE-BSD FRML MDRD: 77 ML/MIN/{1.73_M2}
GLUCOSE SERPL-MCNC: 102 MG/DL (ref 70–99)
POTASSIUM SERPL-SCNC: 3.9 MMOL/L (ref 3.4–5.3)
SODIUM SERPL-SCNC: 135 MMOL/L (ref 133–144)

## 2021-06-03 PROCEDURE — 99606 MTMS BY PHARM EST 15 MIN: CPT | Performed by: PHARMACIST

## 2021-06-03 PROCEDURE — 99607 MTMS BY PHARM ADDL 15 MIN: CPT | Performed by: PHARMACIST

## 2021-06-03 PROCEDURE — 36415 COLL VENOUS BLD VENIPUNCTURE: CPT | Performed by: PHYSICIAN ASSISTANT

## 2021-06-03 PROCEDURE — 80048 BASIC METABOLIC PNL TOTAL CA: CPT | Performed by: PHYSICIAN ASSISTANT

## 2021-06-03 NOTE — PROGRESS NOTES
[facility]  DORA    MyHealth Tracker Heart Failure Alert      Daily Weight Goal: 318-326 lbs     Today's Weight: 316 lbs     Symptom Alert: none                 Current Diuretic & Potassium Plan: Torsemide 80 mg daily, Spironolactone 50 mg daily, Metolazone 2.5 mg as directed by the [facility]        Last Extra Diuretic/Changes: Metolazone 2.5 mg with an additional KCL 20 mEq on 3/2/21       Future Appointments   Date Time Provider Department Center   6/3/2021 12:00 PM EC LAB ECLAB EC   7/12/2021  1:30 PM RU LAB RULAB UMP PSA CLIN   7/12/2021  2:15 PM Ezequiel Chand MD [facility] PSA CLIN       Notes: Phone Busy        MyHealth Tracker Weight Trends:               MyHealth Tracker Weight Graph:         Haily Graves RN 11:43 AM 06/03/21

## 2021-06-03 NOTE — PROGRESS NOTES
Medication Therapy Management (MTM) Encounter    ASSESSMENT:                            Medication Adherence/Access: No issues identified    Depression: We discussed that a fluoxetine increase would be reasonable in the future as his sodium level is back within normal limits.    HFpEF: Patient would benefit from BMP recheck today to recheck potassium, otherwise stable.    OA/Weight: Patient advised to avoid liquid sugar to help with inflammation/pain and weight management. Suggested switch to Crystal Light or similar as he is averse to the taste of plain water.    PLAN:                            1. Continue current blood pressure medications as-is for now.    2. Get BMP/electrolytes checked today.    3. Consider switching to Crystal Light or [place]re instead of fruit juice (or regular soda) to help with weight management and     Future considerations:  -Sodium is normal so could consider Fluoxetine if patient wishes.    Follow-up: Return in about 3 months (around 9/3/2021) for phone visit.      SUBJECTIVE/OBJECTIVE:                          Jamar Ivory is a 71 year old male coming in for a follow-up visit. He was referred to me from Calvin Florez PA-C.  Today's visit is a follow-up MTM visit from 05/06/21.     Reason for visit: Follow up on med changes from last visit.    Allergies/ADRs: Reviewed in chart  Tobacco: He reports that he has never smoked. He has never used smokeless tobacco.  Alcohol: not currently using    Past Medical History: Reviewed in chart    Medication Adherence/Access: no issues reported    Depression: Patient continues on fluoxetine 20mg daily. He reports mood is not very good but stable, he is not interested in going up on fluoxetine or referral to psychiatry or psychology today.    HFpEF: Current medications include losartan 25mg daily, torsemide 80mg daily, spironolactone 50mg daily and metolazone 2.5mg as needed/instructed for fluid overload (has not needed any doses since last visit).  He stopped potassium when he increased spironolactone from 25mg daily after last visit and has noted no side effects with this. He still has potassium on hand for any metolazone doses in the future.  ECHO:  Date 2/9/21, EF 45-50%  Patient is not complaining of HF symptoms.    Patient is measuring daily weights reported in our system.   Patient is following a low sodium diet, is avoiding EtOH.    OA/Weight: Taking Osteo Biflex but still having joint pain. He has stopped regular soda (ran out) but has a 24 oz glass of apple juice daily, occasionally additional juice through the day.    Today's Vitals: /58     Last Comprehensive Metabolic Panel:  Sodium   Date Value Ref Range Status   05/06/2021 139 133 - 144 mmol/L Final     Potassium   Date Value Ref Range Status   05/06/2021 4.0 3.4 - 5.3 mmol/L Final     Chloride   Date Value Ref Range Status   05/06/2021 106 94 - 109 mmol/L Final     Carbon Dioxide   Date Value Ref Range Status   05/06/2021 30 20 - 32 mmol/L Final     Anion Gap   Date Value Ref Range Status   05/06/2021 3 3 - 14 mmol/L Final     Glucose   Date Value Ref Range Status   05/06/2021 100 (H) 70 - 99 mg/dL Final     Comment:     Non Fasting     Urea Nitrogen   Date Value Ref Range Status   05/06/2021 19 7 - 30 mg/dL Final     Creatinine   Date Value Ref Range Status   05/06/2021 0.97 0.66 - 1.25 mg/dL Final     GFR Estimate   Date Value Ref Range Status   05/06/2021 77 >60 mL/min/[1.73_m2] Final     Comment:     Non  GFR Calc  Starting 12/18/2018, serum creatinine based estimated GFR (eGFR) will be   calculated using the Chronic Kidney Disease Epidemiology Collaboration   (CKD-EPI) equation.       Calcium   Date Value Ref Range Status   05/06/2021 9.2 8.5 - 10.1 mg/dL Final     I spent 40 minutes with this patient today. All changes were made via collaborative practice agreement with Calvin Florez PA-C. A copy of the visit note was provided to the patient's primary care  provider.    The patient was given a summary of these recommendations.     Elizabeth Gutiérrez PharmD, [facility]  Medication Therapy Management Provider  Phone: [phone]  [email or web address]

## 2021-06-03 NOTE — PATIENT INSTRUCTIONS
Recommendations from today's MTM visit:                                                       1. Continue current blood pressure medications as-is for now.    2. Get BMP/electrolytes checked today.    3. Consider switching to Crystal Light or [place] instead of fruit juice (or regular soda) to help with weight management and pain management.    Follow-up: Return in about 3 months (around 9/3/2021) for phone visit.    It was great to speak with you today.  I value your experience and would be very thankful for your time with providing feedback on our clinic survey. You may receive a survey via email or text message in the next few days.     To schedule another MTM appointment, please call the clinic directly or you may call the MTM scheduling line at [phone] or toll-free at [phone].     My Clinical Pharmacist's contact information:                                                      Please feel free to contact me with any questions or concerns you have.      Elizabeth Gutiérrez, PharmD, BCACP  Medication Therapy Management Provider  Phone: [phone]  [email or web address]

## 2021-06-04 ENCOUNTER — PATIENT OUTREACH (OUTPATIENT)
Dept: CARE COORDINATION | Facility: CLINIC | Age: 71
End: 2021-06-04

## 2021-06-04 NOTE — PROGRESS NOTES
Clinic Care Coordination Contact    Assessment: Care Coordinator contacted patient for 2 month follow up.  Patient has continued to follow the plan of care and assessment and feels that there is no need follow any longer. Patient and wife confirm that pt has graduated from lymphedema treatments and wound clinic.    Enrollment status: Graduated.      Plan: No further outreaches at this time.  Patient will continue to follow the plan of care.  If new needs arise a new Care Coordination referral may be placed.  Graduation letter sent via [employer].    MERY Suarez   Social Work Clinic Care Coordinator   [facility]  PH: [phone]  [email or web address]

## 2021-06-04 NOTE — LETTER
[facility] CARE COORDINATION    June 4, 2021    Jamar Ivory  [address]    [facility] [address]    Dear Jamar,  Your Care Team congratulates you on your journey to maintain wellness. This document will help guide you on your journey to maintain a healthy lifestyle.  You can use this to help you overcome any barriers you may encounter.  If you should have any questions or concerns, you can contact the members of your Care Team or contact your Primary Care Clinic for assistance.     Health Maintenance  Health Maintenance Reviewed:      My Access Plan  Medical Emergency 911   Primary Clinic Line [facility] - [phone]   24 Hour Appointment Line [phone] or  [phone]) (toll-free)   24 Hour Nurse Line [phone] (toll-free)   Preferred Urgent Care     Preferred Hospital     Preferred Pharmacy [id number] - [place], [place] - [address]     Behavioral Health Crisis Line The National Suicide Prevention Lifeline at 1-899.347.4025 or 911     My Care Team Members  Patient Care Team       Relationship Specialty Notifications Start End    Myah Florez PA-C PCP - General Physician Assistant  9/11/20     Phone: [phone] Fax: [phone]         [address] [place] [address]    Kong Fraga MD MD Gastroenterology  8/11/16     Phone: [phone] Fax: [phone]         [address] [place] [address]    Cary Grace MD MD Ophthalmology  5/21/18     Phone: [phone] Fax: [phone]         [address] [facility] [address]    Haily Graves, RN Registered Nurse Cardiology Admissions 9/26/19     [facility]    Rika Lindsay NP Nurse Practitioner Nurse Practitioner Psych/Mental Health  11/7/19     Phone: [phone] Fax: [phone]          CLINICS [address] [facility] [address]    Rozina Gallegos, APRN CNP MyHealth Tracker Cardiology  11/8/19      [facility]    Phone: [phone] Pager: [phone] Fax: [phone] [address] [place] [address]    Brody Hawk MD Assigned Musculoskeletal Provider   10/23/20     Phone: [phone] Fax: [phone] [address] [facility] [address]    Carin Gomez MD Assigned Behavioral Health Provider   11/15/20     Phone: [phone] Fax: [phone]         [address] [place] [address]    Haily Graves, RN MyHealth Tracker   2/10/21     Destiny Cruz PA Assigned Heart and Vascular Provider   3/17/21     Phone: [phone] Pager: [phone] Fax: [phone]        [facility] [address] [place] [address]    Myah Florez PA-C Assigned PCP   3/22/21     Phone: [phone] Fax: [phone]         [address] [place] [address]    Ezequiel Chand MD MD Cardiovascular Disease  4/21/21     Phone: [phone] Fax: [phone] [address] [facility] [address]    Elizabeth Gutiérrez [facility] Pharmacist Pharmacist  5/11/21     Phone: [phone]          [address] [address]                It has been your Clinic Care Team's pleasure to work with you on your goals.    Regards,  Your Clinic Care Team

## 2021-06-09 ENCOUNTER — CARE COORDINATION (OUTPATIENT)
Dept: CARDIOLOGY | Facility: CLINIC | Age: 71
End: 2021-06-09

## 2021-06-09 NOTE — PROGRESS NOTES
[facility]  CRainORAMY    Treatoealth Tracker Heart Failure Alert      Daily Weight Goal: 318-326 lbs    Today's Weight: 308 lbs    Symptom Alert: said yes to       4) Did you prop up on more pillows or sleep in a chair to breathe easier last night?     Current Diuretic & Potassium Plan: Torsemide 80 mg daily, spironolactone 50 mg daily (increased on 5/11 per MTM.  KCL was stopped 5/11 per MTM    Last Extra Diuretic: Metolazone 2.5 mg with 20 mEq KCl on 3/2/21    Future Appointments   Date Time Provider Department Center   7/12/2021  1:30 PM RU [facility] CL[facility]   7/12/2021  2:15 PM Ezequiel Chand MD [facility] PSA CLIN       Notes: Spoke to Melissa. Kenneth has a decreased appetite with the heat of humidity. He has been drinking more water and eating more fruit, less bread.   He denies taking any metolazone recently. I did talk to Melissa about looking into buying a talking scale as Kenneth has trouble sometimes reading the scale. Stressed to her the importance of accurate weights.       MyHealth Tracker Weight Trends:               MyHealth Tracker Weight Graph:           Haily Gerdowsky, RN 9:45 AM 06/09/21

## 2021-06-15 ENCOUNTER — CARE COORDINATION (OUTPATIENT)
Dept: CARDIOLOGY | Facility: CLINIC | Age: 71
End: 2021-06-15

## 2021-06-15 NOTE — PROGRESS NOTES
[facility]  DORA    MyHealth Tracker Heart Failure Alert      Daily Weight Goal: 318-326 lbs    Today's Weight: 315 lbs      Symptom Alert:  None reported      Current Diuretic & Potassium Plan: Torsemide 80 mg daily, spironolactone 50 mg daily (increased on 5/11 per MTM.  KCL was stopped 5/11 per MTM     Last Extra Diuretic: Metolazone 2.5 mg with 20 mEq KCl on 3/2/21       Future Appointments   Date Time Provider Department Center   7/12/2021  1:30 PM RU [facility] PSA CLIN   7/12/2021  2:15 PM Ezequiel Chand MD [facility] PSA CLIN       Notes: Phone busy x2         MyHealth Tracker Weight Trends:               MyHealth Tracker Weight Graph:             Haily Graves RN 11:32 AM 06/15/21

## 2021-06-20 ENCOUNTER — OFFICE VISIT (OUTPATIENT)
Dept: URGENT CARE | Facility: URGENT CARE | Age: 71
End: 2021-06-20
Payer: MEDICARE

## 2021-06-20 VITALS
TEMPERATURE: 98.5 F | HEART RATE: 72 BPM | RESPIRATION RATE: 17 BRPM | OXYGEN SATURATION: 94 % | DIASTOLIC BLOOD PRESSURE: 69 MMHG | SYSTOLIC BLOOD PRESSURE: 118 MMHG

## 2021-06-20 DIAGNOSIS — H61.23 BILATERAL IMPACTED CERUMEN: Primary | ICD-10-CM

## 2021-06-20 DIAGNOSIS — H60.392 INFECTIVE OTITIS EXTERNA, LEFT: ICD-10-CM

## 2021-06-20 PROCEDURE — 99213 OFFICE O/P EST LOW 20 MIN: CPT | Performed by: FAMILY MEDICINE

## 2021-06-20 RX ORDER — NEOMYCIN SULFATE, POLYMYXIN B SULFATE, HYDROCORTISONE 3.5; 10000; 1 MG/ML; [USP'U]/ML; MG/ML
3 SOLUTION/ DROPS AURICULAR (OTIC) 4 TIMES DAILY
Qty: 10 ML | Refills: 0 | Status: SHIPPED | OUTPATIENT
Start: 2021-06-20 | End: 2021-06-24

## 2021-06-21 NOTE — PROGRESS NOTES
ASSESSMENT/ PLAN  Bilateral impacted cerumen     - carbamide peroxide (DEBROX) 6.5 % otic solution; Place 5 drops into both ears 2 times daily for 10 days    Treat at home after otitis externa is resolved.  No irrigation today due to concern of worsening symptoms of otitis externa    Infective otitis externa, left     - neomycin-polymyxin-hydrocortisone (CORTISPORIN) 3.5-14144-1 otic solution; Place 3 drops Into the left ear 4 times daily    Treat ear infection first to address pain and inflammation     Symptomatic relief of pain and fever with acetaminophen and/or ibuprofen   May apply a warm pack to the region of the ear for symptomatic relief  --------------------------------------------------------------------------------------------------------------------------------------      SUBJECTIVE:  Chief Complaint   Patient presents with     Urgent Care     Present for left pain radiating down to left side of jaw and decrease hearing in left ear for 1.5 weeks.      Urgent Care     Request atorvastatin refill.     Jamar Ivory is a 71 year old male who presents with left ear pain, fullness and pressure for 10 day(s).   Severity: moderate   Timing:gradual onset, still present, worsening and constant  Additional symptoms include - muffled hearing.      History of recurrent otitis: no    Past Medical History:   Diagnosis Date     Arthritis      CHF (congestive heart failure) (H) 12/24/2018     CVA (cerebral infarction) 2012     CVD (cardiovascular disease)      Fatty liver      Gout      Hypertension goal BP (blood pressure) < 140/90 1/17/2011     Major depressive disorder, single episode, severe, without mention of psychotic behavior 1977    hospitalized     Obesity, morbid (more than 100 lbs over ideal weight or BMI > 40) (H)      Shortness of breath      Spider veins      TIA (transient ischaemic attack) 2012     Unspecified hypothyroidism      Vitiligo      Patient Active Problem List   Diagnosis     Acquired  hypothyroidism     Vitiligo     Hyperlipidemia with target LDL less than 100     Hypertension goal BP (blood pressure) < 140/90     Cerebral infarction (H)     Moderate major depression (H)     Health Care Home     Fatty liver     Advanced directives, counseling/discussion     Impaired glucose tolerance     Transient cerebral ischemia     Obesity with serious comorbidity     Bilateral leg edema     Diet Controlled Type 2 diabetes mellitus without complication, without long-term current use of insulin (H)     Benign neoplasm of colon     Pain in both lower extremities     Low hemoglobin     Breast tenderness in male     Hx of Diabetic polyneuropathy associated with type 2 diabetes mellitus - now diet controlled (H)     Chronic combined systolic and diastolic congestive heart failure (H)     PVD (peripheral vascular disease) (H)     Benign prostatic hyperplasia with incomplete bladder emptying     Skin ulcer of right lower leg with fat layer exposed (H)     Depression, major, recurrent, in complete remission (H)     Shortness of breath     Chest tightness     Coronary artery disease, noted to have a small segment infarct with teo-infarct ischemia     Precordial pain     Morbid obesity (H)     Ulcer of lower limb, left, with unspecified severity (H)       ALLERGIES:  Clopidogrel, Penicillins, and Simvastatin    acetaminophen (TYLENOL) 500 MG tablet, Take 1,000 mg by mouth every 6 hours as needed (Headache)   allopurinol (ZYLOPRIM) 300 MG tablet, Take 300 mg by mouth daily   aspirin (ASA) 81 MG tablet, Take 1 tablet (81 mg) by mouth daily  atorvastatin (LIPITOR) 20 MG tablet, Take 1 tablet (20 mg) by mouth daily  Glucosamine-Chondroitin (OSTEO BI-FLEX REGULAR STRENGTH) 250-200 MG TABS, Take 2 tablets by mouth daily   levothyroxine (SYNTHROID/LEVOTHROID) 200 MCG tablet, Take 1 tablet (200 mcg) by mouth daily  losartan (COZAAR) 25 MG tablet, Take 1 tablet (25 mg) by mouth daily  tamsulosin (FLOMAX) 0.4 MG capsule, TAKE 2  CAPSULES BY MOUTH EVERY DAY  torsemide (DEMADEX) 20 MG tablet, Take 4 tablets (80 mg) by mouth daily  BETA BLOCKER NOT PRESCRIBED (INTENTIONAL), Beta Blocker not prescribed intentionally due to Evidence of fluid overload or volume depletion and Refusal by patient (Patient not taking: Reported on 5/6/2021)  FLUoxetine (PROZAC) 20 MG capsule, Take 1 capsule (20 mg) by mouth daily (Patient not taking: Reported on 6/20/2021)  metolazone (ZAROXOLYN) 2.5 MG tablet, Take 1 tablet (2.5 mg) by mouth as needed (when instructed by [facility]) (Patient not taking: Reported on 6/20/2021)  nitroGLYcerin (NITROSTAT) 0.4 MG sublingual tablet, For chest pain place 1 tablet under the tongue every 5 minutes for 3 doses. If symptoms persist 5 minutes after 1st dose call 911. (Patient not taking: Reported on 5/6/2021)  spironolactone (ALDACTONE) 25 MG tablet, Take 2 tablets (50 mg) by mouth daily (Patient not taking: Reported on 6/20/2021)    No current facility-administered medications on file prior to visit.       Social History     Tobacco Use     Smoking status: Never Smoker     Smokeless tobacco: Never Used   Substance Use Topics     Alcohol use: Not Currently     Alcohol/week: 0.0 standard drinks     Comment: rarely       Family History   Problem Relation Age of Onset     Cancer Father         Lung     Diabetes Mother      Cancer Daughter         leukemia         ROS:   CONSTITUTIONAL:NEGATIVE for fever, chills,    INTEGUMENTARY/SKIN: NEGATIVE for worrisome rashes,   or lesions  EYES: NEGATIVE for vision changes or irritation  RESP:NEGATIVE for significant cough or SOB  GI: NEGATIVE for nausea, abdominal pain , or change in bowel habits    OBJECTIVE:  /69 (BP Location: Right arm, Patient Position: Sitting, Cuff Size: Adult Large)   Pulse 72   Temp 98.5  F (36.9  C) (Temporal)   Resp 17   SpO2 94%    EXAM:  The right TM is not visualized secondary to cerumen     The right auditory canal is normal and without drainage,  edema or erythema  The left TM is not visualized secondary to cerumen and drainage  The left auditory canal is erythematous, swollen and tender  Oropharynx exam is normal: no lesions, erythema, adenopathy or exudate.  GENERAL: no acute distress  EYES: EOMI,  PERRL, conjunctiva clear  NECK: supple, non-tender to palpation, no adenopathy noted  RESP: lungs clear to auscultation - no rales, rhonchi or wheezes  CV: regular rates and rhythm, normal S1 S2, no murmur noted  SKIN: no suspicious lesions or rashes

## 2021-06-24 ENCOUNTER — OFFICE VISIT (OUTPATIENT)
Dept: FAMILY MEDICINE | Facility: CLINIC | Age: 71
End: 2021-06-24
Payer: MEDICARE

## 2021-06-24 VITALS
SYSTOLIC BLOOD PRESSURE: 112 MMHG | HEART RATE: 76 BPM | WEIGHT: 315 LBS | DIASTOLIC BLOOD PRESSURE: 60 MMHG | OXYGEN SATURATION: 93 % | BODY MASS INDEX: 49.43 KG/M2 | TEMPERATURE: 99 F

## 2021-06-24 DIAGNOSIS — I25.119 CORONARY ARTERY DISEASE INVOLVING NATIVE CORONARY ARTERY OF NATIVE HEART WITH ANGINA PECTORIS (H): ICD-10-CM

## 2021-06-24 DIAGNOSIS — H61.22 IMPACTED CERUMEN OF LEFT EAR: Primary | ICD-10-CM

## 2021-06-24 DIAGNOSIS — H61.23 BILATERAL IMPACTED CERUMEN: ICD-10-CM

## 2021-06-24 DIAGNOSIS — I50.42 CHRONIC COMBINED SYSTOLIC AND DIASTOLIC CONGESTIVE HEART FAILURE (H): ICD-10-CM

## 2021-06-24 DIAGNOSIS — M10.9 ACUTE GOUTY ARTHRITIS: ICD-10-CM

## 2021-06-24 DIAGNOSIS — H61.21 IMPACTED CERUMEN OF RIGHT EAR: ICD-10-CM

## 2021-06-24 LAB
ALBUMIN SERPL-MCNC: 3.3 G/DL (ref 3.4–5)
ALP SERPL-CCNC: 68 U/L (ref 40–150)
ALT SERPL W P-5'-P-CCNC: 29 U/L (ref 0–70)
ANION GAP SERPL CALCULATED.3IONS-SCNC: 1 MMOL/L (ref 3–14)
AST SERPL W P-5'-P-CCNC: 21 U/L (ref 0–45)
BILIRUB SERPL-MCNC: 0.3 MG/DL (ref 0.2–1.3)
BUN SERPL-MCNC: 14 MG/DL (ref 7–30)
CALCIUM SERPL-MCNC: 9.7 MG/DL (ref 8.5–10.1)
CHLORIDE SERPL-SCNC: 108 MMOL/L (ref 94–109)
CO2 SERPL-SCNC: 31 MMOL/L (ref 20–32)
CREAT SERPL-MCNC: 0.93 MG/DL (ref 0.66–1.25)
GFR SERPL CREATININE-BSD FRML MDRD: 82 ML/MIN/{1.73_M2}
GLUCOSE SERPL-MCNC: 110 MG/DL (ref 70–99)
POTASSIUM SERPL-SCNC: 4 MMOL/L (ref 3.4–5.3)
PROT SERPL-MCNC: 8.2 G/DL (ref 6.8–8.8)
SODIUM SERPL-SCNC: 140 MMOL/L (ref 133–144)
URATE SERPL-MCNC: 7.7 MG/DL (ref 3.5–7.2)

## 2021-06-24 PROCEDURE — 80053 COMPREHEN METABOLIC PANEL: CPT | Performed by: PHYSICIAN ASSISTANT

## 2021-06-24 PROCEDURE — 99213 OFFICE O/P EST LOW 20 MIN: CPT | Mod: 25 | Performed by: PHYSICIAN ASSISTANT

## 2021-06-24 PROCEDURE — 84550 ASSAY OF BLOOD/URIC ACID: CPT | Performed by: PHYSICIAN ASSISTANT

## 2021-06-24 PROCEDURE — 69209 REMOVE IMPACTED EAR WAX UNI: CPT | Performed by: PHYSICIAN ASSISTANT

## 2021-06-24 PROCEDURE — 36415 COLL VENOUS BLD VENIPUNCTURE: CPT | Performed by: PHYSICIAN ASSISTANT

## 2021-06-24 RX ORDER — ALLOPURINOL 300 MG/1
300 TABLET ORAL DAILY
Qty: 90 TABLET | Refills: 1 | Status: SHIPPED | OUTPATIENT
Start: 2021-06-24 | End: 2021-07-01

## 2021-06-24 RX ORDER — SPIRONOLACTONE 25 MG/1
50 TABLET ORAL DAILY
Qty: 180 TABLET | Refills: 1 | Status: SHIPPED | OUTPATIENT
Start: 2021-06-24 | End: 2021-12-21

## 2021-06-24 RX ORDER — ATORVASTATIN CALCIUM 20 MG/1
20 TABLET, FILM COATED ORAL DAILY
Qty: 90 TABLET | Refills: 3 | Status: SHIPPED | OUTPATIENT
Start: 2021-06-24 | End: 2022-09-26

## 2021-06-24 RX ORDER — LOSARTAN POTASSIUM 25 MG/1
25 TABLET ORAL DAILY
Qty: 90 TABLET | Refills: 3 | Status: SHIPPED | OUTPATIENT
Start: 2021-06-24 | End: 2022-09-26

## 2021-06-24 ASSESSMENT — PATIENT HEALTH QUESTIONNAIRE - PHQ9
SUM OF ALL RESPONSES TO PHQ QUESTIONS 1-9: 10
10. IF YOU CHECKED OFF ANY PROBLEMS, HOW DIFFICULT HAVE THESE PROBLEMS MADE IT FOR YOU TO DO YOUR WORK, TAKE CARE OF THINGS AT HOME, OR GET ALONG WITH OTHER PEOPLE: NOT DIFFICULT AT ALL
SUM OF ALL RESPONSES TO PHQ QUESTIONS 1-9: 10

## 2021-06-24 ASSESSMENT — ANXIETY QUESTIONNAIRES
3. WORRYING TOO MUCH ABOUT DIFFERENT THINGS: NOT AT ALL
GAD7 TOTAL SCORE: 2
1. FEELING NERVOUS, ANXIOUS, OR ON EDGE: SEVERAL DAYS
4. TROUBLE RELAXING: NOT AT ALL
6. BECOMING EASILY ANNOYED OR IRRITABLE: SEVERAL DAYS
7. FEELING AFRAID AS IF SOMETHING AWFUL MIGHT HAPPEN: NOT AT ALL
2. NOT BEING ABLE TO STOP OR CONTROL WORRYING: NOT AT ALL
GAD7 TOTAL SCORE: 2
7. FEELING AFRAID AS IF SOMETHING AWFUL MIGHT HAPPEN: NOT AT ALL
5. BEING SO RESTLESS THAT IT IS HARD TO SIT STILL: NOT AT ALL
GAD7 TOTAL SCORE: 2

## 2021-06-24 NOTE — PROGRESS NOTES
Assessment & Plan   Problem List Items Addressed This Visit        Nervous and Auditory    Coronary artery disease, noted to have a small segment infarct with teo-infarct ischemia    Relevant Medications    atorvastatin (LIPITOR) 20 MG tablet    Other Relevant Orders    Comprehensive metabolic panel (BMP + Alb, Alk Phos, ALT, AST, Total. Bili, TP) (Completed)       Circulatory    Chronic combined systolic and diastolic congestive heart failure (H)    Relevant Medications    losartan (COZAAR) 25 MG tablet    spironolactone (ALDACTONE) 25 MG tablet    Other Relevant Orders    Comprehensive metabolic panel (BMP + Alb, Alk Phos, ALT, AST, Total. Bili, TP) (Completed)      Other Visit Diagnoses     Impacted cerumen of left ear    -  Primary    Relevant Orders    KY REMOVAL IMPACTED CERUMEN IRRIGATION/LVG UNILAT (Completed)    Impacted cerumen of right ear        Relevant Orders    KY REMOVAL IMPACTED CERUMEN IRRIGATION/LVG UNILAT (Completed)    Bilateral impacted cerumen        Acute gouty arthritis        Relevant Medications    allopurinol (ZYLOPRIM) 300 MG tablet    Other Relevant Orders    Uric acid (Completed)    Comprehensive metabolic panel (BMP + Alb, Alk Phos, ALT, AST, Total. Bili, TP) (Completed)           Patient has not been requesting medication refills, so he has stopped several daily medications in the past month when he ran out.  Today these were discussed and filled.  Next time he should ask the pharmacy to request refills.   Labs updated as above today.     Bilateral cerumen impaction today - no obvious otitis externa.  Irrigation completed.     PROCEDURE NOTE:  bilateral  ear(s) were irrigated with water.  Cerumen successfully removed.  Procedure was performed by the [facility].          Depression Screening Follow Up    PHQ 6/24/2021   PHQ-9 Total Score 10   Q9: Thoughts of better off dead/self-harm past 2 weeks More than half the days   F/U: Thoughts of suicide or self-harm No   F/U: Safety concerns  No                 Follow Up    Follow Up Actions Taken  Crisis resource information provided in the After Visit Summary    Discussed the following ways the patient can remain in a safe environment:  be around others      Return in about 4 days (around 6/28/2021) for repeat ear wash PRN.    TYLER Pastrana [facility] [place] Cooper is a 71 year old who presents for the following health issues     HPI     ED/UC Followup:    Facility:  [facility]  Date of visit: 6/20/21  Reason for visit: left ear pain  Current Status: still c/o left ear fullness/congestion              Review of Systems         Objective    /60   Pulse 76   Temp 99  F (37.2  C) (Tympanic)   Wt 147.4 kg (325 lb)   SpO2 93%   BMI 49.43 kg/m    Body mass index is 49.43 kg/m .  Physical Exam  Constitutional:       General: He is not in acute distress.     Appearance: He is well-developed. He is not diaphoretic.   HENT:      Head: Normocephalic.      Right Ear: External ear normal. There is impacted cerumen.      Left Ear: External ear normal. There is impacted cerumen.      Nose: Nose normal.   Eyes:      Conjunctiva/sclera: Conjunctivae normal.   Neck:      Musculoskeletal: Normal range of motion.   Pulmonary:      Effort: Pulmonary effort is normal.   Musculoskeletal:      Comments: Bilateral lower leg lymphedema wraps.    Neurological:      Mental Status: He is alert and oriented to person, place, and time.   Psychiatric:         Judgment: Judgment normal.

## 2021-06-24 NOTE — PATIENT INSTRUCTIONS
MENTAL HEALTH CRISIS NUMBERS:   For a medical emergency please call  911 or go to the nearest ER.     [facility]:   [facility] -[phone]   Crisis Residence [facility] -[phone]   Walk-In Counseling Center [facility] -[phone]   COPE 24/7 [place] Mobile Team -[phone] (adults)/[phone] (child)   CHILD: [facility] needs assessment team - [phone]       [facility]:   [facility] - [phone]   Walk-in counseling [facility] - [phone]   Walk-in counseling [facility] - [phone]   Crisis Residence [facility] - [phone]   Urgent Care Adult Mental Nfimyv-[phone] mobile unit/ 24/7 crisis line     National Crisis Numbers:   National Suicide Prevention Lifeline: 3-815-044-TALK (856-123-0983)   Poison Control Center - 9-591-333-1761   Sellaround/resources for a list of additional resources (SOS)   Trans Lifeline a hotline for transgender people 1-344.995.2076   The [employer] Project a hotline for LGBT youth 1-506.174.9149   Crisis Text Line: For any crisis 24/7   To: 675131  see www.crisistextline.org   - IF MAKING A CALL FEELS TOO HARD, send a text!

## 2021-06-25 ENCOUNTER — CARE COORDINATION (OUTPATIENT)
Dept: CARDIOLOGY | Facility: CLINIC | Age: 71
End: 2021-06-25

## 2021-06-25 ASSESSMENT — ANXIETY QUESTIONNAIRES: GAD7 TOTAL SCORE: 2

## 2021-06-25 NOTE — PROGRESS NOTES
[facility]  CRainORAMY    MyHealth Tracker Heart Failure Alert      Daily Weight Goal: 318-326 lbs    Today's Weight: No call in    Symptom Alert: None reported    Current Diuretic & Potassium Plan: Torsemide 80 mg daily, spironolactone 50 mg daily (increased on 5/11 per MTM.  KCL was stopped 5/11 per MTM     Last Extra Diuretic: Metolazone 2.5 mg with 20 mEq KCl on 3/2/21       Future Appointments   Date Time Provider Department Center   7/12/2021  1:30 PM RU LAB [facility] P [facility]   7/12/2021  2:15 PM Ezequiel Chand MD [facility] PSA CLIN       Notes: Left message for call back. Reviewed Epic notes:  Kenneth had OV with his PCC yesterday, 6/24. Wt was 325 lbs.   He had stopped his meds as e ran it. She refilled atorvastatin, losartan and spironolactone. CMP, FLP/ALT& CBC were checked  He also has a gout flare up. Lymphedema wraps were on.       losartan (COZAAR) 25 MG tablet      spironolactone (ALDACTONE) 25 MG tablet             MyHealth Tracker Weight Trends:               MyHealth Tracker Weight Graph:         Haily Graves RN 10:43 AM 06/25/21

## 2021-06-28 ENCOUNTER — OFFICE VISIT (OUTPATIENT)
Dept: FAMILY MEDICINE | Facility: CLINIC | Age: 71
End: 2021-06-28
Payer: MEDICARE

## 2021-06-28 VITALS
WEIGHT: 315 LBS | HEART RATE: 76 BPM | BODY MASS INDEX: 47.74 KG/M2 | TEMPERATURE: 98.3 F | OXYGEN SATURATION: 95 % | DIASTOLIC BLOOD PRESSURE: 70 MMHG | SYSTOLIC BLOOD PRESSURE: 122 MMHG | HEIGHT: 68 IN

## 2021-06-28 DIAGNOSIS — H61.23 BILATERAL IMPACTED CERUMEN: Primary | ICD-10-CM

## 2021-06-28 PROCEDURE — 99213 OFFICE O/P EST LOW 20 MIN: CPT | Performed by: PHYSICIAN ASSISTANT

## 2021-06-28 ASSESSMENT — MIFFLIN-ST. JEOR: SCORE: 2184.95

## 2021-06-28 NOTE — PROGRESS NOTES
"    Assessment & Plan   Problem List Items Addressed This Visit     None      Visit Diagnoses     Bilateral impacted cerumen    -  Primary                        Return in about 3 months (around 9/28/2021) for Medication Recheck.    [facility] is a 71 year old who presents for the following health issues     HPI     Concern - ear problem   Onset: seen 6/24 and back to clean the other side   Description: wax buildup in right ear   Intensity: mild  Progression of Symptoms:  same  Accompanying Signs & Symptoms:   Previous history of similar problem:   Precipitating factors:        Worsened by:   Alleviating factors:        Improved by:   Therapies tried and outcome: None    He has been using the debrox, not sure if it is working        Review of Systems         Objective    /70   Pulse 76   Temp 98.3  F (36.8  C) (Tympanic)   Ht 1.727 m (5' 7.99\")   Wt 146.1 kg (322 lb)   SpO2 95%   BMI 48.97 kg/m    Body mass index is 48.97 kg/m .  Physical Exam  Constitutional:       General: He is not in acute distress.     Appearance: He is well-developed. He is not diaphoretic.   HENT:      Head: Normocephalic.      Right Ear: External ear normal.      Left Ear: External ear normal.      Ears:      Comments: Scant wax remaining in right ear.  Otherwise ear canals are clear - no impaction     Nose: Nose normal.   Eyes:      Conjunctiva/sclera: Conjunctivae normal.   Neck:      Musculoskeletal: Normal range of motion.   Pulmonary:      Effort: Pulmonary effort is normal.   Neurological:      Mental Status: He is alert and oriented to person, place, and time.   Psychiatric:         Judgment: Judgment normal.                        "

## 2021-07-01 ENCOUNTER — CARE COORDINATION (OUTPATIENT)
Dept: CARDIOLOGY | Facility: CLINIC | Age: 71
End: 2021-07-01

## 2021-07-01 DIAGNOSIS — M10.9 ACUTE GOUTY ARTHRITIS: ICD-10-CM

## 2021-07-01 RX ORDER — ALLOPURINOL 300 MG/1
300 TABLET ORAL 2 TIMES DAILY
Qty: 60 TABLET | Refills: 1 | Status: SHIPPED | OUTPATIENT
Start: 2021-07-01 | End: 2021-07-31

## 2021-07-01 NOTE — PROGRESS NOTES
Left voice for Kenneth to please call into [facility] as it has been since 6/17    Haily Graves RN 10:53 AM 07/01/21

## 2021-07-01 NOTE — RESULT ENCOUNTER NOTE
Kenneth    Your lab tests are complete and I have reviewed the results.     - Your metabolic panel shows:  normal electrolytes (sodium, potassium, calcium), kidney function (creatinine and GFR) and non-fasting blood sugar. normal liver function (AST/ALT)  - Your Uric Acid level is normal and - Your Uric Acid level is still high even on your gout lowering medication so we should increase your dosage.  We discussed this over the phone already.     If you have any questions or concerns, please feel free to call or send a [employer] message.    Sincerely,  Calvin Florez PA-C

## 2021-07-03 NOTE — TELEPHONE ENCOUNTER
Yes take extra 40 today and then increase daily dosing on the K to 40 meq. Can recheck as you suggested at next visit.   [Home] : at home, [unfilled] , at the time of the visit. [Medical Office: ([facility])___] : at the medical office located in  [de-identified] : 42 year old female presents for follow up on abn lab results.\par Pt offers no c/o

## 2021-07-05 DIAGNOSIS — F32.1 MODERATE MAJOR DEPRESSION (H): ICD-10-CM

## 2021-07-05 DIAGNOSIS — I50.42 CHRONIC COMBINED SYSTOLIC AND DIASTOLIC CONGESTIVE HEART FAILURE (H): ICD-10-CM

## 2021-07-05 RX ORDER — TORSEMIDE 20 MG/1
80 TABLET ORAL DAILY
Qty: 360 TABLET | Refills: 1 | Status: SHIPPED | OUTPATIENT
Start: 2021-07-05 | End: 2022-12-15

## 2021-07-05 NOTE — TELEPHONE ENCOUNTER
"Failed protocol.  please route to  team if patient needs an appointment     Carisa ARNDTRN BSN  [facility]  [phone]    Requested Prescriptions   Pending Prescriptions Disp Refills     FLUoxetine (PROZAC) 20 MG capsule 90 capsule 1     Sig: Take 1 capsule (20 mg) by mouth daily       SSRIs Protocol Failed - 7/5/2021 10:28 AM        Failed - PHQ-9 score less than 5 in past 6 months     Please review last PHQ-9 score.           Passed - Medication is active on med list        Passed - Patient is age 18 or older        Passed - Recent (6 mo) or future (30 days) visit within the authorizing provider's specialty     Patient had office visit in the last 6 months or has a visit in the next 30 days with authorizing provider or within the authorizing provider's specialty.  See \"Patient Info\" tab in inbasket, or \"Choose Columns\" in Meds & Orders section of the refill encounter.               torsemide (DEMADEX) 20 MG tablet 360 tablet 0     Sig: Take 4 tablets (80 mg) by mouth daily       Diuretics (Including Combos) Protocol Passed - 7/5/2021 10:28 AM        Passed - Blood pressure under 140/90 in past 12 months     BP Readings from Last 3 Encounters:   06/28/21 122/70   06/24/21 112/60   06/20/21 118/69                 Passed - Recent (12 mo) or future (30 days) visit within the authorizing provider's specialty     Patient has had an office visit with the authorizing provider or a provider within the authorizing providers department within the previous 12 mos or has a future within next 30 days. See \"Patient Info\" tab in inbasket, or \"Choose Columns\" in Meds & Orders section of the refill encounter.              Passed - Medication is active on med list        Passed - Patient is age 18 or older        Passed - Normal serum creatinine on file in past 12 months     Recent Labs   Lab Test 06/24/21  1155   CR 0.93              Passed - Normal serum potassium on file in past 12 months     " Recent Labs   Lab Test 06/24/21  1155   POTASSIUM 4.0                    Passed - Normal serum sodium on file in past 12 months     Recent Labs   Lab Test 06/24/21  1155

## 2021-07-06 ENCOUNTER — CARE COORDINATION (OUTPATIENT)
Dept: CARDIOLOGY | Facility: CLINIC | Age: 71
End: 2021-07-06

## 2021-07-06 NOTE — PROGRESS NOTES
Spoke to Melissa. She will call me right back as she had a call from [employer]    Haily Graves RN 10:32 AM 07/06/21

## 2021-07-07 NOTE — PROGRESS NOTES
Spoke to [employer] about re-enrollment. Kenneth is due at end of August. If re-enrollment paper work not filled out, they will lose meals in Sept. Paperwork is due to be mailed to them at end of July. [facility] will fax paper work to [facility] as Melissa is overwhelmed with it.   Reminder sent to board to watch for paper work closer to August.     Left detailed message for Melissa and Kenneth.     Future Appointments   Date Time Provider Department Center   7/12/2021  1:30 PM  LAB [facility]   7/12/2021  2:15 PM Ezequiel Chand MD [facility]         Haily Graves, JOY 11:15 AM 07/07/21

## 2021-07-07 NOTE — PROGRESS NOTES
[facility]  DORA    MyHealth Tracker Heart Failure Alert      Daily Weight Goal: 318-326 lbs    Today's Weight: 297 lbs, 305 lbs 7/6      Symptom Alert: None       Current Diuretic & Potassium Plan: Torsemide 80 mg daily, spironolactone 50 mg daily (increased on 5/11 per MTM.)  KCL was stopped 5/11 per MTM     Last Extra Diuretic: Metolazone 2.5 mg with 20 mEq KCl on 3/2/21         Future Appointments   Date Time Provider Department Center   7/12/2021  1:30 PM RU LAB [facility]   7/12/2021  2:15 PM [facility]       Notes: Left message for Kenneth and Melissa. Kenneth reported he was 305 lbs and now today 297lbs.         MyHealth Tracker Weight Trends:           Wts:   7/6   305 lbs  7/5   319 lbs  7/4   No wt  7/3   320 lbs  7/2  328 lbs  7/1   320 lbs      MyHealth Tracker Weight Graph:           Haily Graves RN 11:30 AM 07/07/21

## 2021-07-10 ENCOUNTER — HEALTH MAINTENANCE LETTER (OUTPATIENT)
Age: 71
End: 2021-07-10

## 2021-07-12 ENCOUNTER — OFFICE VISIT (OUTPATIENT)
Dept: CARDIOLOGY | Facility: CLINIC | Age: 71
End: 2021-07-12
Attending: NURSE PRACTITIONER
Payer: MEDICARE

## 2021-07-12 ENCOUNTER — DOCUMENTATION ONLY (OUTPATIENT)
Dept: CARDIOLOGY | Facility: CLINIC | Age: 71
End: 2021-07-12

## 2021-07-12 ENCOUNTER — LAB (OUTPATIENT)
Dept: LAB | Facility: CLINIC | Age: 71
End: 2021-07-12
Payer: MEDICARE

## 2021-07-12 VITALS
WEIGHT: 315 LBS | SYSTOLIC BLOOD PRESSURE: 122 MMHG | BODY MASS INDEX: 47.74 KG/M2 | HEART RATE: 67 BPM | OXYGEN SATURATION: 95 % | DIASTOLIC BLOOD PRESSURE: 70 MMHG | HEIGHT: 68 IN

## 2021-07-12 DIAGNOSIS — I50.42 CHRONIC COMBINED SYSTOLIC AND DIASTOLIC CONGESTIVE HEART FAILURE (H): ICD-10-CM

## 2021-07-12 DIAGNOSIS — E78.5 HYPERLIPIDEMIA WITH TARGET LDL LESS THAN 100: ICD-10-CM

## 2021-07-12 DIAGNOSIS — E66.01 MORBID OBESITY (H): Primary | ICD-10-CM

## 2021-07-12 DIAGNOSIS — I50.42 CHRONIC COMBINED SYSTOLIC AND DIASTOLIC HEART FAILURE (H): ICD-10-CM

## 2021-07-12 DIAGNOSIS — I25.119 CORONARY ARTERY DISEASE INVOLVING NATIVE CORONARY ARTERY OF NATIVE HEART WITH ANGINA PECTORIS (H): ICD-10-CM

## 2021-07-12 DIAGNOSIS — I10 HYPERTENSION GOAL BP (BLOOD PRESSURE) < 140/90: ICD-10-CM

## 2021-07-12 DIAGNOSIS — I50.42 CHRONIC COMBINED SYSTOLIC AND DIASTOLIC CONGESTIVE HEART FAILURE (H): Primary | ICD-10-CM

## 2021-07-12 LAB
ANION GAP SERPL CALCULATED.3IONS-SCNC: 3 MMOL/L (ref 3–14)
BUN SERPL-MCNC: 19 MG/DL (ref 7–30)
CALCIUM SERPL-MCNC: 9.2 MG/DL (ref 8.5–10.1)
CHLORIDE BLD-SCNC: 103 MMOL/L (ref 94–109)
CO2 SERPL-SCNC: 27 MMOL/L (ref 20–32)
CREAT SERPL-MCNC: 0.99 MG/DL (ref 0.66–1.25)
GFR SERPL CREATININE-BSD FRML MDRD: 76 ML/MIN/1.73M2
GLUCOSE BLD-MCNC: 99 MG/DL (ref 70–99)
POTASSIUM BLD-SCNC: 4.5 MMOL/L (ref 3.4–5.3)
SODIUM SERPL-SCNC: 133 MMOL/L (ref 133–144)

## 2021-07-12 PROCEDURE — 36415 COLL VENOUS BLD VENIPUNCTURE: CPT | Performed by: NURSE PRACTITIONER

## 2021-07-12 PROCEDURE — 99214 OFFICE O/P EST MOD 30 MIN: CPT | Performed by: INTERNAL MEDICINE

## 2021-07-12 PROCEDURE — 80048 BASIC METABOLIC PNL TOTAL CA: CPT | Performed by: NURSE PRACTITIONER

## 2021-07-12 ASSESSMENT — MIFFLIN-ST. JEOR: SCORE: 2192.79

## 2021-07-12 NOTE — PROGRESS NOTES
HISTORY OF PRESENT ILLNESS:  Very sedentary does cross word puzzles and watches TV limited by osteoarthritis. 8 grandchildren. 21 years marriage. Rarely takes metolazone.     Orders this Visit:  No orders of the defined types were placed in this encounter.    No orders of the defined types were placed in this encounter.    There are no discontinued medications.    Encounter Diagnosis   Name Primary?     Chronic combined systolic and diastolic congestive heart failure (H)        CURRENT MEDICATIONS:  Current Outpatient Medications   Medication Sig Dispense Refill     acetaminophen (TYLENOL) 500 MG tablet Take 1,000 mg by mouth every 6 hours as needed (Headache)        allopurinol (ZYLOPRIM) 300 MG tablet Take 1 tablet (300 mg) by mouth 2 times daily 60 tablet 1     aspirin (ASA) 81 MG tablet Take 1 tablet (81 mg) by mouth daily 90 tablet 3     atorvastatin (LIPITOR) 20 MG tablet Take 1 tablet (20 mg) by mouth daily 90 tablet 3     FLUoxetine (PROZAC) 20 MG capsule Take 1 capsule (20 mg) by mouth daily 90 capsule 1     Glucosamine-Chondroitin (OSTEO BI-FLEX REGULAR STRENGTH) 250-200 MG TABS Take 2 tablets by mouth daily        levothyroxine (SYNTHROID/LEVOTHROID) 200 MCG tablet Take 1 tablet (200 mcg) by mouth daily 90 tablet 2     losartan (COZAAR) 25 MG tablet Take 1 tablet (25 mg) by mouth daily 90 tablet 3     metolazone (ZAROXOLYN) 2.5 MG tablet Take 1 tablet (2.5 mg) by mouth as needed (when instructed by [facility] clinic) 10 tablet 0     nitroGLYcerin (NITROSTAT) 0.4 MG sublingual tablet For chest pain place 1 tablet under the tongue every 5 minutes for 3 doses. If symptoms persist 5 minutes after 1st dose call 911. 30 tablet 1     spironolactone (ALDACTONE) 25 MG tablet Take 2 tablets (50 mg) by mouth daily 180 tablet 1     tamsulosin (FLOMAX) 0.4 MG capsule TAKE 2 CAPSULES BY MOUTH EVERY  capsule 0     torsemide (DEMADEX) 20 MG tablet Take 4 tablets (80 mg) by mouth daily 360 tablet 1     BETA BLOCKER NOT  "PRESCRIBED (INTENTIONAL) Beta Blocker not prescribed intentionally due to Evidence of fluid overload or volume depletion and Refusal by patient         ALLERGIES     Allergies   Allergen Reactions     Clopidogrel      Cough/emesis     Penicillins Rash     Simvastatin Diarrhea       PAST MEDICAL, SURGICAL, FAMILY, SOCIAL HISTORY:  History was reviewed and updated as needed, see medical record.    Review of Systems:  A 12-point review of systems was completed, see medical record for detailed review of systems information.    Physical Exam:  Vitals: /70 (BP Location: Right arm, Patient Position: Sitting, Cuff Size: Adult Large)   Pulse 67   Ht 1.727 m (5' 8\")   Wt 146.8 kg (323 lb 11.2 oz)   SpO2 95%   BMI 49.22 kg/m      Constitutional:           Skin:           Head:           Eyes:           ENT:           Neck:           Chest:           Cardiac:                    Abdomen:           Vascular:                                        Extremities and Back:           Neurological:           ASSESSMENT: stable diastolic heart failure     RECOMMENDATIONS:   Continue present meds.  FLORA in 6 months with BMP      Recent Lab Results:  LIPID RESULTS:  Lab Results   Component Value Date    CHOL 183 01/04/2021    HDL 38 (L) 01/04/2021     (H) 01/04/2021    TRIG 138 01/04/2021    CHOLHDLRATIO 3.9 04/27/2015       LIVER ENZYME RESULTS:  Lab Results   Component Value Date    AST 21 06/24/2021    ALT 29 06/24/2021       CBC RESULTS:  Lab Results   Component Value Date    WBC 6.7 03/29/2021    RBC 4.48 03/29/2021    HGB 13.1 (L) 03/29/2021    HCT 38.9 (L) 03/29/2021    MCV 87 03/29/2021    MCH 29.2 03/29/2021    MCHC 33.7 03/29/2021    RDW 14.9 03/29/2021     03/29/2021       BMP RESULTS:  Lab Results   Component Value Date     07/12/2021     06/24/2021    POTASSIUM 4.5 07/12/2021    POTASSIUM 4.0 06/24/2021    CHLORIDE 103 07/12/2021    CHLORIDE 108 06/24/2021    CO2 27 07/12/2021    CO2 31 " 06/24/2021    ANIONGAP 3 07/12/2021    ANIONGAP 1 (L) 06/24/2021    GLC 99 07/12/2021     (H) 06/24/2021    BUN 19 07/12/2021    BUN 14 06/24/2021    CR 0.99 07/12/2021    CR 0.93 06/24/2021    GFRESTIMATED 76 07/12/2021    GFRESTIMATED 82 06/24/2021    GFRESTBLACK >90 06/24/2021    ANGEL 9.2 07/12/2021    ANGEL 9.7 06/24/2021        A1C RESULTS:  Lab Results   Component Value Date    A1C 5.7 (H) 01/04/2021       INR RESULTS:  Lab Results   Component Value Date    INR 1.06 04/11/2018    INR 1.02 08/23/2013       We greatly appreciate the opportunity to be involved in the care of your patient, Jamar Ivory.    Sincerely,  Ezequiel Chand MD      CC  Rozina Gallegos, APRN CNP  [address]  [place] [id number]

## 2021-07-12 NOTE — LETTER
7/12/2021    Myah Florez PA-C  [address]  [place] [address]    RE: Jamar Ivory       Dear Colleague,    I had the pleasure of seeing Jamar Ivory in the [facility].    HISTORY OF PRESENT ILLNESS:  Very sedentary does cross word puzzles and watches TV limited by osteoarthritis. 8 grandchildren. 21 years marriage. Rarely takes metolazone.     Orders this Visit:  No orders of the defined types were placed in this encounter.    No orders of the defined types were placed in this encounter.    There are no discontinued medications.    Encounter Diagnosis   Name Primary?     Chronic combined systolic and diastolic congestive heart failure (H)        CURRENT MEDICATIONS:  Current Outpatient Medications   Medication Sig Dispense Refill     acetaminophen (TYLENOL) 500 MG tablet Take 1,000 mg by mouth every 6 hours as needed (Headache)        allopurinol (ZYLOPRIM) 300 MG tablet Take 1 tablet (300 mg) by mouth 2 times daily 60 tablet 1     aspirin (ASA) 81 MG tablet Take 1 tablet (81 mg) by mouth daily 90 tablet 3     atorvastatin (LIPITOR) 20 MG tablet Take 1 tablet (20 mg) by mouth daily 90 tablet 3     FLUoxetine (PROZAC) 20 MG capsule Take 1 capsule (20 mg) by mouth daily 90 capsule 1     Glucosamine-Chondroitin (OSTEO BI-FLEX REGULAR STRENGTH) 250-200 MG TABS Take 2 tablets by mouth daily        levothyroxine (SYNTHROID/LEVOTHROID) 200 MCG tablet Take 1 tablet (200 mcg) by mouth daily 90 tablet 2     losartan (COZAAR) 25 MG tablet Take 1 tablet (25 mg) by mouth daily 90 tablet 3     metolazone (ZAROXOLYN) 2.5 MG tablet Take 1 tablet (2.5 mg) by mouth as needed (when instructed by [facility] clinic) 10 tablet 0     nitroGLYcerin (NITROSTAT) 0.4 MG sublingual tablet For chest pain place 1 tablet under the tongue every 5 minutes for 3 doses. If symptoms persist 5 minutes after 1st dose call 911. 30 tablet 1     spironolactone  "(ALDACTONE) 25 MG tablet Take 2 tablets (50 mg) by mouth daily 180 tablet 1     tamsulosin (FLOMAX) 0.4 MG capsule TAKE 2 CAPSULES BY MOUTH EVERY  capsule 0     torsemide (DEMADEX) 20 MG tablet Take 4 tablets (80 mg) by mouth daily 360 tablet 1     BETA BLOCKER NOT PRESCRIBED (INTENTIONAL) Beta Blocker not prescribed intentionally due to Evidence of fluid overload or volume depletion and Refusal by patient         ALLERGIES     Allergies   Allergen Reactions     Clopidogrel      Cough/emesis     Penicillins Rash     Simvastatin Diarrhea       PAST MEDICAL, SURGICAL, FAMILY, SOCIAL HISTORY:  History was reviewed and updated as needed, see medical record.    Review of Systems:  A 12-point review of systems was completed, see medical record for detailed review of systems information.    Physical Exam:  Vitals: /70 (BP Location: Right arm, Patient Position: Sitting, Cuff Size: Adult Large)   Pulse 67   Ht 1.727 m (5' 8\")   Wt 146.8 kg (323 lb 11.2 oz)   SpO2 95%   BMI 49.22 kg/m      Constitutional:           Skin:           Head:           Eyes:           ENT:           Neck:           Chest:           Cardiac:                    Abdomen:           Vascular:                                        Extremities and Back:           Neurological:           ASSESSMENT: stable diastolic heart failure     RECOMMENDATIONS:   Continue present meds.  FLORA in 6 months with BMP      Recent Lab Results:  LIPID RESULTS:  Lab Results   Component Value Date    CHOL 183 01/04/2021    HDL 38 (L) 01/04/2021     (H) 01/04/2021    TRIG 138 01/04/2021    CHOLHDLRATIO 3.9 04/27/2015       LIVER ENZYME RESULTS:  Lab Results   Component Value Date    AST 21 06/24/2021    ALT 29 06/24/2021       CBC RESULTS:  Lab Results   Component Value Date    WBC 6.7 03/29/2021    RBC 4.48 03/29/2021    HGB 13.1 (L) 03/29/2021    HCT 38.9 (L) 03/29/2021    MCV 87 03/29/2021    MCH 29.2 03/29/2021    MCHC 33.7 03/29/2021    RDW 14.9 " 03/29/2021     03/29/2021       BMP RESULTS:  Lab Results   Component Value Date     07/12/2021     06/24/2021    POTASSIUM 4.5 07/12/2021    POTASSIUM 4.0 06/24/2021    CHLORIDE 103 07/12/2021    CHLORIDE 108 06/24/2021    CO2 27 07/12/2021    CO2 31 06/24/2021    ANIONGAP 3 07/12/2021    ANIONGAP 1 (L) 06/24/2021    GLC 99 07/12/2021     (H) 06/24/2021    BUN 19 07/12/2021    BUN 14 06/24/2021    CR 0.99 07/12/2021    CR 0.93 06/24/2021    GFRESTIMATED 76 07/12/2021    GFRESTIMATED 82 06/24/2021    GFRESTBLACK >90 06/24/2021    ANGEL 9.2 07/12/2021    ANGEL 9.7 06/24/2021        A1C RESULTS:  Lab Results   Component Value Date    A1C 5.7 (H) 01/04/2021       INR RESULTS:  Lab Results   Component Value Date    INR 1.06 04/11/2018    INR 1.02 08/23/2013       We greatly appreciate the opportunity to be involved in the care of your patient, Jamar Ivory.    Sincerely,  Ezequiel Chand MD      CC  YENI Dee CNP  [address]  [place] [address]                                                                         Thank you for allowing me to participate in the care of your patient.      Sincerely,     Ezequiel Chand MD     [facility]  cc:   YENI Dee CNP  [address]  [place] [id number]

## 2021-07-12 NOTE — PROGRESS NOTES
[facility]  DORA    [facility] Tracker Heart Failure   For office visit review      Daily Weight Goal: 305-318 lbs    [facility] Enrollment date: 6/7/2019    Current Diuretic: Torsemide 80 mg and spirionlacotne 50 mg daily    Potassium Plan: Stopped 5/11 by MTM receommendations    Diuretic Plan: Metolazone 2.5 mg when directed by [facility]    Last Extra Diuretic: Metolazone 2.5 mg with 20 mEq KCl on 3/2/21 - last directed by [facility]    **WTS HAVE BEEN 299-326 lbs - unsure if accurate. Wife and Kenneth bot say he has hard time reading scale and then he can    MyHealth Tracker Weight Trends:           MyHealth Tracker Weight Graph:             Haily Graves RN 12:08 PM 07/12/21

## 2021-07-12 NOTE — PROGRESS NOTES
Service Date: 07/12/2021    PRIMARY CARE PROVIDER:  Calvin Florez PA-C    HISTORY OF PRESENT ILLNESS:  Jamar Ivory, a 71-year-old man with chronic combined systolic/diastolic heart failure, hypertension, obesity, hypothyroidism, old history of stroke, dyslipidemia and diabetes mellitus was seen today at your request for followup.    Since last seen in 04/2021, Mr. Ivory remains stable.  He specifically denies worsening dyspnea, orthopnea, or PND.  His weight remains stable at 322 pounds on the current diuretic regimen.  He has had a long history of intermittent atypical chest discomfort.  Coronary angiography in 2011 was normal.  A recent nuclear stress test showed a small area of transmural infarction in the inferoapical segments with very minimal teo-infarct ischemia and his chest pain has been managed medically.  He takes nitroglycerin on an occasional  basis for chest tightness. He has been followed in the [facility] clinic, but because he has been so stable has asked to followed in the [facility] clinic long term with me.    The patient's blood pressure remains well-controlled.  He has disabling osteoarthritis which limits  physical activity.  He has been offered knee surgery if he is willing to lose about 100 pounds, but the patient told me he is unwilling to consider any weight loss program and is satisfied with his current level of activity.   He enjoys playing crossword puzzles and watching TV.      Our charts indicate that there have been occasional   times when the patient has been less compliant with medical therapy, but he is currently taking all of his pills. He refuses to take metoprolol xl or carvedilol.     PAST MEDICAL HISTORY:  1.  Combined systolic/diastolic heart failure - ejection fractions in the 45%-50% range.  Previous normal coronary angiogram.  Recent nuclear stress test showing only minimal teo-infarct apical ischemia with normal LVEF.  On combined losartan, spironolactone, torsemide, and  p.r.n. metolazone.  2.  Dyslipidemia - on atorvastatin.  3.  Morbid obesity.  4.  Bilateral knee osteoarthritis - managed medically at this time.  5.  History of lymphedema     MEDICATIONS:  1.  Allopurinol 300 mg twice a day.  2.  Aspirin 81 daily.  3.  Atorvastatin 20 mg daily.  4.  Fluoxetine 20 mg daily.  5.  Levothyroxine 200 mcg daily.  6.  Losartan 25 mg daily.  7.  Metolazone 2.5 as needed.  8.  Spironolactone 25 mg daily,  9.  Torsemide 80 mg daily.  10.  Tamsulosin 0.4 mg daily.    PHYSICAL EXAMINATION:    GENERAL:  Exam today demonstrates a cooperative and pleasant 71-year-old man who is overweight.  VITAL SIGNS:  His blood pressure is 122/70, his heart rate is 67.  His height is 1.3 meters, his weight is 146.8 kg, his BMI is 49.22.  RESPIRATORY:  His lungs are clear to percussion and auscultation.  CARDIOVASCULAR:  Exam shows a normal S1 with a normal S2.  There is no S3.  There is no murmur, rub or click.    ASSESSMENT:  Mr. Marroquin remains stable on current medical therapy.  Our primary challenge will be to avoid congestion and he  Is well compensated on the current regimen. .  The patient would benefit from regular exercise, but is unwilling to consider dietary changes and /or a more active lifestyle    RECOMMENDATIONS:  1.  Mediterranean-style weight loss diet if patient will agree.  2.  Continue present medical therapy.  3.  Followup visit with FLORA in about 6 months with a BMP at that time.  4.  Followup visit with me in about 1 year.    We greatly appreciate the opportunity to care for your patient, Mr. Jamar Marroquin.  Ezequiel Chand MD    cc:  Calvin Florez PA-C  [address] [address]    Ezequiel Chand MD        D: 2021   T: 2021   MT: al    Name:     JAMAR MARROQUIN  MRN:      [id number]        Account:      [id number]   :      1950           Service Date: 2021       Document: [id number]

## 2021-07-21 ENCOUNTER — CARE COORDINATION (OUTPATIENT)
Dept: CARDIOLOGY | Facility: CLINIC | Age: 71
End: 2021-07-21

## 2021-07-21 NOTE — PROGRESS NOTES
Left voice for Melissa and Kenneth to call into [facility] and if they received re-enrollment papers from [facility]. Left phone number for [facility] in case he lost it.     Haily Graves RN 10:35 AM 07/21/21

## 2021-07-22 ENCOUNTER — TELEPHONE (OUTPATIENT)
Dept: FAMILY MEDICINE | Facility: CLINIC | Age: 71
End: 2021-07-22

## 2021-07-22 ENCOUNTER — ANCILLARY PROCEDURE (OUTPATIENT)
Dept: GENERAL RADIOLOGY | Facility: CLINIC | Age: 71
End: 2021-07-22
Attending: PHYSICIAN ASSISTANT
Payer: MEDICARE

## 2021-07-22 ENCOUNTER — OFFICE VISIT (OUTPATIENT)
Dept: FAMILY MEDICINE | Facility: CLINIC | Age: 71
End: 2021-07-22
Payer: MEDICARE

## 2021-07-22 VITALS
OXYGEN SATURATION: 95 % | BODY MASS INDEX: 47.74 KG/M2 | SYSTOLIC BLOOD PRESSURE: 128 MMHG | HEIGHT: 68 IN | WEIGHT: 315 LBS | HEART RATE: 85 BPM | RESPIRATION RATE: 14 BRPM | DIASTOLIC BLOOD PRESSURE: 74 MMHG | TEMPERATURE: 98.4 F

## 2021-07-22 DIAGNOSIS — M17.12 PRIMARY OSTEOARTHRITIS OF LEFT KNEE: ICD-10-CM

## 2021-07-22 DIAGNOSIS — S89.92XA KNEE INJURY, LEFT, INITIAL ENCOUNTER: Primary | ICD-10-CM

## 2021-07-22 PROCEDURE — 99213 OFFICE O/P EST LOW 20 MIN: CPT | Performed by: PHYSICIAN ASSISTANT

## 2021-07-22 PROCEDURE — 73562 X-RAY EXAM OF KNEE 3: CPT | Mod: LT | Performed by: RADIOLOGY

## 2021-07-22 ASSESSMENT — ANXIETY QUESTIONNAIRES
3. WORRYING TOO MUCH ABOUT DIFFERENT THINGS: NOT AT ALL
5. BEING SO RESTLESS THAT IT IS HARD TO SIT STILL: NOT AT ALL
GAD7 TOTAL SCORE: 0
1. FEELING NERVOUS, ANXIOUS, OR ON EDGE: NOT AT ALL
7. FEELING AFRAID AS IF SOMETHING AWFUL MIGHT HAPPEN: NOT AT ALL
6. BECOMING EASILY ANNOYED OR IRRITABLE: NOT AT ALL
2. NOT BEING ABLE TO STOP OR CONTROL WORRYING: NOT AT ALL

## 2021-07-22 ASSESSMENT — MIFFLIN-ST. JEOR: SCORE: 2157.87

## 2021-07-22 ASSESSMENT — PAIN SCALES - GENERAL: PAINLEVEL: SEVERE PAIN (7)

## 2021-07-22 ASSESSMENT — PATIENT HEALTH QUESTIONNAIRE - PHQ9
5. POOR APPETITE OR OVEREATING: NOT AT ALL
SUM OF ALL RESPONSES TO PHQ QUESTIONS 1-9: 3

## 2021-07-22 NOTE — PROGRESS NOTES
"    Assessment & Plan   Problem List Items Addressed This Visit     None      Visit Diagnoses     Knee injury, left, initial encounter    -  Primary    Relevant Orders    REVIEW OF HEALTH MAINTENANCE PROTOCOL ORDERS (Completed)    XR Knee Left 3 Views (Completed)         Underlying arthritis  No evidence of fracture - diagnosis  knee contusion with acute OA flare left knee  Discussed supportive cares    Patient Instructions   Ice  Tylenol  Ibuprofen as needed                     Return in about 2 weeks (around 8/5/2021) for a recheck if you are not improved.    TYLER Pastrana [facility] VANNESSA Cooper is a 71 year old who presents for the following health issues     HPI     Concern - Fall  Onset: Monday Morning  Description: left knee pain  Intensity: moderate  Progression of Symptoms:  same  Accompanying Signs & Symptoms: NA  Previous history of similar problem: No  Precipitating factors:        Worsened by: walking  Alleviating factors:        Improved by: resting  Therapies tried and outcome:  none     No other injuries  Hit head, remembers the entire incident  Headache  No vision changes    Landed on the left knee  Immediate pain  Ongoing - pain with walking, stairs, movement  Now swollen        Review of Systems         Objective    /74   Pulse 85   Temp 98.4  F (36.9  C) (Tympanic)   Resp 14   Ht 1.727 m (5' 8\")   Wt 143.3 kg (316 lb)   SpO2 95%   BMI 48.05 kg/m    Body mass index is 48.05 kg/m .  Physical Exam  Constitutional:       General: He is not in acute distress.     Appearance: He is well-developed. He is not diaphoretic.   HENT:      Head: Normocephalic.      Right Ear: External ear normal.      Left Ear: External ear normal.      Nose: Nose normal.   Eyes:      Conjunctiva/sclera: Conjunctivae normal.   Pulmonary:      Effort: Pulmonary effort is normal.   Musculoskeletal:      Cervical back: Normal range of motion.      Left knee: " Swelling, effusion and crepitus present. No erythema, ecchymosis or lacerations. Tenderness present.   Neurological:      Mental Status: He is alert and oriented to person, place, and time.   Psychiatric:         Judgment: Judgment normal.            Results for orders placed or performed in visit on 07/22/21   XR Knee Left 3 Views     Status: None    Narrative    KNEE LEFT THREE VIEWS  July 22, 2021 1:11 PM     HISTORY: Knee injury, left, initial encounter.    COMPARISON: None.      Impression    IMPRESSION: Moderate medial and moderate to advanced patellofemoral  degenerative changes. No fracture or effusion.    SUN PARR MD         SYSTEM ID:  [id number]

## 2021-07-22 NOTE — TELEPHONE ENCOUNTER
Patient wife returned post visit to ask if patient could take some of patient's wife's Ibuprofen 800 mg but cut it in half. Explained to patient's wife that it is not advisable for patient to take a medication that he is not prescribed. Patient wife continued to ask if it would be okay, informed her that I would inquire to provider and have her give patient's wife a call.      Advised that she should give us a call to document, patient wife refused and wanted to have MA leave a message to have provider give her a call.    Callback at: [phone]

## 2021-07-23 DIAGNOSIS — E03.9 ACQUIRED HYPOTHYROIDISM: Chronic | ICD-10-CM

## 2021-07-23 RX ORDER — NAPROXEN 500 MG/1
500 TABLET ORAL 2 TIMES DAILY WITH MEALS
Qty: 60 TABLET | Refills: 1 | Status: SHIPPED | OUTPATIENT
Start: 2021-07-23 | End: 2021-12-21

## 2021-07-23 RX ORDER — LEVOTHYROXINE SODIUM 200 UG/1
200 TABLET ORAL DAILY
Qty: 90 TABLET | Refills: 0 | Status: SHIPPED | OUTPATIENT
Start: 2021-07-23 | End: 2022-04-11

## 2021-07-23 ASSESSMENT — ANXIETY QUESTIONNAIRES: GAD7 TOTAL SCORE: 0

## 2021-07-23 NOTE — TELEPHONE ENCOUNTER
Routing refill request to provider for review/approval because:  Labs not current:  T[facility]    Kassi MACKEY RN  EP Triage

## 2021-07-23 NOTE — TELEPHONE ENCOUNTER
"Pts wife salvador answered [facility] on file and says in the past generic ibuprofen did not work. She says he took 3-4 of these at a time last summer and did not help his pain. Is there anything else to advise on?  When he was taking this medication last year it was for arthritis of knees it did not help.\"She states she does not want to waste more money for same issue they have already have spent money on\"  Pt has been icing his knee though as well. If you know of anything else OTC or and Rx totally fine as well. Please advise and have care member call to update.   Okay to leave detailed message.   Rocío Rodriguez       "

## 2021-07-23 NOTE — TELEPHONE ENCOUNTER
I recommend he take 400 - 600 mg of ibuprofen as needed for the knee pain.   I would prefer they  a bottle of generic ibuprofen and use that instead of using his wife's prescription.      Calvin Florez PA-C

## 2021-07-23 NOTE — TELEPHONE ENCOUNTER
Naproxen 500 mg twice a day instead of ibuprofen.   I just sent a prescription to the pharmacy.     Calvin Florez PA-C

## 2021-07-23 NOTE — TELEPHONE ENCOUNTER
Called and updated pt and they are fine with this plan of care.   Rocío Rodriguez        Labs/EKG/Imaging Studies Labs/EKG/Imaging Studies/Medications

## 2021-07-29 ENCOUNTER — OFFICE VISIT (OUTPATIENT)
Dept: FAMILY MEDICINE | Facility: CLINIC | Age: 71
End: 2021-07-29
Payer: MEDICARE

## 2021-07-29 VITALS
OXYGEN SATURATION: 95 % | DIASTOLIC BLOOD PRESSURE: 72 MMHG | HEART RATE: 78 BPM | BODY MASS INDEX: 47.74 KG/M2 | HEIGHT: 68 IN | SYSTOLIC BLOOD PRESSURE: 126 MMHG | WEIGHT: 315 LBS | TEMPERATURE: 97.1 F

## 2021-07-29 DIAGNOSIS — K92.1 HEMATOCHEZIA: Primary | ICD-10-CM

## 2021-07-29 DIAGNOSIS — Z86.0100 HISTORY OF COLONIC POLYPS: ICD-10-CM

## 2021-07-29 PROCEDURE — 99214 OFFICE O/P EST MOD 30 MIN: CPT | Performed by: PHYSICIAN ASSISTANT

## 2021-07-29 ASSESSMENT — MIFFLIN-ST. JEOR: SCORE: 2221.37

## 2021-07-29 NOTE — PROGRESS NOTES
"    Assessment & Plan   Problem List Items Addressed This Visit     None      Visit Diagnoses     Hematochezia    -  Primary    Relevant Orders    Adult Gastro Ref - Procedure Only    CBC with platelets    History of colonic polyps             Follow up ASAP for the colonoscopy - we need to eval the source or the bleeding  CT considered, however patient is not have any abdominal pain.   CBC to r/o anemia                   Return in about 1 week (around 8/5/2021) for Colonoscopy.    [facility] 71 year [facility] who presents for the following health issues     HPI     Blood in stool   Onset/Duration: x couple days   Description:   Angie-anal lump: no  Pain: no  Itching: no  Accompanying Signs & Symptoms:  Blood in stool: YES  Changes in stool pattern: no  History:   Any previous GI studies done:none  Family History of colon cancer: no  Precipitating factors:   None  Alleviating factors:  None  Therapies tried and outcome: none    3 days of bright red blood - saturating the stool  No pain with wiping  No abdominal pain  Previously had polyps with a similar episode  No constipation or straining prior to the BRB          Review of Systems         Objective    /72   Pulse 78   Temp 97.1  F (36.2  C) (Tympanic)   Ht 1.727 m (5' 8\")   Wt 149.7 kg (330 lb)   SpO2 95%   BMI 50.18 kg/m    Body mass index is 50.18 kg/m .  Physical Exam  Constitutional:       General: He is not in acute distress.     Appearance: He is well-developed. He is not diaphoretic.   HENT:      Head: Normocephalic.      Right Ear: External ear normal.      Left Ear: External ear normal.      Nose: Nose normal.   Eyes:      Conjunctiva/sclera: Conjunctivae normal.   Pulmonary:      Effort: Pulmonary effort is normal.   Abdominal:      Palpations: Abdomen is soft.      Tenderness: There is no abdominal tenderness. There is no guarding.   Musculoskeletal:      Cervical " back: Normal range of motion.   Neurological:      Mental Status: He is alert and oriented to person, place, and time.   Psychiatric:         Judgment: Judgment normal.

## 2021-08-02 ENCOUNTER — OFFICE VISIT (OUTPATIENT)
Dept: FAMILY MEDICINE | Facility: CLINIC | Age: 71
End: 2021-08-02
Payer: MEDICARE

## 2021-08-02 VITALS
TEMPERATURE: 98.7 F | HEART RATE: 84 BPM | SYSTOLIC BLOOD PRESSURE: 112 MMHG | OXYGEN SATURATION: 95 % | BODY MASS INDEX: 50.09 KG/M2 | WEIGHT: 315 LBS | DIASTOLIC BLOOD PRESSURE: 68 MMHG

## 2021-08-02 DIAGNOSIS — Z12.5 SCREENING FOR PROSTATE CANCER: ICD-10-CM

## 2021-08-02 DIAGNOSIS — E11.42 DIABETIC POLYNEUROPATHY ASSOCIATED WITH TYPE 2 DIABETES MELLITUS (H): ICD-10-CM

## 2021-08-02 DIAGNOSIS — E03.9 ACQUIRED HYPOTHYROIDISM: ICD-10-CM

## 2021-08-02 DIAGNOSIS — L60.0 INGROWN NAIL OF GREAT TOE OF LEFT FOOT: Primary | ICD-10-CM

## 2021-08-02 DIAGNOSIS — L03.032 PARONYCHIA OF TOE, LEFT: ICD-10-CM

## 2021-08-02 DIAGNOSIS — K92.1 HEMATOCHEZIA: ICD-10-CM

## 2021-08-02 LAB
ERYTHROCYTE [DISTWIDTH] IN BLOOD BY AUTOMATED COUNT: 15 % (ref 10–15)
HBA1C MFR BLD: 5.9 % (ref 0–5.6)
HCT VFR BLD AUTO: 36.7 % (ref 40–53)
HGB BLD-MCNC: 12.1 G/DL (ref 13.3–17.7)
MCH RBC QN AUTO: 29.2 PG (ref 26.5–33)
MCHC RBC AUTO-ENTMCNC: 33 G/DL (ref 31.5–36.5)
MCV RBC AUTO: 88 FL (ref 78–100)
PLATELET # BLD AUTO: 221 10E3/UL (ref 150–450)
RBC # BLD AUTO: 4.15 10E6/UL (ref 4.4–5.9)
WBC # BLD AUTO: 5.8 10E3/UL (ref 4–11)

## 2021-08-02 PROCEDURE — 83036 HEMOGLOBIN GLYCOSYLATED A1C: CPT | Performed by: PHYSICIAN ASSISTANT

## 2021-08-02 PROCEDURE — 85027 COMPLETE CBC AUTOMATED: CPT | Performed by: PHYSICIAN ASSISTANT

## 2021-08-02 PROCEDURE — 84443 ASSAY THYROID STIM HORMONE: CPT | Performed by: PHYSICIAN ASSISTANT

## 2021-08-02 PROCEDURE — 36415 COLL VENOUS BLD VENIPUNCTURE: CPT | Performed by: PHYSICIAN ASSISTANT

## 2021-08-02 PROCEDURE — 99214 OFFICE O/P EST MOD 30 MIN: CPT | Performed by: PHYSICIAN ASSISTANT

## 2021-08-02 RX ORDER — SULFAMETHOXAZOLE/TRIMETHOPRIM 800-160 MG
1 TABLET ORAL 2 TIMES DAILY
Qty: 14 TABLET | Refills: 0 | Status: SHIPPED | OUTPATIENT
Start: 2021-08-02 | End: 2021-08-09

## 2021-08-02 RX ORDER — METRONIDAZOLE 500 MG/1
500 TABLET ORAL 2 TIMES DAILY
Qty: 14 TABLET | Refills: 0 | Status: SHIPPED | OUTPATIENT
Start: 2021-08-02 | End: 2021-08-09

## 2021-08-02 ASSESSMENT — PAIN SCALES - GENERAL: PAINLEVEL: SEVERE PAIN (6)

## 2021-08-02 NOTE — PROGRESS NOTES
Assessment & Plan   Problem List Items Addressed This Visit        Nervous and Auditory    Hx of Diabetic polyneuropathy associated with type 2 diabetes mellitus - now diet controlled (H)    Relevant Orders    HEMOGLOBIN A1C (Completed)       Endocrine    Acquired hypothyroidism (Chronic)    Relevant Orders    TSH WITH FREE T4 REFLEX      Other Visit Diagnoses     Ingrown nail of great toe of left foot    -  Primary    Relevant Medications    sulfamethoxazole-trimethoprim (BACTRIM DS) 800-160 MG tablet    metroNIDAZOLE (FLAGYL) 500 MG tablet    Screening for prostate cancer        Paronychia of toe, left        Relevant Medications    sulfamethoxazole-trimethoprim (BACTRIM DS) 800-160 MG tablet    metroNIDAZOLE (FLAGYL) 500 MG tablet    Hematochezia             - ingrown left great toenail with infection.  Allergy to PCN - antibiotics as above.   - toe is draining today, no need for I&D  - home care discussed and handout given  - updated labs needed as above  - PSA discussed, patient has opted out of continuing prostate cancer screening                 Return in about 1 week (around 8/9/2021) for a recheck if you are not improved.    Myah Florez PA-C  [facility] Cooper is a 71 year old who presents for the following health issues     HPI     Concern - big toe  Onset: 5 days  Description: left big toe sore, ingrown  Intensity: moderate, severe  Progression of Symptoms:  worsening  Accompanying Signs & Symptoms: tender, red, drainage  Previous history of similar problem: none  Precipitating factors:        Worsened by: socks  Alleviating factors:        Improved by: none  Therapies tried and outcome:  epsom salt soak        Review of Systems         Objective    /68 (Cuff Size: Adult Large)   Pulse 84   Temp 98.7  F (37.1  C) (Tympanic)   Wt 149.4 kg (329 lb 6.4 oz)   SpO2 95%   BMI 50.09 kg/m    Body mass index is 50.09 kg/m .  Physical  Exam  Constitutional:       General: He is not in acute distress.     Appearance: He is well-developed. He is not diaphoretic.   HENT:      Head: Normocephalic.      Right Ear: External ear normal.      Left Ear: External ear normal.      Nose: Nose normal.   Eyes:      Conjunctiva/sclera: Conjunctivae normal.   Pulmonary:      Effort: Pulmonary effort is normal.   Musculoskeletal:      Cervical back: Normal range of motion.   Feet:      Comments: Ingrown toenail with infection - left great toe medial nail edge.  Mild erythema, tenderness throughout great toe, clear drainage from nail.    Neurological:      Mental Status: He is alert and oriented to person, place, and time.   Psychiatric:         Judgment: Judgment normal.

## 2021-08-02 NOTE — PATIENT INSTRUCTIONS
Patient Education     Infected Ingrown Toenail (Antibiotics, No Excision)   An ingrown toenail occurs when the nail grows sideways into the skin alongside the nail. This can cause pain. It can also lead to an infection with redness, swelling, and sometimes drainage.   The most common cause of an ingrown toenail is trimming your nails wrong. Most people trim the nails too close to the skin and try to round the nail too tightly around the shape of the toe. When you do this, the nail can grow into the skin of your toe. It's safer to trim the nail ending in a straight line rather than a curve.   Other causes include injury or wearing shoes that are too short or tight. This can cause the same problem that happens when trimming your nails. Your genetics can also make this more likely to happen.   The following are the most common symptoms of an ingrown toenail:     Pain    Redness    Swelling    Drainage  If the infection is mild, you may be able to take care of it at home with the following measures:     Frequent warm water soaks    Keeping it clean    Wearing loose, comfortable shoes or sandals  Another method involves using a small piece of cotton or waxed dental floss to gently lift up the corner of the problem nail. Change the cotton or floss frequently, especially if it gets dirty.   If your infection is mild, and the above methods aren t working, or if the infection gets worse, see your healthcare provider. Signs of worsening infection include:     Swelling    Redness    Pus drainage    Increased pain  In some cases, you may need antibiotics along with warm soaks. If after 2 to 3 days of antibiotics the toenail doesn't get better or gets worse, part of the nail may need to be removed to drain the infection. With treatment, it can take 1 to 2 weeks to clear up completely.   Home care  Wound care  For the next 3 days, soak and clean your toe in warm water a few times a day.    Twice a day for the first 3 days,  clean and soak the toe as follows:  1. Soak your foot in a tub of warm water for 5 minutes. Or, hold your toe under a faucet of warm running water for 5 minute  2. Clean any remaining crust away with soap and water using a cotton swab.  3. Put a small amount of antibiotic ointment on the infected area.    Change the dressing or bandage every time you soak or clean it, or whenever it becomes wet or dirty.    If you were prescribed antibiotics, take them as directed until they are all gone.    Wear comfortable shoes with a lot of toe room, or open-toe sandals, while your toe is healing.  Medicines    You can take over-the-counter medicine for pain, unless you were given a different pain medicine to use. Note: Talk with your provider before using these medicines if you have chronic liver or kidney disease, ever had a stomach ulcer or digestive bleeding, or are taking blood-thinner medicines.    If you were given antibiotics, take them until they are used up or your provider tells you to stop, even if the wound looks better. This makes sure that the infection clears up.  Prevention  To prevent ingrown toenails:    Wear shoes that fit well. Don't wear shoes that pinch the toes together.    When you trim your toenails, don't cut them too short. Cut straight across at the top and don t round the edges.    Don t use a sharp object to clean under your nail since this might cause an infection.    If the toenail starts to grow into the skin again, put a small piece of waxed dental floss or cotton under that side of the nail to help it grow out straight.  Follow-up care  Follow up with your healthcare provider, or as advised. If the antibiotic doesn't work, or if the condition happens again, you may need to have part of the nail removed.   When to seek medical advice  Call your healthcare provider right away if any of the following occur:    Increasing redness, pain, or swelling of the toe    Red streaks in the skin leading  away from the wound    Pus or fluid drainage    Fever of 100.4 F (38 C) or higher, or as directed by your provider  Babita last reviewed this educational content on 8/1/2019 2000-2021 [employer]. All rights reserved. This information is not intended as a substitute for professional medical care. Always follow your healthcare professional's instructions.

## 2021-08-03 ENCOUNTER — CARE COORDINATION (OUTPATIENT)
Dept: CARDIOLOGY | Facility: CLINIC | Age: 71
End: 2021-08-03

## 2021-08-03 LAB — TSH SERPL DL<=0.005 MIU/L-ACNC: 0.56 MU/L (ref 0.4–4)

## 2021-08-03 NOTE — PROGRESS NOTES
Returned Melissa's call from yesterday as she left voice mail with questions regarding [employer] re-enrollment paperwork.     Spoke to Melissa. Asked her to please tell Kenneth to call into [facility]. She will tell him. She has many questions regarding [employer] paperwork. I did let her know [employer] was going to fax me the same paperwork and I have not seen it yet. She is asking for help with it.     Appt made for nurse only visit for 8/12 to review paperwork with them.     Haily Graves RN 12:37 PM 08/03/21

## 2021-08-05 ENCOUNTER — NURSE TRIAGE (OUTPATIENT)
Dept: NURSING | Facility: CLINIC | Age: 71
End: 2021-08-05

## 2021-08-05 NOTE — RESULT ENCOUNTER NOTE
Kenneth    Your lab tests are complete and I have reviewed the results.     - Your lab results look great; everything is normal.    If you have any questions or concerns, please feel free to call or send a [email or web address] message.    Sincerely,  Calvin Florez PA-C

## 2021-08-06 ENCOUNTER — TELEPHONE (OUTPATIENT)
Dept: URGENT CARE | Facility: URGENT CARE | Age: 71
End: 2021-08-06

## 2021-08-06 ENCOUNTER — OFFICE VISIT (OUTPATIENT)
Dept: URGENT CARE | Facility: URGENT CARE | Age: 71
End: 2021-08-06
Payer: MEDICARE

## 2021-08-06 VITALS
BODY MASS INDEX: 50.02 KG/M2 | SYSTOLIC BLOOD PRESSURE: 125 MMHG | TEMPERATURE: 97.9 F | OXYGEN SATURATION: 94 % | RESPIRATION RATE: 15 BRPM | DIASTOLIC BLOOD PRESSURE: 72 MMHG | HEART RATE: 79 BPM | WEIGHT: 315 LBS

## 2021-08-06 DIAGNOSIS — T78.40XA ALLERGIC REACTION, INITIAL ENCOUNTER: Primary | ICD-10-CM

## 2021-08-06 DIAGNOSIS — L60.0 INGROWN TOENAIL: ICD-10-CM

## 2021-08-06 DIAGNOSIS — Z20.822 SUSPECTED COVID-19 VIRUS INFECTION: ICD-10-CM

## 2021-08-06 LAB — SARS-COV-2 RNA RESP QL NAA+PROBE: NEGATIVE

## 2021-08-06 PROCEDURE — U0003 INFECTIOUS AGENT DETECTION BY NUCLEIC ACID (DNA OR RNA); SEVERE ACUTE RESPIRATORY SYNDROME CORONAVIRUS 2 (SARS-COV-2) (CORONAVIRUS DISEASE [COVID-19]), AMPLIFIED PROBE TECHNIQUE, MAKING USE OF HIGH THROUGHPUT TECHNOLOGIES AS DESCRIBED BY CMS-2020-01-R: HCPCS | Performed by: FAMILY MEDICINE

## 2021-08-06 PROCEDURE — U0005 INFEC AGEN DETEC AMPLI PROBE: HCPCS | Performed by: FAMILY MEDICINE

## 2021-08-06 PROCEDURE — 99214 OFFICE O/P EST MOD 30 MIN: CPT | Performed by: FAMILY MEDICINE

## 2021-08-06 RX ORDER — METHYLPREDNISOLONE 4 MG
TABLET, DOSE PACK ORAL
Qty: 21 TABLET | Refills: 0 | Status: SHIPPED | OUTPATIENT
Start: 2021-08-06 | End: 2021-09-16

## 2021-08-06 RX ORDER — CLINDAMYCIN HCL 300 MG
300 CAPSULE ORAL 4 TIMES DAILY
Qty: 28 CAPSULE | Refills: 0 | Status: SHIPPED | OUTPATIENT
Start: 2021-08-06 | End: 2021-08-13

## 2021-08-06 NOTE — PROGRESS NOTES
SUBJECTIVE: Jamar Ivory is a 71 year old male presenting with a chief complaint of allergic reaction from antibiotic prescribed few days ago for ingrown toenail.  Onset of symptoms was day ago.  Course of illness is worsening.    Severity moderate  Current and Associated symptoms: rash itchy  Treatment measures tried include None tried, pt told can't take antihistamines.  Predisposing factors include ingrown toenail with infection.    Past Medical History:   Diagnosis Date     Arthritis      CHF (congestive heart failure) (H) 12/24/2018     CVA (cerebral infarction) 2012     CVD (cardiovascular disease)      Fatty liver      Gout      Hypertension goal BP (blood pressure) < 140/90 1/17/2011     Major depressive disorder, single episode, severe, without mention of psychotic behavior 1977    hospitalized     Obesity, morbid (more than 100 lbs over ideal weight or BMI > 40) (H)      Shortness of breath      Spider veins      TIA (transient ischaemic attack) 2012     Unspecified hypothyroidism      Vitiligo      Allergies   Allergen Reactions     Clopidogrel      Cough/emesis     Penicillins Rash     Sulfa Drugs      Simvastatin Diarrhea     Social History     Tobacco Use     Smoking status: Never Smoker     Smokeless tobacco: Never Used   Substance Use Topics     Alcohol use: Not Currently     Alcohol/week: 0.0 standard drinks     Comment: rarely       ROS:  SKIN: no rash  GI: no vomiting    OBJECTIVE:  /72   Pulse 79   Temp 97.9  F (36.6  C)   Resp 15   Wt 149.2 kg (329 lb)   SpO2 94%   BMI 50.02 kg/m  GENERAL APPEARANCE: healthy, alert and no distress  SKIN: diffuse rash and red toenail ingrown      ICD-10-CM    1. Allergic reaction, initial encounter  T78.40XA methylPREDNISolone (MEDROL DOSEPAK) 4 MG tablet therapy pack   2. Suspected COVID-19 virus infection  Z20.822 Asymptomatic COVID-19 Virus (Coronavirus) by PCR Nasopharyngeal   3. Ingrown toenail  L60.0 clindamycin (CLEOCIN) 300 MG capsule      Orthopedic  Referral     Discontinue sulfa  probiotics  Fluids/Rest, f/u if worse/not any better

## 2021-08-06 NOTE — TELEPHONE ENCOUNTER
"Call received from spouse, Melissa  Consent to Communicate on chart    Jamar started taking 2 antibiotics on Tue, 8/3  Wed, 8/4 - he started having redness under his chin ~2\" x 0.5\",  Red spot on the back of his neck ~1\" and redness to the back of his hands and between some of his fingers.     His hands especially are \"itching like crazy\".  Not helped with hydrocortisone cream    History of CHF, DM    Per protocol, advised to see Physician within 24 hours  Care Advice reviewed   Avoid antihistamines due to Prostate    Scheduling offered. They will go to [facility] tomorrow    COVID 19 Nurse Triage Plan/Patient Instructions    Please be aware that novel coronavirus (COVID-19) may be circulating in the community. If you develop symptoms such as fever, cough, or SOB or if you have concerns about the presence of another infection including coronavirus (COVID-19), please contact your health care provider or visit [email or web address].     Disposition/Instructions    In-Person Visit with provider recommended. Reference Visit Selection Guide.    Thank you for taking steps to prevent the spread of this virus.  o Limit your contact with others.  o Wear a simple mask to cover your cough.  o Wash your hands well and often.    Resources    [facility]: About COVID-19: [email or web address]    CDC: What to Do If You're Sick: www.cdc.gov/coronavirus/2019-ncov/about/steps-when-sick.html    CDC: Ending Home Isolation: www.cdc.gov/coronavirus/2019-ncov/hcp/disposition-in-home-patients.html     CDC: Caring for Someone: www.cdc.gov/coronavirus/2019-ncov/if-you-are-sick/care-for-someone.html     [facility]: Interim Guidance for Hospital Discharge to Home: www.health.[facility].mn.us/diseases/coronavirus/hcp/hospdischarge.pdf    [facility] clinical trials (COVID-19 research studies): clinicalaffairs.[email or web address]/umn-clinical-trials     Below are the COVID-19 hotlines at the [place] Department of Health ([facility]). " Interpreters are available.   o For health questions: Call 650-740-1221 or 1-428.661.8361 (7 a.m. to 7 p.m.)  o For questions about schools and childcare: Call 771-464-4894 or 1-120.269.3423 (7 a.m. to 7 p.m.)     Thelma Wesley RN  [facility] Nurse Advisors      Reason for Disposition    Hives or itching    Additional Information    Negative: [1] Life-threatening reaction (anaphylaxis) in the past to the same drug AND [2] < 2 hours since exposure    Negative: Difficulty breathing or wheezing    Negative: [1] Hoarseness or cough AND [2] started soon after 1st dose of drug    Negative: [1] Swollen tongue AND [2] started soon after 1st dose of drug    Negative: [1] Purple or blood-colored rash (spots or dots) AND [2] fever    Negative: Sounds like a life-threatening emergency to the triager    Negative: Swollen tongue    Negative: [1] Widespread hives AND [2] onset < 2 hours of exposure to 1st dose of drug    Negative: Fever    Negative: Patient sounds very sick or weak to the triager    Negative: [1] Purple or blood-colored rash (spots or dots) AND [2] no fever AND [3] sounds well to triager    Negative: [1] Taking new prescription medication AND [2] rash within 4 hours of 1st dose    Negative: Large or small blisters on skin (i.e., fluid filled bubbles or sacs)    Negative: Bloody crusts on lips or sores in mouth    Negative: Face or lip swelling    Protocols used: RASH - WIDESPREAD ON DRUGS-A-AH

## 2021-08-06 NOTE — TELEPHONE ENCOUNTER
Pt has urgent 3-5 day pod referral- please triage and advise/schedule appropriately.  No openings within 3-5 days     Thank you,     Blanca LUZ [facility]

## 2021-08-07 ENCOUNTER — NURSE TRIAGE (OUTPATIENT)
Dept: NURSING | Facility: CLINIC | Age: 71
End: 2021-08-07

## 2021-08-09 NOTE — TELEPHONE ENCOUNTER
Per chart review, patient has ingrown nail of left great toe with paronychia. He was put on both Bactrim and Flagyl on 8/2/21. He had an allergic reaction of a rash to the antibiotics and was then placed on Clindamycin and a Medrol Dosepak on 8/6/21.     No openings in [place] until 9/2/21. There are openings this week with Dr. Villegas and Alejandro.     Left voicemail on cell asking for a return call regarding an appointment.     ENID Teixeira RN

## 2021-08-10 ENCOUNTER — CARE COORDINATION (OUTPATIENT)
Dept: CARDIOLOGY | Facility: CLINIC | Age: 71
End: 2021-08-10

## 2021-08-10 NOTE — PROGRESS NOTES
[facility] JOVITA.O.RNESSA        Spoke with Melissa and Kenneth was in the background. Reminded them to call in his weights and heart failure signs and symptoms daily. The last call in we have is on 7/23/21. Kenneth stated he has called in almost daily since 7/23/21. Today's weight is 311 lbs. I called it into MyHealth Tracker for Kenneth successfully. Will continue to monitor via MyHealth Tracker.    Weights /No Signs and Symptoms    8/10: 311 lbs  8/9: 314 lbs  8/8: 318 lbs  8/7: No Weight  8/6: 322 lbs  8/5: 319 lbs  8/4: 321 lbs  8/3: 319 lbs  8/2: 324 lbs  8/1: 321 lbs  7/31: No Weight  7/30: 321 lbs  7/29: 319 lbs  7/28: 321 lbs  7/27: 321 lbs  7/26: 319 lbs  7/25: 321 lbs  7/24: 319 lbs    Blanca Bright, RN  Care Coordinator  [facility] C.ORAMY   C.O.RDREW. Nurse Line: [phone]  / Personal Line: [phone]  08/10/21 10:16 AM

## 2021-08-11 NOTE — TELEPHONE ENCOUNTER
Phone call to patient and informed of referral. He and wife were both on speaker phone. Wife states that once the provider looked at patient's toe more closely, they didn't think it was an ingrown nail and was just an infection of the nail. Patient states he is doing much better and does not need an appointment. Provided triage number for future if needed.   They verbalized understanding.     ENID Teixeira RN

## 2021-08-12 ENCOUNTER — ALLIED HEALTH/NURSE VISIT (OUTPATIENT)
Dept: CARDIOLOGY | Facility: CLINIC | Age: 71
End: 2021-08-12
Payer: MEDICARE

## 2021-08-12 ENCOUNTER — TELEPHONE (OUTPATIENT)
Dept: FAMILY MEDICINE | Facility: CLINIC | Age: 71
End: 2021-08-12

## 2021-08-12 DIAGNOSIS — I50.32 CHRONIC DIASTOLIC CONGESTIVE HEART FAILURE (H): Primary | ICD-10-CM

## 2021-08-12 NOTE — TELEPHONE ENCOUNTER
[facility].        Weight and symptoms are being addressed in a separate encounter. He was seen in clinic this morning by a [facility] Nurse. Closing encounter.    Blanca Bright RN  Care Coordinator  [facility].   LETYORainRDREW. Nurse Line: [phone]  / Personal Line: [phone]  08/12/21 2:29 PM

## 2021-08-12 NOTE — PROGRESS NOTES
Kenneth and Melissa came in for assistance to complete re-enrollment paperwork for [employer]. I went through paperwork with Kenneth and Melissa. Kenneth signed and initialed paperwork were indicted. Rozina needs to sign application and then can sent to [employer] ticketbroker. Will get signature tomorrow when Rozina is in clinic.    Haily Graves RN 11:41 AM 08/12/21

## 2021-08-12 NOTE — TELEPHONE ENCOUNTER
Yes I am aware of his weight. Him and his wife were in cardiology clinic today and we did discuss his weight and symptoms.     Thank you for the update.     Haily Graves RN 2:21 PM 08/12/21

## 2021-08-13 ENCOUNTER — DOCUMENTATION ONLY (OUTPATIENT)
Dept: CARDIOLOGY | Facility: CLINIC | Age: 71
End: 2021-08-13

## 2021-08-13 NOTE — PROGRESS NOTES
Kenneth has been having issues calling into [facility]. [facility] clinic can call from clinic and info is received into [facility] but not from Kenneth's phone.   Called IT and ticket placed.   Ticket number: [id number]    Haily Graves RN 11:53 AM 08/13/21

## 2021-08-16 NOTE — PROGRESS NOTES
[employer] paperwork signed by Rozina on 8/13. Faxed completed forms to [employer].     Haily Graves RN 12:25 PM 08/16/21

## 2021-08-23 ENCOUNTER — OFFICE VISIT (OUTPATIENT)
Dept: FAMILY MEDICINE | Facility: CLINIC | Age: 71
End: 2021-08-23
Payer: MEDICARE

## 2021-08-23 VITALS
TEMPERATURE: 96.9 F | HEART RATE: 94 BPM | DIASTOLIC BLOOD PRESSURE: 76 MMHG | OXYGEN SATURATION: 91 % | SYSTOLIC BLOOD PRESSURE: 124 MMHG

## 2021-08-23 DIAGNOSIS — K12.0 APHTHOUS ULCER: Primary | ICD-10-CM

## 2021-08-23 DIAGNOSIS — M1A.29X0 DRUG-INDUCED CHRONIC GOUT OF MULTIPLE SITES WITHOUT TOPHUS: ICD-10-CM

## 2021-08-23 PROCEDURE — 99214 OFFICE O/P EST MOD 30 MIN: CPT | Performed by: PHYSICIAN ASSISTANT

## 2021-08-23 PROCEDURE — 36415 COLL VENOUS BLD VENIPUNCTURE: CPT | Performed by: PHYSICIAN ASSISTANT

## 2021-08-23 PROCEDURE — 84550 ASSAY OF BLOOD/URIC ACID: CPT | Performed by: PHYSICIAN ASSISTANT

## 2021-08-23 RX ORDER — DEXAMETHASONE 0.5 MG/5ML
0.5 ELIXIR ORAL 4 TIMES DAILY
Qty: 237 ML | Refills: 1 | Status: SHIPPED | OUTPATIENT
Start: 2021-08-23 | End: 2021-12-21

## 2021-08-23 NOTE — PROGRESS NOTES
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  Adding dexamethasone elixir to use until sx resolve.   - Gout - uric acid check due today - will adjust allopurinol as needed.                  Return in about 2 weeks (around 9/6/2021) for a recheck if you are not improved.    Myah Florez PA-C  [facility] is a 71 year old who presents for the following health issues     HPI     Concern - mouth  Onset: 3 weeks  Description: bit tongue   Intensity: moderate  Progression of Symptoms:  worsening and same  Accompanying Signs & Symptoms: sore throat  Previous history of similar problem: none  Precipitating factors:        Worsened by: none  Alleviating factors:        Improved by: none  Therapies tried and outcome:  none     2 weeks bit tongue  1 week of sore throat  No fevers  Fatigue - about baseline    Tx - salt water gurgles          Review of Systems         Objective    /76 (Cuff Size: Adult Large)   Pulse 94   Temp 96.9  F (36.1  C) (Tympanic)   SpO2 91%   There is no height or weight on file to calculate BMI.  Physical Exam  Constitutional:       General: He is not in acute distress.     Appearance: He is well-developed. He is not diaphoretic.   HENT:      Head: Normocephalic.      Right Ear: External ear normal.      Left Ear: External ear normal.      Nose: Nose normal.      Mouth/Throat:      Lips: Pink.      Mouth: Mucous membranes are moist.      Dentition: No gum lesions.      Tongue: Lesions (right lateral tongue, grey round lesion with erythematous border.  No vesicles, no laceration,.) present.      Palate: No  lesions.      Pharynx: Oropharynx is clear. Uvula midline. No posterior oropharyngeal erythema.      Tonsils: No tonsillar exudate.   Eyes:      Conjunctiva/sclera: Conjunctivae normal.   Pulmonary:      Effort: Pulmonary effort is normal.   Musculoskeletal:      Cervical back: Normal range of motion.   Neurological:      Mental Status: He is alert and oriented to person, place, and time.   Psychiatric:         Judgment: Judgment normal.            Results for orders placed or performed in visit on 08/23/21   Uric acid     Status: Normal   Result Value Ref Range    Uric Acid 6.6 3.5 - 7.2 mg/dL

## 2021-08-24 LAB — URATE SERPL-MCNC: 6.6 MG/DL (ref 3.5–7.2)

## 2021-08-25 NOTE — RESULT ENCOUNTER NOTE
Kenneth    Your lab tests are complete and I have reviewed the results.     - Your Uric Acid level is normal so you are currently on the correct dose of allopurinol.    If you have any questions or concerns, please feel free to call or send a [employer] message.    Sincerely,  Calvin Florez PA-C

## 2021-08-25 NOTE — PROGRESS NOTES
Checking with IT regarding Kenneth's inability to call into [facility]. They will ramila back.     Haily Graves RN 12:11 PM 08/25/21

## 2021-08-27 ENCOUNTER — CARE COORDINATION (OUTPATIENT)
Dept: CARDIOLOGY | Facility: CLINIC | Age: 71
End: 2021-08-27

## 2021-08-27 DIAGNOSIS — I50.42 CHRONIC COMBINED SYSTOLIC AND DIASTOLIC CONGESTIVE HEART FAILURE (H): Primary | ICD-10-CM

## 2021-08-27 NOTE — PROGRESS NOTES
[facility]  C.ORAMY    MyHealth Tracker Heart Failure Alert      Daily Weight Goal: 305-318 lbs    Today's Weight:  lbs      Symptom Alert:        Diuretic: Torsemide 80 mg and spirionlacotne 50 mg daily     Potassium Plan: Stopped 5/11 by [facility] receommendations     Diuretic Plan: Metolazone 2.5 mg when directed by [facility]     Last Extra Diuretic: Metolazone 2.5 mg with 20 mEq KCl on 3/2/21 - last directed by [facility]      Future Appointments   Date Time Provider Department Center   9/17/2021 11:45 AM [facility] [place] [facility]   9/17/2021 12:30 PM Rozina Gallegos APRN CNP [facility]       Notes: message left for weight and symptom update. Unable to receive his [facility] - IT aware and still working on issue- ticket placed 8/13.        MyHealth Tracker Weight Trends:         MyHealth Tracker Weight Graph:       Haily Graves RN 11:52 AM 08/27/21

## 2021-09-01 NOTE — PROGRESS NOTES
[facility]  My Health Tracker HF Alert     Background: Last received My Health Tracker survey 8/12. Per my colleague's note there was a request sent to IT as he had been sending surveys but we weren't able to see them.     Daily Weight Goal: 305-318 lbs  Weight today: 315 lbs    I spoke to Kenneth and his wife Melissa. Kenneth told me his breathing is at his baseline, Melissa said he's been able to tolerate running errands with breaks to rest--sounded like this was an improvement. He's been trying to do short walks in their home.    Kenneth said he stopped submitting surveys as they weren't going through. Will update Haily VELAZQUEZ RN.    I asked them to call [facility] RN between now and next visit if worsening swelling, edema, or weight outside above goal range.     Melissa noted she's made a list of their favorite [employer] meals and plans to bring it to next appt with request it's faxed to [employer].    Future Appointments   Date Time Provider Department Center   9/17/2021 11:45 AM [facility] [place] [facility]   9/17/2021 12:30 PM Rozina Gallegos APRN CNP [facility]     Claudia Dimas RN BSN   1:24 PM 09/01/21

## 2021-09-02 NOTE — PROGRESS NOTES
Reviewed phone encounter. Okay to stop [facility].     YENI Yang [facility]  Pager: [phone]

## 2021-09-04 ENCOUNTER — HEALTH MAINTENANCE LETTER (OUTPATIENT)
Age: 71
End: 2021-09-04

## 2021-09-16 ENCOUNTER — OFFICE VISIT (OUTPATIENT)
Dept: FAMILY MEDICINE | Facility: CLINIC | Age: 71
End: 2021-09-16
Payer: MEDICARE

## 2021-09-16 VITALS
BODY MASS INDEX: 49.26 KG/M2 | RESPIRATION RATE: 16 BRPM | SYSTOLIC BLOOD PRESSURE: 130 MMHG | TEMPERATURE: 98.7 F | DIASTOLIC BLOOD PRESSURE: 70 MMHG | WEIGHT: 315 LBS | OXYGEN SATURATION: 94 % | HEART RATE: 72 BPM

## 2021-09-16 DIAGNOSIS — I50.42 CHRONIC COMBINED SYSTOLIC AND DIASTOLIC CONGESTIVE HEART FAILURE (H): ICD-10-CM

## 2021-09-16 DIAGNOSIS — M79.10 MUSCLE PAIN: ICD-10-CM

## 2021-09-16 DIAGNOSIS — R60.0 BILATERAL LEG EDEMA: Primary | ICD-10-CM

## 2021-09-16 DIAGNOSIS — I10 HYPERTENSION GOAL BP (BLOOD PRESSURE) < 140/90: Chronic | ICD-10-CM

## 2021-09-16 DIAGNOSIS — L53.9 LEG ERYTHEMA: ICD-10-CM

## 2021-09-16 PROCEDURE — 99214 OFFICE O/P EST MOD 30 MIN: CPT | Performed by: FAMILY MEDICINE

## 2021-09-16 RX ORDER — DOXYCYCLINE HYCLATE 100 MG
100 TABLET ORAL 2 TIMES DAILY
Qty: 14 TABLET | Refills: 0 | Status: SHIPPED | OUTPATIENT
Start: 2021-09-16 | End: 2021-09-17

## 2021-09-16 ASSESSMENT — PAIN SCALES - GENERAL: PAINLEVEL: SEVERE PAIN (6)

## 2021-09-16 ASSESSMENT — PATIENT HEALTH QUESTIONNAIRE - PHQ9
10. IF YOU CHECKED OFF ANY PROBLEMS, HOW DIFFICULT HAVE THESE PROBLEMS MADE IT FOR YOU TO DO YOUR WORK, TAKE CARE OF THINGS AT HOME, OR GET ALONG WITH OTHER PEOPLE: NOT DIFFICULT AT ALL
SUM OF ALL RESPONSES TO PHQ QUESTIONS 1-9: 7
SUM OF ALL RESPONSES TO PHQ QUESTIONS 1-9: 7

## 2021-09-16 NOTE — PROGRESS NOTES
"    Assessment & Plan     Bilateral leg edema  Patient has chronic ongoing bilateral leg edema. He is advised to continue the wraps and follow-up with cardiology see if there is any alteration in medication for his CHF if needed.    Leg erythema  Mild superficial edema noted less likely deep cellulitis but due to erythema I will start him on doxycycline to make sure there is no infection.  - doxycycline hyclate (VIBRA-TABS) 100 MG tablet; Take 1 tablet (100 mg) by mouth 2 times daily for 7 days    Muscle pain  Side pain most likely muscular etiology has naproxen he can use it as needed. There is no acute systemic symptoms    Chronic combined systolic and diastolic congestive heart failure (H)      Hypertension goal BP (blood pressure) < 140/90    Patient has severe depression and anxiety. His PHQ score is stable he denies any suicidal thoughts however due to his health conditions sometimes that thought does cross his mind. His wife is very supportive does not think there is any worsening at this time.             BMI:   Estimated body mass index is 49.26 kg/m  as calculated from the following:    Height as of 7/29/21: 1.727 m (5' 8\").    Weight as of this encounter: 147 kg (324 lb).           No follow-ups on file.    Tito Reid MD  [facility] VANNESSA Cooper is a 71 year old who presents for the following health issues     56}  Patient came today to discuss some discomfort on the right side which aggravated with change of position.  He has a complex medical history including CHF along with depression anxiety  Is dealing with bilateral leg edema he has done multiple evaluations including edema wrap,s he noticed when he removed his wrap there was some erythema in that area.  Any nausea vomiting there is no urinary symptoms.  Other issues include morbid obesity  Onset/Duration: On and off  Description:   Character:   Location:   Radiation: None  Intensity: moderate  Progression of " Symptoms:  same  Accompanying Signs & Symptoms:  Fever/Chills: no  Gas/Bloating: no  Nausea: no  Vomitting: no  Diarrhea: no  Constipation: no  Dysuria or Hematuria: no  History:   Trauma: no  Previous similar pain: no  Previous tests done: none  Precipitating factors:   Does the pain change with:     Food: no    Bowel Movement: no    Urination: no   Other factors:  no  Therapies tried and outcome: None          Review of Systems   Constitutional, HEENT, cardiovascular, pulmonary, gi and gu systems are negative, except as otherwise noted.      Objective    There were no vitals taken for this visit.  There is no height or weight on file to calculate BMI.  Physical Exam   GENERAL: healthy, alert and no distress  NECK: no adenopathy, no asymmetry, masses, or scars and thyroid normal to palpation  RESP: lungs clear to auscultation - no rales, rhonchi or wheezes  CV: regular rate and rhythm, normal S1 S2, no S3 or S4, no murmur, click or rub, no peripheral edema and peripheral pulses strong  ABDOMEN: soft, nontender, no hepatosplenomegaly, no masses and bowel sounds normal  Bilateral leg edema, some superficial blisters and mild erythema. Not warm to touch

## 2021-09-17 ENCOUNTER — OFFICE VISIT (OUTPATIENT)
Dept: CARDIOLOGY | Facility: CLINIC | Age: 71
End: 2021-09-17
Payer: MEDICARE

## 2021-09-17 ENCOUNTER — LAB (OUTPATIENT)
Dept: LAB | Facility: CLINIC | Age: 71
End: 2021-09-17
Payer: MEDICARE

## 2021-09-17 VITALS
HEIGHT: 68 IN | BODY MASS INDEX: 47.74 KG/M2 | OXYGEN SATURATION: 95 % | DIASTOLIC BLOOD PRESSURE: 62 MMHG | SYSTOLIC BLOOD PRESSURE: 102 MMHG | WEIGHT: 315 LBS | HEART RATE: 96 BPM

## 2021-09-17 DIAGNOSIS — I10 HYPERTENSION GOAL BP (BLOOD PRESSURE) < 140/90: Chronic | ICD-10-CM

## 2021-09-17 DIAGNOSIS — E66.01 MORBID OBESITY (H): ICD-10-CM

## 2021-09-17 DIAGNOSIS — I50.42 CHRONIC COMBINED SYSTOLIC AND DIASTOLIC CONGESTIVE HEART FAILURE (H): Primary | ICD-10-CM

## 2021-09-17 DIAGNOSIS — I50.42 CHRONIC COMBINED SYSTOLIC AND DIASTOLIC CONGESTIVE HEART FAILURE (H): ICD-10-CM

## 2021-09-17 DIAGNOSIS — I25.119 CORONARY ARTERY DISEASE INVOLVING NATIVE CORONARY ARTERY OF NATIVE HEART WITH ANGINA PECTORIS (H): ICD-10-CM

## 2021-09-17 DIAGNOSIS — E78.5 HYPERLIPIDEMIA WITH TARGET LDL LESS THAN 100: Chronic | ICD-10-CM

## 2021-09-17 LAB
ANION GAP SERPL CALCULATED.3IONS-SCNC: 3 MMOL/L (ref 3–14)
BUN SERPL-MCNC: 21 MG/DL (ref 7–30)
CALCIUM SERPL-MCNC: 9.4 MG/DL (ref 8.5–10.1)
CHLORIDE BLD-SCNC: 107 MMOL/L (ref 94–109)
CO2 SERPL-SCNC: 28 MMOL/L (ref 20–32)
CREAT SERPL-MCNC: 0.94 MG/DL (ref 0.66–1.25)
GFR SERPL CREATININE-BSD FRML MDRD: 81 ML/MIN/1.73M2
GLUCOSE BLD-MCNC: 104 MG/DL (ref 70–99)
POTASSIUM BLD-SCNC: 4.3 MMOL/L (ref 3.4–5.3)
SODIUM SERPL-SCNC: 138 MMOL/L (ref 133–144)

## 2021-09-17 PROCEDURE — 99215 OFFICE O/P EST HI 40 MIN: CPT | Performed by: NURSE PRACTITIONER

## 2021-09-17 PROCEDURE — 36415 COLL VENOUS BLD VENIPUNCTURE: CPT | Performed by: NURSE PRACTITIONER

## 2021-09-17 PROCEDURE — 80048 BASIC METABOLIC PNL TOTAL CA: CPT | Performed by: NURSE PRACTITIONER

## 2021-09-17 RX ORDER — ALLOPURINOL 300 MG/1
300 TABLET ORAL DAILY
COMMUNITY
End: 2022-09-26

## 2021-09-17 ASSESSMENT — MIFFLIN-ST. JEOR: SCORE: 2187.8
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
## 2021-09-17 NOTE — PATIENT INSTRUCTIONS
Call CORE nurse for any questions or concerns Mon-Fri 8am-4pm:                                                 #[phone]                                       For concerns after hours:                                               #[phone]     Medication changes: none    Plan from today: CORE follow up with me in 3 months with labs prior    Lab results: see attached:   Component      Latest Ref Rng & Units 7/12/2021 9/17/2021   Sodium      133 - 144 mmol/L 133 138   Potassium      3.4 - 5.3 mmol/L 4.5 4.3   Chloride      94 - 109 mmol/L 103 107   Carbon Dioxide      20 - 32 mmol/L 27 28   Anion Gap      3 - 14 mmol/L 3 3   Urea Nitrogen      7 - 30 mg/dL 19 21   Creatinine      0.66 - 1.25 mg/dL 0.99 0.94   Calcium      8.5 - 10.1 mg/dL 9.2 9.4   Glucose      70 - 99 mg/dL 99 104 (H)   GFR Estimate      >60 mL/min/1.73m2 76 81

## 2021-09-27 DIAGNOSIS — N40.1 BENIGN PROSTATIC HYPERPLASIA WITH WEAK URINARY STREAM: ICD-10-CM

## 2021-09-27 DIAGNOSIS — R39.12 BENIGN PROSTATIC HYPERPLASIA WITH WEAK URINARY STREAM: ICD-10-CM

## 2021-09-28 RX ORDER — TAMSULOSIN HYDROCHLORIDE 0.4 MG/1
CAPSULE ORAL
Qty: 180 CAPSULE | Refills: 1 | Status: SHIPPED | OUTPATIENT
Start: 2021-09-28 | End: 2022-05-23

## 2021-09-28 NOTE — TELEPHONE ENCOUNTER
Seen 1 week ago.    Creatinine   Date Value Ref Range Status   09/17/2021 0.94 0.66 - 1.25 mg/dL Final   06/24/2021 0.93 0.66 - 1.25 mg/dL Final     BP Readings from Last 3 Encounters:   09/17/21 102/62   09/16/21 130/70   08/23/21 124/76

## 2021-10-05 ENCOUNTER — CARE COORDINATION (OUTPATIENT)
Dept: CARDIOLOGY | Facility: CLINIC | Age: 71
End: 2021-10-05

## 2021-10-07 ENCOUNTER — OFFICE VISIT (OUTPATIENT)
Dept: FAMILY MEDICINE | Facility: CLINIC | Age: 71
End: 2021-10-07
Payer: MEDICARE

## 2021-10-07 VITALS
HEIGHT: 68 IN | TEMPERATURE: 97 F | HEART RATE: 70 BPM | DIASTOLIC BLOOD PRESSURE: 72 MMHG | BODY MASS INDEX: 47.74 KG/M2 | SYSTOLIC BLOOD PRESSURE: 124 MMHG | OXYGEN SATURATION: 96 % | WEIGHT: 315 LBS

## 2021-10-07 DIAGNOSIS — S81.802D OPEN WOUND OF LEFT LOWER EXTREMITY, SUBSEQUENT ENCOUNTER: ICD-10-CM

## 2021-10-07 DIAGNOSIS — R60.0 BILATERAL LEG EDEMA: Primary | ICD-10-CM

## 2021-10-07 DIAGNOSIS — I50.42 CHRONIC COMBINED SYSTOLIC AND DIASTOLIC CONGESTIVE HEART FAILURE (H): ICD-10-CM

## 2021-10-07 DIAGNOSIS — Z12.11 SCREENING FOR COLON CANCER: ICD-10-CM

## 2021-10-07 DIAGNOSIS — D50.9 IRON DEFICIENCY ANEMIA, UNSPECIFIED IRON DEFICIENCY ANEMIA TYPE: ICD-10-CM

## 2021-10-07 DIAGNOSIS — Z12.5 SCREENING FOR PROSTATE CANCER: ICD-10-CM

## 2021-10-07 LAB
ERYTHROCYTE [DISTWIDTH] IN BLOOD BY AUTOMATED COUNT: 15.1 % (ref 10–15)
HCT VFR BLD AUTO: 36.2 % (ref 40–53)
HGB BLD-MCNC: 11.6 G/DL (ref 13.3–17.7)
MCH RBC QN AUTO: 29.7 PG (ref 26.5–33)
MCHC RBC AUTO-ENTMCNC: 32 G/DL (ref 31.5–36.5)
MCV RBC AUTO: 93 FL (ref 78–100)
PLATELET # BLD AUTO: 257 10E3/UL (ref 150–450)
RBC # BLD AUTO: 3.9 10E6/UL (ref 4.4–5.9)
WBC # BLD AUTO: 5.7 10E3/UL (ref 4–11)

## 2021-10-07 PROCEDURE — G0103 PSA SCREENING: HCPCS | Performed by: PHYSICIAN ASSISTANT

## 2021-10-07 PROCEDURE — 99214 OFFICE O/P EST MOD 30 MIN: CPT | Performed by: PHYSICIAN ASSISTANT

## 2021-10-07 PROCEDURE — 85027 COMPLETE CBC AUTOMATED: CPT | Performed by: PHYSICIAN ASSISTANT

## 2021-10-07 PROCEDURE — 80048 BASIC METABOLIC PNL TOTAL CA: CPT | Performed by: PHYSICIAN ASSISTANT

## 2021-10-07 PROCEDURE — 82728 ASSAY OF FERRITIN: CPT | Performed by: PHYSICIAN ASSISTANT

## 2021-10-07 PROCEDURE — 36415 COLL VENOUS BLD VENIPUNCTURE: CPT | Performed by: PHYSICIAN ASSISTANT

## 2021-10-07 RX ORDER — FERROUS SULFATE 325(65) MG
325 TABLET, DELAYED RELEASE (ENTERIC COATED) ORAL
Qty: 100 TABLET | Refills: 1 | Status: SHIPPED | OUTPATIENT
Start: 2021-10-07 | End: 2022-09-26

## 2021-10-07 ASSESSMENT — MIFFLIN-ST. JEOR: SCORE: 2194.15

## 2021-10-07 NOTE — PATIENT INSTRUCTIONS
1. For the leg pain at night, lets try unwrapping the legs at night.    Continue taking Tylenol before bed - 1000 mg  Consider taking a hot shower before bed      2. For the swelling and open wound - we need to schedule with the lymphedema specialist and wound clinic.  You do not tolerate Xeroform.     3. Lets start supplementing iron for the anemia.    It is very important to schedule the colonoscopy.      You have been diagnosed with iron deficiency.    To supplement iron:  - Take 325 mg of ferrous sulfate every other day.  This is available over the counter.   - Make sure to take ferrous sulfate with orange juice or vitamin C supplements.  Vitamin C helps your body absorb the iron.    - DO NOT take ferrous sulfate within 3 hours of consuming calcium containing foods or supplements.  Make sure your orange juice is not fortified with calcium.   Calcium will prevent iron absorption.   - DO NOT take ferrous sulfate within 12 hours of gastric reflux medications - these include omeprazole, pantoprazole, ranitidine, famotidine or other proton pump inhibitors or H2 blocker medications (make sure to read labels if you take medications for gastric reflux).  These medications also prevent iron absorption.   - It is ok to take ferrous sulfate with food.  - The most common side effects of ferrous sulfate are constipation and upset stomach.

## 2021-10-07 NOTE — PROGRESS NOTES
"    Assessment & Plan   Problem List Items Addressed This Visit        Musculoskeletal and Integumentary    Bilateral leg edema - Primary    Relevant Medications    Gauze Pads & Dressings (TELFA NON-ADHERENT) 3\"X4\" PADS    Other Relevant Orders    CBC with platelets    Lymphedema Therapy Referral      Other Visit Diagnoses     Open wound of left lower extremity, subsequent encounter        Relevant Medications    Gauze Pads & Dressings (TELFA NON-ADHERENT) 3\"X4\" PADS    Other Relevant Orders    Lymphedema Therapy Referral    Screening for prostate cancer        Relevant Orders    PSA, screen    Screening for colon cancer        Relevant Orders    Adult Gastro Ref - Procedure Only    Iron deficiency anemia, unspecified iron deficiency anemia type        Relevant Medications    ferrous sulfate (FE TABS) 325 (65 Fe) MG EC tablet    Other Relevant Orders    Ferritin         Severe leg pain at night - possible RLS, possible PAD.  Known Edema.    PLAN - check ferritin  Referral to lymphedema and wound clinic.  Wound is chronic, it does not appear infected today - erythema is the same bilaterally.  We discussed using aquaphor and nonstick dressings on the wound - patient had a previous reaction to xeroform.   Prev care updated as above.               Patient Instructions   1. For the leg pain at night, lets try unwrapping the legs at night.    Continue taking Tylenol before bed - 1000 mg  Consider taking a hot shower before bed      2. For the swelling and open wound - we need to schedule with the lymphedema specialist and wound clinic.  You do not tolerate Xeroform.     3. Lets start supplementing iron for the anemia.    It is very important to schedule the colonoscopy.      You have been diagnosed with iron deficiency.    To supplement iron:  - Take 325 mg of ferrous sulfate every other day.  This is available over the counter.   - Make sure to take ferrous sulfate with orange juice or vitamin C supplements.  Vitamin C " "helps your body absorb the iron.    - DO NOT take ferrous sulfate within 3 hours of consuming calcium containing foods or supplements.  Make sure your orange juice is not fortified with calcium.   Calcium will prevent iron absorption.   - DO NOT take ferrous sulfate within 12 hours of gastric reflux medications - these include omeprazole, pantoprazole, ranitidine, famotidine or other proton pump inhibitors or H2 blocker medications (make sure to read labels if you take medications for gastric reflux).  These medications also prevent iron absorption.   - It is ok to take ferrous sulfate with food.  - The most common side effects of ferrous sulfate are constipation and upset stomach.           No follow-ups on file.    TYLER Pastrana [facility] VANNESSA Cooper is a 71 year old who presents for the following health issues     HPI     Musculoskeletal problem/pain  Onset/Duration: ongoing seen for similar sx on 9/21 by [place]   Description  Location: leg - bilateral  Joint Swelling: YES- mild   Redness: YES  Pain: YES  Intensity:  moderate, severe  Progression of Symptoms:  worsening  Accompanying signs and symptoms:   Fevers: no  Numbness/tingling/weakness: no  History  Trauma to the area: no  Previous evaluation:  YES  Precipitating or alleviating factors:  Aggravating factors include: worse at  night   Therapies tried and outcome: nothing    At night the legs are very painful - it is hard to sleep.   The pain worse when he stops moving.      The leg swelling waxes and wanes - the wound on the left leg has gotten larger recently.  It has been there for years, improved for a while, and then recently gotten worse.         Review of Systems         Objective    /72   Pulse 70   Temp 97  F (36.1  C) (Tympanic)   Ht 1.727 m (5' 8\")   Wt 147 kg (324 lb)   SpO2 96%   BMI 49.26 kg/m    Body mass index is 49.26 kg/m .  Physical Exam  Constitutional:       General: He is " not in acute distress.     Appearance: He is well-developed. He is not diaphoretic.   HENT:      Head: Normocephalic.      Right Ear: External ear normal.      Left Ear: External ear normal.      Nose: Nose normal.   Eyes:      Conjunctiva/sclera: Conjunctivae normal.   Pulmonary:      Effort: Pulmonary effort is normal.   Musculoskeletal:      Cervical back: Normal range of motion.      Right lower leg: Edema present.      Left lower leg: Edema present.   Skin:     Findings: Wound (medial left lower leg - open wound, serous drainage, erythema normal per baseline and equal bilaterally) present.   Neurological:      Mental Status: He is alert and oriented to person, place, and time.   Psychiatric:         Judgment: Judgment normal.            Results for orders placed or performed in visit on 10/07/21   Basic metabolic panel     Status: Normal   Result Value Ref Range    Sodium 137 133 - 144 mmol/L    Potassium 4.7 3.4 - 5.3 mmol/L    Chloride 108 94 - 109 mmol/L    Carbon Dioxide (CO2) 21 20 - 32 mmol/L    Anion Gap 8 3 - 14 mmol/L    Urea Nitrogen 21 7 - 30 mg/dL    Creatinine 0.89 0.66 - 1.25 mg/dL    Calcium 8.9 8.5 - 10.1 mg/dL    Glucose 99 70 - 99 mg/dL    GFR Estimate 85 >60 mL/min/1.73m2   Ferritin     Status: Normal   Result Value Ref Range    Ferritin 161 26 - 388 ng/mL   CBC with platelets     Status: Abnormal   Result Value Ref Range    WBC Count 5.7 4.0 - 11.0 10e3/uL    RBC Count 3.90 (L) 4.40 - 5.90 10e6/uL    Hemoglobin 11.6 (L) 13.3 - 17.7 g/dL    Hematocrit 36.2 (L) 40.0 - 53.0 %    MCV 93 78 - 100 fL    MCH 29.7 26.5 - 33.0 pg    MCHC 32.0 31.5 - 36.5 g/dL    RDW 15.1 (H) 10.0 - 15.0 %    Platelet Count 257 150 - 450 10e3/uL   PSA, screen     Status: Normal   Result Value Ref Range    Prostate Specific Antigen Screen 0.64 0.00 - 4.00 ug/L

## 2021-10-08 LAB
ANION GAP SERPL CALCULATED.3IONS-SCNC: 8 MMOL/L (ref 3–14)
BUN SERPL-MCNC: 21 MG/DL (ref 7–30)
CALCIUM SERPL-MCNC: 8.9 MG/DL (ref 8.5–10.1)
CHLORIDE BLD-SCNC: 108 MMOL/L (ref 94–109)
CO2 SERPL-SCNC: 21 MMOL/L (ref 20–32)
CREAT SERPL-MCNC: 0.89 MG/DL (ref 0.66–1.25)
FERRITIN SERPL-MCNC: 161 NG/ML (ref 26–388)
GFR SERPL CREATININE-BSD FRML MDRD: 85 ML/MIN/1.73M2
GLUCOSE BLD-MCNC: 99 MG/DL (ref 70–99)
POTASSIUM BLD-SCNC: 4.7 MMOL/L (ref 3.4–5.3)
PSA SERPL-MCNC: 0.64 UG/L (ref 0–4)
SODIUM SERPL-SCNC: 137 MMOL/L (ref 133–144)

## 2021-10-11 ENCOUNTER — HOSPITAL ENCOUNTER (OUTPATIENT)
Dept: WOUND CARE | Facility: CLINIC | Age: 71
Discharge: HOME | End: 2021-10-11
Attending: SURGERY | Admitting: SURGERY
Payer: MEDICARE

## 2021-10-11 VITALS
DIASTOLIC BLOOD PRESSURE: 76 MMHG | HEART RATE: 90 BPM | TEMPERATURE: 96.9 F | SYSTOLIC BLOOD PRESSURE: 129 MMHG | RESPIRATION RATE: 20 BRPM

## 2021-10-11 DIAGNOSIS — L97.929 ULCER OF LOWER LIMB, LEFT, WITH UNSPECIFIED SEVERITY (H): ICD-10-CM

## 2021-10-11 DIAGNOSIS — L97.912 SKIN ULCER OF RIGHT LOWER LEG WITH FAT LAYER EXPOSED (H): ICD-10-CM

## 2021-10-11 PROCEDURE — 97602 WOUND(S) CARE NON-SELECTIVE: CPT

## 2021-10-11 PROCEDURE — 99203 OFFICE O/P NEW LOW 30 MIN: CPT | Performed by: SURGERY

## 2021-10-11 PROCEDURE — 93923 UPR/LXTR ART STDY 3+ LVLS: CPT

## 2021-10-11 PROCEDURE — 29581 APPL MULTLAYER CMPRN SYS LEG: CPT | Mod: 50,XU

## 2021-10-11 NOTE — PROGRESS NOTES
"[facility]    Received VM on CORE RN line from wife Melissa who would like call back to discuss multiple concerns.  Kenneth c/o legs are really sore and itchy and they are currently using neosporin.   She is asking if there is a spray they could use for the itching.  Also, concerned about increased amount of using bathroom.  He drinks a lot.      Called and spoke to Melissa who reports Kenneth is voiding more.  She is not sure how much he drinks, but thinks it's intermittent.  She states, \"Some days he drinks so much he could sink a moulton ship and other days he doesn't drink much at all\".  Instructed her to contact [facility] about his sore and itchy legs.  She informed that he does have an appt in 2 days.      Chart Review:  -- 9/17/21:  JACKI cole/ Rozina Gallegos CNP who reinforced low sodium diet, 2L fluid restriction and importance of trying to get daily activity.       Future Appointments   Date Time Provider Department Center   10/7/2021 11:00 AM Myah Florez PA-C [facility]   12/17/2021 12:30 PM RU LAB [facility] [place] [facility]   12/17/2021  1:30 PM Rozina Gallegos APRN CNP [facility] PSA CLIN       Blanca Gould, JOY BSN   [facility] [place], [place]  C.O.R.E[facility] Clinic Care Coordinator  10/05/21, 2:24 PM    "
Left message for Supa.    Haily Graves, RN 3:46 PM 10/11/21      
Unknown

## 2021-10-11 NOTE — PROGRESS NOTES
[facility]aling [place] Progress Note    Subject: Jamar Ivory and his wife return for follow-up of his chronic bilateral leg ulcerations.  No history of DVT but suspected venous insufficiency and chronic lymphedema.  Was in the wound clinic over 6 months ago for similar ulcers.  These been present for 2 to 3 years per patient and wife and occurred with very minimal trauma when he bumped his left ankle.  Wounds have never healed.    Did go to the lymphedema clinic for formal wraps.  Now discharged from the clinic.  Wife is doing the wraps but noticed no improvement the ulcers and actual some increase along with the skin irritation.  Thus returns to the clinic.    Would recommend a formal venous evaluation this had not been performed.    Patient lives independently with his wife.  Has multiple medical problems are under relatively good control.  No renal insufficiency.    10/7/2021  K= 4.7 SCr= 0.9 glucose= 99 Hgb= 11.6   Vaccinated with recent Covid 19 testing negative  Patient Active Problem List   Diagnosis     Acquired hypothyroidism     Vitiligo     Hyperlipidemia with target LDL less than 100     Hypertension goal BP (blood pressure) < 140/90     Cerebral infarction (H)     Moderate major depression (H)     Health Care Home     Fatty liver     Advanced directives, counseling/discussion     Impaired glucose tolerance     Transient cerebral ischemia     Obesity with serious comorbidity     Bilateral leg edema     Diet Controlled Type 2 diabetes mellitus without complication, without long-term current use of insulin (H)     Benign neoplasm of colon     Pain in both lower extremities     Low hemoglobin     Breast tenderness in male     Hx of Diabetic polyneuropathy associated with type 2 diabetes mellitus - now diet controlled (H)     Chronic combined systolic and diastolic congestive heart failure (H)     PVD (peripheral vascular disease) (H)     Benign prostatic hyperplasia with incomplete bladder  emptying     Skin ulcer of right lower leg with fat layer exposed (H)     Depression, major, recurrent, in complete remission (H)     Shortness of breath     Chest tightness     Coronary artery disease, noted to have a small segment infarct with teo-infarct ischemia     Precordial pain     Morbid obesity (H)     Ulcer of lower limb, left, with unspecified severity (H)     Past Medical History:   Diagnosis Date     Arthritis      CHF (congestive heart failure) (H) 12/24/2018     CVA (cerebral infarction) 2012     CVD (cardiovascular disease)      Fatty liver      Gout      Hypertension goal BP (blood pressure) < 140/90 1/17/2011     Major depressive disorder, single episode, severe, without mention of psychotic behavior 1977    hospitalized     Obesity, morbid (more than 100 lbs over ideal weight or BMI > 40) (H)      Shortness of breath      Spider veins      TIA (transient ischaemic attack) 2012     Unspecified hypothyroidism      Vitiligo      Exam:  /76 (BP Location: Left arm)   Pulse 90   Temp 96.9  F (36.1  C) (Temporal)   Resp 20   Wound (used by OP I only) 02/23/21 1118 Right ulceration, venous (Active)   Thickness/Stage full thickness 10/11/21 1020   Dressing Appearance moist drainage 10/11/21 1020   Base granulating 10/11/21 1020   Periwound intact 10/11/21 1020   Periwound Temperature warm 10/11/21 1020   Periwound Skin Turgor soft 10/11/21 1020   Edges open 10/11/21 1020   Length (cm) 14 10/11/21 1020   Width (cm) 16 10/11/21 1020   Depth (cm) 0.2 10/11/21 1020   Wound (cm^2) 224 cm^2 10/11/21 1020   Wound Volume (cm^3) 44.8 cm^3 10/11/21 1020   Wound healing % -2809.09 10/11/21 1020   Drainage Characteristics/Odor serosanguineous 10/11/21 1020   Drainage Amount moderate 10/11/21 1020       Wound (used by OP I only) 03/16/21 1030 Left ulceration, venous (Active)   Thickness/Stage full thickness 10/11/21 1020   Dressing Appearance moist drainage 10/11/21 1020   Base granulating 10/11/21 1020    Periwound intact 10/11/21 1020   Periwound Temperature warm 10/11/21 1020   Periwound Skin Turgor soft 10/11/21 1020   Edges open 10/11/21 1020   Length (cm) 13 10/11/21 1020   Width (cm) 39.5 10/11/21 1020   Depth (cm) 0.2 10/11/21 1020   Wound (cm^2) 513.5 cm^2 10/11/21 1020   Wound Volume (cm^3) 102.7 cm^3 10/11/21 1020   Wound healing % -9968.63 10/11/21 1020   Drainage Characteristics/Odor serosanguineous 10/11/21 1020   Drainage Amount moderate 10/11/21 1020     Alert and appropriate.  Here with his wife.  Comfortable.    Truncal obesity  Chest= clear to auscultation.  Lower extremities with chronic lymphedema type changes.  No visible varicose veins.  Intact sensation.  Onychomycotic toenails bilaterally  Difficult to palpate pedal pulses.  Multifocal waveforms by bedside Doppler in both right and left DP/PT with adequate circulation   Superficial ulcers bilaterally as documented in the photographs.  More prominent left than right side.  Base of the ulcers is clean with good healthy granulation tissue no undermining.  Chronically irritated adjacent skin consistent with a stasis dermatitis type pattern.  No infection.  No need for debridement.        Impression: Chronic bilateral calf ulcerations.  Clinically consistent with venous insufficiency and stasis dermatitis and chronic lymphedema.  We will initiate more aggressive compression therapy using Acticoat 7 over the open ulcers followed by a zinc dynamic compression wrap that will change in a weekly basis.  Discussed with patient and wife.  Also triamcinolone to the dermatitis area under the dressings it we will change weekly.    We recommended protein supplements along with vein formula to aid in healing and discussed the reasoning behind this with the patient and his wife.     Plan: We will dress the wounds with primary dressing Acticoat 7 to open ulcers/triamcinolone to dermatitis/zinc dynamic compression wrap..  Patient will return to the clinic in 1  weeks time    30 minutes total with patient today reviewing old chart and present exam along with arterial evaluation and recommendations.  Gilles Alejandro on 10/11/2021 at 10:30 AM

## 2021-10-11 NOTE — DISCHARGE INSTRUCTIONS
[facility]  [address] [facility] [address], [place] [address]  Appointment Phone [phone]     Jamar Ivory      1950  Medications/supplements to aid in healin. Vein Formula 1 tablet twice daily order from www.veinformula.[employer] or call [phone]  A diet high in protein is important for wound healing, we recommend getting 90 grams of protein per day. Taking protein shakes or bars are a good way to get extra protein in your diet.     Good sources of protein:  Pork 26g per 3 oz  Whey protein powder - 24g per scoop (on average)  Greek yogurt - 23g per 8oz   Chicken or Turkey - 23g per 3oz  Fish - 20-25g per 3oz  Beef - 18-23g per 3oz  Navy beans - 20g per cup  Cottage cheese - 14g per 1/2 cup   Lentils - 13g per 1/4 cup  Beef jerky 13g per 1oz  2% milk - 8g per cup  Peanut butter - 8g per 2 tablespoons  Eggs - 6g per egg  Mixed nuts - 6g per 2oz    Keep leg dry with use of a cast protector (available at [facility] or [facility])  Wound Dressing Change: Both Legs  Cleanse wound with prophase   Skin Care: Apply moisturizing cream to skin surrounding the wound (but not in the wound): triamicolone   Cover wound with acticoat 7 cut to fit the size of the wound   Apply Zinc Wrap followed by kerra max dressing  Change dressing weekly.   Compression: Bilateral Lower Legs  Your compression wrap is a 2 layer coflex wrap and should stay on until next week.     Please remove compression dressing if toes turn blue and/or tingle and can not be relieved by raising the leg for one hour.     Walk as much as you can. When you sit raise your ankle above your hips to promote wound healing.     It is very important to follow your healthcare providers instructions. Studies indicate when compression therapy is applied as directed, healing may occur faster and more completely. Do Not remove CoFlex unless your healthcare provider tells you to remove it.  Helpful Tips  Your bandage may feel tight  at first. This is normal. When the swelling goes down, it will not feel as tight.  Wear the stocking that comes with the system over the bandage to help you put on shoes and clothing  Wear comfortable shoes and walk regularly. Walking will improve your circulation which will improve healing.  Keep your bandage dry.   CoFlex maybe left on your leg for up to 7 days. After that your doctor or nurse will apply a new CoFlex.   Call your doctor or nurse if the bandage gets wet, slips down or if you have pain, tingling, numbness or discoloration.      Gilles Alejandro M.D.. October 11, 2021  Call us at [phone] if you have any questions about your wounds, have redness or swelling around your wound, have a fever of 101 or greater or if you have any other problems or concerns. We answer the phone Monday through Friday 8 am to 4 pm, please leave a message as we check the voicemail frequently throughout the day.     If you had a positive experience please indicate that on your patient satisfaction survey form that [facility] will be sending you.    It was a pleasure meeting with you today.  Thank you for allowing me and my team the privilege of caring for you today.  YOU are the reason we are here, and I truly hope we provided you with the excellent service you deserve.  Please let us know if there is anything else we can do for you so that we can be sure you are leaving completely satisfied with your care experience.      If you have any billing related questions please call the [facility] Business office at [phone]. The clinic staff does not handle billing related matters.

## 2021-10-12 NOTE — RESULT ENCOUNTER NOTE
Kenneth    Your lab tests are complete and I have reviewed the results.     - Your hemoglobin dropped a little more, however your iron (ferritin) looks normal.  Lets recheck again in about a month and see if the hemoglobin improves.   - PSA (screen for prostate cancer) was normal.       If you have any questions or concerns, please feel free to call or send a [employer] message.    Sincerely,  Calvin Florez PA-C

## 2021-10-15 ENCOUNTER — HOSPITAL ENCOUNTER (OUTPATIENT)
Dept: WOUND CARE | Facility: CLINIC | Age: 71
Discharge: HOME | End: 2021-10-15
Attending: SURGERY | Admitting: SURGERY
Payer: MEDICARE

## 2021-10-15 VITALS
RESPIRATION RATE: 20 BRPM | TEMPERATURE: 97.2 F | DIASTOLIC BLOOD PRESSURE: 71 MMHG | SYSTOLIC BLOOD PRESSURE: 127 MMHG | HEART RATE: 83 BPM

## 2021-10-15 DIAGNOSIS — L97.912 SKIN ULCER OF RIGHT LOWER LEG WITH FAT LAYER EXPOSED (H): ICD-10-CM

## 2021-10-15 DIAGNOSIS — L97.929 ULCER OF LOWER LIMB, LEFT, WITH UNSPECIFIED SEVERITY (H): ICD-10-CM

## 2021-10-15 PROCEDURE — 29581 APPL MULTLAYER CMPRN SYS LEG: CPT | Mod: 50

## 2021-10-15 NOTE — DISCHARGE INSTRUCTIONS
[facility]  [address] [facility] [address] [facility] [address]    Call us at [phone] if you have any questions about your wounds, have redness or swelling around your wound, have a fever of 101 or greater or if you have any other problems or concerns. We answer the phone Monday through Friday 8 am to 4 pm, please leave a message as we check the voicemail frequently throughout the day.     Jamar Ivory      1950    Medications/supplements to aid in healing:  Vein Formula 1 tablet twice daily order from www.veinformula.[employer] or call  [phone]  A diet high in protein is important for wound healing, we recommend getting 90 grams  of protein per day. Taking protein shakes or bars are a good way to get extra protein in  your diet.    Good sources of protein:  Pork 26g per 3 oz  Whey protein powder - 24g per scoop (on average)  Greek yogurt - 23g per 8oz  Chicken or Turkey - 23g per 3oz  Fish - 20-25g per 3oz  Beef - 18-23g per 3oz  Navy beans - 20g per cup  Cottage cheese - 14g per 1/2 cup  Lentils - 13g per 1/4 cup  Beef jerky 13g per 1oz  2% milk - 8g per cup  Peanut butter - 8g per 2 tablespoons  Eggs - 6g per egg  Mixed nuts - 6g per 2oz  Keep leg dry with use of a cast protector (available at [facility] or [facility])    Wound Dressing Change: Both Legs  Cleanse wound with prophase  Skin Care: Apply moisturizing cream to skin surrounding the wound (but not in the  wound): triamicolone  Cover wound with acticoat 7 cut to fit the size of the wound  Apply Zinc Wrap followed by kerra max dressing  Change dressing weekly.    Compression: Bilateral Lower Legs  Your compression wrap is a 2 layer coflex wrap and should stay on until next week.  Please remove compression dressing if toes turn blue and/or tingle and can not be  relieved by raising the leg for one hour.  Walk as much as you can. When you sit raise your ankle above your hips to  promote wound healing.    It is very  important to follow your healthcare providers instructions. Studies indicate when compression therapy is applied as directed, healing may occur faster and more completely. Do Not remove CoFlex unless your healthcare provider tells you to remove it.  Helpful Tips  Your bandage may feel tight at first. This is normal. When the swelling goes down, it will not feel as tight.  Wear the stocking that comes with the system over the bandage to help you put on shoes and clothing  Wear comfortable shoes and walk regularly. Walking will improve your circulation which will improve healing.  Keep your bandage dry.   CoFlex maybe left on your leg for up to 7 days. After that your doctor or nurse will apply a new CoFlex.   Call your doctor or nurse if the bandage gets wet, slips down or if you have pain, tingling, numbness or discoloration.     If you had a positive experience please indicate on your patient satisfaction survey form that  [facility] will be sending you.    If you have any billing related questions please call the [facility] Business office at [phone]. The clinic staff does not handle billing related matters.

## 2021-10-15 NOTE — PROGRESS NOTES
[facility] Wound Healing [place] Nurse Note    Subject: Jamar Ivory presents for nurse only visit for wound check and dressing change.      Exam:  /71 (BP Location: Left arm)   Pulse 83   Temp 97.2  F (36.2  C) (Temporal)   Resp 20   Wound (used by OP [facility] only) 02/23/21 1118 Right ulceration, venous (Active)   Thickness/Stage full thickness 10/15/21 1400   Dressing Appearance moist drainage 10/15/21 1400   Base granulating 10/15/21 1400   Periwound intact 10/15/21 1400   Periwound Temperature warm 10/15/21 1400   Periwound Skin Turgor soft 10/15/21 1400   Edges open 10/15/21 1400   Length (cm) 0.6 10/15/21 1400   Width (cm) 0.8 10/15/21 1400   Depth (cm) 0.1 10/15/21 1400   Wound (cm^2) 0.48 cm^2 10/15/21 1400   Wound Volume (cm^3) 0.05 cm^3 10/15/21 1400   Wound healing % 93.77 10/15/21 1400   Drainage Characteristics/Odor serosanguineous 10/15/21 1400   Drainage Amount moderate 10/15/21 1400   Care, Wound non-select wound debridement performed 10/15/21 1400       Wound (used by OP [facility] only) 03/16/21 1030 Left ulceration, venous (Active)   Thickness/Stage full thickness 10/15/21 1400   Dressing Appearance moist drainage 10/15/21 1400   Base granulating 10/15/21 1400   Periwound intact 10/15/21 1400   Periwound Temperature warm 10/15/21 1400   Periwound Skin Turgor soft 10/15/21 1400   Edges open 10/15/21 1400   Length (cm) 0.4 10/15/21 1400   Width (cm) 0.4 10/15/21 1400   Depth (cm) 0.1 10/15/21 1400   Wound (cm^2) 0.16 cm^2 10/15/21 1400   Wound Volume (cm^3) 0.02 cm^3 10/15/21 1400   Wound healing % 96.86 10/15/21 1400   Drainage Characteristics/Odor serosanguineous 10/15/21 1400   Drainage Amount moderate 10/15/21 1400   Care, Wound non-select wound debridement performed 10/15/21 1400       Wound (used by OP [facility] only) 10/15/21 1423 Left (Active)   Thickness/Stage full thickness 10/15/21 1400   Dressing Appearance moist drainage 10/15/21 1400   Base granulating 10/15/21 1400   Periwound intact  10/15/21 1400   Periwound Temperature cool 10/15/21 1400   Periwound Skin Turgor soft 10/15/21 1400   Edges open 10/15/21 1400   Length (cm) 2.4 10/15/21 1400   Width (cm) 1.1 10/15/21 1400   Depth (cm) 0.1 10/15/21 1400   Wound (cm^2) 2.64 cm^2 10/15/21 1400   Wound Volume (cm^3) 0.26 cm^3 10/15/21 1400   Drainage Characteristics/Odor serosanguineous 10/15/21 1400   Drainage Amount moderate 10/15/21 1400   Care, Wound non-select wound debridement performed 10/15/21 1400     Procedure:  Non-selective debridement was performed, no bleeding occurred. Patient tolerated procedure well. Light layer of sween cream to Left lower leg intact skin.  Procedure : Wound redressed with acticoat 7and application of 2 layer zinc corflex wrap was applied.  Plan: Patient will return to the clinic in 1 week time  October 15, 2021

## 2021-10-22 ENCOUNTER — HOSPITAL ENCOUNTER (OUTPATIENT)
Dept: WOUND CARE | Facility: CLINIC | Age: 71
Discharge: HOME | End: 2021-10-22
Attending: SURGERY | Admitting: SURGERY
Payer: MEDICARE

## 2021-10-22 VITALS
TEMPERATURE: 97.3 F | HEART RATE: 74 BPM | RESPIRATION RATE: 18 BRPM | SYSTOLIC BLOOD PRESSURE: 132 MMHG | DIASTOLIC BLOOD PRESSURE: 71 MMHG

## 2021-10-22 DIAGNOSIS — L97.912 SKIN ULCER OF RIGHT LOWER LEG WITH FAT LAYER EXPOSED (H): ICD-10-CM

## 2021-10-22 DIAGNOSIS — R60.0 BILATERAL LEG EDEMA: Primary | ICD-10-CM

## 2021-10-22 DIAGNOSIS — L97.929 ULCER OF LOWER LIMB, LEFT, WITH UNSPECIFIED SEVERITY (H): ICD-10-CM

## 2021-10-22 DIAGNOSIS — I73.9 PVD (PERIPHERAL VASCULAR DISEASE) (H): ICD-10-CM

## 2021-10-22 PROCEDURE — 29581 APPL MULTLAYER CMPRN SYS LEG: CPT | Mod: 50,XU

## 2021-10-22 PROCEDURE — 97602 WOUND(S) CARE NON-SELECTIVE: CPT

## 2021-10-22 NOTE — PROGRESS NOTES
[facility] Wound Healing [place] Nurse Note    Subject: Jamar Ivory presents for nurse only visit for wound check and dressing change. Pt measured for Compreflex wraps to be used once the pt is ready to transition from the 2 layer wraps.       Exam:  /71 (BP Location: Left arm)   Pulse 74   Temp 97.3  F (36.3  C) (Temporal)   Resp 18   Wound (used by OP I only) 02/23/21 1118 Right ulceration, venous (Active)   Thickness/Stage full thickness 10/22/21 1300   Dressing Appearance moist drainage 10/22/21 1300   Base granulating 10/22/21 1300   Periwound intact 10/22/21 1300   Periwound Temperature warm 10/22/21 1300   Periwound Skin Turgor soft 10/22/21 1300   Edges open 10/22/21 1300   Length (cm) 0.5 10/22/21 1300   Width (cm) 0.5 10/22/21 1300   Depth (cm) 0.1 10/22/21 1300   Wound (cm^2) 0.25 cm^2 10/22/21 1300   Wound Volume (cm^3) 0.02 cm^3 10/22/21 1300   Wound healing % 96.75 10/22/21 1300   Drainage Characteristics/Odor serosanguineous 10/22/21 1300   Drainage Amount moderate 10/22/21 1300   Care, Wound non-select wound debridement performed 10/22/21 1300       Wound (used by OP I only) 10/15/21 1423 Left (Active)   Thickness/Stage full thickness 10/22/21 1300   Dressing Appearance intact 10/22/21 1300   Base granulating 10/22/21 1300   Periwound intact 10/22/21 1300   Periwound Temperature warm 10/22/21 1300   Periwound Skin Turgor soft 10/22/21 1300   Edges open 10/22/21 1300   Length (cm) 1.5 10/22/21 1300   Width (cm) 0.3 10/22/21 1300   Depth (cm) 0.1 10/22/21 1300   Wound (cm^2) 0.45 cm^2 10/22/21 1300   Wound Volume (cm^3) 0.04 cm^3 10/22/21 1300   Wound healing % 82.95 10/22/21 1300   Drainage Characteristics/Odor serosanguineous 10/22/21 1300   Drainage Amount moderate 10/22/21 1300   Care, Wound non-select wound debridement performed 10/22/21 1300     Procedure:  Non-selective debridement was performed, no bleeding occurred. Patient tolerated procedure well.  Procedure : Bilateral  lower legs were cleaned with Prophase. Wound redressed with acticoat 7 then Application of zinc 2 layer coflex wrap was applied.  Plan: Patient will return to the clinic in 1 weeks time  October 22, 2021  No images are attached to the encounter.

## 2021-10-22 NOTE — DISCHARGE INSTRUCTIONS
[facility]  [address] [facility] [address] [facility] [address]  Appointment Phone [phone]     Jamar Ivory      1950  Keep leg dry with use of a cast protector (available at [facility] or [facility])  Wound Dressing Change: Right and left lower leg  Cleanse wound with soap and water  Skin Care: Apply moisturizing cream to skin surrounding the wound (but not in the wound):sween 24  Cover wound with acticoat 7 cut to fit the size of the wound   Apply Edemawear followed by mackenzie barrera dressing  Change dressing weekly.   Compression:   Your compression wrap is a compreflex wrap for foot and calf. To be worn daily.   Please remove compression dressing if toes turn blue and/or tingle and can not be relieved by raising the leg for one hour.     Walk as much as you can. When you sit raise your ankle above your hips to promote wound healing.     It is very important to follow your healthcare providers instructions. Studies indicate when compression therapy is applied as directed, healing may occur faster and more completely. Do Not remove CoFlex unless your healthcare provider tells you to remove it.  Helpful Tips  Your bandage may feel tight at first. This is normal. When the swelling goes down, it will not feel as tight.  Wear the stocking that comes with the system over the bandage to help you put on shoes and clothing  Wear comfortable shoes and walk regularly. Walking will improve your circulation which will improve healing.  Keep your bandage dry.   CoFlex maybe left on your leg for up to 7 days. After that your doctor or nurse will apply a new CoFlex.   Call your doctor or nurse if the bandage gets wet, slips down or if you have pain, tingling, numbness or discoloration.    Gilles Alejandro M.D.. October 22, 2021  Call us at [phone] if you have any questions about your wounds, have redness or swelling around your wound, have a fever of 101 or greater or if you have any  other problems or concerns. We answer the phone Monday through Friday 8 am to 4 pm, please leave a message as we check the voicemail frequently throughout the day.

## 2021-11-01 ENCOUNTER — HOSPITAL ENCOUNTER (OUTPATIENT)
Dept: WOUND CARE | Facility: CLINIC | Age: 71
Discharge: HOME | End: 2021-11-01
Attending: SURGERY | Admitting: SURGERY
Payer: MEDICARE

## 2021-11-01 VITALS
RESPIRATION RATE: 20 BRPM | DIASTOLIC BLOOD PRESSURE: 88 MMHG | TEMPERATURE: 97.1 F | SYSTOLIC BLOOD PRESSURE: 140 MMHG | HEART RATE: 72 BPM

## 2021-11-01 DIAGNOSIS — L97.912 SKIN ULCER OF RIGHT LOWER LEG WITH FAT LAYER EXPOSED (H): ICD-10-CM

## 2021-11-01 DIAGNOSIS — L97.929 ULCER OF LOWER LIMB, LEFT, WITH UNSPECIFIED SEVERITY (H): ICD-10-CM

## 2021-11-01 PROCEDURE — 97597 DBRDMT OPN WND 1ST 20 CM/<: CPT | Performed by: SURGERY

## 2021-11-01 NOTE — DISCHARGE INSTRUCTIONS
[facility]  [address] [address]    Call us at [phone] if you have any questions about your wounds, have redness or swelling around your wound, have a fever of 101 or greater or if you have any other problems or concerns. We answer the phone Monday through Friday 8 am to 4 pm, please leave a message as we check the voicemail frequently throughout the day.     Jamar Ivory      1950    Keep leg dry with use of a cast protector (available at [employer] or [facility] [place])  Wound Dressing Change: Right and left lower leg    Compression:  Your compression wrap is a compreflex wrap for foot and calf. To be worn daily.  Please remove compression dressing if toes turn blue and/or tingle and can not be  relieved by raising the leg for one hour.  Walk as much as you can. When you sit raise your ankle above your hips to  promote wound healing.  It is very important to follow your healthcare providers instructions. Studies indicate  when compression therapy is applied as directed, healing may occur faster and more  completely. Do Not remove CoFlex unless your healthcare provider tells you to remove  it.  Helpful Tips  Your bandage may feel tight at first. This is normal. When the swelling goes down, it will  not feel as tight.    Wear the stocking that comes with the system over the bandage to help you put on  shoes and clothing  Wear comfortable shoes and walk regularly. Walking will improve your circulation which  will improve healing.  Keep your bandage dry.  CoFlex maybe left on your leg for up to 7 days. After that your doctor or nurse will  apply a new CoFlex.  Call your doctor or nurse if the bandage gets wet, slips down or if you have pain,  tingling, numbness or discoloration.     Gilles Alejandro M.D.. November 1, 2021      If you had a positive experience please indicate on your patient satisfaction survey form that [facility] will be sending  you.    If you have any billing related questions please call the [facility] Business office at [phone]. The clinic staff does not handle billing related matters.

## 2021-11-01 NOTE — PROGRESS NOTES
[facility] [place] Progress Note     Subject: Jamar Ivory returns for follow-up of his bilateral calf ulcers.  We have been seeing us on a weekly basis.  Last seen on 10/22/2021 where he underwent nonselective debridement and use of the zinc 2 layer Coflex wrap.  This has been working out very well for him with gradual improvement of the ulcers as noted in the photograph.    On the last visit the right ulcers had healed.  He and his wife have no complaints.    They went to  the Coflex wraps but only 3 or 4 are available today.    Patient Active Problem List   Diagnosis     Acquired hypothyroidism     Vitiligo     Hyperlipidemia with target LDL less than 100     Hypertension goal BP (blood pressure) < 140/90     Cerebral infarction (H)     Moderate major depression (H)     Health Care Home     Fatty liver     Advanced directives, counseling/discussion     Impaired glucose tolerance     Transient cerebral ischemia     Obesity with serious comorbidity     Bilateral leg edema     Diet Controlled Type 2 diabetes mellitus without complication, without long-term current use of insulin (H)     Benign neoplasm of colon     Pain in both lower extremities     Low hemoglobin     Breast tenderness in male     Hx of Diabetic polyneuropathy associated with type 2 diabetes mellitus - now diet controlled (H)     Chronic combined systolic and diastolic congestive heart failure (H)     PVD (peripheral vascular disease) (H)     Benign prostatic hyperplasia with incomplete bladder emptying     Skin ulcer of right lower leg with fat layer exposed (H)     Depression, major, recurrent, in complete remission (H)     Shortness of breath     Chest tightness     Coronary artery disease, noted to have a small segment infarct with teo-infarct ischemia     Precordial pain     Morbid obesity (H)     Ulcer of lower limb, left, with unspecified severity (H)     Past Medical History:   Diagnosis Date     Arthritis      CHF  (congestive heart failure) (H) 12/24/2018     CVA (cerebral infarction) 2012     CVD (cardiovascular disease)      Fatty liver      Gout      Hypertension goal BP (blood pressure) < 140/90 1/17/2011     Major depressive disorder, single episode, severe, without mention of psychotic behavior 1977    hospitalized     Obesity, morbid (more than 100 lbs over ideal weight or BMI > 40) (H)      Shortness of breath      Spider veins      TIA (transient ischaemic attack) 2012     Unspecified hypothyroidism      Vitiligo      Exam:  BP (!) 140/88   Pulse 72   Temp 97.1  F (36.2  C) (Temporal)   Resp 20   Wound (used by OP [facility] only) 02/23/21 1118 Right ulceration, venous (Active)   Dressing Appearance moist drainage 11/01/21 1031   Drainage Characteristics/Odor serosanguineous 11/01/21 1031   Drainage Amount moderate 11/01/21 1031       Wound (used by OP I only) 10/15/21 1423 Left (Active)   Dressing Appearance intact 11/01/21 1031   Drainage Characteristics/Odor serosanguineous 11/01/21 1031   Drainage Amount moderate 11/01/21 1031     Alert and appropriate.  Very comfortable.  Here with his wife.  Right leg looks very good with all areas healed in good mature epithelium.  Left leg ulcers are also almost essentially healed in all sites.  There is overlying epithelial thickened areas over several of the sites.  Long toenails are also noted bilaterally with deformity due to onychomycosis of the right great toenail and marked thickening.    Procedure:   Patient was determined to be capable of making their own medical decisions and informed consent was obtained. Topical anesthetic of 4% lidocaine was applied, debridement was performed using a #15 blade a selective debridement was performed on the sites in the left.  Overlying thickened epithelium was removed from all sites.  Essentially healing of all sites noted appreciated after debridement.  Bleeding controlled with light pressure. Patient tolerated procedure  well.    Impression: #1.  Ulcers in the right calf that healed.  Almost complete healing of the left calf.  Since his Coflex is not yet available we will do 1 more week of the zinc dynamic compression wraps.  Acticoat 7 is not necessary due to the healing underlying the wrap.     #2.  Patient will return next week when they are able to obtain all the prophylaxis and will discontinue the dynamic compression wraps and switch over to this wrap with instruction of his wife who will be changing this at home for him.     #3.  All toenails were trimmed today at no charge since they have not been able yet to make an appointment for nail trimming.    Plan: We will dress the wounds with primary dressing zinc dynamic compression wrap which will be removed next week and if all looks fine transition over to a Coflex chronic wraps with instruction to his wife at that time.  Patient will return to the clinic in 1 weeks time    Gilles Alejandro on 11/1/2021 at 10:54 AM

## 2021-11-03 DIAGNOSIS — Z11.59 ENCOUNTER FOR SCREENING FOR OTHER VIRAL DISEASES: ICD-10-CM

## 2021-11-08 ENCOUNTER — HOSPITAL ENCOUNTER (OUTPATIENT)
Dept: WOUND CARE | Facility: CLINIC | Age: 71
Discharge: HOME | End: 2021-11-08
Attending: SURGERY | Admitting: SURGERY
Payer: MEDICARE

## 2021-11-08 VITALS — TEMPERATURE: 96.8 F | DIASTOLIC BLOOD PRESSURE: 78 MMHG | HEART RATE: 76 BPM | SYSTOLIC BLOOD PRESSURE: 130 MMHG

## 2021-11-08 DIAGNOSIS — L97.929 ULCER OF LOWER LIMB, LEFT, WITH UNSPECIFIED SEVERITY (H): ICD-10-CM

## 2021-11-08 DIAGNOSIS — L97.912 SKIN ULCER OF RIGHT LOWER LEG WITH FAT LAYER EXPOSED (H): ICD-10-CM

## 2021-11-08 PROCEDURE — G0463 HOSPITAL OUTPT CLINIC VISIT: HCPCS

## 2021-11-08 NOTE — PROGRESS NOTES
[facility] [place] Nurse Note    Subject: Jamar Ivory presents for nurse only visit for wound check and dressing change. Wounds have healed.  Demonstrated how to perform velcro wrappings to patient and wife. They report understanding. Patient will perform daily.     Exam:  /78   Pulse 76   Temp 96.8  F (36  C) (Temporal)     Further instructions from your care team           Your wound is Healed!! Dressings are no longer required.   Wash legs with soap and water in the shower. Apply Sween Cream to skin. Apply a Liner(white stockinet or black sock)   Compression:Apply velcro wraps to calf and foot pulling to green line 30-40 mmhg and wear daily for the rest of your life.  You have a compression wraps that are supposed to be removed at night and put back on first thing in the morning.It is ok to leave on if easier then removing.   Wash and laundry as needed for soilage. Mild detergent and dry on low.   Please remove compression dressing if toes turn blue and/or tingle and can not be relieved by raising the leg for one hour.     Plan: Patient will return to the clinic in PRN.He will follow up with lymphedema therapy for continued education on managing his swelling.   November 8, 2021

## 2021-11-08 NOTE — DISCHARGE INSTRUCTIONS
[facility]  [address] [facility] [address] [place] [address]  Appointment Phone [phone]     Jamar Ivory      1950  Your wound is Healed!! Dressings are no longer required.   Wash legs with soap and water in the shower. Apply Sween Cream to skin. Apply a Liner(white stockinet or black sock)   Compression:Apply velcro wraps to calf and foot pulling to green line 30-40 mmhg and wear daily for the rest of your life.  You have a compression wraps that are supposed to be removed at night and put back on first thing in the morning.It is ok to leave on if easier then removing.   Wash and laundry as needed for soilage. Mild detergent and dry on low.   Please remove compression dressing if toes turn blue and/or tingle and can not be relieved by raising the leg for one hour.      Gilles Alejandro M.D.. November 8, 2021    Once the ulcer has healed, you will need to use compression stocking to help the pumping action in your veins. The stockings will also reduce swelling.  Home care  Follow these tips when caring for yourself at home:    Use any special compression dressings or stockings as directed.    Walk regularly. This helps the blood flow better in your legs.    Maintain a healthy weight. If you are overweight, talk with your provider about a weight loss program.    If you smoke, quit smoking. This will ease your symptoms and lower the chance that the disease will get worse. Join a stop-smoking program or ask your provider for help in quitting.    Check your feet and legs for skin breaks or color changes. Report these to your provider. This could be an early sign of an ulcer.    When standing, shift your weight from one leg to the other.    When sitting for long periods, put your feet up. Move your feet and ankles often to get your calf muscles moving. Get up and walk from time to time.

## 2021-11-12 ENCOUNTER — OFFICE VISIT (OUTPATIENT)
Dept: FAMILY MEDICINE | Facility: CLINIC | Age: 71
End: 2021-11-12
Payer: MEDICARE

## 2021-11-12 VITALS
OXYGEN SATURATION: 96 % | RESPIRATION RATE: 22 BRPM | HEIGHT: 68 IN | BODY MASS INDEX: 49.26 KG/M2 | SYSTOLIC BLOOD PRESSURE: 132 MMHG | DIASTOLIC BLOOD PRESSURE: 80 MMHG | TEMPERATURE: 97.9 F | HEART RATE: 68 BPM

## 2021-11-12 DIAGNOSIS — E66.01 MORBID OBESITY (H): ICD-10-CM

## 2021-11-12 DIAGNOSIS — I10 HYPERTENSION GOAL BP (BLOOD PRESSURE) < 140/90: ICD-10-CM

## 2021-11-12 DIAGNOSIS — Z12.11 SCREEN FOR COLON CANCER: Primary | ICD-10-CM

## 2021-11-12 DIAGNOSIS — M17.0 PRIMARY OSTEOARTHRITIS OF BOTH KNEES: ICD-10-CM

## 2021-11-12 PROCEDURE — 99214 OFFICE O/P EST MOD 30 MIN: CPT | Performed by: FAMILY MEDICINE

## 2021-11-12 RX ORDER — CYCLOBENZAPRINE HCL 5 MG
5 TABLET ORAL 2 TIMES DAILY PRN
Qty: 45 TABLET | Refills: 1 | Status: SHIPPED | OUTPATIENT
Start: 2021-11-12 | End: 2021-12-21

## 2021-11-12 RX ORDER — MELOXICAM 15 MG/1
15 TABLET ORAL DAILY
Qty: 30 TABLET | Refills: 0 | Status: SHIPPED | OUTPATIENT
Start: 2021-11-12 | End: 2021-12-06

## 2021-11-12 ASSESSMENT — PAIN SCALES - GENERAL: PAINLEVEL: NO PAIN (0)

## 2021-11-12 NOTE — PROGRESS NOTES
"  Assessment & Plan     Screen for colon cancer      Primary osteoarthritis of both knees  Worsening bilateral knee DJD (Lt > Rt), was seen by ortho 2020, and wanted to do conservative management, but unfortunately his knee pain has been gradually worsen  Intraarticular corticosteroid injection was offered, but  Pt declined  Will have him to try [place]-2I with muscle relaxant   Encouraged him to recheck with ortho if not improving. Pt acknowledged and agreed with the plan   - meloxicam (MOBIC) 15 MG tablet; Take 1 tablet (15 mg) by mouth daily  - cyclobenzaprine (FLEXERIL) 5 MG tablet; Take 1 tablet (5 mg) by mouth 2 times daily as needed for muscle spasms             BMI:   Estimated body mass index is 49.26 kg/m  as calculated from the following:    Height as of this encounter: 1.727 m (5' 8\").    Weight as of 10/7/21: 147 kg (324 lb).   Weight management plan: Discussed healthy diet and exercise guidelines    FUTURE APPOINTMENTS:       - Follow-up visit as needed     No follow-ups on file.    Leander Casillas MD  [facility] VANNESSA PRAIRIVIDYA Cooper is a 71 year old who presents for the following health issues     HPI     Musculoskeletal problem/pain  Onset/Duration: ongoing  Description  Location: knee - right  Joint Swelling: YES  Redness: no  Pain: YES  Warmth: no  Intensity:  moderate  Progression of Symptoms:  worsening  Accompanying signs and symptoms:   Fevers: no  Numbness/tingling/weakness: no  History  Trauma to the area: no  Recent illness:  no  Previous similar problem: YES  Previous evaluation:  no  Precipitating or alleviating factors:  Aggravating factors include: elevation, standing, laying down   Therapies tried and outcome: rest/inactivity and sitting        Review of Systems   Constitutional, HEENT, cardiovascular, pulmonary, gi and gu systems are negative, except as otherwise noted.      Objective    /80   Pulse 68   Temp 97.9  F (36.6  C) (Tympanic)   Resp 22   Ht " "1.727 m (5' 8\")   SpO2 96%   BMI 49.26 kg/m    Body mass index is 49.26 kg/m .  Physical Exam   GENERAL: healthy, alert and no distress  NECK: no adenopathy, no asymmetry, masses, or scars and thyroid normal to palpation  RESP: lungs clear to auscultation - no rales, rhonchi or wheezes  CV: regular rate and rhythm, normal S1 S2, no S3 or S4, no murmur, click or rub, no peripheral edema and peripheral pulses strong  ABDOMEN: soft, nontender, no hepatosplenomegaly, no masses and bowel sounds normal  MS: decreased ROM on bilateral knee with pain, swelling and tender,  Intact MCL/PCL/LCL/ACL, negative Lachman test                "

## 2021-11-16 ENCOUNTER — TELEPHONE (OUTPATIENT)
Dept: WOUND CARE | Facility: CLINIC | Age: 71
End: 2021-11-16
Payer: MEDICARE

## 2021-11-16 NOTE — TELEPHONE ENCOUNTER
Melissa the wife of Kenneth called because after removing the wraps and socks he has wounds now.   Please call her at .

## 2021-11-17 NOTE — TELEPHONE ENCOUNTER
Call returned to Melissa. She reports Kenneth's wounds have reopened that she believes is caused by the velcro wraps rubbing on the lower part of the leg. She confirms they have been using the gray sock that comes with velcro wraps. Kenneth would like to be seen by Dr. Alejandor to have the legs re-evaluated and wraps reviewed. Melissa instructed to bring in the velcro wraps at the appointment with Dr. Alejandro. Appt made for 12/13.

## 2021-11-17 NOTE — ADDENDUM NOTE
Encounter addended by: Keara Fregoso RN on: 11/17/2021 9:52 AM   Actions taken: Charge Capture section accepted

## 2021-11-30 ENCOUNTER — HOSPITAL ENCOUNTER (OUTPATIENT)
Dept: OCCUPATIONAL THERAPY | Facility: CLINIC | Age: 71
Setting detail: THERAPIES SERIES
End: 2021-11-30
Attending: PHYSICIAN ASSISTANT
Payer: MEDICARE

## 2021-11-30 DIAGNOSIS — R60.0 BILATERAL LEG EDEMA: ICD-10-CM

## 2021-11-30 DIAGNOSIS — S81.802D OPEN WOUND OF LEFT LOWER EXTREMITY, SUBSEQUENT ENCOUNTER: ICD-10-CM

## 2021-11-30 PROCEDURE — 97535 SELF CARE MNGMENT TRAINING: CPT | Mod: GO | Performed by: OCCUPATIONAL THERAPIST

## 2021-11-30 PROCEDURE — 97165 OT EVAL LOW COMPLEX 30 MIN: CPT | Mod: GO | Performed by: OCCUPATIONAL THERAPIST

## 2021-11-30 NOTE — PROGRESS NOTES
11/30/21 1100   Quick Adds   Quick Adds Certification   Rehab Discipline   Discipline OT   Type of Visit   Type of visit Initial Edema Evaluation   General Information   Start of care 11/30/21   Referring physician Myah Florez PA-C   Orders Evaluate and treat as indicated;Evaluate and treat to include bandages and garments   Order date 10/07/21   Medical diagnosis Bilateral leg edema R60.0  - Primary . Ulcer of lower limb, left, with unspecified severity (H) L97.929. Skin ulcer of right lower leg with fat layer exposed (H) L97.912    Onset of illness / date of surgery 10/07/21  (PA Rx)   Affected body parts RLE;LLE   Edema etiology Chronic Venous Insufficiency  (PVD)   Edema etiology comments Pt was last evaluated in OT/lymphedema  on 4/16/21 with GCB layers and HEP reviewed, no further therapy.  ( Pt is displaying a pattern, going in and out of wearing GCB/compression until he has an edema episode, then gets ulcers and becomes more compliant for a while between bouts).  Also treated between Feb/March 2021, July to Sept 2020. June to July 2019.  July 2018 and first treatments were in Aug to Dec 2013.   Pertinent history of current problem (PT: include personal factors and/or comorbidities that impact the POC; OT: include additional occupational profile info)  PMHx includes Chronic combined systolic and diastolic congestive heart failure (H), long standing lyphedema and recurrent wound care needs, PVD (peripheral vascular disease) Acquired hypothyroidism, Fatty liver,Hypertension h/o of Cerebral infarction, and Diet Controlled Type 2 diabetes mellitus without complication, without long-term current use of insulin.  Pt also with pain in both lower extremities/knees.   Surgical / medical history reviewed Yes   Prior level of functional mobility Indep, no device.   Prior treatment Complete decongestive therapy;Compression garments;Exercise;MLD;Gradient compression bandaging   Community support Family /  "friend caregiver  (Wife, Melissa very supportive)   Patient role / employment history Retired   Fall Risk Screen   Fall screen completed by OT   Have you fallen 2 or more times in the past year? No   Have you fallen and had an injury in the past year? No   Is patient a fall risk? No   Abuse Screen (yes response referral indicated)   Feels Unsafe at Home or Work/School no   Feels Threatened by Someone no   Does Anyone Try to Keep You From Having Contact with Others or Doing Things Outside Your Home? no   Physical Signs of Abuse Present no   System Outcome Measures   Outcome Measures Lymphedema   Lymphedema Life Impact Scale (score range 0-72). A higher score indicates greater impairment. 4   Patient / Family Goals   Patient / family goals statement \"I just want o make sure we are doing the right thing...the velcro wraps did not go well for me.  The foot pieces felt to stiff on my feet, made them cramp up.  Calf sleeves broke my wounds open,  \"  pt states after had worn them for nearly 24 hours.   Pain   Pain comments B LE knee pain.   Cognitive Status   Orientation Orientation to person, place and time   Edema Exam / Assessment   Capillary refill Decreased   Capillary refill comments >15 sec b for BLE   Dorsal pedal pulse Decreased   Stemmer sign Positive   Stemmer sign comments at great toe and dorsal foot   Ulceration comments L shin has 6 open areas 1cm, 1cm, 1 x .5 cm, 7mm, 3mm, and 3mm round.  R mid fubula area has 1.5 x 1 cm open and scattered faragile, dry with icro open areas throughout 14 cm wide by 10 cn high area.  Distended varisoe vein areas scattered.  Hemosiderian staing for B shin to toe areas.   Girth Measurements   Girth Measurements Refer to separate girth measurement flowsheet   Volume LE   Right LE (mL) 4043.91   Left LE (mL) 4212.51   LE volume comparison LLE volume greater than RLE volume   % difference 4   Range of Motion   ROM comments Flexxes knees to 95 degrees., adequate ankle pump. "   Strength   Strength comments Walks 8 laps of house 3-4 times per day.   Activities of Daily Living   Activities of Daily Living Completes own bathing, dressing toileting.  WIfe helps with GCB, wound care tasks.   Planned Edema Interventions   Planned edema interventions Gradient compression bandaging;Precautions to prevent infection / exacerbation;Wound care / dressing changes;Skin care / precautions   Planned edema intervention comments REcheck of gome program and education on being consistent with daily GCB wear.     Clinical Impression   Criteria for skilled therapeutic intervention met Current level of function same as previous level of function   Therapy diagnosis lymphedema with wounds   Influenced by the following impairments / conditions Stage 2;Wounds / Ulcers;Phlebolymphedema   Identified Performance Deficits wounds open up when he is not wearing his GCB layers.     Clinical Decision Making (Complexity) Low complexity   Treatment duration Eval and education session only   Risks and benefits of therapy have been explained Yes   Education Assessment   Preferred learning style Listening;Reading;Demonstration;Pictures / video   Barriers to learning No barriers   Goals   Edema Eval Goals 1   Goal 1   Goal identifier Prevention   Goal description Pt /wife to return recall of 3 ways wearing compression will support prevention of further wound complications in the future and how to take minimal skin breaks from GCB,  going without only for over night hours and < 2 hrs after shower per week   Target date 11/30/21   Date met 11/30/21   Total Evaluation Time   OT Eval, Low Complexity Minutes (61672) 45   Certification   Certification date from 11/30/21   Certification date to 11/30/21   Medical Diagnosis B LE lymphedema   Certification I certify the need for these services furnished under this plan of treatment and while under my care.  (Physician co-signature of this document indicates review and certification of  the therapy plan).      Despite offer, pt declines need for MLD, GCB intensive with OT as they feel their plan has worked out in the past, and legs are closing up with GCB /foam and stockinette layers in place.    Thank you for this thoughtful referral! If you have any questions, please call me [phone].  Nice to share in this patient's care with you.    Sincerely,   Stacey Robins, OTR/l

## 2021-11-30 NOTE — PROGRESS NOTES
[facility]        OUTPATIENT OCCUPATIONAL THERAPY EDEMA EVALUATION  PLAN OF TREATMENT FOR OUTPATIENT REHABILITATION  (COMPLETE FOR INITIAL CLAIMS ONLY)  Patient's Last Name, First Name, Jamar Garrido                           Provider s Name:   [facility] Medical Record No.  [id number]     Start of Care Date:  11/30/21   Onset Date:   10/7/2021   Type:  OT   Medical Diagnosis:  (P) B LE lymphedema   Therapy Diagnosis:  (P) lymphedema with wounds Visits from SOC:  1                                     __________________________________________________________________________________   Plan of Treatment/Functional Goals:    (P) Gradient compression bandaging,Precautions to prevent infection / exacerbation,Wound care / dressing changes,Skin care / precautions  (P) REcheck of gome program and education on being consistent with daily GCB wear.       GOALS  1. Goal description: (P) Pt /wife to return recall of 3 ways wearing compression will support prevention of further wound complications in the future and how to take minimal skin breaks from GCB,  going without only for over night hours and < 2 hrs after shower per week       Target date: (P) 11/30/21             Treatment duration: (P) Eval and education session only    Stacey Robins, OTR                                  I CERTIFY THE NEED FOR THESE SERVICES FURNISHED UNDER THIS PLAN OF TREATMENT AND WHILE UNDER MY CARE     (Physician signature in associated progress note indicates review and certification of the therapy plan)                  Certification date from: (P) 11/30/21       Certification date to: (P) 11/30/21           Referring physician: Myah Florez PA-C   Initial Assessment  See Epic Evaluation- Start of care: 11/30/21

## 2021-12-03 ENCOUNTER — OFFICE VISIT (OUTPATIENT)
Dept: INTERNAL MEDICINE | Facility: CLINIC | Age: 71
End: 2021-12-03
Payer: MEDICARE

## 2021-12-03 VITALS
SYSTOLIC BLOOD PRESSURE: 134 MMHG | OXYGEN SATURATION: 94 % | BODY MASS INDEX: 49.66 KG/M2 | HEART RATE: 88 BPM | TEMPERATURE: 97.8 F | DIASTOLIC BLOOD PRESSURE: 76 MMHG | WEIGHT: 315 LBS

## 2021-12-03 DIAGNOSIS — I10 HYPERTENSION GOAL BP (BLOOD PRESSURE) < 140/90: Chronic | ICD-10-CM

## 2021-12-03 DIAGNOSIS — Z23 HIGH PRIORITY FOR 2019-NCOV VACCINE: ICD-10-CM

## 2021-12-03 DIAGNOSIS — I25.119 CORONARY ARTERY DISEASE INVOLVING NATIVE CORONARY ARTERY OF NATIVE HEART WITH ANGINA PECTORIS (H): ICD-10-CM

## 2021-12-03 DIAGNOSIS — E78.5 HYPERLIPIDEMIA WITH TARGET LDL LESS THAN 100: Chronic | ICD-10-CM

## 2021-12-03 DIAGNOSIS — Z01.818 PREOP GENERAL PHYSICAL EXAM: Primary | ICD-10-CM

## 2021-12-03 DIAGNOSIS — D50.9 IRON DEFICIENCY ANEMIA, UNSPECIFIED IRON DEFICIENCY ANEMIA TYPE: ICD-10-CM

## 2021-12-03 DIAGNOSIS — E11.9 TYPE 2 DIABETES MELLITUS WITHOUT COMPLICATION, WITHOUT LONG-TERM CURRENT USE OF INSULIN (H): Chronic | ICD-10-CM

## 2021-12-03 PROCEDURE — 99214 OFFICE O/P EST MOD 30 MIN: CPT | Performed by: PHYSICIAN ASSISTANT

## 2021-12-03 PROCEDURE — 0004A COVID-19,PF,PFIZER (12+ YRS): CPT | Performed by: PHYSICIAN ASSISTANT

## 2021-12-03 PROCEDURE — 91300 COVID-19,PF,PFIZER (12+ YRS): CPT | Performed by: PHYSICIAN ASSISTANT
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
## 2021-12-03 NOTE — PATIENT INSTRUCTIONS
Stop aspirin one week prior to colonoscopy  Do not take torsemide the morning of colonoscopy    Preparing for Your Surgery  Getting started  A nurse will call you to review your health history and instructions. They will give you an arrival time based on your scheduled surgery time.  Please be ready to share the following:    Your doctor's clinic name and phone number    Your medical, surgical and anesthesia history    A list of allergies and sensitivities    A list of medicines, including herbal treatments and over-the-counter drugs    Whether the patient has a legal guardian (ask how to send us the papers in advance)  If you have a child who's having surgery, please ask for a copy of Preparing for Your Child's Surgery.    Preparing for surgery    Within 30 days of surgery: Have a pre-op exam (sometimes called an H&P, or History and Physical). This can be done at a clinic or pre-operative center.  ? If you're having a , you may not need this exam. Talk to your care team    At your pre-op exam, talk to your care team about all medicines you take. If you need to stop any medicines before surgery, ask when to start taking them again.  ? We do this for your safety. Many medicines can make you bleed too much during surgery. Some change how well surgery (anesthesia) drugs work.    Call your insurance company to let them know you're having surgery. (If you don't have insurance, call [phone].)    Call your clinic if there's any change in your health. This includes signs of a cold or flu (sore throat, runny nose, cough, rash, fever). It also includes a scrape or scratch near the surgery site.    If you have questions on the day of surgery, call your hospital or surgery center.  Eating and drinking guidelines  For your safety: Unless your surgeon tells you otherwise, follow the guidelines below.    Eat and drink as usual until 8 hours before surgery. After that, no food or milk.    Drink clear liquids until 2  hours before surgery. These are liquids you can see through, like water, Gatorade and Propel Water. You may also have black coffee and tea (no cream or milk).    Nothing by mouth within 2 hours of surgery. This includes gum, candy and breath mints.    If you drink, stop drinking alcohol the night before surgery.    If your care team tells you to take medicine on the morning of surgery, it's okay to take it with a sip of water.  Preventing infection    Shower or bathe the night before and morning of your surgery. Follow the instructions your clinic gave you. (If no instructions, use regular soap.)    Don't shave or clip hair near your surgery site. We'll remove the hair if needed.    Don't smoke or vape the morning of surgery. You may chew nicotine gum up to 2 hours before surgery. A nicotine patch is okay.  ? Note: Some surgeries require you to completely quit smoking and nicotine. Check with your surgeon.    Your care team will make every effort to keep you safe from infection. We will:  ? Clean our hands often with soap and water (or an alcohol-based hand rub).  ? Clean the skin at your surgery site with a special soap that kills germs.  ? Give you a special gown to keep you warm. (Cold raises the risk of infection.)  ? Wear special hair covers, masks, gowns and gloves during surgery.  ? Give antibiotic medicine, if prescribed. Not all surgeries need antibiotics.  What to bring on the day of surgery    Photo ID and insurance card    Copy of your health care directive, if you have one    Glasses and hearing aides (bring cases)  ? You can't wear contacts during surgery    Inhaler and eye drops, if you use them (tell us about these when you arrive)    CPAP machine or breathing device, if you use them    A few personal items, if spending the night    If you have . . .  ? A pacemaker or ICD (cardiac defibrillator): Bring the ID card.  ? An implanted stimulator: Bring the remote control.  ? A legal guardian: Bring a  copy of the certified (court-stamped) guardianship papers.  Please remove any jewelry, including body piercings. Leave jewelry and other valuables at home.  If you're going home the day of surgery  Important: If you don't follow the rules below, we must cancel your surgery.     Arrange for someone to drive you home after surgery. You may not drive, take a taxi or take public transportation by yourself (unless you'll have local anesthesia only).    Arrange for a responsible adult to stay with you overnight. If you don't, we may keep you in the hospital overnight, and you may need to pay the costs yourself.  Questions?   If you have any questions for your care team, list them here: _________________________________________________________________________________________________________________________________________________________________________________________________________________________________________________________________________________________________________________________  For informational purposes only. Not to replace the advice of your health care provider. Copyright   2003, 2019 [employer]. All rights reserved. Clinically reviewed by Florinda Snyder MD. Yorn [id number] - REV 4/20.      used

## 2021-12-04 DIAGNOSIS — E66.01 MORBID OBESITY (H): ICD-10-CM

## 2021-12-04 DIAGNOSIS — M17.0 PRIMARY OSTEOARTHRITIS OF BOTH KNEES: ICD-10-CM

## 2021-12-06 RX ORDER — MELOXICAM 15 MG/1
TABLET ORAL
Qty: 30 TABLET | Refills: 0 | Status: SHIPPED | OUTPATIENT
Start: 2021-12-06 | End: 2022-01-04

## 2021-12-06 NOTE — TELEPHONE ENCOUNTER
Routing refill request to provider for review/approval because:  Failed protocol    Kassi MACKEY RN  EP Triage    '

## 2021-12-13 ENCOUNTER — HOSPITAL ENCOUNTER (OUTPATIENT)
Dept: WOUND CARE | Facility: CLINIC | Age: 71
Discharge: HOME | End: 2021-12-13
Attending: SURGERY | Admitting: SURGERY
Payer: MEDICARE

## 2021-12-13 VITALS — TEMPERATURE: 97.4 F | HEART RATE: 77 BPM | DIASTOLIC BLOOD PRESSURE: 91 MMHG | SYSTOLIC BLOOD PRESSURE: 163 MMHG

## 2021-12-13 DIAGNOSIS — L97.912 SKIN ULCER OF RIGHT LOWER LEG WITH FAT LAYER EXPOSED (H): ICD-10-CM

## 2021-12-13 DIAGNOSIS — L97.929 ULCER OF LOWER LIMB, LEFT, WITH UNSPECIFIED SEVERITY (H): ICD-10-CM

## 2021-12-13 PROCEDURE — 97597 DBRDMT OPN WND 1ST 20 CM/<: CPT | Performed by: SURGERY

## 2021-12-13 PROCEDURE — 97597 DBRDMT OPN WND 1ST 20 CM/<: CPT

## 2021-12-13 NOTE — DISCHARGE INSTRUCTIONS
Jamar Ivory      1950    Wash legs with soap and water in the shower. Apply CeraVE healing ointment with Ceramides, apply vaseline gauze to open areas, apply roll gauze and secure with tape Apply a Liner black sock  Compression:Apply velcro wraps to calf pulling to green line 30-40 mmhg and wear daily for the rest of your life.  OR Can use gradient compression bandages.   You have a compression wraps that are supposed to be removed at night and put back on first thing in the morning.It is ok to leave on if easier then removing.  Wash and laundry as needed for soilage. Mild detergent and dry on low.  Please remove compression dressing if toes turn blue and/or tingle and can not be relieved by raising the leg for one hour       Gilles Alejandro M.D.. December 13, 2021    Call us at [phone] if you have any questions about your wounds, have redness or swelling around your wound, have a fever of 101 or greater or if you have any other problems or concerns. We answer the phone Monday through Friday 8 am to 4 pm, please leave a message as we check the voicemail frequently throughout the day.     If you had a positive experience please indicate on your patient satisfaction survey form that [facility] will be sending you.    If you have any billing related questions please call the [facility] office at [phone]. The clinic staff does not handle billing related matters.

## 2021-12-13 NOTE — PROGRESS NOTES
[facility] [place] Progress Note    Subject: Jamar Ivory and his wife return to see us.  He had developed multiple blisters and superficial ulcers felt due to chronic lymphedema.  No evidence of arterial insufficiency or DVT.    We have been using dynamic compression wraps on both of his legs that we were changing here in the clinic on a weekly basis.  Legs continue to improve and the ulcer is healed.  Last seen on 11/8/2021 where we discontinued the weekly dynamic compression wraps and switch him over to Velcro wrap system which we thought would be much better in a chronic use situation.    He was reapplied on 11/8/2021 but 2 days later when he removed these there apparently were more blisters and recurrent very superficial ulcers bilaterally.  Thus he and his wife went to more conventional dressings and Ace bandages that she had been applying.    Patient Active Problem List   Diagnosis     Acquired hypothyroidism     Vitiligo     Hyperlipidemia with target LDL less than 100     Hypertension goal BP (blood pressure) < 140/90     Cerebral infarction (H)     Moderate major depression (H)     Health Care Home     Fatty liver     Advanced directives, counseling/discussion     Impaired glucose tolerance     Transient cerebral ischemia     Obesity with serious comorbidity     Bilateral leg edema     Diet Controlled Type 2 diabetes mellitus without complication, without long-term current use of insulin (H)     Benign neoplasm of colon     Pain in both lower extremities     Low hemoglobin     Breast tenderness in male     Hx of Diabetic polyneuropathy associated with type 2 diabetes mellitus - now diet controlled (H)     Chronic combined systolic and diastolic congestive heart failure (H)     PVD (peripheral vascular disease) (H)     Benign prostatic hyperplasia with incomplete bladder emptying     Skin ulcer of right lower leg with fat layer exposed (H)     Depression, major, recurrent, in complete  remission (H)     Shortness of breath     Chest tightness     Coronary artery disease, noted to have a small segment infarct with teo-infarct ischemia     Precordial pain     Morbid obesity (H)     Ulcer of lower limb, left, with unspecified severity (H)     Past Medical History:   Diagnosis Date     Arthritis      CHF (congestive heart failure) (H) 12/24/2018     CVA (cerebral infarction) 2012     CVD (cardiovascular disease)      Fatty liver      Gout      Hypertension goal BP (blood pressure) < 140/90 1/17/2011     Major depressive disorder, single episode, severe, without mention of psychotic behavior 1977    hospitalized     Obesity, morbid (more than 100 lbs over ideal weight or BMI > 40) (H)      Shortness of breath      Spider veins      TIA (transient ischaemic attack) 2012     Unspecified hypothyroidism      Vitiligo      Exam:  BP (!) 163/91 (BP Location: Left arm)   Pulse 77   Temp 97.4  F (36.3  C) (Temporal)   Wound (used by OP I only) 02/23/21 1118 Right ulceration, venous (Active)   Dressing Appearance moist drainage 12/13/21 1124   Length (cm) 14.5 12/13/21 1124   Width (cm) 1 12/13/21 1124   Depth (cm) 0 12/13/21 1124   Wound (cm^2) 14.5 cm^2 12/13/21 1124   Wound Volume (cm^3) 0 cm^3 12/13/21 1124   Wound healing % -88.31 12/13/21 1124   Drainage Characteristics/Odor serosanguineous 12/13/21 1124   Drainage Amount moderate 12/13/21 1124       Wound (used by OP I only) 10/15/21 1423 Left (Active)   Dressing Appearance intact 12/13/21 1123   Length (cm) 3 12/13/21 1123   Width (cm) 2 12/13/21 1123   Depth (cm) 0 12/13/21 1123   Wound (cm^2) 6 cm^2 12/13/21 1123   Wound Volume (cm^3) 0 cm^3 12/13/21 1123   Wound healing % -127.27 12/13/21 1123   Drainage Characteristics/Odor serosanguineous 12/13/21 1123   Drainage Amount moderate 12/13/21 1123       Wound (used by OP I only) 12/13/21 1125 Left (Active)   Dressing Appearance moist drainage 12/13/21 1124   Length (cm) 10 12/13/21 1124   Width  (cm) 10 12/13/21 1124   Depth (cm) 0 12/13/21 1124   Wound (cm^2) 100 cm^2 12/13/21 1124   Wound Volume (cm^3) 0 cm^3 12/13/21 1124   Drainage Characteristics/Odor serosanguineous 12/13/21 1124   Drainage Amount moderate 12/13/21 1124     Alert and appropriate.  Here with his wife.  Comfortable.  Very dry skin is noted.  Several small scabs and quite superficial ulcers over the left leg and one over the right that measures approximately 1.5 x 1.5 cm but essentially no depth.    Procedure:   Patient was determined to be capable of making their own medical decisions and informed consent was obtained. Topical anesthetic of 4% lidocaine was applied, debridement was performed using a #15 blade in a selective debridement fashion removing some of the flaky dry skin but also the scabs over the several ulcers.  These are very superficial and healthy granulation tissue.  Bleeding controlled with light pressure. Patient tolerated procedure well.    Impression: Difficulties with Velcro wraps.  We had recommended EdemaWear immediately under the wraps and this may have caused some of the issues.  This will be discontinued.  Discussed the Xeroform but he apparently has allergic reactions to this will thus use a small amount of Adaptic over the sores.  We will try a newer cream that has more ceramide to see if this helps his very dry skin.  Do feel that he should give the Velcro wraps (green line)another try.  However, if he has ongoing issues use of a short stretch wrap chronically may be the answer and his wife certainly could do this on a daily basis.    Plan: We will dress the wounds with Adaptic and CompreFlex compression system.  Patient will return to the clinic in 4 weeks time      Further instructions from your care team       Jamar Ivory      1950    Wash legs with soap and water in the shower. Apply Sween Cream to skin. Apply a Liner (white stockinet or black sock)  Compression:Apply velcro wraps to calf and  foot pulling to green line 30-40 mmhg and wear daily for the rest of your life.  You have a compression wraps that are supposed to be removed at night and put back on first thing in the morning.It is ok to leave on if easier then removing.  Wash and laundry as needed for soilage. Mild detergent and dry on low.  Please remove compression dressing if toes turn blue and/or tingle and can not be relieved by raising the leg for one hour       Gilles Alejandro M.D.. December 13, 2021    Call us at [phone] if you have any questions about your wounds, have redness or swelling around your wound, have a fever of 101 or greater or if you have any other problems or concerns. We answer the phone Monday through Friday 8 am to 4 pm, please leave a message as we check the voicemail frequently throughout the day.     If you had a positive experience please indicate on your patient satisfaction survey form that [employer] [facility] will be sending you.    If you have any billing related questions please call the  [facility] [employer] [facility] office at [phone]. The clinic staff does not handle billing related matters.         Gilles Alejandro on 12/13/2021 at 11:58 AM        Dictated using Dragon voice recognition software which may result in transcription errors

## 2021-12-17 ENCOUNTER — LAB (OUTPATIENT)
Dept: LAB | Facility: CLINIC | Age: 71
End: 2021-12-17
Payer: MEDICARE

## 2021-12-17 ENCOUNTER — OFFICE VISIT (OUTPATIENT)
Dept: CARDIOLOGY | Facility: CLINIC | Age: 71
End: 2021-12-17
Attending: NURSE PRACTITIONER
Payer: MEDICARE

## 2021-12-17 VITALS
HEART RATE: 67 BPM | HEIGHT: 68 IN | WEIGHT: 315 LBS | DIASTOLIC BLOOD PRESSURE: 80 MMHG | OXYGEN SATURATION: 94 % | SYSTOLIC BLOOD PRESSURE: 130 MMHG | BODY MASS INDEX: 47.74 KG/M2

## 2021-12-17 DIAGNOSIS — I42.8 NONISCHEMIC CARDIOMYOPATHY (H): ICD-10-CM

## 2021-12-17 DIAGNOSIS — I25.119 CORONARY ARTERY DISEASE INVOLVING NATIVE CORONARY ARTERY OF NATIVE HEART WITH ANGINA PECTORIS (H): ICD-10-CM

## 2021-12-17 DIAGNOSIS — I50.42 CHRONIC COMBINED SYSTOLIC AND DIASTOLIC HEART FAILURE (H): ICD-10-CM

## 2021-12-17 DIAGNOSIS — E66.01 MORBID OBESITY (H): ICD-10-CM

## 2021-12-17 DIAGNOSIS — I50.42 CHRONIC COMBINED SYSTOLIC AND DIASTOLIC CONGESTIVE HEART FAILURE (H): Primary | ICD-10-CM

## 2021-12-17 DIAGNOSIS — I50.42 CHRONIC COMBINED SYSTOLIC AND DIASTOLIC CONGESTIVE HEART FAILURE (H): ICD-10-CM

## 2021-12-17 DIAGNOSIS — I10 HYPERTENSION GOAL BP (BLOOD PRESSURE) < 140/90: Chronic | ICD-10-CM

## 2021-12-17 LAB
ANION GAP SERPL CALCULATED.3IONS-SCNC: 6 MMOL/L (ref 3–14)
BUN SERPL-MCNC: 14 MG/DL (ref 7–30)
CALCIUM SERPL-MCNC: 9.2 MG/DL (ref 8.5–10.1)
CHLORIDE BLD-SCNC: 109 MMOL/L (ref 94–109)
CO2 SERPL-SCNC: 25 MMOL/L (ref 20–32)
CREAT SERPL-MCNC: 0.81 MG/DL (ref 0.66–1.25)
GFR SERPL CREATININE-BSD FRML MDRD: 89 ML/MIN/1.73M2
GLUCOSE BLD-MCNC: 86 MG/DL (ref 70–99)
POTASSIUM BLD-SCNC: 3.8 MMOL/L (ref 3.4–5.3)
SODIUM SERPL-SCNC: 140 MMOL/L (ref 133–144)

## 2021-12-17 PROCEDURE — 80048 BASIC METABOLIC PNL TOTAL CA: CPT | Performed by: INTERNAL MEDICINE

## 2021-12-17 PROCEDURE — 36415 COLL VENOUS BLD VENIPUNCTURE: CPT | Performed by: INTERNAL MEDICINE

## 2021-12-17 PROCEDURE — 99215 OFFICE O/P EST HI 40 MIN: CPT | Performed by: NURSE PRACTITIONER

## 2021-12-17 ASSESSMENT — MIFFLIN-ST. JEOR: SCORE: 2223.18

## 2021-12-17 NOTE — PATIENT INSTRUCTIONS
Call CORE nurse for any questions or concerns Mon-Fri 8am-4pm:                                                 #[phone]                                       For concerns after hours:                                               #[phone]     1: Medication changes: none    2: Plan from today:    - Follow up with [facility] in 3 months with labs prior    3: Lab results: see attached:   Component      Latest Ref Rng & Units 10/7/2021 12/17/2021   Sodium      133 - 144 mmol/L 137 140   Potassium      3.4 - 5.3 mmol/L 4.7 3.8   Chloride      94 - 109 mmol/L 108 109   Carbon Dioxide      20 - 32 mmol/L 21 25   Anion Gap      3 - 14 mmol/L 8 6   Urea Nitrogen      7 - 30 mg/dL 21 14   Creatinine      0.66 - 1.25 mg/dL 0.89 0.81   Calcium      8.5 - 10.1 mg/dL 8.9 9.2   Glucose      70 - 99 mg/dL 99 86   GFR Estimate      >60 mL/min/1.73m2 85 89
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
## 2021-12-21 NOTE — PLAN OF CARE
Notified endoscopy that patient stated he has not received colonoscopy prep information. They will follow up.

## 2021-12-23 ENCOUNTER — LAB (OUTPATIENT)
Dept: LAB | Facility: CLINIC | Age: 71
End: 2021-12-23
Payer: MEDICARE

## 2021-12-23 DIAGNOSIS — Z13.220 SCREENING FOR HYPERLIPIDEMIA: Primary | ICD-10-CM

## 2021-12-23 DIAGNOSIS — D50.9 IRON DEFICIENCY ANEMIA, UNSPECIFIED IRON DEFICIENCY ANEMIA TYPE: ICD-10-CM

## 2021-12-23 DIAGNOSIS — Z01.818 PREOP GENERAL PHYSICAL EXAM: ICD-10-CM

## 2021-12-23 PROCEDURE — U0005 INFEC AGEN DETEC AMPLI PROBE: HCPCS

## 2021-12-23 PROCEDURE — U0003 INFECTIOUS AGENT DETECTION BY NUCLEIC ACID (DNA OR RNA); SEVERE ACUTE RESPIRATORY SYNDROME CORONAVIRUS 2 (SARS-COV-2) (CORONAVIRUS DISEASE [COVID-19]), AMPLIFIED PROBE TECHNIQUE, MAKING USE OF HIGH THROUGHPUT TECHNOLOGIES AS DESCRIBED BY CMS-2020-01-R: HCPCS

## 2021-12-24 LAB — SARS-COV-2 RNA RESP QL NAA+PROBE: NEGATIVE

## 2021-12-27 ENCOUNTER — ANESTHESIA EVENT (OUTPATIENT)
Dept: SURGERY | Facility: CLINIC | Age: 71
End: 2021-12-27
Payer: MEDICARE

## 2021-12-27 ENCOUNTER — HOSPITAL ENCOUNTER (OUTPATIENT)
Facility: CLINIC | Age: 71
Discharge: HOME | End: 2021-12-27
Attending: INTERNAL MEDICINE | Admitting: INTERNAL MEDICINE
Payer: MEDICARE

## 2021-12-27 ENCOUNTER — ANESTHESIA (OUTPATIENT)
Dept: SURGERY | Facility: CLINIC | Age: 71
End: 2021-12-27
Payer: MEDICARE

## 2021-12-27 VITALS
WEIGHT: 315 LBS | HEIGHT: 68 IN | BODY MASS INDEX: 47.74 KG/M2 | TEMPERATURE: 98.1 F | OXYGEN SATURATION: 94 % | SYSTOLIC BLOOD PRESSURE: 122 MMHG | RESPIRATION RATE: 20 BRPM | DIASTOLIC BLOOD PRESSURE: 64 MMHG | HEART RATE: 73 BPM

## 2021-12-27 LAB — COLONOSCOPY: NORMAL

## 2021-12-27 PROCEDURE — 258N000003 HC RX IP 258 OP 636: Performed by: NURSE ANESTHETIST, CERTIFIED REGISTERED

## 2021-12-27 PROCEDURE — 250N000009 HC RX 250: Performed by: NURSE ANESTHETIST, CERTIFIED REGISTERED

## 2021-12-27 PROCEDURE — 710N000012 HC RECOVERY PHASE 2, PER MINUTE: Performed by: INTERNAL MEDICINE

## 2021-12-27 PROCEDURE — 370N000017 HC ANESTHESIA TECHNICAL FEE, PER MIN: Performed by: INTERNAL MEDICINE

## 2021-12-27 PROCEDURE — 360N000075 HC SURGERY LEVEL 2, PER MIN: Performed by: INTERNAL MEDICINE

## 2021-12-27 PROCEDURE — 250N000011 HC RX IP 250 OP 636: Performed by: NURSE ANESTHETIST, CERTIFIED REGISTERED

## 2021-12-27 PROCEDURE — 999N000141 HC STATISTIC PRE-PROCEDURE NURSING ASSESSMENT: Performed by: INTERNAL MEDICINE

## 2021-12-27 RX ORDER — NALOXONE HYDROCHLORIDE 0.4 MG/ML
0.2 INJECTION, SOLUTION INTRAMUSCULAR; INTRAVENOUS; SUBCUTANEOUS
Status: DISCONTINUED | OUTPATIENT
Start: 2021-12-27 | End: 2021-12-27 | Stop reason: HOSPADM

## 2021-12-27 RX ORDER — FLUMAZENIL 0.1 MG/ML
0.2 INJECTION, SOLUTION INTRAVENOUS
Status: DISCONTINUED | OUTPATIENT
Start: 2021-12-27 | End: 2021-12-27 | Stop reason: HOSPADM

## 2021-12-27 RX ORDER — PROCHLORPERAZINE MALEATE 5 MG
5 TABLET ORAL EVERY 6 HOURS PRN
Status: DISCONTINUED | OUTPATIENT
Start: 2021-12-27 | End: 2021-12-27 | Stop reason: HOSPADM

## 2021-12-27 RX ORDER — PROPOFOL 10 MG/ML
INJECTION, EMULSION INTRAVENOUS PRN
Status: DISCONTINUED | OUTPATIENT
Start: 2021-12-27 | End: 2021-12-27

## 2021-12-27 RX ORDER — NALOXONE HYDROCHLORIDE 0.4 MG/ML
0.4 INJECTION, SOLUTION INTRAMUSCULAR; INTRAVENOUS; SUBCUTANEOUS
Status: DISCONTINUED | OUTPATIENT
Start: 2021-12-27 | End: 2021-12-27 | Stop reason: HOSPADM

## 2021-12-27 RX ORDER — ONDANSETRON 2 MG/ML
4 INJECTION INTRAMUSCULAR; INTRAVENOUS
Status: DISCONTINUED | OUTPATIENT
Start: 2021-12-27 | End: 2021-12-27 | Stop reason: HOSPADM

## 2021-12-27 RX ORDER — SODIUM CHLORIDE, SODIUM LACTATE, POTASSIUM CHLORIDE, CALCIUM CHLORIDE 600; 310; 30; 20 MG/100ML; MG/100ML; MG/100ML; MG/100ML
INJECTION, SOLUTION INTRAVENOUS CONTINUOUS PRN
Status: DISCONTINUED | OUTPATIENT
Start: 2021-12-27 | End: 2021-12-27

## 2021-12-27 RX ORDER — FENTANYL CITRATE 50 UG/ML
25 INJECTION, SOLUTION INTRAMUSCULAR; INTRAVENOUS
Status: DISCONTINUED | OUTPATIENT
Start: 2021-12-27 | End: 2021-12-27 | Stop reason: HOSPADM

## 2021-12-27 RX ORDER — HYDROMORPHONE HCL IN WATER/PF 6 MG/30 ML
0.2 PATIENT CONTROLLED ANALGESIA SYRINGE INTRAVENOUS EVERY 5 MIN PRN
Status: DISCONTINUED | OUTPATIENT
Start: 2021-12-27 | End: 2021-12-27 | Stop reason: HOSPADM

## 2021-12-27 RX ORDER — LIDOCAINE HYDROCHLORIDE 10 MG/ML
INJECTION, SOLUTION INFILTRATION; PERINEURAL PRN
Status: DISCONTINUED | OUTPATIENT
Start: 2021-12-27 | End: 2021-12-27

## 2021-12-27 RX ORDER — LIDOCAINE 40 MG/G
CREAM TOPICAL
Status: DISCONTINUED | OUTPATIENT
Start: 2021-12-27 | End: 2021-12-27 | Stop reason: HOSPADM

## 2021-12-27 RX ORDER — FENTANYL CITRATE 50 UG/ML
25 INJECTION, SOLUTION INTRAMUSCULAR; INTRAVENOUS EVERY 5 MIN PRN
Status: DISCONTINUED | OUTPATIENT
Start: 2021-12-27 | End: 2021-12-27 | Stop reason: HOSPADM

## 2021-12-27 RX ORDER — OXYCODONE HYDROCHLORIDE 5 MG/1
5 TABLET ORAL EVERY 4 HOURS PRN
Status: DISCONTINUED | OUTPATIENT
Start: 2021-12-27 | End: 2021-12-27 | Stop reason: HOSPADM

## 2021-12-27 RX ORDER — ONDANSETRON 2 MG/ML
4 INJECTION INTRAMUSCULAR; INTRAVENOUS EVERY 6 HOURS PRN
Status: DISCONTINUED | OUTPATIENT
Start: 2021-12-27 | End: 2021-12-27 | Stop reason: HOSPADM

## 2021-12-27 RX ORDER — MEPERIDINE HYDROCHLORIDE 25 MG/ML
12.5 INJECTION INTRAMUSCULAR; INTRAVENOUS; SUBCUTANEOUS
Status: DISCONTINUED | OUTPATIENT
Start: 2021-12-27 | End: 2021-12-27 | Stop reason: HOSPADM

## 2021-12-27 RX ORDER — ONDANSETRON 4 MG/1
4 TABLET, ORALLY DISINTEGRATING ORAL EVERY 30 MIN PRN
Status: DISCONTINUED | OUTPATIENT
Start: 2021-12-27 | End: 2021-12-27 | Stop reason: HOSPADM

## 2021-12-27 RX ORDER — EPINEPHRINE 1 MG/ML
0.1 INJECTION, SOLUTION, CONCENTRATE INTRAVENOUS
Status: DISCONTINUED | OUTPATIENT
Start: 2021-12-27 | End: 2021-12-27 | Stop reason: HOSPADM

## 2021-12-27 RX ORDER — PROPOFOL 10 MG/ML
INJECTION, EMULSION INTRAVENOUS CONTINUOUS PRN
Status: DISCONTINUED | OUTPATIENT
Start: 2021-12-27 | End: 2021-12-27

## 2021-12-27 RX ORDER — GLYCOPYRROLATE 0.2 MG/ML
INJECTION, SOLUTION INTRAMUSCULAR; INTRAVENOUS PRN
Status: DISCONTINUED | OUTPATIENT
Start: 2021-12-27 | End: 2021-12-27

## 2021-12-27 RX ORDER — ONDANSETRON 4 MG/1
4 TABLET, ORALLY DISINTEGRATING ORAL EVERY 6 HOURS PRN
Status: DISCONTINUED | OUTPATIENT
Start: 2021-12-27 | End: 2021-12-27 | Stop reason: HOSPADM

## 2021-12-27 RX ORDER — ONDANSETRON 2 MG/ML
4 INJECTION INTRAMUSCULAR; INTRAVENOUS EVERY 30 MIN PRN
Status: DISCONTINUED | OUTPATIENT
Start: 2021-12-27 | End: 2021-12-27 | Stop reason: HOSPADM

## 2021-12-27 RX ORDER — SIMETHICONE 40MG/0.6ML
133 SUSPENSION, DROPS(FINAL DOSAGE FORM)(ML) ORAL
Status: DISCONTINUED | OUTPATIENT
Start: 2021-12-27 | End: 2021-12-27 | Stop reason: HOSPADM

## 2021-12-27 RX ORDER — ATROPINE SULFATE 0.4 MG/ML
0.4 AMPUL (ML) INJECTION
Status: DISCONTINUED | OUTPATIENT
Start: 2021-12-27 | End: 2021-12-27 | Stop reason: HOSPADM

## 2021-12-27 RX ORDER — SODIUM CHLORIDE, SODIUM LACTATE, POTASSIUM CHLORIDE, CALCIUM CHLORIDE 600; 310; 30; 20 MG/100ML; MG/100ML; MG/100ML; MG/100ML
INJECTION, SOLUTION INTRAVENOUS CONTINUOUS
Status: DISCONTINUED | OUTPATIENT
Start: 2021-12-27 | End: 2021-12-27 | Stop reason: HOSPADM

## 2021-12-27 RX ORDER — FENTANYL CITRATE 50 UG/ML
25-50 INJECTION, SOLUTION INTRAMUSCULAR; INTRAVENOUS
Status: DISCONTINUED | OUTPATIENT
Start: 2021-12-27 | End: 2021-12-27 | Stop reason: HOSPADM

## 2021-12-27 RX ADMIN — PROPOFOL 50 MG: 10 INJECTION, EMULSION INTRAVENOUS at 13:41

## 2021-12-27 RX ADMIN — PROPOFOL 75 MCG/KG/MIN: 10 INJECTION, EMULSION INTRAVENOUS at 13:41

## 2021-12-27 RX ADMIN — LIDOCAINE HYDROCHLORIDE 50 MG: 10 INJECTION, SOLUTION INFILTRATION; PERINEURAL at 13:41

## 2021-12-27 RX ADMIN — GLYCOPYRROLATE 0.1 MG: 0.2 INJECTION, SOLUTION INTRAMUSCULAR; INTRAVENOUS at 13:41

## 2021-12-27 RX ADMIN — PROPOFOL 30 MG: 10 INJECTION, EMULSION INTRAVENOUS at 13:45

## 2021-12-27 RX ADMIN — SODIUM CHLORIDE, POTASSIUM CHLORIDE, SODIUM LACTATE AND CALCIUM CHLORIDE: 600; 310; 30; 20 INJECTION, SOLUTION INTRAVENOUS at 13:24

## 2021-12-27 ASSESSMENT — MIFFLIN-ST. JEOR: SCORE: 2214.57

## 2021-12-27 NOTE — LETTER
December 21, 2021      Jamar Ivory  [address]    [facility] [address]        Dear Jamar,     Thank you for choosing [facility]. You are scheduled for the following service(s).   Please be aware that coverage of these services is subject to the terms and limitations of your health insurance plan.  Call member services at your health plan with any benefit or coverage questions.    You will need to have a History and Physical Exam done within 30 days of this scheduled procedure. Please arrange this with your primary care physician.    Date:  12/27/21    Procedure:   COLONOSCOPY    Doctor:  Dr. Chowdary                      Arrival Time:  1:00pm   *check in at the Surgery Center desk*     Procedure Time: 2:30pm      Location:   [facility]      Surgery Center (on the south side of the [facility])       [address] [place], [place] [address]            MIRALAX -GATORADE  PREP  Colonoscopy is the most accurate test to detect colon polyps and colon cancer; and the only test where polyps can be removed. During this procedure, a doctor examines the lining of your large intestine and rectum through a flexible tube.     Transportation  You must arrange for a ride for the day of your procedure with a responsible adult. A taxi , Uber, etc, is not an option unless you are accompanied by a responsible adult. If you fail to arrange transportation with a responsible adult, your procedure will be cancelled and rescheduled.      Purchase the  following supplies at your local pharmacy:  - 2 (two) bisacodyl tablets: each tablet contains 5 mg.  (Dulcolax  laxative NOT Dulcolax  stool softener)   - 1 (one) 8.3 oz bottle of Polyethylene Glycol (PEG) 3350 Powder   (MiraLAX , Smooth LAX , ClearLAX  or equivalent)  - 64 oz Gatorade    Regular Gatorade, Gatorade G2 , Powerade , Powerade Zero  or Pedialyte  is acceptable. Red colored flavors are not allowed; all other  colors (yellow, green, orange, purple and blue) are okay. It is also okay to buy two 2.12 oz packets of powdered Gatorade that can be mixed with water to a total volume of 64 oz of liquid.  - 1 (one) 10 oz bottle of Magnesium Citrate (Red colored flavors are not allowed)  It is also okay for you to use a 0.5 oz package of powdered magnesium citrate (17 g) mixed with 10 oz of water.      PREPARATION FOR COLONOSCOPY    7 days before:    Discontinue fiber supplements and medications containing iron. This includes Metamucil  and Fibercon ; and multivitamins with iron.    3 days before:    Begin a low-fiber diet. A low-fiber diet helps making the cleanout more effective.     Examples of a low-fiber diet include (but are not limited to): white bread, white rice, pasta, crackers, fish, chicken, eggs, ground beef, creamy peanut butter, cooked/steamed/boiled vegetables, canned fruit, bananas, melons, milk, plain yogurt cheese, salad dressing and other condiments.     The following are not allowed on a low-fiber diet: seeds, nuts, popcorn, bran, whole wheat, corn, quinoa, raw fruits and vegetables, berries and dried fruit, beans and lentils.    For additional details on low-fiber diet, please refer to the table on the last page.  2 days before:    Continue the low-fiber diet.     Drink at least 8 glasses of water throughout the day.     Stop eating solid foods at 11:45 pm.  1 day before:    In the morning: begin a clear liquid diet (liquids you can see through).     Examples of a clear liquid diet include: water, clear broth or bouillon, Gatorade, Pedialyte or Powerade, carbonated and non-carbonated soft drinks (Sprite , 7-Up , ginger ale), strained fruit juices without pulp (apple, white grape, white cranberry), Jell-O  and popsicles.     The following are not allowed on a clear liquid diet: red liquids, alcoholic beverages,  dairy products (milk, creamer, and yogurt), protein shakes,  juice with pulp and chewing  tobacco.    At noon: take 2 (two) bisacodyl tablets     At 4 (and no later than 6pm): start drinking the Miralax-Gatorade preparation (8.3 oz of Miralax mixed with 64 oz of Gatorade in a large pitcher). Drink 1(one) 8 oz glass every 15 minutes thereafter, until the mixture is gone.    COLON CLEANSING TIPS: drink adequate amounts of fluids before and after your colon cleansing to prevent dehydration. Stay near a toilet because you will have diarrhea. Even if you are sitting on the toilet, continue to drink the cleansing solution every 15 minutes. If you feel nauseous or vomit, rinse your mouth with water, take a 15 to 30-minute-break and then continue drinking the solution. You will be uncomfortable until the stool has flushed from your colon (in about 2 to 4 hours). You may feel chilled.  Day of your procedure  You may take all of your morning medications including blood pressure medications, blood thinners (if you have not been instructed to stop these by our office), methadone, anti-seizure medications with sips of water 3 hours prior to your procedure or earlier. Do not take insulin or vitamins prior to your procedure. Continue the clear liquid diet.   4 hours prior: drink 10 oz of magnesium citrate. It may be easier to drink it with a straw.    STOP consuming all liquids after that.     Do not take anything by mouth during this time.     Allow extra time to travel to your procedure as you may need to stop and use a restroom along the way.  You are ready for the procedure, if you followed all instructions and your stool is no longer formed, but clear or yellow liquid. If you are unsure whether your colon is clean, please call our office at [phone] before you leave for your appointment.    Bring the following to your procedure:  - Insurance Card/Photo ID.   - List of current medications including over-the-counter medications and supplements.   - Your rescue inhaler if you currently use one to control  asthma.    Canceling or rescheduling your appointment:   If you must cancel or reschedule your appointment, please call [phone] as soon as possible.    COLONOSCOPY PRE-PROCEDURE CHECKLIST  If you have diabetes, ask your regular doctor for diet and medication restrictions.  If you take an anticoagulant or anti-platelet medication (such as Coumadin , Lovenox , Pradaxa , Xarelto , Eliquis , etc.), please call your primary doctor for advice on holding this medication.  If you take aspirin you may continue to do so.  If you are or may be pregnant, please discuss the risks and benefits of this procedure with your doctor.      What happens during a colonoscopy?  Plan to spend up to two hours, starting at registration time, at the endoscopy center the day of your procedure. The colonoscopy takes an average of 15 to 30 minutes. Recovery time is about 30 minutes.    Before the exam:    You will change into a gown.    Your medical history and medication list will be reviewed with you, unless that has been done over the phone prior to the procedure.     A nurse will insert an intravenous (IV) line into your hand or arm.    The doctor will meet with you and will give you a consent form to sign.    During the exam:     Medicine will be given through the IV line to help you relax.     Your heart rate and oxygen levels will be monitored. If your blood pressure is low, you may be given fluids through the IV line.     The doctor will insert a flexible hollow tube, called a colonoscope, into your rectum. The scope will be advanced slowly through the large intestine (colon).    You may have a feeling of fullness or pressure.     If an abnormal tissue or a polyp is found, the doctor may remove it through the endoscope for closer examination, or biopsy. Tissue removal is painless    After the exam:           Any tissue samples removed during the exam will be sent to a lab for evaluation. It may take 5-7 working days for you to be  notified of the results.     A nurse will provide you with complete discharge instructions before you leave the endoscopy center. Be sure to ask the nurse for specific instructions if you take blood thinners such as Aspirin, Coumadin or Plavix.     The doctor will prepare a full report for you and for the physician who referred you for the procedure.     Your doctor will talk with you about the initial results of your exam.      Medication given during the exam will prohibit you from driving for the rest of the day.     Following the exam, you may resume your normal diet. Your first meal should be light, no greasy foods. Avoid alcohol until the next day.     You may resume your regular activities the day after the procedure.         LOW-FIBER DIET  Foods RECOMMENDED Foods to AVOID   Breads, Cereal, Rice and Pasta:   White bread, rolls, biscuits, croissant and bobbi toast.   Waffles, Kuwaiti toast and pancakes.   White rice, noodles, pasta, macaroni and peeled cooked potatoes.   Plain crackers and saltines.   Cooked cereals: farina, cream of rice.   Cold cereals: Puffed Rice , Rice Krispies , Corn Flakes  and Special K    Breads, Cereal, Rice and Pasta:   Breads or rolls with nuts, seeds or fruit.   Whole wheat, pumpernickel, rye breads and cornbread.   Potatoes with skin, brown or wild rice, and kasha (buckwheat).     Vegetables:   Tender cooked and canned vegetables without seeds: carrots, asparagus tips, green or wax beans, pumpkin, spinach, lima beans. Vegetables:   Raw or steamed vegetables.   Vegetables with seeds.   Sauerkraut.   Winter squash, peas, broccoli, Brussel sprouts, cabbage, onions, cauliflower, baked beans, peas and corn.   Fruits:   Strained fruit juice.   Canned fruit, except pineapple.   Ripe bananas and melon. Fruits:   Prunes and prune juice.   Raw fruits.   Dried fruits: figs, dates and raisins.   Milk/Dairy:   Milk: plain or flavored.   Yogurt, custard and ice cream.   Cheese and cottage  cheese Milk/Dairy:     Meat and other proteins:   ground, well-cooked tender beef, lamb, ham, veal, pork, fish, poultry and organ meats.   Eggs.   Peanut butter without nuts. Meat and other proteins:   Tough, fibrous meats with gristle.   Dry beans, peas and lentils.   Peanut butter with nuts.   Tofu.   Fats, Snack, Sweets, Condiments and Beverages:   Margarine, butter, oils, mayonnaise, sour cream and salad dressing, plain gravy.   Sugar, hard candy, clear jelly, honey and syrup.   Spices, cooked herbs, bouillon, broth and soups made with allowed vegetable, ketchup and mustard.   Coffee, tea and carbonated drinks.   Plain cakes, cookies and pretzels.   Gelatin, plain puddings, custard, ice cream, sherbet and popsicles. Fats, Snack, Sweets, Condiments and Beverages:   Nuts, seeds and coconut.   Jam, marmalade and preserves.   Pickles, olives, relish and horseradish.   All desserts containing nuts, seeds, dried fruit and coconut; or made from whole grains or bran.   Candy made with nuts or seeds.   Popcorn.       DIRECTIONS TO THE SURGERY CENTER  From the north ([facility])  Take [address] south, exit on [address] [address]. Get into the left hand nati, turn left (east), go one-half mile to [address] and turn left. Go north to the first stoplight, take a right on [address] and follow it to the Surgery Center entrance.    From the south ([facility])  Take 35N to the 35E split and exit on [address] [address]. On [address] [address], turn left (west) to [address]. Turn right (north) on [address]. Go north to the first stoplight, take a right on [address] and follow it to the Surgery Center entrance.    From the east via 35E ([facility])  Take 35E south to [address] [address] exit. Turn right on [address] [address]. Go west to [address]. Turn right (north) on [address]. Go to the first stoplight, take a right and follow on [address] to the Surgery Center  entrance.    From the east via [address] ([place])  Take [address] west to [address]. Turn left (south) on [address] to [address]. Turn left (east) on [address] and follow it to the Surgery Center entrance.    From the west via [address] ([place], [place])  Take [address] east to [address]. Turn right (south) on [address] to [address]. Turn left (east) on [address] and follow it to the Surgery Center entrance.
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
## 2021-12-27 NOTE — ANESTHESIA POSTPROCEDURE EVALUATION
Patient: Jamar Ivory    Procedure: Procedure(s):  COLONOSCOPY       Diagnosis:Screening for colon cancer [Z12.11]  Diagnosis Additional Information: No value filed.    Anesthesia Type:  MAC    Note:  Disposition: Outpatient   Postop Pain Control: Uneventful            Sign Out: Well controlled pain   PONV: No   Neuro/Psych: Uneventful            Sign Out: Acceptable/Baseline neuro status   Airway/Respiratory: Uneventful            Sign Out: Acceptable/Baseline resp. status   CV/Hemodynamics: Uneventful            Sign Out: Acceptable CV status; No obvious hypovolemia; No obvious fluid overload   Other NRE: NONE   DID A NON-ROUTINE EVENT OCCUR? No           Last vitals:  Vitals Value Taken Time   /59 12/27/21 1415   Temp     Pulse     Resp 20 12/27/21 1407   SpO2 94 % 12/27/21 1415       Electronically Signed By: Noble Vaughn MD  December 27, 2021  2:24 PM
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
## 2021-12-27 NOTE — ANESTHESIA CARE TRANSFER NOTE
Patient: Jamar Ivory    Procedure: Procedure(s):  COLONOSCOPY       Diagnosis: Screening for colon cancer [Z12.11]  Diagnosis Additional Information: No value filed.    Anesthesia Type:   MAC     Note:    Oropharynx: oropharynx clear of all foreign objects  Level of Consciousness: drowsy  Oxygen Supplementation: face mask and room air    Independent Airway: airway patency satisfactory and stable  Dentition: dentition unchanged  Vital Signs Stable: post-procedure vital signs reviewed and stable  Report to RN Given: handoff report given  Patient transferred to: Phase II    Handoff Report: Identifed the Patient, Identified the Reponsible Provider, Reviewed the pertinent medical history, Discussed the surgical course, Reviewed Intra-OP anesthesia mangement and issues during anesthesia, Set expectations for post-procedure period and Allowed opportunity for questions and acknowledgement of understanding      Vitals:  Vitals Value Taken Time   /42 12/27/21 1407   Temp     Pulse     Resp 20 12/27/21 1407   SpO2 93 % 12/27/21 1407       Electronically Signed By: YENI Graham CRNA  December 27, 2021  2:09 PM

## 2021-12-27 NOTE — LETTER
November 3, 2021      Jamar Ivory  [address]    [facility] [address]        Dear Jamar,     Thank you for choosing [facility]. You are scheduled for the following service(s).   Please be aware that coverage of these services is subject to the terms and limitations of your health insurance plan.  Call member services at your health plan with any benefit or coverage questions.    You will need to have a History and Physical Exam done within 30 days of this scheduled procedure. Please arrange this with your primary care physician.    Date:  12/6/2021 Monday    Procedure:   COLONOSCOPY    Doctor:  Epi                      Arrival Time:  11 am   *check in at the Surgery Center desk*     Procedure Time: 1 pm      Location:   [facility] (on the [facility] side of the [facility])       [address] [place], [place] [address]            MIRALAX -GATORADE  PREP  Colonoscopy is the most accurate test to detect colon polyps and colon cancer; and the only test where polyps can be removed. During this procedure, a doctor examines the lining of your large intestine and rectum through a flexible tube.     Transportation  You must arrange for a ride for the day of your procedure with a responsible adult. A taxi , Uber, etc, is not an option unless you are accompanied by a responsible adult. If you fail to arrange transportation with a responsible adult, your procedure will be cancelled and rescheduled.      Purchase the  following supplies at your local pharmacy:  - 2 (two) bisacodyl tablets: each tablet contains 5 mg.  (Dulcolax  laxative NOT Dulcolax  stool softener)   - 1 (one) 8.3 oz bottle of Polyethylene Glycol (PEG) 3350 Powder   (MiraLAX , Smooth LAX , ClearLAX  or equivalent)  - 64 oz Gatorade    Regular Gatorade, Gatorade G2 , Powerade , Powerade Zero  or Pedialyte  is acceptable. Red colored flavors are not allowed; all other  colors (yellow, green, orange, purple and blue) are okay. It is also okay to buy two 2.12 oz packets of powdered Gatorade that can be mixed with water to a total volume of 64 oz of liquid.  - 1 (one) 10 oz bottle of Magnesium Citrate (Red colored flavors are not allowed)  It is also okay for you to use a 0.5 oz package of powdered magnesium citrate (17 g) mixed with 10 oz of water.      PREPARATION FOR COLONOSCOPY    7 days before:    Discontinue fiber supplements and medications containing iron. This includes Metamucil  and Fibercon ; and multivitamins with iron.  3 days before:    Begin a low-fiber diet. A low-fiber diet helps making the cleanout more effective.     Examples of a low-fiber diet include (but are not limited to): white bread, white rice, pasta, crackers, fish, chicken, eggs, ground beef, creamy peanut butter, cooked/steamed/boiled vegetables, canned fruit, bananas, melons, milk, plain yogurt cheese, salad dressing and other condiments.     The following are not allowed on a low-fiber diet: seeds, nuts, popcorn, bran, whole wheat, corn, quinoa, raw fruits and vegetables, berries and dried fruit, beans and lentils.    For additional details on low-fiber diet, please refer to the table on the last page.  2 days before:    Continue the low-fiber diet.     Drink at least 8 glasses of water throughout the day.     Stop eating solid foods at 11:45 pm.  1 day before:    In the morning: begin a clear liquid diet (liquids you can see through).     Examples of a clear liquid diet include: water, clear broth or bouillon, Gatorade, Pedialyte or Powerade, carbonated and non-carbonated soft drinks (Sprite , 7-Up , ginger ale), strained fruit juices without pulp (apple, white grape, white cranberry), Jell-O  and popsicles.     The following are not allowed on a clear liquid diet: red liquids, alcoholic beverages,  dairy products (milk, creamer, and yogurt), protein shakes,  juice with pulp and chewing  tobacco.    At noon: take 2 (two) bisacodyl tablets     At 4 (and no later than 6pm): start drinking the Miralax-Gatorade preparation (8.3 oz of Miralax mixed with 64 oz of Gatorade in a large pitcher). Drink 1(one) 8 oz glass every 15 minutes thereafter, until the mixture is gone.    COLON CLEANSING TIPS: drink adequate amounts of fluids before and after your colon cleansing to prevent dehydration. Stay near a toilet because you will have diarrhea. Even if you are sitting on the toilet, continue to drink the cleansing solution every 15 minutes. If you feel nauseous or vomit, rinse your mouth with water, take a 15 to 30-minute-break and then continue drinking the solution. You will be uncomfortable until the stool has flushed from your colon (in about 2 to 4 hours). You may feel chilled.    Day of your procedure  You may take all of your morning medications including blood pressure medications, blood thinners (if you have not been instructed to stop these by our office), methadone, anti-seizure medications with sips of water 3 hours prior to your procedure or earlier. Do not take insulin or vitamins prior to your procedure. Continue the clear liquid diet.   4 hours prior: drink 10 oz of magnesium citrate. It may be easier to drink it with a straw.    STOP consuming all liquids after that.     Do not take anything by mouth during this time.     Allow extra time to travel to your procedure as you may need to stop and use a restroom along the way.  You are ready for the procedure, if you followed all instructions and your stool is no longer formed, but clear or yellow liquid. If you are unsure whether your colon is clean, please call our office at [phone] before you leave for your appointment.    Bring the following to your procedure:  - Insurance Card/Photo ID.   - List of current medications including over-the-counter medications and supplements.   - Your rescue inhaler if you currently use one to control  asthma.    Canceling or rescheduling your appointment:   If you must cancel or reschedule your appointment, please call [phone] as soon as possible.    COLONOSCOPY PRE-PROCEDURE CHECKLIST  If you have diabetes, ask your regular doctor for diet and medication restrictions.  If you take an anticoagulant or anti-platelet medication (such as Coumadin , Lovenox , Pradaxa , Xarelto , Eliquis , etc.), please call your primary doctor for advice on holding this medication.  If you take aspirin you may continue to do so.  If you are or may be pregnant, please discuss the risks and benefits of this procedure with your doctor.    What happens during a colonoscopy?  Plan to spend up to two hours, starting at registration time, at the endoscopy center the day of your procedure. The colonoscopy takes an average of 15 to 30 minutes. Recovery time is about 30 minutes.    Before the exam:    You will change into a gown.    Your medical history and medication list will be reviewed with you, unless that has been done over the phone prior to the procedure.     A nurse will insert an intravenous (IV) line into your hand or arm.    The doctor will meet with you and will give you a consent form to sign.    During the exam:     Medicine will be given through the IV line to help you relax.     Your heart rate and oxygen levels will be monitored. If your blood pressure is low, you may be given fluids through the IV line.     The doctor will insert a flexible hollow tube, called a colonoscope, into your rectum. The scope will be advanced slowly through the large intestine (colon).    You may have a feeling of fullness or pressure.     If an abnormal tissue or a polyp is found, the doctor may remove it through the endoscope for closer examination, or biopsy. Tissue removal is painless    After the exam:           Any tissue samples removed during the exam will be sent to a lab for evaluation. It may take 5-7 working days for you to be  notified of the results.     A nurse will provide you with complete discharge instructions before you leave the endoscopy center. Be sure to ask the nurse for specific instructions if you take blood thinners such as Aspirin, Coumadin or Plavix.     The doctor will prepare a full report for you and for the physician who referred you for the procedure.     Your doctor will talk with you about the initial results of your exam.      Medication given during the exam will prohibit you from driving for the rest of the day.     Following the exam, you may resume your normal diet. Your first meal should be light, no greasy foods. Avoid alcohol until the next day.     You may resume your regular activities the day after the procedure.     LOW-FIBER DIET  Foods RECOMMENDED Foods to AVOID   Breads, Cereal, Rice and Pasta:   White bread, rolls, biscuits, croissant and bobbi toast.   Waffles, [place] toast and pancakes.   White rice, noodles, pasta, macaroni and peeled cooked potatoes.   Plain crackers and saltines.   Cooked cereals: farina, cream of rice.   Cold cereals: Puffed Rice , Rice Krispies , Corn Flakes  and Special K    Breads, Cereal, Rice and Pasta:   Breads or rolls with nuts, seeds or fruit.   Whole wheat, pumpernickel, rye breads and cornbread.   Potatoes with skin, brown or wild rice, and kasha (buckwheat).     Vegetables:   Tender cooked and canned vegetables without seeds: carrots, asparagus tips, green or wax beans, pumpkin, spinach, lima beans. Vegetables:   Raw or steamed vegetables.   Vegetables with seeds.   Sauerkraut.   Winter squash, peas, broccoli, Brussel sprouts, cabbage, onions, cauliflower, baked beans, peas and corn.   Fruits:   Strained fruit juice.   Canned fruit, except pineapple.   Ripe bananas and melon. Fruits:   Prunes and prune juice.   Raw fruits.   Dried fruits: figs, dates and raisins.   Milk/Dairy:   Milk: plain or flavored.   Yogurt, custard and ice cream.   Cheese and cottage cheese  Milk/Dairy:     Meat and other proteins:   ground, well-cooked tender beef, lamb, ham, veal, pork, fish, poultry and organ meats.   Eggs.   Peanut butter without nuts. Meat and other proteins:   Tough, fibrous meats with gristle.   Dry beans, peas and lentils.   Peanut butter with nuts.   Tofu.   Fats, Snack, Sweets, Condiments and Beverages:   Margarine, butter, oils, mayonnaise, sour cream and salad dressing, plain gravy.   Sugar, hard candy, clear jelly, honey and syrup.   Spices, cooked herbs, bouillon, broth and soups made with allowed vegetable, ketchup and mustard.   Coffee, tea and carbonated drinks.   Plain cakes, cookies and pretzels.   Gelatin, plain puddings, custard, ice cream, sherbet and popsicles. Fats, Snack, Sweets, Condiments and Beverages:   Nuts, seeds and coconut.   Jam, marmalade and preserves.   Pickles, olives, relish and horseradish.   All desserts containing nuts, seeds, dried fruit and coconut; or made from whole grains or bran.   Candy made with nuts or seeds.   Popcorn.         DIRECTIONS TO THE SURGERY CENTER    From the north ([facility])  Take [address] south, exit on [address] [address]. Get into the left hand nati, turn left (east), go one-half mile to [address] and turn left. Go north to the first stoplight, take a right on [address] and follow it to the Surgery Center entrance.    From the south ([facility])  Take 35N to the [address]E split and exit on [address] [address]. On [address] [address], turn left (west) to [address]. Turn right (north) on [address]. Go north to the first stoplight, take a right on [address] and follow it to the Surgery Center entrance.    From the east via 35E ([facility])  Take [address] south to [address] [address] exit. Turn right on [address] [address]. Go west to [address]. Turn right (north) on [address]. Go to the first stoplight, take a right and follow on [address] to the Surgery Center  entrance.    From the east via [address] ([place])  Take [address] west to [address]. Turn left (south) on [address] to [address]. Turn left (east) on [address] and follow it to the Surgery Center entrance.    From the west via [address] ([place], [place])  Take [address] east to [address]. Turn right (south) on [address] to [address]. Turn left (east) on [address] and follow it to the Surgery Center entrance.

## 2021-12-30 ENCOUNTER — HOSPITAL ENCOUNTER (EMERGENCY)
Facility: CLINIC | Age: 71
Discharge: HOME | End: 2021-12-30
Attending: EMERGENCY MEDICINE | Admitting: EMERGENCY MEDICINE
Payer: MEDICARE

## 2021-12-30 ENCOUNTER — APPOINTMENT (OUTPATIENT)
Dept: CT IMAGING | Facility: CLINIC | Age: 71
End: 2021-12-30
Attending: EMERGENCY MEDICINE
Payer: MEDICARE

## 2021-12-30 VITALS
OXYGEN SATURATION: 95 % | HEART RATE: 77 BPM | SYSTOLIC BLOOD PRESSURE: 131 MMHG | BODY MASS INDEX: 50.15 KG/M2 | WEIGHT: 315 LBS | TEMPERATURE: 98.3 F | DIASTOLIC BLOOD PRESSURE: 78 MMHG | RESPIRATION RATE: 18 BRPM

## 2021-12-30 DIAGNOSIS — R07.89 CHEST WALL PAIN: ICD-10-CM

## 2021-12-30 DIAGNOSIS — R07.9 CHEST PAIN, UNSPECIFIED TYPE: ICD-10-CM

## 2021-12-30 LAB
ANION GAP SERPL CALCULATED.3IONS-SCNC: 6 MMOL/L (ref 3–14)
ATRIAL RATE - MUSE: 77 BPM
BASOPHILS # BLD AUTO: 0.1 10E3/UL (ref 0–0.2)
BASOPHILS NFR BLD AUTO: 1 %
BUN SERPL-MCNC: 21 MG/DL (ref 7–30)
CALCIUM SERPL-MCNC: 8.7 MG/DL (ref 8.5–10.1)
CHLORIDE BLD-SCNC: 108 MMOL/L (ref 94–109)
CO2 SERPL-SCNC: 25 MMOL/L (ref 20–32)
CREAT SERPL-MCNC: 1.03 MG/DL (ref 0.66–1.25)
D DIMER PPP FEU-MCNC: 0.75 UG/ML FEU (ref 0–0.5)
DIASTOLIC BLOOD PRESSURE - MUSE: NORMAL MMHG
EOSINOPHIL # BLD AUTO: 1 10E3/UL (ref 0–0.7)
EOSINOPHIL NFR BLD AUTO: 15 %
ERYTHROCYTE [DISTWIDTH] IN BLOOD BY AUTOMATED COUNT: 14.3 % (ref 10–15)
GFR SERPL CREATININE-BSD FRML MDRD: 77 ML/MIN/1.73M2
GLUCOSE BLD-MCNC: 121 MG/DL (ref 70–99)
HCT VFR BLD AUTO: 40.6 % (ref 40–53)
HGB BLD-MCNC: 12.8 G/DL (ref 13.3–17.7)
HOLD SPECIMEN: NORMAL
IMM GRANULOCYTES # BLD: 0 10E3/UL
IMM GRANULOCYTES NFR BLD: 1 %
INTERPRETATION ECG - MUSE: NORMAL
LYMPHOCYTES # BLD AUTO: 1.9 10E3/UL (ref 0.8–5.3)
LYMPHOCYTES NFR BLD AUTO: 28 %
MCH RBC QN AUTO: 28.8 PG (ref 26.5–33)
MCHC RBC AUTO-ENTMCNC: 31.5 G/DL (ref 31.5–36.5)
MCV RBC AUTO: 91 FL (ref 78–100)
MONOCYTES # BLD AUTO: 0.8 10E3/UL (ref 0–1.3)
MONOCYTES NFR BLD AUTO: 12 %
NEUTROPHILS # BLD AUTO: 2.9 10E3/UL (ref 1.6–8.3)
NEUTROPHILS NFR BLD AUTO: 43 %
NRBC # BLD AUTO: 0 10E3/UL
NRBC BLD AUTO-RTO: 0 /100
NT-PROBNP SERPL-MCNC: 169 PG/ML (ref 0–900)
P AXIS - MUSE: 44 DEGREES
PLATELET # BLD AUTO: 241 10E3/UL (ref 150–450)
POTASSIUM BLD-SCNC: 4.2 MMOL/L (ref 3.4–5.3)
PR INTERVAL - MUSE: 202 MS
QRS DURATION - MUSE: 84 MS
QT - MUSE: 370 MS
QTC - MUSE: 418 MS
R AXIS - MUSE: -65 DEGREES
RBC # BLD AUTO: 4.44 10E6/UL (ref 4.4–5.9)
SARS-COV-2 RNA RESP QL NAA+PROBE: NEGATIVE
SODIUM SERPL-SCNC: 139 MMOL/L (ref 133–144)
SYSTOLIC BLOOD PRESSURE - MUSE: NORMAL MMHG
T AXIS - MUSE: 70 DEGREES
TROPONIN I SERPL HS-MCNC: 8 NG/L
TROPONIN I SERPL HS-MCNC: 9 NG/L
VENTRICULAR RATE- MUSE: 77 BPM
WBC # BLD AUTO: 6.6 10E3/UL (ref 4–11)

## 2021-12-30 PROCEDURE — 87635 SARS-COV-2 COVID-19 AMP PRB: CPT | Performed by: EMERGENCY MEDICINE

## 2021-12-30 PROCEDURE — 93005 ELECTROCARDIOGRAM TRACING: CPT

## 2021-12-30 PROCEDURE — 85018 HEMOGLOBIN: CPT | Performed by: EMERGENCY MEDICINE

## 2021-12-30 PROCEDURE — 84484 ASSAY OF TROPONIN QUANT: CPT | Mod: 91 | Performed by: EMERGENCY MEDICINE

## 2021-12-30 PROCEDURE — 80048 BASIC METABOLIC PNL TOTAL CA: CPT | Performed by: EMERGENCY MEDICINE

## 2021-12-30 PROCEDURE — 99285 EMERGENCY DEPT VISIT HI MDM: CPT | Mod: 25

## 2021-12-30 PROCEDURE — C9803 HOPD COVID-19 SPEC COLLECT: HCPCS

## 2021-12-30 PROCEDURE — 36415 COLL VENOUS BLD VENIPUNCTURE: CPT | Performed by: EMERGENCY MEDICINE

## 2021-12-30 PROCEDURE — 250N000009 HC RX 250: Performed by: EMERGENCY MEDICINE

## 2021-12-30 PROCEDURE — 85379 FIBRIN DEGRADATION QUANT: CPT | Performed by: EMERGENCY MEDICINE

## 2021-12-30 PROCEDURE — 250N000011 HC RX IP 250 OP 636: Performed by: EMERGENCY MEDICINE

## 2021-12-30 PROCEDURE — G1004 CDSM NDSC: HCPCS

## 2021-12-30 PROCEDURE — 84484 ASSAY OF TROPONIN QUANT: CPT | Performed by: EMERGENCY MEDICINE

## 2021-12-30 PROCEDURE — 83880 ASSAY OF NATRIURETIC PEPTIDE: CPT | Performed by: EMERGENCY MEDICINE

## 2021-12-30 PROCEDURE — 71275 CT ANGIOGRAPHY CHEST: CPT | Mod: MG

## 2021-12-30 RX ORDER — IOPAMIDOL 755 MG/ML
500 INJECTION, SOLUTION INTRAVASCULAR ONCE
Status: COMPLETED | OUTPATIENT
Start: 2021-12-30 | End: 2021-12-30

## 2021-12-30 RX ORDER — LIDOCAINE 40 MG/G
CREAM TOPICAL
Status: DISCONTINUED | OUTPATIENT
Start: 2021-12-30 | End: 2021-12-30 | Stop reason: HOSPADM

## 2021-12-30 RX ADMIN — SODIUM CHLORIDE 87 ML: 9 INJECTION, SOLUTION INTRAVENOUS at 05:25

## 2021-12-30 RX ADMIN — IOPAMIDOL 76 ML: 755 INJECTION, SOLUTION INTRAVENOUS at 05:25

## 2021-12-30 ASSESSMENT — ENCOUNTER SYMPTOMS
VOMITING: 0
FEVER: 0
ABDOMINAL PAIN: 0

## 2021-12-30 NOTE — ED PROVIDER NOTES
71 year old male received in signout from Dr. Klein who presents to the ED w/ chest pain.  Prior to signout, D-dimer elevated, CT returned without evidence of PE, initial troponin within normal limits.  At time of signout, troponin pending.  Please see primary note for full details.  Repeat troponin returned unchanged.  Upon reevaluation, the patient reports his pain is pleuritic and is reproducible on my exam.  He does have a history of pleurisy although this seems most consistent with chest wall pain or costochondritis.  Cardiac etiology seems less likely.  Given reassuring work-up and instructions at signout, at this time I feel the patient is safe for discharge.  Recommendations given regarding follow up with PCP and return to the emergency department as needed for new or worsening symptoms.  Counseled on all results, diagnosis and disposition.  Pt understanding and agreeable to plan. Patient discharged in stable condition.                     Chacho Dooley MD  12/30/21 8011

## 2021-12-30 NOTE — ED PROVIDER NOTES
History     Chief Complaint:  Chest Pain     HPI   Jamar Ivory is a 71 year old male with history of CHF, CAD, hypertension and morbid obesity who presents with chest pain. Recently the patient has been experiencing pleuritic chest pain. Tonight, he woke up to go to the bathroom and his chest pain had become severe. Secondary to his increased pain he called EMS after 20 minutes. He was given 324 ASA and 2 nitroglycerin by EMS. These medications helped his pain. Here, he denies any abdominal pain, emesis or fever. He notes he typically sleeps on his side. Of note, he is vaccinated for Covid. He is not on oxygen at home.     Review of Systems   Constitutional: Negative for fever.   Cardiovascular: Positive for chest pain.   Gastrointestinal: Negative for abdominal pain and vomiting.   All other systems reviewed and are negative.    Allergies:  Clopidogrel  Penicillins  Sulfa Drugs  Xeroform   Simvastatin    Medications:  Zyloprim  Lipitor  Ferrous sulfate  Levothyroxine  Cozaar  Meloxicam  Nitrostat  Flomax  Demadex    Past Medical History:    Arthritis   Cerebral artery occlusion with cerebral infarction  CHF  Fatty liver  Gout  Hypertension  Concussion  Morbid obesity  Spider veins  TIA  Vitiligo   Depression  Diet controlled type 2 diabetes  Benign neoplasm of colon  Diabetic polyneuropathy  PVD    Past Surgical History:    Appendectomy  Colonoscopy x2  Vasectomy    Family History:    Father: Lung cancer  Mother: Diabetes   Daughter: Leukemia     Social History:  The patient was accompanied to the ED by EMS.    Physical Exam     Patient Vitals for the past 24 hrs:   BP Temp Temp src Pulse Resp SpO2   12/30/21 0600 121/68 -- -- 68 -- 93 %   12/30/21 0545 137/92 -- -- 66 -- 99 %   12/30/21 0515 120/73 -- -- 68 -- 96 %   12/30/21 0500 -- -- -- 67 -- 96 %   12/30/21 0445 120/80 -- -- 70 -- 93 %   12/30/21 0430 113/76 -- -- 70 -- 93 %   12/30/21 0415 124/74 -- -- 73 -- 93 %   12/30/21 0400 115/76 -- -- 69 -- 96 %    12/30/21 0330 130/78 -- -- 75 -- 97 %   12/30/21 0326 135/80 98.3  F (36.8  C) Oral 79 18 94 %     Physical Exam  Nursing note and vitals reviewed.  Constitutional: Cooperative.   HENT:   Mouth/Throat: Mucous membranes are normal.   Cardiovascular: Normal rate, regular rhythm and normal heart sounds.  No murmur.  Pulmonary/Chest: Effort normal and breath sounds normal. No respiratory distress. No wheezes. No rales.   Abdominal: Soft. Normal appearance and bowel sounds are normal. No distension. There is no tenderness. There is no rigidity and no guarding.   Musculoskeletal: 2+ edema in legs. Normal range of motion.   Neurological: Alert. Oriented x4  Skin: Scattered open wounds on both legs with mild redness. Skin is warm and dry.   Psychiatric: Normal mood and affect.     Emergency Department Course     ECG:  ECG taken at 0325, ECG read at 0343  Normal sinus rhythm  Left axis deviation  Inferior infarct, age undetermined  Rate 77 bpm. KY interval 202 ms. QRS duration 84 ms. QT/QTc 370/418 ms. P-R-T axes 44 -65 70.    Imaging:  CT Chest Pulmonary Embolism w Contrast  1.  Negative for pulmonary embolism.  2.  Bilateral bronchial wall thickening with mosaic airspace attenuation suggesting areas of air trapping and lower airways disease. No focal airspace consolidation.  3.  Borderline cardiomegaly and mild coronary artery disease.  Reading per radiology    Laboratory:  CBC: WBC 6.6, HGB 12.8 (L),   BMP: Glucose 121 (H) o/w WNL (Creatinine 1.03)    Troponin (Collected 0339): 8  Troponin (0830 collection): Pending    D Dimer (Collected 0339): 0.75 (H)    BNP: 169    Asymptomatic COVID-19 Virus (Coronavirus) by PCR Nasopharyngeal swab: Negative    Reviewed:  I reviewed nursing notes, vitals, past medical history and care everywhere    Assessments:  0359 I obtained history and examined the patient as noted above.     0608 I rechecked and updated the patient regarding the imaging results, laboratory results and  the plan for care.    Disposition:  Care of the patient was transferred to my colleague Dr. Dooley pending follow up troponin.     Impression & Plan     Medical Decision Making:  Jamar Ivory is a 71 year old gentleman who presents with non-specific chest pain that awoke him from sleep. This has been somewhat recurrent for him. He has essentially been symptom free since arrival with reassuring vital sings. No signs of heart failure or pulmonary emboli on workup. No sings of pneumonia, pneumothorax, aortic dissection, etc. His abdominal exam is normal. Initial troponin is negative and EKG is non-ischemic. Given the onset of his symptoms he will need a second troponin for 6 hour rule out at approximately 0830 this morning. Pending the results of this he will either be discharged home or admitted for further cardiovascular workup.    Diagnosis:    ICD-10-CM    1. Chest pain, unspecified type  R07.9      Scribe Disclosure:  Gilles DOYLE, am serving as a scribe at 3:28 AM on 12/30/2021 to document services personally performed by Jonnie Klein MD based on my observations and the provider's statements to me.      Jonnie Klein MD  12/30/21 0638

## 2021-12-30 NOTE — DISCHARGE INSTRUCTIONS
-Take acetaminophen 500 to 1000 mg by mouth every 4 to 6 hours as needed for pain or fever.  Do not take more than 4000 mg in 24 hours.  Do not take within 6 hours of another acetaminophen containing medication such as norco (vicodin) or percocet.

## 2021-12-30 NOTE — ED NOTES
Bed: ED01  Expected date: 12/30/21  Expected time:   Means of arrival:   Comments:  BV-1 71 Male Chest pain

## 2021-12-30 NOTE — ED TRIAGE NOTES
Pt arrives via EMS, states that he was sleeping and was awoken by chest pain, waited to call 911. Stated he called about 20 mins later and EMS gave 324 ASA and 2 nitroglycerin that helped his pain initially  Pt states pain is more with deep inspiration. ABC intact

## 2021-12-31 ENCOUNTER — PATIENT OUTREACH (OUTPATIENT)
Dept: FAMILY MEDICINE | Facility: CLINIC | Age: 71
End: 2021-12-31
Payer: MEDICARE

## 2022-01-01 ENCOUNTER — NURSE TRIAGE (OUTPATIENT)
Dept: NURSING | Facility: CLINIC | Age: 72
End: 2022-01-01
Payer: MEDICARE

## 2022-01-01 NOTE — TELEPHONE ENCOUNTER
RN triage   Call from pt wife - signed consent in chart   Pt had colonoscopy on 12/27- fell after that --- not seen after fall until 12/30-- seen in ED for pain w/ deep breath and chest pain --sent home from ED -- told probable rib pain from fall  Pt told to take advil -- wife states advil give pt upset stomach --- can he take aleve instead -- is it OK to take w/ all the other meds pt is on?  Wife not sure what meds pt takes   Reviewed = take any NSAID w/ food   Advised to try tylenol   Advised to call pharmacy for med interactions   Pt sleeping now ---no cough - no fever   Pt more tired since fall --- pt is alert and interactive and oriented when awake   Wife also states pt depression is not getting better   Wife states pt is not thinking/speaking of hurting self or others     Advised to see PCP   Transferred to      Teresa Artis RN  [facility]  Triage Nurse Advisor    COVID 19 Nurse Triage Plan/Patient Instructions    Please be aware that novel coronavirus (COVID-19) may be circulating in the community. If you develop symptoms such as fever, cough, or SOB or if you have concerns about the presence of another infection including coronavirus (COVID-19), please contact your health care provider or visit [email or web address].     Disposition/Instructions    In-Person Visit with provider recommended. Reference Visit Selection Guide.    Thank you for taking steps to prevent the spread of this virus.  o Limit your contact with others.  o Wear a simple mask to cover your cough.  o Wash your hands well and often.    Resources    [facility]: About COVID-19: [email or web address]    CDC: What to Do If You're Sick: www.cdc.gov/coronavirus/2019-ncov/about/steps-when-sick.html    CDC: Ending Home Isolation: www.cdc.gov/coronavirus/2019-ncov/hcp/disposition-in-home-patients.html     CDC: Caring for Someone: www.cdc.gov/coronavirus/2019-ncov/if-you-are-sick/care-for-someone.html     [facility]: Interim  Guidance for Hospital Discharge to Home: www.health.[facility].[place]/diseases/coronavirus/hcp/hospdischarge.pdf    [facility] clinical trials (COVID-19 research studies): [email or web address].[email or web address]/umn-clinical-trials     Below are the COVID-19 hotlines at the [place] Department of [facility] ([facility]). Interpreters are available.   o For health questions: Call 917-916-1780 or 1-516.566.4131 (7 a.m. to 7 p.m.)  o For questions about schools and childcare: Call 471-111-6739 or 1-756.676.5664 (7 a.m. to 7 p.m.)                     Reason for Disposition    [1] Caller has medication question about med not prescribed by PCP AND [2] triager unable to answer question (e.g., compatibility with other med, storage)    Protocols used: MEDICATION QUESTION CALL-A-

## 2022-01-04 ENCOUNTER — OFFICE VISIT (OUTPATIENT)
Dept: FAMILY MEDICINE | Facility: CLINIC | Age: 72
End: 2022-01-04
Payer: COMMERCIAL

## 2022-01-04 VITALS
OXYGEN SATURATION: 96 % | WEIGHT: 315 LBS | BODY MASS INDEX: 47.74 KG/M2 | SYSTOLIC BLOOD PRESSURE: 136 MMHG | DIASTOLIC BLOOD PRESSURE: 74 MMHG | TEMPERATURE: 97.9 F | HEIGHT: 68 IN | RESPIRATION RATE: 18 BRPM | HEART RATE: 68 BPM

## 2022-01-04 DIAGNOSIS — I42.8 NONISCHEMIC CARDIOMYOPATHY (H): ICD-10-CM

## 2022-01-04 DIAGNOSIS — L97.902: ICD-10-CM

## 2022-01-04 DIAGNOSIS — R07.89 CHEST WALL PAIN: Primary | ICD-10-CM

## 2022-01-04 DIAGNOSIS — F32.1 MODERATE MAJOR DEPRESSION (H): ICD-10-CM

## 2022-01-04 DIAGNOSIS — I25.119 CORONARY ARTERY DISEASE INVOLVING NATIVE CORONARY ARTERY OF NATIVE HEART WITH ANGINA PECTORIS (H): ICD-10-CM

## 2022-01-04 DIAGNOSIS — I73.9 PVD (PERIPHERAL VASCULAR DISEASE) (H): ICD-10-CM

## 2022-01-04 DIAGNOSIS — E11.42 DIABETIC POLYNEUROPATHY ASSOCIATED WITH TYPE 2 DIABETES MELLITUS (H): ICD-10-CM

## 2022-01-04 DIAGNOSIS — I25.110 CORONARY ARTERY DISEASE INVOLVING NATIVE CORONARY ARTERY OF NATIVE HEART WITH UNSTABLE ANGINA PECTORIS (H): ICD-10-CM

## 2022-01-04 DIAGNOSIS — M17.0 PRIMARY OSTEOARTHRITIS OF BOTH KNEES: ICD-10-CM

## 2022-01-04 DIAGNOSIS — E66.01 MORBID OBESITY (H): ICD-10-CM

## 2022-01-04 PROCEDURE — 99214 OFFICE O/P EST MOD 30 MIN: CPT | Performed by: PHYSICIAN ASSISTANT

## 2022-01-04 RX ORDER — MELOXICAM 15 MG/1
15 TABLET ORAL DAILY
Qty: 90 TABLET | Refills: 1 | Status: SHIPPED | OUTPATIENT
Start: 2022-01-04 | End: 2022-09-26

## 2022-01-04 RX ORDER — FLUOXETINE 40 MG/1
40 CAPSULE ORAL DAILY
Qty: 90 CAPSULE | Refills: 1 | Status: SHIPPED | OUTPATIENT
Start: 2022-01-04 | End: 2022-10-05

## 2022-01-04 RX ORDER — NITROGLYCERIN 0.4 MG/1
TABLET SUBLINGUAL
Qty: 30 TABLET | Refills: 1 | Status: SHIPPED | OUTPATIENT
Start: 2022-01-04 | End: 2023-06-08

## 2022-01-04 ASSESSMENT — PATIENT HEALTH QUESTIONNAIRE - PHQ9
SUM OF ALL RESPONSES TO PHQ QUESTIONS 1-9: 5
10. IF YOU CHECKED OFF ANY PROBLEMS, HOW DIFFICULT HAVE THESE PROBLEMS MADE IT FOR YOU TO DO YOUR WORK, TAKE CARE OF THINGS AT HOME, OR GET ALONG WITH OTHER PEOPLE: NOT DIFFICULT AT ALL
SUM OF ALL RESPONSES TO PHQ QUESTIONS 1-9: 5

## 2022-01-04 ASSESSMENT — MIFFLIN-ST. JEOR: SCORE: 2203.23

## 2022-01-04 NOTE — PATIENT INSTRUCTIONS
Ok to continue acetaminophen for the pain.   I have sent a refill of the nitroglycerin - you can take it as needed for the chest pain, and see if your symptoms improve.

## 2022-01-04 NOTE — PROGRESS NOTES
Assessment & Plan   Problem List Items Addressed This Visit        Nervous and Auditory    Coronary artery disease, noted to have a small segment infarct with teo-infarct ischemia    Hx of Diabetic polyneuropathy associated with type 2 diabetes mellitus - now diet controlled (H)    Relevant Medications    FLUoxetine (PROZAC) 40 MG capsule       Digestive    Morbid obesity (H)    Relevant Medications    meloxicam (MOBIC) 15 MG tablet       Circulatory    PVD (peripheral vascular disease) (H)      Other Visit Diagnoses     Chest wall pain    -  Primary    Coronary artery disease involving native coronary artery of native heart with unstable angina pectoris (H)        Relevant Medications    nitroGLYcerin (NITROSTAT) 0.4 MG sublingual tablet    Non-healing ulcer of lower leg with fat layer exposed, unspecified laterality (H)        Relevant Medications    meloxicam (MOBIC) 15 MG tablet    Nonischemic cardiomyopathy (H)        Moderate major depression (H)        Relevant Medications    FLUoxetine (PROZAC) 40 MG capsule    Primary osteoarthritis of both knees        Relevant Medications    meloxicam (MOBIC) 15 MG tablet         Pain correlates with chest wall or muscular pain.  We know patient has underlying angina, cardiomyopathy, and CAD - however workup in the ED was negative.  Tender anterior chest on exam today.  Patient should monitor chest pain, nitroglycerin can be used as needed - if more than one dose is required he should call 911.  Patient and his wife understand and agree with the plan.     HCC diagnosis update.   Diabetic neuropathy - DM2 well controlled with diet, neuropathy is now improved.   CAD/cardiomyapathy - followed by [facility].  Recent eval in the ED negative for MI/PE  Morbid obesity - consistent for many years.   PVD - improved  Ulcer - improved  MDD - depression is stable.   We discussed increasing the dose of Prozac to 40 mg today.                   Return in about 4 weeks (around  "2/1/2022) for a recheck if you are not improved.    Myah Florez PA-C  [facility] VANNESSA Cooper is a 71 year old who presents for the following health issues     HPI     ED/UC Followup:    Facility: [facility] Emergency Dept   Date of visit: 12/30/2021  Reason for visit: Chest pain   Had EKG, CT chest pulmonary embolism w/contrast/lab done at ER   Current Status: ongoing chest pain, hard tm breathing and sleeping       Still very sore  He fell after getting home from the hospital. He had a lot of pain 6 hours after the fall.   Legs are not swollen today  Taking 1000 mg tylenol as needed for pain.     At the ED - they evaluated him for MI, PE, dyspnea        Review of Systems         Objective    /74   Pulse 68   Temp 97.9  F (36.6  C) (Tympanic)   Resp 18   Ht 1.727 m (5' 8\")   Wt 147.9 kg (326 lb)   SpO2 96%   BMI 49.57 kg/m    Body mass index is 49.57 kg/m .  Physical Exam  Constitutional:       General: He is not in acute distress.     Appearance: He is well-developed. He is not diaphoretic.   HENT:      Head: Normocephalic.      Right Ear: External ear normal.      Left Ear: External ear normal.      Nose: Nose normal.   Eyes:      Conjunctiva/sclera: Conjunctivae normal.   Cardiovascular:      Rate and Rhythm: Normal rate and regular rhythm.      Heart sounds: Normal heart sounds.   Pulmonary:      Effort: Pulmonary effort is normal.      Breath sounds: Normal breath sounds.   Chest:      Chest wall: Tenderness (left anterior chest) present.   Musculoskeletal:      Cervical back: Normal range of motion.   Skin:     General: Skin is warm and dry.   Neurological:      Mental Status: He is alert and oriented to person, place, and time.   Psychiatric:         Judgment: Judgment normal.            No results found for any visits on 01/04/22.            Answers for HPI/ROS submitted by the patient on 1/4/2022  If you checked off any problems, " how difficult have these problems made it for you to do your work, take care of things at home, or get along with other people?: Not difficult at all  PHQ9 TOTAL SCORE: 5

## 2022-01-05 ASSESSMENT — PATIENT HEALTH QUESTIONNAIRE - PHQ9: SUM OF ALL RESPONSES TO PHQ QUESTIONS 1-9: 5

## 2022-01-17 ENCOUNTER — CARE COORDINATION (OUTPATIENT)
Dept: CARDIOLOGY | Facility: CLINIC | Age: 72
End: 2022-01-17
Payer: COMMERCIAL

## 2022-01-17 NOTE — PROGRESS NOTES
[facility]    Received VM on [facility] RN line from wife Melissa who is requesting call back as she has a question about what cream she should be using and how does she get it.      Called wife back and instructed her to call her own PCP or oncologist to request edgardo cleans and protect refill.  She voices understanding and denies further questions or concerns at this time.     Blanca Gould RN BSN   [facility]- [place], [place]  [email or web address] Care Coordinator  01/17/22, 1:01 PM

## 2022-02-19 ENCOUNTER — HEALTH MAINTENANCE LETTER (OUTPATIENT)
Age: 72
End: 2022-02-19

## 2022-03-01 ENCOUNTER — NURSE TRIAGE (OUTPATIENT)
Dept: FAMILY MEDICINE | Facility: CLINIC | Age: 72
End: 2022-03-01
Payer: COMMERCIAL

## 2022-03-01 NOTE — TELEPHONE ENCOUNTER
"    Reason for Disposition    Thigh or calf pain in only one leg and present > 1 hour    Additional Information    Negative: Looks like a broken bone or dislocated joint (e.g., crooked or deformed)    Negative: Sounds like a life-threatening emergency to the triager    Negative: Followed a hip injury    Negative: Followed a knee injury    Negative: Followed an ankle or foot injury    Negative: Back pain radiating (shooting) into leg(s)    Negative: Foot pain is the main symptom    Negative: Ankle pain is the main symptom    Negative: Knee pain is the main symptom    Negative: Leg swelling is the main symptom    Negative: Chest pain    Negative: Difficulty breathing    Negative: Entire foot is cool or blue in comparison to other side    Negative: Unable to walk    Answer Assessment - Initial Assessment Questions  1. ONSET: \"When did the pain start?\"       Last night  2. LOCATION: \"Where is the pain located?\"       Upper right thigh  3. PAIN: \"How bad is the pain?\"    (Scale 1-10; or mild, moderate, severe)    -  MILD (1-3): doesn't interfere with normal activities     -  MODERATE (4-7): interferes with normal activities (e.g., work or school) or awakens from sleep, limping     -  SEVERE (8-10): excruciating pain, unable to do any normal activities, unable to walk      7/10  4. WORK OR EXERCISE: \"Has there been any recent work or exercise that involved this part of the body?\"       Fell a couple of days ago and landed on right side  5. CAUSE: \"What do you think is causing the leg pain?\"      Not sure  6. OTHER SYMPTOMS: \"Do you have any other symptoms?\" (e.g., chest pain, back pain, breathing difficulty, swelling, rash, fever, numbness, weakness)      no  7. PREGNANCY: \"Is there any chance you are pregnant?\" \"When was your last menstrual period?\"      NA    Protocols used: LEG PAIN-A-OH    GO TO ED/UCC NOW (OR TO OFFICE WITH PCP APPROVAL): * After using nurse judgment regarding the most appropriate site, tell the " caller: Go to nearest ED. Leave NOW.   * TRIAGER CAUTION: In selecting the most appropriate care site, you must consider both the severity of the patient's symptoms AND what resources are available at that care site.  * ED: Patients who may need surgery, sound seriously ill or may be unstable need to be sent to an ED. Likewise, so do most patients with complex medical problems and serious symptoms.  * UCC: Some UCCs can manage patients who are stable and have less serious symptoms (e.g., minor illnesses and injuries). The triager must know the UCC capabilities before sending a patient there. If unsure, call ahead.  * OFFICE: If patient sounds stable and not seriously ill, consult PCP (or follow your office policy) to see if patient can be seen NOW in office.      Kassi Nur RN  devang Mar 1, 2022 7:32 AM   PAIN MEDICINES:  * For pain relief, you can take either acetaminophen, ibuprofen, or naproxen.  * They are over-the-counter (OTC) pain drugs. You can buy them at the drugstore.  * ACETAMINOPHEN - REGULAR STRENGTH TYLENOL: Take 650 mg (two 325 mg pills) by mouth every 4-6 hours as needed. Each Regular Strength Tylenol pill has 325 mg of acetaminophen. The most you should take each day is 3,250 mg (10 pills a day).  * ACETAMINOPHEN - EXTRA STRENGTH TYLENOL: Take 1,000 mg (two 500 mg pills) every 8 hours as needed. Each Extra Strength Tylenol pill has 500 mg of acetaminophen. The most you should take each day is 3,000 mg (6 pills a day).  * IBUPROFEN (E.G., MOTRIN, ADVIL): Take 400 mg (two 200 mg pills) by mouth every 6 hours. The most you should take each day is 1,200 mg (six 200 mg pills), unless your doctor has told you to take more.  * NAPROXEN (E.G., ALEVE): Take 220 mg (one 220 mg pill) by mouth every 8 hours as needed. You may take 440 mg (two 220 mg pills) for your first dose. The most you should take each day is 660 mg (three 220 mg pills a day), unless your doctor has told you to take more.      APPLY COLD  TO THE AREA FOR FIRST 48 HOURS  * Apply a cold pack or an ice bag (wrapped in a moist towel) to the area for 20 minutes. Repeat in 1 hour, then every 4 hours while awake.   * Continue this for the first 48 hours after an injury (Reason: to reduce the swelling and pain).      APPLY HEAT TO THE AREA:   * Beginning 48 hours after an injury, apply a warm washcloth or heating pad for 10 minutes 3 times a day.   * This will help increase blood flow and improve healing.      CALL BACK IF:  * Moderate pain (e.g., limping) lasts more than 3 days  * Mild pain lasts more than 7 days  * You become worse        Patient/Caregiver understands and will follow care advice? Yes, plans to follow advice        Kassi MACKEY RN  EP Triage

## 2022-03-18 DIAGNOSIS — M17.0 PRIMARY OSTEOARTHRITIS OF BOTH KNEES: ICD-10-CM

## 2022-03-18 DIAGNOSIS — E66.01 MORBID OBESITY (H): ICD-10-CM

## 2022-03-22 RX ORDER — CYCLOBENZAPRINE HCL 5 MG
5 TABLET ORAL 2 TIMES DAILY PRN
Qty: 45 TABLET | Refills: 1 | Status: SHIPPED | OUTPATIENT
Start: 2022-03-22 | End: 2022-06-21

## 2022-03-22 NOTE — TELEPHONE ENCOUNTER
Routing refill request to provider for review/approval because:  Drug not on the [facility] refill protocol     Kassi MACKEY RN  EP Triage

## 2022-04-01 ENCOUNTER — OFFICE VISIT (OUTPATIENT)
Dept: CARDIOLOGY | Facility: CLINIC | Age: 72
End: 2022-04-01
Payer: COMMERCIAL

## 2022-04-01 ENCOUNTER — LAB (OUTPATIENT)
Dept: LAB | Facility: CLINIC | Age: 72
End: 2022-04-01
Payer: COMMERCIAL

## 2022-04-01 VITALS
WEIGHT: 315 LBS | HEART RATE: 74 BPM | SYSTOLIC BLOOD PRESSURE: 118 MMHG | DIASTOLIC BLOOD PRESSURE: 84 MMHG | HEIGHT: 68 IN | OXYGEN SATURATION: 96 % | BODY MASS INDEX: 47.74 KG/M2

## 2022-04-01 DIAGNOSIS — I50.42 CHRONIC COMBINED SYSTOLIC AND DIASTOLIC CONGESTIVE HEART FAILURE (H): ICD-10-CM

## 2022-04-01 DIAGNOSIS — I10 HYPERTENSION GOAL BP (BLOOD PRESSURE) < 140/90: Chronic | ICD-10-CM

## 2022-04-01 DIAGNOSIS — E78.5 HYPERLIPIDEMIA WITH TARGET LDL LESS THAN 100: Chronic | ICD-10-CM

## 2022-04-01 DIAGNOSIS — I50.42 CHRONIC COMBINED SYSTOLIC AND DIASTOLIC CONGESTIVE HEART FAILURE (H): Primary | ICD-10-CM

## 2022-04-01 DIAGNOSIS — I42.8 NONISCHEMIC CARDIOMYOPATHY (H): ICD-10-CM

## 2022-04-01 DIAGNOSIS — E66.01 MORBID OBESITY (H): ICD-10-CM

## 2022-04-01 LAB
ANION GAP SERPL CALCULATED.3IONS-SCNC: 5 MMOL/L (ref 3–14)
BUN SERPL-MCNC: 20 MG/DL (ref 7–30)
CALCIUM SERPL-MCNC: 9 MG/DL (ref 8.5–10.1)
CHLORIDE BLD-SCNC: 104 MMOL/L (ref 94–109)
CO2 SERPL-SCNC: 28 MMOL/L (ref 20–32)
CREAT SERPL-MCNC: 1.19 MG/DL (ref 0.66–1.25)
GFR SERPL CREATININE-BSD FRML MDRD: 64 ML/MIN/1.73M2
GLUCOSE BLD-MCNC: 104 MG/DL (ref 70–99)
POTASSIUM BLD-SCNC: 4.3 MMOL/L (ref 3.4–5.3)
SODIUM SERPL-SCNC: 137 MMOL/L (ref 133–144)

## 2022-04-01 PROCEDURE — 80048 BASIC METABOLIC PNL TOTAL CA: CPT | Performed by: INTERNAL MEDICINE

## 2022-04-01 PROCEDURE — 99214 OFFICE O/P EST MOD 30 MIN: CPT | Performed by: NURSE PRACTITIONER

## 2022-04-01 PROCEDURE — 36415 COLL VENOUS BLD VENIPUNCTURE: CPT | Performed by: INTERNAL MEDICINE

## 2022-04-01 NOTE — PROGRESS NOTES
2Cardiology Clinic Progress Note  Jamar Ivory MRN# [id number]   YOB: 1950 Age: 72 year old   Primary Cardiologist: Dr. 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      1.  Chronic combined systolic and diastolic heart failure, nonischemic cardiomyopathy - LVEF of 45-50%, grade I or early diastolic dysfunction.               - NYHA class III, stage C              - Etiology : nonischemic               - Diuretic regimen : continue torsemide 80mg daily                           - PRN metolazone when instructed by CORE clinic              - Last RHC : none              - Ischemic evaluation : 2/11/21 abnormal nuclear stress test - medically managed, 12/2011 normal coronary angiogram               - Guideline directed medical therapy                          - Betablocker: stopped due to patients frustrations with medications, PCP and patient went through his medications and with shared decision making reviewed the risk/benefits. Ultimately they decided to stop metoprolol XL. Heart rates have remained controlled.                           - ACEI/ARB/ARNI: losartan 25mg daily                           - Aldactone antagonist: spironolactone 50mg daily   2. Hypertension - controlled  3. Obesity - counseled patient on weight loss strategies.   4. Dyslipidemia - 1/4/21 lipid panel total cholesterol 183, HDL 38, , and triglycerides 138                       - Continue atorvastatin  5. Depression - Reports his mood overall has been okay, currently on prozac. Continue follow up with psychiatry.     I saw Kenneth and his wife today for CORE follow up. He continues to do well from a heart failure standpoint. HF symptoms overall controlled. No  recent hospitalizations for HF. No medication changes today. Recommend follow up with Dr. Chand this summer. Support given, all questions answered. Encouraged to call with any questions/concerns.     Changes today: none    Follow up plan:     Follow up with Dr. 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      Patient established care with cardiology in May 2019 with Dr. Chand after developing swelling in his lower extremities associated with weight gain in the setting of medication noncompliance. Patient noted to have a known nonischemic cardiomyopathy. Normal coronary angiogram in 12/2011. In 2016 patient was evaluated for nonsustained VT at outside facility, nuclear stress test showed no ischemic with an EF 45-50%. He was last hospitalized for HF exacerbation in December 2018.      Patient has followed closely with CORE clinic since June 2019. I first met patient in September 2019. Patient has been followed in telehealth tracker with multiple phone calls and diuretic adjustments over the past many months. Patient is often resistant to adjustments in his diuretic or medication regimen. He most recently has been maintained on torsemide 80mg daily (which was changed to daily dose per PCP).      He was hospitalized 2/9/21-2/11/21 due to chest pain and increased shortness of breath. Heart failure noted to be compensated. Echocardiogram was completed which showed no significant change from prior, LVEF 45-50%. Nuclear stress test showed small area of nontransmural infarction in the inferoapical segment(s) of the left ventricle associated with a mild degree of teo-infarct ischemia. Cardiology was consulted recommended medical management of abnormal stress test. He was  discharged on all prior to admission medications with no changes.      Over the summer he followed with [facility] his prozac and spironolactone were increased.     During our last CORE visit in December he was doing well, no medications were changed.      ED evaluation 12/30/21 for chest pain, workup unremarkable.     Patient is here today for CORE follow up.     Patient reports feeling good. Monitoring weights daily a home. Reports stable at home 330-333#. Denies shortness of breath at rest. Denies exertional dyspnea. Notes he is up frequently at night going to the bathroom. Denies orthopnea or PND. LE edema controlled. Wearing velcro wraps. Denies abdominal distention. Denies chest pain or chest tightness. Denies dizziness, lightheadedness or other presyncopal symptoms. Denies tachycardia or palpitations. Taking medications daily.     Labs today show stable kidney function and electrolytes. Blood pressure 118/84 and HR 74 in clinic today.    Appetite good. States he is adding salt substitute and pepper on his foods. Getting open arms meals. No set exercise routine, notes lifestyle is sedentary. Denies tobacco and alcohol use.         Social History    , 3 grown up children from previous marriage, 8 grandchildren, living in an apartment.  Social History     Socioeconomic History     Marital status:      Spouse name: Melissa     Number of children: 3     Years of education: Not on file     Highest education level: Not on file   Occupational History     Occupation:      Employer: [employer]   Tobacco Use     Smoking status: Never Smoker     Smokeless tobacco: Never Used   Substance and Sexual Activity     Alcohol use: Not Currently     Alcohol/week: 0.0 standard drinks     Comment: rarely     Drug use: No     Sexual activity: Yes     Partners: Female   Other Topics Concern     Parent/sibling w/ CABG, MI or angioplasty before 65F 55M? No   Social History Narrative     Not on file     Social  "Determinants of Health     Financial Resource Strain: Not on file   Food Insecurity: Not on file   Transportation Needs: No Transportation Needs     Lack of Transportation (Medical): No     Lack of Transportation (Non-Medical): No   Physical Activity: Not on file   Stress: Not on file   Social Connections: Not on file   Intimate Partner Violence: Not on file   Housing Stability: Not on file            Review of Systems:   Skin:  Negative     Eyes:  Positive for glasses  ENT:  Negative    Respiratory:  Positive for dyspnea on exertion  Cardiovascular:    Positive for;chest pain;edema  Gastroenterology: Negative    Genitourinary:  Positive for urinary frequency;nocturia  Musculoskeletal:  Positive for arthritis;joint pain  Neurologic:  Positive for headaches  Psychiatric:  Negative    Heme/Lymph/Imm:  Negative    Endocrine:  Positive for thyroid disorder         Physical Exam:   Vitals: /84 (BP Location: Right arm, Cuff Size: Adult Large)   Pulse 74   Ht 1.727 m (5' 8\")   Wt (!) 151.3 kg (333 lb 9.6 oz)   SpO2 96%   BMI 50.72 kg/m     Wt Readings from Last 4 Encounters:   04/01/22 (!) 151.3 kg (333 lb 9.6 oz)   01/04/22 147.9 kg (326 lb)   12/30/21 149.6 kg (329 lb 12.8 oz)   12/27/21 149 kg (328 lb 8 oz)     GEN: well nourished, in no acute distress.  HEENT:  Pupils equal, round. Sclerae nonicteric.   NECK: Supple, no masses appreciated. JVP hard to assess due to body habitus.   C/V:  Regular rate and rhythm, no murmur, rub or gallop.   RESP: Respirations are unlabored. Clear to auscultation bilaterally without wheezing, rales, or rhonchi.  GI: Abdomen soft, obese, nontender.  EXTREM: Trace bilateral LE edema, velcro compression wraps in place  NEURO: Alert and oriented, cooperative.  SKIN: Warm and dry       Data:     LIPID RESULTS:  Lab Results   Component Value Date    CHOL 183 01/04/2021    HDL 38 (L) 01/04/2021     (H) 01/04/2021    TRIG 138 01/04/2021    CHOLHDLRATIO 3.9 04/27/2015     LIVER " ENZYME RESULTS:  Lab Results   Component Value Date    AST 21 06/24/2021    ALT 29 06/24/2021     CBC RESULTS:  Lab Results   Component Value Date    WBC 6.6 12/30/2021    WBC 6.7 03/29/2021    RBC 4.44 12/30/2021    RBC 4.48 03/29/2021    HGB 12.8 (L) 12/30/2021    HGB 13.1 (L) 03/29/2021    HCT 40.6 12/30/2021    HCT 38.9 (L) 03/29/2021    MCV 91 12/30/2021    MCV 87 03/29/2021    MCH 28.8 12/30/2021    MCH 29.2 03/29/2021    MCHC 31.5 12/30/2021    MCHC 33.7 03/29/2021    RDW 14.3 12/30/2021    RDW 14.9 03/29/2021     12/30/2021     03/29/2021     BMP RESULTS:  Lab Results   Component Value Date     04/01/2022     06/24/2021    POTASSIUM 4.3 04/01/2022    POTASSIUM 4.0 06/24/2021    CHLORIDE 104 04/01/2022    CHLORIDE 108 06/24/2021    CO2 28 04/01/2022    CO2 31 06/24/2021    ANIONGAP 5 04/01/2022    ANIONGAP 1 (L) 06/24/2021     (H) 04/01/2022     (H) 06/24/2021    BUN 20 04/01/2022    BUN 14 06/24/2021    CR 1.19 04/01/2022    CR 0.93 06/24/2021    GFRESTIMATED 64 04/01/2022    GFRESTIMATED 82 06/24/2021    GFRESTBLACK >90 06/24/2021    ANGEL 9.0 04/01/2022    ANGEL 9.7 06/24/2021      A1C RESULTS:  Lab Results   Component Value Date    A1C 5.9 (H) 08/02/2021    A1C 5.7 (H) 01/04/2021     INR RESULTS:  Lab Results   Component Value Date    INR 1.06 04/11/2018    INR 1.02 08/23/2013            Medications     Current Outpatient Medications   Medication Sig Dispense Refill     allopurinol (ZYLOPRIM) 300 MG tablet Take 300 mg by mouth daily       aspirin (ASA) 81 MG tablet Take 1 tablet (81 mg) by mouth daily 90 tablet 3     atorvastatin (LIPITOR) 20 MG tablet Take 1 tablet (20 mg) by mouth daily 90 tablet 3     cyclobenzaprine (FLEXERIL) 5 MG tablet TAKE 1 TABLET (5 MG) BY MOUTH 2 TIMES DAILY AS NEEDED FOR MUSCLE SPASMS 45 tablet 1     ferrous sulfate (FE TABS) 325 (65 Fe) MG EC tablet Take 1 tablet (325 mg) by mouth every 48 hours 100 tablet 1     FLUoxetine (PROZAC) 40 MG  capsule Take 1 capsule (40 mg) by mouth daily 90 capsule 1     nitroGLYcerin (NITROSTAT) 0.4 MG sublingual tablet For chest pain place 1 tablet under the tongue every 5 minutes for 3 doses. If symptoms persist 5 minutes after 1st dose call 911. 30 tablet 1     tamsulosin (FLOMAX) 0.4 MG capsule TAKE 2 CAPSULES BY MOUTH EVERY  capsule 1     torsemide (DEMADEX) 20 MG tablet Take 4 tablets (80 mg) by mouth daily 360 tablet 1     Glucosamine-Chondroitin (OSTEO BI-FLEX REGULAR STRENGTH) 250-200 MG TABS Take 2 tablets by mouth daily        levothyroxine (SYNTHROID/LEVOTHROID) 200 MCG tablet Take 1 tablet (200 mcg) by mouth daily 90 tablet 0     losartan (COZAAR) 25 MG tablet Take 1 tablet (25 mg) by mouth daily 90 tablet 3     meloxicam (MOBIC) 15 MG tablet Take 1 tablet (15 mg) by mouth daily (Patient not taking: Reported on 4/1/2022) 90 tablet 1          Past Medical History     Past Medical History:   Diagnosis Date     Arthritis      Cerebral artery occlusion with cerebral infarction     TIA     CHF (congestive heart failure) 12/24/2018     CVA (cerebral infarction) 2012     CVD (cardiovascular disease)      Depression 1977    hospitalized     Fatty liver      Gout      h/o Concussion      Hypertension 01/17/2011     Hypothyroidism      Obesity      Spider veins      TIA (transient ischaemic attack) 2012     Vitiligo      Past Surgical History:   Procedure Laterality Date     APPENDECTOMY      at age 22     COLONOSCOPY N/A 09/02/2016    Procedure: COMBINED COLONOSCOPY, SINGLE OR MULTIPLE BIOPSY/POLYPECTOMY BY BIOPSY;  Surgeon: Yanick Brown MD;  Location:  GI     COLONOSCOPY N/A 12/27/2021    Procedure: COLONOSCOPY;  Surgeon: Kong Chowdary MD;  Location: [facility] OR     VASECTOMY       Family History   Problem Relation Age of Onset     Cancer Father         Lung     Diabetes Mother      Cancer Daughter         leukemia            Allergies   Clopidogrel, Penicillins, Sulfa drugs, Xeroform [bismuth  tribromphenate], and Simvastatin    30 minutes spent on the date of the encounter doing chart review, history and exam, documentation and further activities as noted above      YENI Yang CNP  [facility]  Pager: [phone]

## 2022-04-01 NOTE — LETTER
4/1/2022    Myah Florez PA-C  [address]  [place] [address]    RE: Jamar Ivory       Dear Colleague,     I had the pleasure of seeing Jamar Ivory in the [facility].  2Cardiology Clinic Progress Note  Jamar Ivory MRN# [id number]   YOB: 1950 Age: 72 year old   Primary Cardiologist: Dr. 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      1.  Chronic combined systolic and diastolic heart failure, nonischemic cardiomyopathy - LVEF of 45-50%, grade I or early diastolic dysfunction.               - NYHA class III, stage C              - Etiology : nonischemic               - Diuretic regimen : continue torsemide 80mg daily                           - PRN metolazone when instructed by CORE clinic              - Last RHC : none              - Ischemic evaluation : 2/11/21 abnormal nuclear stress test - medically managed, 12/2011 normal coronary angiogram               - Guideline directed medical therapy                          - Betablocker: stopped due to patients frustrations with medications, PCP and patient went through his medications and with shared decision making reviewed the risk/benefits. Ultimately they decided to stop metoprolol XL. Heart rates have remained controlled.                           - ACEI/ARB/ARNI: losartan 25mg daily                           - Aldactone antagonist: spironolactone 50mg daily   2. Hypertension - controlled  3. Obesity - counseled patient on weight loss strategies.   4. Dyslipidemia - 1/4/21 lipid panel total cholesterol 183, HDL 38, , and triglycerides 138                       - Continue atorvastatin  5. Depression - Reports his mood  overall has been okay, currently on prozac. Continue follow up with psychiatry.     I saw Kenneth and his wife today for CORE follow up. He continues to do well from a heart failure standpoint. HF symptoms overall controlled. No recent hospitalizations for HF. No medication changes today. Recommend follow up with Dr. Chand this summer. Support given, all questions answered. Encouraged to call with any questions/concerns.     Changes today: none    Follow up plan:     Follow up with Dr. 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      Patient established care with cardiology in May 2019 with Dr. Chand after developing swelling in his lower extremities associated with weight gain in the setting of medication noncompliance. Patient noted to have a known nonischemic cardiomyopathy. Normal coronary angiogram in 12/2011. In 2016 patient was evaluated for nonsustained VT at outside facility, nuclear stress test showed no ischemic with an EF 45-50%. He was last hospitalized for HF exacerbation in December 2018.      Patient has followed closely with CORE clinic since June 2019. I first met patient in September 2019. Patient has been followed in telehealth tracker with multiple phone calls and diuretic adjustments over the past many months. Patient is often resistant to adjustments in his diuretic or medication regimen. He most recently has been maintained on torsemide 80mg daily (which was changed to daily dose per PCP).      He was hospitalized 2/9/21-2/11/21 due to chest pain and increased shortness of breath. Heart failure noted to be compensated. Echocardiogram was completed which showed no significant change from prior, LVEF 45-50%. Nuclear stress test showed small area of  nontransmural infarction in the inferoapical segment(s) of the left ventricle associated with a mild degree of teo-infarct ischemia. Cardiology was consulted recommended medical management of abnormal stress test. He was discharged on all prior to admission medications with no changes.      Over the summer he followed with [facility] his prozac and spironolactone were increased.     During our last CORE visit in December he was doing well, no medications were changed.      ED evaluation 12/30/21 for chest pain, workup unremarkable.     Patient is here today for CORE follow up.     Patient reports feeling good. Monitoring weights daily a home. Reports stable at home 330-333#. Denies shortness of breath at rest. Denies exertional dyspnea. Notes he is up frequently at night going to the bathroom. Denies orthopnea or PND. LE edema controlled. Wearing velcro wraps. Denies abdominal distention. Denies chest pain or chest tightness. Denies dizziness, lightheadedness or other presyncopal symptoms. Denies tachycardia or palpitations. Taking medications daily.     Labs today show stable kidney function and electrolytes. Blood pressure 118/84 and HR 74 in clinic today.    Appetite good. States he is adding salt substitute and pepper on his foods. Getting open arms meals. No set exercise routine, notes lifestyle is sedentary. Denies tobacco and alcohol use.         Social History    , 3 grown up children from previous marriage, 8 grandchildren, living in an apartment.  Social History     Socioeconomic History     Marital status:      Spouse name: Melissa     Number of children: 3     Years of education: Not on file     Highest education level: Not on file   Occupational History     Occupation:      Employer: [employer]   Tobacco Use     Smoking status: Never Smoker     Smokeless tobacco: Never Used   Substance and Sexual Activity     Alcohol use: Not Currently     Alcohol/week: 0.0 standard drinks      "Comment: rarely     Drug use: No     Sexual activity: Yes     Partners: Female   Other Topics Concern     Parent/sibling w/ CABG, MI or angioplasty before 65F 55M? No   Social History Narrative     Not on file     Social Determinants of Health     Financial Resource Strain: Not on file   Food Insecurity: Not on file   Transportation Needs: No Transportation Needs     Lack of Transportation (Medical): No     Lack of Transportation (Non-Medical): No   Physical Activity: Not on file   Stress: Not on file   Social Connections: Not on file   Intimate Partner Violence: Not on file   Housing Stability: Not on file            Review of Systems:   Skin:  Negative     Eyes:  Positive for glasses  ENT:  Negative    Respiratory:  Positive for dyspnea on exertion  Cardiovascular:    Positive for;chest pain;edema  Gastroenterology: Negative    Genitourinary:  Positive for urinary frequency;nocturia  Musculoskeletal:  Positive for arthritis;joint pain  Neurologic:  Positive for headaches  Psychiatric:  Negative    Heme/Lymph/Imm:  Negative    Endocrine:  Positive for thyroid disorder         Physical Exam:   Vitals: /84 (BP Location: Right arm, Cuff Size: Adult Large)   Pulse 74   Ht 1.727 m (5' 8\")   Wt (!) 151.3 kg (333 lb 9.6 oz)   SpO2 96%   BMI 50.72 kg/m     Wt Readings from Last 4 Encounters:   04/01/22 (!) 151.3 kg (333 lb 9.6 oz)   01/04/22 147.9 kg (326 lb)   12/30/21 149.6 kg (329 lb 12.8 oz)   12/27/21 149 kg (328 lb 8 oz)     GEN: well nourished, in no acute distress.  HEENT:  Pupils equal, round. Sclerae nonicteric.   NECK: Supple, no masses appreciated. JVP hard to assess due to body habitus.   C/V:  Regular rate and rhythm, no murmur, rub or gallop.   RESP: Respirations are unlabored. Clear to auscultation bilaterally without wheezing, rales, or rhonchi.  GI: Abdomen soft, obese, nontender.  EXTREM: Trace bilateral LE edema, velcro compression wraps in place  NEURO: Alert and oriented, " cooperative.  SKIN: Warm and dry       Data:     LIPID RESULTS:  Lab Results   Component Value Date    CHOL 183 01/04/2021    HDL 38 (L) 01/04/2021     (H) 01/04/2021    TRIG 138 01/04/2021    CHOLHDLRATIO 3.9 04/27/2015     LIVER ENZYME RESULTS:  Lab Results   Component Value Date    AST 21 06/24/2021    ALT 29 06/24/2021     CBC RESULTS:  Lab Results   Component Value Date    WBC 6.6 12/30/2021    WBC 6.7 03/29/2021    RBC 4.44 12/30/2021    RBC 4.48 03/29/2021    HGB 12.8 (L) 12/30/2021    HGB 13.1 (L) 03/29/2021    HCT 40.6 12/30/2021    HCT 38.9 (L) 03/29/2021    MCV 91 12/30/2021    MCV 87 03/29/2021    MCH 28.8 12/30/2021    MCH 29.2 03/29/2021    MCHC 31.5 12/30/2021    MCHC 33.7 03/29/2021    RDW 14.3 12/30/2021    RDW 14.9 03/29/2021     12/30/2021     03/29/2021     BMP RESULTS:  Lab Results   Component Value Date     04/01/2022     06/24/2021    POTASSIUM 4.3 04/01/2022    POTASSIUM 4.0 06/24/2021    CHLORIDE 104 04/01/2022    CHLORIDE 108 06/24/2021    CO2 28 04/01/2022    CO2 31 06/24/2021    ANIONGAP 5 04/01/2022    ANIONGAP 1 (L) 06/24/2021     (H) 04/01/2022     (H) 06/24/2021    BUN 20 04/01/2022    BUN 14 06/24/2021    CR 1.19 04/01/2022    CR 0.93 06/24/2021    GFRESTIMATED 64 04/01/2022    GFRESTIMATED 82 06/24/2021    GFRESTBLACK >90 06/24/2021    ANGEL 9.0 04/01/2022    ANGEL 9.7 06/24/2021      A1C RESULTS:  Lab Results   Component Value Date    A1C 5.9 (H) 08/02/2021    A1C 5.7 (H) 01/04/2021     INR RESULTS:  Lab Results   Component Value Date    INR 1.06 04/11/2018    INR 1.02 08/23/2013            Medications     Current Outpatient Medications   Medication Sig Dispense Refill     allopurinol (ZYLOPRIM) 300 MG tablet Take 300 mg by mouth daily       aspirin (ASA) 81 MG tablet Take 1 tablet (81 mg) by mouth daily 90 tablet 3     atorvastatin (LIPITOR) 20 MG tablet Take 1 tablet (20 mg) by mouth daily 90 tablet 3     cyclobenzaprine (FLEXERIL) 5 MG  tablet TAKE 1 TABLET (5 MG) BY MOUTH 2 TIMES DAILY AS NEEDED FOR MUSCLE SPASMS 45 tablet 1     ferrous sulfate (FE TABS) 325 (65 Fe) MG EC tablet Take 1 tablet (325 mg) by mouth every 48 hours 100 tablet 1     FLUoxetine (PROZAC) 40 MG capsule Take 1 capsule (40 mg) by mouth daily 90 capsule 1     nitroGLYcerin (NITROSTAT) 0.4 MG sublingual tablet For chest pain place 1 tablet under the tongue every 5 minutes for 3 doses. If symptoms persist 5 minutes after 1st dose call 911. 30 tablet 1     tamsulosin (FLOMAX) 0.4 MG capsule TAKE 2 CAPSULES BY MOUTH EVERY  capsule 1     torsemide (DEMADEX) 20 MG tablet Take 4 tablets (80 mg) by mouth daily 360 tablet 1     Glucosamine-Chondroitin (OSTEO BI-FLEX REGULAR STRENGTH) 250-200 MG TABS Take 2 tablets by mouth daily        levothyroxine (SYNTHROID/LEVOTHROID) 200 MCG tablet Take 1 tablet (200 mcg) by mouth daily 90 tablet 0     losartan (COZAAR) 25 MG tablet Take 1 tablet (25 mg) by mouth daily 90 tablet 3     meloxicam (MOBIC) 15 MG tablet Take 1 tablet (15 mg) by mouth daily (Patient not taking: Reported on 4/1/2022) 90 tablet 1          Past Medical History     Past Medical History:   Diagnosis Date     Arthritis      Cerebral artery occlusion with cerebral infarction     TIA     CHF (congestive heart failure) 12/24/2018     CVA (cerebral infarction) 2012     CVD (cardiovascular disease)      Depression 1977    hospitalized     Fatty liver      Gout      h/o Concussion      Hypertension 01/17/2011     Hypothyroidism      Obesity      Spider veins      TIA (transient ischaemic attack) 2012     Vitiligo      Past Surgical History:   Procedure Laterality Date     APPENDECTOMY      at age 22     COLONOSCOPY N/A 09/02/2016    Procedure: COMBINED COLONOSCOPY, SINGLE OR MULTIPLE BIOPSY/POLYPECTOMY BY BIOPSY;  Surgeon: Yanick Brown MD;  Location:  GI     COLONOSCOPY N/A 12/27/2021    Procedure: COLONOSCOPY;  Surgeon: Kong Chowdary MD;  Location: [facility]      VASECTOMY       Family History   Problem Relation Age of Onset     Cancer Father         Lung     Diabetes Mother      Cancer Daughter         leukemia            Allergies   Clopidogrel, Penicillins, Sulfa drugs, Xeroform [bismuth tribromphenate], and Simvastatin    30 minutes spent on the date of the encounter doing chart review, history and exam, documentation and further activities as noted above      YENI Yang CNP  [facility]  Pager: [phone]    Thank you for allowing me to participate in the care of your patient.      Sincerely,     YENI Yang CNP     [facility]  cc:   No referring provider defined for this encounter.

## 2022-04-01 NOTE — PATIENT INSTRUCTIONS
Call CORE nurse for any questions or concerns Mon-Fri 8am-4pm:                                                 #[phone]                                       For concerns after hours:                                               #[phone]    1: Medication changes: none    2: Plan from today:    - Follow up with Dr. Chand this summer with labs prior    3: Lab results: see attached:     Component      Latest Ref Rng & Units 12/30/2021 4/1/2022   Sodium      133 - 144 mmol/L 139 137   Potassium      3.4 - 5.3 mmol/L 4.2 4.3   Chloride      94 - 109 mmol/L 108 104   Carbon Dioxide      20 - 32 mmol/L 25 28   Anion Gap      3 - 14 mmol/L 6 5   Urea Nitrogen      7 - 30 mg/dL 21 20   Creatinine      0.66 - 1.25 mg/dL 1.03 1.19   Calcium      8.5 - 10.1 mg/dL 8.7 9.0   Glucose      70 - 99 mg/dL 121 (H) 104 (H)   GFR Estimate      >60 mL/min/1.73m2 77 64

## 2022-04-07 ENCOUNTER — TELEPHONE (OUTPATIENT)
Dept: FAMILY MEDICINE | Facility: CLINIC | Age: 72
End: 2022-04-07
Payer: COMMERCIAL

## 2022-04-09 DIAGNOSIS — E03.9 ACQUIRED HYPOTHYROIDISM: Chronic | ICD-10-CM

## 2022-04-11 RX ORDER — LEVOTHYROXINE SODIUM 200 UG/1
TABLET ORAL
Qty: 90 TABLET | Refills: 1 | Status: SHIPPED | OUTPATIENT
Start: 2022-04-11 | End: 2023-02-03 | Stop reason: DRUGHIGH

## 2022-04-11 NOTE — TELEPHONE ENCOUNTER
Prescription approved per [facility] Refill Protocol.  Cece SANCHEZ RN  [facility]

## 2022-05-19 NOTE — PROGRESS NOTES
Assessment & Plan   Problem List Items Addressed This Visit        Nervous and Auditory    Hx of Diabetic polyneuropathy associated with type 2 diabetes mellitus - now diet controlled (H)    Relevant Orders    Hemoglobin A1c      Other Visit Diagnoses     Encounter for long-term (current) use of medications    -  Primary    Urinary frequency        Urinary hesitancy        Relevant Orders    UA with Microscopic reflex to Culture - lab collect    Cellulitis of left lower leg        Relevant Medications    cephALEXin (KEFLEX) 500 MG capsule    Benign prostatic hyperplasia with post-void dribbling        Relevant Medications    tamsulosin (FLOMAX) 0.4 MG capsule         Urinary frequency with decreased stream - sx are consistent with BPH.  PSA normal 7 months ago.   Trial of tamsulosin - recheck in one month to see if symptoms have improved.  Consider referral to urology and recheck PSA if symptoms do not improve.     Left lower leg - concern for infection adjacent to wound.  Increased erythema, tenderness, swelling on left leg when compared to right. We will treat with cephalexin (cr/GFR are normal).  Recheck as needed.  Patient is encouraged to continue diligent leg wraps (wife Melissa is present and helps with leg wraps).     Right knee - OA suspected (previously he has had similar pain on the left).  Patient is not sleeping at night and has difficulty with walking and stairs.  Risks and benefits of cortisone injection were discussed - patient would like to complete the cortisone injection today.  Injection was completed and he tolerated this well.      PSA   Date Value Ref Range Status   07/17/2020 1.35 0 - 4 ug/L Final     Comment:     Assay Method:  Chemiluminescence using Siemens Vista analyzer     Prostate Specific Antigen Screen   Date Value Ref Range Status   10/07/2021 0.64 0.00 - 4.00 ug/L Final             {Provider  Link to [facility] Help Grid :[id number]}     BMI:   Estimated body mass index is 50.75 kg/m  as  "calculated from the following:    Height as of 4/1/22: 1.727 m (5' 8\").    Weight as of this encounter: 151.4 kg (333 lb 12.8 oz).   Weight management plan: Discussed healthy diet and exercise guidelines        Return in about 4 weeks (around 6/17/2022) for Preventive Care Visit.    TYLER [facility] VANNESSA Cooper is a 72 year old who presents for the following health issues     History of Present Illness       Reason for visit:  Enlarged prostate  Symptom onset:  3-4 weeks ago  Symptoms include:  Slow and weak iron flow  Symptom intensity:  Moderate  Symptom progression:  Staying the same  Had these symptoms before:  No  What makes it worse:  No  What makes it better:  Laying down    He eats 0-1 servings of fruits and vegetables daily.He consumes 4 sweetened beverage(s) daily.He exercises with enough effort to increase his heart rate 9 or less minutes per day.  He exercises with enough effort to increase his heart rate 3 or less days per week.   He is taking medications regularly.    Today's PHQ-9         PHQ-9 Total Score: 9    PHQ-9 Q9 Thoughts of better off dead/self-harm past 2 weeks :   Not at all    How difficult have these problems made it for you to do your work, take care of things at home, or get along with other people: Somewhat difficult     Right knee pain - makes it hard to get up out of a chair    Left ankle - concern for infection            Review of Systems         Objective    /70   Pulse 82   Temp 99.1  F (37.3  C) (Tympanic)   Wt (!) 151.4 kg (333 lb 12.8 oz)   SpO2 97%   BMI 50.75 kg/m    Body mass index is 50.75 kg/m .  Physical Exam  Constitutional:       General: He is not in acute distress.     Appearance: He is well-developed. He is not diaphoretic.   HENT:      Head: Normocephalic.      Right Ear: External ear normal.      Left Ear: External ear normal.      Nose: Nose normal.   Eyes:      Conjunctiva/sclera: " Conjunctivae normal.   Pulmonary:      Effort: Pulmonary effort is normal.   Musculoskeletal:      Cervical back: Normal range of motion.      Right lower le+ Edema present.      Left lower le+ Edema present.      Comments: Bilateral anterior lower legs -  discoloration consistent with chronic venous stasis   Skin:     Findings: Erythema (left posterior lower leg - adjacent to open wounds and excoriations) present.   Neurological:      Mental Status: He is alert and oriented to person, place, and time.   Psychiatric:         Judgment: Judgment normal.            No results found for this or any previous visit (from the past 24 hour(s)).

## 2022-05-20 ENCOUNTER — TELEPHONE (OUTPATIENT)
Dept: FAMILY MEDICINE | Facility: CLINIC | Age: 72
End: 2022-05-20

## 2022-05-20 ENCOUNTER — OFFICE VISIT (OUTPATIENT)
Dept: FAMILY MEDICINE | Facility: CLINIC | Age: 72
End: 2022-05-20
Payer: COMMERCIAL

## 2022-05-20 VITALS
OXYGEN SATURATION: 97 % | WEIGHT: 315 LBS | BODY MASS INDEX: 50.75 KG/M2 | HEART RATE: 82 BPM | DIASTOLIC BLOOD PRESSURE: 70 MMHG | TEMPERATURE: 99.1 F | SYSTOLIC BLOOD PRESSURE: 114 MMHG

## 2022-05-20 DIAGNOSIS — R39.11 URINARY HESITANCY: ICD-10-CM

## 2022-05-20 DIAGNOSIS — Z79.899 ENCOUNTER FOR LONG-TERM (CURRENT) USE OF MEDICATIONS: Primary | ICD-10-CM

## 2022-05-20 DIAGNOSIS — N40.1 BENIGN PROSTATIC HYPERPLASIA WITH POST-VOID DRIBBLING: ICD-10-CM

## 2022-05-20 DIAGNOSIS — R35.0 URINARY FREQUENCY: ICD-10-CM

## 2022-05-20 DIAGNOSIS — N39.43 BENIGN PROSTATIC HYPERPLASIA WITH POST-VOID DRIBBLING: ICD-10-CM

## 2022-05-20 DIAGNOSIS — M17.11 ARTHRITIS OF RIGHT KNEE: ICD-10-CM

## 2022-05-20 DIAGNOSIS — L03.116 CELLULITIS OF LEFT LOWER LEG: ICD-10-CM

## 2022-05-20 DIAGNOSIS — E11.42 DIABETIC POLYNEUROPATHY ASSOCIATED WITH TYPE 2 DIABETES MELLITUS (H): ICD-10-CM

## 2022-05-20 LAB
ALBUMIN UR-MCNC: NEGATIVE MG/DL
APPEARANCE UR: CLEAR
BACTERIA #/AREA URNS HPF: ABNORMAL /HPF
BILIRUB UR QL STRIP: NEGATIVE
COLOR UR AUTO: YELLOW
GLUCOSE UR STRIP-MCNC: NEGATIVE MG/DL
HBA1C MFR BLD: 5.7 % (ref 0–5.6)
HGB UR QL STRIP: NEGATIVE
KETONES UR STRIP-MCNC: NEGATIVE MG/DL
LEUKOCYTE ESTERASE UR QL STRIP: NEGATIVE
NITRATE UR QL: NEGATIVE
PH UR STRIP: 5 [PH] (ref 5–7)
RBC #/AREA URNS AUTO: ABNORMAL /HPF
SP GR UR STRIP: 1.01 (ref 1–1.03)
SQUAMOUS #/AREA URNS AUTO: ABNORMAL /LPF
UROBILINOGEN UR STRIP-ACNC: 0.2 E.U./DL
WBC #/AREA URNS AUTO: ABNORMAL /HPF

## 2022-05-20 PROCEDURE — 36415 COLL VENOUS BLD VENIPUNCTURE: CPT | Performed by: PHYSICIAN ASSISTANT

## 2022-05-20 PROCEDURE — 83036 HEMOGLOBIN GLYCOSYLATED A1C: CPT | Performed by: PHYSICIAN ASSISTANT

## 2022-05-20 PROCEDURE — 20610 DRAIN/INJ JOINT/BURSA W/O US: CPT | Performed by: PHYSICIAN ASSISTANT

## 2022-05-20 PROCEDURE — 99214 OFFICE O/P EST MOD 30 MIN: CPT | Mod: 25 | Performed by: PHYSICIAN ASSISTANT

## 2022-05-20 PROCEDURE — 81001 URINALYSIS AUTO W/SCOPE: CPT | Performed by: PHYSICIAN ASSISTANT

## 2022-05-20 RX ORDER — CEPHALEXIN 500 MG/1
500 CAPSULE ORAL 4 TIMES DAILY
Qty: 20 CAPSULE | Refills: 0 | Status: SHIPPED | OUTPATIENT
Start: 2022-05-20 | End: 2022-05-25

## 2022-05-20 RX ORDER — TRIAMCINOLONE ACETONIDE 40 MG/ML
40 INJECTION, SUSPENSION INTRA-ARTICULAR; INTRAMUSCULAR ONCE
Status: COMPLETED | OUTPATIENT
Start: 2022-05-20 | End: 2022-05-20

## 2022-05-20 RX ORDER — TAMSULOSIN HYDROCHLORIDE 0.4 MG/1
0.4 CAPSULE ORAL DAILY
Qty: 30 CAPSULE | Refills: 1 | Status: SHIPPED | OUTPATIENT
Start: 2022-05-20 | End: 2022-12-06

## 2022-05-20 RX ADMIN — TRIAMCINOLONE ACETONIDE 40 MG: 40 INJECTION, SUSPENSION INTRA-ARTICULAR; INTRAMUSCULAR at 13:49

## 2022-05-20 ASSESSMENT — PATIENT HEALTH QUESTIONNAIRE - PHQ9
10. IF YOU CHECKED OFF ANY PROBLEMS, HOW DIFFICULT HAVE THESE PROBLEMS MADE IT FOR YOU TO DO YOUR WORK, TAKE CARE OF THINGS AT HOME, OR GET ALONG WITH OTHER PEOPLE: SOMEWHAT DIFFICULT
SUM OF ALL RESPONSES TO PHQ QUESTIONS 1-9: 9
SUM OF ALL RESPONSES TO PHQ QUESTIONS 1-9: 9

## 2022-05-20 NOTE — TELEPHONE ENCOUNTER
Pt wife Claudia, called regarding clarification on if Tamsulosin 0.4mg is to be taken 2 caps daily or 1 cap daily. Ok to leave detailed VM with medication instructions.    Mariaelena HUSSEIN RN  EP Triage

## 2022-05-20 NOTE — LETTER
May 25, 2022      Kenneth Ivory  [id number]    [place] [address]        Dear ,    We are writing to inform you of your test results.    Your A1C looks really good - this shows excellent diabetes control.     Resulted Orders   UA with Microscopic reflex to Culture - lab collect   Result Value Ref Range    Color Urine Yellow Colorless, Straw, Light Yellow, Yellow    Appearance Urine Clear Clear    Glucose Urine Negative Negative mg/dL    Bilirubin Urine Negative Negative    Ketones Urine Negative Negative mg/dL    Specific Gravity Urine 1.010 1.003 - 1.035    Blood Urine Negative Negative    pH Urine 5.0 5.0 - 7.0    Protein Albumin Urine Negative Negative mg/dL    Urobilinogen Urine 0.2 0.2, 1.0 E.U./dL    Nitrite Urine Negative Negative    Leukocyte Esterase Urine Negative Negative   Urine Microscopic   Result Value Ref Range    Bacteria Urine None Seen None Seen /HPF    RBC Urine 0-2 0-2 /HPF /HPF    WBC Urine 0-5 0-5 /HPF /HPF    Squamous Epithelials Urine Few (A) None Seen /LPF    Narrative    Urine Culture not indicated   Hemoglobin A1c   Result Value Ref Range    Hemoglobin A1C 5.7 (H) 0.0 - 5.6 %      Comment:      Normal <5.7%   Prediabetes 5.7-6.4%    Diabetes 6.5% or higher     Note: Adopted from ADA consensus guidelines.       If you have any questions or concerns, please call the clinic at the number listed above.       Sincerely,      Myah Florez PA-C

## 2022-05-23 NOTE — TELEPHONE ENCOUNTER
S/w wife, Claudia, and updated her on PCP message below. No further questions at this time.    Mariaelena HUSSEIN RN  EP Triage

## 2022-05-25 NOTE — PROGRESS NOTES
Letter mailed to the pt.  Miriam Tenorio,     Answers for HPI/ROS submitted by the patient on 5/20/2022  If you checked off any problems, how difficult have these problems made it for you to do your work, take care of things at home, or get along with other people?: Somewhat difficult  PHQ9 TOTAL SCORE: 9  How many servings of fruits and vegetables do you eat daily?: 0-1  On average, how many sweetened beverages do you drink each day (Examples: soda, juice, sweet tea, etc.  Do NOT count diet or artificially sweetened beverages)?: 4  How many minutes a day do you exercise enough to make your heart beat faster?: 9 or less  How many days a week do you exercise enough to make your heart beat faster?: 3 or less  How many days per week do you miss taking your medication?: 0  What is the reason for your visit today?: Enlarged prostate  When did your symptoms begin?: 3-4 weeks ago  What are your symptoms?: Slow and weak iron flow  How would you describe these symptoms?: Moderate  Are your symptoms:: Staying the same  Have you had these symptoms before?: No  Is there anything that makes you feel worse?: No  Is there anything that makes you feel better?: Laying down

## 2022-06-20 DIAGNOSIS — M17.0 PRIMARY OSTEOARTHRITIS OF BOTH KNEES: ICD-10-CM

## 2022-06-20 DIAGNOSIS — E66.01 MORBID OBESITY (H): ICD-10-CM

## 2022-06-21 RX ORDER — CYCLOBENZAPRINE HCL 5 MG
5 TABLET ORAL 2 TIMES DAILY PRN
Qty: 45 TABLET | Refills: 1 | Status: SHIPPED | OUTPATIENT
Start: 2022-06-21 | End: 2022-09-26

## 2022-06-23 ENCOUNTER — TELEPHONE (OUTPATIENT)
Dept: INTERNAL MEDICINE | Facility: CLINIC | Age: 72
End: 2022-06-23

## 2022-06-23 ENCOUNTER — OFFICE VISIT (OUTPATIENT)
Dept: URGENT CARE | Facility: URGENT CARE | Age: 72
End: 2022-06-23
Payer: COMMERCIAL

## 2022-06-23 VITALS
SYSTOLIC BLOOD PRESSURE: 137 MMHG | OXYGEN SATURATION: 96 % | DIASTOLIC BLOOD PRESSURE: 87 MMHG | TEMPERATURE: 99.1 F | HEART RATE: 81 BPM

## 2022-06-23 DIAGNOSIS — L03.119 CELLULITIS OF LOWER EXTREMITY, UNSPECIFIED LATERALITY: Primary | ICD-10-CM

## 2022-06-23 DIAGNOSIS — E11.9 TYPE 2 DIABETES MELLITUS WITHOUT COMPLICATION, WITHOUT LONG-TERM CURRENT USE OF INSULIN (H): Chronic | ICD-10-CM

## 2022-06-23 PROCEDURE — 99214 OFFICE O/P EST MOD 30 MIN: CPT | Performed by: PHYSICIAN ASSISTANT

## 2022-06-23 RX ORDER — CEPHALEXIN 500 MG/1
500 CAPSULE ORAL 4 TIMES DAILY
Qty: 40 CAPSULE | Refills: 0 | Status: SHIPPED | OUTPATIENT
Start: 2022-06-23 | End: 2022-07-03

## 2022-06-23 NOTE — PROGRESS NOTES
Diet Controlled Type 2 diabetes mellitus without complication, without long-term current use of insulin (H)  - cephALEXin (KEFLEX) 500 MG capsule; Take 1 capsule (500 mg) by mouth 4 times daily for 10 days    Patient was advised to monitor blood sugar levels 3 times daily and follow up with primary care in 1-3 months for any adjustments of diabetes medication. Low carb diet and 20 min of exercise daily also recommended.       Cellulitis of lower extremity, unspecified laterality  - cephALEXin (KEFLEX) 500 MG capsule; Take 1 capsule (500 mg) by mouth 4 times daily for 10 days    Patient was advised to return to clinic if symptoms do not improve in the amount of time specified in the AVS or if symptoms worsen. Patient educated on red flag symptoms and asked to go directly to the ED if symptoms present themselves.       Abilio Howell PA-C  [facility]    Subjective   72 year old who presents to clinic today for the following health issues:    Urgent Care       HPI     Pain History:  When did you first notice your pain? - Acute Pain   Where in your body do you have pain? Musculoskeletal problem/pain  Onset/Duration: Off and on for the last couple of months- More sever the last couple of days  Description  Location: Bilateral ankles   Joint Swelling: YES  Redness: YES  Pain: YES  Warmth: YES  Intensity:  moderate  Progression of Symptoms:  worsening  Accompanying signs and symptoms:   Fevers: YES  Numbness/tingling/weakness: no  History  Trauma to the area: no  Recent illness:  no  Previous similar problem: no  Previous evaluation:  no  Precipitating or alleviating factors:  Aggravating factors include: standing, walking and climbing stairs  Therapies tried and outcome: rest/inactivity, acetaminophen and compression socks.      Patient has DM managed with diet but has not checked his Blood sugar.     Review of Systems   Review of Systems   See HPI     Objective    Temp: 99.1  F (37.3  C) Temp src:  Tympanic BP: 137/87 Pulse: 81     SpO2: 96 %       Physical Exam   Physical Exam  Constitutional:       General: He is not in acute distress.     Appearance: Normal appearance. He is obese. He is not ill-appearing, toxic-appearing or diaphoretic.   HENT:      Head: Normocephalic and atraumatic.   Cardiovascular:      Rate and Rhythm: Normal rate.      Pulses: Normal pulses.   Pulmonary:      Effort: Pulmonary effort is normal. No respiratory distress.   Skin:            Comments: The areas shown above are erythematous, swollen, warm, and tender to touch.    Neurological:      Mental Status: He is alert.   Psychiatric:         Mood and Affect: Mood normal.         Behavior: Behavior normal.         Thought Content: Thought content normal.         Judgment: Judgment normal.          No results found. However, due to the size of the patient record, not all encounters were searched. Please check Results Review for a complete set of results.

## 2022-06-23 NOTE — TELEPHONE ENCOUNTER
Unable to work patient in for appointment in [facility]. Routed to PCP clinic to review for recommendations.     Mary Marquez RN  [facility]

## 2022-06-23 NOTE — TELEPHONE ENCOUNTER
Reason for Call:  Other appointment    Detailed comments: Spouse called and is wanting pt to be seen in person with the clinic declined another UC and refused virtual, patients left leg is swollen to the ankle and now the right one is starting as well please advise thank you    Phone Number Patient can be reached at: Home number on file [phone] (home)    Best Time: anytime    Can we leave a detailed message on this number? YES    Call taken on 6/23/2022 at 8:28 AM by Rika Hernandez

## 2022-07-15 NOTE — TELEPHONE ENCOUNTER
Pt confirmed video visit. Pt will receive a text via mobile number on file.  
The patient is a 85y Female complaining of wheezing.

## 2022-07-26 ENCOUNTER — OFFICE VISIT (OUTPATIENT)
Dept: FAMILY MEDICINE | Facility: CLINIC | Age: 72
End: 2022-07-26
Payer: COMMERCIAL

## 2022-07-26 VITALS
BODY MASS INDEX: 47.74 KG/M2 | DIASTOLIC BLOOD PRESSURE: 62 MMHG | RESPIRATION RATE: 20 BRPM | TEMPERATURE: 98.5 F | OXYGEN SATURATION: 93 % | SYSTOLIC BLOOD PRESSURE: 134 MMHG | WEIGHT: 315 LBS | HEIGHT: 68 IN | HEART RATE: 79 BPM

## 2022-07-26 DIAGNOSIS — M25.561 CHRONIC PAIN OF BOTH KNEES: ICD-10-CM

## 2022-07-26 DIAGNOSIS — N40.1 BENIGN PROSTATIC HYPERPLASIA WITH NOCTURIA: ICD-10-CM

## 2022-07-26 DIAGNOSIS — R35.1 BENIGN PROSTATIC HYPERPLASIA WITH NOCTURIA: ICD-10-CM

## 2022-07-26 DIAGNOSIS — I42.8 NONISCHEMIC CARDIOMYOPATHY (H): ICD-10-CM

## 2022-07-26 DIAGNOSIS — M25.562 CHRONIC PAIN OF BOTH KNEES: ICD-10-CM

## 2022-07-26 DIAGNOSIS — E03.9 ACQUIRED HYPOTHYROIDISM: ICD-10-CM

## 2022-07-26 DIAGNOSIS — Z12.5 SCREENING FOR PROSTATE CANCER: Primary | ICD-10-CM

## 2022-07-26 DIAGNOSIS — G89.29 CHRONIC PAIN OF BOTH KNEES: ICD-10-CM

## 2022-07-26 DIAGNOSIS — I50.42 CHRONIC COMBINED SYSTOLIC AND DIASTOLIC CONGESTIVE HEART FAILURE (H): ICD-10-CM

## 2022-07-26 PROCEDURE — 80048 BASIC METABOLIC PNL TOTAL CA: CPT | Performed by: PHYSICIAN ASSISTANT

## 2022-07-26 PROCEDURE — 99214 OFFICE O/P EST MOD 30 MIN: CPT | Performed by: PHYSICIAN ASSISTANT

## 2022-07-26 PROCEDURE — 84460 ALANINE AMINO (ALT) (SGPT): CPT | Performed by: PHYSICIAN ASSISTANT

## 2022-07-26 PROCEDURE — 84443 ASSAY THYROID STIM HORMONE: CPT | Performed by: PHYSICIAN ASSISTANT

## 2022-07-26 PROCEDURE — G0103 PSA SCREENING: HCPCS | Performed by: PHYSICIAN ASSISTANT

## 2022-07-26 PROCEDURE — 36415 COLL VENOUS BLD VENIPUNCTURE: CPT | Performed by: PHYSICIAN ASSISTANT

## 2022-07-26 ASSESSMENT — PAIN SCALES - GENERAL: PAINLEVEL: NO PAIN (0)

## 2022-07-26 NOTE — LETTER
July 28, 2022      Kenneth Ivory  [id number]    [facility] [address]        Dear ,    We are writing to inform you of your test results.    Your test results fall within the expected range(s) or remain unchanged from previous results.  Please continue with current treatment plan.    Resulted Orders   PSA, screen   Result Value Ref Range    Prostate Specific Antigen Screen 1.28 0.00 - 4.00 ug/L   TSH with free T4 reflex   Result Value Ref Range    TSH 2.36 0.40 - 4.00 mU/L   ALT   Result Value Ref Range    ALT 22 0 - 70 U/L       If you have any questions or concerns, please call the clinic at the number listed above.       Sincerely,      Myah Florez PA-C

## 2022-07-26 NOTE — PATIENT INSTRUCTIONS
Please call [facility] to schedule an appointment with a knee specialist.  Call [phone].

## 2022-07-26 NOTE — PROGRESS NOTES
"  Assessment & Plan   Problem List Items Addressed This Visit        Endocrine    Acquired hypothyroidism (Chronic)    Relevant Orders    TSH with free T4 reflex       Circulatory    Chronic combined systolic and diastolic congestive heart failure (H)      Other Visit Diagnoses     Screening for prostate cancer    -  Primary    Relevant Orders    PSA, screen    Chronic pain of both knees        Relevant Orders    Orthopedic  Referral    Benign prostatic hyperplasia with nocturia        Relevant Orders    Adult Urology  Referral    Nonischemic cardiomyopathy (H)        Relevant Orders    ALT         Bilateral knee pain and instability - most likely related to OA.  Patient is encouraged to follow up with [facility] with a knee specialist.  He should focus on weight loss - he will need to lose about 100 pounds to have knee replacement surgery.    Previous right knee injection helped for only 2 months.   Labs updated as above.   Urology consult for ongoing urinary frequency and minimal improvement with tamsulosin.  PSA updated today as well (previous it was normal).                  Return in about 2 weeks (around 8/9/2022) for Specialist Appointment.    Myah Florez PA-C  [facility] VANNESSA Cooper is a 72 year old, presenting for the following health issues:  Musculoskeletal Problem (Bilateral knee collapsing - no pain ) and Urinary Problem (Patient report frequent urination (every 15 mins) x 1 month )      HPI           Review of Systems         Objective    /62 (BP Location: Left arm, Patient Position: Sitting, Cuff Size: Adult Large)   Pulse 79   Temp 98.5  F (36.9  C) (Tympanic)   Resp 20   Ht 1.727 m (5' 8\")   Wt 147.3 kg (324 lb 12.8 oz)   SpO2 93%   BMI 49.39 kg/m    Body mass index is 49.39 kg/m .  Physical Exam  Constitutional:       General: He is not in acute distress.     Appearance: He is well-developed. He is not diaphoretic.   HENT:     "  Head: Normocephalic.      Right Ear: External ear normal.      Left Ear: External ear normal.      Nose: Nose normal.   Eyes:      Conjunctiva/sclera: Conjunctivae normal.   Pulmonary:      Effort: Pulmonary effort is normal.   Musculoskeletal:      Cervical back: Normal range of motion.      Right knee: Tenderness present over the lateral joint line.      Left knee: Tenderness present.   Skin:     General: Skin is warm and dry.   Neurological:      Mental Status: He is alert and oriented to person, place, and time.   Psychiatric:         Judgment: Judgment normal.            No results found. However, due to the size of the patient record, not all encounters were searched. Please check Results Review for a complete set of results.                .  ..

## 2022-07-27 LAB
ALT SERPL W P-5'-P-CCNC: 22 U/L (ref 0–70)
ANION GAP SERPL CALCULATED.3IONS-SCNC: 6 MMOL/L (ref 3–14)
BUN SERPL-MCNC: 23 MG/DL (ref 7–30)
CALCIUM SERPL-MCNC: 8.6 MG/DL (ref 8.5–10.1)
CHLORIDE BLD-SCNC: 102 MMOL/L (ref 94–109)
CO2 SERPL-SCNC: 26 MMOL/L (ref 20–32)
CREAT SERPL-MCNC: 1.16 MG/DL (ref 0.66–1.25)
GFR SERPL CREATININE-BSD FRML MDRD: 67 ML/MIN/1.73M2
GLUCOSE BLD-MCNC: 99 MG/DL (ref 70–99)
POTASSIUM BLD-SCNC: 3.9 MMOL/L (ref 3.4–5.3)
PSA SERPL-MCNC: 1.28 UG/L (ref 0–4)
SODIUM SERPL-SCNC: 134 MMOL/L (ref 133–144)
TSH SERPL DL<=0.005 MIU/L-ACNC: 2.36 MU/L (ref 0.4–4)

## 2022-08-03 DIAGNOSIS — I50.42 CHRONIC COMBINED SYSTOLIC AND DIASTOLIC CONGESTIVE HEART FAILURE (H): Primary | ICD-10-CM

## 2022-08-04 ENCOUNTER — TELEPHONE (OUTPATIENT)
Dept: CARDIOLOGY | Facility: CLINIC | Age: 72
End: 2022-08-04

## 2022-08-04 NOTE — TELEPHONE ENCOUNTER
[facility] Call Center    Phone Message    May a detailed message be left on voicemail: yes     Reason for Call: Other: Patient's spouse called regarding paperwork they need completed and [facility] they wanted to schedule an appointment with Roz[facility] but there was no times available. Please call and discuss.      Action Taken: Other: Cardiology    Travel Screening: Not Applicable

## 2022-08-04 NOTE — TELEPHONE ENCOUNTER
Patient and wife advised that she can drop forms off at clinic to be filled out and faxed.  Patient is also wondering when he is due back for clinic visit, last [facility] notes state to follow up with Dr. Chand this summer.    Lexi Velasquez RN on 8/4/2022 at 11:22 AM

## 2022-09-02 ENCOUNTER — TELEPHONE (OUTPATIENT)
Dept: INTERNAL MEDICINE | Facility: CLINIC | Age: 72
End: 2022-09-02

## 2022-09-02 NOTE — TELEPHONE ENCOUNTER
Reason for Call:  Same Day Appointment, Requested Provider:  Huy Li     PCP: Myah Florez    Reason for visit: Concerned about about leg and left ankle, just having some pain.    Duration of symptoms: 3 weeks, on and off     Have you been treated for this in the past? Yes    Additional comments: Patient has appointment scheduled for 9/26/22 but would like to be seen as soon as possible due to pain levels.     Can we leave a detailed message on this number? YES    Phone number patient can be reached at: Home number on file [phone] (home)    Best Time: Any time     Call taken on 9/2/2022 at 6:00 PM by Karen Blood

## 2022-09-06 ENCOUNTER — OFFICE VISIT (OUTPATIENT)
Dept: URGENT CARE | Facility: URGENT CARE | Age: 72
End: 2022-09-06
Payer: COMMERCIAL

## 2022-09-06 VITALS
SYSTOLIC BLOOD PRESSURE: 132 MMHG | RESPIRATION RATE: 26 BRPM | OXYGEN SATURATION: 93 % | DIASTOLIC BLOOD PRESSURE: 78 MMHG | TEMPERATURE: 97.2 F | HEART RATE: 86 BPM

## 2022-09-06 DIAGNOSIS — E11.9 TYPE 2 DIABETES MELLITUS WITHOUT COMPLICATION, WITHOUT LONG-TERM CURRENT USE OF INSULIN (H): ICD-10-CM

## 2022-09-06 DIAGNOSIS — L03.119 CELLULITIS OF LOWER EXTREMITY, UNSPECIFIED LATERALITY: Primary | ICD-10-CM

## 2022-09-06 PROCEDURE — 99214 OFFICE O/P EST MOD 30 MIN: CPT | Performed by: FAMILY MEDICINE

## 2022-09-06 RX ORDER — CEPHALEXIN 500 MG/1
500 CAPSULE ORAL 3 TIMES DAILY
Qty: 30 CAPSULE | Refills: 0 | Status: SHIPPED | OUTPATIENT
Start: 2022-09-06 | End: 2022-09-16

## 2022-09-06 NOTE — PROGRESS NOTES
SUBJECTIVE: Jamar Ivory is a 72 year old male presenting with a chief complaint of lower legs bilateral with redness.  Onset of symptoms was day(s) ago.  Course of illness is worsening.    Severity moderate  Predisposing factors include same issues in past.    Past Medical History:   Diagnosis Date     Arthritis      Cerebral artery occlusion with cerebral infarction     TIA     CHF (congestive heart failure) 12/24/2018     CVA (cerebral infarction) 2012     CVD (cardiovascular disease)      Depression 1977    hospitalized     Fatty liver      Gout      h/o Concussion      Hypertension 01/17/2011     Hypothyroidism      Obesity      Spider veins      TIA (transient ischaemic attack) 2012     Vitiligo      Allergies   Allergen Reactions     Clopidogrel      Cough/emesis     Penicillins Rash     Sulfa Drugs      Unsure     Xeroform [Bismuth Tribromphenate]      Unsure     Simvastatin Diarrhea     Social History     Tobacco Use     Smoking status: Never Smoker     Smokeless tobacco: Never Used   Substance Use Topics     Alcohol use: Not Currently     Alcohol/week: 0.0 standard drinks     Comment: rarely       ROS:  SKIN: no rash  GI: no vomiting    OBJECTIVE:  /78   Pulse 86   Temp 97.2  F (36.2  C) (Tympanic)   Resp 26   SpO2 93%    GENERAL APPEARANCE: healthy, alert and no distress  SKIN: bilateral lower redness  Lower ext edema bilateral      ICD-10-CM    1. Cellulitis of lower extremity, unspecified laterality  L03.119 cephALEXin (KEFLEX) 500 MG capsule   2. Diet Controlled Type 2 diabetes mellitus without complication, without long-term current use of insulin (H)  E11.9      f/u recheck PCP  Fluids/Rest, f/u if worse/not any better

## 2022-09-07 NOTE — PROGRESS NOTES
Paperwork done, needs provider to sign off and then can be faxed to [facility]. In [facility]'s black box    Haily Graves RN 2:41 PM 09/07/22

## 2022-09-12 ENCOUNTER — DOCUMENTATION ONLY (OUTPATIENT)
Dept: CARDIOLOGY | Facility: CLINIC | Age: 72
End: 2022-09-12

## 2022-09-12 NOTE — PROGRESS NOTES
[facility]     [facility] application faxed today to [phone] with Margarito Turner CNP's signature as Rozina Gallegos CNP is out.    Brenna Martinez RN, BSN  [facility] [place], [place]  [email or web address]. Clinic Care Coordinator  September 12, 2022 5:06 PM

## 2022-09-26 ENCOUNTER — OFFICE VISIT (OUTPATIENT)
Dept: INTERNAL MEDICINE | Facility: CLINIC | Age: 72
End: 2022-09-26
Payer: COMMERCIAL

## 2022-09-26 VITALS
OXYGEN SATURATION: 95 % | TEMPERATURE: 97.7 F | HEIGHT: 68 IN | RESPIRATION RATE: 18 BRPM | HEART RATE: 90 BPM | WEIGHT: 315 LBS | SYSTOLIC BLOOD PRESSURE: 134 MMHG | BODY MASS INDEX: 47.74 KG/M2 | DIASTOLIC BLOOD PRESSURE: 82 MMHG

## 2022-09-26 DIAGNOSIS — L03.116 CELLULITIS OF LEFT LOWER EXTREMITY: Primary | ICD-10-CM

## 2022-09-26 DIAGNOSIS — L03.115 CELLULITIS OF RIGHT LOWER EXTREMITY: ICD-10-CM

## 2022-09-26 DIAGNOSIS — Z23 HIGH PRIORITY FOR 2019-NCOV VACCINE: ICD-10-CM

## 2022-09-26 DIAGNOSIS — L97.311 VENOUS STASIS ULCER OF RIGHT ANKLE LIMITED TO BREAKDOWN OF SKIN WITH VARICOSE VEINS (H): ICD-10-CM

## 2022-09-26 DIAGNOSIS — Z23 NEED FOR PROPHYLACTIC VACCINATION AND INOCULATION AGAINST INFLUENZA: ICD-10-CM

## 2022-09-26 DIAGNOSIS — I83.013 VENOUS STASIS ULCER OF RIGHT ANKLE LIMITED TO BREAKDOWN OF SKIN WITH VARICOSE VEINS (H): ICD-10-CM

## 2022-09-26 PROCEDURE — 0124A COVID-19,PF,PFIZER BOOSTER BIVALENT: CPT

## 2022-09-26 PROCEDURE — G0008 ADMIN INFLUENZA VIRUS VAC: HCPCS

## 2022-09-26 PROCEDURE — 91312 COVID-19,PF,PFIZER BOOSTER BIVALENT: CPT

## 2022-09-26 PROCEDURE — 90662 IIV NO PRSV INCREASED AG IM: CPT

## 2022-09-26 PROCEDURE — 99213 OFFICE O/P EST LOW 20 MIN: CPT | Mod: 25

## 2022-09-26 RX ORDER — CEPHALEXIN 500 MG/1
500 CAPSULE ORAL 3 TIMES DAILY
Qty: 30 CAPSULE | Refills: 0 | Status: SHIPPED | OUTPATIENT
Start: 2022-09-26 | End: 2023-02-02

## 2022-09-26 ASSESSMENT — PATIENT HEALTH QUESTIONNAIRE - PHQ9: SUM OF ALL RESPONSES TO PHQ QUESTIONS 1-9: 10

## 2022-09-26 NOTE — PROGRESS NOTES
Assessment & Plan     Cellulitis of left lower extremity  Patient has red, warm, swollen LE bilaterally at ankle. And was treated 9/6/2022 with 500mg keflex TID. He has improved with 10 day course of keflex. I will repeat for another 10 days to ensure infection has cleared as they are still red and inflammed. Patient has f/u at wound clinic where he can be seen as well  - cephALEXin (KEFLEX) 500 MG capsule; Take 1 capsule (500 mg) by mouth 3 times daily    Cellulitis of right lower extremity  As above  - cephALEXin (KEFLEX) 500 MG capsule; Take 1 capsule (500 mg) by mouth 3 times daily    Venous stasis ulcer of right ankle limited to breakdown of skin with varicose veins (H)  Seeing wound clinic friday  - cephALEXin (KEFLEX) 500 MG capsule; Take 1 capsule (500 mg) by mouth 3 times daily    Need for prophylactic vaccination and inoculation against influenza  noted  - INFLUENZA, QUAD, HIGH DOSE, PF, 65YR + (FLUZONE HD)    High priority for 2019-nCoV vaccine  noted  - COVID-19,PF,PFIZER BOOSTER BIVALENT 12+Yrs     Depression Screening Follow Up    PHQ 9/26/2022   PHQ-9 Total Score 10   Q9: Thoughts of better off dead/self-harm past 2 weeks Several days   F/U: Thoughts of suicide or self-harm -   F/U: Safety concerns -     Patient states that he does not have suicidal intention or a plan. These are fleeting thoughts that happen every now and then. He states that he is a not a risk to himself or others. He has attempted suicide in the past and states that he would never do that again.    Return for wound clinic.    Eddi Strickland NP  [facility]    Danni Cooper is a 72 year old, presenting for the following health issues:  Patient is being seen to discuss leg concerns.    HPI     Patient has BLE chronic wounds. They were getting better, and getting worse in a repetitive cycle.    Patient was seen in  for foot pain on 9/6/2022.  They diagnosed cellulitis and gave keflex TID x10 days. This  "helped to reduce swelling and pain.     Patient had improvement with keflex      Review of Systems   Constitutional, HEENT, cardiovascular, pulmonary, GI, , musculoskeletal, neuro, skin, endocrine and psych systems are negative, except as otherwise noted.      Objective    /82   Pulse 90   Temp 97.7  F (36.5  C) (Tympanic)   Resp 18   Ht 1.727 m (5' 8\")   Wt 146.1 kg (322 lb)   SpO2 95%   BMI 48.96 kg/m    Body mass index is 48.96 kg/m .  Physical Exam   GENERAL: alert and no distress  EYES: Eyes grossly normal to inspection, PERRL and conjunctivae and sclerae normals  NECK: no adenopathy, no asymmetry, masses, or scars and thyroid normal to palpation  RESP: lungs clear to auscultation - no rales, rhonchi or wheezes  CV: regular rate and rhythm, normal S1 S2, no S3 or S4, no murmur, click or rub, no peripheral edema and peripheral pulses strong  ABDOMEN: soft, nontender, no hepatosplenomegaly, no masses and bowel sounds normal  MS: no gross musculoskeletal defects noted, no edema  SKIN: no suspicious lesions or rashes, BLE are jana/red, several ulcer on BLE  NEURO: Normal strength and tone, mentation intact and speech normal  PSYCH: mentation appears normal, affect normal/bright    "

## 2022-09-30 ENCOUNTER — TELEPHONE (OUTPATIENT)
Dept: WOUND CARE | Facility: CLINIC | Age: 72
End: 2022-09-30

## 2022-09-30 ENCOUNTER — HOSPITAL ENCOUNTER (OUTPATIENT)
Dept: WOUND CARE | Facility: CLINIC | Age: 72
Discharge: HOME | End: 2022-09-30
Attending: PHYSICIAN ASSISTANT
Payer: COMMERCIAL

## 2022-09-30 VITALS
TEMPERATURE: 97.8 F | SYSTOLIC BLOOD PRESSURE: 151 MMHG | HEART RATE: 92 BPM | DIASTOLIC BLOOD PRESSURE: 66 MMHG | RESPIRATION RATE: 16 BRPM

## 2022-09-30 DIAGNOSIS — L97.912 SKIN ULCER OF RIGHT LOWER LEG WITH FAT LAYER EXPOSED (H): Primary | ICD-10-CM

## 2022-09-30 DIAGNOSIS — L97.929 ULCER OF LOWER LIMB, LEFT, WITH UNSPECIFIED SEVERITY (H): ICD-10-CM

## 2022-09-30 DIAGNOSIS — L97.922 ULCER OF LEFT LOWER EXTREMITY WITH FAT LAYER EXPOSED (H): ICD-10-CM

## 2022-09-30 DIAGNOSIS — R60.0 EDEMA OF BOTH LEGS: ICD-10-CM

## 2022-09-30 PROCEDURE — 99215 OFFICE O/P EST HI 40 MIN: CPT | Performed by: PHYSICIAN ASSISTANT

## 2022-09-30 RX ORDER — BETAMETHASONE DIPROPIONATE 0.05 %
OINTMENT (GRAM) TOPICAL DAILY
Qty: 45 G | Refills: 3 | Status: SHIPPED | OUTPATIENT
Start: 2022-09-30 | End: 2023-11-22

## 2022-09-30 NOTE — DISCHARGE INSTRUCTIONS
Jamar Ivory      1950    A DME order for supplies has been placed to [facility]. If there are any issues with your order including not receiving the order please call [facility] at [phone] option 3. They can also provide a tracking number for you if you had supplies shipped to you.    Wound Dressing Change: Right and Left anterior lower leg, Left medial lower leg, Right lateral lower leg  Cleanse with mild unscented soap and water  Apply Betamethasone ointment to right and left lower legs from calf to foot. Ok if ointment gets into wounds  Then apply Sween cream to intact skin on legs  Then apply 1/4th piece of 4x4 polymem ag to each wound  Then cover with 1 ABD 5x9 to each leg and wrap with 1 kerlix for each leg and secure with tape  Then apply ace wraps toe to knees  Change daily    Please call [phone] to schedule an appointment with Lymphedema Therapy    You have been referred to [facility] in [place] [phone] or [place] [phone]     Amanda Pham PA-C September 30, 2022    Call us at [phone] if you have any questions about your wounds, have redness or swelling around your wound, have a fever of 101 or greater or if you have any other problems or concerns. We answer the phone Monday through Friday 8 am to 4 pm, please leave a message as we check the voicemail frequently throughout the day.     If you had a positive experience please indicate that on your patient satisfaction survey form that [facility] will be sending you.    It was a pleasure meeting with you today.  Thank you for allowing me and my team the privilege of caring for you today.  YOU are the reason we are here, and I truly hope we provided you with the excellent service you deserve.  Please let us know if there is anything else we can do for you so that we can be sure you are leaving completely satisfied with your care experience.      If you have any billing related questions  please call the [facility] office at [phone]. The clinic staff does not handle billing related matters.    If you are scheduled to have a follow up appointment, you will receive a reminder call the day before your visit. On the appointment day please arrive 15 minutes prior to your appointment time. If you are unable to keep that appointment, please call the clinic to cancel or reschedule. If you are more than 10 minutes late or greater for your appointment, the clinic policy is that you may be asked to reschedule.

## 2022-09-30 NOTE — PROGRESS NOTES
[facility]    ASSESSMENT:   1. Full thickness ulcers of bilateral lower legs due to edema and venous insufficiency  2. Poorly controlled lymphedema  3. Stasis dermatitis    PLAN/DISCUSSION:   1. Refer back to lymphedema therapy as patient has not been able to control swelling with his current maintenance plan  2. Venous competency US, will refer to [facility] if superficial incommpetence  3. Wound care plan: betamethasone oint to bilateral lower legs, polymem ag to wounds, roll gauze, short stretch wraps  4. See bottom of note for detailed wound care and patient instructions    HISTORY OF PRESENT ILLNESS:   Jamar Ivory is a 72 year old male with hypothyroidism, HLD, HTN, cerebral infarction, CHF, obesity, lymphedema, type 2 DM diet controlled, and BPH who presents today for recurrent lower leg ulcerations. He is well known to me and our clinic for similar ulcerations.  HE was last discharged from our clinic in December of 2021 with a plan to utilize Compreflex inelastic compression wraps. Unfortunately he did not like the wraps so went back to short stretch wraps. He went on to develop sores and rashes and had several bouts of cellulitis. He did undergo lymphedema therapy several years ago and last saw him in November 21. At that point plan was for graduated compression bandage.     TREATMENT COURSE:  09/30/22: Presents for initial visit at our clinic.     VITALS: BP (!) 151/66 (BP Location: Left arm, Patient Position: Sitting)   Pulse 92   Temp 97.8  F (36.6  C) (Temporal)   Resp 16      PHYSICAL EXAM:  GENERAL: Patient is alert and oriented and in no acute distress  CV: palpable pedal pulses  INTEGUMENTARY:   Wound (used by [facility] only) 02/23/21 1118 Right lower;lateral leg ulceration, venous (Active)   Thickness/Stage full thickness 09/30/22 1507   Base red 09/30/22 1507   Periwound intact 09/30/22 1507   Periwound Temperature warm 09/30/22 1507   Periwound Skin Turgor soft  09/30/22 1507   Edges open 09/30/22 1507   Length (cm) 1.9 09/30/22 1507   Width (cm) 2.2 09/30/22 1507   Depth (cm) 0.2 09/30/22 1507   Wound (cm^2) 4.18 cm^2 09/30/22 1507   Wound Volume (cm^3) 0.84 cm^3 09/30/22 1507   Wound healing % 45.71 09/30/22 1507   Drainage Characteristics/Odor serosanguineous 09/30/22 1507   Drainage Amount moderate 09/30/22 1507   Care, Wound non-select wound debridement performed 09/30/22 1507   Dressing Care dressing applied 02/23/21 1100       Wound (used by [facility] only) 10/15/21 1423 Left medial;lower leg ulceration, venous (Active)   Thickness/Stage full thickness 09/30/22 1507   Base red 09/30/22 1507   Periwound intact;swelling 09/30/22 1507   Periwound Temperature warm 09/30/22 1507   Periwound Skin Turgor soft 09/30/22 1507   Edges open 09/30/22 1507   Length (cm) 4 09/30/22 1507   Width (cm) 2.1 09/30/22 1507   Depth (cm) 0.2 09/30/22 1507   Wound (cm^2) 8.4 cm^2 09/30/22 1507   Wound Volume (cm^3) 1.68 cm^3 09/30/22 1507   Wound healing % -218.18 09/30/22 1507   Drainage Characteristics/Odor serosanguineous 09/30/22 1507   Drainage Amount moderate 09/30/22 1507   Care, Wound non-select wound debridement performed 09/30/22 1507       Wound (used by [facility] only) 12/13/21 1125 Left lower;anterior leg ulceration, venous (Active)   Thickness/Stage full thickness 09/30/22 1507   Base red 09/30/22 1507   Periwound intact;swelling 09/30/22 1507   Periwound Temperature warm 09/30/22 1507   Periwound Skin Turgor soft 09/30/22 1507   Edges open 09/30/22 1507   Length (cm) 1 09/30/22 1507   Width (cm) 1.1 09/30/22 1507   Depth (cm) 0.1 09/30/22 1507   Wound (cm^2) 1.1 cm^2 09/30/22 1507   Wound Volume (cm^3) 0.11 cm^3 09/30/22 1507   Wound healing % 98.9 09/30/22 1507   Drainage Characteristics/Odor serosanguineous 09/30/22 1507   Drainage Amount moderate 09/30/22 1507   Care, Wound non-select wound debridement performed 09/30/22 1507       Wound (used by [facility] only) 09/30/22 1509 Right  anterior;lower leg ulceration, venous (Active)   Thickness/Stage full thickness 09/30/22 1507   Base red 09/30/22 1507   Periwound intact;swelling 09/30/22 1507   Periwound Temperature warm 09/30/22 1507   Periwound Skin Turgor soft 09/30/22 1507   Edges open 09/30/22 1507   Length (cm) 0.6 09/30/22 1507   Width (cm) 0.5 09/30/22 1507   Depth (cm) 0.1 09/30/22 1507   Wound (cm^2) 0.3 cm^2 09/30/22 1507   Wound Volume (cm^3) 0.03 cm^3 09/30/22 1507   Drainage Characteristics/Odor serosanguineous 09/30/22 1507   Drainage Amount moderate 09/30/22 1507   Care, Wound non-select wound debridement performed 09/30/22 1507       Incision/Surgical Site 12/27/21 Rectum (Active)                 MDM: 50 minutes were spent on the date of the visit reviewing previous chart notes, evaluating patient and developing the treatment plan, this excludes any time spent on procedures.     PATIENT INSTRUCTIONS      Further instructions from your care team       Jamar Ivory      1950    A DME order for supplies has been placed to [facility]. If there are any issues with your order including not receiving the order please call [facility] at [phone] option 3. They can also provide a tracking number for you if you had supplies shipped to you.    Wound Dressing Change: Right and Left anterior lower leg, Left medial lower leg, Right lateral lower leg  Cleanse with mild unscented soap and water  Apply Betamethasone ointment to right and left lower legs from calf to foot. Ok if ointment gets into wounds  Then apply Sween cream to intact skin on legs  Then apply 1/4th piece of 4x4 polymem ag to each wound  Then cover with 1 ABD 5x9 to each leg and wrap with 1 kerlix for each leg and secure with tape  Then apply ace wraps toe to knees  Change daily    Please call [phone] to schedule an appointment with Lymphedema Therapy    You have been referred to [facility] in [place] [phone] or [place]  [phone]     Amanda Pham PA-C September 30, 2022      Durable Medical Equipment Wound Care Orders     Wound Care Order for DME - ONLY FOR DME   As directed      DME Provider: Joey Mattson Comment - [phone]    Wound Supply Order Options: Complex Wound    Optional: .dmewound can be used to pull in order specific information into documentation    Wound Number:  Wound 1  Wound 2  Wound 3  Wound 4       Wound 1 Location: Left anterior lower leg    Wound 1 Dressing Change Frequency: Daily    Wound 1 Length of Need: 30 days    Wound 1 - Dressing Supplies:  Primary  Secondary  Wrap/Gauze  Tape/Securing       Wound 1 - Primary Dressing Dispensing Instructions: Ok to Substitute    Wound 1 - Primary Dressing Types: Foam w/ Silver    Wound 1 - Foam with AG Types: Polymem Max Ag []    Wound 1 - Polymem Max AG Size: 4 x 4    Wound 1 - Polymem Max AG Quantity: Other (Comments) Comment - 8    Wound 1 - Secondary Dressing Dispensing Instructions: Ok to Substitute    Wound 1 - Secondary Dressing Types: Absorbant    Wound 1 - Absorbant Types: ABD Pad []    Wound 1 - ABD Pad Size: 5 x 9    Wound 1 - ABD Pad Quantity: 30    Wound 1 - Wrap/Gauze Types: Rolls    Wound 1 - Roll Type: Bandage Roll Gauze []    Wound 1 - Bandage Roll Gauze Size: 4.5 x 4.1    Wound 1 - Bandage Roll Gauze Quantity: 30 Rolls    Wound 1 - Tape Type: Medipore []    Wound 1 - Medipore Size: 2 x 10    Wound 1 - Medipore Quantity: 1 Roll    Wound 2 Location: Right anterior lower leg    Wound 2 Dressing Change Frequency: Daily    Wound 2 Length of Need: 30 days    Wound 2 - Dressing Supplies:  Primary  Secondary  Wrap/Gauze  Tape/Securing       Wound 2 - Primary Dressing Dispensing Instructions: Ok to Substitute    Wound 2 - Primary Dressing Types: Foam w/ Silver    Wound 2 - Foam with AG Types: Polymem Max Ag []    Wound 2 - Polymem Max AG Size: 4 x 4    Wound 2 - Polymem Max AG Quantity: Other (Comments) Comment - 8    Wound 2 -  Secondary Dressing Dispensing Instructions: Ok to Substitute    Wound 2 - Secondary Dressing Types: Absorbant    Wound 2 - Absorbant Types: ABD Pad []    Wound 2 - ABD Pad Size: 5 x 9    Wound 2 - ABD Pad Quantity: 30    Wound 2 - Wrap/Gauze Types: Rolls    Wound 2 - Roll Type: Bandage Roll Gauze []    Wound 2 - Bandage Roll Gauze Size: 4.5 x 4.1    Wound 2 - Bandage Roll Gauze Quantity: 30 Rolls    Wound 2 - Tape Type: Medipore []    Wound 2 - Medipore Size: 2 x 10    Wound 2 - Medipore Quantity: 1 Roll    Wound 3 Location: Left medial lower leg    Wound 3 Dressing Change Frequency: Daily    Wound 3 Length of Need: 30 days    Wound 3 - Dressing Supplies: Primary    Wound 3 - Primary Dressing Dispensing Instructions: Ok to Substitute    Wound 3 - Primary Dressing Types: Foam w/ Silver    Wound 3 - Foam with AG Types: Polymem Max Ag []    Wound 3 - Polymem Max AG Size: 4 x 4    Wound 3 - Polymem Max AG Quantity: Other (Comments) Comment - 8    Wound 4 Location: Right lateral lower leg    Wound 4 Dressing Change Frequency: Daily    Wound 4 Length of Need: 30 days    Wound 4 - Dressing Supplies: Primary    Wound 4 - Primary Dressing Dispensing Instructions: Ok to Substitute    Wound 4 - Primary Dressing Types: Foam w/ Silver    Wound 4 - Foam with AG Types: Polymem Max Ag []    Wound 4 - Polymem Max AG Size: 4 x 4    Wound 4 - Polymem Max AG Quantity: Other (Comments) Comment - 8    Skin ulcer of right lower leg with fat layer exposed (H)  Ulcer of left lower extremity with fat layer exposed (H)          Electronically signed by Amanda Pham PA-C on September 30, 2022

## 2022-09-30 NOTE — TELEPHONE ENCOUNTER
Please schedule for bilateral venous competency   Patient is in 2 layer wrap: No  Patient is a val transfer : No      Routing to the [facility] appointment scheduling pool     Amanda Pham PA-C

## 2022-10-03 DIAGNOSIS — F32.1 MODERATE MAJOR DEPRESSION (H): ICD-10-CM

## 2022-10-05 RX ORDER — FLUOXETINE 40 MG/1
CAPSULE ORAL
Qty: 90 CAPSULE | Refills: 1 | Status: SHIPPED | OUTPATIENT
Start: 2022-10-05 | End: 2023-11-30

## 2022-10-05 NOTE — TELEPHONE ENCOUNTER
Routing refill request to provider for review/approval because:  PHQ 1/4/2022 5/20/2022 9/26/2022   PHQ-9 Total Score 5 9 10   Q9: Thoughts of better off dead/self-harm past 2 weeks Not at all Not at all Several days   F/U: Thoughts of suicide or self-harm - - -   F/U: Safety concerns - - -      Mariaelena HUSSEIN RN  EP Triage

## 2022-10-12 ENCOUNTER — HOSPITAL ENCOUNTER (OUTPATIENT)
Dept: WOUND CARE | Facility: CLINIC | Age: 72
Discharge: HOME | End: 2022-10-12
Attending: PHYSICIAN ASSISTANT | Admitting: PHYSICIAN ASSISTANT
Payer: COMMERCIAL

## 2022-10-12 VITALS — RESPIRATION RATE: 18 BRPM | TEMPERATURE: 97.7 F | SYSTOLIC BLOOD PRESSURE: 129 MMHG | DIASTOLIC BLOOD PRESSURE: 74 MMHG

## 2022-10-12 DIAGNOSIS — L97.912 SKIN ULCER OF RIGHT LOWER LEG WITH FAT LAYER EXPOSED (H): ICD-10-CM

## 2022-10-12 DIAGNOSIS — L97.922 ULCER OF LEFT LOWER EXTREMITY WITH FAT LAYER EXPOSED (H): ICD-10-CM

## 2022-10-12 DIAGNOSIS — L97.929 ULCER OF LOWER LIMB, LEFT, WITH UNSPECIFIED SEVERITY (H): Primary | ICD-10-CM

## 2022-10-12 PROCEDURE — 97602 WOUND(S) CARE NON-SELECTIVE: CPT

## 2022-10-12 PROCEDURE — 99214 OFFICE O/P EST MOD 30 MIN: CPT | Performed by: PHYSICIAN ASSISTANT

## 2022-10-12 RX ORDER — TRIAMCINOLONE ACETONIDE 1 MG/G
OINTMENT TOPICAL DAILY
Qty: 453.6 G | Refills: 3 | Status: SHIPPED | OUTPATIENT
Start: 2022-10-12 | End: 2023-11-22

## 2022-10-12 NOTE — DISCHARGE INSTRUCTIONS
Jamar Ivory      1950    A DME order for supplies has been placed to [facility]. If there are any issues with your order including not receiving the order please call [facility] at [phone] option 3. They can also provide a tracking number for you if you had supplies shipped to you.    Wound Dressing Change: Right and Left anterior lower leg, Left medial lower leg, Right lateral lower leg  Cleanse with mild unscented soap and water, rinse and pat dry  Apply Triamcinolone ointment to right and left lower leg intact skin from calf to foot. Ok if ointment gets into wounds  Apply light layer of Sween cream to intact skin on legs  Then apply 1/4th piece of 4x4 polymem ag to each wound  Then cover with 1 ABD 5x9 to each leg and wrap with 1 kerlix for each leg and secure with tape  Then apply Lymphedema wraps or ace wraps toes to knees  Change daily    Please call [phone] to schedule an appointment with Lymphedema Therapy  Follow up with [facility] in [place] [phone]      Amanda Pham PA-C October 12, 2022    Call us at [phone] if you have any questions about your wounds, have redness or swelling around your wound, have a fever of 101 or greater or if you have any other problems or concerns. We answer the phone Monday through Friday 8 am to 4 pm, please leave a message as we check the voicemail frequently throughout the day.     If you had a positive experience please indicate that on your patient satisfaction survey form that [facility] will be sending you.  It was a pleasure meeting with you today.  Thank you for allowing me and my team the privilege of caring for you today.  YOU are the reason we are here, and I truly hope we provided you with the excellent service you deserve.  Please let us know if there is anything else we can do for you so that we can be sure you are leaving completely satisfied with your care experience.      If you have any billing  related questions please call the [facility] Business office at [phone]. The clinic staff does not handle billing related matters.  If you are scheduled to have a follow up appointment, you will receive a reminder call the day before your visit. On the appointment day please arrive 15 minutes prior to your appointment time. If you are unable to keep that appointment, please call the clinic to cancel or reschedule. If you are more than 10 minutes late or greater for your appointment, the clinic policy is that you may be asked to reschedule.

## 2022-10-12 NOTE — PROGRESS NOTES
[place] [facility]    ASSESSMENT:   1. Full thickness ulcers of bilateral lower legs due to edema and venous insufficiency  2. Poorly controlled lymphedema  3. Stasis dermatitis    PLAN/DISCUSSION:   1. Has been referred back to lymphedema therapy as patient has not been able to control swelling with his current maintenance plan. Encouraged him to schedule this appt.  2. Venous competency US scheduled, will refer to [facility] if superficial incommpetence  3. Wound care plan: TAC oint to bilateral lower legs, polymem ag to wounds, roll gauze, short stretch wraps  4. See bottom of note for detailed wound care and patient instructions    HISTORY OF PRESENT ILLNESS:   Jamar Ivory is a 72 year old male with hypothyroidism, HLD, HTN, cerebral infarction, CHF, obesity, lymphedema, type 2 DM diet controlled, and BPH who presents today for recurrent lower leg ulcerations. He is well known to me and our clinic for similar ulcerations.  HE was last discharged from our clinic in December of 2021 with a plan to utilize Compreflex inelastic compression wraps. Unfortunately he did not like the wraps so went back to short stretch wraps. He went on to develop sores and rashes and had several bouts of cellulitis. He did undergo lymphedema therapy several years ago and last saw him in November 21. At that point plan was for graduated compression bandage.     TREATMENT COURSE:  09/30/22: Presents for initial visit at our clinic. Started on Polymem and advised them to continue SSBs. Attempted to start betamethasone ointment for dermatitis.  10/12/22: Returns for follow-up. Wounds all improving. Dermatitis worse. Betamethasone was expensive  So did not . Made [facility] appt but not lymphedema appt.     VITALS: /74 (BP Location: Left arm, Patient Position: Sitting, Cuff Size: Adult Large)   Temp 97.7  F (36.5  C) (Temporal)   Resp 18      PHYSICAL EXAM:  GENERAL: Patient is alert and oriented  and in no acute distress  CV: palpable pedal pulses  INTEGUMENTARY:   Wound (used by [facility] only) 02/23/21 1118 Right lower;lateral leg ulceration, venous (Active)   Thickness/Stage full thickness 09/30/22 1507   Base red 09/30/22 1507   Periwound intact 09/30/22 1507   Periwound Temperature warm 09/30/22 1507   Periwound Skin Turgor soft 09/30/22 1507   Edges open 09/30/22 1507   Length (cm) 1.9 09/30/22 1507   Width (cm) 2.2 09/30/22 1507   Depth (cm) 0.2 09/30/22 1507   Wound (cm^2) 4.18 cm^2 09/30/22 1507   Wound Volume (cm^3) 0.84 cm^3 09/30/22 1507   Wound healing % 45.71 09/30/22 1507   Drainage Characteristics/Odor serosanguineous 09/30/22 1507   Drainage Amount moderate 09/30/22 1507   Care, Wound non-select wound debridement performed 09/30/22 1507   Dressing Care dressing applied 02/23/21 1100       [facility] 10/15/21 1423 Left medial;lower leg ulceration, venous (Active)   Thickness/Stage full thickness 09/30/22 1507   Base red 09/30/22 1507   Periwound intact;swelling 09/30/22 1507   Periwound Temperature warm 09/30/22 1507   Periwound Skin Turgor soft 09/30/22 1507   Edges open 09/30/22 1507   Length (cm) 4 09/30/22 1507   Width (cm) 2.1 09/30/22 1507   Depth (cm) 0.2 09/30/22 1507   Wound (cm^2) 8.4 cm^2 09/30/22 1507   Wound Volume (cm^3) 1.68 cm^3 09/30/22 1507   Wound healing % -218.18 09/30/22 1507   Drainage Characteristics/Odor serosanguineous 09/30/22 1507   Drainage Amount moderate 09/30/22 1507   Care, Wound non-select wound debridement performed 09/30/22 1507       Wound (used by [facility] only) 12/13/21 1125 Left lower;anterior leg ulceration, venous (Active)   Thickness/Stage full thickness 09/30/22 1507   Base red 09/30/22 1507   Periwound intact;swelling 09/30/22 1507   Periwound Temperature warm 09/30/22 1507   Periwound Skin Turgor soft 09/30/22 1507   Edges open 09/30/22 1507   Length (cm) 1 09/30/22 1507   Width (cm) 1.1 09/30/22 1507   Depth (cm) 0.1 09/30/22 1507    Wound (cm^2) 1.1 cm^2 09/30/22 1507   Wound Volume (cm^3) 0.11 cm^3 09/30/22 1507   Wound healing % 98.9 09/30/22 1507   Drainage Characteristics/Odor serosanguineous 09/30/22 1507   Drainage Amount moderate 09/30/22 1507   Care, Wound non-select wound debridement performed 09/30/22 1507       Wound (used by [facility] only) 09/30/22 1509 Right anterior;lower leg ulceration, venous (Active)   Thickness/Stage full thickness 09/30/22 1507   Base red 09/30/22 1507   Periwound intact;swelling 09/30/22 1507   Periwound Temperature warm 09/30/22 1507   Periwound Skin Turgor soft 09/30/22 1507   Edges open 09/30/22 1507   Length (cm) 0.6 09/30/22 1507   Width (cm) 0.5 09/30/22 1507   Depth (cm) 0.1 09/30/22 1507   Wound (cm^2) 0.3 cm^2 09/30/22 1507   Wound Volume (cm^3) 0.03 cm^3 09/30/22 1507   Drainage Characteristics/Odor serosanguineous 09/30/22 1507   Drainage Amount moderate 09/30/22 1507   Care, Wound non-select wound debridement performed 09/30/22 1507       Incision/Surgical Site 12/27/21 Rectum (Active)           MDM: 39 minutes were spent on the date of the visit reviewing previous chart notes, evaluating patient and developing the treatment plan, this excludes any time spent on procedures.       PATIENT INSTRUCTIONS        Further instructions from your care team       Jamar Ivory      1950    A DME order for supplies has been placed to [facility]. If there are any issues with your order including not receiving the order please call [facility] at [phone] option 3. They can also provide a tracking number for you if you had supplies shipped to you.    Wound Dressing Change: Right and Left anterior lower leg, Left medial lower leg, Right lateral lower leg  Cleanse with mild unscented soap and water, rinse and pat dry  Apply Triamcinolone ointment to right and left lower leg intact skin from calf to foot. Ok if ointment gets into wounds  Apply light layer of Sween cream to intact  skin on legs  Then apply 1/4th piece of 4x4 polymem ag to each wound  Then cover with 1 ABD 5x9 to each leg and wrap with 1 kerlix for each leg and secure with tape  Then apply Lymphedema wraps or ace wraps toes to knees  Change daily    Please call [phone] to schedule an appointment with Lymphedema Therapy  Follow up with [facility] in [place] [phone]      Amanda Pham PA-C October 12, 2022    Call us at [phone] if you have any questions about your wounds, have redness or swelling around your wound, have a fever of 101 or greater or if you have any other problems or concerns. We answer the phone Monday through Friday 8 am to 4 pm, please leave a message as we check the voicemail frequently throughout the day.     If you had a positive experience please indicate that on your patient satisfaction survey form that [facility] will be sending you.  It was a pleasure meeting with you today.  Thank you for allowing me and my team the privilege of caring for you today.  YOU are the reason we are here, and I truly hope we provided you with the excellent service you deserve.  Please let us know if there is anything else we can do for you so that we can be sure you are leaving completely satisfied with your care experience.      If you have any billing related questions please call the [facility] Business office at [phone]. The clinic staff does not handle billing related matters.  If you are scheduled to have a follow up appointment, you will receive a reminder call the day before your visit. On the appointment day please arrive 15 minutes prior to your appointment time. If you are unable to keep that appointment, please call the clinic to cancel or reschedule. If you are more than 10 minutes late or greater for your appointment, the clinic policy is that you may be asked to reschedule.        Electronically signed by Amanda Pham PA-C on October 12, 2022  Amanda Pham  TYLER

## 2022-10-28 ENCOUNTER — ANCILLARY PROCEDURE (OUTPATIENT)
Dept: ULTRASOUND IMAGING | Facility: CLINIC | Age: 72
End: 2022-10-28
Payer: COMMERCIAL

## 2022-10-28 DIAGNOSIS — R60.0 EDEMA OF BOTH LEGS: ICD-10-CM

## 2022-10-28 DIAGNOSIS — L97.912 SKIN ULCER OF RIGHT LOWER LEG WITH FAT LAYER EXPOSED (H): ICD-10-CM

## 2022-10-28 DIAGNOSIS — L97.922 ULCER OF LEFT LOWER EXTREMITY WITH FAT LAYER EXPOSED (H): ICD-10-CM

## 2022-10-28 PROCEDURE — 93970 EXTREMITY STUDY: CPT | Performed by: SURGERY

## 2022-11-04 ENCOUNTER — TELEPHONE (OUTPATIENT)
Dept: WOUND CARE | Facility: CLINIC | Age: 72
End: 2022-11-04

## 2022-11-04 DIAGNOSIS — I87.2 VENOUS (PERIPHERAL) INSUFFICIENCY: Primary | ICD-10-CM

## 2022-11-04 NOTE — TELEPHONE ENCOUNTER
Please call patient to inform him that his vein ultrasound showed incompetent veins (valves not working). I would like him to make an appt with [facility] to discuss whether anything can be done to the veins to prevent the wounds from recurring.

## 2022-11-04 NOTE — TELEPHONE ENCOUNTER
Call placed to patient;  Reached wife, Suzan;  Patient is sleeping and Suzan took detailed message;  Given scheduling number for [facility];  Referral in place;  No additional concerns.

## 2022-11-18 ENCOUNTER — HOSPITAL ENCOUNTER (OUTPATIENT)
Dept: WOUND CARE | Facility: CLINIC | Age: 72
Discharge: HOME | End: 2022-11-18
Attending: PHYSICIAN ASSISTANT | Admitting: PHYSICIAN ASSISTANT
Payer: COMMERCIAL

## 2022-11-18 VITALS — SYSTOLIC BLOOD PRESSURE: 154 MMHG | HEART RATE: 68 BPM | DIASTOLIC BLOOD PRESSURE: 88 MMHG | TEMPERATURE: 97.5 F

## 2022-11-18 DIAGNOSIS — L97.929 ULCER OF LOWER LIMB, LEFT, WITH UNSPECIFIED SEVERITY (H): Primary | ICD-10-CM

## 2022-11-18 DIAGNOSIS — L97.922 ULCER OF LEFT LOWER EXTREMITY WITH FAT LAYER EXPOSED (H): ICD-10-CM

## 2022-11-18 DIAGNOSIS — L97.912 SKIN ULCER OF RIGHT LOWER LEG WITH FAT LAYER EXPOSED (H): ICD-10-CM

## 2022-11-18 PROCEDURE — 99214 OFFICE O/P EST MOD 30 MIN: CPT | Performed by: PHYSICIAN ASSISTANT

## 2022-11-18 PROCEDURE — G0463 HOSPITAL OUTPT CLINIC VISIT: HCPCS

## 2022-11-18 NOTE — DISCHARGE INSTRUCTIONS
Jamar Ivory      1950    A DME order was not completed because supplies were not needed        [facility]  [address] [facility] [address], [place] [address]  Appointment Phone [phone]     Jamar PATEL Cachorro      1950    Your wound is healed!! Dressings are no longer required.     Wound Clinic follow up in the future if there are any wound concerns    Skin care for bilateral legs:  Daily moisturizing with lotion  Wrap daily, on in the morning, off in the evening, as you have been wrapping per discussion.    Attend your lymphedema therapy appointment and [facility] appointment.     Amanda Pham PA-C November 18, 2022    Call us at [phone] if you have any questions about your wounds, have redness or swelling around your wound, have a fever of 101 or greater or if you have any other problems or concerns. We answer the phone Monday through Friday 8 am to 4 pm, please leave a message as we check the voicemail frequently throughout the day.     If you had a positive experience please indicate that on your patient satisfaction survey form that [facility] will be sending you.    It was a pleasure meeting with you today.  Thank you for allowing me and my team the privilege of caring for you today.  YOU are the reason we are here, and I truly hope we provided you with the excellent service you deserve.  Please let us know if there is anything else we can do for you so that we can be sure you are leaving completely satisfied with your care experience.      If you have any billing related questions please call the [facility] Business office at [phone]. The clinic staff does not handle billing related matters.    If you are scheduled to have a follow up appointment, you will receive a reminder call the day before your visit. On the appointment day please arrive 15 minutes prior to your appointment time. If you are unable to keep that appointment, please  call the clinic to cancel or reschedule. If you are more than 10 minutes late or greater for your appointment, the clinic policy is that you may be asked to reschedule.

## 2022-11-18 NOTE — PROGRESS NOTES
Patient arrived for wound care visit.   Wound Care Nurse time spent evaluating patient record, and completed a full evaluation; provided recommendation based on treatment plan.   Reviewed discharge instructions, patient education, and discussed plan of care with appropriate medical team staff members and patient and/or family members.

## 2022-11-18 NOTE — PROGRESS NOTES
[place] [facility]    ASSESSMENT:   1. Poorly controlled lymphedema  2. Venous insufficiency  3. Stasis dermatitis    PLAN/DISCUSSION:   1. Wounds are all essentially closed or nearly closed, no need for continued dressings  2. Continue current compression garment (SSB) until new directions and long term plan from lymphedema therapy  3. See bottom of note for detailed wound care and patient instructions    HISTORY OF PRESENT ILLNESS:   Jamar Ivory is a 72 year old male with hypothyroidism, HLD, HTN, cerebral infarction, CHF, obesity, lymphedema, type 2 DM diet controlled, and BPH who presents today for recurrent lower leg ulcerations. He is well known to me and our clinic for similar ulcerations.  HE was last discharged from our clinic in December of 2021 with a plan to utilize Compreflex inelastic compression wraps. Unfortunately he did not like the wraps so went back to short stretch wraps. He went on to develop sores and rashes and had several bouts of cellulitis. He did undergo lymphedema therapy several years ago and last saw him in November 21. At that point plan was for graduated compression bandage.     TREATMENT COURSE:  09/30/22: Presents for initial visit at our clinic. Started on Polymem and advised them to continue SSBs. Attempted to start betamethasone ointment for dermatitis.  10/12/22: Returns for follow-up. Wounds all improving. Dermatitis worse. Betamethasone was expensive  So did not . Made [facility] appt but not lymphedema appt.   10/28: Venous competency US revealed incompetence throughout exam as well as incompetent varicosities, referred to [facility]  11/18/22: Returns for follow-up. Wounds nearly healed. Has upcoming appt with lymphedema therapy and with [facility]. Was unable to get TAC ointment as it was cost prohibitive for pt.    VITALS: BP (!) 154/88 (BP Location: Left arm, Patient Position: Sitting)   Pulse 68   Temp 97.5  F (36.4  C) (Temporal)       PHYSICAL EXAM:  GENERAL: Patient is alert and oriented and in no acute distress  CV: palpable pedal pulses  INTEGUMENTARY: scattered excoriations and scabs, chronic pigmentation and erythema which is his baseline, no open draining lesions            MDM: 39 minutes were spent on the date of the visit reviewing previous chart notes, evaluating patient and developing the treatment plan, this excludes any time spent on procedures.         Further instructions from your care team       Jamar Ivory      1950    A DME order was not completed because supplies were not needed        [facility]  [address] [facility] [address], [facility] [address]  Appointment Phone [phone]     Jamar Ivory      1950    Your wound is healed!! Dressings are no longer required.     Wound Clinic follow up in the future if there are any wound concerns    Skin care for bilateral legs:  Daily moisturizing with lotion  Wrap daily, on in the morning, off in the evening, as you have been wrapping per discussion.    Attend your lymphedema therapy appointment and [facility] appointment.     Amanda Pham PA-C November 18, 2022    Call us at [phone] if you have any questions about your wounds, have redness or swelling around your wound, have a fever of 101 or greater or if you have any other problems or concerns. We answer the phone Monday through Friday 8 am to 4 pm, please leave a message as we check the voicemail frequently throughout the day.     If you had a positive experience please indicate that on your patient satisfaction survey form that [facility] will be sending you.    It was a pleasure meeting with you today.  Thank you for allowing me and my team the privilege of caring for you today.  YOU are the reason we are here, and I truly hope we provided you with the excellent service you deserve.  Please let us know if there is anything else we can do for you so  that we can be sure you are leaving completely satisfied with your care experience.      If you have any billing related questions please call the [facility] Business office at [phone]. The clinic staff does not handle billing related matters.    If you are scheduled to have a follow up appointment, you will receive a reminder call the day before your visit. On the appointment day please arrive 15 minutes prior to your appointment time. If you are unable to keep that appointment, please call the clinic to cancel or reschedule. If you are more than 10 minutes late or greater for your appointment, the clinic policy is that you may be asked to reschedule.

## 2022-12-02 DIAGNOSIS — N40.1 BENIGN PROSTATIC HYPERPLASIA WITH POST-VOID DRIBBLING: ICD-10-CM

## 2022-12-02 DIAGNOSIS — N39.43 BENIGN PROSTATIC HYPERPLASIA WITH POST-VOID DRIBBLING: ICD-10-CM

## 2022-12-05 NOTE — TELEPHONE ENCOUNTER
Routing refill request to provider for review/approval because:  Failed protocol:   BP Readings from Last 3 Encounters:   11/18/22 (!) 154/88   10/12/22 129/74   09/30/22 (!) 151/66     Cece SANCHEZ RN  [facility]

## 2022-12-06 RX ORDER — TAMSULOSIN HYDROCHLORIDE 0.4 MG/1
CAPSULE ORAL
Qty: 90 CAPSULE | Refills: 0 | Status: SHIPPED | OUTPATIENT
Start: 2022-12-06 | End: 2023-02-02

## 2022-12-09 ENCOUNTER — HOSPITAL ENCOUNTER (OUTPATIENT)
Dept: OCCUPATIONAL THERAPY | Facility: CLINIC | Age: 72
Setting detail: THERAPIES SERIES
Discharge: HOME | End: 2022-12-09
Attending: PHYSICIAN ASSISTANT
Payer: COMMERCIAL

## 2022-12-09 DIAGNOSIS — L97.912 SKIN ULCER OF RIGHT LOWER LEG WITH FAT LAYER EXPOSED (H): ICD-10-CM

## 2022-12-09 DIAGNOSIS — L97.922 ULCER OF LEFT LOWER EXTREMITY WITH FAT LAYER EXPOSED (H): ICD-10-CM

## 2022-12-09 PROCEDURE — 97165 OT EVAL LOW COMPLEX 30 MIN: CPT | Mod: GO | Performed by: OCCUPATIONAL THERAPIST

## 2022-12-09 PROCEDURE — 97140 MANUAL THERAPY 1/> REGIONS: CPT | Mod: GO | Performed by: OCCUPATIONAL THERAPIST

## 2022-12-15 DIAGNOSIS — I50.42 CHRONIC COMBINED SYSTOLIC AND DIASTOLIC CONGESTIVE HEART FAILURE (H): ICD-10-CM

## 2022-12-15 RX ORDER — TORSEMIDE 20 MG/1
80 TABLET ORAL DAILY
Qty: 360 TABLET | Refills: 1 | Status: SHIPPED | OUTPATIENT
Start: 2022-12-15

## 2022-12-16 ENCOUNTER — HOSPITAL ENCOUNTER (OUTPATIENT)
Dept: OCCUPATIONAL THERAPY | Facility: CLINIC | Age: 72
Setting detail: THERAPIES SERIES
Discharge: HOME | End: 2022-12-16
Attending: PHYSICIAN ASSISTANT
Payer: COMMERCIAL

## 2022-12-16 PROCEDURE — 97140 MANUAL THERAPY 1/> REGIONS: CPT | Mod: GO | Performed by: OCCUPATIONAL THERAPIST

## 2022-12-19 NOTE — TELEPHONE ENCOUNTER
Request from Gisella with [employer] to contact pt.    Pt had called and was disconnected twice. Once was after he had made a clinic appointment for Tue    Called pt. Both pt and spouse present via speakerphone.  Pt declines speaking with a nurse.    Thelma Wesley RN  [facility]    
no

## 2022-12-28 ENCOUNTER — HOSPITAL ENCOUNTER (OUTPATIENT)
Dept: OCCUPATIONAL THERAPY | Facility: CLINIC | Age: 72
Setting detail: THERAPIES SERIES
Discharge: HOME | End: 2022-12-28
Attending: PHYSICIAN ASSISTANT
Payer: COMMERCIAL

## 2022-12-28 PROCEDURE — 97140 MANUAL THERAPY 1/> REGIONS: CPT | Mod: GO | Performed by: OCCUPATIONAL THERAPIST

## 2022-12-28 NOTE — PROGRESS NOTES
[facility]    Outpatient Occupational Therapy Discharge Note  Patient: Jamar Ivory  : 1950    Beginning/End Dates of Reporting Period:  22 to 22    Referring Provider: Amanda Pham PA-C    Therapy Diagnosis: Lymphedema/wounds    Client Self Report:  Arthritis pain    Objective Measurements:    No change, pt is independent with home program      Outcome Measures (most recent score):  Lymphedema Life Impact Scale (score range 0-72). A higher score indicates greater impairment.: 10    Goals:   Goal Identifier     Goal Description Pt  and wife will be indepndent with modifications to home program including self MLD, lymph ex, bandage check, and self assessment.   Target Date 23   Date Met   22   Progress (detail required for progress note):  Goal met       Plan:  Discharge from therapy.    Discharge:    Reason for Discharge: Patient has met all goals.    Equipment Issued: none, he conts to bandage his legs minimally due to tolerance of wraps.    Discharge Plan: Patient to continue home program.

## 2023-02-02 ENCOUNTER — OFFICE VISIT (OUTPATIENT)
Dept: INTERNAL MEDICINE | Facility: CLINIC | Age: 73
End: 2023-02-02
Payer: COMMERCIAL

## 2023-02-02 VITALS
TEMPERATURE: 96.7 F | SYSTOLIC BLOOD PRESSURE: 122 MMHG | HEART RATE: 70 BPM | RESPIRATION RATE: 16 BRPM | OXYGEN SATURATION: 95 % | HEIGHT: 68 IN | WEIGHT: 315 LBS | DIASTOLIC BLOOD PRESSURE: 76 MMHG | BODY MASS INDEX: 47.74 KG/M2

## 2023-02-02 DIAGNOSIS — Z12.5 ENCOUNTER FOR SCREENING FOR MALIGNANT NEOPLASM OF PROSTATE: ICD-10-CM

## 2023-02-02 DIAGNOSIS — N39.43 BENIGN PROSTATIC HYPERPLASIA WITH POST-VOID DRIBBLING: ICD-10-CM

## 2023-02-02 DIAGNOSIS — L97.311 VENOUS STASIS ULCER OF RIGHT ANKLE LIMITED TO BREAKDOWN OF SKIN WITH VARICOSE VEINS (H): ICD-10-CM

## 2023-02-02 DIAGNOSIS — N40.1 BENIGN PROSTATIC HYPERPLASIA WITH POST-VOID DRIBBLING: ICD-10-CM

## 2023-02-02 DIAGNOSIS — Z79.899 ENCOUNTER FOR LONG-TERM (CURRENT) USE OF MEDICATIONS: ICD-10-CM

## 2023-02-02 DIAGNOSIS — E11.42 DIABETIC POLYNEUROPATHY ASSOCIATED WITH TYPE 2 DIABETES MELLITUS (H): ICD-10-CM

## 2023-02-02 DIAGNOSIS — E66.01 MORBID OBESITY (H): ICD-10-CM

## 2023-02-02 DIAGNOSIS — R35.0 FREQUENT URINATION: Primary | ICD-10-CM

## 2023-02-02 DIAGNOSIS — I25.119 CORONARY ARTERY DISEASE INVOLVING NATIVE CORONARY ARTERY OF NATIVE HEART WITH ANGINA PECTORIS (H): ICD-10-CM

## 2023-02-02 DIAGNOSIS — I83.013 VENOUS STASIS ULCER OF RIGHT ANKLE LIMITED TO BREAKDOWN OF SKIN WITH VARICOSE VEINS (H): ICD-10-CM

## 2023-02-02 DIAGNOSIS — F32.1 MODERATE MAJOR DEPRESSION (H): ICD-10-CM

## 2023-02-02 DIAGNOSIS — N40.0 ENLARGED PROSTATE: ICD-10-CM

## 2023-02-02 DIAGNOSIS — N41.0 ACUTE PROSTATITIS: ICD-10-CM

## 2023-02-02 DIAGNOSIS — I50.42 CHRONIC COMBINED SYSTOLIC AND DIASTOLIC CONGESTIVE HEART FAILURE (H): ICD-10-CM

## 2023-02-02 DIAGNOSIS — E03.9 ACQUIRED HYPOTHYROIDISM: ICD-10-CM

## 2023-02-02 LAB
ALBUMIN SERPL BCG-MCNC: 4.3 G/DL (ref 3.5–5.2)
ALBUMIN UR-MCNC: NEGATIVE MG/DL
ALP SERPL-CCNC: 61 U/L (ref 40–129)
ALT SERPL W P-5'-P-CCNC: 14 U/L (ref 10–50)
ANION GAP SERPL CALCULATED.3IONS-SCNC: 11 MMOL/L (ref 7–15)
APPEARANCE UR: CLEAR
AST SERPL W P-5'-P-CCNC: 29 U/L (ref 10–50)
BILIRUB SERPL-MCNC: 0.3 MG/DL
BILIRUB UR QL STRIP: NEGATIVE
BUN SERPL-MCNC: 15.7 MG/DL (ref 8–23)
CALCIUM SERPL-MCNC: 9.7 MG/DL (ref 8.8–10.2)
CHLORIDE SERPL-SCNC: 101 MMOL/L (ref 98–107)
COLOR UR AUTO: YELLOW
CREAT SERPL-MCNC: 0.99 MG/DL (ref 0.67–1.17)
DEPRECATED HCO3 PLAS-SCNC: 27 MMOL/L (ref 22–29)
ERYTHROCYTE [DISTWIDTH] IN BLOOD BY AUTOMATED COUNT: 15.3 % (ref 10–15)
GFR SERPL CREATININE-BSD FRML MDRD: 80 ML/MIN/1.73M2
GLUCOSE SERPL-MCNC: 80 MG/DL (ref 70–99)
GLUCOSE UR STRIP-MCNC: NEGATIVE MG/DL
HBA1C MFR BLD: 5.8 % (ref 0–5.6)
HCT VFR BLD AUTO: 43.9 % (ref 40–53)
HGB BLD-MCNC: 14.4 G/DL (ref 13.3–17.7)
HGB UR QL STRIP: NEGATIVE
KETONES UR STRIP-MCNC: NEGATIVE MG/DL
LEUKOCYTE ESTERASE UR QL STRIP: NEGATIVE
MCH RBC QN AUTO: 28.7 PG (ref 26.5–33)
MCHC RBC AUTO-ENTMCNC: 32.8 G/DL (ref 31.5–36.5)
MCV RBC AUTO: 88 FL (ref 78–100)
NITRATE UR QL: NEGATIVE
PH UR STRIP: 6 [PH] (ref 5–7)
PLATELET # BLD AUTO: 218 10E3/UL (ref 150–450)
POTASSIUM SERPL-SCNC: 4.6 MMOL/L (ref 3.4–5.3)
PROT SERPL-MCNC: 8.2 G/DL (ref 6.4–8.3)
PSA SERPL-MCNC: 1.06 NG/ML (ref 0–6.5)
RBC # BLD AUTO: 5.01 10E6/UL (ref 4.4–5.9)
SODIUM SERPL-SCNC: 139 MMOL/L (ref 136–145)
SP GR UR STRIP: 1.01 (ref 1–1.03)
T4 FREE SERPL-MCNC: 0.21 NG/DL (ref 0.9–1.7)
TSH SERPL DL<=0.005 MIU/L-ACNC: 50.5 UIU/ML (ref 0.3–4.2)
UROBILINOGEN UR STRIP-ACNC: 0.2 E.U./DL
WBC # BLD AUTO: 6.3 10E3/UL (ref 4–11)

## 2023-02-02 PROCEDURE — 84439 ASSAY OF FREE THYROXINE: CPT | Performed by: NURSE PRACTITIONER

## 2023-02-02 PROCEDURE — 85027 COMPLETE CBC AUTOMATED: CPT | Performed by: NURSE PRACTITIONER

## 2023-02-02 PROCEDURE — G0103 PSA SCREENING: HCPCS | Performed by: NURSE PRACTITIONER

## 2023-02-02 PROCEDURE — 99213 OFFICE O/P EST LOW 20 MIN: CPT | Performed by: NURSE PRACTITIONER

## 2023-02-02 PROCEDURE — 36415 COLL VENOUS BLD VENIPUNCTURE: CPT | Performed by: NURSE PRACTITIONER

## 2023-02-02 PROCEDURE — 83036 HEMOGLOBIN GLYCOSYLATED A1C: CPT | Performed by: NURSE PRACTITIONER

## 2023-02-02 PROCEDURE — 80053 COMPREHEN METABOLIC PANEL: CPT | Performed by: NURSE PRACTITIONER

## 2023-02-02 PROCEDURE — 81003 URINALYSIS AUTO W/O SCOPE: CPT | Performed by: NURSE PRACTITIONER

## 2023-02-02 PROCEDURE — 84443 ASSAY THYROID STIM HORMONE: CPT | Performed by: NURSE PRACTITIONER

## 2023-02-02 RX ORDER — ACETAMINOPHEN 500 MG
500-1000 TABLET ORAL EVERY 6 HOURS PRN
COMMUNITY

## 2023-02-02 RX ORDER — TAMSULOSIN HYDROCHLORIDE 0.4 MG/1
0.8 CAPSULE ORAL DAILY
Qty: 180 CAPSULE | Refills: 1 | Status: SHIPPED | OUTPATIENT
Start: 2023-02-02 | End: 2024-03-18

## 2023-02-02 RX ORDER — CEPHALEXIN 500 MG/1
500 CAPSULE ORAL 2 TIMES DAILY
Qty: 20 CAPSULE | Refills: 0 | Status: SHIPPED | OUTPATIENT
Start: 2023-02-02 | End: 2023-11-15

## 2023-02-02 ASSESSMENT — ANXIETY QUESTIONNAIRES
5. BEING SO RESTLESS THAT IT IS HARD TO SIT STILL: NOT AT ALL
IF YOU CHECKED OFF ANY PROBLEMS ON THIS QUESTIONNAIRE, HOW DIFFICULT HAVE THESE PROBLEMS MADE IT FOR YOU TO DO YOUR WORK, TAKE CARE OF THINGS AT HOME, OR GET ALONG WITH OTHER PEOPLE: NOT DIFFICULT AT ALL
GAD7 TOTAL SCORE: 9
6. BECOMING EASILY ANNOYED OR IRRITABLE: MORE THAN HALF THE DAYS
2. NOT BEING ABLE TO STOP OR CONTROL WORRYING: MORE THAN HALF THE DAYS
GAD7 TOTAL SCORE: 9
1. FEELING NERVOUS, ANXIOUS, OR ON EDGE: SEVERAL DAYS
3. WORRYING TOO MUCH ABOUT DIFFERENT THINGS: NEARLY EVERY DAY
7. FEELING AFRAID AS IF SOMETHING AWFUL MIGHT HAPPEN: NOT AT ALL

## 2023-02-02 ASSESSMENT — PATIENT HEALTH QUESTIONNAIRE - PHQ9
5. POOR APPETITE OR OVEREATING: SEVERAL DAYS
SUM OF ALL RESPONSES TO PHQ QUESTIONS 1-9: 16

## 2023-02-02 NOTE — PROGRESS NOTES
Assessment & Plan     Frequent urination  Discussed could be prostatitis  Will treat with antibiotics   Increase flomax   Check lab   Urology referral   - tamsulosin (FLOMAX) 0.4 MG capsule; Take 2 capsules (0.8 mg) by mouth daily  - cephALEXin (KEFLEX) 500 MG capsule; Take 1 capsule (500 mg) by mouth 2 times daily  - Adult Urology  Referral; Future  - UA Macro with Reflex to Micro and Culture - lab collect    Encounter for screening for malignant neoplasm of prostate    - PSA, screen; Future    Enlarged prostate    - PSA, screen; Future  - Comprehensive metabolic panel (BMP + Alb, Alk Phos, ALT, AST, Total. Bili, TP); Future  - Adult Urology  Referral; Future  - UA Macro with Reflex to Micro and Culture - lab collect    Coronary artery disease involving native coronary artery of native heart with angina pectoris (H)  Stable     Moderate major depression (H)  Not well controlled but desired no change     Morbid obesity (H)      Venous stasis ulcer of right ankle limited to breakdown of skin with varicose veins (H)  Unchanged     Encounter for long-term (current) use of medications      Diabetic polyneuropathy associated with type 2 diabetes mellitus (H)  Recheck   Lab Results   Component Value Date    A1C 5.7 05/20/2022    A1C 5.9 08/02/2021    A1C 5.7 01/04/2021    A1C 5.6 07/17/2020    A1C 5.5 11/30/2019    A1C 5.5 05/08/2019    A1C 5.6 06/28/2018       - HEMOGLOBIN A1C; Future    Chronic combined systolic and diastolic congestive heart failure (H)  Stable   - CBC with Platelets; Future    Acquired hypothyroidism  Stable    - TSH with free T4 reflex; Future    Benign prostatic hyperplasia with post-void dribbling    - tamsulosin (FLOMAX) 0.4 MG capsule; Take 2 capsules (0.8 mg) by mouth daily  - cephALEXin (KEFLEX) 500 MG capsule; Take 1 capsule (500 mg) by mouth 2 times daily  - Adult Urology  Referral; Future  - UA Macro with Reflex to Micro and Culture - lab collect    Acute  "prostatitis    - tamsulosin (FLOMAX) 0.4 MG capsule; Take 2 capsules (0.8 mg) by mouth daily  - cephALEXin (KEFLEX) 500 MG capsule; Take 1 capsule (500 mg) by mouth 2 times daily  - Adult Urology  Referral; Future  - UA Macro with Reflex to Micro and Culture - lab collect      25 minutes spent on the date of the encounter doing chart review, history and exam, documentation and further activities per the note       BMI:   Estimated body mass index is 49.42 kg/m  as calculated from the following:    Height as of this encounter: 1.727 m (5' 8\").    Weight as of this encounter: 147.4 kg (325 lb).       Patient Instructions   Urine test     Increase flomax to 0.8 mg daily   (Two 0.4 mg pills a day)    Keflex twice daily for 10 days     Urology referral            Return in about 2 weeks (around 2/16/2023) for if not improved .    YENI Triana CNP  [facility] is a 73 year old, presenting for the following health issues:  Chief Complaint   Patient presents with     Prostate Problem     Pt thinks he has an enlarged prostate. Pt is frequently urinating.     HPI     Going every 15 minutes to an hour   3-4 weeks   Urine  smell and appearance unchanged   Drinks water     Knee pain    Dies not want to see ortho    Mood low - does not want to change his medication     Want to do lab     Review of Systems   Constitutional, HEENT, cardiovascular, pulmonary, GI, , musculoskeletal, neuro, skin, endocrine and psych systems are negative, except as otherwise noted.      Objective    /76   Pulse 70   Temp (!) 96.7  F (35.9  C) (Tympanic)   Resp 16   Ht 1.727 m (5' 8\")   Wt 147.4 kg (325 lb)   SpO2 95%   BMI 49.42 kg/m    Body mass index is 49.42 kg/m .  Physical Exam   GENERAL: alert and no distress  RESP: lungs clear to auscultation - no rales, rhonchi or wheezes  CV: regular rate and rhythm  ABDOMEN: soft, nontender, and bowel sounds normal  PSYCH: " mentation appears normal, affect normal/bright    Urine   Lab

## 2023-02-02 NOTE — NURSING NOTE
"Chief Complaint   Patient presents with     Prostate Problem     Pt thinks he has an enlarged prostate. Pt is frequently urinating.     initial /76   Pulse 70   Temp (!) 96.7  F (35.9  C) (Tympanic)   Resp 16   Ht 1.727 m (5' 8\")   Wt 147.4 kg (325 lb)   SpO2 95%   BMI 49.42 kg/m   Estimated body mass index is 49.42 kg/m  as calculated from the following:    Height as of this encounter: 1.727 m (5' 8\").    Weight as of this encounter: 147.4 kg (325 lb)..  bp completed using cuff size large  LEANN FRANCIS LPN  "

## 2023-02-02 NOTE — PATIENT INSTRUCTIONS
Urine test     Increase flomax to 0.8 mg daily   (Two 0.4 mg pills a day)    Keflex twice daily for 10 days     Urology referral

## 2023-02-03 RX ORDER — LEVOTHYROXINE SODIUM 112 UG/1
224 TABLET ORAL DAILY
Qty: 180 TABLET | Refills: 3 | Status: SHIPPED | OUTPATIENT
Start: 2023-02-03 | End: 2024-04-17

## 2023-02-28 ENCOUNTER — TELEPHONE (OUTPATIENT)
Dept: FAMILY MEDICINE | Facility: CLINIC | Age: 73
End: 2023-02-28

## 2023-02-28 NOTE — TELEPHONE ENCOUNTER
General Call    Contacts       Type Contact Phone/Fax    02/28/2023 10:15 AM CST Phone (Incoming) Suzan Ivory (Emergency Contact) [phone]        Reason for Call:  Received letter    What are your questions or concerns:  Pt received letter and wanted to get a message to provider.  Wondering where provider is locating to.        Call , ok to  detailed message     Carine Barrera/Kaitlynn-  [facility]

## 2023-03-03 NOTE — TELEPHONE ENCOUNTER
Please call and inform the patient I am moving to [place] and will not be able to continue as his PCP.      I am happy to see him between now and June 29 as needed for his care and refills.     Calvin Florez PA-C

## 2023-03-21 ENCOUNTER — HOSPITAL ENCOUNTER (OUTPATIENT)
Dept: WOUND CARE | Facility: CLINIC | Age: 73
Discharge: HOME | End: 2023-03-21
Attending: PHYSICIAN ASSISTANT
Payer: COMMERCIAL

## 2023-03-21 VITALS — SYSTOLIC BLOOD PRESSURE: 126 MMHG | TEMPERATURE: 98.1 F | HEART RATE: 89 BPM | DIASTOLIC BLOOD PRESSURE: 70 MMHG

## 2023-03-21 DIAGNOSIS — L97.912 SKIN ULCER OF RIGHT LOWER LEG WITH FAT LAYER EXPOSED (H): ICD-10-CM

## 2023-03-21 DIAGNOSIS — L97.922 ULCER OF LEFT LOWER EXTREMITY WITH FAT LAYER EXPOSED (H): ICD-10-CM

## 2023-03-21 DIAGNOSIS — L97.929 ULCER OF LOWER LIMB, LEFT, WITH UNSPECIFIED SEVERITY (H): Primary | ICD-10-CM

## 2023-03-21 PROCEDURE — 99214 OFFICE O/P EST MOD 30 MIN: CPT | Performed by: PHYSICIAN ASSISTANT

## 2023-03-21 RX ORDER — TRIAMCINOLONE ACETONIDE 1 MG/G
CREAM TOPICAL DAILY
Qty: 454 G | Refills: 3 | Status: SHIPPED | OUTPATIENT
Start: 2023-03-21 | End: 2023-11-22

## 2023-03-21 NOTE — PROGRESS NOTES
[facility]    ASSESSMENT:   1. Poorly controlled lymphedema  2. Venous insufficiency  3. Stasis dermatitis    PLAN/DISCUSSION:   1. The bigger issue here is non-adherence to compression garments and stasis dermatitis. He really does not have full-thickness wounds. Will refer to derm team for more help managing stasis dermatitis. Today I gave him a GoodRx coupon and a prescription to [facility] for a 454gram jar of triamcinolone for $15 as this has been cost prohibitive in the past.   2. See bottom of note for detailed wound care and patient instructions, he will be using TAC cream, Sween cream, and his short stretch bandages    HISTORY OF PRESENT ILLNESS:   Jamar Ivory is a 73 year old male with hypothyroidism, HLD, HTN, cerebral infarction, CHF, obesity, lymphedema, type 2 DM diet controlled, and BPH who presents today for recurrent lower leg wounds. He is well known to me and our clinic for similar ulcerations. He has a pattern of improvement with daily cares and compression garments and then regression when he stops applying these. He has seen lymphedema therapy several times and seems to do best with short stretch bandages. His wife is proficient and willing to wrap his legs. However, he is unsure why they stopped applying these in the last month.    VITALS: /70 (BP Location: Left arm, Patient Position: Sitting, Cuff Size: Adult Regular)   Pulse 89   Temp 98.1  F (36.7  C) (Temporal)      PHYSICAL EXAM:  GENERAL: Patient is alert and oriented and in no acute distress  CV: palpable pedal pulses  INTEGUMENTARY: scattered excoriations and scabs, chronic pigmentation and erythema which is his baseline, no open draining lesions                              MDM: 39 minutes were spent on the date of the visit reviewing previous chart notes, evaluating patient and developing the treatment plan, this excludes any time spent on procedures.         Further instructions from your care team        Jamar Ivory      1950    A DME order was not completed because supplies were not needed    We sent Triamcinolone cream prescription to [facility] [address] in [place]. Use your coupon to save $15.    Consult to Dermatology was sent to [facility]. They will be in contact with you for scheduling.    Wound Dressing Change: Bilateral Lower legs  -Wash your hands with soap and water before you begin your dressing change and prepare a clean surface for dressings.  -Cleanse with mild unscented soap and water (such as Cetaphil, Cerave or Dove)  -Apply steroid cream to reddened areas  -Apply Sween 24 cream to reddened areas  -Wear short stretch bandages   Change Daily    Compression:   Your compression is  Short Stretch Bandages  and can be removed at night and put back on first thing in the morning.   Please remove compression dressing if toes turn blue and/or tingle and can not be relieved by raising the leg for one hour. If unable to reapply in the morning, keep compression on until next dressing change.    Walk as much as you can, as you are able. Whenever you sit raise your ankle above your hips to promote wound healing.      Please raise your legs above your hips for 30 mins 2 times a day to promote wound healing.  Walk as much as you can. When you sit raise your ankle above your hips to promote wound healing.        Amanda Pham PA-C March 21, 2023    Call us at [phone] if you have any questions about your wounds, have redness or swelling around your wound, have a fever of 101 or greater or if you have any other problems or concerns. We answer the phone Monday through Friday 8 am to 4 pm, please leave a message as we check the voicemail frequently throughout the day.     If you had a positive experience please indicate that on your patient satisfaction survey form that [facility] will be sending you.    It was a pleasure meeting with you today.  Thank you for  allowing me and my team the privilege of caring for you today.  YOU are the reason we are here, and I truly hope we provided you with the excellent service you deserve.  Please let us know if there is anything else we can do for you so that we can be sure you are leaving completely satisfied with your care experience.      If you have any billing related questions please call the [facility] Business office at [phone]. The clinic staff does not handle billing related matters.    If you are scheduled to have a follow up appointment, you will receive a reminder call the day before your visit. On the appointment day please arrive 15 minutes prior to your appointment time. If you are unable to keep that appointment, please call the clinic to cancel or reschedule. If you are more than 10 minutes late or greater for your appointment, the clinic policy is that you may be asked to reschedule.

## 2023-03-21 NOTE — DISCHARGE INSTRUCTIONS
Jamar Ivory      1950    A DME order was not completed because supplies were not needed    We sent Triamcinolone cream prescription to [facility] [address] in [place]. Use your coupon to save $15.    Consult to Dermatology was sent to [facility]. They will be in contact with you for scheduling.    Wound Dressing Change: Bilateral Lower legs  -Wash your hands with soap and water before you begin your dressing change and prepare a clean surface for dressings.  -Cleanse with mild unscented soap and water (such as Cetaphil, Cerave or Dove)  -Apply steroid cream to reddened areas  -Apply Sween 24 cream to reddened areas  -Wear short stretch bandages   Change Daily    Compression:   Your compression is  Short Stretch Bandages  and can be removed at night and put back on first thing in the morning.   Please remove compression dressing if toes turn blue and/or tingle and can not be relieved by raising the leg for one hour. If unable to reapply in the morning, keep compression on until next dressing change.    Walk as much as you can, as you are able. Whenever you sit raise your ankle above your hips to promote wound healing.      Please raise your legs above your hips for 30 mins 2 times a day to promote wound healing.  Walk as much as you can. When you sit raise your ankle above your hips to promote wound healing.        Amanda Pham PA-C March 21, 2023    Call us at [phone] if you have any questions about your wounds, have redness or swelling around your wound, have a fever of 101 or greater or if you have any other problems or concerns. We answer the phone Monday through Friday 8 am to 4 pm, please leave a message as we check the voicemail frequently throughout the day.     If you had a positive experience please indicate that on your patient satisfaction survey form that [facility] will be sending you.    It was a pleasure meeting with you today.  Thank you for  allowing me and my team the privilege of caring for you today.  YOU are the reason we are here, and I truly hope we provided you with the excellent service you deserve.  Please let us know if there is anything else we can do for you so that we can be sure you are leaving completely satisfied with your care experience.      If you have any billing related questions please call the [facility] Business office at [phone]. The clinic staff does not handle billing related matters.    If you are scheduled to have a follow up appointment, you will receive a reminder call the day before your visit. On the appointment day please arrive 15 minutes prior to your appointment time. If you are unable to keep that appointment, please call the clinic to cancel or reschedule. If you are more than 10 minutes late or greater for your appointment, the clinic policy is that you may be asked to reschedule.

## 2023-04-17 ENCOUNTER — OFFICE VISIT (OUTPATIENT)
Dept: UROLOGY | Facility: CLINIC | Age: 73
End: 2023-04-17
Attending: NURSE PRACTITIONER
Payer: COMMERCIAL

## 2023-04-17 VITALS — HEIGHT: 68 IN | WEIGHT: 301 LBS | BODY MASS INDEX: 45.62 KG/M2

## 2023-04-17 DIAGNOSIS — R35.0 FREQUENT URINATION: ICD-10-CM

## 2023-04-17 DIAGNOSIS — R35.1 NOCTURIA: ICD-10-CM

## 2023-04-17 DIAGNOSIS — N39.43 BENIGN PROSTATIC HYPERPLASIA WITH POST-VOID DRIBBLING: ICD-10-CM

## 2023-04-17 DIAGNOSIS — N40.1 BENIGN PROSTATIC HYPERPLASIA WITH POST-VOID DRIBBLING: ICD-10-CM

## 2023-04-17 DIAGNOSIS — N41.0 ACUTE PROSTATITIS: ICD-10-CM

## 2023-04-17 DIAGNOSIS — Z79.2 PROPHYLACTIC ANTIBIOTIC: Primary | ICD-10-CM

## 2023-04-17 LAB
ALBUMIN UR-MCNC: NEGATIVE MG/DL
APPEARANCE UR: CLEAR
BILIRUB UR QL STRIP: NEGATIVE
COLOR UR AUTO: YELLOW
GLUCOSE UR STRIP-MCNC: NEGATIVE MG/DL
HGB UR QL STRIP: NEGATIVE
KETONES UR STRIP-MCNC: NEGATIVE MG/DL
LEUKOCYTE ESTERASE UR QL STRIP: NEGATIVE
NITRATE UR QL: NEGATIVE
PH UR STRIP: 5 [PH] (ref 5–7)
RESIDUAL VOLUME (RV) (EXTERNAL): 346
SP GR UR STRIP: 1.01 (ref 1–1.03)
UROBILINOGEN UR STRIP-ACNC: 0.2 E.U./DL

## 2023-04-17 PROCEDURE — 51798 US URINE CAPACITY MEASURE: CPT | Performed by: UROLOGY

## 2023-04-17 PROCEDURE — 99203 OFFICE O/P NEW LOW 30 MIN: CPT | Mod: 25 | Performed by: UROLOGY

## 2023-04-17 PROCEDURE — 52000 CYSTOURETHROSCOPY: CPT | Performed by: UROLOGY

## 2023-04-17 PROCEDURE — 81003 URINALYSIS AUTO W/O SCOPE: CPT | Mod: QW | Performed by: UROLOGY

## 2023-04-17 RX ORDER — LIDOCAINE HYDROCHLORIDE 20 MG/ML
JELLY TOPICAL ONCE
Status: COMPLETED | OUTPATIENT
Start: 2023-04-17 | End: 2023-04-17

## 2023-04-17 RX ORDER — CIPROFLOXACIN 500 MG/1
500 TABLET, FILM COATED ORAL ONCE
Qty: 1 TABLET | Refills: 0 | Status: SHIPPED | OUTPATIENT
Start: 2023-04-17 | End: 2023-04-17

## 2023-04-17 RX ADMIN — LIDOCAINE HYDROCHLORIDE 5 ML: 20 JELLY TOPICAL at 11:17

## 2023-04-17 ASSESSMENT — PAIN SCALES - GENERAL: PAINLEVEL: NO PAIN (0)

## 2023-04-17 NOTE — PATIENT INSTRUCTIONS

## 2023-04-17 NOTE — NURSING NOTE
Chief Complaint   Patient presents with     Enlarged prostate     Pt states he has a weakened stream, will urinate down sides of legs.    PVR: 346 mL    Prior to the start of the procedure and with procedural staff participation, I verbally confirmed the patient s identity using two indicators, relevant allergies, that the procedure was appropriate and matched the consent or emergent situation, and that the correct equipment/implants were available. Immediately prior to starting the procedure I conducted the Time Out with the procedural staff and re-confirmed the patient s name, procedure, and site/side. I have wiped the patient off with the povidone-Iodine solution, draped them,  used Lidocaine hydrochloride jelly, and instilled sterile water into the bladder. (The Joint Commission universal protocol was followed.)  Yes    Sedation (Moderate or Deep): None    5mL 2% lidocaine hydrochloride Urojet instilled into urethra.    NDC# [address]-9  Lot #: [id number]  Expiration Date:  10/24      Emily Chacko CMA

## 2023-04-17 NOTE — LETTER
4/17/2023       RE: Jamar Ivory  [address]  [address]  [place] [facility] [place] [facility] [address]     Dear Colleague,    Thank you for referring your patient, Jamar Ivory, to the [facility] [place] [facility]. Please see a copy of my visit note below.    [facility]  Main Office: [address]  [address]  [place] [address]       CHIEF COMPLAINT:  Urinary frequency and nocturia    HISTORY:   This is a 73-year-old gentleman who previously saw me in 2018 reporting urinary frequency and nocturia.  At the time his prostate was found to be quite small on the digital rectal examination.  It came up during that visit that he was drinking 100oz of water every day and also using Lasix for lower extremity edema and this was thought to be the cause of his urinary frequency and nocturia.  He was emptying his bladder well at the time.    Today he complains of worsening urinary frequency.  He says he voids every hour during the day and every 2 hours at night.  He feels like he is not emptying his bladder well now.  He reports a slow urinary stream.  Indeed he is not emptying his bladder well today with a postvoid residual of 346mL.  In light of this I recommended a cystoscopy in clinic today for further evaluation.  I explained the test to him and he agreed to it.      PAST MEDICAL HISTORY:   Past Medical History:   Diagnosis Date    Arthritis     Cerebral artery occlusion with cerebral infarction     TIA    CHF (congestive heart failure) 12/24/2018    CVA (cerebral infarction) 2012    CVD (cardiovascular disease)     Depression 1977    hospitalized    Fatty liver     Gout     h/o Concussion     Hypertension 01/17/2011    Hypothyroidism     Obesity     Spider veins     TIA (transient ischaemic attack) 2012    Vitiligo        PAST SURGICAL HISTORY:   Past Surgical History:   Procedure Laterality Date    APPENDECTOMY      at age 22    COLONOSCOPY  N/A 09/02/2016    Procedure: COMBINED COLONOSCOPY, SINGLE OR MULTIPLE BIOPSY/POLYPECTOMY BY BIOPSY;  Surgeon: Yanick Brown MD;  Location:  GI    COLONOSCOPY N/A 12/27/2021    Procedure: COLONOSCOPY;  Surgeon: Kong Chowdary MD;  Location: [facility] OR    VASECTOMY         FAMILY HISTORY:   Family History   Problem Relation Age of Onset    Cancer Father         Lung    Diabetes Mother     Cancer Daughter         leukemia       SOCIAL HISTORY:   Social History     Tobacco Use    Smoking status: Never    Smokeless tobacco: Never   Vaping Use    Vaping status: Never Used     Passive vaping exposure: Yes   Substance Use Topics    Alcohol use: Not Currently     Alcohol/week: 0.0 standard drinks of alcohol     Comment: rarely          Allergies   Allergen Reactions    Clopidogrel      Cough/emesis    Penicillins Rash    Simvastatin Diarrhea    Sulfa Drugs      Unsure    Xeroform [Bismuth Tribromphenate]      Unsure         Current Outpatient Medications:     acetaminophen (TYLENOL) 500 MG tablet, Take 500-1,000 mg by mouth every 6 hours as needed for mild pain Prn for headaches, Disp: , Rfl:     aspirin (ASA) 81 MG tablet, Take 1 tablet (81 mg) by mouth daily, Disp: 90 tablet, Rfl: 3    betamethasone dipropionate (DIPROSONE) 0.05 % external ointment, Apply topically daily Apply to bilateral lower legs daily, Disp: 45 g, Rfl: 3    cephALEXin (KEFLEX) 500 MG capsule, Take 1 capsule (500 mg) by mouth 2 times daily, Disp: 20 capsule, Rfl: 0    FLUoxetine (PROZAC) 40 MG capsule, TAKE 1 CAPSULE BY MOUTH EVERY DAY, Disp: 90 capsule, Rfl: 1    Glucosamine-Chondroit-MSM-C-Mn (FLEXI JOINT PO), , Disp: , Rfl:     levothyroxine (SYNTHROID/LEVOTHROID) 112 MCG tablet, Take 2 tablets (224 mcg) by mouth daily, Disp: 180 tablet, Rfl: 3    Misc Natural Products (OSTEO BI-FLEX JOINT SHIELD PO), , Disp: , Rfl:     nitroGLYcerin (NITROSTAT) 0.4 MG sublingual tablet, For chest pain place 1 tablet under the tongue every 5 minutes for 3 doses.  If symptoms persist 5 minutes after 1st dose call 911., Disp: 30 tablet, Rfl: 1    tamsulosin (FLOMAX) 0.4 MG capsule, Take 2 capsules (0.8 mg) by mouth daily, Disp: 180 capsule, Rfl: 1    torsemide (DEMADEX) 20 MG tablet, TAKE 4 TABLETS (80 MG) BY MOUTH DAILY, Disp: 360 tablet, Rfl: 1    triamcinolone (KENALOG) 0.1 % external cream, Apply topically daily Apply to reddened areas on both legs Daily, Disp: 454 g, Rfl: 3    triamcinolone (KENALOG) 0.1 % external ointment, Apply topically daily Apply ointment to skin around the wound, Disp: 453.6 g, Rfl: 3    Review Of Systems:  Skin: No rash, pruritis, or skin pigmentation  Eyes: No changes in vision  Ears/Nose/Throat: No changes in hearing, no nosebleeds  Respiratory: No shortness of breath, dyspnea on exertion, cough, or hemoptysis  Cardiovascular: No chest pain or palpitations  Gastrointestinal: No diarrhea or constipation. No abdominal pain. No hematochezia  Genitourinary: see HPI  Musculoskeletal: No pain or swelling of joints, normal range of motion  Neurologic: No weakness or tremors  Psychiatric: No recent changes in memory or mood  Hematologic/Lymphatic/Immunologic: No easy bruising or enlarged lymph nodes  Endocrine: No weight gain or loss      PHYSICAL EXAM:    There were no vitals taken for this visit.  General appearance: In NAD, conversant  HEENT: Normocephalic and atraumatic, anicteric sclera  Cardiovascular: Not examined  Respiratory: normal, non-labored breathing  Gastrointestinal: negative, Abdomen soft, non-tender, and non-distended.   Musculoskeletal: Not Examined  Peripheral Vascular/extremity: No peripheral edema  Skin: Normal temperature, turgor, and texture. No rash  Psychiatric: Appropriate affect, alert and oriented to person, place, and time    Penis: Not Examined  Scrotal Skin: Not Examined   Testicles: Not Examined  Epididymis: Not Examined  Lymphatic: Not examined  Digital Rectal Exam:     Cystoscopy: I performed flexible cystoscopy today.   There were multiple mild strictures in the penile urethra but the scope passed easily through these strictured areas.  No passive dilation was performed.  In the bladder there was some urine in the bladder but I would estimate it to be somewhat less than the 356mL we had seen on bladder scan.  The bladder had no tumors throughout.  No trabeculation in the bladder.  On retroflexion of the scope there was no intravesical segment of the prostate.  In pulling back the scope there was no enlargement or obstruction from the prostate.      PSA: 1.28    UA RESULTS:  Recent Labs   Lab Test 02/02/23  1348 05/20/22  1256   COLOR Yellow Yellow   APPEARANCE Clear Clear   URINEGLC Negative Negative   URINEBILI Negative Negative   URINEKETONE Negative Negative   SG 1.010 1.010   UBLD Negative Negative   URINEPH 6.0 5.0   PROTEIN Negative Negative   UROBILINOGEN 0.2 0.2   NITRITE Negative Negative   LEUKEST Negative Negative   RBCU  --  0-2   WBCU  --  0-5       Bladder Scan: 346mL    Other Labs:      Imaging Studies: None      CLINICAL IMPRESSION:   Mild urethral strictures  Incomplete bladder emptying  Urinary frequency and urgency    PLAN:   He has incomplete bladder emptying of uncertain etiology.  He does have very mild urethral strictures but these are so mild that I do not think they are causing any incomplete bladder emptying.  The poor bladder emptying could be due to overall decompensation from his overall health.  There is no enlargement of the prostate that I can see that is causing any obstruction.  We discussed options.    I counseled him that I think his best option to reduce urinary frequency would be to learn self-catheterization.  He could self catheterize before bed at night and that would certainly reduce his nocturia.    We also discussed treatment options for enlarged prostate.  I do not see any evidence of an enlarged prostate but he could potentially still see some mild improvement from Flomax or minimal  invasive BPH procedure.    At this time he says that the frequency and nocturia does not bother him so he will not seek any treatment.  He is welcome to follow-up with me as needed in the future.      Adiel Alvarez MD

## 2023-04-17 NOTE — PROGRESS NOTES
[facility]  Main Office: [address] [facility]  [address]  [place] [address]       CHIEF COMPLAINT:  Urinary frequency and nocturia    HISTORY:   This is a 73-year-old gentleman who previously saw me in 2018 reporting urinary frequency and nocturia.  At the time his prostate was found to be quite small on the digital rectal examination.  It came up during that visit that he was drinking 100oz of water every day and also using Lasix for lower extremity edema and this was thought to be the cause of his urinary frequency and nocturia.  He was emptying his bladder well at the time.    Today he complains of worsening urinary frequency.  He says he voids every hour during the day and every 2 hours at night.  He feels like he is not emptying his bladder well now.  He reports a slow urinary stream.  Indeed he is not emptying his bladder well today with a postvoid residual of 346mL.  In light of this I recommended a cystoscopy in clinic today for further evaluation.  I explained the test to him and he agreed to it.      PAST MEDICAL HISTORY:   Past Medical History:   Diagnosis Date     Arthritis      Cerebral artery occlusion with cerebral infarction     TIA     CHF (congestive heart failure) 12/24/2018     CVA (cerebral infarction) 2012     CVD (cardiovascular disease)      Depression 1977    hospitalized     Fatty liver      Gout      h/o Concussion      Hypertension 01/17/2011     Hypothyroidism      Obesity      Spider veins      TIA (transient ischaemic attack) 2012     Vitiligo        PAST SURGICAL HISTORY:   Past Surgical History:   Procedure Laterality Date     APPENDECTOMY      at age 22     COLONOSCOPY N/A 09/02/2016    Procedure: COMBINED COLONOSCOPY, SINGLE OR MULTIPLE BIOPSY/POLYPECTOMY BY BIOPSY;  Surgeon: Yanick Brown MD;  Location:  GI     COLONOSCOPY N/A 12/27/2021    Procedure: COLONOSCOPY;  Surgeon: Kong Chowdary MD;  Location: [facility] OR     VASECTOMY         FAMILY HISTORY:   Family History    Problem Relation Age of Onset     Cancer Father         Lung     Diabetes Mother      Cancer Daughter         leukemia       SOCIAL HISTORY:   Social History     Tobacco Use     Smoking status: Never     Smokeless tobacco: Never   Vaping Use     Vaping status: Never Used     Passive vaping exposure: Yes   Substance Use Topics     Alcohol use: Not Currently     Alcohol/week: 0.0 standard drinks of alcohol     Comment: rarely          Allergies   Allergen Reactions     Clopidogrel      Cough/emesis     Penicillins Rash     Simvastatin Diarrhea     Sulfa Drugs      Unsure     Xeroform [Bismuth Tribromphenate]      Unsure         Current Outpatient Medications:      acetaminophen (TYLENOL) 500 MG tablet, Take 500-1,000 mg by mouth every 6 hours as needed for mild pain Prn for headaches, Disp: , Rfl:      aspirin (ASA) 81 MG tablet, Take 1 tablet (81 mg) by mouth daily, Disp: 90 tablet, Rfl: 3     betamethasone dipropionate (DIPROSONE) 0.05 % external ointment, Apply topically daily Apply to bilateral lower legs daily, Disp: 45 g, Rfl: 3     cephALEXin (KEFLEX) 500 MG capsule, Take 1 capsule (500 mg) by mouth 2 times daily, Disp: 20 capsule, Rfl: 0     FLUoxetine (PROZAC) 40 MG capsule, TAKE 1 CAPSULE BY MOUTH EVERY DAY, Disp: 90 capsule, Rfl: 1     Glucosamine-Chondroit-MSM-C-Mn (FLEXI JOINT PO), , Disp: , Rfl:      levothyroxine (SYNTHROID/LEVOTHROID) 112 MCG tablet, Take 2 tablets (224 mcg) by mouth daily, Disp: 180 tablet, Rfl: 3     Misc Natural Products (OSTEO BI-FLEX JOINT SHIELD PO), , Disp: , Rfl:      nitroGLYcerin (NITROSTAT) 0.4 MG sublingual tablet, For chest pain place 1 tablet under the tongue every 5 minutes for 3 doses. If symptoms persist 5 minutes after 1st dose call 911., Disp: 30 tablet, Rfl: 1     tamsulosin (FLOMAX) 0.4 MG capsule, Take 2 capsules (0.8 mg) by mouth daily, Disp: 180 capsule, Rfl: 1     torsemide (DEMADEX) 20 MG tablet, TAKE 4 TABLETS (80 MG) BY MOUTH DAILY, Disp: 360 tablet, Rfl:  1     triamcinolone (KENALOG) 0.1 % external cream, Apply topically daily Apply to reddened areas on both legs Daily, Disp: 454 g, Rfl: 3     triamcinolone (KENALOG) 0.1 % external ointment, Apply topically daily Apply ointment to skin around the wound, Disp: 453.6 g, Rfl: 3    Review Of Systems:  Skin: No rash, pruritis, or skin pigmentation  Eyes: No changes in vision  Ears/Nose/Throat: No changes in hearing, no nosebleeds  Respiratory: No shortness of breath, dyspnea on exertion, cough, or hemoptysis  Cardiovascular: No chest pain or palpitations  Gastrointestinal: No diarrhea or constipation. No abdominal pain. No hematochezia  Genitourinary: see HPI  Musculoskeletal: No pain or swelling of joints, normal range of motion  Neurologic: No weakness or tremors  Psychiatric: No recent changes in memory or mood  Hematologic/Lymphatic/Immunologic: No easy bruising or enlarged lymph nodes  Endocrine: No weight gain or loss      PHYSICAL EXAM:    There were no vitals taken for this visit.  General appearance: In NAD, conversant  HEENT: Normocephalic and atraumatic, anicteric sclera  Cardiovascular: Not examined  Respiratory: normal, non-labored breathing  Gastrointestinal: negative, Abdomen soft, non-tender, and non-distended.   Musculoskeletal: Not Examined  Peripheral Vascular/extremity: No peripheral edema  Skin: Normal temperature, turgor, and texture. No rash  Psychiatric: Appropriate affect, alert and oriented to person, place, and time    Penis: Not Examined  Scrotal Skin: Not Examined   Testicles: Not Examined  Epididymis: Not Examined  Lymphatic: Not examined  Digital Rectal Exam:     Cystoscopy: I performed flexible cystoscopy today.  There were multiple mild strictures in the penile urethra but the scope passed easily through these strictured areas.  No passive dilation was performed.  In the bladder there was some urine in the bladder but I would estimate it to be somewhat less than the 356mL we had seen on  bladder scan.  The bladder had no tumors throughout.  No trabeculation in the bladder.  On retroflexion of the scope there was no intravesical segment of the prostate.  In pulling back the scope there was no enlargement or obstruction from the prostate.      PSA: 1.28    UA RESULTS:  Recent Labs   Lab Test 02/02/23  1348 05/20/22  1256   COLOR Yellow Yellow   APPEARANCE Clear Clear   URINEGLC Negative Negative   URINEBILI Negative Negative   URINEKETONE Negative Negative   SG 1.010 1.010   UBLD Negative Negative   URINEPH 6.0 5.0   PROTEIN Negative Negative   UROBILINOGEN 0.2 0.2   NITRITE Negative Negative   LEUKEST Negative Negative   RBCU  --  0-2   WBCU  --  0-5       Bladder Scan: 346mL    Other Labs:      Imaging Studies: None      CLINICAL IMPRESSION:   Mild urethral strictures  Incomplete bladder emptying  Urinary frequency and urgency    PLAN:   He has incomplete bladder emptying of uncertain etiology.  He does have very mild urethral strictures but these are so mild that I do not think they are causing any incomplete bladder emptying.  The poor bladder emptying could be due to overall decompensation from his overall health.  There is no enlargement of the prostate that I can see that is causing any obstruction.  We discussed options.    I counseled him that I think his best option to reduce urinary frequency would be to learn self-catheterization.  He could self catheterize before bed at night and that would certainly reduce his nocturia.    We also discussed treatment options for enlarged prostate.  I do not see any evidence of an enlarged prostate but he could potentially still see some mild improvement from Flomax or minimal invasive BPH procedure.    At this time he says that the frequency and nocturia does not bother him so he will not seek any treatment.  He is welcome to follow-up with me as needed in the future.      Adiel Alvarez MD

## 2023-04-19 ENCOUNTER — HOSPITAL ENCOUNTER (OUTPATIENT)
Dept: WOUND CARE | Facility: CLINIC | Age: 73
Discharge: HOME | End: 2023-04-19
Attending: PHYSICIAN ASSISTANT | Admitting: PHYSICIAN ASSISTANT
Payer: COMMERCIAL

## 2023-04-19 VITALS
DIASTOLIC BLOOD PRESSURE: 79 MMHG | SYSTOLIC BLOOD PRESSURE: 155 MMHG | HEART RATE: 79 BPM | RESPIRATION RATE: 20 BRPM | TEMPERATURE: 98 F

## 2023-04-19 DIAGNOSIS — L97.912 SKIN ULCER OF RIGHT LOWER LEG WITH FAT LAYER EXPOSED (H): ICD-10-CM

## 2023-04-19 DIAGNOSIS — L97.922 ULCER OF LEFT LOWER EXTREMITY WITH FAT LAYER EXPOSED (H): ICD-10-CM

## 2023-04-19 DIAGNOSIS — L97.929 ULCER OF LOWER LIMB, LEFT, WITH UNSPECIFIED SEVERITY (H): Primary | ICD-10-CM

## 2023-04-19 PROCEDURE — G0463 HOSPITAL OUTPT CLINIC VISIT: HCPCS

## 2023-04-19 PROCEDURE — 99213 OFFICE O/P EST LOW 20 MIN: CPT | Performed by: PHYSICIAN ASSISTANT

## 2023-04-19 NOTE — PROGRESS NOTES
Patient arrived for wound care visit. Certified Wound Care Nurse time spent evaluating patient record, completed a full evaluation and documented wound(s) & teo-wound skin; provided recommendation based on treatment plan. Applied dressing, reviewed discharge instructions, patient education, and discussed plan of care with appropriate medical team staff members and patient and/or family members.     Patient is basically healed; education provided on follow up and prevention of future wounds.   No further questions or concerns.

## 2023-04-19 NOTE — DISCHARGE INSTRUCTIONS
Jamar Ivory      1950  A DME order was not completed because supplies were not needed    Consult to Dermatology was sent to [facility] but Patient declined due to cost.  Contact Dr Perfecto Coon; [facility]  [phone] [address] [place] [address]    Wound Dressing Change: Bilateral Lower legs  -Wash your hands with soap and water before you begin your dressing change and prepare a clean surface for dressings.  -Cleanse with mild unscented soap and water (such as Cetaphil, Cerave or Dove)  -Apply steroid cream to reddened areas as needed  -Apply Moisturizing cream to intact skin  Cover wounds with Bandaids as needed  -Wear short stretch bandages or Compression Stockings  Change Daily    Compression:  Your compression is Short Stretch Bandages and can be removed at night and put back on first thing in the morning.  Please remove compression dressing if toes turn blue and/or tingle and can not be relieved by raising the leg for one hour. If unable to reapply in the morning, keep compression on until next dressing change.  Walk as much as you can, as you are able. Whenever you sit raise your ankle above your hips to promote wound healing.  Please raise your legs above your hips for 30 mins 2 times a day to promote wound healing.  Walk as much as you can. When you sit raise your ankle above your hips to promote wound healing.     Amanda Pham PA-C April 19, 2023    Call us at [phone] if you have any questions about your wounds, have redness or swelling around your wound, have a fever of 101 or greater or if you have any other problems or concerns. We answer the phone Monday through Friday 8 am to 4 pm, please leave a message as we check the voicemail frequently throughout the day.     If you had a positive experience please indicate that on your patient satisfaction survey form that [facility] will be sending you.  It was a pleasure meeting with  you today.  Thank you for allowing me and my team the privilege of caring for you today.  YOU are the reason we are here, and I truly hope we provided you with the excellent service you deserve.  Please let us know if there is anything else we can do for you so that we can be sure you are leaving completely satisfied with your care experience.      If you have any billing related questions please call the [facility] Business office at [phone]. The clinic staff does not handle billing related matters.  If you are scheduled to have a follow up appointment, you will receive a reminder call the day before your visit. On the appointment day please arrive 15 minutes prior to your appointment time. If you are unable to keep that appointment, please call the clinic to cancel or reschedule. If you are more than 10 minutes late or greater for your appointment, the clinic policy is that you may be asked to reschedule.

## 2023-04-21 NOTE — PROGRESS NOTES
[facility]    ASSESSMENT:   1. Poorly controlled lymphedema  2. Venous insufficiency  3. Stasis dermatitis    PLAN/DISCUSSION:   1. Emphasized the need for compression indefinitely. Can use TAC cream PRN rash. He will continue to develop wounds and rash if he does not wear compression    HISTORY OF PRESENT ILLNESS:   Jamar Ivory is a 73 year old male with hypothyroidism, HLD, HTN, cerebral infarction, CHF, obesity, lymphedema, type 2 DM diet controlled, and BPH who presents today for recurrent lower leg wounds. He is well known to me and our clinic for similar ulcerations. He has a pattern of improvement with daily cares and compression garments and then regression when he stops applying these. He has seen lymphedema therapy several times and seems to do best with short stretch bandages. His wife is proficient and willing to wrap his legs. However, has pattern of stopping wrapping his legs. Last visit we provided him a prescription for TAC cream and this has worked very well at clearing up the stasis dermatitis. He has a few dried scabs on his leg. Only one area that is slightly draining.     VITALS: BP (!) 155/79 (BP Location: Left arm, Patient Position: Sitting, Cuff Size: Adult Large)   Pulse 79   Temp 98  F (36.7  C) (Temporal)   Resp 20      PHYSICAL EXAM:  GENERAL: Patient is alert and oriented and in no acute distress  CV: palpable pedal pulses  INTEGUMENTARY: scattered partial thickness wounds/excoriations                  MDM: 29 minutes was spent on the day of visit reviewing previous chart notes, assessing the patient and developing the plan of care, this excludes time spent on any procedures.           Further instructions from your care team       Jamar Ivory      1950  A DME order was not completed because supplies were not needed    Consult to Dermatology was sent to [facility] but Patient declined due to cost.  Contact Dr Perfecto Coon; [facility]  [phone] [address] [place] [address]    Wound Dressing Change: Bilateral Lower legs  -Wash your hands with soap and water before you begin your dressing change and prepare a clean surface for dressings.  -Cleanse with mild unscented soap and water (such as Cetaphil, Cerave or Dove)  -Apply steroid cream to reddened areas as needed  -Apply Moisturizing cream to intact skin  Cover wounds with Bandaids as needed  -Wear short stretch bandages or Compression Stockings  Change Daily    Compression:  Your compression is Short Stretch Bandages and can be removed at night and put back on first thing in the morning.  Please remove compression dressing if toes turn blue and/or tingle and can not be relieved by raising the leg for one hour. If unable to reapply in the morning, keep compression on until next dressing change.  Walk as much as you can, as you are able. Whenever you sit raise your ankle above your hips to promote wound healing.  Please raise your legs above your hips for 30 mins 2 times a day to promote wound healing.  Walk as much as you can. When you sit raise your ankle above your hips to promote wound healing.     Amanda Pham PA-C April 19, 2023    Call us at [phone] if you have any questions about your wounds, have redness or swelling around your wound, have a fever of 101 or greater or if you have any other problems or concerns. We answer the phone Monday through Friday 8 am to 4 pm, please leave a message as we check the voicemail frequently throughout the day.     If you had a positive experience please indicate that on your patient satisfaction survey form that [facility] will be sending you.  It was a pleasure meeting with you today.  Thank you for allowing me and my team the privilege of caring for you today.  YOU are the reason we are here, and I truly hope we provided you with the excellent service you deserve.  Please let us know if there is  anything else we can do for you so that we can be sure you are leaving completely satisfied with your care experience.      If you have any billing related questions please call the [facility] Business office at [phone]. The clinic staff does not handle billing related matters.  If you are scheduled to have a follow up appointment, you will receive a reminder call the day before your visit. On the appointment day please arrive 15 minutes prior to your appointment time. If you are unable to keep that appointment, please call the clinic to cancel or reschedule. If you are more than 10 minutes late or greater for your appointment, the clinic policy is that you may be asked to reschedule.

## 2023-06-07 DIAGNOSIS — I25.110 CORONARY ARTERY DISEASE INVOLVING NATIVE CORONARY ARTERY OF NATIVE HEART WITH UNSTABLE ANGINA PECTORIS (H): ICD-10-CM

## 2023-06-08 RX ORDER — NITROGLYCERIN 0.4 MG/1
TABLET SUBLINGUAL
Qty: 25 TABLET | Refills: 2 | Status: SHIPPED | OUTPATIENT
Start: 2023-06-08
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
## 2023-07-31 ENCOUNTER — TELEPHONE (OUTPATIENT)
Dept: CARDIOLOGY | Facility: CLINIC | Age: 73
End: 2023-07-31
Payer: COMMERCIAL

## 2023-07-31 NOTE — TELEPHONE ENCOUNTER
[facility] Call Center    Phone Message    May a detailed message be left on voicemail: yes     Reason for Call: Other: This is an FYI message for Dr. Chand to inform him that Kenneth has an appt with him tomorrow morning at 9:30. No further information is needed. Thank you!     Action Taken: Other: Cardiology    Travel Screening: Not Applicable    Thank you!  [facility]

## 2023-08-01 ENCOUNTER — OFFICE VISIT (OUTPATIENT)
Dept: CARDIOLOGY | Facility: CLINIC | Age: 73
End: 2023-08-01
Payer: COMMERCIAL

## 2023-08-01 VITALS
HEIGHT: 68 IN | OXYGEN SATURATION: 95 % | DIASTOLIC BLOOD PRESSURE: 74 MMHG | HEART RATE: 73 BPM | SYSTOLIC BLOOD PRESSURE: 120 MMHG | WEIGHT: 307 LBS | BODY MASS INDEX: 46.53 KG/M2

## 2023-08-01 DIAGNOSIS — I50.40 COMBINED SYSTOLIC AND DIASTOLIC HEART FAILURE, UNSPECIFIED HF CHRONICITY (H): Primary | ICD-10-CM

## 2023-08-01 PROCEDURE — 99214 OFFICE O/P EST MOD 30 MIN: CPT | Performed by: INTERNAL MEDICINE

## 2023-08-01 NOTE — LETTER
8/1/2023    Myah Florez PA-C  No address on file    RE: Jamar Ivory       Dear Colleague,     I had the pleasure of seeing Jamar Ivory in the [facility].  HISTORY OF PRESENT ILLNESS:  Stable      Orders this Visit:  No orders of the defined types were placed in this encounter.    No orders of the defined types were placed in this encounter.    There are no discontinued medications.    No diagnosis found.    CURRENT MEDICATIONS:  Current Outpatient Medications   Medication Sig Dispense Refill    acetaminophen (TYLENOL) 500 MG tablet Take 500-1,000 mg by mouth every 6 hours as needed for mild pain Prn for headaches      aspirin (ASA) 81 MG tablet Take 1 tablet (81 mg) by mouth daily 90 tablet 3    cephALEXin (KEFLEX) 500 MG capsule Take 1 capsule (500 mg) by mouth 2 times daily 20 capsule 0    FLUoxetine (PROZAC) 40 MG capsule TAKE 1 CAPSULE BY MOUTH EVERY DAY 90 capsule 1    Glucosamine-Chondroit-MSM-C-Mn (FLEXI JOINT PO)       levothyroxine (SYNTHROID/LEVOTHROID) 112 MCG tablet Take 2 tablets (224 mcg) by mouth daily 180 tablet 3    Misc Natural Products (OSTEO BI-FLEX JOINT SHIELD PO)       nitroGLYcerin (NITROSTAT) 0.4 MG sublingual tablet FOR CHEST PAIN PLACE 1 TABLET UNDER THE TONGUE EVERY 5 MINUTES FOR 3 DOSES. IF SYMPTOMS PERSIST 5 MINUTES AFTER 1ST DOSE CALL 911. 25 tablet 2    tamsulosin (FLOMAX) 0.4 MG capsule Take 2 capsules (0.8 mg) by mouth daily 180 capsule 1    torsemide (DEMADEX) 20 MG tablet TAKE 4 TABLETS (80 MG) BY MOUTH DAILY 360 tablet 1    triamcinolone (KENALOG) 0.1 % external cream Apply topically daily Apply to reddened areas on both legs Daily 454 g 3    triamcinolone (KENALOG) 0.1 % external ointment Apply topically daily Apply ointment to skin around the wound 453.6 g 3    betamethasone dipropionate (DIPROSONE) 0.05 % external ointment Apply topically daily Apply to bilateral lower legs daily (Patient not taking: Reported on 8/1/2023) 45 g 3  "      ALLERGIES     Allergies   Allergen Reactions    Clopidogrel      Cough/emesis    Penicillins Rash    Simvastatin Diarrhea    Sulfa Antibiotics      Unsure    Xeroform [Bismuth Tribromphenate]      Unsure       PAST MEDICAL, SURGICAL, FAMILY, SOCIAL HISTORY:  History was reviewed and updated as needed, see medical record.    Review of Systems:  A 12-point review of systems was completed, see medical record for detailed review of systems information.    Physical Exam:  Vitals: /74 (BP Location: Right arm, Patient Position: Sitting, Cuff Size: Adult Large)   Pulse 73   Ht 1.727 m (5' 8\")   Wt 139.3 kg (307 lb)   SpO2 95%   BMI 46.68 kg/m      Constitutional:           Skin:           Head:           Eyes:           ENT:           Neck:           Chest:           Cardiac:                    Abdomen:           Vascular:                                        Extremities and Back:           Neurological:           ASSESSMENT stable       RECOMMENDATIONS:  1) Continue present medical therapy  2) Needs a primary care MD  will have nursing staff fill out forms for [facility]  3) Follow up in one year with [facility]      Recent Lab Results:  LIPID RESULTS:  Lab Results   Component Value Date    CHOL 183 01/04/2021    HDL 38 (L) 01/04/2021     (H) 01/04/2021    TRIG 138 01/04/2021    CHOLHDLRATIO 3.9 04/27/2015       LIVER ENZYME RESULTS:  Lab Results   Component Value Date    AST 29 02/02/2023    AST 21 06/24/2021    ALT 14 02/02/2023    ALT 29 06/24/2021       CBC RESULTS:  Lab Results   Component Value Date    WBC 6.3 02/02/2023    WBC 6.7 03/29/2021    RBC 5.01 02/02/2023    RBC 4.48 03/29/2021    HGB 14.4 02/02/2023    HGB 13.1 (L) 03/29/2021    HCT 43.9 02/02/2023    HCT 38.9 (L) 03/29/2021    MCV 88 02/02/2023    MCV 87 03/29/2021    MCH 28.7 02/02/2023    MCH 29.2 03/29/2021    MCHC 32.8 02/02/2023    MCHC 33.7 03/29/2021    RDW 15.3 (H) 02/02/2023    RDW 14.9 03/29/2021     02/02/2023    PLT " 242 03/29/2021       BMP RESULTS:  Lab Results   Component Value Date     02/02/2023     06/24/2021    POTASSIUM 4.6 02/02/2023    POTASSIUM 3.9 07/26/2022    POTASSIUM 4.0 06/24/2021    CHLORIDE 101 02/02/2023    CHLORIDE 102 07/26/2022    CHLORIDE 108 06/24/2021    CO2 27 02/02/2023    CO2 26 07/26/2022    CO2 31 06/24/2021    ANIONGAP 11 02/02/2023    ANIONGAP 6 07/26/2022    ANIONGAP 1 (L) 06/24/2021    GLC 80 02/02/2023    GLC 99 07/26/2022     (H) 06/24/2021    BUN 15.7 02/02/2023    BUN 23 07/26/2022    BUN 14 06/24/2021    CR 0.99 02/02/2023    CR 0.93 06/24/2021    GFRESTIMATED 80 02/02/2023    GFRESTIMATED 82 06/24/2021    GFRESTBLACK >90 06/24/2021    ANGEL 9.7 02/02/2023    ANGEL 9.7 06/24/2021        A1C RESULTS:  Lab Results   Component Value Date    A1C 5.8 (H) 02/02/2023    A1C 5.7 (H) 01/04/2021       INR RESULTS:  Lab Results   Component Value Date    INR 1.06 04/11/2018    INR 1.02 08/23/2013       We greatly appreciate the opportunity to be involved in the care of your patient, Jamar Ivory.    Sincerely,  Ezequiel Chand MD      CC  No referring provider defined for this encounter.

## 2023-08-01 NOTE — TELEPHONE ENCOUNTER
[facility]    Pt here for [facility] and [facility] re-enrollment application given to pt to fill out.  Pt thinks wife already filled out his portion.  Called [facility] and left  requesting call back to determine if pt portion received.     Provider portion completed.      Received call back from [facility] who report they just mailed him re-enrollment form.  They haven't received updated enrollment paperwork yet.     Called wife Melissa (CTC on file) who reports she would like copy of application once completed.      (Addendum @ 0023):  completed re-enrollment application (pt and provider portions) faxed to [facility].  Mailed copy of application to pt.      No future appointments.      Blanca Gould RN BSN   [facility] [place], [place]  [facility] Care Coordinator  08/01/23, 11:34 AM

## 2023-08-01 NOTE — PROGRESS NOTES
HISTORY OF PRESENT ILLNESS:    Jamar Ivory, a 71-year-old man with chronic combined systolic/diastolic heart failure (LVEF 45 to 50%) , hypertension, obesity, hypothyroidism, old history of stroke, dyslipidemia and diabetes mellitus was seen today for follow up of chronic heart failure    Since I last saw the patient on 7/12/2021 he has been seen on multiple occasions by our advanced level practitioners in the core clinic, most recently 4/1/2022.  Presently the patient is free of dyspnea chest pain syncope and lightheadedness.  His weight remains stable.  He remains very sedentary but reports no symptoms during usual activities at home.  His most recent echocardiogram in 2021 showed a stable ejection fraction of 50% with no significant valvular stenosis or insufficiency.  Nuclear stress testing in 2021 showed no significant ischemia.  He has a history of a normal coronary angiogram 2011.    The patient tells me he no longer sees a primary care physician.  He brought a form today from a [employer] organization called [employer] so he can receive assistance to pay for food.          Orders this Visit:  No orders of the defined types were placed in this encounter.    No orders of the defined types were placed in this encounter.    There are no discontinued medications.    No diagnosis found.    CURRENT MEDICATIONS:  Current Outpatient Medications   Medication Sig Dispense Refill    acetaminophen (TYLENOL) 500 MG tablet Take 500-1,000 mg by mouth every 6 hours as needed for mild pain Prn for headaches      aspirin (ASA) 81 MG tablet Take 1 tablet (81 mg) by mouth daily 90 tablet 3    cephALEXin (KEFLEX) 500 MG capsule Take 1 capsule (500 mg) by mouth 2 times daily 20 capsule 0    FLUoxetine (PROZAC) 40 MG capsule TAKE 1 CAPSULE BY MOUTH EVERY DAY 90 capsule 1    Glucosamine-Chondroit-MSM-C-Mn (FLEXI JOINT PO)       levothyroxine (SYNTHROID/LEVOTHROID) 112 MCG tablet Take 2 tablets (224 mcg) by mouth daily 180 tablet 3  "   Misc Natural Products (OSTEO BI-FLEX JOINT SHIELD PO)       nitroGLYcerin (NITROSTAT) 0.4 MG sublingual tablet FOR CHEST PAIN PLACE 1 TABLET UNDER THE TONGUE EVERY 5 MINUTES FOR 3 DOSES. IF SYMPTOMS PERSIST 5 MINUTES AFTER 1ST DOSE CALL 911. 25 tablet 2    tamsulosin (FLOMAX) 0.4 MG capsule Take 2 capsules (0.8 mg) by mouth daily 180 capsule 1    torsemide (DEMADEX) 20 MG tablet TAKE 4 TABLETS (80 MG) BY MOUTH DAILY 360 tablet 1    triamcinolone (KENALOG) 0.1 % external cream Apply topically daily Apply to reddened areas on both legs Daily 454 g 3    triamcinolone (KENALOG) 0.1 % external ointment Apply topically daily Apply ointment to skin around the wound 453.6 g 3    betamethasone dipropionate (DIPROSONE) 0.05 % external ointment Apply topically daily Apply to bilateral lower legs daily (Patient not taking: Reported on 8/1/2023) 45 g 3       ALLERGIES     Allergies   Allergen Reactions    Clopidogrel      Cough/emesis    Penicillins Rash    Simvastatin Diarrhea    Sulfa Antibiotics      Unsure    Xeroform [Bismuth Tribromphenate]      Unsure       PAST MEDICAL, SURGICAL, FAMILY, SOCIAL HISTORY:  History was reviewed and updated as needed, see medical record.    Review of Systems:  A 12-point review of systems was completed, see medical record for detailed review of systems information.    Physical Exam:  Vitals: /74 (BP Location: Right arm, Patient Position: Sitting, Cuff Size: Adult Large)   Pulse 73   Ht 1.727 m (5' 8\")   Wt 139.3 kg (307 lb)   SpO2 95%   BMI 46.68 kg/m      Constitutional:           Skin:           Head:           Eyes:           ENT:           Neck:           Chest:           Cardiac:                    Abdomen:           Vascular:                                        Extremities and Back:           Neurological:           ASSESSMENT From a cardiovascular standpoint, the patient remains very stable and unchanged since multiple previous visits in our clinic.  I explained that " we do not provide primary care and although we will assist him in filling out the form so he can receive [employer] assistance, will need to arrange a primary care doctor long-term as we will plan to see him on an annual basis.  I reviewed the benefits of weight loss and a regular exercise program, but the patient does not appear to be motivated to participate in a regular exercise program.       RECOMMENDATIONS:  1) Continue present medical therapy  2) Needs a primary care MD  will have nursing staff fill out forms for [employer]  3) Follow up in one year with [facility]  4) Mediterranean style weight loss diet/ regular exercise progream      Recent Lab Results:  LIPID RESULTS:  Lab Results   Component Value Date    CHOL 183 01/04/2021    HDL 38 (L) 01/04/2021     (H) 01/04/2021    TRIG 138 01/04/2021    CHOLHDLRATIO 3.9 04/27/2015       LIVER ENZYME RESULTS:  Lab Results   Component Value Date    AST 29 02/02/2023    AST 21 06/24/2021    ALT 14 02/02/2023    ALT 29 06/24/2021       CBC RESULTS:  Lab Results   Component Value Date    WBC 6.3 02/02/2023    WBC 6.7 03/29/2021    RBC 5.01 02/02/2023    RBC 4.48 03/29/2021    HGB 14.4 02/02/2023    HGB 13.1 (L) 03/29/2021    HCT 43.9 02/02/2023    HCT 38.9 (L) 03/29/2021    MCV 88 02/02/2023    MCV 87 03/29/2021    MCH 28.7 02/02/2023    MCH 29.2 03/29/2021    MCHC 32.8 02/02/2023    MCHC 33.7 03/29/2021    RDW 15.3 (H) 02/02/2023    RDW 14.9 03/29/2021     02/02/2023     03/29/2021       BMP RESULTS:  Lab Results   Component Value Date     02/02/2023     06/24/2021    POTASSIUM 4.6 02/02/2023    POTASSIUM 3.9 07/26/2022    POTASSIUM 4.0 06/24/2021    CHLORIDE 101 02/02/2023    CHLORIDE 102 07/26/2022    CHLORIDE 108 06/24/2021    CO2 27 02/02/2023    CO2 26 07/26/2022    CO2 31 06/24/2021    ANIONGAP 11 02/02/2023    ANIONGAP 6 07/26/2022    ANIONGAP 1 (L) 06/24/2021    GLC 80 02/02/2023    GLC 99 07/26/2022     (H) 06/24/2021    BUN 15.7  02/02/2023    BUN 23 07/26/2022    BUN 14 06/24/2021    CR 0.99 02/02/2023    CR 0.93 06/24/2021    GFRESTIMATED 80 02/02/2023    GFRESTIMATED 82 06/24/2021    GFRESTBLACK >90 06/24/2021    ANGEL 9.7 02/02/2023    ANGEL 9.7 06/24/2021        A1C RESULTS:  Lab Results   Component Value Date    A1C 5.8 (H) 02/02/2023    A1C 5.7 (H) 01/04/2021       INR RESULTS:  Lab Results   Component Value Date    INR 1.06 04/11/2018    INR 1.02 08/23/2013       We greatly appreciate the opportunity to be involved in the care of your patient, Jamar Ivory.    Sincerely,  Ezequiel Chand MD      CC  No referring provider defined for this encounter.

## 2023-08-04 NOTE — PROGRESS NOTES
"[facility]  [facility]    MyHealth Tracker Heart Failure Alert      Daily Weight Goal: 318-326 lbs    Today's Weight: 316 lbs    Symptom Alert: Said yes      4) Did you prop up on more pillows or sleep in a chair to breathe easier last night?   5) Have you felt more dizzy or lightheaded, since yesterday?    Current Diuretic & Potassium Plan: Spironolactone 25 mg daily & Torsemide 40 mg am and 20 mg in afternoon.     Diuretic plan: Extra 20 mg torsemide in afternoon per CORE      No future appointments.    Notes: Spoke to wife. Pt is eating. They received their first [employer] delivery. Both pt and wife both \"love the food, It taste so good\".   Pt did not want to talk to me. Wife reports his \"depression is really bad, he yells at me constantly.\". Pt declined talking to me. Asked wife about his breathing and dizziness. She had not noticed anything. Pt is eating well.   I did send message to his PCP regarding his worsening  depression and pt did make comment about being tried and \"wanting to give up\".     Will update Destiny and Rozina      MyHealth Tracker Weight Trends:             MyHealth Tracker Weight Graph:           Haily Graves RN 10:04 AM 09/10/20      "
Messaged scheduling to make an in person appt for pt with Rozina.   Left VM for CC at pt's PCC for mental health assistance.   Haily Graves RN 3:59 PM 09/10/20    
Reviewed my health tracker alert. Recommend CORE follow up with me, virtual or in person which ever the patient prefers. Continue to monitor symptoms.     YENI Yang [facility]  Pager: [phone]    
[facility]  [place]    [employer] Tracker Heart Failure Alert      Daily Weight Goal: 318-3226 lbs    Today's Weight: 319 lbs    Symptom Alert: None      Current Diuretic & Potassium Plan: Spironolactone 25 mg daily & Torsemide 40 mg am and 20 mg in afternoon.     Diuretic plan: Extra 20 mg torsemide in afternoon per CORE      Last Extra Diuretic: Torsemide 20 mg on 8/17/2020      Future Appointments   Date Time Provider Department Center   9/25/2020 12:30 PM RU LAB [facility]   9/25/2020  1:10 PM Rozina Gallegos, APRN CNP [facility]         Notes: Spoke to wife as she answered pt's phone. She asked me to call the house phone so she can talk. She states pt is sleeping. I did ask her to have him call me as I would like to discuss his HF s/s. He did not report any this morning but did yesterday in addition to 3 lbs wt gain over night.   Also made a CORE appt with Rozina with labs prior. Mailed out reminder per request      MyHealth Tracker Weight Trends:               MyHealth Tracker Weight Graph:           Haily Graves, JOY 9:32 AM 09/11/20      
n/a

## 2023-08-16 ENCOUNTER — ANCILLARY PROCEDURE (OUTPATIENT)
Dept: GENERAL RADIOLOGY | Facility: CLINIC | Age: 73
End: 2023-08-16
Attending: PHYSICIAN ASSISTANT
Payer: COMMERCIAL

## 2023-08-16 ENCOUNTER — OFFICE VISIT (OUTPATIENT)
Dept: URGENT CARE | Facility: URGENT CARE | Age: 73
End: 2023-08-16
Payer: COMMERCIAL

## 2023-08-16 VITALS
OXYGEN SATURATION: 97 % | TEMPERATURE: 98.2 F | BODY MASS INDEX: 50.02 KG/M2 | SYSTOLIC BLOOD PRESSURE: 133 MMHG | DIASTOLIC BLOOD PRESSURE: 81 MMHG | WEIGHT: 315 LBS | HEART RATE: 72 BPM

## 2023-08-16 DIAGNOSIS — S92.534A CLOSED NONDISPLACED FRACTURE OF DISTAL PHALANX OF LESSER TOE OF RIGHT FOOT, INITIAL ENCOUNTER: Primary | ICD-10-CM

## 2023-08-16 DIAGNOSIS — S99.921A INJURY OF RIGHT FOOT, INITIAL ENCOUNTER: ICD-10-CM

## 2023-08-16 LAB — WBC # BLD AUTO: 3.7 10E3/UL (ref 4–11)

## 2023-08-16 PROCEDURE — 73660 X-RAY EXAM OF TOE(S): CPT | Mod: TC | Performed by: RADIOLOGY

## 2023-08-16 PROCEDURE — 36415 COLL VENOUS BLD VENIPUNCTURE: CPT | Performed by: PHYSICIAN ASSISTANT

## 2023-08-16 PROCEDURE — 99214 OFFICE O/P EST MOD 30 MIN: CPT | Performed by: PHYSICIAN ASSISTANT

## 2023-08-16 PROCEDURE — 85048 AUTOMATED LEUKOCYTE COUNT: CPT | Performed by: PHYSICIAN ASSISTANT

## 2023-08-16 NOTE — PROGRESS NOTES
SUBJECTIVE:  Chief Complaint   Patient presents with    Toe Pain     Second toe in the right injured on Monday. Sore, swollen, redness hurts and sometimes pain radiates  to the upper leg      Jamar Ivory is a 73 year old male presents with a chief complaint of right toe(s) second pain, swelling, tenderness, redness, and decreased range of motion.  The injury occurred 2 day(s) ago.   The injury happened while at home. How: trauma: strucjk toe in middle of the night.   The patient complained of moderate pain  and has had decreased ROM.  Pain exacerbated by weight-bearing.  Relieved by rest.  He treated it initially with ice. This is the first time this type of injury has occurred to this patient.     Past Medical History:   Diagnosis Date    Arthritis     Cerebral artery occlusion with cerebral infarction     TIA    CHF (congestive heart failure) 12/24/2018    CVA (cerebral infarction) 2012    CVD (cardiovascular disease)     Depression 1977    hospitalized    Fatty liver     Gout     h/o Concussion     Hypertension 01/17/2011    Hypothyroidism     Obesity     Spider veins     TIA (transient ischaemic attack) 2012    Vitiligo      Current Outpatient Medications   Medication Sig Dispense Refill    acetaminophen (TYLENOL) 500 MG tablet Take 500-1,000 mg by mouth every 6 hours as needed for mild pain Prn for headaches      aspirin (ASA) 81 MG tablet Take 1 tablet (81 mg) by mouth daily 90 tablet 3    FLUoxetine (PROZAC) 40 MG capsule TAKE 1 CAPSULE BY MOUTH EVERY DAY 90 capsule 1    levothyroxine (SYNTHROID/LEVOTHROID) 112 MCG tablet Take 2 tablets (224 mcg) by mouth daily 180 tablet 3    nitroGLYcerin (NITROSTAT) 0.4 MG sublingual tablet FOR CHEST PAIN PLACE 1 TABLET UNDER THE TONGUE EVERY 5 MINUTES FOR 3 DOSES. IF SYMPTOMS PERSIST 5 MINUTES AFTER 1ST DOSE CALL 911. 25 tablet 2    tamsulosin (FLOMAX) 0.4 MG capsule Take 2 capsules (0.8 mg) by mouth daily 180 capsule 1    torsemide (DEMADEX) 20 MG tablet TAKE 4  TABLETS (80 MG) BY MOUTH DAILY 360 tablet 1    triamcinolone (KENALOG) 0.1 % external cream Apply topically daily Apply to reddened areas on both legs Daily 454 g 3    triamcinolone (KENALOG) 0.1 % external ointment Apply topically daily Apply ointment to skin around the wound 453.6 g 3    betamethasone dipropionate (DIPROSONE) 0.05 % external ointment Apply topically daily Apply to bilateral lower legs daily (Patient not taking: Reported on 8/1/2023) 45 g 3    cephALEXin (KEFLEX) 500 MG capsule Take 1 capsule (500 mg) by mouth 2 times daily (Patient not taking: Reported on 8/16/2023) 20 capsule 0    Glucosamine-Chondroit-MSM-C-Mn (FLEXI JOINT PO)       Misc Natural Products (OSTEO BI-FLEX JOINT SHIELD PO)        Social History     Tobacco Use    Smoking status: Never    Smokeless tobacco: Never   Substance Use Topics    Alcohol use: Not Currently       ROS:  Review of systems negative except as stated above.    EXAM:   /81 (BP Location: Right arm, Patient Position: Chair, Cuff Size: Adult Large)   Pulse 72   Temp 98.2  F (36.8  C) (Tympanic)   Wt 149.2 kg (329 lb)   SpO2 97%   BMI 50.02 kg/m    Gen: healthy,alert,no distress  Extremity: toe(s) second has erythema, swelling, point tenderness, and decreased ROM.   There is not compromise to the distal circulation.  Pulses are +2 and CRT is brisk  NEURO: Normal strength and tone, sensory exam grossly normal, mentation intact and speech normal    Results for orders placed or performed in visit on 08/16/23   XR Toe Right G/E 2 Views     Status: None    Narrative    TOE RIGHT TWO OR MORE VIEWS 8/16/2023 10:32 AM     HISTORY: Injury of right foot, initial encounter.    COMPARISON: None.       Impression    IMPRESSION: There is a curvilinear radiopaque foreign body within the  plantar soft tissues between the first and second toes measuring 8 mm.    Bony irregularity and small bone fragment along the dorsal medial  aspect of the distal phalanx of the second toe  which is in keeping  with an age-indeterminate fracture, possibly acute.    NOTE: ABNORMAL REPORT    THE DICTATION ABOVE DESCRIBES AN ABNORMAL REPORT FOR WHICH FOLLOW-UP  IS NEEDED.     KATHY LEWIS MD         SYSTEM ID:  [id number]   Results for orders placed or performed in visit on 08/16/23   WBC count     Status: Abnormal   Result Value Ref Range    WBC Count 3.7 (L) 4.0 - 11.0 10e3/uL         ASSESSMENT:   (E96.327K) Closed nondisplaced fracture of distal phalanx of lesser toe of right foot, initial encounter  (primary encounter diagnosis)  Plan: XR Toe Right G/E 2 Views, Ankle/Foot Bracing         Supplies DME Walking Boot; Right;         Non-pneumatic; Short, Orthopedic          Referral      (D24.694R) Injury of right foot, initial encounter  Plan: XR Toe Right G/E 2 Views, WBC count, Ankle/Foot        Bracing Supplies DME Walking Boot; Right;         Non-pneumatic; Short, Orthopedic          Referral      Red flags and emergent follow up discussed, and understood by patient  Follow up with PCP if symptoms worsen or fail to improve

## 2023-08-31 ENCOUNTER — TRANSFERRED RECORDS (OUTPATIENT)
Dept: HEALTH INFORMATION MANAGEMENT | Facility: CLINIC | Age: 73
End: 2023-08-31
Payer: COMMERCIAL

## 2023-09-28 ENCOUNTER — TELEPHONE (OUTPATIENT)
Dept: FAMILY MEDICINE | Facility: CLINIC | Age: 73
End: 2023-09-28
Payer: COMMERCIAL

## 2023-09-28 NOTE — TELEPHONE ENCOUNTER
Routing request to provider for review- Potential open statin therapy for patients with cardiovascular disease (SPC) care opportunity     Our prescription records show that Jamar Ivory, who is a [employer] Medicare Advantage plan member, has atherosclerotic cardiovascular disease (ASCVD), and may not be on a high- or moderate-intensity statin medication.    It appears Kenneth was previously on atorvastatin (discontinued 9/2022). If he tolerated, would it be appropriate to restart at this time?    The 10-year ASCVD risk score (Isaak GARNICA, et al., 2019) is: 44.4%    Values used to calculate the score:      Age: 73 years      Sex: Male      Is Non- : No      Diabetic: Yes      Tobacco smoker: No      Systolic Blood Pressure: 133 mmHg      Is BP treated: No      HDL Cholesterol: 38 mg/dL      Total Cholesterol: 183 mg/dL    If not using a statin due to adverse events (specifically Myalgia/myositis/myopathy/rhabdomyolysis) or contraindications (specifically cirrhosis), please ensure assessed and coded annually.     Thank you,     Valery Tapia, PharmD, [facility]

## 2023-09-29 ENCOUNTER — TELEPHONE (OUTPATIENT)
Dept: CARDIOLOGY | Facility: CLINIC | Age: 73
End: 2023-09-29
Payer: COMMERCIAL

## 2023-09-29 NOTE — TELEPHONE ENCOUNTER
"Received message from PCP requesting review patient's medical therapy for cholesterol management. Patient has not yet established w/ new PCP.     Patient w/ atherosclerotic cardiovascular disease. We had advised patient remain on statin medication d/t PMH.     Per records on 9/26/22 Atorvastatin 20 mg daily was discontinued during \"rooming\" process prior to pt's primary care appt. OV on 9/26/22 does not mention reason Atorvastatin was discontinued. Reason listed in EPIC states \"per patient\".     Simvastatin listed under allergy (diarrhea).     Called patient to discuss. Spoke w/ pt's wife, Uszan d/t patient sleeping. C2C on file.   Pt's wife states she does not remember why Atorvastatin was stopped and if there was a s/e with it. Pt's wife states pt will not want to take this Atorvastatin d/t possible side effects.     Advised pt's wife to have pt return call to further discuss w/ RN. Our recommendations are pt remains on Atorvastatin 20 mg daily if he has no side effects. Explained reasoning to pt's wife. Will await callback from pt.     Further refills and management can be deferred to PCP once pt is established. Steffi RN        "

## 2023-09-29 NOTE — TELEPHONE ENCOUNTER
Patient has not established care with new PCP since Vikki left our practice. Established patient of cardiology, so will forward recommendation to Dr Chand for review.    Team - please reach out to patient and schedule establish care visit with new PCP at his earliest convenience (patient lives in [place], so unsure if he wants to return to EP or establish with someone closer to home)

## 2023-10-02 NOTE — TELEPHONE ENCOUNTER
Patient's wife left a voicemail stating that Kenneth is not interested in taking a statin medication.     Reason for recommendations were given to pt.     Will send message to [facility], PCP and Dr.Manoles jeb Kapadia RN

## 2023-10-05 ENCOUNTER — APPOINTMENT (OUTPATIENT)
Dept: CT IMAGING | Facility: CLINIC | Age: 73
End: 2023-10-05
Attending: EMERGENCY MEDICINE
Payer: COMMERCIAL

## 2023-10-05 ENCOUNTER — HOSPITAL ENCOUNTER (EMERGENCY)
Facility: CLINIC | Age: 73
Discharge: HOME | End: 2023-10-05
Attending: EMERGENCY MEDICINE | Admitting: EMERGENCY MEDICINE
Payer: COMMERCIAL

## 2023-10-05 VITALS
OXYGEN SATURATION: 93 % | DIASTOLIC BLOOD PRESSURE: 72 MMHG | RESPIRATION RATE: 18 BRPM | SYSTOLIC BLOOD PRESSURE: 129 MMHG | TEMPERATURE: 97.7 F | HEART RATE: 77 BPM

## 2023-10-05 DIAGNOSIS — R11.2 NAUSEA VOMITING AND DIARRHEA: Primary | ICD-10-CM

## 2023-10-05 DIAGNOSIS — R10.33 PERIUMBILICAL ABDOMINAL PAIN: ICD-10-CM

## 2023-10-05 DIAGNOSIS — R19.7 NAUSEA VOMITING AND DIARRHEA: Primary | ICD-10-CM

## 2023-10-05 DIAGNOSIS — K42.9 UMBILICAL HERNIA WITHOUT OBSTRUCTION AND WITHOUT GANGRENE: ICD-10-CM

## 2023-10-05 DIAGNOSIS — E87.1 HYPONATREMIA: ICD-10-CM

## 2023-10-05 LAB
ALBUMIN SERPL BCG-MCNC: 3.1 G/DL (ref 3.5–5.2)
ALBUMIN UR-MCNC: 30 MG/DL
ALP SERPL-CCNC: 42 U/L (ref 40–129)
ALT SERPL W P-5'-P-CCNC: 17 U/L (ref 0–70)
ANION GAP SERPL CALCULATED.3IONS-SCNC: 12 MMOL/L (ref 7–15)
APPEARANCE UR: CLEAR
AST SERPL W P-5'-P-CCNC: 26 U/L (ref 0–45)
BASO+EOS+MONOS # BLD AUTO: ABNORMAL 10*3/UL
BASO+EOS+MONOS NFR BLD AUTO: ABNORMAL %
BASOPHILS # BLD AUTO: 0 10E3/UL (ref 0–0.2)
BASOPHILS NFR BLD AUTO: 0 %
BILIRUB SERPL-MCNC: 0.6 MG/DL
BILIRUB UR QL STRIP: NEGATIVE
BUN SERPL-MCNC: 22.8 MG/DL (ref 8–23)
CALCIUM SERPL-MCNC: 7.8 MG/DL (ref 8.8–10.2)
CHLORIDE SERPL-SCNC: 101 MMOL/L (ref 98–107)
COLOR UR AUTO: YELLOW
CREAT SERPL-MCNC: 0.96 MG/DL (ref 0.67–1.17)
DEPRECATED HCO3 PLAS-SCNC: 18 MMOL/L (ref 22–29)
EGFRCR SERPLBLD CKD-EPI 2021: 83 ML/MIN/1.73M2
EOSINOPHIL # BLD AUTO: 0.1 10E3/UL (ref 0–0.7)
EOSINOPHIL NFR BLD AUTO: 5 %
ERYTHROCYTE [DISTWIDTH] IN BLOOD BY AUTOMATED COUNT: 14.6 % (ref 10–15)
FLUAV RNA SPEC QL NAA+PROBE: NEGATIVE
FLUBV RNA RESP QL NAA+PROBE: NEGATIVE
GLUCOSE SERPL-MCNC: 120 MG/DL (ref 70–99)
GLUCOSE UR STRIP-MCNC: NEGATIVE MG/DL
HCT VFR BLD AUTO: 40 % (ref 40–53)
HGB BLD-MCNC: 13.7 G/DL (ref 13.3–17.7)
HGB UR QL STRIP: NEGATIVE
HOLD SPECIMEN: NORMAL
IMM GRANULOCYTES # BLD: 0 10E3/UL
IMM GRANULOCYTES NFR BLD: 1 %
KETONES UR STRIP-MCNC: NEGATIVE MG/DL
LEUKOCYTE ESTERASE UR QL STRIP: NEGATIVE
LIPASE SERPL-CCNC: 14 U/L (ref 13–60)
LYMPHOCYTES # BLD AUTO: 0.5 10E3/UL (ref 0.8–5.3)
LYMPHOCYTES NFR BLD AUTO: 21 %
MCH RBC QN AUTO: 29.4 PG (ref 26.5–33)
MCHC RBC AUTO-ENTMCNC: 34.3 G/DL (ref 31.5–36.5)
MCV RBC AUTO: 86 FL (ref 78–100)
MONOCYTES # BLD AUTO: 0.5 10E3/UL (ref 0–1.3)
MONOCYTES NFR BLD AUTO: 19 %
MUCOUS THREADS #/AREA URNS LPF: PRESENT /LPF
NEUTROPHILS # BLD AUTO: 1.3 10E3/UL (ref 1.6–8.3)
NEUTROPHILS NFR BLD AUTO: 54 %
NITRATE UR QL: NEGATIVE
NRBC # BLD AUTO: 0 10E3/UL
NRBC BLD AUTO-RTO: 0 /100
PH UR STRIP: 5.5 [PH] (ref 5–7)
PLATELET # BLD AUTO: 178 10E3/UL (ref 150–450)
POTASSIUM SERPL-SCNC: 4.1 MMOL/L (ref 3.4–5.3)
PROT SERPL-MCNC: 6.7 G/DL (ref 6.4–8.3)
RBC # BLD AUTO: 4.66 10E6/UL (ref 4.4–5.9)
RBC URINE: 2 /HPF
RSV RNA SPEC NAA+PROBE: NEGATIVE
SARS-COV-2 RNA RESP QL NAA+PROBE: NEGATIVE
SODIUM SERPL-SCNC: 131 MMOL/L (ref 135–145)
SP GR UR STRIP: 1.03 (ref 1–1.03)
SQUAMOUS EPITHELIAL: 1 /HPF
UROBILINOGEN UR STRIP-MCNC: NORMAL MG/DL
WBC # BLD AUTO: 2.5 10E3/UL (ref 4–11)
WBC URINE: <1 /HPF

## 2023-10-05 PROCEDURE — 99285 EMERGENCY DEPT VISIT HI MDM: CPT | Mod: 25

## 2023-10-05 PROCEDURE — 250N000009 HC RX 250: Performed by: EMERGENCY MEDICINE

## 2023-10-05 PROCEDURE — 85025 COMPLETE CBC W/AUTO DIFF WBC: CPT | Performed by: EMERGENCY MEDICINE

## 2023-10-05 PROCEDURE — 81001 URINALYSIS AUTO W/SCOPE: CPT | Performed by: EMERGENCY MEDICINE

## 2023-10-05 PROCEDURE — 80053 COMPREHEN METABOLIC PANEL: CPT | Performed by: EMERGENCY MEDICINE

## 2023-10-05 PROCEDURE — 74177 CT ABD & PELVIS W/CONTRAST: CPT

## 2023-10-05 PROCEDURE — 36415 COLL VENOUS BLD VENIPUNCTURE: CPT | Performed by: EMERGENCY MEDICINE

## 2023-10-05 PROCEDURE — 83690 ASSAY OF LIPASE: CPT | Performed by: EMERGENCY MEDICINE

## 2023-10-05 PROCEDURE — 250N000011 HC RX IP 250 OP 636: Performed by: EMERGENCY MEDICINE

## 2023-10-05 PROCEDURE — 87637 SARSCOV2&INF A&B&RSV AMP PRB: CPT | Performed by: EMERGENCY MEDICINE

## 2023-10-05 RX ORDER — IOPAMIDOL 755 MG/ML
500 INJECTION, SOLUTION INTRAVASCULAR ONCE
Status: COMPLETED | OUTPATIENT
Start: 2023-10-05 | End: 2023-10-05

## 2023-10-05 RX ADMIN — IOPAMIDOL 100 ML: 755 INJECTION, SOLUTION INTRAVENOUS at 18:44

## 2023-10-05 RX ADMIN — SODIUM CHLORIDE 65 ML: 9 INJECTION, SOLUTION INTRAVENOUS at 18:45

## 2023-10-05 ASSESSMENT — ENCOUNTER SYMPTOMS
VOMITING: 1
HEMATURIA: 0
DYSURIA: 0
SHORTNESS OF BREATH: 0
BLOOD IN STOOL: 0
DIARRHEA: 1
NAUSEA: 1
ABDOMINAL PAIN: 1
COUGH: 0

## 2023-10-05 ASSESSMENT — ACTIVITIES OF DAILY LIVING (ADL)
ADLS_ACUITY_SCORE: 35

## 2023-10-05 NOTE — ED PROVIDER NOTES
History     Chief Complaint:  Nausea, Vomiting, & Diarrhea       The history is provided by the patient.      Jamar Ivory is a 73 year old male who presents to the ED with nausea and diarrhea. Patient reports nausea and diarrhea for the last two days and has been having diarrhea approximately 6-7 times a day. Reports stool is liquid, no blood or black stool. Last emesis episode was last night and does not know the color of the emesis but reports it was not bloody or black in color. Patient also has lower intermittent non radiating cramping abdominal pain at 6/10 severity and is currently in pain. Reports that passing stool seems to alleviate this pain. Reports history of heart failure and denies any other medical history. Mentions wife has similar symptoms but is feeling better than he is. Patient has been drinking water.    Review of Systems   Constitutional:  Negative for chills and fever.   HENT:  Negative for congestion and rhinorrhea.    Respiratory:  Negative for cough and shortness of breath.    Cardiovascular:  Negative for chest pain.   Gastrointestinal:  Positive for abdominal pain, diarrhea, nausea and vomiting. Negative for blood in stool.   Genitourinary:  Negative for dysuria and hematuria.   Musculoskeletal:  Negative for back pain and neck pain.   Neurological:  Negative for dizziness and headaches.       Independent Historian:   None - Patient Only    Review of External Notes:   Prior outpatient office visit notes     Medications:    Diprosone  Keflex  Prozac  Synthroid  Nitrostat  Flomax  Demadex  Kenalog    Past Medical History:    Arthritis  Stroke  CHF  CVS  CVD  Depression  Fatty liver  Gout  Hypertension  Hypothyroidism  Obesity  TIA  Vitiligo    Past Surgical History:    Appendectomy  Colonoscopy  Vasectomy     Physical Exam   Patient Vitals for the past 24 hrs:   BP Temp Temp src Pulse Resp SpO2   10/05/23 2055 -- -- -- -- -- 94 %   10/05/23 2050 127/66 -- -- 74 -- --   10/05/23 1800  -- -- -- -- -- 92 %   10/05/23 1745 117/69 -- -- -- -- --   10/05/23 1623 (!) 134/94 97.7  F (36.5  C) Oral 86 18 93 %      Constitutional:       General: Not in acute distress.        Appearance: Normal appearance.   HENT:      Head: Normocephalic and atraumatic.   Eyes:      Extraocular Movements: Extraocular movements intact.      Conjunctiva/sclera: Conjunctivae normal.   Cardiovascular:      Rate and Rhythm: Normal rate and regular rhythm.   Pulmonary:      Effort: Pulmonary effort is normal. No respiratory distress.      Breath sounds: Normal breath sounds.   Abdominal:      General: Abdomen is flat. There is no distension.      Palpations: Abdomen is soft.      Tenderness: There is periumbilical abdominal tenderness to palpation.   Musculoskeletal:      Cervical back: Normal range of motion. No rigidity.      Right lower le+ edema.      Left lower le+ edema.   Skin:     General: Skin is warm and dry.   Neurological:      General: No focal deficit present.      Mental Status: Alert and oriented to person, place, and time.   Psychiatric:         Mood and Affect: Mood normal.         Behavior: Behavior normal.    Emergency Department Course     Imaging:  CT Abdomen Pelvis w Contrast   Final Result   IMPRESSION:    1.  Fat-containing umbilical hernia with minimal fat stranding, which may account for pain.   2.  Otherwise, no acute abnormality in the abdomen or pelvis with additional details in the findings.         Report per radiology    Laboratory:  Labs Ordered and Resulted from Time of ED Arrival to Time of ED Departure   COMPREHENSIVE METABOLIC PANEL - Abnormal       Result Value    Sodium 131 (*)     Potassium 4.1      Carbon Dioxide (CO2) 18 (*)     Anion Gap 12      Urea Nitrogen 22.8      Creatinine 0.96      GFR Estimate 83      Calcium 7.8 (*)     Chloride 101      Glucose 120 (*)     Alkaline Phosphatase 42      AST 26      ALT 17      Protein Total 6.7      Albumin 3.1 (*)     Bilirubin Total  0.6     ROUTINE UA WITH MICROSCOPIC REFLEX TO CULTURE - Abnormal    Color Urine Yellow      Appearance Urine Clear      Glucose Urine Negative      Bilirubin Urine Negative      Ketones Urine Negative      Specific Gravity Urine 1.028      Blood Urine Negative      pH Urine 5.5      Protein Albumin Urine 30 (*)     Urobilinogen Urine Normal      Nitrite Urine Negative      Leukocyte Esterase Urine Negative      Mucus Urine Present (*)     RBC Urine 2      WBC Urine <1      Squamous Epithelials Urine 1     CBC WITH PLATELETS AND DIFFERENTIAL - Abnormal    WBC Count 2.5 (*)     RBC Count 4.66      Hemoglobin 13.7      Hematocrit 40.0      MCV 86      MCH 29.4      MCHC 34.3      RDW 14.6      Platelet Count 178      % Neutrophils 54      % Lymphocytes 21      % Monocytes 19      Mids % (Monos, Eos, Basos)        % Eosinophils 5      % Basophils 0      % Immature Granulocytes 1      NRBCs per 100 WBC 0      Absolute Neutrophils 1.3 (*)     Absolute Lymphocytes 0.5 (*)     Absolute Monocytes 0.5      Mids Abs (Monos, Eos, Basos)        Absolute Eosinophils 0.1      Absolute Basophils 0.0      Absolute Immature Granulocytes 0.0      Absolute NRBCs 0.0     INFLUENZA A/B, RSV, & SARS-COV2 PCR - Normal    Influenza A PCR Negative      Influenza B PCR Negative      RSV PCR Negative      SARS CoV2 PCR Negative     LIPASE - Normal    Lipase 14     ENTERIC BACTERIA AND VIRUS PANEL BY PCR   C. DIFFICILE TOXIN B PCR WITH REFLEX TO C. DIFFICILE ANTIGEN AND TOXINS A/B EIA        Emergency Department Course & Assessments:         Interventions:  Medications   iopamidol (ISOVUE-370) solution 500 mL (100 mLs Intravenous $Given 10/5/23 1844)   CT scan flush (65 mLs Intravenous $Given 10/5/23 1845)      Independent Interpretation (X-rays, CTs, rhythm strip):  None    Assessments/Consultations/Discussion of Management or Tests:  ED Course as of 10/05/23 2114   Thu Oct 05, 2023   2021 On reevaluation, patient has no nausea, vomiting, or  diarrhea.  Patient states that he is ready for bowel movement.  Will assist to the restroom for stool collection.    On reevaluation, patient states that he was able to ambulate to the restroom with walker without difficulty.  However, he is unable to have a bowel movement to collect stool sample.  Patient denies any nausea, vomiting, or diarrhea since arrival to the ER.  I did offer patient nausea medication at discharge for which patient declined.  Patient will attempt to collect stool sample at home if able to and bring it to outpatient lab.  Suspect symptoms today likely secondary to viral gastroenteritis as spouse at home has the same symptoms.  Updated on incidental finding of fat-containing umbilical hernia.  Discussed return precautions.  Answered all questions.  Patient voiced understanding and agreement with plan.       Social Determinants of Health affecting care:   None    Disposition:  The patient was discharged to home.     Impression & Plan    CMS Diagnoses: None    Medical Decision Makin-year-old male as described above presents to the emergency department for nausea, vomiting, diarrhea, and periumbilical abdominal pain x2 days.  Patient hemodynamically stable to evaluation.  Afebrile.  Nonseptic appearing.  Nontoxic-appearing.  Mild tenderness to palpation in the periumbilical region.  Broad differential diagnosis considered includes, but not limited to, bowel obstruction, infectious colitis, acute appendicitis, diverticulitis, pancreatitis, and abdominal aortic aneurysm/dissection.  Abdominal pain work-up ordered.  CT abdomen pelvis with contrast for evaluation above discussed differentials.  Patient able to tolerate p.o.  Given history of CHF with significant reduced ejection fraction, and concern for volume overload, we will prefer to have patient hydrate via oral instead of IV especially given patient does not feel like he is significantly dehydrated.  Stool samples/cultures.  The  patient otherwise able to tolerate p.o. with negative work-up, likely discharge to outpatient supportive care.  Potentially outpatient stool sample collection if patient unable to provide stool sample here today.  Discussed care plan with patient who voiced understanding and agreement with plan.  Answered all questions.  Additional work-up and orders as listed in chart.    Please refer to ED course above for details on the patient's treatment course and any changes or updates in care plan beyond my initial evaluation and MDM.    Diagnosis:    ICD-10-CM    1. Nausea vomiting and diarrhea  R11.2 Enteric Bacteria and Virus Panel by JOE Stool    R19.7 C. difficile Toxin B PCR with reflex to C. difficile Antigen and Toxins A/B EIA      2. Periumbilical abdominal pain  R10.33       3. Umbilical hernia without obstruction and without gangrene  K42.9       4. Hyponatremia  E87.1            Scribe Disclosure:  IAvis, am serving as a scribe at 7:13 PM on 10/5/2023 to document services personally performed by Donovan Lopez DO based on my observations and the provider's statements to me.     INathalie, am serving as a scribe at 7:55 PM on 10/5/2023 to document services personally performed by Donovan Lopez DO based on my observations and the provider's statements to me.    10/5/2023   Donovan Lopez DO Yeh, Ferris, DO  10/06/23 0059

## 2023-10-05 NOTE — ED TRIAGE NOTES
Pt arrives via ems from home where he has had Nausea/vominting/and diarrhea for two days. States his wife and roommate are sick with the same thing. Has not had anything to eat since yesterday. Stateds last had diarrhea and vomited about 4am - deies nausea upon arrival. Hx CHF VSS A/ox4     Triage Assessment       Row Name 10/05/23 8084       Triage Assessment (Adult)    Airway WDL WDL       Respiratory WDL    Respiratory WDL WDL       Skin Circulation/Temperature WDL    Skin Circulation/Temperature WDL WDL       Peripheral/Neurovascular WDL    Peripheral Neurovascular WDL WDL       Cognitive/Neuro/Behavioral WDL    Cognitive/Neuro/Behavioral WDL WDL

## 2023-10-05 NOTE — ED NOTES
Bed: [facility]  Expected date: 10/5/23  Expected time: 3:29 PM  Means of arrival: Ambulance  Comments:  BSV: 72 yo YAMINI

## 2023-10-06 ASSESSMENT — ENCOUNTER SYMPTOMS
BACK PAIN: 0
FEVER: 0
RHINORRHEA: 0
HEADACHES: 0
DIZZINESS: 0
CHILLS: 0
NECK PAIN: 0

## 2023-10-07 ENCOUNTER — TELEPHONE (OUTPATIENT)
Dept: EMERGENCY MEDICINE | Facility: CLINIC | Age: 73
End: 2023-10-07
Payer: COMMERCIAL

## 2023-10-07 PROCEDURE — 87507 IADNA-DNA/RNA PROBE TQ 12-25: CPT | Performed by: EMERGENCY MEDICINE

## 2023-10-07 NOTE — TELEPHONE ENCOUNTER
[facility] Emergency Department/Urgent Care Lab result notification  [Note:  ED Lab Results RN will reference the [facility] Emergency Dept visit note prior to contacting patient AND/OR prior to consulting Emergency Dept Provider.  Highlights of Emergency Dept visit in information summary at the bottom of this telephone note]    1. Reason for call  Notify of lab results  Assess patient symptoms [if necessary]  Review ED Providers recommendations/discharge instructions (if necessary)  Advise per [facility] ED lab result protocol    2. Lab Result (including Rx patient on, if applicable).  If culture, copy of lab report at bottom.  Final Enteric Bacteria and Virus Panel by JOE Stool is POSITIVE for Norovirus Gl/Gll  Recommendations in treatment per [facility] Lab Result Enteric Bacteria and Virus Panel protocol.    3. RN Assessment (Patient's current Symptoms):  Time of call: 5:10PM  Assessment:no nausea right now;  last vomiting a couple of nights; Has chills but no fever.  Had a little diarrhea this morning. Is drinking a lot of water    4. RN Recommendations/Instructions per [facility] ED lab result protocol  [facility] ED lab result protocol used: Norovirus  Kenneth was notified of lab result and treatment recommendations  RN reviewed information about Norovirus.  Hydration, dehyrdation, proper handwashing, household cleaning products reviewed    5. Please Contact your PCP clinic or return to the Emergency department if your:  Symptoms worsen or other concerning symptoms.    Information summary from Emergency Dept/Urgent Care visit on 10/5/23  Symptoms reported at ED/UC visit (Chief complaint, HPI) Chief Complaint:  Nausea, Vomiting, & Diarrhea        The history is provided by the patient.      Jamar Ivory is a 73 year old male who presents to the ED with nausea and diarrhea. Patient reports nausea and diarrhea for the last two days and has been having diarrhea approximately 6-7  times a day. Reports stool is liquid, no blood or black stool. Last emesis episode was last night and does not know the color of the emesis but reports it was not bloody or black in color. Patient also has lower intermittent non radiating cramping abdominal pain at 6/10 severity and is currently in pain. Reports that passing stool seems to alleviate this pain. Reports history of heart failure and denies any other medical history. Mentions wife has similar symptoms but is feeling better than he is. Patient has been drinking water.   ED/UC providers Impression and Plan (applicable information) Medical Decision Makin-year-old male as described above presents to the emergency department for nausea, vomiting, diarrhea, and periumbilical abdominal pain x2 days.  Patient hemodynamically stable to evaluation.  Afebrile.  Nonseptic appearing.  Nontoxic-appearing.  Mild tenderness to palpation in the periumbilical region.  Broad differential diagnosis considered includes, but not limited to, bowel obstruction, infectious colitis, acute appendicitis, diverticulitis, pancreatitis, and abdominal aortic aneurysm/dissection.  Abdominal pain work-up ordered.  CT abdomen pelvis with contrast for evaluation above discussed differentials.  Patient able to tolerate p.o.  Given history of CHF with significant reduced ejection fraction, and concern for volume overload, we will prefer to have patient hydrate via oral instead of IV especially given patient does not feel like he is significantly dehydrated.  Stool samples/cultures.  The patient otherwise able to tolerate p.o. with negative work-up, likely discharge to outpatient supportive care.  Potentially outpatient stool sample collection if patient unable to provide stool sample here today.  Discussed care plan with patient who voiced understanding and agreement with plan.  Answered all questions.  Additional work-up and orders as listed in chart.   Miscellaneous   Information  (ED/UC Provider, diagnosis, etc)   Donovan Lopez, DO  10/06/23 0059       Copy of Lab report (if applicable)  Component      Latest Ref Rng 10/7/2023  8:40 AM   Campylobacter species      Negative  Negative    SALMONELLA SPECIES      Negative  Negative    Vibrio species      Negative  Negative    Vibrio cholerae      Negative  Negative    YERSINIA ENTEROCOLITICA      Negative  Negative    Enteropathogenic E. coli (EPEC)      Negative, NA  Negative    Shiga-like toxin-producing E. coli (STEC)      Negative  Negative    Shigella/Enteroinvasive E. coli (EIEC)      Negative  Negative    Cryptosporidium species      Negative  Negative    Giardia lamblia      Negative  Negative    Norovirus Gl/Gll      Negative  Positive !    ROTAVIRUS A      Negative  Negative    Plesiomonas shigelloides      Negative  Negative    Enteroaggregative E. coli (EAEC)      Negative  Negative    Enterotoxigenic E. coli (ETEC)      Negative  Negative    E. coli O157      Negative, NA  NA    Cyclospora cayetanensis      Negative  Negative    Entamoeba histolytica      Negative  Negative    Adenovirus F40/41      Negative  Negative    Astrovirus      Negative  Negative    Sapovirus      Negative  Negative      Legend:  ! Abnormal      Chavez Perez RN  [facility]  Emergency Dept Lab Result RN  Ph# [phone]

## 2023-10-27 ENCOUNTER — ANCILLARY PROCEDURE (OUTPATIENT)
Dept: GENERAL RADIOLOGY | Facility: CLINIC | Age: 73
End: 2023-10-27
Attending: PHYSICIAN ASSISTANT
Payer: COMMERCIAL

## 2023-10-27 ENCOUNTER — OFFICE VISIT (OUTPATIENT)
Dept: URGENT CARE | Facility: URGENT CARE | Age: 73
End: 2023-10-27
Payer: COMMERCIAL

## 2023-10-27 VITALS
WEIGHT: 315 LBS | SYSTOLIC BLOOD PRESSURE: 120 MMHG | DIASTOLIC BLOOD PRESSURE: 78 MMHG | TEMPERATURE: 97.3 F | RESPIRATION RATE: 20 BRPM | OXYGEN SATURATION: 96 % | HEART RATE: 82 BPM | BODY MASS INDEX: 50.02 KG/M2

## 2023-10-27 DIAGNOSIS — M25.571 PAIN IN JOINT INVOLVING RIGHT ANKLE AND FOOT: Primary | ICD-10-CM

## 2023-10-27 DIAGNOSIS — E11.9 TYPE 2 DIABETES MELLITUS WITHOUT COMPLICATION, WITHOUT LONG-TERM CURRENT USE OF INSULIN (H): Chronic | ICD-10-CM

## 2023-10-27 DIAGNOSIS — M25.571 PAIN IN JOINT INVOLVING RIGHT ANKLE AND FOOT: ICD-10-CM

## 2023-10-27 PROCEDURE — 73610 X-RAY EXAM OF ANKLE: CPT | Mod: TC | Performed by: RADIOLOGY

## 2023-10-27 PROCEDURE — 99214 OFFICE O/P EST MOD 30 MIN: CPT | Performed by: PHYSICIAN ASSISTANT

## 2023-10-27 RX ORDER — METHYLPREDNISOLONE 4 MG
TABLET, DOSE PACK ORAL
Qty: 21 TABLET | Refills: 0 | Status: SHIPPED | OUTPATIENT
Start: 2023-10-27 | End: 2023-11-15

## 2023-10-27 NOTE — PROGRESS NOTES
"  Assessment & Plan     Pain in joint involving right ankle and foot    Xray ankle Negative for acute findings, read by Abilio CORBIN at time of visit.    RICE treatment: Rest, Ice, compression, elevation   DME for tall cam walker  Medrol for inflammation and pain of ankle  Advised to follow up with orthopedics  Referral to Ortho placed    - XR Ankle Right G/E 3 Views; Future  - Ankle/Foot Bracing Supplies DME Walking Boot; Left; Pneumatic; Tall  - methylPREDNISolone (MEDROL DOSEPAK) 4 MG tablet therapy pack; Follow package instructions    Diet Controlled Type 2 diabetes mellitus without complication, without long-term current use of insulin (H)    Patient is diabetic  Medrol may temporarily increase blood sugar levels while on medicaiton    Review of external notes as documented elsewhere in note      BMI:   Estimated body mass index is 50.02 kg/m  as calculated from the following:    Height as of 8/1/23: 1.727 m (5' 8\").    Weight as of this encounter: 149.2 kg (329 lb).       CONSULTATION/REFERRAL to Orthopedics    No follow-ups on file.    Abilio Forrester, [facility], PA-C  [facility] URGENT [facility] OBED Cooper is a 73 year old, presenting for the following health issues:  Ankle Pain (R ankle pain-hurts when relaxing and weight bearing X 2 weeks )      HPI   Review of Systems   Constitutional, HEENT, cardiovascular, pulmonary, gi and gu systems are negative, except as otherwise noted.      Objective    /78   Pulse 82   Temp 97.3  F (36.3  C) (Tympanic)   Resp 20   Wt 149.2 kg (329 lb)   SpO2 96%   BMI 50.02 kg/m    Body mass index is 50.02 kg/m .  Physical Exam   GENERAL: healthy, alert and no distress  ABDOMEN: soft, nontender, no hepatosplenomegaly, no masses and bowel sounds normal  MS: Positive for medial ankle tenderness along tendons  SKIN: no suspicious lesions or rashes  NEURO: Normal strength and tone, mentation intact and speech normal  PSYCH: mentation appears normal, " affect normal/bright    Xray - Reviewed and interpreted by me.  Negative for acute findings, read by Abilio CORBIN at time of visit.

## 2023-10-30 ENCOUNTER — OFFICE VISIT (OUTPATIENT)
Dept: PODIATRY | Facility: CLINIC | Age: 73
End: 2023-10-30
Payer: COMMERCIAL

## 2023-10-30 VITALS — BODY MASS INDEX: 47.74 KG/M2 | WEIGHT: 315 LBS | HEIGHT: 68 IN

## 2023-10-30 DIAGNOSIS — M25.571 PAIN IN JOINT INVOLVING RIGHT ANKLE AND FOOT: ICD-10-CM

## 2023-10-30 PROCEDURE — 99203 OFFICE O/P NEW LOW 30 MIN: CPT | Performed by: PODIATRIST

## 2023-10-30 NOTE — LETTER
"    10/30/2023         RE: Jamar Ivory  [address]  [address]  [facility] [address]        Dear Colleague,    Thank you for referring your patient, Jamar Ivory, to the [facility]. Please see a copy of my visit note below.    Foot & Ankle Surgery  October 30, 2023    CC: \"Sore ankle\"    I was asked to see Jamar Ivory regarding the chief complaint by: Urgent care    HPI:  Pt is a 73 year old male who presents with above complaint.  The patient was seen in urgent care on 10/27/2023 for 2-week history of right ankle pain, worse with relaxing and weightbearing.  X-rays demonstrated swelling throughout the ankle, posterior and plantar Achilles spurring, and calcification within the Achilles tendon.  He was given a Medrol Dosepak and a walking cast boot.  He states that symptoms are much improved.    ROS:   Pos for CC.  The patient denies current nausea, vomiting, chills, fevers, belly pain, calf pain, chest pain or SOB.  Complete remainder of ROS is otherwise neg.    VITALS:    Vitals:    10/30/23 1232   Weight: 149.2 kg (329 lb)       PMH:    Past Medical History:   Diagnosis Date     Arthritis      Cerebral artery occlusion with cerebral infarction     TIA     CHF (congestive heart failure) 12/24/2018     CVA (cerebral infarction) 2012     CVD (cardiovascular disease)      Depression 1977    hospitalized     Fatty liver      Gout      h/o Concussion      Hypertension 01/17/2011     Hypothyroidism      Obesity      Spider veins      TIA (transient ischaemic attack) 2012     Vitiligo        SXHX:    Past Surgical History:   Procedure Laterality Date     APPENDECTOMY      at age 22     COLONOSCOPY N/A 09/02/2016    Procedure: COMBINED COLONOSCOPY, SINGLE OR MULTIPLE BIOPSY/POLYPECTOMY BY BIOPSY;  Surgeon: Yanick Brown MD;  Location:  GI     COLONOSCOPY N/A 12/27/2021    Procedure: COLONOSCOPY;  Surgeon: Kong Chowdary MD;  Location: [facility] OR     VASECTOMY        "   MEDS:    Current Outpatient Medications   Medication     acetaminophen (TYLENOL) 500 MG tablet     aspirin (ASA) 81 MG tablet     betamethasone dipropionate (DIPROSONE) 0.05 % external ointment     cephALEXin (KEFLEX) 500 MG capsule     FLUoxetine (PROZAC) 40 MG capsule     Glucosamine-Chondroit-MSM-C-Mn (FLEXI JOINT PO)     levothyroxine (SYNTHROID/LEVOTHROID) 112 MCG tablet     methylPREDNISolone (MEDROL DOSEPAK) 4 MG tablet therapy pack     Misc Natural Products (OSTEO BI-FLEX JOINT SHIELD PO)     nitroGLYcerin (NITROSTAT) 0.4 MG sublingual tablet     tamsulosin (FLOMAX) 0.4 MG capsule     torsemide (DEMADEX) 20 MG tablet     triamcinolone (KENALOG) 0.1 % external cream     triamcinolone (KENALOG) 0.1 % external ointment     No current facility-administered medications for this visit.       ALL:     Allergies   Allergen Reactions     Clopidogrel      Cough/emesis     Penicillins Rash     Simvastatin Diarrhea     Sulfa Antibiotics      Unsure     Xeroform [Bismuth Tribromphenate]      Unsure       FMH:    Family History   Problem Relation Age of Onset     Cancer Father         Lung     Diabetes Mother      Cancer Daughter         leukemia       SocHx:    Social History     Socioeconomic History     Marital status:      Spouse name: Melissa     Number of children: 3     Years of education: Not on file     Highest education level: Not on file   Occupational History     Occupation:      Employer: [employer]   Tobacco Use     Smoking status: Never     Smokeless tobacco: Never   Vaping Use     Vaping Use: Never used   Substance and Sexual Activity     Alcohol use: Not Currently     Drug use: No     Sexual activity: Yes     Partners: Female   Other Topics Concern     Parent/sibling w/ CABG, MI or angioplasty before 65F 55M? No   Social History Narrative     Not on file     Social Determinants of Health     Financial Resource Strain: Not on file   Food Insecurity: Not on file   Transportation  Needs: No Transportation Needs (9/21/2020)    PRAPARE - Transportation      Lack of Transportation (Medical): No      Lack of Transportation (Non-Medical): No   Physical Activity: Not on file   Stress: Not on file   Social Connections: Not on file   Interpersonal Safety: Not on file   Housing Stability: Not on file           EXAMINATION:  Gen:   No apparent distress  Neuro:   A&Ox3, no deficits  Psych:    Answering questions appropriately for age and situation with normal affect  Head:    NCAT  Eye:    Visual scanning without deficit  Ear:    Response to auditory stimuli wnl  Lung:    Non-labored breathing on RA noted  Abd:    NTND per patient report  Lymph:    Baseline venous insufficiency with stasis dermatitis.  Vasc:    Pulses palpable, CFT minimally delayed  Neuro:    Light touch sensation intact to all sensory nerve distributions without paresthesias  Derm:    Dry stable eschar anterior right lower leg.  No signs of infection  MSK:    Right lower extremity -mild tenderness on palpation at the anterior ankle joint.  Tibia, fibula, Achilles and heel are unremarkable today  Calf:    Neg for redness, swelling or tenderness      Imaging:  xrays R ankle 10/27/23 - IMPRESSION: There is soft tissue swelling over the right ankle. No  acute fracture is identified. There is some ossification projecting  over the region of the Achilles tendon. There are moderate-sized  plantar and posterior calcaneal spurs.    Assessment:  73 year old male with resolving anterior right ankle pain in setting of venous insufficiency      Medical Decision Making/Plan:  Discussed etiologies, anatomy and options  1.  Resolving anterior right ankle pain in setting of venous insufficiency  -I personally reviewed and interpreted the patient's lower extremity history pertinent to today's visit, including imaging/labs, in preparation for initiating a treatment program.  -I personally reviewed and interpreted the 10/27/2023 x-rays  -Finish the  Medrol Dosepak  -Continue weightbearing as tolerated in the boot.  He is cleared to transition to a comfortable shoe once symptoms allow  -RICE/NSAID versus Tylenol as needed based on pain  -Patient states he is doing much better.  If the above plan provides sufficient relief and symptoms continue to improve/resolve, no further follow-up or intervention is needed.  If in the near future (e.g. 3 weeks) this has failed to show progression, he will follow-up in clinic for reassessment, reexamination and we will discuss further treatment options    Follow up: 3 weeks as needed or sooner with acute issues      Patient's medical history was reviewed today      Eddi Foster DPM [facility]  Podiatric Foot & Ankle Surgeon  [facility]  [phone]    Disclaimer: This note consists of symbols derived from keyboarding, dictation and/or voice recognition software. As a result, there may be errors in the script that have gone undetected. Please consider this when interpreting information found in this chart.        Again, thank you for allowing me to participate in the care of your patient.        Sincerely,        Eddi Foster DPM, GABY

## 2023-10-30 NOTE — PROGRESS NOTES
"Foot & Ankle Surgery  October 30, 2023    CC: \"Sore ankle\"    I was asked to see Jamar PATEL Cachorro regarding the chief complaint by: Urgent care    HPI:  Pt is a 73 year old male who presents with above complaint.  The patient was seen in urgent care on 10/27/2023 for 2-week history of right ankle pain, worse with relaxing and weightbearing.  X-rays demonstrated swelling throughout the ankle, posterior and plantar Achilles spurring, and calcification within the Achilles tendon.  He was given a Medrol Dosepak and a walking cast boot.  He states that symptoms are much improved.    ROS:   Pos for CC.  The patient denies current nausea, vomiting, chills, fevers, belly pain, calf pain, chest pain or SOB.  Complete remainder of ROS is otherwise neg.    VITALS:    Vitals:    10/30/23 1232   Weight: 149.2 kg (329 lb)       PMH:    Past Medical History:   Diagnosis Date    Arthritis     Cerebral artery occlusion with cerebral infarction     TIA    CHF (congestive heart failure) 12/24/2018    CVA (cerebral infarction) 2012    CVD (cardiovascular disease)     Depression 1977    hospitalized    Fatty liver     Gout     h/o Concussion     Hypertension 01/17/2011    Hypothyroidism     Obesity     Spider veins     TIA (transient ischaemic attack) 2012    Vitiligo        SXHX:    Past Surgical History:   Procedure Laterality Date    APPENDECTOMY      at age 22    COLONOSCOPY N/A 09/02/2016    Procedure: COMBINED COLONOSCOPY, SINGLE OR MULTIPLE BIOPSY/POLYPECTOMY BY BIOPSY;  Surgeon: Yanick Brown MD;  Location:  GI    COLONOSCOPY N/A 12/27/2021    Procedure: COLONOSCOPY;  Surgeon: Kong Chowdary MD;  Location: [facility] OR    VASECTOMY          MEDS:    Current Outpatient Medications   Medication    acetaminophen (TYLENOL) 500 MG tablet    aspirin (ASA) 81 MG tablet    betamethasone dipropionate (DIPROSONE) 0.05 % external ointment    cephALEXin (KEFLEX) 500 MG capsule    FLUoxetine (PROZAC) 40 MG capsule    " Glucosamine-Chondroit-MSM-C-Mn (FLEXI JOINT PO)    levothyroxine (SYNTHROID/LEVOTHROID) 112 MCG tablet    methylPREDNISolone (MEDROL DOSEPAK) 4 MG tablet therapy pack    Misc Natural Products (OSTEO BI-FLEX JOINT SHIELD PO)    nitroGLYcerin (NITROSTAT) 0.4 MG sublingual tablet    tamsulosin (FLOMAX) 0.4 MG capsule    torsemide (DEMADEX) 20 MG tablet    triamcinolone (KENALOG) 0.1 % external cream    triamcinolone (KENALOG) 0.1 % external ointment     No current facility-administered medications for this visit.       ALL:     Allergies   Allergen Reactions    Clopidogrel      Cough/emesis    Penicillins Rash    Simvastatin Diarrhea    Sulfa Antibiotics      Unsure    Xeroform [Bismuth Tribromphenate]      Unsure       FMH:    Family History   Problem Relation Age of Onset    Cancer Father         Lung    Diabetes Mother     Cancer Daughter         leukemia       SocHx:    Social History     Socioeconomic History    Marital status:      Spouse name: Melissa    Number of children: 3    Years of education: Not on file    Highest education level: Not on file   Occupational History    Occupation:      Employer: [employer]   Tobacco Use    Smoking status: Never    Smokeless tobacco: Never   Vaping Use    Vaping Use: Never used   Substance and Sexual Activity    Alcohol use: Not Currently    Drug use: No    Sexual activity: Yes     Partners: Female   Other Topics Concern    Parent/sibling w/ CABG, MI or angioplasty before 65F 55M? No   Social History Narrative    Not on file     Social Determinants of Health     Financial Resource Strain: Not on file   Food Insecurity: Not on file   Transportation Needs: No Transportation Needs (9/21/2020)    PRAPARE - Transportation     Lack of Transportation (Medical): No     Lack of Transportation (Non-Medical): No   Physical Activity: Not on file   Stress: Not on file   Social Connections: Not on file   Interpersonal Safety: Not on file   Housing Stability: Not  on file           EXAMINATION:  Gen:   No apparent distress  Neuro:   A&Ox3, no deficits  Psych:    Answering questions appropriately for age and situation with normal affect  Head:    NCAT  Eye:    Visual scanning without deficit  Ear:    Response to auditory stimuli wnl  Lung:    Non-labored breathing on RA noted  Abd:    NTND per patient report  Lymph:    Baseline venous insufficiency with stasis dermatitis.  Vasc:    Pulses palpable, CFT minimally delayed  Neuro:    Light touch sensation intact to all sensory nerve distributions without paresthesias  Derm:    Dry stable eschar anterior right lower leg.  No signs of infection  MSK:    Right lower extremity -mild tenderness on palpation at the anterior ankle joint.  Tibia, fibula, Achilles and heel are unremarkable today  Calf:    Neg for redness, swelling or tenderness      Imaging:  xrays R ankle 10/27/23 - IMPRESSION: There is soft tissue swelling over the right ankle. No  acute fracture is identified. There is some ossification projecting  over the region of the Achilles tendon. There are moderate-sized  plantar and posterior calcaneal spurs.    Assessment:  73 year old male with resolving anterior right ankle pain in setting of venous insufficiency      Medical Decision Making/Plan:  Discussed etiologies, anatomy and options  1.  Resolving anterior right ankle pain in setting of venous insufficiency  -I personally reviewed and interpreted the patient's lower extremity history pertinent to today's visit, including imaging/labs, in preparation for initiating a treatment program.  -I personally reviewed and interpreted the 10/27/2023 x-rays  -Finish the Medrol Dosepak  -Continue weightbearing as tolerated in the boot.  He is cleared to transition to a comfortable shoe once symptoms allow  -RICE/NSAID versus Tylenol as needed based on pain  -Patient states he is doing much better.  If the above plan provides sufficient relief and symptoms continue to  improve/resolve, no further follow-up or intervention is needed.  If in the near future (e.g. 3 weeks) this has failed to show progression, he will follow-up in clinic for reassessment, reexamination and we will discuss further treatment options    Follow up: 3 weeks as needed or sooner with acute issues      Patient's medical history was reviewed today      Eddi Foster DPM [facility]  Podiatric Foot & Ankle Surgeon  [facility]  [phone]    Disclaimer: This note consists of symbols derived from keyboarding, dictation and/or voice recognition software. As a result, there may be errors in the script that have gone undetected. Please consider this when interpreting information found in this chart.

## 2023-10-30 NOTE — PATIENT INSTRUCTIONS
Thank you for choosing [facility] / Foot & Ankle Surgery!    DR. LOZANO'S CLINIC LOCATIONS:     [facility] (Friday) TRIAGE LINE: [phone] [address] [facility]  APPOINTMENTS: [phone]   [place] [address] [address] RADIOLOGY: [phone]    PHYSICAL THERAPY: [phone]    SET UP SURGERY: [phone]   [facility] (Mon-Tues AM-Thurs) BILLING QUESTIONS: [phone] [address] FAX: [phone]   [place] [address] [address] [place] [facility]: [phone]     You were seen today for the anterior right ankle pain.  I am glad to see that symptoms are improving.  Your x-rays did not show any acute pathology in the area of concern.  Recommendations:    1.  Continue weightbearing as tolerated in the walking cast boot, but you are cleared to transition back to comfortable shoes as symptoms allow;    2.  Finish the tapered steroid dose.  Moving forward, rest, ice, elevate and utilize anti-inflammatories or Tylenol as needed based on pain.    If in the near future (e.g. 3 weeks), you have transition back to shoes and symptoms are otherwise minimal, no further follow-up or intervention will be necessary.  If in a few weeks, symptoms have failed to respond to the above plan, follow-up in clinic for reassessment, reexamination, and we will discuss further treatment options.  Do not hesitate to call prior with any questions.      PRICE Therapy    Many aches and pains throughout the foot and ankle can be helped with many simple treatments.  This is usually described as PRICE Therapy.      P - Protection - often times, inflammation/pain in the lower extremity is not able to improve simply because the areas involved are never allowed to rest.  Every step we take can bother the problematic area.  Protecting those areas is an important step in the healing process.  This may involve a walking cast boot, a special insert/orthotic device, an ankle brace, or simply avoiding barefoot walking.    R -  "Rest - in addition to protecting the foot/ankle, resting is an important, but often times difficult, treatment option.  Getting off your feet when they bother you, and specifically avoiding activities that cause pain/discomfort, are very beneficial to prevent, and treat, foot/ankle pain.  \"If there's something that makes it hurt(eg activities, shoe gear), and you keep doing the thing that makes it hurt, it's just going to keep hurting\".      I - Ice - icing regularly can help to decrease inflammation and swelling in the foot, thus decreasing pain.  Using an ice pack or a bag of frozen peas works very well.  Ice for 20 minutes multiple times per day as needed.  Do not place the ice directly on the skin as this can cause tissue damage.    C - Compression - using a compression wrap or an ACE wrap can help to decrease swelling, which can help to decrease pain.  Wearing the wraps is generally not needed at night, but they should be worn on a regular basis when you are going to be on your feet for prolonged periods as gravity tends to pull fluids down to your feet/ankles.    E - Elevation - elevating your lower extremities multiple times daily for 15-20 minutes can help to decrease swelling, which works well in decreasing pain levels.      NSAID/Tylenol - An anti-inflammatory, like Aleve or ibuprofen, and/or a pain medication, such as Tylenol, can help to improve pain levels and get the issue resolved sooner rather than later.  Also, topical anti-inflammatory medications like Voltaren gel can be used for local treatment, with the benefit of avoiding system issues with oral medications.  Anyone with liver issues should be careful with Tylenol, and anyone with high blood pressure or heart, stomach or kidney issues should be careful with anti-inflammatories.  Please ask if you have questions about these medications, including dosage.      " 25-Sep-2020 05:54:39

## 2023-11-11 ENCOUNTER — APPOINTMENT (OUTPATIENT)
Dept: CT IMAGING | Facility: CLINIC | Age: 73
End: 2023-11-11
Attending: EMERGENCY MEDICINE
Payer: COMMERCIAL

## 2023-11-11 ENCOUNTER — HOSPITAL ENCOUNTER (EMERGENCY)
Facility: CLINIC | Age: 73
Discharge: HOME | End: 2023-11-11
Attending: EMERGENCY MEDICINE | Admitting: EMERGENCY MEDICINE
Payer: COMMERCIAL

## 2023-11-11 VITALS
OXYGEN SATURATION: 93 % | TEMPERATURE: 98.3 F | SYSTOLIC BLOOD PRESSURE: 163 MMHG | DIASTOLIC BLOOD PRESSURE: 98 MMHG | HEART RATE: 79 BPM | RESPIRATION RATE: 18 BRPM
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
## 2023-11-11 DIAGNOSIS — S22.41XA CLOSED FRACTURE OF MULTIPLE RIBS OF RIGHT SIDE, INITIAL ENCOUNTER: ICD-10-CM

## 2023-11-11 DIAGNOSIS — S02.2XXA CLOSED FRACTURE OF NASAL BONE, INITIAL ENCOUNTER: ICD-10-CM

## 2023-11-11 PROCEDURE — 70486 CT MAXILLOFACIAL W/O DYE: CPT

## 2023-11-11 PROCEDURE — 250N000011 HC RX IP 250 OP 636: Performed by: EMERGENCY MEDICINE

## 2023-11-11 PROCEDURE — 71250 CT THORAX DX C-: CPT

## 2023-11-11 PROCEDURE — 90715 TDAP VACCINE 7 YRS/> IM: CPT | Performed by: EMERGENCY MEDICINE

## 2023-11-11 PROCEDURE — 90471 IMMUNIZATION ADMIN: CPT | Performed by: EMERGENCY MEDICINE

## 2023-11-11 PROCEDURE — 250N000013 HC RX MED GY IP 250 OP 250 PS 637: Performed by: EMERGENCY MEDICINE

## 2023-11-11 PROCEDURE — 70450 CT HEAD/BRAIN W/O DYE: CPT

## 2023-11-11 PROCEDURE — 99284 EMERGENCY DEPT VISIT MOD MDM: CPT | Mod: 25

## 2023-11-11 PROCEDURE — 72125 CT NECK SPINE W/O DYE: CPT

## 2023-11-11 RX ORDER — LIDOCAINE 4 G/G
2 PATCH TOPICAL ONCE
Status: DISCONTINUED | OUTPATIENT
Start: 2023-11-11 | End: 2023-11-11 | Stop reason: HOSPADM

## 2023-11-11 RX ORDER — ACETAMINOPHEN 500 MG
1000 TABLET ORAL ONCE
Status: COMPLETED | OUTPATIENT
Start: 2023-11-11 | End: 2023-11-11

## 2023-11-11 RX ORDER — LIDOCAINE 4 G/G
2 PATCH TOPICAL EVERY 24 HOURS
Qty: 14 PATCH | Refills: 0 | Status: SHIPPED | OUTPATIENT
Start: 2023-11-11 | End: 2023-11-18

## 2023-11-11 RX ADMIN — CLOSTRIDIUM TETANI TOXOID ANTIGEN (FORMALDEHYDE INACTIVATED), CORYNEBACTERIUM DIPHTHERIAE TOXOID ANTIGEN (FORMALDEHYDE INACTIVATED), BORDETELLA PERTUSSIS TOXOID ANTIGEN (GLUTARALDEHYDE INACTIVATED), BORDETELLA PERTUSSIS FILAMENTOUS HEMAGGLUTININ ANTIGEN (FORMALDEHYDE INACTIVATED), BORDETELLA PERTUSSIS PERTACTIN ANTIGEN, AND BORDETELLA PERTUSSIS FIMBRIAE 2/3 ANTIGEN 0.5 ML: 5; 2; 2.5; 5; 3; 5 INJECTION, SUSPENSION INTRAMUSCULAR at 16:16

## 2023-11-11 RX ADMIN — ACETAMINOPHEN 1000 MG: 500 TABLET, FILM COATED ORAL at 18:42

## 2023-11-11 RX ADMIN — LIDOCAINE 2 PATCH: 4 PATCH TOPICAL at 18:41

## 2023-11-11 ASSESSMENT — ACTIVITIES OF DAILY LIVING (ADL)
ADLS_ACUITY_SCORE: 35
ADLS_ACUITY_SCORE: 35

## 2023-11-11 NOTE — ED TRIAGE NOTES
Arrives from home via EMS, apartment complex. Walking down the stairs and missing the last step, went down face first onto concrete.   States chest pain after fall but fell onto his as well chest.     VSS en route. 50 mcg fet. .   18 L hand

## 2023-11-12 ENCOUNTER — TELEPHONE (OUTPATIENT)
Dept: INTERNAL MEDICINE | Facility: CLINIC | Age: 73
End: 2023-11-12
Payer: COMMERCIAL

## 2023-11-12 NOTE — ED NOTES
Pt instructed how to use incentive spirometer. Pt demonstrated back the correct way to use the spirometer and feels confident on how to use at home.

## 2023-11-12 NOTE — DISCHARGE INSTRUCTIONS
Use your spirometer at least 5 times/hour while you're awake. It's important to keep your lung fully inflated to help prevent pneumonia.  You may use Tylenol, 1000 mg, every 6 hours as needed for pain.  Also apply the Lidoderm patches for only 12 hours at a time.  Do not apply heat directly over the top of them.    Follow-up with your primary care physician early this week for recheck of your symptoms.  Return to emergency department for new/worsening uncontrollable pain, fever greater than 100.4, new cough, or for any other concerns.

## 2023-11-12 NOTE — TELEPHONE ENCOUNTER
Reason for Call:  Appointment Request    Patient requesting this type of appt:  Hospital/ED Follow-Up     Requested provider: Huy Crystal    Reason patient unable to be scheduled: Not within requested timeframe    When does patient want to be seen/preferred time: 3-5 days    Comments: Patient was in hospital as of 11/10/23 for injuries due to fall. Advised to make follow up appt within 3-5 days, no appointments available within requested time frame.    Okay to leave a detailed message?: Yes at Home number on file [phone] (home)    Call taken on 11/12/2023 at 9:31 AM by Heidy Ferrer

## 2023-11-13 NOTE — TELEPHONE ENCOUNTER
Will need to schedule follow-up appointment when available or else with team member or another provider.

## 2023-11-13 NOTE — TELEPHONE ENCOUNTER
Pts wife (c2c) and pt called the clinic requesting a hospital follow up appt. Assisted with scheduling at [facility] per pt/pts wife request for timing of appt. Scheduled with Margarita for 11/15. Pt/pts wife agree to plan.

## 2023-11-14 NOTE — ED PROVIDER NOTES
History     Chief Complaint:  Fall       HPI   Jamar Ivory is a 73 year old male who presents after fall at home. He was walking down the stairs today when he missed the last step and fell directly on his face and chest. He reports nose bleeding, pain, and anterior chest pain. Denies LOC. He is unsure if his tetanus is up to date.       Independent Historian:   Wife was with him when he fell. Reports there was no LOC, but that it seemed like he tripped and fell.     Review of External Notes:   Reviewed podiatry notes from <2 weeks ago.      Medications:    aspirin (ASA) 81 MG tablet  betamethasone dipropionate (DIPROSONE) 0.05 % external ointment  cephALEXin (KEFLEX) 500 MG capsule  FLUoxetine (PROZAC) 40 MG capsule  Glucosamine-Chondroit-MSM-C-Mn (FLEXI JOINT PO)  levothyroxine (SYNTHROID/LEVOTHROID) 112 MCG tablet  methylPREDNISolone (MEDROL DOSEPAK) 4 MG tablet therapy pack  Misc Natural Products (OSTEO BI-FLEX JOINT SHIELD PO)  nitroGLYcerin (NITROSTAT) 0.4 MG sublingual tablet  tamsulosin (FLOMAX) 0.4 MG capsule  torsemide (DEMADEX) 20 MG tablet  triamcinolone (KENALOG) 0.1 % external cream  triamcinolone (KENALOG) 0.1 % external ointment        Past Medical History:    Past Medical History:   Diagnosis Date    Arthritis     Cerebral artery occlusion with cerebral infarction     CHF (congestive heart failure) 12/24/2018    CVA (cerebral infarction) 2012    CVD (cardiovascular disease)     Depression 1977    Fatty liver     Gout     h/o Concussion     Hypertension 01/17/2011    Hypothyroidism     Obesity     Spider veins     TIA (transient ischaemic attack) 2012    Vitiligo        Past Surgical History:    Past Surgical History:   Procedure Laterality Date    APPENDECTOMY      at age 22    COLONOSCOPY N/A 09/02/2016    Procedure: COMBINED COLONOSCOPY, SINGLE OR MULTIPLE BIOPSY/POLYPECTOMY BY BIOPSY;  Surgeon: Yanick Brown MD;  Location:  GI    COLONOSCOPY N/A 12/27/2021    Procedure: COLONOSCOPY;   Surgeon: Kong Chowdary MD;  Location: [facility] OR    VASECTOMY          Physical Exam     Physical Exam  Nursing note and vitals reviewed.  HENT:   Mouth/Throat: Moist mucous membranes.   Nose abrasion and evidence of recent epistaxis. Bleeding controlled. No septal hematoma.   Eyes: EOMI, nonicteric sclera  Cardiovascular: Normal rate, regular rhythm, no murmurs, rubs, or gallops  Pulmonary/Chest: Effort normal and breath sounds normal. No respiratory distress. No wheezes. No rales. Pain to palpation anterior chest/sternum.  Abdominal: Soft. Nontender, nondistended, no guarding or rigidity.   Musculoskeletal: Normal range of motion.   Neurological: Alert. Moves all extremities spontaneously.   Skin: Skin is warm and dry. No rash noted.         Emergency Department Course       Imaging:  Cervical spine CT w/o contrast   Final Result   IMPRESSION:   HEAD CT:   1.  No acute intracranial process.      FACIAL BONE CT:   1.  There are acute mildly displaced bilateral nasal bone fractures.      CERVICAL SPINE CT:   1.  No acute fracture.      Chest CT w/o contrast   Final Result   IMPRESSION:       Acute, minimally displaced fractures of the right anterior fourth through sixth ribs.         CT Facial Bones without Contrast   Final Result   IMPRESSION:   HEAD CT:   1.  No acute intracranial process.      FACIAL BONE CT:   1.  There are acute mildly displaced bilateral nasal bone fractures.      CERVICAL SPINE CT:   1.  No acute fracture.      Head CT w/o contrast   Final Result   IMPRESSION:   HEAD CT:   1.  No acute intracranial process.      FACIAL BONE CT:   1.  There are acute mildly displaced bilateral nasal bone fractures.      CERVICAL SPINE CT:   1.  No acute fracture.               Emergency Department Course & Assessments:       Interventions:  Medications   Tdap (tetanus-diphtheria-acell pertussis) (ADACEL) injection 0.5 mL (0.5 mLs Intramuscular $Given 11/11/23 9947)   acetaminophen (TYLENOL) tablet 1,000 mg  (1,000 mg Oral $Given 11/11/23 1849)          Independent Interpretation (X-rays, CTs, rhythm strip):  I independently reviewed the head CT. I see no evidence of intracranial hemorrhage.       Consultations/Discussion of Management or Tests:    The patient's medical records were reviewed.  Nursing notes and vitals were reviewed.    I performed an exam of the patient as documented above. The patient is in agreement with my plan of care.       Social Determinants of Health affecting care:   None    Disposition:  The patient was discharged to home.     Impression & Plan        Medical Decision Making:  Pt presents after mechanical fall. No concern for syncope. He suffered a nasal fracture without significant displacement. No ongoing bleeding. No septal hematoma. Discussed with him he could follow-up with ENT, but he declines stating he's broken his nose before. No head bleed on head CT. C-spine CT negative as well. He does have 3 rib fractures noted on chest CT with pneumothorax or other traumatic injury. Pain well-controlled with tylenol in ED. As fractures are anterior, will hopefully be less painful. Discussed tylenol/lidoderm patches for pain relief as pt isn't needing narcotics in ED. He and his wife agree with this approach. Recommended close pediatrician follow-up and pt was supplied with incentive spirometer to try to minimize risk of pneumonia, which we discussed. He is in stable condition at the time of discharge, red flags that should merit ED return were discussed as well as recommended follow-up instructions. All questions were answered and he and his wife are in agreement with the plan.        Diagnosis:    ICD-10-CM    1. Fall, initial encounter  W19.XXXA       2. Closed fracture of multiple ribs of right side, initial encounter  S22.41XA       3. Closed fracture of nasal bone, initial encounter  S02.2XXA            Discharge Medications:  Discharge Medication List as of 11/11/2023  6:53 PM        START  taking these medications    Details   Lidocaine (LIDOCARE) 4 % Patch Place 2 patches onto the skin every 24 hours for 7 days To prevent lidocaine toxicity, patient should be patch free for 12 hrs daily.Disp-14 patch, [id number]                Sridhar Almanza MD  11/14/23 0038

## 2023-11-15 ENCOUNTER — OFFICE VISIT (OUTPATIENT)
Dept: FAMILY MEDICINE | Facility: CLINIC | Age: 73
End: 2023-11-15
Payer: COMMERCIAL

## 2023-11-15 VITALS
HEIGHT: 68 IN | TEMPERATURE: 98.3 F | BODY MASS INDEX: 47.74 KG/M2 | WEIGHT: 315 LBS | OXYGEN SATURATION: 96 % | DIASTOLIC BLOOD PRESSURE: 80 MMHG | HEART RATE: 79 BPM | SYSTOLIC BLOOD PRESSURE: 131 MMHG | RESPIRATION RATE: 18 BRPM

## 2023-11-15 DIAGNOSIS — S61.411D SKIN TEAR OF RIGHT HAND WITHOUT COMPLICATION, SUBSEQUENT ENCOUNTER: ICD-10-CM
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
## 2023-11-15 DIAGNOSIS — S22.41XS CLOSED FRACTURE OF MULTIPLE RIBS OF RIGHT SIDE, SEQUELA: Primary | ICD-10-CM

## 2023-11-15 DIAGNOSIS — S02.2XXS CLOSED FRACTURE OF NASAL BONE, SEQUELA: ICD-10-CM

## 2023-11-15 PROCEDURE — 99214 OFFICE O/P EST MOD 30 MIN: CPT | Performed by: PHYSICIAN ASSISTANT

## 2023-11-15 RX ORDER — TRAMADOL HYDROCHLORIDE 50 MG/1
50 TABLET ORAL EVERY 6 HOURS PRN
Qty: 10 TABLET | Refills: 0 | Status: SHIPPED | OUTPATIENT
Start: 2023-11-15 | End: 2023-11-18

## 2023-11-15 ASSESSMENT — PAIN SCALES - GENERAL: PAINLEVEL: EXTREME PAIN (8)

## 2023-11-15 NOTE — PROGRESS NOTES
Assessment and Plan:     (S22.41XS) Closed fracture of multiple ribs of right side, sequela  (primary encounter diagnosis)  Comment: missed a step, fell on outside stairs, was seen in ED on 11/11/23, trauma scans showed rib fractures, nasal bone fractures, using incentive spirometer, no fever/chills, no cough, using tylenol for pain but still having considerable pain and would like something stronger  Plan: traMADol (ULTRAM) 50 MG tablet--discussed habit forming and can cause drowsiness, don't drive while taking  Continue incentive spirometer  Sees pcp next week          (W10.8XXS) Fall down stairs, sequela  Comment: mechanical call  Plan:     (S02.2XXS) Closed fracture of nasal bone, sequela  Comment: does not want to see ENT  Plan: nasal precautions     (S61.411D) Skin tear of right hand without complication, subsequent encounter  Comment: palm of right hand, no e/o infection  Plan: keep clean, apply vaseline daily    Claritza Mercedes PA-C  30 minutes on the day of the encounter doing chart review, history and exam, documentation and further activities as noted above.      Subjective   Kenneth is a 73 year old, presenting for the following health issues:  ER F/U      HPI       ED/UC Followup:    Facility:  [facility]  Date of visit: 11/11/2023  Reason for visit: Fall, Closed fracture of multiple ribs of right side,   Closed fracture of nasal bone  Current Status: Pt at home    Kenneth is here for ED follow-up, the following is from his discharge summary:    Pt presents after mechanical fall. No concern for syncope. He suffered a nasal fracture without significant displacement. No ongoing bleeding. No septal hematoma. Discussed with him he could follow-up with ENT, but he declines stating he's broken his nose before. No head bleed on head CT. C-spine CT negative as well. He does have 3 rib fractures noted on chest CT with pneumothorax or other traumatic injury. Pain well-controlled with  "tylenol in ED. As fractures are anterior, will hopefully be less painful. Discussed tylenol/lidoderm patches for pain relief as pt isn't needing narcotics in ED. He and his wife agree with this approach. Recommended close pediatrician follow-up and pt was supplied with incentive spirometer to try to minimize risk of pneumonia, which we discussed. He is in stable condition at the time of discharge, red flags that should merit ED return were discussed as well as recommended follow-up instructions. All questions were answered and he and his wife are in agreement with the plan.     Kenneth has been having a lot of pain since his fall,   It is very painful to take a deep breath    He denies cough, fever  He denies hemoptysis     He is currently using tylenol and lidocaine for pain  He has been using incentive spirometer     He has been sleeping well at night  He wonders if he could have something a bit stronger for pain to use temporarily     Review of Systems   See above      Objective    /80 (BP Location: Right arm, Patient Position: Sitting, Cuff Size: Adult Large)   Pulse 79   Temp 98.3  F (36.8  C) (Temporal)   Resp 18   Ht 1.727 m (5' 8\")   Wt 147.5 kg (325 lb 3.2 oz)   SpO2 96%   BMI 49.45 kg/m    Body mass index is 49.45 kg/m .    Physical Exam     GENERAL: in NAD  RESP: lungs clear to auscultation - no rales, no rhonchi, no wheezes, normal WOB  CV: regular rates and rhythm, normal S1 S2, no S3 or S4 and no murmur, no click or rub   MS: extremities- no gross deformities noted, mild pitting edema bilat lower ext w/chronic skin changes  SKIN: skin tear right palm about size of nickel, no surrounding erythema or induration       "

## 2023-11-15 NOTE — PATIENT INSTRUCTIONS
Keep hand wound clean and dry    Continue incentive spirometer     Take tylenol as needed for pain up to 1000mg three times daily, do not exceed 3000mg in 24 hour period

## 2023-11-22 ENCOUNTER — ANCILLARY PROCEDURE (OUTPATIENT)
Dept: GENERAL RADIOLOGY | Facility: CLINIC | Age: 73
End: 2023-11-22
Attending: INTERNAL MEDICINE
Payer: COMMERCIAL

## 2023-11-22 ENCOUNTER — OFFICE VISIT (OUTPATIENT)
Dept: INTERNAL MEDICINE | Facility: CLINIC | Age: 73
End: 2023-11-22
Payer: COMMERCIAL

## 2023-11-22 VITALS
RESPIRATION RATE: 16 BRPM | BODY MASS INDEX: 45.34 KG/M2 | HEIGHT: 68 IN | SYSTOLIC BLOOD PRESSURE: 136 MMHG | OXYGEN SATURATION: 95 % | HEART RATE: 91 BPM | WEIGHT: 299.2 LBS | TEMPERATURE: 97.4 F | DIASTOLIC BLOOD PRESSURE: 82 MMHG

## 2023-11-22 DIAGNOSIS — M25.561 CHRONIC PAIN OF BOTH KNEES: ICD-10-CM

## 2023-11-22 DIAGNOSIS — N39.9 URINARY DISORDER: ICD-10-CM

## 2023-11-22 DIAGNOSIS — M25.562 CHRONIC PAIN OF BOTH KNEES: ICD-10-CM

## 2023-11-22 DIAGNOSIS — E11.42 DIABETIC POLYNEUROPATHY ASSOCIATED WITH TYPE 2 DIABETES MELLITUS (H): Primary | ICD-10-CM

## 2023-11-22 DIAGNOSIS — E03.9 ACQUIRED HYPOTHYROIDISM: ICD-10-CM

## 2023-11-22 DIAGNOSIS — G89.29 CHRONIC PAIN OF BOTH KNEES: ICD-10-CM

## 2023-11-22 DIAGNOSIS — S22.39XS CLOSED FRACTURE OF ONE RIB, UNSPECIFIED LATERALITY, SEQUELA: ICD-10-CM

## 2023-11-22 DIAGNOSIS — I10 HYPERTENSION GOAL BP (BLOOD PRESSURE) < 140/90: Chronic | ICD-10-CM

## 2023-11-22 DIAGNOSIS — I25.119 CORONARY ARTERY DISEASE INVOLVING NATIVE CORONARY ARTERY OF NATIVE HEART WITH ANGINA PECTORIS (H): ICD-10-CM

## 2023-11-22 LAB
ERYTHROCYTE [DISTWIDTH] IN BLOOD BY AUTOMATED COUNT: 15.1 % (ref 10–15)
HBA1C MFR BLD: 5.7 % (ref 0–5.6)
HCT VFR BLD AUTO: 41.4 % (ref 40–53)
HGB BLD-MCNC: 13.5 G/DL (ref 13.3–17.7)
MCH RBC QN AUTO: 29.5 PG (ref 26.5–33)
MCHC RBC AUTO-ENTMCNC: 32.6 G/DL (ref 31.5–36.5)
MCV RBC AUTO: 90 FL (ref 78–100)
PLATELET # BLD AUTO: 230 10E3/UL (ref 150–450)
RBC # BLD AUTO: 4.58 10E6/UL (ref 4.4–5.9)
WBC # BLD AUTO: 5 10E3/UL (ref 4–11)

## 2023-11-22 PROCEDURE — 73562 X-RAY EXAM OF KNEE 3: CPT | Mod: TC | Performed by: RADIOLOGY

## 2023-11-22 PROCEDURE — 36415 COLL VENOUS BLD VENIPUNCTURE: CPT | Performed by: INTERNAL MEDICINE

## 2023-11-22 PROCEDURE — 85027 COMPLETE CBC AUTOMATED: CPT | Performed by: INTERNAL MEDICINE

## 2023-11-22 PROCEDURE — 83036 HEMOGLOBIN GLYCOSYLATED A1C: CPT | Performed by: INTERNAL MEDICINE

## 2023-11-22 PROCEDURE — 99214 OFFICE O/P EST MOD 30 MIN: CPT | Performed by: INTERNAL MEDICINE

## 2023-11-22 PROCEDURE — 84443 ASSAY THYROID STIM HORMONE: CPT | Performed by: INTERNAL MEDICINE

## 2023-11-22 PROCEDURE — 84439 ASSAY OF FREE THYROXINE: CPT | Performed by: INTERNAL MEDICINE

## 2023-11-22 PROCEDURE — 80061 LIPID PANEL: CPT | Performed by: INTERNAL MEDICINE

## 2023-11-22 NOTE — PROGRESS NOTES
"  Assessment & Plan     Diabetic polyneuropathy associated with type 2 diabetes mellitus (H)  Recheck A1c level and discussed compliance with therapy  - HEMOGLOBIN A1C; Future  - Basic metabolic panel  (Ca, Cl, CO2, Creat, Gluc, K, Na, BUN); Future    Coronary artery disease involving native coronary artery of native heart with angina pectoris (H24)  Recheck lipid panel  - Lipid panel reflex to direct LDL Non-fasting; Future    Hypertension goal BP (blood pressure) < 140/90  Stable and encourage compliance with therapy    Acquired hypothyroidism  Recheck thyroid function panel with noted prior elevated TSH and patient not taking levothyroxine  - TSH with free T4 reflex; Future    Chronic pain of both knees  Image both knees bilaterally rule out occult fracture  - XR Knee Right 3 Views; Future  - XR Knee Left 3 Views; Future    Closed fracture of one rib, unspecified laterality, sequela  Supportive care recommended.    Urinary disorder  Urged compliance with Flomax and follow-up with urology as directed       BMI:   Estimated body mass index is 45.49 kg/m  as calculated from the following:    Height as of this encounter: 1.727 m (5' 8\").    Weight as of this encounter: 135.7 kg (299 lb 3.2 oz).   Weight management plan: Discussed healthy diet and exercise guidelines    See Patient Instructions    Huy Crystal MD  [facility]    Danni Cooper is a 73 year old, presenting for the following health issues:  Establish Care      HPI     He reports to me as a new patient.  He is here to primarily establish care.  He comes with his wife who I saw last week as a new patient.  He reports to me also that he has not been taking any of his medications.      Concerns:  1. Bilateral knee pain that has been ongoing.  His symptoms are now worse ever since he fell recently.  He did not have imaging of his knees but notes that now he is having increasing pain and discomfort in both knees.  2. Weak " "urinary stream.  Today he complains of worsening urinary frequency.  He says he voids every hour during the day and every 2 hours at night.  He feels like he is not emptying his bladder well now.  He reports a slow urinary stream.  Indeed he is not emptying his bladder well today with a postvoid residual of 346mL.  Has been seen by urology and underwent a cystoscopy.  3. Follow up rib fractures from fall on 11/11/23. Patient states he is having painful breathing since fall       Review of Systems   CONSTITUTIONAL: NEGATIVE for fever, chills, change in weight  EYES: NEGATIVE for vision changes or irritation  ENT/MOUTH: NEGATIVE for ear, mouth and throat problems  RESP: NEGATIVE for significant cough   CV: NEGATIVE for chest pain, palpitations  GI: NEGATIVE for nausea, abdominal pain, heartburn, or change in bowel habits  NEURO: NEGATIVE for weakness, dizziness or paresthesias  HEME: NEGATIVE for bleeding problems  PSYCHIATRIC: NEGATIVE for changes in mood or affect      Objective    /82   Pulse 91   Temp 97.4  F (36.3  C) (Temporal)   Resp 16   Ht 1.727 m (5' 8\")   Wt 135.7 kg (299 lb 3.2 oz)   SpO2 95%   BMI 45.49 kg/m    Body mass index is 45.49 kg/m .    Physical Exam   GENERAL: alert and using a wheeled walker for ambulation  EYES: Eyes grossly normal to inspection, PERRL and conjunctivae and sclerae normal  HENT: ear canals and TM's normal, nose and mouth without ulcers or lesions  RESP: lungs clear to auscultation - no rales, rhonchi or wheezes, reproducible chest wall pain is noted in region of right fourth through sixth ribs  CV: regular rate and rhythm, normal S1 S2, no S3 or S4, no murmur, click or rub  ABDOMEN:  obese  NEURO: No focal changes  PSYCH: mentation appears normal, affect normal/bright                  "

## 2023-11-22 NOTE — LETTER
[facility]  [address] [address]  [phone]      11/23/2023       Jamar Ivory  [address]    [place] [address]        Dear Jamar,    Your hemoglobin is normal.    Your Hemoglobin A1C and blood sugar tests look good and I would continue with your medication without change.  These tests should be repeated in 6 months.    Your cholesterol is high and could be treated more aggressively with better diet, exercise and weight loss.  Please follow-up with me to discuss your further medication options/changes if you are interested.    Your thyroid function tests indicate you need some medication adjustment so I would call the clinic and make a follow-up appointment to discuss these changes.    Sincerely,      Huy Crystal MD  Internal Medicine

## 2023-11-23 LAB
CHOLEST SERPL-MCNC: 202 MG/DL
HDLC SERPL-MCNC: 42 MG/DL
LDLC SERPL CALC-MCNC: 123 MG/DL
NONHDLC SERPL-MCNC: 160 MG/DL
T4 FREE SERPL-MCNC: 0.59 NG/DL (ref 0.9–1.7)
TRIGL SERPL-MCNC: 185 MG/DL
TSH SERPL DL<=0.005 MIU/L-ACNC: 22.8 UIU/ML (ref 0.3–4.2)

## 2023-11-28 ENCOUNTER — TRANSFERRED RECORDS (OUTPATIENT)
Dept: HEALTH INFORMATION MANAGEMENT | Facility: CLINIC | Age: 73
End: 2023-11-28
Payer: COMMERCIAL

## 2023-11-29 ENCOUNTER — TELEPHONE (OUTPATIENT)
Dept: INTERNAL MEDICINE | Facility: CLINIC | Age: 73
End: 2023-11-29
Payer: COMMERCIAL

## 2023-11-29 NOTE — TELEPHONE ENCOUNTER
Called pt wife, asked what she needed to discuss, was told that is was about her husbands medication she was unwilling to clarify and give more details.     Nurse please give the wife a call     Michelle AGUILAR

## 2023-11-29 NOTE — TELEPHONE ENCOUNTER
General Call    Contacts         Type Contact Phone/Fax    11/29/2023 11:02 AM CST Phone (Incoming) Suzan Ivory (Emergency Contact) [phone]          Reason for Call:  Please call Melissa.    What are your questions or concerns:  She needs to talk to Rika.    Date of last appointment with provider: NA    Okay to leave a detailed message?: Yes at Cell number on file:    Telephone Information:   Mobile [phone]

## 2023-11-30 NOTE — TELEPHONE ENCOUNTER
"Called and spoke with pt's wife (C2C) to clarify what is needed.     She states that PCP mentioned in 11/22 OV that pt and his wife should call \"Rika\".   Writer unable to find any info regarding this in pt's chart.     Routing to PCP to clarify. Can you please advise who Rika is and which phone number she can be reached on, and for what reason? The pt and his wife are unsure.     Please call pt's wife back with PCP recommendations, and leave detailed VM if no answer.   Can we leave a detailed message on this number? YES  Phone number patient can be reached at: Home number on file [phone] (home)    Emy Kelley RN  [facility] Triage      "

## 2023-11-30 NOTE — TELEPHONE ENCOUNTER
Patient called to verify medication list. Medication list updated with what patient had reported.

## 2023-12-21 ENCOUNTER — OFFICE VISIT (OUTPATIENT)
Dept: URGENT CARE | Facility: URGENT CARE | Age: 73
End: 2023-12-21
Payer: COMMERCIAL

## 2023-12-21 VITALS
TEMPERATURE: 98.1 F | RESPIRATION RATE: 20 BRPM | OXYGEN SATURATION: 99 % | HEART RATE: 96 BPM | BODY MASS INDEX: 45.46 KG/M2 | DIASTOLIC BLOOD PRESSURE: 82 MMHG | SYSTOLIC BLOOD PRESSURE: 130 MMHG | WEIGHT: 299 LBS

## 2023-12-21 DIAGNOSIS — M79.675 PAIN AND SWELLING OF TOE OF LEFT FOOT: Primary | ICD-10-CM

## 2023-12-21 DIAGNOSIS — R73.03 PREDIABETES: ICD-10-CM

## 2023-12-21 DIAGNOSIS — M10.9 ACUTE GOUTY ARTHRITIS: ICD-10-CM

## 2023-12-21 DIAGNOSIS — M79.89 PAIN AND SWELLING OF TOE OF LEFT FOOT: Primary | ICD-10-CM

## 2023-12-21 LAB — URATE SERPL-MCNC: 6.3 MG/DL (ref 3.4–7)

## 2023-12-21 PROCEDURE — 99214 OFFICE O/P EST MOD 30 MIN: CPT | Performed by: NURSE PRACTITIONER

## 2023-12-21 PROCEDURE — 36415 COLL VENOUS BLD VENIPUNCTURE: CPT | Performed by: NURSE PRACTITIONER

## 2023-12-21 PROCEDURE — 84550 ASSAY OF BLOOD/URIC ACID: CPT | Performed by: NURSE PRACTITIONER

## 2023-12-21 RX ORDER — PREDNISONE 20 MG/1
40 TABLET ORAL DAILY
Qty: 10 TABLET | Refills: 0 | Status: SHIPPED | OUTPATIENT
Start: 2023-12-21 | End: 2023-12-26

## 2023-12-21 RX ORDER — DOXYCYCLINE 100 MG/1
100 TABLET ORAL 2 TIMES DAILY
Qty: 20 TABLET | Refills: 0 | Status: SHIPPED | OUTPATIENT
Start: 2023-12-21 | End: 2023-12-31

## 2023-12-21 NOTE — PROGRESS NOTES
Chief Complaint   Patient presents with    Toe Pain     Left big toe and outer foot red and swollen X 2 weeks         ICD-10-CM    1. Pain and swelling of toe of left foot  M79.675 doxycycline monohydrate (ADOXA) 100 MG tablet    M79.89 predniSONE (DELTASONE) 20 MG tablet     Uric acid     CANCELED: Uric acid      2. Prediabetes  R73.03       3. Acute gouty arthritis  M10.9       Spoke with patient after visit was completed regarding the normal uric acid level.  Suggested he start the doxycycline and take this until gone and hold off on the prednisone.  If his symptoms fail to improve or they start worsening he is instructed to go to the emergency room.    38 minutes total time spent reviewing patient's chart, completing history and physical exam, establishing plan of care, educating patient, and completing documentation.  Results for orders placed or performed in visit on 12/21/23 (from the past 24 hour(s))   Uric acid   Result Value Ref Range    Uric Acid 6.3 3.4 - 7.0 mg/dL     *Note: Due to a large number of results and/or encounters for the requested time period, some results have not been displayed. A complete set of results can be found in Results Review.       Subjective     Jamar Ivory is an 73 year old male who presents to clinic today for left great toe is swollen and red for 2 weeks, slowly worsening.  He does have a history of gout and is prediabetic with neuropathy in his feet.      ROS: 10 point ROS neg other than the symptoms noted above in the HPI.       Objective    /82   Pulse 96   Temp 98.1  F (36.7  C) (Tympanic)   Resp 20   Wt 135.6 kg (299 lb)   SpO2 99%   BMI 45.46 kg/m    Nurses notes and VS have been reviewed.    Physical Exam       GENERAL APPEARANCE: alert and mild distress     MS: extremities normal- no gross deformities noted; normal muscle tone, except for swelling of the right great toe.     SKIN: Erythema of the left great toe into the proximal metatarsal joint, warm  to the touch     NEURO: Decreased sensation to feet bilaterally, normal strength in all extremities      YENI Rosenberg, CNP  [facility] Provider    The use of Dragon/Hinacom dictation services may have been used to construct the content in this note; any grammatical or spelling errors are non-intentional. Please contact the author of this note directly if you are in need of any clarification.

## 2023-12-27 ENCOUNTER — TRANSFERRED RECORDS (OUTPATIENT)
Dept: HEALTH INFORMATION MANAGEMENT | Facility: CLINIC | Age: 73
End: 2023-12-27
Payer: COMMERCIAL

## 2024-02-16 ENCOUNTER — NURSE TRIAGE (OUTPATIENT)
Dept: INTERNAL MEDICINE | Facility: CLINIC | Age: 74
End: 2024-02-16

## 2024-02-16 NOTE — TELEPHONE ENCOUNTER
"Pts wife (c2c) called the clinic stating pt had been having HA.     Pt fell in November and fractured nose and ribs. Otherwise no known injury to his head.     HA started last week on Friday. They come and go. Recent HA started at 0200 this morning.     Pt reports HA is in the center of his forehead. Pain is a 6/10. Pt reports new weakness in his right leg. Pt also reports a stiff neck but can touch his chin to his chest.      Denies fever, eye pain, cold symptoms, sore throat.     Per protocol- call 911 now. Writer offered to call 911, but pt and pts wife stated they can call 911. Pts wife agrees to plan.        1. LOCATION: \"Where does it hurt?\"       Forehead (middle)  2. ONSET: \"When did the headache start?\" (Minutes, hours or days)       Friday (1 week ago)  3. PATTERN: \"Does the pain come and go, or has it been constant since it started?\"      Comes and goes. This one started 0200   4. SEVERITY: \"How bad is the pain?\" and \"What does it keep you from doing?\"  (e.g., Scale 1-10; mild, moderate, or severe)    - MILD (1-3): doesn't interfere with normal activities     - MODERATE (4-7): interferes with normal activities or awakens from sleep     - SEVERE (8-10): excruciating pain, unable to do any normal activities         6/10  5. RECURRENT SYMPTOM: \"Have you ever had headaches before?\" If Yes, ask: \"When was the last time?\" and \"What happened that time?\"       Recently   6. CAUSE: \"What do you think is causing the headache?\"      Unknown  7. MIGRAINE: \"Have you been diagnosed with migraine headaches?\" If Yes, ask: \"Is this headache similar?\"       No  8. HEAD INJURY: \"Has there been any recent injury to the head?\"       Head injury in Nov of 2023. No recent head injuries.   9. OTHER SYMPTOMS: \"Do you have any other symptoms?\" (fever, stiff neck, eye pain, sore throat, cold symptoms)      Stiff neck   10. PREGNANCY: \"Is there any chance you are pregnant?\" \"When was your last menstrual period?\"        NA  Reason " for Disposition   Weakness of the face, arm or leg on one side of the body and new-onset    Additional Information   Negative: Difficult to awaken or acting confused (e.g., disoriented, slurred speech)    Protocols used: Headache-A-OH

## 2024-03-16 DIAGNOSIS — R35.0 FREQUENT URINATION: ICD-10-CM

## 2024-03-16 DIAGNOSIS — N41.0 ACUTE PROSTATITIS: ICD-10-CM

## 2024-03-16 DIAGNOSIS — N39.43 BENIGN PROSTATIC HYPERPLASIA WITH POST-VOID DRIBBLING: ICD-10-CM

## 2024-03-16 DIAGNOSIS — N40.1 BENIGN PROSTATIC HYPERPLASIA WITH POST-VOID DRIBBLING: ICD-10-CM

## 2024-03-18 RX ORDER — TAMSULOSIN HYDROCHLORIDE 0.4 MG/1
0.8 CAPSULE ORAL DAILY
Qty: 180 CAPSULE | Refills: 0 | Status: SHIPPED | OUTPATIENT
Start: 2024-03-18

## 2024-04-04 ENCOUNTER — TRANSFERRED RECORDS (OUTPATIENT)
Dept: HEALTH INFORMATION MANAGEMENT | Facility: CLINIC | Age: 74
End: 2024-04-04
Payer: COMMERCIAL

## 2024-04-17 DIAGNOSIS — E03.9 ACQUIRED HYPOTHYROIDISM: ICD-10-CM

## 2024-04-17 RX ORDER — LEVOTHYROXINE SODIUM 112 UG/1
224 TABLET ORAL DAILY
Qty: 180 TABLET | Refills: 3 | Status: SHIPPED | OUTPATIENT
Start: 2024-04-17

## 2024-04-23 ENCOUNTER — TELEPHONE (OUTPATIENT)
Dept: INTERNAL MEDICINE | Facility: CLINIC | Age: 74
End: 2024-04-23
Payer: COMMERCIAL

## 2024-04-23 NOTE — TELEPHONE ENCOUNTER
Reason for Call:  Appointment Request    Patient requesting this type of appt:  office visit    Requested provider: Huy Crystal    Reason patient unable to be scheduled: Not within requested timeframe    When does patient want to be seen/preferred time: Same day    Comments: Pt looking to see Dr Crystal, preferably, he had a fall over the weekend and is having more knee pain on top of his normal pain due to the fall, he would like to see Dr Crystal, if possible to be seen sometime this week.     Okay to leave a detailed message?: Yes at Home number on file [phone] (home)    Call taken on 4/23/2024 at 11:37 AM by Yuridia Burkett

## 2024-04-25 ENCOUNTER — OFFICE VISIT (OUTPATIENT)
Dept: URGENT CARE | Facility: URGENT CARE | Age: 74
End: 2024-04-25
Payer: COMMERCIAL

## 2024-04-25 VITALS
HEART RATE: 76 BPM | SYSTOLIC BLOOD PRESSURE: 149 MMHG | TEMPERATURE: 97.5 F | OXYGEN SATURATION: 100 % | DIASTOLIC BLOOD PRESSURE: 87 MMHG

## 2024-04-25 DIAGNOSIS — S89.91XA INJURY OF RIGHT KNEE, INITIAL ENCOUNTER: Primary | ICD-10-CM

## 2024-04-25 PROCEDURE — 99213 OFFICE O/P EST LOW 20 MIN: CPT | Performed by: PHYSICIAN ASSISTANT

## 2024-04-25 NOTE — PROGRESS NOTES
URGENT CARE VISIT:    SUBJECTIVE:   Chief Complaint   Patient presents with    Urgent Care     Fall on Sunday - r knee pain and left foot pain.  Resting for pain improvment      Jamar Ivory is a 74 year old male who presents with a chief complaint of right knee pain and swelling.  Symptoms began 4 day(s) ago, are moderate and sudden onset  He tripped on rug and fell.   Pain exacerbated by movement Relieved by rest.  He treated it initially with no therapy. This is the first time this type of injury has occurred to this patient.     PMH:   Past Medical History:   Diagnosis Date    Arthritis     Cerebral artery occlusion with cerebral infarction     TIA    CHF (congestive heart failure) 12/24/2018    CVA (cerebral infarction) 2012    CVD (cardiovascular disease)     Depression 1977    hospitalized    Fatty liver     Gout     h/o Concussion     Hypertension 01/17/2011    Hypothyroidism     Obesity     Spider veins     TIA (transient ischaemic attack) 2012    Vitiligo      Allergies: Clopidogrel, Penicillins, Simvastatin, Sulfa antibiotics, and Xeroform [bismuth tribromphenate]   Medications:   Current Outpatient Medications   Medication Sig Dispense Refill    acetaminophen (TYLENOL) 500 MG tablet Take 500-1,000 mg by mouth every 6 hours as needed for mild pain Prn for headaches      aspirin (ASA) 81 MG tablet Take 1 tablet (81 mg) by mouth daily 90 tablet 3    diclofenac (VOLTAREN) 1 % topical gel Apply 4 g topically 4 times daily for 7 days 112 g 0    levothyroxine (SYNTHROID/LEVOTHROID) 112 MCG tablet TAKE 2 TABLETS (224 MCG) BY MOUTH DAILY 180 tablet 3    Misc Natural Products (OSTEO BI-FLEX JOINT SHIELD PO)       nitroGLYcerin (NITROSTAT) 0.4 MG sublingual tablet FOR CHEST PAIN PLACE 1 TABLET UNDER THE TONGUE EVERY 5 MINUTES FOR 3 DOSES. IF SYMPTOMS PERSIST 5 MINUTES AFTER 1ST DOSE CALL 911. 25 tablet 2    tamsulosin (FLOMAX) 0.4 MG capsule TAKE 2 CAPSULES BY MOUTH EVERY  capsule 0    torsemide  (DEMADEX) 20 MG tablet TAKE 4 TABLETS (80 MG) BY MOUTH DAILY 360 tablet 1     Social History:   Social History     Tobacco Use    Smoking status: Never    Smokeless tobacco: Never   Substance Use Topics    Alcohol use: Not Currently       ROS:  Review of systems negative except as stated above.    OBJECTIVE:  BP (!) 149/87   Pulse 76   Temp 97.5  F (36.4  C) (Oral)   SpO2 100%   GENERAL APPEARANCE: healthy, alert and no distress  MUSCULOSKELETAL: moderate TTP over right inferior patella. There is moderate edema and ecchymosis. NTTP over tibia or joint line. FROM. Tolerates walking.  EXTREMITIES: peripheral pulses normal  SKIN: ecchymosis and edema over distal right patella.   NEURO: sensation intact       ASSESSMENT:    ICD-10-CM    1. Injury of right knee, initial encounter  S89.91XA diclofenac (VOLTAREN) 1 % topical gel          PLAN:  Patient Instructions   Patient was educated on the natural course of injury. Low suspicion for fracture based on exam. Conservative measures discussed including rest, ice, compression, elevation, and over-the-counter analgesics (Tylenol) as needed. See your primary care provider if symptoms worsen or do not improve in 7 days. Seek emergency care if you develop severe pain/swelling, inability to move extremity, skin paleness, or weakness.     Patient verbalized understanding and is agreeable to plan. The patient was discharged ambulatory and in stable condition.    Brandy Nicholson PA-C on 4/25/2024 at 11:12 AM

## 2024-04-25 NOTE — PATIENT INSTRUCTIONS
Patient was educated on the natural course of injury. Conservative measures discussed including rest, ice, compression, elevation, and over-the-counter analgesics (Tylenol) as needed. See your primary care provider if symptoms worsen or do not improve in 7 days. Seek emergency care if you develop severe pain/swelling, inability to move extremity, skin paleness, or weakness.

## 2024-04-29 NOTE — TELEPHONE ENCOUNTER
Pt went to  on 4/25 to discuss knee injury. No further action requested at the time. Closing encounter.

## 2024-05-07 ENCOUNTER — TELEPHONE (OUTPATIENT)
Dept: INTERNAL MEDICINE | Facility: CLINIC | Age: 74
End: 2024-05-07
Payer: COMMERCIAL

## 2024-05-07 NOTE — TELEPHONE ENCOUNTER
Consent to communicate on file for patient's wife. Wife states that the patient left for UC at 11:55 for follow up for knee pain.  Wife states that she was told that he checked in. There are no visit notes in patient's chart. Wife states that she will get a ride over to check on him. Lucía Hale RN

## 2024-05-08 ENCOUNTER — HOSPITAL ENCOUNTER (EMERGENCY)
Facility: CLINIC | Age: 74
Discharge: HOME | End: 2024-05-09
Attending: EMERGENCY MEDICINE | Admitting: EMERGENCY MEDICINE
Payer: COMMERCIAL

## 2024-05-08 DIAGNOSIS — R19.7 DIARRHEA OF PRESUMED INFECTIOUS ORIGIN: ICD-10-CM
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
## 2024-05-08 PROCEDURE — 93005 ELECTROCARDIOGRAM TRACING: CPT

## 2024-05-08 PROCEDURE — 96361 HYDRATE IV INFUSION ADD-ON: CPT

## 2024-05-08 PROCEDURE — 96360 HYDRATION IV INFUSION INIT: CPT

## 2024-05-08 PROCEDURE — 99285 EMERGENCY DEPT VISIT HI MDM: CPT | Mod: 25

## 2024-05-08 ASSESSMENT — COLUMBIA-SUICIDE SEVERITY RATING SCALE - C-SSRS
6. HAVE YOU EVER DONE ANYTHING, STARTED TO DO ANYTHING, OR PREPARED TO DO ANYTHING TO END YOUR LIFE?: NO
2. HAVE YOU ACTUALLY HAD ANY THOUGHTS OF KILLING YOURSELF IN THE PAST MONTH?: NO
1. IN THE PAST MONTH, HAVE YOU WISHED YOU WERE DEAD OR WISHED YOU COULD GO TO SLEEP AND NOT WAKE UP?: NO

## 2024-05-09 ENCOUNTER — APPOINTMENT (OUTPATIENT)
Dept: CT IMAGING | Facility: CLINIC | Age: 74
End: 2024-05-09
Attending: EMERGENCY MEDICINE
Payer: COMMERCIAL

## 2024-05-09 VITALS
HEART RATE: 54 BPM | OXYGEN SATURATION: 98 % | SYSTOLIC BLOOD PRESSURE: 115 MMHG | RESPIRATION RATE: 20 BRPM | TEMPERATURE: 98.7 F | DIASTOLIC BLOOD PRESSURE: 61 MMHG

## 2024-05-09 LAB
ALBUMIN SERPL BCG-MCNC: 3.6 G/DL (ref 3.5–5.2)
ALP SERPL-CCNC: 50 U/L (ref 40–150)
ALT SERPL W P-5'-P-CCNC: 11 U/L (ref 0–70)
ANION GAP SERPL CALCULATED.3IONS-SCNC: 12 MMOL/L (ref 7–15)
AST SERPL W P-5'-P-CCNC: 15 U/L (ref 0–45)
ATRIAL RATE - MUSE: 53 BPM
BASOPHILS # BLD AUTO: 0 10E3/UL (ref 0–0.2)
BASOPHILS NFR BLD AUTO: 0 %
BILIRUB SERPL-MCNC: 0.2 MG/DL
BUN SERPL-MCNC: 23 MG/DL (ref 8–23)
CALCIUM SERPL-MCNC: 8.5 MG/DL (ref 8.8–10.2)
CHLORIDE SERPL-SCNC: 98 MMOL/L (ref 98–107)
CREAT SERPL-MCNC: 0.89 MG/DL (ref 0.67–1.17)
DEPRECATED HCO3 PLAS-SCNC: 19 MMOL/L (ref 22–29)
DIASTOLIC BLOOD PRESSURE - MUSE: NORMAL MMHG
EGFRCR SERPLBLD CKD-EPI 2021: 89 ML/MIN/1.73M2
EOSINOPHIL # BLD AUTO: 0 10E3/UL (ref 0–0.7)
EOSINOPHIL NFR BLD AUTO: 0 %
ERYTHROCYTE [DISTWIDTH] IN BLOOD BY AUTOMATED COUNT: 13.6 % (ref 10–15)
FLUAV RNA SPEC QL NAA+PROBE: NEGATIVE
FLUBV RNA RESP QL NAA+PROBE: NEGATIVE
GLUCOSE SERPL-MCNC: 154 MG/DL (ref 70–99)
HCT VFR BLD AUTO: 35.6 % (ref 40–53)
HGB BLD-MCNC: 11.9 G/DL (ref 13.3–17.7)
IMM GRANULOCYTES # BLD: 0.1 10E3/UL
IMM GRANULOCYTES NFR BLD: 1 %
INTERPRETATION ECG - MUSE: NORMAL
LIPASE SERPL-CCNC: 16 U/L (ref 13–60)
LYMPHOCYTES # BLD AUTO: 1 10E3/UL (ref 0.8–5.3)
LYMPHOCYTES NFR BLD AUTO: 9 %
MCH RBC QN AUTO: 29.1 PG (ref 26.5–33)
MCHC RBC AUTO-ENTMCNC: 33.4 G/DL (ref 31.5–36.5)
MCV RBC AUTO: 87 FL (ref 78–100)
MONOCYTES # BLD AUTO: 0.7 10E3/UL (ref 0–1.3)
MONOCYTES NFR BLD AUTO: 7 %
NEUTROPHILS # BLD AUTO: 8.9 10E3/UL (ref 1.6–8.3)
NEUTROPHILS NFR BLD AUTO: 83 %
NRBC # BLD AUTO: 0 10E3/UL
NRBC BLD AUTO-RTO: 0 /100
P AXIS - MUSE: 65 DEGREES
PLATELET # BLD AUTO: 206 10E3/UL (ref 150–450)
POTASSIUM SERPL-SCNC: 4.1 MMOL/L (ref 3.4–5.3)
PR INTERVAL - MUSE: 200 MS
PROT SERPL-MCNC: 7 G/DL (ref 6.4–8.3)
QRS DURATION - MUSE: 88 MS
QT - MUSE: 406 MS
QTC - MUSE: 380 MS
R AXIS - MUSE: -48 DEGREES
RBC # BLD AUTO: 4.09 10E6/UL (ref 4.4–5.9)
RSV RNA SPEC NAA+PROBE: NEGATIVE
SARS-COV-2 RNA RESP QL NAA+PROBE: NEGATIVE
SODIUM SERPL-SCNC: 129 MMOL/L (ref 135–145)
SYSTOLIC BLOOD PRESSURE - MUSE: NORMAL MMHG
T AXIS - MUSE: -16 DEGREES
VENTRICULAR RATE- MUSE: 53 BPM
WBC # BLD AUTO: 10.6 10E3/UL (ref 4–11)

## 2024-05-09 PROCEDURE — 83690 ASSAY OF LIPASE: CPT | Performed by: EMERGENCY MEDICINE

## 2024-05-09 PROCEDURE — 85025 COMPLETE CBC W/AUTO DIFF WBC: CPT | Performed by: EMERGENCY MEDICINE

## 2024-05-09 PROCEDURE — 87637 SARSCOV2&INF A&B&RSV AMP PRB: CPT | Performed by: EMERGENCY MEDICINE

## 2024-05-09 PROCEDURE — 80053 COMPREHEN METABOLIC PANEL: CPT | Performed by: EMERGENCY MEDICINE

## 2024-05-09 PROCEDURE — 36415 COLL VENOUS BLD VENIPUNCTURE: CPT | Performed by: EMERGENCY MEDICINE

## 2024-05-09 PROCEDURE — 70450 CT HEAD/BRAIN W/O DYE: CPT

## 2024-05-09 PROCEDURE — 258N000003 HC RX IP 258 OP 636: Performed by: EMERGENCY MEDICINE

## 2024-05-09 RX ADMIN — SODIUM CHLORIDE 1000 ML: 9 INJECTION, SOLUTION INTRAVENOUS at 01:37

## 2024-05-09 ASSESSMENT — ACTIVITIES OF DAILY LIVING (ADL)
ADLS_ACUITY_SCORE: 38

## 2024-05-09 NOTE — ED PROVIDER NOTES
History     Chief Complaint:  Fall       HPI   Jamar Ivory is a 74 year old male with a history of CVA, CVD, CHF, hypertension, and TIA who presents to the ED for evaluation after a fall. The patient states he was pulling up his underwear after using the toilet when the underwear caught on his heal, he stepped backwards, and fell into the tub. Reports some shortness of breath immediately after the fall that has since resolved. Notes recent back pain that was treated by a chiropractor. The patient uses a walker and is not on supplemental oxygen at baseline. Denies injury to the head, loss of consciousness, cough, fever, back pain, chest pain, and dizziness.  Also notes he has been having diarrhea for the last few days.    Independent Historian:    None      Medications:    Aspirin 81 mg  Levothyroxine  Nitroglycerin  Flomax  Torsemide     Past Medical History:    Arthritis   Cerebral artery occlusion with cerebral infarction  CHF  CVD  Depression  Gout  HTN  Hypothyroidism  Obesity  TIA    Past Surgical History:    Appendectomy  Vasectomy     Physical Exam   Patient Vitals for the past 24 hrs:   BP Temp Temp src Pulse Resp SpO2   05/09/24 0102 115/61 -- -- 54 -- --   05/09/24 0047 -- -- -- 54 -- --   05/09/24 0041 111/61 -- -- 51 -- --   05/09/24 0032 -- -- -- 50 -- --   05/09/24 0026 113/62 -- -- 55 -- --   05/09/24 0017 -- -- -- 54 20 --   05/09/24 0011 115/57 -- -- 54 15 --   05/09/24 0002 -- -- -- 57 28 --   05/08/24 2356 114/60 -- -- 59 17 --   05/08/24 2355 116/67 98.7  F (37.1  C) Temporal 63 18 98 %   05/08/24 2347 116/67 -- -- -- -- --        Physical Exam  General: Obese, appears older than stated age.  Chronically ill-appearing  Head: The scalp, face, and head appear normal. Atraumatic.   Eyes: The pupils are equal, round, and reactive to light. Conjunctivae and sclerae are normal  ENT: No hemotympanum or signs basilar skull fracture. The oropharynx is normal without erythema. No tenderness to  "palpation of the face, nose or jaw.   Neck: Normal range of motion. No cervical midline tenderness.   CV: Regular rate. S1/S2. No murmurs.   Resp: Lungs are clear without wheezes or rales. No distress. No crepitance.   GI: Abdomen is soft, no rigidity, guarding, or rebound. No contusion. No distension. No tenderness to palpation in any quadrant.     MS: Normal tone. Joints grossly normal without effusions. No asymmetric leg swelling, calf or thigh tenderness. Normal motor assessment of all extremities. PROM performed of all major joints without pain . No C/T/L tenderness in midline  Skin: Significant venous stasis changes in bilateral lower extremities with skin breakdown  Neuro:  GCS 15.  CN\"s II-XII intact. Speech is normal and fluent. Face is symmetric. Strength is normal and symmetric.   Psych: Normal affect.  Appropriate interactions.     Emergency Department Course   ECG  ECG taken at 2353, ECG read at 0025  Sinus bradycardia  Left axis deviation  Inferior infarct, age undetermined  Cannot rule out anterior infarct, age undetermined    Rate 53 bpm. AZ interval 200 ms. QRS duration 88 ms. QT/QTc 406/380 ms. P-R-T axes 65 -48 -16.    Imaging:  CT Head w/o Contrast   Final Result   IMPRESSION:   1.  No CT evidence for acute intracranial process.   2.  Brain atrophy and presumed chronic microvascular ischemic changes as above.          Laboratory:  Labs Ordered and Resulted from Time of ED Arrival to Time of ED Departure   COMPREHENSIVE METABOLIC PANEL - Abnormal       Result Value    Sodium 129 (*)     Potassium 4.1      Carbon Dioxide (CO2) 19 (*)     Anion Gap 12      Urea Nitrogen 23.0      Creatinine 0.89      GFR Estimate 89      Calcium 8.5 (*)     Chloride 98      Glucose 154 (*)     Alkaline Phosphatase 50      AST 15      ALT 11      Protein Total 7.0      Albumin 3.6      Bilirubin Total 0.2     CBC WITH PLATELETS AND DIFFERENTIAL - Abnormal    WBC Count 10.6      RBC Count 4.09 (*)     Hemoglobin 11.9 " (*)     Hematocrit 35.6 (*)     MCV 87      MCH 29.1      MCHC 33.4      RDW 13.6      Platelet Count 206      % Neutrophils 83      % Lymphocytes 9      % Monocytes 7      % Eosinophils 0      % Basophils 0      % Immature Granulocytes 1      NRBCs per 100 WBC 0      Absolute Neutrophils 8.9 (*)     Absolute Lymphocytes 1.0      Absolute Monocytes 0.7      Absolute Eosinophils 0.0      Absolute Basophils 0.0      Absolute Immature Granulocytes 0.1      Absolute NRBCs 0.0     LIPASE - Normal    Lipase 16     INFLUENZA A/B, RSV, & SARS-COV2 PCR - Normal    Influenza A PCR Negative      Influenza B PCR Negative      RSV PCR Negative      SARS CoV2 PCR Negative          Emergency Department Course & Assessments:    Interventions:  Medications   sodium chloride 0.9% BOLUS 1,000 mL (0 mLs Intravenous Stopped 5/9/24 8500)         Disposition:  The patient was discharged.    Impression & Plan         Medical Decision Making:  Patient is a 74-year-old gentleman who presents to the emergency department after mechanical fall.  CT of the head was negative for any significant intracranial pathology.  The remainder of the patient's head to toe exam was negative from a traumatic standpoint.  However the patient looks to be in very poor health and has some difficulty breathing.  However he is not significantly hypoxic.  COVID, influenza and RSV were negative.  Patient declined any further workup stating that this was his baseline.  On several occasion nursing staff pointed out to me that he became quite short of breath when walking to the bathroom or getting up and moving around about the room.  I discussed with the patient further workup and possible admission to the hospital but patient declined.  He preferred to go home at this time.  I also discussed patient's current medical condition with his wife over the phone.  He reports that he requires a lot of help getting around the house and needs to utilize his walker more.  She  also has concerns that she is reaching her capacity as far as her ability to care for him given his current condition.  I discussed possible assisted living and nursing home placement with the patient but he adamantly declined this.  He prefers to go home at this time and feels at his baseline.  He can always return to the emergency department with any new or worsening symptoms.    Diagnosis:    ICD-10-CM    1. Fall, initial encounter  W19.XXXA       2. Diarrhea of presumed infectious origin  R19.7            Discharge Medications:  Discharge Medication List as of 5/9/2024  4:31 AM             Scribe Disclosure:  I, Estee Wallis, am serving as a scribe at 12:39 AM on 5/9/2024 to document services personally performed by Chacho Hernandez MD based on my observations and the provider's statements to me.  5/9/2024   Chacho Hernandez MD Battista, Christopher Joseph, MD  05/09/24 0624

## 2024-05-09 NOTE — ED TRIAGE NOTES
Pt to ER with fall , pt was attempting to pull up his underwear and he fell into the tub, pt denies any injury to head pt is on blood thinners, pt denies any pain at this time, wife concerned due to pts recent increase in falls, pt states that he has bad knees but no replacements just arthritis

## 2024-05-15 ENCOUNTER — TELEPHONE (OUTPATIENT)
Dept: VASCULAR SURGERY | Facility: CLINIC | Age: 74
End: 2024-05-15

## 2024-05-15 ENCOUNTER — HOSPITAL ENCOUNTER (OUTPATIENT)
Dept: WOUND CARE | Facility: CLINIC | Age: 74
Discharge: HOME | End: 2024-05-15
Attending: FAMILY MEDICINE | Admitting: FAMILY MEDICINE
Payer: COMMERCIAL

## 2024-05-15 VITALS — SYSTOLIC BLOOD PRESSURE: 159 MMHG | DIASTOLIC BLOOD PRESSURE: 87 MMHG | HEART RATE: 73 BPM | TEMPERATURE: 96.8 F

## 2024-05-15 DIAGNOSIS — R60.0 BILATERAL LEG EDEMA: ICD-10-CM

## 2024-05-15 DIAGNOSIS — L97.912 SKIN ULCER OF RIGHT LOWER LEG WITH FAT LAYER EXPOSED (H): ICD-10-CM

## 2024-05-15 DIAGNOSIS — L97.929 ULCER OF LOWER LIMB, LEFT, WITH UNSPECIFIED SEVERITY (H): Primary | ICD-10-CM

## 2024-05-15 DIAGNOSIS — I83.893 SYMPTOMATIC VARICOSE VEINS OF BOTH LOWER EXTREMITIES: Primary | ICD-10-CM

## 2024-05-15 DIAGNOSIS — L97.922 ULCER OF LEFT LOWER EXTREMITY WITH FAT LAYER EXPOSED (H): ICD-10-CM

## 2024-05-15 PROCEDURE — 99204 OFFICE O/P NEW MOD 45 MIN: CPT | Performed by: FAMILY MEDICINE

## 2024-05-15 PROCEDURE — 97602 WOUND(S) CARE NON-SELECTIVE: CPT

## 2024-05-15 NOTE — TELEPHONE ENCOUNTER
Spoke with patients wife regarding scheduling, pt was unavailable and will call back.      NICO OWEN,   [facility]

## 2024-05-15 NOTE — DISCHARGE INSTRUCTIONS
"05/15/2024   Kenneth Ivory   1950  A DME order for supplies has been placed to [employer]. If there are any issues with your order including not receiving the order please call our clinic at [phone]. Do not call [facility]. We are better able to help you. We can contact the [employer] rep to better assist than if you call the general [facility] number. We can provide a tracking number also if needed.     [facility] is now sending an ancillary kit free of charge. This kit includes gauze, gloves, and saline. You will receive 1 kit per 15 days of the supply order. We typically order 30 days of supplies so you will receive 2 kits. Please let us know if you would not like to receive this kit and we can communicate this to [facility].    Plan: 5/15/24  Referral sent to [employer]. Please call [phone] to schedule    Wound Dressing Change: Left anterior/ Medial lower leg; Right lateral lower leg  -Prepare clean surface and Wash your hands with soap and water before  -Cleanse with mild unscented soap and water   -Apply Moisturizing cream to intact skin  Cut and cover wounds 8x10 ABD pad to each leg  Apply 2 Comprilan short stretch bandages: wrap 1 from toes to mid calf and the second from ankle to knee  Use Medipore 2\" tape to secure short stretch  Pull up Spandigrip G from toes to knee to add extra support  Change Daily     Promote wound healing  Compression: Comprilan short stretch and Spandigrip G compression and can be removed at night and put back on first thing in the morning. Please remove compression dressing if toes turn blue and/or tingle and can not be relieved by raising the leg for one hour. If unable to reapply in the morning, keep compression on until next dressing change.  Walk as much as you can, as you are able.   Whenever you sit raise your ankle above your hips: 30 mins 2 times a day   Walk as much as you can. When you sit raise your ankle above your hips     A diet high in protein is important for wound " healing, we recommend getting 90 grams of protein per day. Taking protein shakes or bars are a good way to get extra protein in your diet.  Good sources of protein:  Pork 26g per 3 oz  Whey protein powder - 24g per scoop (on average)  Greek yogurt - 23g per 8oz   Chicken or Turkey - 23g per 3oz  Fish - 20-25g per 3oz  Beef - 18-23g per 3oz  Tofu - 10g per 1/2 cup  Navy beans - 20g per cup  Cottage cheese - 14g per 1/2 cup   Lentils - 13g per 1/4 cup  Beef jerky 13g per 1oz  2% milk - 8g per cup  Peanut butter - 8g per 2 tablespoons  Eggs - 6g per egg  Mixed nuts - 6g per 2oz     Main Provider: Abilio Cortez M.D. May 15, 2024    Call us at [phone] if you have any questions about your wounds, if you have redness or swelling around your wound, have a fever of 101 degrees Fahrenheit or greater or if you have any other problems or concerns. We answer the phone Monday through Friday 8 am to 4 pm, please leave a message as we check the voicemail frequently throughout the day. If you have a concern over the weekend, please leave a message and we will return your call Monday. If the need is urgent, go to the ER or urgent care.    If you had a positive experience please indicate that on your patient satisfaction survey form that [facility] will be sending you.  It was a pleasure meeting with you today.  Thank you for allowing me and my team the privilege of caring for you today.  YOU are the reason we are here, and I truly hope we provided you with the excellent service you deserve.  Please let us know if there is anything else we can do for you so that we can be sure you are leaving completely satisfied with your care experience.      If you have any billing related questions please call the [facility] Business office at [phone]. The clinic staff does not handle billing related matters.  If you are scheduled to have a follow up appointment, you will receive a reminder call the day before your visit. On  the appointment day please arrive 15 minutes prior to your appointment time. If you are unable to keep that appointment, please call the clinic to cancel or reschedule. If you are more than 10 minutes late or greater for your scheduled appointment time, the clinic policy is that you may be asked to reschedule.

## 2024-05-15 NOTE — PROGRESS NOTES
Wound Clinic Note         Visit date: 05/15/2024       Cheif Complaint:     Jamar Ivory is a 74 year old   male had concerns including WOUND CARE.  The patient has lower extremity edema and bilateral leg ulcers.         HISTORY OF PRESENT ILLNESS:    Jamar Ivory reports the wound has been present since February 2023.  The wound began without a clear cause.   He was previously seen here in the wound clinic by Mariana Pham and last saw Mariana on April 19, 2023.  At that time he had just tiny open areas remaining and was nearly healed.  The patient comes to the wound clinic today alone for his initial evaluation with me.  He has a difficult time providing history and does not have a clear recollection of the events relating to his recent wounds.  He reports his wife applies Neosporin and bandages to the wounds along with a short stretch compression wrap all changed once a day.  He reports there is been light serous drainage from the wounds.  On November 28, 2022 he had a bilateral lower extremity venous insufficiency ultrasound performed which did show areas of venous insufficiency.  I do not see that he ever saw specialist to discuss treatment options for his venous insufficiency.           The pateint denies fevers or chills.  They report the pain from the wound has been 0/10 and has remained about the same recently.      The patient reports they currently do not have any routine for elevating their legs.  The patient confirms they do sleep in a bed.     Today the patient reports maintaining a regular diet without special attention to protein.        He has borderline diabetes but does not check his blood sugars regularly.  He does have congestive heart failure and takes torsemide to help control that.  He has worked with the lymphedema clinic previously but has not been going to the lymphedema clinic recently.  He has never used a pneumatic lymphedema pump.        The patient has not had any symptoms  of infection relating to the wound recently and is not currently on antibiotics.       Problem List:   Past Medical History:   Diagnosis Date    Arthritis     Cerebral artery occlusion with cerebral infarction     TIA    CHF (congestive heart failure) 12/24/2018    CVA (cerebral infarction) 2012    CVD (cardiovascular disease)     Depression 1977    hospitalized    Fatty liver     Gout     h/o Concussion     Hypertension 01/17/2011    Hypothyroidism     Obesity     Spider veins     TIA (transient ischaemic attack) 2012    Vitiligo              Family Hx: family history includes Cancer in his daughter and father; Diabetes in his mother.       Surgical Hx:   Past Surgical History:   Procedure Laterality Date    APPENDECTOMY      at age 22    COLONOSCOPY N/A 09/02/2016    Procedure: COMBINED COLONOSCOPY, SINGLE OR MULTIPLE BIOPSY/POLYPECTOMY BY BIOPSY;  Surgeon: Yanick Brown MD;  Location:  GI    COLONOSCOPY N/A 12/27/2021    Procedure: COLONOSCOPY;  Surgeon: Kong Chowdary MD;  Location: [facility] OR    VASECTOMY            Allergies:    Allergies   Allergen Reactions    Clopidogrel      Cough/emesis    Penicillins Rash    Simvastatin Diarrhea    Sulfa Antibiotics      Unsure    Xeroform [Bismuth Tribromphenate]      Unsure              Medication History:    Current Outpatient Medications   Medication Sig Dispense Refill    acetaminophen (TYLENOL) 500 MG tablet Take 500-1,000 mg by mouth every 6 hours as needed for mild pain Prn for headaches      aspirin (ASA) 81 MG tablet Take 1 tablet (81 mg) by mouth daily 90 tablet 3    levothyroxine (SYNTHROID/LEVOTHROID) 112 MCG tablet TAKE 2 TABLETS (224 MCG) BY MOUTH DAILY 180 tablet 3    Misc Natural Products (OSTEO BI-FLEX JOINT SHIELD PO)       nitroGLYcerin (NITROSTAT) 0.4 MG sublingual tablet FOR CHEST PAIN PLACE 1 TABLET UNDER THE TONGUE EVERY 5 MINUTES FOR 3 DOSES. IF SYMPTOMS PERSIST 5 MINUTES AFTER 1ST DOSE CALL 911. 25 tablet 2    tamsulosin (FLOMAX) 0.4 MG  capsule TAKE 2 CAPSULES BY MOUTH EVERY  capsule 0    torsemide (DEMADEX) 20 MG tablet TAKE 4 TABLETS (80 MG) BY MOUTH DAILY 360 tablet 1     No current facility-administered medications for this encounter.         Tobacco History:  reports that he has never smoked. He has never used smokeless tobacco.       REVIEW OF SYMPTOMS:   The review of systems was negative except as noted in the HPI.           PHYSICAL EXAMINATION:     BP (!) 159/87 (BP Location: Left arm)   Pulse 73   Temp 96.8  F (36  C) (Temporal)            GENERAL: The patient overall appears well and is no acute distress.   HEAD: normocephalic   EYES: Sclera and conjunctiva clear   NECK: no obvious masses   LUNGS: breathing is unlabored.   EXTREMITIES: No clubbing, cyanosis or edema   SKIN: No rashes or other abnormalities except as noted under the Wound section below.   NEUROLOGICAL: normal motor and sensory function   EDEMA: Moderate       WOUND: The wound appears healthy with no sign of infection.   Wound bed: necrotic material  Periwound: healthy intact skin  He has a number of fairly superficial wounds over both lower extremities.  These are all partially scabbed over with loose necrotic material covering the wound areas.  I was able to clear away this loose necrotic material with gauze and this did not require sharp debridement.      Also see below for wound details:       Circumferential volume measures:             No data to display                Ulceration(s)/Wound(s):   Please see the media tab under the chart review for pictures of the wounds.  Nursing staff removed dressings and cleansed wound.    Wound (used by [facility] only) 02/23/21 1118 Right lateral;lower leg ulceration, venous (Active)   Thickness/Stage full thickness 05/15/24 0800   Base granulating 05/15/24 0800   Periwound intact;edematous 05/15/24 0800   Periwound Temperature warm 05/15/24 0800   Periwound Skin Turgor firm 05/15/24 0800   Edges open 05/15/24 0800   Length  (cm) 9.6 05/15/24 0800   Width (cm) 1.9 05/15/24 0800   Depth (cm) 0.2 05/15/24 0800   Wound (cm^2) 18.24 cm^2 05/15/24 0800   Wound Volume (cm^3) 3.65 cm^3 05/15/24 0800   Wound healing % -136.88 05/15/24 0800   Drainage Characteristics/Odor serosanguineous 05/15/24 0800   Drainage Amount moderate 05/15/24 0800   Care, Wound non-select wound debridement performed. 05/15/24 0800       Wound (used by [facility] only) 10/15/21 1423 Left medial;lower leg ulceration, venous (Active)   Thickness/Stage full thickness 05/15/24 0800   Base granulating 05/15/24 0800   Periwound intact;edematous 05/15/24 0800   Periwound Temperature warm 05/15/24 0800   Periwound Skin Turgor firm 05/15/24 0800   Edges open 05/15/24 0800   Length (cm) 8 05/15/24 0800   Width (cm) 10 05/15/24 0800   Depth (cm) 0.3 05/15/24 0800   Wound (cm^2) 80 cm^2 05/15/24 0800   Wound Volume (cm^3) 24 cm^3 05/15/24 0800   Wound healing % -2930.3 05/15/24 0800   Drainage Characteristics/Odor serosanguineous 05/15/24 0800   Drainage Amount moderate 05/15/24 0800   Care, Wound non-select wound debridement performed. 05/15/24 0800       Wound (used by [facility] only) 12/13/21 1125 Left anterior;lower leg ulceration, venous (Active)   Thickness/Stage full thickness 05/15/24 0800   Base granulating 05/15/24 0800   Periwound intact;edematous 05/15/24 0800   Periwound Temperature warm 05/15/24 0800   Periwound Skin Turgor firm 05/15/24 0800   Edges open 05/15/24 0800   Length (cm) 8.3 05/15/24 0800   Width (cm) 3.4 05/15/24 0800   Depth (cm) 0.2 05/15/24 0800   Wound (cm^2) 28.22 cm^2 05/15/24 0800   Wound Volume (cm^3) 5.64 cm^3 05/15/24 0800   Wound healing % 71.78 05/15/24 0800   Drainage Characteristics/Odor serosanguineous 05/15/24 0800   Drainage Amount moderate 05/15/24 0800   Care, Wound non-select wound debridement performed. 05/15/24 0800             Recent Labs   Lab Test 11/22/23  1548 02/02/23  1403 05/20/22  1355   A1C 5.7* 5.8* 5.7*          Recent Labs    Lab Test 05/09/24  0141 10/05/23  1723 02/02/23  1403   ALBUMIN 3.6 3.1* 4.3              No sharp debridement performed today.                  ASSESSMENT:   This is a 74 year old  male with bilateral leg ulcers, the patient also has lower extremity edema which was also managed during today's clinic visit.          PLAN:   His wife will bandage the areas with an ABD pad followed by a short stretch compression wrap and a spandagrip stocking all changed once a day.  I will put in a referral to [facility] to discuss treatment options for his superficial venous insufficiency.    Separate from the wound care instructions we then discussed management strategies for lower extremity edema.  I explained the keys for managing lower extremity edema are compression and elevation.  I explained to the patient today that controlling the edema is probably the most important thing we can do to help heal the wound.  I have specifically recommended that they lay down with their legs above the level of the heart for 30 minutes at least twice a day.  I have also encouraged the patient to continue to sleep in a bed.     I have explained to the patient the importance of protein intake to wound healing.  I have explained that increasing protein intake will speed wound healing.  We discussed several types of food that are high in protein and the wound care nurse gave the patient a handout that summarizes this information.  In addition to further speed wound healing I have encouraged the patient to take a protein supplement.   The patient will return to the wound clinic in 3 to 4 weeks to see me again.        45 minutes spent on the date of the encounter doing chart review, history and exam, documentation and further activities per the note, this time excludes any procedure time      Abilio Cortez MD  05/15/2024   9:09 AM   [facility]/Wound  [phone]    This note was electronically signed by Abilio Triana  Diego SANTAMARIA        Further instructions from your care team         05/15/2024   Kenneth Ivory   1950  A DME order for supplies has been placed to [employer]. If there are any issues with your order including not receiving the order please call our clinic at [phone]. Do not call [employer]. We are better able to help you. We can contact the [employer] rep to better assist than if you call the general [employer] number. We can provide a tracking number also if needed.     [employer] is now sending an ancillary kit free of charge. This kit includes gauze, gloves, and saline. You will receive 1 kit per 15 days of the supply order. We typically order 30 days of supplies so you will receive 2 kits. Please let us know if you would not like to receive this kit and we can communicate this to [employer].    Plan: 5/15/24  Referral sent to [facility]. Please call [phone] to schedule    Wound Dressing Change: Left anterior/ Medial lower leg; Right lateral lower leg  -Prepare clean surface and Wash your hands with soap and water before  -Cleanse with mild unscented soap and water   -Apply Moisturizing cream to intact skin  Cut and cover wounds 8x10 ABD pad to each leg  Apply 2 Comprilan short stretch bandages: wrap 1 from toes to mid calf and the second from ankle to knee  Pull up Spandigrip G from toes to knee to add extra support  Change Daily     Promote wound healing  Compression: Comprilan short stretch and Spandigrip G compression and can be removed at night and put back on first thing in the morning. Please remove compression dressing if toes turn blue and/or tingle and can not be relieved by raising the leg for one hour. If unable to reapply in the morning, keep compression on until next dressing change.  Walk as much as you can, as you are able.   Whenever you sit raise your ankle above your hips: 30 mins 2 times a day   Walk as much as you can. When you sit raise your ankle above your hips     A diet high in protein is  important for wound healing, we recommend getting 90 grams of protein per day. Taking protein shakes or bars are a good way to get extra protein in your diet.  Good sources of protein:  Pork 26g per 3 oz  Whey protein powder - 24g per scoop (on average)  Greek yogurt - 23g per 8oz   Chicken or Turkey - 23g per 3oz  Fish - 20-25g per 3oz  Beef - 18-23g per 3oz  Tofu - 10g per 1/2 cup  Navy beans - 20g per cup  Cottage cheese - 14g per 1/2 cup   Lentils - 13g per 1/4 cup  Beef jerky 13g per 1oz  2% milk - 8g per cup  Peanut butter - 8g per 2 tablespoons  Eggs - 6g per egg  Mixed nuts - 6g per 2oz     Main Provider: Abilio Cortez M.D. May 15, 2024    Call us at [phone] if you have any questions about your wounds, if you have redness or swelling around your wound, have a fever of 101 degrees Fahrenheit or greater or if you have any other problems or concerns. We answer the phone Monday through Friday 8 am to 4 pm, please leave a message as we check the voicemail frequently throughout the day. If you have a concern over the weekend, please leave a message and we will return your call Monday. If the need is urgent, go to the ER or urgent care.    If you had a positive experience please indicate that on your patient satisfaction survey form that [facility] will be sending you.  It was a pleasure meeting with you today.  Thank you for allowing me and my team the privilege of caring for you today.  YOU are the reason we are here, and I truly hope we provided you with the excellent service you deserve.  Please let us know if there is anything else we can do for you so that we can be sure you are leaving completely satisfied with your care experience.      If you have any billing related questions please call the [facility] Business office at [phone]. The clinic staff does not handle billing related matters.  If you are scheduled to have a follow up appointment, you will receive a reminder call the day  before your visit. On the appointment day please arrive 15 minutes prior to your appointment time. If you are unable to keep that appointment, please call the clinic to cancel or reschedule. If you are more than 10 minutes late or greater for your scheduled appointment time, the clinic policy is that you may be asked to reschedule.         ,

## 2024-05-30 ENCOUNTER — OFFICE VISIT (OUTPATIENT)
Dept: VASCULAR SURGERY | Facility: CLINIC | Age: 74
End: 2024-05-30
Attending: SURGERY
Payer: COMMERCIAL

## 2024-05-30 ENCOUNTER — ANCILLARY PROCEDURE (OUTPATIENT)
Dept: ULTRASOUND IMAGING | Facility: CLINIC | Age: 74
End: 2024-05-30
Attending: SURGERY
Payer: COMMERCIAL

## 2024-05-30 DIAGNOSIS — L97.912 SKIN ULCER OF RIGHT LOWER LEG WITH FAT LAYER EXPOSED (H): ICD-10-CM

## 2024-05-30 DIAGNOSIS — L97.929 ULCER OF LOWER LIMB, LEFT, WITH UNSPECIFIED SEVERITY (H): ICD-10-CM

## 2024-05-30 DIAGNOSIS — L97.922 ULCER OF LEFT LOWER EXTREMITY WITH FAT LAYER EXPOSED (H): ICD-10-CM

## 2024-05-30 DIAGNOSIS — I83.893 SYMPTOMATIC VARICOSE VEINS OF BOTH LOWER EXTREMITIES: ICD-10-CM

## 2024-05-30 PROCEDURE — 99213 OFFICE O/P EST LOW 20 MIN: CPT | Performed by: SURGERY

## 2024-05-30 PROCEDURE — 93970 EXTREMITY STUDY: CPT | Performed by: SURGERY

## 2024-05-30 NOTE — LETTER
5/30/2024         RE: Jamar Ivory  [address]  [address]  [place] [facility] [address]        Dear Colleague,    Thank you for referring your patient, Jamar Ivory, to the [facility] [place]. Please see a copy of my visit note below.      May 30, 2024    Vein Procedure Recommendation    Spoke with patient and his wife in clinic.    Dr. Landon has recommended patient to have the following vein procedure(s):     1. Right leg VNUS closure GSV      Patient Pre-op Questions:  [facility], [place] on [address]  Anticoagulant/ASA: Yes, Aspirin 81 mg - no need to hold  Artificial Joint or Heart Valve:  No  Open ulcer:  Yes - BLE, amoxicillin 2G  Sedation nausea: No      Patient is recommended to wear Thigh High compression hose following his procedure. However, due to his wound care the patient will not be able to tolerate hose. We discussed today that he will need to have his leg wrapped up with an ace bandage to his groin with wound care underneath to provide compression and that this would have to be on for the first 24 hours and then for another 6 days during waking hours only. His wife states she can wrap his leg.    Handed patient written procedure instructions to review on his own (see After Visit Summary).    Next steps:    Insurance Submission  Informed patient this process could take up to 14 business days, but once approved, the patient will be contacted by our surgery scheduler to schedule the above procedure. Gave patient our surgery scheduler's information.    Patient is in agreement with all of the above and has no further questions at this time.    Rika Puri RN  [facility]  Vein Clinic    I had the pleasure of seeing Mr. Ivory in the vein clinic today.  He is a morbidly obese 74-year-old gentleman who was then sent to us from the wound clinic for ulceration of his lower extremities.  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   He also tells me that it is hard for him to put his compression socks on and his wife is only able to assist him in a very limited way.    He has open ulcers in both lower leg areas and in particular on the right side.    When we review his imaging he has no evidence of deep venous thrombosis in the right lower extremity.  The right great saphenous vein measures 10.2 mm at the saphenofemoral junction, 9.8 mm at the proximal thigh and 5.9 mm at the knee.  The great saphenous is incompetent from the saphenofemoral junction to the proximal thigh and from proximal calf to mid calf with a greatest reflux time of 8331 ms.  The great saphenous vein gives rise to multiple incompetent varicose veins, the largest measures 4.8 mm of the proximal thigh that courses anteromedial with a reflux time of 660 seconds.  There are also incompetent  veins in the right mid lateral shin approximately 16 cm from the knee crease that communicates with the varicose veins.    On the left side there is no evidence of deep venous thrombosis.  The great saphenous vein measures 11.5 mm at the saphenofemoral junction, 8.8 mm in the proximal thigh, and 7.7 mm at the knee.  The great saphenous vein is incompetent from the knee to mid calf, with the greatest reflux time of 2689 ms.  The great saphenous vein gives rise to multiple incompetent varicose veins, the largest measures 5.2 mm of the proximal calf that courses anterolaterally with a reflux time of 1501 ms.  The Giacomini vein is incompetent (4.7 mm) communicating with the small saphenous vein at the knee level.  The  Giacomini vein is incompetent from mid thigh to knee with a reflux time of 2161 ms.  The saphenous popliteal junction is incompetent.  The small saphenous vein is incompetent from saphenous popliteal junction to mid calf with reflux time of 3217 ms.    Due to this significant ambulatory venous hypertension and ulceration my recommendation would be to proceed with right  great saphenous vein radiofrequency ablation.  This would be in addition to aggressive wound care and external compression.  We will submit this to his insurance company.       Again, thank you for allowing me to participate in the care of your patient.        Sincerely,        Willie Landon MD

## 2024-05-30 NOTE — NURSING NOTE
Patient Reported symptoms:    Right leg   Heaviness None of the time   Achiness Most of the time   Swelling All of the time  Throbbing None of the time   Itching A little of the time   Appearance very noticeable  Impact on work/activities Symptoms but full able to participate    Left Leg   Heaviness None of the time   Achiness Most of the time  Swelling All of the time  Throbbing None of the time   Itching None of the time   Appearance Very noticeable   Impact on work/activities Symptoms but full able to participate

## 2024-05-30 NOTE — PROGRESS NOTES
May 30, 2024    Vein Procedure Recommendation    Spoke with patient and his wife in clinic.    Dr. Landon has recommended patient to have the following vein procedure(s):     1. Right leg VNUS closure GSV      Patient Pre-op Questions:  [facility] [place] [place] [address]  Anticoagulant/ASA: Yes, Aspirin 81 mg - no need to hold  Artificial Joint or Heart Valve:  No  Open ulcer:  Yes - BLE, amoxicillin 2G  Sedation nausea: No      Patient is recommended to wear Thigh High compression hose following his procedure. However, due to his wound care the patient will not be able to tolerate hose. We discussed today that he will need to have his leg wrapped up with an ace bandage to his groin with wound care underneath to provide compression and that this would have to be on for the first 24 hours and then for another 6 days during waking hours only. His wife states she can wrap his leg.    Handed patient written procedure instructions to review on his own (see After Visit Summary).    Next steps:    Insurance Submission  Informed patient this process could take up to 14 business days, but once approved, the patient will be contacted by our surgery scheduler to schedule the above procedure. Gave patient our surgery scheduler's information.    Patient is in agreement with all of the above and has no further questions at this time.    Rika Puri RN  [facility]  Vein Clinic

## 2024-05-30 NOTE — PATIENT INSTRUCTIONS
Pre-Procedure Instructions:                                        VNUS Closure   You are having a VNUS Closure. One or more of your veins will be closed with radiofrequency heating.    Insurance  Precertification and/or referral authorization may be required by your insurance company.  We will call your insurance company to verify benefits for the medically necessary part of your procedure.    Your Current Medications and Allergies  Are you on blood thinner medications? (Aspirin, Plavix, Coumadin, Eliquis, Xarelto) Please discuss this with your surgeon.  Are you sensitive to latex or adhesives used for fake fingernails? Please let us know!     Driving Escort and   Please arrange to have a trusted adult (18 years old or older) drive you to and from the clinic.  For your safety, we recommend you have a trusted adult to stay with you until the next morning.    Your Health  If you have a change in your health before the procedure, contact our office immediately.  (For example: cold symptoms, cough, urinary tract infection, fever, flu symptoms.)  A pre-procedure physical is not required.    Note  It is sometimes necessary to adjust the procedure schedule due to emergencies. We greatly appreciate your flexibility and understanding in this matter.  ____________________________________________________________________________________  Check List:  The Morning of Your Procedure  ___1.  Please do not apply anything on your leg(s) or shave the day of your procedure.  ___2.  You may take your normal medications the day of your procedure.  ___3.  It is recommended you eat a light breakfast or lunch on the day of your procedure.  ___4.  Wear comfortable loose-fitting clothing and wide-fitting shoes (i.e. tennis shoes, slip-ons).  ___5.  Please arrive at our clinic at the specified time given by the nurse.  ___6.  You will sign an affirmation of informed consent.  ___7.  Bring your pre-procedure sedation medication  (lorazepam and clonidine) with you to the clinic.              One hour before your procedure, you will be instructed to take these medications. The lorazepam              (Ativan) lowers anxiety and sedates you; the clonidine makes the lorazepam more effective. Everyone's              body processes these medications differently. Therefore, reactions to these medications vary. Some              people stay awake and some people sleep through the whole procedure. You may not remember              everything about the procedure or the day. You do not want to make any big decisions for the rest of the              day.         The Day of Your Procedure:                  VNUS Closure  In the Exam Room  A nurse will bring you back to an exam room with your family member or friend. This is when your informed consent will be signed, and you will take your pre-procedure medications.  You will be asked to remove everything from the waist down, including undergarments. You will then put on a hospital gown or shorts and blue booties.  Your surgeon will come in to answer any questions and mario the appropriate leg(s) with a marker.  You will be taken to the restroom to empty your bladder before going into the procedure room.    In the Procedure Room  You will be escorted to the procedure room. You will lie on a procedure table covered with a sheet or blanket.  A nurse will put a blood pressure cuff on your arm and a pulse/oxygen monitor on a finger. Your vital signs will be monitored every 15 minutes.  Your gown will be pulled up slightly and the groin exposed for a short period of time. The surgeon's assistant will clean your foot, leg, and groin with an antibacterial solution. We will get you covered up as quickly as possible!  Sterile towels and blue drapes will be used to cover you and the table. You will be asked to keep your hands under the blue drapes during the procedure.    The Procedure  The surgeon will visualize  your veins with an ultrasound machine. He or she will then numb your skin and access the vein. A catheter is passed up the vein and positioned with ultrasound guidance. The table will then be tipped head down.  Once the catheter is in the correct position, medication will be injected to numb your leg. You will feel some needle sticks and may feel discomfort as the medication goes in. Once this is done, you should not experience significant discomfort. But if you do, please let us know and more numbing medication can be injected. As the catheter sends out heat, the vein closes off and the catheter is withdrawn.    Post-Procedure  Once the procedure is done, your leg will be washed with warm water and dried. You will have one or more small bandages covering your incisions. Your compression hose will be put on or an ACE wrap from your toes to groin. If the ACE wrap feels too tight or binds, please elevate your leg and loosen it.  You will be offered something to drink and a light snack.  You will rest with your leg(s) elevated for approximately 30 minutes. Your friend or family member may join you.   For your safety, you will be accompanied to your car by a staff member.      Post-Procedure Instructions:                          VNUS Closure    Post-Op Day Zero - The Day of Your Procedure:  1. Medication for Pain Control and Inflammation Control   - The numbing medication injected during your procedure will last for several hours. The pre-procedure    tablets may make you very sleepy and you might not remember everything from the procedure or from    the day. This will usually wear off by the next day.   - Ibuprofen:  If tolerated, take ibuprofen (e.g., Advil) to reduce inflammation whether or not you have    pain. For three days, take two tablets (200mg each) with every meal and at bedtime with a snack. If    you are not able to take Ibuprofen, Tylenol is another option.   - You may resume taking any medications you  were taking before your procedure.  2. Activity   You may be up as tolerated when the sedation wears off. Elevate if possible when not walking.  3. Bandages   - You will have one or more small bandages covering the incision site(s) where we accessed the               vein(s). Keep your bandages on and dry for 48 hours. Compression hose should be worn continuously               over the bandages for the first 24 hours.  4. Incisions   - Bleeding: You may see some incision sites that are oozing through the bandages. This is not unusual    and can be managed with Rest, Ice, Compression and Elevation (RICE). Apply ice and firm pressure    directly to the site that is bleeding and rest with your leg(s) elevated above your heart for 20-30               minutes.    Post-Op Day One:  1. Medication   - Ibuprofen:  Continue the same as the Day of Your Procedure.  2. Activity   - Walk as tolerated. Resume your normal daily walking activities. If it hurts, stop. We encourage you to               walk. Elevate if possible when not walking.  3. Bandages and Compression   - After 24 hours, you may remove your compression hose to take a shower. Please keep your               bandage(s) on and intact. You may want to cover your bandage(s) to ensure it remains dry during your               shower. Reapply your compression hose after your shower and wear during waking hours only.  4. Driving   - You may resume driving when you can do so safely.    Post-Op Day Two:   1. Medication  - Ibuprofen:  Continue the same as the Day of Your Procedure.  2.  Activity   - Walk as tolerated. Elevate if possible when not walking.  3. Bandages and Compression  - You may remove your bandage after 48 hours. Continue wearing your compression hose during waking hours only for a total of seven days following your procedure.  4. Incisions   - Your leg(s) may be bruised at or near incision site(s) and possibly have numb spots. This is normal.   5. Call Us  If:   - You see any areas on your leg that are red and angry in appearance.   - You notice any drainage that is milky or cloudy in appearance or that has a foul odor.   - You run a temperature of 100.5 or greater.      Post-Op Day Three:  You will have a follow up appointment 2-4 days post-procedure. At this appointment, you will have an ultrasound and we will check your incisions.        The Two Weeks Following Your Procedure  1.  Skin Care              - Do not use any lotions, creams, or powders on your incision(s) for 14 days or until the incisions have               healed.              - Do not soak in a bathtub, hot tub or go swimming for 14 days or until your incisions have healed.  2.  Medications   - You may use ibuprofen or acetaminophen (e.g., Tylenol) as needed for pain or discomfort.  3.  Activity   - Do not lift over 25 pounds. After about two weeks you may resume exercise such as aerobics,               running, tennis or weightlifting. Use your common sense and ease back into your exercise routine              slowly.   - You may feel a cord-like tightness along the inside of your leg. Gentle stretching can be helpful.  4. Compression Hose   - Your doctor may instruct you to wear compression for longer than seven days; please    follow your doctor's instructions. As a comfort measure, you may choose to wear compression for    longer than required.  5.  Travel   - Do not fly in an airplane for 14 days after your procedure.  If you have a long car trip planned within    two to three weeks following your procedure, stop and walk for a few minutes every two hours.    Periodic ankle pumps during the ride may be helpful.    Six Week Appointment  - At your six-week appointment, you will see your surgeon for an exam and evaluation. This office visit               will be scheduled when you return for post-op day three return appointment.     Return to Work  1.  If you work outside the home, you may return  to work in a few days depending on the extent of your    procedure, how you tolerate it, and the type of work you perform.  2.  Paperwork: If your employer requires paperwork or you would like a letter written to your employer, please    let us know. We will complete disability type forms at no charge. Please allow five business days for    forms to be completed.     Thigh High Compression Stockings are recommended, medical grade, 20-30 mmHg strength.    Options for purchasing Compression stockings:    You can call your insurance company and ask if compression stockings are covered.  They usually fall under your Durable Medical Equipment (DME) coverage, if covered. The DME codes if they ask are: Knee high , Thigh high , Panty .   Be sure to ask if you need a specific diagnosis for coverage, if your deductible needs to be met first, how many pairs are covered and how often.  If insurance covers and you would like to order from [facility], or another medical supply company: call the clinic back and we can fax a prescription to your medical supply company of choice. They will bill your insurance and ship them to you. We will ask you color choice (black or beige), and toe choice (open or closed toe).    We sell many sizes in our clinic (except specialty order or petite sizing). We can check to see if we have your size.  Knee high (Mediven brand) are $60/pair  Thigh high (Mediven brand) are $85/pair.   If you would like to purchase from the clinic, let us know, and we will set them aside for you to  and pay for at your next clinic appointment or the day of your procedure.    Online website ordering options:   [email or web address]  [email or web address] has many options available.

## 2024-05-31 NOTE — PROGRESS NOTES
I had the pleasure of seeing Mr. Ivory in the vein clinic today.  He is a morbidly obese 74-year-old gentleman who was then sent to us from the wound clinic for ulceration of his lower extremities.  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  He also tells me that it is hard for him to put his compression socks on and his wife is only able to assist him in a very limited way.    He has open ulcers in both lower leg areas and in particular on the right side.    When we review his imaging he has no evidence of deep venous thrombosis in the right lower extremity.  The right great saphenous vein measures 10.2 mm at the saphenofemoral junction, 9.8 mm at the proximal thigh and 5.9 mm at the knee.  The great saphenous is incompetent from the saphenofemoral junction to the proximal thigh and from proximal calf to mid calf with a greatest reflux time of 8331 ms.  The great saphenous vein gives rise to multiple incompetent varicose veins, the largest measures 4.8 mm of the proximal thigh that courses anteromedial with a reflux time of 660 seconds.  There are also incompetent  veins in the right mid lateral shin approximately 16 cm from the knee crease that communicates with the varicose veins.    On the left side there is no evidence of deep venous thrombosis.  The great saphenous vein measures 11.5 mm at the saphenofemoral junction, 8.8 mm in the proximal thigh, and 7.7 mm at the knee.  The great saphenous vein is incompetent from the knee to mid calf, with the greatest reflux time of 2689 ms.  The great saphenous vein gives rise to multiple incompetent varicose veins, the largest measures 5.2 mm of the proximal calf that courses anterolaterally with a reflux time of 1501 ms.  The Giacomini vein is incompetent (4.7 mm) communicating with the small saphenous vein at the knee level.  The  Giacomini vein is incompetent from mid thigh to knee with a reflux time of 2161 ms.  The saphenous popliteal  junction is incompetent.  The small saphenous vein is incompetent from saphenous popliteal junction to mid calf with reflux time of 3217 ms.    Due to this significant ambulatory venous hypertension and ulceration my recommendation would be to proceed with right great saphenous vein radiofrequency ablation.  This would be in addition to aggressive wound care and external compression.  We will submit this to his insurance company.
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
## 2024-06-12 ENCOUNTER — HOSPITAL ENCOUNTER (OUTPATIENT)
Dept: WOUND CARE | Facility: CLINIC | Age: 74
Discharge: HOME | End: 2024-06-12
Attending: FAMILY MEDICINE | Admitting: FAMILY MEDICINE
Payer: COMMERCIAL

## 2024-06-12 VITALS — DIASTOLIC BLOOD PRESSURE: 78 MMHG | SYSTOLIC BLOOD PRESSURE: 151 MMHG | TEMPERATURE: 97.3 F | HEART RATE: 85 BPM

## 2024-06-12 DIAGNOSIS — L97.922 ULCER OF LEFT LOWER EXTREMITY WITH FAT LAYER EXPOSED (H): Primary | ICD-10-CM

## 2024-06-12 DIAGNOSIS — L97.912 SKIN ULCER OF RIGHT LOWER LEG WITH FAT LAYER EXPOSED (H): ICD-10-CM

## 2024-06-12 DIAGNOSIS — R60.0 BILATERAL LEG EDEMA: ICD-10-CM

## 2024-06-12 PROCEDURE — 99214 OFFICE O/P EST MOD 30 MIN: CPT | Performed by: FAMILY MEDICINE

## 2024-06-12 PROCEDURE — 97602 WOUND(S) CARE NON-SELECTIVE: CPT

## 2024-06-12 NOTE — DISCHARGE INSTRUCTIONS
"06/12/2024   Kenneth Glendeo   1950    A DME order for supplies has been placed to [facility]. If there are any issues with your order including not receiving the order please call our clinic at [phone]. Do not call [facility]. We are better able to help you. We can contact the [facility] rep to better assist than if you call the general [facility] number. We can provide a tracking number also if needed.     [facility] is now sending an ancillary kit free of charge. This kit includes gauze, gloves, and saline. You will receive 1 kit per 15 days of the supply order. We typically order 30 days of supplies so you will receive 2 kits. Please let us know if you would not like to receive this kit and we can communicate this to [facility].     Dressing changes outside of clinic are being performed by Patient and Spouse    Plan: 6/12/24  Dr Landon recommended vein procedure; Awaiting insurance approval and then they will call you. Please call next week if havent heard from them at [phone]      Wound Dressing Change: Left Medial lower leg; Right Anterior and Lateral lower leg  -Prepare clean surface and Wash your hands with soap and water before  -Cleanse with mild unscented soap and water   -Apply Moisturizing cream to intact skin  -Apply Hydrofera blue to wound beds (cut to fit)   1/2 of a 4x5 to Left Medial Lower Leg   1 4x5 to cover both Right Anterior lower leg and Right Lateral Lower leg wounds  -Cut and cover wounds with one 8x10 ABD pad to each leg  -Apply 2 Comprilan short stretch bandages: wrap 1 from toes to mid calf and the second from ankle to knee  -Use Medipore 2\" tape to secure short stretch  -Pull up Spandigrip G from toes to knee to add extra support  Change Daily     Compression:   Comprilan short stretch and Spandigrip G compression and can be removed at night and put back on first thing in the morning.   Please remove compression dressing if toes turn blue and/or tingle and can not be relieved by raising the leg " for one hour.   If unable to reapply in the morning, keep compression on until next dressing change.  Walk as much as you can, as you are able.   Whenever you sit raise your ankle above your hips: 30 mins 2 times a day   Walk as much as you can. When you sit raise your ankle above your hips     Elevation:  It is recommended that you elevate your legs above the level of your heart for 30 minutes: approximately 2-3 times each day   Ways to do this:   - Lay on the couch or your bed and prop your legs up on pillows   - Recline back as far as you can go in your recliner and prop your legs on pillows.   Doing these things will help reduce the edema in your legs.      Protein:  A diet high in protein is important for wound healing, we recommend getting 90 grams of protein per day.   Taking protein shakes or bars are a good way to get extra protein in your diet.        Main Provider: Abilio Cortez M.D. June 12, 2024    Call us at [phone] if you have any questions about your wounds, if you have redness or swelling around your wound, have a fever of 101 degrees Fahrenheit or greater or if you have any other problems or concerns. We answer the phone Monday through Friday 8 am to 4 pm, please leave a message as we check the voicemail frequently throughout the day. If you have a concern over the weekend, please leave a message and we will return your call Monday. If the need is urgent, go to the ER or urgent care.    If you had a positive experience please indicate that on your patient satisfaction survey form that [facility] will be sending you.    It was a pleasure meeting with you today.  Thank you for allowing me and my team the privilege of caring for you today.  YOU are the reason we are here, and I truly hope we provided you with the excellent service you deserve.  Please let us know if there is anything else we can do for you so that we can be sure you are leaving completely satisfied with your care  experience.      If you have any billing related questions please call the [facility] Business office at [phone]. The clinic staff does not handle billing related matters.    If you are scheduled to have a follow up appointment, you will receive a reminder call the day before your visit. On the appointment day please arrive 15 minutes prior to your appointment time. If you are unable to keep that appointment, please call the clinic to cancel or reschedule. If you are more than 10 minutes late or greater for your scheduled appointment time, the clinic policy is that you may be asked to reschedule.

## 2024-06-12 NOTE — PROGRESS NOTES
Wound Clinic Note         Visit date: 06/12/2024       Cheif Complaint:     Jamar Ivory is a 74 year old   male had concerns including WOUND CARE.  The patient has lower extremity edema and bilateral leg ulcers.         HISTORY OF PRESENT ILLNESS:    Jamar Ivory reports the wound has been present since February 2023.  The wound began without a clear cause.     On November 28, 2022 he had a bilateral lower extremity venous insufficiency ultrasound performed which did show areas of venous insufficiency.     At his last clinic visit his leg wounds were mostly scabbed over and I recommend that they cover the areas with an ABD pad followed by a short stretch compression wrap changed once a day.  The patient reports that his wife applies this bandage every morning but then by the nighttime while he is sleeping the wraps come undone.  He also reports that there is been some fluid coming out from the areas recently.    Also since his last clinic visit with me he met with Dr. Landon on May 30, 2024 to discuss treatment options for his venous insufficiency.  Dr. Landon has recommended a radiofrequency ablation and they are in the process of getting insurance approval for this.  The patient reports he has not heard anything about scheduling the procedure yet.         The pateint denies fevers or chills.  They report the pain from the wound has been 0/10 and has remained about the same recently.      The patient reports laying down to elevate their legs above the level of their heart at least twice a day.  The patient confirms they do sleep in a bed.     Today the patient reports maintaining a high protein diet, but has not been taking protein supplements lately.        He has borderline diabetes but does not check his blood sugars regularly.  He does have congestive heart failure and takes torsemide to help control that.  He has worked with the lymphedema clinic previously but has not been going to the  lymphedema clinic recently.  He has never used a pneumatic lymphedema pump.        The patient has not had any symptoms of infection relating to the wound recently and is not currently on antibiotics.       Problem List:   Past Medical History:   Diagnosis Date    Arthritis     Cerebral artery occlusion with cerebral infarction     TIA    CHF (congestive heart failure) 12/24/2018    CVA (cerebral infarction) 2012    CVD (cardiovascular disease)     Depression 1977    hospitalized    Fatty liver     Gout     h/o Concussion     Hypertension 01/17/2011    Hypothyroidism     Obesity     Spider veins     TIA (transient ischaemic attack) 2012    Vitiligo              Family Hx: family history includes Cancer in his daughter and father; Diabetes in his mother.       Surgical Hx:   Past Surgical History:   Procedure Laterality Date    APPENDECTOMY      at age 22    COLONOSCOPY N/A 09/02/2016    Procedure: COMBINED COLONOSCOPY, SINGLE OR MULTIPLE BIOPSY/POLYPECTOMY BY BIOPSY;  Surgeon: Yanick Brown MD;  Location:  GI    COLONOSCOPY N/A 12/27/2021    Procedure: COLONOSCOPY;  Surgeon: Kong Chowdary MD;  Location: [facility] OR    VASECTOMY            Allergies:    Allergies   Allergen Reactions    Clopidogrel      Cough/emesis    Penicillins Rash    Simvastatin Diarrhea    Sulfa Antibiotics      Unsure    Xeroform [Bismuth Tribromphenate]      Unsure              Medication History:    Current Outpatient Medications   Medication Sig Dispense Refill    acetaminophen (TYLENOL) 500 MG tablet Take 500-1,000 mg by mouth every 6 hours as needed for mild pain Prn for headaches      aspirin (ASA) 81 MG tablet Take 1 tablet (81 mg) by mouth daily 90 tablet 3    levothyroxine (SYNTHROID/LEVOTHROID) 112 MCG tablet TAKE 2 TABLETS (224 MCG) BY MOUTH DAILY 180 tablet 3    Misc Natural Products (OSTEO BI-FLEX JOINT SHIELD PO)       nitroGLYcerin (NITROSTAT) 0.4 MG sublingual tablet FOR CHEST PAIN PLACE 1 TABLET UNDER THE TONGUE EVERY 5  MINUTES FOR 3 DOSES. IF SYMPTOMS PERSIST 5 MINUTES AFTER 1ST DOSE CALL 911. 25 tablet 2    tamsulosin (FLOMAX) 0.4 MG capsule TAKE 2 CAPSULES BY MOUTH EVERY  capsule 0    torsemide (DEMADEX) 20 MG tablet TAKE 4 TABLETS (80 MG) BY MOUTH DAILY 360 tablet 1     No current facility-administered medications for this encounter.         Tobacco History:  reports that he has never smoked. He has never used smokeless tobacco.       REVIEW OF SYMPTOMS:   The review of systems was negative except as noted in the HPI.           PHYSICAL EXAMINATION:     BP (!) 151/78 (BP Location: Left arm, Patient Position: Sitting, Cuff Size: Adult Large)   Pulse 85   Temp 97.3  F (36.3  C) (Temporal)            GENERAL: The patient overall appears well and is no acute distress.   HEAD: normocephalic   EYES: Sclera and conjunctiva clear   NECK: no obvious masses   LUNGS: breathing is unlabored.   EXTREMITIES: No clubbing, cyanosis or edema   SKIN: No rashes or other abnormalities except as noted under the Wound section below.   NEUROLOGICAL: normal motor and sensory function   EDEMA: Moderate       WOUND: The wound appears healthy with no sign of infection.   Wound bed: granulation tissue  Periwound: healthy intact skin  He has a number of fairly small superficial wounds on his legs today.  There is no areas of scabbing remaining.      Also see below for wound details:       Circumferential volume measures:             No data to display                Ulceration(s)/Wound(s):   Please see the media tab under the chart review for pictures of the wounds.  Nursing staff removed dressings and cleansed wound.    Wound (used by [facility] only) 02/23/21 1118 Right lateral;lower leg ulceration, venous (Active)   Thickness/Stage full thickness 06/12/24 1019   Base granulating 06/12/24 1019   Periwound intact;edematous 06/12/24 1019   Periwound Temperature warm 06/12/24 1019   Periwound Skin Turgor firm 06/12/24 1019   Edges open 06/12/24 1019    Length (cm) 9 06/12/24 1019   Width (cm) 2.3 06/12/24 1019   Depth (cm) 0.3 06/12/24 1019   Wound (cm^2) 20.7 cm^2 06/12/24 1019   Wound Volume (cm^3) 6.21 cm^3 06/12/24 1019   Wound healing % -168.83 06/12/24 1019   Drainage Characteristics/Odor serosanguineous 06/12/24 1019   Drainage Amount moderate 06/12/24 1019   Care, Wound non-select wound debridement performed. 06/12/24 1019       Wound (used by [facility] only) 10/15/21 1423 Left medial;lower leg ulceration, venous (Active)   Thickness/Stage full thickness 06/12/24 1019   Base granulating 06/12/24 1019   Periwound intact;edematous 06/12/24 1019   Periwound Temperature warm 06/12/24 1019   Periwound Skin Turgor firm 06/12/24 1019   Edges open 06/12/24 1019   Length (cm) 3 06/12/24 1019   Width (cm) 5 06/12/24 1019   Depth (cm) 0.3 06/12/24 1019   Wound (cm^2) 15 cm^2 06/12/24 1019   Wound Volume (cm^3) 4.5 cm^3 06/12/24 1019   Wound healing % -468.18 06/12/24 1019   Drainage Characteristics/Odor serosanguineous 06/12/24 1019   Drainage Amount moderate 06/12/24 1019   Care, Wound non-select wound debridement performed. 06/12/24 1019       Wound (used by [facility] only) 12/13/21 1125 Left anterior;lower leg ulceration, venous (Active)   Thickness/Stage full thickness 06/12/24 1019   Base granulating 06/12/24 1019   Periwound intact;edematous 06/12/24 1019   Periwound Temperature warm 06/12/24 1019   Periwound Skin Turgor firm 06/12/24 1019   Edges open 06/12/24 1019   Length (cm) 0 06/12/24 1019   Width (cm) 0 06/12/24 1019   Depth (cm) 0 06/12/24 1019   Wound (cm^2) 0 cm^2 06/12/24 1019   Wound Volume (cm^3) 0 cm^3 06/12/24 1019   Wound healing % 100 06/12/24 1019   Drainage Characteristics/Odor serosanguineous 06/12/24 1019   Drainage Amount moderate 06/12/24 1019   Care, Wound non-select wound debridement performed. 06/12/24 1019       Wound (used by [facility] only) 09/30/22 1509 Right anterior;lower leg ulceration, venous (Active)   Thickness/Stage full thickness  06/12/24 1019   Base pink;granulating 06/12/24 1019   Periwound intact;edematous 06/12/24 1019   Periwound Temperature warm 06/12/24 1019   Periwound Skin Turgor firm 06/12/24 1019   Length (cm) 5 06/12/24 1019   Width (cm) 1 06/12/24 1019   Depth (cm) 0.1 06/12/24 1019   Wound (cm^2) 5 cm^2 06/12/24 1019   Wound Volume (cm^3) 0.5 cm^3 06/12/24 1019   Wound healing % -1566.67 06/12/24 1019   Drainage Characteristics/Odor serosanguineous 06/12/24 1019   Drainage Amount moderate 06/12/24 1019   Care, Wound non-select wound debridement performed. 06/12/24 1019               Recent Labs   Lab Test 11/22/23  1548 02/02/23  1403 05/20/22  1355   A1C 5.7* 5.8* 5.7*          Recent Labs   Lab Test 05/09/24  0141 10/05/23  1723 02/02/23  1403   ALBUMIN 3.6 3.1* 4.3              No sharp debridement performed today.                  ASSESSMENT:   This is a 74 year old  male with bilateral leg ulcers, the patient also has lower extremity edema which was also managed during today's clinic visit.          PLAN:   His wife will bandage the areas with Hydrofera Blue, an ABD pad followed by a short stretch compression wrap and a spandagrip stocking all changed once a day.  I have encouraged the patient to continue to work with Dr. Landon to treat his venous insufficiency.    Separate from the wound care instructions we then discussed management strategies for lower extremity edema.  I explained the keys for managing lower extremity edema are compression and elevation.  I have encouraged the patient to continue to elevate the legs as they have been doing, including laying down with their legs above the level of the heart for 30 minutes at least twice a day.  I have also encouraged the patient to continue to sleep in a bed.     I have encouraged the patient to continue on their high protein diet to aid in wound healing.   The patient will return to the wound clinic in 3 to 4 weeks to see me again.        30 minutes spent on the date  of the encounter doing chart review, history and exam, documentation and further activities per the note, this time excludes any procedure time      Abilio Cortez MD  06/12/2024   10:45 AM   [facility] V[facility]/Wound  [phone]    This note was electronically signed by Abilio Cortez MD        Further instructions from your care team         06/12/2024   Kenneth Edenilsonbibi   1950    A DME order for supplies has been placed to [employer]. If there are any issues with your order including not receiving the order please call our clinic at [phone]. Do not call [employer]. We are better able to help you. We can contact the [employer] rep to better assist than if you call the general [employer] number. We can provide a tracking number also if needed.     [employer] is now sending an ancillary kit free of charge. This kit includes gauze, gloves, and saline. You will receive 1 kit per 15 days of the supply order. We typically order 30 days of supplies so you will receive 2 kits. Please let us know if you would not like to receive this kit and we can communicate this to [employer].     Dressing changes outside of clinic are being performed by Patient and Spouse    Plan: 6/12/24  Dr Landon recommended vein procedure; Awaiting insurance approval and then they will call you. Please call next week if havent heard from them at [phone]      Wound Dressing Change: Left Medial lower leg; Right Anterior and Lateral lower leg  -Prepare clean surface and Wash your hands with soap and water before  -Cleanse with mild unscented soap and water   -Apply Moisturizing cream to intact skin  -Apply Hydrofera blue to wound beds (cut to fit)   1/2 of a 4x5 to Left Medial Lower Leg   1 4x5 to cover both Right Anterior lower leg and Right Lateral Lower leg wounds  -Cut and cover wounds with one 8x10 ABD pad to each leg  -Apply 2 Comprilan short stretch bandages: wrap 1 from toes to mid calf and the second from ankle to knee  -Use  "Medipore 2\" tape to secure short stretch  -Pull up Spandigrip G from toes to knee to add extra support  Change Daily     Compression:   Comprilan short stretch and Spandigrip G compression and can be removed at night and put back on first thing in the morning.   Please remove compression dressing if toes turn blue and/or tingle and can not be relieved by raising the leg for one hour.   If unable to reapply in the morning, keep compression on until next dressing change.  Walk as much as you can, as you are able.   Whenever you sit raise your ankle above your hips: 30 mins 2 times a day   Walk as much as you can. When you sit raise your ankle above your hips     Elevation:  It is recommended that you elevate your legs above the level of your heart for 30 minutes: approximately 2-3 times each day   Ways to do this:   - Lay on the couch or your bed and prop your legs up on pillows   - Recline back as far as you can go in your recliner and prop your legs on pillows.   Doing these things will help reduce the edema in your legs.      Protein:  A diet high in protein is important for wound healing, we recommend getting 90 grams of protein per day.   Taking protein shakes or bars are a good way to get extra protein in your diet.        Main Provider: Abilio Cortez M.D. June 12, 2024    Call us at [phone] if you have any questions about your wounds, if you have redness or swelling around your wound, have a fever of 101 degrees Fahrenheit or greater or if you have any other problems or concerns. We answer the phone Monday through Friday 8 am to 4 pm, please leave a message as we check the voicemail frequently throughout the day. If you have a concern over the weekend, please leave a message and we will return your call Monday. If the need is urgent, go to the ER or urgent care.    If you had a positive experience please indicate that on your patient satisfaction survey form that [facility] will be sending " you.    It was a pleasure meeting with you today.  Thank you for allowing me and my team the privilege of caring for you today.  YOU are the reason we are here, and I truly hope we provided you with the excellent service you deserve.  Please let us know if there is anything else we can do for you so that we can be sure you are leaving completely satisfied with your care experience.      If you have any billing related questions please call the [facility] Business office at [phone]. The clinic staff does not handle billing related matters.    If you are scheduled to have a follow up appointment, you will receive a reminder call the day before your visit. On the appointment day please arrive 15 minutes prior to your appointment time. If you are unable to keep that appointment, please call the clinic to cancel or reschedule. If you are more than 10 minutes late or greater for your scheduled appointment time, the clinic policy is that you may be asked to reschedule.        ,

## 2024-07-10 ENCOUNTER — NURSE TRIAGE (OUTPATIENT)
Dept: INTERNAL MEDICINE | Facility: CLINIC | Age: 74
End: 2024-07-10
Payer: COMMERCIAL

## 2024-07-10 NOTE — TELEPHONE ENCOUNTER
Pt wife ([facility]) calling in stating pt went to [facility]. Wife was upset that note wasn't sent over to [facility] like she was told. Informed her that [facility] could see our information but we can't send a note directly to them through epic. She stated understanding and had no further questions at this time,.     Lou Malloy RN

## 2024-07-10 NOTE — TELEPHONE ENCOUNTER
"Patient and wife reporting bilateral knee pain x 1 1/2 months to the point where \"he can barely walk\" and \"he never cries and he cries now when trying to get up\". Ankles and legs are swollen and when asked more specifically she reports swelling \"from butt to ankles\".     Additional Information   Negative: Chest pain   Negative: Followed an insect bite and has localized swelling (e.g., small area of puffy or swollen skin)   Negative: Followed a knee injury   Negative: Ankle or foot injury   Negative: Pregnant with leg swelling or edema   Negative: Difficulty breathing at rest   Negative: Entire foot is cool or blue in comparison to other side   Negative: Sounds like a life-threatening emergency to the triager   Negative: Patient sounds very sick or weak to the triager   Negative: Fever and red area (or area very tender to touch)     Redness below bilateral ankles and causes  pain   Negative: Can't walk or can barely stand (new-onset)     Wife and patient noting increased difficulty with ambulation due to pain   Negative: Cast on leg or ankle and has increasing pain    Protocols used: Leg Swelling and Edema-A-OH  Wife has applied wraps to ankles due to redness and open sores. Had her remove wraps and sores are present but without weeping. Wife notes history of CHF but patient does not follow currently with cardiologist or vascular specialist. Patient agrees to go to urgent care now for evaluation. Routing to PCP as FYI    Reason for Disposition   SEVERE swelling (e.g., swelling extends above knee, entire leg is swollen, weeping fluid)     Wife notes swelling \"from butt to ankles\"    Additional Information   Negative: Chest pain   Negative: Followed an insect bite and has localized swelling (e.g., small area of puffy or swollen skin)   Negative: Followed a knee injury   Negative: Ankle or foot injury   Negative: Pregnant with leg swelling or edema   Negative: Difficulty breathing at rest   Negative: Entire foot is cool " or blue in comparison to other side   Negative: Sounds like a life-threatening emergency to the triager   Negative: Patient sounds very sick or weak to the triager   Negative: Fever and red area (or area very tender to touch)     Redness below bilateral ankles and causes  pain   Negative: Can't walk or can barely stand (new-onset)     Wife and patient noting increased difficulty with ambulation due to pain   Negative: Cast on leg or ankle and has increasing pain    Protocols used: Leg Swelling and Edema-A-OH    Carine Tyson RN

## 2024-07-10 NOTE — TELEPHONE ENCOUNTER
Provider Response to 2nd Level Triage Request    I have reviewed the RN documentation. My recommendation is:  Refer to ED if symptoms are defined as severe or worsening.

## 2024-07-17 DIAGNOSIS — L97.912 SKIN ULCER OF RIGHT LOWER LEG WITH FAT LAYER EXPOSED (H): ICD-10-CM

## 2024-07-17 DIAGNOSIS — I83.893 SYMPTOMATIC VARICOSE VEINS OF BOTH LOWER EXTREMITIES: ICD-10-CM

## 2024-07-17 DIAGNOSIS — L97.922 ULCER OF LEFT LOWER EXTREMITY WITH FAT LAYER EXPOSED (H): Primary | ICD-10-CM

## 2024-07-30 ENCOUNTER — TRANSFERRED RECORDS (OUTPATIENT)
Dept: HEALTH INFORMATION MANAGEMENT | Facility: CLINIC | Age: 74
End: 2024-07-30

## 2024-08-09 ENCOUNTER — TELEPHONE (OUTPATIENT)
Dept: INTERNAL MEDICINE | Facility: CLINIC | Age: 74
End: 2024-08-09
Payer: COMMERCIAL

## 2024-08-09 NOTE — TELEPHONE ENCOUNTER
Pt wife ([facility]) calling to see if he can take tylenol arthritis. Pt is currently taking tylenol extra strength. Advised pt he could try that but cannot take extra strength tylenol on top of tylenol arthritis. One or the other. Wife stated understanding.     Lou Malloy RN

## 2024-09-06 NOTE — PROGRESS NOTES
SUBJECTIVE:   Jamar Ivory is a 68 year old male who presents to clinic today for the following health issues:      Hospital Follow-up Visit:    Hospital/Nursing Home/IP Rehab Facility: [facility]  Date of Admission: 12-24-18  Date of Discharge: 12-26-18  Reason(s) for Admission: chest pain, trouble breathing            Problems taking medications regularly:  None       Medication changes since discharge: None, added 2 medications       Problems adhering to non-medication therapy:  None  Summary of hospitalization:  [facility] discharge summary reviewed  Diagnostic Tests/Treatments reviewed.  Follow up needed: Cardiology Clinic  Other Healthcare Providers Involved in Patient s Care:         None  Update since discharge: improved.     Post Discharge Medication Reconciliation: discharge medications reconciled, continue medications without change.  Plan of care communicated with patient and family              PROBLEMS TO ADD ON...  Also, patient has sore right wrist.  Hurts when turns it.  Doesn't think he fell on it but not sure.  Outside of right wrist.    Problem list and histories reviewed & adjusted, as indicated.  Additional history: as documented    Reviewed and updated as needed this visit by clinical staff  Tobacco  Allergies  Meds  Med Hx  Surg Hx  Fam Hx  Soc Hx      Reviewed and updated as needed this visit by Provider         ROS:  Constitutional, HEENT, cardiovascular, pulmonary, gi and gu systems are negative, except as otherwise noted.    OBJECTIVE:     /72 (Cuff Size: Adult Large)   Pulse 75   Temp 97.3  F (36.3  C) (Tympanic)   Wt (!) 152.4 kg (336 lb)   SpO2 91%   BMI 51.09 kg/m    Body mass index is 51.09 kg/m .  GENERAL: healthy, alert and no distress  EYES: Eyes grossly normal to inspection, PERRL and conjunctivae and sclerae normal  HENT: ear canals and TM's normal, nose and mouth without ulcers or lesions  NECK: no adenopathy, no asymmetry,  Pharmacy set up and verified.   "masses, or scars and thyroid normal to palpation  RESP: lungs clear to auscultation - no rales, rhonchi or wheezes  CV: regular rate and rhythm, normal S1 S2, no S3 or S4, no murmur, click or rub, no peripheral edema and peripheral pulses strong  ABDOMEN: soft, nontender, no hepatosplenomegaly, no masses and bowel sounds normal  MS: right wrist with no swelling or edema, but point tenderness along ulnar area and with wrist pronation and supination.  SKIN: no suspicious lesions or rashes  NEURO: Normal strength and tone, mentation intact and speech normal  PSYCH: mentation appears normal, affect normal/bright    ASSESSMENT/PLAN:       ICD-10-CM    1. Right wrist pain M25.531 XR Wrist Right G/E 3 Views   2. Hyperlipidemia with target LDL less than 100 E78.5 simvastatin (ZOCOR) 20 MG tablet   3. Acute on chronic combined systolic and diastolic congestive heart failure (H) I50.43 metoprolol succinate ER (TOPROL-XL) 25 MG 24 hr tablet     losartan (COZAAR) 25 MG tablet   4. Acquired hypothyroidism E03.9 levothyroxine (SYNTHROID/LEVOTHROID) 200 MCG tablet   5. Bilateral leg edema R60.0 potassium chloride ER (KLOR-CON) 10 MEQ CR tablet   6. Edema, unspecified type R60.9 furosemide (LASIX) 80 MG tablet   7. Cerebral infarction, unspecified mechanism (H) I63.9 aspirin (ASA) 81 MG tablet     1. For acute on chronic heart failure:    Improved dramatically with furosemide and restarting medication, and addition of toprol.    Patient will call [facility] to establish and follow with cardiology.    Refilled medications.    Reiterated importance of not stopping medications; patient states \"I've learned my lesson\".      2. For right wrist pain:    Xray done today.    Possible small fracture?  Await radiology read.    In meantime, wrist brace given to patient    Avoid activities that worsen pain.    Discussed with patient, all questions answered, in agreement with this plan, will return or seek further care if not improving or " worsening.    Blanca Pneg MD  [facility]

## 2024-09-07 ENCOUNTER — TELEPHONE (OUTPATIENT)
Dept: INTERNAL MEDICINE | Facility: CLINIC | Age: 74
End: 2024-09-07
Payer: COMMERCIAL

## 2024-09-07 NOTE — TELEPHONE ENCOUNTER
Reason for Call:  Appointment Request    Patient requesting this type of appt:  Hospital/ED Follow-Up     Requested provider: Huy Crystal    Reason patient unable to be scheduled: Not within requested timeframe    When does patient want to be seen/preferred time: 1-2 days / UC f/u    Comments: pt was in UC on 9-6, arthritis in both knees     Okay to leave a detailed message?: Yes at Home number on file [phone] (home)    Call taken on 9/7/2024 at 10:30 AM by Edyta Fay

## 2024-09-10 ENCOUNTER — OFFICE VISIT (OUTPATIENT)
Dept: INTERNAL MEDICINE | Facility: CLINIC | Age: 74
End: 2024-09-10
Payer: COMMERCIAL

## 2024-09-10 VITALS
WEIGHT: 315 LBS | HEART RATE: 89 BPM | TEMPERATURE: 98.3 F | SYSTOLIC BLOOD PRESSURE: 120 MMHG | OXYGEN SATURATION: 94 % | DIASTOLIC BLOOD PRESSURE: 70 MMHG | RESPIRATION RATE: 23 BRPM | BODY MASS INDEX: 48.21 KG/M2

## 2024-09-10 DIAGNOSIS — L97.311 VENOUS STASIS ULCER OF RIGHT ANKLE LIMITED TO BREAKDOWN OF SKIN WITH VARICOSE VEINS (H): Primary | ICD-10-CM

## 2024-09-10 DIAGNOSIS — M25.561 CHRONIC PAIN OF BOTH KNEES: ICD-10-CM

## 2024-09-10 DIAGNOSIS — I83.013 VENOUS STASIS ULCER OF RIGHT ANKLE LIMITED TO BREAKDOWN OF SKIN WITH VARICOSE VEINS (H): Primary | ICD-10-CM

## 2024-09-10 DIAGNOSIS — R60.0 BILATERAL LEG EDEMA: ICD-10-CM

## 2024-09-10 DIAGNOSIS — I87.2 STASIS DERMATITIS OF BOTH LEGS: ICD-10-CM

## 2024-09-10 DIAGNOSIS — M25.562 CHRONIC PAIN OF BOTH KNEES: ICD-10-CM

## 2024-09-10 DIAGNOSIS — G89.29 CHRONIC PAIN OF BOTH KNEES: ICD-10-CM

## 2024-09-10 PROCEDURE — 99213 OFFICE O/P EST LOW 20 MIN: CPT | Performed by: PHYSICIAN ASSISTANT

## 2024-09-10 RX ORDER — CEPHALEXIN 500 MG/1
500 CAPSULE ORAL 2 TIMES DAILY
COMMUNITY
Start: 2024-09-06 | End: 2024-09-16

## 2024-09-10 NOTE — PATIENT INSTRUCTIONS
Continue with the wraps  Call wound care clinic   Follow up with vascular surgery     Finish the antibiotics   Ok to have the injections for pain in the knees

## 2024-09-10 NOTE — PROGRESS NOTES
"  Assessment & Plan     Venous stasis ulcer of right ankle limited to breakdown of skin with varicose veins (H)      Stasis dermatitis of both legs      Bilateral leg edema      Chronic pain of both knees            BMI  Estimated body mass index is 48.21 kg/m  as calculated from the following:    Height as of 11/22/23: 1.727 m (5' 8\").    Weight as of this encounter: 143.8 kg (317 lb 1.6 oz).   Weight management plan: Patient was referred to their PCP to discuss a diet and exercise plan.      Patient Instructions   Continue with the wraps  Call wound care clinic   Follow up with vascular surgery     Finish the antibiotics   Ok to have the injections for pain in the knees     Subjective   Kenneth is a 74 year old, presenting for the following health issues:  UC Follow-Up    HPI       ED/UC Followup:    Facility:  [facility]  Date of visit: 09/06/2024  Reason for visit: Stasis dermatitis of both legs   Current Status: Pt states that legs are little red, and very painful 9/10.       Patient with chronic venous stasis and ulcerations/wounds on the lower legs.   He is followed by Vascular surgery . Plan for a procedure to help with this in the future  He was to have injections for bilateral knee pain last week but this was postponed has the orthopedics was worried about appearance of his legs  He was seen in the UC with Satish and started on keflex . He just started medication 2 days ago per his report  Knee pain is troubling him the most  Legs have been better with wrapping but as they heal they are itchy and he will scratch them           Objective    /70 (BP Location: Left arm, Patient Position: Sitting, Cuff Size: Adult Large)   Pulse 89   Temp 98.3  F (36.8  C) (Temporal)   Resp 23   Wt 143.8 kg (317 lb 1.6 oz)   SpO2 94%   BMI 48.21 kg/m    Body mass index is 48.21 kg/m .  Physical Exam   GENERAL: alert and no distress  MS: 2+ edema to just below the knees  SKIN: areas of scabbing " noted bilateral lower legs   General rubor appearance of bilateral lower legs - venous stasis changes   No warmth or drainage noted             Signed Electronically by: Cary Davidson PA-C

## 2024-10-31 ENCOUNTER — HOSPITAL ENCOUNTER (OUTPATIENT)
Dept: CARDIOLOGY | Facility: CLINIC | Age: 74
Discharge: HOME | End: 2024-10-31
Attending: INTERNAL MEDICINE | Admitting: INTERNAL MEDICINE
Payer: COMMERCIAL

## 2024-10-31 DIAGNOSIS — I50.40 COMBINED SYSTOLIC AND DIASTOLIC HEART FAILURE, UNSPECIFIED HF CHRONICITY (H): ICD-10-CM

## 2024-10-31 LAB — LVEF ECHO: NORMAL

## 2024-10-31 PROCEDURE — 93306 TTE W/DOPPLER COMPLETE: CPT

## 2024-10-31 PROCEDURE — 93306 TTE W/DOPPLER COMPLETE: CPT | Mod: 26 | Performed by: INTERNAL MEDICINE

## 2024-11-06 DIAGNOSIS — I25.110 CORONARY ARTERY DISEASE INVOLVING NATIVE CORONARY ARTERY OF NATIVE HEART WITH UNSTABLE ANGINA PECTORIS (H): ICD-10-CM

## 2024-11-07 RX ORDER — NITROGLYCERIN 0.4 MG/1
TABLET SUBLINGUAL
Qty: 25 TABLET | Refills: 2 | Status: SHIPPED | OUTPATIENT
Start: 2024-11-07

## 2024-11-08 ENCOUNTER — TELEPHONE (OUTPATIENT)
Dept: CARDIOLOGY | Facility: CLINIC | Age: 74
End: 2024-11-08
Payer: COMMERCIAL

## 2024-11-08 DIAGNOSIS — I25.119 CORONARY ARTERY DISEASE INVOLVING NATIVE CORONARY ARTERY OF NATIVE HEART WITH ANGINA PECTORIS (H): ICD-10-CM

## 2024-11-08 DIAGNOSIS — I50.40 COMBINED SYSTOLIC AND DIASTOLIC HEART FAILURE, UNSPECIFIED HF CHRONICITY (H): Primary | ICD-10-CM

## 2024-11-08 NOTE — TELEPHONE ENCOUNTER
Spoke with both patient and wife Melissa and he states he has been having a cough for about a month only has chest discomfort when he coughs.  Agreed to be seen in Urgent Care for the cough due to unable to see his PCP.  Agreed to have Lexiscan prior to OV on 12-6-24 with Cecilio.  Reviewed results of echo and plan by both Cecilio and Dr. Chand. Jennifer Maria, RN on 11/8/2024 at 10:38 AM          I have not met him yet, but looks like he is off all GDMT.    If it is ok with you, Dr. Chand, I'd likely just do a Ria to eval for significant ischemia, and restart GDMT.  But if he has any anginal sxs, I can see if he is a cor angio candidate.    Cecilio Garvin PA-C 11/6/2024 12:38 PM                Hi team   I have not seen this patient since 8/2023 and it appears there has been a decline in his LVEF. I am uncertain how closely he follows with his internist, but it looks as if he will need evaluation for the decline in his LVEF and review of his medications to make certain he is on GDMT. I will include Cecilio on this note. It looks as if plans had been to see in 8/2024 but he may have had other medical problems. Please tell him we will need to do some testing to assess why his LVEF has fallen.

## 2024-11-18 DIAGNOSIS — I50.40 COMBINED SYSTOLIC AND DIASTOLIC HEART FAILURE, UNSPECIFIED HF CHRONICITY (H): Primary | ICD-10-CM

## 2024-11-18 NOTE — PROGRESS NOTES
[facility]    CORE enrollment w/ labs ordered.  Messaged scheduling requesting they contact pt to reschedule general follow up appt to a CORE enrollment OV wl labs as pt qualifies for CORE d/t EF 25-30% on most recent echocardiogram on 10/31/24.     Future Appointments   Date Time Provider Department Center   12/6/2024 11:20 AM Cecilio Garvin PA-C [facility]     Blanca Gould RN BSN   [facility] [place], [place]  [facility] Care Coordinator  11/18/24, 4:20 PM

## 2024-12-04 ENCOUNTER — LAB (OUTPATIENT)
Dept: LAB | Facility: CLINIC | Age: 74
End: 2024-12-04
Payer: COMMERCIAL

## 2024-12-04 DIAGNOSIS — I50.40 COMBINED SYSTOLIC AND DIASTOLIC HEART FAILURE, UNSPECIFIED HF CHRONICITY (H): ICD-10-CM

## 2024-12-04 LAB
ANION GAP SERPL CALCULATED.3IONS-SCNC: 11 MMOL/L (ref 7–15)
BUN SERPL-MCNC: 14.5 MG/DL (ref 8–23)
CALCIUM SERPL-MCNC: 9 MG/DL (ref 8.8–10.4)
CHLORIDE SERPL-SCNC: 102 MMOL/L (ref 98–107)
CREAT SERPL-MCNC: 0.88 MG/DL (ref 0.67–1.17)
EGFRCR SERPLBLD CKD-EPI 2021: 90 ML/MIN/1.73M2
GLUCOSE SERPL-MCNC: 114 MG/DL (ref 70–99)
HCO3 SERPL-SCNC: 23 MMOL/L (ref 22–29)
HGB BLD-MCNC: 12.8 G/DL (ref 13.3–17.7)
IRON BINDING CAPACITY (ROCHE): 273 UG/DL (ref 240–430)
IRON SATN MFR SERPL: 12 % (ref 15–46)
IRON SERPL-MCNC: 32 UG/DL (ref 61–157)
POTASSIUM SERPL-SCNC: 4.4 MMOL/L (ref 3.4–5.3)
SODIUM SERPL-SCNC: 136 MMOL/L (ref 135–145)
T4 FREE SERPL-MCNC: 0.93 NG/DL (ref 0.9–1.7)
TSH SERPL DL<=0.005 MIU/L-ACNC: 8.06 UIU/ML (ref 0.3–4.2)

## 2024-12-05 LAB — FERRITIN SERPL-MCNC: 89 NG/ML (ref 31–409)

## 2024-12-16 ENCOUNTER — HOSPITAL ENCOUNTER (OUTPATIENT)
Dept: NUCLEAR MEDICINE | Facility: CLINIC | Age: 74
Setting detail: NUCLEAR MEDICINE
Discharge: HOME | End: 2024-12-16
Attending: PHYSICIAN ASSISTANT
Payer: COMMERCIAL

## 2024-12-16 ENCOUNTER — APPOINTMENT (OUTPATIENT)
Dept: ULTRASOUND IMAGING | Facility: CLINIC | Age: 74
End: 2024-12-16
Attending: EMERGENCY MEDICINE
Payer: COMMERCIAL

## 2024-12-16 ENCOUNTER — HOSPITAL ENCOUNTER (EMERGENCY)
Facility: CLINIC | Age: 74
Discharge: HOME | End: 2024-12-16
Attending: EMERGENCY MEDICINE | Admitting: EMERGENCY MEDICINE
Payer: COMMERCIAL

## 2024-12-16 VITALS
WEIGHT: 315 LBS | BODY MASS INDEX: 49.44 KG/M2 | SYSTOLIC BLOOD PRESSURE: 132 MMHG | HEART RATE: 80 BPM | DIASTOLIC BLOOD PRESSURE: 88 MMHG | TEMPERATURE: 98.2 F | RESPIRATION RATE: 20 BRPM | HEIGHT: 67 IN | OXYGEN SATURATION: 94 %

## 2024-12-16 DIAGNOSIS — M79.652 PAIN OF LEFT THIGH: ICD-10-CM

## 2024-12-16 DIAGNOSIS — I25.119 CORONARY ARTERY DISEASE INVOLVING NATIVE CORONARY ARTERY OF NATIVE HEART WITH ANGINA PECTORIS (H): ICD-10-CM

## 2024-12-16 DIAGNOSIS — I50.40 COMBINED SYSTOLIC AND DIASTOLIC HEART FAILURE, UNSPECIFIED HF CHRONICITY (H): ICD-10-CM

## 2024-12-16 LAB
ALBUMIN SERPL BCG-MCNC: 3.8 G/DL (ref 3.5–5.2)
ALP SERPL-CCNC: 49 U/L (ref 40–150)
ALT SERPL W P-5'-P-CCNC: 10 U/L (ref 0–70)
ANION GAP SERPL CALCULATED.3IONS-SCNC: 13 MMOL/L (ref 7–15)
AST SERPL W P-5'-P-CCNC: 18 U/L (ref 0–45)
BASOPHILS # BLD AUTO: 0.1 10E3/UL (ref 0–0.2)
BASOPHILS NFR BLD AUTO: 2 %
BILIRUB SERPL-MCNC: 0.4 MG/DL
BUN SERPL-MCNC: 15.6 MG/DL (ref 8–23)
CALCIUM SERPL-MCNC: 9.2 MG/DL (ref 8.8–10.4)
CHLORIDE SERPL-SCNC: 101 MMOL/L (ref 98–107)
CK SERPL-CCNC: 75 U/L (ref 39–308)
CREAT SERPL-MCNC: 0.93 MG/DL (ref 0.67–1.17)
EGFRCR SERPLBLD CKD-EPI 2021: 86 ML/MIN/1.73M2
EOSINOPHIL # BLD AUTO: 0.7 10E3/UL (ref 0–0.7)
EOSINOPHIL NFR BLD AUTO: 14 %
ERYTHROCYTE [DISTWIDTH] IN BLOOD BY AUTOMATED COUNT: 14.5 % (ref 10–15)
GLUCOSE SERPL-MCNC: 111 MG/DL (ref 70–99)
HCO3 SERPL-SCNC: 22 MMOL/L (ref 22–29)
HCT VFR BLD AUTO: 37.3 % (ref 40–53)
HGB BLD-MCNC: 12.1 G/DL (ref 13.3–17.7)
HOLD SPECIMEN: NORMAL
HOLD SPECIMEN: NORMAL
IMM GRANULOCYTES # BLD: 0 10E3/UL
IMM GRANULOCYTES NFR BLD: 1 %
LYMPHOCYTES # BLD AUTO: 1.3 10E3/UL (ref 0.8–5.3)
LYMPHOCYTES NFR BLD AUTO: 25 %
MCH RBC QN AUTO: 28.4 PG (ref 26.5–33)
MCHC RBC AUTO-ENTMCNC: 32.4 G/DL (ref 31.5–36.5)
MCV RBC AUTO: 88 FL (ref 78–100)
MONOCYTES # BLD AUTO: 0.7 10E3/UL (ref 0–1.3)
MONOCYTES NFR BLD AUTO: 13 %
NEUTROPHILS # BLD AUTO: 2.4 10E3/UL (ref 1.6–8.3)
NEUTROPHILS NFR BLD AUTO: 46 %
NRBC # BLD AUTO: 0 10E3/UL
NRBC BLD AUTO-RTO: 0 /100
PLATELET # BLD AUTO: 220 10E3/UL (ref 150–450)
POTASSIUM SERPL-SCNC: 4.5 MMOL/L (ref 3.4–5.3)
PROCALCITONIN SERPL IA-MCNC: 0.05 NG/ML
PROT SERPL-MCNC: 7 G/DL (ref 6.4–8.3)
RBC # BLD AUTO: 4.26 10E6/UL (ref 4.4–5.9)
SODIUM SERPL-SCNC: 136 MMOL/L (ref 135–145)
STRESS ECHO TARGET HR: 145
WBC # BLD AUTO: 5.1 10E3/UL (ref 4–11)

## 2024-12-16 PROCEDURE — 99285 EMERGENCY DEPT VISIT HI MDM: CPT | Mod: 25

## 2024-12-16 PROCEDURE — 82550 ASSAY OF CK (CPK): CPT | Performed by: EMERGENCY MEDICINE

## 2024-12-16 PROCEDURE — 36415 COLL VENOUS BLD VENIPUNCTURE: CPT | Performed by: EMERGENCY MEDICINE

## 2024-12-16 PROCEDURE — 93922 UPR/L XTREMITY ART 2 LEVELS: CPT

## 2024-12-16 PROCEDURE — 343N000001 HC RX 343 MED OP 636: Performed by: PHYSICIAN ASSISTANT

## 2024-12-16 PROCEDURE — 78452 HT MUSCLE IMAGE SPECT MULT: CPT

## 2024-12-16 PROCEDURE — 250N000011 HC RX IP 250 OP 636: Performed by: EMERGENCY MEDICINE

## 2024-12-16 PROCEDURE — 93971 EXTREMITY STUDY: CPT | Mod: LT

## 2024-12-16 PROCEDURE — 80053 COMPREHEN METABOLIC PANEL: CPT | Performed by: EMERGENCY MEDICINE

## 2024-12-16 PROCEDURE — 84145 PROCALCITONIN (PCT): CPT | Performed by: EMERGENCY MEDICINE

## 2024-12-16 PROCEDURE — A9500 TC99M SESTAMIBI: HCPCS | Performed by: PHYSICIAN ASSISTANT

## 2024-12-16 PROCEDURE — 85025 COMPLETE CBC W/AUTO DIFF WBC: CPT | Performed by: EMERGENCY MEDICINE

## 2024-12-16 PROCEDURE — 93017 CV STRESS TEST TRACING ONLY: CPT

## 2024-12-16 RX ORDER — HYDROMORPHONE HYDROCHLORIDE 1 MG/ML
0.5 INJECTION, SOLUTION INTRAMUSCULAR; INTRAVENOUS; SUBCUTANEOUS ONCE
Status: COMPLETED | OUTPATIENT
Start: 2024-12-16 | End: 2024-12-16

## 2024-12-16 RX ORDER — HYDROCODONE BITARTRATE AND ACETAMINOPHEN 5; 325 MG/1; MG/1
1 TABLET ORAL EVERY 6 HOURS PRN
Qty: 12 TABLET | Refills: 0 | Status: SHIPPED | OUTPATIENT
Start: 2024-12-16

## 2024-12-16 RX ORDER — HYDROMORPHONE HYDROCHLORIDE 1 MG/ML
INJECTION, SOLUTION INTRAMUSCULAR; INTRAVENOUS; SUBCUTANEOUS
Status: DISCONTINUED
Start: 2024-12-16 | End: 2024-12-17 | Stop reason: HOSPADM

## 2024-12-16 RX ADMIN — Medication 34 MILLICURIE: at 10:57

## 2024-12-16 RX ADMIN — HYDROMORPHONE HYDROCHLORIDE 0.5 MG: 1 INJECTION, SOLUTION INTRAMUSCULAR; INTRAVENOUS; SUBCUTANEOUS at 20:22

## 2024-12-16 RX ADMIN — HYDROMORPHONE HYDROCHLORIDE 0.5 MG: 1 INJECTION, SOLUTION INTRAMUSCULAR; INTRAVENOUS; SUBCUTANEOUS at 17:50

## 2024-12-16 ASSESSMENT — COLUMBIA-SUICIDE SEVERITY RATING SCALE - C-SSRS
2. HAVE YOU ACTUALLY HAD ANY THOUGHTS OF KILLING YOURSELF IN THE PAST MONTH?: NO
6. HAVE YOU EVER DONE ANYTHING, STARTED TO DO ANYTHING, OR PREPARED TO DO ANYTHING TO END YOUR LIFE?: NO
1. IN THE PAST MONTH, HAVE YOU WISHED YOU WERE DEAD OR WISHED YOU COULD GO TO SLEEP AND NOT WAKE UP?: NO

## 2024-12-16 ASSESSMENT — ACTIVITIES OF DAILY LIVING (ADL)
ADLS_ACUITY_SCORE: 49

## 2024-12-16 NOTE — ED PROVIDER NOTES
"  Emergency Department Note      History of Present Illness     Chief Complaint   Leg Pain      HPI     Jamar Ivory is a 74 year old male who presents for leg pain. The patient reports of pain to his inner left thigh. Reports the pain started a week ago and is getting worse. Reports nothing seemed to set off the pain. He thinks its a blood clot.  The pain is aggravated by movement or ambulation.  Reports the redness to his legs is normal for him and that his wife usually wraps them but has not in 4 days. Reports past infections in his legs that he thinks has lead to the redness. Reports arthritis in his knees. Reports he can walk with a walker. He lives in a private apartment with his wife. Denies fever, chest pain, shortness of breath, and other pain.     Independent Historian   None    Review of External Notes   None    Past Medical History     Medical History and Problem List   Benign neoplasm of colon  Chronic combined systolic and diastolic CHF  Hld  Skin ulcer or  lower leg bilaterally with fat layer exposed  Acquired hypothyroidism  BPH with incomplete bladder emptying  Bilateral leg edema  Breast tenderness in male  Cerebral infarction  CAD  Depression  Moderate major depression  Type 2 diabetes  Fatty liver  Hypertension   PVD  Low hemoglobin  Morbid obesity  Precordial pain  Transient cerebral ischemia  Vitiligo     Medications   Albuterol  Vibramycin  Mobic  Deltasone  Aspirin  Synthroid  Nitrostat  Flomax  Demadex     Surgical History   Appendectomy  Colonoscopy x2  Vasectomy     Physical Exam     Patient Vitals for the past 24 hrs:   BP Temp Temp src Pulse Resp SpO2 Height Weight   12/16/24 2146 -- -- -- -- -- -- 1.702 m (5' 7\") 143.8 kg (317 lb)   12/16/24 2132 132/88 -- -- 80 20 94 % -- --   12/16/24 1830 -- -- -- 71 -- 95 % -- --   12/16/24 1800 105/67 -- -- 77 -- 94 % -- --   12/16/24 1755 133/69 -- -- 76 -- 92 % -- --   12/16/24 1700 (!) 170/66 -- -- 78 -- 96 % -- --   12/16/24 1654 125/71 -- " "-- 77 -- 94 % -- --   12/16/24 1653 137/76 -- -- -- -- 96 % -- --   12/16/24 1619 (!) 147/72 -- -- 84 26 97 % -- --   12/16/24 1616 -- 98.2  F (36.8  C) Oral -- -- -- 1.727 m (5' 8\") --     Physical Exam      Eyes:    Conjunctiva normal  Neck:     Supple, no meningismus.     CV:     Regular rate and rhythm.      No murmurs, rubs or gallops.       Nonpalpable DP/PT pulses      Positive dopplerable pulses bilateral     2+ bilateral lower extremity edema.  PULM:    Clear to auscultation bilateral.       No respiratory distress.      Good air exchange.  ABD:    Soft, non-tender, non-distended.       No pulsatile masses.       No rebound, guarding or rigidity.  MSK:     Left lower extremity:      No pain with compression of the calf.      Negative Ekta's sign.      Compartments are soft and compressible.       Moderate focal tenderness to medial proximal thigh without palpable mass, induration, erythema, crepitus  LYMPH:   No cervical lymphadenopathy.  NEURO:   Alert ; good muscle tone     No tremor     Strength and sensation intact to lower extremities  Skin:    Warm, dry      Venous stasis changes to the lower extremities bilateral below the knee with poorly circumscribed erythema but no induration, warmth or tenderness      Few scattered superficial ulcerations  Psych:    Mood is good and affect is appropriate.      Diagnostics     Lab Results   Labs Ordered and Resulted from Time of ED Arrival to Time of ED Departure   COMPREHENSIVE METABOLIC PANEL - Abnormal       Result Value    Sodium 136      Potassium 4.5      Carbon Dioxide (CO2) 22      Anion Gap 13      Urea Nitrogen 15.6      Creatinine 0.93      GFR Estimate 86      Calcium 9.2      Chloride 101      Glucose 111 (*)     Alkaline Phosphatase 49      AST 18      ALT 10      Protein Total 7.0      Albumin 3.8      Bilirubin Total 0.4     CBC WITH PLATELETS AND DIFFERENTIAL - Abnormal    WBC Count 5.1      RBC Count 4.26 (*)     Hemoglobin 12.1 (*)     " Hematocrit 37.3 (*)     MCV 88      MCH 28.4      MCHC 32.4      RDW 14.5      Platelet Count 220      % Neutrophils 46      % Lymphocytes 25      % Monocytes 13      % Eosinophils 14      % Basophils 2      % Immature Granulocytes 1      NRBCs per 100 WBC 0      Absolute Neutrophils 2.4      Absolute Lymphocytes 1.3      Absolute Monocytes 0.7      Absolute Eosinophils 0.7      Absolute Basophils 0.1      Absolute Immature Granulocytes 0.0      Absolute NRBCs 0.0     PROCALCITONIN - Normal    Procalcitonin 0.05     CK TOTAL - Normal    CK 75         Imaging   US MISTY Doppler No Exercise   Final Result   IMPRESSION:   1.  RIGHT LOWER EXTREMITY: MISTY at rest is normal.   2.  LEFT LOWER EXTREMITY: MISTY at rest is normal.      US Lower Extremity Venous Duplex Left   Final Result   IMPRESSION:   1.  No deep venous thrombosis in the left lower extremity.          Independent Interpretation   None    ED Course      Medications Administered   Medications   HYDROmorphone (PF) (DILAUDID) injection 0.5 mg (0.5 mg Intravenous $Given 12/16/24 1750)   HYDROmorphone (PF) (DILAUDID) injection 0.5 mg (0.5 mg Intravenous $Given 12/16/24 2022)       Procedures   Procedures     Discussion of Management   None    ED Course   ED Course as of 12/16/24 2201   Mon Dec 16, 2024   1626 I obtained history and examined the patient as noted above         Additional Documentation  None    Medical Decision Making / Diagnosis     CMS Diagnoses: None    MIPS       None    [facility]     Jamar Ivory is a 74 year old male presents with atraumatic left medial proximal thigh pain.  Doppler ultrasound negative for DVT.  He had nonpalpable but + dopplerable pulses on the left.  ABIs were unable to be obtained at bedside thus ultrasound MISTY undertaken and reassuring.  He has no trauma or focal bony tenderness to suggest bony injury to warrant x-rays.  He has soft tissue focal tenderness drawing concern for muscular source.  CK reassuring. He is able to  ambulate after analgesics.  I am  suspicious for muscle strain, muscle spasm.  Patient safe for discharge home with supportive measures and limited supply of analgesics.  Close follow-up with PCP and return to ED for worsening symptoms.    Disposition   The patient was discharged.     Diagnosis     ICD-10-CM    1. Pain of left thigh  M79.652            Discharge Medications   New Prescriptions    HYDROCODONE-ACETAMINOPHEN (NORCO) 5-325 MG TABLET    Take 1 tablet by mouth every 6 hours as needed for pain.         Scribe Disclosure:  I, Fabian Murray, am serving as a scribe at 4:40 PM on 12/16/2024 to document services personally performed by Tonio Long MD based on my observations and the provider's statements to me.        Tonio Long MD  12/16/24 3854

## 2024-12-16 NOTE — ED TRIAGE NOTES
"Pt presents via ems from home. presents for L upper/inner thigh pain. Reports pain has existed for 3+ wks. Had a fall about 3 days ago where he lost his balance. Pain worsened today. 10/10 pain with movement. Bilateral calves erythremic with edema. Pt states this is how they \"normally look.\" Reports he is not a known diabetic. Wound noted to medial L calf. Pt reports it is not new. Reports dyspnea upon exertion.      Triage Assessment (Adult)       Row Name 12/16/24 8988          Triage Assessment    Airway WDL WDL        Respiratory WDL    Respiratory WDL X  dyspnea noted on exertion        Peripheral/Neurovascular WDL    Peripheral Neurovascular WDL X  erythema and edema noted to calves bilaterally                     "

## 2024-12-17 ENCOUNTER — TELEPHONE (OUTPATIENT)
Dept: CARDIOLOGY | Facility: CLINIC | Age: 74
End: 2024-12-17
Payer: COMMERCIAL

## 2024-12-17 ENCOUNTER — PATIENT OUTREACH (OUTPATIENT)
Dept: INTERNAL MEDICINE | Facility: CLINIC | Age: 74
End: 2024-12-17
Payer: COMMERCIAL

## 2024-12-17 DIAGNOSIS — I50.40 COMBINED SYSTOLIC AND DIASTOLIC HEART FAILURE, UNSPECIFIED HF CHRONICITY (H): Primary | ICD-10-CM

## 2024-12-17 DIAGNOSIS — I25.119 CORONARY ARTERY DISEASE INVOLVING NATIVE CORONARY ARTERY OF NATIVE HEART WITH ANGINA PECTORIS (H): ICD-10-CM

## 2024-12-17 NOTE — TELEPHONE ENCOUNTER
Patient Contact    Attempt # 1    Was call answered?  No.  Left message on voicemail with information to call back.     Carine Tyson RN

## 2024-12-17 NOTE — TELEPHONE ENCOUNTER
Received call from Imaging stress test lexiscan cancelled today due to patient in the ED and needs to have test reordered and scheduled.   New orders placed.  Jennifer Maria RN on 12/17/2024 at 12:15 PM

## 2024-12-19 ENCOUNTER — OFFICE VISIT (OUTPATIENT)
Dept: INTERNAL MEDICINE | Facility: CLINIC | Age: 74
End: 2024-12-19
Payer: COMMERCIAL

## 2024-12-19 VITALS
TEMPERATURE: 97.2 F | RESPIRATION RATE: 18 BRPM | HEART RATE: 75 BPM | SYSTOLIC BLOOD PRESSURE: 136 MMHG | BODY MASS INDEX: 49.44 KG/M2 | DIASTOLIC BLOOD PRESSURE: 76 MMHG | OXYGEN SATURATION: 93 % | HEIGHT: 67 IN | WEIGHT: 315 LBS

## 2024-12-19 DIAGNOSIS — M25.561 CHRONIC PAIN OF BOTH KNEES: Primary | ICD-10-CM

## 2024-12-19 DIAGNOSIS — M25.562 CHRONIC PAIN OF BOTH KNEES: Primary | ICD-10-CM

## 2024-12-19 DIAGNOSIS — I11.0 HYPERTENSIVE HEART DISEASE WITH HEART FAILURE (H): ICD-10-CM

## 2024-12-19 DIAGNOSIS — E11.42 DIABETIC POLYNEUROPATHY ASSOCIATED WITH TYPE 2 DIABETES MELLITUS (H): ICD-10-CM

## 2024-12-19 DIAGNOSIS — I25.119 CORONARY ARTERY DISEASE INVOLVING NATIVE CORONARY ARTERY OF NATIVE HEART WITH ANGINA PECTORIS (H): ICD-10-CM

## 2024-12-19 DIAGNOSIS — Z12.5 SCREENING FOR PROSTATE CANCER: ICD-10-CM

## 2024-12-19 DIAGNOSIS — F32.1 MODERATE MAJOR DEPRESSION (H): ICD-10-CM

## 2024-12-19 DIAGNOSIS — G89.29 CHRONIC PAIN OF BOTH KNEES: Primary | ICD-10-CM

## 2024-12-19 ASSESSMENT — PAIN SCALES - GENERAL: PAINLEVEL_OUTOF10: EXTREME PAIN (9)

## 2024-12-19 NOTE — PROGRESS NOTES
Assessment & Plan     Chronic pain of both knees  Patient may really needs to follow-up primarily with his orthopedist to discuss further injection therapy  - Primary Care - Care Coordination Referral; Future    Diabetic polyneuropathy associated with type 2 diabetes mellitus (H)  Check A1c level and continue with current medical management  - HEMOGLOBIN A1C; Future  - Primary Care - Care Coordination Referral; Future    Screening for prostate cancer  Routine screening  - PROSTATE SPEC ANTIGEN SCREEN; Future    Coronary artery disease involving native coronary artery of native heart with angina pectoris (H)  Labs ordered as fasting per routine.  - Lipid panel reflex to direct LDL Non-fasting; Future  - Primary Care - Care Coordination Referral; Future    Hypertensive heart disease with heart failure (H)  Stable with current medical management, can follow-up with cardiology as directed  - Primary Care - Care Coordination Referral; Future    Moderate major depression (H)  Continue with current supportive care as ordered      See Patient Instructions    Danni Cooper is a 74 year old, presenting for the following health issues:  Knee Pain and Back Pain    HPI     ED/UC Followup:    Facility:  [facility]  Date of visit: 12/16/24  Reason for visit: Pain of left thigh   Current Status: Improving        Musculoskeletal problem/pain    Duration: 6+ months   Description  Location: Bilateral knees   Intensity:  moderate  Accompanying signs and symptoms: none  History  Previous similar problem: YES  Previous evaluation:  orthopedic evaluation, injections   Precipitating or alleviating factors:  Trauma or overuse: no   Aggravating factors include: standing, walking, climbing stairs, and lifting  Therapies tried and outcome: acetaminophen, Ibuprofen, hydrocodone- no change     Other concerns:  1. Discuss getting a seated elliptical exercise machine that is advertised on television   2. Lower left back pain the last few days  "(sharp pain with no radiation)   3. Has been out of torsemide for 3-4 months, uncertain if he should be taking       Review of Systems  CONSTITUTIONAL: NEGATIVE for fever, chills, change in weight  EYES: NEGATIVE for vision changes or irritation  ENT/MOUTH: NEGATIVE for ear, mouth and throat problems  RESP: NEGATIVE for significant cough  CV: NEGATIVE for chest pain, palpitations   GI: NEGATIVE for nausea, abdominal pain, heartburn, or change in bowel habits  : NEGATIVE for frequency, dysuria, or hematuria  NEURO: NEGATIVE for weakness, dizziness or paresthesias  HEME: NEGATIVE for bleeding problems  PSYCHIATRIC: NEGATIVE for changes in mood or affect    Current Outpatient Medications   Medication Sig Dispense Refill    acetaminophen (TYLENOL) 500 MG tablet Take 500-1,000 mg by mouth every 6 hours as needed for mild pain Prn for headaches      aspirin (ASA) 81 MG tablet Take 1 tablet (81 mg) by mouth daily 90 tablet 3    HYDROcodone-acetaminophen (NORCO) 5-325 MG tablet Take 1 tablet by mouth every 6 hours as needed for pain. 12 tablet 0    levothyroxine (SYNTHROID/LEVOTHROID) 112 MCG tablet TAKE 2 TABLETS (224 MCG) BY MOUTH DAILY 180 tablet 3    Misc Natural Products (OSTEO BI-FLEX JOINT SHIELD PO)       nitroGLYcerin (NITROSTAT) 0.4 MG sublingual tablet FOR CHEST PAIN PLACE 1 TABLET UNDER THE TONGUE EVERY 5 MINUTES FOR 3 DOSES. IF SYMPTOMS PERSIST 5 MINUTES AFTER 1ST DOSE CALL 911. 25 tablet 2    tamsulosin (FLOMAX) 0.4 MG capsule TAKE 2 CAPSULES BY MOUTH EVERY  capsule 0    torsemide (DEMADEX) 20 MG tablet TAKE 4 TABLETS (80 MG) BY MOUTH DAILY (Patient not taking: Reported on 12/19/2024) 360 tablet 1     No current facility-administered medications for this visit.           Objective    /76   Pulse 75   Temp 97.2  F (36.2  C) (Temporal)   Resp 18   Ht 1.702 m (5' 7\")   Wt 143.2 kg (315 lb 12.8 oz)   SpO2 93%   BMI 49.46 kg/m    Body mass index is 49.46 kg/m .  Physical Exam   GENERAL: alert " and no distress  EYES: Eyes grossly normal to inspection, PERRL and conjunctivae and sclerae normal  HENT: ear canals and TM's normal, nose and mouth without ulcers or lesions  NECK: no adenopathy, no asymmetry, masses, or scars  RESP: lungs clear to auscultation - no rales, rhonchi or wheezes  CV: regular rate and rhythm, normal S1 S2, no S3 or S4, no click or rub, no peripheral edema  ABDOMEN:  obese  MS: Antalgic gait noted  NEURO: No focal changes  PSYCH: mentation appears normal, affect normal/bright        Signed Electronically by: Huy Crystal MD

## 2024-12-20 ENCOUNTER — HOSPITAL ENCOUNTER (OUTPATIENT)
Dept: NUCLEAR MEDICINE | Facility: CLINIC | Age: 74
Discharge: HOME | End: 2024-12-20
Attending: PHYSICIAN ASSISTANT
Payer: COMMERCIAL

## 2024-12-20 ENCOUNTER — HOSPITAL ENCOUNTER (OUTPATIENT)
Dept: CARDIOLOGY | Facility: CLINIC | Age: 74
Discharge: HOME | End: 2024-12-20
Attending: PHYSICIAN ASSISTANT
Payer: COMMERCIAL

## 2024-12-20 DIAGNOSIS — I50.40 COMBINED SYSTOLIC AND DIASTOLIC HEART FAILURE, UNSPECIFIED HF CHRONICITY (H): ICD-10-CM

## 2024-12-20 DIAGNOSIS — I25.119 CORONARY ARTERY DISEASE INVOLVING NATIVE CORONARY ARTERY OF NATIVE HEART WITH ANGINA PECTORIS (H): ICD-10-CM

## 2024-12-20 LAB
CV STRESS MAX HR HE: 87
RATE PRESSURE PRODUCT: NORMAL
STRESS ECHO BASELINE DIASTOLIC HE: 80
STRESS ECHO BASELINE HR: 78 BPM
STRESS ECHO BASELINE SYSTOLIC BP: 143
STRESS ECHO CALCULATED PERCENT HR: 60 %
STRESS ECHO LAST STRESS DIASTOLIC BP: 82
STRESS ECHO LAST STRESS SYSTOLIC BP: 120
STRESS ECHO TARGET HR: 145

## 2024-12-20 PROCEDURE — 78452 HT MUSCLE IMAGE SPECT MULT: CPT

## 2024-12-20 PROCEDURE — A9500 TC99M SESTAMIBI: HCPCS | Performed by: PHYSICIAN ASSISTANT

## 2024-12-20 PROCEDURE — 343N000001 HC RX 343 MED OP 636: Performed by: PHYSICIAN ASSISTANT

## 2024-12-20 PROCEDURE — 93017 CV STRESS TEST TRACING ONLY: CPT

## 2024-12-20 PROCEDURE — 250N000011 HC RX IP 250 OP 636: Performed by: PHYSICIAN ASSISTANT

## 2024-12-20 RX ORDER — REGADENOSON 0.08 MG/ML
0.4 INJECTION, SOLUTION INTRAVENOUS ONCE
Status: COMPLETED | OUTPATIENT
Start: 2024-12-20 | End: 2024-12-20

## 2024-12-20 RX ORDER — AMINOPHYLLINE 25 MG/ML
50-100 INJECTION, SOLUTION INTRAVENOUS
Status: DISCONTINUED | OUTPATIENT
Start: 2024-12-20 | End: 2024-12-21 | Stop reason: HOSPADM

## 2024-12-20 RX ORDER — ALBUTEROL SULFATE 90 UG/1
2 INHALANT RESPIRATORY (INHALATION) EVERY 5 MIN PRN
Status: DISCONTINUED | OUTPATIENT
Start: 2024-12-20 | End: 2024-12-21 | Stop reason: HOSPADM

## 2024-12-20 RX ORDER — REGADENOSON 0.08 MG/ML
INJECTION, SOLUTION INTRAVENOUS
Status: COMPLETED
Start: 2024-12-20 | End: 2024-12-20

## 2024-12-20 RX ADMIN — REGADENOSON 0.4 MG: 0.08 INJECTION, SOLUTION INTRAVENOUS at 12:50

## 2024-12-20 RX ADMIN — Medication 34 MILLICURIE: at 13:11

## 2024-12-20 NOTE — CARE PLAN
Prior to procedure pt complained of chronic lightheadedness and dizziness but no SOB.  Lung sounds very diminished but upper airway had some expiratory wheezes audible without stethoscope. After injection of Lexiscan pt experienced some increased fatigue. VSS, After drinking caffeine pt felt no different. When testing completed pt escorted to radiology waiting area.

## 2024-12-30 LAB
CV BLOOD PRESSURE: 24 MMHG
CV STRESS MAX HR HE: 87
NUC STRESS EJECTION FRACTION: 24 %
RATE PRESSURE PRODUCT: NORMAL
STRESS ECHO BASELINE DIASTOLIC HE: 80
STRESS ECHO BASELINE HR: 78 BPM
STRESS ECHO BASELINE SYSTOLIC BP: 143
STRESS ECHO CALCULATED PERCENT HR: 60 %
STRESS ECHO LAST STRESS DIASTOLIC BP: 82
STRESS ECHO LAST STRESS SYSTOLIC BP: 120
STRESS ECHO TARGET HR: 145

## 2025-01-02 ENCOUNTER — PATIENT OUTREACH (OUTPATIENT)
Dept: NURSING | Facility: CLINIC | Age: 75
End: 2025-01-02
Payer: COMMERCIAL

## 2025-01-02 ASSESSMENT — ACTIVITIES OF DAILY LIVING (ADL): DEPENDENT_IADLS:: INDEPENDENT

## 2025-01-02 NOTE — LETTER
[facility]  Patient Centered Plan of Care  About Me:        Patient Name:  Jamar Marroquin    YOB: 1950  Age:         74 year old   [place] MRN:    [id number] Telephone Information:  Home Phone [phone]   Mobile [phone]       Address:  [address]  [address]  [place] [address] Email address:  No e-mail address on record      Emergency Contact(s)    Name Relationship Lgl Grd Work Phone Home Phone Mobile Phone   1. SELIN MARROQUIN Spouse No none [phone]   2. JOSE MARIA MARROQUIN Son No  [phone]           Primary language:  English     needed? No   [place] [employer] [facility]:  [phone] op. 1  Other communication barriers:Glasses    Preferred Method of Communication:  Mail  Current living arrangement: I live in a private home with spouse    Mobility Status/ Medical Equipment: No data recorded      Health Maintenance  Health Maintenance Reviewed: Due/Overdue   MERY Ozuna   Care Coordination Team  [phone]        My Access Plan  Medical Emergency 911   Primary Clinic Line [facility]* - [phone]   24 Hour Appointment Line [phone] or  [phone]) (toll-free)   24 Hour Nurse Line [phone] (toll-free)   Preferred Urgent Care No data recorded   Preferred Hospital [facility]  [phone]     Preferred Pharmacy [facility] - [place], [place] - [address]     Behavioral Health Crisis Line The National Suicide Prevention Lifeline at 1-980.858.5824 or Text/Call 188           My Care Team Members  Patient Care Team         Relationship Specialty Notifications Start End    Huy Crystal MD PCP - General Internal Medicine  10/5/23     Phone: [phone] Fax: [phone] [address] [facility] [address]    Kong Fraga MD MD Gastroenterology  8/11/16     Phone: [phone] Fax: [phone]         [facility]   [address] [facility] [address]    Cary Grace MD MD Ophthalmology  5/21/18     Phone: [phone] Fax: [phone]         [address] [facility] [address]    Rika Lindsay NP Nurse Practitioner Nurse Practitioner Psych/Mental Health  11/7/19     Phone: [phone] Fax: [phone]          [facility] [address] [facility] [address]    Ezequiel Chand MD MD Cardiovascular Disease  4/21/21     Phone: [phone] Fax: [phone] [address] [place] [address]    Nakia Martin PA-C Physician Assistant Urology  7/26/22     Phone: [phone] Fax: [phone] [address] [place] [address]    Eddi Foster DPM Assigned Musculoskeletal Provider   11/4/23     Phone: [phone] Fax: [phone] [address] [facility] [address] [facility] [address]    Huy Crystal MD Assigned PCP   11/25/23     Phone: [phone] Fax: [phone] [address] [facility] [address]    Willie Landon MD Assigned Heart and Vascular Provider   6/23/24     Phone: [phone] Fax: [phone] [address] [place] [address]    Rn, Ru Cardiology C.O.R.E. Specialty Care Coordinator Cardiology  11/18/24     Phone: [phone] Fax: [phone]        Fabian Hightower NP Nurse Practitioner Cardiovascular Disease  12/6/24     Phone: [phone] Fax: [phone] [address] [place] [address]    Afua Saxena, LSW Lead Care Coordinator Primary Care - CC Admissions 12/23/24     Phone: [phone]         Karen Stapleton CHW Community Health Worker Primary Care - CC Admissions 1/2/25     Phone: [phone]                     My Care Plans  Self Management and Treatment Plan    Care Plan  Care Plan: Help At Home       Problem: Insufficient In-home support       Goal: Move to Assisted Living       Start Date: 1/2/2025 Expected End Date: 3/28/2025    Priority: Medium    Note:     Barriers:  Uncertain how to begin the process  Strengths: Supportive spouse  Patient expressed understanding of goal: Yes (spouse)  Action steps to achieve this goal:  1. I will contact [facility] at [phone] to request assistance finding and Assisted living community.   2. I will let Care Coordination know if I need additional information.                                 Action Plans on File:            Depression  Heart Failure       Advance Care Plans/Directives:   Advanced Care Plan/Directives on file: Yes    Status of Document(s): No data recorded  Advanced Care Plan/Directives Type: No data recorded         My Medical and Care Information  Problem List   Patient Active Problem List   Diagnosis    Acquired hypothyroidism    Vitiligo    Hyperlipidemia with target LDL less than 100    Hypertension goal BP (blood pressure) < 140/90    Cerebral infarction (H)    Moderate major depression (H)    Fatty liver    Impaired glucose tolerance    Transient cerebral ischemia    Obesity with serious comorbidity    Bilateral leg edema    Diet Controlled Type 2 diabetes mellitus without complication, without long-term current use of insulin (H)    Benign neoplasm of colon    Pain in both lower extremities    Low hemoglobin    Breast tenderness in male    Hx of Diabetic polyneuropathy associated with type 2 diabetes mellitus - now diet controlled (H)    Chronic combined systolic and diastolic congestive heart failure (H)    PVD (peripheral vascular disease) (H)    Benign prostatic hyperplasia with incomplete bladder emptying    Skin ulcer of right lower leg with fat layer exposed (H)    Depression, major, recurrent, in complete remission (H)    Shortness of breath    Chest tightness    Coronary artery disease, noted to have a small segment infarct with teo-infarct ischemia    Precordial pain    Morbid obesity (H)    Ulcer of left lower extremity with fat layer exposed (H)      Current Medications:  Please refer to the most  recent medication list provided to you by your medical team and reach out to your provider with any questions or to make any corrections.    Care Coordination Start Date: 12/19/2024   Frequency of Care Coordination: monthly, more frequently as needed     Form Last Updated: 01/02/2025

## 2025-01-02 NOTE — PROGRESS NOTES
Clinic Care Coordination Contact  Clinic Care Coordination Contact  OUTREACH    Referral Information:  Referral Source: PCP    Primary Diagnosis: Other (include Comment box)    Chief Complaint   Patient presents with    Clinic Care Coordination - Initial     Assisted Living        Universal Utilization: 75% Admission or ED Risk  Clinic Utilization  Difficulty keeping appointments:: No  Compliance Concerns: No  No-Show Concerns: No  No PCP office visit in Past Year: No  Utilization      No Show Count (past year)  2             ED Visits  2             Hospital Admissions  0                    Current as of: 1/2/2025 10:19 AM                Clinical Concerns:  Current Medical Concerns:    Patient Active Problem List   Diagnosis    Acquired hypothyroidism    Vitiligo    Hyperlipidemia with target LDL less than 100    Hypertension goal BP (blood pressure) < 140/90    Cerebral infarction (H)    Moderate major depression (H)    Fatty liver    Impaired glucose tolerance    Transient cerebral ischemia    Obesity with serious comorbidity    Bilateral leg edema    Diet Controlled Type 2 diabetes mellitus without complication, without long-term current use of insulin (H)    Benign neoplasm of colon    Pain in both lower extremities    Low hemoglobin    Breast tenderness in male    Hx of Diabetic polyneuropathy associated with type 2 diabetes mellitus - now diet controlled (H)    Chronic combined systolic and diastolic congestive heart failure (H)    PVD (peripheral vascular disease) (H)    Benign prostatic hyperplasia with incomplete bladder emptying    Skin ulcer of right lower leg with fat layer exposed (H)    Depression, major, recurrent, in complete remission (H)    Shortness of breath    Chest tightness    Coronary artery disease, noted to have a small segment infarct with teo-infarct ischemia    Precordial pain    Morbid obesity (H)    Ulcer of left lower extremity with fat layer exposed (H)        CHANDNI GUARDADO spoke with  spouse Jeovany Mares expressed she had limited time to talk Provided her with information about [facility] as a resource for finding an Assisted Living community.  She stated no other needs or concerns at this time.       Current Behavioral Concerns: None noted    Education Provided to patient: [facility]   Pain  Pain (GOAL):: No  Health Maintenance Reviewed: Due/Overdue   Health Maintenance Due   Topic Date Due    URINE DRUG SCREEN  Never done    ZOSTER IMMUNIZATION (1 of 2) Never done    EYE EXAM  05/22/2019    MICROALBUMIN  01/04/2022    DIABETIC FOOT EXAM  01/04/2022    MEDICARE ANNUAL WELLNESS VISIT  01/08/2022    HF ACTION PLAN  05/15/2022    PHQ-9  08/02/2023    RSV VACCINE (1 - 1-dose 75+ series) Never done    PSA  02/02/2024    A1C  05/22/2024    LIPID  11/22/2024    Medicare Annual MTM Pharmacist Visit (once per calendar year)  Never done        Medication Management:  Medication review status: Medications reviewed and no changes reported per patient.             Functional Status:  Dependent ADLs:: Independent  Dependent IADLs:: Independent  Bed or wheelchair confined:: No  Fallen 2 or more times in the past year?: (!) Yes    Living Situation:  Current living arrangement:: I live in a private home with spouse  Type of residence:: Apartment    Lifestyle & Psychosocial Needs:    Social Drivers of Health     Food Insecurity: Not on file   Depression: Not at risk (11/15/2023)    PHQ-2     PHQ-2 Score: 2   Housing Stability: Not on file   Tobacco Use: Low Risk  (12/19/2024)    Patient History     Smoking Tobacco Use: Never     Smokeless Tobacco Use: Never     Passive Exposure: Not on file   Financial Resource Strain: Not on file   Alcohol Use: Not on file   Transportation Needs: No Transportation Needs (9/21/2020)    PRAPARE - Transportation     Lack of Transportation (Medical): No     Lack of Transportation (Non-Medical): No   Physical Activity: Not on file   Interpersonal Safety: Not on file    Stress: Not on file   Social Connections: Not on file   Health Literacy: Not on file     Inadequate nutrition (GOAL):: No  Tube Feeding: No  Inadequate activity/exercise (GOAL):: No  Significant changes in sleep pattern (GOAL): No  Transportation means:: Regular car     Cheondoism or spiritual beliefs that impact treatment:: No  Mental health DX:: Yes  Mental health DX how managed:: Medication  Mental health management concern (GOAL):: No  Informal Support system:: Friends, Spouse             Resources and Interventions:  Current Resources:      Community Resources: None  Supplies Currently Used at Home: None  Equipment Currently Used at Home: walker, rolling  Employment Status: retired         Advance Care Plan/Directive  Advanced Care Plans/Directives on file:: Yes    Referrals Placed: Community Resources         Care Plan:  Care Plan: Help At Home       Problem: Insufficient In-home support       Goal: Move to Assisted Living       Start Date: 1/2/2025 Expected End Date: 3/28/2025    Priority: Medium    Note:     Barriers: Uncertain how to begin the process  Strengths: Supportive spouse  Patient expressed understanding of goal: Yes (spouse)  Action steps to achieve this goal:  1. I will contact [facility] at [phone] to request assistance finding and Assisted living community.   2. I will let Care Coordination know if I need additional information.                                 Patient/Caregiver understanding: Yes    Outreach Frequency: monthly, more frequently as needed  Future Appointments                In 1 week Fabian Hightower NP [facility]            Plan: Suzan plans to contact [facility] to begin process of exploring Assisted Living communities.  CHW will outreach again in one month.    MERY Ozuna   Care Coordination Team  [phone]

## 2025-01-02 NOTE — LETTER
[facility] CARE COORDINATION    January 2, 2025    Jamar JORGE Ivory  [address]    [place] [address]      Dear Kenneth,    I am a clinic care coordinator who works with Huy Crystal MD with the [facility]. I wanted to thank your wife for spending the time to talk with me.  Below is a description of clinic care coordination and how I can further assist you.       The clinic care coordination team is made up of a registered nurse, , financial resource worker and community health worker who understand the health care system. The goal of clinic care coordination is to help you manage your health and improve access to the health care system. Our team works alongside your provider to assist you in determining your health and social needs. We can help you obtain health care and community resources, providing you with necessary information and education. We can work with you through any barriers and develop a care plan that helps coordinate and strengthen the communication between you and your care team.  Our services are voluntary and are offered without charge to you personally.    Please feel free to contact me with any questions or concerns regarding care coordination and what we can offer.      We are focused on providing you with the highest-quality healthcare experience possible.    Sincerely,     MERY Ozuna   Care Coordination Team  [phone]      Enclosed: I have enclosed a copy of the Patient Centered Plan of Care. This has helpful information and goals that we have talked about. Please keep this in an easy to access place to use as needed.

## 2025-01-09 ENCOUNTER — PATIENT OUTREACH (OUTPATIENT)
Dept: CARDIOLOGY | Facility: CLINIC | Age: 75
End: 2025-01-09

## 2025-01-09 ENCOUNTER — LAB (OUTPATIENT)
Dept: LAB | Facility: CLINIC | Age: 75
End: 2025-01-09
Payer: COMMERCIAL

## 2025-01-09 DIAGNOSIS — I25.119 CORONARY ARTERY DISEASE INVOLVING NATIVE CORONARY ARTERY OF NATIVE HEART WITH ANGINA PECTORIS: ICD-10-CM

## 2025-01-09 DIAGNOSIS — I50.40 COMBINED SYSTOLIC AND DIASTOLIC HEART FAILURE, UNSPECIFIED HF CHRONICITY (H): Primary | ICD-10-CM

## 2025-01-09 DIAGNOSIS — I50.40 COMBINED SYSTOLIC AND DIASTOLIC HEART FAILURE, UNSPECIFIED HF CHRONICITY (H): ICD-10-CM

## 2025-01-09 LAB
ANION GAP SERPL CALCULATED.3IONS-SCNC: 12 MMOL/L (ref 7–15)
BUN SERPL-MCNC: 14.1 MG/DL (ref 8–23)
CALCIUM SERPL-MCNC: 8.9 MG/DL (ref 8.8–10.4)
CHLORIDE SERPL-SCNC: 101 MMOL/L (ref 98–107)
CREAT SERPL-MCNC: 0.83 MG/DL (ref 0.67–1.17)
EGFRCR SERPLBLD CKD-EPI 2021: >90 ML/MIN/1.73M2
GLUCOSE SERPL-MCNC: 101 MG/DL (ref 70–99)
HCO3 SERPL-SCNC: 22 MMOL/L (ref 22–29)
NT-PROBNP SERPL-MCNC: 471 PG/ML (ref 0–900)
POTASSIUM SERPL-SCNC: 4.5 MMOL/L (ref 3.4–5.3)
SODIUM SERPL-SCNC: 135 MMOL/L (ref 135–145)

## 2025-01-09 NOTE — TELEPHONE ENCOUNTER
[facility]   Pt seen in [facility] today as re-enrollment.   Wife and pt unsure of medications and will call [facility] when they get home with what medications he is taking.     Pt to have labs drawn after OV (no labs appt made prior to appt)   After meds are confirmed, update Parish for further recommendations.         AKOSUA Rivero, RN 2:56 PM 01/09/25

## 2025-01-10 NOTE — PROGRESS NOTES
Thank you for the update. Yes, lets restart torsemide at a lower dose of 20 mg once daily. Also recommend restarting on aspirin 81 mg daily.  He should recheck labs in about 1 week after making the medication adjustments for a basic metabolic panel. Thank you! Parish Hightower, APRN, CNP

## 2025-01-10 NOTE — TELEPHONE ENCOUNTER
"[facility]   Pt's wife called late in the day 1/9/25.    Message left stating pt \"only has levothyroxine, Osteo Bi Flexand tamsulosin\"      Update to Parish's nurse team.        Future Appointments   Date Time Provider Department Center   2/11/2025 10:40 AM Fabian Hightower, NP [facility] CL[facility]     AKOSUA Rivero, RN 1:30 PM 01/10/25    "

## 2025-01-10 NOTE — PROGRESS NOTES
[facility] -      Spoke with pt and wife regarding medications. Medication list updated. Pt stated he ran out of Torsemide approximately 1 month ago.     They are agreeable to start Losartan, Toprol XL and rosuvastatin.     Suzan will call to arrange follow-up appt and labs.     Will send FYI to [facility] Hightower NP regarding the Torsemide.     Ranjana Chacon RN BAN   1:24 PM 1/10/2025    Nurse line M-F 8a-4p: [phone]

## 2025-01-13 RX ORDER — TORSEMIDE 20 MG/1
20 TABLET ORAL DAILY
Qty: 90 TABLET | Refills: 3 | Status: SHIPPED | OUTPATIENT
Start: 2025-01-13

## 2025-01-13 RX ORDER — ASPIRIN 81 MG/1
81 TABLET ORAL DAILY
Status: SHIPPED
Start: 2025-01-13

## 2025-01-13 NOTE — PROGRESS NOTES
[facility]       Reviewed additional recs from Parish Hightower CNP with Kenneth and his wife Melissa:  Start 81 mg aspirin daily; they will purchase over the counter supply  Start torsemide 20 mg daily; rx sent    Reviewed GDMT rationale.    Discussed need for BMP in 1-2 weeks; order is in; they will call to schedule.      1/10/25 AVS mailed to their home per wife's request.    Future Appointments   Date Time Provider Department Center   2/11/2025 10:40 AM Fabian Hightower NP [facility]as RN BSN   11:20 AM 01/13/25  Nurse line M-F 8a-4p: [phone]

## 2025-01-16 ENCOUNTER — APPOINTMENT (OUTPATIENT)
Dept: CT IMAGING | Facility: CLINIC | Age: 75
DRG: 579 | End: 2025-01-16
Attending: EMERGENCY MEDICINE
Payer: COMMERCIAL

## 2025-01-16 ENCOUNTER — HOSPITAL ENCOUNTER (EMERGENCY)
Facility: CLINIC | Age: 75
Discharge: HOME | DRG: 579 | End: 2025-01-16
Attending: EMERGENCY MEDICINE
Payer: COMMERCIAL

## 2025-01-16 VITALS
RESPIRATION RATE: 18 BRPM | TEMPERATURE: 97.9 F | HEART RATE: 72 BPM | WEIGHT: 307 LBS | BODY MASS INDEX: 46.53 KG/M2 | HEIGHT: 68 IN | SYSTOLIC BLOOD PRESSURE: 161 MMHG | DIASTOLIC BLOOD PRESSURE: 85 MMHG | OXYGEN SATURATION: 95 %

## 2025-01-16 DIAGNOSIS — S61.412A LACERATION OF LEFT HAND WITHOUT FOREIGN BODY, INITIAL ENCOUNTER: ICD-10-CM

## 2025-01-16 DIAGNOSIS — S00.83XA FOREHEAD CONTUSION, INITIAL ENCOUNTER: ICD-10-CM
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
## 2025-01-16 LAB
ALBUMIN UR-MCNC: NEGATIVE MG/DL
ANION GAP SERPL CALCULATED.3IONS-SCNC: 11 MMOL/L (ref 7–15)
APPEARANCE UR: CLEAR
BACTERIA #/AREA URNS HPF: ABNORMAL /HPF
BASOPHILS # BLD AUTO: 0.1 10E3/UL (ref 0–0.2)
BASOPHILS NFR BLD AUTO: 2 %
BILIRUB UR QL STRIP: NEGATIVE
BUN SERPL-MCNC: 20.2 MG/DL (ref 8–23)
CALCIUM SERPL-MCNC: 8.5 MG/DL (ref 8.8–10.4)
CHLORIDE SERPL-SCNC: 104 MMOL/L (ref 98–107)
COLOR UR AUTO: ABNORMAL
CREAT SERPL-MCNC: 0.78 MG/DL (ref 0.67–1.17)
EGFRCR SERPLBLD CKD-EPI 2021: >90 ML/MIN/1.73M2
EOSINOPHIL # BLD AUTO: 0.5 10E3/UL (ref 0–0.7)
EOSINOPHIL NFR BLD AUTO: 19 %
ERYTHROCYTE [DISTWIDTH] IN BLOOD BY AUTOMATED COUNT: 14.1 % (ref 10–15)
GLUCOSE SERPL-MCNC: 104 MG/DL (ref 70–99)
GLUCOSE UR STRIP-MCNC: NEGATIVE MG/DL
HCO3 SERPL-SCNC: 19 MMOL/L (ref 22–29)
HCT VFR BLD AUTO: 33.9 % (ref 40–53)
HGB BLD-MCNC: 11.5 G/DL (ref 13.3–17.7)
HGB UR QL STRIP: NEGATIVE
IMM GRANULOCYTES # BLD: 0.1 10E3/UL
IMM GRANULOCYTES NFR BLD: 2 %
KETONES UR STRIP-MCNC: NEGATIVE MG/DL
LEUKOCYTE ESTERASE UR QL STRIP: NEGATIVE
LYMPHOCYTES # BLD AUTO: 0.9 10E3/UL (ref 0.8–5.3)
LYMPHOCYTES NFR BLD AUTO: 32 %
MCH RBC QN AUTO: 29 PG (ref 26.5–33)
MCHC RBC AUTO-ENTMCNC: 33.9 G/DL (ref 31.5–36.5)
MCV RBC AUTO: 85 FL (ref 78–100)
MONOCYTES # BLD AUTO: 0.6 10E3/UL (ref 0–1.3)
MONOCYTES NFR BLD AUTO: 22 %
NEUTROPHILS # BLD AUTO: 0.6 10E3/UL (ref 1.6–8.3)
NEUTROPHILS NFR BLD AUTO: 23 %
NITRATE UR QL: NEGATIVE
NRBC # BLD AUTO: 0 10E3/UL
NRBC BLD AUTO-RTO: 0 /100
PH UR STRIP: 5 [PH] (ref 5–7)
PLATELET # BLD AUTO: 157 10E3/UL (ref 150–450)
POTASSIUM SERPL-SCNC: 4 MMOL/L (ref 3.4–5.3)
RBC # BLD AUTO: 3.97 10E6/UL (ref 4.4–5.9)
RBC URINE: 0 /HPF
SODIUM SERPL-SCNC: 134 MMOL/L (ref 135–145)
SP GR UR STRIP: 1.01 (ref 1–1.03)
UROBILINOGEN UR STRIP-MCNC: NORMAL MG/DL
WBC # BLD AUTO: 2.8 10E3/UL (ref 4–11)
WBC URINE: 0 /HPF

## 2025-01-16 PROCEDURE — 81001 URINALYSIS AUTO W/SCOPE: CPT | Performed by: EMERGENCY MEDICINE

## 2025-01-16 PROCEDURE — 99284 EMERGENCY DEPT VISIT MOD MDM: CPT | Mod: 25

## 2025-01-16 PROCEDURE — 85041 AUTOMATED RBC COUNT: CPT | Performed by: EMERGENCY MEDICINE

## 2025-01-16 PROCEDURE — 80048 BASIC METABOLIC PNL TOTAL CA: CPT | Performed by: EMERGENCY MEDICINE

## 2025-01-16 PROCEDURE — 12002 RPR S/N/AX/GEN/TRNK2.6-7.5CM: CPT | Mod: LT

## 2025-01-16 PROCEDURE — 250N000013 HC RX MED GY IP 250 OP 250 PS 637: Performed by: EMERGENCY MEDICINE

## 2025-01-16 PROCEDURE — 250N000009 HC RX 250: Performed by: EMERGENCY MEDICINE

## 2025-01-16 PROCEDURE — 36415 COLL VENOUS BLD VENIPUNCTURE: CPT | Performed by: EMERGENCY MEDICINE

## 2025-01-16 PROCEDURE — 70450 CT HEAD/BRAIN W/O DYE: CPT

## 2025-01-16 PROCEDURE — 0HQGXZZ REPAIR LEFT HAND SKIN, EXTERNAL APPROACH: ICD-10-PCS | Performed by: EMERGENCY MEDICINE

## 2025-01-16 PROCEDURE — 85004 AUTOMATED DIFF WBC COUNT: CPT | Performed by: EMERGENCY MEDICINE

## 2025-01-16 RX ORDER — ACETAMINOPHEN 500 MG
1000 TABLET ORAL ONCE
Status: COMPLETED | OUTPATIENT
Start: 2025-01-16 | End: 2025-01-16

## 2025-01-16 RX ORDER — LIDOCAINE HYDROCHLORIDE 10 MG/ML
5 INJECTION, SOLUTION EPIDURAL; INFILTRATION; INTRACAUDAL; PERINEURAL ONCE
Status: DISCONTINUED | OUTPATIENT
Start: 2025-01-16 | End: 2025-01-16 | Stop reason: HOSPADM

## 2025-01-16 RX ADMIN — ACETAMINOPHEN 1000 MG: 500 TABLET, FILM COATED ORAL at 12:42

## 2025-01-16 RX ADMIN — Medication 3 ML: at 09:45

## 2025-01-16 ASSESSMENT — ACTIVITIES OF DAILY LIVING (ADL)
ADLS_ACUITY_SCORE: 49

## 2025-01-16 NOTE — ED TRIAGE NOTES
Pt fell out of bed this morning.  Caught his left hand on the radiator with a laceration between his fingers.  Pt hit head, no LOC no thinners.  Says right neck and right back hurt.  Small skin      Triage Assessment (Adult)       Row Name 01/16/25 0731          Triage Assessment    Airway WDL WDL        Respiratory WDL    Respiratory WDL WDL        Peripheral/Neurovascular WDL    Peripheral Neurovascular WDL WDL

## 2025-01-16 NOTE — ED PROVIDER NOTES
"  Emergency Department Note      History of Present Illness     Chief Complaint  Fall    HPI  Jamar Ivory is a 74 year old male with history of type 2 diabetes, hypertension, hyperlipidemia, and CHF who presents to the ED via EMS for evaluation after a fall. He reports falling out of bed this morning when he was sleeping. He hit his head on the floor when he rolled out of bed this morning. He isn't sure how he fell and wasn't able to get up on his own. His wife called 911. Denies loss of consciousness, numbness, tingling, fever, recent illness, neck pain, urinary symptoms, abdominal pain, chest pain or shortness of breath. He has a laceration between his left 3rd and 4th finger.     Reports bed is around 2 feet off the ground.        Independent Historian  None    Review of External Notes  I reviewed MRI C.  Patient is up-to-date on tetanus booster 2023    Past Medical History   Medical History and Problem List   Benign neoplasm of colon  Chronic combined systolic and diastolic CHF  Hyperlipidemia   Skin ulcer or  lower leg bilaterally with fat layer exposed  Acquired hypothyroidism  BPH with incomplete bladder emptying  Bilateral leg edema  Breast tenderness in male  Cerebral infarction  CAD  Depression  Moderate major depression  Type 2 diabetes  Fatty liver  Hypertension   PVD  Low hemoglobin  Morbid obesity  Precordial pain  Transient cerebral ischemia  Vitiligo     Medications   Aspirin 81 mg  Norco  Levothyroxine  Losartan  Metoprolol succinate  Nitroglycerin  Crestor  Flomax  Demadex     Surgical History   Appendectomy  Colonoscopy x2  Vasectomy   Physical Exam   Patient Vitals for the past 24 hrs:   BP Temp Temp src Pulse Resp SpO2 Height Weight   01/16/25 0743 (!) 161/85 97.9  F (36.6  C) Oral 72 18 95 % 1.727 m (5' 8\") 139.3 kg (307 lb)     Physical Exam  General: Alert, no acute distress  Neuro:  PERRL.  EOMI.  No focal deficits; GCS 15.   HEENT:  Small abrasion right forehead.  Moist mucous " membranes. Conjunctiva normal.  CV:  RRR, no m/r/g, skin warm and well perfused  Pulm:  CTAB, no wheezes/ronchi/rales.  No acute distress, breathing comfortably  GI:  Soft, nontender, nondistended.  No rebound or guarding.    MSK:  Moving all extremities.  No focal areas of edema, erythema  Skin:  WWP, no rashes, no lower extremity edema, skin color normal  Left hand: 3 cm gaping laceration of 3rd webspace.  Able to make fist.  SILT throughout the hand.  2+ radial pulse.  Capillary refill < 2 seconds.     Diagnostics   Lab Results   Labs Ordered and Resulted from Time of ED Arrival to Time of ED Departure   BASIC METABOLIC PANEL - Abnormal       Result Value    Sodium 134 (*)     Potassium 4.0      Chloride 104      Carbon Dioxide (CO2) 19 (*)     Anion Gap 11      Urea Nitrogen 20.2      Creatinine 0.78      GFR Estimate >90      Calcium 8.5 (*)     Glucose 104 (*)    CBC WITH PLATELETS AND DIFFERENTIAL - Abnormal    WBC Count 2.8 (*)     RBC Count 3.97 (*)     Hemoglobin 11.5 (*)     Hematocrit 33.9 (*)     MCV 85      MCH 29.0      MCHC 33.9      RDW 14.1      Platelet Count 157      % Neutrophils 23      % Lymphocytes 32      % Monocytes 22      % Eosinophils 19      % Basophils 2      % Immature Granulocytes 2      NRBCs per 100 WBC 0      Absolute Neutrophils 0.6 (*)     Absolute Lymphocytes 0.9      Absolute Monocytes 0.6      Absolute Eosinophils 0.5      Absolute Basophils 0.1      Absolute Immature Granulocytes 0.1      Absolute NRBCs 0.0     ROUTINE UA WITH MICROSCOPIC REFLEX TO CULTURE - Abnormal    Color Urine Straw      Appearance Urine Clear      Glucose Urine Negative      Bilirubin Urine Negative      Ketones Urine Negative      Specific Gravity Urine 1.008      Blood Urine Negative      pH Urine 5.0      Protein Albumin Urine Negative      Urobilinogen Urine Normal      Nitrite Urine Negative      Leukocyte Esterase Urine Negative      Bacteria Urine Few (*)     RBC Urine 0      WBC Urine 0        Imaging  Head CT w/o contrast   Final Result   IMPRESSION:   1.  No CT evidence for acute intracranial process.   2.  Brain atrophy and presumed chronic microvascular ischemic changes as above.        Report per radiology    Independent Interpretation  CT Head: No intracranial hemorrhage or midline shift.  ED Course    Medications Administered  Medications   lidocaine (PF) (XYLOCAINE) 1 % injection 5 mL (has no administration in time range)   lido-EPINEPHrine-tetracaine (LET) topical gel GEL (3 mLs Topical $Given 1/16/25 0910)   acetaminophen (TYLENOL) tablet 1,000 mg (1,000 mg Oral $Given 1/16/25 1242)     Procedures  Procedures     Laceration Repair      Procedure: Laceration Repair    Indication: Laceration    Consent: Verbal    Tetanus status reviewed    Location: Left Hand     Length: 3 cm    Preparation: Irrigation with Sterile Saline.    Anesthesia/Sedation: Topical -LET      Treatment/Exploration: Wound explored, no foreign bodies found     Closure: The wound was closed with one layer. Skin/superficial layer was closed with FIVE x 4-0 Nylon using Interrupted sutures.     Patient Status: The patient tolerated the procedure well: Yes. There were no complications.          Discussion of Management  None    Additional Documentation  None    ED Course  ED Course as of 01/16/25 1309   Thu Jan 16, 2025   0742 I obtained history and examined the patient as noted above.    0938 I rechecked the patient and explained findings.      Medical Decision Making / Diagnosis   CMS Diagnoses: None    MIPS     None    MDM  74-year-old male with history of DM2, hypertension, hyperlipidemia and CHF presenting to the emergency department for evaluation of head injury after fall.  Patient was sleeping earlier this morning and rolled out of bed striking his head on the floor.  Denies any loss of consciousness and is not on anticoagulation.  GCS 15 on arrival.  Says his bed is about 2 feet from the ground.  He is otherwise been in  his normal state of health recently.  He is afebrile and vitally stable.  He is neurologically intact without any focal deficits worrisome for stroke.  Given his head injury, CT head was obtained fortunately negative for signs of intracranial pathology.  He denies any neck pain and has no cervical midline tenderness.  Cervical spine is cleared clinically.  He does have a laceration in the webspace between the third and fourth fingers which was anesthetized, cleansed, closed to close approximation as noted above.  He is otherwise CMS intact to the affected extremity.  The rest of his head to toe trauma exam is unremarkable for other injury or pain.  His basic lab studies above are largely unremarkable.  UA is normal.  He was ambulatory in the emergency department and steady on his feet and is safe to discharge home at this time.  Recommend suture removal with PCP or urgent care in 7-10 days.  Discussed risk for scar formation, wound dehiscence, infection with the patient.  Discussed return precautions for the emergency department.  All questions were answered prior to discharge.    Disposition  The patient was discharged.     ICD-10 Codes:    ICD-10-CM    1. Fall from bed, initial encounter  W06.XXXA       2. Forehead contusion, initial encounter  S00.83XA       3. Laceration of left hand without foreign body, initial encounter  S61.412A            Discharge Medications  New Prescriptions    No medications on file     Scribe Disclosure:  I, Irene Akins, am serving as a scribe at 7:58 AM on 1/16/2025 to document services personally performed by Ivan Echevarria MD based on my observations and the provider's statements to me.      Ivan Echevarria MD  01/16/25 8706

## 2025-01-18 ENCOUNTER — APPOINTMENT (OUTPATIENT)
Dept: GENERAL RADIOLOGY | Facility: CLINIC | Age: 75
DRG: 579 | End: 2025-01-18
Payer: COMMERCIAL

## 2025-01-18 ENCOUNTER — HOSPITAL ENCOUNTER (INPATIENT)
Facility: CLINIC | Age: 75
DRG: 579 | End: 2025-01-18
Admitting: INTERNAL MEDICINE
Payer: COMMERCIAL

## 2025-01-18 DIAGNOSIS — I89.0 LYMPHEDEMA: Primary | ICD-10-CM

## 2025-01-18 DIAGNOSIS — I50.40 COMBINED SYSTOLIC AND DIASTOLIC HEART FAILURE, UNSPECIFIED HF CHRONICITY (H): ICD-10-CM

## 2025-01-18 DIAGNOSIS — T14.8XXA WOUND INFECTION: ICD-10-CM

## 2025-01-18 DIAGNOSIS — L03.114 CELLULITIS OF LEFT HAND: ICD-10-CM

## 2025-01-18 DIAGNOSIS — L08.9 WOUND INFECTION: ICD-10-CM

## 2025-01-18 LAB
ALBUMIN SERPL BCG-MCNC: 3.6 G/DL (ref 3.5–5.2)
ALP SERPL-CCNC: 49 U/L (ref 40–150)
ALT SERPL W P-5'-P-CCNC: 15 U/L (ref 0–70)
ANION GAP SERPL CALCULATED.3IONS-SCNC: 8 MMOL/L (ref 7–15)
AST SERPL W P-5'-P-CCNC: 22 U/L (ref 0–45)
BASOPHILS # BLD AUTO: 0.1 10E3/UL (ref 0–0.2)
BASOPHILS NFR BLD AUTO: 1 %
BILIRUB SERPL-MCNC: 0.4 MG/DL
BUN SERPL-MCNC: 13.9 MG/DL (ref 8–23)
CALCIUM SERPL-MCNC: 8.8 MG/DL (ref 8.8–10.4)
CHLORIDE SERPL-SCNC: 102 MMOL/L (ref 98–107)
CREAT SERPL-MCNC: 0.84 MG/DL (ref 0.67–1.17)
CRP SERPL-MCNC: 53.74 MG/L
EGFRCR SERPLBLD CKD-EPI 2021: >90 ML/MIN/1.73M2
EOSINOPHIL # BLD AUTO: 0.6 10E3/UL (ref 0–0.7)
EOSINOPHIL NFR BLD AUTO: 17 %
ERYTHROCYTE [DISTWIDTH] IN BLOOD BY AUTOMATED COUNT: 14.4 % (ref 10–15)
ERYTHROCYTE [SEDIMENTATION RATE] IN BLOOD BY WESTERGREN METHOD: 33 MM/HR (ref 0–20)
GLUCOSE BLDC GLUCOMTR-MCNC: 83 MG/DL (ref 70–99)
GLUCOSE BLDC GLUCOMTR-MCNC: 94 MG/DL (ref 70–99)
GLUCOSE SERPL-MCNC: 82 MG/DL (ref 70–99)
HCO3 SERPL-SCNC: 26 MMOL/L (ref 22–29)
HCT VFR BLD AUTO: 36.7 % (ref 40–53)
HGB BLD-MCNC: 12 G/DL (ref 13.3–17.7)
HOLD SPECIMEN: NORMAL
IMM GRANULOCYTES # BLD: 0 10E3/UL
IMM GRANULOCYTES NFR BLD: 0 %
LACTATE SERPL-SCNC: 1.1 MMOL/L (ref 0.7–2)
LYMPHOCYTES # BLD AUTO: 1.1 10E3/UL (ref 0.8–5.3)
LYMPHOCYTES NFR BLD AUTO: 29 %
MCH RBC QN AUTO: 28.6 PG (ref 26.5–33)
MCHC RBC AUTO-ENTMCNC: 32.7 G/DL (ref 31.5–36.5)
MCV RBC AUTO: 88 FL (ref 78–100)
MONOCYTES # BLD AUTO: 0.8 10E3/UL (ref 0–1.3)
MONOCYTES NFR BLD AUTO: 22 %
NEUTROPHILS # BLD AUTO: 1.1 10E3/UL (ref 1.6–8.3)
NEUTROPHILS NFR BLD AUTO: 30 %
NRBC # BLD AUTO: 0 10E3/UL
NRBC BLD AUTO-RTO: 0 /100
PLATELET # BLD AUTO: 192 10E3/UL (ref 150–450)
POTASSIUM SERPL-SCNC: 3.9 MMOL/L (ref 3.4–5.3)
PROT SERPL-MCNC: 6.7 G/DL (ref 6.4–8.3)
RBC # BLD AUTO: 4.19 10E6/UL (ref 4.4–5.9)
SODIUM SERPL-SCNC: 136 MMOL/L (ref 135–145)
WBC # BLD AUTO: 3.8 10E3/UL (ref 4–11)

## 2025-01-18 PROCEDURE — 250N000011 HC RX IP 250 OP 636

## 2025-01-18 PROCEDURE — 87040 BLOOD CULTURE FOR BACTERIA: CPT

## 2025-01-18 PROCEDURE — 250N000013 HC RX MED GY IP 250 OP 250 PS 637: Performed by: INTERNAL MEDICINE

## 2025-01-18 PROCEDURE — 99285 EMERGENCY DEPT VISIT HI MDM: CPT | Mod: 25

## 2025-01-18 PROCEDURE — 82040 ASSAY OF SERUM ALBUMIN: CPT

## 2025-01-18 PROCEDURE — 85018 HEMOGLOBIN: CPT

## 2025-01-18 PROCEDURE — 83605 ASSAY OF LACTIC ACID: CPT

## 2025-01-18 PROCEDURE — 258N000003 HC RX IP 258 OP 636

## 2025-01-18 PROCEDURE — 82962 GLUCOSE BLOOD TEST: CPT

## 2025-01-18 PROCEDURE — 85652 RBC SED RATE AUTOMATED: CPT

## 2025-01-18 PROCEDURE — 87070 CULTURE OTHR SPECIMN AEROBIC: CPT

## 2025-01-18 PROCEDURE — 36415 COLL VENOUS BLD VENIPUNCTURE: CPT

## 2025-01-18 PROCEDURE — 86140 C-REACTIVE PROTEIN: CPT

## 2025-01-18 PROCEDURE — 73130 X-RAY EXAM OF HAND: CPT | Mod: LT

## 2025-01-18 PROCEDURE — 99222 1ST HOSP IP/OBS MODERATE 55: CPT | Performed by: INTERNAL MEDICINE

## 2025-01-18 PROCEDURE — 85004 AUTOMATED DIFF WBC COUNT: CPT

## 2025-01-18 PROCEDURE — 120N000001 HC R&B MED SURG/OB

## 2025-01-18 PROCEDURE — 96365 THER/PROPH/DIAG IV INF INIT: CPT

## 2025-01-18 PROCEDURE — 96375 TX/PRO/DX INJ NEW DRUG ADDON: CPT

## 2025-01-18 RX ORDER — ASPIRIN 81 MG/1
81 TABLET, CHEWABLE ORAL DAILY
Status: DISCONTINUED | OUTPATIENT
Start: 2025-01-18 | End: 2025-01-24 | Stop reason: HOSPADM

## 2025-01-18 RX ORDER — POLYETHYLENE GLYCOL 3350 17 G/17G
17 POWDER, FOR SOLUTION ORAL 2 TIMES DAILY PRN
Status: DISCONTINUED | OUTPATIENT
Start: 2025-01-18 | End: 2025-01-24 | Stop reason: HOSPADM

## 2025-01-18 RX ORDER — TORSEMIDE 20 MG/1
20 TABLET ORAL DAILY
Status: DISCONTINUED | OUTPATIENT
Start: 2025-01-19 | End: 2025-01-21

## 2025-01-18 RX ORDER — LOSARTAN POTASSIUM 25 MG/1
25 TABLET ORAL DAILY
Status: DISCONTINUED | OUTPATIENT
Start: 2025-01-19 | End: 2025-01-24 | Stop reason: HOSPADM

## 2025-01-18 RX ORDER — HYDROCODONE BITARTRATE AND ACETAMINOPHEN 5; 325 MG/1; MG/1
1 TABLET ORAL EVERY 6 HOURS PRN
Status: DISCONTINUED | OUTPATIENT
Start: 2025-01-18 | End: 2025-01-24 | Stop reason: HOSPADM

## 2025-01-18 RX ORDER — CEFTRIAXONE 2 G/1
2 INJECTION, POWDER, FOR SOLUTION INTRAMUSCULAR; INTRAVENOUS EVERY 24 HOURS
Status: DISCONTINUED | OUTPATIENT
Start: 2025-01-19 | End: 2025-01-21

## 2025-01-18 RX ORDER — ACETAMINOPHEN 500 MG
500-1000 TABLET ORAL EVERY 6 HOURS PRN
Status: DISCONTINUED | OUTPATIENT
Start: 2025-01-18 | End: 2025-01-24 | Stop reason: HOSPADM

## 2025-01-18 RX ORDER — VANCOMYCIN HYDROCHLORIDE 1 G/200ML
1000 INJECTION, SOLUTION INTRAVENOUS EVERY 12 HOURS
Status: DISCONTINUED | OUTPATIENT
Start: 2025-01-19 | End: 2025-01-23

## 2025-01-18 RX ORDER — LEVOTHYROXINE SODIUM 112 UG/1
224 TABLET ORAL DAILY
Status: DISCONTINUED | OUTPATIENT
Start: 2025-01-19 | End: 2025-01-24 | Stop reason: HOSPADM

## 2025-01-18 RX ORDER — NITROGLYCERIN 0.4 MG/1
0.4 TABLET SUBLINGUAL EVERY 5 MIN PRN
Status: DISCONTINUED | OUTPATIENT
Start: 2025-01-18 | End: 2025-01-24 | Stop reason: HOSPADM

## 2025-01-18 RX ORDER — CEFTRIAXONE 2 G/1
2 INJECTION, POWDER, FOR SOLUTION INTRAMUSCULAR; INTRAVENOUS ONCE
Status: COMPLETED | OUTPATIENT
Start: 2025-01-18 | End: 2025-01-18

## 2025-01-18 RX ORDER — ONDANSETRON 4 MG/1
4 TABLET, ORALLY DISINTEGRATING ORAL EVERY 6 HOURS PRN
Status: DISCONTINUED | OUTPATIENT
Start: 2025-01-18 | End: 2025-01-24 | Stop reason: HOSPADM

## 2025-01-18 RX ORDER — CALCIUM CARBONATE 500 MG/1
1000 TABLET, CHEWABLE ORAL 4 TIMES DAILY PRN
Status: DISCONTINUED | OUTPATIENT
Start: 2025-01-18 | End: 2025-01-24 | Stop reason: HOSPADM

## 2025-01-18 RX ORDER — METOPROLOL SUCCINATE 25 MG/1
25 TABLET, EXTENDED RELEASE ORAL DAILY
Status: DISCONTINUED | OUTPATIENT
Start: 2025-01-19 | End: 2025-01-24 | Stop reason: HOSPADM

## 2025-01-18 RX ORDER — MORPHINE SULFATE 4 MG/ML
4 INJECTION, SOLUTION INTRAMUSCULAR; INTRAVENOUS ONCE
Status: COMPLETED | OUTPATIENT
Start: 2025-01-18 | End: 2025-01-18

## 2025-01-18 RX ORDER — LIDOCAINE 40 MG/G
CREAM TOPICAL
Status: DISCONTINUED | OUTPATIENT
Start: 2025-01-18 | End: 2025-01-24 | Stop reason: HOSPADM

## 2025-01-18 RX ORDER — MIRTAZAPINE 15 MG/1
15 TABLET, FILM COATED ORAL AT BEDTIME
Status: DISCONTINUED | OUTPATIENT
Start: 2025-01-18 | End: 2025-01-24 | Stop reason: HOSPADM

## 2025-01-18 RX ORDER — TAMSULOSIN HYDROCHLORIDE 0.4 MG/1
0.4 CAPSULE ORAL DAILY
Status: DISCONTINUED | OUTPATIENT
Start: 2025-01-19 | End: 2025-01-18

## 2025-01-18 RX ORDER — TAMSULOSIN HYDROCHLORIDE 0.4 MG/1
0.4 CAPSULE ORAL DAILY
COMMUNITY

## 2025-01-18 RX ORDER — AMOXICILLIN 250 MG
1 CAPSULE ORAL 2 TIMES DAILY PRN
Status: DISCONTINUED | OUTPATIENT
Start: 2025-01-18 | End: 2025-01-24 | Stop reason: HOSPADM

## 2025-01-18 RX ORDER — ONDANSETRON 2 MG/ML
4 INJECTION INTRAMUSCULAR; INTRAVENOUS EVERY 6 HOURS PRN
Status: DISCONTINUED | OUTPATIENT
Start: 2025-01-18 | End: 2025-01-24 | Stop reason: HOSPADM

## 2025-01-18 RX ORDER — ROSUVASTATIN CALCIUM 20 MG/1
20 TABLET, COATED ORAL DAILY
Status: DISCONTINUED | OUTPATIENT
Start: 2025-01-19 | End: 2025-01-24 | Stop reason: HOSPADM

## 2025-01-18 RX ORDER — TAMSULOSIN HYDROCHLORIDE 0.4 MG/1
0.8 CAPSULE ORAL DAILY
Status: DISCONTINUED | OUTPATIENT
Start: 2025-01-19 | End: 2025-01-24 | Stop reason: HOSPADM

## 2025-01-18 RX ORDER — ONDANSETRON 2 MG/ML
4 INJECTION INTRAMUSCULAR; INTRAVENOUS EVERY 30 MIN PRN
Status: DISCONTINUED | OUTPATIENT
Start: 2025-01-18 | End: 2025-01-18

## 2025-01-18 RX ORDER — AMOXICILLIN 250 MG
2 CAPSULE ORAL 2 TIMES DAILY PRN
Status: DISCONTINUED | OUTPATIENT
Start: 2025-01-18 | End: 2025-01-24 | Stop reason: HOSPADM

## 2025-01-18 RX ADMIN — ASPIRIN 81 MG CHEWABLE TABLET 81 MG: 81 TABLET CHEWABLE at 15:20

## 2025-01-18 RX ADMIN — MIRTAZAPINE 15 MG: 15 TABLET, FILM COATED ORAL at 21:19

## 2025-01-18 RX ADMIN — VANCOMYCIN HYDROCHLORIDE 2500 MG: 10 INJECTION, POWDER, LYOPHILIZED, FOR SOLUTION INTRAVENOUS at 13:22

## 2025-01-18 RX ADMIN — CEFTRIAXONE 2 G: 2 INJECTION, POWDER, FOR SOLUTION INTRAMUSCULAR; INTRAVENOUS at 15:42

## 2025-01-18 RX ADMIN — MORPHINE SULFATE 4 MG: 4 INJECTION, SOLUTION INTRAMUSCULAR; INTRAVENOUS at 12:18

## 2025-01-18 RX ADMIN — HYDROCODONE BITARTRATE AND ACETAMINOPHEN 1 TABLET: 5; 325 TABLET ORAL at 15:20

## 2025-01-18 ASSESSMENT — COLUMBIA-SUICIDE SEVERITY RATING SCALE - C-SSRS
6. HAVE YOU EVER DONE ANYTHING, STARTED TO DO ANYTHING, OR PREPARED TO DO ANYTHING TO END YOUR LIFE?: NO
1. IN THE PAST MONTH, HAVE YOU WISHED YOU WERE DEAD OR WISHED YOU COULD GO TO SLEEP AND NOT WAKE UP?: NO
2. HAVE YOU ACTUALLY HAD ANY THOUGHTS OF KILLING YOURSELF IN THE PAST MONTH?: NO

## 2025-01-18 ASSESSMENT — ACTIVITIES OF DAILY LIVING (ADL)
ADLS_ACUITY_SCORE: 53
ADLS_ACUITY_SCORE: 36
ADLS_ACUITY_SCORE: 53
ADLS_ACUITY_SCORE: 53
ADLS_ACUITY_SCORE: 35
ADLS_ACUITY_SCORE: 49
ADLS_ACUITY_SCORE: 49
ADLS_ACUITY_SCORE: 57
ADLS_ACUITY_SCORE: 49
ADLS_ACUITY_SCORE: 35
ADLS_ACUITY_SCORE: 53
ADLS_ACUITY_SCORE: 49
ADLS_ACUITY_SCORE: 49

## 2025-01-18 NOTE — H&P
[facility]    History and Physical - Hospitalist Service       Date of Admission:  1/18/2025  Primary Care Physician   Huy Crystal  CONSULTANTS: ortho    Assessment & Plan      Jamar Ivory is a 74 year old male who who has a complex medical history including diet-controlled type 2 diabetes, hypertension, systolic heart failure, hyperlipidemia, previous stroke, obesity is presenting with a worsening infection of his left hand.  Patient on the 16th fell and cut his webbing between the third and fourth digit on the left hand.  He presented to the ER and had sutures placed and had it washed out.  Patient was not placed on antibiotics and sent home.  Unfortunately for the patient he developed increased pain, swelling, and redness of left hand starting yesterday and got to the point where he decided to come into the ER today.  He has significant erythema over the dorsum of his left hand.  This pain is 1-2/10.  He has pain with extension and flexion of his left hand.  In the emergency room his sutures were removed and gross pus came out of the wound.  He does not have any fever, chills, sweats.  His white blood cell count is actually only 3.8.  He was given a tetanus shot last year.  He was started on vancomycin in the emergency room.  Workup also included a BMP that was normal, CRP was elevated at 54, lactic acid 1.1, hemoglobin 12, white blood cell count 3.8, platelets 192.  Patient had a wound culture taken as well as blood cultures which are pending.        Left hand cellulitis and wound infection  Patient presented with a history of a fall on the 16th leading to a laceration of the webbing between his left third and fourth digits that required suturing.  Since then his symptoms have gotten worse and his hand now is erythematous, tender, and swollen.  Patient was not on antibiotics and now has been started on Rocephin and vancomycin.  Will get an MRI of his left hand to rule out abscess  and tenosynovitis.  Ortho will see the patient for possible need for washout.  Patient's pain currently is well-controlled and will continue on Tylenol and Norco.  He has a history of having a tetanus shot 2013.    Chronic systolic congstive heart failure, essential hypertension   Patient has chronic lower extremity edema and has a known ejection fraction 25-30% by echocardiogram 10/2024.  Patient does not have an ICD in place.  He continues on an ARB, beta-blocker, and diuretics with torsemide.  Patient does have lower extremity edema at baseline    Hyperlipidemia   Patient is on Crestor    History CVA  Patient continues on aspirin and Crestor    Hypothyroidism  Patient is on Synthroid    BPH with symptoms  Patient states he continues to get up at night to go to the bathroom.  He is already on Flomax which I will increase the dose.  Could consider adding Proscar    Depression, active  Patient has ongoing depression but is not on any current medications.  Will place the patient on Remeron to see if we can help his symptoms    Morbidly obese  Patient with a BMI of 48 which complicates all aspects of his care    Type 2 diabetes with polyneuropathy  Patient is no longer on medications and is diet controlled.    Gout  Not on medications       Advance care planning  Had a very quick conversation with the patient as he knows his wishes.  Patient is DNR/DNI.  This is consistent with previous epic orders.  He made it clear that he would not even want short-term intubation.  I talked to him to make sure that he has talked to his family about what his wishes are in case something should happen to him in the future.  Patient states that he does have a living will which we have a POLST on file from 2019 that states DNR/DNI    4Ms:  Polypharmacy: medications reviewed and appropriate  Mentation:   Capacity intact to make his own medical decisions but admits his memory is not great  Social support: lives in an apartment with his  "second wife, has a son and daughter whom he recognizes and is estranged from another daughter, is a retired   Mobility:  uses a four wheeled walker  What is importmant:  to be at home    Discussed plan of care with Jonnie Muller PA-c in the ER  Diet: Regular Diet Adult    DVT Prophylaxis: Pneumatic Compression Devices  Leon Catheter: Not present  Lines: None     Cardiac Monitoring: None    RESTRAINTS: not indicated  Code Status: No CPR- Do NOT Intubate         This document was created using voice recognition technology.  Please excuse any typographical errors that may have occurred.  Please call with any questions.     Medical Decision Making     Clinically Significant Risk Factors Present on Admission                 # Drug Induced Platelet Defect: home medication list includes an antiplatelet medication   # Hypertension: Noted on problem list  # Chronic heart failure with reduced ejection fraction: last echo with EF <40%          # Severe Obesity: Estimated body mass index is 48.37 kg/m  as calculated from the following:    Height as of this encounter: 1.727 m (5' 8\").    Weight as of this encounter: 144.3 kg (318 lb 1.6 oz).              Disposition Plan      Expected Discharge Date: 01/19/2025                  Fabian Chance MD  Hospitalist Service  [facility]  Securely message with [employer] (more info)  Text page via [employer] Paging/Directory     Medically Ready for Discharge:       Length of stay: Anticipate greater than 2 midnight hospitalization for evaluation and treatment of cellulitis of the left hand          ______________________________________________________________________    Chief Complaint   Left hand infection    History is obtained from the patient  Epic reviewed    History of Present Illness   Jamar Ivory is a 74 year old male who who has a complex medical history including diet-controlled type 2 diabetes, hypertension, systolic heart failure, hyperlipidemia, " previous stroke, obesity is presenting with a worsening infection of his left hand.  Patient on the 16th fell and cut his webbing between the third and fourth digit on the left hand.  He presented to the ER and had sutures placed and had it washed out.  Patient was not placed on antibiotics and sent home.  Unfortunately for the patient he developed increased pain, swelling, and redness of left hand starting yesterday and got to the point where he decided to come into the ER today.  He has significant erythema over the dorsum of his left hand.  This pain is 1-2/10.  He has pain with extension and flexion of his left hand.  In the emergency room his sutures were removed and gross pus came out of the wound.  He does not have any fever, chills, sweats.  His white blood cell count is actually only 3.8.  He was given a tetanus shot last year.  He was started on vancomycin in the emergency room.  Workup also included a BMP that was normal, CRP was elevated at 54, lactic acid 1.1, hemoglobin 12, white blood cell count 3.8, platelets 192.  Patient had a wound culture taken as well as blood cultures which are pending.      Past Medical History    Past Medical History:   Diagnosis Date    Arthritis     Cerebral artery occlusion with cerebral infarction     TIA    CHF (congestive heart failure) 12/24/2018    CVA (cerebral infarction) 2012    CVD (cardiovascular disease)     Depression 1977    hospitalized    Fatty liver     Gout     h/o Concussion     Hypertension 01/17/2011    Hypothyroidism     Obesity     Spider veins     TIA (transient ischaemic attack) 2012    Vitiligo        Past Surgical History   Past Surgical History:   Procedure Laterality Date    APPENDECTOMY      at age 22    COLONOSCOPY N/A 09/02/2016    Procedure: COMBINED COLONOSCOPY, SINGLE OR MULTIPLE BIOPSY/POLYPECTOMY BY BIOPSY;  Surgeon: Yanick Brown MD;  Location:  GI    COLONOSCOPY N/A 12/27/2021    Procedure: COLONOSCOPY;  Surgeon: Kong Chowdary  MD NORM;  Location: [facility] OR    VASECTOMY         Prior to Admission Medications  per epic, pharm D to reconcile as patient is not sure what he is actually taking  Prior to Admission Medications   Prescriptions Last Dose Informant Patient Reported? Taking?   HYDROcodone-acetaminophen (NORCO) 5-325 MG tablet 1/18/2025 Morning  No Yes   Sig: Take 1 tablet by mouth every 6 hours as needed for pain.   Misc Natural Products (OSTEO BI-FLEX JOINT SHIELD PO) 1/18/2025 Morning  Yes Yes   Sig: Take 1 tablet by mouth daily.   acetaminophen (TYLENOL) 500 MG tablet Unknown  Yes Yes   Sig: Take 500-1,000 mg by mouth every 6 hours as needed for mild pain Prn for headaches   aspirin (ASA) 81 MG tablet More than a month  No Yes   Sig: Take 1 tablet (81 mg) by mouth daily   levothyroxine (SYNTHROID/LEVOTHROID) 112 MCG tablet 1/18/2025 Morning  No Yes   Sig: TAKE 2 TABLETS (224 MCG) BY MOUTH DAILY   losartan (COZAAR) 25 MG tablet 1/18/2025 Morning  No Yes   Sig: Take 1 tablet (25 mg) by mouth daily.   metoprolol succinate ER (TOPROL XL) 25 MG 24 hr tablet 1/18/2025 Morning  No Yes   Sig: Take 1 tablet (25 mg) by mouth daily.   nitroGLYcerin (NITROSTAT) 0.4 MG sublingual tablet   No Yes   Sig: FOR CHEST PAIN PLACE 1 TABLET UNDER THE TONGUE EVERY 5 MINUTES FOR 3 DOSES. IF SYMPTOMS PERSIST 5 MINUTES AFTER 1ST DOSE CALL 911.   rosuvastatin (CRESTOR) 20 MG tablet 1/18/2025 Morning  No Yes   Sig: Take 1 tablet (20 mg) by mouth daily.   tamsulosin (FLOMAX) 0.4 MG capsule 1/18/2025 Morning  Yes Yes   Sig: Take 0.4 mg by mouth daily.   torsemide (DEMADEX) 20 MG tablet 1/18/2025 Morning  No Yes   Sig: Take 1 tablet (20 mg) by mouth daily.      Facility-Administered Medications: None        Allergies   Allergies   Allergen Reactions    Clopidogrel      Cough/emesis    Penicillins Rash    Simvastatin Diarrhea    Sulfa Antibiotics      Unsure    Xeroform [Bismuth Tribromphenate]      Unsure        REVIEW OF SYSTEMS:  A comprehensive review of systems  was negative except for items noted in the HPI.  Patient currently denies any fever, chills, sweats, nausea, vomiting, diarrhea, shortness of breath, or chest pain.  Does admit to depression also gets up in the night to urinate often       Physical Exam   Vital Signs: Temp: 98.1  F (36.7  C) Temp src: Oral BP: 128/72 Pulse: 73   Resp: 18 SpO2: 97 % O2 Device: None (Room air)    Weight: 318 lbs 1.6 oz    General appearance: Patient is alert and oriented x3, no apparent distress, pleasant and conversing normally, speaking in full sentences, appears stated age  HEENT:  Atraumatic/normal cephalic, EOMI, Pupils equally round and reactive to light, sclera non-icteric, Mucous membranes are moist,    NECK:  has a beard  RESPIRATORY: Clear to auscultation bilateral, good air movement, distant due to body habitus  CARDIOVASCULAR: distant due to body habitus, Regular rate and rhythm, normal S1/S2, no appreciable murmurs   GASTROINTESTINAL:  morbidly obese, Non-distended, non-tender, soft, bowel sounds present throughout,  MUSCULOSKELETAL: left hand with errythemia, tenderness, swelling with a open wound between/webbing of 3rd/4th left fingers  SKIN:  Warm, dry, no rashes, no mottling  NEUROLOGIC:  Cranial nerves II-XII intact, without any focal deficits, strength 5/5 throughout  EXTREMITIES:  Moves all extremities, no clubbing, cyanosis, has 2+ bilateral lower extremity edema which patient states is chronic  :  Leon not present , patient wet himself in the bathroom      Data     I have personally reviewed the following data over the past 24 hrs:    3.8 (L)  \   12.0 (L)   / 192     136 102 13.9 /  82   3.9 26 0.84 \     ALT: 15 AST: 22 AP: 49 TBILI: 0.4   ALB: 3.6 TOT PROTEIN: 6.7 LIPASE: N/A     Procal: N/A CRP: 53.74 (H) Lactic Acid: 1.1         Imaging:   Results for orders placed or performed during the hospital encounter of 01/18/25   XR Hand Left G/E 3 Views    Narrative    EXAM: XR HAND LEFT G/E 3 VIEWS  LOCATION: M  [facility]  DATE: 1/18/2025    INDICATION: left hand pain and swelling  COMPARISON: None.      Impression    IMPRESSION:     There is diffuse soft tissue swelling over the left hand. The bones are diffusely demineralized. There are prior postoperative changes from plate and screw fixation of the index finger proximal phalanx. No displaced fracture. Scattered degenerative   changes most pronounced at the thumb CMC joint where they appear moderate. There is some soft tissue laceration between the third and fourth proximal phalangeal bases. No radiodense foreign body. No radiographic evidence for osteomyelitis.     *Note: Due to a large number of results and/or encounters for the requested time period, some results have not been displayed. A complete set of results can be found in Results Review.     Procedures: ortho to see, MRI of the hand pending  I have personally have reviewed the patient's most up to date radiologic exams  labs, orders, and medications myself

## 2025-01-18 NOTE — PHARMACY-ADMISSION MEDICATION HISTORY
Pharmacy Intern Admission Medication History    Admission medication history is complete. The information provided in this note is only as accurate as the sources available at the time of the update.    Information Source(s): Patient and CareEverywhere/SureScripts via in-person    Pertinent Information: None     Changes made to PTA medication list:  Added: None  Deleted: Aspirin and Torsemide (duplicates)   Changed:   Osteo vitamin-> added sig   Tamsulosin 2QD->1QD     Allergies reviewed with patient and updates made in EHR: yes    Medication History Completed By: Kortney Sapp RPH 1/18/2025 1:42 PM    PTA Med List   Medication Sig Last Dose/Taking    acetaminophen (TYLENOL) 500 MG tablet Take 500-1,000 mg by mouth every 6 hours as needed for mild pain Prn for headaches Unknown    aspirin (ASA) 81 MG tablet Take 1 tablet (81 mg) by mouth daily More than a month    HYDROcodone-acetaminophen (NORCO) 5-325 MG tablet Take 1 tablet by mouth every 6 hours as needed for pain. 1/18/2025 Morning    levothyroxine (SYNTHROID/LEVOTHROID) 112 MCG tablet TAKE 2 TABLETS (224 MCG) BY MOUTH DAILY 1/18/2025 Morning    losartan (COZAAR) 25 MG tablet Take 1 tablet (25 mg) by mouth daily. 1/18/2025 Morning    metoprolol succinate ER (TOPROL XL) 25 MG 24 hr tablet Take 1 tablet (25 mg) by mouth daily. 1/18/2025 Morning    Misc Natural Products (OSTEO BI-FLEX JOINT SHIELD PO) Take 1 tablet by mouth daily. 1/18/2025 Morning    nitroGLYcerin (NITROSTAT) 0.4 MG sublingual tablet FOR CHEST PAIN PLACE 1 TABLET UNDER THE TONGUE EVERY 5 MINUTES FOR 3 DOSES. IF SYMPTOMS PERSIST 5 MINUTES AFTER 1ST DOSE CALL 911. Taking    rosuvastatin (CRESTOR) 20 MG tablet Take 1 tablet (20 mg) by mouth daily. 1/18/2025 Morning    tamsulosin (FLOMAX) 0.4 MG capsule Take 0.4 mg by mouth daily. 1/18/2025 Morning    torsemide (DEMADEX) 20 MG tablet Take 1 tablet (20 mg) by mouth daily. 1/18/2025 Morning

## 2025-01-18 NOTE — TREATMENT PLAN
Ortho aware of patient. Reviewed chart/clinical images. MRI pending. Will review/evaluate patient once this has been completed.     NPO after midnight for possible OR tomorrow. Tentatively has been added to OR schedule pending clinical picture.     Keyur Hoffmann MD   [facility]   Orthopaedic Surgery - Hand & Upper Extremity

## 2025-01-18 NOTE — ED PROVIDER NOTES
ED APC SUPERVISION NOTE:   I evaluated this patient in conjunction with Jonnie Muller PA-C  I have participated in the care of the patient and personally performed key elements of the history, exam, and medical decision making.      HPI:   Jamar Ivory is a 74 year old male with history of diabetes, hypertension, hyperlipidemia, CHF who presents with increasing pain and swelling left hand.  Patient was seen 2 days ago for a laceration between the third and fourth fingers on the left hand after he fell out of bed.  This laceration was cleaned and repaired with sutures in the ED.  Yesterday he noticed that the pain was getting worse and he had increased swelling and redness on the dorsum of the hand.  He has not had any fevers.    Independent Historian:   None    Review of External Notes:   I reviewed the ER visit from 1/16/2025.     EXAM:   Physical Exam  Vitals and nursing note reviewed.   Constitutional:       General: He is not in acute distress.     Appearance: He is not ill-appearing.   HENT:      Head: Normocephalic and atraumatic.      Right Ear: External ear normal.      Left Ear: External ear normal.      Nose: Nose normal.      Mouth/Throat:      Mouth: Mucous membranes are moist.   Eyes:      Extraocular Movements: Extraocular movements intact.      Conjunctiva/sclera: Conjunctivae normal.   Pulmonary:      Effort: Pulmonary effort is normal. No respiratory distress.   Musculoskeletal:         General: No signs of injury.      Left hand: Swelling, laceration (There is a wound in the webspace between the third and fourth digits on the left hand with sutures in place and small amount of purulent drainage) and tenderness present. Decreased range of motion (There is significant pain with passive range of motion of the third and fourth MCP joints).        Hands:       Cervical back: Normal range of motion and neck supple.   Skin:     General: Skin is warm and dry.      Findings: No rash.   Neurological:       Mental Status: He is alert and oriented to person, place, and time.   Psychiatric:         Behavior: Behavior normal.                 Independent Interpretation (X-rays, CTs, rhythm strip):  X-ray hand shows no fx or obvious retained FB    Consultations/Discussion of Management or Tests:  Admitting Hospitalist, Dr Chance     MEDICAL DECISION MAKING/ASSESSMENT AND PLAN:   74-year-old male presenting with left hand swelling and pain after having a wound repair 2 days ago.  There appears to be a wound infection and likely extension into the deep space of the hand.  Patient does not appear septic at this point.  We will plan for irrigation of the wound, cultures, and lab work.  We will give IV antibiotics and plan for admission for additional IV antibiotics, pain control, and Ortho/hand evaluation.     DIAGNOSIS:     ICD-10-CM    1. Cellulitis of left hand  L03.114       2. Wound infection  T14.8XXA     L08.9             Maxime Stevens MD  1/18/2025  [facility] EMERGENCY DEPT       Maxime Stevens MD  01/18/25 1912

## 2025-01-18 NOTE — ED NOTES
"[facility]  ED Nurse Handoff Report    ED Chief complaint: Hand Pain  . ED Diagnosis:   Final diagnoses:   None       Allergies:   Allergies   Allergen Reactions    Clopidogrel      Cough/emesis    Penicillins Rash    Simvastatin Diarrhea    Sulfa Antibiotics      Unsure    Xeroform [Bismuth Tribromphenate]      Unsure       Code Status: Full Code    Activity level - Baseline/Home:   Walker .  Activity Level - Current:   standby and walker.   Lift room needed: No.   Bariatric: No   Needed: No   Isolation: No.   Infection: Not Applicable.     Respiratory status: Room air    Vital Signs (within 30 minutes):   Vitals:    01/18/25 1015 01/18/25 1215   BP: (!) 143/78 (!) 125/94   Pulse: 78 77   Resp: 18 18   Temp: 98.1  F (36.7  C)    TempSrc: Oral    SpO2: 97% 95%   Weight: 144.3 kg (318 lb 1.6 oz)    Height: 1.727 m (5' 8\")        Cardiac Rhythm:  ,   Cardiac  Cardiac Rhythm: Normal sinus rhythm  Pain level:  6/10  Patient confused: No.   Patient Falls Risk: bed/chair alarm on and nonskid shoes/slippers when out of bed.   Elimination Status: Has voided     Patient Report - Initial Complaint: Jamar Ivroy is a 74 year old male with a history of type 2 diabetes, HTN, HLD and CHF presenting with hand pain. The patient states that yesterday he started having swelling, soreness and redness near the wound on his left hand. The pain and redness radiates up to his wrist. The patient received sutures in the ED on 01/16/25 after falling out of bed and hitting his hand on the radiator. He has not changed his bandage. The patient has had no drainage from the wound, fever, chills, nausea, vomiting or pain elsewhere. He did not receive an x ray when he was in the ED. The patient has a penicillin allergy that causes rash.    Focused Assessment: Redness, swelling and pain to left hand.      Abnormal Results:   Labs Ordered and Resulted from Time of ED Arrival to Time of ED Departure   CRP " INFLAMMATION - Abnormal       Result Value    CRP Inflammation 53.74 (*)    ERYTHROCYTE SEDIMENTATION RATE AUTO - Abnormal    Erythrocyte Sedimentation Rate 33 (*)    CBC WITH PLATELETS AND DIFFERENTIAL - Abnormal    WBC Count 3.8 (*)     RBC Count 4.19 (*)     Hemoglobin 12.0 (*)     Hematocrit 36.7 (*)     MCV 88      MCH 28.6      MCHC 32.7      RDW 14.4      Platelet Count 192      % Neutrophils 30      % Lymphocytes 29      % Monocytes 22      % Eosinophils 17      % Basophils 1      % Immature Granulocytes 0      NRBCs per 100 WBC 0      Absolute Neutrophils 1.1 (*)     Absolute Lymphocytes 1.1      Absolute Monocytes 0.8      Absolute Eosinophils 0.6      Absolute Basophils 0.1      Absolute Immature Granulocytes 0.0      Absolute NRBCs 0.0     COMPREHENSIVE METABOLIC PANEL - Normal    Sodium 136      Potassium 3.9      Carbon Dioxide (CO2) 26      Anion Gap 8      Urea Nitrogen 13.9      Creatinine 0.84      GFR Estimate >90      Calcium 8.8      Chloride 102      Glucose 82      Alkaline Phosphatase 49      AST 22      ALT 15      Protein Total 6.7      Albumin 3.6      Bilirubin Total 0.4     LACTIC ACID WHOLE BLOOD WITH 1X REPEAT IN 2 HR WHEN >2 - Normal    Lactic Acid, Initial 1.1     BLOOD CULTURE   BLOOD CULTURE   AEROBIC BACTERIAL CULTURE ROUTINE        XR Hand Left G/E 3 Views   Final Result   IMPRESSION:       There is diffuse soft tissue swelling over the left hand. The bones are diffusely demineralized. There are prior postoperative changes from plate and screw fixation of the index finger proximal phalanx. No displaced fracture. Scattered degenerative    changes most pronounced at the thumb CMC joint where they appear moderate. There is some soft tissue laceration between the third and fourth proximal phalangeal bases. No radiodense foreign body. No radiographic evidence for osteomyelitis.          Treatments provided: IV, labs, imaging, suture removal, meds  Family Comments: Wife at home   OBS  brochure/video discussed/provided to patient:  N/A  ED Medications:   Medications   ondansetron (ZOFRAN) injection 4 mg (has no administration in time range)   vancomycin (VANCOCIN) 2,500 mg in 0.9% NaCl 525 mL intermittent infusion (has no administration in time range)   morphine (PF) injection 4 mg (4 mg Intravenous $Given 1/18/25 1218)       Drips infusing:  No  For the majority of the shift this patient was Green.   Interventions performed were IV, labs, meds and imaging.    Sepsis treatment initiated: No    Cares/treatment/interventions/medications to be completed following ED care: Per MD order    ED Nurse Name: Analilia Figueroa RN  1:11 PM    RECEIVING UNIT ED HANDOFF REVIEW    Above ED Nurse Handoff Report was reviewed: Yes  Reviewed by: Analilia Horton RN on January 18, 2025 at 5:44 PM   YANIRA Rebolledo called the ED to inform them the note was read: Yes

## 2025-01-18 NOTE — ED TRIAGE NOTES
Patient had sutures placed in the top of his left hand on Thursday, patient states today he noted swelling and redness in his hand.       Triage Assessment (Adult)       Row Name 01/18/25 1014          Triage Assessment    Airway WDL WDL        Respiratory WDL    Respiratory WDL WDL        Skin Circulation/Temperature WDL    Skin Circulation/Temperature WDL WDL        Cardiac WDL    Cardiac WDL WDL        Peripheral/Neurovascular WDL    Peripheral Neurovascular WDL WDL        Cognitive/Neuro/Behavioral WDL    Cognitive/Neuro/Behavioral WDL WDL

## 2025-01-18 NOTE — ED PROVIDER NOTES
Emergency Department Note      History of Present Illness     Chief Complaint   Hand Pain      HPI   Jamar Ivory is a 74 year old male with a history of type 2 diabetes, HTN, HLD and CHF presenting with hand pain. The patient states that yesterday he started having swelling, soreness and redness near the wound on his left hand. The pain and redness radiates up to his wrist. The patient received sutures in the ED on 01/16/25 after falling out of bed and hitting his hand on the radiator. He has not changed his bandage. The patient states he has not noted drainage on the bandage from the wound, fever, chills, nausea, vomiting or pain elsewhere. He did not receive an x ray when he was in the ED. The patient has a penicillin allergy that causes rash.     Independent Historian   None    Review of External Notes   ED note from 01/13/25 with Dr. Echevarria, the patient received 5 4-0 nylon interrupted sutures on the left hand.   10/31/2024 echocardiogram, visual ejection fraction 25-30%    Past Medical History   Medical History and Problem List   Benign neoplasm of colon  Chronic combined systolic and diastolic CHF  Hyperlipidemia   Skin ulcer or  lower leg bilaterally with fat layer exposed  Acquired hypothyroidism  BPH with incomplete bladder emptying  Bilateral leg edema  Breast tenderness in male  Cerebral infarction  CAD  Depression  Moderate major depression  Type 2 diabetes  Fatty liver  Hypertension   PVD  Low hemoglobin  Morbid obesity  Precordial pain  Transient cerebral ischemia  Vitiligo      Medications   Aspirin 81 mg  Norco  Levothyroxine  Losartan  Metoprolol succinate  Nitroglycerin  Crestor  Flomax  Demadex      Surgical History   Appendectomy  Colonoscopy x2  Vasectomy       Physical Exam     Patient Vitals for the past 24 hrs:   BP Temp Temp src Pulse Resp SpO2 Height Weight   01/18/25 1508 (!) 140/73 97.6  F (36.4  C) Oral 75 18 95 % -- --   01/18/25 1315 128/72 -- -- 73 18 97 % -- --   01/18/25  "1215 (!) 125/94 -- -- 77 18 95 % -- --   01/18/25 1015 (!) 143/78 98.1  F (36.7  C) Oral 78 18 97 % 1.727 m (5' 8\") 144.3 kg (318 lb 1.6 oz)     Physical Exam    Full upper extremity exam:   Constitutional: Alert, attentive, GCS 15   HENT:    Nose: Nose normal.    Mouth/Throat: Oropharynx is clear, mucous membranes are moist   Eyes: EOM are normal.   CV: regular rate and rhythm; no murmurs, rubs or gallups. 2+ radial and ulnar pulses to the bilateral upper extremities.  +2 pitting edema bilateral lower extremities.  Chest: Effort normal and breath sounds decreased.   GI:   There is no tenderness. No distension. Normal bowel sounds   Neurological:  sensation intact to light touch to the radian, ulnar and median distributions   MSK: restricted extensor and flexor ROM to the MCP, PIP, and DIP 3rd and 4th digits left hand due to pain  Skin: Skin is warm and dry.              Diagnostics     Lab Results   Labs Ordered and Resulted from Time of ED Arrival to Time of ED Departure   CRP INFLAMMATION - Abnormal       Result Value    CRP Inflammation 53.74 (*)    ERYTHROCYTE SEDIMENTATION RATE AUTO - Abnormal    Erythrocyte Sedimentation Rate 33 (*)    CBC WITH PLATELETS AND DIFFERENTIAL - Abnormal    WBC Count 3.8 (*)     RBC Count 4.19 (*)     Hemoglobin 12.0 (*)     Hematocrit 36.7 (*)     MCV 88      MCH 28.6      MCHC 32.7      RDW 14.4      Platelet Count 192      % Neutrophils 30      % Lymphocytes 29      % Monocytes 22      % Eosinophils 17      % Basophils 1      % Immature Granulocytes 0      NRBCs per 100 WBC 0      Absolute Neutrophils 1.1 (*)     Absolute Lymphocytes 1.1      Absolute Monocytes 0.8      Absolute Eosinophils 0.6      Absolute Basophils 0.1      Absolute Immature Granulocytes 0.0      Absolute NRBCs 0.0     COMPREHENSIVE METABOLIC PANEL - Normal    Sodium 136      Potassium 3.9      Carbon Dioxide (CO2) 26      Anion Gap 8      Urea Nitrogen 13.9      Creatinine 0.84      GFR Estimate >90      " Calcium 8.8      Chloride 102      Glucose 82      Alkaline Phosphatase 49      AST 22      ALT 15      Protein Total 6.7      Albumin 3.6      Bilirubin Total 0.4     LACTIC ACID WHOLE BLOOD WITH 1X REPEAT IN 2 HR WHEN >2 - Normal    Lactic Acid, Initial 1.1     BLOOD CULTURE   BLOOD CULTURE   AEROBIC BACTERIAL CULTURE ROUTINE       Imaging   XR Hand Left G/E 3 Views   Final Result   IMPRESSION:       There is diffuse soft tissue swelling over the left hand. The bones are diffusely demineralized. There are prior postoperative changes from plate and screw fixation of the index finger proximal phalanx. No displaced fracture. Scattered degenerative    changes most pronounced at the thumb CMC joint where they appear moderate. There is some soft tissue laceration between the third and fourth proximal phalangeal bases. No radiodense foreign body. No radiographic evidence for osteomyelitis.      MR Hand Left w/o & w Contrast    (Results Pending)     Independent Interpretation   None    ED Course      Medications Administered   Medications   cefTRIAXone (ROCEPHIN) 2 g vial to attach to  ml bag for ADULTS or NS 50 ml bag for PEDS (has no administration in time range)   acetaminophen (TYLENOL) tablet 500-1,000 mg (has no administration in time range)   HYDROcodone-acetaminophen (NORCO) 5-325 MG per tablet 1 tablet (1 tablet Oral $Given 1/18/25 1520)   levothyroxine (SYNTHROID/LEVOTHROID) tablet 224 mcg (has no administration in time range)   losartan (COZAAR) tablet 25 mg (has no administration in time range)   metoprolol succinate ER (TOPROL XL) 24 hr tablet 25 mg (has no administration in time range)   nitroGLYcerin (NITROSTAT) sublingual tablet 0.4 mg (has no administration in time range)   rosuvastatin (CRESTOR) tablet 20 mg (has no administration in time range)   torsemide (DEMADEX) tablet 20 mg (has no administration in time range)   cefTRIAXone (ROCEPHIN) 2 g vial to attach to  ml bag for ADULTS or NS 50  ml bag for PEDS (has no administration in time range)   lidocaine 1 % 0.1-1 mL (has no administration in time range)   lidocaine (LMX4) cream (has no administration in time range)   sodium chloride (PF) 0.9% PF flush 3 mL (3 mLs Intracatheter $Given 1/18/25 1520)   sodium chloride (PF) 0.9% PF flush 3 mL (has no administration in time range)   senna-docusate (SENOKOT-S/PERICOLACE) 8.6-50 MG per tablet 1 tablet (has no administration in time range)     Or   senna-docusate (SENOKOT-S/PERICOLACE) 8.6-50 MG per tablet 2 tablet (has no administration in time range)   calcium carbonate (TUMS) chewable tablet 1,000 mg (has no administration in time range)   polyethylene glycol (MIRALAX) Packet 17 g (has no administration in time range)   ondansetron (ZOFRAN ODT) ODT tab 4 mg (has no administration in time range)     Or   ondansetron (ZOFRAN) injection 4 mg (has no administration in time range)   aspirin (ASA) chewable tablet 81 mg (81 mg Oral $Given 1/18/25 1520)   mirtazapine (REMERON) tablet 15 mg (has no administration in time range)   tamsulosin (FLOMAX) capsule 0.8 mg (has no administration in time range)   morphine (PF) injection 4 mg (4 mg Intravenous $Given 1/18/25 1218)   vancomycin (VANCOCIN) 2,500 mg in 0.9% NaCl 525 mL intermittent infusion (2,500 mg Intravenous $New Bag 1/18/25 1322)       Procedures   Procedures   Suture removal:   Date sutures applied: 01/16/25         Where (setting) in which they applied:ED  Description:  Type: 4-0 nylon  Location: left hand  History:    Cause of laceration: fell out of bed and lacerated hand on radiator  Accompanying Signs & Symptoms:  Redness: YES  Warmth: YES-   Drainage: Yes  Still bleeding: No  Fevers: No  Last tetanus shot: last tetanus booster within 10 years    Discussion of Management   Admitting Hospitalist, Dr. Chance  Staffed with Dr. Stevens  ED Course   ED Course as of 01/18/25 1523   Sat Jan 18, 2025   1103 I obtained the history and examined the patient  as noted above.      1134 I staffed the patient with Dr. Stevens.   1240 I removed the sutures from the left hand, there was an immediate release of purulent material.  Wound was irrigated with sterile saline.   1244 I rechecked and updated the patient.       Additional Documentation  None    Medical Decision Making / Diagnosis     CMS Diagnoses: IV Antibiotics given and/or elevated Lactate of 1.1 and no sepsis note found - Delete this reminder and enter the sepsis note or '.edcms' before signing chart.>>>The patient has signs of sepsis   Sepsis ED evaluation the patient did not have signs of sepsis at the time of evaluation in the ED.      Time zero:  1202  on 01/18/25 as this was the time when  CBC resulted, raising suspicion for bacterial infection.    Lactic Acid Results:  Recent Labs   Lab Test 01/18/25  1202 06/20/18  1424 06/20/18  1239   LACT 1.1 1.4 1.8       3 Hour Bundle 6 Hour Bundle (Reassessment)   Blood Cultures before IV Antibiotics: Yes  Antibiotics given: see below  Prehospital fluid volume (mL):                     Total fluids given (ED +Pre-hospital):  Full 30 mL/kg bolus intentionally NOT administered to this patient due to CHF and acute Pulmonary Edema. The target volume to infuse in this patient is 0.   Repeat Lactic Acid Level: Ordered by reflex for 2 hours after initial lactic acid collection.  Vasopressors: MAP>65 after initial IVF bolus, will continue to monitor fluid status and vital signs.  Repeat perfusion exam: I attest to having performed a repeat sepsis exam and assessment of perfusion at  1300 .   BMI Readings from Last 1 Encounters:   01/18/25 48.37 kg/m        Anti-infectives (From admission through now)      Start     Dose/Rate Route Frequency Ordered Stop    01/18/25 1405  cefTRIAXone (ROCEPHIN) 2 g vial to attach to  ml bag for ADULTS or NS 50 ml bag for PEDS         2 g  over 30 Minutes Intravenous ONCE 01/18/25 1404      01/18/25 1210  vancomycin (VANCOCIN) 2,500 mg in  0.9% NaCl 525 mL intermittent infusion         2,500 mg  over 120 Minutes Intravenous ONCE 01/18/25 1209 01/18/25 1522                MIPS       None    [facility]   Jamar Ivory is a 74 year old male with a history of type 2 diabetes, HTN, HLD and CHF presenting with hand pain redness and swelling.  Differential includes but is not limited to cellulitis, erysipelas, deep space infection of hand, and osteomyelitis among many others.  Vitals reassuring without fever, tachycardia, or hypoxia and patient was normotensive.  On physical exam, dorsal aspect of hand was quite erythematous and edematous, mild erythema edema on the volar aspect, pain with passive range of motion of the third and fourth digits both flexion and extension.  The sutures were still intact from the previous laceration, there appeared to be a scant amount of drainage.  Patient had not removed the bandage nor washed the wound since receiving sutures.  Patient's tetanus status was up-to-date Tdap on 11/11/2023.      Lactic acid was 1.1, no signs of sepsis today.  There were no significant metabolic derangements.  White blood cell count and hemoglobin were stable from previous visit.  ESR and CRP were elevated.  Blood cultures were obtained, pending.  An x-ray was obtained of the left hand, there were no fractures, no subluxations, no signs consistent with osteomyelitis.  I remove the sutures from between his third and fourth digits of his left hand, there was return of purulent material.  I washed out the wound with sterile saline, applied bacitracin and bandage the hand.  Due to rapid spread of cellulitis I anticipated need for admission and therefore I staffed this patient with Dr. Stevens.  Please see his note for further.  I spoke to patient regarding recommended for admission, he agreed.  Antibiotics were ordered including vancomycin and Rocephin.  I spoke with Dr. Chance who agreed to accept the patient for admission for cellulitis of the left  hand    Disposition   The patient was discharged.     Diagnosis     ICD-10-CM    1. Cellulitis of left hand  L03.114 Case Request: IRRIGATION AND DEBRIDEMENT, FINGER     Case Request: IRRIGATION AND DEBRIDEMENT, FINGER      2. Wound infection  T14.8XXA Case Request: IRRIGATION AND DEBRIDEMENT, FINGER    L08.9 Case Request: IRRIGATION AND DEBRIDEMENT, FINGER           Discharge Medications   New Prescriptions    No medications on file         Scribe Disclosure:  Blanca DOYLE, am serving as a scribe at 10:50 AM on 1/18/2025 to document services personally performed by Jonnie Muller PA-C based on my observations and the provider's statements to me.        Jonnie Muller PA-C  01/18/25 1528

## 2025-01-18 NOTE — ED TRIAGE NOTES
Pt arrives via EMS from home for hand swelling and redness. Was seen two days ago after a fall and had stiches placed in hand. Symptoms began yesterday. 4/10 pain, denies fever.

## 2025-01-18 NOTE — PROVIDER NOTIFICATION
MD notified d/t gram stain on L hand wound from 1/18 at 1316 shows 3+ gram positive cocci, and 4+ WBC. No new orders, pt on abx per provider.

## 2025-01-19 ENCOUNTER — ANESTHESIA (OUTPATIENT)
Dept: SURGERY | Facility: CLINIC | Age: 75
End: 2025-01-19
Payer: COMMERCIAL

## 2025-01-19 ENCOUNTER — APPOINTMENT (OUTPATIENT)
Dept: CT IMAGING | Facility: CLINIC | Age: 75
DRG: 579 | End: 2025-01-19
Attending: INTERNAL MEDICINE
Payer: COMMERCIAL

## 2025-01-19 ENCOUNTER — ANESTHESIA EVENT (OUTPATIENT)
Dept: SURGERY | Facility: CLINIC | Age: 75
End: 2025-01-19
Payer: COMMERCIAL

## 2025-01-19 LAB
BACTERIA SPEC CULT: NORMAL
GRAM STAIN RESULT: NORMAL

## 2025-01-19 PROCEDURE — 73201 CT UPPER EXTREMITY W/DYE: CPT | Mod: LT

## 2025-01-19 PROCEDURE — 99232 SBSQ HOSP IP/OBS MODERATE 35: CPT | Performed by: INTERNAL MEDICINE

## 2025-01-19 PROCEDURE — 250N000009 HC RX 250

## 2025-01-19 PROCEDURE — 272N000001 HC OR GENERAL SUPPLY STERILE: Performed by: STUDENT IN AN ORGANIZED HEALTH CARE EDUCATION/TRAINING PROGRAM

## 2025-01-19 PROCEDURE — 250N000009 HC RX 250: Performed by: STUDENT IN AN ORGANIZED HEALTH CARE EDUCATION/TRAINING PROGRAM

## 2025-01-19 PROCEDURE — 250N000013 HC RX MED GY IP 250 OP 250 PS 637: Performed by: INTERNAL MEDICINE

## 2025-01-19 PROCEDURE — 250N000011 HC RX IP 250 OP 636: Performed by: STUDENT IN AN ORGANIZED HEALTH CARE EDUCATION/TRAINING PROGRAM

## 2025-01-19 PROCEDURE — 250N000011 HC RX IP 250 OP 636

## 2025-01-19 PROCEDURE — 250N000011 HC RX IP 250 OP 636: Performed by: ANESTHESIOLOGY

## 2025-01-19 PROCEDURE — 250N000013 HC RX MED GY IP 250 OP 250 PS 637: Performed by: STUDENT IN AN ORGANIZED HEALTH CARE EDUCATION/TRAINING PROGRAM

## 2025-01-19 PROCEDURE — 250N000009 HC RX 250: Performed by: ANESTHESIOLOGY

## 2025-01-19 PROCEDURE — 258N000003 HC RX IP 258 OP 636

## 2025-01-19 PROCEDURE — 710N000009 HC RECOVERY PHASE 1, LEVEL 1, PER MIN: Performed by: STUDENT IN AN ORGANIZED HEALTH CARE EDUCATION/TRAINING PROGRAM

## 2025-01-19 PROCEDURE — 70450 CT HEAD/BRAIN W/O DYE: CPT

## 2025-01-19 PROCEDURE — 250N000011 HC RX IP 250 OP 636: Performed by: INTERNAL MEDICINE

## 2025-01-19 PROCEDURE — 370N000017 HC ANESTHESIA TECHNICAL FEE, PER MIN: Performed by: STUDENT IN AN ORGANIZED HEALTH CARE EDUCATION/TRAINING PROGRAM

## 2025-01-19 PROCEDURE — 87102 FUNGUS ISOLATION CULTURE: CPT | Performed by: STUDENT IN AN ORGANIZED HEALTH CARE EDUCATION/TRAINING PROGRAM

## 2025-01-19 PROCEDURE — 999N000141 HC STATISTIC PRE-PROCEDURE NURSING ASSESSMENT: Performed by: STUDENT IN AN ORGANIZED HEALTH CARE EDUCATION/TRAINING PROGRAM

## 2025-01-19 PROCEDURE — 0JBK0ZZ EXCISION OF LEFT HAND SUBCUTANEOUS TISSUE AND FASCIA, OPEN APPROACH: ICD-10-PCS | Performed by: STUDENT IN AN ORGANIZED HEALTH CARE EDUCATION/TRAINING PROGRAM

## 2025-01-19 PROCEDURE — 87070 CULTURE OTHR SPECIMN AEROBIC: CPT | Performed by: STUDENT IN AN ORGANIZED HEALTH CARE EDUCATION/TRAINING PROGRAM

## 2025-01-19 PROCEDURE — 120N000001 HC R&B MED SURG/OB

## 2025-01-19 PROCEDURE — 258N000001 HC RX 258: Performed by: STUDENT IN AN ORGANIZED HEALTH CARE EDUCATION/TRAINING PROGRAM

## 2025-01-19 PROCEDURE — 250N000009 HC RX 250: Performed by: INTERNAL MEDICINE

## 2025-01-19 PROCEDURE — 87205 SMEAR GRAM STAIN: CPT | Performed by: STUDENT IN AN ORGANIZED HEALTH CARE EDUCATION/TRAINING PROGRAM

## 2025-01-19 PROCEDURE — 360N000083 HC SURGERY LEVEL 3 W/ FLUORO, PER MIN: Performed by: STUDENT IN AN ORGANIZED HEALTH CARE EDUCATION/TRAINING PROGRAM

## 2025-01-19 PROCEDURE — 87075 CULTR BACTERIA EXCEPT BLOOD: CPT | Performed by: STUDENT IN AN ORGANIZED HEALTH CARE EDUCATION/TRAINING PROGRAM

## 2025-01-19 RX ORDER — ONDANSETRON 2 MG/ML
4 INJECTION INTRAMUSCULAR; INTRAVENOUS EVERY 30 MIN PRN
Status: DISCONTINUED | OUTPATIENT
Start: 2025-01-19 | End: 2025-01-19 | Stop reason: HOSPADM

## 2025-01-19 RX ORDER — LIDOCAINE HYDROCHLORIDE 20 MG/ML
INJECTION, SOLUTION INFILTRATION; PERINEURAL PRN
Status: DISCONTINUED | OUTPATIENT
Start: 2025-01-19 | End: 2025-01-19

## 2025-01-19 RX ORDER — NALOXONE HYDROCHLORIDE 0.4 MG/ML
0.2 INJECTION, SOLUTION INTRAMUSCULAR; INTRAVENOUS; SUBCUTANEOUS
Status: DISCONTINUED | OUTPATIENT
Start: 2025-01-19 | End: 2025-01-24 | Stop reason: HOSPADM

## 2025-01-19 RX ORDER — ROPIVACAINE HYDROCHLORIDE 7.5 MG/ML
INJECTION, SOLUTION EPIDURAL; PERINEURAL PRN
Status: DISCONTINUED | OUTPATIENT
Start: 2025-01-19 | End: 2025-01-19

## 2025-01-19 RX ORDER — IOPAMIDOL 755 MG/ML
500 INJECTION, SOLUTION INTRAVASCULAR ONCE
Status: COMPLETED | OUTPATIENT
Start: 2025-01-19 | End: 2025-01-19

## 2025-01-19 RX ORDER — ONDANSETRON 4 MG/1
4 TABLET, ORALLY DISINTEGRATING ORAL EVERY 30 MIN PRN
Status: DISCONTINUED | OUTPATIENT
Start: 2025-01-19 | End: 2025-01-19 | Stop reason: HOSPADM

## 2025-01-19 RX ORDER — BUPIVACAINE HYDROCHLORIDE 5 MG/ML
INJECTION, SOLUTION PERINEURAL PRN
Status: DISCONTINUED | OUTPATIENT
Start: 2025-01-19 | End: 2025-01-19 | Stop reason: HOSPADM

## 2025-01-19 RX ORDER — OXYCODONE HYDROCHLORIDE 5 MG/1
10 TABLET ORAL
Status: DISCONTINUED | OUTPATIENT
Start: 2025-01-19 | End: 2025-01-19 | Stop reason: HOSPADM

## 2025-01-19 RX ORDER — HYDROMORPHONE HYDROCHLORIDE 1 MG/ML
0.5 INJECTION, SOLUTION INTRAMUSCULAR; INTRAVENOUS; SUBCUTANEOUS EVERY 5 MIN PRN
Status: DISCONTINUED | OUTPATIENT
Start: 2025-01-19 | End: 2025-01-19 | Stop reason: HOSPADM

## 2025-01-19 RX ORDER — DEXAMETHASONE SODIUM PHOSPHATE 4 MG/ML
4 INJECTION, SOLUTION INTRA-ARTICULAR; INTRALESIONAL; INTRAMUSCULAR; INTRAVENOUS; SOFT TISSUE
Status: DISCONTINUED | OUTPATIENT
Start: 2025-01-19 | End: 2025-01-19 | Stop reason: HOSPADM

## 2025-01-19 RX ORDER — ALBUTEROL SULFATE 0.83 MG/ML
2.5 SOLUTION RESPIRATORY (INHALATION) EVERY 4 HOURS PRN
Status: DISCONTINUED | OUTPATIENT
Start: 2025-01-19 | End: 2025-01-19 | Stop reason: HOSPADM

## 2025-01-19 RX ORDER — KETOROLAC TROMETHAMINE 15 MG/ML
15 INJECTION, SOLUTION INTRAMUSCULAR; INTRAVENOUS
Status: DISCONTINUED | OUTPATIENT
Start: 2025-01-19 | End: 2025-01-19 | Stop reason: HOSPADM

## 2025-01-19 RX ORDER — FENTANYL CITRATE 50 UG/ML
INJECTION, SOLUTION INTRAMUSCULAR; INTRAVENOUS PRN
Status: DISCONTINUED | OUTPATIENT
Start: 2025-01-19 | End: 2025-01-19

## 2025-01-19 RX ORDER — IPRATROPIUM BROMIDE AND ALBUTEROL SULFATE 2.5; .5 MG/3ML; MG/3ML
3 SOLUTION RESPIRATORY (INHALATION) EVERY 4 HOURS PRN
Status: DISCONTINUED | OUTPATIENT
Start: 2025-01-19 | End: 2025-01-24 | Stop reason: HOSPADM

## 2025-01-19 RX ORDER — NALOXONE HYDROCHLORIDE 0.4 MG/ML
0.4 INJECTION, SOLUTION INTRAMUSCULAR; INTRAVENOUS; SUBCUTANEOUS
Status: DISCONTINUED | OUTPATIENT
Start: 2025-01-19 | End: 2025-01-24 | Stop reason: HOSPADM

## 2025-01-19 RX ORDER — SODIUM CHLORIDE, SODIUM LACTATE, POTASSIUM CHLORIDE, CALCIUM CHLORIDE 600; 310; 30; 20 MG/100ML; MG/100ML; MG/100ML; MG/100ML
INJECTION, SOLUTION INTRAVENOUS CONTINUOUS PRN
Status: DISCONTINUED | OUTPATIENT
Start: 2025-01-19 | End: 2025-01-19

## 2025-01-19 RX ORDER — DIPHENHYDRAMINE HYDROCHLORIDE 50 MG/ML
12.5 INJECTION INTRAMUSCULAR; INTRAVENOUS EVERY 6 HOURS PRN
Status: DISCONTINUED | OUTPATIENT
Start: 2025-01-19 | End: 2025-01-19 | Stop reason: HOSPADM

## 2025-01-19 RX ORDER — ONDANSETRON 2 MG/ML
INJECTION INTRAMUSCULAR; INTRAVENOUS PRN
Status: DISCONTINUED | OUTPATIENT
Start: 2025-01-19 | End: 2025-01-19

## 2025-01-19 RX ORDER — DIPHENHYDRAMINE HCL 12.5MG/5ML
12.5 LIQUID (ML) ORAL EVERY 6 HOURS PRN
Status: DISCONTINUED | OUTPATIENT
Start: 2025-01-19 | End: 2025-01-19 | Stop reason: HOSPADM

## 2025-01-19 RX ORDER — KETAMINE HYDROCHLORIDE 10 MG/ML
INJECTION INTRAMUSCULAR; INTRAVENOUS PRN
Status: DISCONTINUED | OUTPATIENT
Start: 2025-01-19 | End: 2025-01-19

## 2025-01-19 RX ORDER — FENTANYL CITRATE 50 UG/ML
50 INJECTION, SOLUTION INTRAMUSCULAR; INTRAVENOUS EVERY 5 MIN PRN
Status: DISCONTINUED | OUTPATIENT
Start: 2025-01-19 | End: 2025-01-19 | Stop reason: HOSPADM

## 2025-01-19 RX ORDER — MAGNESIUM SULFATE HEPTAHYDRATE 40 MG/ML
2 INJECTION, SOLUTION INTRAVENOUS
Status: DISCONTINUED | OUTPATIENT
Start: 2025-01-19 | End: 2025-01-19 | Stop reason: HOSPADM

## 2025-01-19 RX ORDER — HYDRALAZINE HYDROCHLORIDE 20 MG/ML
5-10 INJECTION INTRAMUSCULAR; INTRAVENOUS EVERY 10 MIN PRN
Status: DISCONTINUED | OUTPATIENT
Start: 2025-01-19 | End: 2025-01-19 | Stop reason: HOSPADM

## 2025-01-19 RX ORDER — BUTALBITAL, ACETAMINOPHEN AND CAFFEINE 50; 325; 40 MG/1; MG/1; MG/1
2 TABLET ORAL ONCE
Status: COMPLETED | OUTPATIENT
Start: 2025-01-19 | End: 2025-01-19

## 2025-01-19 RX ORDER — NALOXONE HYDROCHLORIDE 0.4 MG/ML
0.1 INJECTION, SOLUTION INTRAMUSCULAR; INTRAVENOUS; SUBCUTANEOUS
Status: DISCONTINUED | OUTPATIENT
Start: 2025-01-19 | End: 2025-01-19 | Stop reason: HOSPADM

## 2025-01-19 RX ORDER — OXYCODONE HYDROCHLORIDE 5 MG/1
5 TABLET ORAL
Status: DISCONTINUED | OUTPATIENT
Start: 2025-01-19 | End: 2025-01-19 | Stop reason: HOSPADM

## 2025-01-19 RX ORDER — LABETALOL HYDROCHLORIDE 5 MG/ML
10 INJECTION, SOLUTION INTRAVENOUS
Status: DISCONTINUED | OUTPATIENT
Start: 2025-01-19 | End: 2025-01-19 | Stop reason: HOSPADM

## 2025-01-19 RX ORDER — FENTANYL CITRATE 50 UG/ML
25 INJECTION, SOLUTION INTRAMUSCULAR; INTRAVENOUS EVERY 5 MIN PRN
Status: DISCONTINUED | OUTPATIENT
Start: 2025-01-19 | End: 2025-01-19 | Stop reason: HOSPADM

## 2025-01-19 RX ORDER — PROPOFOL 10 MG/ML
INJECTION, EMULSION INTRAVENOUS PRN
Status: DISCONTINUED | OUTPATIENT
Start: 2025-01-19 | End: 2025-01-19

## 2025-01-19 RX ORDER — GADOBUTROL 604.72 MG/ML
14 INJECTION INTRAVENOUS ONCE
Status: DISCONTINUED | OUTPATIENT
Start: 2025-01-19 | End: 2025-01-23

## 2025-01-19 RX ORDER — SODIUM CHLORIDE, SODIUM LACTATE, POTASSIUM CHLORIDE, CALCIUM CHLORIDE 600; 310; 30; 20 MG/100ML; MG/100ML; MG/100ML; MG/100ML
INJECTION, SOLUTION INTRAVENOUS CONTINUOUS
Status: DISCONTINUED | OUTPATIENT
Start: 2025-01-19 | End: 2025-01-19 | Stop reason: HOSPADM

## 2025-01-19 RX ADMIN — CEFTRIAXONE 2 G: 2 INJECTION, POWDER, FOR SOLUTION INTRAMUSCULAR; INTRAVENOUS at 08:01

## 2025-01-19 RX ADMIN — LOSARTAN POTASSIUM 25 MG: 25 TABLET, FILM COATED ORAL at 08:01

## 2025-01-19 RX ADMIN — TORSEMIDE 20 MG: 20 TABLET ORAL at 08:01

## 2025-01-19 RX ADMIN — LEVOTHYROXINE SODIUM 224 MCG: 0.11 TABLET ORAL at 08:01

## 2025-01-19 RX ADMIN — VANCOMYCIN HYDROCHLORIDE 1000 MG: 1 INJECTION, SOLUTION INTRAVENOUS at 19:46

## 2025-01-19 RX ADMIN — SODIUM CHLORIDE, POTASSIUM CHLORIDE, SODIUM LACTATE AND CALCIUM CHLORIDE: 600; 310; 30; 20 INJECTION, SOLUTION INTRAVENOUS at 10:04

## 2025-01-19 RX ADMIN — PHENYLEPHRINE HYDROCHLORIDE 100 MCG: 10 INJECTION INTRAVENOUS at 10:42

## 2025-01-19 RX ADMIN — Medication 10 MG: at 10:42

## 2025-01-19 RX ADMIN — Medication 10 MG: at 10:20

## 2025-01-19 RX ADMIN — PHENYLEPHRINE HYDROCHLORIDE 100 MCG: 10 INJECTION INTRAVENOUS at 10:50

## 2025-01-19 RX ADMIN — FENTANYL CITRATE 25 MCG: 50 INJECTION INTRAMUSCULAR; INTRAVENOUS at 10:14

## 2025-01-19 RX ADMIN — PHENYLEPHRINE HYDROCHLORIDE 200 MCG: 10 INJECTION INTRAVENOUS at 10:34

## 2025-01-19 RX ADMIN — METOPROLOL SUCCINATE 25 MG: 25 TABLET, EXTENDED RELEASE ORAL at 08:00

## 2025-01-19 RX ADMIN — IPRATROPIUM BROMIDE AND ALBUTEROL SULFATE 3 ML: .5; 3 SOLUTION RESPIRATORY (INHALATION) at 14:17

## 2025-01-19 RX ADMIN — ONDANSETRON 4 MG: 2 INJECTION INTRAMUSCULAR; INTRAVENOUS at 10:08

## 2025-01-19 RX ADMIN — DEXMEDETOMIDINE HYDROCHLORIDE 0.5 MCG/KG/HR: 100 INJECTION, SOLUTION INTRAVENOUS at 10:04

## 2025-01-19 RX ADMIN — FENTANYL CITRATE 25 MCG: 50 INJECTION INTRAMUSCULAR; INTRAVENOUS at 10:07

## 2025-01-19 RX ADMIN — FENTANYL CITRATE 25 MCG: 50 INJECTION INTRAMUSCULAR; INTRAVENOUS at 10:20

## 2025-01-19 RX ADMIN — ROPIVACAINE HYDROCHLORIDE 10 ML: 7.5 INJECTION, SOLUTION EPIDURAL; PERINEURAL at 09:36

## 2025-01-19 RX ADMIN — ACETAMINOPHEN 500 MG: 500 TABLET, FILM COATED ORAL at 13:52

## 2025-01-19 RX ADMIN — IOPAMIDOL 100 ML: 755 INJECTION, SOLUTION INTRAVENOUS at 08:39

## 2025-01-19 RX ADMIN — VANCOMYCIN HYDROCHLORIDE 1000 MG: 1 INJECTION, SOLUTION INTRAVENOUS at 05:14

## 2025-01-19 RX ADMIN — MIRTAZAPINE 15 MG: 15 TABLET, FILM COATED ORAL at 21:41

## 2025-01-19 RX ADMIN — BUTALBITAL, ACETAMINOPHEN, AND CAFFEINE 2 TABLET: 50; 325; 40 TABLET ORAL at 16:44

## 2025-01-19 RX ADMIN — PROPOFOL 20 MG: 10 INJECTION, EMULSION INTRAVENOUS at 10:19

## 2025-01-19 RX ADMIN — ACETAMINOPHEN 1000 MG: 500 TABLET, FILM COATED ORAL at 01:52

## 2025-01-19 RX ADMIN — TAMSULOSIN HYDROCHLORIDE 0.8 MG: 0.4 CAPSULE ORAL at 08:00

## 2025-01-19 RX ADMIN — ROSUVASTATIN CALCIUM 20 MG: 20 TABLET, FILM COATED ORAL at 08:00

## 2025-01-19 RX ADMIN — LIDOCAINE HYDROCHLORIDE 10 ML: 20 INJECTION, SOLUTION INFILTRATION; PERINEURAL at 09:36

## 2025-01-19 RX ADMIN — MIDAZOLAM 1 MG: 1 INJECTION INTRAMUSCULAR; INTRAVENOUS at 10:07

## 2025-01-19 RX ADMIN — MIDAZOLAM 1 MG: 1 INJECTION INTRAMUSCULAR; INTRAVENOUS at 10:04

## 2025-01-19 RX ADMIN — PHENYLEPHRINE HYDROCHLORIDE 100 MCG: 10 INJECTION INTRAVENOUS at 10:26

## 2025-01-19 RX ADMIN — PHENYLEPHRINE HYDROCHLORIDE 100 MCG: 10 INJECTION INTRAVENOUS at 10:24

## 2025-01-19 ASSESSMENT — ACTIVITIES OF DAILY LIVING (ADL)
ADLS_ACUITY_SCORE: 39
ADLS_ACUITY_SCORE: 41
ADLS_ACUITY_SCORE: 39
ADLS_ACUITY_SCORE: 41
ADLS_ACUITY_SCORE: 39
ADLS_ACUITY_SCORE: 41
ADLS_ACUITY_SCORE: 39
ADLS_ACUITY_SCORE: 41
ADLS_ACUITY_SCORE: 41
ADLS_ACUITY_SCORE: 39
ADLS_ACUITY_SCORE: 39
ADLS_ACUITY_SCORE: 41
ADLS_ACUITY_SCORE: 39

## 2025-01-19 NOTE — PLAN OF CARE
"Goal Outcome Evaluation:      Plan of Care Reviewed With: patient, spouse    Overall Patient Progress: no changeOverall Patient Progress: no change    Outcome Evaluation: [facility] scheduled for left hand I&D at 0930. Needs MRI done.      Diet: Mod carb  Mental Status:  A&O x 4  O2: RA, LS diminished, denies SOB   Pain: Denies   Mobility:  SBA, GB, FWW  LDA's: Left hand wound, right LE ulcer   Consults: Ortho  Other Cares: IV ABX's   GI/: Voiding. No nausea or vomiting   Discharge Disposition: TBD, from home with spouse   Discharge Time: pending    MRI checklist completed. On IV Rocephin and Vanco. Left hand swelling/redness. Dressing CDI. Spouse updated on plan of care via phone per pt request.       Problem: Adult Inpatient Plan of Care  Goal: Plan of Care Review  Description: The Plan of Care Review/Shift note should be completed every shift.  The Outcome Evaluation is a brief statement about your assessment that the patient is improving, declining, or no change.  This information will be displayed automatically on your shift  note.  Outcome: Progressing  Flowsheets (Taken 1/19/2025 0121)  Outcome Evaluation: [facility] scheduled for left hand I&D at 0930. Needs MRI done.  Plan of Care Reviewed With:   patient   spouse  Overall Patient Progress: no change  Goal: Patient-Specific Goal (Individualized)  Description: You can add care plan individualizations to a care plan. Examples of Individualization might be:  \"Parent requests to be called daily at 9am for status\", \"I have a hard time hearing out of my right ear\", or \"Do not touch me to wake me up as it startles  me\".  Outcome: Progressing  Goal: Absence of Hospital-Acquired Illness or Injury  Outcome: Progressing  Intervention: Identify and Manage Fall Risk  Recent Flowsheet Documentation  Taken 1/18/2025 2039 by Nikole Santiago RN  Safety Promotion/Fall Prevention:   activity supervised   assistive device/personal items within reach   clutter free " environment maintained   nonskid shoes/slippers when out of bed   safety round/check completed  Intervention: Prevent Infection  Recent Flowsheet Documentation  Taken 1/18/2025 2039 by Nikole Santiago, RN  Infection Prevention: rest/sleep promoted  Goal: Optimal Comfort and Wellbeing  Outcome: Progressing  Goal: Readiness for Transition of Care  Outcome: Progressing  Intervention: Mutually Develop Transition Plan  Recent Flowsheet Documentation  Taken 1/18/2025 2000 by Nikole Santiago, RN  Equipment Currently Used at Home: walker, rolling     Problem: Skin or Soft Tissue Infection  Goal: Absence of Infection Signs and Symptoms  Outcome: Progressing  Intervention: Minimize and Manage Infection Progression  Recent Flowsheet Documentation  Taken 1/18/2025 2039 by Nikole Santiago, RN  Infection Prevention: rest/sleep promoted

## 2025-01-19 NOTE — ANESTHESIA PREPROCEDURE EVALUATION
Anesthesia Pre-Procedure Evaluation    Patient: Jamar Ivory   MRN: [id number] : 1950        Procedure : Procedure(s):  IRRIGATION AND DEBRIDEMENT, FINGER          Past Medical History:   Diagnosis Date    Arthritis     Cerebral artery occlusion with cerebral infarction     TIA    CHF (congestive heart failure) 2018    CVA (cerebral infarction)     CVD (cardiovascular disease)     Depression 1977    hospitalized    Fatty liver     Gout     h/o Concussion     Hypertension 2011    Hypothyroidism     Obesity     Spider veins     TIA (transient ischaemic attack)     Vitiligo       Past Surgical History:   Procedure Laterality Date    APPENDECTOMY      at age 22    COLONOSCOPY N/A 2016    Procedure: COMBINED COLONOSCOPY, SINGLE OR MULTIPLE BIOPSY/POLYPECTOMY BY BIOPSY;  Surgeon: Yanick Brown MD;  Location:  GI    COLONOSCOPY N/A 2021    Procedure: COLONOSCOPY;  Surgeon: Kong Chowdary MD;  Location: [facility] OR    VASECTOMY        Allergies   Allergen Reactions    Clopidogrel      Cough/emesis    Penicillins Rash    Simvastatin Diarrhea    Sulfa Antibiotics      Unsure    Xeroform [Bismuth Tribromphenate]      Unsure      Social History     Tobacco Use    Smoking status: Never    Smokeless tobacco: Never   Substance Use Topics    Alcohol use: Not Currently      Wt Readings from Last 1 Encounters:   25 137.8 kg (303 lb 11.2 oz)        Anesthesia Evaluation   Pt has had prior anesthetic.     No history of anesthetic complications       ROS/MED HX  ENT/Pulmonary:     (+)     DAYNA risk factors,  hypertension, obese,                                Neurologic:     (+)          CVA,                      Cardiovascular:     (+)  hypertension- -  CAD angina-  - -      CHF  Last EF: 24   GOMEZ.                      Previous cardiac testing   Echo: Date: Results:    Stress Test:  Date:  Results:    The nuclear stress test is abnormal.     There is transmural infarction in the  entire inferior segment(s) of the left ventricle.     Left ventricular function is severely reduced.     The left ventricular ejection fraction at rest is 24%.  The left ventricular ejection fraction at stress is 24%.     A prior study was conducted on 2/11/2021. In prior study inferoapical infarct with mild teo infarct ischemia was noted with LVEF of 41% at rest.    ECG Reviewed:  Date: Results:    Cath:  Date: Results:      METS/Exercise Tolerance:     Hematologic:     (+)      anemia,          Musculoskeletal:   (+)  arthritis,             GI/Hepatic:     (+)             liver disease,       Renal/Genitourinary:       Endo:     (+)  type II DM,        thyroid problem, hypothyroidism,    Obesity,       Psychiatric/Substance Use:     (+) psychiatric history depression       Infectious Disease: Comment: Hand infection      Malignancy:       Other:            Physical Exam    Airway        Mallampati: II   TM distance: > 3 FB   Neck ROM: full   Mouth opening: > 3 cm    Respiratory Devices and Support         Dental       (+) Modest Abnormalities - crowns, retainers, 1 or 2 missing teeth      Cardiovascular          Rhythm and rate: regular and normal     Pulmonary   pulmonary exam normal                OUTSIDE LABS:  CBC:   Lab Results   Component Value Date    WBC 3.8 (L) 01/18/2025    WBC 2.8 (L) 01/16/2025    HGB 12.0 (L) 01/18/2025    HGB 11.5 (L) 01/16/2025    HCT 36.7 (L) 01/18/2025    HCT 33.9 (L) 01/16/2025     01/18/2025     01/16/2025     BMP:   Lab Results   Component Value Date     01/18/2025     (L) 01/16/2025    POTASSIUM 3.9 01/18/2025    POTASSIUM 4.0 01/16/2025    CHLORIDE 102 01/18/2025    CHLORIDE 104 01/16/2025    CO2 26 01/18/2025    CO2 19 (L) 01/16/2025    BUN 13.9 01/18/2025    BUN 20.2 01/16/2025    CR 0.84 01/18/2025    CR 0.78 01/16/2025    GLC 94 01/18/2025    GLC 83 01/18/2025     COAGS:   Lab Results   Component Value Date    PTT 29 08/23/2013    INR 1.06  "04/11/2018     POC: No results found for: \"BGM\", \"HCG\", \"HCGS\"  HEPATIC:   Lab Results   Component Value Date    ALBUMIN 3.6 01/18/2025    PROTTOTAL 6.7 01/18/2025    ALT 15 01/18/2025    AST 22 01/18/2025    ALKPHOS 49 01/18/2025    BILITOTAL 0.4 01/18/2025     OTHER:   Lab Results   Component Value Date    LACT 1.1 01/18/2025    A1C 5.7 (H) 11/22/2023    ANGEL 8.8 01/18/2025    PHOS 3.3 04/11/2018    MAG 2.2 12/24/2018    LIPASE 16 05/09/2024    TSH 8.06 (H) 12/04/2024    T4 0.93 12/04/2024    .0 (H) 03/08/2021    SED 33 (H) 01/18/2025       Anesthesia Plan    ASA Status:  4    NPO Status:  NPO Appropriate    Anesthesia Type: MAC.     - Reason for MAC: chronic cardiopulmonary disease, straight local not clinically adequate, immobility needed   Induction: Intravenous.   Maintenance: TIVA.   Techniques and Equipment:       - Drips/Meds: Dexmed. infusion     Consents    Anesthesia Plan(s) and associated risks, benefits, and realistic alternatives discussed. Questions answered and patient/representative(s) expressed understanding.     - Discussed:     - Discussed with:  Patient      - Extended Intubation/Ventilatory Support Discussed: No.      - Patient is DNR/DNI Status: Yes             Suspend during perioperative period? No.    Use of blood products discussed: No .     Postoperative Care    Pain management: Peripheral nerve block (Single Shot), IV analgesics, Oral pain medications, Multi-modal analgesia.   PONV prophylaxis: Dexamethasone or Solumedrol, Ondansetron (or other 5HT-3)     Comments:    Other Comments:   ASA 4 for severely reduced EF and super morbid obesity    The surgeon has given a verbal order transferring care of this patient to me for the performance of a regional analgesia block for either post-op pain control or primary anesthetic. It is requested of me because I am uniquely trained and qualified to perform this block and the surgeon is neither trained nor qualified to perform this " "procedure.    Ultrasound guided peripheral nerve block(s) discussed. The patient was informed that undergoing the block is not required for surgery to proceed and is optional, but they can be beneficial in reducing post operative pain and pain medication requirements for a period of time (several hours to a few days). Block rationale, procedure, expected results, and block specific side effects reviewed with the patient. Risks, including but not limited to bleeding, infection, nerve damage, damage to surrounding structures, medication adverse/allergic reactions, and block failure discussed. Other anesthetic/pain control options discussed.  Questions encouraged and answered.  The patient wishes to proceed.               Wilmar Peng MD                 # Drug Induced Platelet Defect: home medication list includes an antiplatelet medication   # Hypertension: Noted on problem list  # Chronic heart failure with reduced ejection fraction: last echo with EF <40%          # Severe Obesity: Estimated body mass index is 46.18 kg/m  as calculated from the following:    Height as of this encounter: 1.727 m (5' 8\").    Weight as of this encounter: 137.8 kg (303 lb 11.2 oz).             "

## 2025-01-19 NOTE — CONSULTS
Orthopaedic Hand Surgery Consultation    Jamar Ivory MRN# [id number]   Age: 75 year old YOB: 1950   Date of Admission: 1/18/2025    Reason for consult: L hand infection    Requesting physician: No att. providers found                Impression and Recommendation (Resident / Clinician):   Impression:  Jamar Ivory is a 75 year old male with history of a laceration to the third webspace 1/16, he developed worsening redness and drainage. Clinical exam is concerning for early flexor tenosynovitis of the middle finger and collar button abscess.      Recommendations:  I&D in the OR today     Activity: post op, ROM as tolerated, WB < 5 lb with LUE   Wound Cares/Dressing/Splint: daily dressing changes.   DVT PPx: DVT ppx per primary team  Antibiotic: Recommend 24 hours of post op abx (ancef)  Cultures: will be collected in OR   Labs:   Recommend following CRP   Imaging: No further imaging needed at this time  PT/OT: Recommend OT - hand and finger ROM, edema reduction   Dispo: Per Primary team          Keyur Hoffmann MD   [facility]   Hand & Upper Extremity            Chief Complaint:   L hand swelling           History of Present Illness :   75-year-old male with history of type 2 diabetes who sustained a laceration to the third webspace 1/16. He developed worsening of the redness warmth and drainage from the laceration site.  He has been on antibiotics since presenting to the emergency department yesterday, he states there has been no improvement in pain but the erythema is slightly improved.             Past Medical History:     Past Medical History:   Diagnosis Date    Arthritis     Cerebral artery occlusion with cerebral infarction     TIA    CHF (congestive heart failure) 12/24/2018    CVA (cerebral infarction) 2012    CVD (cardiovascular disease)     Depression 1977    hospitalized    Fatty liver     Gout     h/o Concussion     Hypertension 01/17/2011    Hypothyroidism     Obesity      Spider veins     TIA (transient ischaemic attack) 2012    Vitiligo      Patient denies any personal history of bleeding disorders, clotting disorders, or adverse reactions to anesthesia.         Past Surgical History:     Past Surgical History:   Procedure Laterality Date    APPENDECTOMY      at age 22    COLONOSCOPY N/A 09/02/2016    Procedure: COMBINED COLONOSCOPY, SINGLE OR MULTIPLE BIOPSY/POLYPECTOMY BY BIOPSY;  Surgeon: Yanick Brown MD;  Location:  GI    COLONOSCOPY N/A 12/27/2021    Procedure: COLONOSCOPY;  Surgeon: Kong Chowdary MD;  Location: [facility] OR    VASECTOMY              Social History:     Social History     Socioeconomic History    Marital status:      Spouse name: Melissa    Number of children: 3    Years of education: Not on file    Highest education level: Not on file   Occupational History    Occupation:      Employer: [employer]   Tobacco Use    Smoking status: Never    Smokeless tobacco: Never   Vaping Use    Vaping status: Never Used   Substance and Sexual Activity    Alcohol use: Not Currently    Drug use: No    Sexual activity: Yes     Partners: Female   Other Topics Concern    Parent/sibling w/ CABG, MI or angioplasty before 65F 55M? No   Social History Narrative    Not on file     Social Drivers of Health     Financial Resource Strain: Low Risk  (1/18/2025)    Financial Resource Strain     Within the past 12 months, have you or your family members you live with been unable to get utilities (heat, electricity) when it was really needed?: No   Food Insecurity: Low Risk  (1/18/2025)    Food Insecurity     Within the past 12 months, did you worry that your food would run out before you got money to buy more?: No     Within the past 12 months, did the food you bought just not last and you didn t have money to get more?: No   Transportation Needs: Low Risk  (1/18/2025)    Transportation Needs     Within the past 12 months, has lack of transportation kept you  from medical appointments, getting your medicines, non-medical meetings or appointments, work, or from getting things that you need?: No   Physical Activity: Not on file   Stress: Not on file   Social Connections: Not on file   Interpersonal Safety: Low Risk  (1/18/2025)    Interpersonal Safety     Do you feel physically and emotionally safe where you currently live?: Yes     Within the past 12 months, have you been hit, slapped, kicked or otherwise physically hurt by someone?: No     Within the past 12 months, have you been humiliated or emotionally abused in other ways by your partner or ex-partner?: No   Housing Stability: Low Risk  (1/18/2025)    Housing Stability     Do you have housing? : Yes     Are you worried about losing your housing?: No             Family History:     Family History   Problem Relation Age of Onset    Cancer Father         Lung    Diabetes Mother     Cancer Daughter         leukemia       Patient denies known family history of bleeding, clotting, or anesthesia related complications.            Allergies:     Allergies   Allergen Reactions    Clopidogrel      Cough/emesis    Penicillins Rash    Simvastatin Diarrhea    Sulfa Antibiotics      Unsure    Xeroform [Bismuth Tribromphenate]      Unsure             Medications:     Prior to Admission medications    Medication Sig Last Dose Taking? Auth Provider Long Term End Date   acetaminophen (TYLENOL) 500 MG tablet Take 500-1,000 mg by mouth every 6 hours as needed for mild pain Prn for headaches Unknown Yes Reported, Patient     aspirin (ASA) 81 MG tablet Take 1 tablet (81 mg) by mouth daily More than a month Yes Blanca Peng MD     HYDROcodone-acetaminophen (NORCO) 5-325 MG tablet Take 1 tablet by mouth every 6 hours as needed for pain. 1/18/2025 Morning Yes Tonio Long MD     levothyroxine (SYNTHROID/LEVOTHROID) 112 MCG tablet TAKE 2 TABLETS (224 MCG) BY MOUTH DAILY 1/18/2025 Morning Yes Chen Dee MD Yes     losartan (COZAAR) 25 MG tablet Take 1 tablet (25 mg) by mouth daily. 1/18/2025 Morning Yes Fabian Hightower NP Yes    metoprolol succinate ER (TOPROL XL) 25 MG 24 hr tablet Take 1 tablet (25 mg) by mouth daily. 1/18/2025 Morning Yes Fabian Hightower NP Yes    Misc Natural Products (OSTEO BI-FLEX JOINT SHIELD PO) Take 1 tablet by mouth daily. 1/18/2025 Morning Yes Reported, Patient     nitroGLYcerin (NITROSTAT) 0.4 MG sublingual tablet FOR CHEST PAIN PLACE 1 TABLET UNDER THE TONGUE EVERY 5 MINUTES FOR 3 DOSES. IF SYMPTOMS PERSIST 5 MINUTES AFTER 1ST DOSE CALL 911.  Yes Chen Dee MD Yes    rosuvastatin (CRESTOR) 20 MG tablet Take 1 tablet (20 mg) by mouth daily. 1/18/2025 Morning Yes Fabian Hightower NP Yes    tamsulosin (FLOMAX) 0.4 MG capsule Take 0.4 mg by mouth daily. 1/18/2025 Morning Yes Unknown, Entered By History     torsemide (DEMADEX) 20 MG tablet Take 1 tablet (20 mg) by mouth daily. 1/18/2025 Morning Yes Fabian Hightower NP Yes      Medication reviewed with patient and in chart.           Physical Exam:     Vitals:    01/18/25 1852 01/18/25 1918 01/18/25 2341 01/19/25 0759   BP: 126/69  122/66 137/69   BP Location:   Left arm Right arm   Pulse: 74  74 78   Resp: 18  18 20   Temp: 98.2  F (36.8  C)  98  F (36.7  C) 97.5  F (36.4  C)   TempSrc: Oral  Temporal Temporal   SpO2: 96%  93% 93%   Weight:  137.8 kg (303 lb 11.2 oz)     Height:         General: Patient is awake, alert, and appropriate; following commands and is in NAD  Neuro: CN II-XII grossly intact  Skin: No rashes, lumps, or bumps; skin color normal  HEENT: Normocephalic, atraumatic; EOMs grossly intact; external ear without erythema or edema bilaterally; no septal deviation  Lungs: Breaths nonlabored, without wheezes or stridor  Heart/Cardiovascular: Regular pulse, no peripheral cyanosis    L Upper Extremity: Marked erythema and skin tenderness along the dorsal aspect of the hand stemming from the third webspace volarly the  patient has tenderness in the webspace and along the flexor tendon sheath of the middle finger.  He is neurovascularly intact.  He can fire FDS and FDP of all of his digits.  He has no tenderness in his carpal tunnel or proximal to the A1 pulley region            Imaging:   Review of imaging below demonstrates x-rays obtained on 1-, 3 views of the left hand show no acute fractures or dislocations there is some swelling in the third webspace region.    XR Hand Left G/E 3 Views    Result Date: 1/18/2025  EXAM: XR HAND LEFT G/E 3 VIEWS LOCATION: [facility] DATE: 1/18/2025 INDICATION: left hand pain and swelling COMPARISON: None.     IMPRESSION: There is diffuse soft tissue swelling over the left hand. The bones are diffusely demineralized. There are prior postoperative changes from plate and screw fixation of the index finger proximal phalanx. No displaced fracture. Scattered degenerative changes most pronounced at the thumb CMC joint where they appear moderate. There is some soft tissue laceration between the third and fourth proximal phalangeal bases. No radiodense foreign body. No radiographic evidence for osteomyelitis.    Head CT w/o contrast    Result Date: 1/16/2025  EXAM: CT HEAD W/O CONTRAST LOCATION: [facility] DATE: 1/16/2025 INDICATION: Fall, head injury COMPARISON: 11 November 2023 head CT TECHNIQUE: Routine CT Head without IV contrast. Multiplanar reformats. Dose reduction techniques were used. FINDINGS: INTRACRANIAL CONTENTS: No intracranial hemorrhage, extraaxial collection, or mass effect.  No CT evidence of acute infarct. Moderate presumed chronic small vessel ischemic changes. Moderate generalized volume loss. No hydrocephalus. VISUALIZED ORBITS/SINUSES/MASTOIDS: No intraorbital abnormality. Mild mucosal thickening scattered about the paranasal sinuses. No middle ear or mastoid effusion. BONES/SOFT TISSUES: No acute abnormality. Chronic linear  scalp thickening over the left frontal convexity unchanged. No fracture.     IMPRESSION: 1.  No CT evidence for acute intracranial process. 2.  Brain atrophy and presumed chronic microvascular ischemic changes as above.    XR Lumbar Flex/Ext 2/3 Views    Result Date: 1/10/2025  INDICATION: Lumbar pain. TECHNIQUE: 5 radiographs of the lumbar spine were obtained. COMPARISON:  None. FINDINGS/    5 lumbar type vertebrae. Lumbar vertebral heights appear preserved. Mild grade 1 retrolisthesis at L3-4 in neutral, flexion, and extension positioning. Multilevel loss of disc height and facet arthropathy. Atherosclerotic calcifications of the aorta.    NM Lexiscan stress test (nuc card)    Result Date: 12/30/2024    The nuclear stress test is abnormal.   There is transmural infarction in the entire inferior segment(s) of the left ventricle.   Left ventricular function is severely reduced.   The left ventricular ejection fraction at rest is 24%.  The left ventricular ejection fraction at stress is 24%.   A prior study was conducted on 2/11/2021. In prior study inferoapical infarct with mild teo infarct ischemia was noted with LVEF of 41% at rest.            Labs:   CBC:  Lab Results   Component Value Date    WBC 3.8 (L) 01/18/2025    HGB 12.0 (L) 01/18/2025     01/18/2025       BMP:  Lab Results   Component Value Date     01/18/2025    POTASSIUM 3.9 01/18/2025    CHLORIDE 102 01/18/2025    CO2 26 01/18/2025    BUN 13.9 01/18/2025    CR 0.84 01/18/2025    ANIONGAP 8 01/18/2025    ANGEL 8.8 01/18/2025    GLC 94 01/18/2025       Inflammatory Markers:  Lab Results   Component Value Date    WBC 3.8 (L) 01/18/2025    .0 (H) 03/08/2021    SED 33 (H) 01/18/2025

## 2025-01-19 NOTE — PROGRESS NOTES
Pt down for MRI at 0630. Per MRI tech, pt unable to be positioned in MRI machine for imaging of hand due to body habitus.

## 2025-01-19 NOTE — ANESTHESIA POSTPROCEDURE EVALUATION
Patient: Jamar Ivory    Procedure: Procedure(s):  Irrigation and debridement left middle and ring fingers       Anesthesia Type:  MAC    Note:  Disposition: Inpatient   Postop Pain Control: Uneventful            Sign Out: Well controlled pain   PONV: No   Neuro/Psych: Uneventful            Sign Out: Acceptable/Baseline neuro status   Airway/Respiratory: Uneventful            Sign Out: Acceptable/Baseline resp. status   CV/Hemodynamics: Uneventful            Sign Out: Acceptable CV status   Other NRE: NONE   DID A NON-ROUTINE EVENT OCCUR? No           Last vitals:  Vitals Value Taken Time   /60 01/19/25 1120   Temp 96.62  F (35.9  C) 01/19/25 1116   Pulse 58 01/19/25 1121   Resp 10 01/19/25 1121   SpO2 93 % 01/19/25 1121   Vitals shown include unfiled device data.    Electronically Signed By: Wilmar Peng MD  January 19, 2025  11:22 AM

## 2025-01-19 NOTE — PHARMACY-VANCOMYCIN DOSING SERVICE
Pharmacy Vancomycin Initial Note  Date of Service 2025  Patient's  1950  74 year old, male    Indication: Skin and Soft Tissue Infection    Current estimated CrCl = Estimated Creatinine Clearance: 106.2 mL/min (based on SCr of 0.84 mg/dL).    Creatinine for last 3 days  2025:  8:46 AM Creatinine 0.78 mg/dL  2025: 12:02 PM Creatinine 0.84 mg/dL    Recent Vancomycin Level(s) for last 3 days  No results found for requested labs within last 3 days.      Vancomycin IV Administrations (past 72 hours)                     vancomycin (VANCOCIN) 2,500 mg in 0.9% NaCl 525 mL intermittent infusion (mg) 2,500 mg New Bag 25 1322                    Nephrotoxins and other renal medications (From now, onward)      Start     Dose/Rate Route Frequency Ordered Stop    25 0800  torsemide (DEMADEX) tablet 20 mg        Note to Pharmacy: PTA Sig:Take 1 tablet (20 mg) by mouth daily.      20 mg Oral DAILY 25 1420      25 0600  vancomycin (VANCOCIN) 1,000 mg in 200 mL dextrose intermittent infusion         1,000 mg  200 mL/hr over 1 Hours Intravenous EVERY 12 HOURS 25 1849              Contrast Orders - past 72 hours (72h ago, onward)      None          [employer] Prediction of Planned Initial Vancomycin Regimen  Loading dose: 2500mg  Regimen: 1000 mg IV every 12 hours.  Exposure target: AUC24 (range)400-600 mg/L.hr   AUC24,ss: 508 mg/L.hr  Probability of AUC24 > 400: 67 %  Ctrough,ss: 14.7 mg/L  Probability of Ctrough,ss > 20: 34 %  Probability of nephrotoxicity (Lodise ARACELIS ): 10 %    Vancomycin  1000 mg IV q12h.   Vancomycin monitoring method: AUC  Vancomycin therapeutic monitoring goal: 400-600 mg*h/L  Pharmacy will check vancomycin levels as appropriate in 1-3 Days.    Serum creatinine levels will be ordered a minimum of twice weekly.      Claus Hicks [facility]

## 2025-01-19 NOTE — PLAN OF CARE
Goal Outcome Evaluation:    Pt A&O. Pain managed with [place]. L hand dressing CDI. CMS intact.Transfers with Ax1. AUO. External catheter in place. On vanco, ceftriaxone. Tolerating diet, denies N/V. NPO at 0000 for possible procedure tomorrow. Transferring to room 643.

## 2025-01-19 NOTE — ANESTHESIA PROCEDURE NOTES
Brachial plexus Procedure Note    Pre-Procedure   Staff -        Anesthesiologist:  Wilmar Peng MD       Performed By: anesthesiologist       Referred By: [facility]       Location: pre-op       Pre-Anesthestic Checklist: patient identified, IV checked, site marked, risks and benefits discussed, informed consent, monitors and equipment checked, pre-op evaluation, at physician/surgeon's request and post-op pain management  Timeout:       Correct Patient: Yes        Correct Procedure: Yes        Correct Site: Yes        Correct Position: Yes        Correct Laterality: Yes        Site Marked: Yes  Procedure Documentation  Procedure: Brachial plexus         Laterality: left       Patient Position: supine       Patient Prep/Sterile Barriers: sterile gloves, mask       Skin prep: Chloraprep       Local skin infiltrated with 3 mL of 2% lidocaine.  (supraclavicular approach).       Needle Type: insulated and short bevel       Needle Gauge: 21.        Needle Length (Inches): 4        Ultrasound guided (The brachial plexus was abnormal.  Very lateral and was never visualized in close proximity to the artery in the supra view.)       1. Ultrasound was used to identify targeted nerve, plexus, vascular marker, or fascial plane and place a needle adjacent to it in real-time.       2. Ultrasound was used to visualize the spread of anesthetic in close proximity to the above referenced structure.       5. There were no apparent abnormal pathologic findings.    Assessment/Narrative         The placement was negative for: blood aspirated, painful injection and site bleeding       Paresthesias: No.       Bolus given via needle. no blood aspirated via catheter.        Secured via.        Insertion/Infusion Method: Single Shot       Complications: none       Injection made incrementally with aspirations every 5 mL.     Comments:    Good LA spread around the BP       FOR [facility] ONLY:   Pain Team Contact information:  "please page the Pain Team Via [facility]. Search \"Pain\". During daytime hours, please page the attending first. At night please page the resident first.      "

## 2025-01-19 NOTE — ANESTHESIA CARE TRANSFER NOTE
Patient: Jamar Ivory    Procedure: Procedure(s):  Irrigation and debridement left middle and ring fingers       Diagnosis: Cellulitis of left hand [L03.114]  Wound infection [T14.8XXA, L08.9]  Diagnosis Additional Information: No value filed.    Anesthesia Type:   MAC     Note:    Oropharynx: oropharynx clear of all foreign objects  Level of Consciousness: awake  Oxygen Supplementation: face mask  Level of Supplemental Oxygen (L/min / FiO2): 6  Independent Airway: airway patency satisfactory and stable  Dentition: dentition unchanged  Vital Signs Stable: post-procedure vital signs reviewed and stable  Report to RN Given: handoff report given  Patient transferred to: PACU    Handoff Report: Identifed the Patient, Identified the Reponsible Provider, Reviewed the pertinent medical history, Discussed the surgical course, Reviewed Intra-OP anesthesia mangement and issues during anesthesia, Set expectations for post-procedure period and Allowed opportunity for questions and acknowledgement of understanding      Vitals:  Vitals Value Taken Time   BP     Temp     Pulse     Resp     SpO2         Electronically Signed By: YENI Hoffman CRNA  January 19, 2025  11:00 AM

## 2025-01-19 NOTE — PROGRESS NOTES
"    [facility]     Hospitalist Progress Note     Assessment & Plan     ASSESSMENT    75M with history of morbid obesity BMI 46, hypothyroidism, and new-onset systolic heart failure EF 25-30% suspected to be secondary to ischemic cardiomyopathy (abnormal stress test but has not had cath) presents with worsening left hand pain and swelling after recent fall onto this extremity and found to have cellulitis and possibly flexor tenosynovitis going for operative management today.    PLAN    Cellulitis w/ Flexor Tenosynovitis  -Presents with worsening left hand pain and swelling after recent fall onto this extremity  -Exam concerning for cellulitis with flexor tenosynovitis of middle finger  -Vancomycin + ceftriaxone 2 g daily  -NPO for OR today with orthopedic surgery    Chronic HFrEF EF 25-30%   Suspected Ischemic Cardiomyopathy  -Recent echo with EF 25 to 30% and abnormal stress test  -Cardiac cath deferred after risk-benefit discussion with patient (no inducible ischemia on stress)  -Continue BB, ARB ASA, Statin, and home Torsemide    Other Issues  -Morbid obesity BMI 46: Complicates all aspects of care  -Hypothyroidism: Home Synthroid  -BPH: Home Flomax  -Chronic CVA: Home aspirin and statin    DVT Prophy  -SCDs    Disposition  -Medically Ready for Discharge: Anticipated in 2-4 Days       Santiago Arthur MD    Subjective     Seen at bedside prior to going down for CT scan.  Pain controlled in left hand.        Objective   Blood pressure 139/83, pulse 78, temperature 97.5  F (36.4  C), temperature source Core, resp. rate 20, height 1.727 m (5' 8\"), weight 137.8 kg (303 lb 11.2 oz), SpO2 93%.    PHYSICAL EXAM  General: In no acute distress  CV: RRR.  Lungs: CTAB. Nl WOB.  Abd: Non-tender.  Ext: Left hand with evidence of cellulitis and tenosynovitis of middle finger currently wrapped in dressing.    LABS AND IMAGING  Reviewed and pertinent results discussed in assessment and plan.    "

## 2025-01-20 LAB
ANION GAP SERPL CALCULATED.3IONS-SCNC: 10 MMOL/L (ref 7–15)
BACTERIA WND CULT: ABNORMAL
BACTERIA WND CULT: ABNORMAL
BASE EXCESS BLDV CALC-SCNC: 4.3 MMOL/L (ref -3–3)
BUN SERPL-MCNC: 17 MG/DL (ref 8–23)
CALCIUM SERPL-MCNC: 8.6 MG/DL (ref 8.8–10.4)
CHLORIDE SERPL-SCNC: 102 MMOL/L (ref 98–107)
CREAT SERPL-MCNC: 0.88 MG/DL (ref 0.67–1.17)
EGFRCR SERPLBLD CKD-EPI 2021: 89 ML/MIN/1.73M2
ERYTHROCYTE [DISTWIDTH] IN BLOOD BY AUTOMATED COUNT: 14.3 % (ref 10–15)
GLUCOSE SERPL-MCNC: 99 MG/DL (ref 70–99)
GRAM STAIN RESULT: ABNORMAL
GRAM STAIN RESULT: ABNORMAL
HCO3 BLDV-SCNC: 31 MMOL/L (ref 21–28)
HCO3 SERPL-SCNC: 25 MMOL/L (ref 22–29)
HCT VFR BLD AUTO: 35.8 % (ref 40–53)
HGB BLD-MCNC: 11.6 G/DL (ref 13.3–17.7)
MCH RBC QN AUTO: 28.4 PG (ref 26.5–33)
MCHC RBC AUTO-ENTMCNC: 32.4 G/DL (ref 31.5–36.5)
MCV RBC AUTO: 88 FL (ref 78–100)
O2/TOTAL GAS SETTING VFR VENT: 2 %
OXYHGB MFR BLDV: 39 % (ref 70–75)
PCO2 BLDV: 56 MM HG (ref 40–50)
PH BLDV: 7.36 [PH] (ref 7.32–7.43)
PLATELET # BLD AUTO: 159 10E3/UL (ref 150–450)
PO2 BLDV: 24 MM HG (ref 25–47)
POTASSIUM SERPL-SCNC: 4 MMOL/L (ref 3.4–5.3)
RBC # BLD AUTO: 4.08 10E6/UL (ref 4.4–5.9)
SAO2 % BLDV: 39.6 % (ref 70–75)
SODIUM SERPL-SCNC: 137 MMOL/L (ref 135–145)
VANCOMYCIN SERPL-MCNC: 13.8 UG/ML
WBC # BLD AUTO: 3.2 10E3/UL (ref 4–11)

## 2025-01-20 PROCEDURE — 85014 HEMATOCRIT: CPT | Performed by: INTERNAL MEDICINE

## 2025-01-20 PROCEDURE — 250N000013 HC RX MED GY IP 250 OP 250 PS 637: Performed by: STUDENT IN AN ORGANIZED HEALTH CARE EDUCATION/TRAINING PROGRAM

## 2025-01-20 PROCEDURE — 250N000009 HC RX 250: Performed by: INTERNAL MEDICINE

## 2025-01-20 PROCEDURE — 82805 BLOOD GASES W/O2 SATURATION: CPT | Performed by: INTERNAL MEDICINE

## 2025-01-20 PROCEDURE — 999N000156 HC STATISTIC RCP CONSULT EA 30 MIN

## 2025-01-20 PROCEDURE — 999N000157 HC STATISTIC RCP TIME EA 10 MIN

## 2025-01-20 PROCEDURE — 80202 ASSAY OF VANCOMYCIN: CPT | Performed by: INTERNAL MEDICINE

## 2025-01-20 PROCEDURE — 36415 COLL VENOUS BLD VENIPUNCTURE: CPT | Performed by: INTERNAL MEDICINE

## 2025-01-20 PROCEDURE — 94640 AIRWAY INHALATION TREATMENT: CPT

## 2025-01-20 PROCEDURE — 80048 BASIC METABOLIC PNL TOTAL CA: CPT | Performed by: INTERNAL MEDICINE

## 2025-01-20 PROCEDURE — 99232 SBSQ HOSP IP/OBS MODERATE 35: CPT | Performed by: INTERNAL MEDICINE

## 2025-01-20 PROCEDURE — 99222 1ST HOSP IP/OBS MODERATE 55: CPT | Performed by: INTERNAL MEDICINE

## 2025-01-20 PROCEDURE — 250N000011 HC RX IP 250 OP 636: Performed by: STUDENT IN AN ORGANIZED HEALTH CARE EDUCATION/TRAINING PROGRAM

## 2025-01-20 PROCEDURE — 120N000001 HC R&B MED SURG/OB

## 2025-01-20 PROCEDURE — 250N000013 HC RX MED GY IP 250 OP 250 PS 637: Performed by: INTERNAL MEDICINE

## 2025-01-20 RX ORDER — AMOXICILLIN 250 MG
1 CAPSULE ORAL 2 TIMES DAILY PRN
Qty: 12 TABLET | Refills: 0 | Status: SHIPPED | OUTPATIENT
Start: 2025-01-20

## 2025-01-20 RX ORDER — GUAIFENESIN/DEXTROMETHORPHAN 100-10MG/5
10 SYRUP ORAL EVERY 4 HOURS PRN
Status: DISCONTINUED | OUTPATIENT
Start: 2025-01-20 | End: 2025-01-24 | Stop reason: HOSPADM

## 2025-01-20 RX ORDER — HYDROCODONE BITARTRATE AND ACETAMINOPHEN 5; 325 MG/1; MG/1
1 TABLET ORAL EVERY 6 HOURS PRN
Qty: 12 TABLET | Refills: 0 | Status: SHIPPED | OUTPATIENT
Start: 2025-01-20 | End: 2025-01-24

## 2025-01-20 RX ADMIN — ASPIRIN 81 MG CHEWABLE TABLET 81 MG: 81 TABLET CHEWABLE at 08:01

## 2025-01-20 RX ADMIN — IPRATROPIUM BROMIDE AND ALBUTEROL SULFATE 3 ML: .5; 3 SOLUTION RESPIRATORY (INHALATION) at 02:10

## 2025-01-20 RX ADMIN — MIRTAZAPINE 15 MG: 15 TABLET, FILM COATED ORAL at 21:18

## 2025-01-20 RX ADMIN — ACETAMINOPHEN 500 MG: 500 TABLET, FILM COATED ORAL at 16:27

## 2025-01-20 RX ADMIN — TORSEMIDE 20 MG: 20 TABLET ORAL at 08:02

## 2025-01-20 RX ADMIN — CEFTRIAXONE 2 G: 2 INJECTION, POWDER, FOR SOLUTION INTRAMUSCULAR; INTRAVENOUS at 09:31

## 2025-01-20 RX ADMIN — LOSARTAN POTASSIUM 25 MG: 25 TABLET, FILM COATED ORAL at 08:02

## 2025-01-20 RX ADMIN — GUAIFENESIN SYRUP AND DEXTROMETHORPHAN 10 ML: 100; 10 SYRUP ORAL at 16:27

## 2025-01-20 RX ADMIN — LEVOTHYROXINE SODIUM 224 MCG: 0.11 TABLET ORAL at 08:01

## 2025-01-20 RX ADMIN — ROSUVASTATIN CALCIUM 20 MG: 20 TABLET, FILM COATED ORAL at 08:01

## 2025-01-20 RX ADMIN — IPRATROPIUM BROMIDE AND ALBUTEROL SULFATE 3 ML: .5; 3 SOLUTION RESPIRATORY (INHALATION) at 16:47

## 2025-01-20 RX ADMIN — VANCOMYCIN HYDROCHLORIDE 1000 MG: 1 INJECTION, SOLUTION INTRAVENOUS at 07:33

## 2025-01-20 RX ADMIN — HYDROCODONE BITARTRATE AND ACETAMINOPHEN 1 TABLET: 5; 325 TABLET ORAL at 00:19

## 2025-01-20 RX ADMIN — VANCOMYCIN HYDROCHLORIDE 1000 MG: 1 INJECTION, SOLUTION INTRAVENOUS at 18:48

## 2025-01-20 RX ADMIN — METOPROLOL SUCCINATE 25 MG: 25 TABLET, EXTENDED RELEASE ORAL at 08:02

## 2025-01-20 RX ADMIN — TAMSULOSIN HYDROCHLORIDE 0.8 MG: 0.4 CAPSULE ORAL at 08:01

## 2025-01-20 ASSESSMENT — ACTIVITIES OF DAILY LIVING (ADL)
ADLS_ACUITY_SCORE: 41

## 2025-01-20 NOTE — PROGRESS NOTES
"    [facility]     Hospitalist Progress Note     Assessment & Plan     ASSESSMENT    75M with history of morbid obesity BMI 46, hypothyroidism, and new-onset systolic heart failure EF 25-30% suspected to be secondary to ischemic cardiomyopathy (abnormal stress test but has not had cath) presents with worsening left hand pain and swelling after recent fall onto this extremity and found to have cellulitis and possibly flexor tenosynovitis s/p I&D.    Preoperative cultures with MSSA.  Operative cultures pending.  Suspect patient may need outpatient IV antibiotics.  Will consult infectious disease.    PLAN    Cellulitis w/ Flexor Tenosynovitis  -Presents with worsening left hand pain and swelling after recent fall onto this extremity  -Exam concerning for flexor tenosynovitis now s/p I&D, preop cx w/ MSSA, op cx pending  -Vancomycin + ceftriaxone 2 g daily  -Consult ID, may need IV antibiotics at the time of discharge    Chronic HFrEF EF 25-30%   Suspected Ischemic Cardiomyopathy  -Recent echo with EF 25 to 30% and abnormal stress test  -Cardiac cath deferred after risk-benefit discussion with patient (no inducible ischemia on stress)  -Continue BB, ARB ASA, Statin, and home Torsemide    Other Issues  -Morbid obesity BMI 46: Complicates all aspects of care  -Hypothyroidism: Home Synthroid  -BPH: Home Flomax  -Chronic CVA: Home aspirin and statin    DVT Prophy  -SCDs    Disposition  -Medically Ready for Discharge: Anticipated in 2-4 Days       Santiago Arthur MD    Subjective     Patient seen at bedside.  Minimal pain in hand.  Able to ambulate with walker with minimal assistance.        Objective   Blood pressure 126/67, pulse 74, temperature 97.8  F (36.6  C), temperature source Temporal, resp. rate 13, height 1.727 m (5' 8\"), weight 137.8 kg (303 lb 11.2 oz), SpO2 96%.    PHYSICAL EXAM  General: In no acute distress  CV: RRR.  Lungs: CTAB. Nl WOB.  Abd: Non-tender.  Ext: Left hand with evidence of " cellulitis wrapped in postoperative dressing    LABS AND IMAGING  Reviewed and pertinent results discussed in assessment and plan.

## 2025-01-20 NOTE — PROGRESS NOTES
Orthopedic Surgery  Jamar Ivory  01/20/2025     Admit Date:  1/18/2025  POD: 1 Day Post-Op   Procedure(s):  Irrigation and debridement left middle and ring fingers: LEFT hand      Patient resting comfortably in bed.    Pain controlled in hand   Tolerating oral intake.    Denies nausea or vomiting  Denies chest pain or shortness of breath but has an audible     Temp:  [97  F (36.1  C)-98.3  F (36.8  C)] 97.8  F (36.6  C)  Pulse:  [57-77] 74  Resp:  [11-16] 13  BP: ()/(50-83) 126/67  SpO2:  [90 %-98 %] 96 %    Alert and oriented  Dressing is changed today on left hand.  Continues to have erythema along the dorsal aspect of the 3rd webspace.   Swelling present in 3rd/4th fingers.  Able to flex and extend fingers with mild pain.   Sensation and pulses intact and equal    Labs:  Recent Labs   Lab Test 01/20/25  0732 01/18/25  1202 01/16/25  0846   WBC 3.2* 3.8* 2.8*   HGB 11.6* 12.0* 11.5*    192 157     Recent Labs   Lab Test 04/11/18  1115   INR 1.06     Recent Labs   Lab Test 01/18/25  1202   CRPI 53.74*       PLAN:  Daily dressing changes, daily hand soaks with warm water/chlorhexidine then redress wounds with Adaptic and Kerlix, bulked in the webspace, 2 inch Ace wrap.  Encourage finger range of motion, limit weightbearing to 2 pounds with the left upper extremity  Follow-up with Dr Hoffmann in 7 to 10 days  Defer antibiotic management to infectious disease.     2. Disposition   Anticipate d/c to home possibly tomorrow pending improvement in erythema and abx plan.    Magy Callahan PA-C

## 2025-01-20 NOTE — CONSULTS
[facility]    Infectious Disease Consultation     Date of Admission:  1/18/2025  Date of Consult (When I saw the patient): 01/20/25    Assessment & Plan   Jamar Ivory is a 75 year old male who was admitted on 1/18/2025.     Impression: 1 75-year-old male with trauma to his left hand, secondarily infected but not fortunately into the tenosynovitis level, status post I&D all cultures are growing Staph aureus sensitivities to follow  2 no significant clinical sepsis and blood cultures negative  3 chronic cardiomyopathy  4 penicillin and sulfa allergies  5 Venous stasis edema and some wounds    REC 1 currently on Vanco and ceftriaxone continue for now await full sensitivities and culture result, presumably oral antibiotics as early as tomorrow if doing well        Ramirez Claire MD    Reason for Consult   Reason for consult: I was asked to evaluate this patient for left hand infection.    Primary Care Physician   Huy Crystal    Chief Complaint   Left hand pain    History is obtained from the patient and medical records    History of Present Illness   Jamar Ivory is a 75 year old male who presents with creatinine injury to his left hand when he fell out of bed, early post procedure started having redness swelling and pain.  At evaluation was concern for possible tenosynovitis he is underwent surgical drainage and fortunately was not into the tendon sheath.  Cultures from that drainage are growing Staph aureus.  Never had major clinical sepsis blood cultures were done and are negative.  He feels relatively well still some degree of pain in the hand.    Past Medical History   I have reviewed this patient's medical history and updated it with pertinent information if needed.   Past Medical History:   Diagnosis Date    Arthritis     Cerebral artery occlusion with cerebral infarction     TIA    CHF (congestive heart failure) 12/24/2018    CVA (cerebral infarction) 2012    CVD  (cardiovascular disease)     Depression 1977    hospitalized    Fatty liver     Gout     h/o Concussion     Hypertension 01/17/2011    Hypothyroidism     Obesity     Spider veins     TIA (transient ischaemic attack) 2012    Vitiligo        Past Surgical History   I have reviewed this patient's surgical history and updated it with pertinent information if needed.  Past Surgical History:   Procedure Laterality Date    APPENDECTOMY      at age 22    COLONOSCOPY N/A 09/02/2016    Procedure: COMBINED COLONOSCOPY, SINGLE OR MULTIPLE BIOPSY/POLYPECTOMY BY BIOPSY;  Surgeon: Yanick Brown MD;  Location:  GI    COLONOSCOPY N/A 12/27/2021    Procedure: COLONOSCOPY;  Surgeon: Kong Chowdary MD;  Location: [facility] OR    VASECTOMY         Prior to Admission Medications   Prior to Admission Medications   Prescriptions Last Dose Informant Patient Reported? Taking?   HYDROcodone-acetaminophen (NORCO) 5-325 MG tablet 1/18/2025 Morning  No No   Sig: Take 1 tablet by mouth every 6 hours as needed for pain.   Misc Natural Products (OSTEO BI-FLEX JOINT SHIELD PO) 1/18/2025 Morning  Yes Yes   Sig: Take 1 tablet by mouth daily.   acetaminophen (TYLENOL) 500 MG tablet Unknown  Yes Yes   Sig: Take 500-1,000 mg by mouth every 6 hours as needed for mild pain Prn for headaches   aspirin (ASA) 81 MG tablet More than a month  No Yes   Sig: Take 1 tablet (81 mg) by mouth daily   levothyroxine (SYNTHROID/LEVOTHROID) 112 MCG tablet 1/18/2025 Morning  No Yes   Sig: TAKE 2 TABLETS (224 MCG) BY MOUTH DAILY   losartan (COZAAR) 25 MG tablet 1/18/2025 Morning  No Yes   Sig: Take 1 tablet (25 mg) by mouth daily.   metoprolol succinate ER (TOPROL XL) 25 MG 24 hr tablet 1/18/2025 Morning  No Yes   Sig: Take 1 tablet (25 mg) by mouth daily.   nitroGLYcerin (NITROSTAT) 0.4 MG sublingual tablet   No Yes   Sig: FOR CHEST PAIN PLACE 1 TABLET UNDER THE TONGUE EVERY 5 MINUTES FOR 3 DOSES. IF SYMPTOMS PERSIST 5 MINUTES AFTER 1ST DOSE CALL 911.   rosuvastatin  (CRESTOR) 20 MG tablet 1/18/2025 Morning  No Yes   Sig: Take 1 tablet (20 mg) by mouth daily.   tamsulosin (FLOMAX) 0.4 MG capsule 1/18/2025 Morning  Yes Yes   Sig: Take 0.4 mg by mouth daily.   torsemide (DEMADEX) 20 MG tablet 1/18/2025 Morning  No Yes   Sig: Take 1 tablet (20 mg) by mouth daily.      Facility-Administered Medications: None     Allergies   Allergies   Allergen Reactions    Clopidogrel      Cough/emesis    Penicillins Rash    Simvastatin Diarrhea    Sulfa Antibiotics      Unsure    Xeroform [Bismuth Tribromphenate]      Unsure       Immunization History   Immunization History   Administered Date(s) Administered    COVID-19 12+ (Pfizer) 12/19/2024    COVID-19 Bivalent 12+ (Pfizer) 09/26/2022    COVID-19 MONOVALENT 12+ (Pfizer) 04/05/2021, 04/26/2021, 12/03/2021    Influenza (High Dose) Trivalent,PF (Fluzone) 12/19/2024    Influenza Vaccine 65+ (Fluzone HD) 09/26/2022    Pneumo Conj 13-V (2010&after) 06/28/2018    Pneumococcal 23 valent 04/27/2015    TD,PF 7+ (Tenivac) 11/07/2003    TDAP (Adacel,Boostrix) 11/11/2023    TDAP Vaccine (Adacel) 11/25/2013       Social History   I have reviewed this patient's social history and updated it with pertinent information if needed. Jamar PATEL Cachorro  reports that he has never smoked. He has never used smokeless tobacco. He reports that he does not currently use alcohol. He reports that he does not use drugs.    Family History   I have reviewed this patient's family history and updated it with pertinent information if needed.   Family History   Problem Relation Age of Onset    Cancer Father         Lung    Diabetes Mother     Cancer Daughter         leukemia       Review of Systems   The 10 point Review of Systems is negative    Physical Exam   Temp: 97.9  F (36.6  C) Temp src: Temporal BP: 108/51 Pulse: 70   Resp: 16 SpO2: 96 % O2 Device: Nasal cannula Oxygen Delivery: 2 LPM  Vital Signs with Ranges  Temp:  [97.3  F (36.3  C)-98.3  F (36.8  C)] 97.9  F (36.6  " C)  Pulse:  [64-77] 70  Resp:  [13-16] 16  BP: ()/(50-67) 108/51  SpO2:  [90 %-96 %] 96 %  303 lbs 11.15 oz  Body mass index is 46.18 kg/m .    GENERAL APPEARANCE:  awake  EYES: Eyes grossly normal to inspection  NECK: no adenopathy  RESP: lungs clear   CV: regular rates and rhythm  LYMPHATICS: normal ant/post cervical and supraclavicular nodes  ABDOMEN: soft, nontender  MS: extremities normal hand is wrapped, has some erythema extending slightly proximally and also into the distal hand away from the dressing  SKIN: no suspicious lesions or rashes        Data   All laboratory and imaging data in the past 24 hours reviewed  No results for input(s): \"CULT\" in the last 168 hours.  No lab results found.    Invalid input(s): \"UC\"       All cultures:  Recent Labs   Lab 01/19/25  1031 01/19/25  1028 01/19/25  1023 01/18/25  1324 01/18/25  1316 01/18/25  1202   CULTURE No growth after 1 day  No anaerobic organisms isolated after 1 day  Culture in progress  1+ Staphylococcus aureus*  See corresponding culture for results No growth after 1 day  No anaerobic organisms isolated after 1 day  Culture in progress  1+ Staphylococcus aureus*  See corresponding culture for results Culture in progress  1+ Staphylococcus aureus*  No growth after 1 day  No anaerobic organisms isolated after 1 day  See corresponding culture for results No growth after 1 day 4+ Staphylococcus aureus*  2+ Staphylococcus epidermidis* No growth after 1 day      Blood culture:  Results for orders placed or performed during the hospital encounter of 01/18/25   Blood Culture Arm, Left    Collection Time: 01/18/25  1:24 PM    Specimen: Arm, Left; Blood   Result Value Ref Range    Culture No growth after 1 day    Blood Culture Peripheral Blood    Collection Time: 01/18/25 12:02 PM    Specimen: Peripheral Blood   Result Value Ref Range    Culture No growth after 1 day    Results for orders placed or performed during the hospital encounter of " 08/23/13   Blood culture ONE site    Collection Time: 08/23/13  9:01 PM    Specimen: Arm, Right; Blood   Result Value Ref Range    Specimen Description Blood Right Arm     Culture Micro No growth     Micro Report Status FINAL 08/29/2013      *Note: Due to a large number of results and/or encounters for the requested time period, some results have not been displayed. A complete set of results can be found in Results Review.      Urine culture:  No results found. However, due to the size of the patient record, not all encounters were searched. Please check Results Review for a complete set of results.

## 2025-01-20 NOTE — PLAN OF CARE
"Pt emotional/crying after coming back from PACU. Reported generalized headache, PRN Tylenol given but with no relief. MD notified. Order for CT of the head (see result review). Gave one time dose ESGIC with some relief. Continues to have numbness to the hand. Tolerating 2g NA. Voiding without any issues. Not oob bed yet. On 3L NC. Some wheezing noted. Neb treatment given.  Will continue to provide supportive care.     Problem: Adult Inpatient Plan of Care  Goal: Plan of Care Review  Description: The Plan of Care Review/Shift note should be completed every shift.  The Outcome Evaluation is a brief statement about your assessment that the patient is improving, declining, or no change.  This information will be displayed automatically on your shift  note.  Outcome: Progressing  Flowsheets (Taken 1/19/2025 1839)  Plan of Care Reviewed With: patient  Overall Patient Progress: improving  Goal: Patient-Specific Goal (Individualized)  Description: You can add care plan individualizations to a care plan. Examples of Individualization might be:  \"Parent requests to be called daily at 9am for status\", \"I have a hard time hearing out of my right ear\", or \"Do not touch me to wake me up as it startles  me\".  Outcome: Progressing  Goal: Absence of Hospital-Acquired Illness or Injury  Outcome: Progressing  Intervention: Identify and Manage Fall Risk  Recent Flowsheet Documentation  Taken 1/19/2025 0800 by Beba Vela RN  Safety Promotion/Fall Prevention:   activity supervised   clutter free environment maintained   mobility aid in reach   nonskid shoes/slippers when out of bed   safety round/check completed  Intervention: Prevent Skin Injury  Recent Flowsheet Documentation  Taken 1/19/2025 0800 by Beba Vela RN  Body Position: position changed independently  Intervention: Prevent and Manage VTE (Venous Thromboembolism) Risk  Recent Flowsheet Documentation  Taken 1/19/2025 0800 by Beba Vela RN  VTE Prevention/Management: " SCDs off (sequential compression devices)  Intervention: Prevent Infection  Recent Flowsheet Documentation  Taken 1/19/2025 0800 by Beba Vela RN  Infection Prevention:   cohorting utilized   single patient room provided  Goal: Optimal Comfort and Wellbeing  Outcome: Progressing  Goal: Readiness for Transition of Care  Outcome: Progressing     Problem: Skin or Soft Tissue Infection  Goal: Absence of Infection Signs and Symptoms  Outcome: Progressing  Intervention: Minimize and Manage Infection Progression  Recent Flowsheet Documentation  Taken 1/19/2025 0800 by Beba Vela RN  Infection Prevention:   cohorting utilized   single patient room provided   Goal Outcome Evaluation:      Plan of Care Reviewed With: patient    Overall Patient Progress: improvingOverall Patient Progress: improving

## 2025-01-20 NOTE — PLAN OF CARE
"A&Ox4. On 2L O2. Cont pulse ox. Has cough. CMS intact. Rates pain 4/10, declined pain meds. Dressing to L hand CDI, changed by ortho. Venous ulcers to BLE, dressings changed today. Tolerating 2g Na diet. Up ax1 gb/walker. Partial WB to LUE. Moderate swelling. IV Vanco and Rocephin for abx. Voiding with urinal. Possible discharge home tomorrow.    Goal Outcome Evaluation:      Plan of Care Reviewed With: patient    Overall Patient Progress: improvingOverall Patient Progress: improving       Problem: Adult Inpatient Plan of Care  Goal: Plan of Care Review  Description: The Plan of Care Review/Shift note should be completed every shift.  The Outcome Evaluation is a brief statement about your assessment that the patient is improving, declining, or no change.  This information will be displayed automatically on your shift  note.  Outcome: Progressing  Flowsheets (Taken 1/20/2025 1218)  Plan of Care Reviewed With: patient  Overall Patient Progress: improving  Goal: Patient-Specific Goal (Individualized)  Description: You can add care plan individualizations to a care plan. Examples of Individualization might be:  \"Parent requests to be called daily at 9am for status\", \"I have a hard time hearing out of my right ear\", or \"Do not touch me to wake me up as it startles  me\".  Outcome: Progressing  Goal: Absence of Hospital-Acquired Illness or Injury  Outcome: Progressing  Intervention: Identify and Manage Fall Risk  Recent Flowsheet Documentation  Taken 1/20/2025 1037 by Michelle Acosta RN  Safety Promotion/Fall Prevention: safety round/check completed  Intervention: Prevent Skin Injury  Recent Flowsheet Documentation  Taken 1/20/2025 1037 by Michelle Acosta RN  Body Position: position changed independently  Intervention: Prevent and Manage VTE (Venous Thromboembolism) Risk  Recent Flowsheet Documentation  Taken 1/20/2025 1037 by Michelle Acosta RN  VTE Prevention/Management: SCDs off (sequential compression " devices)  Intervention: Prevent Infection  Recent Flowsheet Documentation  Taken 1/20/2025 1037 by Michelle Acosta RN  Infection Prevention:   single patient room provided   rest/sleep promoted   hand hygiene promoted  Goal: Optimal Comfort and Wellbeing  Outcome: Progressing  Intervention: Monitor Pain and Promote Comfort  Recent Flowsheet Documentation  Taken 1/20/2025 0758 by Michelle Acosta, RN  Pain Management Interventions: declines  Goal: Readiness for Transition of Care  Outcome: Progressing     Problem: Skin or Soft Tissue Infection  Goal: Absence of Infection Signs and Symptoms  Outcome: Progressing  Intervention: Minimize and Manage Infection Progression  Recent Flowsheet Documentation  Taken 1/20/2025 1037 by Michelle Acosta, RN  Infection Prevention:   single patient room provided   rest/sleep promoted   hand hygiene promoted

## 2025-01-20 NOTE — PLAN OF CARE
"Goal Outcome Evaluation:      Plan of Care Reviewed With: patient    Overall Patient Progress: improvingOverall Patient Progress: improving    Outcome Evaluation: No acute events overnight    Pt is A&O x 4. Tolerating 2 gm Na diet- no nausea or vomiting. 2L O2 with capno overnight. PRN neb for cough. 2 lb weight bearing to LUE. Received block in OR so some left hand numbness but able to wiggle fingers and good cap refill. Dressing with small serous drainage. Moderate swelling. Continues on IV vanco. Pain managed with PRN norco. Updated wife on results of head CT.       Problem: Adult Inpatient Plan of Care  Goal: Plan of Care Review  Description: The Plan of Care Review/Shift note should be completed every shift.  The Outcome Evaluation is a brief statement about your assessment that the patient is improving, declining, or no change.  This information will be displayed automatically on your shift  note.  1/20/2025 0437 by Nikole Santiago RN  Outcome: Progressing  Flowsheets (Taken 1/20/2025 0437)  Outcome Evaluation: No acute events overnight  Overall Patient Progress: improving  1/20/2025 0404 by Nikole Santiago RN  Outcome: Progressing  Flowsheets (Taken 1/20/2025 0404)  Plan of Care Reviewed With: patient  Overall Patient Progress: improving  Goal: Patient-Specific Goal (Individualized)  Description: You can add care plan individualizations to a care plan. Examples of Individualization might be:  \"Parent requests to be called daily at 9am for status\", \"I have a hard time hearing out of my right ear\", or \"Do not touch me to wake me up as it startles  me\".  1/20/2025 0437 by Nikole Santiago RN  Outcome: Progressing  1/20/2025 0404 by Nikole Santiago RN  Outcome: Progressing  Goal: Absence of Hospital-Acquired Illness or Injury  1/20/2025 0437 by Nikole Santiago RN  Outcome: Progressing  1/20/2025 0404 by Nikole Santiago RN  Outcome: Progressing  Intervention: " Identify and Manage Fall Risk  Recent Flowsheet Documentation  Taken 1/19/2025 2000 by Nikole Santiago RN  Safety Promotion/Fall Prevention:   activity supervised   assistive device/personal items within reach   clutter free environment maintained   nonskid shoes/slippers when out of bed   safety round/check completed  Intervention: Prevent and Manage VTE (Venous Thromboembolism) Risk  Recent Flowsheet Documentation  Taken 1/19/2025 2000 by Nikole Santiago RN  VTE Prevention/Management: (pt unable to tolerate) SCDs off (sequential compression devices)  Intervention: Prevent Infection  Recent Flowsheet Documentation  Taken 1/19/2025 2000 by Nikole Santiago RN  Infection Prevention: rest/sleep promoted  Goal: Optimal Comfort and Wellbeing  1/20/2025 0437 by Nikole Santiago RN  Outcome: Progressing  1/20/2025 0404 by Nikole Santiago RN  Outcome: Progressing  Goal: Readiness for Transition of Care  1/20/2025 0437 by Nikole Santiago RN  Outcome: Progressing  1/20/2025 0404 by Nikole Santiago RN  Outcome: Progressing     Problem: Skin or Soft Tissue Infection  Goal: Absence of Infection Signs and Symptoms  1/20/2025 0437 by Nikole Santiago RN  Outcome: Progressing  1/20/2025 0404 by Nikole Santiago RN  Outcome: Progressing  Intervention: Minimize and Manage Infection Progression  Recent Flowsheet Documentation  Taken 1/19/2025 2000 by Nikole Santiago RN  Infection Prevention: rest/sleep promoted

## 2025-01-21 ENCOUNTER — APPOINTMENT (OUTPATIENT)
Dept: PHYSICAL THERAPY | Facility: CLINIC | Age: 75
DRG: 579 | End: 2025-01-21
Attending: INTERNAL MEDICINE
Payer: COMMERCIAL

## 2025-01-21 ENCOUNTER — APPOINTMENT (OUTPATIENT)
Dept: GENERAL RADIOLOGY | Facility: CLINIC | Age: 75
DRG: 579 | End: 2025-01-21
Attending: INTERNAL MEDICINE
Payer: COMMERCIAL

## 2025-01-21 ENCOUNTER — APPOINTMENT (OUTPATIENT)
Dept: ULTRASOUND IMAGING | Facility: CLINIC | Age: 75
DRG: 579 | End: 2025-01-21
Attending: INTERNAL MEDICINE
Payer: COMMERCIAL

## 2025-01-21 LAB
BACTERIA TISS BX CULT: ABNORMAL
FLUAV RNA SPEC QL NAA+PROBE: NEGATIVE
FLUBV RNA RESP QL NAA+PROBE: NEGATIVE
RSV RNA SPEC NAA+PROBE: POSITIVE
SARS-COV-2 RNA RESP QL NAA+PROBE: NEGATIVE

## 2025-01-21 PROCEDURE — 250N000009 HC RX 250: Performed by: INTERNAL MEDICINE

## 2025-01-21 PROCEDURE — 87637 SARSCOV2&INF A&B&RSV AMP PRB: CPT | Performed by: INTERNAL MEDICINE

## 2025-01-21 PROCEDURE — 99232 SBSQ HOSP IP/OBS MODERATE 35: CPT | Performed by: INTERNAL MEDICINE

## 2025-01-21 PROCEDURE — 250N000013 HC RX MED GY IP 250 OP 250 PS 637: Performed by: INTERNAL MEDICINE

## 2025-01-21 PROCEDURE — 97530 THERAPEUTIC ACTIVITIES: CPT | Mod: GP | Performed by: PHYSICAL THERAPIST

## 2025-01-21 PROCEDURE — 97161 PT EVAL LOW COMPLEX 20 MIN: CPT | Mod: GP | Performed by: PHYSICAL THERAPIST

## 2025-01-21 PROCEDURE — 999N000157 HC STATISTIC RCP TIME EA 10 MIN

## 2025-01-21 PROCEDURE — 93970 EXTREMITY STUDY: CPT

## 2025-01-21 PROCEDURE — 99233 SBSQ HOSP IP/OBS HIGH 50: CPT | Performed by: INTERNAL MEDICINE

## 2025-01-21 PROCEDURE — 250N000011 HC RX IP 250 OP 636: Performed by: STUDENT IN AN ORGANIZED HEALTH CARE EDUCATION/TRAINING PROGRAM

## 2025-01-21 PROCEDURE — 71045 X-RAY EXAM CHEST 1 VIEW: CPT

## 2025-01-21 PROCEDURE — 250N000013 HC RX MED GY IP 250 OP 250 PS 637: Performed by: STUDENT IN AN ORGANIZED HEALTH CARE EDUCATION/TRAINING PROGRAM

## 2025-01-21 PROCEDURE — 250N000011 HC RX IP 250 OP 636: Performed by: INTERNAL MEDICINE

## 2025-01-21 PROCEDURE — 120N000001 HC R&B MED SURG/OB

## 2025-01-21 PROCEDURE — 97116 GAIT TRAINING THERAPY: CPT | Mod: GP | Performed by: PHYSICAL THERAPIST

## 2025-01-21 PROCEDURE — 94640 AIRWAY INHALATION TREATMENT: CPT | Mod: 76

## 2025-01-21 RX ORDER — BENZONATATE 100 MG/1
100 CAPSULE ORAL 3 TIMES DAILY PRN
Status: DISCONTINUED | OUTPATIENT
Start: 2025-01-21 | End: 2025-01-24 | Stop reason: HOSPADM

## 2025-01-21 RX ORDER — FUROSEMIDE 10 MG/ML
40 INJECTION INTRAMUSCULAR; INTRAVENOUS EVERY 12 HOURS
Status: DISCONTINUED | OUTPATIENT
Start: 2025-01-21 | End: 2025-01-21

## 2025-01-21 RX ORDER — FUROSEMIDE 10 MG/ML
40 INJECTION INTRAMUSCULAR; INTRAVENOUS EVERY 12 HOURS
Status: COMPLETED | OUTPATIENT
Start: 2025-01-21 | End: 2025-01-22

## 2025-01-21 RX ADMIN — ACETAMINOPHEN 500 MG: 500 TABLET, FILM COATED ORAL at 06:58

## 2025-01-21 RX ADMIN — IPRATROPIUM BROMIDE AND ALBUTEROL SULFATE 3 ML: .5; 3 SOLUTION RESPIRATORY (INHALATION) at 17:04

## 2025-01-21 RX ADMIN — ASPIRIN 81 MG CHEWABLE TABLET 81 MG: 81 TABLET CHEWABLE at 08:44

## 2025-01-21 RX ADMIN — GUAIFENESIN SYRUP AND DEXTROMETHORPHAN 10 ML: 100; 10 SYRUP ORAL at 16:56

## 2025-01-21 RX ADMIN — TORSEMIDE 20 MG: 20 TABLET ORAL at 08:49

## 2025-01-21 RX ADMIN — FUROSEMIDE 40 MG: 10 INJECTION, SOLUTION INTRAVENOUS at 12:01

## 2025-01-21 RX ADMIN — MIRTAZAPINE 15 MG: 15 TABLET, FILM COATED ORAL at 21:18

## 2025-01-21 RX ADMIN — LOSARTAN POTASSIUM 25 MG: 25 TABLET, FILM COATED ORAL at 08:46

## 2025-01-21 RX ADMIN — ACETAMINOPHEN 1000 MG: 500 TABLET, FILM COATED ORAL at 00:34

## 2025-01-21 RX ADMIN — METOPROLOL SUCCINATE 25 MG: 25 TABLET, EXTENDED RELEASE ORAL at 08:44

## 2025-01-21 RX ADMIN — LEVOTHYROXINE SODIUM 224 MCG: 0.11 TABLET ORAL at 08:44

## 2025-01-21 RX ADMIN — VANCOMYCIN HYDROCHLORIDE 1000 MG: 1 INJECTION, SOLUTION INTRAVENOUS at 19:40

## 2025-01-21 RX ADMIN — TAMSULOSIN HYDROCHLORIDE 0.8 MG: 0.4 CAPSULE ORAL at 08:44

## 2025-01-21 RX ADMIN — CEFTRIAXONE 2 G: 2 INJECTION, POWDER, FOR SOLUTION INTRAMUSCULAR; INTRAVENOUS at 08:49

## 2025-01-21 RX ADMIN — VANCOMYCIN HYDROCHLORIDE 1000 MG: 1 INJECTION, SOLUTION INTRAVENOUS at 06:51

## 2025-01-21 RX ADMIN — ROSUVASTATIN CALCIUM 20 MG: 20 TABLET, FILM COATED ORAL at 08:45

## 2025-01-21 RX ADMIN — HYDROCODONE BITARTRATE AND ACETAMINOPHEN 1 TABLET: 5; 325 TABLET ORAL at 06:58

## 2025-01-21 RX ADMIN — ACETAMINOPHEN 500 MG: 500 TABLET, FILM COATED ORAL at 16:56

## 2025-01-21 ASSESSMENT — ACTIVITIES OF DAILY LIVING (ADL)
ADLS_ACUITY_SCORE: 37
ADLS_ACUITY_SCORE: 41
ADLS_ACUITY_SCORE: 37
ADLS_ACUITY_SCORE: 41
ADLS_ACUITY_SCORE: 37
ADLS_ACUITY_SCORE: 37
ADLS_ACUITY_SCORE: 41
ADLS_ACUITY_SCORE: 37
ADLS_ACUITY_SCORE: 41
ADLS_ACUITY_SCORE: 37
ADLS_ACUITY_SCORE: 41
ADLS_ACUITY_SCORE: 37

## 2025-01-21 NOTE — PLAN OF CARE
Goal Outcome Evaluation:      Plan of Care Reviewed With: patient    Overall Patient Progress: no changeOverall Patient Progress: no change    Outcome Evaluation: A&O x4, on 1-2 LPM NC & oxymask at bedtime, continuous pulse ox in place, VBG done for increased somolence hcp reviewed no acute changes, contact precautions initiated for MRSA, pain mngd w/ tylenol, cough mngd w/ robitussin & nebulizer treatment, IS utilized, voiding ok via urinal, Ax1 GB & walker, dressings CDI, possible discharge home tomorrow    Problem: Adult Inpatient Plan of Care  Goal: Plan of Care Review  Description: The Plan of Care Review/Shift note should be completed every shift.  The Outcome Evaluation is a brief statement about your assessment that the patient is improving, declining, or no change.  This information will be displayed automatically on your shift  note.  1/20/2025 2249 by Gregoria Polanco, RN  Outcome: Progressing  Flowsheets (Taken 1/20/2025 2244)  Outcome Evaluation: A&O x4, on 1-2 LPM NC & oxymask at bedtime, continuous pulse ox in place, VBG done for increased somolence hcp reviewed no acute changes, contact precautions initiated for MRSA, pain mngd w/ tylenol, cough mngd w/ robitussin & nebulizer treatment, IS utilized, voiding ok via urinal, Ax1 GB & walker, dressings CDI, possible discharge home tomorrow  1/20/2025 2244 by Gregoria Polanco, RN  Outcome: Not Progressing  1/20/2025 2244 by Gregoria Polanco, RN  Outcome: Progressing  Flowsheets (Taken 1/20/2025 2244)  Outcome Evaluation: A&O x4, on 1-2 LPM NC & oxymask at bedtime, continuous pulse ox in place, VBG done for increased somolence hcp reviewed no acute changes, contact precautions initiated for MRSA, pain mngd w/ tylenol, cough mngd w/ robitussin & nebulizer treatment, IS utilized, voiding ok via urinal, Ax1 GB & walker, dressings CDI, possible discharge home tomorrow  Plan of Care Reviewed With: patient  Overall Patient Progress: no change  Goal:  "Patient-Specific Goal (Individualized)  Description: You can add care plan individualizations to a care plan. Examples of Individualization might be:  \"Parent requests to be called daily at 9am for status\", \"I have a hard time hearing out of my right ear\", or \"Do not touch me to wake me up as it startles  me\".  1/20/2025 2249 by Gregoria Polanco RN  Outcome: Progressing  1/20/2025 2244 by Gregoria Polanco RN  Outcome: Not Progressing  1/20/2025 2244 by Gregoria Polanco RN  Outcome: Progressing  Goal: Absence of Hospital-Acquired Illness or Injury  1/20/2025 2249 by Gregoria Polanco RN  Outcome: Progressing  1/20/2025 2244 by Gregoria Polanco RN  Outcome: Not Progressing  1/20/2025 2244 by Gregoria Polanco RN  Outcome: Progressing  Intervention: Identify and Manage Fall Risk  Recent Flowsheet Documentation  Taken 1/20/2025 1610 by Gregoria Polanco RN  Safety Promotion/Fall Prevention: safety round/check completed  Intervention: Prevent and Manage VTE (Venous Thromboembolism) Risk  Recent Flowsheet Documentation  Taken 1/20/2025 1858 by Gregoria Polanco RN  VTE Prevention/Management: SCDs on (sequential compression devices)  Taken 1/20/2025 1610 by Gregoria Polanco RN  VTE Prevention/Management: SCDs off (sequential compression devices)  Intervention: Prevent Infection  Recent Flowsheet Documentation  Taken 1/20/2025 1610 by Gregoria Polanco RN  Infection Prevention: hand hygiene promoted  Goal: Optimal Comfort and Wellbeing  1/20/2025 2249 by Gregoria Polanco RN  Outcome: Progressing  1/20/2025 2244 by Gregoria Polanco RN  Outcome: Not Progressing  1/20/2025 2244 by Gregoria Polanco RN  Outcome: Progressing  Intervention: Monitor Pain and Promote Comfort  Recent Flowsheet Documentation  Taken 1/20/2025 1819 by Gregoria Polanco RN  Pain Management Interventions: rest  Taken 1/20/2025 1627 by Gregoria Polanco RN  Pain Management Interventions:   medication (see MAR)   rest   " quiet environment facilitated  Goal: Readiness for Transition of Care  1/20/2025 2249 by Gregoria Polanco RN  Outcome: Progressing  1/20/2025 2244 by Gregoria Polanco RN  Outcome: Not Progressing  1/20/2025 2244 by Gregoria Polanco RN  Outcome: Progressing     Problem: Skin or Soft Tissue Infection  Goal: Absence of Infection Signs and Symptoms  1/20/2025 2249 by Gregoria Polanco RN  Outcome: Progressing  1/20/2025 2244 by Gregoria Polanco RN  Outcome: Not Progressing  1/20/2025 2244 by Gregoria Polanco RN  Outcome: Progressing  Intervention: Minimize and Manage Infection Progression  Recent Flowsheet Documentation  Taken 1/20/2025 1610 by Gregoria Polanco RN  Infection Prevention: hand hygiene promoted

## 2025-01-21 NOTE — PROGRESS NOTES
Orthopedic Surgery  Jamar Ivory  01/21/2025     Admit Date:  1/18/2025  POD: 2 Days Post-Op   Procedure(s):  incision and drainage of 3rd webspace,  incision and drainage of flexor tendon at A1 pulley middle and ring fingers, incision and drainage of mid palmar space: LEFT hand      Patient resting comfortably in bed.    Pain controlled in hand   Tolerating oral intake.    Denies nausea or vomiting  Denies chest pain or shortness of breath but has an audible wheeze and notable cough.  States his cough has been present for months.  Is requiring oxygen (1LPM).     Temp:  [97.7  F (36.5  C)-98.2  F (36.8  C)] 98  F (36.7  C)  Pulse:  [69-72] 72  Resp:  [16-19] 19  BP: (108-116)/(51-55) 113/55  SpO2:  [90 %-96 %] 95 %    Alert and oriented  Dressing is changed today on left hand.  Continues to have erythema along the dorsal aspect of the 3rd webspace but is improved vs yesterday.  No expressible drainage.   Swelling present in 3rd/4th fingers.  Able to flex and extend fingers with mild pain.   Sensation and pulses intact and equal    Labs:  Recent Labs   Lab Test 01/20/25  0732 01/18/25  1202 01/16/25  0846   WBC 3.2* 3.8* 2.8*   HGB 11.6* 12.0* 11.5*    192 157     Recent Labs   Lab Test 04/11/18  1115   INR 1.06     Recent Labs   Lab Test 01/18/25  1202   CRPI 53.74*       PLAN:  Daily dressing changes, daily hand soaks with warm water/chlorhexidine then redress wounds with Adaptic and Kerlix, bulked in the webspace, 2 inch Ace wrap.  Encourage finger range of motion, limit weightbearing to 2 pounds with the left upper extremity  Follow-up with Dr Hoffmann in 7 to 10 days  Defer antibiotic management to infectious disease.  Erythema is improving.    5.  Cough/wheezing being managed by medicine team.      2. Disposition   Anticipate d/c to home pending medical clearance and abx plan.      Magy Callahan PA-C

## 2025-01-21 NOTE — PHARMACY-VANCOMYCIN DOSING SERVICE
Pharmacy Vancomycin Note  Date of Service 2025  Patient's  1950   75 year old, male    Indication: Skin and Soft Tissue Infection  Day of Therapy: 4  Current vancomycin regimen:  1000 mg IV q12h  Current vancomycin monitoring method: AUC  Current vancomycin therapeutic monitoring goal: 400-600 mg*h/L    InsightRX Prediction of Current Vancomycin Regimen  Loading dose: N/A  Regimen: 1000 mg IV every 12 hours.  Start time: 18:51 on 2025  Exposure target: AUC24 (range)400-600 mg/L.hr   AUC24,ss: 458 mg/L.hr  Probability of AUC24 > 400: 82 %  Ctrough,ss: 12.1 mg/L  Probability of Ctrough,ss > 20: 0 %  Probability of nephrotoxicity (Lodise ARACELIS ): 7 %      Current estimated CrCl = Estimated Creatinine Clearance: 97.2 mL/min (based on SCr of 0.88 mg/dL).    Creatinine for last 3 days  2025: 12:02 PM Creatinine 0.84 mg/dL  2025:  7:32 AM Creatinine 0.88 mg/dL    Recent Vancomycin Levels (past 3 days)  2025:  5:28 PM Vancomycin 13.8 ug/mL    Vancomycin IV Administrations (past 72 hours)                     vancomycin (VANCOCIN) 1,000 mg in 200 mL dextrose intermittent infusion (mg) 1,000 mg New Bag 25 0651     1,000 mg New Bag 25 1848     1,000 mg New Bag  0733     1,000 mg New Bag 25 1946     1,000 mg New Bag  0514    vancomycin (VANCOCIN) 2,500 mg in 0.9% NaCl 525 mL intermittent infusion (mg) 2,500 mg New Bag 25 1322                    Nephrotoxins and other renal medications (From now, onward)      Start     Dose/Rate Route Frequency Ordered Stop    25 1200  furosemide (LASIX) injection 40 mg         40 mg  over 1-3 Minutes Intravenous EVERY 12 HOURS 25 1039 25 1159    25 0800  [Held by provider]  torsemide (DEMADEX) tablet 20 mg        (On hold since today at 1039 until manually unheld; held by Ezequiel Gallagher MDHold Reason: Abnormal Electrolytes)   Note to Pharmacy: PTA Sig:Take 1 tablet (20 mg) by mouth daily.       20 mg Oral DAILY 01/18/25 1420      01/19/25 0600  vancomycin (VANCOCIN) 1,000 mg in 200 mL dextrose intermittent infusion         1,000 mg  200 mL/hr over 1 Hours Intravenous EVERY 12 HOURS 01/18/25 1849                 Contrast Orders - past 72 hours (72h ago, onward)      Start     Dose/Rate Route Frequency Stop    01/19/25 0900  iopamidol (ISOVUE-370) solution 500 mL         500 mL Intravenous ONCE 01/19/25 0839    01/19/25 0630  gadobutrol (GADAVIST) injection 14 mL         14 mL Intravenous ONCE              Interpretation of levels and current regimen:  Vancomycin level is reflective of -600    Has serum creatinine changed greater than 50% in last 72 hours: No    Urine output:  unable to determine    Renal Function: Stable    InsightRX Prediction of Planned New Vancomycin Regimen  Loading dose: N/A  Regimen: 1000 mg IV every 12 hours.  Start time: 18:51 on 01/21/2025  Exposure target: AUC24 (range)400-600 mg/L.hr   AUC24,ss: 458 mg/L.hr  Probability of AUC24 > 400: 82 %  Ctrough,ss: 12.1 mg/L  Probability of Ctrough,ss > 20: 0 %  Probability of nephrotoxicity (Lodise ARACELIS 2009): 7 %      Plan:  Continue Current Dose  Vancomycin monitoring method: AUC  Vancomycin therapeutic monitoring goal: 400-600 mg*h/L  Pharmacy will check vancomycin levels as appropriate in 1-3 Days.  Serum creatinine levels will be ordered daily for the first week of therapy and at least twice weekly for subsequent weeks.    Perfecto Malloy [facility]

## 2025-01-21 NOTE — PLAN OF CARE
"Pt up with A1 to void. Strains to void, states that he thinks he has a prostate issue but is unsure if he has a urologist or takes medication for BPH. Isolation for MRSA. 2gm Na diet. Left hand is wrapped with ace- edema noted, fingers with redness, no drainage noted. Elevated on pillows. Tylenol for pain, declined anything else for pain control.     Shift events:   Assist OOB to void, otherwise uneventful night, slept well. No significant change in condition.       Treatment plan:  Pain management, Elevated LUE, assist OOB. Isolation for MRSA, wound cares,  Vanco added to anbx- also on Rocephin.  PT, ID & Ortho following.                            Problem: Adult Inpatient Plan of Care  Goal: Plan of Care Review  Description: The Plan of Care Review/Shift note should be completed every shift.  The Outcome Evaluation is a brief statement about your assessment that the patient is improving, declining, or no change.  This information will be displayed automatically on your shift  note.  Outcome: Progressing  Flowsheets (Taken 1/21/2025 9234)  Plan of Care Reviewed With: patient  Overall Patient Progress: improving  Goal: Patient-Specific Goal (Individualized)  Description: You can add care plan individualizations to a care plan. Examples of Individualization might be:  \"Parent requests to be called daily at 9am for status\", \"I have a hard time hearing out of my right ear\", or \"Do not touch me to wake me up as it startles  me\".  Outcome: Progressing  Goal: Absence of Hospital-Acquired Illness or Injury  Outcome: Progressing  Intervention: Identify and Manage Fall Risk  Recent Flowsheet Documentation  Taken 1/21/2025 0015 by Haily Barlow, RN  Safety Promotion/Fall Prevention: safety round/check completed  Intervention: Prevent Skin Injury  Recent Flowsheet Documentation  Taken 1/21/2025 0015 by Haily aBrlow, RN  Body Position: (Left UE elevated on pillows)   position changed independently   upper extremity " elevated  Goal: Optimal Comfort and Wellbeing  Outcome: Progressing  Goal: Readiness for Transition of Care  Outcome: Progressing   Goal Outcome Evaluation:      Plan of Care Reviewed With: patient    Overall Patient Progress: improvingOverall Patient Progress: improving

## 2025-01-21 NOTE — PLAN OF CARE
"Goal Outcome Evaluation:      Plan of Care Reviewed With: patient    Overall Patient Progress: improvingOverall Patient Progress: improving     Pt alert and oriented. Tearful at times. On 2L NC. Cont pulse ox. Dry frequent cough. Found to be RSV positive today. On IV vanco. Dressing to hand changed today. 2+ edema to L hand and legs. WOC consult placed for BLE wounds. IV lasix. Voiding via urinal.  CXR today negative. T[facility].       Problem: Adult Inpatient Plan of Care  Goal: Plan of Care Review  Description: The Plan of Care Review/Shift note should be completed every shift.  The Outcome Evaluation is a brief statement about your assessment that the patient is improving, declining, or no change.  This information will be displayed automatically on your shift  note.  Outcome: Progressing  Flowsheets (Taken 1/21/2025 1540)  Plan of Care Reviewed With: patient  Overall Patient Progress: improving  Goal: Patient-Specific Goal (Individualized)  Description: You can add care plan individualizations to a care plan. Examples of Individualization might be:  \"Parent requests to be called daily at 9am for status\", \"I have a hard time hearing out of my right ear\", or \"Do not touch me to wake me up as it startles  me\".  Outcome: Progressing  Goal: Absence of Hospital-Acquired Illness or Injury  Outcome: Progressing  Intervention: Identify and Manage Fall Risk  Recent Flowsheet Documentation  Taken 1/21/2025 1116 by Lety Freeman RN  Safety Promotion/Fall Prevention: safety round/check completed  Intervention: Prevent Infection  Recent Flowsheet Documentation  Taken 1/21/2025 1116 by Lety Freeman RN  Infection Prevention: hand hygiene promoted  Goal: Optimal Comfort and Wellbeing  Outcome: Progressing  Goal: Readiness for Transition of Care  Outcome: Progressing     Problem: Skin or Soft Tissue Infection  Goal: Absence of Infection Signs and Symptoms  Outcome: Progressing  Intervention: Minimize and Manage " Infection Progression  Recent Flowsheet Documentation  Taken 1/21/2025 1116 by Lety Freeman, RN  Infection Prevention: hand hygiene promoted  Isolation Precautions: contact precautions maintained

## 2025-01-21 NOTE — PROGRESS NOTES
01/21/25 1030   Appointment Info   Signing Clinician's Name / Credentials (PT) Cassidy Darnell PT   Living Environment   People in Home spouse;friend(s)   Current Living Arrangements apartment   Home Accessibility no concerns   Self-Care   Usual Activity Tolerance good   Current Activity Tolerance poor   Equipment Currently Used at Home walker, standard;walker, rolling   Fall history within last six months yes   Number of times patient has fallen within last six months 2   Activity/Exercise/Self-Care Comment pt report indep self cares, daily activities, spouse recently doing more of the household chores, cooking   General Information   Onset of Illness/Injury or Date of Surgery 01/20/25   Pertinent History of Current Problem (include personal factors and/or comorbidities that impact the POC) 74 year old male who who has a complex medical history including diet-controlled type 2 diabetes, hypertension, systolic heart failure, hyperlipidemia, previous stroke, obesity is presenting with a worsening infection of his left hand.  Patient on the 16th fell and cut his webbing between the third and fourth digit on the left hand.  He presented to the ER and had sutures placed and had it washed out.  Patient was not placed on antibiotics and sent home.  Unfortunately for the patient he developed increased pain, swelling, and redness of left hand starting yesterday and got to the point where he decided to come into the ER today.  He has significant erythema over the dorsum of his left hand.  This pain is 1-2/10.  He has pain with extension and flexion of his left hand.  In the emergency room his sutures were removed and gross pus came out of the wound   Existing Precautions/Restrictions fall;weight bearing   Weight-Bearing Status - LUE partial weight-bearing (% in comments)  (less than 2#)   Cognition   Affect/Mental Status (Cognition) emotionally labile;sad/depressed affect   Orientation Status (Cognition) oriented x 3    Follows Commands (Cognition) WFL   Safety Deficit (Cognition) at risk behavior observed   Pain Assessment   Patient Currently in Pain Yes, see Vital Sign flowsheet   Posture    Posture Forward head position;Protracted shoulders   Range of Motion (ROM)   Range of Motion ROM deficits secondary to surgical procedure   ROM Comment L hand   Strength (Manual Muscle Testing)   Strength (Manual Muscle Testing) Deficits observed during functional mobility   Transfers   Comment, (Transfers) sit to stand w min A and WW L UE PWB   Gait/Stairs (Locomotion)   Sutton Level (Gait) minimum assist (75% patient effort)   Assistive Device (Gait) walker, front-wheeled   Distance in Feet (Gait) 4   Pattern (Gait) step-through   Deviations/Abnormal Patterns (Gait) ataxic;silvio decreased;gait speed decreased;stride length decreased   Maintains Weight-bearing Status (Gait) unable to maintain   Comment, (Gait/Stairs) distance limited by pain, pt weepy crying out d/t leg pain; unable to maintain L UE PWB   Balance   Balance Comments impaired in standing req UE support w WW due to leg pain   Clinical Impression   Criteria for Skilled Therapeutic Intervention Yes, treatment indicated   PT Diagnosis (PT) Difficulty walking   Influenced by the following impairments dec strength impaired gait dec indep transfers pain   Functional limitations due to impairments imparied mobility   Clinical Presentation (PT Evaluation Complexity) evolving   Clinical Presentation Rationale clincial assessment   Clinical Decision Making (Complexity) low complexity   Planned Therapy Interventions (PT) bed mobility training;gait training;transfer training;ROM (range of motion);strengthening;progressive activity/exercise   Risk & Benefits of therapy have been explained evaluation/treatment results reviewed;care plan/treatment goals reviewed;risks/benefits reviewed   PT Discharge Planning   PT Discharge Recommendation (DC Rec) Transitional Care Facility   PT  Rationale for DC Rec Pt mobilizing well below his reported mod indep baseline req A x 1 with significantly reduced ambulation tolerance due to B lower leg pain with weight bearing, pt will benefit from PT during stay and anticipate pt will need cont therapy in rehab setting. OT recommended for ADLs, dressing/toileting needs w PWB L UE. Should pt demonstrate improvement w mobility to SBA or better, returning home could be considered w home therapies.

## 2025-01-21 NOTE — PROGRESS NOTES
[facility]  Infectious Disease Progress Note          Assessment and Plan:   Date of Admission:  1/18/2025  Date of Consult (When I saw the patient): 01/20/25        Assessment & Plan  Jamar Ivory is a 75 year old male who was admitted on 1/18/2025.      Impression: 1 75-year-old male with trauma to his left hand, secondarily infected but not fortunately into the tenosynovitis level, status post I&D all cultures are growing Staph aureus sensitivities to follow  2 no significant clinical sepsis and blood cultures negative  3 chronic cardiomyopathy  4 penicillin and sulfa allergies  5 Venous stasis edema     REC 1 cultures MRSA, continue Vanco while here, oral choice of doxycycline 100 twice daily for about 10 more days as discharge med  2 ongoing cough and respiratory symptoms, now found to be RSV positive, has had some symptoms for many weeks unclear that or whether that is the actual diagnosis, symptomatic treatment only no definitive antiviral therapy indicated                 Interval History:     no new complaints and feels okay, cultures MRSA.  Ongoing respiratory symptoms of many weeks duration felt to be cardiac related but now found to be RSV positive              Medications:     Current Facility-Administered Medications   Medication Dose Route Frequency Provider Last Rate Last Admin    aspirin (ASA) chewable tablet 81 mg  81 mg Oral Daily Keyur Hoffmann MD   81 mg at 01/21/25 0844    furosemide (LASIX) injection 40 mg  40 mg Intravenous Q12H Ezequiel Gallagher MD   40 mg at 01/21/25 1201    gadobutrol (GADAVIST) injection 14 mL  14 mL Intravenous Once Keyur Hoffmann MD        levothyroxine (SYNTHROID/LEVOTHROID) tablet 224 mcg  224 mcg Oral Daily Keyur Hoffmann MD   224 mcg at 01/21/25 0844    losartan (COZAAR) tablet 25 mg  25 mg Oral Daily Keyur Hoffmann MD   25 mg at 01/21/25 0846    metoprolol succinate ER (TOPROL XL) 24 hr tablet 25 mg  " 25 mg Oral Daily Keyur Hoffmann MD   25 mg at 01/21/25 0844    mirtazapine (REMERON) tablet 15 mg  15 mg Oral At Bedtime Keyur Hoffmann MD   15 mg at 01/20/25 2118    rosuvastatin (CRESTOR) tablet 20 mg  20 mg Oral Daily Keyur Hoffmann MD   20 mg at 01/21/25 0845    sodium chloride (PF) 0.9% PF flush 3 mL  3 mL Intracatheter Q8H Keyur Hoffmann MD   3 mL at 01/21/25 0859    tamsulosin (FLOMAX) capsule 0.8 mg  0.8 mg Oral Daily Keyur Hoffmann MD   0.8 mg at 01/21/25 0844    [Held by provider] torsemide (DEMADEX) tablet 20 mg  20 mg Oral Daily Keyur Hoffmann MD   20 mg at 01/21/25 0849    vancomycin (VANCOCIN) 1,000 mg in 200 mL dextrose intermittent infusion  1,000 mg Intravenous Q12H Keyur Hoffmann  mL/hr at 01/21/25 0651 1,000 mg at 01/21/25 0651                  Physical Exam:   Blood pressure 113/55, pulse 72, temperature 98  F (36.7  C), temperature source Temporal, resp. rate 19, height 1.727 m (5' 8\"), weight 137.8 kg (303 lb 11.2 oz), SpO2 95%.  Wt Readings from Last 2 Encounters:   01/18/25 137.8 kg (303 lb 11.2 oz)   01/16/25 139.3 kg (307 lb)     Vital Signs with Ranges  Temp:  [97.7  F (36.5  C)-98.2  F (36.8  C)] 98  F (36.7  C)  Pulse:  [69-72] 72  Resp:  [18-19] 19  BP: (108-116)/(52-55) 113/55  SpO2:  [90 %-96 %] 95 %    Constitutional: Awake, alert, cooperative, no apparent distress     Lungs: Congestive to auscultation bilaterally, no crackles or wheezing   Cardiovascular: Regular rate and rhythm, normal S1 and S2, and no murmur noted   Abdomen: Normal bowel sounds, soft, non-distended, non-tender   Skin: No rashes, no cyanosis, some edema and stasis with wds not in fected   Other: Hand wrapped looks okay less redness around the area          Data:   All microbiology laboratory data reviewed.  Recent Labs   Lab Test 01/20/25  0732 01/18/25  1202 01/16/25  0846   WBC 3.2* 3.8* 2.8*   HGB 11.6* 12.0* 11.5*   HCT 35.8* 36.7* 33.9*   MCV 88 88 85 " "   192 157     Recent Labs   Lab Test 01/20/25  0732 01/18/25  1202 01/16/25  0846   CR 0.88 0.84 0.78     Recent Labs   Lab Test 01/18/25  1202   SED 33*     No lab results found.    Invalid input(s): \"UC\"     "

## 2025-01-21 NOTE — PROGRESS NOTES
[facility]     Hospitalist Progress Note     Assessment & Plan     ASSESSMENT    75M with history of morbid obesity BMI 46, hypothyroidism, and new-onset systolic heart failure EF 25-30% suspected to be secondary to ischemic cardiomyopathy (abnormal stress test but has not had cath) presents with worsening left hand pain and swelling after recent fall onto this extremity and found to have cellulitis and possibly flexor tenosynovitis s/p I&D.    Preoperative cultures with MRSA.  Operative cultures pending.  Worsening respiratory status this morning.  Will obtain CXR and viral respiratory testing.  Also awaiting therapy eval for discharge mobility needs.    PLAN    Cellulitis w/ Collar Button Abscess of Left Hand  -Presents with worsening left hand pain and swelling after recent fall onto this extremity  -Exam concerning for flexor tenosynovitis now s/p I&D, preop cx w/ MRSA, op cx pending  -Vancomycin + ceftriaxone 2 g daily  -ID following, likely oral antibiotics at discharge    Acute Hypoxic Hypercapnic Respiratory Failure  -Patient with cough and shortness of breath, needing 1 L O2 to keep sats greater than 90%  -Suspect due to CHF although will obtain viral testing and CXR for further workup  -Lasix 40 mg IV twice daily for today    Acute on Chronic HFrEF EF 25-30%   Suspected Ischemic Cardiomyopathy  -Recent echo with EF 25 to 30% and abnormal stress test  -Cardiac cath deferred after risk-benefit discussion with patient (no inducible ischemia on stress)  -Continue BB, ARB ASA, Statin  -IV Lasix per above for today    Other Issues  -Morbid obesity BMI 46: Complicates all aspects of care  -Hypothyroidism: Home Synthroid  -BPH: Home Flomax  -Chronic CVA: Home aspirin and statin    DVT Prophy  -SCDs    Disposition  -Medically Ready for Discharge: Anticipated Tomorrow       Santiago Arthur MD    Subjective     Patient with shortness of breath and cough this morning.  Needing 1 L O2 to keep sats  "greater than 90%.  Pain in hand is controlled.        Objective   Blood pressure 113/55, pulse 72, temperature 98  F (36.7  C), temperature source Temporal, resp. rate 19, height 1.727 m (5' 8\"), weight 137.8 kg (303 lb 11.2 oz), SpO2 95%.    PHYSICAL EXAM  General: In no acute distress  CV: RRR.  Lungs: Scattered wheezing. Nl WOB.  Abd: Non-tender.  Ext: Left hand with evidence of cellulitis wrapped in postoperative dressing    LABS AND IMAGING  Reviewed and pertinent results discussed in assessment and plan.    "

## 2025-01-21 NOTE — PROGRESS NOTES
01/20/25 1647   RCAT Assessment   Reason for Assessment CHF  (SOB)   Pulmonary Status 0   Surgical Status 0   Chest X-ray 0   Respiratory Pattern 1   Mental Status 1   Breath Sounds 2   Cough Effectiveness 0   Level of Activity 1   O2 Required for SpO2>=92% 1   Acuity Level (points) 6   Acuity Level  4   Re-eval Interval Guideline Every 3 days   Re-evaluation Date 01/23/25   Clinical Indications/Symptoms   Aerosol Therapy Physician order;RCAT protocol   Volume Expansion Decreased breath sounds   Aerosol Therapy Plan   Anticholinergic/Beta-Andrenergic Agonist Duoneb soln (0.5mg/3mg per 3mL) neb Max 6 doses/24h   Aerosol Treatment Frequency Acuity Level 4: PRN L3p-[id number] wheezing   Volume Expansion Plan   Volume Expansion Treatment Incentive Spirometer   Volume Expansion Frequency Acuity Level 4: BID-Prevention of atelectasis   Breath Sounds   Breath Sounds All Fields   All Lung Fields Breath Sounds Anterior:;Lateral:;diminished         Chacho Mccloud, RT

## 2025-01-21 NOTE — PROGRESS NOTES
Orthopedic Surgery  Jamar Ivory  01/21/2025     Admit Date:  1/18/2025  POD: 2 Days Post-Op   Procedure(s):  incision and drainage of 3rd webspace,  incision and drainage of flexor tendon at A1 pulley middle and ring fingers, incision and drainage of mid palmar space: LEFT hand      Patient resting comfortably in bed.    Pain controlled in hand   Tolerating oral intake.    Denies nausea or vomiting  Denies chest pain or shortness of breath but has an audible wheeze    Temp:  [97.7  F (36.5  C)-98.2  F (36.8  C)] 98  F (36.7  C)  Pulse:  [69-72] 72  Resp:  [16-19] 19  BP: (108-116)/(51-55) 113/55  SpO2:  [90 %-96 %] 95 %    Alert and oriented  Dressing is changed today on left hand.  Continues to have erythema along the dorsal aspect of the 3rd webspace.   Swelling present in 3rd/4th fingers.  Able to flex and extend fingers with mild pain.   Sensation and pulses intact and equal    Labs:  Recent Labs   Lab Test 01/20/25  0732 01/18/25  1202 01/16/25  0846   WBC 3.2* 3.8* 2.8*   HGB 11.6* 12.0* 11.5*    192 157     Recent Labs   Lab Test 04/11/18  1115   INR 1.06     Recent Labs   Lab Test 01/18/25  1202   CRPI 53.74*       PLAN:  Daily dressing changes, daily hand soaks with warm water/chlorhexidine then redress wounds with Adaptic and Kerlix, bulked in the webspace, 2 inch Ace wrap.  Encourage finger range of motion, limit weightbearing to 2 pounds with the left upper extremity  Follow-up with Dr Hoffmann in 7 to 10 days  Defer antibiotic management to infectious disease.     2. Disposition   Anticipate d/c to home possibly tomorrow pending improvement in erythema and abx plan.    Magy Callahan PA-C

## 2025-01-22 ENCOUNTER — APPOINTMENT (OUTPATIENT)
Dept: PHYSICAL THERAPY | Facility: CLINIC | Age: 75
DRG: 579 | End: 2025-01-22
Payer: COMMERCIAL

## 2025-01-22 ENCOUNTER — PATIENT OUTREACH (OUTPATIENT)
Dept: CARE COORDINATION | Facility: CLINIC | Age: 75
End: 2025-01-22
Payer: COMMERCIAL

## 2025-01-22 LAB
ANION GAP SERPL CALCULATED.3IONS-SCNC: 9 MMOL/L (ref 7–15)
BUN SERPL-MCNC: 22.2 MG/DL (ref 8–23)
CALCIUM SERPL-MCNC: 8.6 MG/DL (ref 8.8–10.4)
CHLORIDE SERPL-SCNC: 101 MMOL/L (ref 98–107)
CREAT SERPL-MCNC: 0.96 MG/DL (ref 0.67–1.17)
EGFRCR SERPLBLD CKD-EPI 2021: 82 ML/MIN/1.73M2
GLUCOSE SERPL-MCNC: 122 MG/DL (ref 70–99)
HCO3 SERPL-SCNC: 27 MMOL/L (ref 22–29)
HOLD SPECIMEN: NORMAL
POTASSIUM SERPL-SCNC: 3.9 MMOL/L (ref 3.4–5.3)
SODIUM SERPL-SCNC: 137 MMOL/L (ref 135–145)

## 2025-01-22 PROCEDURE — 36415 COLL VENOUS BLD VENIPUNCTURE: CPT | Performed by: INTERNAL MEDICINE

## 2025-01-22 PROCEDURE — 250N000013 HC RX MED GY IP 250 OP 250 PS 637: Performed by: INTERNAL MEDICINE

## 2025-01-22 PROCEDURE — 250N000011 HC RX IP 250 OP 636: Performed by: INTERNAL MEDICINE

## 2025-01-22 PROCEDURE — 250N000011 HC RX IP 250 OP 636: Performed by: STUDENT IN AN ORGANIZED HEALTH CARE EDUCATION/TRAINING PROGRAM

## 2025-01-22 PROCEDURE — 99233 SBSQ HOSP IP/OBS HIGH 50: CPT | Performed by: INTERNAL MEDICINE

## 2025-01-22 PROCEDURE — 120N000001 HC R&B MED SURG/OB

## 2025-01-22 PROCEDURE — 250N000013 HC RX MED GY IP 250 OP 250 PS 637: Performed by: STUDENT IN AN ORGANIZED HEALTH CARE EDUCATION/TRAINING PROGRAM

## 2025-01-22 PROCEDURE — G0463 HOSPITAL OUTPT CLINIC VISIT: HCPCS

## 2025-01-22 PROCEDURE — 97530 THERAPEUTIC ACTIVITIES: CPT | Mod: GP

## 2025-01-22 PROCEDURE — 99232 SBSQ HOSP IP/OBS MODERATE 35: CPT | Performed by: INTERNAL MEDICINE

## 2025-01-22 PROCEDURE — 80048 BASIC METABOLIC PNL TOTAL CA: CPT | Performed by: INTERNAL MEDICINE

## 2025-01-22 RX ADMIN — VANCOMYCIN HYDROCHLORIDE 1000 MG: 1 INJECTION, SOLUTION INTRAVENOUS at 20:33

## 2025-01-22 RX ADMIN — MIRTAZAPINE 15 MG: 15 TABLET, FILM COATED ORAL at 22:09

## 2025-01-22 RX ADMIN — VANCOMYCIN HYDROCHLORIDE 1000 MG: 1 INJECTION, SOLUTION INTRAVENOUS at 06:51

## 2025-01-22 RX ADMIN — HYDROCODONE BITARTRATE AND ACETAMINOPHEN 1 TABLET: 5; 325 TABLET ORAL at 00:05

## 2025-01-22 RX ADMIN — METOPROLOL SUCCINATE 25 MG: 25 TABLET, EXTENDED RELEASE ORAL at 08:32

## 2025-01-22 RX ADMIN — GUAIFENESIN SYRUP AND DEXTROMETHORPHAN 10 ML: 100; 10 SYRUP ORAL at 15:36

## 2025-01-22 RX ADMIN — ASPIRIN 81 MG CHEWABLE TABLET 81 MG: 81 TABLET CHEWABLE at 08:28

## 2025-01-22 RX ADMIN — TAMSULOSIN HYDROCHLORIDE 0.8 MG: 0.4 CAPSULE ORAL at 08:30

## 2025-01-22 RX ADMIN — ROSUVASTATIN CALCIUM 20 MG: 20 TABLET, FILM COATED ORAL at 08:29

## 2025-01-22 RX ADMIN — LEVOTHYROXINE SODIUM 224 MCG: 0.11 TABLET ORAL at 08:28

## 2025-01-22 RX ADMIN — LOSARTAN POTASSIUM 25 MG: 25 TABLET, FILM COATED ORAL at 08:32

## 2025-01-22 RX ADMIN — FUROSEMIDE 40 MG: 10 INJECTION, SOLUTION INTRAVENOUS at 00:05

## 2025-01-22 ASSESSMENT — ACTIVITIES OF DAILY LIVING (ADL)
ADLS_ACUITY_SCORE: 39
ADLS_ACUITY_SCORE: 41
ADLS_ACUITY_SCORE: 39
ADLS_ACUITY_SCORE: 39
ADLS_ACUITY_SCORE: 41
ADLS_ACUITY_SCORE: 39
ADLS_ACUITY_SCORE: 39
ADLS_ACUITY_SCORE: 41
ADLS_ACUITY_SCORE: 39
ADLS_ACUITY_SCORE: 41
ADLS_ACUITY_SCORE: 39
ADLS_ACUITY_SCORE: 41
ADLS_ACUITY_SCORE: 39
ADLS_ACUITY_SCORE: 41
DEPENDENT_IADLS:: INDEPENDENT
ADLS_ACUITY_SCORE: 41
ADLS_ACUITY_SCORE: 39
ADLS_ACUITY_SCORE: 41

## 2025-01-22 NOTE — PROGRESS NOTES
Clinic Care Coordination Contact  The Community Health Worker planned to call for a Care Coordination outreach to follow up on goals and action steps.     Did Not Contact Patient.    Per Chart Review, patient is admitted at [facility] as of 1/18/25.    CARY Saunders  Clinic Care Coordination  [facility] Clinics: [facility] [place], and [facility]  Phone: [phone]

## 2025-01-22 NOTE — PLAN OF CARE
"Pt c/o lower extremity pain 6/10. [facility] at [facility]. Voiding well after IV lasix. INES LE red with edema and scabbing, elevated on pillows. Mepilex CDI, WOC to see today.   Lungs dim with dry harsh cough. 2L o2 90-96%. Saline locked. Isolation for MRSA and RSV.     Shift events:   PRN pain meds, assist with urinal. Slept well, no change in condition.     Treatment plan:  Pain management, Elevated LUE, assist OOB. Isolation for MRSA & RSV- nurse led wound cares, WOC to see today for lower extremities.   Vanco & Rocephin. PT, ID & Ortho following.                               Problem: Adult Inpatient Plan of Care  Goal: Plan of Care Review  Description: The Plan of Care Review/Shift note should be completed every shift.  The Outcome Evaluation is a brief statement about your assessment that the patient is improving, declining, or no change.  This information will be displayed automatically on your shift  note.  Outcome: Not Progressing  Flowsheets (Taken 1/22/2025 0616)  Plan of Care Reviewed With: patient  Overall Patient Progress: no change  Goal: Patient-Specific Goal (Individualized)  Description: You can add care plan individualizations to a care plan. Examples of Individualization might be:  \"Parent requests to be called daily at 9am for status\", \"I have a hard time hearing out of my right ear\", or \"Do not touch me to wake me up as it startles  me\".  Outcome: Not Progressing  Goal: Absence of Hospital-Acquired Illness or Injury  Outcome: Not Progressing  Intervention: Identify and Manage Fall Risk  Recent Flowsheet Documentation  Taken 1/22/2025 0030 by Haily Barlow, RN  Safety Promotion/Fall Prevention: safety round/check completed  Intervention: Prevent Skin Injury  Recent Flowsheet Documentation  Taken 1/22/2025 0030 by Haily Barlow, RN  Body Position: (Left UE elevated on pillows- encourage repo)   position changed independently   upper extremity elevated  Goal: Optimal Comfort and Wellbeing  Outcome: " Not Progressing  Goal: Readiness for Transition of Care  Outcome: Not Progressing   Goal Outcome Evaluation:      Plan of Care Reviewed With: patient    Overall Patient Progress: no changeOverall Patient Progress: no change

## 2025-01-22 NOTE — PROGRESS NOTES
[facility]     Hospitalist Progress Note     Assessment & Plan     ASSESSMENT    75M with history of morbid obesity BMI 46, hypothyroidism, and new-onset systolic heart failure EF 25-30% suspected to be secondary to ischemic cardiomyopathy (abnormal stress test but has not had cath) presents with worsening left hand pain and swelling after recent fall onto this extremity and found to have cellulitis with collar button abscess s/p I&D.  Hospital course complicated by respiratory failure from RSV infection and acute on chronic heart failure.    Worsening respiratory status yesterday and found to be positive for RSV.  Also concern for acute on chronic CHF and started on IV diuretics.  PT recommending TCU placement.  Patient seems to be indifferent about this.  Will discuss with wife over the phone.    PLAN    Cellulitis w/ Collar Button Abscess of Left Hand  -Presents with worsening left hand pain and swelling after recent fall onto this extremity  -Exam concerning for flexor tenosynovitis now s/p I&D, cx w/ MRSA and other staph species  -Vancomycin, switched to doxycycline at time of discharge per ID  -Will follow-up with orthopedic surgery after hospital stay    Acute Hypoxic Hypercapnic Respiratory Failure 2/2 RSV Infection  -Needing 1 L O2 to keep sats greater than 90% and found to be positive for RSV  -Supportive care for this and wean O2 as able    Acute on Chronic HFrEF EF 25-30%   Suspected Ischemic Cardiomyopathy  -Recent echo with EF 25 to 30% and abnormal stress test  -Cardiac cath deferred after risk-benefit discussion with patient (no inducible ischemia on stress)  -Continue BB, ARB ASA, Statin  -Lasix 40 mg IV twice daily    Lymphedema & Chronic Venous Stasis of Lower Extremities  -Has both pitting and nonpitting edema with weeping and excoriations  -Suspect some of this is due to CHF above and started on diuresis  -Wound care consulted, will have lymphedema wrap legs once wounds are  "controlled    Other Issues  -Morbid obesity BMI 46: Complicates all aspects of care  -Hypothyroidism: Home Synthroid  -BPH: Home Flomax  -Chronic CVA: Home aspirin and statin    DVT Prophy  -SCDs    Disposition  -Medically Ready for Discharge: Anticipated Tomorrow       Santiago Arthur MD    Subjective     Seen at bedside and case discussed with nursing.  Emotional today, wanting to go to the hospital.        Objective   Blood pressure 112/61, pulse 75, temperature 98  F (36.7  C), temperature source Temporal, resp. rate 16, height 1.727 m (5' 8\"), weight 137.8 kg (303 lb 11.2 oz), SpO2 93%.    PHYSICAL EXAM  General: In no acute distress  CV: RRR.  Lungs: Scattered wheezing. Nl WOB.  Abd: Non-tender.  Ext: Left hand with evidence of cellulitis wrapped in postoperative dressing.  2+ swelling and chronic venous stasis changes with wounds of lower extremities.    LABS AND IMAGING  Reviewed and pertinent results discussed in assessment and plan.    "
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
## 2025-01-22 NOTE — PROGRESS NOTES
[facility]  Infectious Disease Progress Note          Assessment and Plan:   Date of Admission:  1/18/2025  Date of Consult (When I saw the patient): 01/20/25        Assessment & Plan  Jamar Ivory is a 75 year old male who was admitted on 1/18/2025.      Impression: 1 75-year-old male with trauma to his left hand, secondarily infected but not fortunately into the tenosynovitis level, status post I&D all cultures are growing Staph aureus sensitivities to follow  2 no significant clinical sepsis and blood cultures negative  3 chronic cardiomyopathy  4 penicillin and sulfa allergies  5 Venous stasis edema     REC 1 culture is  MRSA, continue Vanco while here, oral choice of doxycycline 100 twice daily for about 10 more days as discharge med  hand is still fairly erythematous but improved, as long as he is here anyway IV continue  2 ongoing cough and respiratory symptoms, now found to be RSV positive, has had some symptoms for many weeks unclear that or whether that is the actual diagnosis, symptomatic treatment only no definitive antiviral therapy indicated  3  legs are somewhat bothersome as well he has some wounds no evidence of cellulitis but if so covered by the same antibiotic program.  Needs some degree of wound care and edema control measures                 Interval History:     no new complaints  but overall still does not feel very well, teary-eyed and unhappy that he still in the hospital, cultures MRSA.  Ongoing respiratory symptoms of many weeks duration felt to be cardiac related but now found to be RSV positive   mild  legs mildly bothersome as well              Medications:     Current Facility-Administered Medications   Medication Dose Route Frequency Provider Last Rate Last Admin    aspirin (ASA) chewable tablet 81 mg  81 mg Oral Daily Keyur Hoffmann MD   81 mg at 01/22/25 0828    gadobutrol (GADAVIST) injection 14 mL  14 mL Intravenous Once Keyur Hoffmann MD  "       levothyroxine (SYNTHROID/LEVOTHROID) tablet 224 mcg  224 mcg Oral Daily Keyur Hoffmann MD   224 mcg at 01/22/25 0828    losartan (COZAAR) tablet 25 mg  25 mg Oral Daily Keyur Hoffmann MD   25 mg at 01/22/25 0832    metoprolol succinate ER (TOPROL XL) 24 hr tablet 25 mg  25 mg Oral Daily Keyur Hoffmann MD   25 mg at 01/22/25 0832    mirtazapine (REMERON) tablet 15 mg  15 mg Oral At Bedtime Keyur Hoffmann MD   15 mg at 01/21/25 2118    rosuvastatin (CRESTOR) tablet 20 mg  20 mg Oral Daily Keyur Hoffmann MD   20 mg at 01/22/25 0829    sodium chloride (PF) 0.9% PF flush 3 mL  3 mL Intracatheter Q8H Keyur Hoffmann MD   3 mL at 01/22/25 0831    tamsulosin (FLOMAX) capsule 0.8 mg  0.8 mg Oral Daily Keyur Hoffmann MD   0.8 mg at 01/22/25 0830    vancomycin (VANCOCIN) 1,000 mg in 200 mL dextrose intermittent infusion  1,000 mg Intravenous Q12H Keyur Hoffmann  mL/hr at 01/22/25 0651 1,000 mg at 01/22/25 0651                  Physical Exam:   Blood pressure 112/61, pulse 75, temperature 98  F (36.7  C), temperature source Temporal, resp. rate 16, height 1.727 m (5' 8\"), weight 137.8 kg (303 lb 11.2 oz), SpO2 93%.  Wt Readings from Last 2 Encounters:   01/18/25 137.8 kg (303 lb 11.2 oz)   01/16/25 139.3 kg (307 lb)     Vital Signs with Ranges  Temp:  [98  F (36.7  C)-98.2  F (36.8  C)] 98  F (36.7  C)  Pulse:  [68-75] 75  Resp:  [16-19] 16  BP: ()/(47-64) 112/61  SpO2:  [93 %-96 %] 93 %    Constitutional: Awake, alert, cooperative, no apparent distress     Lungs: Congestive to auscultation bilaterally, no crackles or wheezing   Cardiovascular: Regular rate and rhythm, normal S1 and S2, and no murmur noted   Abdomen: Normal bowel sounds, soft, non-distended, non-tender   Skin: No rashes, no cyanosis, some edema and stasis with wds not in fected   Other: Hand wrapped looks okay less redness around the area          Data:   All microbiology laboratory data " "reviewed.  Recent Labs   Lab Test 01/20/25  0732 01/18/25  1202 01/16/25  0846   WBC 3.2* 3.8* 2.8*   HGB 11.6* 12.0* 11.5*   HCT 35.8* 36.7* 33.9*   MCV 88 88 85    192 157     Recent Labs   Lab Test 01/22/25  0733 01/20/25  0732 01/18/25  1202   CR 0.96 0.88 0.84     Recent Labs   Lab Test 01/18/25  1202   SED 33*     No lab results found.    Invalid input(s): \"UC\"     "

## 2025-01-22 NOTE — CONSULTS
[facility]  WO Nurse Inpatient Assessment     Consulted for: legs    Summary: Pt has chronic LE     WO nurse follow-up plan: weekly    Patient History (according to provider note(s):      75M with history of morbid obesity BMI 46, hypothyroidism, and new-onset systolic heart failure EF 25-30% suspected to be secondary to ischemic cardiomyopathy (abnormal stress test but has not had cath) presents with worsening left hand pain and swelling after recent fall onto this extremity and found to have cellulitis and possibly flexor tenosynovitis s/p I&D.     Assessment:      Areas visualized during today's visit: Lower extremities  and Bilateral    Wound location: Bilateral LE     1/22: right anterior LE    1/22: Right lateral LE    1/22: Left medial/anterior LE    Last photo: 1/22/25  Wound due to: Venous Ulcer  Wound history/plan of care: Pt has chronic venous ulcers and typically does not have any dressings on wounds but his wife will put wraps on. [facility] offered to order some edemawear but he prefers wraps so will ask for a lymphedema consult. Majority of wounds are intact scabs and one open area noted to right lateral LE  Wound base: 75 % Superficial scab, 25 % Dermis     Palpation of the wound bed: normal      Drainage: small     Description of drainage: serosanguinous     Measurements (length x width x depth, in cm): 6  x 2.5  x  0.1 cm      Periwound skin: Intact, Edematous, and Hemosiderin staining      Color: normal and consistent with surrounding tissue      Temperature: normal   Odor: none  Pain: mild and during dressing change, tender  Pain interventions prior to dressing change: patient tolerated well and slow and gentle cares   Treatment goal: Maintain (prevention of deterioration) and Protection  STATUS: initial assessment  Supplies ordered: supplies stored on unit, discussed with RN, and discussed with patient        Treatment Plan:     Right lateral LE wound(s): Every 3 days and PRN  or when lymphedema wraps are changed  Spray with wound cleanser and gently pat dry  Apply 4x4 mepilex over open wound only  Wraps per lymphedema     Orders: Written    RECOMMEND PRIMARY TEAM ORDER: Lymphedema consult- offered to order edemawear but pt prefers to be wrapped  Education provided: plan of care and wound progress  Discussed plan of care with: Patient and Nurse  Notify [facility] if wound(s) deteriorate.  Nursing to notify the Provider(s) and re-consult the WO Nurse if new skin concern.    DATA:     Current support surface: Standard  Standard gel mattress (Isoflex)  Containment of urine/stool: Incontinent pad in bed  BMI: Body mass index is 46.18 kg/m .   Active diet order: Orders Placed This Encounter      2 Gram Sodium Diet      Diet     Output: I/O last 3 completed shifts:  In: 240 [P.O.:240]  Out: 2350 [Urine:2350]     Labs:   Recent Labs   Lab 01/20/25  0732 01/18/25  1202   ALBUMIN  --  3.6   HGB 11.6* 12.0*   WBC 3.2* 3.8*     Pressure injury risk assessment:   Sensory Perception: 3-->slightly limited  Moisture: 3-->occasionally moist  Activity: 3-->walks occasionally  Mobility: 3-->slightly limited  Nutrition: 3-->adequate  Friction and Shear: 3-->no apparent problem  Pritesh Score: 18    Elizabeth Amin RN CWOCN  Pager no longer is use, please contact through [facility] group: [facility] Nurse Giovanni  Dept. Office Number: [phone]

## 2025-01-22 NOTE — CONSULTS
Care Management Initial Consult    General Information  Assessment completed with: Spouse or significant other,    Type of CM/SW Visit: Initial Assessment    Primary Care Provider verified and updated as needed:     Readmission within the last 30 days: no previous admission in last 30 days      Reason for Consult: discharge planning  Advance Care Planning: Advance Care Planning Reviewed: present on chart       Communication Assessment  Patient's communication style: spoken language (English or Bilingual)    Hearing Difficulty or Deaf: no   Wear Glasses or Blind: yes    Cognitive  Cognitive/Neuro/Behavioral: WDL  Level of Consciousness: alert     Orientation: oriented x 4  Mood/Behavior: calm, cooperative  Best Language: 0 - No aphasia  Speech: clear, spontaneous    Living Environment:   People in home: spouse     Current living Arrangements: apartment      Able to return to prior arrangements: other (see comments)  Living Arrangement Comments: TCU recommended    Family/Social Support:  Care provided by: spouse/significant other  Provides care for: no one  Marital Status:   Support system: Wife, Friend, Parent(s)          Description of Support System: Supportive, Involved    Support Assessment: Adequate family and caregiver support, Adequate social supports    Current Resources:   Patient receiving home care services: No        Community Resources: Other (see comment) ([employer] meals)  Equipment currently used at home: walker, standard, walker, rolling  Supplies currently used at home:      Employment/Financial:  Employment Status: retired        Financial Concerns: none      Does the patient's insurance plan have a 3 day qualifying hospital stay waiver?  Yes     Which insurance plan 3 day waiver is available? Alternative insurance waiver    Will the waiver be used for post-acute placement? No    Lifestyle & Psychosocial Needs:  Social Drivers of Health     Food Insecurity: Low Risk  (1/18/2025)    Food  Insecurity     Within the past 12 months, did you worry that your food would run out before you got money to buy more?: No     Within the past 12 months, did the food you bought just not last and you didn t have money to get more?: No   Depression: Not at risk (11/15/2023)    PHQ-2     PHQ-2 Score: 2   Housing Stability: Low Risk  (1/18/2025)    Housing Stability     Do you have housing? : Yes     Are you worried about losing your housing?: No   Tobacco Use: Low Risk  (1/19/2025)    Patient History     Smoking Tobacco Use: Never     Smokeless Tobacco Use: Never     Passive Exposure: Not on file   Financial Resource Strain: Low Risk  (1/18/2025)    Financial Resource Strain     Within the past 12 months, have you or your family members you live with been unable to get utilities (heat, electricity) when it was really needed?: No   Alcohol Use: Not on file   Transportation Needs: Low Risk  (1/18/2025)    Transportation Needs     Within the past 12 months, has lack of transportation kept you from medical appointments, getting your medicines, non-medical meetings or appointments, work, or from getting things that you need?: No   Physical Activity: Not on file   Interpersonal Safety: Low Risk  (1/18/2025)    Interpersonal Safety     Do you feel physically and emotionally safe where you currently live?: Yes     Within the past 12 months, have you been hit, slapped, kicked or otherwise physically hurt by someone?: No     Within the past 12 months, have you been humiliated or emotionally abused in other ways by your partner or ex-partner?: No   Stress: Not on file   Social Connections: Not on file   Health Literacy: Not on file       Functional Status:  Prior to admission patient needed assistance:   Dependent ADLs:: Independent  Dependent IADLs:: Independent     Values/Beliefs:  Spiritual, Cultural Beliefs, Muslim Practices, Values that affect care: no             Discussed  Partnership in Safe Discharge Planning   document with patient/family: No    Additional Information:  Care management consult for discharge planning/disposition and hand-in from ambulatory care management. Patient admitted for wound infection, cellulitis of left hand and also has RSV.     Due to patient isolation, attempted to call patient in room to complete initial assessment. No answer. Called patient's wife to complete assessment and will call patient again later. At baseline patient is independent with a walker. He has had a fall recently at home resulting in a cut to his left hand which brought him back to the ER due to pain, swelling and redness. PT/OT have evaluated patient and recommendation is TCU placement. Spouse indicates that patient would likely be reluctant to go. Will discuss with patient. She is agreeable to have referrals sent to [facility] and [facility]. Patient's spouse does not drive and became tearful due to not being able to visit patient and not wanting him to go further away for TCU. She also expressed interest in looking into assisted living soon.     [place] [facility] Reviewed out of pocket cost for [facility] transport, $90.70 base and $6.08 per mile to the destination.    TCU referrals sent. Care management to follow for discharge.     Next Steps: follow up on TCU referrals, discuss with patient    Keyona Decker RN  Care Coordinator  [facility]

## 2025-01-22 NOTE — PLAN OF CARE
" Goal Outcome Evaluation:      Plan of Care Reviewed With: patient    Overall Patient Progress: no changeOverall Patient Progress: no change    Outcome Evaluation: A&O x4, 2 LPM NC, continuous pulse ox in place, pain mngd w/ tylenol, cough mngd w/ robitussin & nebulizer, droplet & contact precautions, voiding via urinal, US negative for acute changes, mepilex on bilateral lower extremities WOC consult placed, dressing on LUE CDI, discharge plans TBD    Problem: Adult Inpatient Plan of Care  Goal: Plan of Care Review  Description: The Plan of Care Review/Shift note should be completed every shift.  The Outcome Evaluation is a brief statement about your assessment that the patient is improving, declining, or no change.  This information will be displayed automatically on your shift  note.  Outcome: Progressing  Flowsheets (Taken 1/21/2025 2239)  Outcome Evaluation: A&O x4, 2 LPM NC, continuous pulse ox in place, pain mngd w/ tylenol, cough mngd w/ robitussin & nebulizer, droplet & contact precautions, voiding via urinal, US negative for acute changes, mepilex on bilateral lower extremities WOC consult placed, dressing on LUE CDI, discharge plans TBD  Overall Patient Progress: no change  Goal: Patient-Specific Goal (Individualized)  Description: You can add care plan individualizations to a care plan. Examples of Individualization might be:  \"Parent requests to be called daily at 9am for status\", \"I have a hard time hearing out of my right ear\", or \"Do not touch me to wake me up as it startles  me\".  Outcome: Progressing  Goal: Absence of Hospital-Acquired Illness or Injury  Outcome: Progressing  Intervention: Identify and Manage Fall Risk  Recent Flowsheet Documentation  Taken 1/21/2025 1627 by Gregoria Polanco, RN  Safety Promotion/Fall Prevention: safety round/check completed  Intervention: Prevent Skin Injury  Recent Flowsheet Documentation  Taken 1/21/2025 1624 by Gregoria Polanco, RN  Body Position: supine, " head elevated  Intervention: Prevent and Manage VTE (Venous Thromboembolism) Risk  Recent Flowsheet Documentation  Taken 1/21/2025 1653 by Gregoria Polanco RN  VTE Prevention/Management: SCDs on (sequential compression devices)  Taken 1/21/2025 1627 by Gregoria Polanco RN  VTE Prevention/Management: SCDs off (sequential compression devices)  Intervention: Prevent Infection  Recent Flowsheet Documentation  Taken 1/21/2025 1627 by Gregoria Polanco RN  Infection Prevention: hand hygiene promoted  Goal: Optimal Comfort and Wellbeing  Outcome: Progressing  Intervention: Monitor Pain and Promote Comfort  Recent Flowsheet Documentation  Taken 1/21/2025 1644 by Gregoria Polanco RN  Pain Management Interventions: medication (see MAR)  Goal: Readiness for Transition of Care  Outcome: Progressing     Problem: Skin or Soft Tissue Infection  Goal: Absence of Infection Signs and Symptoms  Outcome: Progressing  Intervention: Minimize and Manage Infection Progression  Recent Flowsheet Documentation  Taken 1/21/2025 1627 by Gregoria Polanco RN  Infection Prevention: hand hygiene promoted

## 2025-01-22 NOTE — DISCHARGE INSTRUCTIONS
Right lateral LE wound(s): Every 3 days and PRN or when lymphedema wraps are changed  Spray with wound cleanser and gently pat dry  Apply 4x4 mepilex over open wound only  Wraps per lymphedema

## 2025-01-22 NOTE — PROGRESS NOTES
Orthopedic Surgery  Jamar Ivory  01/22/2025     Admit Date:  1/18/2025  POD: 3 Days Post-Op   Procedure(s):  incision and drainage of 3rd webspace,  incision and drainage of flexor tendon at A1 pulley middle and ring fingers, incision and drainage of mid palmar space: LEFT hand      Patient resting comfortably in bed.    Pain controlled in hand.  Feels it is improving.   Tolerating oral intake.    Denies nausea or vomiting  Continues to have a cough. Tested positive for RSV.     Temp:  [98  F (36.7  C)-98.2  F (36.8  C)] 98  F (36.7  C)  Pulse:  [68-75] 75  Resp:  [16-19] 16  BP: ()/(47-64) 112/61  SpO2:  [93 %-96 %] 93 %    Alert and oriented  Dressing is changed today on left hand.    Erythema is improving along the dorsal aspect of the 3rd webspace. No active drainage.   Swelling present in 3rd/4th fingers.    Able to flex and extend fingers with mild pain.   Sensation and pulses intact and equal.     Labs:  Recent Labs   Lab Test 01/20/25  0732 01/18/25  1202 01/16/25  0846   WBC 3.2* 3.8* 2.8*   HGB 11.6* 12.0* 11.5*    192 157     Recent Labs   Lab Test 04/11/18  1115   INR 1.06     Recent Labs   Lab Test 01/18/25  1202   CRPI 53.74*       Plan:  Daily dressing changes, daily hand soaks with warm water/chlorhexidine then redress wounds with Adaptic and Kerlix fluff, bulked in the webspace, gauze wrap and 2 inch Ace wrap.  No soaks once discharged to home.   Encourage finger range of motion, limit weightbearing to 2 pounds with the left upper extremity  Follow-up with Dr Hoffmann in 7 to 10 days  Defer antibiotic management to infectious disease.     Disposition:   Anticipate d/c to home when medically cleared.  Okay to discharge on oral Abx from ortho standpoint.     Magy Callahan PA-C

## 2025-01-23 VITALS
SYSTOLIC BLOOD PRESSURE: 110 MMHG | HEIGHT: 68 IN | BODY MASS INDEX: 46.03 KG/M2 | OXYGEN SATURATION: 94 % | RESPIRATION RATE: 16 BRPM | DIASTOLIC BLOOD PRESSURE: 50 MMHG | TEMPERATURE: 98 F | HEART RATE: 67 BPM | WEIGHT: 303.7 LBS

## 2025-01-23 LAB
ANION GAP SERPL CALCULATED.3IONS-SCNC: 10 MMOL/L (ref 7–15)
BACTERIA BLD CULT: NO GROWTH
BACTERIA BLD CULT: NO GROWTH
BACTERIA TISS BX CULT: NORMAL
BUN SERPL-MCNC: 20.3 MG/DL (ref 8–23)
CALCIUM SERPL-MCNC: 8.5 MG/DL (ref 8.8–10.4)
CHLORIDE SERPL-SCNC: 99 MMOL/L (ref 98–107)
CREAT SERPL-MCNC: 0.84 MG/DL (ref 0.67–1.17)
EGFRCR SERPLBLD CKD-EPI 2021: 90 ML/MIN/1.73M2
GLUCOSE SERPL-MCNC: 114 MG/DL (ref 70–99)
HCO3 SERPL-SCNC: 26 MMOL/L (ref 22–29)
POTASSIUM SERPL-SCNC: 4 MMOL/L (ref 3.4–5.3)
SODIUM SERPL-SCNC: 135 MMOL/L (ref 135–145)

## 2025-01-23 PROCEDURE — 36415 COLL VENOUS BLD VENIPUNCTURE: CPT | Performed by: INTERNAL MEDICINE

## 2025-01-23 PROCEDURE — 250N000013 HC RX MED GY IP 250 OP 250 PS 637: Performed by: INTERNAL MEDICINE

## 2025-01-23 PROCEDURE — 120N000001 HC R&B MED SURG/OB

## 2025-01-23 PROCEDURE — 250N000011 HC RX IP 250 OP 636: Performed by: STUDENT IN AN ORGANIZED HEALTH CARE EDUCATION/TRAINING PROGRAM

## 2025-01-23 PROCEDURE — 99232 SBSQ HOSP IP/OBS MODERATE 35: CPT | Performed by: INTERNAL MEDICINE

## 2025-01-23 PROCEDURE — 250N000013 HC RX MED GY IP 250 OP 250 PS 637: Performed by: STUDENT IN AN ORGANIZED HEALTH CARE EDUCATION/TRAINING PROGRAM

## 2025-01-23 PROCEDURE — 80048 BASIC METABOLIC PNL TOTAL CA: CPT | Performed by: INTERNAL MEDICINE

## 2025-01-23 RX ORDER — TORSEMIDE 20 MG/1
40 TABLET ORAL
Status: DISCONTINUED | OUTPATIENT
Start: 2025-01-23 | End: 2025-01-24 | Stop reason: HOSPADM

## 2025-01-23 RX ORDER — DOXYCYCLINE 100 MG/1
100 CAPSULE ORAL EVERY 12 HOURS SCHEDULED
Status: DISCONTINUED | OUTPATIENT
Start: 2025-01-23 | End: 2025-01-24 | Stop reason: HOSPADM

## 2025-01-23 RX ORDER — FUROSEMIDE 10 MG/ML
40 INJECTION INTRAMUSCULAR; INTRAVENOUS EVERY 12 HOURS
Status: DISCONTINUED | OUTPATIENT
Start: 2025-01-23 | End: 2025-01-23

## 2025-01-23 RX ADMIN — DOXYCYCLINE HYCLATE 100 MG: 100 CAPSULE ORAL at 20:54

## 2025-01-23 RX ADMIN — METOPROLOL SUCCINATE 25 MG: 25 TABLET, EXTENDED RELEASE ORAL at 08:08

## 2025-01-23 RX ADMIN — GUAIFENESIN SYRUP AND DEXTROMETHORPHAN 10 ML: 100; 10 SYRUP ORAL at 20:58

## 2025-01-23 RX ADMIN — ROSUVASTATIN CALCIUM 20 MG: 20 TABLET, FILM COATED ORAL at 08:08

## 2025-01-23 RX ADMIN — ASPIRIN 81 MG CHEWABLE TABLET 81 MG: 81 TABLET CHEWABLE at 08:08

## 2025-01-23 RX ADMIN — VANCOMYCIN HYDROCHLORIDE 1000 MG: 1 INJECTION, SOLUTION INTRAVENOUS at 06:43

## 2025-01-23 RX ADMIN — ACETAMINOPHEN 500 MG: 500 TABLET, FILM COATED ORAL at 20:59

## 2025-01-23 RX ADMIN — TORSEMIDE 40 MG: 20 TABLET ORAL at 14:44

## 2025-01-23 RX ADMIN — LEVOTHYROXINE SODIUM 224 MCG: 0.11 TABLET ORAL at 08:08

## 2025-01-23 RX ADMIN — TAMSULOSIN HYDROCHLORIDE 0.8 MG: 0.4 CAPSULE ORAL at 08:08

## 2025-01-23 RX ADMIN — HYDROCODONE BITARTRATE AND ACETAMINOPHEN 1 TABLET: 5; 325 TABLET ORAL at 00:30

## 2025-01-23 RX ADMIN — LOSARTAN POTASSIUM 25 MG: 25 TABLET, FILM COATED ORAL at 08:08

## 2025-01-23 RX ADMIN — GUAIFENESIN SYRUP AND DEXTROMETHORPHAN 10 ML: 100; 10 SYRUP ORAL at 16:30

## 2025-01-23 RX ADMIN — MIRTAZAPINE 15 MG: 15 TABLET, FILM COATED ORAL at 21:00

## 2025-01-23 ASSESSMENT — ACTIVITIES OF DAILY LIVING (ADL)
ADLS_ACUITY_SCORE: 43
ADLS_ACUITY_SCORE: 40

## 2025-01-23 NOTE — PLAN OF CARE
Goal Outcome Evaluation:      Plan of Care Reviewed With: patient    Overall Patient Progress: no changeOverall Patient Progress: no change    Outcome Evaluation: PRN Norco given overnight. 2LPM o2. Assist x1 walker and gait belt. Tearful at times. Droplet and contact precautions maintained for RSV and MRSA. [facility]      Problem: Adult Inpatient Plan of Care  Goal: Plan of Care Review  Description: The Plan of Care Review/Shift note should be completed every shift.  The Outcome Evaluation is a brief statement about your assessment that the patient is improving, declining, or no change.  This information will be displayed automatically on your shift  note.  Outcome: Progressing  Flowsheets (Taken 1/23/2025 0455)  Outcome Evaluation: PRN Norco given overnight. 2LPM o2. Assist x1 walker and gait belt. Tearful at times. Droplet and contact precautions maintained for RSV and MRSA. [facility].  Plan of Care Reviewed With: patient  Overall Patient Progress: no change  Goal: Absence of Hospital-Acquired Illness or Injury  Intervention: Identify and Manage Fall Risk  Recent Flowsheet Documentation  Taken 1/22/2025 2000 by Ruth Gomez RN  Safety Promotion/Fall Prevention:   activity supervised   assistive device/personal items within reach   supervised activity   safety round/check completed  Intervention: Prevent Infection  Recent Flowsheet Documentation  Taken 1/22/2025 2000 by Ruth Gomez RN  Infection Prevention: hand hygiene promoted     Problem: Skin or Soft Tissue Infection  Goal: Absence of Infection Signs and Symptoms  Intervention: Minimize and Manage Infection Progression  Recent Flowsheet Documentation  Taken 1/22/2025 2000 by Ruth Gomez RN  Infection Prevention: hand hygiene promoted  Isolation Precautions:   contact precautions maintained   droplet precautions maintained

## 2025-01-23 NOTE — PROGRESS NOTES
[facility]  Infectious Disease Progress Note          Assessment and Plan:   Date of Admission:  1/18/2025  Date of Consult (When I saw the patient): 01/20/25        Assessment & Plan  Jamar Ivory is a 75 year old male who was admitted on 1/18/2025.      Impression: 1 75-year-old male with trauma to his left hand, secondarily infected but not fortunately into the tenosynovitis level, status post I&D all cultures are growing Staph aureus sensitivities to follow  2 no significant clinical sepsis and blood cultures negative  3 chronic cardiomyopathy  4 penicillin and sulfa allergies  5 Venous stasis edema     REC 1 culture is  MRSA, already a significant course of vancomycin probably not worth the ongoing toxicity, good oral choice of doxycycline 100 twice daily for about 10 more days as discharge med  hand is improved although still somewhat red  2 ongoing cough and respiratory symptoms, now found to be RSV positive, has had some symptoms for many weeks unclear that or whether that is the actual diagnosis, symptomatic treatment only no definitive antiviral therapy indicated no major hypoxia  3  legs are somewhat bothersome as well he has some wounds no evidence of cellulitis but if so covered by the same antibiotic program.  Needs some degree of wound care and edema control measures                 Interval History:     no new complaints  but overall still does not feel very well, teary-eyed and unhappy that he still in the hospital, cultures MRSA.  Ongoing respiratory symptoms of many weeks duration felt to be cardiac related but now found to be RSV positive   mild  legs mildly bothersome as well              Medications:     Current Facility-Administered Medications   Medication Dose Route Frequency Provider Last Rate Last Admin    aspirin (ASA) chewable tablet 81 mg  81 mg Oral Daily Keyur Hoffmann MD   81 mg at 01/23/25 0808    doxycycline hyclate (VIBRAMYCIN) capsule 100 mg   "100 mg Oral Q12H [facility] (08/20) Ramirez Claire MD        furosemide (LASIX) injection 40 mg  40 mg Intravenous Q12H Ezequiel Gallagher MD        gadobutrol (GADAVIST) injection 14 mL  14 mL Intravenous Once Keyur Hoffmann MD        levothyroxine (SYNTHROID/LEVOTHROID) tablet 224 mcg  224 mcg Oral Daily Keyur Hoffmann MD   224 mcg at 01/23/25 0808    losartan (COZAAR) tablet 25 mg  25 mg Oral Daily Keyur Hoffmann MD   25 mg at 01/23/25 0808    metoprolol succinate ER (TOPROL XL) 24 hr tablet 25 mg  25 mg Oral Daily Keyur Hoffmann MD   25 mg at 01/23/25 0808    mirtazapine (REMERON) tablet 15 mg  15 mg Oral At Bedtime Keyur Hoffmann MD   15 mg at 01/22/25 2209    rosuvastatin (CRESTOR) tablet 20 mg  20 mg Oral Daily Keyur Hoffmann MD   20 mg at 01/23/25 0808    sodium chloride (PF) 0.9% PF flush 3 mL  3 mL Intracatheter Q8H Keyur Hoffmann MD   3 mL at 01/23/25 0808    tamsulosin (FLOMAX) capsule 0.8 mg  0.8 mg Oral Daily Keyur Hoffmann MD   0.8 mg at 01/23/25 0808                  Physical Exam:   Blood pressure 123/55, pulse 69, temperature 97.6  F (36.4  C), temperature source Temporal, resp. rate 16, height 1.727 m (5' 8\"), weight 137.8 kg (303 lb 11.2 oz), SpO2 94%.  Wt Readings from Last 2 Encounters:   01/18/25 137.8 kg (303 lb 11.2 oz)   01/16/25 139.3 kg (307 lb)     Vital Signs with Ranges  Temp:  [97.6  F (36.4  C)-98.4  F (36.9  C)] 97.6  F (36.4  C)  Pulse:  [67-76] 69  Resp:  [16-18] 16  BP: (103-123)/(55-67) 123/55  SpO2:  [94 %-98 %] 94 %    Constitutional: Awake, alert, cooperative, no apparent distress     Lungs: Congestive to auscultation bilaterally, no crackles or wheezing   Cardiovascular: Regular rate and rhythm, normal S1 and S2, and no murmur noted   Abdomen: Normal bowel sounds, soft, non-distended, non-tender   Skin: No rashes, no cyanosis, some edema and stasis with wds not in fected   Other: Hand wrapped looks okay less redness " "around the area          Data:   All microbiology laboratory data reviewed.  Recent Labs   Lab Test 01/20/25  0732 01/18/25  1202 01/16/25  0846   WBC 3.2* 3.8* 2.8*   HGB 11.6* 12.0* 11.5*   HCT 35.8* 36.7* 33.9*   MCV 88 88 85    192 157     Recent Labs   Lab Test 01/23/25  0725 01/22/25  0733 01/20/25  0732   CR 0.84 0.96 0.88     Recent Labs   Lab Test 01/18/25  1202   SED 33*     No lab results found.    Invalid input(s): \"UC\"     "

## 2025-01-23 NOTE — PROGRESS NOTES
Orthopedic Surgery  Jamar Ivory  01/23/2025     Admit Date:  1/18/2025  POD: 4 Days Post-Op   Procedure(s):  incision and drainage of 3rd webspace,  incision and drainage of flexor tendon at A1 pulley middle and ring fingers, incision and drainage of mid palmar space: LEFT hand      Patient resting comfortably in bed.    States hand continues to improve.    Tolerating oral intake.    Denies nausea or vomiting.   Continues to have a cough. Tested positive for RSV.     Temp:  [97.6  F (36.4  C)-98.4  F (36.9  C)] 97.6  F (36.4  C)  Pulse:  [67-76] 69  Resp:  [16-18] 16  BP: (103-123)/(55-67) 123/55  SpO2:  [94 %-98 %] 94 %    Alert and oriented  Erythema is improving along the dorsal aspect of the 3rd webspace. No active drainage.   Swelling present in 3rd/4th fingers.    Able to flex and extend fingers with mild pain at the 3rd MCP joint.   Sensation and pulses intact and equal.     Labs:  Recent Labs   Lab Test 01/20/25  0732 01/18/25  1202 01/16/25  0846   WBC 3.2* 3.8* 2.8*   HGB 11.6* 12.0* 11.5*    192 157     Recent Labs   Lab Test 04/11/18  1115   INR 1.06     Recent Labs   Lab Test 01/18/25  1202   CRPI 53.74*       Plan:  Daily dressing changes, daily hand soaks with warm water/chlorhexidine then redress wounds with Adaptic and Kerlix fluff, bulked in the webspace, gauze wrap and 2 inch Ace wrap.  No soaks once discharged.   Encourage finger range of motion, limit weightbearing to 2 pounds with the left upper extremity  Follow-up with Dr Hoffmann in 7 to 10 days  Defer antibiotic management to infectious disease.     Disposition:   Anticipate d/c to TCU when medically cleared.  Okay to discharge on oral Abx from ortho standpoint.     Magy Callahan PA-C

## 2025-01-23 NOTE — PROVIDER NOTIFICATION
Kesha Paged Dr. Arthur,     11:29 IV went bad when I went to go give IV Lasix. PIV removed, Pt refusing to have the Iv placed again. Pt wanting to know if he has to have IV placed again? Thank you    11:42 Okay to keep PIV out and will start pt on PO medication.

## 2025-01-23 NOTE — PROGRESS NOTES
Care Management Follow Up    Length of Stay (days): 5    Expected Discharge Date: 01/24/2025     Concerns to be Addressed: discharge planning     Patient plan of care discussed at interdisciplinary rounds: Yes    Anticipated Discharge Disposition:  TCU              Anticipated Discharge Services:  therapy  Anticipated Discharge DME:  none    Patient/family educated on Medicare website which has current facility and service quality ratings:  yes  Education Provided on the Discharge Plan:  yes  Patient/Family in Agreement with the Plan: unable to assess    Referrals Placed by CM/SW:    Private pay costs discussed: transportation costs    Discussed  Partnership in Safe Discharge Planning  document with patient/family: No     Handoff Completed: Yes, [facility] PCP: Internal handoff referral completed    Additional Information:  Patient has been accepted for TCU placement at [facility]. Discussed with patient at bedside and he is agreeable to go to TCU. They are able to accept patient tomorrow after 1400.  Care team updated.     Next Steps: Set up transport for tomorrow after 2pm, PAS needed    Keyona Decker RN  Care Coordinator  [facility]

## 2025-01-23 NOTE — PROGRESS NOTES
[facility]     Hospitalist Progress Note     Assessment & Plan     ASSESSMENT    75M with history of morbid obesity BMI 46, hypothyroidism, and new-onset systolic heart failure EF 25-30% suspected to be secondary to ischemic cardiomyopathy (abnormal stress test but has not had cath) presents with worsening left hand pain and swelling after recent fall onto this extremity and found to have cellulitis with collar button abscess s/p I&D.  Hospital course complicated by respiratory failure from RSV infection and acute on chronic heart failure.    Awaiting for TCU placement.  Diuresing in the meantime to improve lower extremity edema.  Will consult lymphedema team for lymphedema wraps.    PLAN    Cellulitis w/ Collar Button Abscess of Left Hand  -Presents with worsening left hand pain and swelling after recent fall onto this extremity  -Exam concerning for flexor tenosynovitis now s/p I&D, cx w/ MRSA and other staph species  -Vancomycin, switched to doxycycline at time of discharge per ID  -Will follow-up with orthopedic surgery after hospital stay    Acute Hypoxic Hypercapnic Respiratory Failure 2/2 RSV Infection  -Needing 1 L O2 to keep sats greater than 90% and found to be positive for RSV  -Supportive care for this and wean O2 as able    Acute on Chronic HFrEF EF 25-30%   Suspected Ischemic Cardiomyopathy  -Recent echo with EF 25 to 30% and abnormal stress test  -Cardiac cath deferred after risk-benefit discussion with patient (no inducible ischemia on stress)  -Continue BB, ARB ASA, Statin  -Lasix 40 mg IV twice daily    Lymphedema & Chronic Venous Stasis of Lower Extremities  -Has both pitting and nonpitting edema with weeping and excoriations  -Suspect some of this is due to CHF above and started on diuresis  -Lymphedema consultation    Other Issues  -Morbid obesity BMI 46: Complicates all aspects of care  -Hypothyroidism: Home Synthroid  -BPH: Home Flomax  -Chronic CVA: Home aspirin and  "statin    DVT Prophy  -SCDs    Disposition  -Medically Ready for Discharge: Anticipated Tomorrow       Santiago Arthur MD    Subjective     Seen at bedside. Pain controlled. Working with therapies.        Objective   Blood pressure 123/55, pulse 69, temperature 97.6  F (36.4  C), temperature source Temporal, resp. rate 16, height 1.727 m (5' 8\"), weight 137.8 kg (303 lb 11.2 oz), SpO2 94%.    PHYSICAL EXAM  General: In no acute distress  CV: RRR.  Lungs: Scattered wheezing. Nl WOB.  Abd: Non-tender.  Ext: Left hand with evidence of cellulitis wrapped in postoperative dressing.  2+ swelling and chronic venous stasis changes with wounds of lower extremities.    LABS AND IMAGING  Reviewed and pertinent results discussed in assessment and plan.    "

## 2025-01-23 NOTE — PLAN OF CARE
"Goal Outcome Evaluation:    VSS, on 2 L 02 NC. Ax1 walker/ GB. Wound care on L had done, WOC saw pt today for scabs on BLE- mepalex applied to open wound on R leg. Voiding well via urinal. Worked with PT today. Denies pain. Robitussin given for cough. Plan to discharge to TCU- referrals sent.       Plan of Care Reviewed With: patient    Overall Patient Progress: no changeOverall Patient Progress: no change       Problem: Adult Inpatient Plan of Care  Goal: Plan of Care Review  Description: The Plan of Care Review/Shift note should be completed every shift.  The Outcome Evaluation is a brief statement about your assessment that the patient is improving, declining, or no change.  This information will be displayed automatically on your shift  note.  Outcome: Progressing  Flowsheets (Taken 1/22/2025 1954)  Plan of Care Reviewed With: patient  Overall Patient Progress: no change  Goal: Patient-Specific Goal (Individualized)  Description: You can add care plan individualizations to a care plan. Examples of Individualization might be:  \"Parent requests to be called daily at 9am for status\", \"I have a hard time hearing out of my right ear\", or \"Do not touch me to wake me up as it startles  me\".  Outcome: Progressing  Goal: Absence of Hospital-Acquired Illness or Injury  Outcome: Progressing  Intervention: Identify and Manage Fall Risk  Recent Flowsheet Documentation  Taken 1/22/2025 0832 by Ceci Garza, RN  Safety Promotion/Fall Prevention:   activity supervised   assistive device/personal items within reach   supervised activity   safety round/check completed  Intervention: Prevent Skin Injury  Recent Flowsheet Documentation  Taken 1/22/2025 0832 by Ceci Garza, RN  Body Position: position changed independently  Goal: Optimal Comfort and Wellbeing  Outcome: Progressing  Goal: Readiness for Transition of Care  Outcome: Progressing     Problem: Skin or Soft Tissue Infection  Goal: Absence of Infection Signs and " Symptoms  Outcome: Progressing

## 2025-01-24 VITALS
HEART RATE: 73 BPM | BODY MASS INDEX: 46.03 KG/M2 | SYSTOLIC BLOOD PRESSURE: 123 MMHG | TEMPERATURE: 98.7 F | DIASTOLIC BLOOD PRESSURE: 56 MMHG | HEIGHT: 68 IN | OXYGEN SATURATION: 90 % | RESPIRATION RATE: 17 BRPM | WEIGHT: 303.7 LBS

## 2025-01-24 PROCEDURE — 250N000013 HC RX MED GY IP 250 OP 250 PS 637: Performed by: INTERNAL MEDICINE

## 2025-01-24 PROCEDURE — 250N000013 HC RX MED GY IP 250 OP 250 PS 637: Performed by: STUDENT IN AN ORGANIZED HEALTH CARE EDUCATION/TRAINING PROGRAM

## 2025-01-24 PROCEDURE — 99239 HOSP IP/OBS DSCHRG MGMT >30: CPT | Performed by: INTERNAL MEDICINE

## 2025-01-24 RX ORDER — HYDROCODONE BITARTRATE AND ACETAMINOPHEN 5; 325 MG/1; MG/1
1 TABLET ORAL EVERY 6 HOURS PRN
Qty: 12 TABLET | Refills: 0 | Status: SHIPPED | OUTPATIENT
Start: 2025-01-24 | End: 2025-01-30

## 2025-01-24 RX ORDER — DOXYCYCLINE 100 MG/1
100 CAPSULE ORAL EVERY 12 HOURS
DISCHARGE
Start: 2025-01-24 | End: 2025-02-03

## 2025-01-24 RX ORDER — TORSEMIDE 20 MG/1
20 TABLET ORAL 2 TIMES DAILY
DISCHARGE
Start: 2025-01-24

## 2025-01-24 RX ADMIN — GUAIFENESIN SYRUP AND DEXTROMETHORPHAN 10 ML: 100; 10 SYRUP ORAL at 06:17

## 2025-01-24 RX ADMIN — LEVOTHYROXINE SODIUM 112 MCG: 0.11 TABLET ORAL at 06:17

## 2025-01-24 RX ADMIN — METOPROLOL SUCCINATE 25 MG: 25 TABLET, EXTENDED RELEASE ORAL at 08:57

## 2025-01-24 RX ADMIN — TAMSULOSIN HYDROCHLORIDE 0.8 MG: 0.4 CAPSULE ORAL at 08:57

## 2025-01-24 RX ADMIN — DOXYCYCLINE HYCLATE 100 MG: 100 CAPSULE ORAL at 08:57

## 2025-01-24 RX ADMIN — TORSEMIDE 40 MG: 20 TABLET ORAL at 08:57

## 2025-01-24 RX ADMIN — ASPIRIN 81 MG CHEWABLE TABLET 81 MG: 81 TABLET CHEWABLE at 08:58

## 2025-01-24 RX ADMIN — ROSUVASTATIN CALCIUM 20 MG: 20 TABLET, FILM COATED ORAL at 08:57

## 2025-01-24 RX ADMIN — LOSARTAN POTASSIUM 25 MG: 25 TABLET, FILM COATED ORAL at 08:57

## 2025-01-24 RX ADMIN — GUAIFENESIN SYRUP AND DEXTROMETHORPHAN 10 ML: 100; 10 SYRUP ORAL at 10:23

## 2025-01-24 RX ADMIN — HYDROCODONE BITARTRATE AND ACETAMINOPHEN 1 TABLET: 5; 325 TABLET ORAL at 13:14

## 2025-01-24 RX ADMIN — BENZONATATE 100 MG: 100 CAPSULE ORAL at 13:14

## 2025-01-24 ASSESSMENT — ACTIVITIES OF DAILY LIVING (ADL)
ADLS_ACUITY_SCORE: 43

## 2025-01-24 NOTE — PLAN OF CARE
"Goal Outcome Evaluation:      Plan of Care Reviewed With: patient    Overall Patient Progress: improvingOverall Patient Progress: improving    Outcome Evaluation: Dressing and ACE wrap CDI to LUE, ABX given, given norco and robitussin x1 for report of cough and associated pain in head and chest    A/Ox4, sometimes forgetful, VSS, on RA, no IV access (MD aware), Ax1 W/GB, PO ABX given, given robitussin and norco x1 for report of cough and pain caused by cough with relief per pt, venous ulcers intact to BLE, edema +3 to BLE, dressing and ACE wrap CDI to LUE, no drainage or bleeding observed, denies pain at site, voiding well, contact and droplet precautions maintained, plan for discharge to [facility] today after 1400.,    Problem: Adult Inpatient Plan of Care  Goal: Plan of Care Review  Description: The Plan of Care Review/Shift note should be completed every shift.  The Outcome Evaluation is a brief statement about your assessment that the patient is improving, declining, or no change.  This information will be displayed automatically on your shift  note.  Outcome: Progressing  Flowsheets (Taken 1/24/2025 0139)  Outcome Evaluation: Dressing and ACE wrap CDI to LUE, ABX given, given norco and robitussin x1 for report of cough and associated pain in head and chest  Plan of Care Reviewed With: patient  Overall Patient Progress: improving  Goal: Patient-Specific Goal (Individualized)  Description: You can add care plan individualizations to a care plan. Examples of Individualization might be:  \"Parent requests to be called daily at 9am for status\", \"I have a hard time hearing out of my right ear\", or \"Do not touch me to wake me up as it startles  me\".  Outcome: Progressing  Goal: Absence of Hospital-Acquired Illness or Injury  Outcome: Progressing  Intervention: Identify and Manage Fall Risk  Recent Flowsheet Documentation  Taken 1/23/2025 2054 by Carine Angeles, RN  Safety Promotion/Fall Prevention:   activity " supervised   assistive device/personal items within reach   clutter free environment maintained   nonskid shoes/slippers when out of bed   room near nurse's station   room organization consistent   safety round/check completed   supervised activity  Intervention: Prevent and Manage VTE (Venous Thromboembolism) Risk  Recent Flowsheet Documentation  Taken 1/23/2025 2054 by Carine Angeles RN  VTE Prevention/Management: SCDs on (sequential compression devices)  Intervention: Prevent Infection  Recent Flowsheet Documentation  Taken 1/23/2025 2054 by Carine Angeles RN  Infection Prevention:   environmental surveillance performed   rest/sleep promoted   single patient room provided  Taken 1/23/2025 1941 by Carine Angeles RN  Infection Prevention:   environmental surveillance performed   rest/sleep promoted   single patient room provided  Goal: Optimal Comfort and Wellbeing  Outcome: Progressing  Intervention: Monitor Pain and Promote Comfort  Recent Flowsheet Documentation  Taken 1/23/2025 2059 by Carine Angeles RN  Pain Management Interventions: medication (see MAR)  Goal: Readiness for Transition of Care  Outcome: Progressing     Problem: Skin or Soft Tissue Infection  Goal: Absence of Infection Signs and Symptoms  Outcome: Progressing  Intervention: Minimize and Manage Infection Progression  Recent Flowsheet Documentation  Taken 1/23/2025 2054 by Carine Angeles RN  Infection Prevention:   environmental surveillance performed   rest/sleep promoted   single patient room provided  Isolation Precautions:   contact precautions maintained   droplet precautions maintained  Taken 1/23/2025 1941 by Carine Angeles RN  Infection Prevention:   environmental surveillance performed   rest/sleep promoted   single patient room provided

## 2025-01-24 NOTE — PROGRESS NOTES
Orthopedic Surgery  Jamar Ivory  01/24/2025     Admit Date:  1/18/2025  POD: 5 Days Post-Op   Procedure(s):  incision and drainage of 3rd webspace,  incision and drainage of flexor tendon at A1 pulley middle and ring fingers, incision and drainage of mid palmar space: LEFT hand      Patient resting comfortably in bed.    States hand continues to improve. No new orthopedic complaints.    Tolerating oral intake.    Denies nausea or vomiting.   Continues to have a cough. Tested positive for RSV.     Temp:  [98  F (36.7  C)-98.7  F (37.1  C)] 98.7  F (37.1  C)  Pulse:  [67-75] 73  Resp:  [16-18] 17  BP: (110-123)/(50-57) 123/56  SpO2:  [90 %-95 %] 90 %    Alert and oriented  Erythema is improving along the dorsal aspect of the 3rd webspace. No active drainage.   Swelling present in 3rd/4th fingers.    Able to flex and extend fingers with mild pain at the 3rd MCP joint.   Sensation and pulses intact and equal.     Labs:  Recent Labs   Lab Test 01/20/25  0732 01/18/25  1202 01/16/25  0846   WBC 3.2* 3.8* 2.8*   HGB 11.6* 12.0* 11.5*    192 157     Recent Labs   Lab Test 04/11/18  1115   INR 1.06     Recent Labs   Lab Test 01/18/25  1202   CRPI 53.74*       Plan:  Daily dressing changes, daily hand soaks with warm water/chlorhexidine then redress wounds with Adaptic and Kerlix fluff, bulked in the webspace, gauze wrap and 2 inch Ace wrap.  No soaks once discharged.   Encourage finger range of motion, limit weightbearing to 2 pounds with the left upper extremity  Follow-up with Dr Hoffmann in 7 to 10 days  Defer antibiotic management to infectious disease.     Disposition:   Anticipate d/c to TCU when medically cleared.  Okay to discharge on oral Abx from ortho standpoint today after last dressing change and soak.     Courtney Aldridge PA-C

## 2025-01-24 NOTE — PROGRESS NOTES
Care Management Discharge Note    Discharge Date: 01/24/2025       Discharge Disposition:  TCU    Discharge Services:  rehab    Discharge DME:  none    Discharge Transportation: agency    Private pay costs discussed: transportation costs    Does the patient's insurance plan have a 3 day qualifying hospital stay waiver?  No    PAS Confirmation Code: [id number]  Patient/family educated on Medicare website which has current facility and service quality ratings:  yes    Education Provided on the Discharge Plan:  yes  Persons Notified of Discharge Plans: patient, spouse  Patient/Family in Agreement with the Plan: unable to assess    Handoff Referral Completed: No, handoff not indicated or clinically appropriate    Additional Information:  [facility]. Discharge orders faxed. PAS completed.   Updated patient's spouse of discharge.     Keyona Decker RN  Care Coordinator  [facility]

## 2025-01-24 NOTE — PLAN OF CARE
"Goal Outcome Evaluation:      Plan of Care Reviewed With: patient    Overall Patient Progress: improvingOverall Patient Progress: improving    Outcome Evaluation: VSS. On RA. CMS is intact. Assist of 1 GB/W. Pt denies any pain. Complains of cough, given PRN robitussin and tessalon pearls. Pain controlled with PRN norco. Contact and droplet precautions maintained. Dressing on L hand is clean, dry and intact. Tolerating 2g sodium diet well. Plan is discharge to TCU.      Problem: Adult Inpatient Plan of Care  Goal: Plan of Care Review  Description: The Plan of Care Review/Shift note should be completed every shift.  The Outcome Evaluation is a brief statement about your assessment that the patient is improving, declining, or no change.  This information will be displayed automatically on your shift  note.  Outcome: Adequate for Care Transition  Flowsheets (Taken 1/24/2025 1104)  Outcome Evaluation: VSS. On RA. CMS is intact. Assist of 1 GB/W. Pt denies any pain. Complains of cough, given PRN robitussin. Contact and droplet precautions maintained. Dressing on L hand is clean, dry and intact. Tolerating 2g sodium diet well. Plan is discharge to TCU.  Plan of Care Reviewed With: patient  Overall Patient Progress: improving  Goal: Patient-Specific Goal (Individualized)  Description: You can add care plan individualizations to a care plan. Examples of Individualization might be:  \"Parent requests to be called daily at 9am for status\", \"I have a hard time hearing out of my right ear\", or \"Do not touch me to wake me up as it startles  me\".  Outcome: Adequate for Care Transition  Goal: Absence of Hospital-Acquired Illness or Injury  Outcome: Adequate for Care Transition  Intervention: Identify and Manage Fall Risk  Recent Flowsheet Documentation  Taken 1/24/2025 5492 by Trae Lindsay, RN  Safety Promotion/Fall Prevention:   activity supervised   assistive device/personal items within reach   clutter free environment " maintained   increased rounding and observation   increase visualization of patient   patient and family education   nonskid shoes/slippers when out of bed   safety round/check completed   supervised activity  Intervention: Prevent Skin Injury  Recent Flowsheet Documentation  Taken 1/24/2025 0856 by Trae Lindsay RN  Body Position: position changed independently  Intervention: Prevent and Manage VTE (Venous Thromboembolism) Risk  Recent Flowsheet Documentation  Taken 1/24/2025 0856 by Trae Lindsay RN  VTE Prevention/Management:   SCDs off (sequential compression devices)   patient refused intervention  Intervention: Prevent Infection  Recent Flowsheet Documentation  Taken 1/24/2025 0856 by Trae Lindsay RN  Infection Prevention:   hand hygiene promoted   rest/sleep promoted   single patient room provided  Goal: Optimal Comfort and Wellbeing  Outcome: Adequate for Care Transition  Intervention: Monitor Pain and Promote Comfort  Recent Flowsheet Documentation  Taken 1/24/2025 0856 by Trae Lindsay RN  Pain Management Interventions:   care clustered   declines  Goal: Readiness for Transition of Care  Outcome: Adequate for Care Transition     Problem: Skin or Soft Tissue Infection  Goal: Absence of Infection Signs and Symptoms  Outcome: Adequate for Care Transition  Intervention: Minimize and Manage Infection Progression  Recent Flowsheet Documentation  Taken 1/24/2025 0856 by Trae Lindsay RN  Infection Prevention:   hand hygiene promoted   rest/sleep promoted   single patient room provided  Isolation Precautions:   contact precautions maintained   droplet precautions maintained

## 2025-01-24 NOTE — PROGRESS NOTES
Discharge Note    Patient discharged to TCU via transportation service  accompanied by other agency per TCU  .  IV: Discontinued  Prescriptions N/A.   Belongings reviewed and sent with patient.   Home medications returned to patient: NA  Equipment sent with: patient, N/A.   patient verbalizes understanding of discharge instructions. AVS given to patient and TCU  .  Additional education completed?  NA  Gregoria Polanco RN on 1/24/2025 at 4:58 PM

## 2025-01-24 NOTE — PROGRESS NOTES
[facility] GERIATRICS    PRIMARY CARE PROVIDER AND CLINIC:  Huy Crystal MD, [address] [place] [id number]  Chief Complaint   Patient presents with    Hospital F/U      [facility] Medical Record Number:  [id number]  Place of Service where encounter took place:  [facility]  ([facility]) [[id number]]    Jamar Ivory  is a 75 year old  (1950), admitted to the above facility from  [facility]. Hospital stay 1/18/2025 through 1/24/2025.  HPI:      Past medical history significant for HFrEF secondary to ischemic cardiomyopathy, dyslipidemia, lymphedema, chronic venous stasis, CVA, BPH, hypothyroidism, morbid obesity      Summary of recent hospitalization:  Patient was hospitalized at [facility] from 1/18/2025 to 1/24/2025 for cellulitis, respiratory failure secondary to RSV.  Patient presented to the emergency department for evaluation of increased pain and swelling of left hand.  Of note 2 days prior to this visit in the ER patient was seen for laceration between third and fourth fingers on the left hand after he fell out of bed and it was repaired and cleaned in the emergency department.  Patient was started on ceftriaxone and vancomycin and status post I&D on 1/19/2025 for collar-button abscess.  ID consulted.  Cultures grew MRSA, recommended Vanco inpatient, changed to doxycycline at discharge.  Patient with ongoing cough and respiratory symptoms, found to have RSV.  Patient also with respiratory failure, requiring oxygen.  Hospital course also complicated by acute on chronic heart failure exacerbation, received IV diuretics and torsemide dose increased to twice daily. Discharged to TCU for physical rehabilitation and medical management.     Reviewed ER note, H&P, discharge summary, consultant notes, labs and imaging from recent hospital stay today.    Today patient was seen for admission visit in the TCU.  He is oriented x 3, lives in [place] in an  apartment with his wife.  He denies any pain.  He denies any shortness of breath, chest pain, dizziness/lightheadedness, dysuria.  Bowels are moving regularly.  He does not feel like he has any extra fluid on board currently, he does weigh himself at home, telling me that his baseline weight ranges from 279-302.  He has chronic lymphedema, his wife wraps his legs daily at home.  He reports ongoing cough that is productive, finds guaifenesin helpful.  His appetite is good.  Discussed what to expect in the TCU.    Reviewed facility EMR including medications, recent nursing progress notes, vital signs.  Discussed plan of care with nursing.    CODE STATUS/ADVANCE DIRECTIVES DISCUSSION:    ALLERGIES:   Allergies   Allergen Reactions    Clopidogrel      Cough/emesis    Penicillins Rash    Simvastatin Diarrhea    Sulfa Antibiotics      Unsure    Xeroform [Bismuth Tribromphenate]      Unsure      PAST MEDICAL HISTORY:   Past Medical History:   Diagnosis Date    Arthritis     Cerebral artery occlusion with cerebral infarction     TIA    CHF (congestive heart failure) 12/24/2018    CVA (cerebral infarction) 2012    CVD (cardiovascular disease)     Depression 1977    hospitalized    Fatty liver     Gout     h/o Concussion     Hypertension 01/17/2011    Hypothyroidism     Obesity     Spider veins     TIA (transient ischaemic attack) 2012    Vitiligo       PAST SURGICAL HISTORY:   has a past surgical history that includes appendectomy; Colonoscopy (N/A, 09/02/2016); Vasectomy; Colonoscopy (N/A, 12/27/2021); and Irrigation and debridement finger, combined (Left, 1/19/2025).  FAMILY HISTORY: family history includes Cancer in his daughter and father; Diabetes in his mother.  SOCIAL HISTORY:   reports that he has never smoked. He has never used smokeless tobacco. He reports that he does not currently use alcohol. He reports that he does not use drugs.  Patient's living condition: lives with spouse    Post Discharge Medication  Reconciliation Status:   MED REC REQUIRED  Post Medication Reconciliation Status:  Discharge medications reconciled and changed, see notes/orders     Current Outpatient Medications   Medication Sig Dispense Refill    acetaminophen (TYLENOL) 500 MG tablet Take 500-1,000 mg by mouth every 6 hours as needed for mild pain Prn for headaches      aspirin (ASA) 81 MG tablet Take 1 tablet (81 mg) by mouth daily 90 tablet 3    doxycycline hyclate (VIBRAMYCIN) 100 MG capsule Take 1 capsule (100 mg) by mouth every 12 hours for 10 days.      HYDROcodone-acetaminophen (NORCO) 5-325 MG tablet Take 1 tablet by mouth every 6 hours as needed for pain. 12 tablet 0    levothyroxine (SYNTHROID/LEVOTHROID) 112 MCG tablet TAKE 2 TABLETS (224 MCG) BY MOUTH DAILY 180 tablet 3    losartan (COZAAR) 25 MG tablet Take 1 tablet (25 mg) by mouth daily. 90 tablet 3    metoprolol succinate ER (TOPROL XL) 25 MG 24 hr tablet Take 1 tablet (25 mg) by mouth daily. 90 tablet 3    Misc Natural Products (OSTEO BI-FLEX JOINT SHIELD PO) Take 1 tablet by mouth daily.      nitroGLYcerin (NITROSTAT) 0.4 MG sublingual tablet FOR CHEST PAIN PLACE 1 TABLET UNDER THE TONGUE EVERY 5 MINUTES FOR 3 DOSES. IF SYMPTOMS PERSIST 5 MINUTES AFTER 1ST DOSE CALL 911. 25 tablet 2    rosuvastatin (CRESTOR) 20 MG tablet Take 1 tablet (20 mg) by mouth daily. 90 tablet 3    senna-docusate (SENOKOT-S/PERICOLACE) 8.6-50 MG tablet Take 1 tablet by mouth 2 times daily as needed for constipation. 12 tablet 0    tamsulosin (FLOMAX) 0.4 MG capsule Take 0.4 mg by mouth daily.      torsemide (DEMADEX) 20 MG tablet Take 1 tablet (20 mg) by mouth 2 times daily.       No current facility-administered medications for this visit.       ROS:  10 point ROS of systems including Constitutional, Eyes, Respiratory, Cardiovascular, Gastroenterology, Genitourinary, Integumentary, Musculoskeletal, Psychiatric were all negative except for pertinent positives noted in my HPI.    Vitals:  /64    "Pulse 89   Temp 98  F (36.7  C)   Resp 18   Ht 1.727 m (5' 8\")   Wt (!) 137.4 kg (303 lb)   SpO2 (!) 90%   BMI 46.07 kg/m    Exam:  GENERAL APPEARANCE:  Alert, in NAD  HEENT: normocephalic, moist mucous membranes, nose without drainage or crusting  RESP:  respiratory effort normal, no respiratory distress, Lung sounds clear, patient is on RA  CV: auscultation of heart done, rate and rhythm regular.  ABDOMEN: obese, + bowel sounds, soft, nontender, no grimacing or guarding with palpation.  M/S:  2+ lower extremity edema  SKIN:  chronic wounds to bilateral lower extremities with scabs in place, one is covered  NEURO: moves extremities freely, speech is normal  PSYCH: oriented x 3, affect and mood normal      Lab/Diagnostic data:  Labs done in SNF are in [facility]. Please refer to them using [facility]Care Everywhere. and Recent labs in [facility] reviewed by me today.     ASSESSMENT/PLAN:  Cellulitis with collar-button abscess of left hand status post I&D on 1/19/2025   ID followed, cultures grew MRSA, received ceftriaxone and vancomycin, changed to doxycycline at discharge  WBC 3.2 on 1/20/2024  Denies any pain today  Plan: CBC 1/28. Continue doxycycline 100 mg twice daily x 10 days through 2/3/2025.  Continue pain management with Tylenol 500 mg every 6 hours as needed and hydrocodone/acetaminophen 5-325 every 6 hours as needed.  Max Tylenol 3 g from all sources.  Continue therapy.  Max lift 2 pounds, avoid repetitive motions with left fingers, okay for gentle range of motion.  Follow-up with surgeon per recommendations.    Acute hypoxic hypercapnic respiratory failure, resolved  RSV infection  Oxygen saturations 90-93% on RA  Respiratory status stable, with ongoing cough  Plan: Start guaifenesin PRN. Monitor respiratory status.  Continue supportive care.  Patient to remain in transmission based precautions precautions per facility protocol.    Acute on chronic HFrEF, LVEF 25-30% per ECHO on 10/31/2024  Suspected " ischemic cardiomyopathy  Lymphedema  Received IV diuresis inpatient, PTA torsemide dose increased to twice daily inpatient  Weight 301.6 lb on 1/24, leg swelling 2+ today  Plan: Continue torsemide 20 mg twice daily, metoprolol ER 25 mg daily, losartan 25 mg daily.  BMP 1/28.  2 g sodium diet. Daily weights. Notify provider with weight gain >2 lbs in a day, >5 lbs in on week.  Follow-up with cardiology on 2/11 as scheduled. Start lymph therapy    CAD  Essential hypertension  With nuclear stress test on 12/30/2024 that revealed transmural infarction in the entire inferior segment of the left ventricle  -110s  Plan: Continue torsemide 20 mg twice daily, losartan 25 mg daily, metoprolol ER 25 mg daily, nitroglycerin as needed.  Continue rosuvastatin 20 mg daily and aspirin 81 mg daily.  Follow-up with cardiology as scheduled.    Dyslipidemia  CVA  Plan: Continue aspirin 81 mg daily and rosuvastatin 20 mg daily.    Hypothyroidism  TSH 8.06 on 12/4/2024  Plan: Continue levothyroxine 224 mcg daily.  TSH at follow-up with PCP    BPH  Plan: Continue tamsulosin 0.4 mg daily.  Monitor for symptoms.    Venous stasis wounds  Plan: Continue wound care as ordered.  Nursing to update provider if worsening    At risk for Slow transit constipation  Bowels moving regularly,at risk due to opioids and immobility  Plan: Continue senna S1 tab twice daily as needed.  Monitor bowels.    Morbid obesity, BMI 46.07  Complicates care  Plan: Encourage weight loss.  Dietitian follows in the TCU.    Physical deconditioning  Secondary to recent hospitalization, medical conditions as above  Plan: Encourage participation in physical therapy/occupational therapy for strengthening and deconditioning. Discharge planning per their recommendation. Social work to assist with d/c planning.        Total time spent with patient visit at the skilled nursing facility was 47 minutes including patient visit and review of past records, review of admission  orders, medication reconciliation, discussion with nursing staff regarding plan of care    Disclaimer: This note may contain text created using speech-recognition software and may contain unintended word substitutions.     Electronically signed by:  YENI Marsh CNP

## 2025-01-24 NOTE — PLAN OF CARE
"Goal Outcome Evaluation:      Plan of Care Reviewed With: patient    Overall Patient Progress: improvingOverall Patient Progress: improving    Outcome Evaluation: Pt A/Ox4. VSS; RA. Forgetful. Pt tearful throughout the day. Pt became very agry at this writer when attepting to place a new PIV and talking about discharging to TCU vs Home. Denies pain medication during shift. Assist x1 with walker and gait belt. Voiding well. CMS at baseline. Dressing CDI. Plan is TCU at discharge.      Problem: Adult Inpatient Plan of Care  Goal: Plan of Care Review  Description: The Plan of Care Review/Shift note should be completed every shift.  The Outcome Evaluation is a brief statement about your assessment that the patient is improving, declining, or no change.  This information will be displayed automatically on your shift  note.  1/23/2025 1856 by Migdalia Lobo RN  Outcome: Progressing  Flowsheets (Taken 1/23/2025 1854)  Outcome Evaluation:   Pt A/Ox4. VSS   RA. Forgetful. Pt tearful throughout the day. Pt became very agry at this writer when attepting to place a new PIV and talking about discharging to TCU vs Home. Denies pain medication during shift. Assist x1 with walker and gait belt. Voiding well. CMS at baseline. Dressing CDI. Plan is TCU at discharge.  Plan of Care Reviewed With: patient  Overall Patient Progress: improving  1/23/2025 1636 by Migdalia Lobo, RN  Outcome: Progressing  Flowsheets (Taken 1/23/2025 1635)  Outcome Evaluation: Pt A/Ox4. Forgetful. VSS  Plan of Care Reviewed With: patient  Overall Patient Progress: improving  1/23/2025 1343 by Migdalia Lobo RN  Outcome: Progressing  Goal: Patient-Specific Goal (Individualized)  Description: You can add care plan individualizations to a care plan. Examples of Individualization might be:  \"Parent requests to be called daily at 9am for status\", \"I have a hard time hearing out of my right ear\", or \"Do not touch me to wake me up as it " [address]    PROGRESS NOTE             4/25/2022    7:13 AM    Name:   Rebekah Trevino  MRN:     [id number]     Dwain Schaumann:      [de-identified]   Room:   2002/2002-01  IP Day:  9  Admit Date:  4/16/2022 10:25 AM    PCP:  Jason Magaña MD  Code Status:  Full Code    Subjective:     C/C:   Chief Complaint   Patient presents with    Shortness of Breath     Interval History Status: not changed. Patient seen and examined at bedside, no acute events overnight, patient was restless and tachypnic on evaluation. Vital signs are stable, on ventilator FiO2 30%  Weaning trial failed yesterday due to agitation  Pro-Branden trending down 5.74>2.03  CRP 7.8, ESR 34  WBC 11.5  Repeat x-ray today Suspected small right pneumothorax. Precedex was used briefly yesterday   Propofol on hold   Received 2 doses of fentanyl overnight   day 7 of IV Merrem      Brief History:     The patient is a 64 y.o.  Non- / non  female sever COPD 3L O2 baseline, bipolar, Hx of DVT/ PE, migraines, who presents with Shortness of Breath and cough  Patient was recently discharge from [facility] for mycoplasma pneumonia. She had a follow up appointment with PCP, patient was complaining of cough and chest pain, was started on Tessalon and nsaids for pain. However; she continued to be in distress and her daughter brought her to ED. She was hypoxic initially on 3L o2, was increased to 6L and continued to be hypoxic   Tachypneic, tachycardic  ABGs: pH 7.33, CO2 34, HCO3 18  WBC 18   Lactic 2.8> 1.5  Pancultures were sent  Chest x-ray: Moderate loculated right basal pneumothorax.  Evidence for tension.  Cavitary lesion noted in the right lung base  CT chest: Chronic clot material RUL, RLL.  Thick-walled cavitary lesion RLL.   Multiloculated pleural collection or hydropneumothorax posterior to the right lung, right chest tube in situ.  Infiltrates of the right middle lobe.  Stellate 6 "startles  me\".  1/23/2025 1856 by Migdalia Lobo RN  Outcome: Progressing  1/23/2025 1636 by Migdalia Lobo RN  Outcome: Progressing  1/23/2025 1343 by Migdalia Lobo RN  Outcome: Progressing  Goal: Absence of Hospital-Acquired Illness or Injury  1/23/2025 1856 by Migdalia Lobo RN  Outcome: Progressing  1/23/2025 1636 by Migdalia Lobo RN  Outcome: Progressing  1/23/2025 1343 by Migdalia Lobo RN  Outcome: Progressing  Intervention: Identify and Manage Fall Risk  Recent Flowsheet Documentation  Taken 1/23/2025 0806 by Migdalia Lobo RN  Safety Promotion/Fall Prevention:   activity supervised   assistive device/personal items within reach   supervised activity   safety round/check completed  Intervention: Prevent Skin Injury  Recent Flowsheet Documentation  Taken 1/23/2025 0806 by Migdalia Lobo RN  Body Position: position changed independently  Intervention: Prevent and Manage VTE (Venous Thromboembolism) Risk  Recent Flowsheet Documentation  Taken 1/23/2025 0806 by Migdalia Lobo RN  VTE Prevention/Management: SCDs on (sequential compression devices)  Intervention: Prevent Infection  Recent Flowsheet Documentation  Taken 1/23/2025 0806 by Migdalia Lobo RN  Infection Prevention: hand hygiene promoted  Goal: Optimal Comfort and Wellbeing  1/23/2025 1856 by Migdalia Lobo RN  Outcome: Progressing  1/23/2025 1636 by Migdalia Lobo RN  Outcome: Progressing  1/23/2025 1343 by Migdalia Lobo RN  Outcome: Progressing  Intervention: Monitor Pain and Promote Comfort  Recent Flowsheet Documentation  Taken 1/23/2025 0806 by Migdalia Lobo RN  Pain Management Interventions: declines  Goal: Readiness for Transition of Care  1/23/2025 1856 by Migdalia Lobo RN  Outcome: Progressing  1/23/2025 1636 by Migdalia Lobo RN  Outcome: Progressing  1/23/2025 1343 by Migdalia Lobo RN  Outcome: Progressing     Problem: Skin or Soft Tissue " mm lateral RUL nodule.  Mediastinal and right hilar lymphadenopathy  Chest tube was placed and patient was intubated. Was given 1 L bolus, IV cefepime, vancomycin, 125 mg Solu-Medrol and she is admitted to the hospital for the management of acute hypoxic respiratory failure due to pneumothorax and possible lung infection     4/19: switched to meropenem      4/21: bronchial washing from bronchoscopy growing pseudomonas    Review of Systems:     Review of Systems   Unable to perform ROS: Intubated       Medications: Allergies:     Allergies   Allergen Reactions    Advil [Ibuprofen] Other (See Comments)     vomiting    Aleve [Naproxen] Other (See Comments)     vomiting    Antipyrine Other (See Comments)     unknown    Celecoxib Other (See Comments)    Codeine      headache    Fomepizole     Incruse Ellipta [Umeclidinium Bromide]      confusion    Other      GRASS, TREES, WEEDS    Rofecoxib Other (See Comments)     Unknown reaction    Salicylates      Unknown reaction     Strawberry Extract      HEADACHES    Sulfinpyrazone Other (See Comments)     Unknown reaction    Aspirin Nausea And Vomiting, Other (See Comments) and Nausea Only    Nsaids Nausea Only, Other (See Comments) and Nausea And Vomiting       Current Meds:   Scheduled Meds:    multivitamin  1 tablet Oral Daily    thiamine and folic acid IVPB  1 mg IntraVENous Daily    insulin glargine  18 Units SubCUTAneous BID    insulin lispro  0-12 Units SubCUTAneous Q6H    methylPREDNISolone  40 mg IntraVENous Q8H    polyethylene glycol  17 g Oral Daily    furosemide  20 mg IntraVENous BID    meropenem  1,000 mg IntraVENous Q8H    albuterol  2.5 mg Nebulization Q4H    acetylcysteine  200 mg Inhalation Q4H    sodium chloride flush  5-40 mL IntraVENous 2 times per day    risperiDONE  1.5 mg Oral Q12H    rivastigmine  4.5 mg Oral BID    atorvastatin  20 mg Oral Daily    traZODone  50 mg Oral Nightly    polyvinyl alcohol  1 drop Both Eyes Infection  Goal: Absence of Infection Signs and Symptoms  1/23/2025 1856 by Migdalia Lobo RN  Outcome: Progressing  1/23/2025 1636 by Migdalia Lobo RN  Outcome: Progressing  1/23/2025 1343 by Migdalia Lobo RN  Outcome: Progressing  Intervention: Minimize and Manage Infection Progression  Recent Flowsheet Documentation  Taken 1/23/2025 0806 by Migdalia Lobo RN  Infection Prevention: hand hygiene promoted  Isolation Precautions:   contact precautions maintained   droplet precautions maintained      Q4H    And    artificial tears   Both Eyes Q4H    chlorhexidine  15 mL Mouth/Throat BID    famotidine (PEPCID) injection  20 mg IntraVENous BID    heparin (porcine)  5,000 Units SubCUTAneous 3 times per day     Continuous Infusions:    dexmedetomidine Stopped (22 1758)    sodium chloride 15 mL/hr at 22 0400    sodium chloride      dextrose      propofol Stopped (22 0881)     PRN Meds: hydrALAZINE, sodium chloride flush, sodium chloride, sodium chloride flush, potassium chloride **OR** potassium alternative oral replacement **OR** potassium chloride, glucose, dextrose, glucagon (rDNA), dextrose, fentanNYL    Data:     Past Medical History:   has a past medical history of Abnormal EKG, TRAM (acute kidney injury) ([facility] [address] Anxiety, Asthma, Bipolar disorder ([address] COPD (chronic obstructive pulmonary disease) ([address] Cramps, extremity, Depression, Dilated bile duct, Headache, History of elective , Hyperlipidemia, Irritable bowel syndrome, Prolonged emergence from general anesthesia, Substance abuse ([address] Unspecified sleep apnea, and Vision abnormalities. Social History:   reports that she quit smoking about 3 years ago. Her smoking use included cigarettes. She has a 84.00 pack-year smoking history. She has never used smokeless tobacco. She reports current alcohol use. She reports that she does not use drugs.      Family History:   Family History   Problem Relation Age of Onset    Dementia Maternal Aunt     Kidney Disease Mother     Heart Attack Sister     Prostate Cancer Father     High Cholesterol Brother     Heart Attack Paternal Grandmother        Vitals:  /61   Pulse 67   Temp 99 °F (37.2 °C) (Oral)   Resp 24   Ht 5' 5\" (1.651 m)   Wt 183 lb 6.8 oz (83.2 kg)   LMP 2013 (Exact Date)   SpO2 96%   BMI 30.52 kg/m²   Temp (24hrs), Av.6 °F (37 °C), Min:97.7 °F (36.5 °C), Max:99.7 °F (37.6 °C)    Recent Labs     22  0739 22  1326 22 04/25/22  0133   POCGLU 175* 167* 182* 136*       I/O(24Hr):     Intake/Output Summary (Last 24 hours) at 4/25/2022 0713  Last data filed at 4/25/2022 0600  Gross per 24 hour   Intake 2200.63 ml   Output 2250 ml   Net -49.37 ml       Labs:    CBC:   Lab Results   Component Value Date    WBC 11.5 04/25/2022    RBC 3.25 04/25/2022    RBC 0-2 07/08/2021    HGB 9.7 04/25/2022    HCT 30.5 04/25/2022    MCV 94.1 04/25/2022    MCH 29.8 04/25/2022    MCHC 31.7 04/25/2022    RDW 16.9 04/25/2022     04/25/2022    MPV 8.0 04/25/2022     BMP:    Lab Results   Component Value Date     04/25/2022    K 4.2 04/25/2022    CL 95 04/25/2022    CO2 38 04/25/2022    BUN 45 04/25/2022    LABALBU 2.8 04/16/2022    LABALBU 3.6 07/08/2021    CREATININE <0.40 04/25/2022    CALCIUM 8.6 04/25/2022    GFRAA Can not be calculated 04/25/2022    LABGLOM Can not be calculated 04/25/2022    GLUCOSE 229 04/25/2022    GLUCOSE 127 07/08/2021     ABG:    Lab Results   Component Value Date    PH 8.0 07/08/2021       Lab Results   Component Value Date/Time    SPECIAL 8ML GREEN/2ML ORANGE RIGHT IJ 04/16/2022 12:12 PM     Lab Results   Component Value Date/Time    CULTURE PENDING 04/18/2022 12:20 PM    CULTURE PSEUDOMONAS AERUGINOSA LIGHT GROWTH (A) 04/18/2022 12:20 PM    CULTURE NO NORMAL RAVEN 04/18/2022 12:20 PM         Radiology:    ECHO Complete 2D W Doppler W Color    Result Date: 4/18/2022  [facility] Transthoracic Echocardiography Report (TTE)  Patient Name Jennifer Oviedo     Date of Study               04/18/2022               BOBBY OROSCO   Date of      1958  Gender                      Female  Birth   Age          71 year(s)  Race                           Room Number  2002        Height:                     65 inch, 165.1 cm   Corporate ID [id number]    Weight:                     176 pounds, 79.8 kg  #   Patient Acct [de-identified]   BSA:          1.87 m^2      BMI:      29.29  # kg/m^2   MR #         [id number]      Sonographer                 Мария Stein   Accession #  [id number]  Interpreting Physician      Giovanna Cosme   Fellow                   Referring Nurse                           Practitioner   Interpreting             Referring Physician         Selena Tolliver  Type of Study   TTE procedure:2D Echocardiogram, M-Mode, Doppler, Color Doppler. Procedure Date Date: 04/18/2022 Start: 10:35 AM Study Location: [facility] Technical Quality: Fair visualization Indications:Dyspnea/SOB, Respiratory Failure and Pulmonary embolus. History / Tech. Comments: COPD, HLD, Sleep apnea Patient Status: Inpatient Height: 65 inches Weight: 176 pounds BSA: 1.87 m^2 BMI: 29.29 kg/m^2 Rhythm: Sinus bradycardia HR: 57 bpm BP: 138/65 mmHg CONCLUSIONS Summary Left ventricle is normal in size and wall thickness. Global left ventricular systolic function appears mildly reduced. Estimated LV EF 45-50 %. No obvious wall motion abnormality seen. RV size appears normal mildly reduced function Left atrium is normal in size. No significant valvular regurgitation or stenosis seen. No significant pericardial effusion is seen. Normal aortic root dimension. Dilated IVC, impaired or no respiratory variations suggestive of elevate Rt atrial pressure Signature ----------------------------------------------------------------------------  Electronically signed by Giovanna Cosme(Interpreting physician) on  04/18/2022 05:06 PM ---------------------------------------------------------------------------- ----------------------------------------------------------------------------  Electronically signed by Norm SteinSonographer) on 04/18/2022 02:29  PM ---------------------------------------------------------------------------- FINDINGS Left Atrium Left atrium is normal in size. Left Ventricle Left ventricle is normal in size and wall thickness.  Global left ventricular systolic function is mildly reduced . Estimated LV EF  %. No obvious wall motion abnormality seen. Right Atrium Right atrium is normal in size. Right Ventricle Normal right ventricular size , mildly reduced function. Mitral Valve No obvious valvular abnormality seen. No evidence of mitral regurgitation. Aortic Valve No obvious valvular abnormality seen. No evidence of aortic insufficiency or stenosis. Tricuspid Valve No obvious valvular abnormality seen. Insignificant tricuspid regurgitation, unable to estimate RVSP. Pulmonic Valve Pulmonic valve was not well visualized. No evidence of pulmonic insufficiency or stenosis. Pericardial Effusion No significant pericardial effusion is seen. Pleural Effusion No pleural effusion seen. Miscellaneous Normal aortic root dimension. IVC Increased diameter and impaired or no inspiratory variation indicating elevated RA filling pressure (i.e. CVP) . M-mode / 2D Measurements & Calculations:   LVIDd:4.74 cm(3.7 - 5.6 cm)      Aortic Root:3.3 cm(2.0 - 3.7 cm)  LVIDs:3.28 cm(2.2 - 4.0 cm)      LA Dimension: 3.5 cm(1.9 - 4.0 cm)  IVSd:1.05 cm(0.6 - 1.1 cm)       LA volume/Index: 40.7 ml /22m^2  LVPWd:0.93 cm(0.6 - 1.1 cm)  Fractional Shortenin.8 %      XR CHEST (SINGLE VIEW FRONTAL)    Result Date: 2022  EXAMINATION: ONE XRAY VIEW OF THE CHEST 2022 8:55 am COMPARISON: April 3, 2022 HISTORY: ORDERING SYSTEM PROVIDED HISTORY: pna TECHNOLOGIST PROVIDED HISTORY: pna Reason for Exam: pna FINDINGS: Stable position of the right internal jugular central venous catheter. Right infrahilar airspace opacity not significantly changed. No new focal lung abnormality. No sizable pleural effusion. No pneumothorax.      Stable right infrahilar airspace opacity     XR CHEST (SINGLE VIEW FRONTAL)    Result Date: 4/3/2022  EXAMINATION: ONE XRAY VIEW OF THE CHEST 4/3/2022 5:51 am COMPARISON: 2022 HISTORY: ORDERING SYSTEM PROVIDED HISTORY: sob, pleurisy, cough TECHNOLOGIST PROVIDED HISTORY: sob, pleurisy, cough Reason for Exam: Shortness of breath, pleurisy, cough Additional signs and symptoms: Shortness of breath, pleurisy, cough Relevant Medical/Surgical History: Shortness of breath, pleurisy, cough FINDINGS: AP portable view of the chest time stamped at 528 hours demonstrates overlying cardiac monitoring electrodes. Heart size is stable. Right internal jugular catheter terminates in the distal superior vena cava. There has been worsening of a focal opacity at the right lung base. Minimal left basilar opacity is unchanged. No extrapleural air or overt edema. No gross effusions. Worsening opacity at the right base favoring airspace disease. Change in minimal left basilar opacity which may be related atelectasis. XR CHEST PORTABLE    Result Date: 4/23/2022  EXAMINATION: ONE XRAY VIEW OF THE CHEST 4/23/2022 5:40 am COMPARISON: 04/22/2022 and 04/21/2022 HISTORY: ORDERING SYSTEM PROVIDED HISTORY: ETT placement TECHNOLOGIST PROVIDED HISTORY: ETT placement Reason for Exam: ETT placement Additional signs and symptoms: ETT placement Relevant Medical/Surgical History: ETT placement FINDINGS: Endotracheal tube tip is approximately 2 cm above the nimesh. Nasogastric tube continues into the stomach and off the exam.  Right PICC line is near the cavoatrial junction. The right chest tube is stable with the tip over the right upper lung but the side port outside the ribcage. Soft tissue emphysema in the right chest wall is redemonstrated and appears mildly increased. Some patchy opacities persist in the right base. Evaluation for right apical pneumothorax is suboptimal.  The left lung appears acceptable. Cardiac silhouette is not enlarged. The central pulmonary arteries appears stable. Limited evaluation of the right lateral ribs. 1.  Endotracheal tube, nasogastric tube, and right jugular catheter appear acceptable. The right chest tube side port is outside the ribcage.  Correlate for functionality of the chest tube. 2.  Patchy opacities persist in the right lower chest.  The evaluation for small right apical pneumothorax is suboptimal on the current study. XR CHEST PORTABLE    Result Date: 4/22/2022  EXAMINATION: ONE XRAY VIEW OF THE CHEST 4/22/2022 6:29 am COMPARISON: 21 April 2022 HISTORY: ORDERING SYSTEM PROVIDED HISTORY: ETT placement TECHNOLOGIST PROVIDED HISTORY: ETT placement Reason for Exam: on vent FINDINGS: AP portable view of the chest time stamped at 623 hours demonstrates overlying cardiac monitoring electrodes, endotracheal tube terminating 4.8 cm above the nimesh and right internal jugular catheter terminating in the right atrium. Right-sided chest tube is again noted. Trace extrapleural air at the right apex persists. Subcutaneous emphysema along the right lateral chest wall is noted. Faint opacity is present at the right lung base suspicious for focal airspace disease. No mediastinal shift. Heart size is stable. Persistent small right apical pneumothorax. Support tubes and lines as above. Opacity at the right base. There is elevation right hemidiaphragm. Atelectasis is evident but superimposed airspace disease may be present. XR CHEST PORTABLE    Result Date: 4/21/2022  EXAMINATION: ONE XRAY VIEW OF THE CHEST 4/21/2022 11:57 am COMPARISON: 04/21/2022, 0625 hours. HISTORY: ORDERING SYSTEM PROVIDED HISTORY: Chest tube now clamped TECHNOLOGIST PROVIDED HISTORY: Chest tube now clamped Reason for Exam: chest tube now clamped FINDINGS: The ET tube was in satisfactory position, 4.5 cm above the nimesh. The NG tube was passed the gastric fundus. A right jugular central venous line was noted with the tip in the superior vena cava near its junction with the right atrium. A right-sided chest tube was noted. The proximal most opening is just out of the right lateral chest wall. No pneumothorax was noted.   No cardiomegaly, pneumonia, interstitial edema or definite effusions were noted. Mild hyper inflation was identified. The ET tube was in satisfactory position, 4.5 cm above the nimesh. The NG tube was past the gastric fundus. A right jugular central venous line was noted with the tip in the superior vena cava near its junction with the right atrium. No pneumothorax was identified. A right-sided chest tube was noted but the proximal most opening is just external to the right lateral chest wall. No cardiomegaly, pneumonia or interstitial edema. XR CHEST PORTABLE    Result Date: 4/21/2022  EXAMINATION: ONE XRAY VIEW OF THE CHEST 4/21/2022 6:30 am COMPARISON: 04/20/2022 HISTORY: ORDERING SYSTEM PROVIDED HISTORY: ETT placement TECHNOLOGIST PROVIDED HISTORY: ETT placement Reason for Exam: vent FINDINGS: Support tubes and lines are unchanged. The right chest tube side port is external to the chest wall which may predispose to air leak. Cardiac silhouette and mediastinal contours are unchanged. Calcified left hilar lymph nodes are noted. No pneumothorax. No new lung infiltrate. Aeration of the right lung base has improved. 1. The right chest tube side port is external to the chest wall which may predispose to an air leak. No pneumothorax. 2. Improved aeration of the right lung base, likely related to atelectasis. XR CHEST PORTABLE    Result Date: 4/20/2022  EXAMINATION: ONE XRAY VIEW OF THE CHEST 4/20/2022 11:52 am COMPARISON: None. HISTORY: ORDERING SYSTEM PROVIDED HISTORY: Chest tube now to waterseal TECHNOLOGIST PROVIDED HISTORY: Chest tube now to waterseal Reason for Exam: Chest tube now to waterseal FINDINGS: There is a right chest tube in place. There is no evidence of pneumothorax. Some apparent atelectasis noted in the the right lung base. ET tube is in good position. Tip of central line overlies the right atrium. Left lung appears normal.  Heart appears unremarkable. No evidence of pneumothorax.   Some atelectasis in the right lung base.  Tip of centralized overlies the right atrium. It should be withdrawn by 6 cm. The findings were sent to the Radiology Results [address] at 12:21 pm on 4/20/2022 to be communicated to a licensed caregiver. XR CHEST PORTABLE    Result Date: 4/20/2022  EXAMINATION: ONE XRAY VIEW OF THE CHEST 4/20/2022 6:30 am COMPARISON: 04/19/2022 HISTORY: ORDERING SYSTEM PROVIDED HISTORY: ETT placement TECHNOLOGIST PROVIDED HISTORY: ETT placement Reason for Exam: ETT FINDINGS: The cardiac silhouette and mediastinal contours are stable. There is a right-sided chest tube with the side port noted external to the chest wall. Right internal jugular vein catheter, endotracheal tube, and orogastric tube are again noted. There is pulmonary vascular congestion. No pneumothorax. The patient is rotated towards the right. 1. Right chest tube side port is external to the chest wall which may predispose to an air leak. 2. Stable pulmonary vascular congestion. 3. No pneumothorax is identified. XR CHEST PORTABLE    Result Date: 4/19/2022  EXAMINATION: ONE X-RAY VIEW OF THE CHEST 4/18/2022 6:27 am COMPARISON: 04/17/2022 HISTORY: ORDERING SYSTEM PROVIDED HISTORY: ETT placement TECHNOLOGIST PROVIDED HISTORY: ETT placement Reason for Exam: ETT placement FINDINGS: The endotracheal tube, nasogastric tube, right IJ catheter and right-sided chest tube remain. The extreme lung apices are excluded from the examination. A pneumothorax is not identified. Right basilar consolidation has increased peripherally. Increased right basilar opacity may reflect fluid filling the large cavitary lesion at the right lung base. Pulmonary consolidation/increased pleural effusion or empyema are alternate considerations. XR CHEST PORTABLE    Result Date: 4/19/2022  EXAMINATION: ONE XRAY VIEW OF THE CHEST 4/19/2022 6:34 am COMPARISON: Chest radiograph performed 04/18/2022.  HISTORY: ORDERING SYSTEM PROVIDED HISTORY: ETT placement TECHNOLOGIST PROVIDED HISTORY: ETT placement Reason for Exam: on vent FINDINGS: There is right lower lung infiltrate. There is no pneumothorax. The mediastinal structures are unremarkable. The upper abdomen is unremarkable. The extrathoracic soft tissues are unremarkable. There is an endotracheal tube with the tip in the midtrachea. There is a right-sided chest tube. There is a right internal jugular central line with the tip at the cavoatrial junction. There is a gastric tube and the tip is not well visualized. Right lower lung infiltrate that is mildly improved. Support tubes as described above. XR CHEST PORTABLE    Result Date: 4/17/2022  EXAMINATION: ONE XRAY VIEW OF THE CHEST 4/17/2022 6:04 am COMPARISON: 04/16/2022, 1248 hours HISTORY: ORDERING SYSTEM PROVIDED HISTORY: ETT placement TECHNOLOGIST PROVIDED HISTORY: ETT placement Reason for Exam: ETT 26-year-old female with endotracheal tube placement FINDINGS: Endotracheal tube distal tip overlying the mid trachea approximately 4.8 cm above the level of the nimesh. Enteric tube traverses the GE junction with distal tip excluded from the field of view. Right IJ approach central venous catheter distal tip overlying the right atrium. Right-sided chest tube distal tip projects over the right hilum. Cardiac monitor leads overlie the chest. No obvious pneumothorax. No free air. Cardiac and mediastinal contours remain unchanged. Left lung is relatively clear. Persistent airspace disease at the right mid and right lower lung zones. Visualized osseous structures remain unchanged. Subcutaneous emphysema previously seen at the right lateral chest wall no longer identified. 1. Persistent airspace disease at the right mid and right lower lung zones. Follow-up is recommended to document resolution. 2. Tubes and right IJ line as detailed above.      XR CHEST PORTABLE    Result Date: 4/16/2022  EXAMINATION: ONE XRAY VIEW OF THE CHEST 4/16/2022 1:10 pm COMPARISON: 04/16/2022, 1213 hours HISTORY: ORDERING SYSTEM PROVIDED HISTORY: chest tube TECHNOLOGIST PROVIDED HISTORY: chest tube Reason for Exam: chest tube Additional signs and symptoms: chest tube and NG tube placement 76-year-old female with history of NG tube and chest tube placement FINDINGS: Portable supine view of the chest. Right-sided chest tube has been placed with distal tip projecting over the right infrahilar region. Right IJ approach central venous catheter distal tip overlying the cavoatrial junction, stable. Endotracheal tube distal tip overlying the mid trachea approximately 4.3 cm above the level of the nimesh. Enteric tube traverses the GE junction with distal tip projecting over the left mid abdomen likely within the stomach body. Left lung is clear. Pleural thickening and opacity involving the right mid and right lower lung zones. Subcutaneous emphysema along the right lateral chest wall. Cardiac and mediastinal contours remain unchanged. Atherosclerotic calcification of the thoracic aorta. No free air. There is re-expansion of the right lung compared with the prior study. Probable small residual inferolateral right pneumothorax component. 1. Tubes and right IJ line as detailed above. Re-expansion of the right lung compared with the prior study. There is likely a small residual right inferolateral pneumothorax component. 2. Pleural thickening and airspace opacity involving the right mid and right lower lung zones. Follow-up is recommended to document resolution. 3. Subcutaneous emphysema along the right lateral chest wall. XR CHEST PORTABLE    Result Date: 4/16/2022  EXAMINATION: ONE XRAY VIEW OF THE CHEST 4/16/2022 12:24 pm COMPARISON: None. HISTORY: ORDERING SYSTEM PROVIDED HISTORY: Intubation TECHNOLOGIST PROVIDED HISTORY: Intubation Reason for Exam: central line and ET placement FINDINGS: ET tube terminates 3 cm above the nimesh. Right line terminates in the SVC.  There is a relatively stable right-sided basilar pneumothorax. The remaining lungs are unchanged. Cardiac silhouette and osseous structures unchanged. Intubation as above. No significant change     XR CHEST PORTABLE    Result Date: 4/16/2022  EXAMINATION: ONE XRAY VIEW OF THE CHEST 4/16/2022 11:02 am COMPARISON: 04/05/2022 HISTORY: ORDERING SYSTEM PROVIDED HISTORY: SOB TECHNOLOGIST PROVIDED HISTORY: SOB Reason for Exam: SOB FINDINGS: There is a moderate loculated right basal pneumothorax. Cavitary lesion is noted in the right lung base. Left lung is unremarkable. Heart and mediastinal structures appear normal.  There is no evidence for any tension phenomena. Moderate loculated right basal pneumothorax. Evidence for tension. Cavitary lesion noted in the right lung base. .  Case discussed with Dr. Dale Swift. XR CHEST PORTABLE    Result Date: 4/1/2022  EXAMINATION: ONE XRAY VIEW OF THE CHEST 4/1/2022 4:01 pm COMPARISON: 04/01/2022 HISTORY: ORDERING SYSTEM PROVIDED HISTORY: central line placed TECHNOLOGIST PROVIDED HISTORY: central line placed Reason for Exam: Central line placed FINDINGS: There is a right internal jugular central line in place with distal tip overlying the superior vena cava. Previously noted right basal infiltrate is unchanged. Left lung appears clear. The heart and mediastinal structures appear normal.  There is no evidence of pneumothorax. Satisfactory position of right internal jugular central line. No change in the the right basal infiltrate. XR CHEST PORTABLE    Result Date: 4/1/2022  EXAMINATION: ONE XRAY VIEW OF THE CHEST 4/1/2022 1:22 pm COMPARISON: 06/17/2020. CT dated 10/15/2021. HISTORY: ORDERING SYSTEM PROVIDED HISTORY: dyspnea TECHNOLOGIST PROVIDED HISTORY: dyspnea Reason for Exam: Dyspnea Relevant Medical/Surgical History: Hx of COPD FINDINGS: The cardiac silhouette appears within normal limits.   There are bibasilar opacities, right greater than left which may reflect multifocal pneumonia in the correct clinical setting. No evidence of pleural effusion or pneumothorax is seen. Bibasilar opacities, right greater than left, which may reflect multifocal pneumonia. Follow-up to imaging resolution is recommended. CT CHEST PULMONARY EMBOLISM W CONTRAST    Result Date: 4/16/2022  EXAMINATION: CTA OF THE CHEST 4/16/2022 2:30 pm TECHNIQUE: CTA of the chest was performed after the administration of intravenous contrast.  Multiplanar reformatted images are provided for review. MIP images are provided for review. Dose modulation, iterative reconstruction, and/or weight based adjustment of the mA/kV was utilized to reduce the radiation dose to as low as reasonably achievable. COMPARISON: CT chest from 10/15/2021 HISTORY: ORDERING SYSTEM PROVIDED HISTORY: SOB TECHNOLOGIST PROVIDED HISTORY: SOB Decision Support Exception - unselect if not a suspected or confirmed emergency medical condition->Emergency Medical Condition (MA) Reason for Exam: sob Relevant Medical/Surgical History: intubated, septic, hx of PE 41-year-old female with shortness of breath FINDINGS: Pulmonary Arteries: No obvious filling defect in the main, right main, or left main pulmonary arteries. Evaluation of the segmental and subsegmental pulmonary arterial vasculature is limited due to respiratory motion suboptimal bolus timing, airspace disease, and streak artifact. There are areas of probable residual linear chronic clot within the right lower lobar pulmonary artery on image 111, series 2. Probable linear residual clot within the anterior right upper lobe pulmonary artery on image 83, series 2. Mediastinum: Atherosclerotic calcification of the aorta and branch vasculature. No dissection flap within the visualized thoracic aorta. No pericardial effusion. There is fluid in the superior pericardial recess. Enlarged precarinal lymph nodes remain unchanged on image 83, series 2.  Right hilar lymphadenopathy is seen on image 96, series 2. Coronary artery disease. No left hilar or axillary lymphadenopathy. Lungs/pleura: Endotracheal tube distal tip within the lower trachea. Trachea and very proximal central airways appear patent. Narrowing of the right middle lobe airway into a region of partial consolidation/atelectasis/scarring. Opacification of the right lower lobar segmental airways. There is a thick-walled cavitary lesion in the right lower lobe measuring 5.5 x 7.3 cm in greatest AP and transverse dimensions on image 68, series 4. There is a dependent air-fluid level within this lesion. This area measures 7.6 cm in greatest craniocaudal extent on image 48, series 602. There is surrounding airspace disease. There is a gas and fluid containing multiloculated pleural collection or hydropneumothorax along the posterior margin of the right lung. Right sided chest tube distal tip along the anteromedial right lung. Subcutaneous emphysema along the right axilla and right lateral chest wall. Stellate pulmonary nodule in the lateral right upper lobe measuring 6 mm on image 32, series 4. Moderate to severe emphysema, dependent atelectasis and respiratory motion. Upper Abdomen: Fatty liver. NG tube is seen extending into the stomach body. Atherosclerotic calcification of the upper abdominal aorta and branch vasculature. Soft Tissues/Bones: Chronic anterior wedge compression deformities, unchanged from 10/15/2021 involving T5 and T6. Mild diffuse degenerative changes throughout the spine. 1. Linear filling defects in the anterior right upper lobe and right lower lobe pulmonary arteries likely representing residual/chronic clot material. No acute central filling defect/pulmonary embolus is evident. 2. Thick-walled cavitary lesion in the right lower lobe measuring up to 5.5 x 7.3 x 7.6 cm which could be related to TB or fungal disease or a necrotizing pneumonia. Underlying cavitary malignancy not entirely excluded. There is surrounding airspace disease. There is also an associated multiloculated pleural collection or hydropneumothorax primarily along the posterior margin of the right lung. Right-sided chest tube distal tip along the anteromedial right pleura/lung. 3. Partial consolidation, atelectasis and/or infiltrate of the right middle lobe. 4. Stellate 6 mm lateral right upper lobe pulmonary nodule. Follow-up guidelines provided below. 5. Underlying moderate to severe emphysema. 6. Endotracheal and NG tubes as above. 7. Coronary artery disease. 8. Chronic anterior wedge compression deformities of T5 and T6. 9. Subcutaneous emphysema along the right axilla and right lateral chest wall likely related to chest tube. 10. Mediastinal and right hilar lymphadenopathy. RECOMMENDATIONS: 6 mm suspicious right solid pulmonary nodule within the upper lobe. Recommend a non-contrast Chest CT at 6-12 months, then another non-contrast Chest CT at 18-24 months. These guidelines do not apply to immunocompromised patients and patients with cancer. Follow up in patients with significant comorbidities as clinically warranted. For lung cancer screening, adhere to Lung-RADS guidelines. Reference: Radiology. 2017; 284(1):228-43.      VL Lower Extremity Bilateral Venous Duplex    Result Date: 4/18/2022    [facility]  Vascular Lower Extremities DVT Study Procedure   Patient Name   Nitesh Sandoval     Date of Study           04/18/2022                 BOBBY OROSCO   Date of Birth  1958  Gender                  Female   Age            71 year(s)  Race                       Room Number    2002   Corporate ID # [id number]   Patient Acct # [de-identified]   MR #           [id number]      Sonographer             [facility]   Accession #    [id number]  Interpreting Physician  Domo Norris   Referring                  Referring Physician     Adán Davis  Nurse  Practitioner  Procedure Type of Study:   Veins: Lower Extremities DVT Study, Venous Scan Lower Bilateral.  Indications for Study:R/O DVT. Patient Status: In Patient. Technical Quality:Adequate visualization. Conclusions   Summary   Bilateral:  No evidence of deep or superficial venous thrombosis. Signature   ----------------------------------------------------------------  Electronically signed by Alicia Barrera RVT(Sonographer) on  04/18/2022 07:45 AM  ----------------------------------------------------------------   ----------------------------------------------------------------  Electronically signed by Domo Norris(Interpreting physician)  on 04/18/2022 01:10 PM  ----------------------------------------------------------------  Findings:   Right Impression:                    Left Impression:  The common femoral, femoral,         The common femoral, femoral,  popliteal and tibial veins           popliteal and tibial veins  demonstrate normal compressibility   demonstrate normal compressibility  and augmentation. and augmentation. Normal compressibility of the great  Normal compressibility of the great  saphenous vein. saphenous vein. Normal compressibility of the small  Normal compressibility of the small  saphenous vein. saphenous vein. Velocities are measured in cm/s ; Diameters are measured in cm Right Lower Extremities DVT Study Measurements Right 2D Measurements +------------------------------------+----------+---------------+----------+ ! Location                            ! Visualized! Compressibility! Thrombosis! +------------------------------------+----------+---------------+----------+ ! Common Femoral                      !Yes       ! Yes            ! None      ! +------------------------------------+----------+---------------+----------+ ! Prox Femoral                        !Yes       ! Yes            ! None      ! +------------------------------------+----------+---------------+----------+ ! Mid Femoral !Yes       !Yes            ! None      ! +------------------------------------+----------+---------------+----------+ ! Dist Femoral                        !Yes       ! Yes            ! None      ! +------------------------------------+----------+---------------+----------+ ! Deep Femoral                        !Yes       ! Yes            ! None      ! +------------------------------------+----------+---------------+----------+ ! Popliteal                           !Yes       ! Yes            ! None      ! +------------------------------------+----------+---------------+----------+ ! Sapheno Femoral Junction            ! Yes       ! Yes            ! None      ! +------------------------------------+----------+---------------+----------+ ! PTV                                 ! Yes       ! Yes            ! None      ! +------------------------------------+----------+---------------+----------+ ! Peroneal                            !Yes       ! Yes            ! None      ! +------------------------------------+----------+---------------+----------+ ! Gastroc                             ! Yes       ! Yes            ! None      ! +------------------------------------+----------+---------------+----------+ ! GSV Thigh                           ! Yes       ! Yes            ! None      ! +------------------------------------+----------+---------------+----------+ ! GSV Knee                            ! Yes       ! Yes            ! None      ! +------------------------------------+----------+---------------+----------+ ! GSV Ankle                           ! Yes       ! Yes            ! None      ! +------------------------------------+----------+---------------+----------+ ! SSV                                 ! Yes       ! Yes            ! None      ! +------------------------------------+----------+---------------+----------+ Left Lower Extremities DVT Study Measurements Left 2D Measurements +------------------------------------+----------+---------------+----------+ ! Location                            ! Visualized! Compressibility! Thrombosis! +------------------------------------+----------+---------------+----------+ ! Common Femoral                      !Yes       ! Yes            ! None      ! +------------------------------------+----------+---------------+----------+ ! Prox Femoral                        !Yes       ! Yes            ! None      ! +------------------------------------+----------+---------------+----------+ ! Mid Femoral                         !Yes       ! Yes            ! None      ! +------------------------------------+----------+---------------+----------+ ! Dist Femoral                        !Yes       ! Yes            ! None      ! +------------------------------------+----------+---------------+----------+ ! Deep Femoral                        !Yes       ! Yes            ! None      ! +------------------------------------+----------+---------------+----------+ ! Popliteal                           !Yes       ! Yes            ! None      ! +------------------------------------+----------+---------------+----------+ ! Sapheno Femoral Junction            ! Yes       ! Yes            ! None      ! +------------------------------------+----------+---------------+----------+ ! PTV                                 ! Yes       ! Yes            ! None      ! +------------------------------------+----------+---------------+----------+ ! Peroneal                            !Yes       ! Yes            ! None      ! +------------------------------------+----------+---------------+----------+ ! Gastroc                             ! Yes       ! Yes            ! None      ! +------------------------------------+----------+---------------+----------+ ! GSV Thigh                           ! Yes       ! Yes            ! None      ! +------------------------------------+----------+---------------+----------+ ! GSV Knee !Yes       !Yes            ! None      ! +------------------------------------+----------+---------------+----------+ ! GSV Ankle                           ! Yes       ! Yes            ! None      ! +------------------------------------+----------+---------------+----------+ ! SSV                                 ! Yes       ! Yes            ! None      ! +------------------------------------+----------+---------------+----------+    FL MODIFIED BARIUM SWALLOW W VIDEO    Result Date: 4/4/2022  EXAMINATION: MODIFIED BARIUM SWALLOW WAS PERFORMED IN CONJUNCTION WITH SPEECH PATHOLOGY SERVICES TECHNIQUE: Fluoroscopic evaluation of the swallowing mechanism was performed using cineradiography with multiple consistency of barium product in conjunction with speech pathology services. FLUOROSCOPY DOSE AND TYPE OR TIME AND EXPOSURES: DAP 49.2 dGy cm squared COMPARISON: None HISTORY: ORDERING SYSTEM PROVIDED HISTORY: aspiration pna TECHNOLOGIST PROVIDED HISTORY: aspiration pna Reason for Exam: aspiration pneumonia FINDINGS: Premature vallecular spillage. There was trace penetration with straw with thin liquid. No aspiration. No aspiration. Trace penetration with straw with thin liquids. Please see separate speech pathology report for full discussion of findings and recommendations. RECOMMENDATIONS: Unavailable         Physical Examination:        Physical Exam  Constitutional:       General: She is not in acute distress. Appearance: She is not ill-appearing. Comments: Sedated, comfortable appearing, unresponsive   Cardiovascular:      Rate and Rhythm: Normal rate and regular rhythm. Pulmonary:      Breath sounds: Rhonchi and rales present. Abdominal:      General: Bowel sounds are normal. There is no distension. Musculoskeletal:      Right lower leg: No edema. Left lower leg: No edema.    Neurological:      Comments: Unresponsive, sedated           Assessment:        Primary Problem  Sepsis [facility])    Active Hospital Problems    Diagnosis Date Noted    Elevated C-reactive protein (CRP) [R79.82]      Priority: Medium    Pseudomonas aeruginosa infection [A49.8]      Priority: Medium    Elevated procalcitonin [R79.89]      Priority: Medium    Hyperkalemia [E87.5] 04/21/2022     Priority: Medium    Acute systolic HF (heart failure) (HCC) [I50.21] 04/19/2022    Pneumothorax on right [J93.9]     HCAP (healthcare-associated pneumonia) [J18.9]     Sepsis ([address] [A41.9] 04/16/2022    Acute on chronic respiratory failure ([address] [J96.20] 04/16/2022    Cavitary lesion of lung [J98.4] 04/16/2022    History of DVT (deep vein thrombosis) [Z86.718] 04/16/2022    Pneumothorax, right [J93.9] 04/16/2022    Status post chest tube placement ([address] [Z93.8] 04/16/2022    History of pulmonary embolus (PE) [Z86.711] 04/02/2022    Chronic respiratory failure with hypoxia (HCC) [J96.11] 08/05/2021    Compression fracture of body of thoracic vertebra ([address] [S22.000A] 09/28/2020    Lung nodule [R91.1] 08/22/2018    Bipolar disorder ([address] [F31.9]     Chronic obstructive pulmonary disease ([address] [J44.9] 01/06/2016       Plan:        Sepsis due to pneumonia with abcess vs empyema, pseudomonas sp. Tachypnea, tachycardia  WBC 18>>>12.6> 11.5  Pro-Branden >100 (>100 on repeat), , Lactate 2.8> 1.5  Chest x-ray: Moderate loculated right basal pneumothorax.  Evidence for tension.  Cavitary lesion noted in the right lung base  CT chest: Thick-walled cavitary lesion RLL.  Right chest tube in situ for posterior right lung or right pneumothorax.  Infiltrates of the right middle lobe.  Stellate 6 mm lateral RUL nodule.  Mediastinal and right hilar lymphadenopathy  Received 1 L bolus  Now getting 1/2 NS at 15 cc/h  Initially placed on broad-spectrum antibiotic with cefepime, vancomycin  ID and Critical care following  Respiratory cultures x2 (suctioned sputum and bronchial washing) gram-positive cocci in pairs, Pseudomonas Chest tubes cultures grew Pseudomonas Aeruginosa   On Merrem IV day 7     Acute on chronic respiratory failure 2/2 pneumothorax and pseudomonas pneumonia  History of severe COPD - 3 L home O2 baseline  CXR right pneumothorax on admission   S/p intubation and right chest tube placement  Sedated with propofol  Currently on  Vent with FiO2 30% PEEP 8, unable to be weaned, continue weaning trials and sedation vacation as tolerated - discussion with pt's daughter regarding monitoring over the weekend and if still unable to wean or not showing signs of improvement, consider withdrawal of care, as it was against patient's wishes to have trach/PEG  Continue solumedrol 40mg q8h   Chest tube removed yesterday   Hard to wean off ventilator     Acute systolic heart failure  Echo Estimated LV EF 45-50 %  Probnp 1000s   Lasix 20mg IV BID    Type II DM Insulin sliding scale and lantus 18 Units twice daily     Hx DVT/ PE: Eliquis was discontinued in 12/2021  History bipolar disorder: Trazodone, Risperidone resumed     Diet: NPO, on Tube feeds; had some hyperkalemia, resolved  GI ppx: Pepcid 20 mg twice daily IV  DVT ppx: Heparin 5000 units TID   Code status: Full code  Consults: pulm, ID  Dispo: referral sent to [facility], patient will not likely be discharged until next week    rEica Lua MD  4/25/2022  7:13 AM     Attestation and add on       I have discussed the care of Brianne Kirkpatrick , including pertinent history and exam findings,      4/25/22    with the resident. I have seen and examined the patient and the key elements of all parts of the encounter have been performed by me . I agree with the assessment, plan and orders as documented by the resident.      Principal Problem:    Sepsis ([address]  Active Problems:    Pseudomonas aeruginosa infection    Elevated procalcitonin    Elevated C-reactive protein (CRP)    Lung abscess (HCC)    Recurrent pneumothorax after chest tube removed    Chronic obstructive pulmonary disease [facility]    Bipolar disorder ([facility]    Lung nodule    Compression fracture of body of thoracic vertebra (HCC)    Chronic respiratory failure with hypoxia (HCC)    History of pulmonary embolus (PE)    Acute on chronic respiratory failure ([facility]    Cavitary lesion of lung    History of DVT (deep vein thrombosis)    Pneumothorax, right    Status post chest tube placement (HCC)    Pneumothorax on right    HCAP (healthcare-associated pneumonia)    Acute systolic HF (heart failure) ([facility]  Resolved Problems:    Hyperkalemia        ''''''''''       MD JUNO Mayorga[address], [address] [facility].    Phone [phone]   Fax: [phone]  Answering Service: [phone]

## 2025-01-24 NOTE — DISCHARGE SUMMARY
"[facility]  Discharge Summary    Admit date:  1/18/2025    Discharge date and time: 1/24/2025    Discharge Physician: Santiago Arthur MD    Primary care provider: Huy Crystal    Primary Discharge Diagnosis      Cellulitis w/ Collar Button Abscess of Left Hand     Secondary Diagnoses     Acute Hypoxic Hypercapnic Respiratory Failure 2/2 RSV Infection  Acute on Chronic HFrEF EF 25-30%   Suspected Ischemic Cardiomyopathy  Lymphedema & Chronic Venous Stasis of Lower Extremities  Morbid obesity BMI 46  Hypothyroidism  BPH  Chronic CVA    Summary of Hospital Stay     75M with history of morbid obesity BMI 46, hypothyroidism, and new-onset systolic heart failure EF 25-30% suspected to be secondary to ischemic cardiomyopathy (abnormal stress test but has not had cath) presented with worsening left hand pain and swelling after recent fall onto this extremity and found to have cellulitis with collar button abscess s/p I&D. Hospital course complicated by respiratory failure from RSV infection and acute on chronic heart failure.  Patient was started on IV diuresis and his torsemide dose was increased from daily to twice daily at time of discharge.  ID was consulted regarding antibiotics for home and recommended an additional 10 days of doxycycline.  Patient will discharge to TCU for rehab needs.    Follow-up  -Recommend PCP and orthopedic surgery follow-up  -Need repeat BMP in 1 week from date of discharge    Patient Discharge Condition & Exam     Discharge condition: Improved    /56   Pulse 73   Temp 98.7  F (37.1  C) (Temporal)   Resp 17   Ht 1.727 m (5' 8\")   Wt 137.8 kg (303 lb 11.2 oz)   SpO2 (!) 90%   BMI 46.18 kg/m       General: In NAD.  Cardiac: RRR.  Lungs: CTAB.  Abd: Non-tender.  Ext: No edema.    Discharge Instructions     Patient/family instructions: Written discharge instruction given to patient/family    Discharge Medications:     Review of your medicines        START taking        " Dose / Directions   doxycycline hyclate 100 MG capsule  Commonly known as: VIBRAMYCIN  Indication: Infection of the Skin and/or Soft Tissue      Dose: 100 mg  Take 1 capsule (100 mg) by mouth every 12 hours for 10 days.  Refills: 0     senna-docusate 8.6-50 MG tablet  Commonly known as: SENOKOT-S/PERICOLACE      Dose: 1 tablet  Take 1 tablet by mouth 2 times daily as needed for constipation.  Quantity: 12 tablet  Refills: 0            CONTINUE these medicines which may have CHANGED, or have new prescriptions. If we are uncertain of the size of tablets/capsules you have at home, strength may be listed as something that might have changed.        Dose / Directions   torsemide 20 MG tablet  Commonly known as: DEMADEX  This may have changed: when to take this  Used for: Combined systolic and diastolic heart failure, unspecified HF chronicity (H)      Dose: 20 mg  Take 1 tablet (20 mg) by mouth 2 times daily.  Refills: 0            CONTINUE these medicines which have NOT CHANGED        Dose / Directions   acetaminophen 500 MG tablet  Commonly known as: TYLENOL      Dose: 500-1,000 mg  Take 500-1,000 mg by mouth every 6 hours as needed for mild pain Prn for headaches  Refills: 0     aspirin 81 MG tablet  Commonly known as: ASA  Used for: Cerebral infarction, unspecified mechanism (H)      Dose: 81 mg  Take 1 tablet (81 mg) by mouth daily  Quantity: 90 tablet  Refills: 3     HYDROcodone-acetaminophen 5-325 MG tablet  Commonly known as: NORCO      Dose: 1 tablet  Take 1 tablet by mouth every 6 hours as needed for pain.  Quantity: 12 tablet  Refills: 0     levothyroxine 112 MCG tablet  Commonly known as: SYNTHROID/LEVOTHROID  Used for: Acquired hypothyroidism      Dose: 224 mcg  TAKE 2 TABLETS (224 MCG) BY MOUTH DAILY  Quantity: 180 tablet  Refills: 3     losartan 25 MG tablet  Commonly known as: COZAAR  Used for: Combined systolic and diastolic heart failure, unspecified HF chronicity (H), Coronary artery disease involving  native coronary artery of native heart with angina pectoris, Dyslipidemia, Essential hypertension      Dose: 25 mg  Take 1 tablet (25 mg) by mouth daily.  Quantity: 90 tablet  Refills: 3     metoprolol succinate ER 25 MG 24 hr tablet  Commonly known as: TOPROL XL  Used for: Combined systolic and diastolic heart failure, unspecified HF chronicity (H), Coronary artery disease involving native coronary artery of native heart with angina pectoris, Dyslipidemia, Essential hypertension      Dose: 25 mg  Take 1 tablet (25 mg) by mouth daily.  Quantity: 90 tablet  Refills: 3     nitroGLYcerin 0.4 MG sublingual tablet  Commonly known as: NITROSTAT  Used for: Coronary artery disease involving native coronary artery of native heart with unstable angina pectoris (H)      FOR CHEST PAIN PLACE 1 TABLET UNDER THE TONGUE EVERY 5 MINUTES FOR 3 DOSES. IF SYMPTOMS PERSIST 5 MINUTES AFTER 1ST DOSE CALL 911.  Quantity: 25 tablet  Refills: 2     OSTEO BI-FLEX JOINT SHIELD PO      Dose: 1 tablet  Take 1 tablet by mouth daily.  Refills: 0     rosuvastatin 20 MG tablet  Commonly known as: CRESTOR  Used for: Combined systolic and diastolic heart failure, unspecified HF chronicity (H), Coronary artery disease involving native coronary artery of native heart with angina pectoris, Dyslipidemia, Essential hypertension      Dose: 20 mg  Take 1 tablet (20 mg) by mouth daily.  Quantity: 90 tablet  Refills: 3     tamsulosin 0.4 MG capsule  Commonly known as: FLOMAX      Dose: 0.4 mg  Take 0.4 mg by mouth daily.  Refills: 0               Where to get your medicines        These medications were sent to [facility] [place], [place] - [address] [address] [address]      Phone: [phone]   senna-docusate 8.6-50 MG tablet       Some of these will need a paper prescription and others can be bought over the counter. Ask your nurse if you have questions.    Bring a paper prescription for each of these  medications  HYDROcodone-acetaminophen 5-325 MG tablet        Discharge diet: cardiac diet    Discharge activity: Activity as tolerated    Discharge follow-up:    Follow up with primary care provider within one week or earlier if symptoms return or gets worse.    Follow up with consultant as instructed.    Pending Results     Unresulted Labs Ordered in the Past 30 Days of this Admission       Date and Time Order Name Status Description    1/19/2025 10:31 AM Fungal or Yeast Culture Routine Preliminary     1/19/2025 10:31 AM Anaerobic Bacterial Culture Routine Preliminary     1/19/2025 10:29 AM Fungal or Yeast Culture Routine Preliminary     1/19/2025 10:29 AM Anaerobic Bacterial Culture Routine Preliminary     1/19/2025 10:23 AM Fungal or Yeast Culture Routine Preliminary     1/19/2025 10:23 AM Anaerobic Bacterial Culture Routine Preliminary              Patient Allergies     Allergies   Allergen Reactions    Clopidogrel      Cough/emesis    Penicillins Rash    Simvastatin Diarrhea    Sulfa Antibiotics      Unsure    Xeroform [Bismuth Tribromphenate]      Unsure     Disposition   Disposition: SNF     I saw and evaluated the patient on day of discharge and  discharge instructions reviewed  and  all the patient's questions and concerns addressed. Over 30 minutes spent on discharge and coordination of discharge process for this patient.

## 2025-01-25 NOTE — PLAN OF CARE
Physical Therapy Discharge Summary     Reason for therapy discharge:    Discharged to transitional care facility.     Progress towards therapy goal(s). See goals on Care Plan in [facility] electronic health record for goal details.  Goals not met.  Barriers to achieving goals:   discharge from facility.     Therapy recommendation(s):    Continued therapy is recommended.  Rationale/Recommendations:  Patient would benefit from PT eval at TCU in order to increase strength, activity tolerance, balance and independence with mobility.    **Pt not seen by discharging therapist on this date, note written based on previous treating therapist's notes and recommendations

## 2025-01-26 LAB
BACTERIA TISS BX CULT: ABNORMAL
BACTERIA TISS BX CULT: NORMAL
BACTERIA TISS BX CULT: NORMAL

## 2025-01-27 ENCOUNTER — PATIENT OUTREACH (OUTPATIENT)
Dept: CARE COORDINATION | Facility: CLINIC | Age: 75
End: 2025-01-27

## 2025-01-27 ENCOUNTER — TRANSITIONAL CARE UNIT VISIT (OUTPATIENT)
Dept: GERIATRICS | Facility: CLINIC | Age: 75
End: 2025-01-27
Payer: COMMERCIAL

## 2025-01-27 ENCOUNTER — LAB REQUISITION (OUTPATIENT)
Dept: LAB | Facility: CLINIC | Age: 75
End: 2025-01-27
Payer: COMMERCIAL

## 2025-01-27 VITALS
HEIGHT: 68 IN | RESPIRATION RATE: 18 BRPM | TEMPERATURE: 98 F | WEIGHT: 303 LBS | SYSTOLIC BLOOD PRESSURE: 103 MMHG | OXYGEN SATURATION: 90 % | HEART RATE: 89 BPM | BODY MASS INDEX: 45.92 KG/M2 | DIASTOLIC BLOOD PRESSURE: 64 MMHG

## 2025-01-27 DIAGNOSIS — I25.5 ISCHEMIC CARDIOMYOPATHY: ICD-10-CM

## 2025-01-27 DIAGNOSIS — E66.01 MORBID OBESITY (H): ICD-10-CM

## 2025-01-27 DIAGNOSIS — R53.81 PHYSICAL DECONDITIONING: ICD-10-CM

## 2025-01-27 DIAGNOSIS — E03.9 HYPOTHYROIDISM, UNSPECIFIED TYPE: ICD-10-CM

## 2025-01-27 DIAGNOSIS — I10 ESSENTIAL HYPERTENSION: ICD-10-CM

## 2025-01-27 DIAGNOSIS — I50.42 CHRONIC COMBINED SYSTOLIC (CONGESTIVE) AND DIASTOLIC (CONGESTIVE) HEART FAILURE (H): ICD-10-CM

## 2025-01-27 DIAGNOSIS — N40.0 BENIGN PROSTATIC HYPERPLASIA, UNSPECIFIED WHETHER LOWER URINARY TRACT SYMPTOMS PRESENT: ICD-10-CM

## 2025-01-27 DIAGNOSIS — B33.8 RSV INFECTION: ICD-10-CM

## 2025-01-27 DIAGNOSIS — I89.0 LYMPHEDEMA: ICD-10-CM

## 2025-01-27 DIAGNOSIS — E78.5 DYSLIPIDEMIA: ICD-10-CM

## 2025-01-27 DIAGNOSIS — Z98.890 STATUS POST INCISION AND DRAINAGE: ICD-10-CM

## 2025-01-27 DIAGNOSIS — I50.23 ACUTE ON CHRONIC SYSTOLIC HEART FAILURE (H): ICD-10-CM

## 2025-01-27 DIAGNOSIS — I63.9 CEREBROVASCULAR ACCIDENT (CVA), UNSPECIFIED MECHANISM (H): ICD-10-CM

## 2025-01-27 DIAGNOSIS — I25.10 CORONARY ARTERY DISEASE INVOLVING NATIVE HEART, UNSPECIFIED VESSEL OR LESION TYPE, UNSPECIFIED WHETHER ANGINA PRESENT: ICD-10-CM

## 2025-01-27 DIAGNOSIS — L03.818 CELLULITIS OF OTHER SPECIFIED SITE: Primary | ICD-10-CM

## 2025-01-27 DIAGNOSIS — I87.8 VENOUS STASIS: ICD-10-CM

## 2025-01-27 PROCEDURE — 99310 SBSQ NF CARE HIGH MDM 45: CPT | Performed by: NURSE PRACTITIONER

## 2025-01-27 NOTE — LETTER
1/27/2025      Jamar Ivory  [address]  [facility] [address]        [facility] GERIATRICS    PRIMARY CARE PROVIDER AND CLINIC:  Huy Crystal MD, [address] [place] [address] / [facility] [address]  Chief Complaint   Patient presents with     Hospital F/U      [place] Medical Record Number:  [id number]  Place of Service where encounter took place:  [facility]  ([facility]) [[id number]]    Jamar Ivory  is a 75 year old  (1950), admitted to the above facility from  [facility]. Hospital stay 1/18/2025 through 1/24/2025.  HPI:      Past medical history significant for HFrEF secondary to ischemic cardiomyopathy, dyslipidemia, lymphedema, chronic venous stasis, CVA, BPH, hypothyroidism, morbid obesity      Summary of recent hospitalization:  Patient was hospitalized at [facility] from 1/18/2025 to 1/24/2025 for cellulitis, respiratory failure secondary to RSV.  Patient presented to the emergency department for evaluation of increased pain and swelling of left hand.  Of note 2 days prior to this visit in the ER patient was seen for laceration between third and fourth fingers on the left hand after he fell out of bed and it was repaired and cleaned in the emergency department.  Patient was started on ceftriaxone and vancomycin and status post I&D on 1/19/2025 for collar-button abscess.  ID consulted.  Cultures grew MRSA, recommended Vanco inpatient, changed to doxycycline at discharge.  Patient with ongoing cough and respiratory symptoms, found to have RSV.  Patient also with respiratory failure, requiring oxygen.  Hospital course also complicated by acute on chronic heart failure exacerbation, received IV diuretics and torsemide dose increased to twice daily. Discharged to TCU for physical rehabilitation and medical management.     Reviewed ER note, H&P, discharge summary, consultant notes, labs and imaging from recent hospital stay today.    Today  patient was seen for admission visit in the TCU.  He is oriented x 3, lives in [place] in an apartment with his wife.  He denies any pain.  He denies any shortness of breath, chest pain, dizziness/lightheadedness, dysuria.  Bowels are moving regularly.  He does not feel like he has any extra fluid on board currently, he does weigh himself at home, telling me that his baseline weight ranges from 279-302.  He has chronic lymphedema, his wife wraps his legs daily at home.  He reports ongoing cough that is productive, finds guaifenesin helpful.  His appetite is good.  Discussed what to expect in the TCU.    Reviewed facility EMR including medications, recent nursing progress notes, vital signs.  Discussed plan of care with nursing.    CODE STATUS/ADVANCE DIRECTIVES DISCUSSION:    ALLERGIES:   Allergies   Allergen Reactions     Clopidogrel      Cough/emesis     Penicillins Rash     Simvastatin Diarrhea     Sulfa Antibiotics      Unsure     Xeroform [Bismuth Tribromphenate]      Unsure      PAST MEDICAL HISTORY:   Past Medical History:   Diagnosis Date     Arthritis      Cerebral artery occlusion with cerebral infarction     TIA     CHF (congestive heart failure) 12/24/2018     CVA (cerebral infarction) 2012     CVD (cardiovascular disease)      Depression 1977    hospitalized     Fatty liver      Gout      h/o Concussion      Hypertension 01/17/2011     Hypothyroidism      Obesity      Spider veins      TIA (transient ischaemic attack) 2012     Vitiligo       PAST SURGICAL HISTORY:   has a past surgical history that includes appendectomy; Colonoscopy (N/A, 09/02/2016); Vasectomy; Colonoscopy (N/A, 12/27/2021); and Irrigation and debridement finger, combined (Left, 1/19/2025).  FAMILY HISTORY: family history includes Cancer in his daughter and father; Diabetes in his mother.  SOCIAL HISTORY:   reports that he has never smoked. He has never used smokeless tobacco. He reports that he does not currently use alcohol. He  reports that he does not use drugs.  Patient's living condition: lives with spouse    Post Discharge Medication Reconciliation Status:   MED REC REQUIRED  Post Medication Reconciliation Status:  Discharge medications reconciled and changed, see notes/orders     Current Outpatient Medications   Medication Sig Dispense Refill     acetaminophen (TYLENOL) 500 MG tablet Take 500-1,000 mg by mouth every 6 hours as needed for mild pain Prn for headaches       aspirin (ASA) 81 MG tablet Take 1 tablet (81 mg) by mouth daily 90 tablet 3     doxycycline hyclate (VIBRAMYCIN) 100 MG capsule Take 1 capsule (100 mg) by mouth every 12 hours for 10 days.       HYDROcodone-acetaminophen (NORCO) 5-325 MG tablet Take 1 tablet by mouth every 6 hours as needed for pain. 12 tablet 0     levothyroxine (SYNTHROID/LEVOTHROID) 112 MCG tablet TAKE 2 TABLETS (224 MCG) BY MOUTH DAILY 180 tablet 3     losartan (COZAAR) 25 MG tablet Take 1 tablet (25 mg) by mouth daily. 90 tablet 3     metoprolol succinate ER (TOPROL XL) 25 MG 24 hr tablet Take 1 tablet (25 mg) by mouth daily. 90 tablet 3     Misc Natural Products (OSTEO BI-FLEX JOINT SHIELD PO) Take 1 tablet by mouth daily.       nitroGLYcerin (NITROSTAT) 0.4 MG sublingual tablet FOR CHEST PAIN PLACE 1 TABLET UNDER THE TONGUE EVERY 5 MINUTES FOR 3 DOSES. IF SYMPTOMS PERSIST 5 MINUTES AFTER 1ST DOSE CALL 911. 25 tablet 2     rosuvastatin (CRESTOR) 20 MG tablet Take 1 tablet (20 mg) by mouth daily. 90 tablet 3     senna-docusate (SENOKOT-S/PERICOLACE) 8.6-50 MG tablet Take 1 tablet by mouth 2 times daily as needed for constipation. 12 tablet 0     tamsulosin (FLOMAX) 0.4 MG capsule Take 0.4 mg by mouth daily.       torsemide (DEMADEX) 20 MG tablet Take 1 tablet (20 mg) by mouth 2 times daily.       No current facility-administered medications for this visit.       ROS:  10 point ROS of systems including Constitutional, Eyes, Respiratory, Cardiovascular, Gastroenterology, Genitourinary,  "Integumentary, Musculoskeletal, Psychiatric were all negative except for pertinent positives noted in my HPI.    Vitals:  /64   Pulse 89   Temp 98  F (36.7  C)   Resp 18   Ht 1.727 m (5' 8\")   Wt (!) 137.4 kg (303 lb)   SpO2 (!) 90%   BMI 46.07 kg/m    Exam:  GENERAL APPEARANCE:  Alert, in NAD  HEENT: normocephalic, moist mucous membranes, nose without drainage or crusting  RESP:  respiratory effort normal, no respiratory distress, Lung sounds clear, patient is on RA  CV: auscultation of heart done, rate and rhythm regular.  ABDOMEN: obese, + bowel sounds, soft, nontender, no grimacing or guarding with palpation.  M/S:  2+ lower extremity edema  SKIN:  chronic wounds to bilateral lower extremities with scabs in place, one is covered  NEURO: moves extremities freely, speech is normal  PSYCH: oriented x 3, affect and mood normal      Lab/Diagnostic data:  Labs done in SNF are in [facility]. Please refer to them using [employer] Everywhere. and Recent labs in [facility] reviewed by me today.     ASSESSMENT/PLAN:  Cellulitis with collar-button abscess of left hand status post I&D on 1/19/2025   ID followed, cultures grew MRSA, received ceftriaxone and vancomycin, changed to doxycycline at discharge  WBC 3.2 on 1/20/2024  Denies any pain today  Plan: CBC 1/28. Continue doxycycline 100 mg twice daily x 10 days through 2/3/2025.  Continue pain management with Tylenol 500 mg every 6 hours as needed and hydrocodone/acetaminophen 5-325 every 6 hours as needed.  Max Tylenol 3 g from all sources.  Continue therapy.  Max lift 2 pounds, avoid repetitive motions with left fingers, okay for gentle range of motion.  Follow-up with surgeon per recommendations.    Acute hypoxic hypercapnic respiratory failure, resolved  RSV infection  Oxygen saturations 90-93% on RA  Respiratory status stable, with ongoing cough  Plan: Start guaifenesin PRN. Monitor respiratory status.  Continue supportive care.  Patient to remain in " transmission based precautions precautions per facility protocol.    Acute on chronic HFrEF, LVEF 25-30% per ECHO on 10/31/2024  Suspected ischemic cardiomyopathy  Lymphedema  Received IV diuresis inpatient, PTA torsemide dose increased to twice daily inpatient  Weight 301.6 lb on 1/24, leg swelling 2+ today  Plan: Continue torsemide 20 mg twice daily, metoprolol ER 25 mg daily, losartan 25 mg daily.  BMP 1/28.  2 g sodium diet. Daily weights. Notify provider with weight gain >2 lbs in a day, >5 lbs in on week.  Follow-up with cardiology on 2/11 as scheduled. Start lymph therapy    CAD  Essential hypertension  With nuclear stress test on 12/30/2024 that revealed transmural infarction in the entire inferior segment of the left ventricle  -110s  Plan: Continue torsemide 20 mg twice daily, losartan 25 mg daily, metoprolol ER 25 mg daily, nitroglycerin as needed.  Continue rosuvastatin 20 mg daily and aspirin 81 mg daily.  Follow-up with cardiology as scheduled.    Dyslipidemia  CVA  Plan: Continue aspirin 81 mg daily and rosuvastatin 20 mg daily.    Hypothyroidism  TSH 8.06 on 12/4/2024  Plan: Continue levothyroxine 224 mcg daily.  TSH at follow-up with PCP    BPH  Plan: Continue tamsulosin 0.4 mg daily.  Monitor for symptoms.    Venous stasis wounds  Plan: Continue wound care as ordered.  Nursing to update provider if worsening    At risk for Slow transit constipation  Bowels moving regularly,at risk due to opioids and immobility  Plan: Continue senna S1 tab twice daily as needed.  Monitor bowels.    Morbid obesity, BMI 46.07  Complicates care  Plan: Encourage weight loss.  Dietitian follows in the TCU.    Physical deconditioning  Secondary to recent hospitalization, medical conditions as above  Plan: Encourage participation in physical therapy/occupational therapy for strengthening and deconditioning. Discharge planning per their recommendation. Social work to assist with d/c planning.        Total time spent  with patient visit at the [facility] nursing facility was 47 minutes including patient visit and review of past records, review of admission orders, medication reconciliation, discussion with nursing staff regarding plan of care    Disclaimer: This note may contain text created using speech-recognition software and may contain unintended word substitutions.     Electronically signed by:  YENI Marsh CNP           Sincerely,        YENI Marsh CNP    Electronically signed

## 2025-01-27 NOTE — PATIENT INSTRUCTIONS
Orders  Jamar Ivory  1950  1) Start guaifenesin 400 mg q4h PRN for Cough  2) Lymph therapy consult  3) Please schedule ortho follow up appointment  4) Please add max tylenol of 3000 mg in 24 hours from all sources  5) Daily weights. Notify provider with weight gain >2 lbs in a day, >5 lbs in on week.   6) BMP, CBC 1/28. Diagnosis: leukopenia, CHF  Jayda Barajas, YENI CNP on 1/27/2025 at 9:35 AM

## 2025-01-27 NOTE — PROGRESS NOTES
Clinic Care Coordination Contact  Care Team Conversations    Situation: [facility] following patient through TCU care progression.     Background:  Hospitalized  at [facility] from 1/18/25 - 1/24/25.  History of morbid obesity BMI 46, hypothyroidism, and new-onset systolic heart failure EF 25-30% suspected to be secondary to ischemic cardiomyopathy (abnormal stress test but has not had cath) presented with worsening left hand pain and swelling after recent fall onto this extremity and found to have cellulitis with collar button abscess s/p I&D. Hospital course complicated by respiratory failure from RSV infection and acute on chronic heart failure     Assessment: Discharged to [facility]    Plan/Recommendations: [facility] will send TCU Care Team Care Management hand in communication and request involvement in discharge planning and coordination of care.      MERY Ozuna Care Coordination Team  [phone]

## 2025-01-28 LAB
ANION GAP SERPL CALCULATED.3IONS-SCNC: 12 MMOL/L (ref 7–15)
BASOPHILS # BLD AUTO: 0.1 10E3/UL (ref 0–0.2)
BASOPHILS NFR BLD AUTO: 1 %
BUN SERPL-MCNC: 22.7 MG/DL (ref 8–23)
CALCIUM SERPL-MCNC: 8.9 MG/DL (ref 8.8–10.4)
CHLORIDE SERPL-SCNC: 100 MMOL/L (ref 98–107)
CREAT SERPL-MCNC: 0.99 MG/DL (ref 0.67–1.17)
EGFRCR SERPLBLD CKD-EPI 2021: 79 ML/MIN/1.73M2
EOSINOPHIL # BLD AUTO: 0.6 10E3/UL (ref 0–0.7)
EOSINOPHIL NFR BLD AUTO: 13 %
ERYTHROCYTE [DISTWIDTH] IN BLOOD BY AUTOMATED COUNT: 13.8 % (ref 10–15)
GLUCOSE SERPL-MCNC: 94 MG/DL (ref 70–99)
HCO3 SERPL-SCNC: 27 MMOL/L (ref 22–29)
HCT VFR BLD AUTO: 35.3 % (ref 40–53)
HGB BLD-MCNC: 11.4 G/DL (ref 13.3–17.7)
IMM GRANULOCYTES # BLD: 0 10E3/UL
IMM GRANULOCYTES NFR BLD: 0 %
LYMPHOCYTES # BLD AUTO: 1.2 10E3/UL (ref 0.8–5.3)
LYMPHOCYTES NFR BLD AUTO: 25 %
MCH RBC QN AUTO: 28.2 PG (ref 26.5–33)
MCHC RBC AUTO-ENTMCNC: 32.3 G/DL (ref 31.5–36.5)
MCV RBC AUTO: 87 FL (ref 78–100)
MONOCYTES # BLD AUTO: 0.6 10E3/UL (ref 0–1.3)
MONOCYTES NFR BLD AUTO: 12 %
NEUTROPHILS # BLD AUTO: 2.4 10E3/UL (ref 1.6–8.3)
NEUTROPHILS NFR BLD AUTO: 48 %
NRBC # BLD AUTO: 0 10E3/UL
NRBC BLD AUTO-RTO: 0 /100
PLATELET # BLD AUTO: 254 10E3/UL (ref 150–450)
POTASSIUM SERPL-SCNC: 3.7 MMOL/L (ref 3.4–5.3)
RBC # BLD AUTO: 4.04 10E6/UL (ref 4.4–5.9)
SODIUM SERPL-SCNC: 139 MMOL/L (ref 135–145)
WBC # BLD AUTO: 4.9 10E3/UL (ref 4–11)

## 2025-01-28 PROCEDURE — 85004 AUTOMATED DIFF WBC COUNT: CPT | Performed by: NURSE PRACTITIONER

## 2025-01-28 PROCEDURE — 80048 BASIC METABOLIC PNL TOTAL CA: CPT | Performed by: NURSE PRACTITIONER

## 2025-01-28 PROCEDURE — 36415 COLL VENOUS BLD VENIPUNCTURE: CPT | Performed by: NURSE PRACTITIONER

## 2025-01-28 PROCEDURE — P9604 ONE-WAY ALLOW PRORATED TRIP: HCPCS | Performed by: NURSE PRACTITIONER

## 2025-01-30 DIAGNOSIS — L03.114 CELLULITIS OF LEFT HAND: ICD-10-CM

## 2025-01-30 LAB
BACTERIA TISS BX CULT: NORMAL

## 2025-01-30 RX ORDER — HYDROCODONE BITARTRATE AND ACETAMINOPHEN 5; 325 MG/1; MG/1
1 TABLET ORAL EVERY 6 HOURS PRN
Qty: 12 TABLET | Refills: 0 | Status: SHIPPED | OUTPATIENT
Start: 2025-01-30

## 2025-01-31 PROBLEM — L97.912 SKIN ULCER OF RIGHT LOWER LEG WITH FAT LAYER EXPOSED (H): Status: RESOLVED | Noted: 2020-02-10 | Resolved: 2025-01-31

## 2025-02-05 ENCOUNTER — DISCHARGE SUMMARY NURSING HOME (OUTPATIENT)
Dept: GERIATRICS | Facility: CLINIC | Age: 75
End: 2025-02-05
Payer: COMMERCIAL

## 2025-02-05 VITALS
DIASTOLIC BLOOD PRESSURE: 67 MMHG | SYSTOLIC BLOOD PRESSURE: 121 MMHG | WEIGHT: 285.8 LBS | RESPIRATION RATE: 17 BRPM | OXYGEN SATURATION: 93 % | HEIGHT: 68 IN | BODY MASS INDEX: 43.32 KG/M2 | HEART RATE: 68 BPM | TEMPERATURE: 98 F

## 2025-02-05 DIAGNOSIS — I10 ESSENTIAL HYPERTENSION: ICD-10-CM

## 2025-02-05 DIAGNOSIS — E78.5 DYSLIPIDEMIA: ICD-10-CM

## 2025-02-05 DIAGNOSIS — I25.10 CORONARY ARTERY DISEASE INVOLVING NATIVE HEART, UNSPECIFIED VESSEL OR LESION TYPE, UNSPECIFIED WHETHER ANGINA PRESENT: ICD-10-CM

## 2025-02-05 DIAGNOSIS — I25.5 ISCHEMIC CARDIOMYOPATHY: ICD-10-CM

## 2025-02-05 DIAGNOSIS — I89.0 LYMPHEDEMA: ICD-10-CM

## 2025-02-05 DIAGNOSIS — I87.8 VENOUS STASIS: ICD-10-CM

## 2025-02-05 DIAGNOSIS — Z98.890 STATUS POST INCISION AND DRAINAGE: ICD-10-CM

## 2025-02-05 DIAGNOSIS — Z86.73 HISTORY OF CVA (CEREBROVASCULAR ACCIDENT): ICD-10-CM

## 2025-02-05 DIAGNOSIS — L03.114 CELLULITIS OF LEFT HAND: Primary | ICD-10-CM

## 2025-02-05 DIAGNOSIS — E66.01 MORBID OBESITY (H): ICD-10-CM

## 2025-02-05 DIAGNOSIS — R53.81 PHYSICAL DECONDITIONING: ICD-10-CM

## 2025-02-05 DIAGNOSIS — E03.9 HYPOTHYROIDISM, UNSPECIFIED TYPE: ICD-10-CM

## 2025-02-05 DIAGNOSIS — N40.0 BENIGN PROSTATIC HYPERPLASIA, UNSPECIFIED WHETHER LOWER URINARY TRACT SYMPTOMS PRESENT: ICD-10-CM

## 2025-02-05 PROCEDURE — 99316 NF DSCHRG MGMT 30 MIN+: CPT | Performed by: NURSE PRACTITIONER

## 2025-02-05 RX ORDER — TORSEMIDE 20 MG/1
20 TABLET ORAL DAILY
Status: SHIPPED
Start: 2025-02-05

## 2025-02-05 RX ORDER — NYSTATIN 100000 [USP'U]/G
POWDER TOPICAL 2 TIMES DAILY
COMMUNITY

## 2025-02-05 NOTE — PATIENT INSTRUCTIONS
[facility] Discharge Orders    Name: Jamar Ivory  : 1950  Planned Discharge Date: 2025  Discharged to: home    MEDICAL FOLLOW UP  Follow up with primary care provider in 1 weeks  Follow up with specialist : Follow-up with Dr. Hoffmann at [facility] 7-10 days following your surgery. Call the care coordinator at [phone] to arrange appointment or if any questions.  O clinic numbers: [place] [phone], [place] [phone]  Walk in clinic hours: 8 am - 8 pm    [facility] scheduled appointments:  Appointments in Next Year      2025 9:45 AM  LAB with [facility]  [facility] ([facility] ) [phone]     2025 10:40 AM  (Arrive by 10:35 AM)  RETURN CORE with Fabian Hightower NP  [facility] ([facility] ) [phone]     2025 2:30 PM  (Arrive by 2:15 PM)  ED/Hospital Follow Up with Huy Crystal MD  [facility] ([facility]) [phone]       FUTURE LABS: at follow up with cardiology    ORDER CHANGES:  Reduced torsemide to 20 mg daily  Discontinued norco since not using it    DISCHARGE MEDICATIONS:  The patient s pharmacy is authorized to dispense a 30-day supply of medications. Refill requests should be directed to the primary provider, Huy Crystal  Current Outpatient Medications   Medication Sig Dispense Refill    acetaminophen (TYLENOL) 500 MG tablet Take 500 mg by mouth every 6 hours as needed for mild pain. Prn for headaches      aspirin (ASA) 81 MG tablet Take 1 tablet (81 mg) by mouth daily 90 tablet 3    levothyroxine (SYNTHROID/LEVOTHROID) 112 MCG tablet TAKE 2 TABLETS (224 MCG) BY MOUTH DAILY 180 tablet 3    losartan (COZAAR) 25 MG tablet Take 1 tablet (25 mg) by mouth daily. 90 tablet 3    metoprolol succinate ER (TOPROL XL) 25 MG 24 hr tablet Take 1  tablet (25 mg) by mouth daily. 90 tablet 3    Mis Natural Products (OSTEO BI-FLEX JOINT SHIELD PO) Take 1 tablet by mouth daily.      nitroGLYcerin (NITROSTAT) 0.4 MG sublingual tablet FOR CHEST PAIN PLACE 1 TABLET UNDER THE TONGUE EVERY 5 MINUTES FOR 3 DOSES. IF SYMPTOMS PERSIST 5 MINUTES AFTER 1ST DOSE CALL 911. 25 tablet 2    nystatin (MYCOSTATIN) 463602 UNIT/GM external powder Apply topically 2 times daily. X14 days through 2/14/2025      rosuvastatin (CRESTOR) 20 MG tablet Take 1 tablet (20 mg) by mouth daily. 90 tablet 3    senna-docusate (SENOKOT-S/PERICOLACE) 8.6-50 MG tablet Take 1 tablet by mouth 2 times daily as needed for constipation. 12 tablet 0    tamsulosin (FLOMAX) 0.4 MG capsule Take 0.4 mg by mouth daily.      torsemide (DEMADEX) 20 MG tablet Take 1 tablet (20 mg) by mouth daily.         SERVICES:  Home Care:  Occupational Therapy, Physical Therapy, Registered Nurse, and Home Health Aide    ADDITIONAL INSTRUCTIONS:  Continue to follow your diet:  2 gram sodium.   Weigh yourself daily in the morning and keep a record. Call your primary clinic: a) if you are more short of breath, or b) if your weight changes more than 3 pounds in one day or more than 5 pounds in one week.   Wound care to Right lateral LE wounds: every 3 days and PRN. Spray with wound cleanser and gently pat dry. Apply 4x4 Mepilex over open wound only.   Max lift 2 lbs, avoid repetitive motions with left fingers, okay for gentle ROM  Left hand: Wash hand with chlorhexidine soap, then redress with adaptic, kerlix fluff between webspace and gauze roll and ace wrap. Complete daily    YENI Marsh CNP  This document was electronically signed on February 5, 2025

## 2025-02-05 NOTE — PROGRESS NOTES
[facility] GERIATRICS DISCHARGE SUMMARY  PATIENT'S NAME: Jamar Ivory  YOB: 1950  MEDICAL RECORD NUMBER:  [id number]  Place of Service where encounter took place:  [facility]  ([facility]) [[id number]]    PRIMARY CARE PROVIDER AND CLINIC RESPONSIBLE AFTER TRANSFER:   Huy Crystal MD, [address] [place] [address] / [facility] [id number]        Transferring providers: YENI Marsh CNP, Dr. Blanca Malloy MD  Recent Hospitalization/ED:  [facility] Hospital stay 1/18/2025 to 1/24/2025.  Date of SNF Admission: January 25, 2025  Date of SNF (anticipated) Discharge: February 07, 2025  Discharged to: home  Cognitive Scores: SLUMS 14/30  Physical Function: Standby assist for upper and lower body dressing and toileting, ambulating 40 feet with front wheel walker standby assist, transfers standby assist with 2 wheeled walker, bed mobility standby assist    CODE STATUS/ADVANCE DIRECTIVES DISCUSSION:  No CPR- Do NOT Intubate   ALLERGIES: Clopidogrel, Penicillins, Simvastatin, Sulfa antibiotics, and Xeroform [bismuth tribromphenate]    NURSING FACILITY COURSE   Medication Changes/Rationale:   Reduced torsemide to 20 mg daily  Discontinued norco since not using it    Past medical history significant for HFrEF secondary to ischemic cardiomyopathy, dyslipidemia, lymphedema, chronic venous stasis, CVA, BPH, hypothyroidism, morbid obesity        Summary of recent hospitalization:  Patient was hospitalized at [facility] from 1/18/2025 to 1/24/2025 for cellulitis, respiratory failure secondary to RSV.  Patient presented to the emergency department for evaluation of increased pain and swelling of left hand.  Of note 2 days prior to this visit in the ER patient was seen for laceration between third and fourth fingers on the left hand after he fell out of bed and it was repaired and cleaned in the emergency department.  Patient was started on ceftriaxone and vancomycin and status  post I&D on 1/19/2025 for collar-button abscess.  ID consulted.  Cultures grew MRSA, recommended Vanco inpatient, changed to doxycycline at discharge.  Patient with ongoing cough and respiratory symptoms, found to have RSV.  Patient also with respiratory failure, requiring oxygen.  Hospital course also complicated by acute on chronic heart failure exacerbation, received IV diuretics and torsemide dose increased to twice daily. Discharged to TCU for physical rehabilitation and medical management.      Summary of nursing facility stay:   Today patient was seen for discharge evaluation in the TCU.  He reports being very dry, reports drinking water lots of mouth being dry, he would like to decrease torsemide dose back down to 20 mg daily.  He has had weight loss of about 15 pounds since he has been in the TCU.  He denies shortness of breath, chest pain, dizziness/lightheadedness, dysuria.  Reports bowels are moving regularly.  He denies any pain to his left hand.  He denies fever/chills.  He tells me he has follow-up scheduled with Ortho next Thursday.  We discussed follow-up with cardiology and PCP.  Patient to continue therapy through home care.    Specific problems addressed in the TCU:  Cellulitis with collar-button abscess of left hand status post I&D on 1/19/2025   ID followed, cultures grew MRSA, received ceftriaxone and vancomycin, changed to doxycycline at discharge and completed course 2/3  Denies any pain  Plan: Discontinue norco as not using it. Continue pain management with Tylenol 500 mg every 6 hours as needed and hydrocodone/acetaminophen 5-325 every 6 hours as needed.  Max Tylenol 3 g from all sources.  Continue therapy.  Max lift 2 pounds, avoid repetitive motions with left fingers, okay for gentle range of motion.  Follow-up with surgeon per recommendations, discussed with patient and nursing facility patient needs to be seen for this appointment ASAP.     Acute hypoxic hypercapnic respiratory  failure, resolved  RSV infection, resolved  Oxygen saturations 90-96% on RA  Respiratory status is stable  Plan: Continue guaifenesin PRN. Monitor respiratory status.  Continue supportive care.  Patient to remain in transmission based precautions precautions per facility protocol.     Acute on chronic HFrEF, LVEF 25-30% per ECHO on 10/31/2024  Suspected ischemic cardiomyopathy  Lymphedema  Received IV diuresis inpatient, PTA torsemide dose increased to twice daily inpatient  Weight 285.8 lb on 2/4- weight down about 15 lb since admission, leg swelling 2+  Patient reports feeling very dry, would like to reduce torsemide dose  Plan: Reduce torsemide 20 mg daily. Continue metoprolol ER 25 mg daily, losartan 25 mg daily. BMP at follow up with cardiology next week.  2 g sodium diet. Daily weights. Notify provider with weight gain >2 lbs in a day, >5 lbs in on week.  Follow-up with cardiology on 2/11 as scheduled.     CAD  Essential hypertension  With nuclear stress test on 12/30/2024 that revealed transmural infarction in the entire inferior segment of the left ventricle  -120s  Plan: Reduce torsemide 20 mg daily as above. Continue losartan 25 mg daily, metoprolol ER 25 mg daily, nitroglycerin as needed.  Continue rosuvastatin 20 mg daily and aspirin 81 mg daily.  Follow-up with cardiology as scheduled.     Dyslipidemia  CVA  Plan: Continue aspirin 81 mg daily and rosuvastatin 20 mg daily.     Hypothyroidism  TSH 8.06 on 12/4/2024  Plan: Continue levothyroxine 224 mcg daily.  TSH at follow-up with PCP     BPH  Plan: Continue tamsulosin 0.4 mg daily.  Monitor for symptoms.     Venous stasis wounds  Plan: Continue wound care as ordered.  Nursing to update provider if worsening     At risk for Slow transit constipation  Bowels moving regularly  Plan: Continue senna S1 tab twice daily as needed.  Monitor bowels.     Morbid obesity, BMI 43.46  Complicates care  Plan: Encourage weight loss.  Dietitian follows in the  TCU.     Physical deconditioning  Secondary to recent hospitalization, medical conditions as above  Completed course of therapy in the TCU  Plan: Continue therapy through home care      Discharge Medications:  MED REC REQUIRED    Post Medication Reconciliation Status:  Discharge medications reconciled and changed, see notes/orders     Current Outpatient Medications   Medication Sig Dispense Refill    acetaminophen (TYLENOL) 500 MG tablet Take 500 mg by mouth every 6 hours as needed for mild pain. Prn for headaches      aspirin (ASA) 81 MG tablet Take 1 tablet (81 mg) by mouth daily 90 tablet 3    levothyroxine (SYNTHROID/LEVOTHROID) 112 MCG tablet TAKE 2 TABLETS (224 MCG) BY MOUTH DAILY 180 tablet 3    losartan (COZAAR) 25 MG tablet Take 1 tablet (25 mg) by mouth daily. 90 tablet 3    metoprolol succinate ER (TOPROL XL) 25 MG 24 hr tablet Take 1 tablet (25 mg) by mouth daily. 90 tablet 3    Misc Natural Products (OSTEO BI-FLEX JOINT SHIELD PO) Take 1 tablet by mouth daily.      nitroGLYcerin (NITROSTAT) 0.4 MG sublingual tablet FOR CHEST PAIN PLACE 1 TABLET UNDER THE TONGUE EVERY 5 MINUTES FOR 3 DOSES. IF SYMPTOMS PERSIST 5 MINUTES AFTER 1ST DOSE CALL 911. 25 tablet 2    nystatin (MYCOSTATIN) 900480 UNIT/GM external powder Apply topically 2 times daily. X14 days through 2/14/2025      rosuvastatin (CRESTOR) 20 MG tablet Take 1 tablet (20 mg) by mouth daily. 90 tablet 3    senna-docusate (SENOKOT-S/PERICOLACE) 8.6-50 MG tablet Take 1 tablet by mouth 2 times daily as needed for constipation. 12 tablet 0    tamsulosin (FLOMAX) 0.4 MG capsule Take 0.4 mg by mouth daily.      torsemide (DEMADEX) 20 MG tablet Take 1 tablet (20 mg) by mouth daily.          Past Medical History:   Past Medical History:   Diagnosis Date    Arthritis     Cerebral artery occlusion with cerebral infarction     TIA    CHF (congestive heart failure) 12/24/2018    CVA (cerebral infarction) 2012    CVD (cardiovascular disease)     Depression 1977  "   hospitalized    Fatty liver     Gout     h/o Concussion     Hypertension 01/17/2011    Hypothyroidism     Obesity     Spider veins     TIA (transient ischaemic attack) 2012    Vitiligo      Physical Exam:   Vitals: /67   Pulse 68   Temp 98  F (36.7  C)   Resp 17   Ht 1.727 m (5' 8\")   Wt 129.6 kg (285 lb 12.8 oz)   SpO2 93%   BMI 43.46 kg/m    BMI: Body mass index is 43.46 kg/m .  GENERAL APPEARANCE:  Alert, in NAD  HEENT: normocephalic, moist mucous membranes, nose without drainage or crusting  RESP:  respiratory effort normal, no respiratory distress, Lung sounds clear, patient is on RA  CV: auscultation of heart done, rate and rhythm regular.  ABDOMEN: + bowel sounds  M/S:   2+ lower extremity edema  SKIN: Wounds to bilateral lower legs with no signs of infection  NEURO: moves extremities freely, speech is normal  PSYCH: affect and mood normal      SNF labs: Labs done in SNF are in [facility]. Please refer to them using [employer]/Care Everywhere. and Recent labs in [facility] reviewed by me today.     DISCHARGE PLAN:  Medical Follow Up:     Follow up with primary care provider in 1 weeks  Follow up with specialist : Follow-up with Dr. Hoffmann at [facility] 7-10 days following your surgery. Call the care coordinator at [phone] to arrange appointment or if any questions.  [facility] clinic numbers: [place] [phone], [place] [phone]  Walk in clinic hours: 8 am - 8 pm    [facility] scheduled appointments:  Appointments in Next Year      Feb 11, 2025 9:45 AM  LAB with RU LAB  [facility] ([facility] ) [phone]     Feb 11, 2025 10:40 AM  (Arrive by 10:35 AM)  RETURN CORE with Fabian Hightower NP  [facility] ([facility] ) [phone]     Feb 25, 2025 2:30 PM  (Arrive by 2:15 PM)  ED/Hospital Follow Up with Huy Crystal MD  [facility] (" [facility] [phone]       Discharge Services: Home Care:  Occupational Therapy, Physical Therapy, Registered Nurse, and Home Health Aide  Discharge Instructions:   Continue to follow your diet:  2 gram sodium.   Weigh yourself daily in the morning and keep a record. Call your primary clinic: a) if you are more short of breath, or b) if your weight changes more than 3 pounds in one day or more than 5 pounds in one week.   Wound care to Right lateral LE wounds: every 3 days and PRN. Spray with wound cleanser and gently pat dry. Apply 4x4 Mepilex over open wound only.   Max lift 2 lbs, avoid repetitive motions with left fingers, okay for gentle ROM  Left hand: Wash hand with chlorhexidine soap, then redress with adaptic, kerlix fluff between webspace and gauze roll and ace wrap. Complete daily    TOTAL DISCHARGE TIME:   Greater than 30 minutes  Electronically signed by:  YENI Marsh CNP     Home care Face to Face documentation done in [facility] attached to Home care orders for [facility].

## 2025-02-05 NOTE — LETTER
2/5/2025      Jamar Ivory  [address]  [facility] [place] [facility] [place] [address]        [facility] GERIATRICS DISCHARGE SUMMARY  PATIENT'S NAME: Jamar Ivory  YOB: 1950  MEDICAL RECORD NUMBER:  [id number]  Place of Service where encounter took place:  [facility]  ([facility]) [[id number]]    PRIMARY CARE PROVIDER AND CLINIC RESPONSIBLE AFTER TRANSFER:   Huy Crystal MD, [address] [place] [id number]        Transferring providers: YENI Marsh CNP, Dr. Blanca Malloy MD  Recent Hospitalization/ED:  [facility] Hospital stay 1/18/2025 to 1/24/2025.  Date of SNF Admission: January 25, 2025  Date of SNF (anticipated) Discharge: February 07, 2025  Discharged to: home  Cognitive Scores: SLUMS 14/30  Physical Function: Standby assist for upper and lower body dressing and toileting, ambulating 40 feet with front wheel walker standby assist, transfers standby assist with 2 wheeled walker, bed mobility standby assist    CODE STATUS/ADVANCE DIRECTIVES DISCUSSION:  No CPR- Do NOT Intubate   ALLERGIES: Clopidogrel, Penicillins, Simvastatin, Sulfa antibiotics, and Xeroform [bismuth tribromphenate]    NURSING FACILITY COURSE   Medication Changes/Rationale:   Reduced torsemide to 20 mg daily  Discontinued norco since not using it    Past medical history significant for HFrEF secondary to ischemic cardiomyopathy, dyslipidemia, lymphedema, chronic venous stasis, CVA, BPH, hypothyroidism, morbid obesity        Summary of recent hospitalization:  Patient was hospitalized at [facility] from 1/18/2025 to 1/24/2025 for cellulitis, respiratory failure secondary to RSV.  Patient presented to the emergency department for evaluation of increased pain and swelling of left hand.  Of note 2 days prior to this visit in the ER patient was seen for laceration between third and fourth fingers on the left hand after he fell out of bed and it was repaired and cleaned in  the emergency department.  Patient was started on ceftriaxone and vancomycin and status post I&D on 1/19/2025 for collar-button abscess.  ID consulted.  Cultures grew MRSA, recommended Vanco inpatient, changed to doxycycline at discharge.  Patient with ongoing cough and respiratory symptoms, found to have RSV.  Patient also with respiratory failure, requiring oxygen.  Hospital course also complicated by acute on chronic heart failure exacerbation, received IV diuretics and torsemide dose increased to twice daily. Discharged to TCU for physical rehabilitation and medical management.      Summary of nursing facility stay:   Today patient was seen for discharge evaluation in the TCU.  He reports being very dry, reports drinking water lots of mouth being dry, he would like to decrease torsemide dose back down to 20 mg daily.  He has had weight loss of about 15 pounds since he has been in the TCU.  He denies shortness of breath, chest pain, dizziness/lightheadedness, dysuria.  Reports bowels are moving regularly.  He denies any pain to his left hand.  He denies fever/chills.  He tells me he has follow-up scheduled with Ortho next Thursday.  We discussed follow-up with cardiology and PCP.  Patient to continue therapy through home care.    Specific problems addressed in the TCU:  Cellulitis with collar-button abscess of left hand status post I&D on 1/19/2025   ID followed, cultures grew MRSA, received ceftriaxone and vancomycin, changed to doxycycline at discharge and completed course 2/3  Denies any pain  Plan: Discontinue norco as not using it. Continue pain management with Tylenol 500 mg every 6 hours as needed and hydrocodone/acetaminophen 5-325 every 6 hours as needed.  Max Tylenol 3 g from all sources.  Continue therapy.  Max lift 2 pounds, avoid repetitive motions with left fingers, okay for gentle range of motion.  Follow-up with surgeon per recommendations, discussed with patient and nursing facility patient needs  to be seen for this appointment ASAP.     Acute hypoxic hypercapnic respiratory failure, resolved  RSV infection, resolved  Oxygen saturations 90-96% on RA  Respiratory status is stable  Plan: Continue guaifenesin PRN. Monitor respiratory status.  Continue supportive care.  Patient to remain in transmission based precautions precautions per facility protocol.     Acute on chronic HFrEF, LVEF 25-30% per ECHO on 10/31/2024  Suspected ischemic cardiomyopathy  Lymphedema  Received IV diuresis inpatient, PTA torsemide dose increased to twice daily inpatient  Weight 285.8 lb on 2/4- weight down about 15 lb since admission, leg swelling 2+  Patient reports feeling very dry, would like to reduce torsemide dose  Plan: Reduce torsemide 20 mg daily. Continue metoprolol ER 25 mg daily, losartan 25 mg daily. BMP at follow up with cardiology next week.  2 g sodium diet. Daily weights. Notify provider with weight gain >2 lbs in a day, >5 lbs in on week.  Follow-up with cardiology on 2/11 as scheduled.     CAD  Essential hypertension  With nuclear stress test on 12/30/2024 that revealed transmural infarction in the entire inferior segment of the left ventricle  -120s  Plan: Reduce torsemide 20 mg daily as above. Continue losartan 25 mg daily, metoprolol ER 25 mg daily, nitroglycerin as needed.  Continue rosuvastatin 20 mg daily and aspirin 81 mg daily.  Follow-up with cardiology as scheduled.     Dyslipidemia  CVA  Plan: Continue aspirin 81 mg daily and rosuvastatin 20 mg daily.     Hypothyroidism  TSH 8.06 on 12/4/2024  Plan: Continue levothyroxine 224 mcg daily.  TSH at follow-up with PCP     BPH  Plan: Continue tamsulosin 0.4 mg daily.  Monitor for symptoms.     Venous stasis wounds  Plan: Continue wound care as ordered.  Nursing to update provider if worsening     At risk for Slow transit constipation  Bowels moving regularly  Plan: Continue senna S1 tab twice daily as needed.  Monitor bowels.     Morbid obesity, BMI  43.46  Complicates care  Plan: Encourage weight loss.  Dietitian follows in the TCU.     Physical deconditioning  Secondary to recent hospitalization, medical conditions as above  Completed course of therapy in the TCU  Plan: Continue therapy through home care      Discharge Medications:  MED REC REQUIRED    Post Medication Reconciliation Status:  Discharge medications reconciled and changed, see notes/orders     Current Outpatient Medications   Medication Sig Dispense Refill     acetaminophen (TYLENOL) 500 MG tablet Take 500 mg by mouth every 6 hours as needed for mild pain. Prn for headaches       aspirin (ASA) 81 MG tablet Take 1 tablet (81 mg) by mouth daily 90 tablet 3     levothyroxine (SYNTHROID/LEVOTHROID) 112 MCG tablet TAKE 2 TABLETS (224 MCG) BY MOUTH DAILY 180 tablet 3     losartan (COZAAR) 25 MG tablet Take 1 tablet (25 mg) by mouth daily. 90 tablet 3     metoprolol succinate ER (TOPROL XL) 25 MG 24 hr tablet Take 1 tablet (25 mg) by mouth daily. 90 tablet 3     Misc Natural Products (OSTEO BI-FLEX JOINT SHIELD PO) Take 1 tablet by mouth daily.       nitroGLYcerin (NITROSTAT) 0.4 MG sublingual tablet FOR CHEST PAIN PLACE 1 TABLET UNDER THE TONGUE EVERY 5 MINUTES FOR 3 DOSES. IF SYMPTOMS PERSIST 5 MINUTES AFTER 1ST DOSE CALL 911. 25 tablet 2     nystatin (MYCOSTATIN) 759912 UNIT/GM external powder Apply topically 2 times daily. X14 days through 2/14/2025       rosuvastatin (CRESTOR) 20 MG tablet Take 1 tablet (20 mg) by mouth daily. 90 tablet 3     senna-docusate (SENOKOT-S/PERICOLACE) 8.6-50 MG tablet Take 1 tablet by mouth 2 times daily as needed for constipation. 12 tablet 0     tamsulosin (FLOMAX) 0.4 MG capsule Take 0.4 mg by mouth daily.       torsemide (DEMADEX) 20 MG tablet Take 1 tablet (20 mg) by mouth daily.          Past Medical History:   Past Medical History:   Diagnosis Date     Arthritis      Cerebral artery occlusion with cerebral infarction     TIA     CHF (congestive heart failure)  "12/24/2018     CVA (cerebral infarction) 2012     CVD (cardiovascular disease)      Depression 1977    hospitalized     Fatty liver      Gout      h/o Concussion      Hypertension 01/17/2011     Hypothyroidism      Obesity      Spider veins      TIA (transient ischaemic attack) 2012     Vitiligo      Physical Exam:   Vitals: /67   Pulse 68   Temp 98  F (36.7  C)   Resp 17   Ht 1.727 m (5' 8\")   Wt 129.6 kg (285 lb 12.8 oz)   SpO2 93%   BMI 43.46 kg/m    BMI: Body mass index is 43.46 kg/m .  GENERAL APPEARANCE:  Alert, in NAD  HEENT: normocephalic, moist mucous membranes, nose without drainage or crusting  RESP:  respiratory effort normal, no respiratory distress, Lung sounds clear, patient is on RA  CV: auscultation of heart done, rate and rhythm regular.  ABDOMEN: + bowel sounds  M/S:   2+ lower extremity edema  SKIN: Wounds to bilateral lower legs with no signs of infection  NEURO: moves extremities freely, speech is normal  PSYCH: affect and mood normal      SNF labs: Labs done in SNF are in [facility]. Please refer to them using [employer]/Care Everywhere. and Recent labs in [facility] reviewed by me today.     DISCHARGE PLAN:  Medical Follow Up:     Follow up with primary care provider in 1 weeks  Follow up with specialist : Follow-up with Dr. Hoffmann at [facility] 7-10 days following your surgery. Call the care coordinator at [phone] to arrange appointment or if any questions.  [facility] clinic numbers: [place] [phone], [place] [phone]  Walk in clinic hours: 8 am - 8 pm    [facility] scheduled appointments:  Appointments in Next Year      Feb 11, 2025 9:45 AM  LAB with RU LAB  [facility] ([facility] ) [phone]     Feb 11, 2025 10:40 AM  (Arrive by 10:35 AM)  RETURN CORE with Fabian Hightower NP  [facility] ([facility] ) [phone]     Feb 25, 2025 2:30 " PM  (Arrive by 2:15 PM)  ED/Hospital Follow Up with Huy Crystal MD  [facility] ([facility]) [phone]       Discharge Services: Home Care:  Occupational Therapy, Physical Therapy, Registered Nurse, and Home Health Aide  Discharge Instructions:   Continue to follow your diet:  2 gram sodium.   Weigh yourself daily in the morning and keep a record. Call your primary clinic: a) if you are more short of breath, or b) if your weight changes more than 3 pounds in one day or more than 5 pounds in one week.   Wound care to Right lateral LE wounds: every 3 days and PRN. Spray with wound cleanser and gently pat dry. Apply 4x4 Mepilex over open wound only.   Max lift 2 lbs, avoid repetitive motions with left fingers, okay for gentle ROM  Left hand: Wash hand with chlorhexidine soap, then redress with adaptic, kerlix fluff between webspace and gauze roll and ace wrap. Complete daily    TOTAL DISCHARGE TIME:   Greater than 30 minutes  Electronically signed by:  YENI Marsh CNP     Home care Face to Face documentation done in [facility] attached to Home care orders for [facility].               Sincerely,        YENI Marsh CNP    Electronically signed

## 2025-02-06 LAB
BACTERIA TISS BX CULT: NORMAL

## 2025-02-10 DIAGNOSIS — Z09 HOSPITAL DISCHARGE FOLLOW-UP: ICD-10-CM

## 2025-02-11 ENCOUNTER — OFFICE VISIT (OUTPATIENT)
Dept: CARDIOLOGY | Facility: CLINIC | Age: 75
End: 2025-02-11
Payer: COMMERCIAL

## 2025-02-11 ENCOUNTER — LAB (OUTPATIENT)
Dept: LAB | Facility: CLINIC | Age: 75
End: 2025-02-11
Payer: COMMERCIAL

## 2025-02-11 VITALS
WEIGHT: 291.7 LBS | BODY MASS INDEX: 45.78 KG/M2 | HEIGHT: 67 IN | HEART RATE: 80 BPM | OXYGEN SATURATION: 96 % | DIASTOLIC BLOOD PRESSURE: 72 MMHG | SYSTOLIC BLOOD PRESSURE: 126 MMHG

## 2025-02-11 DIAGNOSIS — I50.22 CHRONIC HFREF (HEART FAILURE WITH REDUCED EJECTION FRACTION) (H): Primary | ICD-10-CM

## 2025-02-11 DIAGNOSIS — I10 ESSENTIAL HYPERTENSION: ICD-10-CM

## 2025-02-11 DIAGNOSIS — E78.5 DYSLIPIDEMIA: ICD-10-CM

## 2025-02-11 DIAGNOSIS — I25.5 ISCHEMIC CARDIOMYOPATHY: ICD-10-CM

## 2025-02-11 DIAGNOSIS — I50.40 COMBINED SYSTOLIC AND DIASTOLIC HEART FAILURE, UNSPECIFIED HF CHRONICITY (H): ICD-10-CM

## 2025-02-11 DIAGNOSIS — I25.119 CORONARY ARTERY DISEASE INVOLVING NATIVE CORONARY ARTERY OF NATIVE HEART WITH ANGINA PECTORIS: ICD-10-CM

## 2025-02-11 DIAGNOSIS — R94.39 ABNORMAL CARDIOVASCULAR STRESS TEST: ICD-10-CM

## 2025-02-11 LAB
ANION GAP SERPL CALCULATED.3IONS-SCNC: 13 MMOL/L (ref 7–15)
BUN SERPL-MCNC: 14.3 MG/DL (ref 8–23)
CALCIUM SERPL-MCNC: 9.4 MG/DL (ref 8.8–10.4)
CHLORIDE SERPL-SCNC: 106 MMOL/L (ref 98–107)
CREAT SERPL-MCNC: 0.9 MG/DL (ref 0.67–1.17)
EGFRCR SERPLBLD CKD-EPI 2021: 89 ML/MIN/1.73M2
GLUCOSE SERPL-MCNC: 103 MG/DL (ref 70–99)
HCO3 SERPL-SCNC: 24 MMOL/L (ref 22–29)
POTASSIUM SERPL-SCNC: 4.1 MMOL/L (ref 3.4–5.3)
SODIUM SERPL-SCNC: 143 MMOL/L (ref 135–145)

## 2025-02-11 RX ORDER — SPIRONOLACTONE 25 MG/1
25 TABLET ORAL DAILY
Qty: 90 TABLET | Refills: 3 | Status: SHIPPED | OUTPATIENT
Start: 2025-02-11

## 2025-02-11 NOTE — PROGRESS NOTES
Cardiology Clinic Progress Note    [facility] Clinic Visit ([facility])    Service Date: February 11, 2025  Primary Cardiology Team: Dr. Chand    HPI:   I had the pleasure of seeing Jamar Ivory in the clinic today. He is a very pleasant 75 year old male with a past medical history notable for chronic combined systolic/diastolic heart failure (LVEF 45-50%), hypertension, obesity, hypothyroidism, old history of stroke, dyslipidemia, and type 2 diabetes mellitus.    Mr. Ivory established care with cardiology in May 2019 with Dr. Chand after developing swelling in his lower extremities associated with weight gain in the setting of medication noncompliance. Patient noted to have a known nonischemic cardiomyopathy with no significant coronary artery disease noted on coronary angiogram in 12/2011. In 2016, the patient was evaluated for nonsustained VT at an outside facility, and a nuclear stress test showed no ischemic with an EF 45-50%.     He was hospitalized in February 2021 due to chest pain and increased shortness of breath. Heart failure noted to be compensated. Echocardiogram was completed which showed no significant change from prior with LVEF 45-50%. Nuclear stress test showed small area of nontransmural infarction in the inferoapical segments of the left ventricle associated with a mild degree of teo-infarct ischemia. Cardiology was consulted recommended medical management of abnormal stress test. He was discharged on all prior to admission medications with no changes.      The patient was then lost to follow up with cardiology for a number of years until August 2023. He had no showed or cancelled/rescheduled numerous appointments prior to this. He came in for an echocardiogram in October 2023 which demonstrated a decline in left ventricular function with EF 25-30% with a moderately dilated LV and severe global hypokinesia of the left ventricle. Grade I or early diastolic  dysfunction is also noted. The prior study was in 2021 showing EF 45-50% at that time. Findings were reviewed by Dr. Chand who recommended follow up for continued optimization of GDMT.     A Lexiscan nuclear stress test was also subsequently completed on 12/21/24 showing transmural infarction in the entire inferior segments of the left ventricle with EF 24%. On a prior study from 02/2021, inferoapical infarct with mild teo-infarct ischemia was noted with LVEF of 41% at rest.  Findings were reviewed by Dr. Chand recommended follow-up in the clinic to discuss optimization of GDMT for his cardiomyopathy later consideration for further ischemic evaluation with coronary angiography and possible RHC.     I saw  in follow up in the clinic on 1/7/25 to re-establish care in the [facility] for management of his cardiomyopathy and in review of his recent test results. We discussed the option of coronary angiogram and possible PCI for further evaluation of obstructive CAD contributing to the drop in his ejection fraction stress test as aligned above. However, given transmural infarction noted on the nuclear study with no evidence of ischemia we reviewed that it is unclear if his EF would improve from PCI or stenting or if this would be of benefit versus continued medical management. After discussion, he expressed preference for more conservative medical management declines to pursue this. He was not been having frequent symptoms of chest pain or any clear angina. I recommended restarting on losartan 25 mg daily and metoprolol succinate 25 mg daily which he had tolerated previously but stopped for some time, and adding rosuvastatin 20 mg once daily.     In the interim, the patient was admitted to [facility] on 1/18/25 after being found to have cellulitis with collar button abscess of the left hand. His hospital course was also complicated by acute respiratory failure from RSV infection and  "acute HFrEF. He was diuresed with IV lasix and his torsemide dose was increased to 20 mg BID at discharge. His weight on the day of discharge was documented at 303 lbs. he was discharged initially to TCU and his weight trended down significantly around 15 pounds further after increasing torsemide and there was concern that he was getting \"dry\" so torsemide was later reduced back to the 20 mg once daily dosing.    Today, Kenneth presents to the clinic in follow up.  He tells me he has been feeling quite well since he has been home from the hospital.  He feels that he is sleeping much better and his previous symptoms of likely orthopnea and shortness of breath have improved with the diuresis.  His weight has continued to trend down to 291 pounds in clinic today.  He feels his leg swelling is much improved as well.  He otherwise denies any symptoms of chest pain, palpitations, dizziness, or lightheadedness.  Labs were checked prior to the visit today with basic metabolic panel showing stable electrolytes and renal function with potassium level 4.1 and creatinine at 0.90.    ASSESSMENT:  1.  Chronic combined systolic and diastolic heart failure, nonischemic cardiomyopathy   - LVEF previously 45-50% with grade I or early diastolic dysfunction, more recently with decline in his EF to 25 to 30%  - NYHA class III, stage C  - Etiology: Previously felt to be nonischemic, but now with largely area of inferior infarction noted on a nuclear stress test on 12/21/24.  - Diuretic regimen: Torsemide 20 mg twice daily with PRN metolazone when instructed by [facility].   - Ischemic evaluation: 12/2011 normal coronary angiogram   - Volume status: Unclear due to lack of follow-up recently, but appears reasonably well compensated on exam today at a weight of 291 pounds with reported improvement in his prior symptoms of orthopnea, GOMEZ, and lower extremity edema.    Guideline directed medical therapy:  - Beta blocker: metoprolol succinate 25 " mg daily.  - ACEI/ARB/ARNI: losartan 25 mg daily (Entresto appears cost preoperative)  - MRA: none currently - Will restart spironolactone 25 mg once daily as noted below. He previously tolerated spironolactone at 50 mg once daily without issues.  - SGLT2 inhibitor: none currently--appears cost prohibitive on recent cost/coverage check.    2. Hypertension   3. Obesity , BMI 47   4. Dyslipidemia, previously on atorvastatin 20 mg daily. Not currently on a statin for unclear reasons beyond not wanting to take pills. Most recent  in 11/2023.  5. Depression, previously on prozac and followed with psychiatry.     PLAN:  - Recommend he start on spironolactone 25 mg once daily for further optimization of GDMT for his cardiomyopathy with plan for repeat BMP in about 1 week.   - Counseled on the importance of adhering to his medications and follow up, checking daily weights, and trying to stick to a low sodium diet (under 2,000 mg/day). Reviewed signs/symptoms of fluid retention to monitor for and when to contact the clinic.  - Follow-up with Dr. Chand in about 3 months with repeat echocardiogram beforehand for reassessment of his EF after initiation of GDMT for the cardiomyopathy.  If his EF remains at or below 30-35% at that time, would consider referral to electrophysiology for consideration of possible ICD placement for SCD prophylaxis if this aligns with the patient's goals/wishes.    Thank you for the opportunity to participate in this patient's care. We would be happy to see him sooner if needed for any concerns in the meantime.    30 total minutes was spent today including chart review, precharting, history and exam, post visit documentation, and reviewing studies as outlined above.     Please kindly note that this document was completed in part using voice recognition software. Although reviewed after completion, some word substitutions and typographical errors may occur. Please contact me if clarification  is needed.     YENI Rizvi, CNP   Nurse Practitioner  [facility]    Orders this Visit:  Orders Placed This Encounter   Procedures    Basic metabolic panel    Follow-Up with Cardiology    Echocardiogram Complete     Orders Placed This Encounter   Medications    spironolactone (ALDACTONE) 25 MG tablet     Sig: Take 1 tablet (25 mg) by mouth daily.     Dispense:  90 tablet     Refill:  3     There are no discontinued medications.  Encounter Diagnoses   Name Primary?    Chronic HFrEF (heart failure with reduced ejection fraction) (H) Yes    Ischemic cardiomyopathy        CURRENT MEDICATIONS:  Current Outpatient Medications   Medication Sig Dispense Refill    spironolactone (ALDACTONE) 25 MG tablet Take 1 tablet (25 mg) by mouth daily. 90 tablet 3    acetaminophen (TYLENOL) 500 MG tablet Take 500 mg by mouth every 6 hours as needed for mild pain. Prn for headaches      aspirin (ASA) 81 MG tablet Take 1 tablet (81 mg) by mouth daily 90 tablet 3    levothyroxine (SYNTHROID/LEVOTHROID) 112 MCG tablet TAKE 2 TABLETS (224 MCG) BY MOUTH DAILY 180 tablet 3    losartan (COZAAR) 25 MG tablet Take 1 tablet (25 mg) by mouth daily. 90 tablet 3    metoprolol succinate ER (TOPROL XL) 25 MG 24 hr tablet Take 1 tablet (25 mg) by mouth daily. 90 tablet 3    Misc Natural Products (OSTEO BI-FLEX JOINT SHIELD PO) Take 1 tablet by mouth daily.      nitroGLYcerin (NITROSTAT) 0.4 MG sublingual tablet FOR CHEST PAIN PLACE 1 TABLET UNDER THE TONGUE EVERY 5 MINUTES FOR 3 DOSES. IF SYMPTOMS PERSIST 5 MINUTES AFTER 1ST DOSE CALL 911. 25 tablet 2    nystatin (MYCOSTATIN) 655284 UNIT/GM external powder Apply topically 2 times daily. X14 days through 2/14/2025      rosuvastatin (CRESTOR) 20 MG tablet Take 1 tablet (20 mg) by mouth daily. 90 tablet 3    senna-docusate (SENOKOT-S/PERICOLACE) 8.6-50 MG tablet Take 1 tablet by mouth 2 times daily as needed for constipation. 12 tablet 0    tamsulosin (FLOMAX) 0.4 MG capsule Take 0.4  mg by mouth daily.      torsemide (DEMADEX) 20 MG tablet Take 1 tablet (20 mg) by mouth daily.       ALLERGIES  Allergies   Allergen Reactions    Clopidogrel      Cough/emesis    Penicillins Rash    Simvastatin Diarrhea    Sulfa Antibiotics      Unsure    Xeroform [Bismuth Tribromphenate]      Unsure     PAST MEDICAL, SURGICAL, FAMILY HISTORY:  History was reviewed and updated as needed, see medical record.    SOCIAL HISTORY:  Social History     Socioeconomic History    Marital status:      Spouse name: Melissa    Number of children: 3    Years of education: Not on file    Highest education level: Not on file   Occupational History    Occupation:      Employer: [employer]   Tobacco Use    Smoking status: Never    Smokeless tobacco: Never   Vaping Use    Vaping status: Never Used   Substance and Sexual Activity    Alcohol use: Not Currently    Drug use: No    Sexual activity: Yes     Partners: Female   Other Topics Concern    Parent/sibling w/ CABG, MI or angioplasty before 65F 55M? No   Social History Narrative    Not on file     Social Drivers of Health     Financial Resource Strain: Low Risk  (1/18/2025)    Financial Resource Strain     Within the past 12 months, have you or your family members you live with been unable to get utilities (heat, electricity) when it was really needed?: No   Food Insecurity: Low Risk  (1/18/2025)    Food Insecurity     Within the past 12 months, did you worry that your food would run out before you got money to buy more?: No     Within the past 12 months, did the food you bought just not last and you didn t have money to get more?: No   Transportation Needs: Low Risk  (1/18/2025)    Transportation Needs     Within the past 12 months, has lack of transportation kept you from medical appointments, getting your medicines, non-medical meetings or appointments, work, or from getting things that you need?: No   Physical Activity: Not on file   Stress: Not on file  "  Social Connections: Not on file   Interpersonal Safety: Low Risk  (1/18/2025)    Interpersonal Safety     Do you feel physically and emotionally safe where you currently live?: Yes     Within the past 12 months, have you been hit, slapped, kicked or otherwise physically hurt by someone?: No     Within the past 12 months, have you been humiliated or emotionally abused in other ways by your partner or ex-partner?: No   Housing Stability: Low Risk  (1/18/2025)    Housing Stability     Do you have housing? : Yes     Are you worried about losing your housing?: No     Review of Systems:  Focused cardiovascular and respiratory review of systems is negative other than the symptoms noted above in the HPI.     Physical Exam:  Vitals: /72 (BP Location: Right arm, Patient Position: Sitting, Cuff Size: Adult Large)   Pulse 80   Ht 1.702 m (5' 7\")   Wt 132.3 kg (291 lb 11.2 oz)   SpO2 96%   BMI 45.69 kg/m     Wt Readings from Last 4 Encounters:   02/11/25 132.3 kg (291 lb 11.2 oz)   02/05/25 129.6 kg (285 lb 12.8 oz)   01/31/25 132.4 kg (291 lb 12.8 oz)   01/24/25 (!) 137.4 kg (303 lb)     CONSTITUTIONAL: Alert and in no acute distress.  NECK: Challenging to visualize JVP due to body habitus.  CARDIOVASCULAR:  Regular rate and rhythm. No murmur, rub or gallop.   RESPIRATORY: Breathing non-labored. Lungs are clear to auscultation with no wheezes or crackles bilaterally.  GASTROINTESTINAL: Abdomen non-distended.  EXTREMITIES: Trace nonpitting lower extremity edema in the ankles bilaterally.  Legs currently wrapped. Hemosiderin deposits and venous stasis changes noted.   NEURO/PSYCHIATRIC: No gross focal deficits. Affect appropriate. Mentation normal.     Recent Lab Results:  LIPID RESULTS:  Lab Results   Component Value Date    CHOL 202 (H) 11/22/2023    CHOL 183 01/04/2021    HDL 42 11/22/2023    HDL 38 (L) 01/04/2021     (H) 11/22/2023     (H) 01/04/2021    TRIG 185 (H) 11/22/2023    TRIG 138 01/04/2021 "    CHOLHDLRATIO 3.9 04/27/2015     LIVER ENZYME RESULTS:  Lab Results   Component Value Date    AST 22 01/18/2025    AST 21 06/24/2021    ALT 15 01/18/2025    ALT 29 06/24/2021     CBC RESULTS:  Lab Results   Component Value Date    WBC 3.2 (L) 01/20/2025    WBC 6.7 03/29/2021    RBC 4.08 (L) 01/20/2025    RBC 4.48 03/29/2021    HGB 11.6 (L) 01/20/2025    HGB 13.1 (L) 03/29/2021    HCT 35.8 (L) 01/20/2025    HCT 38.9 (L) 03/29/2021    MCV 88 01/20/2025    MCV 87 03/29/2021    MCH 28.4 01/20/2025    MCH 29.2 03/29/2021    MCHC 32.4 01/20/2025    MCHC 33.7 03/29/2021    RDW 14.3 01/20/2025    RDW 14.9 03/29/2021     01/20/2025     03/29/2021     BMP RESULTS:  Lab Results   Component Value Date     02/11/2025     06/24/2021    POTASSIUM 4.1 02/11/2025    POTASSIUM 3.9 07/26/2022    POTASSIUM 4.0 06/24/2021    CHLORIDE 106 02/11/2025    CHLORIDE 102 07/26/2022    CHLORIDE 108 06/24/2021    CO2 24 02/11/2025    CO2 26 07/26/2022    CO2 31 06/24/2021    ANIONGAP 13 02/11/2025    ANIONGAP 6 07/26/2022    ANIONGAP 1 (L) 06/24/2021     (H) 02/11/2025    GLC 94 01/18/2025    GLC 99 07/26/2022     (H) 06/24/2021    BUN 14.3 02/11/2025    BUN 23 07/26/2022    BUN 14 06/24/2021    CR 0.90 02/11/2025    CR 0.93 06/24/2021    GFRESTIMATED 89 02/11/2025    GFRESTIMATED 82 06/24/2021    GFRESTBLACK >90 06/24/2021    ANGEL 9.4 02/11/2025    ANGEL 9.7 06/24/2021      A1C RESULTS:  Lab Results   Component Value Date    A1C 5.7 (H) 11/22/2023    A1C 5.7 (H) 01/04/2021     CC  Ezequiel Chand MD  [id number]  [place] [id number]

## 2025-02-11 NOTE — LETTER
2/11/2025    Huy Crystal MD  [address] [facility] [address]    RE: Jamar Ivory       Dear Colleague,     I had the pleasure of seeing Jamar Ivory in the [facility].    Cardiology Clinic Progress Note    C.O.R.E. Clinic Visit (Heart Failure Specialty Clinic)    Service Date: February 11, 2025  Primary Cardiology Team: Dr. Chand    HPI:   I had the pleasure of seeing Jaamr Ivory in the clinic today. He is a very pleasant 75 year old male with a past medical history notable for chronic combined systolic/diastolic heart failure (LVEF 45-50%), hypertension, obesity, hypothyroidism, old history of stroke, dyslipidemia, and type 2 diabetes mellitus.    Mr. Ivory established care with cardiology in May 2019 with Dr. Chand after developing swelling in his lower extremities associated with weight gain in the setting of medication noncompliance. Patient noted to have a known nonischemic cardiomyopathy with no significant coronary artery disease noted on coronary angiogram in 12/2011. In 2016, the patient was evaluated for nonsustained VT at an outside facility, and a nuclear stress test showed no ischemic with an EF 45-50%.     He was hospitalized in February 2021 due to chest pain and increased shortness of breath. Heart failure noted to be compensated. Echocardiogram was completed which showed no significant change from prior with LVEF 45-50%. Nuclear stress test showed small area of nontransmural infarction in the inferoapical segments of the left ventricle associated with a mild degree of teo-infarct ischemia. Cardiology was consulted recommended medical management of abnormal stress test. He was discharged on all prior to admission medications with no changes.      The patient was then lost to follow up with cardiology for a number of years until August 2023. He had no showed or cancelled/rescheduled numerous appointments prior to this. He came in for an  echocardiogram in October 2023 which demonstrated a decline in left ventricular function with EF 25-30% with a moderately dilated LV and severe global hypokinesia of the left ventricle. Grade I or early diastolic dysfunction is also noted. The prior study was in 2021 showing EF 45-50% at that time. Findings were reviewed by Dr. Chand who recommended follow up for continued optimization of GDMT.     A Lexiscan nuclear stress test was also subsequently completed on 12/21/24 showing transmural infarction in the entire inferior segments of the left ventricle with EF 24%. On a prior study from 02/2021, inferoapical infarct with mild teo-infarct ischemia was noted with LVEF of 41% at rest.  Findings were reviewed by Dr. Chand recommended follow-up in the clinic to discuss optimization of GDMT for his cardiomyopathy later consideration for further ischemic evaluation with coronary angiography and possible RHC.     I saw  in follow up in the clinic on 1/7/25 to re-establish care in the [facility] for management of his cardiomyopathy and in review of his recent test results. We discussed the option of coronary angiogram and possible PCI for further evaluation of obstructive CAD contributing to the drop in his ejection fraction stress test as aligned above. However, given transmural infarction noted on the nuclear study with no evidence of ischemia we reviewed that it is unclear if his EF would improve from PCI or stenting or if this would be of benefit versus continued medical management. After discussion, he expressed preference for more conservative medical management declines to pursue this. He was not been having frequent symptoms of chest pain or any clear angina. I recommended restarting on losartan 25 mg daily and metoprolol succinate 25 mg daily which he had tolerated previously but stopped for some time, and adding rosuvastatin 20 mg once daily.     In the interim, the patient was admitted to   "[facility] on 1/18/25 after being found to have cellulitis with collar button abscess of the left hand. His hospital course was also complicated by acute respiratory failure from RSV infection and acute HFrEF. He was diuresed with IV lasix and his torsemide dose was increased to 20 mg BID at discharge. His weight on the day of discharge was documented at 303 lbs. he was discharged initially to TCU and his weight trended down significantly around 15 pounds further after increasing torsemide and there was concern that he was getting \"dry\" so torsemide was later reduced back to the 20 mg once daily dosing.    Today, Kenneth presents to the clinic in follow up.  He tells me he has been feeling quite well since he has been home from the hospital.  He feels that he is sleeping much better and his previous symptoms of likely orthopnea and shortness of breath have improved with the diuresis.  His weight has continued to trend down to 291 pounds in clinic today.  He feels his leg swelling is much improved as well.  He otherwise denies any symptoms of chest pain, palpitations, dizziness, or lightheadedness.  Labs were checked prior to the visit today with basic metabolic panel showing stable electrolytes and renal function with potassium level 4.1 and creatinine at 0.90.    ASSESSMENT:  1.  Chronic combined systolic and diastolic heart failure, nonischemic cardiomyopathy   - LVEF previously 45-50% with grade I or early diastolic dysfunction, more recently with decline in his EF to 25 to 30%  - NYHA class III, stage C  - Etiology: Previously felt to be nonischemic, but now with largely area of inferior infarction noted on a nuclear stress test on 12/21/24.  - Diuretic regimen: Torsemide 20 mg twice daily with PRN metolazone when instructed by [facility].   - Ischemic evaluation: 12/2011 normal coronary angiogram   - Volume status: Unclear due to lack of follow-up recently, but appears reasonably well " compensated on exam today at a weight of 291 pounds with reported improvement in his prior symptoms of orthopnea, GOMEZ, and lower extremity edema.    Guideline directed medical therapy:  - Beta blocker: metoprolol succinate 25 mg daily.  - ACEI/ARB/ARNI: losartan 25 mg daily (Entresto appears cost preoperative)  - MRA: none currently - Will restart spironolactone 25 mg once daily as noted below. He previously tolerated spironolactone at 50 mg once daily without issues.  - SGLT2 inhibitor: none currently--appears cost prohibitive on recent cost/coverage check.    2. Hypertension   3. Obesity , BMI 47   4. Dyslipidemia, previously on atorvastatin 20 mg daily. Not currently on a statin for unclear reasons beyond not wanting to take pills. Most recent  in 11/2023.  5. Depression, previously on prozac and followed with psychiatry.     PLAN:  - Recommend he start on spironolactone 25 mg once daily for further optimization of GDMT for his cardiomyopathy with plan for repeat BMP in about 1 week.   - Counseled on the importance of adhering to his medications and follow up, checking daily weights, and trying to stick to a low sodium diet (under 2,000 mg/day). Reviewed signs/symptoms of fluid retention to monitor for and when to contact the clinic.  - Follow-up with Dr. Chand in about 3 months with repeat echocardiogram beforehand for reassessment of his EF after initiation of GDMT for the cardiomyopathy.  If his EF remains at or below 30-35% at that time, would consider referral to electrophysiology for consideration of possible ICD placement for SCD prophylaxis if this aligns with the patient's goals/wishes.    Thank you for the opportunity to participate in this patient's care. We would be happy to see him sooner if needed for any concerns in the meantime.    30 total minutes was spent today including chart review, precharting, history and exam, post visit documentation, and reviewing studies as outlined above.      Please kindly note that this document was completed in part using voice recognition software. Although reviewed after completion, some word substitutions and typographical errors may occur. Please contact me if clarification is needed.     YENI Rizvi, CNP   Nurse Practitioner  [facility]    Orders this Visit:  Orders Placed This Encounter   Procedures     Basic metabolic panel     Follow-Up with Cardiology     Echocardiogram Complete     Orders Placed This Encounter   Medications     spironolactone (ALDACTONE) 25 MG tablet     Sig: Take 1 tablet (25 mg) by mouth daily.     Dispense:  90 tablet     Refill:  3     There are no discontinued medications.  Encounter Diagnoses   Name Primary?     Chronic HFrEF (heart failure with reduced ejection fraction) (H) Yes     Ischemic cardiomyopathy        CURRENT MEDICATIONS:  Current Outpatient Medications   Medication Sig Dispense Refill     spironolactone (ALDACTONE) 25 MG tablet Take 1 tablet (25 mg) by mouth daily. 90 tablet 3     acetaminophen (TYLENOL) 500 MG tablet Take 500 mg by mouth every 6 hours as needed for mild pain. Prn for headaches       aspirin (ASA) 81 MG tablet Take 1 tablet (81 mg) by mouth daily 90 tablet 3     levothyroxine (SYNTHROID/LEVOTHROID) 112 MCG tablet TAKE 2 TABLETS (224 MCG) BY MOUTH DAILY 180 tablet 3     losartan (COZAAR) 25 MG tablet Take 1 tablet (25 mg) by mouth daily. 90 tablet 3     metoprolol succinate ER (TOPROL XL) 25 MG 24 hr tablet Take 1 tablet (25 mg) by mouth daily. 90 tablet 3     Misc Natural Products (OSTEO BI-FLEX JOINT SHIELD PO) Take 1 tablet by mouth daily.       nitroGLYcerin (NITROSTAT) 0.4 MG sublingual tablet FOR CHEST PAIN PLACE 1 TABLET UNDER THE TONGUE EVERY 5 MINUTES FOR 3 DOSES. IF SYMPTOMS PERSIST 5 MINUTES AFTER 1ST DOSE CALL 911. 25 tablet 2     nystatin (MYCOSTATIN) 230168 UNIT/GM external powder Apply topically 2 times daily. X14 days through 2/14/2025       rosuvastatin (CRESTOR)  20 MG tablet Take 1 tablet (20 mg) by mouth daily. 90 tablet 3     senna-docusate (SENOKOT-S/PERICOLACE) 8.6-50 MG tablet Take 1 tablet by mouth 2 times daily as needed for constipation. 12 tablet 0     tamsulosin (FLOMAX) 0.4 MG capsule Take 0.4 mg by mouth daily.       torsemide (DEMADEX) 20 MG tablet Take 1 tablet (20 mg) by mouth daily.       ALLERGIES  Allergies   Allergen Reactions     Clopidogrel      Cough/emesis     Penicillins Rash     Simvastatin Diarrhea     Sulfa Antibiotics      Unsure     Xeroform [Bismuth Tribromphenate]      Unsure     PAST MEDICAL, SURGICAL, FAMILY HISTORY:  History was reviewed and updated as needed, see medical record.    SOCIAL HISTORY:  Social History     Socioeconomic History     Marital status:      Spouse name: Melissa     Number of children: 3     Years of education: Not on file     Highest education level: Not on file   Occupational History     Occupation:      Employer: [employer]   Tobacco Use     Smoking status: Never     Smokeless tobacco: Never   Vaping Use     Vaping status: Never Used   Substance and Sexual Activity     Alcohol use: Not Currently     Drug use: No     Sexual activity: Yes     Partners: Female   Other Topics Concern     Parent/sibling w/ CABG, MI or angioplasty before 65F 55M? No   Social History Narrative     Not on file     Social Drivers of Health     Financial Resource Strain: Low Risk  (1/18/2025)    Financial Resource Strain      Within the past 12 months, have you or your family members you live with been unable to get utilities (heat, electricity) when it was really needed?: No   Food Insecurity: Low Risk  (1/18/2025)    Food Insecurity      Within the past 12 months, did you worry that your food would run out before you got money to buy more?: No      Within the past 12 months, did the food you bought just not last and you didn t have money to get more?: No   Transportation Needs: Low Risk  (1/18/2025)     "Transportation Needs      Within the past 12 months, has lack of transportation kept you from medical appointments, getting your medicines, non-medical meetings or appointments, work, or from getting things that you need?: No   Physical Activity: Not on file   Stress: Not on file   Social Connections: Not on file   Interpersonal Safety: Low Risk  (1/18/2025)    Interpersonal Safety      Do you feel physically and emotionally safe where you currently live?: Yes      Within the past 12 months, have you been hit, slapped, kicked or otherwise physically hurt by someone?: No      Within the past 12 months, have you been humiliated or emotionally abused in other ways by your partner or ex-partner?: No   Housing Stability: Low Risk  (1/18/2025)    Housing Stability      Do you have housing? : Yes      Are you worried about losing your housing?: No     Review of Systems:  Focused cardiovascular and respiratory review of systems is negative other than the symptoms noted above in the HPI.     Physical Exam:  Vitals: /72 (BP Location: Right arm, Patient Position: Sitting, Cuff Size: Adult Large)   Pulse 80   Ht 1.702 m (5' 7\")   Wt 132.3 kg (291 lb 11.2 oz)   SpO2 96%   BMI 45.69 kg/m     Wt Readings from Last 4 Encounters:   02/11/25 132.3 kg (291 lb 11.2 oz)   02/05/25 129.6 kg (285 lb 12.8 oz)   01/31/25 132.4 kg (291 lb 12.8 oz)   01/24/25 (!) 137.4 kg (303 lb)     CONSTITUTIONAL: Alert and in no acute distress.  NECK: Challenging to visualize JVP due to body habitus.  CARDIOVASCULAR:  Regular rate and rhythm. No murmur, rub or gallop.   RESPIRATORY: Breathing non-labored. Lungs are clear to auscultation with no wheezes or crackles bilaterally.  GASTROINTESTINAL: Abdomen non-distended.  EXTREMITIES: Trace nonpitting lower extremity edema in the ankles bilaterally.  Legs currently wrapped. Hemosiderin deposits and venous stasis changes noted.   NEURO/PSYCHIATRIC: No gross focal deficits. Affect appropriate. " Mentation normal.     Recent Lab Results:  LIPID RESULTS:  Lab Results   Component Value Date    CHOL 202 (H) 11/22/2023    CHOL 183 01/04/2021    HDL 42 11/22/2023    HDL 38 (L) 01/04/2021     (H) 11/22/2023     (H) 01/04/2021    TRIG 185 (H) 11/22/2023    TRIG 138 01/04/2021    CHOLHDLRATIO 3.9 04/27/2015     LIVER ENZYME RESULTS:  Lab Results   Component Value Date    AST 22 01/18/2025    AST 21 06/24/2021    ALT 15 01/18/2025    ALT 29 06/24/2021     CBC RESULTS:  Lab Results   Component Value Date    WBC 3.2 (L) 01/20/2025    WBC 6.7 03/29/2021    RBC 4.08 (L) 01/20/2025    RBC 4.48 03/29/2021    HGB 11.6 (L) 01/20/2025    HGB 13.1 (L) 03/29/2021    HCT 35.8 (L) 01/20/2025    HCT 38.9 (L) 03/29/2021    MCV 88 01/20/2025    MCV 87 03/29/2021    MCH 28.4 01/20/2025    MCH 29.2 03/29/2021    MCHC 32.4 01/20/2025    MCHC 33.7 03/29/2021    RDW 14.3 01/20/2025    RDW 14.9 03/29/2021     01/20/2025     03/29/2021     BMP RESULTS:  Lab Results   Component Value Date     02/11/2025     06/24/2021    POTASSIUM 4.1 02/11/2025    POTASSIUM 3.9 07/26/2022    POTASSIUM 4.0 06/24/2021    CHLORIDE 106 02/11/2025    CHLORIDE 102 07/26/2022    CHLORIDE 108 06/24/2021    CO2 24 02/11/2025    CO2 26 07/26/2022    CO2 31 06/24/2021    ANIONGAP 13 02/11/2025    ANIONGAP 6 07/26/2022    ANIONGAP 1 (L) 06/24/2021     (H) 02/11/2025    GLC 94 01/18/2025    GLC 99 07/26/2022     (H) 06/24/2021    BUN 14.3 02/11/2025    BUN 23 07/26/2022    BUN 14 06/24/2021    CR 0.90 02/11/2025    CR 0.93 06/24/2021    GFRESTIMATED 89 02/11/2025    GFRESTIMATED 82 06/24/2021    GFRESTBLACK >90 06/24/2021    ANGEL 9.4 02/11/2025    ANGEL 9.7 06/24/2021      A1C RESULTS:  Lab Results   Component Value Date    A1C 5.7 (H) 11/22/2023    A1C 5.7 (H) 01/04/2021     CC  Ezequiel Chand MD  [address]  [place] [address]      Thank you for allowing me to participate in the care of your  patient.      Sincerely,     Fabian Hightower NP     [facility]  cc:   Fabian Hightower NP  [address] [address]

## 2025-02-11 NOTE — PATIENT INSTRUCTIONS
If you have questions or concerns please call the [facility] nurse team at [phone] or send a Xoft message (Mon-Fri 8am-4pm).  If you have concerns after hours, please call [phone], option 2 to speak with an on call Cardiologist.    Today's plan:  - Start spironolactone 25 mg once daily.  - Check non-fasting labs in about 1 week after starting spironolactone.  - Check your weights daily and try to stick to a low sodium diet (under 2,000 mg/day).  - Call the [facility] nurse team at the number listed above if you develop signs concerning for fluid retention, including weight gain of over 2 pounds in 1 day or over 5 pounds in 1 week, increasing shortness of breath, or increasing swelling in the legs or abdomen.  - Follow up with Dr. Chand in about 3 months with an echocardiogram beforehand to recheck the pumping function of the heart.    It was a pleasure to see you today!     YENI Rizvi, CNP  Nurse Practitioner  [facility]    Thank you for your visit with the [facility] today. [facility] stands for Cardiomyopathy [facility].    The [facility] is a heart failure specialty clinic within the [facility] where you will work with specialized nurse practitioners, physician assistants, doctors, and registered nurses. They are dedicated to helping patients with heart failure to carefully adjust medications, receive education, and learn who and when to call if symptoms develop. They specialize in helping you better understand your condition, slow the progression of your disease, improve the length and quality of your life, help you detect future heart problems before they become life threatening, and avoid hospitalizations.      Component      Latest Ref Rng 1/23/2025  7:25 AM 2/11/2025  10:00 AM   Sodium      135 - 145 mmol/L 135  143    Potassium      3.4 - 5.3 mmol/L 4.0  4.1    Chloride      98 - 107 mmol/L 99  106    Carbon Dioxide (CO2)       22 - 29 mmol/L 26  24    Anion Gap      7 - 15 mmol/L 10  13    Urea Nitrogen      8.0 - 23.0 mg/dL 20.3  14.3    Creatinine      0.67 - 1.17 mg/dL 0.84  0.90    GFR Estimate      >60 mL/min/1.73m2 90  89    Calcium      8.8 - 10.4 mg/dL 8.5 (L)  9.4    Glucose      70 - 99 mg/dL 114 (H)  103 (H)       Legend:  (L) Low  (H) High

## 2025-02-12 ENCOUNTER — TRANSFERRED RECORDS (OUTPATIENT)
Dept: HEALTH INFORMATION MANAGEMENT | Facility: CLINIC | Age: 75
End: 2025-02-12
Payer: COMMERCIAL

## 2025-02-12 ENCOUNTER — TELEPHONE (OUTPATIENT)
Dept: PHARMACY | Facility: OTHER | Age: 75
End: 2025-02-12
Payer: COMMERCIAL

## 2025-02-12 NOTE — TELEPHONE ENCOUNTER
MTM referral from: Transitions of Care (recent hospital discharge, TCU discharge, or ED visit)    MTM referral outreach attempt #2 on February 12, 2025 at 2:46 PM      Outcome: Spoke with patient declined    Use DES craig med adv, TCU Discharge: February 07, 2025 for the carrier/Plan on the flowsheet          Cassandra Jeff [facility]  -[facility]  [phone]

## 2025-02-13 LAB
BACTERIA TISS BX CULT: NORMAL

## 2025-02-16 LAB
BACTERIA TISS BX CULT: NO GROWTH

## 2025-02-17 ENCOUNTER — MEDICAL CORRESPONDENCE (OUTPATIENT)
Dept: HEALTH INFORMATION MANAGEMENT | Facility: CLINIC | Age: 75
End: 2025-02-17

## 2025-02-20 DIAGNOSIS — Z53.9 DIAGNOSIS NOT YET DEFINED: Primary | ICD-10-CM

## 2025-02-20 PROCEDURE — G0180 MD CERTIFICATION HHA PATIENT: HCPCS | Performed by: INTERNAL MEDICINE

## 2025-02-25 ENCOUNTER — OFFICE VISIT (OUTPATIENT)
Dept: INTERNAL MEDICINE | Facility: CLINIC | Age: 75
End: 2025-02-25
Payer: COMMERCIAL

## 2025-02-25 ENCOUNTER — PATIENT OUTREACH (OUTPATIENT)
Dept: CARE COORDINATION | Facility: CLINIC | Age: 75
End: 2025-02-25

## 2025-02-25 ENCOUNTER — MEDICAL CORRESPONDENCE (OUTPATIENT)
Dept: HEALTH INFORMATION MANAGEMENT | Facility: CLINIC | Age: 75
End: 2025-02-25

## 2025-02-25 VITALS
DIASTOLIC BLOOD PRESSURE: 73 MMHG | BODY MASS INDEX: 47.01 KG/M2 | WEIGHT: 299.5 LBS | HEIGHT: 67 IN | RESPIRATION RATE: 17 BRPM | HEART RATE: 70 BPM | OXYGEN SATURATION: 94 % | TEMPERATURE: 98 F | SYSTOLIC BLOOD PRESSURE: 116 MMHG

## 2025-02-25 DIAGNOSIS — Z12.5 SCREENING FOR PROSTATE CANCER: ICD-10-CM

## 2025-02-25 DIAGNOSIS — L03.114 CELLULITIS OF LEFT HAND: ICD-10-CM

## 2025-02-25 DIAGNOSIS — I50.42 CHRONIC COMBINED SYSTOLIC AND DIASTOLIC CONGESTIVE HEART FAILURE (H): ICD-10-CM

## 2025-02-25 DIAGNOSIS — M25.562 CHRONIC PAIN OF BOTH KNEES: ICD-10-CM

## 2025-02-25 DIAGNOSIS — Z09 HOSPITAL DISCHARGE FOLLOW-UP: Primary | ICD-10-CM

## 2025-02-25 DIAGNOSIS — M25.561 CHRONIC PAIN OF BOTH KNEES: ICD-10-CM

## 2025-02-25 DIAGNOSIS — F32.1 MODERATE MAJOR DEPRESSION (H): Chronic | ICD-10-CM

## 2025-02-25 DIAGNOSIS — I25.119 CORONARY ARTERY DISEASE INVOLVING NATIVE CORONARY ARTERY OF NATIVE HEART WITH ANGINA PECTORIS: ICD-10-CM

## 2025-02-25 DIAGNOSIS — G89.29 CHRONIC PAIN OF BOTH KNEES: ICD-10-CM

## 2025-02-25 DIAGNOSIS — E11.42 DIABETIC POLYNEUROPATHY ASSOCIATED WITH TYPE 2 DIABETES MELLITUS (H): ICD-10-CM

## 2025-02-25 PROCEDURE — 3074F SYST BP LT 130 MM HG: CPT | Performed by: INTERNAL MEDICINE

## 2025-02-25 PROCEDURE — 1111F DSCHRG MED/CURRENT MED MERGE: CPT | Performed by: INTERNAL MEDICINE

## 2025-02-25 PROCEDURE — 99214 OFFICE O/P EST MOD 30 MIN: CPT | Performed by: INTERNAL MEDICINE

## 2025-02-25 PROCEDURE — 3078F DIAST BP <80 MM HG: CPT | Performed by: INTERNAL MEDICINE

## 2025-02-25 PROCEDURE — G2211 COMPLEX E/M VISIT ADD ON: HCPCS | Performed by: INTERNAL MEDICINE

## 2025-02-25 PROCEDURE — 1125F AMNT PAIN NOTED PAIN PRSNT: CPT | Performed by: INTERNAL MEDICINE

## 2025-02-25 ASSESSMENT — PAIN SCALES - GENERAL: PAINLEVEL_OUTOF10: MODERATE PAIN (4)

## 2025-02-25 ASSESSMENT — PATIENT HEALTH QUESTIONNAIRE - PHQ9: SUM OF ALL RESPONSES TO PHQ QUESTIONS 1-9: 11

## 2025-02-25 NOTE — PROGRESS NOTES
Clinic Care Coordination Contact  Follow Up Progress Note      Assessment: Patient was discharged from TCU on 2/5/25.  Greater then 14 days so TCM episode not created. Kenneth is getting Home Care and has ED follow up visit scheduled    SW CC spoke with spouse Suzan.  She stated that he is doing well and they are not interested in exploring [facility] at this time. He will be placed on maintenance as no other goal at this time.      Care Gaps:    Health Maintenance Due   Topic Date Due    URINE DRUG SCREEN  Never done    ZOSTER IMMUNIZATION (1 of 2) Never done    EYE EXAM  05/22/2019    MICROALBUMIN  01/04/2022    DIABETIC FOOT EXAM  01/04/2022    MEDICARE ANNUAL WELLNESS VISIT  01/08/2022    HF ACTION PLAN  05/15/2022    RSV VACCINE (1 - 1-dose 75+ series) Never done    PSA  02/02/2024    A1C  05/22/2024    LIPID  11/22/2024       Intervention/Education provided during outreach: NA              Plan:   Placed on maintenance.   Care Coordinator will follow up in 2 months    MERY Ozuna Care Coordination Team  [phone]

## 2025-02-25 NOTE — PROGRESS NOTES
Assessment & Plan     Hospital discharge follow-up  Per patient and doing relatively well.  He was advised to consider updating his routine vaccines [place] as listed    Cellulitis of left hand  Stable and Patient with good functional range of motion to left hand.    Chronic combined systolic and diastolic congestive heart failure (H)  Appears overall euvolemic.  Continue with current medical management and follow-up with cardiology as directed.    Coronary artery disease involving native coronary artery of native heart with angina pectoris  Orders placed for routine labs including lipid reduction therapy  - Comprehensive metabolic panel; Future  - Lipid Profile; Future    Diabetic polyneuropathy associated with type 2 diabetes mellitus (H)  Check A1c as scheduled, patient did not want to do lab work today.  - HEMOGLOBIN A1C; Future    Moderate major depression (H)  Patient did not want to see any mental health providers.  Discussed PHQ-9 score.  He does not demonstrate any harmful thoughts or ideation to himself or others.    Screening for prostate cancer  To update routine screening but patient declined, orders placed.  - PROSTATE SPEC ANTIGEN SCREEN; Future    Chronic pain of both knees  Significantly limited pain and discomfort due to chronic pain.  He has been seen at [facility] and would like a referral for secondary assessment.  - Orthopedic  Referral; Future        MED REC REQUIRED  Post Medication Reconciliation Status: discharge medications reconciled, continue medications without change  Depression Screening Follow Up        2/25/2025     2:13 PM   PHQ   PHQ-9 Total Score 11   Q9: Thoughts of better off dead/self-harm past 2 weeks Several days         2/25/2025     2:13 PM   Last PHQ-9   1.  Little interest or pleasure in doing things 3   2.  Feeling down, depressed, or hopeless 3   3.  Trouble falling or staying asleep, or sleeping too much 0   4.  Feeling tired or having little energy 3   5.  Poor  "appetite or overeating 0   6.  Feeling bad about yourself 1   7.  Trouble concentrating 0   8.  Moving slowly or restless 0   Q9: Thoughts of better off dead/self-harm past 2 weeks 1   PHQ-9 Total Score 11   Difficulty at work, home, or with people Not difficult at all       Follow Up Actions Taken  Crisis resource information provided in the After Visit Summary  Patient declined referral.    Discussed the following ways the patient can remain in a safe environment:  be around others    See Patient Instructions    Subjective   Kenneth is a 75 year old, presenting for the following health issues:  Hospital F/U and Knee Pain    HPI     Kenneth was admitted to [facility] on 1/18/25 after being found to have cellulitis with collar button abscess of the left hand. His hospital course was also complicated by acute respiratory failure from RSV infection and acute HFrEF. He was diuresed with IV lasix and his torsemide dose was increased to 20 mg BID at discharge.  He was discharged initially to TCU and his weight trended down significantly around 15 pounds further after increasing torsemide and there was concern that he was getting \"dry\" so torsemide was later reduced back to the 20 mg once daily dosing.     He was seen and followed up in the cardiology clinic at that time feeling well since he went home from the hospital.    Ejection fraction is undergone in the 25 to 30% range and he has been defined as a New York Heart Association class III patient.    Recent renal function and basic metabolic panel done on February 11, 2025 was found to be normal.    Hospital Follow-up Visit:    Hospital/Nursing Home/ Rehab Facility: [facility]  Date of Admission: 1/18/25  Date of Discharge: 2/7/25   Reason(s) for Admission: cellulitis, respiratory failure secondary to RSV   Was the patient in the ICU or did the patient experience delirium during hospitalization? "  No  Do you have any other stressors you would like to discuss with your provider? No    Problems taking medications regularly:  None  Medication changes since discharge: noted  Problems adhering to non-medication therapy:  None    Summary of hospitalization:  [employer] [employer] discharge summary reviewed  Diagnostic Tests/Treatments reviewed.  Follow up needed: Cardiology, Orthopedics  Other Healthcare Providers Involved in Patient s Care:         None  Update since discharge: stable.         Plan of care communicated with patient     Other concerns:  Chronic knee pain. Previously received injections at [place] with only short-term relief         Current Outpatient Medications   Medication Sig Dispense Refill    acetaminophen (TYLENOL) 500 MG tablet Take 500 mg by mouth every 6 hours as needed for mild pain. Prn for headaches      aspirin (ASA) 81 MG tablet Take 1 tablet (81 mg) by mouth daily 90 tablet 3    levothyroxine (SYNTHROID/LEVOTHROID) 112 MCG tablet TAKE 2 TABLETS (224 MCG) BY MOUTH DAILY 180 tablet 3    losartan (COZAAR) 25 MG tablet Take 1 tablet (25 mg) by mouth daily. 90 tablet 3    metoprolol succinate ER (TOPROL XL) 25 MG 24 hr tablet Take 1 tablet (25 mg) by mouth daily. 90 tablet 3    Misc Natural Products (OSTEO BI-FLEX JOINT SHIELD PO) Take 1 tablet by mouth daily.      nitroGLYcerin (NITROSTAT) 0.4 MG sublingual tablet FOR CHEST PAIN PLACE 1 TABLET UNDER THE TONGUE EVERY 5 MINUTES FOR 3 DOSES. IF SYMPTOMS PERSIST 5 MINUTES AFTER 1ST DOSE CALL 911. 25 tablet 2    nystatin (MYCOSTATIN) 892368 UNIT/GM external powder Apply topically 2 times daily. X14 days through 2/14/2025      rosuvastatin (CRESTOR) 20 MG tablet Take 1 tablet (20 mg) by mouth daily. 90 tablet 3    senna-docusate (SENOKOT-S/PERICOLACE) 8.6-50 MG tablet Take 1 tablet by mouth 2 times daily as needed for constipation. 12 tablet 0    spironolactone (ALDACTONE) 25 MG tablet Take 1 tablet (25 mg) by mouth daily. 90 tablet 3     "tamsulosin (FLOMAX) 0.4 MG capsule Take 0.4 mg by mouth daily.      torsemide (DEMADEX) 20 MG tablet Take 1 tablet (20 mg) by mouth daily.       No current facility-administered medications for this visit.           Review of Systems  CONSTITUTIONAL: NEGATIVE for fever, chills, change in weight  EYES: NEGATIVE for vision changes or irritation  ENT/MOUTH: NEGATIVE for ear, mouth and throat problems  RESP: NEGATIVE for significant cough or SOB  CV: NEGATIVE for chest pain, palpitations or peripheral edema  GI: NEGATIVE for nausea, abdominal pain, heartburn, or change in bowel habits  : NEGATIVE for frequency, dysuria, or hematuria  NEURO: NEGATIVE for weakness, dizziness or paresthesias  HEME: NEGATIVE for bleeding problems        Objective    /73   Pulse 70   Temp 98  F (36.7  C) (Temporal)   Resp 17   Ht 1.702 m (5' 7\")   Wt 135.9 kg (299 lb 8 oz)   SpO2 94%   BMI 46.91 kg/m    Body mass index is 46.91 kg/m .  Physical Exam   GENERAL: alert and no distress using wheeled walker  EYES: Eyes grossly normal to inspection, PERRL and conjunctivae and sclerae normal  HENT: ear canals and TM's normal, nose and mouth without ulcers or lesions  RESP: lungs clear to auscultation - no rales, rhonchi or wheezes  CV: regular rate and rhythm, normal S1 S2, no S3 or S4, no click or rub,  ABDOMEN: soft, nontender, no hepatosplenomegaly, no masses and bowel sounds normal  SKIN: left hand healing well w/o cellulitic changes and good ROM  NEURO: No focal changes  PSYCH: mentation appears normal, affect flat but appropriate    PSA   Date Value Ref Range Status   07/17/2020 1.35 0 - 4 ug/L Final     Comment:     Assay Method:  Chemiluminescence using Siemens Vista analyzer     Prostate Specific Antigen Screen   Date Value Ref Range Status   02/02/2023 1.06 0.00 - 6.50 ng/mL Final   07/26/2022 1.28 0.00 - 4.00 ug/L Final     Lab Results   Component Value Date    A1C 5.7 11/22/2023    A1C 5.8 02/02/2023    A1C 5.7 05/20/2022 "    A1C 5.9 08/02/2021    A1C 5.7 01/04/2021    A1C 5.6 07/17/2020    A1C 5.5 11/30/2019    A1C 5.5 05/08/2019    A1C 5.6 06/28/2018           Signed Electronically by: Huy Crystal MD

## 2025-03-14 NOTE — TELEPHONE ENCOUNTER
Reason for Call:  Request for results:    Name of test or procedure: BMP AND CBC    Date of test of procedure: 2-7-17    Location of the test or procedure: [facility]    OK to leave the result message on voice mail or with a family member? YES    Phone number Patient can be reached at:  Home number on file [phone] (home)    Additional comments: please call with results    Call taken on 2/15/2017 at 3:33 PM by PRIYANKA GOMEZ   How Severe Are Your Spot(S)?: mild Have Your Spot(S) Been Treated In The Past?: has not been treated Hpi Title: Evaluation of Skin Lesions negative

## 2025-04-09 ENCOUNTER — MEDICAL CORRESPONDENCE (OUTPATIENT)
Dept: HEALTH INFORMATION MANAGEMENT | Facility: CLINIC | Age: 75
End: 2025-04-09
Payer: COMMERCIAL

## 2025-04-10 ENCOUNTER — MEDICAL CORRESPONDENCE (OUTPATIENT)
Dept: HEALTH INFORMATION MANAGEMENT | Facility: CLINIC | Age: 75
End: 2025-04-10

## 2025-04-27 ENCOUNTER — TELEPHONE (OUTPATIENT)
Dept: INTERNAL MEDICINE | Facility: CLINIC | Age: 75
End: 2025-04-27
Payer: COMMERCIAL

## 2025-04-27 NOTE — TELEPHONE ENCOUNTER
FYI - Status Update    Who is Calling: nurseMariana     Update: Having lots of pain in legs, nurse wondering if pcp can prescribe gabapentin, burning npossible nerve pain in both legs... Doing wound care and compression wraps but still puffy legs, on torsimide once a day. wants to up dose. Issues with urine stream if pcp suggests make an appt.     Does caller want a call/response back: Yes     Okay to leave a detailed message?: Yes at Home number on file [phone] (home)

## 2025-04-29 ENCOUNTER — PATIENT OUTREACH (OUTPATIENT)
Dept: CARE COORDINATION | Facility: CLINIC | Age: 75
End: 2025-04-29
Payer: COMMERCIAL

## 2025-04-29 NOTE — PROGRESS NOTES
Clinic Care Coordination Contact  [facility]/Voicemail    Clinical Data: Care Coordinator Outreach    Outreach Documentation Number of Outreach Attempt   4/29/2025   1:55 PM 1       Unable to leave a message due to: Voicemail is not set up.      Plan: Care Coordinator will try to reach patient again in 10 business days.    Madelyn TOWNSEND, B.S. [facility] [[email or web address]] Care Coordination  [facility] Clinics:  [place], [place] and [place]  [phone]  [email or web address]

## 2025-04-30 NOTE — TELEPHONE ENCOUNTER
"Called and spoke to the patient in relation to the message received below.     Pain in Legs: burning pain in the legs. Has been occurring for the past 3-4 months. Pain has gradually been getting worse over the past 2 months. Patient states the L leg is worse compared to the R one. Constant. Patient states it does hurt to walk. Patient does not feel like he is going to fall. Patient made note that he does have a walker that he uses with ambulation.   Puffiness in Legs: Has been present for the past month. Shins are \"swollen a bit.\" Puffiness is worse in the R leg. Patient is currently taking Torsemide 20 mg daily. No pain associated with the puffiness beyond the burning mentioned above. Patient is currently wearing compression wraps. When wearing the wraps, the swelling does go down a little bit.   Urine Problems: Patient states he is having a weak urine stream. Patient made note that he \"pees everywhere but the toilet.\" Patient states he does have a little bit of incontinence. Stream cannot make it to the toilet and instead runs down the patient's legs and onto the bathroom floor. Patient states he has been noticing symptom for the past 3 months. Patient states he has had problems with urination in the past but prostate was normal (patient does not recall the last time he had his prostate worked up for issues).     Due to patient's multiple concerns, RN assisted in getting patient scheduled for an appointment.     Appointments in Next Year      May 01, 2025 11:00 AM  (Arrive by 10:40 AM)  Provider Visit with Migdalia Rouse MD  [facility] ([facility]) [phone]     Yen Dobbins RN  "

## 2025-05-01 ENCOUNTER — MEDICAL CORRESPONDENCE (OUTPATIENT)
Dept: HEALTH INFORMATION MANAGEMENT | Facility: CLINIC | Age: 75
End: 2025-05-01

## 2025-05-01 ENCOUNTER — OFFICE VISIT (OUTPATIENT)
Dept: INTERNAL MEDICINE | Facility: CLINIC | Age: 75
End: 2025-05-01
Payer: COMMERCIAL

## 2025-05-01 VITALS
HEIGHT: 67 IN | OXYGEN SATURATION: 96 % | HEART RATE: 67 BPM | BODY MASS INDEX: 47.45 KG/M2 | TEMPERATURE: 97 F | DIASTOLIC BLOOD PRESSURE: 69 MMHG | WEIGHT: 302.3 LBS | SYSTOLIC BLOOD PRESSURE: 109 MMHG

## 2025-05-01 DIAGNOSIS — I25.119 CORONARY ARTERY DISEASE INVOLVING NATIVE CORONARY ARTERY OF NATIVE HEART WITH ANGINA PECTORIS: ICD-10-CM

## 2025-05-01 DIAGNOSIS — I50.42 CHRONIC COMBINED SYSTOLIC AND DIASTOLIC CONGESTIVE HEART FAILURE (H): ICD-10-CM

## 2025-05-01 DIAGNOSIS — R39.14 BENIGN PROSTATIC HYPERPLASIA WITH INCOMPLETE BLADDER EMPTYING: ICD-10-CM

## 2025-05-01 DIAGNOSIS — E11.42 DIABETIC POLYNEUROPATHY ASSOCIATED WITH TYPE 2 DIABETES MELLITUS (H): Primary | ICD-10-CM

## 2025-05-01 DIAGNOSIS — F33.2 SEVERE EPISODE OF RECURRENT MAJOR DEPRESSIVE DISORDER, WITHOUT PSYCHOTIC FEATURES (H): Chronic | ICD-10-CM

## 2025-05-01 DIAGNOSIS — Z12.5 SCREENING FOR PROSTATE CANCER: ICD-10-CM

## 2025-05-01 DIAGNOSIS — R39.81 FUNCTIONAL URINARY INCONTINENCE: ICD-10-CM

## 2025-05-01 DIAGNOSIS — N40.1 BENIGN PROSTATIC HYPERPLASIA WITH INCOMPLETE BLADDER EMPTYING: ICD-10-CM

## 2025-05-01 LAB
ALBUMIN SERPL BCG-MCNC: 3.9 G/DL (ref 3.5–5.2)
ALBUMIN UR-MCNC: NEGATIVE MG/DL
ALP SERPL-CCNC: 52 U/L (ref 40–150)
ALT SERPL W P-5'-P-CCNC: 8 U/L (ref 0–70)
ANION GAP SERPL CALCULATED.3IONS-SCNC: 10 MMOL/L (ref 7–15)
APPEARANCE UR: CLEAR
AST SERPL W P-5'-P-CCNC: 18 U/L (ref 0–45)
BILIRUB SERPL-MCNC: 0.5 MG/DL
BILIRUB UR QL STRIP: NEGATIVE
BUN SERPL-MCNC: 17.3 MG/DL (ref 8–23)
CALCIUM SERPL-MCNC: 9.3 MG/DL (ref 8.8–10.4)
CHLORIDE SERPL-SCNC: 103 MMOL/L (ref 98–107)
CHOLEST SERPL-MCNC: 101 MG/DL
COLOR UR AUTO: YELLOW
CREAT SERPL-MCNC: 0.93 MG/DL (ref 0.67–1.17)
EGFRCR SERPLBLD CKD-EPI 2021: 85 ML/MIN/1.73M2
EST. AVERAGE GLUCOSE BLD GHB EST-MCNC: 103 MG/DL
FASTING STATUS PATIENT QL REPORTED: YES
FASTING STATUS PATIENT QL REPORTED: YES
GLUCOSE SERPL-MCNC: 87 MG/DL (ref 70–99)
GLUCOSE UR STRIP-MCNC: NEGATIVE MG/DL
HBA1C MFR BLD: 5.2 % (ref 0–5.6)
HCO3 SERPL-SCNC: 23 MMOL/L (ref 22–29)
HDLC SERPL-MCNC: 48 MG/DL
HGB UR QL STRIP: NEGATIVE
KETONES UR STRIP-MCNC: NEGATIVE MG/DL
LDLC SERPL CALC-MCNC: 37 MG/DL
LEUKOCYTE ESTERASE UR QL STRIP: NEGATIVE
NITRATE UR QL: NEGATIVE
NONHDLC SERPL-MCNC: 53 MG/DL
PH UR STRIP: 6.5 [PH] (ref 5–7)
POTASSIUM SERPL-SCNC: 4.7 MMOL/L (ref 3.4–5.3)
PROT SERPL-MCNC: 7.5 G/DL (ref 6.4–8.3)
PSA SERPL DL<=0.01 NG/ML-MCNC: 1.58 NG/ML (ref 0–6.5)
SODIUM SERPL-SCNC: 136 MMOL/L (ref 135–145)
SP GR UR STRIP: <=1.005 (ref 1–1.03)
TRIGL SERPL-MCNC: 78 MG/DL
UROBILINOGEN UR STRIP-ACNC: 0.2 E.U./DL

## 2025-05-01 RX ORDER — TORSEMIDE 20 MG/1
20 TABLET ORAL 2 TIMES DAILY
Qty: 60 TABLET | Refills: 2 | Status: SHIPPED | OUTPATIENT
Start: 2025-05-01

## 2025-05-01 ASSESSMENT — PATIENT HEALTH QUESTIONNAIRE - PHQ9
10. IF YOU CHECKED OFF ANY PROBLEMS, HOW DIFFICULT HAVE THESE PROBLEMS MADE IT FOR YOU TO DO YOUR WORK, TAKE CARE OF THINGS AT HOME, OR GET ALONG WITH OTHER PEOPLE: EXTREMELY DIFFICULT
SUM OF ALL RESPONSES TO PHQ QUESTIONS 1-9: 13
SUM OF ALL RESPONSES TO PHQ QUESTIONS 1-9: 13

## 2025-05-01 ASSESSMENT — COLUMBIA-SUICIDE SEVERITY RATING SCALE - C-SSRS
6. HAVE YOU EVER DONE ANYTHING, STARTED TO DO ANYTHING, OR PREPARED TO DO ANYTHING TO END YOUR LIFE?: NO
1. WITHIN THE PAST MONTH, HAVE YOU WISHED YOU WERE DEAD OR WISHED YOU COULD GO TO SLEEP AND NOT WAKE UP?: YES
2. IN THE PAST MONTH, HAVE YOU ACTUALLY HAD ANY THOUGHTS OF KILLING YOURSELF?: NO

## 2025-05-01 NOTE — PROGRESS NOTES
"  Assessment & Plan     Overall he is volume overloaded today. Will increase torsemide to BID. He is about 15 lbs above dry weight.    Urinary incontinence a big issue and has not been evalutaed. He denies a weaker stream but I am not sure how his BPH plays a role. Will hold off on icnreasing flomax but will send to urology for eavluation. Incontinence supplies ordered    Depression has been very severe. Refer to .    Diabetic polyneuropathy associated with type 2 diabetes mellitus (H)    - Adult Eye  Referral; Future  - HEMOGLOBIN A1C    Screening for prostate cancer    - PROSTATE SPEC ANTIGEN SCREEN    Benign prostatic hyperplasia with incomplete bladder emptying    - UA Macroscopic with reflex to Microscopic and Culture - Clinic Collect  - Adult Urology  Referral; Future    Chronic combined systolic and diastolic congestive heart failure (H)  Increase to BID    - torsemide (DEMADEX) 20 MG tablet; Take 1 tablet (20 mg) by mouth 2 times daily.    Functional urinary incontinence    - Incontinence Supplies Order for DME - ONLY FOR DME  - Adult Urology  Referral; Future    Severe episode of recurrent major depressive disorder, without psychotic features (H)  Severe sx of depression but safe at home. Will refer to  for evaluation and tx.    - Adult Mental Health  Referral; Future    Coronary artery disease involving native coronary artery of native heart with angina pectoris    - Comprehensive metabolic panel  - Lipid Profile          BMI  Estimated body mass index is 47.35 kg/m  as calculated from the following:    Height as of this encounter: 1.702 m (5' 7\").    Weight as of this encounter: 137.1 kg (302 lb 4.8 oz).       Depression Screening Follow Up        5/1/2025    11:01 AM   PHQ   PHQ-9 Total Score 13    Q9: Thoughts of better off dead/self-harm past 2 weeks Nearly every day    F/U: Thoughts of suicide or self-harm No    F/U: Safety concerns No        Proxy-reported " "                5/1/2025     5:01 PM   [facility] ([facility])   Within the last month, have you wished you were dead or wished you could go to sleep and not wake up? Yes   Within the last month, have you had any actual thoughts of killing yourself? No   Within the last month, have you ever done anything, started to do anything, or prepared to do anything to end your life? No         Follow Up Actions Taken  Crisis resource information provided in the After Visit Summary  Mental Health Referral placed    Discussed the following ways the patient can remain in a safe environment:  be around others        Subjective   Kenneth is a 75 year old, presenting for the following health issues:  Leg Swelling      Urinary incontinence: Fully incontinent of urine every 30 minutes or so. Does not have any incontinent supplies at home and never has seen urology. Does have dx of BPH. Incontinence has been an issue for over a year.    2. Swelling of legs has been worse recently. Weight is up 10-15 lbs from last cardiology visits 2 months ago. He uses torsemide daily. Last cards note said BID. He eats a lot of soup and wife will review the sodium content.     3. Depression: Ongoing problem. No immediate thoughts or plan for self harm. Is interested in engaging with mental health team.    History of Present Illness       Reason for visit:  Leg Swelling and Weak urine stream    He eats 0-1 servings of fruits and vegetables daily.He consumes 4 sweetened beverage(s) daily.He exercises with enough effort to increase his heart rate 9 or less minutes per day.  He exercises with enough effort to increase his heart rate 3 or less days per week.   He is taking medications regularly.                        Objective    /69   Pulse 67   Temp 97  F (36.1  C) (Temporal)   Ht 1.702 m (5' 7\")   Wt (!) 137.1 kg (302 lb 4.8 oz)   SpO2 96%   BMI 47.35 kg/m    Body mass index is 47.35 kg/m .  Physical Exam     Well appearing but with overt " urinary incontinence   RRR  CTAB  LE with 3+pitting edema to the knees            Signed Electronically by: Migdalia Rouse MD

## 2025-05-06 ENCOUNTER — MEDICAL CORRESPONDENCE (OUTPATIENT)
Dept: HEALTH INFORMATION MANAGEMENT | Facility: CLINIC | Age: 75
End: 2025-05-06
Payer: COMMERCIAL

## 2025-05-13 ENCOUNTER — MEDICAL CORRESPONDENCE (OUTPATIENT)
Dept: HEALTH INFORMATION MANAGEMENT | Facility: CLINIC | Age: 75
End: 2025-05-13
Payer: COMMERCIAL

## 2025-06-02 ENCOUNTER — APPOINTMENT (OUTPATIENT)
Dept: CT IMAGING | Facility: CLINIC | Age: 75
End: 2025-06-02
Attending: EMERGENCY MEDICINE
Payer: COMMERCIAL

## 2025-06-02 ENCOUNTER — PATIENT OUTREACH (OUTPATIENT)
Dept: CARE COORDINATION | Facility: CLINIC | Age: 75
End: 2025-06-02

## 2025-06-02 ENCOUNTER — HOSPITAL ENCOUNTER (EMERGENCY)
Facility: CLINIC | Age: 75
Discharge: HOME | End: 2025-06-02
Attending: EMERGENCY MEDICINE | Admitting: EMERGENCY MEDICINE
Payer: COMMERCIAL

## 2025-06-02 VITALS
SYSTOLIC BLOOD PRESSURE: 144 MMHG | OXYGEN SATURATION: 94 % | RESPIRATION RATE: 18 BRPM | DIASTOLIC BLOOD PRESSURE: 74 MMHG | TEMPERATURE: 98.1 F | HEART RATE: 62 BPM

## 2025-06-02 DIAGNOSIS — R59.0 PELVIC LYMPHADENOPATHY: ICD-10-CM

## 2025-06-02 DIAGNOSIS — E11.42 DIABETIC POLYNEUROPATHY ASSOCIATED WITH TYPE 2 DIABETES MELLITUS (H): ICD-10-CM

## 2025-06-02 DIAGNOSIS — R10.9 LEFT FLANK PAIN: ICD-10-CM

## 2025-06-02 LAB
ALBUMIN SERPL BCG-MCNC: 3.5 G/DL (ref 3.5–5.2)
ALBUMIN UR-MCNC: NEGATIVE MG/DL
ALP SERPL-CCNC: 48 U/L (ref 40–150)
ALT SERPL W P-5'-P-CCNC: 14 U/L (ref 0–70)
ANION GAP SERPL CALCULATED.3IONS-SCNC: 10 MMOL/L (ref 7–15)
APPEARANCE UR: CLEAR
AST SERPL W P-5'-P-CCNC: 17 U/L (ref 0–45)
BASOPHILS # BLD AUTO: 0.1 10E3/UL (ref 0–0.2)
BASOPHILS NFR BLD AUTO: 1 %
BILIRUB SERPL-MCNC: 0.4 MG/DL
BILIRUB UR QL STRIP: NEGATIVE
BUN SERPL-MCNC: 23.7 MG/DL (ref 8–23)
CALCIUM SERPL-MCNC: 8.7 MG/DL (ref 8.8–10.4)
CHLORIDE SERPL-SCNC: 99 MMOL/L (ref 98–107)
COLOR UR AUTO: NORMAL
CREAT SERPL-MCNC: 1.01 MG/DL (ref 0.67–1.17)
EGFRCR SERPLBLD CKD-EPI 2021: 77 ML/MIN/1.73M2
EOSINOPHIL # BLD AUTO: 0.7 10E3/UL (ref 0–0.7)
EOSINOPHIL NFR BLD AUTO: 19 %
ERYTHROCYTE [DISTWIDTH] IN BLOOD BY AUTOMATED COUNT: 13.3 % (ref 10–15)
GLUCOSE SERPL-MCNC: 96 MG/DL (ref 70–99)
GLUCOSE UR STRIP-MCNC: NEGATIVE MG/DL
HCO3 SERPL-SCNC: 25 MMOL/L (ref 22–29)
HCT VFR BLD AUTO: 31 % (ref 40–53)
HGB BLD-MCNC: 10.5 G/DL (ref 13.3–17.7)
HGB UR QL STRIP: NEGATIVE
HOLD SPECIMEN: NORMAL
HOLD SPECIMEN: NORMAL
IMM GRANULOCYTES # BLD: 0 10E3/UL
IMM GRANULOCYTES NFR BLD: 0 %
KETONES UR STRIP-MCNC: NEGATIVE MG/DL
LEUKOCYTE ESTERASE UR QL STRIP: NEGATIVE
LIPASE SERPL-CCNC: 28 U/L (ref 13–60)
LYMPHOCYTES # BLD AUTO: 1.1 10E3/UL (ref 0.8–5.3)
LYMPHOCYTES NFR BLD AUTO: 28 %
MCH RBC QN AUTO: 29.2 PG (ref 26.5–33)
MCHC RBC AUTO-ENTMCNC: 33.9 G/DL (ref 31.5–36.5)
MCV RBC AUTO: 86 FL (ref 78–100)
MONOCYTES # BLD AUTO: 0.6 10E3/UL (ref 0–1.3)
MONOCYTES NFR BLD AUTO: 15 %
NEUTROPHILS # BLD AUTO: 1.5 10E3/UL (ref 1.6–8.3)
NEUTROPHILS NFR BLD AUTO: 37 %
NITRATE UR QL: NEGATIVE
NRBC # BLD AUTO: 0 10E3/UL
NRBC BLD AUTO-RTO: 0 /100
PH UR STRIP: 5.5 [PH] (ref 5–7)
PLATELET # BLD AUTO: 178 10E3/UL (ref 150–450)
POTASSIUM SERPL-SCNC: 4.1 MMOL/L (ref 3.4–5.3)
PROT SERPL-MCNC: 6.8 G/DL (ref 6.4–8.3)
RBC # BLD AUTO: 3.6 10E6/UL (ref 4.4–5.9)
RBC URINE: 1 /HPF
SODIUM SERPL-SCNC: 134 MMOL/L (ref 135–145)
SP GR UR STRIP: 1.02 (ref 1–1.03)
SQUAMOUS EPITHELIAL: <1 /HPF
UROBILINOGEN UR STRIP-MCNC: NORMAL MG/DL
WBC # BLD AUTO: 3.9 10E3/UL (ref 4–11)
WBC URINE: 1 /HPF

## 2025-06-02 PROCEDURE — 99285 EMERGENCY DEPT VISIT HI MDM: CPT | Mod: 25

## 2025-06-02 PROCEDURE — 250N000009 HC RX 250: Performed by: EMERGENCY MEDICINE

## 2025-06-02 PROCEDURE — 83690 ASSAY OF LIPASE: CPT | Performed by: EMERGENCY MEDICINE

## 2025-06-02 PROCEDURE — 80053 COMPREHEN METABOLIC PANEL: CPT | Performed by: EMERGENCY MEDICINE

## 2025-06-02 PROCEDURE — 85018 HEMOGLOBIN: CPT | Performed by: EMERGENCY MEDICINE

## 2025-06-02 PROCEDURE — 250N000013 HC RX MED GY IP 250 OP 250 PS 637: Performed by: EMERGENCY MEDICINE

## 2025-06-02 PROCEDURE — 250N000011 HC RX IP 250 OP 636: Performed by: EMERGENCY MEDICINE

## 2025-06-02 PROCEDURE — 82570 ASSAY OF URINE CREATININE: CPT

## 2025-06-02 PROCEDURE — 36415 COLL VENOUS BLD VENIPUNCTURE: CPT | Performed by: STUDENT IN AN ORGANIZED HEALTH CARE EDUCATION/TRAINING PROGRAM

## 2025-06-02 PROCEDURE — 81001 URINALYSIS AUTO W/SCOPE: CPT | Performed by: EMERGENCY MEDICINE

## 2025-06-02 PROCEDURE — 74177 CT ABD & PELVIS W/CONTRAST: CPT

## 2025-06-02 RX ORDER — LIDOCAINE 4 G/G
2 PATCH TOPICAL EVERY 24 HOURS
Qty: 14 PATCH | Refills: 0 | Status: SHIPPED | OUTPATIENT
Start: 2025-06-02 | End: 2025-06-09

## 2025-06-02 RX ORDER — LIDOCAINE 4 G/G
2 PATCH TOPICAL ONCE
Status: DISCONTINUED | OUTPATIENT
Start: 2025-06-02 | End: 2025-06-02 | Stop reason: HOSPADM

## 2025-06-02 RX ORDER — IOPAMIDOL 755 MG/ML
500 INJECTION, SOLUTION INTRAVASCULAR ONCE
Status: COMPLETED | OUTPATIENT
Start: 2025-06-02 | End: 2025-06-02

## 2025-06-02 RX ORDER — ACETAMINOPHEN 500 MG
1000 TABLET ORAL ONCE
Status: COMPLETED | OUTPATIENT
Start: 2025-06-02 | End: 2025-06-02

## 2025-06-02 RX ADMIN — LIDOCAINE 2 PATCH: 4 PATCH TOPICAL at 14:10

## 2025-06-02 RX ADMIN — IOPAMIDOL 100 ML: 755 INJECTION, SOLUTION INTRAVENOUS at 09:32

## 2025-06-02 RX ADMIN — ACETAMINOPHEN 1000 MG: 500 TABLET, FILM COATED ORAL at 09:23

## 2025-06-02 RX ADMIN — SODIUM CHLORIDE 100 ML: 9 INJECTION, SOLUTION INTRAVENOUS at 09:32

## 2025-06-02 ASSESSMENT — ACTIVITIES OF DAILY LIVING (ADL)
ADLS_ACUITY_SCORE: 54

## 2025-06-02 NOTE — PROGRESS NOTES
Clinic Care Coordination Contact     The Community Health Worker planned to call for a Care Coordination outreach to follow up on goals and action steps.     Did Not Contact Patient.    Per Chart Review, patient is currently at [facility] ED.    CHW will outreach in 10 business days.    CARY Saunders  Clinic Care Coordination  [facility] Clinics: [facility], [place], [place], and [facility]  Phone: [phone]

## 2025-06-02 NOTE — ED TRIAGE NOTES
Pt presents via ems with concern for difficulty urinating for 2 wks and L flank pain for 2 days. Of note when undressing pt, multiple wounds noted on bilateral lower extremities. Pt able to walk from stretcher to ED bed with assistance upon arrival. A & Ox4     Triage Assessment (Adult)       Row Name 06/02/25 0839          Triage Assessment    Airway WDL WDL        Respiratory WDL    Respiratory WDL WDL        Skin Circulation/Temperature WDL    Skin Circulation/Temperature WDL X  multiple wounds in bilateral lower extremities

## 2025-06-02 NOTE — DISCHARGE INSTRUCTIONS
No signs of urinary tract or kidney infection today. Incidentally, the radiologist commented on some enlarged lymph nodes in your abdomen/pelvis. Notably, these are slightly larger than they were in 2023, but these were also present on a CT scan back in 2018.     Follow-up with your doctor this week for recheck of your pain. You can continue tylenol 1,000mg every 6 hours as needed and try lidoderm patches as well. Keep these on for only 12 hours at a time and don't place heat directly over them.     Return to emergency department for fever > 100.4, worsening pain, vomiting, chest pain, shortness of breath, or for any other concerns.     Discharge Instructions  Incidental Findings    An incidental finding is something unexpected that was found while you were being treated and is felt to not be related to the reason that you came to the Emergency Department.  While this finding is not an emergency, you need to follow up with your primary provider (or occasionally a specialist) to determine if anything should be done about it.    These findings can come from:  Checking your vital signs (example: high blood pressure).  Taking your history (example: unexplained weight loss).  The physical exam (example: a heart murmur).  Laboratory study (example: anemia or low blood count).  X-rays/ultrasound/CT or other imaging (example: an unexplained mass).    Generally, every Emergency Department visit should have a follow-up clinic visit with either a primary or a specialty clinic/provider. Please follow-up as instructed by your emergency provider today.    Return to the Emergency Department if:  Your condition worsens.  You develop unexpected pain.  You now develop new symptoms or have new concerns.  If you were given a prescription for medicine here today, be sure to read all of the information (including the package insert) that comes with your prescription.  This will include important information about the medicine, its side  effects, and any warnings that you need to know about.  The pharmacist who fills the prescription can provide more information and answer questions you may have about the medicine.  If you have questions or concerns that the pharmacist cannot address, please call or return to the Emergency Department.   Remember that you can always come back to the Emergency Department if you are not able to see your regular provider in the amount of time listed above, if you get any new symptoms, or if there is anything that worries you.

## 2025-06-03 ENCOUNTER — OFFICE VISIT (OUTPATIENT)
Dept: INTERNAL MEDICINE | Facility: CLINIC | Age: 75
End: 2025-06-03
Payer: COMMERCIAL

## 2025-06-03 VITALS
HEIGHT: 67 IN | TEMPERATURE: 97.9 F | RESPIRATION RATE: 18 BRPM | WEIGHT: 295.6 LBS | OXYGEN SATURATION: 97 % | HEART RATE: 71 BPM | DIASTOLIC BLOOD PRESSURE: 66 MMHG | BODY MASS INDEX: 46.39 KG/M2 | SYSTOLIC BLOOD PRESSURE: 124 MMHG

## 2025-06-03 DIAGNOSIS — F33.1 MAJOR DEPRESSIVE DISORDER, RECURRENT EPISODE, MODERATE (H): ICD-10-CM

## 2025-06-03 DIAGNOSIS — R59.0 RETROPERITONEAL LYMPHADENOPATHY: ICD-10-CM

## 2025-06-03 DIAGNOSIS — E11.42 DIABETIC POLYNEUROPATHY ASSOCIATED WITH TYPE 2 DIABETES MELLITUS (H): ICD-10-CM

## 2025-06-03 DIAGNOSIS — Z09 ENCOUNTER FOR EXAMINATION FOLLOWING TREATMENT AT HOSPITAL: Primary | ICD-10-CM

## 2025-06-03 DIAGNOSIS — R10.9 LEFT FLANK PAIN: ICD-10-CM

## 2025-06-03 DIAGNOSIS — R93.5 ABNORMAL FINDINGS ON DIAGNOSTIC IMAGING OF OTHER ABDOMINAL REGIONS, INCLUDING RETROPERITONEUM: ICD-10-CM

## 2025-06-03 PROCEDURE — 3074F SYST BP LT 130 MM HG: CPT | Performed by: INTERNAL MEDICINE

## 2025-06-03 PROCEDURE — 99214 OFFICE O/P EST MOD 30 MIN: CPT | Performed by: INTERNAL MEDICINE

## 2025-06-03 PROCEDURE — 3078F DIAST BP <80 MM HG: CPT | Performed by: INTERNAL MEDICINE

## 2025-06-03 RX ORDER — SERTRALINE HYDROCHLORIDE 100 MG/1
TABLET, FILM COATED ORAL
Qty: 87 TABLET | Refills: 0 | Status: SHIPPED | OUTPATIENT
Start: 2025-06-03 | End: 2025-09-01

## 2025-06-03 NOTE — PATIENT INSTRUCTIONS
- I have ordered a PET scan to look into these lymph nodes seen on your CT scan  - I have started sertraline

## 2025-06-03 NOTE — ED PROVIDER NOTES
Emergency Department Note      History of Present Illness     Chief Complaint   Flank Pain      HPI   Jamar Ivory is a 75 year old male with past medical history CHF, stroke, hypertension, and more who presents with chief complaint left-sided flank pain and concerns for UTI.  Patient states that he has felt like he has a urinary tract infection for the last 2 days.  He reports left flank pain.  He denies any dysuria.  Reports frequent urination.  He reports multiple month history of difficulty urinating and a slow stream.  He expresses concern that infection is in his kidney.  He notes pain is worse when he bends, stands, or moves around.  He reports pain is absent when he is lying or sitting.    Independent Historian   None    Review of External Notes   Reviewed PCP note from 5/1/2025. Urinary incontinence was discussed at that appointment and noted to be a problem for greater than 1 year.    Past Medical History     Medical History and Problem List   Past Medical History:   Diagnosis Date    Arthritis     Cerebral artery occlusion with cerebral infarction     CHF (congestive heart failure) 12/24/2018    CVA (cerebral infarction) 2012    CVD (cardiovascular disease)     Depression 1977    Fatty liver     Gout     h/o Concussion     Hypertension 01/17/2011    Hypothyroidism     Obesity     Spider veins     TIA (transient ischaemic attack) 2012    Vitiligo        Medications   Lidocaine (LIDOCARE) 4 % Patch  acetaminophen (TYLENOL) 500 MG tablet  aspirin (ASA) 81 MG tablet  levothyroxine (SYNTHROID/LEVOTHROID) 112 MCG tablet  losartan (COZAAR) 25 MG tablet  metoprolol succinate ER (TOPROL XL) 25 MG 24 hr tablet  Misc Natural Products (OSTEO BI-FLEX JOINT SHIELD PO)  nitroGLYcerin (NITROSTAT) 0.4 MG sublingual tablet  nystatin (MYCOSTATIN) [id number] UNIT/GM external powder  rosuvastatin (CRESTOR) 20 MG tablet  senna-docusate (SENOKOT-S/PERICOLACE) 8.6-50 MG tablet  spironolactone (ALDACTONE) 25 MG  tablet  tamsulosin (FLOMAX) 0.4 MG capsule  torsemide (DEMADEX) 20 MG tablet        Surgical History   Past Surgical History:   Procedure Laterality Date    APPENDECTOMY      at age 22    COLONOSCOPY N/A 09/02/2016    Procedure: COMBINED COLONOSCOPY, SINGLE OR MULTIPLE BIOPSY/POLYPECTOMY BY BIOPSY;  Surgeon: Yanick Brown MD;  Location: [facility]    COLONOSCOPY N/A 12/27/2021    Procedure: COLONOSCOPY;  Surgeon: Kong Chowdary MD;  Location: [facility]    IRRIGATION AND DEBRIDEMENT FINGER, COMBINED Left 1/19/2025    Procedure: incision and drainage of 3rd webspace,  incision and drainage of flexor tendon at A1 pulley middle and ring fingers, incision and drainage of mid palmar space;  Surgeon: Keyur Hoffmann MD;  Location: [facility]    VASECTOMY         Physical Exam     Patient Vitals for the past 24 hrs:   BP Temp Temp src Pulse Resp SpO2   06/02/25 1545 (!) 144/74 -- -- 62 18 94 %   06/02/25 1420 116/64 -- -- 59 -- 94 %   06/02/25 1400 102/57 -- -- 60 -- 95 %   06/02/25 1345 110/57 -- -- -- 18 97 %   06/02/25 1315 119/60 -- -- -- -- 93 %   06/02/25 1300 112/56 -- -- 55 -- 93 %   06/02/25 1120 121/64 -- -- 68 -- 96 %   06/02/25 1100 108/56 -- -- 59 -- 92 %   06/02/25 1044 104/52 -- -- -- -- 95 %   06/02/25 1022 111/56 -- -- 64 -- (!) 91 %   06/02/25 1002 114/62 -- -- 67 -- 93 %   06/02/25 0945 -- -- -- -- -- 93 %   06/02/25 0944 122/69 -- -- 73 -- --   06/02/25 0920 113/59 -- -- 66 -- (!) 91 %   06/02/25 0845 117/63 -- -- -- -- 93 %   06/02/25 0839 126/67 98.1  F (36.7  C) Oral 73 20 94 %     Physical Exam  Nursing note and vitals reviewed.  HENT:   Mouth/Throat: Moist mucous membranes.   Eyes: EOMI, nonicteric sclera  Cardiovascular: Normal rate, regular rhythm, no murmurs, rubs, or gallops  Pulmonary/Chest: Effort normal and breath sounds normal. No respiratory distress. No wheezes. No rales.   Abdominal: Soft. Nontender, nondistended, no guarding or rigidity.   Musculoskeletal: Pain to palpation left lateral  abdomen/low back.  Pain worse with extension of both legs.  Neurological: Alert. Moves all extremities spontaneously.   Skin: Skin is warm and dry. No shingles rash noted.         Diagnostics     Lab Results   Labs Ordered and Resulted from Time of ED Arrival to Time of ED Departure   COMPREHENSIVE METABOLIC PANEL - Abnormal       Result Value    Sodium 134 (*)     Potassium 4.1      Carbon Dioxide (CO2) 25      Anion Gap 10      Urea Nitrogen 23.7 (*)     Creatinine 1.01      GFR Estimate 77      Calcium 8.7 (*)     Chloride 99      Glucose 96      Alkaline Phosphatase 48      AST 17      ALT 14      Protein Total 6.8      Albumin 3.5      Bilirubin Total 0.4     CBC WITH PLATELETS AND DIFFERENTIAL - Abnormal    WBC Count 3.9 (*)     RBC Count 3.60 (*)     Hemoglobin 10.5 (*)     Hematocrit 31.0 (*)     MCV 86      MCH 29.2      MCHC 33.9      RDW 13.3      Platelet Count 178      % Neutrophils 37      % Lymphocytes 28      % Monocytes 15      % Eosinophils 19      % Basophils 1      % Immature Granulocytes 0      NRBCs per 100 WBC 0      Absolute Neutrophils 1.5 (*)     Absolute Lymphocytes 1.1      Absolute Monocytes 0.6      Absolute Eosinophils 0.7      Absolute Basophils 0.1      Absolute Immature Granulocytes 0.0      Absolute NRBCs 0.0     LIPASE - Normal    Lipase 28     ROUTINE UA WITH MICROSCOPIC REFLEX TO CULTURE - Normal    Color Urine Straw      Appearance Urine Clear      Glucose Urine Negative      Bilirubin Urine Negative      Ketones Urine Negative      Specific Gravity Urine 1.019      Blood Urine Negative      pH Urine 5.5      Protein Albumin Urine Negative      Urobilinogen Urine Normal      Nitrite Urine Negative      Leukocyte Esterase Urine Negative      RBC Urine 1      WBC Urine 1      Squamous Epithelials Urine <1         Imaging   CT Abdomen Pelvis w Contrast   Final Result   IMPRESSION:    1.  No acute findings in the abdomen and pelvis.   2.  Progression of retroperitoneal and pelvic  lymphadenopathy since 10/5/2023, either due to lymphoproliferative disorder or metastatic disease. Further workup is recommended.   3.  Stable mild splenomegaly.   4.  Stable 1.5 cm left adrenal gland nodule, likely an adenoma given the stability.   5.  Stable moderate-sized fat-containing umbilical hernia and small fat-containing left inguinal hernia.                 Independent Interpretation   None    ED Course      Medications Administered   Medications   acetaminophen (TYLENOL) tablet 1,000 mg (1,000 mg Oral $Given 6/2/25 4254)   iopamidol (ISOVUE-370) solution 500 mL (100 mLs Intravenous $Given 6/2/25 1483)       Procedures   Procedures     Discussion of Management   None    ED Course   The patient arrived in triage where vitals were measured and recorded.   The patient was then escorted back to the emergency department.   The patient's medical records were reviewed.  Nursing notes and vitals were reviewed.    I performed an exam of the patient as documented above. The patient is in agreement with my plan of care.       Additional Documentation  None    Medical Decision Making / Diagnosis     CMS Diagnoses: None    MIPS   None       [facility]   Jamar Ivory is a 75 year old male who presents with left flank pain.  Patient concerned he has a kidney infection.  More likely given his history and exam is musculoskeletal etiology as pain is only present when patient is attempting to move.  Pain reproducible on exam as well with palpation.  Regardless, given comorbidities, patient was worked up as above.  Urinalysis negative for infection.  Labs reassuring.  CT negative for acute etiology though there is discussion about pelvic lymphadenopathy.  Radiologist notes that lymph nodes appear larger than they were in 2023.  However, patient had pelvic lymphadenopathy documented on CT back in 2018.  I discussed this finding with the patient.  Discussed that it appears to be a chronic finding, but did discuss that lymph nodes  do appear larger now than they were 2 years ago.  I encouraged him to discuss this further with his primary care provider to discuss any additional steps.  I do not believe this is responsible for his symptoms today given the chronicity of this radiographic finding.  CT also negative for kidney stones or other obvious cause.  Patient will plan on using Tylenol for pain relief.  He has had success with Lidoderm patches in the past so these were prescribed as well. He is in stable condition at the time of discharge, red flags that should merit ED return were discussed as well as recommended follow-up instructions. All questions were answered and he is in agreement with the plan.      Disposition   The patient was discharged.     Diagnosis     ICD-10-CM    1. Left flank pain  R10.9       2. Pelvic lymphadenopathy  R59.0            Discharge Medications   Discharge Medication List as of 6/2/2025  2:11 PM        START taking these medications    Details   Lidocaine (LIDOCARE) 4 % Patch Place 2 patches over 12 hours onto the skin every 24 hours for 7 days. To prevent lidocaine toxicity, patient should be patch free for 12 hrs daily.Don't place heat directly over patches.Disp-14 patch, [id number]                  Sridhar Almanza MD  06/02/25 3850

## 2025-06-03 NOTE — PROGRESS NOTES
Assessment & Plan   Encounter for examination following treatment at hospital  Left flank pain  Chronic issue, patient reports ongoing for years. Has not picked up lidocaine patches rx'd to him by the ER. I encouraged him to do so and give those a try. He and his wife had numerous questions about his chronic care and it seems they may benefit from a follow-up visit with his PCP. I assisted him in scheduling an appointment with his PCP next week for follow-up on his chronic conditions.    Retroperitoneal lymphadenopathy  Abnormal findings on diagnostic imaging of other abdominal regions, including retroperitoneum  Discussed this finding with patient + wife directly. Discussed concern for possible malignancy here. They were agreeable to pursue PET scan. Order placed.  - PET Oncology (Eyes to Thighs); Future    Major depressive disorder, recurrent episode, moderate (H)  Patient + wife reports patient struggling with depression. I discussed starting selective serotonin reuptake inhibitor therapy, will start sertraline. Discussed possible side effects. Discussed that these medications need to be taken every day to work and that they can take 4-6 weeks to fully kick in. F/u with PCP as scheduled.  - sertraline (ZOLOFT) 100 MG tablet; Take 0.5 tablets (50 mg) by mouth daily for 6 days, THEN 1 tablet (100 mg) daily.    Diabetic polyneuropathy associated with type 2 diabetes mellitus (H)  Will add-on monitoring test to recent urine sample.  - Albumin Random Urine Quantitative with Creat Ratio; Future    F/u with regular PCP next week as scheduled    Signed Electronically by:  Christiano Miramontes MD, MPH  [facility]  Internal Medicine    Subjective   Kenneth is a 75 year old who presents alongside his wife for a same day acute care visit with chief concern of: ER F/U  This is the first time I have met Kenneth, who typically sees Dr. Crystal in our clinic.    HPI   ED/UC Followup:  Facility:  [facility]  Date of visit: 6/2  Reason for visit: Left Flank Pain   Current Status:     Seen in the ER yesterday. Pain persists. Reports he has not picked up the lidocaine patches rx'd by ER. Denies f/c. Denies bowel changes. Feels he empties his bladder fully. Denies night sweats. Denies weight changes. Patient + wife bickered with each other off/on throughout today's visit, despite me trying to keep us on track here to discuss patient's medical concerns. He eventually discloses he does think he's depressed. Denies SI. He is open to trying an anti-depressant.        Objective    /66   Pulse 71   Temp 97.9  F (36.6  C) (Tympanic)   Resp 18   Ht 1.702 m (5' 7\")   Wt 134.1 kg (295 lb 9.6 oz)   SpO2 97%   BMI 46.30 kg/m    Body mass index is 46.3 kg/m .    Physical Exam   GENERAL: alert and in no distress.  EYES: conjunctivae/corneas clear. EOMs grossly intact  HENT: Facies symmetric.  RESP: No iWOB.  GI: NT, ND, no rebound or guarding.  MSK: Ambulates with walker  DERM: No abnormalities over skin in area of flank pain.  "

## 2025-06-04 ENCOUNTER — PATIENT OUTREACH (OUTPATIENT)
Dept: CARE COORDINATION | Facility: CLINIC | Age: 75
End: 2025-06-04
Payer: COMMERCIAL

## 2025-06-04 ENCOUNTER — RESULTS FOLLOW-UP (OUTPATIENT)
Dept: INTERNAL MEDICINE | Facility: CLINIC | Age: 75
End: 2025-06-04

## 2025-06-04 LAB
CREAT UR-MCNC: 24.9 MG/DL
MICROALBUMIN UR-MCNC: <12 MG/L
MICROALBUMIN/CREAT UR: NORMAL MG/G{CREAT}

## 2025-06-04 NOTE — PROGRESS NOTES
Clinic Care Coordination Contact  Care Coordination Clinician Chart Review    Situation: Patient chart reviewed by Care Coordinator.       Background: Care Coordination Program started: 12/19/2024. Initial assessment completed and patient-centered care plan(s) were developed with participation from patient. Lead CC handed patient off to CHW for continued outreaches.       Assessment: Per chart review, patient outreach completed by CC CHW on 4/29 and was unreachable, at next they had been in ED with plans to call 6/16/25.  Patient is actively working to accomplish goal(s). Patient's goal(s) appropriate and relevant at this time as they transitioned to Maintenance per chart review as spouse indicated they were not interested in [facility] at this time. Patient is not due for updated Plan of Care.  Assessments will be completed annually or as needed/with change of patient status.      Care Plan: Help At Home Completed 2/26/2025      Problem: Insufficient In-home support  Resolved 2/26/2025      Goal: Move to Assisted Living  Completed 2/26/2025      Start Date: 1/2/2025 Expected End Date: 3/28/2025    Priority: Medium    Note:     Barriers: Uncertain how to begin the process  Strengths: Supportive spouse  Patient expressed understanding of goal: Yes (spouse)  Action steps to achieve this goal:  1. I will contact [facility] at [phone] to request assistance finding and Assisted living community.   2. I will let Care Coordination know if I need additional information.                                    Plan/Recommendations: The patient will continue working with Care Coordination to achieve goal(s) as above. CHW will continue outreaches at minimum every 30 days and will involve Lead CC as needed or if patient is ready to move to Maintenance. Lead CC will continue to monitor CHW outreaches and patient's progress to goal(s) every 6 weeks.     Plan of Care updated and sent to patient: No    MERY Jarquin    [facility] - Care Coordination  [facility]   [phone]

## 2025-06-05 ENCOUNTER — PATIENT OUTREACH (OUTPATIENT)
Dept: CARE COORDINATION | Facility: CLINIC | Age: 75
End: 2025-06-05
Payer: COMMERCIAL

## 2025-06-05 NOTE — PROGRESS NOTES
Clinic Care Coordination Contact    Situation: Patient chart reviewed by care coordinator.    Background: pt to Ed for left flank pain.     Assessment: no new concerns were noted on exam.  There was some changes in chronic conditions yet were not felt to be cause of flank pain.  Pt was treated and discharged.  Pt has already seen provider for ED follow up appt.     Plan/Recommendations: will not call related to ED visit due to above.     MERY Jarquin   [facility] - Care Coordination     [phone]

## 2025-06-11 ENCOUNTER — OFFICE VISIT (OUTPATIENT)
Dept: INTERNAL MEDICINE | Facility: CLINIC | Age: 75
End: 2025-06-11
Payer: COMMERCIAL

## 2025-06-11 VITALS
TEMPERATURE: 97.8 F | HEIGHT: 67 IN | OXYGEN SATURATION: 92 % | WEIGHT: 295 LBS | BODY MASS INDEX: 46.3 KG/M2 | HEART RATE: 67 BPM | DIASTOLIC BLOOD PRESSURE: 58 MMHG | RESPIRATION RATE: 14 BRPM | SYSTOLIC BLOOD PRESSURE: 108 MMHG

## 2025-06-11 DIAGNOSIS — R39.9 LOWER URINARY TRACT SYMPTOMS (LUTS): ICD-10-CM

## 2025-06-11 DIAGNOSIS — F33.1 MAJOR DEPRESSIVE DISORDER, RECURRENT EPISODE, MODERATE (H): ICD-10-CM

## 2025-06-11 DIAGNOSIS — D64.9 ANEMIA, UNSPECIFIED TYPE: ICD-10-CM

## 2025-06-11 DIAGNOSIS — E11.9 TYPE 2 DIABETES MELLITUS WITHOUT COMPLICATION, WITHOUT LONG-TERM CURRENT USE OF INSULIN (H): Chronic | ICD-10-CM

## 2025-06-11 DIAGNOSIS — I10 HYPERTENSION GOAL BP (BLOOD PRESSURE) < 140/90: Primary | Chronic | ICD-10-CM

## 2025-06-11 DIAGNOSIS — R59.0 RETROPERITONEAL LYMPHADENOPATHY: ICD-10-CM

## 2025-06-11 PROCEDURE — 99214 OFFICE O/P EST MOD 30 MIN: CPT | Performed by: INTERNAL MEDICINE

## 2025-06-11 PROCEDURE — 3074F SYST BP LT 130 MM HG: CPT | Performed by: INTERNAL MEDICINE

## 2025-06-11 PROCEDURE — 3078F DIAST BP <80 MM HG: CPT | Performed by: INTERNAL MEDICINE

## 2025-06-11 PROCEDURE — 1125F AMNT PAIN NOTED PAIN PRSNT: CPT | Performed by: INTERNAL MEDICINE

## 2025-06-11 ASSESSMENT — PAIN SCALES - GENERAL: PAINLEVEL_OUTOF10: SEVERE PAIN (7)

## 2025-06-11 NOTE — PROGRESS NOTES
"  Assessment & Plan     Hypertension goal BP (blood pressure) < 140/90  Stable at present time.  Will not change any current medical management at present.    Diet Controlled Type 2 diabetes mellitus without complication, without long-term current use of insulin (H)  Lab Results   Component Value Date    A1C 5.2 05/01/2025    A1C 5.7 11/22/2023    A1C 5.8 02/02/2023    A1C 5.7 05/20/2022    A1C 5.9 08/02/2021    A1C 5.7 01/04/2021    A1C 5.6 07/17/2020    A1C 5.5 11/30/2019    A1C 5.5 05/08/2019    A1C 5.6 06/28/2018   Stable.  Continuing with current ongoing medical management and dietary control.  Suggest A1c check in 6 months.      Lower urinary tract symptoms (LUTS)  Currently on tamsulosin.  Scheduled to see urology upcoming.    Anemia, unspecified type  Orders placed to recheck CBC in setting of mild anemia and leukopenia.  - CBC with platelets and differential; Future  - Lab Blood Morphology Pathologist Review; Future  - Ferritin; Future  - Iron & Iron Binding Capacity; Future    Retroperitoneal lymphadenopathy  Prior discussion noted.  Patient is scheduled for upcoming PET scan previously placed.  Results pending.    Major depressive disorder, recurrent episode, moderate (H)  Only on SSRI therapy.  Patient reports medication without significant side effect.  Discussed with patient that patient often times takes a little while to fully kick in.    Discussed with patient and wife about care coordination.  Wife states that they have been having somebody come in in the past to help the wife with his lower extremity edema and wrapping of his legs but they apparently now have discontinued services.  Patient has been having ambulatory issues along with incontinence issues.  I will place another referral to see if they need more supportive care at home.    BMI  Estimated body mass index is 46.2 kg/m  as calculated from the following:    Height as of this encounter: 1.702 m (5' 7\").    Weight as of this encounter: " 133.8 kg (295 lb).   Weight management plan: Discussed healthy diet and exercise guidelines      Subjective   Kenneth is a 75 year old, presenting for the following health issues:  Depression, Incontinence, and Back Pain    HPI        1. Chronic left sided flank pain/ lower back pain, has been using lidocaine patch.  2. Worsening urinary incontinence, patient needing to change underpants 8-10 times daily. Does not use any incontinence supplies.  He has a tentatively scheduled urological appointment upcoming.    He is accompanied by his wife today.  He is using a wheelchair.  They are living independently at home.  Overall he has been essentially stable since he was last seen in the early part of June by one of my colleagues.    He reports he does not need any medications refilled.  He has been tolerating his SSRI.  We discussed the issues in regards to his depression, recent lab work, his incontinence and ongoing back issues.    Diabetes Follow-up    How often are you checking your blood sugar? Not at all  What concerns do you have today about your diabetes? None   Do you have any of these symptoms? (Select all that apply)  No numbness or tingling in feet.  No redness, sores or blisters on feet.  No complaints of excessive thirst.  No reports of blurry vision.  No significant changes to weight.  Have you had a diabetic eye exam in the last 12 months? No        BP Readings from Last 2 Encounters:   06/11/25 98/58   06/03/25 124/66     Hemoglobin A1C (%)   Date Value   05/01/2025 5.2   11/22/2023 5.7 (H)   01/04/2021 5.7 (H)   07/17/2020 5.6     LDL Cholesterol Calculated (mg/dL)   Date Value   05/01/2025 37   11/22/2023 123 (H)   01/04/2021 117 (H)   05/08/2019 94         Depression   How are you doing with your depression since your last visit? No change  Are you having other symptoms that might be associated with depression? No  Have you had a significant life event?  No   Are you feeling anxious or having panic  "attacks?   No  Do you have any concerns with your use of alcohol or other drugs? No    Social History     Tobacco Use    Smoking status: Never    Smokeless tobacco: Never   Vaping Use    Vaping status: Never Used   Substance Use Topics    Alcohol use: Not Currently    Drug use: No         2/2/2023     1:54 PM 2/25/2025     2:13 PM 5/1/2025    11:01 AM   PHQ   PHQ-9 Total Score 16 11 13    Q9: Thoughts of better off dead/self-harm past 2 weeks More than half the days Several days Nearly every day    F/U: Thoughts of suicide or self-harm   No    F/U: Safety concerns   No        Proxy-reported         6/24/2021    11:15 AM 7/22/2021    12:09 PM 2/2/2023     1:54 PM   MICHEAL-7 SCORE   Total Score 2 (minimal anxiety)     Total Score 2 0 9         Review of Systems  CONSTITUTIONAL: NEGATIVE for fever, chills, change in weight  EYES: NEGATIVE for vision changes or irritation  ENT/MOUTH: NEGATIVE for ear, mouth and throat problems  RESP: NEGATIVE for significant cough or SOB  CV: NEGATIVE for chest pain, palpitations  GI: NEGATIVE for nausea, abdominal pain, heartburn, or change in bowel habits  : NEGATIVE for dysuria, or hematuria  NEURO: NEGATIVE for weakness, dizziness or paresthesias  HEME: NEGATIVE for bleeding problems  PSYCHIATRIC: No significant changes are noted from his baseline.      Objective    /58   Pulse 67   Temp 97.8  F (36.6  C) (Temporal)   Resp 14   Ht 1.702 m (5' 7\")   Wt 133.8 kg (295 lb)   SpO2 92%   BMI 46.20 kg/m    Body mass index is 46.2 kg/m .    Physical Exam   GENERAL: alert  EYES: Eyes grossly normal to inspection, PERRL and conjunctivae and sclerae normal  HENT: ear canals and TM's normal, nose and mouth without ulcers or lesions  RESP: lungs clear to auscultation - no rales, rhonchi or wheezes  CV: regular rate and rhythm, normal S1 S2, no S3 or S4, no click or rub, Noted LE peripheral edema  ABDOMEN: obese  NEURO: No focal changes, in wheelchair  PSYCH: mentation appears normal, " affect normal/bright        Creatinine   Date Value Ref Range Status   06/02/2025 1.01 0.67 - 1.17 mg/dL Final   06/24/2021 0.93 0.66 - 1.25 mg/dL Final     LDL Cholesterol Calculated   Date Value Ref Range Status   05/01/2025 37 <100 mg/dL Final   01/04/2021 117 (H) <100 mg/dL Final     Comment:     Above desirable:  100-129 mg/dl  Borderline High:  130-159 mg/dL  High:             160-189 mg/dL  Very high:       >189 mg/dl       Lab Results   Component Value Date    ALT 14 06/02/2025    ALT 29 06/24/2021     Hemoglobin   Date Value Ref Range Status   06/02/2025 10.5 (L) 13.3 - 17.7 g/dL Final   03/29/2021 13.1 (L) 13.3 - 17.7 g/dL Final   ]      Signed Electronically by: Huy Crystal MD

## 2025-06-15 ENCOUNTER — APPOINTMENT (OUTPATIENT)
Dept: CT IMAGING | Facility: CLINIC | Age: 75
End: 2025-06-15
Attending: EMERGENCY MEDICINE
Payer: COMMERCIAL

## 2025-06-15 ENCOUNTER — HOSPITAL ENCOUNTER (OUTPATIENT)
Facility: CLINIC | Age: 75
Setting detail: OBSERVATION
Discharge: NURSING, REHAB OR LONG-TERM CARE | End: 2025-06-18
Attending: EMERGENCY MEDICINE | Admitting: INTERNAL MEDICINE
Payer: COMMERCIAL

## 2025-06-15 DIAGNOSIS — E03.9 ACQUIRED HYPOTHYROIDISM: Chronic | ICD-10-CM

## 2025-06-15 DIAGNOSIS — I50.42 CHRONIC COMBINED SYSTOLIC AND DIASTOLIC CONGESTIVE HEART FAILURE (H): ICD-10-CM

## 2025-06-15 DIAGNOSIS — R11.0 NAUSEA: ICD-10-CM

## 2025-06-15 DIAGNOSIS — F33.1 MAJOR DEPRESSIVE DISORDER, RECURRENT EPISODE, MODERATE (H): ICD-10-CM

## 2025-06-15 DIAGNOSIS — S09.90XA CLOSED HEAD INJURY, INITIAL ENCOUNTER: ICD-10-CM

## 2025-06-15 DIAGNOSIS — L97.919 ULCERS OF BOTH LOWER EXTREMITIES, UNSPECIFIED ULCER STAGE (H): ICD-10-CM

## 2025-06-15 DIAGNOSIS — L03.114 CELLULITIS OF LEFT HAND: ICD-10-CM

## 2025-06-15 DIAGNOSIS — E78.5 HYPERLIPIDEMIA WITH TARGET LDL LESS THAN 100: Chronic | ICD-10-CM

## 2025-06-15 DIAGNOSIS — Z29.9 ENCOUNTER FOR DEEP VEIN THROMBOSIS (DVT) PROPHYLAXIS: ICD-10-CM

## 2025-06-15 DIAGNOSIS — R60.0 BILATERAL LEG EDEMA: ICD-10-CM

## 2025-06-15 DIAGNOSIS — L97.929 ULCERS OF BOTH LOWER EXTREMITIES, UNSPECIFIED ULCER STAGE (H): ICD-10-CM

## 2025-06-15 DIAGNOSIS — E11.42 DIABETIC POLYNEUROPATHY ASSOCIATED WITH TYPE 2 DIABETES MELLITUS (H): ICD-10-CM

## 2025-06-15 DIAGNOSIS — L08.9 WOUND INFECTION: ICD-10-CM

## 2025-06-15 DIAGNOSIS — I10 HYPERTENSION GOAL BP (BLOOD PRESSURE) < 140/90: Chronic | ICD-10-CM

## 2025-06-15 DIAGNOSIS — R53.1 GENERALIZED WEAKNESS: ICD-10-CM

## 2025-06-15 DIAGNOSIS — R06.02 SHORTNESS OF BREATH: ICD-10-CM

## 2025-06-15 DIAGNOSIS — I73.9 PVD (PERIPHERAL VASCULAR DISEASE): ICD-10-CM

## 2025-06-15 DIAGNOSIS — F32.9 MAJOR DEPRESSION: Chronic | ICD-10-CM

## 2025-06-15 DIAGNOSIS — E66.01 MORBID OBESITY (H): ICD-10-CM

## 2025-06-15 DIAGNOSIS — E11.9 TYPE 2 DIABETES MELLITUS WITHOUT COMPLICATION, WITHOUT LONG-TERM CURRENT USE OF INSULIN (H): Primary | Chronic | ICD-10-CM

## 2025-06-15 DIAGNOSIS — K59.00 CONSTIPATION, UNSPECIFIED CONSTIPATION TYPE: ICD-10-CM

## 2025-06-15 DIAGNOSIS — R29.6 RECURRENT FALLS: ICD-10-CM

## 2025-06-15 DIAGNOSIS — T14.8XXA WOUND INFECTION: ICD-10-CM

## 2025-06-15 DIAGNOSIS — I25.119 CORONARY ARTERY DISEASE INVOLVING NATIVE CORONARY ARTERY OF NATIVE HEART WITH ANGINA PECTORIS: ICD-10-CM

## 2025-06-15 LAB
ALBUMIN SERPL BCG-MCNC: 3.7 G/DL (ref 3.5–5.2)
ALBUMIN UR-MCNC: NEGATIVE MG/DL
ALP SERPL-CCNC: 57 U/L (ref 40–150)
ALT SERPL W P-5'-P-CCNC: 17 U/L (ref 0–70)
ANION GAP SERPL CALCULATED.3IONS-SCNC: 13 MMOL/L (ref 7–15)
APPEARANCE UR: CLEAR
AST SERPL W P-5'-P-CCNC: 23 U/L (ref 0–45)
BACTERIA #/AREA URNS HPF: ABNORMAL /HPF
BASOPHILS # BLD AUTO: 0.1 10E3/UL (ref 0–0.2)
BASOPHILS NFR BLD AUTO: 2 %
BILIRUB SERPL-MCNC: 0.4 MG/DL
BILIRUB UR QL STRIP: NEGATIVE
BUN SERPL-MCNC: 16.8 MG/DL (ref 8–23)
CALCIUM SERPL-MCNC: 8.8 MG/DL (ref 8.8–10.4)
CHLORIDE SERPL-SCNC: 98 MMOL/L (ref 98–107)
COLOR UR AUTO: ABNORMAL
CREAT SERPL-MCNC: 0.82 MG/DL (ref 0.67–1.17)
EGFRCR SERPLBLD CKD-EPI 2021: >90 ML/MIN/1.73M2
EOSINOPHIL # BLD AUTO: 0.8 10E3/UL (ref 0–0.7)
EOSINOPHIL NFR BLD AUTO: 17 %
ERYTHROCYTE [DISTWIDTH] IN BLOOD BY AUTOMATED COUNT: 13.5 % (ref 10–15)
GLUCOSE SERPL-MCNC: 96 MG/DL (ref 70–99)
GLUCOSE UR STRIP-MCNC: NEGATIVE MG/DL
HCO3 SERPL-SCNC: 23 MMOL/L (ref 22–29)
HCT VFR BLD AUTO: 33.3 % (ref 40–53)
HGB BLD-MCNC: 11.2 G/DL (ref 13.3–17.7)
HGB UR QL STRIP: NEGATIVE
HOLD SPECIMEN: NORMAL
HOLD SPECIMEN: NORMAL
IMM GRANULOCYTES # BLD: 0 10E3/UL
IMM GRANULOCYTES NFR BLD: 1 %
KETONES UR STRIP-MCNC: NEGATIVE MG/DL
LEUKOCYTE ESTERASE UR QL STRIP: NEGATIVE
LYMPHOCYTES # BLD AUTO: 1.1 10E3/UL (ref 0.8–5.3)
LYMPHOCYTES NFR BLD AUTO: 24 %
MAGNESIUM SERPL-MCNC: 1.9 MG/DL (ref 1.7–2.3)
MCH RBC QN AUTO: 28.9 PG (ref 26.5–33)
MCHC RBC AUTO-ENTMCNC: 33.6 G/DL (ref 31.5–36.5)
MCV RBC AUTO: 86 FL (ref 78–100)
MONOCYTES # BLD AUTO: 0.6 10E3/UL (ref 0–1.3)
MONOCYTES NFR BLD AUTO: 14 %
MUCOUS THREADS #/AREA URNS LPF: PRESENT /LPF
NEUTROPHILS # BLD AUTO: 1.9 10E3/UL (ref 1.6–8.3)
NEUTROPHILS NFR BLD AUTO: 42 %
NITRATE UR QL: NEGATIVE
NRBC # BLD AUTO: 0 10E3/UL
NRBC BLD AUTO-RTO: 0 /100
PH UR STRIP: 5 [PH] (ref 5–7)
PLATELET # BLD AUTO: 182 10E3/UL (ref 150–450)
POTASSIUM SERPL-SCNC: 4.2 MMOL/L (ref 3.4–5.3)
PROT SERPL-MCNC: 7.2 G/DL (ref 6.4–8.3)
RBC # BLD AUTO: 3.88 10E6/UL (ref 4.4–5.9)
RBC URINE: <1 /HPF
SODIUM SERPL-SCNC: 134 MMOL/L (ref 135–145)
SP GR UR STRIP: 1 (ref 1–1.03)
SQUAMOUS EPITHELIAL: <1 /HPF
UROBILINOGEN UR STRIP-MCNC: NORMAL MG/DL
WBC # BLD AUTO: 4.4 10E3/UL (ref 4–11)
WBC URINE: 1 /HPF

## 2025-06-15 PROCEDURE — 96360 HYDRATION IV INFUSION INIT: CPT | Mod: 59

## 2025-06-15 PROCEDURE — 99285 EMERGENCY DEPT VISIT HI MDM: CPT | Mod: 25

## 2025-06-15 PROCEDURE — G0378 HOSPITAL OBSERVATION PER HR: HCPCS

## 2025-06-15 PROCEDURE — 80053 COMPREHEN METABOLIC PANEL: CPT | Performed by: EMERGENCY MEDICINE

## 2025-06-15 PROCEDURE — 99222 1ST HOSP IP/OBS MODERATE 55: CPT | Performed by: PHYSICIAN ASSISTANT

## 2025-06-15 PROCEDURE — 83735 ASSAY OF MAGNESIUM: CPT | Performed by: EMERGENCY MEDICINE

## 2025-06-15 PROCEDURE — 81001 URINALYSIS AUTO W/SCOPE: CPT | Performed by: EMERGENCY MEDICINE

## 2025-06-15 PROCEDURE — 85025 COMPLETE CBC W/AUTO DIFF WBC: CPT | Performed by: EMERGENCY MEDICINE

## 2025-06-15 PROCEDURE — 70450 CT HEAD/BRAIN W/O DYE: CPT

## 2025-06-15 PROCEDURE — 258N000003 HC RX IP 258 OP 636: Performed by: EMERGENCY MEDICINE

## 2025-06-15 PROCEDURE — 36415 COLL VENOUS BLD VENIPUNCTURE: CPT | Performed by: EMERGENCY MEDICINE

## 2025-06-15 PROCEDURE — 93005 ELECTROCARDIOGRAM TRACING: CPT

## 2025-06-15 RX ORDER — SERTRALINE HYDROCHLORIDE 100 MG/1
100 TABLET, FILM COATED ORAL DAILY
Status: DISCONTINUED | OUTPATIENT
Start: 2025-06-16 | End: 2025-06-18 | Stop reason: HOSPADM

## 2025-06-15 RX ORDER — ACETAMINOPHEN 650 MG/1
650 SUPPOSITORY RECTAL EVERY 4 HOURS PRN
Status: DISCONTINUED | OUTPATIENT
Start: 2025-06-15 | End: 2025-06-18 | Stop reason: HOSPADM

## 2025-06-15 RX ORDER — ONDANSETRON 2 MG/ML
4 INJECTION INTRAMUSCULAR; INTRAVENOUS EVERY 6 HOURS PRN
Status: DISCONTINUED | OUTPATIENT
Start: 2025-06-15 | End: 2025-06-18 | Stop reason: HOSPADM

## 2025-06-15 RX ORDER — TORSEMIDE 20 MG/1
20 TABLET ORAL 2 TIMES DAILY
Status: DISCONTINUED | OUTPATIENT
Start: 2025-06-15 | End: 2025-06-18 | Stop reason: HOSPADM

## 2025-06-15 RX ORDER — SPIRONOLACTONE 25 MG/1
25 TABLET ORAL DAILY
Status: DISCONTINUED | OUTPATIENT
Start: 2025-06-16 | End: 2025-06-18 | Stop reason: HOSPADM

## 2025-06-15 RX ORDER — AMOXICILLIN 250 MG
1 CAPSULE ORAL 2 TIMES DAILY PRN
Status: DISCONTINUED | OUTPATIENT
Start: 2025-06-15 | End: 2025-06-18 | Stop reason: HOSPADM

## 2025-06-15 RX ORDER — LIDOCAINE 40 MG/G
CREAM TOPICAL
Status: DISCONTINUED | OUTPATIENT
Start: 2025-06-15 | End: 2025-06-18 | Stop reason: HOSPADM

## 2025-06-15 RX ORDER — POLYETHYLENE GLYCOL 3350 17 G/17G
17 POWDER, FOR SOLUTION ORAL 2 TIMES DAILY PRN
Status: DISCONTINUED | OUTPATIENT
Start: 2025-06-15 | End: 2025-06-18 | Stop reason: HOSPADM

## 2025-06-15 RX ORDER — ROSUVASTATIN CALCIUM 20 MG/1
20 TABLET, COATED ORAL DAILY
Status: DISCONTINUED | OUTPATIENT
Start: 2025-06-16 | End: 2025-06-18 | Stop reason: HOSPADM

## 2025-06-15 RX ORDER — SERTRALINE HYDROCHLORIDE 100 MG/1
100 TABLET, FILM COATED ORAL DAILY
COMMUNITY

## 2025-06-15 RX ORDER — AMOXICILLIN 250 MG
2 CAPSULE ORAL 2 TIMES DAILY PRN
Status: DISCONTINUED | OUTPATIENT
Start: 2025-06-15 | End: 2025-06-18 | Stop reason: HOSPADM

## 2025-06-15 RX ORDER — PROCHLORPERAZINE MALEATE 5 MG/1
5 TABLET ORAL EVERY 6 HOURS PRN
Status: DISCONTINUED | OUTPATIENT
Start: 2025-06-15 | End: 2025-06-18 | Stop reason: HOSPADM

## 2025-06-15 RX ORDER — TAMSULOSIN HYDROCHLORIDE 0.4 MG/1
0.4 CAPSULE ORAL DAILY
Status: DISCONTINUED | OUTPATIENT
Start: 2025-06-16 | End: 2025-06-18 | Stop reason: HOSPADM

## 2025-06-15 RX ORDER — ACETAMINOPHEN 325 MG/1
650 TABLET ORAL EVERY 4 HOURS PRN
Status: DISCONTINUED | OUTPATIENT
Start: 2025-06-15 | End: 2025-06-18 | Stop reason: HOSPADM

## 2025-06-15 RX ORDER — ASPIRIN 81 MG/1
81 TABLET, CHEWABLE ORAL DAILY
Status: DISCONTINUED | OUTPATIENT
Start: 2025-06-16 | End: 2025-06-18 | Stop reason: HOSPADM

## 2025-06-15 RX ORDER — ONDANSETRON 4 MG/1
4 TABLET, ORALLY DISINTEGRATING ORAL EVERY 6 HOURS PRN
Status: DISCONTINUED | OUTPATIENT
Start: 2025-06-15 | End: 2025-06-18 | Stop reason: HOSPADM

## 2025-06-15 RX ORDER — METOPROLOL SUCCINATE 25 MG/1
25 TABLET, EXTENDED RELEASE ORAL DAILY
Status: DISCONTINUED | OUTPATIENT
Start: 2025-06-16 | End: 2025-06-18 | Stop reason: HOSPADM

## 2025-06-15 RX ORDER — LEVOTHYROXINE SODIUM 112 UG/1
224 TABLET ORAL DAILY
Status: DISCONTINUED | OUTPATIENT
Start: 2025-06-16 | End: 2025-06-18 | Stop reason: HOSPADM

## 2025-06-15 RX ORDER — LOSARTAN POTASSIUM 25 MG/1
25 TABLET ORAL DAILY
Status: DISCONTINUED | OUTPATIENT
Start: 2025-06-16 | End: 2025-06-18 | Stop reason: HOSPADM

## 2025-06-15 RX ADMIN — SODIUM CHLORIDE 1000 ML: 0.9 INJECTION, SOLUTION INTRAVENOUS at 10:22

## 2025-06-15 ASSESSMENT — COLUMBIA-SUICIDE SEVERITY RATING SCALE - C-SSRS
1. IN THE PAST MONTH, HAVE YOU WISHED YOU WERE DEAD OR WISHED YOU COULD GO TO SLEEP AND NOT WAKE UP?: NO
6. HAVE YOU EVER DONE ANYTHING, STARTED TO DO ANYTHING, OR PREPARED TO DO ANYTHING TO END YOUR LIFE?: NO
2. HAVE YOU ACTUALLY HAD ANY THOUGHTS OF KILLING YOURSELF IN THE PAST MONTH?: NO

## 2025-06-15 ASSESSMENT — ACTIVITIES OF DAILY LIVING (ADL)
ADLS_ACUITY_SCORE: 54
ADLS_ACUITY_SCORE: 58
ADLS_ACUITY_SCORE: 54
ADLS_ACUITY_SCORE: 56
ADLS_ACUITY_SCORE: 56
ADLS_ACUITY_SCORE: 58
ADLS_ACUITY_SCORE: 56
ADLS_ACUITY_SCORE: 56
ADLS_ACUITY_SCORE: 58
ADLS_ACUITY_SCORE: 60
ADLS_ACUITY_SCORE: 54
ADLS_ACUITY_SCORE: 54
ADLS_ACUITY_SCORE: 56

## 2025-06-15 NOTE — ED TRIAGE NOTES
Patient arrives via EMS from home.  Reported independent apartment with wife.  Reports 4 falls in the last 24 hours.  Last fall reports tripping backwards and hitting head.  No thinners.  EMS reports significant weakness upon their arrival.  ABCs intact, A&Ox4     Triage Assessment (Adult)       Row Name 06/15/25 0847          Triage Assessment    Airway WDL WDL        Respiratory WDL    Respiratory WDL WDL        Skin Circulation/Temperature WDL    Skin Circulation/Temperature WDL WDL        Cardiac WDL    Cardiac WDL WDL        Peripheral/Neurovascular WDL    Peripheral Neurovascular WDL WDL        Cognitive/Neuro/Behavioral WDL    Cognitive/Neuro/Behavioral WDL WDL                     
nothing

## 2025-06-15 NOTE — PLAN OF CARE
ROOM # [id number]    Living Situation (if not independent, order SW consult):   Facility name: [facility]   : Suzan Ivory     Activity level at baseline: Independent   Activity level on admit: Hover krystal to bed. Pt unsure about moving     Who will be transporting you at discharge: pt states he will need arrangements       Patient registered to observation; given Patient Bill of Rights; given the opportunity to ask questions about observation status and their plan of care.  Patient has been oriented to the observation room, bathroom and call light is in place.    Discussed discharge goals and expectations with patient/family.

## 2025-06-15 NOTE — H&P
[facility]    History and Physical - Hospitalist Service       Date of Admission:  6/15/2025    Assessment & Plan      Jamar Ivory is a 75 year old male with PMHx significant for DM type II, HTN, hypothyroidism, chronic systolic CHF, hx of CVA, BPH, depression, chronic LE ulcers, retroperitoneal lymphadenopathy, and obesity, who was admitted on 6/15/2025 with recurrent falls and generalized weakness.     Recurrent falls  Generalized weakness  Lost his balance and fell backwards, hit his head today. Notes mild headache but denies LOC. Pt reports 4 falls at home since Friday. That's an increase from baseline. He states he loses his balance and then falls. Falling when he's not using his walker. Denies focal neurological sx. Does endorse some lightheadedness at times. Denies N/V/D, dysuria or loss of appetite.   ED work up is fairly unremarkable. Blood pressures are borderline low, may be contributing to falls. Hgb baseline, UA unremarkable. CT head negative for acute process. EKG SR with 1st degree AV block, RBBB.  - admit to OBS  - on several BP meds, not orthostatic. On Losartan 25 mg qd, torsemide 20 mg BID, flomax 0.4 mg qd, metoprolol 25 mg qd, spironolactone 25 mg qd; may need down titration of meds  - no focal neurological sx at this time  - PT/OT consults  - SW consult  - consider brain MRI if mobility is significantly impaired  - suspect falls are due to not using walker and pt should likely use walker at all times    HTN  Managed with Losartan, torsemide, metoprolol and spironolactone  - resume home meds with parameters. Per above, may need some down titration on his regimen  Hypothyroidism  - resume home levothyroxine   Chronic combined systolic and diastolic CHF  Last echo done 10/2024, showed EF 25-30%, down from 45-50% in 2021. Follow up JACKY Infante showed transmural infarction to entire inferior segment of L ventricle, EF 24%. Followed up with Cardiology, discussed further  "work up, pt opted for medical management. Pt was not compliant on regimen so losartan, metoprolol and statin resumed at the beginning of the year, spironolactone added back about a month later.  Pt continues to note occasional chest pains that he will take nitroglycerin for but its been awhile. Notes exertional SOB so he is fairly sedentary.   - resume home BB, ARB, spironolactone, statin and diuretic with parameters    Hx of CVA  - resume ASA and statin  BPH  - resume home Flomax  Depression  - resume home Zoloft  Chronic LE ulcers   Continues to do wound cares at home. Legs are red, pt states this is baseline  Obesity  DM type II  Diet controlled    Retroperitoneal lymphadenopathy  PET scan ordered by PCP        Diet:  regular diet  DVT Prophylaxis: Pneumatic Compression Devices  Leon Catheter: Not present  Lines: None     Cardiac Monitoring: None  Code Status:  DNR/DNI    Clinically Significant Risk Factors Present on Admission         # Hyponatremia: Lowest Na = 134 mmol/L in last 2 days, will monitor as appropriate         # Drug Induced Platelet Defect: home medication list includes an antiplatelet medication   # Hypertension: Noted on problem list  # Chronic heart failure with reduced ejection fraction: last echo with EF <40%          # Morbid Obesity: Estimated body mass index is 44.85 kg/m  as calculated from the following:    Height as of this encounter: 1.727 m (5' 8\").    Weight as of 6/11/25: 133.8 kg (295 lb).       # Financial/Environmental Concerns:           Disposition Plan     Medically Ready for Discharge: Anticipated Tomorrow         The patient's care was discussed with the Patient and ED provider, Dr. Morales.    Rika Castrejon PA-C  Hospitalist Service  [facility]  Securely message with Kesha (more info)  Text page via [facility] Paging/Directory     ______________________________________________________________________    Chief Complaint   Falls     History is " obtained from the patient    History of Present Illness   Jamar Ivory is a 75 year old male with PMHx significant for DM type II, HTN, hypothyroidism, chronic systolic CHF, hx of CVA, BPH, depression, chronic LE ulcers, retroperitoneal lymphadenopathy, and obesity, who presents to the ED after a fall. He states he was putting a  back in his closet then took 2 steps back and lost his balance, causing him to fall on his back side and he hit his head. He denies LOC. He notes 3 other falls since Friday, all due to losing his balance an occur when he is not using his walker. He notes hx of falls but this is more frequent than normal for him. He notes mild headache. He endorses occasional chest pains due to his heart and has nitroglycerin for this but has not used this in awhile. He notes exertional SOB so he is fairly sedentary to avoid this. He denies abd pain, nausea, vomiting, diarrhea or dysuria. He denies vision loss, facial droop, speech difficulties or one sided weakness.      Past Medical History    Past Medical History:   Diagnosis Date    Arthritis     Cerebral artery occlusion with cerebral infarction     TIA    CHF (congestive heart failure) 12/24/2018    CVA (cerebral infarction) 2012    CVD (cardiovascular disease)     Depression 1977    hospitalized    Fatty liver     Gout     h/o Concussion     Hypertension 01/17/2011    Hypothyroidism     Obesity     Spider veins     TIA (transient ischaemic attack) 2012    Vitiligo        Past Surgical History   Past Surgical History:   Procedure Laterality Date    APPENDECTOMY      at age 22    COLONOSCOPY N/A 09/02/2016    Procedure: COMBINED COLONOSCOPY, SINGLE OR MULTIPLE BIOPSY/POLYPECTOMY BY BIOPSY;  Surgeon: Yanick Brown MD;  Location:  GI    COLONOSCOPY N/A 12/27/2021    Procedure: COLONOSCOPY;  Surgeon: Kong Chowdary MD;  Location: [facility] OR    IRRIGATION AND DEBRIDEMENT FINGER, COMBINED Left 1/19/2025    Procedure: incision and drainage of  3rd webspace,  incision and drainage of flexor tendon at A1 pulley middle and ring fingers, incision and drainage of mid palmar space;  Surgeon: Keyur Hoffmann MD;  Location: [facility] OR    VASECTOMY         Prior to Admission Medications   Prior to Admission Medications   Prescriptions Last Dose Informant Patient Reported? Taking?   Misc Natural Products (OSTEO BI-FLEX JOINT SHIELD PO) 6/15/2025 Morning  Yes Yes   Sig: Take 1 tablet by mouth daily.   acetaminophen (TYLENOL) 500 MG tablet   Yes Yes   Sig: Take 500 mg by mouth every 6 hours as needed for mild pain. Prn for headaches   aspirin (ASA) 81 MG tablet 6/15/2025 Morning  No Yes   Sig: Take 1 tablet (81 mg) by mouth daily   levothyroxine (SYNTHROID/LEVOTHROID) 112 MCG tablet 6/15/2025 Morning  No Yes   Sig: TAKE 2 TABLETS (224 MCG) BY MOUTH DAILY   losartan (COZAAR) 25 MG tablet 6/15/2025 Morning  No Yes   Sig: Take 1 tablet (25 mg) by mouth daily.   metoprolol succinate ER (TOPROL XL) 25 MG 24 hr tablet 6/15/2025 Morning  No Yes   Sig: Take 1 tablet (25 mg) by mouth daily.   nitroGLYcerin (NITROSTAT) 0.4 MG sublingual tablet   No Yes   Sig: FOR CHEST PAIN PLACE 1 TABLET UNDER THE TONGUE EVERY 5 MINUTES FOR 3 DOSES. IF SYMPTOMS PERSIST 5 MINUTES AFTER 1ST DOSE CALL 911.   rosuvastatin (CRESTOR) 20 MG tablet 6/15/2025 Morning  No Yes   Sig: Take 1 tablet (20 mg) by mouth daily.   sertraline (ZOLOFT) 100 MG tablet 6/15/2025 Morning  Yes Yes   Sig: Take 100 mg by mouth daily.   spironolactone (ALDACTONE) 25 MG tablet 6/15/2025 Morning  No Yes   Sig: Take 1 tablet (25 mg) by mouth daily.   tamsulosin (FLOMAX) 0.4 MG capsule 6/15/2025 Morning  Yes Yes   Sig: Take 0.4 mg by mouth daily.   torsemide (DEMADEX) 20 MG tablet 6/15/2025 Morning  No Yes   Sig: Take 1 tablet (20 mg) by mouth 2 times daily.      Facility-Administered Medications: None           Physical Exam   Vital Signs: Temp: 97.7  F (36.5  C) Temp src: Temporal BP: 97/54 Pulse: 62   Resp: 16 SpO2: 94 %  O2 Device: None (Room air)    Weight: 0 lbs 0 oz    GENERAL:  Comfortable.  PSYCH: pleasant, oriented, No acute distress.  HEENT:  Atraumatic, normocephalic. Normal conjunctiva, normal hearing, and oropharynx is normal.  NECK:  Supple, no neck vein distention, adenopathy or bruits, normal thyroid.  HEART:  Normal S1, S2 with no murmur, no pericardial rub, gallops or S3 or S4.  LUNGS:  Clear to auscultation, normal Respiratory effort. No wheezing, rales or ronchi.  GI:  Soft, no hepatosplenomegaly, normal bowel sounds. Non-tender, non distended.   EXTREMITIES:  No pedal edema, +2 pulses bilateral and equal.  SKIN:  Dry to touch, No rash, wound or ulcerations.  NEUROLOGIC:  CN 2-12 intact, BL 5/5 symmetric upper and lower extremity strength, sensation is intact with no focal deficits.      Medical Decision Making       65 MINUTES SPENT BY ME on the date of service doing chart review, history, exam, documentation & further activities per the note.      Data     I have personally reviewed the following data over the past 24 hrs:    4.4  \   11.2 (L)   / 182     134 (L) 98 16.8 /  96   4.2 23 0.82 \     ALT: 17 AST: 23 AP: 57 TBILI: 0.4   ALB: 3.7 TOT PROTEIN: 7.2 LIPASE: N/A       Imaging results reviewed over the past 24 hrs:   Recent Results (from the past 24 hours)   Head CT w/o contrast    Narrative    EXAM: CT HEAD W/O CONTRAST  LOCATION: [facility]  DATE: 6/15/2025    INDICATION: Trauma, head injury  COMPARISON: Head CT 01/19/2025  TECHNIQUE: Routine CT Head without IV contrast. Multiplanar reformats. Dose reduction techniques were used.    FINDINGS:  INTRACRANIAL CONTENTS: No intracranial hemorrhage. Mild diffuse cerebral parenchymal volume loss. No midline shift. The basilar cisterns are patent. Mild periventricular and scattered foci of deep white matter hypodensities involving both cerebral   hemispheres. No CT evidence for an acute infarct. No cerebellar tonsillar ectopia.    VISUALIZED  ORBITS/SINUSES/MASTOIDS: No intraorbital abnormality. Mild mucosal thickening of the ethmoid air cells. No middle ear or mastoid effusion.    BONES/SOFT TISSUES: No acute abnormality.      Impression    IMPRESSION:  1.  No intracranial hemorrhage, mass lesions, hydrocephalus or CT evidence for an acute infarct.  2.  Mild diffuse cerebral parenchymal volume loss. Presumed chronic hypertensive/microvascular ischemic white matter changes.

## 2025-06-15 NOTE — ED NOTES
[facility]  ED Nurse Handoff Report    ED Chief complaint: Fall  . ED Diagnosis:   Final diagnoses:   Closed head injury, initial encounter   Generalized weakness   Recurrent falls   Ulcers of both lower extremities, unspecified ulcer stage (H)       Allergies:   Allergies   Allergen Reactions    Clopidogrel      Cough/emesis    Penicillins Rash    Simvastatin Diarrhea    Sulfa Antibiotics      Unsure    Xeroform [Bismuth Tribromphenate]      Unsure       Code Status: DNR / DNI    Activity level - Baseline/Home:  assist of 1.  Activity Level - Current:   assist of 2.   Lift room needed: No   Bariatric: Yes   Needed: No   Isolation: No.   Infection: Not Applicable      Respiratory status: Room air    Vital Signs (within 30 minutes):   Vitals:    06/15/25 1056 06/15/25 1116 06/15/25 1121 06/15/25 1130   BP: 109/54 97/86  97/54   Pulse: 60 61  62   Resp:       Temp:       TempSrc:       SpO2: 94%  92% 94%   Height:           Cardiac Rhythm:  ,      Pain level:    Patient confused: No.   Patient Falls Risk: bed/chair alarm on, nonskid shoes/slippers when out of bed, arm band in place, patient and family education, assistive device/personal items within reach, and activity supervised.   Elimination Status: Male PureWick in place     Patient Report - Initial Complaint: Falls.   Focused Assessment:   HPI   Jamar Ivory is a 75 year old male with a past medical history significant for type II diabetes mellitus, congestive heart failure, cerebral infarction, hypertension, obesity, and hypothyroidism who presents to the emergency department with EMS for evaluation of a fall. He currently resides in independent living with his wife. He reportedly had four falls within the last 24 hours, the most recent of which he lost his balance and tripped backwards, striking his head on the ground. He did not lose consciousness upon impact. EMS was called and upon arrival, they noted significant  generalized weakness. He states that he has been experienced increased weakness and feeling off balance with weight bearing as of recently. He does feel as though this has intensified over the last 24 hours and has not had this frequent of falls previously. He normally ambulates with a walker at baseline, but EMS believe that he was ambulating without this this morning, causing the fall. He did not sustain any other injuries, and denies any pain at bedside. He denies taking any blood thinning medications, although he does take an aspirin 81 mg daily. He denies any lightheadedness, dizziness, neck pain, back pain, abdominal pain, fevers, chills, chest pain, shortness of breath, or urinary symptoms. He does endorse mild headache at bedside, as well as states that he has been having intermittent episodes of incontinence. He has been able to tolerate the oral intake of foods and fluids without difficulty. He does have several ulcerations to his bilateral lower extremities with surrounding erythema, but these are chronic in nature. He follows with a wound care specialist, and his wife bandages these wounds daily.    Physical Exam  General: Obese elderly male, laying on the stretcher  Eyes: PERRL, Conjunctive within normal limits.  No scleral icterus.  ENT: Moist mucous membranes, oropharynx clear.   CV: Normal S1S2, no murmur, rub or gallop. Regular rate and rhythm  Resp: Clear to auscultation bilaterally, no wheezes, rales or rhonchi. Normal respiratory effort.  GI: Abdomen is soft, nontender and nondistended. No palpable masses. No rebound or guarding.  MSK: No edema. Nontender. Normal active range of motion.  Skin: Warm and dry. No rashes or lesions or ecchymoses on visible skin.  Neuro: Alert and oriented. Responds appropriately to all questions and commands. No focal findings appreciated. Normal muscle tone.  Psych: Normal mood and affect. Pleasant.     Abnormal Results:   Labs Ordered and Resulted from Time of ED  Arrival to Time of ED Departure   ROUTINE UA WITH MICROSCOPIC REFLEX TO CULTURE - Abnormal       Result Value    Color Urine Straw      Appearance Urine Clear      Glucose Urine Negative      Bilirubin Urine Negative      Ketones Urine Negative      Specific Gravity Urine 1.005      Blood Urine Negative      pH Urine 5.0      Protein Albumin Urine Negative      Urobilinogen Urine Normal      Nitrite Urine Negative      Leukocyte Esterase Urine Negative      Bacteria Urine Few (*)     Mucus Urine Present (*)     RBC Urine <1      WBC Urine 1      Squamous Epithelials Urine <1     COMPREHENSIVE METABOLIC PANEL - Abnormal    Sodium 134 (*)     Potassium 4.2      Carbon Dioxide (CO2) 23      Anion Gap 13      Urea Nitrogen 16.8      Creatinine 0.82      GFR Estimate >90      Calcium 8.8      Chloride 98      Glucose 96      Alkaline Phosphatase 57      AST 23      ALT 17      Protein Total 7.2      Albumin 3.7      Bilirubin Total 0.4     CBC WITH PLATELETS AND DIFFERENTIAL - Abnormal    WBC Count 4.4      RBC Count 3.88 (*)     Hemoglobin 11.2 (*)     Hematocrit 33.3 (*)     MCV 86      MCH 28.9      MCHC 33.6      RDW 13.5      Platelet Count 182      % Neutrophils 42      % Lymphocytes 24      % Monocytes 14      % Eosinophils 17      % Basophils 2      % Immature Granulocytes 1      NRBCs per 100 WBC 0      Absolute Neutrophils 1.9      Absolute Lymphocytes 1.1      Absolute Monocytes 0.6      Absolute Eosinophils 0.8 (*)     Absolute Basophils 0.1      Absolute Immature Granulocytes 0.0      Absolute NRBCs 0.0     MAGNESIUM - Normal    Magnesium 1.9          Head CT w/o contrast   Final Result   IMPRESSION:   1.  No intracranial hemorrhage, mass lesions, hydrocephalus or CT evidence for an acute infarct.   2.  Mild diffuse cerebral parenchymal volume loss. Presumed chronic hypertensive/microvascular ischemic white matter changes.             Treatments provided: Bolus, see MAR  Family Comments:  wife Melissa,  contacted for an update  OBS brochure/video discussed/provided to patient:  Yes  ED Medications:   Medications   sodium chloride 0.9% BOLUS 1,000 mL (0 mLs Intravenous Stopped 6/15/25 1141)       Drips infusing:  No  For the majority of the shift this patient was Green.   Interventions performed were None.    Sepsis treatment initiated: No    Cares/treatment/interventions/medications to be completed following ED care:  see MAR    ED Nurse Name: Vito Díaz RN  12:23 PM    RECEIVING UNIT ED HANDOFF REVIEW    Above ED Nurse Handoff Report was reviewed: Yes  Reviewed by: Cary Lawson RN on Dianne 15, 2025 at 3:10 PM   YANIRA Rebolledo called the ED to inform them the note was read: No

## 2025-06-15 NOTE — ED NOTES
Bed: [facility]  Expected date:   Expected time:   Means of arrival:   Comments:  BV2

## 2025-06-15 NOTE — PHARMACY-ADMISSION MEDICATION HISTORY
Pharmacist Admission Medication History    Admission medication history is complete. The information provided in this note is only as accurate as the sources available at the time of the update.    Information Source(s): Patient via in-person    Changes made to PTA medication list:  Added: None  Deleted: nystatin, senokot  Changed: sertraline    Allergies reviewed with patient and updates made in EHR: yes    Medication History Completed By: Saravanan Zamudio [facility] 6/15/2025 1:14 PM    PTA Med List   Medication Sig Last Dose/Taking    acetaminophen (TYLENOL) 500 MG tablet Take 500 mg by mouth every 6 hours as needed for mild pain. Prn for headaches Taking As Needed    aspirin (ASA) 81 MG tablet Take 1 tablet (81 mg) by mouth daily 6/15/2025 Morning    levothyroxine (SYNTHROID/LEVOTHROID) 112 MCG tablet TAKE 2 TABLETS (224 MCG) BY MOUTH DAILY 6/15/2025 Morning    losartan (COZAAR) 25 MG tablet Take 1 tablet (25 mg) by mouth daily. 6/15/2025 Morning    metoprolol succinate ER (TOPROL XL) 25 MG 24 hr tablet Take 1 tablet (25 mg) by mouth daily. 6/15/2025 Morning    [facility] Natural Products (OSTEO BI-FLEX JOINT SHIELD PO) Take 1 tablet by mouth daily. 6/15/2025 Morning    nitroGLYcerin (NITROSTAT) 0.4 MG sublingual tablet FOR CHEST PAIN PLACE 1 TABLET UNDER THE TONGUE EVERY 5 MINUTES FOR 3 DOSES. IF SYMPTOMS PERSIST 5 MINUTES AFTER 1ST DOSE CALL 911. Taking    rosuvastatin (CRESTOR) 20 MG tablet Take 1 tablet (20 mg) by mouth daily. 6/15/2025 Morning    sertraline (ZOLOFT) 100 MG tablet Take 100 mg by mouth daily. 6/15/2025 Morning    spironolactone (ALDACTONE) 25 MG tablet Take 1 tablet (25 mg) by mouth daily. 6/15/2025 Morning    tamsulosin (FLOMAX) 0.4 MG capsule Take 0.4 mg by mouth daily. 6/15/2025 Morning    torsemide (DEMADEX) 20 MG tablet Take 1 tablet (20 mg) by mouth 2 times daily. 6/15/2025 Morning

## 2025-06-15 NOTE — PLAN OF CARE
[facility]    ED Boarding Nurse Handoff Addendum Report:    Date/time: 6/15/2025, 3:20 PM    Activity Level: in bed    Fall Risk: Yes:  activity supervised    Active Infusions: NA    Current Meds Due: See MAR    Current care needs: Monitor general weakness    Oxygen requirements (liters/min and/or FiO2): NA    Respiratory status: Room air    Vital signs (within last 30 minutes):    Vitals:    06/15/25 1116 06/15/25 1121 06/15/25 1130 06/15/25 1200   BP: 97/86  97/54 109/57   Pulse: 61  62 60   Resp:       Temp:       TempSrc:       SpO2:  92% 94% 97%   Height:           Focused assessment within last 30 minutes:    AOx4, not OOB, VSS, RA, BLE ulcers open to air, no drainage, external cath, regular diet, denies SOB, denies pain, plan for OT, PT and social work consult.     ED Boarding Nurse name: Chelo Thomas RN

## 2025-06-15 NOTE — ED NOTES
Assisted with patient triage (ambulance). Applied monitoring equipment onto Patient (Pulse ox and BP). Assisted Pt. To BR using walker. Pt. Gait steady with walker.

## 2025-06-15 NOTE — ED PROVIDER NOTES
Emergency Department Note      History of Present Illness     Chief Complaint   Fall      HPI   Jamar Ivory is a 75 year old male with a past medical history significant for type II diabetes mellitus, congestive heart failure, cerebral infarction, hypertension, obesity, and hypothyroidism who presents to the emergency department with EMS for evaluation of a fall. He currently resides in independent living with his wife. He reportedly had four falls within the last 24 hours, the most recent of which he lost his balance and tripped backwards, striking his head on the ground. He did not lose consciousness upon impact. EMS was called and upon arrival, they noted significant generalized weakness. He states that he has been experienced increased weakness and feeling off balance with weight bearing as of recently. He does feel as though this has intensified over the last 24 hours and has not had this frequent of falls previously. He normally ambulates with a walker at baseline, but EMS believe that he was ambulating without this this morning, causing the fall. He did not sustain any other injuries, and denies any pain at bedside. He denies taking any blood thinning medications, although he does take an aspirin 81 mg daily. He denies any lightheadedness, dizziness, neck pain, back pain, abdominal pain, fevers, chills, chest pain, shortness of breath, or urinary symptoms. He does endorse mild headache at bedside, as well as states that he has been having intermittent episodes of incontinence. He has been able to tolerate the oral intake of foods and fluids without difficulty. He does have several ulcerations to his bilateral lower extremities with surrounding erythema, but these are chronic in nature. He follows with a wound care specialist, and his wife bandages these wounds daily.    Independent Historian   EMS as detailed above.    Review of External Notes   I reviewed the previous ED note from 6/2/25, for which  "the patient was seen for evaluation of left flank pain.    Past Medical History     Medical History and Problem List   Arthritis  Cerebral artery occlusion with cerebral infarction  congestive heart failure  cerebral infarction  cardiovascular disease  Depression  Fatty liver  Gout  Concussion  Hypertension  Hypothyroidism  Obesity  Spider veins  TIA  Vitiligo  Type II DM    Medications   Aspirin 81 mg  albuterol  levothyroxine   Losartan  lexapro  metoprolol succinate  nitroglycerin  rosuvastatin  senna-docusate  gabapentin  sertraline  spironolactone  tamsulosin  Torsemide  nitroglycerin    Surgical History   Appendectomy  Colonoscopy  I&D  vasectomy    Physical Exam     Patient Vitals for the past 24 hrs:   BP Temp Temp src Pulse Resp SpO2 Height   06/15/25 1130 97/54 -- -- 62 -- 94 % --   06/15/25 1121 -- -- -- -- -- 92 % --   06/15/25 1116 97/86 -- -- 61 -- -- --   06/15/25 1056 109/54 -- -- 60 -- 94 % --   06/15/25 1041 (!) 85/67 -- -- 62 -- 95 % --   06/15/25 1025 -- -- -- 65 -- 93 % --   06/15/25 1015 106/51 -- -- 61 -- 92 % --   06/15/25 0858 -- 97.7  F (36.5  C) Temporal -- -- -- 1.727 m (5' 8\")   06/15/25 0849 (!) 145/82 -- -- 90 16 95 % --     Physical Exam  General: Obese elderly male, laying on the stretcher  Eyes: PERRL, Conjunctive within normal limits.  No scleral icterus.  ENT: Moist mucous membranes, oropharynx clear.   CV: Normal S1S2, no murmur, rub or gallop. Regular rate and rhythm  Resp: Clear to auscultation bilaterally, no wheezes, rales or rhonchi. Normal respiratory effort.  GI: Abdomen is soft, nontender and nondistended. No palpable masses. No rebound or guarding.  MSK: No edema. Nontender. Normal active range of motion.  Skin: Warm and dry. No rashes or lesions or ecchymoses on visible skin.  Neuro: Alert and oriented. Responds appropriately to all questions and commands. No focal findings appreciated. Normal muscle tone.  Psych: Normal mood and affect. Pleasant.     Diagnostics "     Lab Results   Labs Ordered and Resulted from Time of ED Arrival to Time of ED Departure   ROUTINE UA WITH MICROSCOPIC REFLEX TO CULTURE - Abnormal       Result Value    Color Urine Straw      Appearance Urine Clear      Glucose Urine Negative      Bilirubin Urine Negative      Ketones Urine Negative      Specific Gravity Urine 1.005      Blood Urine Negative      pH Urine 5.0      Protein Albumin Urine Negative      Urobilinogen Urine Normal      Nitrite Urine Negative      Leukocyte Esterase Urine Negative      Bacteria Urine Few (*)     Mucus Urine Present (*)     RBC Urine <1      WBC Urine 1      Squamous Epithelials Urine <1     COMPREHENSIVE METABOLIC PANEL - Abnormal    Sodium 134 (*)     Potassium 4.2      Carbon Dioxide (CO2) 23      Anion Gap 13      Urea Nitrogen 16.8      Creatinine 0.82      GFR Estimate >90      Calcium 8.8      Chloride 98      Glucose 96      Alkaline Phosphatase 57      AST 23      ALT 17      Protein Total 7.2      Albumin 3.7      Bilirubin Total 0.4     CBC WITH PLATELETS AND DIFFERENTIAL - Abnormal    WBC Count 4.4      RBC Count 3.88 (*)     Hemoglobin 11.2 (*)     Hematocrit 33.3 (*)     MCV 86      MCH 28.9      MCHC 33.6      RDW 13.5      Platelet Count 182      % Neutrophils 42      % Lymphocytes 24      % Monocytes 14      % Eosinophils 17      % Basophils 2      % Immature Granulocytes 1      NRBCs per 100 WBC 0      Absolute Neutrophils 1.9      Absolute Lymphocytes 1.1      Absolute Monocytes 0.6      Absolute Eosinophils 0.8 (*)     Absolute Basophils 0.1      Absolute Immature Granulocytes 0.0      Absolute NRBCs 0.0     MAGNESIUM - Normal    Magnesium 1.9         Imaging   Head CT w/o contrast   Final Result   IMPRESSION:   1.  No intracranial hemorrhage, mass lesions, hydrocephalus or CT evidence for an acute infarct.   2.  Mild diffuse cerebral parenchymal volume loss. Presumed chronic hypertensive/microvascular ischemic white matter changes.             EKG    ECG results from 06/15/25   EKG 12-lead, tracing only     Value    Systolic Blood Pressure     Diastolic Blood Pressure     Ventricular Rate 66    Atrial Rate 66    NY Interval 242    QRS Duration 132        QTc 444    P Axis 43    R AXIS -54    T Axis 8    Interpretation ECG      Sinus rhythm with 1st degree A-V block  Left axis deviation  Right bundle branch block  Inferior infarct , age undetermined  Abnormal ECG  When compared with ECG of 20-Dec-2024 12:57, (unconfirmed)  NY interval has increased  Right bundle branch block is now Present  Minimal criteria for Anteroseptal infarct are no longer Present  Inferior infarct is now Present       *Note: Due to a large number of results and/or encounters for the requested time period, some results have not been displayed. A complete set of results can be found in Results Review.       Independent Interpretation   CT Head: No intracranial hemorrhage.    ED Course      Medications Administered   Medications   sodium chloride 0.9% BOLUS 1,000 mL (0 mLs Intravenous Stopped 6/15/25 1141)       Procedures   Procedures     Discussion of Management   Admitting Hospitalist, Lucía for Dr. Bean    ED Course   ED Course as of 06/15/25 1211   Sun Arjun 15, 2025   0849 I obtained history and examined the patient as noted above.     1041 I rechecked the patient and updated. He is feeling better after eating and receiving fluids and would like to go home.     1209 I spoke with Lucía for Dr. Bean from [facility], who accepts the patient for admission.       Additional Documentation  None    Medical Decision Making / Diagnosis     CMS Diagnoses: None    MIPS   None               [facility]   Jamar Ivory is a 75 year old male with multiple medical problems including type 2 diabetes mellitus, CHF, CVA, hypertension, obesity, and hypothyroidism presents emergency department after recurrent falls since yesterday.  He is fallen 4 times.  He reports increased  weakness from baseline but does report he frequently falls as well.  He does have evidence of head injury by history, head CT fortunately not showing signs of intracranial hemorrhage or skull fracture.  He is neurologically intact. He has chronic leg ulcers by his history, his legs do have some erythema associated, lower suspicion for infection at this time with lack of reported changes by the patient, fevers, or leukocytosis.  I do suspect mild dehydration.  He was gently hydrated here in the emergency department.  He continued to be without focal neurologic signs or symptoms, with ongoing increased risk of falling so will admit for ongoing care.  He feels comfortable this plan.  All questions were answered prior to admission.    Disposition   The patient was admitted to the hospital.     Diagnosis     ICD-10-CM    1. Closed head injury, initial encounter  S09.90XA       2. Generalized weakness  R53.1       3. Recurrent falls  R29.6       4. Ulcers of both lower extremities, unspecified ulcer stage (H)  L97.919     L97.929            Scribe Disclosure:  I, Analilia Scott, am serving as a scribe at 8:58 AM on 6/15/2025 to document services personally performed by Clau Morales MD based on my observations and the provider's statements to me.        Clau Morales MD  06/15/25 3134

## 2025-06-15 NOTE — PROGRESS NOTES
Patient not able to stand due to general weakness, heavy assist of 2, not able to complete ortho BP at this time.

## 2025-06-15 NOTE — PLAN OF CARE
Pt A&OX4, pt denies pain. Pt not feeling strong enough to pivot. Hover mat over to bed. PT able to roll.     Skin Pt is on contact for MRA and had open wounds BLLE. The skin is red/ pink.Placed a chucks under legs  Pt denies any pain at sights. Rash on the R side of abdominal fold. Open to air with pillows under to help with +3 edema Bilaterally in ankles. Pt has a small scab on top back of head from fall. Pt has big bruises on Left upper arm, Left hip, and small scattered all over.   Pt states his wife normally covers with ACE Wrap.

## 2025-06-16 ENCOUNTER — APPOINTMENT (OUTPATIENT)
Dept: OCCUPATIONAL THERAPY | Facility: CLINIC | Age: 75
End: 2025-06-16
Attending: PHYSICIAN ASSISTANT
Payer: COMMERCIAL

## 2025-06-16 ENCOUNTER — APPOINTMENT (OUTPATIENT)
Dept: PHYSICAL THERAPY | Facility: CLINIC | Age: 75
End: 2025-06-16
Attending: PHYSICIAN ASSISTANT
Payer: COMMERCIAL

## 2025-06-16 LAB
ANION GAP SERPL CALCULATED.3IONS-SCNC: 11 MMOL/L (ref 7–15)
ATRIAL RATE - MUSE: 66 BPM
BUN SERPL-MCNC: 14.6 MG/DL (ref 8–23)
CALCIUM SERPL-MCNC: 8.5 MG/DL (ref 8.8–10.4)
CHLORIDE SERPL-SCNC: 103 MMOL/L (ref 98–107)
CREAT SERPL-MCNC: 0.84 MG/DL (ref 0.67–1.17)
DIASTOLIC BLOOD PRESSURE - MUSE: NORMAL MMHG
EGFRCR SERPLBLD CKD-EPI 2021: 90 ML/MIN/1.73M2
ERYTHROCYTE [DISTWIDTH] IN BLOOD BY AUTOMATED COUNT: 13.5 % (ref 10–15)
FLUAV RNA SPEC QL NAA+PROBE: NEGATIVE
FLUBV RNA RESP QL NAA+PROBE: NEGATIVE
GLUCOSE BLDC GLUCOMTR-MCNC: 89 MG/DL (ref 70–99)
GLUCOSE SERPL-MCNC: 88 MG/DL (ref 70–99)
HCO3 SERPL-SCNC: 25 MMOL/L (ref 22–29)
HCT VFR BLD AUTO: 29.9 % (ref 40–53)
HGB BLD-MCNC: 10 G/DL (ref 13.3–17.7)
INTERPRETATION ECG - MUSE: NORMAL
MCH RBC QN AUTO: 28.7 PG (ref 26.5–33)
MCHC RBC AUTO-ENTMCNC: 33.4 G/DL (ref 31.5–36.5)
MCV RBC AUTO: 86 FL (ref 78–100)
P AXIS - MUSE: 43 DEGREES
PLATELET # BLD AUTO: 164 10E3/UL (ref 150–450)
POTASSIUM SERPL-SCNC: 4 MMOL/L (ref 3.4–5.3)
PR INTERVAL - MUSE: 242 MS
QRS DURATION - MUSE: 132 MS
QT - MUSE: 424 MS
QTC - MUSE: 444 MS
R AXIS - MUSE: -54 DEGREES
RBC # BLD AUTO: 3.48 10E6/UL (ref 4.4–5.9)
RSV RNA SPEC NAA+PROBE: NEGATIVE
SARS-COV-2 RNA RESP QL NAA+PROBE: NEGATIVE
SODIUM SERPL-SCNC: 139 MMOL/L (ref 135–145)
SYSTOLIC BLOOD PRESSURE - MUSE: NORMAL MMHG
T AXIS - MUSE: 8 DEGREES
T4 FREE SERPL-MCNC: 0.83 NG/DL (ref 0.9–1.7)
TSH SERPL DL<=0.005 MIU/L-ACNC: 6.75 UIU/ML (ref 0.3–4.2)
VENTRICULAR RATE- MUSE: 66 BPM
WBC # BLD AUTO: 4.4 10E3/UL (ref 4–11)

## 2025-06-16 PROCEDURE — 36415 COLL VENOUS BLD VENIPUNCTURE: CPT | Performed by: PHYSICIAN ASSISTANT

## 2025-06-16 PROCEDURE — 250N000013 HC RX MED GY IP 250 OP 250 PS 637: Performed by: PHYSICIAN ASSISTANT

## 2025-06-16 PROCEDURE — 97161 PT EVAL LOW COMPLEX 20 MIN: CPT | Mod: GP | Performed by: PHYSICAL THERAPIST

## 2025-06-16 PROCEDURE — 82962 GLUCOSE BLOOD TEST: CPT

## 2025-06-16 PROCEDURE — G0378 HOSPITAL OBSERVATION PER HR: HCPCS

## 2025-06-16 PROCEDURE — 97530 THERAPEUTIC ACTIVITIES: CPT | Mod: GP | Performed by: PHYSICAL THERAPIST

## 2025-06-16 PROCEDURE — 99233 SBSQ HOSP IP/OBS HIGH 50: CPT | Performed by: PHYSICIAN ASSISTANT

## 2025-06-16 PROCEDURE — G0463 HOSPITAL OUTPT CLINIC VISIT: HCPCS | Mod: 25

## 2025-06-16 PROCEDURE — 80048 BASIC METABOLIC PNL TOTAL CA: CPT | Performed by: PHYSICIAN ASSISTANT

## 2025-06-16 PROCEDURE — 84439 ASSAY OF FREE THYROXINE: CPT | Performed by: PHYSICIAN ASSISTANT

## 2025-06-16 PROCEDURE — 84443 ASSAY THYROID STIM HORMONE: CPT | Performed by: PHYSICIAN ASSISTANT

## 2025-06-16 PROCEDURE — 85014 HEMATOCRIT: CPT | Performed by: PHYSICIAN ASSISTANT

## 2025-06-16 PROCEDURE — 87637 SARSCOV2&INF A&B&RSV AMP PRB: CPT | Performed by: PHYSICIAN ASSISTANT

## 2025-06-16 PROCEDURE — 97535 SELF CARE MNGMENT TRAINING: CPT | Mod: GO | Performed by: OCCUPATIONAL THERAPIST

## 2025-06-16 PROCEDURE — 97166 OT EVAL MOD COMPLEX 45 MIN: CPT | Mod: GO | Performed by: OCCUPATIONAL THERAPIST

## 2025-06-16 RX ADMIN — ACETAMINOPHEN 650 MG: 325 TABLET ORAL at 18:56

## 2025-06-16 RX ADMIN — METOPROLOL SUCCINATE 25 MG: 25 TABLET, EXTENDED RELEASE ORAL at 08:27

## 2025-06-16 RX ADMIN — ASPIRIN 81 MG CHEWABLE TABLET 81 MG: 81 TABLET CHEWABLE at 08:27

## 2025-06-16 RX ADMIN — SPIRONOLACTONE 25 MG: 25 TABLET ORAL at 08:26

## 2025-06-16 RX ADMIN — TORSEMIDE 20 MG: 20 TABLET ORAL at 08:27

## 2025-06-16 RX ADMIN — TAMSULOSIN HYDROCHLORIDE 0.4 MG: 0.4 CAPSULE ORAL at 08:27

## 2025-06-16 RX ADMIN — SERTRALINE HYDROCHLORIDE 100 MG: 100 TABLET ORAL at 08:26

## 2025-06-16 RX ADMIN — ROSUVASTATIN CALCIUM 20 MG: 20 TABLET, FILM COATED ORAL at 08:27

## 2025-06-16 RX ADMIN — LEVOTHYROXINE SODIUM 224 MCG: 0.11 TABLET ORAL at 08:26

## 2025-06-16 RX ADMIN — LOSARTAN POTASSIUM 25 MG: 25 TABLET, FILM COATED ORAL at 08:26

## 2025-06-16 ASSESSMENT — ACTIVITIES OF DAILY LIVING (ADL)
ADLS_ACUITY_SCORE: 58
ADLS_ACUITY_SCORE: 58
ADLS_ACUITY_SCORE: 65
ADLS_ACUITY_SCORE: 59
ADLS_ACUITY_SCORE: 67
ADLS_ACUITY_SCORE: 58
ADLS_ACUITY_SCORE: 67
PREVIOUS_RESPONSIBILITIES: MEAL PREP;HOUSEKEEPING;LAUNDRY;SHOPPING;YARDWORK;MEDICATION MANAGEMENT;FINANCES;DRIVING
ADLS_ACUITY_SCORE: 58
ADLS_ACUITY_SCORE: 65
ADLS_ACUITY_SCORE: 58
ADLS_ACUITY_SCORE: 65
DEPENDENT_IADLS:: INDEPENDENT
ADLS_ACUITY_SCORE: 65
ADLS_ACUITY_SCORE: 65
ADLS_ACUITY_SCORE: 67
ADLS_ACUITY_SCORE: 58
ADLS_ACUITY_SCORE: 58
ADLS_ACUITY_SCORE: 67
ADLS_ACUITY_SCORE: 67
ADLS_ACUITY_SCORE: 58
ADLS_ACUITY_SCORE: 67
ADLS_ACUITY_SCORE: 65
ADLS_ACUITY_SCORE: 65
ADLS_ACUITY_SCORE: 59

## 2025-06-16 NOTE — PROGRESS NOTES
06/16/25 1600   Appointment Info   Signing Clinician's Name / Credentials (OT) Alyson Abel OTRL   Quick Adds   [facility] Auth & Certification   Living Environment   People in Home spouse;friend(s)   Current Living Arrangements house   Home Accessibility stairs to enter home   Number of Stairs, Main Entrance 10   Stair Railings, Main Entrance railings on both sides of stairs   Living Environment Comments Pt reports he has a narrow hallway to bathroom where he is unable to use walker leading to falls in the past. Pt has standard height toilets with countertop next to toilet. Pt has a tub shower.   Self-Care   Usual Activity Tolerance moderate   Current Activity Tolerance poor   Regular Exercise No   Equipment Currently Used at Home walker, rolling;walker, standard   Fall history within last six months yes   Number of times patient has fallen within last six months 4   Activity/Exercise/Self-Care Comment Pt reports independence with ADLs and mobility with intermittent walker use (pt reports walker does not fit in all areas of his home)   Instrumental Activities of Daily Living (IADL)   Previous Responsibilities meal prep;housekeeping;laundry;shopping;yardwork;medication management;finances;driving   IADL Comments Pt reports he manages his own meds   General Information   Onset of Illness/Injury or Date of Surgery 06/15/25   Referring Physician Rika Castrejon PA-C   Patient/Family Therapy Goal Statement (OT) Pt would like to return to prior level of function   Additional Occupational Profile Info/Pertinent History of Current Problem 75 year old male with PMHx significant for DM type II, HTN, hypothyroidism, chronic systolic CHF, hx of CVA, BPH, depression, chronic LE ulcers, retroperitoneal lymphadenopathy, and obesity who was admitted on 6/15/2025 with recurrent falls and generalized weakness.      In the ED he was afebrile with blood pressure 145/82, heart rate of 90 and breathing comfortably on room  air without hypoxia. Lab work showed normal BMP with the exception of sodium 134.  WBC normal at 4.4, hemoglobin mildly low at 11.2.  Urinalysis did not appear infected. He was given 1 litre of IV fluids with observation admission requested.     Concerns that patient is having numerous falls, which he presents as a mechanical fall and denies any cardiac or neurological symptoms. His appearance is concerning that he is unable to attend to his ADL's.  He also has not followed up with core clinic or vascular clinic regarding his wounds.  The open wounds on his legs have enlarged significantly in size and number since his last wound care appointment.  PT assessed and is recommending TCU.   Existing Precautions/Restrictions fall   Limitations/Impairments safety/cognitive   Cognitive Status Examination   Orientation Status person  (reports the year is 2024 and month is May)   Affect/Mental Status (Cognitive) confused   Follows Commands WFL   Safety Deficit moderate deficit   Memory Deficit severe deficit   Attention Deficit severe deficit   Executive Function Deficit severe deficit   Cognitive Status Comments SLUMS from [place] was 14/30 indicating severe cognitive impairment. See Short Blessed below for details on current cognitive status. Pt endorses decline in memory reporting he and his wife are both impaired. Pt manages his own meds but is unable to state all the meds he takes.   Cognitive Screens/Assessments   Cognitive Assessments Completed Other Cognitive Screen/Assessment   Cognitive Assessment Test Short Blessed   Cognitive Assessment Test Score -15   Cognitive Assessment Test Interpretation Indicates severe cognitive impairment   Visual Perception   Visual Impairment/Limitations corrective lenses full-time   Sensory   Sensory Comments Pt reports intact   Pain Assessment   Patient Currently in Pain Yes, see Vital Sign flowsheet   Posture   Posture forward head position;protracted shoulders   Range of Motion  Comprehensive   Comment, General Range of Motion Limited trunk flexion during dressing task, BLE WFL   Strength Comprehensive (MMT)   Comment, General Manual Muscle Testing (MMT) Assessment Decline in strength globally, impaired tolerance for activity   Coordination   Coordination Comments Delayed   Bed Mobility   Comment (Bed Mobility) CGA with bed features   Transfers   Transfer Comments Mod assist x2   Balance   Balance Comments Impaired in standing   Activities of Daily Living   BADL Assessment/Intervention bathing;lower body dressing;toileting;grooming   Bathing Assessment/Intervention   Comment, (Bathing) Max assist per clinical reasoning   Lower Body Dressing Assessment/Training   Comment, (Lower Body Dressing) Max assist   Grooming Assessment/Training   Comment, (Grooming) Poor tolerance for activity in standing   Toileting   Comment, (Toileting) Mod assist per clinical reasoning   Clinical Impression   Criteria for Skilled Therapeutic Interventions Met (OT) Yes, treatment indicated   OT Diagnosis Decline in dressing bathing toileting and grooming tolerance with cognitive decline   Influenced by the following impairments falls, weak   OT Problem List-Impairments impacting ADL problems related to;activity tolerance impaired;cognition;balance;mobility;coordination;strength;range of motion (ROM)   Assessment of Occupational Performance 5 or more Performance Deficits   Identified Performance Deficits Impaired dressing bathing toileting and grooming tolerance with cognitive decline   Planned Therapy Interventions (OT) ADL retraining;cognition;progressive activity/exercise;home program guidelines   Clinical Decision Making Complexity (OT) detailed assessment/moderate complexity   Risk & Benefits of therapy have been explained evaluation/treatment results reviewed;care plan/treatment goals reviewed;risks/benefits reviewed;current/potential barriers reviewed;participants voiced agreement with care plan;participants  included;patient   OT Total Evaluation Time   OT Eval, Moderate Complexity Minutes (66021) 10   Therapy Certification   Start of Care Date 06/16/25   Certification date from 06/16/25   Certification date to 06/25/25   Medical Diagnosis Falls, weakness   [facility] Authorization Information   Condition type Initial onset (within last 3 months)   Cause of current episode Unspecified   Nature of treatment Rehabilitative   Functional ability Severe functional limitations   Documented POC (choose all that apply) Measurable short and long term/discharge treatment goals related to physical and functional deficits.;Frequency of treatment visits and treatment activities to address deficit areas.;Patient agrees to program participation including home program   Briefly describe symptoms Weakness, impaired activity tolerance and frequent falls   How did the symptoms start Fell a few days prior to admission, continued weakness and 3 more falls   Last 24H: avg pain/symptom intensity  7/10   Past wk: avg pain/symptom intensity 6/10   Frequency of Symptoms Constantly (% of the time)   Symptom impact on ADLs Extremely   Condition change since eval No change   General health reported by patient Fair   OT Goals   Therapy Frequency (OT) 3 times/week   OT Predicted Duration/Target Date for Goal Attainment 06/25/25   OT Goals Hygiene/Grooming;Lower Body Dressing;Lower Body Bathing;Toilet Transfer/Toileting;Cognition   OT: Hygiene/Grooming modified independent;using adaptive equipment;while standing   OT: Lower Body Dressing Modified independent;using adaptive equipment   OT: Lower Body Bathing Modified independent;using adaptive equipment   OT: Toilet Transfer/Toileting Modified independent;toilet transfer;using adaptive equipment;cleaning and garment management   OT: Cognitive Patient/caregiver will verbalize understanding of cognitive assessment results/recommendations as needed for safe discharge planning   Self-Care/Home Management  "  Self-Care/Home Mgmt/ADL, Compensatory, Meal Prep Minutes (89248) 21   Symptoms Noted During/After Treatment (Meal Preparation/Planning Training) fatigue;increased pain;shortness of breath   Treatment Detail/Skilled Intervention Ot; PT agreeable to therapy. Pt co-treated with PT/OT to provide skilled assist x2 needed for transfer independence needed for toileting. Pt completed supine to sit with cues for tehcnique and CGA. Pt reports dizziness EOB,  systolic. Pt symptoms resolved, with bed raised, mod assist x2 and assist through the walker pt compelted sit t ostand. Pt completed stand pivot transfer with CGA-min assist due to imbalance and intermittent retropulsion. Pt completed stand to sit with mi nassist to control descent. SEated in chairm pt educated on modified figure four to improve LE dressing, pt unable to thread BLE through brief in sitting with technique (pt knees are painful, unable to complete) requiring mod assist to don brief. PT completed sit tostand from chair with min-mod assist x2, min assist to pull up brief. Seated in chairm pot educated on results of short blessed. Pt endorses decline in memory for \"many years\". Pt in chair with alarm on and call light upon TO departure   OT Discharge Planning   OT Plan LE dressing with AE., g/h standing in front of chair with tray table, monitor cog   OT Discharge Recommendation (DC Rec) Transitional Care Facility   OT Rationale for DC Rec Patient presents significantly below baseline wiht ADLs and mobility requiring 2 assist for transfers upon eval. Pt will require TCU at discharge to return to prior level of function.   OT Brief overview of current status Mod assist x2 sit to stand, mod assist LE dressing   OT Total Distance Amb During Session (feet) 4   [facility]                                                                                   OUTPATIENT OCCUPATIONAL THERAPY    PLAN OF TREATMENT FOR OUTPATIENT " REHABILITATION   Patient's Last Name, First Name, ANDRADERainYANIRARain  Jamar Ivory YOB: 1950   Provider's Name   [facility]   Medical Record No.  [id number]     Onset Date: 06/15/25 Start of Care Date: (P) 06/16/25     Medical Diagnosis:  (P) Falls, weakness               OT Diagnosis:  (P) Decline in dressing bathing toileting and grooming tolerance with cognitive decline Certification Dates:  From: (P) 06/16/25  To: (P) 06/25/25     See note for plan of treatment, functional goals, and certification details.    I CERTIFY THE NEED FOR THESE SERVICES FURNISHED UNDER        THIS PLAN OF TREATMENT AND WHILE UNDER MY CARE (Physician co-signature of this document indicates review and certification of the therapy plan).

## 2025-06-16 NOTE — PLAN OF CARE
PRIMARY DIAGNOSIS: Weakness/Falls  OUTPATIENT/OBSERVATION GOALS TO BE MET BEFORE DISCHARGE:  ADLs back to baseline: No     Activity and level of assistance: Up with maximum assistance. Consider SW and/or PT evaluation.      Pain status: Pain free.     Return to near baseline physical activity: No          Discharge Planner Nurse  Safe discharge environment identified: No  Barriers to discharge: Yes       Entered by: Rocío Serrano RN 06/16/2025      Pt is A&Ox4. VSS.Ax1-2 walker. Regular diet. PIV-L.  BLE- redness and wounds. Pt would benefit from a WOC consult. Legs elevated. Consults: PT/OT/SW.     Please review provider order for any additional goals.   Nurse to notify provider when observation goals have been met and patient is ready for discharge.

## 2025-06-16 NOTE — PLAN OF CARE
Vitals are Temp: 98.4  F (36.9  C) Temp src: Oral BP: 113/47 Pulse: 63   Resp: 18 SpO2: 92 %.    Aox4, Denies pain. PIV SL. Contact precautions for MRSA maintained. Regular diet. Assist x2 urinal at bed side. WOC consult completed, wound care daily to BLE. Consult PT/OT and SW. Plan: Nasal swab needed for RSV/flu/covid. Discharge planning pending after PT/OT recommendations.     PRIMARY DIAGNOSIS: GENERALIZED WEAKNESS, Falls    OUTPATIENT/OBSERVATION GOALS TO BE MET BEFORE DISCHARGE  1. Orthostatic performed: Yes:          Lying Orthostatic BP: 101/52         Sitting Orthostatic BP: 101/46         Standing Orthostatic BP: 97/64     2. Tolerating PO medications: Yes    3. Return to near baseline physical activity: No    4. Cleared for discharge by consultants (if involved): No    Discharge Planner Nurse   Safe discharge environment identified: Yes  Barriers to discharge: Yes       Entered by: Frida Renner RN 06/16/2025      Please review provider order for any additional goals.   Nurse to notify provider when observation goals have been met and patient is ready for discharge.      Goal Outcome Evaluation:      Plan of Care Reviewed With: patient    Overall Patient Progress: improvingOverall Patient Progress: improving    Outcome Evaluation: AOx4, Denies pain, N/V, SOB. Isolation maintained MRSA. PT/OT consult.          Problem: Adult Inpatient Plan of Care  Goal: Plan of Care Review  Description: The Plan of Care Review/Shift note should be completed every shift.  The Outcome Evaluation is a brief statement about your assessment that the patient is improving, declining, or no change.  This information will be displayed automatically on your shift  note.  6/16/2025 1029 by Frida Renner RN  Outcome: Progressing  Flowsheets (Taken 6/16/2025 1029)  Outcome Evaluation: AOx4, Denies pain, N/V, SOB. Isolation maintained MRSA. PT/OT consult.  Plan of Care Reviewed With: patient  Overall Patient Progress: improving  6/16/2025  "1028 by Frida Renner RN  Outcome: Progressing  6/16/2025 1027 by Frida Renner RN  Outcome: Progressing  Flowsheets (Taken 6/16/2025 1027)  Outcome Evaluation: AOx4, Denies pain, N/V, SOB. Isolation maintained MRSA. PT/OT consult.  Goal: Patient-Specific Goal (Individualized)  Description: You can add care plan individualizations to a care plan. Examples of Individualization might be:  \"Parent requests to be called daily at 9am for status\", \"I have a hard time hearing out of my right ear\", or \"Do not touch me to wake me up as it startles  me\".  6/16/2025 1029 by Frida Renner RN  Outcome: Progressing  6/16/2025 1028 by Frida Renner RN  Outcome: Progressing  6/16/2025 1027 by Frida Renner RN  Outcome: Progressing  Goal: Absence of Hospital-Acquired Illness or Injury  6/16/2025 1029 by Frida Renner RN  Outcome: Progressing  6/16/2025 1028 by Frida Renner RN  Outcome: Progressing  6/16/2025 1027 by Frida Renner RN  Outcome: Progressing  Intervention: Identify and Manage Fall Risk  Recent Flowsheet Documentation  Taken 6/16/2025 0831 by Frida Renner RN  Safety Promotion/Fall Prevention:   activity supervised   safety round/check completed   patient and family education   nonskid shoes/slippers when out of bed   increased rounding and observation  Intervention: Prevent Skin Injury  Recent Flowsheet Documentation  Taken 6/16/2025 0831 by Frida Renner RN  Body Position:   heels elevated   legs elevated   weight shifting  Intervention: Prevent and Manage VTE (Venous Thromboembolism) Risk  Recent Flowsheet Documentation  Taken 6/16/2025 0831 by Frida Renner RN  VTE Prevention/Management: compression stockings off  Intervention: Prevent Infection  Recent Flowsheet Documentation  Taken 6/16/2025 0831 by Frida Renner RN  Infection Prevention:   single patient room provided   rest/sleep promoted  Goal: Optimal Comfort and Wellbeing  6/16/2025 1029 by Frida Renner RN  Outcome: Progressing  6/16/2025 1028 by Frida Renner RN  Outcome: " Progressing  6/16/2025 1027 by Frida Renner RN  Outcome: Progressing  Intervention: Monitor Pain and Promote Comfort  Recent Flowsheet Documentation  Taken 6/16/2025 0831 by Frida Renner RN  Pain Management Interventions: declines  Intervention: Provide Person-Centered Care  Recent Flowsheet Documentation  Taken 6/16/2025 0831 by Frida Renner RN  Trust Relationship/Rapport: care explained  Goal: Readiness for Transition of Care  6/16/2025 1029 by Frida Renner RN  Outcome: Progressing  6/16/2025 1028 by Frida Renner RN  Outcome: Progressing  6/16/2025 1027 by Frida Renner RN  Outcome: Progressing     Problem: Delirium  Goal: Optimal Coping  6/16/2025 1029 by Frida Renner RN  Outcome: Progressing  6/16/2025 1028 by Frida Renner RN  Outcome: Progressing  6/16/2025 1027 by Frida Renner RN  Outcome: Progressing  Goal: Improved Behavioral Control  6/16/2025 1029 by Frida Renner RN  Outcome: Progressing  6/16/2025 1028 by Frida Renner RN  Outcome: Progressing  6/16/2025 1027 by Frida Renner RN  Outcome: Progressing  Intervention: Minimize Safety Risk  Recent Flowsheet Documentation  Taken 6/16/2025 0831 by Frida Renner RN  Trust Relationship/Rapport: care explained  Goal: Improved Attention and Thought Clarity  6/16/2025 1029 by Frida Renner RN  Outcome: Progressing  6/16/2025 1028 by Frida Renner RN  Outcome: Progressing  6/16/2025 1027 by Frida Renner RN  Outcome: Progressing  Goal: Improved Sleep  6/16/2025 1029 by Frida Renner RN  Outcome: Progressing  6/16/2025 1028 by Frida Renner RN  Outcome: Progressing  6/16/2025 1027 by Frida Renner RN  Outcome: Progressing     Problem: Skin Injury Risk Increased  Goal: Skin Health and Integrity  6/16/2025 1029 by Frida Renner RN  Outcome: Progressing  6/16/2025 1028 by Frida Renner RN  Outcome: Progressing  6/16/2025 1027 by Jud, Frida, RN  Outcome: Progressing  Intervention: Plan: Nurse Driven Intervention: Moisture Management  Recent Flowsheet Documentation  Taken 6/16/2025 0831 by Frida Renner,  RN  Moisture Interventions: Encourage regular toileting  Intervention: Plan: Nurse Driven Intervention: Friction and Shear  Recent Flowsheet Documentation  Taken 6/16/2025 0831 by Frida Renner RN  Friction/Shear Interventions: HOB 30 degrees or less  Intervention: Optimize Skin Protection  Recent Flowsheet Documentation  Taken 6/16/2025 0831 by Frida Renner RN  Activity Management: activity adjusted per tolerance  Head of Bed (HOB) Positioning: HOB at 30-45 degrees

## 2025-06-16 NOTE — CONSULTS
Care Management Initial Consult    General Information  Assessment completed with: Patient, VM-chart review,    Type of CM/SW Visit: Initial Assessment    Primary Care Provider verified and updated as needed: Yes   Readmission within the last 30 days: no previous admission in last 30 days      Reason for Consult: discharge planning  Advance Care Planning: Advance Care Planning Reviewed: present on chart, no concerns identified          Communication Assessment  Patient's communication style: spoken language (English or Bilingual)    Hearing Difficulty or Deaf: no        Cognitive  Cognitive/Neuro/Behavioral: .WDL except  Level of Consciousness: intermittent confusion  Arousal Level: opens eyes spontaneously  Orientation: oriented x 4  Mood/Behavior: calm, cooperative  Best Language: 0 - No aphasia  Speech: clear, spontaneous, logical    Living Environment:   People in home: spouse, friend(s)     Current living Arrangements: apartment      Able to return to prior arrangements: other (see comments) (Pending PT/OT consults)       Family/Social Support:  Care provided by: spouse/significant other  Provides care for: no one  Marital Status:   Support system: Wife, Children, Friend          Description of Support System: Supportive, Involved    Support Assessment: Lacks adequate physical care    Current Resources:   Patient receiving home care services: No        Community Resources: Other (see comment) (Meals from [facility])  Equipment currently used at home: walker, standard, walker, rolling  Supplies currently used at home: None    Employment/Financial:  Employment Status: retired        Financial Concerns: none           Does the patient's insurance plan have a 3 day qualifying hospital stay waiver?  No    Lifestyle & Psychosocial Needs:  Social Drivers of Health     Food Insecurity: Low Risk  (6/15/2025)    Food Insecurity     Within the past 12 months, did you worry that your food would run out before you got  money to buy more?: No     Within the past 12 months, did the food you bought just not last and you didn t have money to get more?: No   Depression: Not at risk (5/1/2025)    PHQ-2     PHQ-2 Score: 2   Recent Concern: Depression - At risk (2/25/2025)    PHQ-2     PHQ-2 Score: 6   Housing Stability: Low Risk  (6/15/2025)    Housing Stability     Do you have housing? : Yes     Are you worried about losing your housing?: No   Tobacco Use: Low Risk  (6/11/2025)    Patient History     Smoking Tobacco Use: Never     Smokeless Tobacco Use: Never     Passive Exposure: Not on file   Financial Resource Strain: Low Risk  (6/15/2025)    Financial Resource Strain     Within the past 12 months, have you or your family members you live with been unable to get utilities (heat, electricity) when it was really needed?: No   Alcohol Use: Not on file   Transportation Needs: High Risk (6/15/2025)    Transportation Needs     Within the past 12 months, has lack of transportation kept you from medical appointments, getting your medicines, non-medical meetings or appointments, work, or from getting things that you need?: Yes   Physical Activity: Not on file   Interpersonal Safety: Low Risk  (1/18/2025)    Interpersonal Safety     Do you feel physically and emotionally safe where you currently live?: Yes     Within the past 12 months, have you been hit, slapped, kicked or otherwise physically hurt by someone?: No     Within the past 12 months, have you been humiliated or emotionally abused in other ways by your partner or ex-partner?: No   Stress: Not on file   Social Connections: Not on file   Health Literacy: Not on file       Functional Status:  Prior to admission patient needed assistance:   Dependent ADLs:: Independent  Dependent IADLs:: Independent  Assesssment of Functional Status: Not at baseline with mobility    Mental Health Status:  Mental Health Status: No Current Concerns       Chemical Dependency Status:  Chemical Dependency  Status: No Current Concerns             Values/Beliefs:  Spiritual, Cultural Beliefs, Buddhist Practices, Values that affect care: no               Discussed  Partnership in Safe Discharge Planning  document with patient/family: No    Additional Information:  Writer met with pt at bedside to introduce role and assess for discharge needs r/t ERS, provider consult, and ambulatory handoff. Pt confirmed he lives in an apartment with his spouse and best friend and is independent with I/ADLs at baseline. Pt has had several falls recently and is discussing transition to care home with spouse. The current apartment does not have any services available. Address and PCP verified.     Pt denied financial, MH, or CD concerns. HCD present on chart.     PT/OT consults pending. Anticipate likely TCU or home care recommendation. Pt's first choice T[facility] is [facility]. Has worked with [facility] in the past and agreeable to working with them again in the future.  Care Management will continue to follow.     Next Steps: Determine PT/OT recs and follow standard discharge practices.     Suzanne Pérez RN   Care Management  [phone]

## 2025-06-16 NOTE — PROGRESS NOTES
[facility]                                                                                   OUTPATIENT PHYSICAL THERAPY    PLAN OF TREATMENT FOR OUTPATIENT REHABILITATION   Patient's Last Name, First Name, Jamar Garrido YOB: 1950   Provider's Name   [facility]   Medical Record No.  [id number]     Onset Date: 06/15/25 Start of Care Date: 06/16/25     Medical Diagnosis:  Falls, weakness               PT Diagnosis:  Impaired functional mobility Certification Dates:  From: 06/16/25  To: 06/21/25       See note for plan of treatment, functional goals, and certification details.    I CERTIFY THE NEED FOR THESE SERVICES FURNISHED UNDER        THIS PLAN OF TREATMENT AND WHILE UNDER MY CARE (Physician co-signature of this document indicates review and certification of the therapy plan).               06/16/25 5437   Appointment Info   Signing Clinician's Name / Credentials (PT) Jennifer Murphy DPT   Quick Adds   Quick Adds [facility] Auth & Certification   Living Environment   People in Home spouse;friend(s)   Current Living Arrangements house   Home Accessibility stairs to enter home   Number of Stairs, Main Entrance 10   Stair Railings, Main Entrance railings on both sides of stairs   Self-Care   Usual Activity Tolerance moderate   Current Activity Tolerance poor   Regular Exercise No   Equipment Currently Used at Home walker, rolling;walker, standard   Fall history within last six months yes   Number of times patient has fallen within last six months 4   Activity/Exercise/Self-Care Comment Pt endorses 4 falls in past 2 weeks   General Information   Onset of Illness/Injury or Date of Surgery 06/15/25   Referring Physician Rika Castrejon PA-C   Patient/Family Therapy Goals Statement (PT) return home   Pertinent History of Current Problem (include personal factors and/or comorbidities that impact the POC) Jamar Ivory is a 75 year old  male with PMHx significant for DM type II, HTN, hypothyroidism, chronic systolic CHF, hx of CVA, BPH, depression, chronic LE ulcers, retroperitoneal lymphadenopathy, and obesity, who was admitted on 6/15/2025 with recurrent falls and generalized weakness.   Existing Precautions/Restrictions fall   Weight-Bearing Status - LLE full weight-bearing   Weight-Bearing Status - RLE full weight-bearing   Cognition   Affect/Mental Status (Cognition) WFL   Orientation Status (Cognition) oriented x 3   Follows Commands (Cognition) WFL   Cognitive Status Comments forgetful, needing reminder of year   Pain Assessment   Patient Currently in Pain No   Integumentary/Edema   Integumentary/Edema Comments chronic wounds and B LE present   Posture    Posture Forward head position;Protracted shoulders   Range of Motion (ROM)   Range of Motion ROM is WFL   Strength (Manual Muscle Testing)   Strength (Manual Muscle Testing) Deficits observed during functional mobility   Bed Mobility   Comment, (Bed Mobility) sit<>supine with maxA   Transfers   Comment, (Transfers) sit<>stand with modAx2   Gait/Stairs (Locomotion)   Comment, (Gait/Stairs) MinAx2 x 3'   Balance   Balance Comments posterior lean in stance, needs external assist for safety   Sensory Examination   Sensory Perception Comments patient inconsistent with reports of BLE neuropathy. B LE intact to light touch this date   Coordination   Coordination no deficits were identified   Muscle Tone   Muscle Tone no deficits were identified   Clinical Impression   Criteria for Skilled Therapeutic Intervention Yes, treatment indicated   PT Diagnosis (PT) Impaired functional mobility   Influenced by the following impairments Weakness, deconditioning, impaired balance, pain   Functional limitations due to impairments Difficulty with bed mobility, transfers, ambulation, stairs   Clinical Presentation (PT Evaluation Complexity) stable   Clinical Presentation Rationale clinical reasoning   Clinical  Decision Making (Complexity) low complexity   Planned Therapy Interventions (PT) balance training;bed mobility training;gait training;home exercise program;patient/family education;stair training;strengthening;stretching;transfer training;progressive activity/exercise;home program guidelines   Risk & Benefits of therapy have been explained evaluation/treatment results reviewed;care plan/treatment goals reviewed;risks/benefits reviewed;current/potential barriers reviewed;participants voiced agreement with care plan;participants included;patient   PT Total Evaluation Time   PT Eval Low Complexity Minutes (89091) 12   Therapy Certification   Start of care date 06/16/25   Certification date from 06/16/25   Certification date to 06/21/25   Medical Diagnosis Falls, weakness   [facility] Authorization Information   Condition type Initial onset (within last 3 months)   Cause of current episode Unspecified   Nature of treatment Rehabilitative   Functional ability Moderate functional limitations   Documented POC (choose all that apply) Measurable short and long term/discharge treatment goals related to physical and functional deficits.;Frequency of treatment visits and treatment activities to address deficit areas.;Patient agrees to program participation including home program   Briefly describe symptoms Pt admitted with increased falls and complaints of weakness   How did the symptoms start gradually   Last 24H: avg pain/symptom intensity  7/10  (3/10 pain; 7/10 weakness)   Past wk: avg pain/symptom intensity 7/10  (3/10 pain; 7/10 weakness)   Frequency of Symptoms Frequently (51-75% of the time)   Symptom impact on ADLs Moderately   Condition change since eval N/A (first visit)   General health reported by patient Fair   Physical Therapy Goals   PT Frequency 5x/week   PT Predicted Duration/Target Date for Goal Attainment 06/21/25   PT Goals Bed Mobility;Transfers;Gait;Stairs   PT: Bed Mobility Supervision/stand-by assist;Supine  to/from sit   PT: Transfers Supervision/stand-by assist;Sit to/from stand   PT: Gait Supervision/stand-by assist;Rolling walker;50 feet   PT: Stairs Minimal assist;10 stairs;Rail on both sides   PT Discharge Planning   PT Discharge Recommendation (DC Rec) Transitional Care Facility   PT Rationale for DC Rec at this time the patient is significantly below baseline, requiring Ax2 for basic transfer to the bedside chair. Rec continued PT in the TCU setting to promote improved strength, conditioning, balance required for functional mobility   PT Brief overview of current status Ax2 to pivot to bedside chair   PT Total Distance Amb During Session (feet) 5   Physical Therapy Time and Intention   Total Session Time (sum of timed and untimed services) 12

## 2025-06-16 NOTE — PLAN OF CARE
Vitals are Temp: 98.2  F (36.8  C) Temp src: Oral BP: 134/60 Pulse: 67   Resp: 16 SpO2: 93 %.    Aox4, Denies pain. PIV SL. Contact precautions for MRSA maintained. Regular diet. Assist x2, Not OOB. Consult PT/OT and SW. WOC referral for BLE ulcers.     PRIMARY DIAGNOSIS: GENERALIZED WEAKNESS, Falls    OUTPATIENT/OBSERVATION GOALS TO BE MET BEFORE DISCHARGE  1. Orthostatic performed: Yes:          Lying Orthostatic BP: 101/52         Sitting Orthostatic BP: 101/46         Standing Orthostatic BP: 97/64     2. Tolerating PO medications: Yes    3. Return to near baseline physical activity: No    4. Cleared for discharge by consultants (if involved): No    Discharge Planner Nurse   Safe discharge environment identified: Yes  Barriers to discharge: Yes       Entered by: Frida Renner RN 06/16/2025      Please review provider order for any additional goals.   Nurse to notify provider when observation goals have been met and patient is ready for discharge.      Goal Outcome Evaluation:      Plan of Care Reviewed With: patient    Overall Patient Progress: improvingOverall Patient Progress: improving    Outcome Evaluation: AOx4, Denies pain, N/V, SOB. Isolation maintained MRSA. PT/OT consult.          Problem: Adult Inpatient Plan of Care  Goal: Plan of Care Review  Description: The Plan of Care Review/Shift note should be completed every shift.  The Outcome Evaluation is a brief statement about your assessment that the patient is improving, declining, or no change.  This information will be displayed automatically on your shift  note.  6/16/2025 1029 by Frida Renner RN  Outcome: Progressing  Flowsheets (Taken 6/16/2025 1029)  Outcome Evaluation: AOx4, Denies pain, N/V, SOB. Isolation maintained MRSA. PT/OT consult.  Plan of Care Reviewed With: patient  Overall Patient Progress: improving  6/16/2025 1028 by Frida Renner RN  Outcome: Progressing  6/16/2025 1027 by Frida Renner RN  Outcome: Progressing  Flowsheets (Taken 6/16/2025  "1027)  Outcome Evaluation: AOx4, Denies pain, N/V, SOB. Isolation maintained MRSA. PT/OT consult.  Goal: Patient-Specific Goal (Individualized)  Description: You can add care plan individualizations to a care plan. Examples of Individualization might be:  \"Parent requests to be called daily at 9am for status\", \"I have a hard time hearing out of my right ear\", or \"Do not touch me to wake me up as it startles  me\".  6/16/2025 1029 by Frida Renner RN  Outcome: Progressing  6/16/2025 1028 by Frida Renner RN  Outcome: Progressing  6/16/2025 1027 by Frida Renner RN  Outcome: Progressing  Goal: Absence of Hospital-Acquired Illness or Injury  6/16/2025 1029 by Frida Renner RN  Outcome: Progressing  6/16/2025 1028 by Frida Renner RN  Outcome: Progressing  6/16/2025 1027 by Frida Renner RN  Outcome: Progressing  Intervention: Identify and Manage Fall Risk  Recent Flowsheet Documentation  Taken 6/16/2025 0831 by Frida Renner RN  Safety Promotion/Fall Prevention:   activity supervised   safety round/check completed   patient and family education   nonskid shoes/slippers when out of bed   increased rounding and observation  Intervention: Prevent Skin Injury  Recent Flowsheet Documentation  Taken 6/16/2025 0831 by Frida Renner RN  Body Position:   heels elevated   legs elevated   weight shifting  Intervention: Prevent and Manage VTE (Venous Thromboembolism) Risk  Recent Flowsheet Documentation  Taken 6/16/2025 0831 by Frida Renner RN  VTE Prevention/Management: compression stockings off  Intervention: Prevent Infection  Recent Flowsheet Documentation  Taken 6/16/2025 0831 by Frida Renner RN  Infection Prevention:   single patient room provided   rest/sleep promoted  Goal: Optimal Comfort and Wellbeing  6/16/2025 1029 by Frida Renner RN  Outcome: Progressing  6/16/2025 1028 by Frida Renner RN  Outcome: Progressing  6/16/2025 1027 by Frida Renner RN  Outcome: Progressing  Intervention: Monitor Pain and Promote Comfort  Recent Flowsheet " Documentation  Taken 6/16/2025 0831 by Frida Renner RN  Pain Management Interventions: declines  Intervention: Provide Person-Centered Care  Recent Flowsheet Documentation  Taken 6/16/2025 0831 by Frida Renner RN  Trust Relationship/Rapport: care explained  Goal: Readiness for Transition of Care  6/16/2025 1029 by Frida Renner RN  Outcome: Progressing  6/16/2025 1028 by Frida Renner RN  Outcome: Progressing  6/16/2025 1027 by Frida Renner RN  Outcome: Progressing     Problem: Delirium  Goal: Optimal Coping  6/16/2025 1029 by Frida Renner RN  Outcome: Progressing  6/16/2025 1028 by Frida Renner RN  Outcome: Progressing  6/16/2025 1027 by Frida Renner RN  Outcome: Progressing  Goal: Improved Behavioral Control  6/16/2025 1029 by Frida Renner RN  Outcome: Progressing  6/16/2025 1028 by Frida Renner RN  Outcome: Progressing  6/16/2025 1027 by Frida Renner RN  Outcome: Progressing  Intervention: Minimize Safety Risk  Recent Flowsheet Documentation  Taken 6/16/2025 0831 by Frida Renner RN  Trust Relationship/Rapport: care explained  Goal: Improved Attention and Thought Clarity  6/16/2025 1029 by Frida Renner RN  Outcome: Progressing  6/16/2025 1028 by Frida Renner RN  Outcome: Progressing  6/16/2025 1027 by Frida Renner RN  Outcome: Progressing  Goal: Improved Sleep  6/16/2025 1029 by Frida Renner RN  Outcome: Progressing  6/16/2025 1028 by Frida Renner RN  Outcome: Progressing  6/16/2025 1027 by Frida Renner RN  Outcome: Progressing     Problem: Skin Injury Risk Increased  Goal: Skin Health and Integrity  6/16/2025 1029 by Frida Renner RN  Outcome: Progressing  6/16/2025 1028 by Frida Renner RN  Outcome: Progressing  6/16/2025 1027 by Frida Renner RN  Outcome: Progressing  Intervention: Plan: Nurse Driven Intervention: Moisture Management  Recent Flowsheet Documentation  Taken 6/16/2025 0831 by Frida Renner RN  Moisture Interventions: Encourage regular toileting  Intervention: Plan: Nurse Driven Intervention: Friction and Shear  Recent  Flowsheet Documentation  Taken 6/16/2025 0831 by Frida Renner, RN  Friction/Shear Interventions: HOB 30 degrees or less  Intervention: Optimize Skin Protection  Recent Flowsheet Documentation  Taken 6/16/2025 0831 by Frida Renner, RN  Activity Management: activity adjusted per tolerance  Head of Bed (HOB) Positioning: HOB at 30-45 degrees

## 2025-06-16 NOTE — PLAN OF CARE
PRIMARY DIAGNOSIS: Weakness/Falls  OUTPATIENT/OBSERVATION GOALS TO BE MET BEFORE DISCHARGE:  ADLs back to baseline: No    Activity and level of assistance: Up with maximum assistance. Consider SW and/or PT evaluation.     Pain status: Pain free.    Return to near baseline physical activity: No     Discharge Planner Nurse   Safe discharge environment identified: No  Barriers to discharge: Yes       Entered by: Rocío Serrano RN 06/16/2025    Please review provider order for any additional goals.   Nurse to notify provider when observation goals have been met and patient is ready for discharge.

## 2025-06-16 NOTE — PLAN OF CARE
Goal Outcome Evaluation:           [facility]

## 2025-06-16 NOTE — PLAN OF CARE
PRIMARY DIAGNOSIS: multiple falls, and generalized weakness   OUTPATIENT/OBSERVATION GOALS TO BE MET BEFORE DISCHARGE:  ADLs back to baseline: No    Activity and level of assistance: Up with maximum assistance. Consider SW and/or PT evaluation.     Pain status: Pain free.    Return to near baseline physical activity: No     Discharge Planner Nurse   Safe discharge environment identified: No  Barriers to discharge: Yes         Please review provider order for any additional goals.   Nurse to notify provider when observation goals have been met and patient is ready for discharge.      Goal Outcome Evaluation:      Plan of Care Reviewed With: patient    Overall Patient Progress: no changeOverall Patient Progress: no change    Outcome Evaluation: Pt A&OX4, pt denies pain. ISO for MRSA. Pt not feeling strong enough to pivot, hover mat over to bed. Pt denies SOB, lightheaded, and dizziness. Skin Pt is on contact for MRA and had open wounds BLLE. The skin is red/ pink.Placed a chucks under legs  Pt denies any pain at sights. Rash on the R side of abdominal fold. Open to air with pillows under to help with +3 edema Bilaterally in ankles. Pt has a small scab on top back of head from fall. Pt has big bruises on Left upper arm, Left hip, and small scattered all over.   Pt states his wife normally covers with ACE Wrap. Following with SW, OT, and PT

## 2025-06-16 NOTE — CONSULTS
[facility]  WOC Nurse Inpatient Assessment     Consulted for: Bilateral legs    WOC nurse follow-up plan: weekly    Patient History (according to provider note(s):      Jamar Ivory is a 75 year old male with PMHx significant for DM type II, HTN, hypothyroidism, chronic systolic CHF, hx of CVA, BPH, depression, chronic LE ulcers, retroperitoneal lymphadenopathy, and obesity, who was admitted on 6/15/2025 with recurrent falls and generalized weakness.     Assessment:      Areas visualized during today's visit: Focused:    Wound location: Bilateral legs  Last photo: 6/16/25    Right      Left      Wound due to: Friction and Venous Ulcer  Wound history/plan of care: Patient reports the wounds started about 2 years ago when he scratched them because they itched badly.  Patient reports his wife puts gauze dressings on wounds and compression wraps.    Wound base: One area of red moist tissue on right lateral leg, rest are all dry drainage.      Palpation of the wound bed: normal      Drainage: none     Description of drainage: none     Measurements (length x width x depth, in cm): 4 areas on right and 2 on left ranging from 1x1cm to 6x2.5cm       Tunneling: N/A     Undermining: N/A  Periwound skin: dry/flaking      Color: pink      Temperature: normal   Odor: none  Pain: denies   Pain interventions prior to dressing change: N/A  Treatment goal: Heal   STATUS: initial assessment  Supplies ordered: ordered Vashe       Treatment Plan:     Bilateral leg wound(s): Daily   Cleanse wounds and surrounding skin with Vashe and dry  Apply Sween cream to wounds, legs and feet (not between toes)   Apply adaptic to wounds  Cover with ABDs and wrap with kerlix    Orders: Written    RECOMMEND PRIMARY TEAM ORDER: None, at this time  Education provided: plan of care  Discussed plan of care with: Patient and Nurse  Notify [facility] if wound(s) deteriorate.  Nursing to notify the Provider(s) and re-consult the WOC Nurse  if new skin concern.    DATA:     Current support surface: Standard  Standard gel mattress (Isoflex)  Containment of urine/stool: Incontinence Protocol  BMI: Body mass index is 45.22 kg/m .   Active diet order: Orders Placed This Encounter      Regular Diet Adult     Output: I/O last 3 completed shifts:  In: -   Out: 1600 [Urine:1600]     Labs:   Recent Labs   Lab 06/16/25  0547 06/15/25  0904   ALBUMIN  --  3.7   HGB 10.0* 11.2*   WBC 4.4 4.4     Pressure injury risk assessment:   Sensory Perception: 3-->slightly limited  Moisture: 2-->very moist  Activity: 2-->chairfast  Mobility: 2-->very limited  Nutrition: 4-->excellent  Friction and Shear: 2-->potential problem  Pritesh Score: 15    Tono Benito RN CWOCN  Contact Via [facility] Nurse (Giovanni)  Dept. Office Number: [phone]

## 2025-06-16 NOTE — PLAN OF CARE
Vitals are Temp: 98.2  F (36.8  C) Temp src: Oral BP: 108/53 Pulse: 64   Resp: 16 SpO2: 93 %.    Aox4, Denies pain. PIV SL. Contact precautions for MRSA maintained. Regular diet. Assist x2 urinal at bed side. Consult PT/OT and SW. WOC referral for BLE ulcers.     PRIMARY DIAGNOSIS: GENERALIZED WEAKNESS, Falls    OUTPATIENT/OBSERVATION GOALS TO BE MET BEFORE DISCHARGE  1. Orthostatic performed: Yes:          Lying Orthostatic BP: 101/52         Sitting Orthostatic BP: 101/46         Standing Orthostatic BP: 97/64     2. Tolerating PO medications: Yes    3. Return to near baseline physical activity: No    4. Cleared for discharge by consultants (if involved): No    Discharge Planner Nurse   Safe discharge environment identified: Yes  Barriers to discharge: Yes       Entered by: Frida Renner RN 06/16/2025      Please review provider order for any additional goals.   Nurse to notify provider when observation goals have been met and patient is ready for discharge.      Goal Outcome Evaluation:      Plan of Care Reviewed With: patient    Overall Patient Progress: improvingOverall Patient Progress: improving    Outcome Evaluation: AOx4, Denies pain, N/V, SOB. Isolation maintained MRSA. PT/OT consult.          Problem: Adult Inpatient Plan of Care  Goal: Plan of Care Review  Description: The Plan of Care Review/Shift note should be completed every shift.  The Outcome Evaluation is a brief statement about your assessment that the patient is improving, declining, or no change.  This information will be displayed automatically on your shift  note.  6/16/2025 1029 by Frida Renner RN  Outcome: Progressing  Flowsheets (Taken 6/16/2025 1029)  Outcome Evaluation: AOx4, Denies pain, N/V, SOB. Isolation maintained MRSA. PT/OT consult.  Plan of Care Reviewed With: patient  Overall Patient Progress: improving  6/16/2025 1028 by Frida Renner RN  Outcome: Progressing  6/16/2025 1027 by Frida Renner RN  Outcome: Progressing  Flowsheets (Taken  "6/16/2025 1027)  Outcome Evaluation: AOx4, Denies pain, N/V, SOB. Isolation maintained MRSA. PT/OT consult.  Goal: Patient-Specific Goal (Individualized)  Description: You can add care plan individualizations to a care plan. Examples of Individualization might be:  \"Parent requests to be called daily at 9am for status\", \"I have a hard time hearing out of my right ear\", or \"Do not touch me to wake me up as it startles  me\".  6/16/2025 1029 by Frida Renner RN  Outcome: Progressing  6/16/2025 1028 by Frida Renner RN  Outcome: Progressing  6/16/2025 1027 by Frida Renner RN  Outcome: Progressing  Goal: Absence of Hospital-Acquired Illness or Injury  6/16/2025 1029 by Frida Renner RN  Outcome: Progressing  6/16/2025 1028 by Frida Renner RN  Outcome: Progressing  6/16/2025 1027 by Frida Renner RN  Outcome: Progressing  Intervention: Identify and Manage Fall Risk  Recent Flowsheet Documentation  Taken 6/16/2025 0831 by Frida Renner RN  Safety Promotion/Fall Prevention:   activity supervised   safety round/check completed   patient and family education   nonskid shoes/slippers when out of bed   increased rounding and observation  Intervention: Prevent Skin Injury  Recent Flowsheet Documentation  Taken 6/16/2025 0831 by Frida Renner RN  Body Position:   heels elevated   legs elevated   weight shifting  Intervention: Prevent and Manage VTE (Venous Thromboembolism) Risk  Recent Flowsheet Documentation  Taken 6/16/2025 0831 by Frida Renner RN  VTE Prevention/Management: compression stockings off  Intervention: Prevent Infection  Recent Flowsheet Documentation  Taken 6/16/2025 0831 by Frida Renner RN  Infection Prevention:   single patient room provided   rest/sleep promoted  Goal: Optimal Comfort and Wellbeing  6/16/2025 1029 by Frida Renner RN  Outcome: Progressing  6/16/2025 1028 by Frida Renner RN  Outcome: Progressing  6/16/2025 1027 by Frida Renner RN  Outcome: Progressing  Intervention: Monitor Pain and Promote Comfort  Recent " Flowsheet Documentation  Taken 6/16/2025 0831 by Frida Renner RN  Pain Management Interventions: declines  Intervention: Provide Person-Centered Care  Recent Flowsheet Documentation  Taken 6/16/2025 0831 by Frida Renner RN  Trust Relationship/Rapport: care explained  Goal: Readiness for Transition of Care  6/16/2025 1029 by Frida Renner RN  Outcome: Progressing  6/16/2025 1028 by Frida Renner RN  Outcome: Progressing  6/16/2025 1027 by Frida Renner RN  Outcome: Progressing     Problem: Delirium  Goal: Optimal Coping  6/16/2025 1029 by Frida Renner RN  Outcome: Progressing  6/16/2025 1028 by Frida Renner RN  Outcome: Progressing  6/16/2025 1027 by Frida Renner RN  Outcome: Progressing  Goal: Improved Behavioral Control  6/16/2025 1029 by Frida Renner RN  Outcome: Progressing  6/16/2025 1028 by Frida Renner RN  Outcome: Progressing  6/16/2025 1027 by Frida Renner RN  Outcome: Progressing  Intervention: Minimize Safety Risk  Recent Flowsheet Documentation  Taken 6/16/2025 0831 by Frida Renner RN  Trust Relationship/Rapport: care explained  Goal: Improved Attention and Thought Clarity  6/16/2025 1029 by Frida Renner RN  Outcome: Progressing  6/16/2025 1028 by Frida Renner RN  Outcome: Progressing  6/16/2025 1027 by Frida Renner RN  Outcome: Progressing  Goal: Improved Sleep  6/16/2025 1029 by Frida Renner RN  Outcome: Progressing  6/16/2025 1028 by Frida Renner RN  Outcome: Progressing  6/16/2025 1027 by Frida Renner RN  Outcome: Progressing     Problem: Skin Injury Risk Increased  Goal: Skin Health and Integrity  6/16/2025 1029 by Frida Renner RN  Outcome: Progressing  6/16/2025 1028 by Frida Renner RN  Outcome: Progressing  6/16/2025 1027 by Frida Renner, RN  Outcome: Progressing  Intervention: Plan: Nurse Driven Intervention: Moisture Management  Recent Flowsheet Documentation  Taken 6/16/2025 0831 by Jud, Frida, RN  Moisture Interventions: Encourage regular toileting  Intervention: Plan: Nurse Driven Intervention: Friction and  Shear  Recent Flowsheet Documentation  Taken 6/16/2025 0831 by Frida Renner, RN  Friction/Shear Interventions: HOB 30 degrees or less  Intervention: Optimize Skin Protection  Recent Flowsheet Documentation  Taken 6/16/2025 0831 by Frida Renner, RN  Activity Management: activity adjusted per tolerance  Head of Bed (HOB) Positioning: HOB at 30-45 degrees

## 2025-06-16 NOTE — PROGRESS NOTES
[facility]    Medicine Progress Note - Hospitalist Service    Date of Admission:  6/15/2025    Provider attestation: I agree with the information in Jan Olmos's PA student progress note.  Patient presented with multiple falls and weakness that is likely multifactorial related to generalized deconditioning, lymphedema of the lower extremities and possibly related to hypovolemia/hypotension?  Will order orthostatics and have wound care look at his lower extremities.  Plan for lymphedema wraps and OT/PT assessments.    Lin SALAZAR    Assessment & Plan     Jamar Ivory is a 75 year old male with PMHx significant for DM type II, HTN, hypothyroidism, chronic systolic CHF, hx of CVA, BPH, depression, chronic LE ulcers, retroperitoneal lymphadenopathy, and obesity who was admitted on 6/15/2025 with recurrent falls and generalized weakness.     In the ED he was afebrile with blood pressure 145/82, heart rate of 90 and breathing comfortably on room air without hypoxia. Lab work showed normal BMP with the exception of sodium 134.  WBC normal at 4.4, hemoglobin mildly low at 11.2.  Urinalysis did not appear infected. He was given 1 litre of IV fluids with observation admission requested.    Concerns that patient is having numerous falls, which he presents as a mechanical fall and denies any cardiac or neurological symptoms. His appearance is concerning that he is unable to attend to his ADL's.  He also has not followed up with core clinic or vascular clinic regarding his wounds.  The open wounds on his legs have enlarged significantly in size and number since his last wound care appointment.  PT assessed and is recommending TCU.    # Recurrent falls  # Generalized weakness  -Lost his balance and fell backwards, hit his head. Notes mild headache but denied LOC. Pt reports 4 falls at home since 6/13. That's an increase from baseline. He states he loses his balance and then falls. Falling when  he's not using his walker, in areas not walker accessible.   - Possible orthostasis?  On Losartan 25 mg qd, torsemide 20 mg BID, flomax 0.4 mg qd, metoprolol 25 mg qd, spironolactone 25 mg qd; may need down titration of meds  -Obtain orthostatics  - Suspect falls are due to not using walker and pt should likely use walker at all times  - Discussed with patient that he and his wife need more help and maybe relocate to an assisted living facility. He noted that he and his wife had discussed this last week.   - Awaiting PT/OT consults-nursing staff noted using lift assistance device to stand from bed and needed maximum assistance to ambulate to bathroom.     # Lymphedema  # Chronic LE ulcers   -Continues to do wound cares at home. He notes that his wife treats his legs. Legs are red, pt states this is baseline. Appears treatment has been difficult to do for his wife. He stopped scheduled wound cares a few moths ago. From pictures in chart, wounds appear larger.  -WOC Consult ordered 6/16    # HTN  -Managed with Losartan, torsemide, metoprolol and spironolactone  - Resume PTA meds with parameters. Per above, may need some down titration on his regimen    # Hypothyroidism  -Check TSH  - Resume PTA levothyroxine     # Chronic combined systolic and diastolic CHF  -Last echo done 10/2024, showed EF 25-30%, down from 45-50% in 2021. Follow up [facility] showed transmural infarction to entire inferior segment of L ventricle, EF 24%. Followed up with Cardiology, discussed further work up, pt opted for medical management. Pt was not compliant on regimen so losartan, metoprolol and statin resumed at the beginning of the year, spironolactone added back about a month later.  -Pt continues to note occasional chest pains that he will take nitroglycerin for but its been awhile. Notes exertional SOB so he is fairly sedentary.   - Resume PTA BB, ARB, spironolactone, statin and diuretic with parameters  -Has not been to core clinic in  "multiple months     #Hx of CVA  - resume ASA and statin    #BPH  - resume PTA Flomax    #Depression  - resume PTA Zoloft    #Obesity  #DM type II  -Diet controlled  -All BG levels in 88-96 range during admission as of 6/16  -Continue to monitor    #Retroperitoneal lymphadenopathy  -PET scan ordered by PCP     Observation Goals: -diagnostic tests and consults completed and resulted, -vital signs normal or at patient baseline, -safe disposition plan has been identified, Nurse to notify provider when observation goals have been met and patient is ready for discharge.  Diet: Regular Diet Adult    DVT Prophylaxis: Pneumatic Compression Devices  Leon Catheter: Not present  Lines: None     Cardiac Monitoring: None  Code Status: No CPR- Do NOT Intubate      Clinically Significant Risk Factors Present on Admission         # Hyponatremia: Lowest Na = 134 mmol/L in last 2 days, will monitor as appropriate   # Hypocalcemia: Lowest Ca = 8.5 mg/dL in last 2 days, will monitor and replace as appropriate       # Drug Induced Platelet Defect: home medication list includes an antiplatelet medication   # Hypertension: Noted on problem list  # Chronic heart failure with reduced ejection fraction: last echo with EF <40%     # Anemia: based on hgb <11       # Morbid Obesity: Estimated body mass index is 45.22 kg/m  as calculated from the following:    Height as of this encounter: 1.727 m (5' 8\").    Weight as of this encounter: 134.9 kg (297 lb 6.4 oz).       # Financial/Environmental Concerns:           Social Drivers of Health    Depression: Not at risk (5/1/2025)    PHQ-2     PHQ-2 Score: 2   Recent Concern: Depression - At risk (2/25/2025)    PHQ-2     PHQ-2 Score: 6   Transportation Needs: High Risk (6/15/2025)    Transportation Needs     Within the past 12 months, has lack of transportation kept you from medical appointments, getting your medicines, non-medical meetings or appointments, work, or from getting things that you need?: " Yes          Disposition Plan     Medically Ready for Discharge: Anticipated Tomorrow        Jan Olmos PA-Student  Hospitalist Service  [facility]  Securely message with [employer] (more info)  Text page via bright box Paging/Directory   ______________________________________________________________________    Interval History   Denies any acute events overnight. Denies any fevers, chills, night sweats, nausea, vomiting, diarrhea, CP or SOB. Endorses a slight headache. Also he notes pain with external catheter. Remove with assistance to bathroom.         Physical Exam   Vital Signs: Temp: 98.2  F (36.8  C) Temp src: Oral BP: 108/53 Pulse: 64   Resp: 16 SpO2: 93 % O2 Device: None (Room air)    Weight: 297 lbs 6.41 oz    Constitutional: awake, alert, cooperative, no apparent distress.  Eyes: Lids and lashes normal, pupils equal, round and reactive to light, sclera clear, conjunctiva normal  Respiratory: No increased work of breathing, good air exchange, clear to auscultation bilaterally, no crackles or wheezing  Cardiovascular: Normal apical impulse, regular rate and rhythm, normal S1 and S2, no S3 or S4, and no murmur noted  Skin: Bilateral lower extremities with extreme erythema, several open wounds of various sizes, warm to touch and scaly tight skin. Decreased sensation.   Neurologic: Awake, alert, oriented to name, place and time.  Cranial nerves II-XII are grossly intact.  Motor is 5 out of 5 bilaterally.  Cerebellar finger to nose, heel to shin intact.  Sensory is intact.  Babinski down going, Romberg negative, and gait is normal.    Medical Decision Making       55 MINUTES SPENT BY ME on the date of service doing chart review, history, exam, documentation & further activities per the note.      Data     I have personally reviewed the following data over the past 24 hrs:    4.4  \   10.0 (L)   / 164     139 103 14.6 /  89   4.0 25 0.84 \       Imaging results reviewed over the past 24 hrs:    No results found for this or any previous visit (from the past 24 hours).

## 2025-06-17 ENCOUNTER — APPOINTMENT (OUTPATIENT)
Dept: PHYSICAL THERAPY | Facility: CLINIC | Age: 75
End: 2025-06-17
Attending: PHYSICIAN ASSISTANT
Payer: COMMERCIAL

## 2025-06-17 PROCEDURE — 99233 SBSQ HOSP IP/OBS HIGH 50: CPT | Performed by: PHYSICIAN ASSISTANT

## 2025-06-17 PROCEDURE — 97140 MANUAL THERAPY 1/> REGIONS: CPT | Mod: GP | Performed by: PHYSICAL THERAPIST

## 2025-06-17 PROCEDURE — 250N000013 HC RX MED GY IP 250 OP 250 PS 637: Performed by: PHYSICIAN ASSISTANT

## 2025-06-17 PROCEDURE — G0378 HOSPITAL OBSERVATION PER HR: HCPCS

## 2025-06-17 RX ADMIN — ACETAMINOPHEN 650 MG: 325 TABLET ORAL at 20:21

## 2025-06-17 RX ADMIN — LEVOTHYROXINE SODIUM 224 MCG: 0.11 TABLET ORAL at 07:39

## 2025-06-17 RX ADMIN — ROSUVASTATIN CALCIUM 20 MG: 20 TABLET, FILM COATED ORAL at 07:39

## 2025-06-17 RX ADMIN — METOPROLOL SUCCINATE 25 MG: 25 TABLET, EXTENDED RELEASE ORAL at 07:39

## 2025-06-17 RX ADMIN — ACETAMINOPHEN 650 MG: 325 TABLET ORAL at 09:44

## 2025-06-17 RX ADMIN — TAMSULOSIN HYDROCHLORIDE 0.4 MG: 0.4 CAPSULE ORAL at 07:39

## 2025-06-17 RX ADMIN — SERTRALINE HYDROCHLORIDE 100 MG: 100 TABLET ORAL at 07:39

## 2025-06-17 RX ADMIN — ASPIRIN 81 MG CHEWABLE TABLET 81 MG: 81 TABLET CHEWABLE at 07:39

## 2025-06-17 RX ADMIN — SPIRONOLACTONE 25 MG: 25 TABLET ORAL at 07:39

## 2025-06-17 RX ADMIN — TORSEMIDE 20 MG: 20 TABLET ORAL at 07:39

## 2025-06-17 RX ADMIN — ACETAMINOPHEN 650 MG: 325 TABLET ORAL at 03:38

## 2025-06-17 ASSESSMENT — ACTIVITIES OF DAILY LIVING (ADL)
ADLS_ACUITY_SCORE: 62
ADLS_ACUITY_SCORE: 67
ADLS_ACUITY_SCORE: 62
ADLS_ACUITY_SCORE: 67
ADLS_ACUITY_SCORE: 62

## 2025-06-17 NOTE — PROGRESS NOTES
06/17/25 1421   Appointment Info   Signing Clinician's Name / Credentials (PT) Misty Palmer PT   Quick Adds   Quick Adds Edema Eval   Living Environment   People in Home spouse;friend(s)   Current Living Arrangements house   Home Accessibility stairs to enter home   Number of Stairs, Main Entrance 10   Stair Railings, Main Entrance railings on both sides of stairs   Transportation Anticipated agency;family or friend will provide   Living Environment Comments Pt reports he has a narrow hallway to bathroom where he is unable to use walker leading to falls in the past. Pt has standard height toilets with countertop next to toilet. Pt has a tub shower.   Self-Care   Usual Activity Tolerance moderate   Current Activity Tolerance poor   Regular Exercise No   Equipment Currently Used at Home walker, rolling   Fall history within last six months yes   Number of times patient has fallen within last six months 4   Activity/Exercise/Self-Care Comment Pt endorses 4 falls in past 2 weeks   General Information   Onset of Illness/Injury or Date of Surgery 06/15/25   Referring Physician Rika Castrejon, PAJessicaC   Patient/Family Therapy Goals Statement (PT) return home with assist of spouse   Pertinent History of Current Problem (include personal factors and/or comorbidities that impact the POC) Jamar Ivory is a 75 year old male with PMHx significant for DM type II, HTN, hypothyroidism, chronic systolic CHF, hx of CVA, BPH, depression, chronic LE ulcers, retroperitoneal lymphadenopathy, and obesity, who was admitted on 6/15/2025 with recurrent falls and generalized weakness.; undergoing wound cares for LE's; Lymph consulted   Existing Precautions/Restrictions fall   Weight-Bearing Status - LLE full weight-bearing   Weight-Bearing Status - RLE full weight-bearing   General Observations Patient up in recliner chair; agreeable to Lymph/PT session   Cognition   Affect/Mental Status (Cognition) WFL   Orientation Status  (Cognition) oriented x 3   Follows Commands (Cognition) WFL   Pain Assessment   Patient Currently in Pain Yes, see Vital Sign flowsheet  (with pushing on LE's to check for edema; sensitive to touch)   Integumentary/Edema   Integumentary/Edema Comments chronic wounds and B LE present  (see wound care notes)   Onset of Edema   (unknown)   Affected Body Part(s) Left LE;Right LE   Edema Etiology Unknown   Etiology Comments patient not sure when it started; hasn't ever had OP lymph treatment; has had home treatment   General Comments/Previous Edema Treatment/Edema Equipment spouse has wrapped LE's with acewrap; no OP lymph sessions or compression sock use at baseline   Edema Examination/Assessment   Skin Condition Other (see comments)  (wounds; currently covered; see wound care notes)   Skin Condition Comments see wound care notes   Ulcerations Yes  (see wound care pictures and notes)   Edema Assessment Comments longstanding edema and wounds in LE's; wife does cares and ace wraps   Range of Motion (ROM)   Range of Motion ROM is WFL   Strength (Manual Muscle Testing)   Strength Comments see PT note; patient not out of chair during edema eval   Bed Mobility   Comment, (Bed Mobility) NT; up in chair   Transfers   Comment, (Transfers) mod A x 2 during PT session on 6/16   Gait/Stairs (Locomotion)   Comment, (Gait/Stairs) Min A x 2 with walker during session on 6/16   Clinical Impression   Criteria for Skilled Therapeutic Intervention Yes, treatment indicated   PT Diagnosis (PT) LE edema   Edema: Patient Presentation Wounds/Ulcers   Influenced by the following impairments Weakness, deconditioning, impaired balance, pain; wounds on LE's   Functional limitations due to impairments Difficulty with bed mobility, transfers, ambulation, stairs; caregiver burnout noted in chart for attending to patient's LE cares   Clinical Presentation (PT Evaluation Complexity) stable   Clinical Presentation Rationale clinical judgement   Clinical  "Decision Making (Complexity) low complexity   Planned Therapy Interventions (PT) balance training;bed mobility training;gait training;home exercise program;patient/family education;stair training;strengthening;stretching;transfer training;progressive activity/exercise;home program guidelines;other (see comments);manual therapy techniques  (edema management)   Edema: Planned Interventions Gradient compression bandaging;Edema exercises;Education;Manual therapy   Risk & Benefits of therapy have been explained evaluation/treatment results reviewed;care plan/treatment goals reviewed;risks/benefits reviewed;current/potential barriers reviewed;participants voiced agreement with care plan;participants included;patient   Clinical Impression Comments unsure if patient's LE's are different than his baseline; patient unsure   PT Total Evaluation Time   PT Eval, Low Complexity Minutes (37112) 10   Physical Therapy Goals   PT Frequency Daily  (for lymph)   PT Predicted Duration/Target Date for Goal Attainment 06/21/25   PT Goals Edema   PT: Edema education to increase ability to manage edema after discharge from the hospital Patient;Caregiver   PT: Management of edema bandages Caregiver;Demonstrate   PT: Functional edema exercise program to reduce limb volume, increase activity tolerance and improve independence with ADL Patient;Demonstrates   Manual Therapy   Manual Therapy Minutes (68073) 25   Symptoms Noted During/After Treatment Pain increased  (with pitting edema checks; sensitive to touch)   Treatment Detail/Skilled Intervention Lymph: Order received; evaluation completed;  patient reports normally wife completes \"ace bandaging\" to bilateral LE's; reports never having OP PT treatment, but was seen by Home Lymph person; wife has been acewrapping and providing wound cares per patient; unable to visualize wounds secondary to dressing changes being completed prior to therapist arrival; no significant pitting noted at dorsum of " foot or above/below Kerlix; patient sensitive to touch when checking for pitting edema; if present, it's mild currently; use of ABD padding over dorsum of foot to match up with kerlix from wound care; Gradient compression bandages applied over Kerlix; underlayer not use secondary to difficulty of getting over kerlix without disturbing wound care wrapping; Pt is appropriate for LE quick wrap, using short stretch (SS) bandages, not ACE wraps. Pt educated in rationale for compression with wound healing and importance of using SS bandages which have low resting pressure and high working pressure, vs ACE wraps which have high resting pressure and low working pressure. LEs washed, lotion applied, and quick wraps completed to B LE with 8 cm short stretch bandage toes to ankle and 10 cm short stretch bandage ankle to knee with appropriate stretch and spacing to allow gradient compression.  Wraps applied bilaterally; L LE with more edema than R (this is baseline per patien report); if wraps become loose or are otherwise not tolerated. If pt does not tolerate wraps well, please remove wraps but keep LEs elevated.  Patient verbalized understanding   PT Discharge Planning   PT Plan progress indep with transfers, gait as able with w/c follow; assess ongoing need for edema management   PT Discharge Recommendation (DC Rec) Transitional Care Facility   PT Rationale for DC Rec at this time the patient is significantly below baseline, requiring Ax2 for basic transfer to the bedside chair. Rec continued PT in the TCU setting to promote improved strength, conditioning, balance required for functional mobility and for edema management   PT Brief overview of current status able to tolerate gradient compression bandaging of LE's   Physical Therapy Time and Intention   Timed Code Treatment Minutes 25   Total Session Time (sum of timed and untimed services) 35

## 2025-06-17 NOTE — UTILIZATION REVIEW
Concurrent stay review; Secondary Review Determination - [facility]        Under the authority of the Utilization Management Committee, the utilization review process indicated a secondary review on the above patient.  The review outcome is based on review of the medical records, discussions with staff, and applying clinical experience noted on the date of the review.        (x) Outpatient residential status is appropriate       RATIONALE FOR DETERMINATION:     Patient delayed discharge is related to disposition, there is no medical necessity for inpatient admission at the time of this review. If there is a change in patient status, please resend for review.    The information on this document is developed by the utilization review team in order for the business office to ensure compliance.  This only denotes the appropriateness of proper admission status and does not reflect the quality of care rendered.       The definitions of Inpatient Status and Observation Status used in making the determination above are those provided in the CMS Coverage Manual, Chapter 1 and Chapter 6, section 70.4.       Sincerely,    José Bean MD

## 2025-06-17 NOTE — PLAN OF CARE
Vitals are Temp: 97.8  F (36.6  C) Temp src: Oral BP: 123/62 Pulse: 60   Resp: 20 SpO2: 94 %.      Aox4. CO lower back and knee pain. PIV SL. Contact precaution MRSA maintained. Regular diet. Assist x2 pivot/ hiral steady. Urinal at bedside with urgency. WO wound care daily BLE ulcers. PT/OT recommend TCU. Lymphedema evaluation today.    PRIMARY DIAGNOSIS: GENERALIZED WEAKNESS, FALLS    OUTPATIENT/OBSERVATION GOALS TO BE MET BEFORE DISCHARGE  1. Orthostatic performed: Yes:          Lying Orthostatic BP: 116/67         Sitting Orthostatic BP: 115/55         Standing Orthostatic BP: 115/59     2. Tolerating PO medications: Yes    3. Return to near baseline physical activity: No    4. Cleared for discharge by consultants (if involved): No    Discharge Planner Nurse   Safe discharge environment identified: Yes  Barriers to discharge: Yes       Entered by: Frida Renner RN 06/17/2025      Please review provider order for any additional goals.   Nurse to notify provider when observation goals have been met and patient is ready for discharge.      Goal Outcome Evaluation:      Plan of Care Reviewed With: patient    Overall Patient Progress: improvingOverall Patient Progress: improving    Outcome Evaluation: AOx4, denies N/V, co pain back and knees prn tylenol, assist x2 with hiral steady, contact maintained MRSA, PT/OT recommend TCU. WOC-wound care BLE, Lymphedema evaluation today.

## 2025-06-17 NOTE — PLAN OF CARE
Vitals are Temp: 98  F (36.7  C) Temp src: Oral BP: 119/52 Pulse: 58   Resp: 18 SpO2: 95 %.      Aox4. CO lower back and knee pain. PIV SL. Contact precaution MRSA maintained. Regular diet. Assist x2 pivot/ hiral steady. Urinal at bedside with urgency. WOC wound care daily BLE ulcers done. PT/OT recommend TCU. Lymphedema evaluation today. Discharge [facility] for tomorrow, 6/18.  [facility] for 6/18, 9621-1258.     PRIMARY DIAGNOSIS: GENERALIZED WEAKNESS, FALLS    OUTPATIENT/OBSERVATION GOALS TO BE MET BEFORE DISCHARGE  1. Orthostatic performed: Yes:          Lying Orthostatic BP: 116/67         Sitting Orthostatic BP: 115/55         Standing Orthostatic BP: 115/59     2. Tolerating PO medications: Yes    3. Return to near baseline physical activity: No    4. Cleared for discharge by consultants (if involved): No    Discharge Planner Nurse   Safe discharge environment identified: Yes  Barriers to discharge: Yes       Entered by: Frida Renner RN 06/17/2025      Please review provider order for any additional goals.   Nurse to notify provider when observation goals have been met and patient is ready for discharge.      Goal Outcome Evaluation:      Plan of Care Reviewed With: patient    Overall Patient Progress: improvingOverall Patient Progress: improving    Outcome Evaluation: AOx4, denies N/V, co pain back and knees prn tylenol, assist x2 with hiral steady, contact maintained MRSA, PT/OT recommend TCU. WOC-wound care BLE, Lymphedema evaluation today.

## 2025-06-17 NOTE — PROGRESS NOTES
Care Management Follow Up    Length of Stay (days): 0    Expected Discharge Date: 06/18/2025     Concerns to be Addressed: discharge planning     Patient plan of care discussed at interdisciplinary rounds: Yes    Anticipated Discharge Disposition: TCU; [facility]    Anticipated Discharge Services:  none  Anticipated Discharge DME: none    Patient/family educated on Medicare website which has current facility and service quality ratings:  yes  Education Provided on the Discharge Plan: Yes  Patient/Family in Agreement with the Plan: yes    Referrals Placed by CM/SW:  post acute facilities  Private pay costs discussed: transportation costs    Discussed  Partnership in Safe Discharge Planning  document with patient/family: No     Handoff Completed: No, handoff not indicated or clinically appropriate    Additional Information:  3:23 PM   Patient accepted at [facility] for tomorrow, 6/18. SW discussed transportation options and costs with patient; all parties agreeable. SW arranged WC for 6/18, 5929-2290.     Patient can discharge within 24 hours. Outpatient snf status not appropriate.     Next Steps: CHANDNI following for discharge coordination.     DAVID Patel   Social Work Care Manager   [facility]   [phone]

## 2025-06-17 NOTE — PLAN OF CARE
"PRIMARY DIAGNOSIS: Fall/ Chronic wounds  OUTPATIENT/OBSERVATION GOALS TO BE MET BEFORE DISCHARGE:  1. Pain Status: Pain free.     2. Return to near baseline physical activity: No     3. Cleared for discharge by consultants (if involved): No     Discharge Planner Nurse  Safe discharge environment identified: Yes  Barriers to discharge: Yes        /63 (BP Location: Right arm)   Pulse 57   Temp 98.3  F (36.8  C) (Oral)   Resp 18   Ht 1.727 m (5' 8\")   Wt 134.9 kg (297 lb 6.4 oz)   SpO2 92%   BMI 45.22 kg/m       Patient is Alert and Oriented x4. They are 2 assist with Hiral Venus.  Patient is on Contact precuations for MRSA.  Pt is a Regular diet. Pt complained of headache lastnight, PRN tylenol given.  Patient is Saline locked. PT/OT- TCU- SW to follow. WOC following for BLE chronic ulcers- Wound cares daily. Leg wound cares done changed overnight due to drainage. Pt states he has numbness in BLE legs which is baseline. Bruising on R elbow and leg. BLE has redness/edema. Comes from home w spouse.     Please review provider order for any additional goals.   Nurse to notify provider when observation goals have been met and patient is ready for discharge.  Goal Outcome Evaluation:       Plan of Care Reviewed With: patient     Overall Patient Progress: improvingOverall Patient Progress: improving     Outcome Evaluation: A7Ox4, Ax2 hiral steady. Denies pain/N/V. Contact bfor MRSA. PT/OT- TCU. WOC following for Chronic Ulcers- Wound cares per note/orders- changed on nights        "

## 2025-06-17 NOTE — PLAN OF CARE
"PRIMARY DIAGNOSIS: Fall/ Chronic wounds  OUTPATIENT/OBSERVATION GOALS TO BE MET BEFORE DISCHARGE:  1. Pain Status: Pain free.     2. Return to near baseline physical activity: No     3. Cleared for discharge by consultants (if involved): No     Discharge Planner Nurse  Safe discharge environment identified: Yes  Barriers to discharge: Yes        /57 (BP Location: Right arm)   Pulse 62   Temp 98.3  F (36.8  C) (Oral)   Resp 18   Ht 1.727 m (5' 8\")   Wt 134.9 kg (297 lb 6.4 oz)   SpO2 93%   BMI 45.22 kg/m     Patient is Alert and Oriented x4. They are 2 assist with Hiral Steady.  Patient is on Contact precuations for MRSA.  Pt is a Regular diet. They are denying pain, PRN tylenol .  Patient is Saline locked. PT/OT- TCU- SW to follow. WOC following for BLE chronic ulcers- Wound cares daily. Leg wound cares done changed overnight due to drainage. Pt states he has numbness in BLE legs which is baseline. Bruising on R elbow and leg. BLE has redness/edema. Comes from home w spouse.     Please review provider order for any additional goals.   Nurse to notify provider when observation goals have been met and patient is ready for discharge.Goal Outcome Evaluation:       Plan of Care Reviewed With: patient     Overall Patient Progress: improvingOverall Patient Progress: improving     Outcome Evaluation: A7Ox4, Ax2 hiral steady. Denies pain/N/V. Contact bfor MRSA. PT/OT- TCU. WOC following for Chronic Ulcers- Wound cares per note/orders- changed on nights          "

## 2025-06-17 NOTE — PLAN OF CARE
PRIMARY DIAGNOSIS: Fall/ Chronic wounds  OUTPATIENT/OBSERVATION GOALS TO BE MET BEFORE DISCHARGE:  1. Pain Status: Pain free.    2. Return to near baseline physical activity: No    3. Cleared for discharge by consultants (if involved): No    Discharge Planner Nurse   Safe discharge environment identified: Yes  Barriers to discharge: Yes         Vitals are Temp: 98.9  F (37.2  C) Temp src: Oral BP: 109/59 Pulse: 64   Resp: 16 SpO2: 93 %.  Patient is Alert and Oriented x4. They are 2 assist with Hiral Venus.  Patient is on Contact precuations for MRSA.  Pt is a Regular diet. They are denying pain, PRN tylenol .  Patient is Saline locked. PT/OT- TCU- SW to follow. WOC following for BLE chronic ulcers- Wound cares daily. Leg wound cares done changed overnight due to drainage. Pt states he has numbness in BLE legs which is baseline. Bruising on R elbow and leg. BLE has redness/edema. Comes from home w spouse.     Please review provider order for any additional goals.   Nurse to notify provider when observation goals have been met and patient is ready for discharge.Goal Outcome Evaluation:      Plan of Care Reviewed With: patient    Overall Patient Progress: improvingOverall Patient Progress: improving    Outcome Evaluation: A7Ox4, Ax2 hiral steady. Denies pain/N/V. Contact bfor MRSA. PT/OT- TCU. WOC following for Chronic Ulcers- Wound cares per note/orders- changed on nights

## 2025-06-17 NOTE — PROGRESS NOTES
[facility]    Medicine Progress Note - Hospitalist Service    Date of Admission:  6/15/2025    Provider Attestation: I agree with the below progress note and physical exam written by Jan Olmos's PA student. Patient is doing well today. Wound care assessed with daily dressing changes. Noted to be hypothyroid but is not taking medication correctly. Education given. Holding Losartan due to softer blood pressures. PT assessed and recommending TCU. Patient will need better follow up with Cardiology and wound care as outpatient    Lin SALAZAR    Assessment & Plan   Jamar Ivory is a 75 year old male with PMHx significant for DM type II, HTN, hypothyroidism, chronic systolic CHF, hx of CVA, BPH, depression, chronic LE ulcers, retroperitoneal lymphadenopathy, and obesity who was admitted on 6/15/2025 with recurrent falls and generalized weakness.      In the ED he was afebrile with blood pressure 145/82, heart rate of 90 and breathing comfortably on room air without hypoxia. Lab work showed normal BMP with the exception of sodium 134.  WBC normal at 4.4, hemoglobin mildly low at 11.2.  Urinalysis did not appear infected. He was given 1 litre of IV fluids with observation admission requested.     Concerns that patient is having numerous falls, which he presents as a mechanical fall and denies any cardiac or neurological symptoms. His appearance is concerning that he is unable to attend to his ADL's.  He also has not followed up with core clinic or vascular clinic regarding his wounds.  The open wounds on his legs have enlarged significantly in size and number since his last wound care appointment. Wound care evaluated on 6/16 with daily dressing changed recommended. PT assessed and is recommending TCU    Patient is medically ready to discharge.     # Recurrent falls  # Generalized weakness  # Failure to thrive  -Lost his balance and fell backwards, hit his head. Notes mild headache but  denied LOC. Pt reports 4 falls at home since 6/13. That's an increase from baseline. He states he loses his balance and then falls. Falling when he's not using his walker, in areas not walker accessible.   - 6/16 Obtained orthostatics-passed   - PTA Losartan 25 mg qd, torsemide 20 mg BID, flomax 0.4 mg qd, metoprolol 25 mg qd, spironolactone 25 mg every day  -Suspect possible intermittent low BPs. Losartan held. May need further down titration of meds  - Suspect falls are multifactorial related to not using walker all the time, chronic lymphedema with wounds that aren't being cared for properly, possibly low BP with standing and hypothyroidism  - Discussed with patient that he and his wife need more help and maybe relocate to an assisted living facility. He noted that he and his wife had discussed this last week.   -  PT/OT consults- recommends TCU. Nursing staff noted using lift assistance device to stand from bed and needed maximum assistance to ambulate to bathroom.     # Lymphedema  # Chronic LE ulcers   -Continues to do wound cares at home. He notes that his wife treats his legs. Legs are red, pt states this is baseline. Appears treatment has been difficult to do for his wife. He stopped scheduled wound cares a few moths ago. From pictures in chart, wounds appear larger.  -WOC Consult ordered 6/16 with daily dressing changes ordered  -Lymphedema wrapping on 6/17  -Will elevated legs on blue foam riser     # HTN  -Managed with Losartan, torsemide, metoprolol and spironolactone  -Suspect he may have intermittent low BP so will hold Losartan  - Resume other PTA meds with parameters. Per above, may need some down titration on his regimen  - Passed orthostatic vital sign test.     # Hypothyroidism  -Check TSH 6/16 Elevated to 6.75, T4 0.8  In discussion with patient he does exhibit s/s of hypothyroidism,-cold intolerance, depression, dry skin, and fatigue. Also noted that he takes all meds together and then eats  breakfast. With this noted, educated pt to take levothyroxine in the morning with no other meds and 1 hour before eating. Most likely this noncompliance is reason for TSH elevation and Low T4. Could consider an endo consult but until med is taken appropriately most likely they will not investigate further.   - Resume PTA levothyroxine   - Recheck 4-6 weeks     # Chronic combined systolic and diastolic CHF  -Last echo done 10/2024, showed EF 25-30%, down from 45-50% in 2021. Follow up [facility] Lexiscan showed transmural infarction to entire inferior segment of L ventricle, EF 24%. Followed up with Cardiology, discussed further work up, pt opted for medical management. Pt was not compliant on regimen so losartan, metoprolol and statin resumed at the beginning of the year, spironolactone added back about a month later.  -Pt continues to note occasional chest pains that he will take nitroglycerin for but its been awhile. Notes exertional SOB so he is fairly sedentary.   - Resume PTA BB, ARB, spironolactone, statin and diuretic with parameters  -Has not been to core clinic in multiple months, needs to follow up     #Hx of CVA  - resume ASA and statin     #BPH  - resume PTA Flomax     #Depression  - resume PTA Zoloft     #Obesity  #DM type II  -Diet controlled  -All BG levels in 88-96 range during admission as of 6/16  -Continue to monitor     #Retroperitoneal lymphadenopathy  -PET scan ordered by PCP          Observation Goals: -diagnostic tests and consults completed and resulted, -vital signs normal or at patient baseline, -safe disposition plan has been identified, Nurse to notify provider when observation goals have been met and patient is ready for discharge.  Diet: Regular Diet Adult    DVT Prophylaxis: Pneumatic Compression Devices  Leon Catheter: Not present  Lines: None     Cardiac Monitoring: None  Code Status: No CPR- Do NOT Intubate      Clinically Significant Risk Factors Present on Admission                 #  "Drug Induced Platelet Defect: home medication list includes an antiplatelet medication   # Hypertension: Noted on problem list  # Chronic heart failure with reduced ejection fraction: last echo with EF <40%     # Anemia: based on hgb <11       # Morbid Obesity: Estimated body mass index is 45.22 kg/m  as calculated from the following:    Height as of this encounter: 1.727 m (5' 8\").    Weight as of this encounter: 134.9 kg (297 lb 6.4 oz).       # Financial/Environmental Concerns: none         Social Drivers of Health    Depression: Not at risk (5/1/2025)    PHQ-2     PHQ-2 Score: 2   Recent Concern: Depression - At risk (2/25/2025)    PHQ-2     PHQ-2 Score: 6   Transportation Needs: High Risk (6/15/2025)    Transportation Needs     Within the past 12 months, has lack of transportation kept you from medical appointments, getting your medicines, non-medical meetings or appointments, work, or from getting things that you need?: Yes          Disposition Plan     Medically Ready for Discharge: Ready Now           The patient's care was discussed with the Patient.    Jan SALAZAR-Student  Hospitalist Service  [facility]  Securely message with [employer] (more info)  Text page via [employer] Paging/Directory   ______________________________________________________________________    Interval History   No acute event overnight, denies nausea, vomiting, diarrhea, fevers, chills, or night sweats, CP or SOB.    Physical Exam   Vital Signs: Temp: 97.8  F (36.6  C) Temp src: Oral BP: 123/62 Pulse: 60   Resp: 20 SpO2: 94 % O2 Device: None (Room air)    Weight: 297 lbs 6.41 oz    Constitutional: awake, alert, cooperative, no apparent distress, and appears stated age  Eyes: Lids and lashes normal, pupils equal, round and reactive to light, sclera clear, conjunctiva normal  Respiratory: No increased work of breathing, good air exchange, clear to auscultation bilaterally, no crackles or wheezing  Cardiovascular: " Normal apical impulse, regular rate and rhythm, normal S1 and S2, no S3 or S4, and no murmur noted  Skin: BLE red, warm, swollen, bandaged for noted wounds  Neurologic: Awake, alert, oriented to name, place and time.  Cranial nerves II-XII are grossly intact.    Medical Decision Making       55 MINUTES SPENT BY ME on the date of service doing chart review, history, exam, documentation & further activities per the note.      Data     I have personally reviewed the following data over the past 24 hrs:    TSH: N/A T4: N/A A1C: N/A       Imaging results reviewed over the past 24 hrs:   No results found for this or any previous visit (from the past 24 hours).

## 2025-06-17 NOTE — PLAN OF CARE
Vitals are Temp: 98  F (36.7  C) Temp src: Oral BP: 119/61 Pulse: 61   Resp: 20 SpO2: 94 %.      Aox4. CO lower back and knee pain. PIV SL. Contact precaution MRSA maintained. Regular diet. Assist x2 pivot/ hiral steady. Urinal at bedside with urgency. WOC wound care daily BLE ulcers done. PT/OT recommend TCU. Lymphedema evaluation today.    PRIMARY DIAGNOSIS: GENERALIZED WEAKNESS, FALLS    OUTPATIENT/OBSERVATION GOALS TO BE MET BEFORE DISCHARGE  1. Orthostatic performed: Yes:          Lying Orthostatic BP: 116/67         Sitting Orthostatic BP: 115/55         Standing Orthostatic BP: 115/59     2. Tolerating PO medications: Yes    3. Return to near baseline physical activity: No    4. Cleared for discharge by consultants (if involved): No    Discharge Planner Nurse   Safe discharge environment identified: Yes  Barriers to discharge: Yes       Entered by: Frida Renner RN 06/17/2025      Please review provider order for any additional goals.   Nurse to notify provider when observation goals have been met and patient is ready for discharge.      Goal Outcome Evaluation:      Plan of Care Reviewed With: patient    Overall Patient Progress: improvingOverall Patient Progress: improving    Outcome Evaluation: AOx4, denies N/V, co pain back and knees prn tylenol, assist x2 with hiral steady, contact maintained MRSA, PT/OT recommend TCU. WOC-wound care BLE, Lymphedema evaluation today.

## 2025-06-18 ENCOUNTER — LAB REQUISITION (OUTPATIENT)
Dept: LAB | Facility: CLINIC | Age: 75
End: 2025-06-18
Payer: COMMERCIAL

## 2025-06-18 VITALS
TEMPERATURE: 98.2 F | RESPIRATION RATE: 20 BRPM | SYSTOLIC BLOOD PRESSURE: 113 MMHG | HEIGHT: 68 IN | OXYGEN SATURATION: 92 % | BODY MASS INDEX: 45.07 KG/M2 | DIASTOLIC BLOOD PRESSURE: 61 MMHG | HEART RATE: 68 BPM | WEIGHT: 297.4 LBS

## 2025-06-18 DIAGNOSIS — Z11.1 ENCOUNTER FOR SCREENING FOR RESPIRATORY TUBERCULOSIS: ICD-10-CM

## 2025-06-18 PROCEDURE — G0378 HOSPITAL OBSERVATION PER HR: HCPCS

## 2025-06-18 PROCEDURE — 250N000013 HC RX MED GY IP 250 OP 250 PS 637: Performed by: PHYSICIAN ASSISTANT

## 2025-06-18 PROCEDURE — 99239 HOSP IP/OBS DSCHRG MGMT >30: CPT | Performed by: PHYSICIAN ASSISTANT

## 2025-06-18 RX ORDER — ONDANSETRON 4 MG/1
4 TABLET, ORALLY DISINTEGRATING ORAL EVERY 6 HOURS PRN
DISCHARGE
Start: 2025-06-18

## 2025-06-18 RX ORDER — POLYETHYLENE GLYCOL 3350 17 G/17G
17 POWDER, FOR SOLUTION ORAL DAILY
DISCHARGE
Start: 2025-06-18

## 2025-06-18 RX ORDER — ENOXAPARIN SODIUM 100 MG/ML
40 INJECTION SUBCUTANEOUS EVERY 12 HOURS
DISCHARGE
Start: 2025-06-18 | End: 2025-06-23

## 2025-06-18 RX ADMIN — SERTRALINE HYDROCHLORIDE 100 MG: 100 TABLET ORAL at 08:17

## 2025-06-18 RX ADMIN — SPIRONOLACTONE 25 MG: 25 TABLET ORAL at 08:17

## 2025-06-18 RX ADMIN — LEVOTHYROXINE SODIUM 224 MCG: 0.11 TABLET ORAL at 08:17

## 2025-06-18 RX ADMIN — ASPIRIN 81 MG CHEWABLE TABLET 81 MG: 81 TABLET CHEWABLE at 08:17

## 2025-06-18 RX ADMIN — TAMSULOSIN HYDROCHLORIDE 0.4 MG: 0.4 CAPSULE ORAL at 08:17

## 2025-06-18 RX ADMIN — METOPROLOL SUCCINATE 25 MG: 25 TABLET, EXTENDED RELEASE ORAL at 08:17

## 2025-06-18 RX ADMIN — ROSUVASTATIN CALCIUM 20 MG: 20 TABLET, FILM COATED ORAL at 08:17

## 2025-06-18 RX ADMIN — TORSEMIDE 20 MG: 20 TABLET ORAL at 08:17

## 2025-06-18 ASSESSMENT — ACTIVITIES OF DAILY LIVING (ADL)
ADLS_ACUITY_SCORE: 62
ADLS_ACUITY_SCORE: 58
ADLS_ACUITY_SCORE: 62
ADLS_ACUITY_SCORE: 58
ADLS_ACUITY_SCORE: 62

## 2025-06-18 NOTE — PLAN OF CARE
"PRIMARY DIAGNOSIS: GENERALIZED WEAKNESS/Fall    OUTPATIENT/OBSERVATION GOALS TO BE MET BEFORE DISCHARGE  1. Orthostatic performed: No    2. Tolerating PO medications: Yes    3. Return to near baseline physical activity: No    4. Cleared for discharge by consultants (if involved): Yes    Discharge Planner Nurse   Safe discharge environment identified: Yes  Barriers to discharge: Yes    Please review provider order for any additional goals.   Nurse to notify provider when observation goals have been met and patient is ready for discharge.  Goal Outcome Evaluation:      Plan of Care Reviewed With: patient    Overall Patient Progress: improvingOverall Patient Progress: improving    Outcome Evaluation: Pt A&Ox3 disoriented to time/intermittent confusion. VS, RA. Denies pain/n/v/d/sob/chest pain/numbness/tingling/dizziness. Saline locked, clean/dry/intact. Regular diet, tolerating well. Assist x2 pivot/hiral steady. Contact precautions in place. SW/WOC following. Bed alarm in place. Discharge pending, WC tranfers to TCU at 13:08-13:50.    /71   Pulse 69   Temp 98.1  F (36.7  C) (Oral)   Resp 20   Ht 1.727 m (5' 8\")   Wt 134.9 kg (297 lb 6.4 oz)   SpO2 93%   BMI 45.22 kg/m          "

## 2025-06-18 NOTE — PLAN OF CARE
"PRIMARY DIAGNOSIS: Weakness/Wounds  OUTPATIENT/OBSERVATION GOALS TO BE MET BEFORE DISCHARGE:  1. Pain Status: Improved-controlled with oral pain medications.     2. Return to near baseline physical activity: No     3. Cleared for discharge by consultants (if involved): Yes     Discharge Planner Nurse  Safe discharge environment identified: Yes  Barriers to discharge: Yes         /53 (BP Location: Right arm)   Pulse 61   Temp 98.5  F (36.9  C) (Oral)   Resp 18   Ht 1.727 m (5' 8\")   Wt 134.9 kg (297 lb 6.4 oz)   SpO2 93%   BMI 45.22 kg/m     .  Patient is Alert and Oriented x4. They are 2 assist with Blanca Steady.  Patient is on Contact precuations for MRSA.  Pt is a Regular diet.  They are complaining of 4/10 pain in their Head.  Tylenol given for pain.  Patient is Saline locked. WOC following for chronic ulcer wounds on BLE- Wound cares daily. SW/PT- TCU [facility] 6/18 WC transport @[id number].      Please review provider order for any additional goals.   Nurse to notify provider when observation goals have been met and patient is ready for discharge.  Goal Outcome Evaluation:       Plan of Care Reviewed With: patient     Overall Patient Progress: improvingOverall Patient Progress: improving     Outcome Evaluation: A&Ox4, Ax2 pivot to commode. Chronic Ulcer wounds on BLE- WOC following- wound cares. SW/PT- [facility] 6/18 WC transport @[id number]. Contact-MRSA      "

## 2025-06-18 NOTE — DISCHARGE SUMMARY
"[facility]  Hospitalist Discharge Summary      Date of Admission:  6/15/2025  Date of Discharge:  6/18/2025  Discharging Provider: Lou Peng  Discharge Service: Hospitalist Service    Provider Attestation: I agree with the information in Jan SALAZAR student note. Patient presented with falls and weakness likely multifactorial from lymphedema/chronic wounds, possibly intermittent low blood pressure, deconditioning and possibly hypothyroidism. Wound care evaluated with daily wound dressings. Stopped Losartan for low BP and kept levothyroxine at the same dose with instructions on how to take correctly. Discharged to TCU    Lou Peng PA-C    Discharge Diagnoses   Weakness  Failure to thrive  Recurrent Falls  Chronic lymphedema  Chronic lower extremity wounds  Hypothyroidsm      Clinically Significant Risk Factors     # Morbid Obesity: Estimated body mass index is 45.22 kg/m  as calculated from the following:    Height as of this encounter: 1.727 m (5' 8\").    Weight as of this encounter: 134.9 kg (297 lb 6.4 oz).       Follow-ups Needed After Discharge   Follow-up Appointments       Follow Up and recommended labs and tests      Follow up with prison physician.  The following labs/tests are recommended: BMP. Will need repeat TSH/T4 in 4-6 weeks to ensure he is taking medicine correctly    After rehab patient should make appt with the wound care clinic for open wounds and cardiology for management of heart failure. He will need to call the [facility] clinic.    Primary care provider will need to follow up for your hypothyroidism.                Unresulted Labs Ordered in the Past 30 Days of this Admission       No orders found from 5/16/2025 to 6/16/2025.        These results will be followed up by   Huy Crystal MD     General - Internal Medicine    [phone]       Discharge Disposition   Discharged to [facility]  Condition at discharge: Stable    Hospital Course   Jamar PATEL" Cachorro is a 75 year old male with PMHx significant for DM type II, HTN, hypothyroidism, chronic systolic CHF, hx of CVA, BPH, depression, chronic LE ulcers, retroperitoneal lymphadenopathy, and obesity who was admitted on 6/15/2025 with recurrent falls and generalized weakness.     He states he was putting a  back in his closet then took 2 steps back and lost his balance, causing him to fall on his back side and he hit his head. He denies LOC. He notes 3 other falls since Friday, all due to losing his balance an occur when he is not using his walker. He notes hx of falls but this is more frequent than normal for him. He notes mild headache. He endorses occasional chest pains due to his heart and has nitroglycerin for this but has not used this in awhile. He notes exertional SOB so he is fairly sedentary to avoid this. He denies abd pain, nausea, vomiting, diarrhea or dysuria. He denies vision loss, facial droop, speech difficulties or one sided weakness.     In the ED he was afebrile with blood pressure 145/82, heart rate of 90 and breathing comfortably on room air without hypoxia. Lab work showed normal BMP with the exception of sodium 134.  WBC normal at 4.4, hemoglobin mildly low at 11.2.  Urinalysis did not appear infected. He was given 1 litre of IV fluids with observation admission requested.     Concerns that patient is having numerous falls, which he presents as a mechanical fall and denies any cardiac or neurological symptoms. His appearance is concerning that he is unable to attend to his ADL's.  He also has not followed up with core clinic or vascular clinic regarding his wounds.  The open wounds on his legs have enlarged significantly in size and number since his last wound care appointment. Wound care evaluated on 6/16 with daily dressing changed recommended. PT assessed and is recommending TCU. Please refer to below problem list for specifics.     # Recurrent falls  # Generalized weakness  #  Failure to thrive  -Lost his balance and fell backwards, hit his head. Notes mild headache but denied LOC. Pt reports 4 falls at home since 6/13. That's an increase from baseline. He states he loses his balance and then falls. Falling when he's not using his walker, in areas not walker accessible.   - 6/16 Obtained orthostatics-passed   - PTA Losartan 25 mg qd, torsemide 20 mg BID, flomax 0.4 mg qd, metoprolol 25 mg qd, spironolactone 25 mg every day  -Suspect possible intermittent low BPs. Losartan held. May need further down titration of meds  - Suspect falls are multifactorial related to not using walker all the time, chronic lymphedema with wounds that aren't being cared for properly, possibly low BP with standing and hypothyroidism  - Discussed with patient that he and his wife need more help and maybe relocate to an assisted living facility. He noted that he and his wife had discussed this last week.   -  PT/OT consults- recommends TCU. Nursing staff noted using lift assistance device to stand from bed and needed maximum assistance to ambulate to bathroom.      # Lymphedema  # Chronic LE ulcers   -Continues to do wound cares at home. He notes that his wife treats his legs. Legs are red, pt states this is baseline. Appears treatment has been difficult to do for his wife. He stopped scheduled wound cares a few moths ago. From pictures in chart, wounds appear larger.  -WOC Consult ordered 6/16 with daily dressing changes ordered  -Lymphedema wrapping on 6/17  -Will elevated legs on blue foam riser  -needs follow up with wound care clinic and vascular clinic at discharge     # HTN  -Managed with Losartan, torsemide, metoprolol and spironolactone  -Suspect he may have intermittent low BP so will stop Losartan  - Resume other PTA meds with parameters. Per above, may need some down titration on his regimen  - Passed orthostatic vital sign test.      # Hypothyroidism  -Check TSH 6/16 Elevated to 6.75, T4 0.8  In  discussion with patient he does exhibit s/s of hypothyroidism,-cold intolerance, depression, dry skin, and fatigue. Also noted that he takes all meds together and then eats breakfast. With this noted, educated pt to take levothyroxine in the morning with no other meds and 1 hour before eating. Most likely this noncompliance is reason for TSH elevation and Low T4. Could consider an endo consult but until med is taken appropriately most likely they will not investigate further.   - Resume PTA levothyroxine   - Recheck 4-6 weeks     # Chronic combined systolic and diastolic CHF  -Last echo done 10/2024, showed EF 25-30%, down from 45-50% in 2021. Follow up [facility] Lexiscan showed transmural infarction to entire inferior segment of L ventricle, EF 24%. Followed up with Cardiology, discussed further work up, pt opted for medical management. Pt was not compliant on regimen so losartan, metoprolol and statin resumed at the beginning of the year, spironolactone added back about a month later.  -Pt continues to note occasional chest pains that he will take nitroglycerin for but its been awhile. Notes exertional SOB so he is fairly sedentary.   - Resume PTA BB, spironolactone, statin and diuretic with parameters  - Stopped Losartan due to low BP  -Has not been to core clinic in multiple months, needs to follow up after discharge from TCU     #Hx of CVA  - resume ASA and statin     #BPH  - resume PTA Flomax     #Depression  - resume PTA Zoloft     #Obesity  #DM type II  -Diet controlled  -All BG levels in 88-96 range during admission as of 6/16  -Continue to monitor     #Retroperitoneal lymphadenopathy  -PET scan ordered by PCP as outpatient       Consultations This Hospital Stay   OCCUPATIONAL THERAPY ADULT IP CONSULT  PHYSICAL THERAPY ADULT IP CONSULT  CARE MANAGEMENT / SOCIAL WORK IP CONSULT  CARE MANAGEMENT / SOCIAL WORK IP CONSULT  WOUND OSTOMY CONTINENCE NURSE  IP CONSULT  CARE MANAGEMENT / SOCIAL WORK IP  CONSULT  OCCUPATIONAL THERAPY ADULT IP CONSULT  LYMPHEDEMA THERAPY IP CONSULT  PHYSICAL THERAPY ADULT IP CONSULT  OCCUPATIONAL THERAPY ADULT IP CONSULT    Code Status   No CPR- Do NOT Intubate    Time Spent on this Encounter   I, Jan Olmos, personally saw the patient today and spent greater than 30 minutes discharging this patient.       Jan Olmos PA-Student  [facility] OBSERVATION DEPT  [address] [place] [address]  Phone: [phone]  ______________________________________________________________________    Physical Exam   Vital Signs: Temp: 98.1  F (36.7  C) Temp src: Oral BP: 130/71 Pulse: 69   Resp: 20 SpO2: 93 % O2 Device: None (Room air)    Weight: 297 lbs 6.41 oz  Constitutional: awake, alert, cooperative, no apparent distress, and appears stated age  Eyes: Lids and lashes normal, sclera clear, conjunctiva normal  Respiratory: No increased work of breathing, good air exchange, clear to auscultation bilaterally, no crackles or wheezing  Cardiovascular: Normal apical impulse, regular rate and rhythm, normal S1 and S2, no S3 or S4, and no murmur noted  Neurologic: Awake, alert, oriented to name, place and time.  Cranial nerves II-XII are grossly intact.       Primary Care Physician   Huy Crystal    Discharge Orders      Primary Care - Care Coordination Referral      General info for SNF    Length of Stay Estimate: Short Term Care: Estimated # of Days <30  Condition at Discharge: Stable  Level of care:skilled   Rehabilitation Potential: Good  Admission H&P remains valid and up-to-date: Yes  Recent Chemotherapy: N/A  Use Nursing Home Standing Orders: Yes     Mantoux instructions    Give two-step Mantoux (PPD) Per Facility Policy Yes     Reason for your hospital stay    You were admitted to the hospital for generalized weakness and a fall in which you hit you head. You were also treated for lymphedema and open wounds on both lower legs.    Noted to have softer blood  pressures here so Losartan was discontinued    Noted to have elevated TSH and low T3 but patient is not taking medication correctly and he was educated to take 1 hour before eating or taking other pills.    It was noted that he has not had recommended follow up with wound care, vascular medicine or cardiology. He will need to schedule those after rehab     Activity - Up with assistive device     Daily weights    Call Provider for weight gain of more than 2 pounds per day or 5 pounds per week.     Wound care (specify)    Bilateral leg wound(s): Daily   1. Cleanse wounds and surrounding skin with Vashe and dry  2. Apply Sween cream to wounds, legs and feet (not between toes)   3. Apply adaptic to wounds  4. Cover with ABDs and wrap with kerlix     Follow Up and recommended labs and tests    Follow up with shelter physician.  The following labs/tests are recommended: BMP. Will need repeat TSH/T4 in 4-6 weeks to ensure he is taking medicine correctly    After rehab patient should make appt with the wound care clinic for open wounds and cardiology for management of heart failure. He will need to call the [facility] clinic.    Primary care provider will need to follow up for your hypothyroidism.     No CPR- Do NOT Intubate     Physical Therapy Adult Consult    Evaluate and treat as clinically indicated.    Reason:  Weakness and lymphedema wraps     Occupational Therapy Adult Consult    Evaluate and treat as clinically indicated.    Reason:  Weakness and lymphedema wraps     Contact Isolation     Fall precautions     Diet    Follow this diet upon discharge: Current Diet:Orders Placed This Encounter      Regular Diet Adult       Significant Results and Procedures   Results for orders placed or performed during the hospital encounter of 06/15/25   Head CT w/o contrast    Narrative    EXAM: CT HEAD W/O CONTRAST  LOCATION: [facility]  DATE: 6/15/2025    INDICATION: Trauma, head injury  COMPARISON: Head CT  01/19/2025  TECHNIQUE: Routine CT Head without IV contrast. Multiplanar reformats. Dose reduction techniques were used.    FINDINGS:  INTRACRANIAL CONTENTS: No intracranial hemorrhage. Mild diffuse cerebral parenchymal volume loss. No midline shift. The basilar cisterns are patent. Mild periventricular and scattered foci of deep white matter hypodensities involving both cerebral   hemispheres. No CT evidence for an acute infarct. No cerebellar tonsillar ectopia.    VISUALIZED ORBITS/SINUSES/MASTOIDS: No intraorbital abnormality. Mild mucosal thickening of the ethmoid air cells. No middle ear or mastoid effusion.    BONES/SOFT TISSUES: No acute abnormality.      Impression    IMPRESSION:  1.  No intracranial hemorrhage, mass lesions, hydrocephalus or CT evidence for an acute infarct.  2.  Mild diffuse cerebral parenchymal volume loss. Presumed chronic hypertensive/microvascular ischemic white matter changes.       *Note: Due to a large number of results and/or encounters for the requested time period, some results have not been displayed. A complete set of results can be found in Results Review.       Discharge Medications      Review of your medicines        START taking        Dose / Directions   enoxaparin ANTICOAGULANT 40 MG/0.4ML syringe  Commonly known as: LOVENOX  Used for: Encounter for deep vein thrombosis (DVT) prophylaxis      Dose: 40 mg  Inject 0.4 mLs (40 mg) subcutaneously every 12 hours for 5 days.  Refills: 0     ondansetron 4 MG ODT tab  Commonly known as: ZOFRAN ODT  Used for: Nausea      Dose: 4 mg  Take 1 tablet (4 mg) by mouth every 6 hours as needed.  Refills: 0     polyethylene glycol 17 GM/Dose powder  Commonly known as: MIRALAX  Used for: Constipation, unspecified constipation type      Dose: 17 g  Take 17 g by mouth daily.  Refills: 0            CONTINUE these medicines which have NOT CHANGED        Dose / Directions   acetaminophen 500 MG tablet  Commonly known as: TYLENOL      Dose: 500  mg  Take 500 mg by mouth every 6 hours as needed for mild pain. Prn for headaches  Refills: 0     aspirin 81 MG tablet  Commonly known as: ASA  Used for: Cerebral infarction, unspecified mechanism (H)      Dose: 81 mg  Take 1 tablet (81 mg) by mouth daily  Quantity: 90 tablet  Refills: 3     levothyroxine 112 MCG tablet  Commonly known as: SYNTHROID/LEVOTHROID  Used for: Acquired hypothyroidism      Dose: 224 mcg  TAKE 2 TABLETS (224 MCG) BY MOUTH DAILY  Quantity: 180 tablet  Refills: 3     metoprolol succinate ER 25 MG 24 hr tablet  Commonly known as: TOPROL XL  Used for: Combined systolic and diastolic heart failure, unspecified HF chronicity (H), Coronary artery disease involving native coronary artery of native heart with angina pectoris, Dyslipidemia, Essential hypertension      Dose: 25 mg  Take 1 tablet (25 mg) by mouth daily.  Quantity: 90 tablet  Refills: 3     nitroGLYcerin 0.4 MG sublingual tablet  Commonly known as: NITROSTAT  Used for: Coronary artery disease involving native coronary artery of native heart with unstable angina pectoris (H)      FOR CHEST PAIN PLACE 1 TABLET UNDER THE TONGUE EVERY 5 MINUTES FOR 3 DOSES. IF SYMPTOMS PERSIST 5 MINUTES AFTER 1ST DOSE CALL 911.  Quantity: 25 tablet  Refills: 2     OSTEO BI-FLEX JOINT SHIELD PO      Dose: 1 tablet  Take 1 tablet by mouth daily.  Refills: 0     rosuvastatin 20 MG tablet  Commonly known as: CRESTOR  Used for: Combined systolic and diastolic heart failure, unspecified HF chronicity (H), Coronary artery disease involving native coronary artery of native heart with angina pectoris, Dyslipidemia, Essential hypertension      Dose: 20 mg  Take 1 tablet (20 mg) by mouth daily.  Quantity: 90 tablet  Refills: 3     sertraline 100 MG tablet  Commonly known as: ZOLOFT      Dose: 100 mg  Take 100 mg by mouth daily.  Refills: 0     spironolactone 25 MG tablet  Commonly known as: ALDACTONE  Used for: Chronic HFrEF (heart failure with reduced ejection  fraction) (H), Ischemic cardiomyopathy      Dose: 25 mg  Take 1 tablet (25 mg) by mouth daily.  Quantity: 90 tablet  Refills: 3     tamsulosin 0.4 MG capsule  Commonly known as: FLOMAX      Dose: 0.4 mg  Take 0.4 mg by mouth daily.  Refills: 0     torsemide 20 MG tablet  Commonly known as: DEMADEX  Used for: Chronic combined systolic and diastolic congestive heart failure (H)      Dose: 20 mg  Take 1 tablet (20 mg) by mouth 2 times daily.  Quantity: 60 tablet  Refills: 2            STOP taking      losartan 25 MG tablet  Commonly known as: COZAAR               Allergies   Allergies   Allergen Reactions    Clopidogrel      Cough/emesis    Penicillins Rash    Simvastatin Diarrhea    Sulfa Antibiotics      Unsure    Xeroform [Bismuth Tribromphenate]      Unsure

## 2025-06-18 NOTE — PLAN OF CARE
"PRIMARY DIAGNOSIS: GENERALIZED WEAKNESS/Fall    OUTPATIENT/OBSERVATION GOALS TO BE MET BEFORE DISCHARGE  1. Orthostatic performed: No    2. Tolerating PO medications: Yes    3. Return to near baseline physical activity: No    4. Cleared for discharge by consultants (if involved): Yes    Discharge Planner Nurse   Safe discharge environment identified: Yes  Barriers to discharge: Yes    Please review provider order for any additional goals.   Nurse to notify provider when observation goals have been met and patient is ready for discharge.  Goal Outcome Evaluation:      Plan of Care Reviewed With: patient    Overall Patient Progress: improvingOverall Patient Progress: improving    Outcome Evaluation: Pt A&Ox3 disoriented to time/intermittent confusion. VS, RA. Denies pain/n/v/d/sob/chest pain/numbness/tingling/dizziness. Saline locked, clean/dry/intact. Regular diet, tolerating well. Assist x2 pivot/hiral steady. WDL breath/heart sounds. WOC completed on BLE ulcers. Contact precautions in place. SW/WOC following. Bed alarm in place. Discharge pending, WC tranfers to TCU at 13:08-13:50.    /61 (BP Location: Right arm)   Pulse 68   Temp 98.2  F (36.8  C) (Oral)   Resp 20   Ht 1.727 m (5' 8\")   Wt 134.9 kg (297 lb 6.4 oz)   SpO2 92%   BMI 45.22 kg/m        "

## 2025-06-18 NOTE — PROGRESS NOTES
Occupational Therapy Discharge Summary    Reason for therapy discharge:    Discharged to transitional care facility.    Progress towards therapy goal(s). See goals on Care Plan in [facility] electronic health record for goal details.  Goals partially met.  Barriers to achieving goals:   discharge from facility.    Therapy recommendation(s):    Continued therapy is recommended.  Rationale/Recommendations:  Recommend continued OT at TCU to increase pt's independence and endurance for ADLS.

## 2025-06-18 NOTE — PROGRESS NOTES
Care Management Discharge Note    Discharge Date: 06/18/2025       Discharge Disposition: TCU; [facility]CC    Discharge Services:  none    Discharge DME: none    Discharge Transportation: [facility]; [id number]    Private pay costs discussed: transportation costs    Does the patient's insurance plan have a 3 day qualifying hospital stay waiver?  No    PAS Confirmation Code:  [id number]  Patient/family educated on Medicare website which has current facility and service quality ratings:  yes    Education Provided on the Discharge Plan: Yes  Persons Notified of Discharge Plans: patient, care team   Patient/Family in Agreement with the Plan: yes    Handoff Referral Completed: No, handoff not indicated or clinically appropriate    Additional Information:  Patient discharging to TCU via [facility] at [phone]. Transportation costs and options discussed, all parties agreeable. Discharge discussed and confirmed with patient, nursing, hospitalist, and accepting facility, [facility]. Orders sent via inbasket.       DAVID Patel   Social Work Care Manager   [facility]   [phone]

## 2025-06-18 NOTE — PLAN OF CARE
"Physical Therapy Discharge Summary    Reason for therapy discharge:    Discharged to transitional care facility.    Progress towards therapy goal(s). See goals on Care Plan in [facility] electronic health record for goal details.  Goals not met.  Barriers to achieving goals:   discharge from facility.    Therapy recommendation(s):    Continued therapy is recommended.  Rationale/Recommendations:  Per prior PT recommendation, \"at this time the patient is significantly below baseline, requiring Ax2 for basic transfer to the bedside chair. Rec continued PT in the TCU setting to promote improved strength, conditioning, balance required for functional mobility and for edema management\".      "

## 2025-06-18 NOTE — PLAN OF CARE
"PRIMARY DIAGNOSIS: Weakness/Wounds  OUTPATIENT/OBSERVATION GOALS TO BE MET BEFORE DISCHARGE:  1. Pain Status: Improved-controlled with oral pain medications.     2. Return to near baseline physical activity: No     3. Cleared for discharge by consultants (if involved): Yes     Discharge Planner Nurse  Safe discharge environment identified: Yes  Barriers to discharge: Yes          /53 (BP Location: Right arm)   Pulse 61   Temp 98.5  F (36.9  C) (Oral)   Resp 18   Ht 1.727 m (5' 8\")   Wt 134.9 kg (297 lb 6.4 oz)   SpO2 93%   BMI 45.22 kg/m    .  Patient is Alert and Oriented x4. They are 2 assist with Blanca Steady.  Patient is on Contact precuations for MRSA.  Pt is a Regular diet.  They are complaining of 4/10 pain in their Head.  Tylenol given for pain.  Patient is Saline locked. WOC following for chronic ulcer wounds on BLE- Wound cares daily. SW/PT- TCU [facility] 6/18 WC transport @[phone].      Please review provider order for any additional goals.   Nurse to notify provider when observation goals have been met and patient is ready for discharge.Goal Outcome Evaluation:       Plan of Care Reviewed With: patient     Overall Patient Progress: improvingOverall Patient Progress: improving     Outcome Evaluation: A&Ox4, Ax2 pivot to commode. Chronic Ulcer wounds on BLE- WOC following- wound cares. SW/PT- [facility] 6/18 WC transport @[phone]. Contact-MRSA           "

## 2025-06-18 NOTE — PROGRESS NOTES
[facility]     [facility] Clinic chart review.    Alerted by Epic of Kenneth's admission. He is due for [facility] Clinic follow-up.    Have arranged follow-up as below. Note plans to discharge to TCU as early as today.    Future Appointments   Date Time Provider Department Center   6/24/2025  2:15 PM [id number] [facility] [place] [facility]   6/26/2025 11:45 AM RU LAB [facility] [place] [facility]   6/26/2025 12:40 PM Fabian Hightower NP [facility]IN   9/29/2025 10:15 AM Adiel Alvarez MD [facility]     Please call with questions.    Claudia Dimas RN BSN  [facility] Clinic Care Coordinator [facility]  [phone]  11:29 AM 06/18/25

## 2025-06-18 NOTE — PLAN OF CARE
PRIMARY DIAGNOSIS: Weakness/Wounds  OUTPATIENT/OBSERVATION GOALS TO BE MET BEFORE DISCHARGE:  1. Pain Status: Improved-controlled with oral pain medications.    2. Return to near baseline physical activity: No    3. Cleared for discharge by consultants (if involved): Yes    Discharge Planner Nurse   Safe discharge environment identified: Yes  Barriers to discharge: Yes         Vitals are Temp: 98.4  F (36.9  C) Temp src: Oral BP: 110/52 Pulse: 61   Resp: 18 SpO2: 94 %.  Patient is Alert and Oriented x4. They are 2 assist with Blanca Steady.  Patient is on Contact precuations for MRSA.  Pt is a Regular diet.  They are complaining of 4/10 pain in their Head.  Tylenol given for pain.  Patient is Saline locked. WOC following for chronic ulcer wounds on BLE- Wound cares daily. SW/PT- TCU [facility] 6/18 WC transport @[phone].      Please review provider order for any additional goals.   Nurse to notify provider when observation goals have been met and patient is ready for discharge.Goal Outcome Evaluation:      Plan of Care Reviewed With: patient    Overall Patient Progress: improvingOverall Patient Progress: improving    Outcome Evaluation: A&Ox4, Ax2 pivot to commode. Chronic Ulcer wounds on BLE- WOC following- wound cares. SW/PT- [facility] 6/18 WC transport @[phone]. Contact-MRSA

## 2025-06-19 ENCOUNTER — LAB REQUISITION (OUTPATIENT)
Dept: LAB | Facility: CLINIC | Age: 75
End: 2025-06-19
Payer: COMMERCIAL

## 2025-06-19 ENCOUNTER — TRANSITIONAL CARE UNIT VISIT (OUTPATIENT)
Dept: GERIATRICS | Facility: CLINIC | Age: 75
End: 2025-06-19
Payer: COMMERCIAL

## 2025-06-19 VITALS
OXYGEN SATURATION: 92 % | HEIGHT: 68 IN | WEIGHT: 285 LBS | BODY MASS INDEX: 43.19 KG/M2 | TEMPERATURE: 98.2 F | DIASTOLIC BLOOD PRESSURE: 66 MMHG | RESPIRATION RATE: 18 BRPM | HEART RATE: 67 BPM | SYSTOLIC BLOOD PRESSURE: 101 MMHG

## 2025-06-19 DIAGNOSIS — I87.8 VENOUS STASIS: ICD-10-CM

## 2025-06-19 DIAGNOSIS — F33.1 MAJOR DEPRESSIVE DISORDER, RECURRENT EPISODE, MODERATE (H): ICD-10-CM

## 2025-06-19 DIAGNOSIS — N40.1 BENIGN PROSTATIC HYPERPLASIA WITH INCOMPLETE BLADDER EMPTYING: ICD-10-CM

## 2025-06-19 DIAGNOSIS — I50.42 CHRONIC COMBINED SYSTOLIC (CONGESTIVE) AND DIASTOLIC (CONGESTIVE) HEART FAILURE (H): ICD-10-CM

## 2025-06-19 DIAGNOSIS — M79.604 PAIN IN BOTH LOWER EXTREMITIES: ICD-10-CM

## 2025-06-19 DIAGNOSIS — K59.01 SLOW TRANSIT CONSTIPATION: ICD-10-CM

## 2025-06-19 DIAGNOSIS — R62.7 FAILURE TO THRIVE IN ADULT: ICD-10-CM

## 2025-06-19 DIAGNOSIS — I50.42 CHRONIC COMBINED SYSTOLIC AND DIASTOLIC CONGESTIVE HEART FAILURE (H): ICD-10-CM

## 2025-06-19 DIAGNOSIS — I25.5 ISCHEMIC CARDIOMYOPATHY: ICD-10-CM

## 2025-06-19 DIAGNOSIS — E03.9 ACQUIRED HYPOTHYROIDISM: Chronic | ICD-10-CM

## 2025-06-19 DIAGNOSIS — E78.5 DYSLIPIDEMIA: ICD-10-CM

## 2025-06-19 DIAGNOSIS — M79.605 PAIN IN BOTH LOWER EXTREMITIES: ICD-10-CM

## 2025-06-19 DIAGNOSIS — I10 ESSENTIAL HYPERTENSION: ICD-10-CM

## 2025-06-19 DIAGNOSIS — R53.81 PHYSICAL DECONDITIONING: Primary | ICD-10-CM

## 2025-06-19 DIAGNOSIS — I10 ESSENTIAL (PRIMARY) HYPERTENSION: ICD-10-CM

## 2025-06-19 DIAGNOSIS — R29.6 RECURRENT FALLS: ICD-10-CM

## 2025-06-19 DIAGNOSIS — Z86.73 HISTORY OF CVA (CEREBROVASCULAR ACCIDENT): ICD-10-CM

## 2025-06-19 DIAGNOSIS — D64.9 ANEMIA, UNSPECIFIED: ICD-10-CM

## 2025-06-19 DIAGNOSIS — E87.1 HYPONATREMIA: ICD-10-CM

## 2025-06-19 DIAGNOSIS — I89.0 LYMPHEDEMA: ICD-10-CM

## 2025-06-19 DIAGNOSIS — I25.10 CORONARY ARTERY DISEASE INVOLVING NATIVE HEART, UNSPECIFIED VESSEL OR LESION TYPE, UNSPECIFIED WHETHER ANGINA PRESENT: ICD-10-CM

## 2025-06-19 DIAGNOSIS — D64.9 CHRONIC ANEMIA: ICD-10-CM

## 2025-06-19 DIAGNOSIS — R39.14 BENIGN PROSTATIC HYPERPLASIA WITH INCOMPLETE BLADDER EMPTYING: ICD-10-CM

## 2025-06-19 PROCEDURE — P9604 ONE-WAY ALLOW PRORATED TRIP: HCPCS | Mod: ORL

## 2025-06-19 PROCEDURE — 36415 COLL VENOUS BLD VENIPUNCTURE: CPT | Mod: ORL

## 2025-06-19 PROCEDURE — 86481 TB AG RESPONSE T-CELL SUSP: CPT | Mod: ORL

## 2025-06-19 RX ORDER — ACETAMINOPHEN 325 MG/1
650 TABLET ORAL 4 TIMES DAILY
COMMUNITY
Start: 2025-06-19

## 2025-06-19 NOTE — LETTER
6/19/2025      Jamar Ivory  [address]  [facility] [address]        [facility] GERIATRICS    PRIMARY CARE PROVIDER AND CLINIC:  Huy Crystal MD, [address] [place] [address] / [facility] [id number]  Chief Complaint   Patient presents with     Hospital F/U      [facility] Medical Record Number:  [id number]  Place of Service where encounter took place:  [facility]  (U) [[id number]]    Jamar Ivory  is a 75 year old  (1950), admitted to the above facility from  [facility]. Hospital stay 6/15/25 through 6/18/25..     By chart review, patient was admitted to [facility] 6/15-6/18/25 following weakness and falls. In ED, workup remarkable for grossly normal CBC, Bmp except for mild hyponatremia (134,) and anemia (hgb 11.2.) UA was unremarkable. He received IVF. He was noted to have worsening vascular wounds and had not followed up with vascular clinic. WOC consulted. There was concern for FTT, medication non-compliance and patient/spouse requiring additional assistance at home. PTA losartan was stopped with concern for hypotension. He was recommended TCU at discharge, admitted to current facility for ongoing medical management, rehab, nursing care.    HPI:    Seen today for follow-up in his room in TCU up to recliner. He reports he is having acute on chronic knee pain. Takes tylenol as needed at home but not sure what he is receiving here. He reports his wounds are at their recent baseline. Denies CP, SOB, changes in b/b habits, fever/chills. We discussed what to expect in TCU.    CODE STATUS/ADVANCE DIRECTIVES DISCUSSION:  No CPR- Do NOT Intubate  DNR / DNI  ALLERGIES:   Allergies   Allergen Reactions     Clopidogrel      Cough/emesis     Penicillins Rash     Simvastatin Diarrhea     Sulfa Antibiotics      Unsure     Xeroform [Bismuth Tribromphenate]      Unsure      PAST MEDICAL HISTORY:   Past Medical History:   Diagnosis Date     Arthritis      Cerebral  artery occlusion with cerebral infarction     TIA     CHF (congestive heart failure) 12/24/2018     CVA (cerebral infarction) 2012     CVD (cardiovascular disease)      Depression 1977    hospitalized     Fatty liver      Gout      h/o Concussion      Hypertension 01/17/2011     Hypothyroidism      Obesity      Spider veins      TIA (transient ischaemic attack) 2012     Vitiligo       PAST SURGICAL HISTORY:   has a past surgical history that includes appendectomy; Colonoscopy (N/A, 09/02/2016); Vasectomy; Colonoscopy (N/A, 12/27/2021); and Irrigation and debridement finger, combined (Left, 1/19/2025).  FAMILY HISTORY: family history includes Cancer in his daughter and father; Diabetes in his mother.  SOCIAL HISTORY:   reports that he has never smoked. He has never used smokeless tobacco. He reports that he does not currently use alcohol. He reports that he does not use drugs.  Patient's living condition: lives with spouse    Post Discharge Medication Reconciliation Status:   MED REC REQUIRED  Post Medication Reconciliation Status: discharge medications reconciled and changed, per note/orders       Current Outpatient Medications   Medication Sig Dispense Refill     acetaminophen (TYLENOL) 325 MG tablet Take 2 tablets (650 mg) by mouth 4 times daily.       acetaminophen (TYLENOL) 500 MG tablet Take 500 mg by mouth every 6 hours as needed for mild pain. Prn for headaches       aspirin (ASA) 81 MG tablet Take 1 tablet (81 mg) by mouth daily 90 tablet 3     enoxaparin ANTICOAGULANT (LOVENOX) 40 MG/0.4ML syringe Inject 0.4 mLs (40 mg) subcutaneously every 12 hours for 5 days.       levothyroxine (SYNTHROID/LEVOTHROID) 112 MCG tablet TAKE 2 TABLETS (224 MCG) BY MOUTH DAILY 180 tablet 3     metoprolol succinate ER (TOPROL XL) 25 MG 24 hr tablet Take 1 tablet (25 mg) by mouth daily. 90 tablet 3     Misc Natural Products (OSTEO BI-FLEX JOINT SHIELD PO) Take 1 tablet by mouth daily.       nitroGLYcerin (NITROSTAT) 0.4 MG  "sublingual tablet FOR CHEST PAIN PLACE 1 TABLET UNDER THE TONGUE EVERY 5 MINUTES FOR 3 DOSES. IF SYMPTOMS PERSIST 5 MINUTES AFTER 1ST DOSE CALL 911. 25 tablet 2     ondansetron (ZOFRAN ODT) 4 MG ODT tab Take 1 tablet (4 mg) by mouth every 6 hours as needed.       polyethylene glycol (MIRALAX) 17 GM/Dose powder Take 17 g by mouth daily.       rosuvastatin (CRESTOR) 20 MG tablet Take 1 tablet (20 mg) by mouth daily. 90 tablet 3     sertraline (ZOLOFT) 100 MG tablet Take 100 mg by mouth daily.       spironolactone (ALDACTONE) 25 MG tablet Take 1 tablet (25 mg) by mouth daily. 90 tablet 3     tamsulosin (FLOMAX) 0.4 MG capsule Take 0.4 mg by mouth daily.       torsemide (DEMADEX) 20 MG tablet Take 1 tablet (20 mg) by mouth 2 times daily. 60 tablet 2     No current facility-administered medications for this visit.       ROS:  Limited secondary to cognitive impairment but today pt reports theabove and 4 point ROS including Respiratory, CV, GI and , other than that noted in the HPI,  is negative    Vitals:  /66   Pulse 67   Temp 98.2  F (36.8  C)   Resp 18   Ht 1.727 m (5' 8\")   Wt 129.3 kg (285 lb)   SpO2 92%   BMI 43.33 kg/m    Exam:  GENERAL APPEARANCE:  Alert, in no distress  RESP:  respiratory effort and palpation of chest normal, lungs clear to auscultation , no respiratory distress  CV:  regular rate and rhythm, no murmur, rub, or gallop, no edema  ABDOMEN:  normal bowel sounds, soft, nontender, no hepatosplenomegaly or other masses  M/S:   Gait and station abnormal transfers with assist  SKIN:  BLE wounds dressed  NEURO:   adam freely  PSYCH:  memory impaired , affect and mood normal    Lab/Diagnostic data:  Labs done in SNF are in [facility] EPIC. Please refer to them using [facility] Everywhere. and Recent labs in EPIC reviewed by me today.     ASSESSMENT/PLAN:    (R53.81) Physical deconditioning  (primary encounter diagnosis)  (R29.6) Recurrent falls  (R62.7) Failure to thrive in adult  Comment: acute " on chronic, related to chronic conditions as below, recent hospitalization. May need additional assistance at home or higher level of care at discharge  Plan: PT/OT. SNF for assist with ADLs, medication management, meals, activities    (I50.42) Chronic combined systolic and diastolic congestive heart failure (H)  (I89.0) Lymphedema  (I25.5) Ischemic cardiomyopathy  Comment: chronic, EF 25-30% by ECHO 10/2024 with suspected ischemic cardiomyopathy  Plan: continue metoprolol 25 mg daily, spironolactone 25 mg daily, torsemide 20 mg BID. Lymph therapy pending wound assessment. Monitor weights, exam.    (I87.8) Venous stasis  Comment: chronic wounds, follows with wound clinic outpatient, WOC to follow  Plan: continue local wound care as directed, follow-up with vascular surgery as directed    (I25.10) Coronary artery disease involving native heart, unspecified vessel or lesion type, unspecified whether angina present  (I10) Essential hypertension  (E78.5) Dyslipidemia  Comment: chronic, nuclear stress test 12/2024 with transmural infarction in the entire inferior segment of LV, lost to follow-up   BP Readings from Last 3 Encounters:   06/19/25 101/66   06/18/25 113/61   06/11/25 108/58   Plan: continue metoprolol 25 mg daily, spironolactone 25 mg daily, toresmide 20 mg bid NTG PRN. Statin, ASA. Monitor cardiac status, BP, encourage follow-up with cardiology as directed    (M79.604,  M79.605) Pain in both lower extremities  Comment: chronic, wounds and OA contributing  Plan: acetaminophen (TYLENOL) 325 MG tablet schedule QID while on therapies    (Z86.73) History of CVA (cerebrovascular accident)  Comment: noted in history  Plan: continue ASA, statin    (N40.1,  R39.14) Benign prostatic hyperplasia with incomplete bladder emptying  Comment: chronic  Plan: continue flomax, monitor symptoms    (E87.1) Hyponatremia  Comment: mild, diet likely contributing  Plan: repeat BMP, monitor Na periodically    (D64.9) Chronic  anemia  Comment: baseline hgb 11-13  Hemoglobin   Date Value Ref Range Status   06/16/2025 10.0 (L) 13.3 - 17.7 g/dL Final   06/15/2025 11.2 (L) 13.3 - 17.7 g/dL Final   03/29/2021 13.1 (L) 13.3 - 17.7 g/dL Final   03/08/2021 12.0 (L) 13.3 - 17.7 g/dL Final   Plan: monitor hgb and for s/sx bleeding, repeat CBC 6/20    (K59.01) Slow transit constipation  Comment: immobility contributing  Plan: continue bowel regimen, monitor bowel habits per nursing    (E03.9) Hypothyroidism  Comment: chronic, TSH elevated with some concern for medication nonadherance   Plan: continue synthroid    F33.1 Depression  Comment: chronic by history  Plan: continue sertraline 100 mg daily, ACP PRN    Orders:  CBC, BMP 6/20  Daily wts notify provider if wt gain >2lbs/day or >5 lbs/wk  Discontinue current tylenol  Tylenol 650 mg QID     Total time spent with patient visit at the skilled nursing facility was 55 minutes including patient visit, review of past records, and review of management with nursing facility staff.       Electronically signed by:  YENI Cortes CNP                   Sincerely,        YENI Cortes CNP    Electronically signed

## 2025-06-19 NOTE — PROGRESS NOTES
[facility] GERIATRICS    PRIMARY CARE PROVIDER AND CLINIC:  Huy Crystal MD, [address] [place] [id number]  Chief Complaint   Patient presents with    Hospital F/U      [facility] Medical Record Number:  [id number]  Place of Service where encounter took place:  [facility]  (U) [[id number]]    Jamar Ivory  is a 75 year old  (1950), admitted to the above facility from  [facility]. Hospital stay 6/15/25 through 6/18/25..     By chart review, patient was admitted to [facility] 6/15-6/18/25 following weakness and falls. In ED, workup remarkable for grossly normal CBC, Bmp except for mild hyponatremia (134,) and anemia (hgb 11.2.) UA was unremarkable. He received IVF. He was noted to have worsening vascular wounds and had not followed up with vascular clinic. WOC consulted. There was concern for FTT, medication non-compliance and patient/spouse requiring additional assistance at home. PTA losartan was stopped with concern for hypotension. He was recommended TCU at discharge, admitted to current facility for ongoing medical management, rehab, nursing care.    HPI:    Seen today for follow-up in his room in TCU up to recliner. He reports he is having acute on chronic knee pain. Takes tylenol as needed at home but not sure what he is receiving here. He reports his wounds are at their recent baseline. Denies CP, SOB, changes in b/b habits, fever/chills. We discussed what to expect in TCU.    CODE STATUS/ADVANCE DIRECTIVES DISCUSSION:  No CPR- Do NOT Intubate  DNR / DNI  ALLERGIES:   Allergies   Allergen Reactions    Clopidogrel      Cough/emesis    Penicillins Rash    Simvastatin Diarrhea    Sulfa Antibiotics      Unsure    Xeroform [Bismuth Tribromphenate]      Unsure      PAST MEDICAL HISTORY:   Past Medical History:   Diagnosis Date    Arthritis     Cerebral artery occlusion with cerebral infarction     TIA    CHF (congestive heart failure) 12/24/2018    CVA  (cerebral infarction) 2012    CVD (cardiovascular disease)     Depression 1977    hospitalized    Fatty liver     Gout     h/o Concussion     Hypertension 01/17/2011    Hypothyroidism     Obesity     Spider veins     TIA (transient ischaemic attack) 2012    Vitiligo       PAST SURGICAL HISTORY:   has a past surgical history that includes appendectomy; Colonoscopy (N/A, 09/02/2016); Vasectomy; Colonoscopy (N/A, 12/27/2021); and Irrigation and debridement finger, combined (Left, 1/19/2025).  FAMILY HISTORY: family history includes Cancer in his daughter and father; Diabetes in his mother.  SOCIAL HISTORY:   reports that he has never smoked. He has never used smokeless tobacco. He reports that he does not currently use alcohol. He reports that he does not use drugs.  Patient's living condition: lives with spouse    Post Discharge Medication Reconciliation Status:   MED REC REQUIRED  Post Medication Reconciliation Status: discharge medications reconciled and changed, per note/orders       Current Outpatient Medications   Medication Sig Dispense Refill    acetaminophen (TYLENOL) 325 MG tablet Take 2 tablets (650 mg) by mouth 4 times daily.      acetaminophen (TYLENOL) 500 MG tablet Take 500 mg by mouth every 6 hours as needed for mild pain. Prn for headaches      aspirin (ASA) 81 MG tablet Take 1 tablet (81 mg) by mouth daily 90 tablet 3    enoxaparin ANTICOAGULANT (LOVENOX) 40 MG/0.4ML syringe Inject 0.4 mLs (40 mg) subcutaneously every 12 hours for 5 days.      levothyroxine (SYNTHROID/LEVOTHROID) 112 MCG tablet TAKE 2 TABLETS (224 MCG) BY MOUTH DAILY 180 tablet 3    metoprolol succinate ER (TOPROL XL) 25 MG 24 hr tablet Take 1 tablet (25 mg) by mouth daily. 90 tablet 3    Misc Natural Products (OSTEO BI-FLEX JOINT SHIELD PO) Take 1 tablet by mouth daily.      nitroGLYcerin (NITROSTAT) 0.4 MG sublingual tablet FOR CHEST PAIN PLACE 1 TABLET UNDER THE TONGUE EVERY 5 MINUTES FOR 3 DOSES. IF SYMPTOMS PERSIST 5 MINUTES  "AFTER 1ST DOSE CALL 911. 25 tablet 2    ondansetron (ZOFRAN ODT) 4 MG ODT tab Take 1 tablet (4 mg) by mouth every 6 hours as needed.      polyethylene glycol (MIRALAX) 17 GM/Dose powder Take 17 g by mouth daily.      rosuvastatin (CRESTOR) 20 MG tablet Take 1 tablet (20 mg) by mouth daily. 90 tablet 3    sertraline (ZOLOFT) 100 MG tablet Take 100 mg by mouth daily.      spironolactone (ALDACTONE) 25 MG tablet Take 1 tablet (25 mg) by mouth daily. 90 tablet 3    tamsulosin (FLOMAX) 0.4 MG capsule Take 0.4 mg by mouth daily.      torsemide (DEMADEX) 20 MG tablet Take 1 tablet (20 mg) by mouth 2 times daily. 60 tablet 2     No current facility-administered medications for this visit.       ROS:  Limited secondary to cognitive impairment but today pt reports theabove and 4 point ROS including Respiratory, CV, GI and , other than that noted in the HPI,  is negative    Vitals:  /66   Pulse 67   Temp 98.2  F (36.8  C)   Resp 18   Ht 1.727 m (5' 8\")   Wt 129.3 kg (285 lb)   SpO2 92%   BMI 43.33 kg/m    Exam:  GENERAL APPEARANCE:  Alert, in no distress  RESP:  respiratory effort and palpation of chest normal, lungs clear to auscultation , no respiratory distress  CV:  regular rate and rhythm, no murmur, rub, or gallop, no edema  ABDOMEN:  normal bowel sounds, soft, nontender, no hepatosplenomegaly or other masses  M/S:   Gait and station abnormal transfers with assist  SKIN:  BLE wounds dressed  NEURO:   adam freely  PSYCH:  memory impaired , affect and mood normal    Lab/Diagnostic data:  Labs done in SNF are in [facility]. Please refer to them using [facility]Care Everywhere. and Recent labs in EPIC reviewed by me today.     ASSESSMENT/PLAN:    (R53.81) Physical deconditioning  (primary encounter diagnosis)  (R29.6) Recurrent falls  (R62.7) Failure to thrive in adult  Comment: acute on chronic, related to chronic conditions as below, recent hospitalization. May need additional assistance at home or higher level " of care at discharge  Plan: PT/OT. SNF for assist with ADLs, medication management, meals, activities    (I50.42) Chronic combined systolic and diastolic congestive heart failure (H)  (I89.0) Lymphedema  (I25.5) Ischemic cardiomyopathy  Comment: chronic, EF 25-30% by ECHO 10/2024 with suspected ischemic cardiomyopathy  Plan: continue metoprolol 25 mg daily, spironolactone 25 mg daily, torsemide 20 mg BID. Lymph therapy pending wound assessment. Monitor weights, exam.    (I87.8) Venous stasis  Comment: chronic wounds, follows with wound clinic outpatient, WOC to follow  Plan: continue local wound care as directed, follow-up with vascular surgery as directed    (I25.10) Coronary artery disease involving native heart, unspecified vessel or lesion type, unspecified whether angina present  (I10) Essential hypertension  (E78.5) Dyslipidemia  Comment: chronic, nuclear stress test 12/2024 with transmural infarction in the entire inferior segment of LV, lost to follow-up   BP Readings from Last 3 Encounters:   06/19/25 101/66   06/18/25 113/61   06/11/25 108/58   Plan: continue metoprolol 25 mg daily, spironolactone 25 mg daily, toresmide 20 mg bid NTG PRN. Statin, ASA. Monitor cardiac status, BP, encourage follow-up with cardiology as directed    (M79.604,  M79.605) Pain in both lower extremities  Comment: chronic, wounds and OA contributing  Plan: acetaminophen (TYLENOL) 325 MG tablet schedule QID while on therapies    (Z86.73) History of CVA (cerebrovascular accident)  Comment: noted in history  Plan: continue ASA, statin    (N40.1,  R39.14) Benign prostatic hyperplasia with incomplete bladder emptying  Comment: chronic  Plan: continue flomax, monitor symptoms    (E87.1) Hyponatremia  Comment: mild, diet likely contributing  Plan: repeat BMP, monitor Na periodically    (D64.9) Chronic anemia  Comment: baseline hgb 11-13  Hemoglobin   Date Value Ref Range Status   06/16/2025 10.0 (L) 13.3 - 17.7 g/dL Final   06/15/2025  11.2 (L) 13.3 - 17.7 g/dL Final   03/29/2021 13.1 (L) 13.3 - 17.7 g/dL Final   03/08/2021 12.0 (L) 13.3 - 17.7 g/dL Final   Plan: monitor hgb and for s/sx bleeding, repeat CBC 6/20    (K59.01) Slow transit constipation  Comment: immobility contributing  Plan: continue bowel regimen, monitor bowel habits per nursing    (E03.9) Hypothyroidism  Comment: chronic, TSH elevated with some concern for medication nonadherance   Plan: continue synthroid    F33.1 Depression  Comment: chronic by history  Plan: continue sertraline 100 mg daily, ACP PRN    Orders:  CBC, BMP 6/20  Daily wts notify provider if wt gain >2lbs/day or >5 lbs/wk  Discontinue current tylenol  Tylenol 650 mg QID     Total time spent with patient visit at the skilled nursing facility was 55 minutes including patient visit, review of past records, and review of management with nursing facility staff.       Electronically signed by:  YENI Cortes CNP

## 2025-06-20 ENCOUNTER — PATIENT OUTREACH (OUTPATIENT)
Dept: CARE COORDINATION | Facility: CLINIC | Age: 75
End: 2025-06-20
Payer: COMMERCIAL

## 2025-06-20 LAB
ANION GAP SERPL CALCULATED.3IONS-SCNC: 11 MMOL/L (ref 7–15)
BUN SERPL-MCNC: 22 MG/DL (ref 8–23)
CALCIUM SERPL-MCNC: 8.6 MG/DL (ref 8.8–10.4)
CHLORIDE SERPL-SCNC: 102 MMOL/L (ref 98–107)
CREAT SERPL-MCNC: 1 MG/DL (ref 0.67–1.17)
EGFRCR SERPLBLD CKD-EPI 2021: 78 ML/MIN/1.73M2
ERYTHROCYTE [DISTWIDTH] IN BLOOD BY AUTOMATED COUNT: 13.8 % (ref 10–15)
GAMMA INTERFERON BACKGROUND BLD IA-ACNC: 0.02 IU/ML
GLUCOSE SERPL-MCNC: 89 MG/DL (ref 70–99)
HCO3 SERPL-SCNC: 26 MMOL/L (ref 22–29)
HCT VFR BLD AUTO: 33.4 % (ref 40–53)
HGB BLD-MCNC: 11 G/DL (ref 13.3–17.7)
M TB IFN-G BLD-IMP: NEGATIVE
M TB IFN-G CD4+ BCKGRND COR BLD-ACNC: 1.79 IU/ML
MCH RBC QN AUTO: 28.6 PG (ref 26.5–33)
MCHC RBC AUTO-ENTMCNC: 32.9 G/DL (ref 31.5–36.5)
MCV RBC AUTO: 87 FL (ref 78–100)
MITOGEN IGNF BCKGRD COR BLD-ACNC: 0 IU/ML
MITOGEN IGNF BCKGRD COR BLD-ACNC: 0.01 IU/ML
PLATELET # BLD AUTO: 186 10E3/UL (ref 150–450)
POTASSIUM SERPL-SCNC: 3.8 MMOL/L (ref 3.4–5.3)
QUANTIFERON MITOGEN: 1.81 IU/ML
QUANTIFERON NIL TUBE: 0.02 IU/ML
QUANTIFERON TB1 TUBE: 0.03 IU/ML
QUANTIFERON TB2 TUBE: 0.02
RBC # BLD AUTO: 3.84 10E6/UL (ref 4.4–5.9)
SODIUM SERPL-SCNC: 139 MMOL/L (ref 135–145)
WBC # BLD AUTO: 3.8 10E3/UL (ref 4–11)

## 2025-06-20 PROCEDURE — 36415 COLL VENOUS BLD VENIPUNCTURE: CPT | Mod: ORL

## 2025-06-20 PROCEDURE — P9604 ONE-WAY ALLOW PRORATED TRIP: HCPCS | Mod: ORL

## 2025-06-20 PROCEDURE — 85027 COMPLETE CBC AUTOMATED: CPT | Mod: ORL

## 2025-06-20 PROCEDURE — 80048 BASIC METABOLIC PNL TOTAL CA: CPT | Mod: ORL

## 2025-06-23 ENCOUNTER — TRANSITIONAL CARE UNIT VISIT (OUTPATIENT)
Dept: GERIATRICS | Facility: CLINIC | Age: 75
End: 2025-06-23
Payer: COMMERCIAL

## 2025-06-23 VITALS
TEMPERATURE: 98.2 F | WEIGHT: 278 LBS | DIASTOLIC BLOOD PRESSURE: 66 MMHG | RESPIRATION RATE: 18 BRPM | HEART RATE: 64 BPM | HEIGHT: 68 IN | OXYGEN SATURATION: 93 % | BODY MASS INDEX: 42.13 KG/M2 | SYSTOLIC BLOOD PRESSURE: 113 MMHG

## 2025-06-23 DIAGNOSIS — I25.5 ISCHEMIC CARDIOMYOPATHY: ICD-10-CM

## 2025-06-23 DIAGNOSIS — R53.81 PHYSICAL DECONDITIONING: Primary | ICD-10-CM

## 2025-06-23 DIAGNOSIS — Z86.73 HISTORY OF CVA (CEREBROVASCULAR ACCIDENT): ICD-10-CM

## 2025-06-23 DIAGNOSIS — M79.605 PAIN IN BOTH LOWER EXTREMITIES: ICD-10-CM

## 2025-06-23 DIAGNOSIS — I25.119 CORONARY ARTERY DISEASE INVOLVING NATIVE CORONARY ARTERY OF NATIVE HEART WITH ANGINA PECTORIS: ICD-10-CM

## 2025-06-23 DIAGNOSIS — N40.1 BENIGN PROSTATIC HYPERPLASIA WITH INCOMPLETE BLADDER EMPTYING: ICD-10-CM

## 2025-06-23 DIAGNOSIS — M79.604 PAIN IN BOTH LOWER EXTREMITIES: ICD-10-CM

## 2025-06-23 DIAGNOSIS — L30.4 INTERTRIGO: ICD-10-CM

## 2025-06-23 DIAGNOSIS — R62.7 FAILURE TO THRIVE IN ADULT: ICD-10-CM

## 2025-06-23 DIAGNOSIS — E78.5 DYSLIPIDEMIA: ICD-10-CM

## 2025-06-23 DIAGNOSIS — I89.0 LYMPHEDEMA: ICD-10-CM

## 2025-06-23 DIAGNOSIS — D64.9 CHRONIC ANEMIA: ICD-10-CM

## 2025-06-23 DIAGNOSIS — I10 ESSENTIAL HYPERTENSION: ICD-10-CM

## 2025-06-23 DIAGNOSIS — R41.89 COGNITIVE IMPAIRMENT: ICD-10-CM

## 2025-06-23 DIAGNOSIS — E87.1 HYPONATREMIA: ICD-10-CM

## 2025-06-23 DIAGNOSIS — R39.14 BENIGN PROSTATIC HYPERPLASIA WITH INCOMPLETE BLADDER EMPTYING: ICD-10-CM

## 2025-06-23 DIAGNOSIS — K59.01 SLOW TRANSIT CONSTIPATION: ICD-10-CM

## 2025-06-23 DIAGNOSIS — F33.1 MAJOR DEPRESSIVE DISORDER, RECURRENT EPISODE, MODERATE (H): ICD-10-CM

## 2025-06-23 DIAGNOSIS — I87.8 VENOUS STASIS: ICD-10-CM

## 2025-06-23 DIAGNOSIS — E03.9 ACQUIRED HYPOTHYROIDISM: Chronic | ICD-10-CM

## 2025-06-23 DIAGNOSIS — I50.42 CHRONIC COMBINED SYSTOLIC AND DIASTOLIC CONGESTIVE HEART FAILURE (H): ICD-10-CM

## 2025-06-23 DIAGNOSIS — R29.6 RECURRENT FALLS: ICD-10-CM

## 2025-06-23 NOTE — LETTER
" 6/23/2025      Jamar Ivory  [address]  [facility] [address]        [facility] GERIATRICS    Chief Complaint   Patient presents with     RECHECK     HPI:  Jamar Ivory is a 75 year old  (1950), who is being seen today for an episodic care visit at: [facility]  ([facility]) [[id number]].     By chart review, patient was admitted to [facility] 6/15-6/18/25 following weakness and falls. In ED, workup remarkable for grossly normal CBC, Bmp except for mild hyponatremia (134,) and anemia (hgb 11.2.) UA was unremarkable. He received IVF. He was noted to have worsening vascular wounds and had not followed up with vascular clinic. WOC consulted. There was concern for FTT, medication non-compliance and patient/spouse requiring additional assistance at home. PTA losartan was stopped with concern for hypotension. He was recommended TCU at discharge, admitted to current facility for ongoing medical management, rehab, nursing care.     Today's concern is: Seen today for follow-up in TCU resting abed. He reports he is doing fine. He denies pain. He is eating well. Lifts his hospital gown and requests assistance with new underwear, assisted to call for nursing staff. Noted red yeasty rash in bilateral groin folds, denies itching. Denies CP, SOB, changes in b/b habits.     Allergies, and PMH/PSH reviewed in EPIC today.  REVIEW OF SYSTEMS:  Limited secondary to cognitive impairment but today pt reports the above and 4 point ROS including Respiratory, CV, GI and , other than that noted in the HPI,  is negative    Objective:   /66   Pulse 64   Temp 98.2  F (36.8  C)   Resp 18   Ht 1.727 m (5' 8\")   Wt 126.1 kg (278 lb)   SpO2 93%   BMI 42.27 kg/m    GENERAL APPEARANCE:  Alert, in no distress  RESP:  respiratory effort and palpation of chest normal, lungs clear to auscultation , no respiratory distress  CV:  regular rate and rhythm, no murmur, rub, or gallop, peripheral edema trace+ " in BLE  ABDOMEN:  normal bowel sounds, soft, nontender, no hepatosplenomegaly or other masses  M/S:   Gait and station abnormal transfers with assist  SKIN:  jana discoloration in BLE, left shin wound dressed, red well demarcated rash to bilateral groin folds  NEURO:   kia hairston  PSYCH:  insight and judgement impaired, memory impaired     Labs done in SNF are in [facility]. Please refer to them using EPIC/Care Everywhere. and Recent labs in EPIC reviewed by me today.     Assessment/Plan:  (R53.81) Physical deconditioning  (primary encounter diagnosis)  (R29.6) Recurrent falls  (R62.7) Failure to thrive in adult  Comment: acute on chronic, related to chronic conditions as below, recent hospitalization. May need additional assistance at home or higher level of care at discharge  Plan: PT/OT. SNF for assist with ADLs, medication management, meals, activities     (I50.42) Chronic combined systolic and diastolic congestive heart failure (H)  (I89.0) Lymphedema  (I25.5) Ischemic cardiomyopathy  Comment: chronic, EF 25-30% by ECHO 10/2024 with suspected ischemic cardiomyopathy  Plan: continue metoprolol 25 mg daily, spironolactone 25 mg daily, torsemide 20 mg BID. Lymph therapy pending wound assessment. Monitor weights, exam.     (I87.8) Venous stasis  Comment: chronic wounds, follows with wound clinic outpatient, WOC to follow  Plan: continue local wound care as directed, follow-up with vascular surgery as directed     (I25.10) Coronary artery disease involving native heart, unspecified vessel or lesion type, unspecified whether angina present  (I10) Essential hypertension  (E78.5) Dyslipidemia  Comment: chronic, nuclear stress test 12/2024 with transmural infarction in the entire inferior segment of LV, lost to follow-up   BP Readings from Last 3 Encounters:   06/23/25 113/66   06/19/25 101/66   06/18/25 113/61   Plan: continue metoprolol 25 mg daily, spironolactone 25 mg daily, toresmide 20 mg bid NTG PRN. Statin,  ASA. Monitor cardiac status, BP, encourage follow-up with cardiology as directed     (M79.604,  M79.605) Pain in both lower extremities  Comment: chronic, wounds and OA contributing  Plan: acetaminophen (TYLENOL) 325 MG tablet schedule QID while on therapies     (Z86.73) History of CVA (cerebrovascular accident)  Comment: noted in history  Plan: continue ASA, statin     (N40.1,  R39.14) Benign prostatic hyperplasia with incomplete bladder emptying  Comment: chronic  Plan: continue flomax, monitor symptoms     (E87.1) Hyponatremia  Comment: mild, diet likely contributing  Plan: repeat BMP, monitor Na periodically     (D64.9) Chronic anemia  Comment: baseline hgb 11-13  Hemoglobin   Date Value Ref Range Status   06/16/2025 10.0 (L) 13.3 - 17.7 g/dL Final   06/15/2025 11.2 (L) 13.3 - 17.7 g/dL Final   03/29/2021 13.1 (L) 13.3 - 17.7 g/dL Final   03/08/2021 12.0 (L) 13.3 - 17.7 g/dL Final   Plan: monitor hgb and for s/sx bleeding, repeat CBC 6/20     (K59.01) Slow transit constipation  Comment: immobility contributing  Plan: continue bowel regimen, monitor bowel habits per nursing     (E03.9) Hypothyroidism  Comment: chronic, TSH elevated with some concern for medication nonadherance   Plan: continue synthroid     F33.1 Depression  Comment: chronic by history  Plan: continue sertraline 100 mg daily, ACP PRN    (R41.89) Cognitive impairment  (primary encounter diagnosis)  Comment: SLUMS very low 8/30, management reviewed with OT on site  Plan: OT following for formal cognitive testing, safe discharge planning    (L30.4) Intertrigo  Comment: acute  Plan: topical nystatin, nursing to assist with pericare as needed          MED REC REQUIRED  Post Medication Reconciliation Status: discharge medications reconciled and changed, per note/orders      Orders:  Nystatin cream 100 k unit(s)/gm apply topically to groin bid x 10 days    Electronically signed by: YENI Cortes CNP         Sincerely,        Lissette Pérez  APRN CNP    Electronically signed

## 2025-06-23 NOTE — PROGRESS NOTES
Clinic Care Coordination Contact    Clinic Care Coordination Contact  Care Team Conversations    Situation:  CC following patient through TCU care progression.     Background: PMHx significant for DM type II, HTN, hypothyroidism, chronic systolic CHF, hx of CVA, BPH, depression, chronic LE ulcers, retroperitoneal lymphadenopathy, and obesity who was admitted  6/15/2025 - 6/18/25with recurrent falls and generalized weakness.     Assessment: Patient discharged to [facility] TCU.    Plan/Recommendations:  CC will send TCU Care Team Care Management hand in communication and request involvement in discharge planning and coordination of care.     MERY Ozuna   Care Coordination Team  [phone]

## 2025-06-23 NOTE — PROGRESS NOTES
"[facility] GERIATRICS    Chief Complaint   Patient presents with    RECHECK     HPI:  Jamar Ivory is a 75 year old  (1950), who is being seen today for an episodic care visit at: [facility]  ([facility]) [[id number]].     By chart review, patient was admitted to [facility] 6/15-6/18/25 following weakness and falls. In ED, workup remarkable for grossly normal CBC, Bmp except for mild hyponatremia (134,) and anemia (hgb 11.2.) UA was unremarkable. He received IVF. He was noted to have worsening vascular wounds and had not followed up with vascular clinic. WOC consulted. There was concern for FTT, medication non-compliance and patient/spouse requiring additional assistance at home. PTA losartan was stopped with concern for hypotension. He was recommended TCU at discharge, admitted to current facility for ongoing medical management, rehab, nursing care.     Today's concern is: Seen today for follow-up in TCU resting abed. He reports he is doing fine. He denies pain. He is eating well. Lifts his hospital gown and requests assistance with new underwear, assisted to call for nursing staff. Noted red yeasty rash in bilateral groin folds, denies itching. Denies CP, SOB, changes in b/b habits.     Allergies, and PMH/PSH reviewed in [facility] today.  REVIEW OF SYSTEMS:  Limited secondary to cognitive impairment but today pt reports the above and 4 point ROS including Respiratory, CV, GI and , other than that noted in the HPI,  is negative    Objective:   /66   Pulse 64   Temp 98.2  F (36.8  C)   Resp 18   Ht 1.727 m (5' 8\")   Wt 126.1 kg (278 lb)   SpO2 93%   BMI 42.27 kg/m    GENERAL APPEARANCE:  Alert, in no distress  RESP:  respiratory effort and palpation of chest normal, lungs clear to auscultation , no respiratory distress  CV:  regular rate and rhythm, no murmur, rub, or gallop, peripheral edema trace+ in BLE  ABDOMEN:  normal bowel sounds, soft, nontender, no hepatosplenomegaly or other " masses  M/S:   Gait and station abnormal transfers with assist  SKIN:  jana discoloration in BLE, left shin wound dressed, red well demarcated rash to bilateral groin folds  NEURO:   kia hairston  PSYCH:  insight and judgement impaired, memory impaired     Labs done in SNF are in [place] EPIC. Please refer to them using EPIC/Care Everywhere. and Recent labs in EPIC reviewed by me today.     Assessment/Plan:  (R53.81) Physical deconditioning  (primary encounter diagnosis)  (R29.6) Recurrent falls  (R62.7) Failure to thrive in adult  Comment: acute on chronic, related to chronic conditions as below, recent hospitalization. May need additional assistance at home or higher level of care at discharge  Plan: PT/OT. SNF for assist with ADLs, medication management, meals, activities     (I50.42) Chronic combined systolic and diastolic congestive heart failure (H)  (I89.0) Lymphedema  (I25.5) Ischemic cardiomyopathy  Comment: chronic, EF 25-30% by ECHO 10/2024 with suspected ischemic cardiomyopathy  Plan: continue metoprolol 25 mg daily, spironolactone 25 mg daily, torsemide 20 mg BID. Lymph therapy pending wound assessment. Monitor weights, exam.     (I87.8) Venous stasis  Comment: chronic wounds, follows with wound clinic outpatient, WOC to follow  Plan: continue local wound care as directed, follow-up with vascular surgery as directed     (I25.10) Coronary artery disease involving native heart, unspecified vessel or lesion type, unspecified whether angina present  (I10) Essential hypertension  (E78.5) Dyslipidemia  Comment: chronic, nuclear stress test 12/2024 with transmural infarction in the entire inferior segment of LV, lost to follow-up   BP Readings from Last 3 Encounters:   06/23/25 113/66   06/19/25 101/66   06/18/25 113/61   Plan: continue metoprolol 25 mg daily, spironolactone 25 mg daily, toresmide 20 mg bid NTG PRN. Statin, ASA. Monitor cardiac status, BP, encourage follow-up with cardiology as directed      (M79.604,  M79.605) Pain in both lower extremities  Comment: chronic, wounds and OA contributing  Plan: acetaminophen (TYLENOL) 325 MG tablet schedule QID while on therapies     (Z86.73) History of CVA (cerebrovascular accident)  Comment: noted in history  Plan: continue ASA, statin     (N40.1,  R39.14) Benign prostatic hyperplasia with incomplete bladder emptying  Comment: chronic  Plan: continue flomax, monitor symptoms     (E87.1) Hyponatremia  Comment: mild, diet likely contributing  Plan: repeat BMP, monitor Na periodically     (D64.9) Chronic anemia  Comment: baseline hgb 11-13  Hemoglobin   Date Value Ref Range Status   06/16/2025 10.0 (L) 13.3 - 17.7 g/dL Final   06/15/2025 11.2 (L) 13.3 - 17.7 g/dL Final   03/29/2021 13.1 (L) 13.3 - 17.7 g/dL Final   03/08/2021 12.0 (L) 13.3 - 17.7 g/dL Final   Plan: monitor hgb and for s/sx bleeding, repeat CBC 6/20     (K59.01) Slow transit constipation  Comment: immobility contributing  Plan: continue bowel regimen, monitor bowel habits per nursing     (E03.9) Hypothyroidism  Comment: chronic, TSH elevated with some concern for medication nonadherance   Plan: continue synthroid     F33.1 Depression  Comment: chronic by history  Plan: continue sertraline 100 mg daily, ACP PRN    (R41.89) Cognitive impairment  (primary encounter diagnosis)  Comment: SLUMS very low 8/30, management reviewed with OT on site  Plan: OT following for formal cognitive testing, safe discharge planning    (L30.4) Intertrigo  Comment: acute  Plan: topical nystatin, nursing to assist with pericare as needed          MED REC REQUIRED  Post Medication Reconciliation Status: discharge medications reconciled and changed, per note/orders      Orders:  Nystatin cream 100 k unit(s)/gm apply topically to groin bid x 10 days    Electronically signed by: YENI Cortes CNP

## 2025-06-24 NOTE — PROGRESS NOTES
Naomi Ivory  1950     PT eval and treat for lymphedema therapy      Lissette Pérez, YENI CNP on 6/24/2025 at 3:27 PM

## 2025-06-26 ENCOUNTER — TRANSITIONAL CARE UNIT VISIT (OUTPATIENT)
Dept: GERIATRICS | Facility: CLINIC | Age: 75
End: 2025-06-26
Payer: COMMERCIAL

## 2025-06-26 ENCOUNTER — DOCUMENTATION ONLY (OUTPATIENT)
Dept: GERIATRICS | Facility: CLINIC | Age: 75
End: 2025-06-26

## 2025-06-26 VITALS
HEIGHT: 68 IN | SYSTOLIC BLOOD PRESSURE: 124 MMHG | DIASTOLIC BLOOD PRESSURE: 76 MMHG | RESPIRATION RATE: 18 BRPM | TEMPERATURE: 98.1 F | BODY MASS INDEX: 42.59 KG/M2 | WEIGHT: 281 LBS | OXYGEN SATURATION: 91 % | HEART RATE: 68 BPM

## 2025-06-26 DIAGNOSIS — I87.8 VENOUS STASIS: ICD-10-CM

## 2025-06-26 DIAGNOSIS — R41.89 COGNITIVE IMPAIRMENT: ICD-10-CM

## 2025-06-26 DIAGNOSIS — I10 ESSENTIAL HYPERTENSION: ICD-10-CM

## 2025-06-26 DIAGNOSIS — L97.929 ULCERS OF BOTH LOWER EXTREMITIES, UNSPECIFIED ULCER STAGE (H): ICD-10-CM

## 2025-06-26 DIAGNOSIS — I89.0 LYMPHEDEMA: ICD-10-CM

## 2025-06-26 DIAGNOSIS — L30.4 INTERTRIGO: ICD-10-CM

## 2025-06-26 DIAGNOSIS — R62.7 FAILURE TO THRIVE IN ADULT: ICD-10-CM

## 2025-06-26 DIAGNOSIS — I63.9 CEREBROVASCULAR ACCIDENT (CVA), UNSPECIFIED MECHANISM (H): ICD-10-CM

## 2025-06-26 DIAGNOSIS — N40.1 BENIGN PROSTATIC HYPERPLASIA WITH INCOMPLETE BLADDER EMPTYING: ICD-10-CM

## 2025-06-26 DIAGNOSIS — E03.9 HYPOTHYROIDISM, UNSPECIFIED TYPE: ICD-10-CM

## 2025-06-26 DIAGNOSIS — D64.9 CHRONIC ANEMIA: ICD-10-CM

## 2025-06-26 DIAGNOSIS — M79.604 PAIN IN BOTH LOWER EXTREMITIES: ICD-10-CM

## 2025-06-26 DIAGNOSIS — R29.6 RECURRENT FALLS: ICD-10-CM

## 2025-06-26 DIAGNOSIS — I50.42 CHRONIC COMBINED SYSTOLIC AND DIASTOLIC CONGESTIVE HEART FAILURE (H): ICD-10-CM

## 2025-06-26 DIAGNOSIS — I25.10 CORONARY ARTERY DISEASE INVOLVING NATIVE HEART, UNSPECIFIED VESSEL OR LESION TYPE, UNSPECIFIED WHETHER ANGINA PRESENT: ICD-10-CM

## 2025-06-26 DIAGNOSIS — M79.605 PAIN IN BOTH LOWER EXTREMITIES: ICD-10-CM

## 2025-06-26 DIAGNOSIS — L97.919 ULCERS OF BOTH LOWER EXTREMITIES, UNSPECIFIED ULCER STAGE (H): ICD-10-CM

## 2025-06-26 DIAGNOSIS — K59.01 SLOW TRANSIT CONSTIPATION: ICD-10-CM

## 2025-06-26 DIAGNOSIS — R39.14 BENIGN PROSTATIC HYPERPLASIA WITH INCOMPLETE BLADDER EMPTYING: ICD-10-CM

## 2025-06-26 DIAGNOSIS — E78.5 DYSLIPIDEMIA: ICD-10-CM

## 2025-06-26 DIAGNOSIS — I25.5 ISCHEMIC CARDIOMYOPATHY: ICD-10-CM

## 2025-06-26 DIAGNOSIS — F33.2 SEVERE EPISODE OF RECURRENT MAJOR DEPRESSIVE DISORDER, WITHOUT PSYCHOTIC FEATURES (H): Chronic | ICD-10-CM

## 2025-06-26 DIAGNOSIS — E87.1 HYPONATREMIA: ICD-10-CM

## 2025-06-26 DIAGNOSIS — R53.81 PHYSICAL DECONDITIONING: Primary | ICD-10-CM

## 2025-06-26 NOTE — LETTER
" 6/26/2025      Jamar Ivory  [address]  [facility] [address]        [facility] GERIATRICS    Chief Complaint   Patient presents with     RECHECK     HPI:  Jamar Ivory is a 75 year old  (1950), who is being seen today for an episodic care visit at: [facility]  ([facility]) [[id number]].     By chart review, patient was admitted to [facility] 6/15-6/18/25 following weakness and falls. In ED, workup remarkable for grossly normal CBC, Bmp except for mild hyponatremia (134,) and anemia (hgb 11.2.) UA was unremarkable. He received IVF. He was noted to have worsening vascular wounds and had not followed up with vascular clinic. WOC consulted. There was concern for FTT, medication non-compliance and patient/spouse requiring additional assistance at home. PTA losartan was stopped with concern for hypotension. He was recommended TCU at discharge, admitted to current facility for ongoing medical management, rehab, nursing care.        Today's concern is: Seen today for follow-up in his room in TCU. Seems irritable and states he is frustrated and wants to leave. He denies pain. Working with IDT on discharge planning. Denies CP, SOB, changes in b/b habits, fever/chills.    Allergies, and PMH/PSH reviewed in [facility] today.  REVIEW OF SYSTEMS:  Limited secondary to cognitive impairment but today pt reports the above and 4 point ROS including Respiratory, CV, GI and , other than that noted in the HPI,  is negative    Objective:   /76   Pulse 68   Temp 98.1  F (36.7  C)   Resp 18   Ht 1.727 m (5' 8\")   Wt 127.5 kg (281 lb)   SpO2 (!) 91%   BMI 42.73 kg/m    GENERAL APPEARANCE:  Alert, in no distress  RESP:  respiratory effort and palpation of chest normal, lungs clear to auscultation , no respiratory distress  CV:  regular rate and rhythm, no murmur, rub, or gallop, peripheral edema 1+ in BLE  ABDOMEN:  normal bowel sounds, soft, nontender, no hepatosplenomegaly or other " [facility]  Gera Lucero M.D.  Internal Medicine  [address] [place] [address]  Office : [phone]  Fax : [phone]    Chief Complaint   Patient presents with    Annual Exam     Also requesting labs to check for Celiac Disease. IgA and Tissue Transglutaminase IgA       History of Present Illness:  Jordon Underwood is a 37 y.o. male.  HPI    Wellness Physical  Patient feels well today.  Health Maintenance updated and appropriate screenings/preventative treatments are ordered if patient gives consent.    Counseling/anticipatory guidance/risk factor reduction interventions appropriate for this patient are discussed including but not limited to:    Achieving normal body weight with low carb diet and intermittent fasting  Regular moderate exercise  Smoking cessation if appropriate  Avoidance of excessive alcohol/caffeine use  Seatbelt use  Keeping medical appointments  Taking medication as directed    Son has Celiac sprue      Past Medical History:  Past Medical History:   Diagnosis Date    Allergic rhinitis     never tested    Furuncle of left lower limb     GERD (gastroesophageal reflux disease)     Psoriasis     Varicella      Past Surgical History:  Past Surgical History:   Procedure Laterality Date    HERNIA REPAIR Bilateral     SEPTOPLASTY      nasal fracture    WISDOM TOOTH EXTRACTION       Allergies:   No Known Allergies  Medications:   Current Outpatient Medications   Medication Sig Dispense Refill    Guselkumab (TREMFYA) 100 MG/ML SOPN Inject into the skin       No current facility-administered medications for this visit.     Social History:  Social History     Tobacco Use    Smoking status: Never    Smokeless tobacco: Never   Substance Use Topics    Alcohol use: Yes     Alcohol/week: 4.0 standard drinks of alcohol     Family History  Family History   Problem Relation Age of Onset    Lupus Sister     Cancer Father         skin       Review of  masses  M/S:   Gait and station abnormal transfers with assist  SKIN:  Lymph wraps to BLE  NEURO:   adam freely  PSYCH:  memory impaired , affect and mood normal    Labs done in SNF are in [place] EPIC. Please refer to them using EPIC/Care Everywhere. and Recent labs in EPIC reviewed by me today.     Assessment/Plan:  (R53.81) Physical deconditioning  (primary encounter diagnosis)  (R29.6) Recurrent falls  (R62.7) Failure to thrive in adult  Comment: acute on chronic, related to chronic conditions as below, recent hospitalization.   Plan: PT/OT. SNF for assist with ADLs, medication management, meals, activities     (I50.42) Chronic combined systolic and diastolic congestive heart failure (H)  (I89.0) Lymphedema  (I25.5) Ischemic cardiomyopathy  Comment: chronic, EF 25-30% by ECHO 10/2024 with suspected ischemic cardiomyopathy. Started on lymph therapy  Plan: continue metoprolol 25 mg daily, spironolactone 25 mg daily, torsemide 20 mg BID. Lymph therapy per PT. Monitor weights, exam.     (I87.8) Venous stasis  (L97.919,  L97.929) Ulcers of both lower extremities, unspecified ulcer stage (H)  Comment: chronic wounds, follows with wound clinic outpatient, WOC following. May adjust treatment depending on plans for lymph therapy, per IDT rounds  Plan: continue local wound care as directed, follow-up with vascular surgery as directed     (I25.10) Coronary artery disease involving native heart, unspecified vessel or lesion type, unspecified whether angina present  (I10) Essential hypertension  (E78.5) Dyslipidemia  Comment: chronic, nuclear stress test 12/2024 with transmural infarction in the entire inferior segment of LV, lost to follow-up   BP Readings from Last 3 Encounters:   06/26/25 124/76   06/23/25 113/66   06/19/25 101/66   Plan: continue metoprolol 25 mg daily, spironolactone 25 mg daily, toresmide 20 mg bid NTG PRN. Statin, ASA. Monitor cardiac status, BP, encourage follow-up with cardiology as directed      (M79.604,  M79.605) Pain in both lower extremities  Comment: chronic, wounds and OA contributing  Plan: acetaminophen (TYLENOL) 325 MG tablet schedule QID while on therapies     (Z86.73) History of CVA (cerebrovascular accident)  Comment: noted in history  Plan: continue ASA, statin     (N40.1,  R39.14) Benign prostatic hyperplasia with incomplete bladder emptying  Comment: chronic  Plan: continue flomax, monitor symptoms     (E87.1) Hyponatremia  Comment: mild, diet likely contributing  Plan: repeat BMP, monitor Na periodically     (D64.9) Chronic anemia  Comment: baseline hgb 11-13  Hemoglobin   Date Value Ref Range Status   06/16/2025 10.0 (L) 13.3 - 17.7 g/dL Final   06/15/2025 11.2 (L) 13.3 - 17.7 g/dL Final   03/29/2021 13.1 (L) 13.3 - 17.7 g/dL Final   03/08/2021 12.0 (L) 13.3 - 17.7 g/dL Final   Plan: monitor hgb and for s/sx bleeding, repeat CBC 6/20     (K59.01) Slow transit constipation  Comment: immobility contributing  Plan: continue bowel regimen, monitor bowel habits per nursing     (E03.9) Hypothyroidism  Comment: chronic, TSH elevated with some concern for medication nonadherance   Plan: continue synthroid     F33.1 Depression  Comment: chronic by history  Plan: continue sertraline 100 mg daily, ACP PRN     (R41.89) Cognitive impairment  (primary encounter diagnosis)  Comment: SLUMS very low 8/30, management reviewed with OT on site. CPT 3.9/5.6 recommended 24 supervision and no additional driving  Plan: OT following for formal cognitive testing, safe discharge planning     (L30.4) Intertrigo  Comment: acute  Plan: topical nystatin, nursing to assist with pericare as needed    MED REC REQUIRED  Post Medication Reconciliation Status: discharge medications reconciled and changed, per note/orders    Total time spent with patient visit at the [facility] nursing facility was 41 minutes including patient visit, review of past records, and review of management with IDT regarding discharge planning,  cognitive testing, wound/lymph managment.       Electronically signed by: YENI Cortes CNP         Sincerely,        YENI Cortes CNP    Electronically signed

## 2025-06-26 NOTE — PROGRESS NOTES
"[facility] GERIATRICS    Chief Complaint   Patient presents with    RECHECK     HPI:  Jamar Ivory is a 75 year old  (1950), who is being seen today for an episodic care visit at: [facility]  ([facility]) [[id number]].     By chart review, patient was admitted to [facility] 6/15-6/18/25 following weakness and falls. In ED, workup remarkable for grossly normal CBC, Bmp except for mild hyponatremia (134,) and anemia (hgb 11.2.) UA was unremarkable. He received IVF. He was noted to have worsening vascular wounds and had not followed up with vascular clinic. WOC consulted. There was concern for FTT, medication non-compliance and patient/spouse requiring additional assistance at home. PTA losartan was stopped with concern for hypotension. He was recommended TCU at discharge, admitted to current facility for ongoing medical management, rehab, nursing care.        Today's concern is: Seen today for follow-up in his room in TCU. Seems irritable and states he is frustrated and wants to leave. He denies pain. Working with IDT on discharge planning. Denies CP, SOB, changes in b/b habits, fever/chills.    Allergies, and PMH/PSH reviewed in EPIC today.  REVIEW OF SYSTEMS:  Limited secondary to cognitive impairment but today pt reports the above and 4 point ROS including Respiratory, CV, GI and , other than that noted in the HPI,  is negative    Objective:   /76   Pulse 68   Temp 98.1  F (36.7  C)   Resp 18   Ht 1.727 m (5' 8\")   Wt 127.5 kg (281 lb)   SpO2 (!) 91%   BMI 42.73 kg/m    GENERAL APPEARANCE:  Alert, in no distress  RESP:  respiratory effort and palpation of chest normal, lungs clear to auscultation , no respiratory distress  CV:  regular rate and rhythm, no murmur, rub, or gallop, peripheral edema 1+ in BLE  ABDOMEN:  normal bowel sounds, soft, nontender, no hepatosplenomegaly or other masses  M/S:   Gait and station abnormal transfers with assist  SKIN:  Lymph wraps to BLE  NEURO:   " kia hairston  PSYCH:  memory impaired , affect and mood normal    Labs done in SNF are in [place] EPIC. Please refer to them using EPIC/Care Everywhere. and Recent labs in EPIC reviewed by me today.     Assessment/Plan:  (R53.81) Physical deconditioning  (primary encounter diagnosis)  (R29.6) Recurrent falls  (R62.7) Failure to thrive in adult  Comment: acute on chronic, related to chronic conditions as below, recent hospitalization.   Plan: PT/OT. SNF for assist with ADLs, medication management, meals, activities     (I50.42) Chronic combined systolic and diastolic congestive heart failure (H)  (I89.0) Lymphedema  (I25.5) Ischemic cardiomyopathy  Comment: chronic, EF 25-30% by ECHO 10/2024 with suspected ischemic cardiomyopathy. Started on lymph therapy  Plan: continue metoprolol 25 mg daily, spironolactone 25 mg daily, torsemide 20 mg BID. Lymph therapy per PT. Monitor weights, exam.     (I87.8) Venous stasis  (L97.919,  L97.929) Ulcers of both lower extremities, unspecified ulcer stage (H)  Comment: chronic wounds, follows with wound clinic outpatient, WOC following. May adjust treatment depending on plans for lymph therapy, per IDT rounds  Plan: continue local wound care as directed, follow-up with vascular surgery as directed     (I25.10) Coronary artery disease involving native heart, unspecified vessel or lesion type, unspecified whether angina present  (I10) Essential hypertension  (E78.5) Dyslipidemia  Comment: chronic, nuclear stress test 12/2024 with transmural infarction in the entire inferior segment of LV, lost to follow-up   BP Readings from Last 3 Encounters:   06/26/25 124/76   06/23/25 113/66   06/19/25 101/66   Plan: continue metoprolol 25 mg daily, spironolactone 25 mg daily, toresmide 20 mg bid NTG PRN. Statin, ASA. Monitor cardiac status, BP, encourage follow-up with cardiology as directed     (M79.604,  M79.605) Pain in both lower extremities  Comment: chronic, wounds and OA contributing  Plan:  acetaminophen (TYLENOL) 325 MG tablet schedule QID while on therapies     (Z86.73) History of CVA (cerebrovascular accident)  Comment: noted in history  Plan: continue ASA, statin     (N40.1,  R39.14) Benign prostatic hyperplasia with incomplete bladder emptying  Comment: chronic  Plan: continue flomax, monitor symptoms     (E87.1) Hyponatremia  Comment: mild, diet likely contributing  Plan: repeat BMP, monitor Na periodically     (D64.9) Chronic anemia  Comment: baseline hgb 11-13  Hemoglobin   Date Value Ref Range Status   06/16/2025 10.0 (L) 13.3 - 17.7 g/dL Final   06/15/2025 11.2 (L) 13.3 - 17.7 g/dL Final   03/29/2021 13.1 (L) 13.3 - 17.7 g/dL Final   03/08/2021 12.0 (L) 13.3 - 17.7 g/dL Final   Plan: monitor hgb and for s/sx bleeding, repeat CBC 6/20     (K59.01) Slow transit constipation  Comment: immobility contributing  Plan: continue bowel regimen, monitor bowel habits per nursing     (E03.9) Hypothyroidism  Comment: chronic, TSH elevated with some concern for medication nonadherance   Plan: continue synthroid     F33.1 Depression  Comment: chronic by history  Plan: continue sertraline 100 mg daily, ACP PRN     (R41.89) Cognitive impairment  (primary encounter diagnosis)  Comment: SLUMS very low 8/30, management reviewed with OT on site. CPT 3.9/5.6 recommended 24 supervision and no additional driving  Plan: OT following for formal cognitive testing, safe discharge planning     (L30.4) Intertrigo  Comment: acute  Plan: topical nystatin, nursing to assist with pericare as needed    MED REC REQUIRED  Post Medication Reconciliation Status: discharge medications reconciled and changed, per note/orders    Total time spent with patient visit at the skilled nursing facility was 41 minutes including patient visit, review of past records, and review of management with IDT regarding discharge planning, cognitive testing, wound/lymph managment.       Electronically signed by: YENI Cortes CNP

## 2025-06-30 ENCOUNTER — TRANSITIONAL CARE UNIT VISIT (OUTPATIENT)
Dept: GERIATRICS | Facility: CLINIC | Age: 75
End: 2025-06-30
Payer: COMMERCIAL

## 2025-06-30 VITALS
DIASTOLIC BLOOD PRESSURE: 69 MMHG | RESPIRATION RATE: 18 BRPM | HEART RATE: 64 BPM | OXYGEN SATURATION: 96 % | TEMPERATURE: 97.9 F | BODY MASS INDEX: 42.28 KG/M2 | WEIGHT: 279 LBS | SYSTOLIC BLOOD PRESSURE: 113 MMHG | HEIGHT: 68 IN

## 2025-06-30 DIAGNOSIS — M79.604 PAIN IN BOTH LOWER EXTREMITIES: ICD-10-CM

## 2025-06-30 DIAGNOSIS — M79.605 PAIN IN BOTH LOWER EXTREMITIES: ICD-10-CM

## 2025-06-30 DIAGNOSIS — R62.7 FAILURE TO THRIVE IN ADULT: ICD-10-CM

## 2025-06-30 DIAGNOSIS — Z86.73 HISTORY OF CVA (CEREBROVASCULAR ACCIDENT): ICD-10-CM

## 2025-06-30 DIAGNOSIS — D64.9 CHRONIC ANEMIA: ICD-10-CM

## 2025-06-30 DIAGNOSIS — L30.4 INTERTRIGO: ICD-10-CM

## 2025-06-30 DIAGNOSIS — N40.1 BENIGN PROSTATIC HYPERPLASIA WITH INCOMPLETE BLADDER EMPTYING: ICD-10-CM

## 2025-06-30 DIAGNOSIS — F33.1 MAJOR DEPRESSIVE DISORDER, RECURRENT EPISODE, MODERATE (H): ICD-10-CM

## 2025-06-30 DIAGNOSIS — R41.89 COGNITIVE IMPAIRMENT: ICD-10-CM

## 2025-06-30 DIAGNOSIS — E03.9 ACQUIRED HYPOTHYROIDISM: ICD-10-CM

## 2025-06-30 DIAGNOSIS — R39.14 BENIGN PROSTATIC HYPERPLASIA WITH INCOMPLETE BLADDER EMPTYING: ICD-10-CM

## 2025-06-30 DIAGNOSIS — I89.0 LYMPHEDEMA: ICD-10-CM

## 2025-06-30 DIAGNOSIS — I25.10 CORONARY ARTERY DISEASE INVOLVING NATIVE HEART, UNSPECIFIED VESSEL OR LESION TYPE, UNSPECIFIED WHETHER ANGINA PRESENT: ICD-10-CM

## 2025-06-30 DIAGNOSIS — K59.01 SLOW TRANSIT CONSTIPATION: ICD-10-CM

## 2025-06-30 DIAGNOSIS — I10 ESSENTIAL HYPERTENSION: ICD-10-CM

## 2025-06-30 DIAGNOSIS — I25.5 ISCHEMIC CARDIOMYOPATHY: ICD-10-CM

## 2025-06-30 DIAGNOSIS — R29.6 RECURRENT FALLS: ICD-10-CM

## 2025-06-30 DIAGNOSIS — E78.5 DYSLIPIDEMIA: ICD-10-CM

## 2025-06-30 DIAGNOSIS — I50.42 CHRONIC COMBINED SYSTOLIC AND DIASTOLIC CONGESTIVE HEART FAILURE (H): ICD-10-CM

## 2025-06-30 DIAGNOSIS — R53.81 PHYSICAL DECONDITIONING: Primary | ICD-10-CM

## 2025-06-30 DIAGNOSIS — E87.1 HYPONATREMIA: ICD-10-CM

## 2025-06-30 PROCEDURE — 99310 SBSQ NF CARE HIGH MDM 45: CPT

## 2025-06-30 NOTE — LETTER
" 6/30/2025      Jamar Ivory  [address]  [facility] [address]        [facility]    Chief Complaint   Patient presents with     RECHECK     HPI:  Jamar Ivory is a 75 year old  (1950), who is being seen today for an episodic care visit at: [facility]  ([facility]) [[id number]].       By chart review, patient was admitted to [facility] 6/15-6/18/25 following weakness and falls. In ED, workup remarkable for grossly normal CBC, Bmp except for mild hyponatremia (134,) and anemia (hgb 11.2.) UA was unremarkable. He received IVF. He was noted to have worsening vascular wounds and had not followed up with vascular clinic. WOC consulted. There was concern for FTT, medication non-compliance and patient/spouse requiring additional assistance at home. PTA losartan was stopped with concern for hypotension. He was recommended TCU at discharge, admitted to current facility for ongoing medical management, rehab, nursing care.     Today's concern is: Seen today for follow-up in his room in TCU up to wheelchair. He is feeling \"alright.\" He is eager to discharge to home. Reports a bowel movement yesterday. Does not identify any acute needs. He is denying pain, CP, SOB, changes in b/b habits, fever/chills.     Allergies, and PMH/PSH reviewed in EPIC today.  REVIEW OF SYSTEMS:  Limited secondary to cognitive impairment but today pt reports the above and 4 point ROS including Respiratory, CV, GI and , other than that noted in the HPI,  is negative    Objective:   /69   Pulse 64   Temp 97.9  F (36.6  C)   Resp 18   Ht 1.727 m (5' 8\")   Wt 126.6 kg (279 lb)   SpO2 96%   BMI 42.42 kg/m    GENERAL APPEARANCE:  Alert, in no distress  RESP:  respiratory effort and palpation of chest normal, lungs clear to auscultation , no respiratory distress  CV:  regular rate and rhythm, no murmur, rub, or gallop, peripheral edema 2+ in BLE  ABDOMEN:  normal bowel sounds, soft, nontender, no " hepatosplenomegaly or other masses  M/S:   Gait and station abnormal transfers with assist  SKIN:  lymph wraps to BLE  NEURO:   adam freely  PSYCH:  oriented X 2-3, memory impaired , affect and mood normal    Labs done in SNF are in [place] EPIC. Please refer to them using EPIC/Care Everywhere. and Recent labs in EPIC reviewed by me today.     Assessment/Plan:  (R53.81) Physical deconditioning  (primary encounter diagnosis)  (R29.6) Recurrent falls  (R62.7) Failure to thrive in adult  Comment: acute on chronic, related to chronic conditions as below, recent hospitalization.   Plan: PT/OT. SNF for assist with ADLs, medication management, meals, activities     (I50.42) Chronic combined systolic and diastolic congestive heart failure (H)  (I89.0) Lymphedema  (I25.5) Ischemic cardiomyopathy  Comment: chronic, EF 25-30% by ECHO 10/2024 with suspected ischemic cardiomyopathy. Started on lymph therapy  Plan: continue metoprolol 25 mg daily, spironolactone 25 mg daily, torsemide 20 mg BID. Lymph therapy per PT. Monitor weights, exam.     (I87.8) Venous stasis  (L97.919,  L97.929) Ulcers of both lower extremities, unspecified ulcer stage (H)  Comment: chronic wounds, follows with wound clinic outpatient, WOC following.   Plan: continue local wound care as directed, follow-up with vascular surgery as directed     (I25.10) Coronary artery disease involving native heart, unspecified vessel or lesion type, unspecified whether angina present  (I10) Essential hypertension  (E78.5) Dyslipidemia  Comment: chronic, nuclear stress test 12/2024 with transmural infarction in the entire inferior segment of LV, lost to follow-up   BP Readings from Last 3 Encounters:   06/30/25 113/69   06/26/25 124/76   06/23/25 113/66   Plan: continue metoprolol 25 mg daily, spironolactone 25 mg daily, toresmide 20 mg bid NTG PRN. Statin, ASA. Monitor cardiac status, BP, encourage follow-up with cardiology as directed     (M79.604,  M79.605) Pain in both  lower extremities  Comment: chronic, wounds and OA contributing  Plan: acetaminophen (TYLENOL) 325 MG tablet schedule QID while on therapies     (Z86.73) History of CVA (cerebrovascular accident)  Comment: noted in history  Plan: continue ASA, statin     (N40.1,  R39.14) Benign prostatic hyperplasia with incomplete bladder emptying  Comment: chronic  Plan: continue flomax, monitor symptoms     (E87.1) Hyponatremia  Comment: mild, diet likely contributing  Sodium   Date Value Ref Range Status   06/16/2025 139 135 - 145 mmol/L Final   06/24/2021 140 133 - 144 mmol/L Final   Plan: repeat BMP, monitor Na periodically     (D64.9) Chronic anemia  Comment: baseline hgb 11-13  Hemoglobin   Date Value Ref Range Status   06/16/2025 10.0 (L) 13.3 - 17.7 g/dL Final   06/15/2025 11.2 (L) 13.3 - 17.7 g/dL Final   03/29/2021 13.1 (L) 13.3 - 17.7 g/dL Final   03/08/2021 12.0 (L) 13.3 - 17.7 g/dL Final   Plan: monitor hgb and for s/sx bleeding, repeat CBC 6/20     (K59.01) Slow transit constipation  Comment: immobility contributing  Plan: continue bowel regimen, monitor bowel habits per nursing     (E03.9) Hypothyroidism  Comment: chronic, TSH elevated inpatient with some concern for medication nonadherance   Plan: continue synthroid     F33.1 Depression  Comment: chronic by history. Management reviewed with nursing, patient is interested in ACP for increased depressive sx  Plan: continue sertraline 100 mg daily, consult ACP     (R41.89) Cognitive impairment  (primary encounter diagnosis)  Comment: SLUMS very low 8/30, management reviewed with OT on site. CPT 3.9/5.6 recommended 24 supervision and should no longer be driving  >[employer] mobility paperwork completed today  Plan: OT following for formal cognitive testing, safe discharge planning     (L30.4) Intertrigo  Comment: acute  Plan: topical nystatin, nursing to assist with pericare as needed    MED REC REQUIRED  Post Medication Reconciliation Status: medication reconcilation  previously completed during another office visit        Total time spent with patient visit at the skilled nursing facility was 46 minutes including patient visit, review of past records, and completion of [employer] mobility paperwork,  review of management with social work.       Electronically signed by: YENI Cortes CNP         Sincerely,        YENI Cortes CNP    Electronically signed

## 2025-06-30 NOTE — PROGRESS NOTES
"[facility] GERIATRICS    Chief Complaint   Patient presents with    RECHECK     HPI:  Jamar Ivory is a 75 year old  (1950), who is being seen today for an episodic care visit at: [facility]  ([facility]) [[id number]].       By chart review, patient was admitted to [facility] 6/15-6/18/25 following weakness and falls. In ED, workup remarkable for grossly normal CBC, Bmp except for mild hyponatremia (134,) and anemia (hgb 11.2.) UA was unremarkable. He received IVF. He was noted to have worsening vascular wounds and had not followed up with vascular clinic. WOC consulted. There was concern for FTT, medication non-compliance and patient/spouse requiring additional assistance at home. PTA losartan was stopped with concern for hypotension. He was recommended TCU at discharge, admitted to current facility for ongoing medical management, rehab, nursing care.     Today's concern is: Seen today for follow-up in his room in TCU up to wheelchair. He is feeling \"alright.\" He is eager to discharge to home. Reports a bowel movement yesterday. Does not identify any acute needs. He is denying pain, CP, SOB, changes in b/b habits, fever/chills.     Allergies, and PMH/PSH reviewed in EPIC today.  REVIEW OF SYSTEMS:  Limited secondary to cognitive impairment but today pt reports the above and 4 point ROS including Respiratory, CV, GI and , other than that noted in the HPI,  is negative    Objective:   /69   Pulse 64   Temp 97.9  F (36.6  C)   Resp 18   Ht 1.727 m (5' 8\")   Wt 126.6 kg (279 lb)   SpO2 96%   BMI 42.42 kg/m    GENERAL APPEARANCE:  Alert, in no distress  RESP:  respiratory effort and palpation of chest normal, lungs clear to auscultation , no respiratory distress  CV:  regular rate and rhythm, no murmur, rub, or gallop, peripheral edema 2+ in BLE  ABDOMEN:  normal bowel sounds, soft, nontender, no hepatosplenomegaly or other masses  M/S:   Gait and station abnormal transfers with " assist  SKIN:  lymph wraps to BLE  NEURO:   adam freely  PSYCH:  oriented X 2-3, memory impaired , affect and mood normal    Labs done in SNF are in [place] EPIC. Please refer to them using EPIC/Care Everywhere. and Recent labs in EPIC reviewed by me today.     Assessment/Plan:  (R53.81) Physical deconditioning  (primary encounter diagnosis)  (R29.6) Recurrent falls  (R62.7) Failure to thrive in adult  Comment: acute on chronic, related to chronic conditions as below, recent hospitalization.   Plan: PT/OT. SNF for assist with ADLs, medication management, meals, activities     (I50.42) Chronic combined systolic and diastolic congestive heart failure (H)  (I89.0) Lymphedema  (I25.5) Ischemic cardiomyopathy  Comment: chronic, EF 25-30% by ECHO 10/2024 with suspected ischemic cardiomyopathy. Started on lymph therapy  Plan: continue metoprolol 25 mg daily, spironolactone 25 mg daily, torsemide 20 mg BID. Lymph therapy per PT. Monitor weights, exam.     (I87.8) Venous stasis  (L97.919,  L97.929) Ulcers of both lower extremities, unspecified ulcer stage (H)  Comment: chronic wounds, follows with wound clinic outpatient, WOC following.   Plan: continue local wound care as directed, follow-up with vascular surgery as directed     (I25.10) Coronary artery disease involving native heart, unspecified vessel or lesion type, unspecified whether angina present  (I10) Essential hypertension  (E78.5) Dyslipidemia  Comment: chronic, nuclear stress test 12/2024 with transmural infarction in the entire inferior segment of LV, lost to follow-up   BP Readings from Last 3 Encounters:   06/30/25 113/69   06/26/25 124/76   06/23/25 113/66   Plan: continue metoprolol 25 mg daily, spironolactone 25 mg daily, toresmide 20 mg bid NTG PRN. Statin, ASA. Monitor cardiac status, BP, encourage follow-up with cardiology as directed     (M79.604,  M79.605) Pain in both lower extremities  Comment: chronic, wounds and OA contributing  Plan:  acetaminophen (TYLENOL) 325 MG tablet schedule QID while on therapies     (Z86.73) History of CVA (cerebrovascular accident)  Comment: noted in history  Plan: continue ASA, statin     (N40.1,  R39.14) Benign prostatic hyperplasia with incomplete bladder emptying  Comment: chronic  Plan: continue flomax, monitor symptoms     (E87.1) Hyponatremia  Comment: mild, diet likely contributing  Sodium   Date Value Ref Range Status   06/16/2025 139 135 - 145 mmol/L Final   06/24/2021 140 133 - 144 mmol/L Final   Plan: repeat BMP, monitor Na periodically     (D64.9) Chronic anemia  Comment: baseline hgb 11-13  Hemoglobin   Date Value Ref Range Status   06/16/2025 10.0 (L) 13.3 - 17.7 g/dL Final   06/15/2025 11.2 (L) 13.3 - 17.7 g/dL Final   03/29/2021 13.1 (L) 13.3 - 17.7 g/dL Final   03/08/2021 12.0 (L) 13.3 - 17.7 g/dL Final   Plan: monitor hgb and for s/sx bleeding, repeat CBC 6/20     (K59.01) Slow transit constipation  Comment: immobility contributing  Plan: continue bowel regimen, monitor bowel habits per nursing     (E03.9) Hypothyroidism  Comment: chronic, TSH elevated inpatient with some concern for medication nonadherance   Plan: continue synthroid     F33.1 Depression  Comment: chronic by history. Management reviewed with nursing, patient is interested in ACP for increased depressive sx  Plan: continue sertraline 100 mg daily, consult ACP     (R41.89) Cognitive impairment  (primary encounter diagnosis)  Comment: SLUMS very low 8/30, management reviewed with OT on site. CPT 3.9/5.6 recommended 24 supervision and should no longer be driving  >[employer] mobility paperwork completed today  Plan: OT following for formal cognitive testing, safe discharge planning     (L30.4) Intertrigo  Comment: acute  Plan: topical nystatin, nursing to assist with pericare as needed    MED REC REQUIRED  Post Medication Reconciliation Status: medication reconcilation previously completed during another office visit        Total time spent  with patient visit at the skilled nursing facility was 46 minutes including patient visit, review of past records, and completion of [employer] mobility paperwork,  review of management with social work.       Electronically signed by: YENI Cortes CNP

## 2025-07-03 ENCOUNTER — DISCHARGE SUMMARY NURSING HOME (OUTPATIENT)
Dept: GERIATRICS | Facility: CLINIC | Age: 75
End: 2025-07-03
Payer: COMMERCIAL

## 2025-07-03 VITALS
OXYGEN SATURATION: 93 % | RESPIRATION RATE: 18 BRPM | HEIGHT: 68 IN | SYSTOLIC BLOOD PRESSURE: 123 MMHG | HEART RATE: 68 BPM | DIASTOLIC BLOOD PRESSURE: 75 MMHG | TEMPERATURE: 97.7 F | WEIGHT: 279 LBS | BODY MASS INDEX: 42.28 KG/M2

## 2025-07-03 DIAGNOSIS — R62.7 FAILURE TO THRIVE IN ADULT: ICD-10-CM

## 2025-07-03 DIAGNOSIS — E03.9 HYPOTHYROIDISM, UNSPECIFIED TYPE: ICD-10-CM

## 2025-07-03 DIAGNOSIS — M79.605 PAIN IN BOTH LOWER EXTREMITIES: ICD-10-CM

## 2025-07-03 DIAGNOSIS — R53.81 PHYSICAL DECONDITIONING: Primary | ICD-10-CM

## 2025-07-03 DIAGNOSIS — F33.2 SEVERE EPISODE OF RECURRENT MAJOR DEPRESSIVE DISORDER, WITHOUT PSYCHOTIC FEATURES (H): Chronic | ICD-10-CM

## 2025-07-03 DIAGNOSIS — I50.42 CHRONIC COMBINED SYSTOLIC AND DIASTOLIC CONGESTIVE HEART FAILURE (H): ICD-10-CM

## 2025-07-03 DIAGNOSIS — R41.89 COGNITIVE IMPAIRMENT: ICD-10-CM

## 2025-07-03 DIAGNOSIS — M79.604 PAIN IN BOTH LOWER EXTREMITIES: ICD-10-CM

## 2025-07-03 DIAGNOSIS — I25.10 CORONARY ARTERY DISEASE INVOLVING NATIVE HEART, UNSPECIFIED VESSEL OR LESION TYPE, UNSPECIFIED WHETHER ANGINA PRESENT: ICD-10-CM

## 2025-07-03 DIAGNOSIS — N40.1 BENIGN PROSTATIC HYPERPLASIA WITH INCOMPLETE BLADDER EMPTYING: ICD-10-CM

## 2025-07-03 DIAGNOSIS — I87.8 VENOUS STASIS: ICD-10-CM

## 2025-07-03 DIAGNOSIS — E78.5 DYSLIPIDEMIA: ICD-10-CM

## 2025-07-03 DIAGNOSIS — I10 ESSENTIAL HYPERTENSION: ICD-10-CM

## 2025-07-03 DIAGNOSIS — D64.9 CHRONIC ANEMIA: ICD-10-CM

## 2025-07-03 DIAGNOSIS — I89.0 LYMPHEDEMA: ICD-10-CM

## 2025-07-03 DIAGNOSIS — R39.14 BENIGN PROSTATIC HYPERPLASIA WITH INCOMPLETE BLADDER EMPTYING: ICD-10-CM

## 2025-07-03 DIAGNOSIS — Z86.73 HISTORY OF CVA (CEREBROVASCULAR ACCIDENT): ICD-10-CM

## 2025-07-03 DIAGNOSIS — I25.5 ISCHEMIC CARDIOMYOPATHY: ICD-10-CM

## 2025-07-03 DIAGNOSIS — K59.01 SLOW TRANSIT CONSTIPATION: ICD-10-CM

## 2025-07-03 DIAGNOSIS — R29.6 RECURRENT FALLS: ICD-10-CM

## 2025-07-03 PROCEDURE — 99316 NF DSCHRG MGMT 30 MIN+: CPT

## 2025-07-03 RX ORDER — ACETAMINOPHEN 325 MG/1
650 TABLET ORAL 4 TIMES DAILY PRN
COMMUNITY
Start: 2025-07-03

## 2025-07-03 NOTE — LETTER
7/3/2025      Jamar Ivory  [address]  [facility] [address]        [facility] GERIATRICS DISCHARGE SUMMARY  PATIENT'S NAME: Jamar Ivory  YOB: 1950  MEDICAL RECORD NUMBER:  [id number]  Place of Service where encounter took place:  [facility]  (U) [[id number]]    PRIMARY CARE PROVIDER AND CLINIC RESPONSIBLE AFTER TRANSFER:   Huy Crystal MD, [address] [place] [id number]    Non-G Provider     Transferring providers: YENI Cortes CNP, Mary Aldridge MD  Recent Hospitalization/ED:  [facility] stay 6/15/25 to 6/18/25.  Date of SNF Admission: June 18, 2025  Date of SNF (anticipated) Discharge: July 06, 2025  Discharged to: previous independent home  Cognitive Scores: SLUMS: 8/30 and CPT: 3.9/5.6  Physical Function: Ambulating 200 ft with FWW CGA  DME: No new DME needed    CODE STATUS/ADVANCE DIRECTIVES DISCUSSION:  No CPR- Do NOT Intubate   ALLERGIES: Clopidogrel, Penicillins, Simvastatin, Sulfa antibiotics, and Xeroform [bismuth tribromphenate]    NURSING FACILITY COURSE   By chart review, patient was admitted to [facility] 6/15-6/18/25 following weakness and falls. In ED, workup remarkable for grossly normal CBC, Bmp except for mild hyponatremia (134,) and anemia (hgb 11.2.) UA was unremarkable. He received IVF. He was noted to have worsening vascular wounds and had not followed up with vascular clinic. WOC consulted. There was concern for FTT, medication non-compliance and patient/spouse requiring additional assistance at home. PTA losartan was stopped with concern for hypotension. He was recommended TCU at discharge, admitted to current facility for ongoing medical management, rehab, nursing care.     Summary of nursing facility stay:   (R53.81) Physical deconditioning  (primary encounter diagnosis)  (R29.6) Recurrent falls  (R62.7) Failure to thrive in adult  Comment: acute on chronic, related  to chronic conditions as below, recent hospitalization.   Plan: PT/OT/RN/HHA home care     (I50.42) Chronic combined systolic and diastolic congestive heart failure (H)  (I89.0) Lymphedema  (I25.5) Ischemic cardiomyopathy  Comment: chronic, EF 25-30% by ECHO 10/2024 with suspected ischemic cardiomyopathy. Started on lymph therapy  Plan: continue metoprolol 25 mg daily, spironolactone 25 mg daily, torsemide 20 mg BID. Lymph therapy per PT. Monitor weights, exam. RN home care     (I87.8) Venous stasis  (L97.919,  L97.929) Ulcers of both lower extremities, unspecified ulcer stage (H)  Comment: chronic wounds, follows with wound clinic outpatient, WOC following.   Plan: continue local wound care as directed, follow-up with vascular surgery as directed     (I25.10) Coronary artery disease involving native heart, unspecified vessel or lesion type, unspecified whether angina present  (I10) Essential hypertension  (E78.5) Dyslipidemia  Comment: chronic, nuclear stress test 12/2024 with transmural infarction in the entire inferior segment of LV, lost to follow-up   BP Readings from Last 3 Encounters:   07/03/25 123/75   06/30/25 113/69   06/26/25 124/76   Plan: continue metoprolol 25 mg daily, spironolactone 25 mg daily, toresmide 20 mg bid NTG PRN. Statin, ASA. Monitor cardiac status, BP, encourage follow-up with cardiology as directed     (M79.604,  M79.605) Pain in both lower extremities  Comment: chronic, wounds and OA contributing  Plan: acetaminophen (TYLENOL) 325 MG tablet schedule QID while on therapies     (Z86.73) History of CVA (cerebrovascular accident)  Comment: noted in history  Plan: continue ASA, statin     (N40.1,  R39.14) Benign prostatic hyperplasia with incomplete bladder emptying  Comment: chronic  Plan: continue flomax, monitor symptoms     (E87.1) Hyponatremia - resolved  Comment: mild, diet likely contributing  Sodium   Date Value Ref Range Status   06/16/2025 139 135 - 145 mmol/L Final   06/24/2021  140 133 - 144 mmol/L Final   Plan: monitor Na periodically     (D64.9) Chronic anemia  Comment: baseline hgb 11-13  Hemoglobin   Date Value Ref Range Status   06/16/2025 10.0 (L) 13.3 - 17.7 g/dL Final   03/29/2021 13.1 (L) 13.3 - 17.7 g/dL Final   03/08/2021 12.0 (L) 13.3 - 17.7 g/dL Final   Plan: monitor hgb and for s/sx bleeding, repeat CBC 6/20     (K59.01) Slow transit constipation  Comment: immobility contributing  Plan: continue bowel regimen, monitor bowel habits per nursing     (E03.9) Hypothyroidism  Comment: chronic, TSH elevated with some concern for medication nonadherance   Plan: continue synthroid     F33.1 Depression  Comment: chronic by history  Plan: continue sertraline 100 mg daily, ACP PRN     (R41.89) Cognitive impairment  (primary encounter diagnosis)  Comment: SLUMS very low 8/30, management reviewed with OT on site. CPT 3.9/5.6 recommended 24 supervision and no additional driving. [employer] mobility paperwork completed in TCU  Plan: OT home care      Discharge Medications:  MED REC REQUIRED  Post Medication Reconciliation Status: discharge medications reconciled and changed, per note/orders       Current Outpatient Medications   Medication Sig Dispense Refill     acetaminophen (TYLENOL) 325 MG tablet Take 2 tablets (650 mg) by mouth 4 times daily as needed for mild pain.       aspirin (ASA) 81 MG tablet Take 1 tablet (81 mg) by mouth daily 90 tablet 3     levothyroxine (SYNTHROID/LEVOTHROID) 112 MCG tablet TAKE 2 TABLETS (224 MCG) BY MOUTH DAILY 180 tablet 3     metoprolol succinate ER (TOPROL XL) 25 MG 24 hr tablet Take 1 tablet (25 mg) by mouth daily. 90 tablet 3     Misc Natural Products (OSTEO BI-FLEX JOINT SHIELD PO) Take 1 tablet by mouth daily.       nitroGLYcerin (NITROSTAT) 0.4 MG sublingual tablet FOR CHEST PAIN PLACE 1 TABLET UNDER THE TONGUE EVERY 5 MINUTES FOR 3 DOSES. IF SYMPTOMS PERSIST 5 MINUTES AFTER 1ST DOSE CALL 911. 25 tablet 2     polyethylene glycol (MIRALAX) 17 GM/Dose  "powder Take 17 g by mouth daily.       rosuvastatin (CRESTOR) 20 MG tablet Take 1 tablet (20 mg) by mouth daily. 90 tablet 3     sertraline (ZOLOFT) 100 MG tablet Take 100 mg by mouth daily.       spironolactone (ALDACTONE) 25 MG tablet Take 1 tablet (25 mg) by mouth daily. 90 tablet 3     tamsulosin (FLOMAX) 0.4 MG capsule Take 0.4 mg by mouth daily.       torsemide (DEMADEX) 20 MG tablet Take 1 tablet (20 mg) by mouth 2 times daily. 60 tablet 2          Controlled medications:   not applicable/none     Past Medical History:   Past Medical History:   Diagnosis Date     Arthritis      Cerebral artery occlusion with cerebral infarction     TIA     CHF (congestive heart failure) 12/24/2018     CVA (cerebral infarction) 2012     CVD (cardiovascular disease)      Depression 1977    hospitalized     Fatty liver      Gout      h/o Concussion      Hypertension 01/17/2011     Hypothyroidism      Obesity      Spider veins      TIA (transient ischaemic attack) 2012     Vitiligo      Physical Exam:   Vitals: /75   Pulse 68   Temp 97.7  F (36.5  C)   Resp 18   Ht 1.727 m (5' 8\")   Wt 126.6 kg (279 lb)   SpO2 93%   BMI 42.42 kg/m    BMI: Body mass index is 42.42 kg/m .  GENERAL APPEARANCE:  Alert, in no distress  RESP:  respiratory effort and palpation of chest normal, lungs clear to auscultation , no respiratory distress  CV:  regular rate and rhythm, no murmur, rub, or gallop, peripheral edema 2+ in BLE  ABDOMEN:  normal bowel sounds, soft, nontender, no hepatosplenomegaly or other masses  M/S:   Gait and station abnormal transfers with assist  SKIN:  Inspection of skin and subcutaneous tissue baseline, Palpation of skin and subcutaneous tissue baseline, lymph wraps to BLE  NEURO:   kia hairston  PSYCH:  insight and judgement impaired, memory impaired      SNF labs: Labs done in SNF are in [place] EPIC. Please refer to them using [facility]/Care Everywhere. and Recent labs in EPIC reviewed by me today.     DISCHARGE " PLAN:  Follow up labs: No labs orders/due  Medical Follow Up:      Follow up with primary care provider in 1-2 weeks    Appointment: 6/24 @215 arrive by 145 PET Oncology  [facility] [address] [facility] [address] [phone]   Appointment: 6/26 @1145am LAB [facility]  [facility] Clinic [address] [facility] [address] [phone] 6/26 1240 arrive at 1235  Return Core    [facility] scheduled appointments:  Appointments in Next Year      Jul 03, 2025 10:30 AM  Discharge Summary with YENI Cortes CNP  [facility] ([facility] ) [phone]     Sep 29, 2025 10:15 AM  (Arrive by 10:00 AM)  Return Patient with Adiel Alvarez MD  [facility] [place] ([facility] [place] ) [phone]           Discharge Services: Home Care:  Occupational Therapy, Physical Therapy, Registered Nurse, and Home Health Aide  Discharge Instructions Verbalized to Patient at Discharge:   Continue to follow your diet:  regular.   Weigh yourself daily in the morning and keep a record. Call your primary clinic: a) if you are more short of breath, or b) if your weight changes more than 3 pounds in one day or more than 5 pounds in one week.   Notify your surgeon if you have increased redness, swelling, tenderness, or drainage at your wound sites   Edema wear on in AM/off in PM  Notify your primary care provider if you have a fever greater than 100.5 degrees.   Wound Care: Bilateral Legs - Daily 1. Cleanse wounds and surrounding skin with Vashe and dry 2. Apply Sween ceam to wounds, legs and feet (not between toes) 3. Apply adaptic to wounds 3. Cover with ABDs and wrap with Kerlix  Driving is not recommended until cleared by your providers to resume.   24-hour supervision is recommended for safety.       TOTAL DISCHARGE TIME:   Greater than 30  minutes  Electronically signed by:  YENI Cortes CNP     Home care Face to Face documentation done in EPIC attached to Home care orders for [facility].               Sincerely,        YENI Cortes CNP    Electronically signed

## 2025-07-03 NOTE — PROGRESS NOTES
[facility] GERIATRICS DISCHARGE SUMMARY  PATIENT'S NAME: Jamar Ivory  YOB: 1950  MEDICAL RECORD NUMBER:  [id number]  Place of Service where encounter took place:  [facility]  (U) [[id number]]    PRIMARY CARE PROVIDER AND CLINIC RESPONSIBLE AFTER TRANSFER:   Huy Crystal MD, [address] [place] [address] / [facility] [address]    Non-G Provider     Transferring providers: YENI Cortes CNP, Mary Aldridge MD  Recent Hospitalization/ED:  [facility] Hospital stay 6/15/25 to 6/18/25.  Date of SNF Admission: June 18, 2025  Date of SNF (anticipated) Discharge: July 06, 2025  Discharged to: previous independent home  Cognitive Scores: SLUMS: 8/30 and CPT: 3.9/5.6  Physical Function: Ambulating 200 ft with FWW CGA  DME: No new DME needed    CODE STATUS/ADVANCE DIRECTIVES DISCUSSION:  No CPR- Do NOT Intubate   ALLERGIES: Clopidogrel, Penicillins, Simvastatin, Sulfa antibiotics, and Xeroform [bismuth tribromphenate]    NURSING FACILITY COURSE   By chart review, patient was admitted to [facility] 6/15-6/18/25 following weakness and falls. In ED, workup remarkable for grossly normal CBC, Bmp except for mild hyponatremia (134,) and anemia (hgb 11.2.) UA was unremarkable. He received IVF. He was noted to have worsening vascular wounds and had not followed up with vascular clinic. WOC consulted. There was concern for FTT, medication non-compliance and patient/spouse requiring additional assistance at home. PTA losartan was stopped with concern for hypotension. He was recommended TCU at discharge, admitted to current facility for ongoing medical management, rehab, nursing care.     Summary of nursing facility stay:   (R53.81) Physical deconditioning  (primary encounter diagnosis)  (R29.6) Recurrent falls  (R62.7) Failure to thrive in adult  Comment: acute on chronic, related to chronic conditions as below, recent hospitalization.   Plan: PT/OT/RN/HHA home care      (I50.42) Chronic combined systolic and diastolic congestive heart failure (H)  (I89.0) Lymphedema  (I25.5) Ischemic cardiomyopathy  Comment: chronic, EF 25-30% by ECHO 10/2024 with suspected ischemic cardiomyopathy. Started on lymph therapy  Plan: continue metoprolol 25 mg daily, spironolactone 25 mg daily, torsemide 20 mg BID. Lymph therapy per PT. Monitor weights, exam. RN home care     (I87.8) Venous stasis  (L97.919,  L97.929) Ulcers of both lower extremities, unspecified ulcer stage (H)  Comment: chronic wounds, follows with wound clinic outpatient, WOC following.   Plan: continue local wound care as directed, follow-up with vascular surgery as directed     (I25.10) Coronary artery disease involving native heart, unspecified vessel or lesion type, unspecified whether angina present  (I10) Essential hypertension  (E78.5) Dyslipidemia  Comment: chronic, nuclear stress test 12/2024 with transmural infarction in the entire inferior segment of LV, lost to follow-up   BP Readings from Last 3 Encounters:   07/03/25 123/75   06/30/25 113/69   06/26/25 124/76   Plan: continue metoprolol 25 mg daily, spironolactone 25 mg daily, toresmide 20 mg bid NTG PRN. Statin, ASA. Monitor cardiac status, BP, encourage follow-up with cardiology as directed     (M79.604,  M79.605) Pain in both lower extremities  Comment: chronic, wounds and OA contributing  Plan: acetaminophen (TYLENOL) 325 MG tablet schedule QID while on therapies     (Z86.73) History of CVA (cerebrovascular accident)  Comment: noted in history  Plan: continue ASA, statin     (N40.1,  R39.14) Benign prostatic hyperplasia with incomplete bladder emptying  Comment: chronic  Plan: continue flomax, monitor symptoms     (E87.1) Hyponatremia - resolved  Comment: mild, diet likely contributing  Sodium   Date Value Ref Range Status   06/16/2025 139 135 - 145 mmol/L Final   06/24/2021 140 133 - 144 mmol/L Final   Plan: monitor Na periodically     (D64.9) Chronic  anemia  Comment: baseline hgb 11-13  Hemoglobin   Date Value Ref Range Status   06/16/2025 10.0 (L) 13.3 - 17.7 g/dL Final   03/29/2021 13.1 (L) 13.3 - 17.7 g/dL Final   03/08/2021 12.0 (L) 13.3 - 17.7 g/dL Final   Plan: monitor hgb and for s/sx bleeding, repeat CBC 6/20     (K59.01) Slow transit constipation  Comment: immobility contributing  Plan: continue bowel regimen, monitor bowel habits per nursing     (E03.9) Hypothyroidism  Comment: chronic, TSH elevated with some concern for medication nonadherance   Plan: continue synthroid     F33.1 Depression  Comment: chronic by history  Plan: continue sertraline 100 mg daily, ACP PRN     (R41.89) Cognitive impairment  (primary encounter diagnosis)  Comment: SLUMS very low 8/30, management reviewed with OT on site. CPT 3.9/5.6 recommended 24 supervision and no additional driving. [employer] paperwork completed in TCU  Plan: OT home care      Discharge Medications:  MED REC REQUIRED  Post Medication Reconciliation Status: discharge medications reconciled and changed, per note/orders       Current Outpatient Medications   Medication Sig Dispense Refill    acetaminophen (TYLENOL) 325 MG tablet Take 2 tablets (650 mg) by mouth 4 times daily as needed for mild pain.      aspirin (ASA) 81 MG tablet Take 1 tablet (81 mg) by mouth daily 90 tablet 3    levothyroxine (SYNTHROID/LEVOTHROID) 112 MCG tablet TAKE 2 TABLETS (224 MCG) BY MOUTH DAILY 180 tablet 3    metoprolol succinate ER (TOPROL XL) 25 MG 24 hr tablet Take 1 tablet (25 mg) by mouth daily. 90 tablet 3    Misc Natural Products (OSTEO BI-FLEX JOINT SHIELD PO) Take 1 tablet by mouth daily.      nitroGLYcerin (NITROSTAT) 0.4 MG sublingual tablet FOR CHEST PAIN PLACE 1 TABLET UNDER THE TONGUE EVERY 5 MINUTES FOR 3 DOSES. IF SYMPTOMS PERSIST 5 MINUTES AFTER 1ST DOSE CALL 911. 25 tablet 2    polyethylene glycol (MIRALAX) 17 GM/Dose powder Take 17 g by mouth daily.      rosuvastatin (CRESTOR) 20 MG tablet Take 1 tablet  "(20 mg) by mouth daily. 90 tablet 3    sertraline (ZOLOFT) 100 MG tablet Take 100 mg by mouth daily.      spironolactone (ALDACTONE) 25 MG tablet Take 1 tablet (25 mg) by mouth daily. 90 tablet 3    tamsulosin (FLOMAX) 0.4 MG capsule Take 0.4 mg by mouth daily.      torsemide (DEMADEX) 20 MG tablet Take 1 tablet (20 mg) by mouth 2 times daily. 60 tablet 2          Controlled medications:   not applicable/none     Past Medical History:   Past Medical History:   Diagnosis Date    Arthritis     Cerebral artery occlusion with cerebral infarction     TIA    CHF (congestive heart failure) 12/24/2018    CVA (cerebral infarction) 2012    CVD (cardiovascular disease)     Depression 1977    hospitalized    Fatty liver     Gout     h/o Concussion     Hypertension 01/17/2011    Hypothyroidism     Obesity     Spider veins     TIA (transient ischaemic attack) 2012    Vitiligo      Physical Exam:   Vitals: /75   Pulse 68   Temp 97.7  F (36.5  C)   Resp 18   Ht 1.727 m (5' 8\")   Wt 126.6 kg (279 lb)   SpO2 93%   BMI 42.42 kg/m    BMI: Body mass index is 42.42 kg/m .  GENERAL APPEARANCE:  Alert, in no distress  RESP:  respiratory effort and palpation of chest normal, lungs clear to auscultation , no respiratory distress  CV:  regular rate and rhythm, no murmur, rub, or gallop, peripheral edema 2+ in BLE  ABDOMEN:  normal bowel sounds, soft, nontender, no hepatosplenomegaly or other masses  M/S:   Gait and station abnormal transfers with assist  SKIN:  Inspection of skin and subcutaneous tissue baseline, Palpation of skin and subcutaneous tissue baseline, lymph wraps to BLE  NEURO:   kia hairston  PSYCH:  insight and judgement impaired, memory impaired      SNF labs: Labs done in SNF are in [facility]. Please refer to them using EPIC/Care Everywhere. and Recent labs in [facility] reviewed by me today.     DISCHARGE PLAN:  Follow up labs: No labs orders/due  Medical Follow Up:      Follow up with primary care provider in 1-2 " weeks    Appointment: 6/24 @215 arrive by 145 PET Oncology   Imaging [address] [facility] [address] [phone]   Appointment: 6/26 @1145am LAB [facility]  Heart Clinic [address] [facility] [address] [phone] 6/26 1240 arrive at 1235  Return Core    [facility] scheduled appointments:  Appointments in Next Year      Jul 03, 2025 10:30 AM  Discharge Summary with YENI Cortes CNP  [facility] ([facility] ) [phone]     Sep 29, 2025 10:15 AM  (Arrive by 10:00 AM)  Return Patient with Adiel Alvarez MD  [facility] [place] ([facility] [place] ) [phone]           Discharge Services: Home Care:  Occupational Therapy, Physical Therapy, Registered Nurse, and Home Health Aide  Discharge Instructions Verbalized to Patient at Discharge:   Continue to follow your diet:  regular.   Weigh yourself daily in the morning and keep a record. Call your primary clinic: a) if you are more short of breath, or b) if your weight changes more than 3 pounds in one day or more than 5 pounds in one week.   Notify your surgeon if you have increased redness, swelling, tenderness, or drainage at your wound sites   Edema wear on in AM/off in PM  Notify your primary care provider if you have a fever greater than 100.5 degrees.   Wound Care: Bilateral Legs - Daily 1. Cleanse wounds and surrounding skin with Vashe and dry 2. Apply Sween ceam to wounds, legs and feet (not between toes) 3. Apply adaptic to wounds 3. Cover with ABDs and wrap with Kerlix  Driving is not recommended until cleared by your providers to resume.   24-hour supervision is recommended for safety.       TOTAL DISCHARGE TIME:   Greater than 30 minutes  Electronically signed by:  YENI Cortes CNP     Home care Face to Face documentation done in EPIC  attached to Home care orders for [facility].

## 2025-07-03 NOTE — PATIENT INSTRUCTIONS
Jamar Ivory  YOB: 1950                                 SSN: [id number]  [facility] MRN#:[id number]  Medical Center Admission Date: 6/18/25 Discharge Date: 7/3/2025   PHYSICIAN's DISCHARGE SUMMARY / ORDER SHEET  1. Discharge to: previous independent home with spouse and home care  2. Medications:      Current Outpatient Medications   Medication Sig Dispense Refill    acetaminophen (TYLENOL) 325 MG tablet Take 2 tablets (650 mg) by mouth 4 times daily.      aspirin (ASA) 81 MG tablet Take 1 tablet (81 mg) by mouth daily 90 tablet 3    levothyroxine (SYNTHROID/LEVOTHROID) 112 MCG tablet TAKE 2 TABLETS (224 MCG) BY MOUTH DAILY 180 tablet 3    metoprolol succinate ER (TOPROL XL) 25 MG 24 hr tablet Take 1 tablet (25 mg) by mouth daily. 90 tablet 3    Misc Natural Products (OSTEO BI-FLEX JOINT SHIELD PO) Take 1 tablet by mouth daily.      nitroGLYcerin (NITROSTAT) 0.4 MG sublingual tablet FOR CHEST PAIN PLACE 1 TABLET UNDER THE TONGUE EVERY 5 MINUTES FOR 3 DOSES. IF SYMPTOMS PERSIST 5 MINUTES AFTER 1ST DOSE CALL 911. 25 tablet 2    polyethylene glycol (MIRALAX) 17 GM/Dose powder Take 17 g by mouth daily.      rosuvastatin (CRESTOR) 20 MG tablet Take 1 tablet (20 mg) by mouth daily. 90 tablet 3    sertraline (ZOLOFT) 100 MG tablet Take 100 mg by mouth daily.      spironolactone (ALDACTONE) 25 MG tablet Take 1 tablet (25 mg) by mouth daily. 90 tablet 3    tamsulosin (FLOMAX) 0.4 MG capsule Take 0.4 mg by mouth daily.      torsemide (DEMADEX) 20 MG tablet Take 1 tablet (20 mg) by mouth 2 times daily. 60 tablet 2              DISCHARGE PLAN:  Follow up labs: No labs orders/due  Medical Follow Up:      Follow up with primary care provider in 1-2 weeks    Appointment: 6/24 @215 arrive by 145 PET Oncology  [facility] [address] [facility] [address] [phone]   Appointment: 6/26 @1145a LAB [facility]  Heart Clinic [address] [facility]  [address] [phone] 6/26 1240 arrive at 1235  Return [facility] scheduled appointments:  Appointments in Next Year      Jul 03, 2025 10:30 AM  Discharge Summary with YENI Cortes CNP  [facility] ([facility] ) [phone]     Sep 29, 2025 10:15 AM  (Arrive by 10:00 AM)  Return Patient with Adiel Alvarez MD  [facility] [place] ([facility] [place] ) [phone]           Discharge Services: Home Care:  Occupational Therapy, Physical Therapy, Registered Nurse, and Home Health Aide  Discharge Instructions Verbalized to Patient at Discharge:   Continue to follow your diet:  regular.   Weigh yourself daily in the morning and keep a record. Call your primary clinic: a) if you are more short of breath, or b) if your weight changes more than 3 pounds in one day or more than 5 pounds in one week.   Notify your surgeon if you have increased redness, swelling, tenderness, or drainage at your wound sites   Edema wear on in AM/off in PM  Notify your primary care provider if you have a fever greater than 100.5 degrees.   Wound Care: Bilateral Legs - Daily 1. Cleanse wounds and surrounding skin with Vashe and dry 2. Apply Sween ceam to wounds, legs and feet (not between toes) 3. Apply adaptic to wounds 3. Cover with ABDs and wrap with Kerlix  Driving is not recommended until cleared by your providers to resume.   24-hour supervision is recommended for safety.       Home care Face to Face documentation done in EPIC attached to [facility].     Discharge patient to home with current medications and treatments  Discharge Home with Home care as above  - Nursing call in 30 days supply of needed meds to pharmacy of choice upon discharge  - please send home with original of these discharge instructions and copy for chart.            ______________________________  Electronically signed:  YENI Cortes [facility]   Department [facility]                   7/3/2025

## 2025-07-08 DIAGNOSIS — Z09 HOSPITAL DISCHARGE FOLLOW-UP: ICD-10-CM

## 2025-07-09 ENCOUNTER — TELEPHONE (OUTPATIENT)
Dept: PHARMACY | Facility: OTHER | Age: 75
End: 2025-07-09
Payer: COMMERCIAL

## 2025-07-09 NOTE — TELEPHONE ENCOUNTER
MTM referral from: Transitions of Care (recent hospital discharge, TCU discharge, or ED visit)    MTM referral outreach attempt #1 on July 9, 2025 at 1:13 PM      Outcome: Spoke with patient declined    Use OX, [facility] med adv, TCU Discharge: July 06, 2025 for the carrier/Plan on the flowsheet          Cassandra Jeff [facility]  -[facility]  [phone]

## 2025-07-15 ENCOUNTER — MEDICAL CORRESPONDENCE (OUTPATIENT)
Dept: HEALTH INFORMATION MANAGEMENT | Facility: CLINIC | Age: 75
End: 2025-07-15
Payer: COMMERCIAL

## 2025-07-16 ENCOUNTER — PATIENT OUTREACH (OUTPATIENT)
Dept: CARE COORDINATION | Facility: CLINIC | Age: 75
End: 2025-07-16
Payer: COMMERCIAL

## 2025-07-16 ASSESSMENT — ACTIVITIES OF DAILY LIVING (ADL): DEPENDENT_IADLS:: CLEANING;COOKING;LAUNDRY;SHOPPING;MEAL PREPARATION

## 2025-07-16 NOTE — LETTER
[facility] CARE COORDINATION    July 16, 2025        Jamar Pylehideo  [address]  [place] [address]          Dear Jamar,     I spoke briefly with Selin.  She expressed interest in looking in to Assisted Living .  A resource that can help with finding an Assisted living is [facility] who can be reached at  [phone].    Attached is an updated Patient Centered Plan of Care for your continued enrollment in Care Coordination. Please let us know if you have additional questions, concerns, or goals that we can assist with.    Sincerely,    MERY Ozuna   Care Coordination Team  [phone]         [facility]  Patient Centered Plan of Care  About Me:        Patient Name:  Jamar Marroquin    YOB: 1950  Age:         75 year old   Twila MRN:    [id number] Telephone Information:  Home Phone [phone]   Mobile Not on file.       Address:  [address]  [place] [address] Email address:  No e-mail address on record      Emergency Contact(s)    Name Relationship Lgl Grd Work Phone Home Phone Mobile Phone   1. SELIN MARROQUIN Spouse No  [phone]   2. JOSE MARIA MARROQUIN Son No  [phone]           Primary language:  English     needed? No   [place] [facility]:  [phone] op. 1  Other communication barriers:Glasses    Preferred Method of Communication:  Mail  Current living arrangement: I live in a private home with spouse    Mobility Status/ Medical Equipment: Independent w/Device        Health Maintenance  Health Maintenance Reviewed: Due/Overdue   Health Maintenance Due   Topic Date Due    ZOSTER VACCINE (1 of 2) Never done    EYE EXAM  05/22/2019    DIABETIC FOOT EXAM  01/04/2022    MEDICARE ANNUAL WELLNESS VISIT  01/08/2022    HF ACTION PLAN  05/15/2022    RSV VACCINE (1 - 1-dose 75+ series) Never done    COVID-19 VACCINE (6 - 2024-25 season) 06/19/2025    DEPRESSION 6 MO INDEX REPEAT  PHQ-9  06/26/2025          My Access Plan  Medical Emergency 911   Primary Clinic Line [facility]* - [phone]   24 Hour Appointment Line [phone] or  [phone]) (toll-free)   24 Hour Nurse Line [phone] (toll-free)   Preferred Urgent Care No data recorded   Preferred Hospital [facility]  [phone]     Preferred Pharmacy [facility] - [place], [place] - [address]     Behavioral Health Crisis Line The National Suicide Prevention Lifeline at 1-843.304.3443 or Text/Call 878           My Care Team Members  Patient Care Team         Relationship Specialty Notifications Start End    Huy Crystal MD PCP - General Internal Medicine  10/5/23     Phone: [phone] Fax: [phone] [address] [facility] [address]    Kong Fraga MD MD Gastroenterology  8/11/16     Phone: [phone] Fax: [phone]         [address] [facility] [address] [facility] [address]    Cary Grace MD MD Ophthalmology  5/21/18     Phone: [phone] Fax: [phone]         [address] [facility] [address]    Rika Lindsay NP Nurse Practitioner Nurse Practitioner Psych/Mental Health  11/7/19     Phone: [phone] Fax: [phone]          CLINICS [address] [facility] [address]    Ezequiel Chand MD MD Cardiovascular Disease  4/21/21     Phone: [phone] Fax: [phone] [address] [place] [address]    Nakia Martin PA-C Physician Assistant Urology  7/26/22     Phone: [phone] Fax: [phone] [address] [place] [address]    Huy Crystal MD Assigned PCP   11/25/23     Phone: [phone] Fax: [phone]         [address] [place] [address]    Rn, [facility] C.ORainRNESSA Specialty Care Coordinator Cardiology  11/18/24     Phone: [phone] Fax: [phone]        Fabian Hightower, NP Nurse Practitioner  Cardiovascular Disease  12/6/24     Phone: [phone] Fax: [phone] [address] [place] [address]    Afua Saxena, [place] Lead Care Coordinator Primary Care - CC Admissions 12/23/24     Phone: [phone]         Melissa De [facility] C.O.RRainE. Specialty Care Coordinator Cardiology  1/9/25     Phone: [phone] Fax: [phone]        Fabian Hightower NP Assigned Heart and Vascular Provider   3/23/25     Phone: [phone] Fax: [phone] [address] [place] [address]    Willie Landon MD Assigned Heart and Vascular Surgical Provider   3/23/25     Phone: [phone] Fax: [phone] [address] [place] [address]    Migdalia Rouse MD MD Internal Medicine  5/2/25     Phone: [phone] Fax: [phone]         [address] [facility] [address]    Adiel Alvarez MD MD Urology  5/2/25     Phone: [phone] Fax: [phone]         [address] [address]    Tk Logan, [facility] Assigned Behavioral Health Provider   6/23/25     Phone: [phone] [address] [facility] [address]                My Care Plans  Self Management and Treatment Plan    Care Plan  Care Plan: Help At Home       Problem: HP GENERAL PROBLEM       Goal: Look in to a higher level of care       Start Date: 7/16/2025 Expected End Date: 9/30/2025    Priority: High    Note:     Priority: High      Note:      Barriers: Uncertain how to begin the process  Strengths: Supportive spouse  Patient expressed understanding of goal: Yes (spouse)  Action steps to achieve this goal:  1. I will contact [facility] at [phone] to request assistance finding and Assisted living community.   2. I will let Care Coordination know if I need additional information.                                                 Care Plan: Transportation       Problem: Lack of transportation       Goal: Establish reliable transportation        Start Date: 7/16/2025 Expected End Date: 9/30/2025    Priority: Medium    Note:     Barriers: Kenneth currently not able to drive.  Strengths: Family support  Patient expressed understanding of goal: Yes  Action steps to achieve this goal:  1. We will complete the [employer] application and turn it in to be processed.                                Action Plans on File:            Depression  Heart Failure       Advance Care Plans/Directives:   Advanced Care Plan/Directives on file: Yes    Status of Document(s): No data recorded  Advanced Care Plan/Directives Type: No data recorded         My Medical and Care Information  Problem List   Patient Active Problem List   Diagnosis    Acquired hypothyroidism    Vitiligo    Hyperlipidemia with target LDL less than 100    Hypertension goal BP (blood pressure) < 140/90    Cerebral infarction (H)    Moderate major depression (H)    Fatty liver    Impaired glucose tolerance    Transient cerebral ischemia    Obesity with serious comorbidity    Bilateral leg edema    Diet Controlled Type 2 diabetes mellitus without complication, without long-term current use of insulin (H)    Benign neoplasm of colon    Pain in both lower extremities    Low hemoglobin    Breast tenderness in male    Hx of Diabetic polyneuropathy associated with type 2 diabetes mellitus - now diet controlled (H)    Chronic combined systolic and diastolic congestive heart failure (H)    PVD (peripheral vascular disease)    Benign prostatic hyperplasia with incomplete bladder emptying    Major depressive disorder, recurrent episode, moderate (H)    Shortness of breath    Chest tightness    Coronary artery disease, noted to have a small segment infarct with teo-infarct ischemia    Precordial pain    Morbid obesity (H)    Wound infection    Cellulitis of left hand    Generalized weakness    Recurrent falls    Closed head injury, initial encounter    Ulcers of both lower extremities, unspecified ulcer stage (H)       Current Medications:  Please refer to the most recent medication list provided to you by your medical team and reach out to your provider with any questions or to make any corrections.    Care Coordination Start Date: 12/19/2024   Frequency of Care Coordination: monthly, more frequently as needed     Form Last Updated: 07/16/2025

## 2025-07-16 NOTE — PROGRESS NOTES
Clinic Care Coordination Contact  Clinic Care Coordination Contact  OUTREACH with Post Discharge Assessment    Referral Information:  Referral Source: IP Report    Primary Diagnosis: Other (include Comment box)    Chief Complaint   Patient presents with    Clinic Care Coordination - Post Hospital     TCM outreach        Universal Utilization: 95% Admission or ED Risk  Clinic Utilization  Difficulty keeping appointments:: No  Compliance Concerns: No  No PCP office visit in Past Year: No  Utilization      No Show Count (past year)  9             ED Visits  5             Hospital Admissions  2                    Current as of: 7/15/2025  6:54 AM                Clinical Concerns:  Current Medical Concerns:    Patient Active Problem List   Diagnosis    Acquired hypothyroidism    Vitiligo    Hyperlipidemia with target LDL less than 100    Hypertension goal BP (blood pressure) < 140/90    Cerebral infarction (H)    Moderate major depression (H)    Fatty liver    Impaired glucose tolerance    Transient cerebral ischemia    Obesity with serious comorbidity    Bilateral leg edema    Diet Controlled Type 2 diabetes mellitus without complication, without long-term current use of insulin (H)    Benign neoplasm of colon    Pain in both lower extremities    Low hemoglobin    Breast tenderness in male    Hx of Diabetic polyneuropathy associated with type 2 diabetes mellitus - now diet controlled (H)    Chronic combined systolic and diastolic congestive heart failure (H)    PVD (peripheral vascular disease)    Benign prostatic hyperplasia with incomplete bladder emptying    Major depressive disorder, recurrent episode, moderate (H)    Shortness of breath    Chest tightness    Coronary artery disease, noted to have a small segment infarct with teo-infarct ischemia    Precordial pain    Morbid obesity (H)    Wound infection    Cellulitis of left hand    Generalized weakness    Recurrent falls    Closed head injury, initial encounter     Ulcers of both lower extremities, unspecified ulcer stage (H)        CHANDNI GUARDADO spoke with spouse Suzan.  Home Care has started.  Kenneth not able to drive. They started paperwork for [employer] which she needs to find.   Declined scheduling a PCP follow up due to not having transportation. Reviewed that TCU reccommended 24 hour care for Kenneth.   Suzan agreeable to looking in to Assisted Living options and CHANDNI GUARDADO will send her information about [facility].  She needed to end the call abruptly as needed to use the bathroom.    Current Behavioral Concerns: None noted    Education Provided to patient: [employer]       Health Maintenance Reviewed:   Health Maintenance Due   Topic Date Due    ZOSTER VACCINE (1 of 2) Never done    EYE EXAM  05/22/2019    DIABETIC FOOT EXAM  01/04/2022    MEDICARE ANNUAL WELLNESS VISIT  01/08/2022    HF ACTION PLAN  05/15/2022    RSV VACCINE (1 - 1-dose 75+ series) Never done    COVID-19 VACCINE (6 - 2024-25 season) 06/19/2025    DEPRESSION 6 MO INDEX REPEAT PHQ-9  06/26/2025           Transitions of Care Outreach    Most Recent Admission Date: 6/15/2025   Most Recent Admission Diagnosis: Generalized weakness - R53.1  Recurrent falls - R29.6  Closed head injury, initial encounter - S09.90XA  Ulcers of both lower extremities, unspecified ulcer stage (H) - L97.919, L97.929     Most Recent Discharge Date: 6/18/2025   Most Recent Discharge Diagnosis: Closed head injury, initial encounter - S09.90XA  Generalized weakness - R53.1  Recurrent falls - R29.6  Ulcers of both lower extremities, unspecified ulcer stage (H) - L97.919, L97.929  Type 2 diabetes mellitus without complication, without long-term current use of insulin (H) - E11.9  Morbid obesity (H) - E66.01  Major depressive disorder, recurrent episode, moderate (H) - F33.1  Hypertension goal BP (blood pressure) < 140/90 - I10  Shortness of breath - R06.02  Wound infection - T14.8XXA, L08.9  Major depression - F32.9  Acquired  hypothyroidism - E03.9  Diabetic polyneuropathy associated with type 2 diabetes mellitus (H) - E11.42  Bilateral leg edema - R60.0  Hyperlipidemia with target LDL less than 100 - E78.5  Chronic combined systolic and diastolic congestive heart failure (H) - I50.42  Cellulitis of left hand - L03.114  Coronary artery disease involving native coronary artery of native heart with angina pectoris - I25.119  PVD (peripheral vascular disease) - I73.9  Nausea - R11.0  Constipation, unspecified constipation type - K59.00  Encounter for deep vein thrombosis (DVT) prophylaxis - Z29.9     Transitions of Care Assessment    Discharge Assessment  How are you doing now that you are home?: Spouse states he is doing ok and has been waling on his own. Home care has started.  They have [employer] paperwork  How are your symptoms? (Red Flag symptoms escalate to triage hotline per guidelines): Improved  Do you know how to contact your clinic care team if you have future questions or changes to your health status? : Yes  Does the patient have their discharge instructions? : Yes  Does the patient have questions regarding their discharge instructions? : Yes (see comment)  Were you started on any new medications or were there changes to any of your previous medications? : Yes  Does the patient have all of their medications?: Yes  Do you have questions regarding any of your medications? : No  Do you have all of your needed medical supplies or equipment (DME)?  (i.e. oxygen tank, CPAP, cane, etc.): Yes                  Medication Management:  Medication review status: Not reviewed.Have home Care RN reviewing medication wit them        Functional Status:  Dependent ADLs:: Ambulation-walker  Dependent IADLs:: Cleaning, Cooking, Laundry, Shopping, Meal Preparation  Bed or wheelchair confined:: No  Mobility Status: Independent w/Device  Fallen 2 or more times in the past year?: (!) Yes    Living Situation:  Current living arrangement:: I live  in a private home with spouse  Type of residence:: Apartment    Lifestyle & Psychosocial Needs:    Social Drivers of Health     Food Insecurity: Low Risk  (6/15/2025)    Food Insecurity     Within the past 12 months, did you worry that your food would run out before you got money to buy more?: No     Within the past 12 months, did the food you bought just not last and you didn t have money to get more?: No   Depression: Not at risk (5/1/2025)    PHQ-2     PHQ-2 Score: 2   Recent Concern: Depression - At risk (2/25/2025)    PHQ-2     PHQ-2 Score: 6   Housing Stability: Low Risk  (6/15/2025)    Housing Stability     Do you have housing? : Yes     Are you worried about losing your housing?: No   Tobacco Use: Low Risk  (7/1/2025)    Patient History     Smoking Tobacco Use: Never     Smokeless Tobacco Use: Never     Passive Exposure: Not on file   Financial Resource Strain: Low Risk  (6/15/2025)    Financial Resource Strain     Within the past 12 months, have you or your family members you live with been unable to get utilities (heat, electricity) when it was really needed?: No   Alcohol Use: Not on file   Transportation Needs: High Risk (6/15/2025)    Transportation Needs     Within the past 12 months, has lack of transportation kept you from medical appointments, getting your medicines, non-medical meetings or appointments, work, or from getting things that you need?: Yes   Physical Activity: Not on file   Interpersonal Safety: Low Risk  (1/18/2025)    Interpersonal Safety     Do you feel physically and emotionally safe where you currently live?: Yes     Within the past 12 months, have you been hit, slapped, kicked or otherwise physically hurt by someone?: No     Within the past 12 months, have you been humiliated or emotionally abused in other ways by your partner or ex-partner?: No   Stress: Not on file   Social Connections: Not on file   Health Literacy: Not on file     Inadequate nutrition (GOAL):: No  Tube  Feeding: No  Inadequate activity/exercise (GOAL):: No  Significant changes in sleep pattern (GOAL): No  Transportation means:: Other     Mental health DX:: Yes  Mental health management concern (GOAL):: No  Informal Support system:: Friends, Spouse             Resources and Interventions:  Current Resources:   Skilled Home Care Services: Skilled Nursing, Physical Therapy, Occupational Therapy     Supplies Currently Used at Home: Incontinence Supplies  Equipment Currently Used at Home: walker, rolling  Employment Status: retired         Advance Care Plan/Directive  Advanced Care Plans/Directives on file:: Yes    Referrals Placed: Other , Transportation ([facility])     Care Plan:   Care Plan: Help At Home       Problem: HP GENERAL PROBLEM       Goal: Look in to a higher level of care       Start Date: 7/16/2025 Expected End Date: 9/30/2025    Priority: High    Note:     Priority: High      Note:      Barriers: Uncertain how to begin the process  Strengths: Supportive spouse  Patient expressed understanding of goal: Yes (spouse)  Action steps to achieve this goal:  1. I will contact [facility] at [phone] to request assistance finding and Assisted living community.   2. I will let Care Coordination know if I need additional information.                                                 Care Plan: Transportation       Problem: Lack of transportation       Goal: Establish reliable transportation       Start Date: 7/16/2025 Expected End Date: 9/30/2025    Priority: Medium    Note:     Barriers: Kenneth currently not able to drive.  Strengths: Family support  Patient expressed understanding of goal: Yes  Action steps to achieve this goal:  1. We will complete the [employer] application and turn it in to be processed.                                Patient/Caregiver understanding: Yes    Outreach Frequency: monthly, more frequently as needed  Future Appointments                In 2 months Adiel Alvarez  MD [facility]            Follow up Plan     Discharge Follow-Up  Discharge follow up appointment scheduled in alignment with recommended follow up timeframe or Transitions of Risk Category? (Low = within 30 days; Moderate= within 14 days; High= within 7 days): No  Patient's follow up appointment not scheduled: Patient declined scheduling support. Education on the importance of transitions of care follow up. Provided scheduling phone number.    Future Appointments   Date Time Provider Department Center   9/29/2025 10:15 AM Adiel Alvarez MD [facility]       Outpatient Plan as outlined on AVS reviewed with patient.    For any urgent concerns, please contact our 24 hour nurse triage line: [phone] ([phone])       Delegation to CHW: Yes, CHW will:  CHW Delegation:   1)  Due Date:  Next outreach       Delegation:Assit with "[employer] Mob[employer] application as needed.  Schedule appt with PCP if possible.

## 2025-07-18 ENCOUNTER — TELEPHONE (OUTPATIENT)
Dept: INTERNAL MEDICINE | Facility: CLINIC | Age: 75
End: 2025-07-18
Payer: COMMERCIAL

## 2025-07-18 DIAGNOSIS — R29.6 FALLS FREQUENTLY: ICD-10-CM

## 2025-07-18 DIAGNOSIS — I50.42 CHRONIC COMBINED SYSTOLIC AND DIASTOLIC CONGESTIVE HEART FAILURE (H): Primary | ICD-10-CM

## 2025-07-18 NOTE — TELEPHONE ENCOUNTER
Order/Referral Request    Who is requesting: Pt's wife    Orders being requested: [employer]    Reason service is needed/diagnosis: Pt unable to drive until he gets the okay, so needs to get an order for [employer] to get to appts.    When are orders needed by: ASAP    Has this been discussed with Provider: No    Does patient have a preference on a Group/Provider/Facility? [employer] fax [phone]    Does patient have an appointment scheduled?: No    Where to send orders: Fax Get in touch with [employer]   Okay to leave a detailed message?: Yes at Home number on file [phone] (home)

## 2025-07-21 ENCOUNTER — PATIENT OUTREACH (OUTPATIENT)
Dept: CARE COORDINATION | Facility: CLINIC | Age: 75
End: 2025-07-21
Payer: COMMERCIAL

## 2025-07-21 NOTE — PROGRESS NOTES
Clinic Care Coordination Contact  Community Health Worker Follow Up    Care Gaps:     Health Maintenance Due   Topic Date Due    ZOSTER VACCINE (1 of 2) Never done    EYE EXAM  05/22/2019    DIABETIC FOOT EXAM  01/04/2022    MEDICARE ANNUAL WELLNESS VISIT  01/08/2022    HF ACTION PLAN  05/15/2022    RSV VACCINE (1 - 1-dose 75+ series) Never done    COVID-19 VACCINE (6 - 2024-25 season) 06/19/2025    DEPRESSION 6 MO INDEX REPEAT PHQ-9  06/26/2025       Did Not Address    Care Plan:   Care Plan: Help At Home       Problem: HP GENERAL PROBLEM       Goal: Look in to a higher level of care       Start Date: 7/16/2025 Expected End Date: 9/30/2025    This Visit's Progress: 20%    Priority: High    Note:     Priority: High      Note:      Barriers: Uncertain how to begin the process  Strengths: Supportive spouse  Patient expressed understanding of goal: Yes (spouse)  Action steps to achieve this goal:  1. I will contact [facility] at [phone] to request assistance finding and Assisted living community.   2. I will let Care Coordination know if I need additional information.                                                 Care Plan: Transportation       Problem: Lack of transportation       Goal: Establish reliable transportation       Start Date: 7/16/2025 Expected End Date: 9/30/2025    This Visit's Progress: 10%    Priority: Medium    Note:     Barriers: Kenneth currently not able to drive.  Strengths: Family support  Patient expressed understanding of goal: Yes  Action steps to achieve this goal:  1. We will complete the [employer] application and turn it in to be processed.                            Discussed:  Spoke with patient's wife, Melissa.    CHW called to assist with a [employer] application.    Patient's wife stated Kishore a SW from [facility] will be visiting this week to complete the paperwork.  Patient's wife stated the TCU facility completed and signed the profession portion on the form.   Kishore will help complete the application and will fax over to [employer].  Patient's wife stated she does not need any assistance from CC.    Patient's wife stated she asked family members to provide transportation, the family is not comfortable due to the falls from patient.    Patient's wife stated once there is an approval from , she will schedule a PCP appointment for her .    Patient's wife stated she will reach out to CC if anything else is needed.      Intervention and Education during outreach:     CHW encouraged patient to contact CC if there are any other changes or resources needed.  Patient acknowledged understanding.      CHW Plan: will outreach in one month    CARY Saunders  Clinic Care Coordination  [facility] Clinics: [facility] [place], [facility], [facility], and [facility]  Phone: [phone]

## 2025-07-21 NOTE — TELEPHONE ENCOUNTER
Referral placed for care coordination to give information on [employer], unable to place direct order for such

## 2025-07-21 NOTE — TELEPHONE ENCOUNTER
Pt's wife was called with provider's message. I see that the home care also obtained a  eval and treat with Home Care.

## 2025-07-23 DIAGNOSIS — I50.22 CHRONIC HFREF (HEART FAILURE WITH REDUCED EJECTION FRACTION) (H): ICD-10-CM

## 2025-07-23 DIAGNOSIS — I25.5 ISCHEMIC CARDIOMYOPATHY: ICD-10-CM

## 2025-07-23 RX ORDER — SPIRONOLACTONE 25 MG/1
25 TABLET ORAL DAILY
Qty: 90 TABLET | Refills: 2 | Status: SHIPPED | OUTPATIENT
Start: 2025-07-23

## 2025-07-23 NOTE — TELEPHONE ENCOUNTER
Medication Question or Refill    Contacts       Contact Date/Time Type Contact Phone/Fax    07/23/2025 02:17 PM CDT Phone (Incoming) Suzan Ivory (Emergency Contact) [phone] (H)            What medication are you calling about (include dose and sig)?: spironolactone (ALDACTONE) 25 MG tablet [I50.22] , [I25.5]     Preferred Pharmacy: [place] [address] [place] [address] [address]  Phone: [phone] Fax: [phone]      Controlled Substance Agreement on file:   CSA -- Patient Level:    CSA: None found at the patient level.       Who prescribed the medication?: Fabian Hightower     Do you need a refill? Yes    When did you use the medication last? 7/23/25    Patient offered an appointment? No    Do you have any questions or concerns?  No      Okay to leave a detailed message?: Yes at Home number on file [phone] (home)

## 2025-07-29 ENCOUNTER — MEDICAL CORRESPONDENCE (OUTPATIENT)
Dept: HEALTH INFORMATION MANAGEMENT | Facility: CLINIC | Age: 75
End: 2025-07-29
Payer: COMMERCIAL

## 2025-07-30 ENCOUNTER — TELEPHONE (OUTPATIENT)
Dept: INTERNAL MEDICINE | Facility: CLINIC | Age: 75
End: 2025-07-30
Payer: COMMERCIAL

## 2025-07-30 NOTE — TELEPHONE ENCOUNTER
Home Care is calling regarding an established patient with [facility].  Requesting orders from: Huy Crystal RN APPROVED: RN able to provide verbal orders.  Home Care will send orders for signature.  RN will close encounter.  Is this a request for a temporary pause in the home care episode?  No    Orders Requested    Social Work  Request for initial evaluation and treatment (one time)   RN gave verbal order: Yes      Contacts       Contact Date/Time Type Contact Phone/Fax    07/30/2025 09:36 AM CDT Phone (Incoming) JOY [employer]. [phone]     confidential          Guerda Silverio RN

## 2025-07-31 ENCOUNTER — MEDICAL CORRESPONDENCE (OUTPATIENT)
Dept: HEALTH INFORMATION MANAGEMENT | Facility: CLINIC | Age: 75
End: 2025-07-31
Payer: COMMERCIAL

## 2025-08-06 ENCOUNTER — VIRTUAL VISIT (OUTPATIENT)
Dept: INTERNAL MEDICINE | Facility: CLINIC | Age: 75
End: 2025-08-06
Payer: COMMERCIAL

## 2025-08-06 ENCOUNTER — MEDICAL CORRESPONDENCE (OUTPATIENT)
Dept: HEALTH INFORMATION MANAGEMENT | Facility: CLINIC | Age: 75
End: 2025-08-06

## 2025-08-06 DIAGNOSIS — E66.01 MORBID OBESITY (H): ICD-10-CM

## 2025-08-06 DIAGNOSIS — E11.9 TYPE 2 DIABETES MELLITUS WITHOUT COMPLICATION, WITHOUT LONG-TERM CURRENT USE OF INSULIN (H): Chronic | ICD-10-CM

## 2025-08-06 DIAGNOSIS — E03.9 ACQUIRED HYPOTHYROIDISM: Chronic | ICD-10-CM

## 2025-08-06 DIAGNOSIS — L97.929 ULCERS OF BOTH LOWER EXTREMITIES, UNSPECIFIED ULCER STAGE (H): ICD-10-CM

## 2025-08-06 DIAGNOSIS — I50.42 CHRONIC COMBINED SYSTOLIC AND DIASTOLIC CONGESTIVE HEART FAILURE (H): ICD-10-CM

## 2025-08-06 DIAGNOSIS — Z91.89 DRIVING SAFETY ISSUE: ICD-10-CM

## 2025-08-06 DIAGNOSIS — L97.919 ULCERS OF BOTH LOWER EXTREMITIES, UNSPECIFIED ULCER STAGE (H): ICD-10-CM

## 2025-08-06 DIAGNOSIS — I10 HYPERTENSION GOAL BP (BLOOD PRESSURE) < 140/90: Chronic | ICD-10-CM

## 2025-08-06 DIAGNOSIS — Z09 HOSPITAL DISCHARGE FOLLOW-UP: Primary | ICD-10-CM

## 2025-08-06 PROCEDURE — 98014 SYNCH AUDIO-ONLY EST MOD 30: CPT | Performed by: INTERNAL MEDICINE

## 2025-08-12 ENCOUNTER — MEDICAL CORRESPONDENCE (OUTPATIENT)
Dept: HEALTH INFORMATION MANAGEMENT | Facility: CLINIC | Age: 75
End: 2025-08-12
Payer: COMMERCIAL

## 2025-08-19 ENCOUNTER — MEDICAL CORRESPONDENCE (OUTPATIENT)
Dept: HEALTH INFORMATION MANAGEMENT | Facility: CLINIC | Age: 75
End: 2025-08-19
Payer: COMMERCIAL

## 2025-08-26 ENCOUNTER — PATIENT OUTREACH (OUTPATIENT)
Dept: CARE COORDINATION | Facility: CLINIC | Age: 75
End: 2025-08-26
Payer: COMMERCIAL

## 2025-08-28 ENCOUNTER — PATIENT OUTREACH (OUTPATIENT)
Dept: CARE COORDINATION | Facility: CLINIC | Age: 75
End: 2025-08-28
Payer: COMMERCIAL

## (undated) DEVICE — GLOVE BIOGEL PI MICRO SZ 6.5 48565

## (undated) DEVICE — PREP DYNA-HEX 4% CHG SCRUB 4OZ BOTTLE MDS098710

## (undated) DEVICE — SU ETHILON 4-0 PS-2 18" BLACK 1667H

## (undated) DEVICE — GLOVE BIOGEL PI MICRO INDICATOR UNDERGLOVE SZ 7.0 48970

## (undated) DEVICE — PREP POVIDONE IODINE SOLUTION 10% 4OZ BOTTLE 29906-004

## (undated) DEVICE — SOL NACL 0.9% IRRIG 1000ML BOTTLE 2F7124

## (undated) DEVICE — PREP POVIDONE-IODINE 7.5% SCRUB 4OZ BOTTLE MDS093945

## (undated) DEVICE — BAG CLEAR TRASH 1.3M 39X33" P4040C

## (undated) DEVICE — SUCTION CANISTER MEDIVAC LINER 3000ML W/LID 65651-530

## (undated) DEVICE — BNDG ELASTIC 2"X5YDS UNSTERILE 6611-20

## (undated) DEVICE — TUBING CYSTO/BLADDER IRRIG SET 80" 06544-01

## (undated) DEVICE — DRSG KERLIX 4 1/2"X4YDS ROLL 6730

## (undated) DEVICE — PACK HAND SOP32HARMO

## (undated) DEVICE — TUBING SUCTION MEDI-VAC SOFT 3/16"X20' N520A

## (undated) DEVICE — TRAY PREP DRY SKIN SCRUB 067

## (undated) DEVICE — PAD CHUX UNDERPAD 30X36" P3036C

## (undated) DEVICE — SOL WATER IRRIG 1000ML BOTTLE 2F7114

## (undated) DEVICE — TUBING SUCTION 12"X1/4" N612

## (undated) DEVICE — LINEN FULL SHEET 5511

## (undated) DEVICE — APPLICATORS COTTON TIP 6"X2 STERILE LF C15053-006

## (undated) DEVICE — LINEN HALF SHEET 5512

## (undated) DEVICE — BASIN SET MAJOR

## (undated) RX ORDER — CHLORHEXIDINE GLUCONATE 40 MG/ML
SOLUTION TOPICAL
Status: DISPENSED

## (undated) RX ORDER — BUPIVACAINE HYDROCHLORIDE 5 MG/ML
INJECTION, SOLUTION EPIDURAL; INTRACAUDAL
Status: DISPENSED
Start: 2025-01-19

## (undated) RX ORDER — PROPOFOL 10 MG/ML
INJECTION, EMULSION INTRAVENOUS
Status: DISPENSED
Start: 2021-12-27

## (undated) RX ORDER — PROPOFOL 10 MG/ML
INJECTION, EMULSION INTRAVENOUS
Status: DISPENSED
Start: 2025-01-19

## (undated) RX ORDER — MAGNESIUM HYDROXIDE 1200 MG/15ML
LIQUID ORAL
Status: DISPENSED

## (undated) RX ORDER — LIDOCAINE HYDROCHLORIDE 10 MG/ML
INJECTION, SOLUTION EPIDURAL; INFILTRATION; INTRACAUDAL; PERINEURAL
Status: DISPENSED
Start: 2021-12-27

## (undated) RX ORDER — GLYCOPYRROLATE 0.2 MG/ML
INJECTION INTRAMUSCULAR; INTRAVENOUS
Status: DISPENSED
Start: 2021-12-27

## (undated) RX ORDER — DEXAMETHASONE SODIUM PHOSPHATE 4 MG/ML
INJECTION, SOLUTION INTRA-ARTICULAR; INTRALESIONAL; INTRAMUSCULAR; INTRAVENOUS; SOFT TISSUE
Status: DISPENSED
Start: 2021-12-27

## (undated) RX ORDER — DEXMEDETOMIDINE HYDROCHLORIDE 4 UG/ML
INJECTION, SOLUTION INTRAVENOUS
Status: DISPENSED
Start: 2025-01-19

## (undated) RX ORDER — FENTANYL CITRATE 50 UG/ML
INJECTION, SOLUTION INTRAMUSCULAR; INTRAVENOUS
Status: DISPENSED
Start: 2025-01-19